# Patient Record
Sex: MALE | Race: WHITE | NOT HISPANIC OR LATINO | Employment: OTHER | URBAN - METROPOLITAN AREA
[De-identification: names, ages, dates, MRNs, and addresses within clinical notes are randomized per-mention and may not be internally consistent; named-entity substitution may affect disease eponyms.]

---

## 2017-01-16 ENCOUNTER — OFFICE VISIT (OUTPATIENT)
Dept: FAMILY MEDICINE | Facility: CLINIC | Age: 67
End: 2017-01-16
Payer: MEDICARE

## 2017-01-16 VITALS
WEIGHT: 249.56 LBS | HEIGHT: 71 IN | HEART RATE: 72 BPM | SYSTOLIC BLOOD PRESSURE: 121 MMHG | TEMPERATURE: 98 F | BODY MASS INDEX: 34.94 KG/M2 | OXYGEN SATURATION: 96 % | DIASTOLIC BLOOD PRESSURE: 79 MMHG

## 2017-01-16 DIAGNOSIS — Z11.59 NEED FOR HEPATITIS C SCREENING TEST: ICD-10-CM

## 2017-01-16 DIAGNOSIS — Z11.59 NEED FOR HEPATITIS C SCREENING TEST: Primary | ICD-10-CM

## 2017-01-16 DIAGNOSIS — I25.10 CORONARY ARTERY DISEASE INVOLVING NATIVE CORONARY ARTERY OF NATIVE HEART WITHOUT ANGINA PECTORIS: ICD-10-CM

## 2017-01-16 DIAGNOSIS — I50.42 CHRONIC COMBINED SYSTOLIC AND DIASTOLIC CHF (CONGESTIVE HEART FAILURE): ICD-10-CM

## 2017-01-16 DIAGNOSIS — E11.9 DIABETES MELLITUS TYPE 2 WITHOUT RETINOPATHY: Primary | ICD-10-CM

## 2017-01-16 DIAGNOSIS — Z79.4 TYPE 2 DIABETES MELLITUS WITH DIABETIC NEUROPATHY, WITH LONG-TERM CURRENT USE OF INSULIN: ICD-10-CM

## 2017-01-16 DIAGNOSIS — E78.5 DYSLIPIDEMIA: ICD-10-CM

## 2017-01-16 DIAGNOSIS — E66.9 OBESITY, CLASS I, BMI 30.0-34.9 (SEE ACTUAL BMI): ICD-10-CM

## 2017-01-16 DIAGNOSIS — E11.40 TYPE 2 DIABETES MELLITUS WITH DIABETIC NEUROPATHY, WITH LONG-TERM CURRENT USE OF INSULIN: ICD-10-CM

## 2017-01-16 PROCEDURE — 99999 PR PBB SHADOW E&M-EST. PATIENT-LVL III: CPT | Mod: PBBFAC,,, | Performed by: FAMILY MEDICINE

## 2017-01-16 PROCEDURE — 99214 OFFICE O/P EST MOD 30 MIN: CPT | Mod: S$PBB,,, | Performed by: FAMILY MEDICINE

## 2017-01-16 PROCEDURE — 99213 OFFICE O/P EST LOW 20 MIN: CPT | Mod: PBBFAC,PO | Performed by: FAMILY MEDICINE

## 2017-01-16 NOTE — MR AVS SNAPSHOT
[address]  [place] [address]  Phone: [phone]  Fax: [phone]                  Clifton Wilson   2017 8:00 AM   Office Visit    Description:  Male : 1950   Provider:  Britton Grissom MD   Department:  [facility]           Reason for Visit     Follow-up           Diagnoses this Visit        Comments    Diabetes mellitus type 2 without retinopathy    -  Primary     Dyslipidemia         Obesity, Class I, BMI 30.0-34.9 (see actual BMI)         Coronary artery disease involving native coronary artery of native heart without angina pectoris         Chronic combined systolic and diastolic CHF (congestive heart failure)         Type 2 diabetes mellitus with diabetic neuropathy, with long-term current use of insulin         Need for hepatitis C screening test                To Do List           Future Appointments        Provider Department Dept Phone    3/20/2017 9:10 AM APPOINTMENT LAB, [place] [facility] [phone]    3/22/2017 9:00 AM Britton Grissom MD [facility] [phone]      Goals (5 Years of Data)     None      Follow-Up and Disposition     Return in about 2 months (around 3/16/2017), or if symptoms worsen or fail to improve.      [facility]     [facility] -  Assistance  Registered nurses in the [facility] provide clinical advisement, health education, appointment booking, and other advisory services.  Call for this free service at [phone].             Medications           Message regarding Medications     Verify the changes and/or additions to your medication regime listed below are the same as discussed with your clinician today.  If any of these changes or additions are incorrect, please notify your healthcare provider.             Verify that the below list of medications is an accurate representation of the medications you are  "currently taking.  If none reported, the list may be blank. If incorrect, please contact your healthcare provider. Carry this list with you in case of emergency.           Current Medications     aspirin (ECOTRIN) 81 MG EC tablet Take 81 mg by mouth once daily.    atorvastatin (LIPITOR) 40 MG tablet Take 1 tablet (40 mg total) by mouth once daily.    blood sugar diagnostic (CONTOUR TEST STRIPS) Strp 200 each by Misc.(Non-Drug; Combo Route) route 4 (four) times daily.    carvedilol (COREG) 12.5 MG tablet Take 1 tablet (12.5 mg total) by mouth 2 (two) times daily.    clopidogrel (PLAVIX) 75 mg tablet TAKE ONE TABLET BY MOUTH ONCE DAILY    furosemide (LASIX) 20 MG tablet Take 1 tablet (20 mg total) by mouth 2 (two) times daily.    gabapentin (NEURONTIN) 300 MG capsule Take 2 capsules (600 mg total) by mouth 2 (two) times daily.    insulin aspart (NOVOLOG FLEXPEN) 100 unit/mL InPn pen Inject 20 Units into the skin 2 (two) times daily before meals.    insulin glargine (LANTUS SOLOSTAR) 100 unit/mL (3 mL) InPn pen Inject 20 Units into the skin every evening.    losartan (COZAAR) 25 MG tablet Take 1 tablet (25 mg total) by mouth once daily.    metformin (GLUCOPHAGE) 1000 MG tablet Take 1 tablet (1,000 mg total) by mouth 2 (two) times daily with meals.    nitroGLYCERIN (NITROSTAT) 0.4 MG SL tablet Place 1 tablet (0.4 mg total) under the tongue every 5 (five) minutes as needed for Chest pain.    pen needle, diabetic 32 gauge x 1/5" Ndle Use 4 times/day for insulin administration    zolpidem (AMBIEN) 5 MG Tab Take 1 tablet (5 mg total) by mouth nightly as needed.           Clinical Reference Information           Vital Signs - Last Recorded  Most recent update: 1/16/2017  8:19 AM by Tia Enriquez LPN    BP Pulse Temp Ht Wt SpO2    121/79 (BP Location: Left arm, Patient Position: Sitting, BP Method: Automatic) 72 98 °F (36.7 °C) (Oral) 5' 11" (1.803 m) 113.2 kg (249 lb 9 oz) 96%    BMI                34.81 kg/m2        "   Blood Pressure          Most Recent Value    BP  121/79      Allergies as of 1/16/2017     No Known Allergies      Immunizations Administered on Date of Encounter - 1/16/2017     None      Orders Placed During Today's Visit     Future Labs/Procedures Expected by Expires    Comprehensive metabolic panel  1/16/2017 3/17/2018    Hemoglobin A1c  1/16/2017 3/17/2018    Hepatitis C antibody  1/16/2017 3/17/2018    Lipid panel  1/16/2017 3/17/2018

## 2017-01-16 NOTE — PROGRESS NOTES
Subjective:       Patient ID: Clifton Wilson is a 66 y.o. male.    Chief Complaint: Follow-up    HPI Comments: 66 yr old pleasant white male with DM II, HTN, HLD, CAD, Combined chronic CHF, MR, obesity, neuropathy, presents today for diabetes.      DM II - improving - HGBA1C                   7.2 (H)             12/22/2016                      - on metformin and insulin and sugars improving - - due for eye exam - foot exam UTD - on ASA and plavix. Losartan      HTN - chronic - controlled - on losartan, lasix - compliant - no side effects      HLD - chronic -   LDLCALC                  62.6 (L)            12/22/2016                    - controlled -       CAD/combined CHF - EF 25 - on external defibrillator - medial management - on ASA/plavix/ARB - no symptoms - following cardiology      History as below - reviewed            Diabetes   He presents for his follow-up diabetic visit. He has type 2 diabetes mellitus. His disease course has been worsening. Pertinent negatives for hypoglycemia include no dizziness, nervousness/anxiousness, speech difficulty or sweats. Pertinent negatives for diabetes include no chest pain, no polydipsia, no polyuria and no weakness. Pertinent negatives for hypoglycemia complications include no blackouts, no hospitalization, no nocturnal hypoglycemia, no required assistance and no required glucagon injection. Symptoms are stable. There are no diabetic complications. Pertinent negatives for diabetic complications include no autonomic neuropathy, CVA, impotence, peripheral neuropathy, PVD or retinopathy. Risk factors for coronary artery disease include diabetes mellitus, dyslipidemia, hypertension and obesity. Current diabetic treatment includes oral agent (monotherapy). He is compliant with treatment all of the time. He is following a diabetic diet. Meal planning includes avoidance of concentrated sweets. He rarely participates in exercise. An ACE inhibitor/angiotensin II receptor blocker  is being taken. He does not see a podiatrist.Eye exam is not current.   Hypertension   This is a chronic problem. The current episode started more than 1 year ago. The problem has been gradually improving since onset. The problem is controlled. Pertinent negatives include no chest pain, malaise/fatigue, neck pain, palpitations, peripheral edema, PND or sweats. Risk factors for coronary artery disease include diabetes mellitus, dyslipidemia, male gender and obesity. Past treatments include angiotensin blockers and diuretics. The current treatment provides significant improvement. There are no compliance problems.  Hypertensive end-organ damage includes CAD/MI and heart failure. There is no history of CVA, left ventricular hypertrophy, PVD, renovascular disease, retinopathy or a thyroid problem. There is no history of chronic renal disease, hypercortisolism, hyperparathyroidism or pheochromocytoma.   Hyperlipidemia   This is a chronic problem. The current episode started more than 1 year ago. The problem is controlled. Recent lipid tests were reviewed and are normal. Exacerbating diseases include obesity. He has no history of chronic renal disease or diabetes. There are no known factors aggravating his hyperlipidemia. Pertinent negatives include no chest pain, focal sensory loss or myalgias. Current antihyperlipidemic treatment includes statins. The current treatment provides moderate improvement of lipids. There are no compliance problems.  Risk factors for coronary artery disease include diabetes mellitus, dyslipidemia, male sex, hypertension and obesity.     Review of Systems   Constitutional: Negative.  Negative for activity change, diaphoresis, malaise/fatigue and unexpected weight change.   HENT: Negative.  Negative for congestion, ear pain, mouth sores, rhinorrhea and voice change.    Eyes: Negative.  Negative for pain, discharge and visual disturbance.   Respiratory: Negative.  Negative for apnea, cough and  wheezing.    Cardiovascular: Negative.  Negative for chest pain, palpitations and PND.   Gastrointestinal: Negative.  Negative for abdominal distention, anal bleeding, diarrhea and vomiting.   Endocrine: Negative.  Negative for cold intolerance, polydipsia and polyuria.   Genitourinary: Negative.  Negative for decreased urine volume, difficulty urinating, discharge, frequency, impotence and scrotal swelling.   Musculoskeletal: Negative.  Negative for back pain, myalgias, neck pain and neck stiffness.   Skin: Negative.  Negative for color change and rash.   Allergic/Immunologic: Negative.  Negative for environmental allergies and immunocompromised state.   Neurological: Negative.  Negative for dizziness, speech difficulty, weakness and light-headedness.   Hematological: Negative.    Psychiatric/Behavioral: Negative.  Negative for agitation, dysphoric mood and suicidal ideas. The patient is not nervous/anxious.        PMH/PSH/FH/SH/MED/ALLERGY reviewed    Objective:       Vitals:    01/16/17 0816   BP: 121/79   Pulse: 72   Temp: 98 °F (36.7 °C)       Physical Exam   Constitutional: He is oriented to person, place, and time. He appears well-developed and well-nourished.   HENT:   Head: Normocephalic and atraumatic.   Right Ear: External ear normal.   Left Ear: External ear normal.   Nose: Nose normal.   Mouth/Throat: Oropharynx is clear and moist. No oropharyngeal exudate.   Eyes: Conjunctivae and EOM are normal. Pupils are equal, round, and reactive to light. Right eye exhibits no discharge. Left eye exhibits no discharge. No scleral icterus.   Neck: Normal range of motion. Neck supple. No JVD present. No tracheal deviation present. No thyromegaly present.   Cardiovascular: Normal rate, regular rhythm, normal heart sounds and intact distal pulses.  Exam reveals no gallop and no friction rub.    No murmur heard.  Pulmonary/Chest: Effort normal and breath sounds normal. No stridor. No respiratory distress. He has no  wheezes. He has no rales. He exhibits no tenderness.   Abdominal: Soft. Bowel sounds are normal. He exhibits no distension and no mass. There is no tenderness. There is no rebound and no guarding. No hernia.   Musculoskeletal: Normal range of motion. He exhibits no edema or tenderness.   Lymphadenopathy:     He has no cervical adenopathy.   Neurological: He is alert and oriented to person, place, and time. He has normal reflexes. He displays normal reflexes. No cranial nerve deficit. He exhibits normal muscle tone. Coordination normal.   Skin: Skin is warm and dry. No rash noted. No erythema. No pallor.   Psychiatric: He has a normal mood and affect. His behavior is normal. Judgment and thought content normal.       Protective Sensation (w/ 10 gram monofilament):  Right: Decreased  Left: Decreased    Visual Inspection:  Normal -  Bilateral    Pedal Pulses:   Right: Present  Left: Present    Posterior tibialis:   Right:Present  Left: Present      Assessment:       1. Diabetes mellitus type 2 without retinopathy    2. Dyslipidemia    3. Obesity, Class I, BMI 30.0-34.9 (see actual BMI)    4. Coronary artery disease involving native coronary artery of native heart without angina pectoris    5. Chronic combined systolic and diastolic CHF (congestive heart failure)    6. Type 2 diabetes mellitus with diabetic neuropathy, with long-term current use of insulin    7. Need for hepatitis C screening test        Plan:       Clifton was seen today for follow-up.    Diagnoses and all orders for this visit:    Diabetes mellitus type 2 without retinopathy  -     Hemoglobin A1c; Future  -     Comprehensive metabolic panel; Future  -     Lipid panel; Future    Dyslipidemia  -     Comprehensive metabolic panel; Future  -     Lipid panel; Future    Obesity, Class I, BMI 30.0-34.9 (see actual BMI)    Coronary artery disease involving native coronary artery of native heart without angina pectoris  -     Comprehensive metabolic panel;  Future  -     Lipid panel; Future    Chronic combined systolic and diastolic CHF (congestive heart failure)    Type 2 diabetes mellitus with diabetic neuropathy, with long-term current use of insulin  -     Lipid panel; Future    Need for hepatitis C screening test  -     Hepatitis C antibody; Future      DM II controlled/hyperglycemia  -reports improving since started insulin  -lantus and novolog to continue  -strict diet control    HTN  -controlled    HLD  -controlled    Combined CHF  -follows cardiology  -on external defibrillator    Neuropathy  -continue neurontin and increase dose to 600 mg BID    Obesity  -diet and exercise as tolerated    Spent adequate time in obtaining history and explaining differentials    40 minutes spent during this visit of which greater than 50% devoted to face-face counseling and coordination of care regarding diagnosis and management plan    RTC 2 months

## 2017-02-18 DIAGNOSIS — E11.40 TYPE 2 DIABETES MELLITUS WITH DIABETIC NEUROPATHY: ICD-10-CM

## 2017-02-20 ENCOUNTER — TELEPHONE (OUTPATIENT)
Dept: CARDIOLOGY | Facility: CLINIC | Age: 67
End: 2017-02-20

## 2017-02-20 RX ORDER — CLOPIDOGREL BISULFATE 75 MG/1
TABLET ORAL
Qty: 90 TABLET | Refills: 0 | Status: SHIPPED | OUTPATIENT
Start: 2017-02-20 | End: 2017-05-31 | Stop reason: SDUPTHER

## 2017-02-20 RX ORDER — LOSARTAN POTASSIUM 25 MG/1
TABLET ORAL
Qty: 90 TABLET | Refills: 0 | Status: SHIPPED | OUTPATIENT
Start: 2017-02-20 | End: 2017-04-10 | Stop reason: SDUPTHER

## 2017-02-20 RX ORDER — CARVEDILOL 6.25 MG/1
TABLET ORAL
Qty: 180 TABLET | Refills: 0 | Status: SHIPPED | OUTPATIENT
Start: 2017-02-20 | End: 2017-03-22 | Stop reason: SDUPTHER

## 2017-02-20 RX ORDER — METFORMIN HYDROCHLORIDE 1000 MG/1
TABLET ORAL
Qty: 180 TABLET | Refills: 0 | Status: SHIPPED | OUTPATIENT
Start: 2017-02-20 | End: 2017-06-20 | Stop reason: SDUPTHER

## 2017-02-26 ENCOUNTER — PATIENT MESSAGE (OUTPATIENT)
Dept: CARDIOLOGY | Facility: CLINIC | Age: 67
End: 2017-02-26

## 2017-03-02 ENCOUNTER — PATIENT MESSAGE (OUTPATIENT)
Dept: CARDIOLOGY | Facility: CLINIC | Age: 67
End: 2017-03-02

## 2017-03-08 ENCOUNTER — PATIENT MESSAGE (OUTPATIENT)
Dept: CARDIOLOGY | Facility: CLINIC | Age: 67
End: 2017-03-08

## 2017-03-08 DIAGNOSIS — I50.43 SYSTOLIC AND DIASTOLIC CHF, ACUTE ON CHRONIC: Primary | ICD-10-CM

## 2017-03-10 ENCOUNTER — PATIENT MESSAGE (OUTPATIENT)
Dept: CARDIOLOGY | Facility: CLINIC | Age: 67
End: 2017-03-10

## 2017-03-10 RX ORDER — TADALAFIL 10 MG/1
10 TABLET ORAL DAILY PRN
Qty: 30 TABLET | Refills: 3 | Status: SHIPPED | OUTPATIENT
Start: 2017-03-10 | End: 2017-04-10

## 2017-03-20 ENCOUNTER — LAB VISIT (OUTPATIENT)
Dept: LAB | Facility: HOSPITAL | Age: 67
End: 2017-03-20
Attending: FAMILY MEDICINE
Payer: MEDICARE

## 2017-03-20 DIAGNOSIS — E11.40 TYPE 2 DIABETES MELLITUS WITH DIABETIC NEUROPATHY, WITH LONG-TERM CURRENT USE OF INSULIN: ICD-10-CM

## 2017-03-20 DIAGNOSIS — Z79.4 TYPE 2 DIABETES MELLITUS WITH DIABETIC NEUROPATHY, WITH LONG-TERM CURRENT USE OF INSULIN: ICD-10-CM

## 2017-03-20 DIAGNOSIS — Z11.59 NEED FOR HEPATITIS C SCREENING TEST: ICD-10-CM

## 2017-03-20 DIAGNOSIS — I25.10 CORONARY ARTERY DISEASE INVOLVING NATIVE CORONARY ARTERY OF NATIVE HEART WITHOUT ANGINA PECTORIS: ICD-10-CM

## 2017-03-20 DIAGNOSIS — E11.9 DIABETES MELLITUS TYPE 2 WITHOUT RETINOPATHY: ICD-10-CM

## 2017-03-20 DIAGNOSIS — E78.5 DYSLIPIDEMIA: ICD-10-CM

## 2017-03-20 LAB
ALBUMIN SERPL BCP-MCNC: 3.8 G/DL
ALP SERPL-CCNC: 63 U/L
ALT SERPL W/O P-5'-P-CCNC: 23 U/L
ANION GAP SERPL CALC-SCNC: 11 MMOL/L
AST SERPL-CCNC: 15 U/L
BILIRUB SERPL-MCNC: 1.1 MG/DL
BUN SERPL-MCNC: 14 MG/DL
CALCIUM SERPL-MCNC: 9 MG/DL
CHLORIDE SERPL-SCNC: 108 MMOL/L
CHOLEST/HDLC SERPL: 4.1 {RATIO}
CO2 SERPL-SCNC: 22 MMOL/L
CREAT SERPL-MCNC: 0.9 MG/DL
EST. GFR  (AFRICAN AMERICAN): >60 ML/MIN/1.73 M^2
EST. GFR  (NON AFRICAN AMERICAN): >60 ML/MIN/1.73 M^2
GLUCOSE SERPL-MCNC: 149 MG/DL
HDL/CHOLESTEROL RATIO: 24.4 %
HDLC SERPL-MCNC: 135 MG/DL
HDLC SERPL-MCNC: 33 MG/DL
LDLC SERPL CALC-MCNC: 64 MG/DL
NONHDLC SERPL-MCNC: 102 MG/DL
POTASSIUM SERPL-SCNC: 4.2 MMOL/L
PROT SERPL-MCNC: 7.4 G/DL
SODIUM SERPL-SCNC: 141 MMOL/L
TRIGL SERPL-MCNC: 190 MG/DL

## 2017-03-20 PROCEDURE — 80061 LIPID PANEL: CPT

## 2017-03-20 PROCEDURE — 80053 COMPREHEN METABOLIC PANEL: CPT

## 2017-03-20 PROCEDURE — 36415 COLL VENOUS BLD VENIPUNCTURE: CPT

## 2017-03-20 PROCEDURE — 86803 HEPATITIS C AB TEST: CPT

## 2017-03-20 PROCEDURE — 83036 HEMOGLOBIN GLYCOSYLATED A1C: CPT

## 2017-03-21 LAB
ESTIMATED AVG GLUCOSE: 154 MG/DL
HBA1C MFR BLD HPLC: 7 %
HCV AB SERPL QL IA: NEGATIVE

## 2017-03-22 ENCOUNTER — HOSPITAL ENCOUNTER (OUTPATIENT)
Dept: CARDIOLOGY | Facility: HOSPITAL | Age: 67
Discharge: HOME | End: 2017-03-22
Attending: INTERNAL MEDICINE
Payer: MEDICARE

## 2017-03-22 ENCOUNTER — OFFICE VISIT (OUTPATIENT)
Dept: FAMILY MEDICINE | Facility: CLINIC | Age: 67
End: 2017-03-22
Payer: MEDICARE

## 2017-03-22 VITALS
SYSTOLIC BLOOD PRESSURE: 120 MMHG | OXYGEN SATURATION: 96 % | DIASTOLIC BLOOD PRESSURE: 79 MMHG | HEIGHT: 71 IN | WEIGHT: 257.06 LBS | HEART RATE: 73 BPM | BODY MASS INDEX: 35.99 KG/M2

## 2017-03-22 DIAGNOSIS — I25.10 CORONARY ARTERY DISEASE INVOLVING NATIVE CORONARY ARTERY OF NATIVE HEART WITHOUT ANGINA PECTORIS: ICD-10-CM

## 2017-03-22 DIAGNOSIS — Z79.4 TYPE 2 DIABETES MELLITUS WITH DIABETIC NEUROPATHY, WITH LONG-TERM CURRENT USE OF INSULIN: ICD-10-CM

## 2017-03-22 DIAGNOSIS — E78.5 DYSLIPIDEMIA: ICD-10-CM

## 2017-03-22 DIAGNOSIS — I50.42 CHRONIC COMBINED SYSTOLIC AND DIASTOLIC CHF (CONGESTIVE HEART FAILURE): ICD-10-CM

## 2017-03-22 DIAGNOSIS — I50.43 SYSTOLIC AND DIASTOLIC CHF, ACUTE ON CHRONIC: ICD-10-CM

## 2017-03-22 DIAGNOSIS — E66.9 OBESITY (BMI 35.0-39.9 WITHOUT COMORBIDITY): ICD-10-CM

## 2017-03-22 DIAGNOSIS — E11.40 TYPE 2 DIABETES MELLITUS WITH DIABETIC NEUROPATHY, WITH LONG-TERM CURRENT USE OF INSULIN: ICD-10-CM

## 2017-03-22 DIAGNOSIS — E11.9 DIABETES MELLITUS TYPE 2 WITHOUT RETINOPATHY: Primary | ICD-10-CM

## 2017-03-22 DIAGNOSIS — I10 ESSENTIAL HYPERTENSION: ICD-10-CM

## 2017-03-22 DIAGNOSIS — L02.11 ABSCESS, NECK: ICD-10-CM

## 2017-03-22 LAB
DIASTOLIC DYSFUNCTION: YES
ESTIMATED PA SYSTOLIC PRESSURE: 11.92
GLOBAL PERICARDIAL EFFUSION: ABNORMAL
RETIRED EF AND QEF - SEE NOTES: 25 (ref 55–65)

## 2017-03-22 PROCEDURE — 93306 TTE W/DOPPLER COMPLETE: CPT

## 2017-03-22 PROCEDURE — 99214 OFFICE O/P EST MOD 30 MIN: CPT | Mod: S$PBB,,, | Performed by: FAMILY MEDICINE

## 2017-03-22 PROCEDURE — 99214 OFFICE O/P EST MOD 30 MIN: CPT | Mod: PBBFAC,PO | Performed by: FAMILY MEDICINE

## 2017-03-22 PROCEDURE — 93306 TTE W/DOPPLER COMPLETE: CPT | Mod: 26,,, | Performed by: INTERNAL MEDICINE

## 2017-03-22 PROCEDURE — 99999 PR PBB SHADOW E&M-EST. PATIENT-LVL IV: CPT | Mod: PBBFAC,,, | Performed by: FAMILY MEDICINE

## 2017-03-22 RX ORDER — SULFAMETHOXAZOLE AND TRIMETHOPRIM 800; 160 MG/1; MG/1
1 TABLET ORAL 2 TIMES DAILY
Qty: 20 TABLET | Refills: 0 | Status: SHIPPED | OUTPATIENT
Start: 2017-03-22 | End: 2017-04-01

## 2017-03-22 NOTE — MR AVS SNAPSHOT
[address]  [place] [address]  Phone: [phone]  Fax: [phone]                  Clifton Wilson   3/22/2017 9:00 AM   Office Visit    Description:  Male : 1950   Provider:  Britton Grissom MD   Department:  [facility]           Reason for Visit     Follow-up     Eye Exam     Colonoscopy           Diagnoses this Visit        Comments    Diabetes mellitus type 2 without retinopathy    -  Primary     Obesity (BMI 35.0-39.9 without comorbidity)         Essential hypertension         Type 2 diabetes mellitus with diabetic neuropathy, with long-term current use of insulin         Chronic combined systolic and diastolic CHF (congestive heart failure)         Coronary artery disease involving native coronary artery of native heart without angina pectoris         Dyslipidemia         Abscess, neck                To Do List           Future Appointments        Provider Department Dept Phone    3/22/2017 11:00 AM CARDIOLOGY, [facility] [phone]      Goals (5 Years of Data)     None      Follow-Up and Disposition     Return in about 3 months (around 2017), or if symptoms worsen or fail to improve.       These Medications        Disp Refills Start End    sulfamethoxazole-trimethoprim 800-160mg (BACTRIM DS) 800-160 mg Tab 20 tablet 0 3/22/2017 2017    Take 1 tablet by mouth 2 (two) times daily. - Oral    Pharmacy: [facility] [address] [place] [address] Ph #: [phone]         [facility]     [facility] Care [facility] -  Assistance  Registered nurses in the [facility] provide clinical advisement, health education, appointment booking, and other advisory services.  Call for this free service at [phone].             Medications           Message regarding Medications     Verify the changes and/or additions to your medication regime listed  "below are the same as discussed with your clinician today.  If any of these changes or additions are incorrect, please notify your healthcare provider.        START taking these NEW medications        Refills    sulfamethoxazole-trimethoprim 800-160mg (BACTRIM DS) 800-160 mg Tab 0    Sig: Take 1 tablet by mouth 2 (two) times daily.    Class: Normal    Route: Oral           Verify that the below list of medications is an accurate representation of the medications you are currently taking.  If none reported, the list may be blank. If incorrect, please contact your healthcare provider. Carry this list with you in case of emergency.           Current Medications     aspirin (ECOTRIN) 81 MG EC tablet Take 81 mg by mouth once daily.    atorvastatin (LIPITOR) 40 MG tablet Take 1 tablet (40 mg total) by mouth once daily.    blood sugar diagnostic (CONTOUR TEST STRIPS) Strp 200 each by Misc.(Non-Drug; Combo Route) route 4 (four) times daily.    carvedilol (COREG) 12.5 MG tablet Take 1 tablet (12.5 mg total) by mouth 2 (two) times daily.    clopidogrel (PLAVIX) 75 mg tablet TAKE ONE TABLET BY MOUTH ONCE DAILY    furosemide (LASIX) 20 MG tablet Take 1 tablet (20 mg total) by mouth 2 (two) times daily.    gabapentin (NEURONTIN) 300 MG capsule Take 2 capsules (600 mg total) by mouth 2 (two) times daily.    insulin aspart (NOVOLOG FLEXPEN) 100 unit/mL InPn pen Inject 20 Units into the skin 2 (two) times daily before meals.    insulin glargine (LANTUS SOLOSTAR) 100 unit/mL (3 mL) InPn pen Inject 20 Units into the skin every evening.    losartan (COZAAR) 25 MG tablet TAKE ONE TABLET BY MOUTH ONCE DAILY    metformin (GLUCOPHAGE) 1000 MG tablet TAKE ONE TABLET BY MOUTH TWICE DAILY WITH MEALS    nitroGLYCERIN (NITROSTAT) 0.4 MG SL tablet Place 1 tablet (0.4 mg total) under the tongue every 5 (five) minutes as needed for Chest pain.    pen needle, diabetic 32 gauge x 1/5" Ndle Use 4 times/day for insulin administration    tadalafil " "(CIALIS) 10 MG tablet Take 1 tablet (10 mg total) by mouth daily as needed for Erectile Dysfunction.    zolpidem (AMBIEN) 5 MG Tab Take 1 tablet (5 mg total) by mouth nightly as needed.    sulfamethoxazole-trimethoprim 800-160mg (BACTRIM DS) 800-160 mg Tab Take 1 tablet by mouth 2 (two) times daily.           Clinical Reference Information           Your Vitals Were     BP Pulse Height Weight SpO2 BMI    120/79 73 5' 11" (1.803 m) 116.6 kg (257 lb 0.9 oz) 96% 35.85 kg/m2      Blood Pressure          Most Recent Value    BP  120/79      Allergies as of 3/22/2017     No Known Allergies      Immunizations Administered on Date of Encounter - 3/22/2017     None      Orders Placed During Today's Visit      Normal Orders This Visit    Ambulatory referral to Ophthalmology       Language Assistance Services     ATTENTION: Language assistance services are available, free of charge. Please call [phone].      ATENCIÓN: Si habla matilde, tiene a page disposición servicios gratuitos de asistencia lingüística. Llame al [phone].     ALESSANDRO Ý: N?u b?n nói Ti?ng Vi?t, có các d?ch v? h? tr? ngôn ng? mi?n phí clarkeh cho b?n. G?i s? [phone].         [facility] complies with applicable Federal civil rights laws and does not discriminate on the basis of race, color, national origin, age, disability, or sex.        "

## 2017-03-22 NOTE — PROGRESS NOTES
Subjective:       Patient ID: Clifton Wilson is a 66 y.o. male.    Chief Complaint: Follow-up; Eye Exam; and Colonoscopy    HPI Comments: 66 yr old pleasant white male with DM II, HTN, HLD, CAD, Combined chronic CHF, MR, obesity, neuropathy, presents today for diabetes. He also c/o abscess neck with discharge.      DM II - improving -  HGBA1C                   7.0 (H)             03/20/2017                              - on metformin and insulin and sugars improving - - due for eye exam - foot exam UTD - on ASA and plavix. Losartan      HTN - chronic - controlled - on losartan, lasix - compliant - no side effects      HLD - chronic -  LDLCALC                  64.0                03/20/2017                       - controlled -       CAD/combined CHF - EF 25 - on external defibrillator - medial management - on ASA/plavix/ARB - no symptoms - following cardiology      History as below - reviewed            Diabetes   He presents for his follow-up diabetic visit. He has type 2 diabetes mellitus. His disease course has been worsening. Pertinent negatives for hypoglycemia include no dizziness, nervousness/anxiousness, speech difficulty or sweats. Pertinent negatives for diabetes include no chest pain, no polydipsia, no polyuria and no weakness. Pertinent negatives for hypoglycemia complications include no blackouts, no hospitalization, no nocturnal hypoglycemia, no required assistance and no required glucagon injection. Symptoms are stable. There are no diabetic complications. Pertinent negatives for diabetic complications include no autonomic neuropathy, CVA, impotence, peripheral neuropathy, PVD or retinopathy. Risk factors for coronary artery disease include diabetes mellitus, dyslipidemia, hypertension and obesity. Current diabetic treatment includes oral agent (monotherapy). He is compliant with treatment all of the time. He is following a diabetic diet. Meal planning includes avoidance of concentrated sweets. He  rarely participates in exercise. An ACE inhibitor/angiotensin II receptor blocker is being taken. He does not see a podiatrist.Eye exam is not current.   Hypertension   This is a chronic problem. The current episode started more than 1 year ago. The problem has been gradually improving since onset. The problem is controlled. Pertinent negatives include no chest pain, malaise/fatigue, neck pain, palpitations, peripheral edema, PND or sweats. Risk factors for coronary artery disease include diabetes mellitus, dyslipidemia, male gender and obesity. Past treatments include angiotensin blockers and diuretics. The current treatment provides significant improvement. There are no compliance problems.  Hypertensive end-organ damage includes CAD/MI and heart failure. There is no history of CVA, left ventricular hypertrophy, PVD, renovascular disease, retinopathy or a thyroid problem. There is no history of chronic renal disease, hypercortisolism, hyperparathyroidism or pheochromocytoma.   Hyperlipidemia   This is a chronic problem. The current episode started more than 1 year ago. The problem is controlled. Recent lipid tests were reviewed and are normal. Exacerbating diseases include obesity. He has no history of chronic renal disease or diabetes. There are no known factors aggravating his hyperlipidemia. Pertinent negatives include no chest pain, focal sensory loss or myalgias. Current antihyperlipidemic treatment includes statins. The current treatment provides moderate improvement of lipids. There are no compliance problems.  Risk factors for coronary artery disease include diabetes mellitus, dyslipidemia, male sex, hypertension and obesity.   Abscess   Chronicity:  Recurrent  Size:  Less than 2 cm  Location:  Head/neck  Associated Symptoms: no fever, no chills, no sweats  Characteristics: painful, redness and swelling    Characteristics: not draining, no itching and no blistering    Pain Scale:  3/10  Relieved by:   Nothing  Worsened by:  Nothing    Review of Systems   Constitutional: Negative.  Negative for activity change, chills, diaphoresis, fever, malaise/fatigue and unexpected weight change.   HENT: Negative.  Negative for congestion, ear pain, mouth sores, rhinorrhea and voice change.    Eyes: Negative.  Negative for pain, discharge and visual disturbance.   Respiratory: Negative.  Negative for apnea, cough and wheezing.    Cardiovascular: Negative.  Negative for chest pain, palpitations and PND.   Gastrointestinal: Negative.  Negative for abdominal distention, anal bleeding, diarrhea and vomiting.   Endocrine: Negative.  Negative for cold intolerance, polydipsia and polyuria.   Genitourinary: Negative.  Negative for decreased urine volume, difficulty urinating, discharge, frequency, impotence and scrotal swelling.   Musculoskeletal: Negative.  Negative for back pain, myalgias, neck pain and neck stiffness.   Skin: Negative.  Negative for color change and rash.        Cyst on neck   Allergic/Immunologic: Negative.  Negative for environmental allergies and immunocompromised state.   Neurological: Negative.  Negative for dizziness, speech difficulty, weakness and light-headedness.   Hematological: Negative.    Psychiatric/Behavioral: Negative.  Negative for agitation, dysphoric mood and suicidal ideas. The patient is not nervous/anxious.        PMH/PSH/FH/SH/MED/ALLERGY reviewed    Objective:       Vitals:    03/22/17 0904   BP: 120/79   Pulse: 73       Physical Exam   Constitutional: He is oriented to person, place, and time. He appears well-developed and well-nourished.   HENT:   Head: Normocephalic and atraumatic.   Right Ear: External ear normal.   Left Ear: External ear normal.   Nose: Nose normal.   Mouth/Throat: Oropharynx is clear and moist. No oropharyngeal exudate.   Eyes: Conjunctivae and EOM are normal. Pupils are equal, round, and reactive to light. Right eye exhibits no discharge. Left eye exhibits no  discharge. No scleral icterus.   Neck: Normal range of motion. Neck supple. No JVD present. No tracheal deviation present. No thyromegaly present.   1 cm indurated, raised abscess left side of posterior neck with central opening and draining purulent material   Cardiovascular: Normal rate, regular rhythm, normal heart sounds and intact distal pulses.  Exam reveals no gallop and no friction rub.    No murmur heard.  Pulmonary/Chest: Effort normal and breath sounds normal. No stridor. No respiratory distress. He has no wheezes. He has no rales. He exhibits no tenderness.   Abdominal: Soft. Bowel sounds are normal. He exhibits no distension and no mass. There is no tenderness. There is no rebound and no guarding. No hernia.   Musculoskeletal: Normal range of motion. He exhibits no edema or tenderness.   Lymphadenopathy:     He has no cervical adenopathy.   Neurological: He is alert and oriented to person, place, and time. He has normal reflexes. He displays normal reflexes. No cranial nerve deficit. He exhibits normal muscle tone. Coordination normal.   Skin: Skin is warm and dry. No rash noted. No erythema. No pallor.   Psychiatric: He has a normal mood and affect. His behavior is normal. Judgment and thought content normal.       Assessment:       1. Diabetes mellitus type 2 without retinopathy    2. Obesity (BMI 35.0-39.9 without comorbidity)    3. Essential hypertension    4. Type 2 diabetes mellitus with diabetic neuropathy, with long-term current use of insulin    5. Chronic combined systolic and diastolic CHF (congestive heart failure)    6. Coronary artery disease involving native coronary artery of native heart without angina pectoris    7. Dyslipidemia    8. Abscess, neck        Plan:       Clifton was seen today for follow-up, eye exam and colonoscopy.    Diagnoses and all orders for this visit:    Diabetes mellitus type 2 without retinopathy  -     Ambulatory referral to Ophthalmology    Obesity (BMI  35.0-39.9 without comorbidity)    Essential hypertension    Type 2 diabetes mellitus with diabetic neuropathy, with long-term current use of insulin    Chronic combined systolic and diastolic CHF (congestive heart failure)    Coronary artery disease involving native coronary artery of native heart without angina pectoris    Dyslipidemia    Abscess, neck  -     sulfamethoxazole-trimethoprim 800-160mg (BACTRIM DS) 800-160 mg Tab; Take 1 tablet by mouth 2 (two) times daily.        DM II controlled/hyperglycemia  -reports improving since started insulin  -lantus and novolog to continue  -strict diet control    HTN  -controlled    HLD  -controlled    Combined CHF  -follows cardiology  -on external defibrillator    Neuropathy  -continue neurontin and increase dose to 600 mg BID    Obesity  -diet and exercise as tolerated    Abscess neck  -bactrim x 10 days  -warm compresses    Spent adequate time in obtaining history and explaining differentials    40 minutes spent during this visit of which greater than 50% devoted to face-face counseling and coordination of care regarding diagnosis and management plan      Return in about 3 months (around 6/22/2017), or if symptoms worsen or fail to improve.

## 2017-03-24 ENCOUNTER — PATIENT MESSAGE (OUTPATIENT)
Dept: CARDIOLOGY | Facility: CLINIC | Age: 67
End: 2017-03-24

## 2017-03-26 DIAGNOSIS — E11.40 TYPE 2 DIABETES MELLITUS WITH DIABETIC NEUROPATHY: ICD-10-CM

## 2017-03-26 DIAGNOSIS — R73.9 HYPERGLYCEMIA: ICD-10-CM

## 2017-03-26 DIAGNOSIS — G62.9 NEUROPATHY: ICD-10-CM

## 2017-03-26 DIAGNOSIS — E11.40 TYPE 2 DIABETES MELLITUS WITH DIABETIC NEUROPATHY, WITH LONG-TERM CURRENT USE OF INSULIN: ICD-10-CM

## 2017-03-26 DIAGNOSIS — Z79.4 TYPE 2 DIABETES MELLITUS WITH DIABETIC NEUROPATHY, WITH LONG-TERM CURRENT USE OF INSULIN: ICD-10-CM

## 2017-03-26 RX ORDER — GABAPENTIN 300 MG/1
CAPSULE ORAL
Qty: 360 CAPSULE | Refills: 0 | Status: SHIPPED | OUTPATIENT
Start: 2017-03-26 | End: 2017-04-10 | Stop reason: SDUPTHER

## 2017-03-27 RX ORDER — GABAPENTIN 300 MG/1
600 CAPSULE ORAL 2 TIMES DAILY
Qty: 360 CAPSULE | Refills: 3 | Status: SHIPPED | OUTPATIENT
Start: 2017-03-27 | End: 2017-08-28 | Stop reason: SDUPTHER

## 2017-03-27 RX ORDER — INSULIN ASPART 100 [IU]/ML
20 INJECTION, SOLUTION INTRAVENOUS; SUBCUTANEOUS
Qty: 3 ML | Refills: 10 | Status: SHIPPED | OUTPATIENT
Start: 2017-03-27 | End: 2017-08-28 | Stop reason: SDUPTHER

## 2017-03-27 NOTE — TELEPHONE ENCOUNTER
Patient would like to know if their is anything he should be doing different now that he is in stage 3 of his cardiac rehab?

## 2017-03-28 ENCOUNTER — TELEPHONE (OUTPATIENT)
Dept: CARDIOLOGY | Facility: CLINIC | Age: 67
End: 2017-03-28

## 2017-04-06 DIAGNOSIS — R00.2 PALPITATION: Primary | ICD-10-CM

## 2017-04-10 ENCOUNTER — TELEPHONE (OUTPATIENT)
Dept: DERMATOLOGY | Facility: CLINIC | Age: 67
End: 2017-04-10

## 2017-04-10 ENCOUNTER — OFFICE VISIT (OUTPATIENT)
Dept: CARDIOLOGY | Facility: CLINIC | Age: 67
End: 2017-04-10
Payer: MEDICARE

## 2017-04-10 ENCOUNTER — OFFICE VISIT (OUTPATIENT)
Dept: OPTOMETRY | Facility: CLINIC | Age: 67
End: 2017-04-10
Payer: MEDICARE

## 2017-04-10 VITALS
HEART RATE: 75 BPM | BODY MASS INDEX: 36.26 KG/M2 | WEIGHT: 259 LBS | SYSTOLIC BLOOD PRESSURE: 133 MMHG | HEIGHT: 71 IN | DIASTOLIC BLOOD PRESSURE: 80 MMHG

## 2017-04-10 DIAGNOSIS — E11.9 DIABETES MELLITUS TYPE 2 WITHOUT RETINOPATHY: Primary | ICD-10-CM

## 2017-04-10 DIAGNOSIS — Z96.1: ICD-10-CM

## 2017-04-10 DIAGNOSIS — I10 ESSENTIAL HYPERTENSION: ICD-10-CM

## 2017-04-10 DIAGNOSIS — I25.5 CARDIOMYOPATHY, ISCHEMIC: ICD-10-CM

## 2017-04-10 DIAGNOSIS — R00.2 PALPITATION: ICD-10-CM

## 2017-04-10 DIAGNOSIS — H26.493 BILATERAL POSTERIOR CAPSULAR OPACIFICATION: ICD-10-CM

## 2017-04-10 DIAGNOSIS — I50.42 CHRONIC COMBINED SYSTOLIC AND DIASTOLIC CHF (CONGESTIVE HEART FAILURE): Primary | ICD-10-CM

## 2017-04-10 PROCEDURE — 93010 ELECTROCARDIOGRAM REPORT: CPT | Mod: S$PBB,,, | Performed by: INTERNAL MEDICINE

## 2017-04-10 PROCEDURE — 99999 PR PBB SHADOW E&M-EST. PATIENT-LVL II: CPT | Mod: PBBFAC,,, | Performed by: OPTOMETRIST

## 2017-04-10 PROCEDURE — 92014 COMPRE OPH EXAM EST PT 1/>: CPT | Mod: S$PBB,,, | Performed by: OPTOMETRIST

## 2017-04-10 PROCEDURE — 99215 OFFICE O/P EST HI 40 MIN: CPT | Mod: S$PBB,,, | Performed by: INTERNAL MEDICINE

## 2017-04-10 PROCEDURE — 99999 PR PBB SHADOW E&M-EST. PATIENT-LVL III: CPT | Mod: PBBFAC,,, | Performed by: INTERNAL MEDICINE

## 2017-04-10 PROCEDURE — 99212 OFFICE O/P EST SF 10 MIN: CPT | Mod: PBBFAC,27,PO | Performed by: OPTOMETRIST

## 2017-04-10 RX ORDER — LOSARTAN POTASSIUM 50 MG/1
50 TABLET ORAL DAILY
Qty: 90 TABLET | Refills: 3 | Status: SHIPPED | OUTPATIENT
Start: 2017-04-10 | End: 2017-08-28 | Stop reason: SDUPTHER

## 2017-04-10 NOTE — PROGRESS NOTES
Subjective:    Patient ID:  Clifton Wilson is a 66 y.o. male who presents for evaluation of cardiomyopathy    Congestive Heart Failure   Associated symptoms include palpitations. Pertinent negatives include no abdominal pain, chest pain, muscle weakness, near-syncope or shortness of breath.   Palpitations    Pertinent negatives include no anxiety, chest pain, dizziness, malaise/fatigue, near-syncope, shortness of breath, syncope or weakness.      66 y.o. M  HTN HL DM  CAD/NSTEMI 12/2011, and recently as well: 12/2015.    In hospital post PCI, had NSVT. Was given a LifeVest.  Subsequent LVEF 39%, leading to EPS, which was negative (therefore no ICD then).  Since, has recently had months of persistently low LVEF.   Again referred by Dr Clement for ICD, due to LVEF 25% with ICM.    Feels fairly well overall. Some . No CP.    He's being treated for a R posterior neck abscess/cyst which has been present though variably-sized for >10 years. It was tangerine-sized recently, but since starting ABx (Rx by PCP), it's down to cherry-sized.    Echo 12/15: 35-40%. 4/16: 25-40%.  3/17 25%  2/16 nuclear ETT neg    My interpretation of today's ECG is NSR 75 bpm. Narrow QRSd.    Review of Systems   Constitution: Negative. Negative for weakness and malaise/fatigue.   HENT: Negative.  Negative for ear pain and tinnitus.    Eyes: Negative for blurred vision.   Cardiovascular: Positive for dyspnea on exertion and palpitations. Negative for chest pain, near-syncope and syncope.   Respiratory: Negative.  Negative for shortness of breath.    Endocrine: Negative.  Negative for polyuria.   Hematologic/Lymphatic: Does not bruise/bleed easily.   Skin: Positive for suspicious lesions (longstanding posterior neck lump (?abscess) -- improved since ABx). Negative for rash.   Musculoskeletal: Positive for myalgias (quads/knees). Negative for joint pain and muscle weakness.   Gastrointestinal: Negative.  Negative for abdominal pain and change in  bowel habit.   Genitourinary: Negative for frequency.   Neurological: Negative.  Negative for dizziness.   Psychiatric/Behavioral: Negative.  Negative for depression. The patient is not nervous/anxious.    Allergic/Immunologic: Negative for environmental allergies.        Objective:    Physical Exam   Constitutional: He is oriented to person, place, and time. He appears well-developed and well-nourished.   HENT:   Head: Normocephalic and atraumatic.   Eyes: Conjunctivae, EOM and lids are normal. No scleral icterus.   Neck: Normal range of motion. No JVD present. No tracheal deviation present. No thyromegaly present.   Cardiovascular: Normal rate, regular rhythm, normal heart sounds and intact distal pulses.   No extrasystoles are present. PMI is not displaced.  Exam reveals no gallop and no friction rub.    No murmur heard.  Pulses:       Radial pulses are 2+ on the right side, and 2+ on the left side.   Pulmonary/Chest: Effort normal and breath sounds normal. No accessory muscle usage. No tachypnea. No respiratory distress. He has no wheezes. He has no rales.   Abdominal: Soft. Bowel sounds are normal. He exhibits no distension. There is no hepatosplenomegaly. There is no tenderness.   Musculoskeletal: Normal range of motion. He exhibits no edema.   Neurological: He is alert and oriented to person, place, and time. He has normal reflexes. He exhibits normal muscle tone.   Skin: Skin is warm and dry. No rash noted.        Psychiatric: He has a normal mood and affect. His behavior is normal.   Nursing note and vitals reviewed.        Assessment:       1. Chronic combined systolic and diastolic CHF (congestive heart failure)    2. Palpitation    3. Essential hypertension    4. Cardiomyopathy, ischemic         Plan:       Given LVEF 25%, ICM, makes primary-prevention criteria per MADIT-2 and SCD-HeFT.    I spent about a half hour discussing the risks and benefits of ICD placement and testing. Our discussion of risks  included (but was not limited to) the possibility of infection, death, stroke, MI, pneumothorax, embolism, cardiac perforation, cardiac tamponade, renal injury, and bleeding.  I discussed with patient risks, indications, benefits, and alternatives of the planned procedure. All questions were answered. Patient understands and wishes to proceed.    Increase losartan for HTN, CHF.    Derm appt in near future re: longstanding likely cyst/abscess in posterior neck, which has decreased in size significantly since starting ABx given by PCP (making infectious etiology more likely). Once that's cleared...    SJM single-chamber ICD

## 2017-04-10 NOTE — TELEPHONE ENCOUNTER
----- Message from Emily Sky sent at 4/10/2017  8:50 AM CDT -----  Contact: Anila RAMOS-pt- Anila is calling to speak with the nurse pt needs to be seen today or this week with anyone. PT can see anyone. Pt is coming in for a bump on the back on the neck Dr. Walsh wants to have him checked out before the pt has surg.  Can you please Anila [phone].    ASHLEY

## 2017-04-10 NOTE — PROGRESS NOTES
ROS     Negative for: Constitutional, Gastrointestinal, Neurological, Skin,   Genitourinary, Musculoskeletal, HENT, Endocrine, Cardiovascular, Eyes,   Respiratory, Psychiatric, Allergic/Imm, Heme/Lymph    Last edited by Doug Damon, OD on 4/10/2017 11:37 AM. (History)        Assessment /Plan     For exam results, see Encounter Report.    Diabetes mellitus type 2 without retinopathy    Status post intraocular lens implant    Bilateral posterior capsular opacification      1. No diabetic retinopathy, no csme. Return in 1 year for dilated eye exam.  2. Monitor condition. Patient to report any changes. RTC 1 year recheck.  3. Refer to Dr. Dias for evaluation and possible removal.

## 2017-04-12 ENCOUNTER — PATIENT MESSAGE (OUTPATIENT)
Dept: CARDIOLOGY | Facility: CLINIC | Age: 67
End: 2017-04-12

## 2017-04-12 ENCOUNTER — TELEPHONE (OUTPATIENT)
Dept: ELECTROPHYSIOLOGY | Facility: CLINIC | Age: 67
End: 2017-04-12

## 2017-04-12 NOTE — TELEPHONE ENCOUNTER
----- Message from Ovi Orozco sent at 4/12/2017  1:56 PM CDT -----  Contact: patient  Please call pt at [phone]. Returning your call from today    Thank you

## 2017-04-17 ENCOUNTER — PATIENT MESSAGE (OUTPATIENT)
Dept: CARDIOLOGY | Facility: CLINIC | Age: 67
End: 2017-04-17

## 2017-04-17 ENCOUNTER — TELEPHONE (OUTPATIENT)
Dept: CARDIOLOGY | Facility: CLINIC | Age: 67
End: 2017-04-17

## 2017-04-18 ENCOUNTER — TELEPHONE (OUTPATIENT)
Dept: CARDIOLOGY | Facility: CLINIC | Age: 67
End: 2017-04-18

## 2017-04-18 ENCOUNTER — TELEPHONE (OUTPATIENT)
Dept: ELECTROPHYSIOLOGY | Facility: CLINIC | Age: 67
End: 2017-04-18

## 2017-04-18 NOTE — TELEPHONE ENCOUNTER
----- Message from Pedro Clement MD sent at 4/18/2017  1:08 PM CDT -----  Please let Mr Wilson know that because of his recent stents in November, he should not stop aspirin or plavix (clopidogrel) for any reason for at least a year or he will be at risk of those stents closing off and causing a heart attack. He has some procedure scheduled but cannot stop the aspirin or plavix. If the surgeon cannot perform the procedure while Mr Wilson is taking these medications, then the procedure should not be done at this time.   Thanks  MACK

## 2017-04-18 NOTE — TELEPHONE ENCOUNTER
I explained to the Pt that he needs to discuss holding Plavix prior to his Cyst removal with his Cardiologist. I explained that I will send them a message regarding this to the Cardiology Department. Pt is seeing a Dr. Gross ([phone]) to remove the Cyst, however, he prefers to see [facility]. I explained that I will send a message requesting an appointment with our Dermatology deparment, however, I can not guarantee an immediate appointment. I will also call Dermatology Dept. He verbalized understanding and agreement of plan. Denied further questions, needs, and concerns.

## 2017-04-18 NOTE — TELEPHONE ENCOUNTER
----- Message from Evelyn Martinez sent at 4/18/2017 10:08 AM CDT -----  Contact: Pt called  Pt called, states he is returning a call in regards to holding his RX Plavix.Ph [phone]. Thank you

## 2017-04-19 ENCOUNTER — OFFICE VISIT (OUTPATIENT)
Dept: PLASTIC SURGERY | Facility: CLINIC | Age: 67
End: 2017-04-19
Payer: MEDICARE

## 2017-04-19 ENCOUNTER — PATIENT MESSAGE (OUTPATIENT)
Dept: CARDIOLOGY | Facility: CLINIC | Age: 67
End: 2017-04-19

## 2017-04-19 ENCOUNTER — OFFICE VISIT (OUTPATIENT)
Dept: DERMATOLOGY | Facility: CLINIC | Age: 67
End: 2017-04-19
Payer: MEDICARE

## 2017-04-19 VITALS
BODY MASS INDEX: 5.49 KG/M2 | HEIGHT: 71 IN | SYSTOLIC BLOOD PRESSURE: 109 MMHG | DIASTOLIC BLOOD PRESSURE: 76 MMHG | TEMPERATURE: 98 F | HEART RATE: 65 BPM | WEIGHT: 39.25 LBS

## 2017-04-19 DIAGNOSIS — L72.9 CYST OF SKIN: Primary | ICD-10-CM

## 2017-04-19 DIAGNOSIS — L72.0 EIC (EPIDERMAL INCLUSION CYST): Primary | ICD-10-CM

## 2017-04-19 PROCEDURE — 99212 OFFICE O/P EST SF 10 MIN: CPT | Mod: 27,S$PBB,, | Performed by: PHYSICIAN ASSISTANT

## 2017-04-19 PROCEDURE — 99999 PR PBB SHADOW E&M-EST. PATIENT-LVL II: CPT | Mod: PBBFAC,,, | Performed by: DERMATOLOGY

## 2017-04-19 PROCEDURE — 99213 OFFICE O/P EST LOW 20 MIN: CPT | Mod: PBBFAC,27,PO | Performed by: PHYSICIAN ASSISTANT

## 2017-04-19 PROCEDURE — 99999 PR PBB SHADOW E&M-EST. PATIENT-LVL III: CPT | Mod: PBBFAC,,, | Performed by: PHYSICIAN ASSISTANT

## 2017-04-19 PROCEDURE — 99499 UNLISTED E&M SERVICE: CPT | Mod: S$PBB,,, | Performed by: DERMATOLOGY

## 2017-04-19 NOTE — MR AVS SNAPSHOT
[facility]  [address]  [facility] [address]  Phone: [phone]  Fax: [phone]                  Clifton Wilson   2017 9:00 AM   Office Visit    Description:  Male : 1950   Provider:  Libby Aldridge MD   Department:  [facility]           Reason for Visit     Cyst                To Do List           Future Appointments        Provider Department Dept Phone    2017 3:00 PM Doug Dias MD [place] [facility] Op[facility]mology [phone]      Goals (5 Years of Data)     None      [facility]     [facility] Nurse Care Line -  Assistance  Unless otherwise directed by your provider, please contact [facility], our nurse care line that is available for  assistance.     Registered nurses in the [facility] Center provide: appointment scheduling, clinical advisement, health education, and other advisory services.  Call: [phone] (toll free)               Medications           Message regarding Medications     Verify the changes and/or additions to your medication regime listed below are the same as discussed with your clinician today.  If any of these changes or additions are incorrect, please notify your healthcare provider.             Verify that the below list of medications is an accurate representation of the medications you are currently taking.  If none reported, the list may be blank. If incorrect, please contact your healthcare provider. Carry this list with you in case of emergency.           Current Medications     aspirin (ECOTRIN) 81 MG EC tablet Take 81 mg by mouth once daily.    atorvastatin (LIPITOR) 40 MG tablet Take 1 tablet (40 mg total) by mouth once daily.    blood sugar diagnostic (CONTOUR TEST STRIPS) Strp 200 each by Misc.(Non-Drug; Combo Route) route 4 (four) times daily.    carvedilol (COREG) 12.5 MG tablet Take 1 tablet (12.5 mg total) by mouth 2 (two) times daily.    clopidogrel (PLAVIX) 75 mg  "tablet TAKE ONE TABLET BY MOUTH ONCE DAILY    furosemide (LASIX) 20 MG tablet Take 1 tablet (20 mg total) by mouth 2 (two) times daily.    gabapentin (NEURONTIN) 300 MG capsule Take 2 capsules (600 mg total) by mouth 2 (two) times daily.    insulin aspart (NOVOLOG FLEXPEN) 100 unit/mL InPn pen Inject 20 Units into the skin 2 (two) times daily before meals.    insulin glargine (LANTUS SOLOSTAR) 100 unit/mL (3 mL) InPn pen Inject 20 Units into the skin every evening.    losartan (COZAAR) 50 MG tablet Take 1 tablet (50 mg total) by mouth once daily.    metformin (GLUCOPHAGE) 1000 MG tablet TAKE ONE TABLET BY MOUTH TWICE DAILY WITH MEALS    pen needle, diabetic 32 gauge x 1/5" Ndle Use 4 times/day for insulin administration    zolpidem (AMBIEN) 5 MG Tab Take 1 tablet (5 mg total) by mouth nightly as needed.    nitroGLYCERIN (NITROSTAT) 0.4 MG SL tablet Place 1 tablet (0.4 mg total) under the tongue every 5 (five) minutes as needed for Chest pain.           Clinical Reference Information           Allergies as of 4/19/2017     No Known Allergies      Immunizations Administered on Date of Encounter - 4/19/2017     None      Language Assistance Services     ATTENTION: Language assistance services are available, free of charge. Please call [phone].      ATENCIÓN: Si habla juanaroxana, tiene a page disposición servicios gratuitos de asistencia lingüística. Llame al [phone].     ALESSANDRO Ý: N?u b?n nói Ti?ng Vi?t, có các d?ch v? h? tr? ngôn ng? mi?n phí dành cho b?n. G?i s? [phone].         [facility] complies with applicable Federal civil rights laws and does not discriminate on the basis of race, color, national origin, age, disability, or sex.        "

## 2017-04-19 NOTE — LETTER
April 19, 2017      Libby Greene MD  [address]  [facility] [address]           [facility]  [address]  [facility] [address]  Phone: [phone]  Fax: [phone]          Patient: Clifton Wilson   MR Number: [id number]   YOB: 1950   Date of Visit: 4/19/2017       Dear Dr. Libby Greene:    Thank you for referring Clifton Wilson to me for evaluation. Attached you will find relevant portions of my assessment and plan of care.    If you have questions, please do not hesitate to call me. I look forward to following Clifton Wilson along with you.    Sincerely,    Libby Aldridge MD    Enclosure  CC:  No Recipients    If you would like to receive this communication electronically, please contact [email or web address] or [phone] to request more information on [employer] Link access.    For providers and/or their staff who would like to refer a patient to [facility], please contact us through our one-stop-shop provider referral line, [facility], at [phone].    If you feel you have received this communication in error or would no longer like to receive these types of communications, please e-mail [email or web address]

## 2017-04-19 NOTE — PROGRESS NOTES
Subjective:       Patient ID:  Clifton Wilson is a 66 y.o. male who presents for   Chief Complaint   Patient presents with    Cyst     post neck x 15-20years, recent drained x 2 months, bacitracin and bactrim oral in the past      Cyst  - Initial  Affected locations: neck  Duration: 15 years  Signs / symptoms: draining  Severity: mild to moderate  Timing: intermittent  Relieving factors/Treatments tried: antibiotics (bacitracin, bactrim (two months ago))  Improvement on treatment: moderate      Pt needs a pacemaker and per dr duffy needs EIC excised prior to pacemaker placement.    Review of Systems   Constitutional: Positive for weight gain. Negative for fever, chills and weight loss.   Skin: Negative for daily sunscreen use, activity-related sunscreen use and recent sunburn.   Hematologic/Lymphatic: Bruises/bleeds easily (on plavix and ASA).        Objective:    Physical Exam   Constitutional: He appears well-developed and well-nourished. No distress.   Neurological: He is alert and oriented to person, place, and time. He is not disoriented.   Psychiatric: He has a normal mood and affect.   Skin:   Areas Examined (abnormalities noted in diagram):   Neck Inspection Performed                  Diagram Legend     Erythematous scaling macule/papule c/w actinic keratosis       Vascular papule c/w angioma      Pigmented verrucoid papule/plaque c/w seborrheic keratosis      Yellow umbilicated papule c/w sebaceous hyperplasia      Irregularly shaped tan macule c/w lentigo     1-2 mm smooth white papules consistent with Milia      Movable subcutaneous cyst with punctum c/w epidermal inclusion cyst      Subcutaneous movable cyst c/w pilar cyst      Firm pink to brown papule c/w dermatofibroma      Pedunculated fleshy papule(s) c/w skin tag(s)      Evenly pigmented macule c/w junctional nevus     Mildly variegated pigmented, slightly irregular-bordered macule c/w mildly atypical nevus      Flesh colored to evenly  pigmented papule c/w intradermal nevus       Pink pearly papule/plaque c/w basal cell carcinoma      Erythematous hyperkeratotic cursted plaque c/w SCC      Surgical scar with no sign of skin cancer recurrence      Open and closed comedones      Inflammatory papules and pustules      Verrucoid papule consistent consistent with wart     Erythematous eczematous patches and plaques     Dystrophic onycholytic nail with subungual debris c/w onychomycosis     Umbilicated papule    Erythematous-base heme-crusted tan verrucoid plaque consistent with inflamed seborrheic keratosis     Erythematous Silvery Scaling Plaque c/w Psoriasis     See annotation      Assessment / Plan:        EIC (epidermal inclusion cyst) - right post neck  -     Ambulatory referral to Plastic Surgery for excision           Return if symptoms worsen or fail to improve.

## 2017-04-20 ENCOUNTER — PATIENT MESSAGE (OUTPATIENT)
Dept: DERMATOLOGY | Facility: CLINIC | Age: 67
End: 2017-04-20

## 2017-04-20 NOTE — PROGRESS NOTES
"Clifton Wilson presents to Plastic Surgery Clinic on referral from Dr Aldridge in Dermatology to discuss cyst removal of R posterior neck. He reports that he has had this cyst for 15-20 years but that approximately 2 months ago it became infected and spontaneously drained. He completed a course of Bactrim as prescribed by his PCP.  The more pressing issue with removal is that he needs the cyst urgently removed as he needs to have a ICD placed and cannot do so until the cyst is removed.     Vitals:    04/19/17 1046   BP: 109/76   Pulse: 65   Temp: 98.2 °F (36.8 °C)     Current Outpatient Prescriptions on File Prior to Visit   Medication Sig Dispense Refill    aspirin (ECOTRIN) 81 MG EC tablet Take 81 mg by mouth once daily.      atorvastatin (LIPITOR) 40 MG tablet Take 1 tablet (40 mg total) by mouth once daily. 90 tablet 3    blood sugar diagnostic (CONTOUR TEST STRIPS) Strp 200 each by Misc.(Non-Drug; Combo Route) route 4 (four) times daily. 360 each 3    carvedilol (COREG) 12.5 MG tablet Take 1 tablet (12.5 mg total) by mouth 2 (two) times daily. 180 tablet 2    clopidogrel (PLAVIX) 75 mg tablet TAKE ONE TABLET BY MOUTH ONCE DAILY 90 tablet 0    furosemide (LASIX) 20 MG tablet Take 1 tablet (20 mg total) by mouth 2 (two) times daily. 60 tablet 11    gabapentin (NEURONTIN) 300 MG capsule Take 2 capsules (600 mg total) by mouth 2 (two) times daily. 360 capsule 3    insulin aspart (NOVOLOG FLEXPEN) 100 unit/mL InPn pen Inject 20 Units into the skin 2 (two) times daily before meals. 3 mL 10    insulin glargine (LANTUS SOLOSTAR) 100 unit/mL (3 mL) InPn pen Inject 20 Units into the skin every evening. 15 mL 10    losartan (COZAAR) 50 MG tablet Take 1 tablet (50 mg total) by mouth once daily. 90 tablet 3    metformin (GLUCOPHAGE) 1000 MG tablet TAKE ONE TABLET BY MOUTH TWICE DAILY WITH MEALS 180 tablet 0    pen needle, diabetic 32 gauge x 1/5" Ndle Use 4 times/day for insulin administration 200 each 10    " "zolpidem (AMBIEN) 5 MG Tab Take 1 tablet (5 mg total) by mouth nightly as needed. 30 tablet 3    nitroGLYCERIN (NITROSTAT) 0.4 MG SL tablet Place 1 tablet (0.4 mg total) under the tongue every 5 (five) minutes as needed for Chest pain. 25 tablet 3     No current facility-administered medications on file prior to visit.      Patient Active Problem List   Diagnosis    HTN (hypertension)    Type 2 diabetes mellitus with neurologic complication    Mitral regurgitation    Acute combined systolic and diastolic congestive heart failure    Coronary artery disease involving native coronary artery without angina pectoris    Ventricular tachycardia, nonsustained post-MI    Dyslipidemia    Chronic combined systolic and diastolic CHF (congestive heart failure)    Coronary artery disease involving native coronary artery of native heart without angina pectoris    Type 2 diabetes mellitus with diabetic neuropathy    Diabetes mellitus type 2 without retinopathy    Status post intraocular lens implant    Bilateral posterior capsular opacification    Obesity (BMI 35.0-39.9 without comorbidity)    Cardiomyopathy, ischemic     Past Surgical History:   Procedure Laterality Date    APPENDECTOMY      CATARACT EXTRACTION Bilateral 2011    EYE SURGERY       WD/WD NAD AAOx3  Cyst of R posterior neck - no erythema/fluctuance 1.0cm x 0.5cm     A/P    Myself and Dr. Vaughn Noguera  Explained to the patient and his wife that we would not be able to schedule him for removal "urgently" as our schedule is completely booked into May. We have arranged for the patient to be seen and evaluated by Dr Parker in General Surgery next week. All questions were answered. The patient was advised to call the clinic with any questions or concerns prior to their next visit.     "

## 2017-04-24 ENCOUNTER — OFFICE VISIT (OUTPATIENT)
Dept: OPHTHALMOLOGY | Facility: CLINIC | Age: 67
End: 2017-04-24
Payer: MEDICARE

## 2017-04-24 ENCOUNTER — PATIENT MESSAGE (OUTPATIENT)
Dept: CARDIOLOGY | Facility: CLINIC | Age: 67
End: 2017-04-24

## 2017-04-24 ENCOUNTER — PROCEDURE VISIT (OUTPATIENT)
Dept: SURGERY | Facility: CLINIC | Age: 67
End: 2017-04-24
Payer: MEDICARE

## 2017-04-24 VITALS
BODY MASS INDEX: 35.98 KG/M2 | TEMPERATURE: 98 F | SYSTOLIC BLOOD PRESSURE: 106 MMHG | HEIGHT: 71 IN | DIASTOLIC BLOOD PRESSURE: 73 MMHG | WEIGHT: 257 LBS | HEART RATE: 75 BPM

## 2017-04-24 VITALS — HEART RATE: 76 BPM | DIASTOLIC BLOOD PRESSURE: 72 MMHG | SYSTOLIC BLOOD PRESSURE: 114 MMHG

## 2017-04-24 DIAGNOSIS — E11.9 DM TYPE 2 WITHOUT RETINOPATHY: ICD-10-CM

## 2017-04-24 DIAGNOSIS — Z96.1 PSEUDOPHAKIA: ICD-10-CM

## 2017-04-24 DIAGNOSIS — I10 ESSENTIAL HYPERTENSION: ICD-10-CM

## 2017-04-24 DIAGNOSIS — H26.493 PCO (POSTERIOR CAPSULAR OPACIFICATION), BILATERAL: Primary | ICD-10-CM

## 2017-04-24 PROCEDURE — 66821 AFTER CATARACT LASER SURGERY: CPT | Mod: PBBFAC,PO | Performed by: OPHTHALMOLOGY

## 2017-04-24 PROCEDURE — 99999 PR PBB SHADOW E&M-EST. PATIENT-LVL II: CPT | Mod: PBBFAC,,, | Performed by: OPHTHALMOLOGY

## 2017-04-24 PROCEDURE — 88304 TISSUE EXAM BY PATHOLOGIST: CPT | Performed by: PATHOLOGY

## 2017-04-24 PROCEDURE — 12042 INTMD RPR N-HF/GENIT2.6-7.5: CPT | Mod: S$PBB,,, | Performed by: SURGERY

## 2017-04-24 PROCEDURE — 66821 AFTER CATARACT LASER SURGERY: CPT | Mod: S$PBB,LT,, | Performed by: OPHTHALMOLOGY

## 2017-04-24 PROCEDURE — 92014 COMPRE OPH EXAM EST PT 1/>: CPT | Mod: 57,S$PBB,, | Performed by: OPHTHALMOLOGY

## 2017-04-24 PROCEDURE — 11423 EXC H-F-NK-SP B9+MARG 2.1-3: CPT | Mod: 51,S$PBB,, | Performed by: SURGERY

## 2017-04-24 PROCEDURE — 99212 OFFICE O/P EST SF 10 MIN: CPT | Mod: PBBFAC,PO,25 | Performed by: OPHTHALMOLOGY

## 2017-04-24 NOTE — PROGRESS NOTES
Subjective:       Patient ID: Clifton Wilson is a 66 y.o. male.    Chief Complaint: Laser Treatment (Yag Laser OS )    HPI  Review of Systems    Objective:      Physical Exam    Assessment:       1. PCO (posterior capsular opacification), bilateral    2. DM type 2 without retinopathy    3. Essential hypertension    4. Pseudophakia        Plan:       Visually significant  PCO OS- Pt. Wants Laser.  Early PCO OD- Not Visually Significant.  DM-No NPDR OU.  HTN-No retinopathy OU.        YAG CAP OS today. TE=   34.3 mj, Avg. 0.7mj, 49 pulses.     PF taper OS.  RTC 2 wks.

## 2017-04-25 ENCOUNTER — PATIENT MESSAGE (OUTPATIENT)
Dept: CARDIOLOGY | Facility: CLINIC | Age: 67
End: 2017-04-25

## 2017-04-25 NOTE — PROCEDURES
PREOPERATIVE DIAGNOSIS:    1. Cyst of neck  Tissue Specimen To Pathology, Surgery       POSTOPERATIVE DIAGNOSIS:   1. Cyst of neck  Tissue Specimen To Pathology, Surgery       PROCEDURE PERFORMED: excision sebaceous cusy of neck    ATTENDING SURGEON: Frank Parker MD      HOUSESTAFF SURGEON:none    ANESTHESIA:  Local    ESTIMATED BLOOD LOSS: 3 ml    FINDINGS: cyst    INDICATIONS: Clifton Wilson is a 66 y.o. male referred to my General Surgery Clinic with soft tissue mass in the posterior neck.  We recommended excisional biopsy.  This procedure has been fully reviewed with the patient and written informed consent has been obtained.      PROCEDURE IN DETAIL: The patient was identified and brought back to minor   procedure room in the General Surgery Clinic. Patient was positioned appropriately   and prepped and draped sterilely. Local anesthesia was administered and a 3cm skin incision was made with the scalpel. Subcutaneous tissue was divided sharply and the cyst was sharply excised away from the normal surrounding   subcutaneous tissue sharply, was passed off the field as specimen. Hemostasis   was obtained. The wound was closed in layers including vicryl deep and monocryl on the skin. A sterile dressing was applied. The patient tolerated the procedure well, was discharged from the minor procedure room and will follow up in two weeks.

## 2017-05-08 ENCOUNTER — OFFICE VISIT (OUTPATIENT)
Dept: SURGERY | Facility: CLINIC | Age: 67
End: 2017-05-08
Payer: MEDICARE

## 2017-05-08 ENCOUNTER — OFFICE VISIT (OUTPATIENT)
Dept: OPHTHALMOLOGY | Facility: CLINIC | Age: 67
End: 2017-05-08
Payer: MEDICARE

## 2017-05-08 VITALS
BODY MASS INDEX: 36.96 KG/M2 | HEART RATE: 79 BPM | TEMPERATURE: 98 F | SYSTOLIC BLOOD PRESSURE: 114 MMHG | HEIGHT: 71 IN | WEIGHT: 264 LBS | DIASTOLIC BLOOD PRESSURE: 75 MMHG

## 2017-05-08 DIAGNOSIS — L72.3 SEBACEOUS CYST: Primary | ICD-10-CM

## 2017-05-08 DIAGNOSIS — Z98.890 POST-OPERATIVE STATE: Primary | ICD-10-CM

## 2017-05-08 DIAGNOSIS — Z96.1 PSEUDOPHAKIA: ICD-10-CM

## 2017-05-08 DIAGNOSIS — H26.491 PCO (POSTERIOR CAPSULAR OPACIFICATION), RIGHT: ICD-10-CM

## 2017-05-08 PROCEDURE — 99999 PR PBB SHADOW E&M-EST. PATIENT-LVL III: CPT | Mod: PBBFAC,,, | Performed by: SURGERY

## 2017-05-08 PROCEDURE — 99024 POSTOP FOLLOW-UP VISIT: CPT | Mod: ,,, | Performed by: SURGERY

## 2017-05-08 PROCEDURE — 99213 OFFICE O/P EST LOW 20 MIN: CPT | Mod: PBBFAC,27 | Performed by: SURGERY

## 2017-05-08 PROCEDURE — 99024 POSTOP FOLLOW-UP VISIT: CPT | Mod: ,,, | Performed by: OPHTHALMOLOGY

## 2017-05-08 PROCEDURE — 99999 PR PBB SHADOW E&M-EST. PATIENT-LVL II: CPT | Mod: PBBFAC,,, | Performed by: OPHTHALMOLOGY

## 2017-05-08 NOTE — PROGRESS NOTES
Subjective:       Patient ID: Clifton Wilson is a 66 y.o. male.    Chief Complaint: Post-op Evaluation (2 weeks yag po os)    HPI  Review of Systems    Objective:      Physical Exam    Assessment:       1. Post-operative state    2. PCO (posterior capsular opacification), right    3. Pseudophakia        Plan:       S/p YAG CAP OS- Doing well.  Early PCO OD- Not Visually Significant.        RTC Dr Damon in 6 mos.

## 2017-05-08 NOTE — MR AVS SNAPSHOT
[place] [facility]   [facility]  [place] [address]  Phone: [phone]  Fax: [phone]                  Clifton Wilson   2017 8:00 AM   Office Visit    Description:  Male : 1950   Provider:  Doug Dias MD   Department:  [place] [facility]           Reason for Visit     Post-op Evaluation           Diagnoses this Visit        Comments    Post-operative state    -  Primary     PCO (posterior capsular opacification), right         Pseudophakia                To Do List           Future Appointments        Provider Department Dept Phone    2017 10:15 AM Frank Parker MD [facility] - General Surgery [phone]      Goals (5 Years of Data)     None      Follow-Up and Disposition     Return in about 6 months (around 2017) for Dr Damon, F/U S/P YAG CAP OS..      [facility] On Call     [facility] On Call Nurse Care Line -  Assistance  Unless otherwise directed by your provider, please contact [facility], our nurse care line that is available for  assistance.     Registered nurses in the [facility] provide: appointment scheduling, clinical advisement, health education, and other advisory services.  Call: [phone] (toll free)               Medications           Message regarding Medications     Verify the changes and/or additions to your medication regime listed below are the same as discussed with your clinician today.  If any of these changes or additions are incorrect, please notify your healthcare provider.             Verify that the below list of medications is an accurate representation of the medications you are currently taking.  If none reported, the list may be blank. If incorrect, please contact your healthcare provider. Carry this list with you in case of emergency.           Current Medications     aspirin (ECOTRIN) 81 MG EC tablet Take 81 mg by mouth once daily.    atorvastatin (LIPITOR) 40 MG tablet  "Take 1 tablet (40 mg total) by mouth once daily.    blood sugar diagnostic (CONTOUR TEST STRIPS) Strp 200 each by Misc.(Non-Drug; Combo Route) route 4 (four) times daily.    carvedilol (COREG) 12.5 MG tablet Take 1 tablet (12.5 mg total) by mouth 2 (two) times daily.    clopidogrel (PLAVIX) 75 mg tablet TAKE ONE TABLET BY MOUTH ONCE DAILY    furosemide (LASIX) 20 MG tablet Take 1 tablet (20 mg total) by mouth 2 (two) times daily.    gabapentin (NEURONTIN) 300 MG capsule Take 2 capsules (600 mg total) by mouth 2 (two) times daily.    insulin aspart (NOVOLOG FLEXPEN) 100 unit/mL InPn pen Inject 20 Units into the skin 2 (two) times daily before meals.    insulin glargine (LANTUS SOLOSTAR) 100 unit/mL (3 mL) InPn pen Inject 20 Units into the skin every evening.    losartan (COZAAR) 50 MG tablet Take 1 tablet (50 mg total) by mouth once daily.    metformin (GLUCOPHAGE) 1000 MG tablet TAKE ONE TABLET BY MOUTH TWICE DAILY WITH MEALS    nitroGLYCERIN (NITROSTAT) 0.4 MG SL tablet Place 1 tablet (0.4 mg total) under the tongue every 5 (five) minutes as needed for Chest pain.    pen needle, diabetic 32 gauge x 1/5" Ndle Use 4 times/day for insulin administration    zolpidem (AMBIEN) 5 MG Tab Take 1 tablet (5 mg total) by mouth nightly as needed.           Clinical Reference Information           Allergies as of 5/8/2017     No Known Allergies      Immunizations Administered on Date of Encounter - 5/8/2017     None      Language Assistance Services     ATTENTION: Language assistance services are available, free of charge. Please call [phone].      ATENCIÓN: Si habla matilde, tiene a page disposición servicios gratuitos de asistencia lingüística. Lljeannine al [phone].     CHÚ Ý: N?u b?n nói Ti?ng Vi?t, có các d?ch v? h? tr? ngôn ng? mi?n phí dành cho b?n. G?i s? [phone].         [facility] complies with applicable Federal civil rights laws and does not discriminate on the basis of race, color, " national origin, age, disability, or sex.

## 2017-05-09 NOTE — PROGRESS NOTES
S/p excision sebaceous cyst posterior neck      Incision healed,  No patient complaints,  Path reviewed.    F/u prn

## 2017-05-11 DIAGNOSIS — I25.5 CARDIOMYOPATHY, ISCHEMIC: ICD-10-CM

## 2017-05-11 DIAGNOSIS — I50.42 CHRONIC COMBINED SYSTOLIC AND DIASTOLIC CHF (CONGESTIVE HEART FAILURE): ICD-10-CM

## 2017-05-11 DIAGNOSIS — I25.10 CORONARY ARTERY DISEASE INVOLVING NATIVE CORONARY ARTERY OF NATIVE HEART WITHOUT ANGINA PECTORIS: ICD-10-CM

## 2017-05-11 NOTE — PROGRESS NOTES
Post-Procedure Patient Discharge Instructions  Defibrillator  Wound Care   If you are discharged with a standard dressing over the incision, you may remove the dressing after 24 hours.    If you are discharged with an AQUACEL dressing, you should keep it on until your follow-up appointment in 1-2 weeks.   If there are Steri-strips (strips of tape) over your incision, leave them on until your follow-up appointment. They may begin to fall off on their own, which is normal. If there is Dermabond (clear glue) over your incision, do not scrub it off. It acts as a barrier and will eventually disappear.   You will be discharged with 5 days of oral antibiotics. Please take the full prescription until it is gone.   Do not get the incision wet for 48 hours following the procedure. You may sponge bath during this period, working around the incision. After 48 hours, you may shower, but you should still try to keep this area as dry as possible, and avoid direct water contact to the incision (allow the water to hit back of your shoulder rather than directly on the incision). Gently pat the incision dry if it does get wet.   You may take regular showers after 2 weeks, unless otherwise indicated at your follow-up visit.   Do not submerge the incision in a tub, pool, or body of water for 6 weeks.   Avoid using deodorants, powders, creams, lotions, etc. on your incision for 6 weeks.   If you notice unusual swelling, redness, drainage, have more device site pain, chest pain, shortness of breath, or have a fever greater than 100 degrees, call our device clinic immediately: [phone] or [phone] during normal office hours. You may call [phone] after-hours or on weekends and ask for the electrophysiologist on call.  Activity    If you only had a battery/generator change performed, there are no postoperative activity restrictions.    If this is your first device or if you had new wires added to your existing  device, then you will be discharged in a sling which you should wear continuously for 48 hours. After that, the sling should remain off during the day but should be worn at night for another 2-4 weeks (depending on how active a sleeper you are).   Do not raise your device-side arm above your shoulder for 6 weeks. Do not lift more than 5-10 lbs with your device-side arm for 6 weeks.    If you were driving prior to the procedure, you may resume driving after your first follow-up appointment (1-2 weeks). If you have a history of passing out or a history of certain arrhythmias, there may be driving restrictions unrelated to the procedure. Please clarify with your physician if this is the case.   No heavy activity with the affected arm for 6 weeks (eg. tennis, golf, bowling, aerobics, mowing the lawn, etc.).   Avoid rough contact at the device site for 6 weeks.   You may participate in sexual activity unless otherwise instructed.   You may return to work within 3-5 days unless told otherwise, provided you adhere to the above activity restrictions.  Long-Term Instructions   Keep your pacemaker or defibrillator identification card with you at all times.   If you have a defibrillator and you get shocked by the device: If you receive one shock and you feel ok, you may call [phone] or [phone] during normal office hours. You may call [phone] after-hours. If you receive one shock and you do not feel well, call Emergency Medical Services. They will take you to the nearest emergency room.   If you have a defibrillator and you get more than one shock from the device or multiple shocks in a short period of time: Call Emergency Medical Services. They will take you to the nearest emergency room.   Appliances: You may operate any electrical device in your home, including microwaves.   Security Systems: Electromagnetic security systems can be located in the workplace, courthouses, or other  high-security areas. Exposure to this type of security system has been shown to cause interference in some cases. Interference may be related to the duration of exposure and/or the distance between the device and the security device. You should be aware of the location of security systems, move through them at a normal pace, and avoid leaning or standing too close.   Metal Detectors at Airports: Metal detectors at airports can potentially interfere with pacemakers or defibrillators, although this is unlikely. Metal detectors will likely be triggered by your device and therefore at places such as airports  it will be important for you to carry your identification card for the pacemaker/defibrillator. Airport personnel will likely prefer to do a manual search.   Cellular Phones: It is unlikely that using a cellular phone will interfere with your device. It should be used with the hand opposite to the side where your device was implanted. The phone should not be carried in the shirt pocket on the same side as the device.   Specific Work Conditions: Patients who work near high-voltage lines, transmitting towers, large motors, welding equipment, or powerful magnets should discuss their specific situation with their physician. In general, remain at least two feet from external electrical equipment, verify that the equipment is properly grounded, and wear insulated gloves when using electrical devices. Leave the immediate location if lightheadedness or other symptoms develop.   MRI: Some pacemakers and defibrillators are safe in MRI scanners, while others are not. Please consult with your physician to see if you have an MRI-compatible device.   Surgery: Should you require surgery in the future, some electrosurgical devices can interfere with your device function. You should discuss this with your surgeon before any operation.   Radiation Therapy: If you ever require radiation therapy in the future, care must be taken  to avoid irradiating the device.  Long-Term Follow-Up   Your device has the ability to transmit device information from home to the doctors office using a home monitoring system.   This remote system takes the place of a doctors visit. Your device will be checked from home every 3-6 months. Every 6-12 months, you will be asked to come into the office for an in-office check.   Your device should last in the range of 6-12 years. This depends on many factors including how often it paces the heart.   When the battery is low, a generator change will be performed. This is a same-day procedure with no post-op activity restrictions, unless one of the pacemaker or defibrillator leads needs to be replaced at that time, or a new lead is added to your existing system.

## 2017-05-11 NOTE — PROGRESS NOTES
IMPLANTABLE DEVICE EDUCATION CHECKLIST        6-06-17 @ 11 AM  REPORT TO CARDIOLOGY WAITING ROOM ON 3RD FLOOR OF HOSPITAL (DO NOT REPORT TO CLINIC)  Directions for Reporting to Cardiology Waiting Area in the Hospital  If you park in the Parking Garage:  Take elevators to the 2nd floor  Walk up ramp and turn right by Gold Elevators  Take elevator to the 3rd floor  Upon exiting the elevator, turn away from the clinic areas  Walk long trammell around to front of hospital to area with windows overlooking [facility]  Check in at Reception Desk  OR  If family is dropping you off:  Have them drop you off at the front of the Hospital  (Near the ER, where all the flags are hung).  Take the E elevators to the 3rd floor.  Check in at the Reception Desk in the waiting room.      WASH YOUR CHEST WITH HIBICLENS OR AN ANTIBACTERIAL SOAP (SUCH AS DIAL) ON THE NIGHT BEFORE AND THE MORNING OF YOUR PROCEDURE.    DO NOT EAT OR DRINK ANYTHING AFTER: 12 MN ON THE NIGHT BEFORE YOUR PROCEDURE    MEDICATIONS  TAKE HALF DOSE OF INSULIN(LANTUS) THE NIGHT PRIOR TO PROCEDURE.    HOLD LASIX (FUROSEMIDE), METFORMIN (GLUCOPHAGE), AND INSULIN  MORNING OF PROCEDURE     YOU MAY TAKE ALL  OTHER  MORNING MEDICATIONS WITH A SIP OF WATER INCLUDING ASPIRIN AND PLAVIX    YOU WILL BE SPENDING THE NIGHT AFTER YOUR PROCEDURE  YOU WILL NEED SOMEONE TO DRIVE YOU HOME THE DAY AFTER YOUR PROCEDURE    YOUR PAIN DURING YOUR PROCEDURE WILL BE MANAGED BY THE SEDATION NURSE    THE ABOVE INSTRUCTIONS WERE GIVEN TO THE PATIENT VERBALLY AND THEY VERBALIZED UNDERSTANDING. THEY DO NOT REQUIRE ANY SPECIAL NEEDS AND DO NOT HAVE ANY LEARNING BARRIERS.     Any need to reschedule or cancel procedures, or any questions regarding your procedures should be addressed directly with the Arrhythmia Department Nurses at the following phone number: [phone]

## 2017-05-12 DIAGNOSIS — E11.9 TYPE 2 DIABETES MELLITUS WITHOUT COMPLICATION: ICD-10-CM

## 2017-05-12 RX ORDER — GLIPIZIDE 10 MG/1
TABLET ORAL
Qty: 180 TABLET | Refills: 0 | Status: SHIPPED | OUTPATIENT
Start: 2017-05-12 | End: 2017-06-10 | Stop reason: SDUPTHER

## 2017-05-15 ENCOUNTER — PATIENT MESSAGE (OUTPATIENT)
Dept: ELECTROPHYSIOLOGY | Facility: CLINIC | Age: 67
End: 2017-05-15

## 2017-05-24 ENCOUNTER — PATIENT MESSAGE (OUTPATIENT)
Dept: MEDSURG UNIT | Facility: HOSPITAL | Age: 67
End: 2017-05-24

## 2017-05-25 ENCOUNTER — PATIENT MESSAGE (OUTPATIENT)
Dept: ELECTROPHYSIOLOGY | Facility: CLINIC | Age: 67
End: 2017-05-25

## 2017-05-31 RX ORDER — CLOPIDOGREL BISULFATE 75 MG/1
TABLET ORAL
Qty: 90 TABLET | Refills: 0 | Status: SHIPPED | OUTPATIENT
Start: 2017-05-31 | End: 2017-07-24 | Stop reason: SDUPTHER

## 2017-06-05 DIAGNOSIS — E78.5 HYPERLIPIDEMIA, UNSPECIFIED HYPERLIPIDEMIA TYPE: ICD-10-CM

## 2017-06-05 RX ORDER — ATORVASTATIN CALCIUM 40 MG/1
40 TABLET, FILM COATED ORAL DAILY
Qty: 90 TABLET | Refills: 3 | Status: SHIPPED | OUTPATIENT
Start: 2017-06-05 | End: 2018-02-02 | Stop reason: SDUPTHER

## 2017-06-06 ENCOUNTER — ANESTHESIA (OUTPATIENT)
Dept: MEDSURG UNIT | Facility: HOSPITAL | Age: 67
End: 2017-06-06
Payer: MEDICARE

## 2017-06-06 ENCOUNTER — HOSPITAL ENCOUNTER (OUTPATIENT)
Facility: HOSPITAL | Age: 67
Discharge: HOME | End: 2017-06-07
Attending: INTERNAL MEDICINE | Admitting: INTERNAL MEDICINE
Payer: MEDICARE

## 2017-06-06 ENCOUNTER — ANESTHESIA EVENT (OUTPATIENT)
Dept: MEDSURG UNIT | Facility: HOSPITAL | Age: 67
End: 2017-06-06
Payer: MEDICARE

## 2017-06-06 ENCOUNTER — SURGERY (OUTPATIENT)
Age: 67
End: 2017-06-06

## 2017-06-06 DIAGNOSIS — I49.9 ARRHYTHMIA: ICD-10-CM

## 2017-06-06 DIAGNOSIS — I25.5 ISCHEMIC CARDIOMYOPATHY: Primary | ICD-10-CM

## 2017-06-06 DIAGNOSIS — I25.5 CARDIOMYOPATHY, ISCHEMIC: ICD-10-CM

## 2017-06-06 DIAGNOSIS — I10 ESSENTIAL (PRIMARY) HYPERTENSION: ICD-10-CM

## 2017-06-06 LAB
ANION GAP SERPL CALC-SCNC: 11 MMOL/L
APTT BLDCRRT: <21 SEC
BASOPHILS # BLD AUTO: 0.04 K/UL
BASOPHILS NFR BLD: 0.6 %
BUN SERPL-MCNC: 18 MG/DL
CALCIUM SERPL-MCNC: 9.5 MG/DL
CHLORIDE SERPL-SCNC: 104 MMOL/L
CO2 SERPL-SCNC: 25 MMOL/L
CREAT SERPL-MCNC: 1 MG/DL
DIFFERENTIAL METHOD: ABNORMAL
EOSINOPHIL # BLD AUTO: 0.2 K/UL
EOSINOPHIL NFR BLD: 3.2 %
ERYTHROCYTE [DISTWIDTH] IN BLOOD BY AUTOMATED COUNT: 12.5 %
EST. GFR  (AFRICAN AMERICAN): >60 ML/MIN/1.73 M^2
EST. GFR  (NON AFRICAN AMERICAN): >60 ML/MIN/1.73 M^2
GLUCOSE SERPL-MCNC: 195 MG/DL
HCT VFR BLD AUTO: 41.9 %
HGB BLD-MCNC: 14.6 G/DL
INR PPP: 1
LYMPHOCYTES # BLD AUTO: 1.9 K/UL
LYMPHOCYTES NFR BLD: 27.5 %
MCH RBC QN AUTO: 31.3 PG
MCHC RBC AUTO-ENTMCNC: 34.8 %
MCV RBC AUTO: 90 FL
MONOCYTES # BLD AUTO: 0.7 K/UL
MONOCYTES NFR BLD: 10.2 %
NEUTROPHILS # BLD AUTO: 4 K/UL
NEUTROPHILS NFR BLD: 58.1 %
PLATELET # BLD AUTO: 161 K/UL
PMV BLD AUTO: 10.1 FL
POCT GLUCOSE: 122 MG/DL (ref 70–110)
POCT GLUCOSE: 141 MG/DL (ref 70–110)
POCT GLUCOSE: 143 MG/DL (ref 70–110)
POCT GLUCOSE: 192 MG/DL (ref 70–110)
POTASSIUM SERPL-SCNC: 4.3 MMOL/L
PROTHROMBIN TIME: 10.3 SEC
RBC # BLD AUTO: 4.67 M/UL
SODIUM SERPL-SCNC: 140 MMOL/L
WBC # BLD AUTO: 6.88 K/UL

## 2017-06-06 PROCEDURE — 80048 BASIC METABOLIC PNL TOTAL CA: CPT

## 2017-06-06 PROCEDURE — 85025 COMPLETE CBC W/AUTO DIFF WBC: CPT

## 2017-06-06 PROCEDURE — 93005 ELECTROCARDIOGRAM TRACING: CPT | Mod: 59

## 2017-06-06 PROCEDURE — 99220 PR INITIAL OBSERVATION CARE,LEVL III: CPT | Mod: ,,, | Performed by: INTERNAL MEDICINE

## 2017-06-06 PROCEDURE — 37000009 HC ANESTHESIA EA ADD 15 MINS: Performed by: INTERNAL MEDICINE

## 2017-06-06 PROCEDURE — 25000003 PHARM REV CODE 250: Performed by: NURSE PRACTITIONER

## 2017-06-06 PROCEDURE — 85730 THROMBOPLASTIN TIME PARTIAL: CPT

## 2017-06-06 PROCEDURE — 37000008 HC ANESTHESIA 1ST 15 MINUTES: Performed by: INTERNAL MEDICINE

## 2017-06-06 PROCEDURE — 82962 GLUCOSE BLOOD TEST: CPT

## 2017-06-06 PROCEDURE — 33249 INSJ/RPLCMT DEFIB W/LEAD(S): CPT | Mod: Q0,,, | Performed by: INTERNAL MEDICINE

## 2017-06-06 PROCEDURE — 25000003 PHARM REV CODE 250: Performed by: REGISTERED NURSE

## 2017-06-06 PROCEDURE — 33249 INSJ/RPLCMT DEFIB W/LEAD(S): CPT

## 2017-06-06 PROCEDURE — D9220A PRA ANESTHESIA: Mod: ANES,,, | Performed by: ANESTHESIOLOGY

## 2017-06-06 PROCEDURE — 93010 ELECTROCARDIOGRAM REPORT: CPT | Mod: ,,, | Performed by: INTERNAL MEDICINE

## 2017-06-06 PROCEDURE — 63600175 PHARM REV CODE 636 W HCPCS: Performed by: REGISTERED NURSE

## 2017-06-06 PROCEDURE — 27100006 EP LAB PROCEDURE

## 2017-06-06 PROCEDURE — 63600175 PHARM REV CODE 636 W HCPCS: Performed by: NURSE PRACTITIONER

## 2017-06-06 PROCEDURE — 85610 PROTHROMBIN TIME: CPT

## 2017-06-06 PROCEDURE — D9220A PRA ANESTHESIA: Mod: CRNA,,, | Performed by: REGISTERED NURSE

## 2017-06-06 RX ORDER — IBUPROFEN 200 MG
16 TABLET ORAL
Status: DISCONTINUED | OUTPATIENT
Start: 2017-06-06 | End: 2017-06-07 | Stop reason: HOSPADM

## 2017-06-06 RX ORDER — CEFAZOLIN SODIUM 2 G/50ML
2 SOLUTION INTRAVENOUS
Status: COMPLETED | OUTPATIENT
Start: 2017-06-06 | End: 2017-06-07

## 2017-06-06 RX ORDER — SODIUM CHLORIDE 0.9 % (FLUSH) 0.9 %
3 SYRINGE (ML) INJECTION
Status: DISCONTINUED | OUTPATIENT
Start: 2017-06-06 | End: 2017-06-06

## 2017-06-06 RX ORDER — LORAZEPAM 2 MG/ML
0.25 INJECTION INTRAMUSCULAR ONCE AS NEEDED
Status: DISCONTINUED | OUTPATIENT
Start: 2017-06-06 | End: 2017-06-06

## 2017-06-06 RX ORDER — FENTANYL CITRATE 50 UG/ML
INJECTION, SOLUTION INTRAMUSCULAR; INTRAVENOUS
Status: DISCONTINUED | OUTPATIENT
Start: 2017-06-06 | End: 2017-06-06

## 2017-06-06 RX ORDER — IBUPROFEN 200 MG
24 TABLET ORAL
Status: DISCONTINUED | OUTPATIENT
Start: 2017-06-06 | End: 2017-06-07 | Stop reason: HOSPADM

## 2017-06-06 RX ORDER — ATORVASTATIN CALCIUM 20 MG/1
40 TABLET, FILM COATED ORAL DAILY
Status: DISCONTINUED | OUTPATIENT
Start: 2017-06-07 | End: 2017-06-07 | Stop reason: HOSPADM

## 2017-06-06 RX ORDER — GLUCAGON 1 MG
1 KIT INJECTION
Status: DISCONTINUED | OUTPATIENT
Start: 2017-06-06 | End: 2017-06-07 | Stop reason: HOSPADM

## 2017-06-06 RX ORDER — FENTANYL CITRATE 50 UG/ML
25 INJECTION, SOLUTION INTRAMUSCULAR; INTRAVENOUS EVERY 5 MIN PRN
Status: DISCONTINUED | OUTPATIENT
Start: 2017-06-06 | End: 2017-06-06

## 2017-06-06 RX ORDER — CARVEDILOL 12.5 MG/1
12.5 TABLET ORAL 2 TIMES DAILY
Status: DISCONTINUED | OUTPATIENT
Start: 2017-06-06 | End: 2017-06-07 | Stop reason: HOSPADM

## 2017-06-06 RX ORDER — ONDANSETRON 2 MG/ML
4 INJECTION INTRAMUSCULAR; INTRAVENOUS DAILY PRN
Status: DISCONTINUED | OUTPATIENT
Start: 2017-06-06 | End: 2017-06-06

## 2017-06-06 RX ORDER — FUROSEMIDE 20 MG/1
20 TABLET ORAL 2 TIMES DAILY
Status: DISCONTINUED | OUTPATIENT
Start: 2017-06-06 | End: 2017-06-07 | Stop reason: HOSPADM

## 2017-06-06 RX ORDER — CEFAZOLIN SODIUM 2 G/50ML
2 SOLUTION INTRAVENOUS
Status: COMPLETED | OUTPATIENT
Start: 2017-06-06 | End: 2017-06-06

## 2017-06-06 RX ORDER — LOSARTAN POTASSIUM 50 MG/1
50 TABLET ORAL DAILY
Status: DISCONTINUED | OUTPATIENT
Start: 2017-06-07 | End: 2017-06-07 | Stop reason: HOSPADM

## 2017-06-06 RX ORDER — ASPIRIN 81 MG/1
81 TABLET ORAL DAILY
Status: DISCONTINUED | OUTPATIENT
Start: 2017-06-07 | End: 2017-06-07 | Stop reason: HOSPADM

## 2017-06-06 RX ORDER — GABAPENTIN 300 MG/1
600 CAPSULE ORAL 2 TIMES DAILY
Status: DISCONTINUED | OUTPATIENT
Start: 2017-06-06 | End: 2017-06-07 | Stop reason: HOSPADM

## 2017-06-06 RX ORDER — MIDAZOLAM HYDROCHLORIDE 1 MG/ML
INJECTION, SOLUTION INTRAMUSCULAR; INTRAVENOUS
Status: DISCONTINUED | OUTPATIENT
Start: 2017-06-06 | End: 2017-06-06

## 2017-06-06 RX ORDER — CLOPIDOGREL BISULFATE 75 MG/1
75 TABLET ORAL DAILY
Status: DISCONTINUED | OUTPATIENT
Start: 2017-06-07 | End: 2017-06-07 | Stop reason: HOSPADM

## 2017-06-06 RX ORDER — HYDROMORPHONE HYDROCHLORIDE 1 MG/ML
0.2 INJECTION, SOLUTION INTRAMUSCULAR; INTRAVENOUS; SUBCUTANEOUS EVERY 5 MIN PRN
Status: DISCONTINUED | OUTPATIENT
Start: 2017-06-06 | End: 2017-06-06

## 2017-06-06 RX ORDER — INSULIN ASPART 100 [IU]/ML
0-5 INJECTION, SOLUTION INTRAVENOUS; SUBCUTANEOUS
Status: DISCONTINUED | OUTPATIENT
Start: 2017-06-06 | End: 2017-06-07 | Stop reason: HOSPADM

## 2017-06-06 RX ORDER — SODIUM CHLORIDE 9 MG/ML
INJECTION, SOLUTION INTRAVENOUS CONTINUOUS PRN
Status: DISCONTINUED | OUTPATIENT
Start: 2017-06-06 | End: 2017-06-06

## 2017-06-06 RX ORDER — ZOLPIDEM TARTRATE 5 MG/1
5 TABLET ORAL NIGHTLY PRN
Status: DISCONTINUED | OUTPATIENT
Start: 2017-06-06 | End: 2017-06-07 | Stop reason: HOSPADM

## 2017-06-06 RX ADMIN — FUROSEMIDE 20 MG: 20 TABLET ORAL at 06:06

## 2017-06-06 RX ADMIN — INSULIN DETEMIR 10 UNITS: 100 INJECTION, SOLUTION SUBCUTANEOUS at 08:06

## 2017-06-06 RX ADMIN — CEFAZOLIN SODIUM 2 G: 2 SOLUTION INTRAVENOUS at 01:06

## 2017-06-06 RX ADMIN — SODIUM CHLORIDE: 0.9 INJECTION, SOLUTION INTRAVENOUS at 12:06

## 2017-06-06 RX ADMIN — CARVEDILOL 12.5 MG: 12.5 TABLET, FILM COATED ORAL at 08:06

## 2017-06-06 RX ADMIN — DEXTROSE 2 G: 50 INJECTION, SOLUTION INTRAVENOUS at 08:06

## 2017-06-06 RX ADMIN — MIDAZOLAM 1 MG: 1 INJECTION INTRAMUSCULAR; INTRAVENOUS at 12:06

## 2017-06-06 RX ADMIN — ZOLPIDEM TARTRATE 5 MG: 5 TABLET, FILM COATED ORAL at 11:06

## 2017-06-06 RX ADMIN — MIDAZOLAM 0.5 MG: 1 INJECTION INTRAMUSCULAR; INTRAVENOUS at 01:06

## 2017-06-06 RX ADMIN — GABAPENTIN 600 MG: 300 CAPSULE ORAL at 08:06

## 2017-06-06 RX ADMIN — FENTANYL CITRATE 25 MCG: 50 INJECTION, SOLUTION INTRAMUSCULAR; INTRAVENOUS at 01:06

## 2017-06-06 NOTE — ANESTHESIA RELEASE NOTE
"Anesthesia Release from PACU Note    Patient: Clifton Wilson    Procedure(s) Performed: Procedure(s) (LRB):  INSERTION-ICD-SINGLE (N/A)    Anesthesia type: MAC    Post pain: Adequate analgesia    Post assessment: no apparent anesthetic complications, tolerated procedure well and no evidence of recall    Last Vitals:   Visit Vitals  /68 (BP Location: Right arm, Patient Position: Lying, BP Method: Automatic)   Pulse 68   Temp 36.8 °C (98.3 °F) (Oral)   Resp 18   Ht 5' 11" (1.803 m)   Wt 113.4 kg (250 lb)   SpO2 (!) 93%   BMI 34.87 kg/m²       Post vital signs: stable    Level of consciousness: awake, alert  and oriented    Nausea/Vomiting: no nausea/no vomiting    Complications: none    Airway Patency: patent    Respiratory: unassisted    Cardiovascular: stable and blood pressure at baseline    Hydration: euvolemic  "

## 2017-06-06 NOTE — ANESTHESIA POSTPROCEDURE EVALUATION
"Anesthesia Post Evaluation    Patient: Clifton Wilson    Procedure(s) Performed: Procedure(s) (LRB):  INSERTION-ICD-SINGLE (N/A)    Final Anesthesia Type: general  Patient location during evaluation: PACU  Patient participation: Yes- Able to Participate  Level of consciousness: awake and alert  Post-procedure vital signs: reviewed and stable  Pain management: adequate  Airway patency: patent  PONV status at discharge: No PONV  Anesthetic complications: no      Cardiovascular status: hemodynamically stable  Respiratory status: unassisted, spontaneous ventilation and room air  Hydration status: euvolemic          Visit Vitals  /68 (BP Location: Right arm, Patient Position: Lying, BP Method: Automatic)   Pulse 68   Temp 36.8 °C (98.3 °F) (Oral)   Resp 18   Ht 5' 11" (1.803 m)   Wt 113.4 kg (250 lb)   SpO2 (!) 93%   BMI 34.87 kg/m²       Pain/Yumiko Score: Pain Assessment Performed: Yes (6/6/2017  4:00 PM)  Presence of Pain: denies (6/6/2017  4:00 PM)  Yumiko Score: 10 (6/6/2017  4:00 PM)      "

## 2017-06-06 NOTE — TRANSFER OF CARE
"Anesthesia Transfer of Care Note    Patient: Clifton Wilson    Procedure(s) Performed: Procedure(s) (LRB):  INSERTION-ICD-SINGLE (N/A)    Patient location: PACU    Anesthesia Type: MAC    Transport from OR: Transported from OR on room air with adequate spontaneous ventilation    Post pain: adequate analgesia    Post assessment: no apparent anesthetic complications and tolerated procedure well    Post vital signs: stable    Level of consciousness: awake, alert and oriented    Nausea/Vomiting: no nausea/vomiting    Complications: none    Transfer of care protocol was followed      Last vitals:   Visit Vitals  /70 (BP Location: Left arm, Patient Position: Lying, BP Method: Automatic)   Pulse 73   Temp 36.6 °C (97.8 °F) (Oral)   Resp 18   Ht 5' 11" (1.803 m)   Wt 113.4 kg (250 lb)   SpO2 95%   BMI 34.87 kg/m²     "

## 2017-06-06 NOTE — PROGRESS NOTES
Called observation to give report and sec stated they have a core call going on and are not able to take report at this time.

## 2017-06-06 NOTE — H&P
[facility]  Cardiology Electrophysiology  History and Physical     Patient Name: Clifton Wilson  MRN: [id number]  Admission Date: 6/6/2017  Attending Provider: Shukri Walsh MD  Principal Problem: Ischemic cardiomyopathy    Patient information was obtained from patient     Subjective:     Chief Complaint:   ICM     HPI: 66 year old male with PMH HTN, HL, DM, CAD/ NSTEMI  s/p PCIs > last PCI to prox LAD and mid LCX with IVONNE on 11/2016, with months of persistently low EF  (25-30 percent) with ICM. Pt had posterior neck abscess followed by dermatology and no further treatment at present. Pt presented for planned single chamber ICD implantation. Pt denies any acute symptoms at present, reports chronic . Pt denies any palpitations, syncope,  fevers chills     Past Medical History:   Diagnosis Date    Anticoagulant long-term use     Cardiomyopathy     CHF (congestive heart failure)     Coronary artery disease     Diabetes mellitus     Gout     Hypertension        Past Surgical History:   Procedure Laterality Date    APPENDECTOMY      CATARACT EXTRACTION Bilateral 2011    EYE SURGERY            Review of patient's allergies indicates:  No Known Allergies     No current facility-administered medications on file prior to encounter.      Current Outpatient Prescriptions on File Prior to Encounter   Medication Sig Dispense Refill    aspirin (ECOTRIN) 81 MG EC tablet Take 81 mg by mouth once daily.      blood sugar diagnostic (CONTOUR TEST STRIPS) Strp 200 each by Misc.(Non-Drug; Combo Route) route 4 (four) times daily. 360 each 3    carvedilol (COREG) 12.5 MG tablet Take 1 tablet (12.5 mg total) by mouth 2 (two) times daily. 180 tablet 2    furosemide (LASIX) 20 MG tablet Take 1 tablet (20 mg total) by mouth 2 (two) times daily. 60 tablet 11    gabapentin (NEURONTIN) 300 MG capsule Take 2 capsules (600 mg total) by mouth 2 (two) times daily. 360 capsule 3    insulin aspart (NOVOLOG  "FLEXPEN) 100 unit/mL InPn pen Inject 20 Units into the skin 2 (two) times daily before meals. 3 mL 10    insulin glargine (LANTUS SOLOSTAR) 100 unit/mL (3 mL) InPn pen Inject 20 Units into the skin every evening. 15 mL 10    losartan (COZAAR) 50 MG tablet Take 1 tablet (50 mg total) by mouth once daily. 90 tablet 3    metformin (GLUCOPHAGE) 1000 MG tablet TAKE ONE TABLET BY MOUTH TWICE DAILY WITH MEALS 180 tablet 0    pen needle, diabetic 32 gauge x 1/5" Ndle Use 4 times/day for insulin administration 200 each 10    zolpidem (AMBIEN) 5 MG Tab Take 1 tablet (5 mg total) by mouth nightly as needed. 30 tablet 3    nitroGLYCERIN (NITROSTAT) 0.4 MG SL tablet Place 1 tablet (0.4 mg total) under the tongue every 5 (five) minutes as needed for Chest pain. 25 tablet 3       Family History   Problem Relation Age of Onset    Heart disease Mother     Heart disease Father        Social History   Substance Use Topics    Smoking status: Former Smoker    Smokeless tobacco: Not on file    Alcohol use Yes      Comment: socially         Review of Systems   Constitution: Negative  for weakness and malaise/fatigue.   Eyes: Negative for blurred vision.   Cardiovascular: negative  for chest pain,  near-syncope, palpitations and syncope. Reports    Respiratory:  Positive  for shortness of breath on exertion   Endocrine:  Negative for polyuria.   Hematologic/Lymphatic: Negative for bruise/bleed easily.   Skin:  Negative for rash.   Musculoskeletal: Negative for joint pain and muscle weakness.   Gastrointestinal:  Negative for abdominal pain and change in bowel habit.   Genitourinary: Negative for frequency.   Neurological:  Negative for dizziness.   Psychiatric/Behavioral:  Negative for depression, anxiety       Objective:     Temp: 97.8 °F (36.6 °C) (06/06/17 1100)  Pulse: 73 (06/06/17 1100)  Resp: 18 (06/06/17 1100)  BP: 117/70 (06/06/17 1101)  SpO2: 95 % (06/06/17 1100)    Vital Signs (24h Range):  Temp:  [97.8 °F (36.6 " °C)]   Pulse:  [73]   Resp:  [18]   BP: (117-150)/(65-70)   SpO2:  [95 %]       PE:   General: Well developed, well nourished. No distress on Room air   Neck: Supple, symmetrical neck; trachea midline.  Lungs: Clear to auscultation bilaterally.  No wheezing. Normal respiratory effort.  Cardiovascular:  S1 S2 Normal rate, regular rhythm and normal heart sounds.    PMI is not displaced  Extremities: No LE edema. Pulses 2+ and symmetric.   Abdomen: Abdomen is soft, non-tender non-distended with normal bowel sounds.  Skin: Skin color, texture, turgor normal. R neck lesion healed with no acute drainage.   Musculoskeletal: normal range of motion   Neurologic: Normal strength and tone. No focal numbness or weakness.   Psychiatric: normal mood and affect.  behavior is normal. Alert and oriented X 3.      Significant Labs:   Lab Results   Component Value Date    WBC 6.88 2017    HGB 14.6 2017    HCT 41.9 2017    MCV 90 2017     2017     BMP  Lab Results   Component Value Date     2017    K 4.3 2017     2017    CO2 25 2017    BUN 18 2017    CREATININE 1.0 2017    CALCIUM 9.5 2017    ANIONGAP 11 2017    ESTGFRAFRICA >60.0 2017    EGFRNONAA >60.0 2017      Lab Results   Component Value Date    INR 1.0 2016    INR 0.9 2016    INR 1.0 2016       Significant Imaging:   ECHO   CONCLUSIONS     1 - Severely depressed left ventricular systolic function (EF 25-30%).     2 - Left ventricular diastolic dysfunction.     3 - Normal right ventricular systolic function .     4 - The estimated PA systolic pressure is 12 mmHg.     5 - Intermediate central venous pressure.       This document has been electronically    SIGNED BY: Genaro Mora MD On: 2017 15:35    EK2017 NSR at 72 BPM     Assessment and Plan:     66 year old male with PMH HTN, HL, DM, CAD/ NSTEMI  s/p PCIs, with months of persistently  low EF  (25-30 percent) with ICM.    SJM single-chamber ICD   Sedation per anesthesia.    Prior to procedure, we discussed the alternatives, benefits and risks of the procedure including pain, infection, bleeding, injury to lung causing pneumothorax requiring tube placement, injury to heart valves, puncture of the heart leading to pericardial effusion or tamponade requiring tube drainage, heart attack, stroke and death.The patient voices understanding and all questions have been answered. No further questions/concerns voiced at this time.        Signed:  MICHAEL Brown-BC  Cardiology Electrophysiology  NP   [facility]    Attending: MD Jillian Watt

## 2017-06-06 NOTE — PROGRESS NOTES
To room no complaints no distress noted called telemetry tech to make sure they could see patient and also ticket to ride in chart.

## 2017-06-06 NOTE — PROGRESS NOTES
Patient admitted to recovery see [facility] for complete assessment pacu bcg's maintained safety measures verified patient instructed on pain scale and patient verbalized understanding. Blood sugar 143 also called cojennifer for orders will monitor.

## 2017-06-06 NOTE — ANESTHESIA PREPROCEDURE EVALUATION
06/06/2017  Clifton Wilson is a 66 y.o., male.    Anesthesia Evaluation    I have reviewed the Patient Summary Reports.    I have reviewed the Nursing Notes.   I have reviewed the Medications.     Review of Systems  Cardiovascular:   Hypertension CAD   CHF    Endocrine:   Diabetes        Physical Exam  General:  Obesity    Airway/Jaw/Neck:  Airway Findings: Mouth Opening: Normal Tongue: Normal  General Airway Assessment: Average  Mallampati: III  Improves to II with phonation.  TM Distance: Normal, at least 6 cm  Jaw/Neck Findings:  Neck ROM: Normal ROM            Mental Status:  Mental Status Findings:  Alert and Oriented       Patient Active Problem List   Diagnosis    HTN (hypertension)    Type 2 diabetes mellitus with neurologic complication    Mitral regurgitation    Acute combined systolic and diastolic congestive heart failure    Coronary artery disease involving native coronary artery without angina pectoris    Ventricular tachycardia, nonsustained post-MI    Dyslipidemia    Chronic combined systolic and diastolic CHF (congestive heart failure)    Coronary artery disease involving native coronary artery of native heart without angina pectoris    Type 2 diabetes mellitus with diabetic neuropathy    Diabetes mellitus type 2 without retinopathy    Status post intraocular lens implant    Bilateral posterior capsular opacification    Obesity (BMI 35.0-39.9 without comorbidity)    Cardiomyopathy, ischemic    DM type 2 without retinopathy    Essential hypertension    Pseudophakia    Right posterior capsular opacification    Post-operative state    Ischemic cardiomyopathy         Anesthesia Plan  Type of Anesthesia, risks & benefits discussed:  Anesthesia Type:  general, MAC  Patient's Preference:   Intra-op Monitoring Plan: standard ASA monitors  Intra-op Monitoring Plan Comments:    Post Op Pain Control Plan:   Post Op Pain Control Plan Comments:   Induction:   IV  Beta Blocker:  Patient is on a Beta-Blocker and has received one dose within the past 24 hours (No further documentation required).       Informed Consent: Patient understands risks and agrees with Anesthesia plan.  Questions answered. Anesthesia consent signed with patient.  ASA Score: 3     Day of Surgery Review of History & Physical:    H&P update referred to the surgeon.         Ready For Surgery From Anesthesia Perspective.

## 2017-06-06 NOTE — NURSING
Pt arrived to unit via stretcher from EP.  Pt aaox3, vss, no s/s of distress noted.  Pt denies pain. Call light within reach.  Family at bedside.

## 2017-06-06 NOTE — PLAN OF CARE
Problem: Patient Care Overview  Goal: Individualization & Mutuality  See epic    Comments: See epic

## 2017-06-06 NOTE — NURSING TRANSFER
Nursing Transfer Note      6/6/2017     Transfer To: observation 5    Transfer via stretcher    Transfer with cardiac monitoring    Transported by escort    Medicines sent: none    Chart send with patient: Yes    Notified: spouse    Patient reassessed at: see note (date, time)

## 2017-06-07 VITALS
WEIGHT: 250 LBS | RESPIRATION RATE: 15 BRPM | DIASTOLIC BLOOD PRESSURE: 83 MMHG | OXYGEN SATURATION: 95 % | SYSTOLIC BLOOD PRESSURE: 142 MMHG | BODY MASS INDEX: 35 KG/M2 | TEMPERATURE: 97 F | HEART RATE: 82 BPM | HEIGHT: 71 IN

## 2017-06-07 LAB
ESTIMATED AVG GLUCOSE: 171 MG/DL
HBA1C MFR BLD HPLC: 7.6 %
POCT GLUCOSE: 158 MG/DL (ref 70–110)

## 2017-06-07 PROCEDURE — 36415 COLL VENOUS BLD VENIPUNCTURE: CPT

## 2017-06-07 PROCEDURE — 25000003 PHARM REV CODE 250: Performed by: INTERNAL MEDICINE

## 2017-06-07 PROCEDURE — 63600175 PHARM REV CODE 636 W HCPCS: Performed by: NURSE PRACTITIONER

## 2017-06-07 PROCEDURE — 83036 HEMOGLOBIN GLYCOSYLATED A1C: CPT

## 2017-06-07 PROCEDURE — 82962 GLUCOSE BLOOD TEST: CPT

## 2017-06-07 PROCEDURE — 25000003 PHARM REV CODE 250: Performed by: NURSE PRACTITIONER

## 2017-06-07 RX ORDER — CEPHALEXIN 500 MG/1
500 CAPSULE ORAL EVERY 8 HOURS
Qty: 15 CAPSULE | Refills: 0 | Status: SHIPPED | OUTPATIENT
Start: 2017-06-07 | End: 2017-06-12

## 2017-06-07 RX ORDER — ACETAMINOPHEN 325 MG/1
650 TABLET ORAL EVERY 6 HOURS PRN
Status: DISCONTINUED | OUTPATIENT
Start: 2017-06-07 | End: 2017-06-07 | Stop reason: HOSPADM

## 2017-06-07 RX ADMIN — ATORVASTATIN CALCIUM 40 MG: 20 TABLET, FILM COATED ORAL at 08:06

## 2017-06-07 RX ADMIN — ASPIRIN 81 MG: 81 TABLET, COATED ORAL at 08:06

## 2017-06-07 RX ADMIN — DEXTROSE 2 G: 50 INJECTION, SOLUTION INTRAVENOUS at 05:06

## 2017-06-07 RX ADMIN — CARVEDILOL 12.5 MG: 12.5 TABLET, FILM COATED ORAL at 08:06

## 2017-06-07 RX ADMIN — FUROSEMIDE 20 MG: 20 TABLET ORAL at 08:06

## 2017-06-07 RX ADMIN — CLOPIDOGREL 75 MG: 75 TABLET, FILM COATED ORAL at 08:06

## 2017-06-07 RX ADMIN — ACETAMINOPHEN 650 MG: 325 TABLET ORAL at 04:06

## 2017-06-07 RX ADMIN — LOSARTAN POTASSIUM 50 MG: 50 TABLET ORAL at 08:06

## 2017-06-07 RX ADMIN — GABAPENTIN 600 MG: 300 CAPSULE ORAL at 08:06

## 2017-06-07 NOTE — PLAN OF CARE
Problem: Patient Care Overview  Goal: Plan of Care Review  Outcome: Ongoing (interventions implemented as appropriate)  Pt aaox3, vss, no s/s of distress noted.  Accuchecks ac/hs.  Safety precautions maintained, pt remains free of falls.  Pt placed on telemetry.  Pt denies pain.  Call light within reach.  Family at bedside.

## 2017-06-07 NOTE — PROGRESS NOTES
Discharge instructions provided to and gone over with patient. Understanding verbalized. Sling removed and demonstration provided to patient. IV's removed x 2, tips intact. Sites WNL. Patient ambulated off unit with staff to waiting vehicle.

## 2017-06-07 NOTE — PLAN OF CARE
Problem: Patient Care Overview  Goal: Plan of Care Review  Outcome: Ongoing (interventions implemented as appropriate)  Rounding every 2 hours and call light within reach to reduce risk of falls. Accuchecks and insulin administration per MD order to control blood sugar levels. Administer pain medication per MD order to control pain levels.

## 2017-06-07 NOTE — DISCHARGE SUMMARY
[facility]  Cardiology  Discharge Summary      Patient Name: Clifton Wilson  MRN: [id number]  Admission Date: 6/6/2017  Hospital Length of Stay: 0 days  Discharge Date and Time:  06/07/2017 8:22 AM  Attending Physician: Shukri Walsh MD  Discharging Provider: Randolph Parra NP  Primary Care Physician: Britton Grissom MD     HPI: 66 year old male with PMH HTN, HL, DM, CAD/ NSTEMI  s/p PCIs > last PCI to prox LAD and mid LCX with IVONNE on 11/2016, with months of persistently low EF  (25-30 percent) with ICM. Pt had posterior neck abscess followed by dermatology and no further treatment at present. Pt presented for planned single chamber ICD implantation.       Procedure(s) (LRB):  INSERTION-ICD-SINGLE (N/A)     Hospital Course:  Pt underwent  ICD implantation ( see procedure note for details) , tolerated procedure, no acute complications noted. Left pectoral site with no bleeding or hematoma noted.   Pt to continue antibiotics keflex for 5 days. Pt to follow up with device clinic for wound check  in 1 week, and 3 months with MD Shukri Walsh   Discharge plans/instructions discussed with patient and spouse  who verbalized understanding and agreement of plans of care.     Consults:  anesthesia       Final Active Diagnoses:    Diagnosis Date Noted POA    PRINCIPAL PROBLEM:  Ischemic cardiomyopathy [I25.5] 06/06/2017 Yes      Problems Resolved During this Admission:    Diagnosis Date Noted Date Resolved POA       Discharged Condition: good    Follow Up:  Follow-up Information     PROV C ARRHYTHMIA In 1 week.    Specialty:  Cardiology  Contact information:  [address] [place] [address] [id number] [phone]           Shukri Walsh MD In 3 months.    Specialties:  Cardiology, Electrophysiology  Contact information:  [address] [facility] [id number] [phone]                 Patient Instructions:     Diet general   Order Specific Question Answer Comments   Additional restrictions:  Cardiac (Low Na/Chol)    Additional restrictions: Diabetic 1800      Other restrictions (specify):   Scheduling Instructions: Instructions to patient:  - We have implanted a /ICD on this admission, please take the following precautions in the days/weeks following your procedure  - Please refer to post procedure device written hand out    - You may shower in 48 hours, but do not let beam of shower hit site directly and no scrubbing in area. Use Hibiclens.   - For the first 6 weeks, while your implanted leads become permanently fixed, we ask that you avoid raising your left elbow above your shoulder and lifting greater than 5-10 lbs.   - We will schedule a visit to our wound clinic 1 week after your procedure for close follow up, in the interim period please keep your wound as clean & dry as possible, If you notice any discharge,worsening tenderness or discomfort from the area please call our clinic as soon as possible >   Telephone [phone] > or main hospital number  to be directed to Device Clinic [phone].   - You may continue the antibiotics as  prescribed  > keflex for 5 days   - Please resume your home medication.   - Follow with Dr Jillian Watt  in 3  months post procedure     Call MD for:  temperature >100.4     Call MD for:  persistent nausea and vomiting or diarrhea     Call MD for:  severe uncontrolled pain     Call MD for:  redness, tenderness, or signs of infection (pain, swelling, redness, odor or green/yellow discharge around incision site)     Call MD for:  difficulty breathing or increased cough     Call MD for:  severe persistent headache     Call MD for:  worsening rash     Call MD for:  persistent dizziness, light-headedness, or visual disturbances     Call MD for:  increased confusion or weakness     Call MD for:   Scheduling Instructions: For any concerning medical symptoms     No dressing needed       Medications:  Reconciled Home Medications:   Current Discharge Medication List       START taking these medications    Details   cephALEXin (KEFLEX) 500 MG capsule Take 1 capsule (500 mg total) by mouth every 8 (eight) hours.  Qty: 15 capsule, Refills: 0         CONTINUE these medications which have NOT CHANGED    Details   aspirin (ECOTRIN) 81 MG EC tablet Take 81 mg by mouth once daily.      atorvastatin (LIPITOR) 40 MG tablet Take 1 tablet (40 mg total) by mouth once daily.  Qty: 90 tablet, Refills: 3    Associated Diagnoses: Hyperlipidemia, unspecified hyperlipidemia type      blood sugar diagnostic (CONTOUR TEST STRIPS) Strp 200 each by Misc.(Non-Drug; Combo Route) route 4 (four) times daily.  Qty: 360 each, Refills: 3      carvedilol (COREG) 12.5 MG tablet Take 1 tablet (12.5 mg total) by mouth 2 (two) times daily.  Qty: 180 tablet, Refills: 2      clopidogrel (PLAVIX) 75 mg tablet TAKE ONE TABLET BY MOUTH ONCE DAILY  Qty: 90 tablet, Refills: 0      furosemide (LASIX) 20 MG tablet Take 1 tablet (20 mg total) by mouth 2 (two) times daily.  Qty: 60 tablet, Refills: 11      gabapentin (NEURONTIN) 300 MG capsule Take 2 capsules (600 mg total) by mouth 2 (two) times daily.  Qty: 360 capsule, Refills: 3    Associated Diagnoses: Neuropathy      glipiZIDE (GLUCOTROL) 10 MG tablet TAKE ONE TABLET BY MOUTH TWICE DAILY BEFORE MEAL(S)  Qty: 180 tablet, Refills: 0    Associated Diagnoses: Type 2 diabetes mellitus without complication      insulin aspart (NOVOLOG FLEXPEN) 100 unit/mL InPn pen Inject 20 Units into the skin 2 (two) times daily before meals.  Qty: 3 mL, Refills: 10    Associated Diagnoses: Type 2 diabetes mellitus with diabetic neuropathy, with long-term current use of insulin; Uncontrolled type 2 diabetes mellitus with diabetic polyneuropathy, without long-term current use of insulin; Hyperglycemia; IDDM (insulin dependent diabetes mellitus)      insulin glargine (LANTUS SOLOSTAR) 100 unit/mL (3 mL) InPn pen Inject 20 Units into the skin every evening.  Qty: 15 mL, Refills: 10      losartan  "(COZAAR) 50 MG tablet Take 1 tablet (50 mg total) by mouth once daily.  Qty: 90 tablet, Refills: 3      metformin (GLUCOPHAGE) 1000 MG tablet TAKE ONE TABLET BY MOUTH TWICE DAILY WITH MEALS  Qty: 180 tablet, Refills: 0    Associated Diagnoses: Type 2 diabetes mellitus with diabetic neuropathy      pen needle, diabetic 32 gauge x 1/5" Ndle Use 4 times/day for insulin administration  Qty: 200 each, Refills: 10    Associated Diagnoses: Type 2 diabetes mellitus with diabetic neuropathy, with long-term current use of insulin; Uncontrolled type 2 diabetes mellitus with diabetic polyneuropathy, without long-term current use of insulin; Hyperglycemia; IDDM (insulin dependent diabetes mellitus)      zolpidem (AMBIEN) 5 MG Tab Take 1 tablet (5 mg total) by mouth nightly as needed.  Qty: 30 tablet, Refills: 3    Associated Diagnoses: Primary insomnia      nitroGLYCERIN (NITROSTAT) 0.4 MG SL tablet Place 1 tablet (0.4 mg total) under the tongue every 5 (five) minutes as needed for Chest pain.  Qty: 25 tablet, Refills: 3             MICHAEL Brown-BC  Cardiology Electrophysiology  NP   [facility]    Attending: MD Jillian Watt         "

## 2017-06-07 NOTE — PLAN OF CARE
Problem: Patient Care Overview  Goal: Plan of Care Review  Outcome: Ongoing (interventions implemented as appropriate)  Patient AAOx4.Safety maintained. Bed locked in low with 2 side rails up. Call light within reach. Blood glucose monitoring ongoing. No events noted on tele. No complaints of pain.

## 2017-06-10 DIAGNOSIS — E11.9 TYPE 2 DIABETES MELLITUS WITHOUT COMPLICATION: ICD-10-CM

## 2017-06-10 RX ORDER — GLIPIZIDE 10 MG/1
10 TABLET ORAL 2 TIMES DAILY WITH MEALS
Qty: 180 TABLET | Refills: 4 | Status: SHIPPED | OUTPATIENT
Start: 2017-06-10 | End: 2017-08-28 | Stop reason: SDUPTHER

## 2017-06-13 ENCOUNTER — CLINICAL SUPPORT (OUTPATIENT)
Dept: ELECTROPHYSIOLOGY | Facility: CLINIC | Age: 67
End: 2017-06-13
Payer: MEDICARE

## 2017-06-13 DIAGNOSIS — I25.10 CORONARY ARTERY DISEASE INVOLVING NATIVE CORONARY ARTERY OF NATIVE HEART WITHOUT ANGINA PECTORIS: ICD-10-CM

## 2017-06-13 DIAGNOSIS — I50.42 CHRONIC COMBINED SYSTOLIC AND DIASTOLIC CHF (CONGESTIVE HEART FAILURE): ICD-10-CM

## 2017-06-13 DIAGNOSIS — I25.5 CARDIOMYOPATHY, ISCHEMIC: ICD-10-CM

## 2017-06-13 PROCEDURE — 93282 PRGRMG EVAL IMPLANTABLE DFB: CPT | Mod: PBBFAC | Performed by: INTERNAL MEDICINE

## 2017-06-15 DIAGNOSIS — E11.40 TYPE 2 DIABETES MELLITUS WITH DIABETIC NEUROPATHY: ICD-10-CM

## 2017-06-15 RX ORDER — METFORMIN HYDROCHLORIDE 1000 MG/1
TABLET ORAL
Qty: 180 TABLET | Refills: 0 | OUTPATIENT
Start: 2017-06-15

## 2017-06-19 ENCOUNTER — PATIENT MESSAGE (OUTPATIENT)
Dept: FAMILY MEDICINE | Facility: CLINIC | Age: 67
End: 2017-06-19

## 2017-06-19 DIAGNOSIS — E11.40 TYPE 2 DIABETES MELLITUS WITH DIABETIC NEUROPATHY: ICD-10-CM

## 2017-06-19 RX ORDER — METFORMIN HYDROCHLORIDE 1000 MG/1
TABLET ORAL
Qty: 180 TABLET | Refills: 0 | Status: CANCELLED | OUTPATIENT
Start: 2017-06-19

## 2017-06-20 DIAGNOSIS — E11.40 TYPE 2 DIABETES MELLITUS WITH DIABETIC NEUROPATHY, UNSPECIFIED LONG TERM INSULIN USE STATUS: ICD-10-CM

## 2017-06-20 RX ORDER — METFORMIN HYDROCHLORIDE 1000 MG/1
1000 TABLET ORAL 2 TIMES DAILY WITH MEALS
Qty: 180 TABLET | Refills: 3 | Status: SHIPPED | OUTPATIENT
Start: 2017-06-20 | End: 2017-08-28 | Stop reason: SDUPTHER

## 2017-06-23 ENCOUNTER — PATIENT MESSAGE (OUTPATIENT)
Dept: CARDIOLOGY | Facility: CLINIC | Age: 67
End: 2017-06-23

## 2017-06-28 DIAGNOSIS — F51.01 PRIMARY INSOMNIA: ICD-10-CM

## 2017-06-29 ENCOUNTER — PATIENT MESSAGE (OUTPATIENT)
Dept: FAMILY MEDICINE | Facility: CLINIC | Age: 67
End: 2017-06-29

## 2017-06-29 DIAGNOSIS — F51.01 PRIMARY INSOMNIA: ICD-10-CM

## 2017-06-29 RX ORDER — ZOLPIDEM TARTRATE 5 MG/1
TABLET ORAL
Qty: 30 TABLET | Refills: 0 | Status: SHIPPED | OUTPATIENT
Start: 2017-06-29 | End: 2017-06-29 | Stop reason: SDUPTHER

## 2017-06-29 NOTE — TELEPHONE ENCOUNTER
[facility] has approved Zolpidem Tartrate 5 mg staring 06/27/2017 until further notice.

## 2017-06-30 RX ORDER — ZOLPIDEM TARTRATE 5 MG/1
TABLET ORAL
Qty: 30 TABLET | Refills: 0 | Status: SHIPPED | OUTPATIENT
Start: 2017-06-30 | End: 2017-07-24 | Stop reason: SDUPTHER

## 2017-07-24 ENCOUNTER — PATIENT MESSAGE (OUTPATIENT)
Dept: FAMILY MEDICINE | Facility: CLINIC | Age: 67
End: 2017-07-24

## 2017-07-24 DIAGNOSIS — F51.01 PRIMARY INSOMNIA: ICD-10-CM

## 2017-07-24 RX ORDER — CLOPIDOGREL BISULFATE 75 MG/1
TABLET ORAL
Qty: 90 TABLET | Refills: 0 | Status: SHIPPED | OUTPATIENT
Start: 2017-07-24 | End: 2017-08-28 | Stop reason: SDUPTHER

## 2017-07-24 RX ORDER — ZOLPIDEM TARTRATE 5 MG/1
5 TABLET ORAL NIGHTLY PRN
Qty: 30 TABLET | Refills: 0 | Status: SHIPPED | OUTPATIENT
Start: 2017-07-24 | End: 2017-08-28 | Stop reason: SDUPTHER

## 2017-08-09 ENCOUNTER — PATIENT MESSAGE (OUTPATIENT)
Dept: CARDIOLOGY | Facility: CLINIC | Age: 67
End: 2017-08-09

## 2017-08-10 ENCOUNTER — TELEPHONE (OUTPATIENT)
Dept: ELECTROPHYSIOLOGY | Facility: CLINIC | Age: 67
End: 2017-08-10

## 2017-08-10 NOTE — TELEPHONE ENCOUNTER
Received my [facility] message from pt stating he may have received ICD shock in middle of night. Requested pt send remote transmission, as no alerts were received. Remote transmission received and reviewed, no therapies delivered. Notified patient. Pt states currently feeling fine. Advised pt if symptoms return he should go to ER. Pt verbalized understanding.

## 2017-08-27 DIAGNOSIS — E11.40 TYPE 2 DIABETES MELLITUS WITH DIABETIC NEUROPATHY: ICD-10-CM

## 2017-08-27 DIAGNOSIS — Z79.4 TYPE 2 DIABETES MELLITUS WITH DIABETIC NEUROPATHY, WITH LONG-TERM CURRENT USE OF INSULIN: ICD-10-CM

## 2017-08-27 DIAGNOSIS — F51.01 PRIMARY INSOMNIA: ICD-10-CM

## 2017-08-27 DIAGNOSIS — G62.9 NEUROPATHY: ICD-10-CM

## 2017-08-27 DIAGNOSIS — E11.40 TYPE 2 DIABETES MELLITUS WITH DIABETIC NEUROPATHY, WITH LONG-TERM CURRENT USE OF INSULIN: ICD-10-CM

## 2017-08-27 DIAGNOSIS — E11.9 TYPE 2 DIABETES MELLITUS WITHOUT COMPLICATION: ICD-10-CM

## 2017-08-27 DIAGNOSIS — E11.40 TYPE 2 DIABETES MELLITUS WITH DIABETIC NEUROPATHY, UNSPECIFIED LONG TERM INSULIN USE STATUS: ICD-10-CM

## 2017-08-27 DIAGNOSIS — R73.9 HYPERGLYCEMIA: ICD-10-CM

## 2017-08-28 DIAGNOSIS — F51.01 PRIMARY INSOMNIA: ICD-10-CM

## 2017-08-28 DIAGNOSIS — I10 ESSENTIAL HYPERTENSION: Primary | ICD-10-CM

## 2017-08-28 DIAGNOSIS — I50.41 ACUTE COMBINED SYSTOLIC AND DIASTOLIC CONGESTIVE HEART FAILURE: ICD-10-CM

## 2017-08-28 DIAGNOSIS — I34.0 MITRAL VALVE INSUFFICIENCY, UNSPECIFIED ETIOLOGY: ICD-10-CM

## 2017-08-28 DIAGNOSIS — I10 ESSENTIAL HYPERTENSION: ICD-10-CM

## 2017-08-28 RX ORDER — INSULIN ASPART 100 [IU]/ML
20 INJECTION, SOLUTION INTRAVENOUS; SUBCUTANEOUS
Qty: 3 ML | Refills: 10 | Status: SHIPPED | OUTPATIENT
Start: 2017-08-28 | End: 2017-12-26 | Stop reason: SDUPTHER

## 2017-08-28 RX ORDER — CARVEDILOL 12.5 MG/1
12.5 TABLET ORAL 2 TIMES DAILY
Qty: 180 TABLET | Refills: 2 | Status: SHIPPED | OUTPATIENT
Start: 2017-08-28 | End: 2017-08-28 | Stop reason: SDUPTHER

## 2017-08-28 RX ORDER — ZOLPIDEM TARTRATE 5 MG/1
5 TABLET ORAL NIGHTLY PRN
Qty: 90 TABLET | Refills: 1 | Status: SHIPPED | OUTPATIENT
Start: 2017-08-28 | End: 2017-08-28 | Stop reason: SDUPTHER

## 2017-08-28 RX ORDER — CARVEDILOL 12.5 MG/1
12.5 TABLET ORAL 2 TIMES DAILY
Qty: 180 TABLET | Refills: 2 | Status: SHIPPED | OUTPATIENT
Start: 2017-08-28 | End: 2018-01-10 | Stop reason: SDUPTHER

## 2017-08-28 RX ORDER — GLIPIZIDE 10 MG/1
10 TABLET ORAL 2 TIMES DAILY WITH MEALS
Qty: 180 TABLET | Refills: 4 | Status: SHIPPED | OUTPATIENT
Start: 2017-08-28 | End: 2017-12-26 | Stop reason: SDUPTHER

## 2017-08-28 RX ORDER — INSULIN GLARGINE 100 [IU]/ML
20 INJECTION, SOLUTION SUBCUTANEOUS NIGHTLY
Qty: 15 ML | Refills: 10 | Status: SHIPPED | OUTPATIENT
Start: 2017-08-28 | End: 2018-12-20 | Stop reason: SDUPTHER

## 2017-08-28 RX ORDER — FUROSEMIDE 20 MG/1
20 TABLET ORAL 2 TIMES DAILY
Qty: 60 TABLET | Refills: 11 | Status: SHIPPED | OUTPATIENT
Start: 2017-08-28 | End: 2018-01-10 | Stop reason: SDUPTHER

## 2017-08-28 RX ORDER — INSULIN GLARGINE 100 [IU]/ML
20 INJECTION, SOLUTION SUBCUTANEOUS NIGHTLY
Qty: 100 ML | Refills: 10 | Status: SHIPPED | OUTPATIENT
Start: 2017-08-28 | End: 2017-12-26 | Stop reason: SDUPTHER

## 2017-08-28 RX ORDER — ZOLPIDEM TARTRATE 5 MG/1
TABLET ORAL
Qty: 30 TABLET | Refills: 0 | Status: SHIPPED | OUTPATIENT
Start: 2017-08-28 | End: 2017-08-28 | Stop reason: SDUPTHER

## 2017-08-28 RX ORDER — METFORMIN HYDROCHLORIDE 1000 MG/1
1000 TABLET ORAL 2 TIMES DAILY WITH MEALS
Qty: 180 TABLET | Refills: 3 | Status: SHIPPED | OUTPATIENT
Start: 2017-08-28 | End: 2017-12-26 | Stop reason: SDUPTHER

## 2017-08-28 RX ORDER — LOSARTAN POTASSIUM 50 MG/1
50 TABLET ORAL DAILY
Qty: 90 TABLET | Refills: 3 | Status: SHIPPED | OUTPATIENT
Start: 2017-08-28 | End: 2018-02-02 | Stop reason: SDUPTHER

## 2017-08-28 RX ORDER — GABAPENTIN 300 MG/1
600 CAPSULE ORAL 2 TIMES DAILY
Qty: 360 CAPSULE | Refills: 1 | Status: SHIPPED | OUTPATIENT
Start: 2017-08-28 | End: 2017-12-26 | Stop reason: SDUPTHER

## 2017-08-28 RX ORDER — CLOPIDOGREL BISULFATE 75 MG/1
75 TABLET ORAL DAILY
Qty: 90 TABLET | Refills: 0 | Status: SHIPPED | OUTPATIENT
Start: 2017-08-28 | End: 2017-09-19 | Stop reason: SDUPTHER

## 2017-08-28 RX ORDER — INSULIN ASPART 100 [IU]/ML
INJECTION, SOLUTION INTRAVENOUS; SUBCUTANEOUS
Qty: 100 ML | Refills: 10 | Status: SHIPPED | OUTPATIENT
Start: 2017-08-28 | End: 2017-09-19 | Stop reason: SDUPTHER

## 2017-08-28 RX ORDER — ZOLPIDEM TARTRATE 5 MG/1
5 TABLET ORAL NIGHTLY PRN
Qty: 30 TABLET | Refills: 0 | Status: SHIPPED | OUTPATIENT
Start: 2017-08-28 | End: 2018-01-10 | Stop reason: SDUPTHER

## 2017-08-29 RX ORDER — CLOPIDOGREL BISULFATE 75 MG/1
TABLET ORAL
Qty: 90 TABLET | Refills: 0 | Status: SHIPPED | OUTPATIENT
Start: 2017-08-29 | End: 2018-01-10 | Stop reason: SDUPTHER

## 2017-09-14 ENCOUNTER — TELEPHONE (OUTPATIENT)
Dept: FAMILY MEDICINE | Facility: CLINIC | Age: 67
End: 2017-09-14

## 2017-09-14 NOTE — TELEPHONE ENCOUNTER
----- Message from Liliana García sent at 9/14/2017  9:46 AM CDT -----  Contact: Judie [facility]es [facility] [phone] Ref.[phone]   Calling to talk to nurse concerning patients health. Please advice

## 2017-09-19 ENCOUNTER — CLINICAL SUPPORT (OUTPATIENT)
Dept: ELECTROPHYSIOLOGY | Facility: CLINIC | Age: 67
End: 2017-09-19
Payer: MEDICARE

## 2017-09-19 ENCOUNTER — OFFICE VISIT (OUTPATIENT)
Dept: ELECTROPHYSIOLOGY | Facility: CLINIC | Age: 67
End: 2017-09-19
Payer: MEDICARE

## 2017-09-19 ENCOUNTER — HOSPITAL ENCOUNTER (OUTPATIENT)
Dept: CARDIOLOGY | Facility: CLINIC | Age: 67
Discharge: HOME | End: 2017-09-19
Payer: MEDICARE

## 2017-09-19 VITALS
DIASTOLIC BLOOD PRESSURE: 82 MMHG | WEIGHT: 262.13 LBS | BODY MASS INDEX: 36.7 KG/M2 | HEART RATE: 77 BPM | HEIGHT: 71 IN | SYSTOLIC BLOOD PRESSURE: 122 MMHG

## 2017-09-19 DIAGNOSIS — I25.5 CARDIOMYOPATHY, ISCHEMIC: ICD-10-CM

## 2017-09-19 DIAGNOSIS — I50.42 CHRONIC COMBINED SYSTOLIC AND DIASTOLIC CHF (CONGESTIVE HEART FAILURE): ICD-10-CM

## 2017-09-19 DIAGNOSIS — I25.10 CORONARY ARTERY DISEASE INVOLVING NATIVE CORONARY ARTERY OF NATIVE HEART WITHOUT ANGINA PECTORIS: ICD-10-CM

## 2017-09-19 DIAGNOSIS — R00.2 PALPITATION: ICD-10-CM

## 2017-09-19 DIAGNOSIS — Z95.810 ICD (IMPLANTABLE CARDIOVERTER-DEFIBRILLATOR), SINGLE, IN SITU: ICD-10-CM

## 2017-09-19 DIAGNOSIS — I25.5 ISCHEMIC CARDIOMYOPATHY: Primary | ICD-10-CM

## 2017-09-19 DIAGNOSIS — I47.20 VENTRICULAR TACHYCARDIA: ICD-10-CM

## 2017-09-19 PROCEDURE — 93282 PRGRMG EVAL IMPLANTABLE DFB: CPT | Mod: S$GLB,,, | Performed by: INTERNAL MEDICINE

## 2017-09-19 PROCEDURE — 3008F BODY MASS INDEX DOCD: CPT | Mod: S$GLB,,, | Performed by: NURSE PRACTITIONER

## 2017-09-19 PROCEDURE — 3074F SYST BP LT 130 MM HG: CPT | Mod: S$GLB,,, | Performed by: NURSE PRACTITIONER

## 2017-09-19 PROCEDURE — 99214 OFFICE O/P EST MOD 30 MIN: CPT | Mod: S$GLB,,, | Performed by: NURSE PRACTITIONER

## 2017-09-19 PROCEDURE — 93000 ELECTROCARDIOGRAM COMPLETE: CPT | Mod: S$GLB,,, | Performed by: INTERNAL MEDICINE

## 2017-09-19 PROCEDURE — 99999 PR PBB SHADOW E&M-EST. PATIENT-LVL III: CPT | Mod: PBBFAC,,, | Performed by: NURSE PRACTITIONER

## 2017-09-19 PROCEDURE — 3079F DIAST BP 80-89 MM HG: CPT | Mod: S$GLB,,, | Performed by: NURSE PRACTITIONER

## 2017-09-19 NOTE — PROGRESS NOTES
Subjective:    Patient ID:  Clifton Wilson is a 66 y.o. male who presents for follow-up of No chief complaint on file.    Clifton Wilson is a patient of Dr. Walsh.       HPI     66 y.o. M  HTN HL DM  CAD/NSTEMI 12/2011, and recently as well: 12/2015.    In hospital post PCI, had NSVT. Was given a LifeVest.  Subsequent LVEF 39%, leading to EPS, which was negative (therefore no ICD then).  Since, has recently had months of persistently low LVEF.   Again referred by Dr Clement for ICD, due to LVEF 25% with ICM.  At his last office visit (04/10/17), Mr. Wilson reported experiencing occasional .   Echo 12/15: 35-40%. 4/16: 25-40%.  3/17 25%  2/16 nuclear ETT neg  Mr. Wilson underwent successful SC ICD (06/06/17) without complication.     Since the procedure, Mr. Wilson reports feeling well from a cardiac standpoint. He continues to note occasional mild weakness with carrying groceries, however, he could not do this at all in the past; his exertional tolerance has since improved. He denies chest pain, SOB/, dizziness, palpitations, or syncope.      Recent cardiac studies:  Echo (03/22/17) revealed an EF of 25-30%, LVDD, intermediate CVP, and a PASP of 12 mmHg.   Device Interrogation (09/19/17) reveals an intrinsic SR with stable lead and device function. No arrhythmias or treated episodes noted. He paces 0% in the RV. Estimated battery longevity 7.6-8.8 years.       I reviewed today's ECG which demonstrated NSR at 77 bpm; , QRS 90, and QTc 439.    Review of Systems   Constitution: Positive for weakness (occasional; improved) and malaise/fatigue. Negative for diaphoresis.   HENT: Negative for nosebleeds.    Eyes: Negative for double vision.   Cardiovascular: Negative for chest pain, dyspnea on exertion, irregular heartbeat, near-syncope, palpitations and syncope.   Respiratory: Positive for cough. Negative for shortness of breath.    Skin: Negative.    Musculoskeletal: Positive for stiffness.    Gastrointestinal: Negative for abdominal pain, hematemesis and hematochezia.   Genitourinary: Negative for hematuria.   Neurological: Negative for dizziness and light-headedness.   Psychiatric/Behavioral: Negative for altered mental status.        Objective:    Physical Exam   Constitutional: He is oriented to person, place, and time. He appears well-developed and well-nourished.   HENT:   Head: Normocephalic and atraumatic.   Eyes: Pupils are equal, round, and reactive to light.   Cardiovascular: Normal rate, regular rhythm and normal heart sounds.    Pulmonary/Chest: Effort normal and breath sounds normal.   Neurological: He is alert and oriented to person, place, and time.   Vitals reviewed.        Assessment:       1. Ischemic cardiomyopathy    2. Chronic combined systolic and diastolic CHF (congestive heart failure)    3. Ventricular tachycardia, nonsustained post-MI    4. ICD (implantable cardioverter-defibrillator), single, in situ    5. Coronary artery disease involving native coronary artery of native heart without angina pectoris         Plan:       In summary, Mr. Wilson is a 66 y.o. male with ICM, HFrEF (EF 25-30%), a hx of VT s/p recent SC ICD, and CAD. Mr. Wilson is doing well from a device perspective with stable lead and device function without ventricular arrhythmia noted.    Continue current medication regimen and device settings.   Follow up in device clinic as scheduled.   Follow up in EP clinic in 1 year, sooner as needed.     Ofelia Jiménez, [place], APRN, FNP-C        (A copy of today's note was sent to Dr. Walsh.)

## 2017-12-20 ENCOUNTER — CLINICAL SUPPORT (OUTPATIENT)
Dept: ELECTROPHYSIOLOGY | Facility: CLINIC | Age: 67
End: 2017-12-20
Payer: MEDICARE

## 2017-12-20 DIAGNOSIS — I25.5 CARDIOMYOPATHY, ISCHEMIC: ICD-10-CM

## 2017-12-20 DIAGNOSIS — I50.42 CHRONIC COMBINED SYSTOLIC AND DIASTOLIC CHF (CONGESTIVE HEART FAILURE): ICD-10-CM

## 2017-12-20 DIAGNOSIS — I25.10 CORONARY ARTERY DISEASE INVOLVING NATIVE CORONARY ARTERY OF NATIVE HEART WITHOUT ANGINA PECTORIS: ICD-10-CM

## 2017-12-21 PROCEDURE — 93295 DEV INTERROG REMOTE 1/2/MLT: CPT | Mod: S$GLB,,, | Performed by: INTERNAL MEDICINE

## 2017-12-21 PROCEDURE — 93296 REM INTERROG EVL PM/IDS: CPT | Mod: S$GLB,,, | Performed by: INTERNAL MEDICINE

## 2017-12-26 DIAGNOSIS — E11.40 TYPE 2 DIABETES MELLITUS WITH DIABETIC NEUROPATHY, UNSPECIFIED LONG TERM INSULIN USE STATUS: ICD-10-CM

## 2017-12-26 DIAGNOSIS — R73.9 HYPERGLYCEMIA: ICD-10-CM

## 2017-12-26 DIAGNOSIS — E08.00 DIABETES MELLITUS DUE TO UNDERLYING CONDITION, UNCONTROLLED, WITH HYPEROSMOLARITY WITHOUT COMA, WITHOUT LONG-TERM CURRENT USE OF INSULIN: ICD-10-CM

## 2017-12-26 DIAGNOSIS — Z79.4 TYPE 2 DIABETES MELLITUS WITHOUT COMPLICATION, WITH LONG-TERM CURRENT USE OF INSULIN: ICD-10-CM

## 2017-12-26 DIAGNOSIS — Z79.4 TYPE 2 DIABETES MELLITUS WITH DIABETIC NEUROPATHY, WITH LONG-TERM CURRENT USE OF INSULIN: ICD-10-CM

## 2017-12-26 DIAGNOSIS — G62.9 NEUROPATHY: ICD-10-CM

## 2017-12-26 DIAGNOSIS — E11.9 TYPE 2 DIABETES MELLITUS WITHOUT COMPLICATION, WITH LONG-TERM CURRENT USE OF INSULIN: ICD-10-CM

## 2017-12-26 DIAGNOSIS — E11.40 TYPE 2 DIABETES MELLITUS WITH DIABETIC NEUROPATHY, WITH LONG-TERM CURRENT USE OF INSULIN: ICD-10-CM

## 2017-12-26 RX ORDER — LANCETS
1 EACH MISCELLANEOUS 3 TIMES DAILY
Qty: 300 EACH | Refills: 1 | Status: SHIPPED | OUTPATIENT
Start: 2017-12-26 | End: 2019-05-13 | Stop reason: SDUPTHER

## 2017-12-26 RX ORDER — ISOPROPYL ALCOHOL 70 ML/100ML
1 SWAB TOPICAL
Qty: 100 EACH | Refills: 1 | COMMUNITY
Start: 2017-12-26 | End: 2018-01-06 | Stop reason: SDUPTHER

## 2017-12-26 RX ORDER — GLIPIZIDE 10 MG/1
10 TABLET ORAL 2 TIMES DAILY WITH MEALS
Qty: 180 TABLET | Refills: 1 | Status: SHIPPED | OUTPATIENT
Start: 2017-12-26 | End: 2018-08-16 | Stop reason: SDUPTHER

## 2017-12-26 RX ORDER — ISOPROPYL ALCOHOL 70 ML/100ML
1 SWAB TOPICAL
COMMUNITY
End: 2017-12-26 | Stop reason: SDUPTHER

## 2017-12-26 RX ORDER — PEN NEEDLE, DIABETIC 30 GX3/16"
NEEDLE, DISPOSABLE MISCELLANEOUS
COMMUNITY
End: 2018-02-07 | Stop reason: SDUPTHER

## 2017-12-26 RX ORDER — DEXTROSE 4 G
1 TABLET,CHEWABLE ORAL 3 TIMES DAILY
Qty: 1 EACH | Refills: 0 | Status: SHIPPED | OUTPATIENT
Start: 2017-12-26 | End: 2018-10-24 | Stop reason: SDUPTHER

## 2017-12-26 RX ORDER — INSULIN GLARGINE 100 [IU]/ML
20 INJECTION, SOLUTION SUBCUTANEOUS NIGHTLY
Qty: 100 ML | Refills: 1 | Status: SHIPPED | OUTPATIENT
Start: 2017-12-26 | End: 2018-06-26 | Stop reason: SDUPTHER

## 2017-12-26 RX ORDER — METFORMIN HYDROCHLORIDE 1000 MG/1
1000 TABLET ORAL 2 TIMES DAILY WITH MEALS
Qty: 180 TABLET | Refills: 1 | Status: SHIPPED | OUTPATIENT
Start: 2017-12-26 | End: 2018-08-16 | Stop reason: SDUPTHER

## 2017-12-26 RX ORDER — GABAPENTIN 300 MG/1
600 CAPSULE ORAL 2 TIMES DAILY
Qty: 360 CAPSULE | Refills: 1 | Status: SHIPPED | OUTPATIENT
Start: 2017-12-26 | End: 2018-08-16 | Stop reason: SDUPTHER

## 2017-12-26 RX ORDER — INSULIN ASPART 100 [IU]/ML
20 INJECTION, SOLUTION INTRAVENOUS; SUBCUTANEOUS
Qty: 3 ML | Refills: 10 | Status: SHIPPED | OUTPATIENT
Start: 2017-12-26 | End: 2018-09-30 | Stop reason: SDUPTHER

## 2018-01-05 ENCOUNTER — PATIENT MESSAGE (OUTPATIENT)
Dept: FAMILY MEDICINE | Facility: CLINIC | Age: 68
End: 2018-01-05

## 2018-01-05 DIAGNOSIS — Z79.4 TYPE 2 DIABETES MELLITUS WITH DIABETIC NEUROPATHY, WITH LONG-TERM CURRENT USE OF INSULIN: ICD-10-CM

## 2018-01-05 DIAGNOSIS — E11.40 TYPE 2 DIABETES MELLITUS WITH DIABETIC NEUROPATHY, WITH LONG-TERM CURRENT USE OF INSULIN: ICD-10-CM

## 2018-01-05 DIAGNOSIS — R73.9 HYPERGLYCEMIA: ICD-10-CM

## 2018-01-06 RX ORDER — ISOPROPYL ALCOHOL 70 ML/100ML
1 SWAB TOPICAL
Qty: 100 EACH | Refills: 1 | COMMUNITY
Start: 2018-01-06 | End: 2018-11-07 | Stop reason: SDUPTHER

## 2018-01-08 DIAGNOSIS — E11.9 TYPE 2 DIABETES MELLITUS WITHOUT COMPLICATION: ICD-10-CM

## 2018-01-10 DIAGNOSIS — I10 ESSENTIAL HYPERTENSION: ICD-10-CM

## 2018-01-10 DIAGNOSIS — I50.41 ACUTE COMBINED SYSTOLIC AND DIASTOLIC CONGESTIVE HEART FAILURE: ICD-10-CM

## 2018-01-10 DIAGNOSIS — I34.0 MITRAL VALVE INSUFFICIENCY, UNSPECIFIED ETIOLOGY: ICD-10-CM

## 2018-01-10 DIAGNOSIS — F51.01 PRIMARY INSOMNIA: ICD-10-CM

## 2018-01-10 RX ORDER — CARVEDILOL 12.5 MG/1
12.5 TABLET ORAL 2 TIMES DAILY
Qty: 180 TABLET | Refills: 2 | Status: SHIPPED | OUTPATIENT
Start: 2018-01-10 | End: 2018-02-02 | Stop reason: SDUPTHER

## 2018-01-10 RX ORDER — CLOPIDOGREL BISULFATE 75 MG/1
75 TABLET ORAL DAILY
Qty: 90 TABLET | Refills: 0 | Status: SHIPPED | OUTPATIENT
Start: 2018-01-10 | End: 2018-02-02 | Stop reason: SDUPTHER

## 2018-01-10 RX ORDER — ZOLPIDEM TARTRATE 5 MG/1
5 TABLET ORAL NIGHTLY PRN
Qty: 90 TABLET | Refills: 0 | Status: SHIPPED | OUTPATIENT
Start: 2018-01-10 | End: 2018-04-13 | Stop reason: SDUPTHER

## 2018-01-10 RX ORDER — FUROSEMIDE 20 MG/1
20 TABLET ORAL 2 TIMES DAILY
Qty: 60 TABLET | Refills: 11 | Status: SHIPPED | OUTPATIENT
Start: 2018-01-10 | End: 2018-02-02 | Stop reason: SDUPTHER

## 2018-01-10 NOTE — TELEPHONE ENCOUNTER
----- Message from Reba Frazier sent at 1/10/2018 12:06 PM CST -----  Contact: Kaelyn from [facility]/[phone]  Kaelyn called to state previous requests have been sent to your office regarding getting a new prescription for the patient on the medication below and no response has been received back.    Please call and advise.    zolpidem (AMBIEN) 5 MG Tab

## 2018-01-16 ENCOUNTER — TELEPHONE (OUTPATIENT)
Dept: FAMILY MEDICINE | Facility: CLINIC | Age: 68
End: 2018-01-16

## 2018-01-16 DIAGNOSIS — I10 ESSENTIAL (PRIMARY) HYPERTENSION: Primary | ICD-10-CM

## 2018-01-16 DIAGNOSIS — I10 ESSENTIAL HYPERTENSION: ICD-10-CM

## 2018-01-16 DIAGNOSIS — E11.40 TYPE 2 DIABETES MELLITUS WITH DIABETIC NEUROPATHY, WITH LONG-TERM CURRENT USE OF INSULIN: ICD-10-CM

## 2018-01-16 DIAGNOSIS — E78.5 DYSLIPIDEMIA: ICD-10-CM

## 2018-01-16 DIAGNOSIS — Z12.5 SCREENING PSA (PROSTATE SPECIFIC ANTIGEN): ICD-10-CM

## 2018-01-16 DIAGNOSIS — Z79.4 TYPE 2 DIABETES MELLITUS WITH DIABETIC NEUROPATHY, WITH LONG-TERM CURRENT USE OF INSULIN: ICD-10-CM

## 2018-01-17 ENCOUNTER — PATIENT MESSAGE (OUTPATIENT)
Dept: FAMILY MEDICINE | Facility: CLINIC | Age: 68
End: 2018-01-17

## 2018-01-19 ENCOUNTER — LAB VISIT (OUTPATIENT)
Dept: LAB | Facility: HOSPITAL | Age: 68
End: 2018-01-19
Attending: FAMILY MEDICINE
Payer: MEDICARE

## 2018-01-19 DIAGNOSIS — Z79.4 TYPE 2 DIABETES MELLITUS WITH DIABETIC NEUROPATHY, WITH LONG-TERM CURRENT USE OF INSULIN: ICD-10-CM

## 2018-01-19 DIAGNOSIS — Z12.5 SCREENING PSA (PROSTATE SPECIFIC ANTIGEN): ICD-10-CM

## 2018-01-19 DIAGNOSIS — I10 ESSENTIAL (PRIMARY) HYPERTENSION: ICD-10-CM

## 2018-01-19 DIAGNOSIS — E78.5 DYSLIPIDEMIA: ICD-10-CM

## 2018-01-19 DIAGNOSIS — E11.40 TYPE 2 DIABETES MELLITUS WITH DIABETIC NEUROPATHY, WITH LONG-TERM CURRENT USE OF INSULIN: ICD-10-CM

## 2018-01-19 DIAGNOSIS — E11.9 TYPE 2 DIABETES MELLITUS WITHOUT COMPLICATION: ICD-10-CM

## 2018-01-19 LAB
ALBUMIN SERPL BCP-MCNC: 3.7 G/DL
ALP SERPL-CCNC: 71 U/L
ALT SERPL W/O P-5'-P-CCNC: 37 U/L
ANION GAP SERPL CALC-SCNC: 10 MMOL/L
AST SERPL-CCNC: 27 U/L
BASOPHILS # BLD AUTO: 0.03 K/UL
BASOPHILS NFR BLD: 0.5 %
BILIRUB SERPL-MCNC: 1.2 MG/DL
BILIRUB UR QL STRIP: NEGATIVE
BUN SERPL-MCNC: 15 MG/DL
CALCIUM SERPL-MCNC: 9.2 MG/DL
CHLORIDE SERPL-SCNC: 104 MMOL/L
CHOLEST SERPL-MCNC: 144 MG/DL
CHOLEST/HDLC SERPL: 5 {RATIO}
CLARITY UR: CLEAR
CO2 SERPL-SCNC: 28 MMOL/L
COLOR UR: YELLOW
COMPLEXED PSA SERPL-MCNC: 0.7 NG/ML
CREAT SERPL-MCNC: 0.9 MG/DL
CREAT UR-MCNC: 180 MG/DL
DIFFERENTIAL METHOD: NORMAL
EOSINOPHIL # BLD AUTO: 0.3 K/UL
EOSINOPHIL NFR BLD: 4.2 %
ERYTHROCYTE [DISTWIDTH] IN BLOOD BY AUTOMATED COUNT: 12.6 %
EST. GFR  (AFRICAN AMERICAN): >60 ML/MIN/1.73 M^2
EST. GFR  (NON AFRICAN AMERICAN): >60 ML/MIN/1.73 M^2
ESTIMATED AVG GLUCOSE: 169 MG/DL
ESTIMATED AVG GLUCOSE: 169 MG/DL
GLUCOSE SERPL-MCNC: 197 MG/DL
GLUCOSE UR QL STRIP: NEGATIVE
HBA1C MFR BLD HPLC: 7.5 %
HBA1C MFR BLD HPLC: 7.5 %
HCT VFR BLD AUTO: 42.2 %
HDLC SERPL-MCNC: 29 MG/DL
HDLC SERPL: 20.1 %
HGB BLD-MCNC: 14.2 G/DL
HGB UR QL STRIP: ABNORMAL
KETONES UR QL STRIP: NEGATIVE
LDLC SERPL CALC-MCNC: 67.2 MG/DL
LEUKOCYTE ESTERASE UR QL STRIP: NEGATIVE
LYMPHOCYTES # BLD AUTO: 1.5 K/UL
LYMPHOCYTES NFR BLD: 22.6 %
MCH RBC QN AUTO: 30.6 PG
MCHC RBC AUTO-ENTMCNC: 33.6 G/DL
MCV RBC AUTO: 91 FL
MICROALBUMIN UR DL<=1MG/L-MCNC: 26 UG/ML
MICROALBUMIN/CREATININE RATIO: 14.4 UG/MG
MONOCYTES # BLD AUTO: 0.8 K/UL
MONOCYTES NFR BLD: 11.6 %
NEUTROPHILS # BLD AUTO: 4.1 K/UL
NEUTROPHILS NFR BLD: 60.9 %
NITRITE UR QL STRIP: NEGATIVE
NONHDLC SERPL-MCNC: 115 MG/DL
PH UR STRIP: 6 [PH] (ref 5–8)
PLATELET # BLD AUTO: 186 K/UL
PMV BLD AUTO: 10 FL
POTASSIUM SERPL-SCNC: 3.7 MMOL/L
PROT SERPL-MCNC: 7.5 G/DL
PROT UR QL STRIP: NEGATIVE
RBC # BLD AUTO: 4.64 M/UL
SODIUM SERPL-SCNC: 142 MMOL/L
SP GR UR STRIP: 1.02 (ref 1–1.03)
TRIGL SERPL-MCNC: 239 MG/DL
URN SPEC COLLECT METH UR: ABNORMAL
UROBILINOGEN UR STRIP-ACNC: NEGATIVE EU/DL
WBC # BLD AUTO: 6.64 K/UL

## 2018-01-19 PROCEDURE — 36415 COLL VENOUS BLD VENIPUNCTURE: CPT

## 2018-01-19 PROCEDURE — 81003 URINALYSIS AUTO W/O SCOPE: CPT

## 2018-01-19 PROCEDURE — 80053 COMPREHEN METABOLIC PANEL: CPT

## 2018-01-19 PROCEDURE — 84153 ASSAY OF PSA TOTAL: CPT

## 2018-01-19 PROCEDURE — 80061 LIPID PANEL: CPT

## 2018-01-19 PROCEDURE — 83036 HEMOGLOBIN GLYCOSYLATED A1C: CPT

## 2018-01-19 PROCEDURE — 85025 COMPLETE CBC W/AUTO DIFF WBC: CPT

## 2018-01-19 PROCEDURE — 82043 UR ALBUMIN QUANTITATIVE: CPT

## 2018-01-21 ENCOUNTER — PATIENT MESSAGE (OUTPATIENT)
Dept: FAMILY MEDICINE | Facility: CLINIC | Age: 68
End: 2018-01-21

## 2018-01-23 ENCOUNTER — PATIENT MESSAGE (OUTPATIENT)
Dept: FAMILY MEDICINE | Facility: CLINIC | Age: 68
End: 2018-01-23

## 2018-01-26 ENCOUNTER — PATIENT MESSAGE (OUTPATIENT)
Dept: CARDIOLOGY | Facility: CLINIC | Age: 68
End: 2018-01-26

## 2018-01-31 RX ORDER — NITROGLYCERIN 0.4 MG/1
0.4 TABLET SUBLINGUAL EVERY 5 MIN PRN
Qty: 25 TABLET | Refills: 3 | Status: SHIPPED | OUTPATIENT
Start: 2018-01-31 | End: 2019-05-08 | Stop reason: SDUPTHER

## 2018-02-02 DIAGNOSIS — I10 ESSENTIAL HYPERTENSION: ICD-10-CM

## 2018-02-02 DIAGNOSIS — I50.41 ACUTE COMBINED SYSTOLIC AND DIASTOLIC CONGESTIVE HEART FAILURE: ICD-10-CM

## 2018-02-02 DIAGNOSIS — I34.0 MITRAL VALVE INSUFFICIENCY, UNSPECIFIED ETIOLOGY: ICD-10-CM

## 2018-02-02 DIAGNOSIS — E78.5 HYPERLIPIDEMIA, UNSPECIFIED HYPERLIPIDEMIA TYPE: ICD-10-CM

## 2018-02-06 RX ORDER — FUROSEMIDE 20 MG/1
20 TABLET ORAL 2 TIMES DAILY
Qty: 60 TABLET | Refills: 11 | Status: SHIPPED | OUTPATIENT
Start: 2018-02-06 | End: 2018-06-12 | Stop reason: SDUPTHER

## 2018-02-06 RX ORDER — CLOPIDOGREL BISULFATE 75 MG/1
75 TABLET ORAL DAILY
Qty: 90 TABLET | Refills: 0 | Status: SHIPPED | OUTPATIENT
Start: 2018-02-06 | End: 2018-04-18 | Stop reason: SDUPTHER

## 2018-02-06 RX ORDER — ATORVASTATIN CALCIUM 40 MG/1
40 TABLET, FILM COATED ORAL DAILY
Qty: 90 TABLET | Refills: 3 | Status: SHIPPED | OUTPATIENT
Start: 2018-02-06 | End: 2018-04-30 | Stop reason: SDUPTHER

## 2018-02-06 RX ORDER — LOSARTAN POTASSIUM 50 MG/1
50 TABLET ORAL DAILY
Qty: 90 TABLET | Refills: 3 | Status: SHIPPED | OUTPATIENT
Start: 2018-02-06 | End: 2018-04-30 | Stop reason: SDUPTHER

## 2018-02-06 RX ORDER — CARVEDILOL 12.5 MG/1
12.5 TABLET ORAL 2 TIMES DAILY
Qty: 180 TABLET | Refills: 2 | Status: SHIPPED | OUTPATIENT
Start: 2018-02-06 | End: 2018-11-02 | Stop reason: SDUPTHER

## 2018-02-07 RX ORDER — PEN NEEDLE, DIABETIC 30 GX3/16"
1 NEEDLE, DISPOSABLE MISCELLANEOUS 4 TIMES DAILY
Qty: 400 EACH | Refills: 5 | Status: SHIPPED | OUTPATIENT
Start: 2018-02-07 | End: 2019-03-12 | Stop reason: SDUPTHER

## 2018-03-27 ENCOUNTER — CLINICAL SUPPORT (OUTPATIENT)
Dept: ELECTROPHYSIOLOGY | Facility: CLINIC | Age: 68
End: 2018-03-27
Payer: MEDICARE

## 2018-03-27 DIAGNOSIS — I50.42 CHRONIC COMBINED SYSTOLIC AND DIASTOLIC CHF (CONGESTIVE HEART FAILURE): ICD-10-CM

## 2018-03-27 DIAGNOSIS — I25.5 CARDIOMYOPATHY, ISCHEMIC: ICD-10-CM

## 2018-03-27 DIAGNOSIS — I25.10 CORONARY ARTERY DISEASE INVOLVING NATIVE CORONARY ARTERY OF NATIVE HEART WITHOUT ANGINA PECTORIS: ICD-10-CM

## 2018-03-27 PROCEDURE — 93296 REM INTERROG EVL PM/IDS: CPT | Mod: S$GLB,,, | Performed by: INTERNAL MEDICINE

## 2018-03-27 PROCEDURE — 93295 DEV INTERROG REMOTE 1/2/MLT: CPT | Mod: S$GLB,,, | Performed by: INTERNAL MEDICINE

## 2018-04-04 DIAGNOSIS — I25.5 ISCHEMIC CARDIOMYOPATHY: ICD-10-CM

## 2018-04-04 DIAGNOSIS — Z95.810 AICD (AUTOMATIC CARDIOVERTER/DEFIBRILLATOR) PRESENT: Primary | ICD-10-CM

## 2018-04-13 DIAGNOSIS — F51.01 PRIMARY INSOMNIA: ICD-10-CM

## 2018-04-13 RX ORDER — ZOLPIDEM TARTRATE 5 MG/1
TABLET ORAL
Qty: 90 TABLET | Refills: 0 | Status: SHIPPED | OUTPATIENT
Start: 2018-04-13 | End: 2018-06-26 | Stop reason: SDUPTHER

## 2018-04-19 RX ORDER — CLOPIDOGREL BISULFATE 75 MG/1
75 TABLET ORAL DAILY
Qty: 90 TABLET | Refills: 2 | Status: SHIPPED | OUTPATIENT
Start: 2018-04-19 | End: 2018-12-27 | Stop reason: SDUPTHER

## 2018-04-23 ENCOUNTER — HOSPITAL ENCOUNTER (OUTPATIENT)
Facility: HOSPITAL | Age: 68
Discharge: HOME | End: 2018-04-25
Attending: EMERGENCY MEDICINE | Admitting: INTERNAL MEDICINE
Payer: MEDICARE

## 2018-04-23 ENCOUNTER — PATIENT MESSAGE (OUTPATIENT)
Dept: CARDIOLOGY | Facility: CLINIC | Age: 68
End: 2018-04-23

## 2018-04-23 DIAGNOSIS — I25.10 CAD (CORONARY ARTERY DISEASE): ICD-10-CM

## 2018-04-23 DIAGNOSIS — R07.9 CHEST PAIN: ICD-10-CM

## 2018-04-23 DIAGNOSIS — I50.41 ACUTE COMBINED SYSTOLIC AND DIASTOLIC CONGESTIVE HEART FAILURE: ICD-10-CM

## 2018-04-23 DIAGNOSIS — R73.9 HYPERGLYCEMIA: Primary | ICD-10-CM

## 2018-04-23 DIAGNOSIS — I25.10 CORONARY ARTERY DISEASE INVOLVING NATIVE CORONARY ARTERY OF NATIVE HEART WITHOUT ANGINA PECTORIS: ICD-10-CM

## 2018-04-23 LAB
ALBUMIN SERPL BCP-MCNC: 3.7 G/DL
ALP SERPL-CCNC: 95 U/L
ALT SERPL W/O P-5'-P-CCNC: 28 U/L
ANION GAP SERPL CALC-SCNC: 10 MMOL/L
AST SERPL-CCNC: 17 U/L
BASOPHILS # BLD AUTO: 0.03 K/UL
BASOPHILS NFR BLD: 0.5 %
BILIRUB SERPL-MCNC: 1 MG/DL
BNP SERPL-MCNC: 12 PG/ML
BUN SERPL-MCNC: 18 MG/DL
CALCIUM SERPL-MCNC: 9.1 MG/DL
CHLORIDE SERPL-SCNC: 106 MMOL/L
CO2 SERPL-SCNC: 25 MMOL/L
CREAT SERPL-MCNC: 1.1 MG/DL
DIFFERENTIAL METHOD: ABNORMAL
EOSINOPHIL # BLD AUTO: 0.2 K/UL
EOSINOPHIL NFR BLD: 3 %
ERYTHROCYTE [DISTWIDTH] IN BLOOD BY AUTOMATED COUNT: 12.3 %
EST. GFR  (AFRICAN AMERICAN): >60 ML/MIN/1.73 M^2
EST. GFR  (NON AFRICAN AMERICAN): >60 ML/MIN/1.73 M^2
GLUCOSE SERPL-MCNC: 390 MG/DL
HCT VFR BLD AUTO: 39.3 %
HGB BLD-MCNC: 13 G/DL
LYMPHOCYTES # BLD AUTO: 1.6 K/UL
LYMPHOCYTES NFR BLD: 29.2 %
MCH RBC QN AUTO: 30.2 PG
MCHC RBC AUTO-ENTMCNC: 33.1 G/DL
MCV RBC AUTO: 91 FL
MONOCYTES # BLD AUTO: 0.6 K/UL
MONOCYTES NFR BLD: 10.9 %
NEUTROPHILS # BLD AUTO: 3.1 K/UL
NEUTROPHILS NFR BLD: 56.2 %
PLATELET # BLD AUTO: 189 K/UL
PMV BLD AUTO: 9.7 FL
POTASSIUM SERPL-SCNC: 4 MMOL/L
PROT SERPL-MCNC: 7 G/DL
RBC # BLD AUTO: 4.31 M/UL
SODIUM SERPL-SCNC: 141 MMOL/L
TROPONIN I SERPL DL<=0.01 NG/ML-MCNC: 0.01 NG/ML
TROPONIN I SERPL DL<=0.01 NG/ML-MCNC: <0.006 NG/ML
WBC # BLD AUTO: 5.59 K/UL

## 2018-04-23 PROCEDURE — G0378 HOSPITAL OBSERVATION PER HR: HCPCS

## 2018-04-23 PROCEDURE — 63600175 PHARM REV CODE 636 W HCPCS: Performed by: PHYSICIAN ASSISTANT

## 2018-04-23 PROCEDURE — 80053 COMPREHEN METABOLIC PANEL: CPT

## 2018-04-23 PROCEDURE — 83880 ASSAY OF NATRIURETIC PEPTIDE: CPT

## 2018-04-23 PROCEDURE — 94761 N-INVAS EAR/PLS OXIMETRY MLT: CPT

## 2018-04-23 PROCEDURE — 63600175 PHARM REV CODE 636 W HCPCS: Performed by: EMERGENCY MEDICINE

## 2018-04-23 PROCEDURE — 96360 HYDRATION IV INFUSION INIT: CPT

## 2018-04-23 PROCEDURE — 93005 ELECTROCARDIOGRAM TRACING: CPT

## 2018-04-23 PROCEDURE — 85025 COMPLETE CBC W/AUTO DIFF WBC: CPT

## 2018-04-23 PROCEDURE — 84484 ASSAY OF TROPONIN QUANT: CPT | Mod: 91

## 2018-04-23 PROCEDURE — A4216 STERILE WATER/SALINE, 10 ML: HCPCS | Performed by: EMERGENCY MEDICINE

## 2018-04-23 PROCEDURE — 25000003 PHARM REV CODE 250: Performed by: EMERGENCY MEDICINE

## 2018-04-23 PROCEDURE — 99284 EMERGENCY DEPT VISIT MOD MDM: CPT | Mod: 25

## 2018-04-23 PROCEDURE — 96361 HYDRATE IV INFUSION ADD-ON: CPT

## 2018-04-23 PROCEDURE — 25000003 PHARM REV CODE 250: Performed by: PHYSICIAN ASSISTANT

## 2018-04-23 RX ORDER — SODIUM CHLORIDE 0.9 % (FLUSH) 0.9 %
3 SYRINGE (ML) INJECTION EVERY 8 HOURS
Status: DISCONTINUED | OUTPATIENT
Start: 2018-04-23 | End: 2018-04-25 | Stop reason: HOSPADM

## 2018-04-23 RX ORDER — NITROGLYCERIN 0.4 MG/1
0.4 TABLET SUBLINGUAL
Status: COMPLETED | OUTPATIENT
Start: 2018-04-23 | End: 2018-04-23

## 2018-04-23 RX ORDER — NITROGLYCERIN 0.4 MG/1
0.4 TABLET SUBLINGUAL
Status: ACTIVE | OUTPATIENT
Start: 2018-04-23 | End: 2018-04-24

## 2018-04-23 RX ORDER — ASPIRIN 325 MG
325 TABLET, DELAYED RELEASE (ENTERIC COATED) ORAL
Status: ACTIVE | OUTPATIENT
Start: 2018-04-23 | End: 2018-04-24

## 2018-04-23 RX ORDER — ASPIRIN 325 MG
325 TABLET ORAL
Status: COMPLETED | OUTPATIENT
Start: 2018-04-23 | End: 2018-04-23

## 2018-04-23 RX ADMIN — SODIUM CHLORIDE 500 ML: 9 INJECTION, SOLUTION INTRAVENOUS at 06:04

## 2018-04-23 RX ADMIN — SODIUM CHLORIDE 500 ML: 9 INJECTION, SOLUTION INTRAVENOUS at 07:04

## 2018-04-23 RX ADMIN — ASPIRIN 325 MG ORAL TABLET 325 MG: 325 PILL ORAL at 05:04

## 2018-04-23 RX ADMIN — SODIUM CHLORIDE, PRESERVATIVE FREE 3 ML: 5 INJECTION INTRAVENOUS at 11:04

## 2018-04-23 RX ADMIN — NITROGLYCERIN 0.4 MG: 0.4 TABLET SUBLINGUAL at 05:04

## 2018-04-23 NOTE — ED NOTES
Pt here c/o left chest aching and lower leg worsening edema. Skin is warm/dry/pink. Denies nausea or fevers or change in urine. And no cough.

## 2018-04-23 NOTE — ED NOTES
Pt c/o excessive wait time.  Apologized for wait, spoke with Charge Nurse who will place patient as quickly as possible.  Reports pain has increased to 8/10 at this time and vital signs re-assessed at [facility].

## 2018-04-23 NOTE — ED PROVIDER NOTES
"Encounter Date: 4/23/2018       History     Chief Complaint   Patient presents with    Chest Pain     Left chest "aching" that began 3 days ago and goes through to back.  Hx of pacemaker/defibrillator in place that did not fire.       Clifton Wilson, a 67 y.o. Male with PMH significant for CHF, DM, HTN, CAD, that presents to the ED for evaluation of "aching chest pain" that started 3 days ago.  Patient states that the pain is constant with radiation to back.  He has not identified any alleviating or exacerbating factors.  Patient has a pacemaker in place and did not feel it fire.  He has multiple stents placed, with his         The history is provided by the patient.     Review of patient's allergies indicates:  No Known Allergies  Past Medical History:   Diagnosis Date    Anticoagulant long-term use     Cardiomyopathy     CHF (congestive heart failure)     Coronary artery disease     Diabetes mellitus     Gout     Hypertension      Past Surgical History:   Procedure Laterality Date    APPENDECTOMY      CATARACT EXTRACTION Bilateral 2011    EYE SURGERY       Family History   Problem Relation Age of Onset    Heart disease Mother     Heart disease Father      Social History   Substance Use Topics    Smoking status: Former Smoker    Smokeless tobacco: Not on file    Alcohol use Yes      Comment: socially     Review of Systems   Constitutional: Negative for fever.   Respiratory: Negative for cough and shortness of breath.    Cardiovascular: Positive for chest pain and leg swelling. Negative for palpitations.   Gastrointestinal: Negative for nausea and vomiting.   Skin: Negative for color change.   Allergic/Immunologic: Negative for immunocompromised state.   Neurological: Negative for dizziness, weakness and numbness.   Hematological: Negative for adenopathy.   Psychiatric/Behavioral: Negative for agitation and confusion.   All other systems reviewed and are negative.      Physical Exam     Initial " Vitals [04/23/18 1512]   BP Pulse Resp Temp SpO2   (!) 162/74 81 16 98.3 °F (36.8 °C) 97 %      MAP       103.33         Physical Exam    Nursing note and vitals reviewed.  Constitutional: He appears well-developed and well-nourished.   HENT:   Head: Normocephalic and atraumatic.   Right Ear: External ear normal.   Left Ear: External ear normal.   Nose: Nose normal.   Mouth/Throat: Oropharynx is clear and moist.   Eyes: EOM are normal.   Neck: Normal range of motion. Neck supple.   Cardiovascular: Normal rate and regular rhythm. The patient has a device (subcutaneous AICD).   Pulmonary/Chest: Breath sounds normal. No respiratory distress. He has no wheezes. He has no rhonchi. He has no rales.   Abdominal: Soft. Bowel sounds are normal. He exhibits no distension. There is no tenderness. There is no rebound and no guarding.   Musculoskeletal: Normal range of motion. He exhibits no edema or tenderness.   Lymphadenopathy:     He has no cervical adenopathy.   Neurological: He is alert and oriented to person, place, and time. No cranial nerve deficit.   Skin: Skin is warm and dry. Capillary refill takes less than 2 seconds. No rash and no abscess noted. No erythema.   Psychiatric: He has a normal mood and affect. Thought content normal.         ED Course   Procedures  Labs Reviewed   CBC W/ AUTO DIFFERENTIAL - Abnormal; Notable for the following:        Result Value    RBC 4.31 (*)     Hemoglobin 13.0 (*)     Hematocrit 39.3 (*)     All other components within normal limits   COMPREHENSIVE METABOLIC PANEL - Abnormal; Notable for the following:     Glucose 390 (*)     All other components within normal limits   TROPONIN I   B-TYPE NATRIURETIC PEPTIDE   TROPONIN I   TROPONIN I   POCT GLUCOSE MONITORING CONTINUOUS     EKG Readings: (Independently Interpreted)   Rhythm: Normal Sinus Rhythm. Heart Rate: 82. Ectopy: No Ectopy. Conduction: Normal. ST Segments: Normal ST Segments. T Waves: Normal. Clinical Impression: Normal  Sinus Rhythm   1506     Imaging Results          X-Ray Chest AP Portable (Final result)  Result time 04/23/18 17:27:42    Final result by Marco A Cheung MD (04/23/18 17:27:42)                 Impression:      Cardiomegaly with pulmonary vascular congestion, suggestive of early CHF.      Electronically signed by: Marco A Cheung MD  Date:    04/23/2018  Time:    17:27             Narrative:    EXAMINATION:  XR CHEST AP PORTABLE    CLINICAL HISTORY:  Chest pain.    TECHNIQUE:  Single frontal view of the chest was performed.    COMPARISON:  06/06/2017    FINDINGS:  There is a stable appearance of a single lead left-sided pacemaker with the tip terminating over the right ventricle.    The trachea is unremarkable.  The cardiomediastinal silhouette is at upper limits of normal.  The hemidiaphragms are unremarkable.  There is no evidence of free air beneath the hemidiaphragms.  There are no pleural effusions.  There is no evidence of a pneumothorax.  There is no evidence of pneumomediastinum.  There is pulmonary vascular congestion.  No focal consolidation is present.                                     Medical Decision Making:   Initial Assessment:   Left sided CP that radiates to back with significant cardiac history   Differential Diagnosis:   ACS, angina, muscular strain, AAA  Clinical Tests:   Lab Tests: Ordered and Reviewed  The following lab test(s) were unremarkable: CBC, CMP, Troponin and BNP  Radiological Study: Ordered and Reviewed  ED Management:  Patient presents with c/o of anterior left sided chest pain.  Patient has significant cardiac history.  CBC, troponin, BNP, EKG show no acute abnormalities.  CMP shows glucose of 390.  1L bolus given. Patient received significant relief of his CP with administration of NTG.   CXR shows cardiomegaly with pulmonary vascular congestion, suggestive of early CHF.  Due to patient's significant co-morbidities, he will be admitted to Dr. Clement's service for further observation.                 Attending Attestation:     Physician Attestation Statement for NP/PA:       Other NP/PA Attestation Additions:      Medical Decision Making: The patient presents the emergency department with chest pain today.  The patient is a 67-year-old male with significant cardiac history, he has 7 stents and an AICD pacemaker in place.  The patient had relief of his chest pain with nitroglycerin.  Troponin is negative and EKG is within normal limits.  Case discussed with Dr. Baugh, on-call for Dr. Clement.  He will admit the patient to his service for serial cardiac enzymes.                    Clinical Impression:   The primary encounter diagnosis was Hyperglycemia. A diagnosis of Chest pain was also pertinent to this visit.                           Marjan Morales PA-C  04/23/18 1941       Marjan Morales PA-C  04/23/18 1942       Sherry Arzola MD  04/23/18 2002

## 2018-04-24 LAB
DIASTOLIC DYSFUNCTION: NO
DIASTOLIC DYSFUNCTION: YES
ESTIMATED AVG GLUCOSE: 160 MG/DL
ESTIMATED PA SYSTOLIC PRESSURE: 7.84
HBA1C MFR BLD HPLC: 7.2 %
MITRAL VALVE MOBILITY: NORMAL
POCT GLUCOSE: 157 MG/DL (ref 70–110)
POCT GLUCOSE: 195 MG/DL (ref 70–110)
POCT GLUCOSE: 218 MG/DL (ref 70–110)
POCT GLUCOSE: 237 MG/DL (ref 70–110)
RETIRED EF AND QEF - SEE NOTES: 35 (ref 55–65)
TRICUSPID VALVE REGURGITATION: ABNORMAL
TROPONIN I SERPL DL<=0.01 NG/ML-MCNC: 0.01 NG/ML
TROPONIN I SERPL DL<=0.01 NG/ML-MCNC: 0.01 NG/ML

## 2018-04-24 PROCEDURE — 63600175 PHARM REV CODE 636 W HCPCS: Performed by: NURSE PRACTITIONER

## 2018-04-24 PROCEDURE — 93306 TTE W/DOPPLER COMPLETE: CPT | Mod: 26,,, | Performed by: INTERNAL MEDICINE

## 2018-04-24 PROCEDURE — 94761 N-INVAS EAR/PLS OXIMETRY MLT: CPT

## 2018-04-24 PROCEDURE — 25000003 PHARM REV CODE 250: Performed by: NURSE PRACTITIONER

## 2018-04-24 PROCEDURE — 84484 ASSAY OF TROPONIN QUANT: CPT | Mod: 91

## 2018-04-24 PROCEDURE — 83036 HEMOGLOBIN GLYCOSYLATED A1C: CPT

## 2018-04-24 PROCEDURE — 93306 TTE W/DOPPLER COMPLETE: CPT

## 2018-04-24 PROCEDURE — 93005 ELECTROCARDIOGRAM TRACING: CPT | Mod: 59

## 2018-04-24 PROCEDURE — 93016 CV STRESS TEST SUPVJ ONLY: CPT | Mod: ,,, | Performed by: INTERNAL MEDICINE

## 2018-04-24 PROCEDURE — 93018 CV STRESS TEST I&R ONLY: CPT | Mod: ,,, | Performed by: INTERNAL MEDICINE

## 2018-04-24 PROCEDURE — G0378 HOSPITAL OBSERVATION PER HR: HCPCS

## 2018-04-24 PROCEDURE — 99223 1ST HOSP IP/OBS HIGH 75: CPT | Mod: ,,, | Performed by: NURSE PRACTITIONER

## 2018-04-24 PROCEDURE — A4216 STERILE WATER/SALINE, 10 ML: HCPCS | Performed by: EMERGENCY MEDICINE

## 2018-04-24 PROCEDURE — 36415 COLL VENOUS BLD VENIPUNCTURE: CPT

## 2018-04-24 PROCEDURE — 25000003 PHARM REV CODE 250: Performed by: EMERGENCY MEDICINE

## 2018-04-24 RX ORDER — GABAPENTIN 300 MG/1
600 CAPSULE ORAL 2 TIMES DAILY
Status: DISCONTINUED | OUTPATIENT
Start: 2018-04-24 | End: 2018-04-25 | Stop reason: HOSPADM

## 2018-04-24 RX ORDER — ZOLPIDEM TARTRATE 5 MG/1
5 TABLET ORAL NIGHTLY PRN
Status: DISCONTINUED | OUTPATIENT
Start: 2018-04-24 | End: 2018-04-25 | Stop reason: HOSPADM

## 2018-04-24 RX ORDER — FUROSEMIDE 20 MG/1
20 TABLET ORAL 2 TIMES DAILY
Status: DISCONTINUED | OUTPATIENT
Start: 2018-04-24 | End: 2018-04-24

## 2018-04-24 RX ORDER — IBUPROFEN 200 MG
24 TABLET ORAL
Status: DISCONTINUED | OUTPATIENT
Start: 2018-04-24 | End: 2018-04-25 | Stop reason: HOSPADM

## 2018-04-24 RX ORDER — FUROSEMIDE 10 MG/ML
40 INJECTION INTRAMUSCULAR; INTRAVENOUS 2 TIMES DAILY
Status: DISCONTINUED | OUTPATIENT
Start: 2018-04-24 | End: 2018-04-25 | Stop reason: HOSPADM

## 2018-04-24 RX ORDER — CARVEDILOL 12.5 MG/1
12.5 TABLET ORAL 2 TIMES DAILY
Status: DISCONTINUED | OUTPATIENT
Start: 2018-04-24 | End: 2018-04-25 | Stop reason: HOSPADM

## 2018-04-24 RX ORDER — IBUPROFEN 200 MG
16 TABLET ORAL
Status: DISCONTINUED | OUTPATIENT
Start: 2018-04-24 | End: 2018-04-25 | Stop reason: HOSPADM

## 2018-04-24 RX ORDER — ATORVASTATIN CALCIUM 40 MG/1
40 TABLET, FILM COATED ORAL DAILY
Status: DISCONTINUED | OUTPATIENT
Start: 2018-04-24 | End: 2018-04-25 | Stop reason: HOSPADM

## 2018-04-24 RX ORDER — INSULIN ASPART 100 [IU]/ML
1-10 INJECTION, SOLUTION INTRAVENOUS; SUBCUTANEOUS
Status: DISCONTINUED | OUTPATIENT
Start: 2018-04-24 | End: 2018-04-25 | Stop reason: HOSPADM

## 2018-04-24 RX ORDER — LOSARTAN POTASSIUM 50 MG/1
50 TABLET ORAL DAILY
Status: DISCONTINUED | OUTPATIENT
Start: 2018-04-24 | End: 2018-04-25 | Stop reason: HOSPADM

## 2018-04-24 RX ORDER — CLOPIDOGREL BISULFATE 75 MG/1
75 TABLET ORAL DAILY
Status: DISCONTINUED | OUTPATIENT
Start: 2018-04-24 | End: 2018-04-25 | Stop reason: HOSPADM

## 2018-04-24 RX ORDER — GLUCAGON 1 MG
1 KIT INJECTION
Status: DISCONTINUED | OUTPATIENT
Start: 2018-04-24 | End: 2018-04-25 | Stop reason: HOSPADM

## 2018-04-24 RX ORDER — ASPIRIN 81 MG/1
81 TABLET ORAL DAILY
Status: DISCONTINUED | OUTPATIENT
Start: 2018-04-24 | End: 2018-04-25 | Stop reason: HOSPADM

## 2018-04-24 RX ADMIN — CARVEDILOL 12.5 MG: 12.5 TABLET, FILM COATED ORAL at 09:04

## 2018-04-24 RX ADMIN — LOSARTAN POTASSIUM 50 MG: 50 TABLET, FILM COATED ORAL at 09:04

## 2018-04-24 RX ADMIN — INSULIN ASPART 2 UNITS: 100 INJECTION, SOLUTION INTRAVENOUS; SUBCUTANEOUS at 09:04

## 2018-04-24 RX ADMIN — SODIUM CHLORIDE, PRESERVATIVE FREE 3 ML: 5 INJECTION INTRAVENOUS at 09:04

## 2018-04-24 RX ADMIN — INSULIN ASPART 2 UNITS: 100 INJECTION, SOLUTION INTRAVENOUS; SUBCUTANEOUS at 05:04

## 2018-04-24 RX ADMIN — SODIUM CHLORIDE, PRESERVATIVE FREE 3 ML: 5 INJECTION INTRAVENOUS at 05:04

## 2018-04-24 RX ADMIN — GABAPENTIN 600 MG: 300 CAPSULE ORAL at 09:04

## 2018-04-24 RX ADMIN — ASPIRIN 81 MG: 81 TABLET, COATED ORAL at 09:04

## 2018-04-24 RX ADMIN — FUROSEMIDE 40 MG: 10 INJECTION, SOLUTION INTRAMUSCULAR; INTRAVENOUS at 05:04

## 2018-04-24 RX ADMIN — ATORVASTATIN CALCIUM 40 MG: 40 TABLET, FILM COATED ORAL at 09:04

## 2018-04-24 RX ADMIN — CLOPIDOGREL BISULFATE 75 MG: 75 TABLET ORAL at 09:04

## 2018-04-24 RX ADMIN — FUROSEMIDE 40 MG: 10 INJECTION, SOLUTION INTRAMUSCULAR; INTRAVENOUS at 09:04

## 2018-04-24 NOTE — ASSESSMENT & PLAN NOTE
LVEF 25% in 02/2018 s/p ICD  Repeat Echo pending  ADHF in setting of dietary indiscretion with patient eating boiled crawfish on Friday  CXR c/w ADHF, JVD, edema, rales, abd distension on exam  Takes Lasix 20 mg BID at home   Diuresing with Lasix 40 mg IV BID and will monitor response.   Continue BB and ARB

## 2018-04-24 NOTE — PLAN OF CARE
TN went to meet with patient, no family at bedside.  Patient is independent and lives with his spouse at home. He does not have home health or medical equipment at home, only a glucometer. His cardiologist is Dr. Clement. TN will continue to follow and assess discharge needs.    Future Appointments  Date Time Provider Department Center   7/3/2018 8:00 AM HOME MONITOR DEVICE CHECK, [facility]        04/24/18 0029   Discharge Assessment   Assessment Type Discharge Planning Assessment   Confirmed/corrected address and phone number on facesheet? Yes   Assessment information obtained from? Patient   Prior to hospitilization cognitive status: Alert/Oriented   Prior to hospitalization functional status: Independent   Current cognitive status: Alert/Oriented   Current Functional Status: Independent   Lives With spouse   Able to Return to Prior Arrangements yes   Is patient able to care for self after discharge? Yes   Patient's perception of discharge disposition home or selfcare   Readmission Within The Last 30 Days no previous admission in last 30 days   Equipment Currently Used at Home glucometer   Transportation Available family or friend will provide   Discharge Plan A Home with family   Discharge Plan B Home with family;Home Health   Patient/Family In Agreement With Plan yes     Екатерина Magdaleno RN  Transition Navigator  [phone]

## 2018-04-24 NOTE — HOSPITAL COURSE
04/24/2018 CXR c/w ADHF. Trop negative x 4. EKG SR without acute ischemic changes. NM stress and Echo pending. Patient undergoing diuresis with IV Lasix 40 mg BID.

## 2018-04-24 NOTE — ASSESSMENT & PLAN NOTE
11/2016 [facility]    proximal LAD  90% stenosis treated with Stent Resolute Rx 3.0 X 26.  Distal to the stenosis the LAD is stented to its distal portion with flow  that stops by the apex.    mid LCX  80% stenosis treated with Stent Resolute Rx 3.0 X 22   LM normal.  RCA LI. Distally provides collaterals to the distal LAD.    Continue GDMT with asa, Plavix, statin, BB, ARB    NM stress test to evaluate for possible ischemic nature of chest pain

## 2018-04-24 NOTE — SUBJECTIVE & OBJECTIVE
Past Medical History:   Diagnosis Date    Anticoagulant long-term use     Cardiomyopathy     CHF (congestive heart failure)     Coronary artery disease     Diabetes mellitus     Gout     Hypertension        Past Surgical History:   Procedure Laterality Date    APPENDECTOMY      CATARACT EXTRACTION Bilateral 2011    EYE SURGERY         Review of patient's allergies indicates:  No Known Allergies    No current facility-administered medications on file prior to encounter.      Current Outpatient Prescriptions on File Prior to Encounter   Medication Sig    alcohol swabs PadM Apply 1 each topically as needed.    aspirin (ECOTRIN) 81 MG EC tablet Take 81 mg by mouth once daily.    atorvastatin (LIPITOR) 40 MG tablet Take 1 tablet (40 mg total) by mouth once daily.    blood glucose control high,low (ACCU-CHEK CATHRYN CONTROL SOLN) Soln Use as directed to check blood sugar    blood sugar diagnostic (ACCU-CHEK CATHRYN PLUS TEST STRP) Strp 1 each by Misc.(Non-Drug; Combo Route) route 4 (four) times daily.    blood sugar diagnostic (CONTOUR TEST STRIPS) Strp 200 each by Misc.(Non-Drug; Combo Route) route 4 (four) times daily.    blood-glucose meter (ACCU-CHEK CATHRYN PLUS METER) Misc 1 Device by Misc.(Non-Drug; Combo Route) route 3 (three) times daily.    carvedilol (COREG) 12.5 MG tablet Take 1 tablet (12.5 mg total) by mouth 2 (two) times daily.    clopidogrel (PLAVIX) 75 mg tablet Take 1 tablet (75 mg total) by mouth once daily.    furosemide (LASIX) 20 MG tablet Take 1 tablet (20 mg total) by mouth 2 (two) times daily.    gabapentin (NEURONTIN) 300 MG capsule Take 2 capsules (600 mg total) by mouth 2 (two) times daily.    glipiZIDE (GLUCOTROL) 10 MG tablet Take 1 tablet (10 mg total) by mouth 2 (two) times daily with meals.    insulin aspart (NOVOLOG FLEXPEN) 100 unit/mL InPn pen Inject 20 Units into the skin 2 (two) times daily before meals.    insulin glargine (LANTUS SOLOSTAR) 100 unit/mL (3 mL) InPn  "pen Inject 20 Units into the skin every evening.    insulin glargine (LANTUS SOLOSTAR) 100 unit/mL (3 mL) InPn pen Inject 20 Units into the skin every evening.    lancets (ACCU-CHEK SOFTCLIX LANCETS) Misc 1 Device by Misc.(Non-Drug; Combo Route) route 3 (three) times daily.    losartan (COZAAR) 50 MG tablet Take 1 tablet (50 mg total) by mouth once daily.    metFORMIN (GLUCOPHAGE) 1000 MG tablet Take 1 tablet (1,000 mg total) by mouth 2 (two) times daily with meals.    nitroGLYCERIN (NITROSTAT) 0.4 MG SL tablet Place 1 tablet (0.4 mg total) under the tongue every 5 (five) minutes as needed for Chest pain.    pen needle, diabetic 31 gauge x 3/16" Ndle 1 each by Misc.(Non-Drug; Combo Route) route 4 (four) times daily.    pen needle, diabetic 32 gauge x 1/5" Ndle Use 4 times/day for insulin administration    zolpidem (AMBIEN) 5 MG Tab TAKE 1 TABLET EVERY NIGHT AS NEEDED     Family History     Problem Relation (Age of Onset)    Heart disease Mother, Father        Social History Main Topics    Smoking status: Former Smoker    Smokeless tobacco: Never Used    Alcohol use Yes      Comment: socially    Drug use: No    Sexual activity: Not on file     Review of Systems   Constitution: Positive for weakness (chronic) and weight gain. Negative for diaphoresis and malaise/fatigue.   HENT: Negative.    Eyes: Negative.    Cardiovascular: Positive for chest pain, dyspnea on exertion and leg swelling. Negative for irregular heartbeat, near-syncope, orthopnea, palpitations, paroxysmal nocturnal dyspnea and syncope.   Respiratory: Positive for shortness of breath. Negative for cough, sputum production and wheezing.    Endocrine: Negative.    Hematologic/Lymphatic: Negative.    Skin: Negative.    Musculoskeletal: Negative.    Gastrointestinal: Positive for bloating. Negative for nausea and vomiting.   Genitourinary: Negative.    Psychiatric/Behavioral: Negative.    Allergic/Immunologic: Negative.      Objective:     Vital " Signs (Most Recent):  Temp: 98 °F (36.7 °C) (04/24/18 0729)  Pulse: 74 (04/24/18 1115)  Resp: 18 (04/24/18 0922)  BP: (!) 161/77 (04/24/18 0729)  SpO2: 99 % (04/24/18 0922) Vital Signs (24h Range):  Temp:  [97.9 °F (36.6 °C)-98.3 °F (36.8 °C)] 98 °F (36.7 °C)  Pulse:  [56-81] 74  Resp:  [16-20] 18  SpO2:  [89 %-99 %] 99 %  BP: (109-174)/(54-97) 161/77     Weight: 111.1 kg (245 lb)  Body mass index is 34.17 kg/m².    SpO2: 99 %  O2 Device (Oxygen Therapy): room air      Intake/Output Summary (Last 24 hours) at 04/24/18 1216  Last data filed at 04/24/18 0930   Gross per 24 hour   Intake             1000 ml   Output              525 ml   Net              475 ml       Lines/Drains/Airways          No matching active lines, drains, or airways          Physical Exam   Constitutional: He is oriented to person, place, and time. He appears well-developed and well-nourished. No distress.   HENT:   Head: Normocephalic and atraumatic.   Eyes: Right eye exhibits no discharge. Left eye exhibits no discharge.   Neck: JVD present.   Cardiovascular: Normal rate and regular rhythm.  Exam reveals no gallop and no friction rub.    Murmur heard.  Pulmonary/Chest: Effort normal. He has rales.   Abdominal: Bowel sounds are normal. He exhibits distension.   Musculoskeletal: He exhibits edema.   Neurological: He is alert and oriented to person, place, and time.   Skin: Skin is warm and dry. He is not diaphoretic.   Psychiatric: He has a normal mood and affect. His behavior is normal. Judgment and thought content normal.       Significant Labs:   BMP:   Recent Labs  Lab 04/23/18  1736   *      K 4.0      CO2 25   BUN 18   CREATININE 1.1   CALCIUM 9.1   , CMP   Recent Labs  Lab 04/23/18  1736      K 4.0      CO2 25   *   BUN 18   CREATININE 1.1   CALCIUM 9.1   PROT 7.0   ALBUMIN 3.7   BILITOT 1.0   ALKPHOS 95   AST 17   ALT 28   ANIONGAP 10   ESTGFRAFRICA >60   EGFRNONAA >60   , CBC   Recent Labs  Lab  04/23/18  1736   WBC 5.59   HGB 13.0*   HCT 39.3*      , INR No results for input(s): INR, PROTIME in the last 48 hours., Troponin   Recent Labs  Lab 04/23/18  1736 04/23/18  1944 04/24/18  0254   TROPONINI <0.006 0.013 0.015  0.011    and All pertinent lab results from the last 24 hours have been reviewed.    Significant Imaging: X-Ray: CXR: X-Ray Chest 1 View (CXR): No results found for this visit on 04/23/18.

## 2018-04-24 NOTE — HPI
"Clifton Wilson, a 67 y.o. Male with HFrEF 25% s/p ICD, DM, HTN, CAD that presented to the ED for evaluation of "aching back pain" that radiates to his chest which began started 3 days ago.  Patient states that the pain is constant with radiation to chest. Pain has resolved at the present. He reports eating boiled crawfish on Friday which he has not done since his last stents in 2016.  He is also reporting increased edema to his BLE and hands. Denies NV, diaphoresis, syncope. His chest pain has resolved at the time of exam. Compliant with medications.   "

## 2018-04-24 NOTE — PLAN OF CARE
Problem: Patient Care Overview  Goal: Plan of Care Review  Outcome: Ongoing (interventions implemented as appropriate)  Plan of care reviewed w/pt.  Pt verbalized understanding.  IV lasix given per orders.  Stress test and 2D echo today.  No c/o CP throughout shift.  Blood glucose monitored and maintained per orders.  Fall/safety precautions maintained.  Bed in low position and locked.  Call light in reach.  Slip resistant socks on.  Bed alarm on.  Nurse instructed pt to notify staff for assistance.  Pt verbalized understanding.  Pt stable.  PIV clean, dry and intact.  No acute distress noted.  Pt on tele. NSR HR 60s to 70s.  No ectopy of true red alarms noted.

## 2018-04-24 NOTE — ASSESSMENT & PLAN NOTE
Etiology felt to be ADHF in setting of sodium indiscretion   NM stress for completeness  Troponin negative x 4, EKG without acute ischemic changes

## 2018-04-24 NOTE — H&P
"[facility]  Cardiology  History and Physical     Patient Name: Clifton Wilson  MRN: [id number]  Admission Date: 4/23/2018  Code Status: Full Code   Attending Provider: Pedro Clement MD   Primary Care Physician: Britton Grissom MD  Principal Problem:Acute combined systolic and diastolic congestive heart failure    Patient information was obtained from patient, past medical records and ER records.     Subjective:     Chief Complaint:  Chest Pain     HPI:  Clifton Wilson, a 67 y.o. Male with HFrEF 25% s/p ICD, DM, HTN, CAD that presented to the ED for evaluation of "aching back pain" that radiates to his chest which began started 3 days ago.  Patient states that the pain is constant with radiation to chest. Pain has resolved at the present. He reports eating boiled crawfish on Friday which he has not done since his last stents in 2016.   He is also reporting increased edema to his BLE and hands. Denies NV, diaphoresis, syncope. His chest pain has resolved at the time of exam.     Past Medical History:   Diagnosis Date    Anticoagulant long-term use     Cardiomyopathy     CHF (congestive heart failure)     Coronary artery disease     Diabetes mellitus     Gout     Hypertension        Past Surgical History:   Procedure Laterality Date    APPENDECTOMY      CATARACT EXTRACTION Bilateral 2011    EYE SURGERY         Review of patient's allergies indicates:  No Known Allergies    No current facility-administered medications on file prior to encounter.      Current Outpatient Prescriptions on File Prior to Encounter   Medication Sig    alcohol swabs PadM Apply 1 each topically as needed.    aspirin (ECOTRIN) 81 MG EC tablet Take 81 mg by mouth once daily.    atorvastatin (LIPITOR) 40 MG tablet Take 1 tablet (40 mg total) by mouth once daily.    blood glucose control high,low (ACCU-CHEK CATHRYN CONTROL SOLN) Soln Use as directed to check blood sugar    blood sugar diagnostic (ACCU-CHEK CATHRYN " "PLUS TEST STRP) Strp 1 each by Misc.(Non-Drug; Combo Route) route 4 (four) times daily.    blood sugar diagnostic (CONTOUR TEST STRIPS) Strp 200 each by Misc.(Non-Drug; Combo Route) route 4 (four) times daily.    blood-glucose meter (ACCU-CHEK CATHRYN PLUS METER) Misc 1 Device by Misc.(Non-Drug; Combo Route) route 3 (three) times daily.    carvedilol (COREG) 12.5 MG tablet Take 1 tablet (12.5 mg total) by mouth 2 (two) times daily.    clopidogrel (PLAVIX) 75 mg tablet Take 1 tablet (75 mg total) by mouth once daily.    furosemide (LASIX) 20 MG tablet Take 1 tablet (20 mg total) by mouth 2 (two) times daily.    gabapentin (NEURONTIN) 300 MG capsule Take 2 capsules (600 mg total) by mouth 2 (two) times daily.    glipiZIDE (GLUCOTROL) 10 MG tablet Take 1 tablet (10 mg total) by mouth 2 (two) times daily with meals.    insulin aspart (NOVOLOG FLEXPEN) 100 unit/mL InPn pen Inject 20 Units into the skin 2 (two) times daily before meals.    insulin glargine (LANTUS SOLOSTAR) 100 unit/mL (3 mL) InPn pen Inject 20 Units into the skin every evening.    insulin glargine (LANTUS SOLOSTAR) 100 unit/mL (3 mL) InPn pen Inject 20 Units into the skin every evening.    lancets (ACCU-CHEK SOFTCLIX LANCETS) Misc 1 Device by Misc.(Non-Drug; Combo Route) route 3 (three) times daily.    losartan (COZAAR) 50 MG tablet Take 1 tablet (50 mg total) by mouth once daily.    metFORMIN (GLUCOPHAGE) 1000 MG tablet Take 1 tablet (1,000 mg total) by mouth 2 (two) times daily with meals.    nitroGLYCERIN (NITROSTAT) 0.4 MG SL tablet Place 1 tablet (0.4 mg total) under the tongue every 5 (five) minutes as needed for Chest pain.    pen needle, diabetic 31 gauge x 3/16" Ndle 1 each by Misc.(Non-Drug; Combo Route) route 4 (four) times daily.    pen needle, diabetic 32 gauge x 1/5" Ndle Use 4 times/day for insulin administration    zolpidem (AMBIEN) 5 MG Tab TAKE 1 TABLET EVERY NIGHT AS NEEDED     Family History     Problem Relation (Age of " Onset)    Heart disease Mother, Father        Social History Main Topics    Smoking status: Former Smoker    Smokeless tobacco: Never Used    Alcohol use Yes      Comment: socially    Drug use: No    Sexual activity: Not on file     Review of Systems   Constitution: Positive for weakness (chronic) and weight gain. Negative for diaphoresis and malaise/fatigue.   HENT: Negative.    Eyes: Negative.    Cardiovascular: Positive for chest pain, dyspnea on exertion and leg swelling. Negative for irregular heartbeat, near-syncope, orthopnea, palpitations, paroxysmal nocturnal dyspnea and syncope.   Respiratory: Positive for shortness of breath. Negative for cough, sputum production and wheezing.    Endocrine: Negative.    Hematologic/Lymphatic: Negative.    Skin: Negative.    Musculoskeletal: Negative.    Gastrointestinal: Positive for bloating. Negative for nausea and vomiting.   Genitourinary: Negative.    Psychiatric/Behavioral: Negative.    Allergic/Immunologic: Negative.      Objective:     Vital Signs (Most Recent):  Temp: 98 °F (36.7 °C) (04/24/18 0729)  Pulse: 74 (04/24/18 1115)  Resp: 18 (04/24/18 0922)  BP: (!) 161/77 (04/24/18 0729)  SpO2: 99 % (04/24/18 0922) Vital Signs (24h Range):  Temp:  [97.9 °F (36.6 °C)-98.3 °F (36.8 °C)] 98 °F (36.7 °C)  Pulse:  [56-81] 74  Resp:  [16-20] 18  SpO2:  [89 %-99 %] 99 %  BP: (109-174)/(54-97) 161/77     Weight: 111.1 kg (245 lb)  Body mass index is 34.17 kg/m².    SpO2: 99 %  O2 Device (Oxygen Therapy): room air      Intake/Output Summary (Last 24 hours) at 04/24/18 1216  Last data filed at 04/24/18 0930   Gross per 24 hour   Intake             1000 ml   Output              525 ml   Net              475 ml       Lines/Drains/Airways          No matching active lines, drains, or airways          Physical Exam   Constitutional: He is oriented to person, place, and time. He appears well-developed and well-nourished. No distress.   HENT:   Head: Normocephalic and  atraumatic.   Eyes: Right eye exhibits no discharge. Left eye exhibits no discharge.   Neck: JVD present.   Cardiovascular: Normal rate and regular rhythm.  Exam reveals no gallop and no friction rub.    Murmur heard.  Pulmonary/Chest: Effort normal. He has rales.   Abdominal: Bowel sounds are normal. He exhibits distension.   Musculoskeletal: He exhibits edema.   Neurological: He is alert and oriented to person, place, and time.   Skin: Skin is warm and dry. He is not diaphoretic.   Psychiatric: He has a normal mood and affect. His behavior is normal. Judgment and thought content normal.       Significant Labs:   BMP:   Recent Labs  Lab 04/23/18  1736   *      K 4.0      CO2 25   BUN 18   CREATININE 1.1   CALCIUM 9.1   , CMP   Recent Labs  Lab 04/23/18  1736      K 4.0      CO2 25   *   BUN 18   CREATININE 1.1   CALCIUM 9.1   PROT 7.0   ALBUMIN 3.7   BILITOT 1.0   ALKPHOS 95   AST 17   ALT 28   ANIONGAP 10   ESTGFRAFRICA >60   EGFRNONAA >60   , CBC   Recent Labs  Lab 04/23/18  1736   WBC 5.59   HGB 13.0*   HCT 39.3*      , INR No results for input(s): INR, PROTIME in the last 48 hours., Troponin   Recent Labs  Lab 04/23/18  1736 04/23/18  1944 04/24/18  0254   TROPONINI <0.006 0.013 0.015  0.011    and All pertinent lab results from the last 24 hours have been reviewed.    Significant Imaging: X-Ray: CXR: X-Ray Chest 1 View (CXR): No results found for this visit on 04/23/18.    Assessment and Plan:     * Acute combined systolic and diastolic congestive heart failure    LVEF 25% in 02/2018 s/p ICD  Repeat Echo pending  ADHF in setting of dietary indiscretion with patient eating boiled crawfish on Friday  CXR c/w ADHF, JVD, edema, rales, abd distension on exam  Takes Lasix 20 mg BID at home   Diuresing with Lasix 40 mg IV BID and will monitor response.   Continue BB and ARB         Chest pain    Etiology felt to be ADHF in setting of sodium indiscretion   NM stress for  completeness  Troponin negative x 4, EKG without acute ischemic changes        Coronary artery disease involving native coronary artery without angina pectoris    11/2016 [facility]    proximal LAD  90% stenosis treated with Stent Resolute Rx 3.0 X 26.  Distal to the stenosis the LAD is stented to its distal portion with flow  that stops by the apex.    mid LCX  80% stenosis treated with Stent Resolute Rx 3.0 X 22   LM normal.  RCA LI. Distally provides collaterals to the distal LAD.    Continue GDMT with asa, Plavix, statin, BB, ARB    NM stress test to evaluate for possible ischemic nature of chest pain           Type 2 diabetes mellitus with neurologic complication    Accuchecks AC/HS with SSI   A1c 7.5 in 01/2018        HTN (hypertension)    SBP high on admission, improved with resumption of home medications  Continue Coreg and Losartan            VTE Risk Mitigation         Ordered     IP VTE LOW RISK PATIENT  Once      04/23/18 1949          Andrew Morales NP  Cardiology   [facility]

## 2018-04-24 NOTE — PLAN OF CARE
Problem: Patient Care Overview  Goal: Plan of Care Review  Outcome: Ongoing (interventions implemented as appropriate)  Plan of care reviewed with patient. Verbalized understanding. No distress noted. Will continue to monitor. On telemetry, no red alarms or ectopy.NSR, Hr 60s-70s, will continue to monitor.Bed alarm active, bed in lowest position, call light within reach. Patient instructed to use call light for assistance. Verbalized understanding. Bed rails up x2.

## 2018-04-25 VITALS
HEART RATE: 84 BPM | TEMPERATURE: 98 F | RESPIRATION RATE: 16 BRPM | BODY MASS INDEX: 34.3 KG/M2 | SYSTOLIC BLOOD PRESSURE: 132 MMHG | WEIGHT: 245 LBS | HEIGHT: 71 IN | DIASTOLIC BLOOD PRESSURE: 72 MMHG | OXYGEN SATURATION: 99 %

## 2018-04-25 DIAGNOSIS — I50.9 CONGESTIVE HEART FAILURE, UNSPECIFIED HF CHRONICITY, UNSPECIFIED HEART FAILURE TYPE: Primary | ICD-10-CM

## 2018-04-25 DIAGNOSIS — N52.9 ERECTILE DYSFUNCTION, UNSPECIFIED ERECTILE DYSFUNCTION TYPE: ICD-10-CM

## 2018-04-25 DIAGNOSIS — R60.9 EDEMA, UNSPECIFIED TYPE: ICD-10-CM

## 2018-04-25 LAB
ALBUMIN SERPL BCP-MCNC: 3.9 G/DL
ALP SERPL-CCNC: 80 U/L
ALT SERPL W/O P-5'-P-CCNC: 39 U/L
ANION GAP SERPL CALC-SCNC: 12 MMOL/L
AST SERPL-CCNC: 29 U/L
BILIRUB SERPL-MCNC: 1.3 MG/DL
BUN SERPL-MCNC: 19 MG/DL
CALCIUM SERPL-MCNC: 9.5 MG/DL
CHLORIDE SERPL-SCNC: 99 MMOL/L
CO2 SERPL-SCNC: 29 MMOL/L
CREAT SERPL-MCNC: 1 MG/DL
EST. GFR  (AFRICAN AMERICAN): >60 ML/MIN/1.73 M^2
EST. GFR  (NON AFRICAN AMERICAN): >60 ML/MIN/1.73 M^2
GLUCOSE SERPL-MCNC: 324 MG/DL
POCT GLUCOSE: 205 MG/DL (ref 70–110)
POCT GLUCOSE: 298 MG/DL (ref 70–110)
POTASSIUM SERPL-SCNC: 3.8 MMOL/L
PROT SERPL-MCNC: 7.7 G/DL
SODIUM SERPL-SCNC: 140 MMOL/L

## 2018-04-25 PROCEDURE — 25000003 PHARM REV CODE 250: Performed by: NURSE PRACTITIONER

## 2018-04-25 PROCEDURE — A4216 STERILE WATER/SALINE, 10 ML: HCPCS | Performed by: EMERGENCY MEDICINE

## 2018-04-25 PROCEDURE — 25000003 PHARM REV CODE 250: Performed by: EMERGENCY MEDICINE

## 2018-04-25 PROCEDURE — 36415 COLL VENOUS BLD VENIPUNCTURE: CPT

## 2018-04-25 PROCEDURE — 80053 COMPREHEN METABOLIC PANEL: CPT

## 2018-04-25 PROCEDURE — 94761 N-INVAS EAR/PLS OXIMETRY MLT: CPT

## 2018-04-25 PROCEDURE — 99217 PR OBSERVATION CARE DISCHARGE: CPT | Mod: ,,, | Performed by: NURSE PRACTITIONER

## 2018-04-25 PROCEDURE — G0378 HOSPITAL OBSERVATION PER HR: HCPCS

## 2018-04-25 PROCEDURE — 63600175 PHARM REV CODE 636 W HCPCS: Performed by: NURSE PRACTITIONER

## 2018-04-25 RX ORDER — TADALAFIL 10 MG/1
10 TABLET ORAL DAILY PRN
Qty: 6 TABLET | Refills: 0 | Status: SHIPPED | OUTPATIENT
Start: 2018-04-25 | End: 2020-02-19

## 2018-04-25 RX ADMIN — CLOPIDOGREL BISULFATE 75 MG: 75 TABLET ORAL at 08:04

## 2018-04-25 RX ADMIN — INSULIN ASPART 6 UNITS: 100 INJECTION, SOLUTION INTRAVENOUS; SUBCUTANEOUS at 12:04

## 2018-04-25 RX ADMIN — SODIUM CHLORIDE, PRESERVATIVE FREE 3 ML: 5 INJECTION INTRAVENOUS at 05:04

## 2018-04-25 RX ADMIN — FUROSEMIDE 40 MG: 10 INJECTION, SOLUTION INTRAMUSCULAR; INTRAVENOUS at 08:04

## 2018-04-25 RX ADMIN — CARVEDILOL 12.5 MG: 12.5 TABLET, FILM COATED ORAL at 08:04

## 2018-04-25 RX ADMIN — ASPIRIN 81 MG: 81 TABLET, COATED ORAL at 08:04

## 2018-04-25 RX ADMIN — INSULIN ASPART 4 UNITS: 100 INJECTION, SOLUTION INTRAVENOUS; SUBCUTANEOUS at 09:04

## 2018-04-25 RX ADMIN — ATORVASTATIN CALCIUM 40 MG: 40 TABLET, FILM COATED ORAL at 08:04

## 2018-04-25 RX ADMIN — GABAPENTIN 600 MG: 300 CAPSULE ORAL at 08:04

## 2018-04-25 RX ADMIN — LOSARTAN POTASSIUM 50 MG: 50 TABLET, FILM COATED ORAL at 08:04

## 2018-04-25 NOTE — PLAN OF CARE
Problem: Patient Care Overview  Goal: Plan of Care Review  Outcome: Ongoing (interventions implemented as appropriate)  Patient paced SR on continuous cardiac monitoring, no true red alarms noted. Blood glucose monitored. Denies pain. Fall precautions explained, patient refuses bed alarm. Patient advised to use call light for assistance, patient verbalizes understanding. Will continue to monitor.

## 2018-04-25 NOTE — PLAN OF CARE
Patient discharge instructions given and reviewed. Med rec reviewed with Patient. Patient verbalized understanding. Education provided on new medication and diagnosis and follow-up appointment. PIV d/dru tip intact. Pt tolerated well.  Tele removed.  Awaiting transportation home.

## 2018-04-25 NOTE — PLAN OF CARE
TN went to meet with patient, friend at bedside.   Cardiology follow-up scheduled. TN reviewed patient's clinicals. Will continue to follow and assess discharge needs.    Future Appointments  Date Time Provider Department Center   5/8/2018 10:20 AM Pedro Clement MD [facility]IO [facility]   7/3/2018 8:00 AM HOME MONITOR DEVICE CHECK, [facility] [address]     Follow-up With  Details  Why  Contact Info   Pedro Clement MD  On 5/8/2018  at 10:20 am--Cardiology Follow-Up  [address]  [address]  [facility] [address]  [phone]         04/25/18 6903   Discharge Reassessment   Assessment Type Discharge Planning Reassessment   Provided patient/caregiver education on the expected discharge date and the discharge plan Yes   Do you have any problems affording any of your prescribed medications? TBD   Discharge Plan A Home with family   Discharge Plan B Home with family;Home Health   Patient choice form signed by patient/caregiver N/A   Can the patient answer the patient profile reliably? Yes, cognitively intact   Describe the patient's ability to walk at the present time. No restrictions   How often would a person be available to care for the patient? Whenever needed     Екатерина Magdaleno RN  Transition Navigator  [phone]

## 2018-04-25 NOTE — PLAN OF CARE
Patient is discharged to home. Cardiology follow-up scheduled. Patient's medication called in to his pharmacy. No other discharge needs.    Future Appointments  Date Time Provider Department Center   5/8/2018 10:20 AM Pedro Clement MD [facility] CARDIO [facility]   7/3/2018 8:00 AM HOME MONITOR DEVICE CHECK, [facility] [address]        04/25/18 1508   Final Note   Assessment Type Final Discharge Note   Discharge Disposition Home   What phone number can be called within the next 1-3 days to see how you are doing after discharge? [phone]   Hospital Follow Up  Appt(s) scheduled? Yes   Discharge plans and expectations educations in teach back method with documentation complete? Yes   Right Care Referral Info   Post Acute Recommendation No Care     Екатерина Magdaleno RN  Transition Navigator  [phone]

## 2018-04-26 NOTE — DISCHARGE SUMMARY
"[facility]  Cardiology  Discharge Summary      Patient Name: Clifton Wilson  MRN: [id number]  Admission Date: 4/23/2018  Hospital Length of Stay: 0 days  Discharge Date and Time: 4/25/2018  5:48 PM  Attending Physician: No att. providers found  Discharging Provider: OMID Parnell, ANP  Primary Care Physician: Britton Grsisom MD    HPI: 67 y.o. Male with HFrEF 25% s/p ICD, DM, HTN, CAD that presented to the ED for evaluation of "aching back pain" that radiates to his chest which began started 3 days ago.  Patient states that the pain is constant with radiation to chest. Pain has resolved at the present. He reports eating boiled crawfish on Friday which he has not done since his last stents in 2016.   He is also reporting increased edema to his BLE and hands. Denies NV, diaphoresis, syncope. His chest pain has resolved at the time of exam.     * No surgery found *     Indwelling Lines/Drains at time of discharge: none       Hospital Course     4/23/2018 Presented to the ER with complaints of chest pain felf to be related to ADHF secondary to dietary indiscretion of salt intake. Admitted to [facility] for ADHF with IV diuresis initiated  4/24/2018 Continued on IV Lasix BID with good response. Echocardiogram with LVEF 35-40% up from 25% previously. Chest pain resolved with no complaints of SOB or . Nuclear stress test with preliminary results with no evidence of ischemia  4/25/2018 IV Lasix continued with 2.2 liters out overnight. Ambulating in the hallway with no complaints of chest pain or SOB. Complained of LLE swelling and mild pain with no major increase with ambulation. Notation of mild swelling and discoloration with improvement after 1-2 hours. Also complains of ED with no improvement with Viagra. Reports started about 2 years ago with no improvement with Viagra. Will try Cialis. Deemed ready for discharge and sent home on current medication regimen. Instructed to continue Lasix " 40mg daily and to strictly adhere to low salt diet. Instructed on importance of daily weights with instructions to take extra dose of Lasix for increased weight of 2-3lbs in 24hours (40mg po BID). Will arrange for BLE venous and arterial ultrasound as an outpatient. Will follow up with Dr. Clement in 1-2 weeks    Consults: none     Significant Diagnostic Studies: Cardiac Graphics: Echocardiogram:   2D echo with color flow doppler:   Results for orders placed or performed during the hospital encounter of 04/23/18   2D echo with color flow doppler   Result Value Ref Range    EF 35 (A) 55 - 65    Diastolic Dysfunction Yes (A)     Est. PA Systolic Pressure 7.84     Mitral Valve Mobility NORMAL     Tricuspid Valve Regurgitation TRIVIAL        Pending Diagnostic Studies:     Procedure Component Value Units Date/Time    NM Myocardial Perfusion Spect Multi Pharmacologic [[id number]] Resulted:  04/24/18 0928    Order Status:  Sent Lab Status:  In process Updated:  04/24/18 1220          Final Active Diagnoses:    Diagnosis Date Noted POA    PRINCIPAL PROBLEM:  Acute combined systolic and diastolic congestive heart failure [I50.41] 12/07/2015 Yes    Chest pain [R07.9] 04/23/2018 Yes    Coronary artery disease involving native coronary artery without angina pectoris [I25.10] 12/08/2015 Yes    HTN (hypertension) [I10] 12/04/2015 Yes    Type 2 diabetes mellitus with neurologic complication [E11.49] 12/04/2015 Yes      Problems Resolved During this Admission:    Diagnosis Date Noted Date Resolved POA       Discharged Condition: good    Follow Up:  Follow-up Information     Pedro Clement MD On 5/8/2018.    Specialties:  INTERVENTIONAL CARDIOLOGY, Cardiology  Why:  at 10:20 am--Cardiology Follow-Up  Contact information:  [address]  [address]  [place] [address] [phone]                 Patient Instructions:     Diet Cardiac     Activity as tolerated       Medications:  Reconciled Home Medications:       Medication List      START taking these medications    tadalafil 10 MG tablet  Commonly known as:  CIALIS  Take 1 tablet (10 mg total) by mouth daily as needed for Erectile Dysfunction.        CONTINUE taking these medications    alcohol swabs Padm  Apply 1 each topically as needed.     aspirin 81 MG EC tablet  Commonly known as:  ECOTRIN  Take 81 mg by mouth once daily.     atorvastatin 40 MG tablet  Commonly known as:  LIPITOR  Take 1 tablet (40 mg total) by mouth once daily.     blood glucose control high,low Soln  Commonly known as:  ACCU-CHEK CATHRYN CONTROL SOLN  Use as directed to check blood sugar     * blood sugar diagnostic Strp  Commonly known as:  CONTOUR TEST STRIPS  200 each by Misc.(Non-Drug; Combo Route) route 4 (four) times daily.     * blood sugar diagnostic Strp  Commonly known as:  ACCU-CHEK CATHRYN PLUS TEST STRP  1 each by Misc.(Non-Drug; Combo Route) route 4 (four) times daily.     blood-glucose meter Misc  Commonly known as:  ACCU-CHEK CATHRYN PLUS METER  1 Device by Misc.(Non-Drug; Combo Route) route 3 (three) times daily.     carvedilol 12.5 MG tablet  Commonly known as:  COREG  Take 1 tablet (12.5 mg total) by mouth 2 (two) times daily.     clopidogrel 75 mg tablet  Commonly known as:  PLAVIX  Take 1 tablet (75 mg total) by mouth once daily.     furosemide 20 MG tablet  Commonly known as:  LASIX  Take 1 tablet (20 mg total) by mouth 2 (two) times daily.     gabapentin 300 MG capsule  Commonly known as:  NEURONTIN  Take 2 capsules (600 mg total) by mouth 2 (two) times daily.     glipiZIDE 10 MG tablet  Commonly known as:  GLUCOTROL  Take 1 tablet (10 mg total) by mouth 2 (two) times daily with meals.     insulin aspart U-100 100 unit/mL Inpn pen  Commonly known as:  NovoLOG Flexpen U-100 Insulin  Inject 20 Units into the skin 2 (two) times daily before meals.     * insulin glargine 100 unit/mL (3 mL) Inpn pen  Commonly known as:  LANTUS SOLOSTAR U-100 INSULIN  Inject 20 Units into the skin  "every evening.     * insulin glargine 100 unit/mL (3 mL) Inpn pen  Commonly known as:  LANTUS SOLOSTAR U-100 INSULIN  Inject 20 Units into the skin every evening.     lancets Misc  Commonly known as:  ACCU-CHEK SOFTCLIX LANCETS  1 Device by Misc.(Non-Drug; Combo Route) route 3 (three) times daily.     losartan 50 MG tablet  Commonly known as:  COZAAR  Take 1 tablet (50 mg total) by mouth once daily.     metFORMIN 1000 MG tablet  Commonly known as:  GLUCOPHAGE  Take 1 tablet (1,000 mg total) by mouth 2 (two) times daily with meals.     nitroGLYCERIN 0.4 MG SL tablet  Commonly known as:  NITROSTAT  Place 1 tablet (0.4 mg total) under the tongue every 5 (five) minutes as needed for Chest pain.     * pen needle, diabetic 32 gauge x 1/5" Ndle  Use 4 times/day for insulin administration     * pen needle, diabetic 31 gauge x 3/16" Ndle  1 each by Misc.(Non-Drug; Combo Route) route 4 (four) times daily.     zolpidem 5 MG Tab  Commonly known as:  AMBIEN  TAKE 1 TABLET EVERY NIGHT AS NEEDED        * This list has 6 medication(s) that are the same as other medications prescribed for you. Read the directions carefully, and ask your doctor or other care provider to review them with you.                Time spent on the discharge of patient: 15 minutes    OMID Parnell, ANP  Cardiology  [facility]  "

## 2018-04-30 DIAGNOSIS — E78.5 HYPERLIPIDEMIA, UNSPECIFIED HYPERLIPIDEMIA TYPE: ICD-10-CM

## 2018-04-30 RX ORDER — LOSARTAN POTASSIUM 50 MG/1
50 TABLET ORAL DAILY
Qty: 90 TABLET | Refills: 3 | Status: SHIPPED | OUTPATIENT
Start: 2018-04-30 | End: 2018-12-27 | Stop reason: SDUPTHER

## 2018-04-30 RX ORDER — ATORVASTATIN CALCIUM 40 MG/1
40 TABLET, FILM COATED ORAL DAILY
Qty: 90 TABLET | Refills: 3 | Status: SHIPPED | OUTPATIENT
Start: 2018-04-30 | End: 2019-04-26 | Stop reason: SDUPTHER

## 2018-05-01 ENCOUNTER — HOSPITAL ENCOUNTER (OUTPATIENT)
Dept: RADIOLOGY | Facility: HOSPITAL | Age: 68
Discharge: HOME | End: 2018-05-01
Attending: NURSE PRACTITIONER
Payer: MEDICARE

## 2018-05-01 DIAGNOSIS — N52.9 ERECTILE DYSFUNCTION, UNSPECIFIED ERECTILE DYSFUNCTION TYPE: ICD-10-CM

## 2018-05-01 DIAGNOSIS — R60.9 EDEMA, UNSPECIFIED TYPE: ICD-10-CM

## 2018-05-01 PROCEDURE — 93970 EXTREMITY STUDY: CPT | Mod: TC

## 2018-05-01 PROCEDURE — 93925 LOWER EXTREMITY STUDY: CPT | Mod: TC

## 2018-05-01 PROCEDURE — 93925 LOWER EXTREMITY STUDY: CPT | Mod: 26,,, | Performed by: RADIOLOGY

## 2018-05-01 PROCEDURE — 93970 EXTREMITY STUDY: CPT | Mod: 26,,, | Performed by: RADIOLOGY

## 2018-05-02 ENCOUNTER — PATIENT MESSAGE (OUTPATIENT)
Dept: CARDIOLOGY | Facility: CLINIC | Age: 68
End: 2018-05-02

## 2018-05-03 ENCOUNTER — PATIENT MESSAGE (OUTPATIENT)
Dept: FAMILY MEDICINE | Facility: CLINIC | Age: 68
End: 2018-05-03

## 2018-05-08 ENCOUNTER — OFFICE VISIT (OUTPATIENT)
Dept: CARDIOLOGY | Facility: CLINIC | Age: 68
End: 2018-05-08
Payer: MEDICARE

## 2018-05-08 VITALS
HEIGHT: 71 IN | WEIGHT: 262.69 LBS | HEART RATE: 70 BPM | BODY MASS INDEX: 36.77 KG/M2 | SYSTOLIC BLOOD PRESSURE: 110 MMHG | DIASTOLIC BLOOD PRESSURE: 68 MMHG

## 2018-05-08 DIAGNOSIS — E78.5 DYSLIPIDEMIA: ICD-10-CM

## 2018-05-08 DIAGNOSIS — I50.42 CHRONIC COMBINED SYSTOLIC AND DIASTOLIC CHF (CONGESTIVE HEART FAILURE): Primary | ICD-10-CM

## 2018-05-08 DIAGNOSIS — Z95.810 ICD (IMPLANTABLE CARDIOVERTER-DEFIBRILLATOR), SINGLE, IN SITU: ICD-10-CM

## 2018-05-08 DIAGNOSIS — I25.5 ISCHEMIC CARDIOMYOPATHY: ICD-10-CM

## 2018-05-08 DIAGNOSIS — I47.20 VENTRICULAR TACHYCARDIA: ICD-10-CM

## 2018-05-08 DIAGNOSIS — I10 ESSENTIAL HYPERTENSION: ICD-10-CM

## 2018-05-08 DIAGNOSIS — E11.9 DIABETES MELLITUS TYPE 2 WITHOUT RETINOPATHY: ICD-10-CM

## 2018-05-08 DIAGNOSIS — R07.9 CHEST PAIN, UNSPECIFIED TYPE: ICD-10-CM

## 2018-05-08 DIAGNOSIS — I50.41 ACUTE COMBINED SYSTOLIC AND DIASTOLIC CONGESTIVE HEART FAILURE: ICD-10-CM

## 2018-05-08 DIAGNOSIS — I34.0 MITRAL VALVE INSUFFICIENCY, UNSPECIFIED ETIOLOGY: ICD-10-CM

## 2018-05-08 DIAGNOSIS — I25.10 CORONARY ARTERY DISEASE INVOLVING NATIVE CORONARY ARTERY OF NATIVE HEART WITHOUT ANGINA PECTORIS: ICD-10-CM

## 2018-05-08 DIAGNOSIS — I25.5 CARDIOMYOPATHY, ISCHEMIC: ICD-10-CM

## 2018-05-08 PROCEDURE — 99214 OFFICE O/P EST MOD 30 MIN: CPT | Mod: S$GLB,,, | Performed by: INTERNAL MEDICINE

## 2018-05-08 PROCEDURE — 3074F SYST BP LT 130 MM HG: CPT | Mod: CPTII,S$GLB,, | Performed by: INTERNAL MEDICINE

## 2018-05-08 PROCEDURE — 3078F DIAST BP <80 MM HG: CPT | Mod: CPTII,S$GLB,, | Performed by: INTERNAL MEDICINE

## 2018-05-08 PROCEDURE — 3045F PR MOST RECENT HEMOGLOBIN A1C LEVEL 7.0-9.0%: CPT | Mod: CPTII,S$GLB,, | Performed by: INTERNAL MEDICINE

## 2018-05-08 PROCEDURE — 99999 PR PBB SHADOW E&M-EST. PATIENT-LVL III: CPT | Mod: PBBFAC,,, | Performed by: INTERNAL MEDICINE

## 2018-05-08 NOTE — PROGRESS NOTES
Subjective:    Patient ID:  Clifton Wilson is a 67 y.o. male who presents for follow-up of Congestive Heart Failure      HPI    67 y/o male former pt of Dr. Coley. He has a hx of CAD s/p NSTEMI 12/11 with IVONNE to LAD, recurrent symptoms with ISR 1/2016 requiring additional IVONNE to LAD, recent NSTEMI with IVONNE to prox LAD (not ISR) and mid LCX (had ischemic changes in cardiac rehab and was supposed to see me but was admitted with NSTEMI before clinic visit), was evaluated for ICD by Dr Walsh (EF at that time marginal and EPS without inducible VT, therefore no ICD at that time), ICM with last 2DE with EF 20-25% after NSTEMI, HTN, HLD, DM, hx of VT (LIFEVEST in the past).   Presents for f/u.   Last clinic visit was doing well but had some LE from dietary indiscretion. Since then he was hospitalized for ADHF from dietary indiscretion (crawfish). 2DE with some improvement in EF to 35-40%. IV diuresis with improvement and discharged home. Has done well since and is doing well from a cardiac perspective and denies CP, SOB/, orthopnea, PND, syncope, palps. Currently with NYHA FC I-II symptoms. Mild LE edema.    Review of Systems   Constitution: Negative. Negative for weakness and malaise/fatigue.   HENT: Negative for congestion.    Eyes: Negative for blurred vision.   Cardiovascular: Positive for leg swelling. Negative for chest pain, claudication, cyanosis, dyspnea on exertion, irregular heartbeat, near-syncope, orthopnea, palpitations, paroxysmal nocturnal dyspnea and syncope.   Respiratory: Negative for shortness of breath.    Endocrine: Negative for polyuria.   Hematologic/Lymphatic: Negative for bleeding problem.   Skin: Negative for itching and rash.   Musculoskeletal: Negative for joint swelling, muscle cramps and muscle weakness.   Gastrointestinal: Negative for abdominal pain, hematemesis, hematochezia, melena, nausea and vomiting.   Genitourinary: Negative for dysuria and hematuria.   Neurological: Negative  for dizziness, focal weakness, headaches, light-headedness and loss of balance.   Psychiatric/Behavioral: Negative for depression. The patient is not nervous/anxious.         Objective:    Physical Exam   Constitutional: He is oriented to person, place, and time. He appears well-developed and well-nourished.   HENT:   Head: Normocephalic and atraumatic.   Neck: Neck supple. No JVD present.   Cardiovascular: Normal rate and regular rhythm.    Murmur heard.   Systolic murmur is present with a grade of 2/6   Pulses:       Carotid pulses are 2+ on the right side, and 2+ on the left side.       Radial pulses are 2+ on the right side, and 2+ on the left side.        Femoral pulses are 2+ on the right side, and 2+ on the left side.       Posterior tibial pulses are 1+ on the right side, and 1+ on the left side.   Pulmonary/Chest: Effort normal and breath sounds normal.   Abdominal: Soft. Bowel sounds are normal.   Musculoskeletal: He exhibits edema.   Neurological: He is alert and oriented to person, place, and time.   Skin: Skin is warm and dry.   Psychiatric: He has a normal mood and affect. His behavior is normal. Thought content normal.         Assessment:       1. Chronic combined systolic and diastolic CHF (congestive heart failure)    2. Cardiomyopathy, ischemic    3. Acute combined systolic and diastolic congestive heart failure    4. ICD (implantable cardioverter-defibrillator), single, in situ    5. Ischemic cardiomyopathy    6. Coronary artery disease involving native coronary artery of native heart without angina pectoris    7. Ventricular tachycardia, nonsustained post-MI    8. Mitral valve insufficiency, unspecified etiology    9. Essential hypertension    10. Dyslipidemia    11. Diabetes mellitus type 2 without retinopathy    12. Chest pain, unspecified type      66 y/o male with hx and presentation as above. Doing well with NYHA FC I-II symptoms. Recent hospitalization for ADHF from dietary indiscretion.         Plan:       -Continue current medical management  -f/u in 1 month

## 2018-05-21 ENCOUNTER — OFFICE VISIT (OUTPATIENT)
Dept: OPTOMETRY | Facility: CLINIC | Age: 68
End: 2018-05-21
Payer: MEDICARE

## 2018-05-21 DIAGNOSIS — H35.371 EPIRETINAL MEMBRANE (ERM) OF RIGHT EYE: ICD-10-CM

## 2018-05-21 DIAGNOSIS — H26.491 RIGHT POSTERIOR CAPSULAR OPACIFICATION: Primary | ICD-10-CM

## 2018-05-21 PROCEDURE — 99999 PR PBB SHADOW E&M-EST. PATIENT-LVL II: CPT | Mod: PBBFAC,,, | Performed by: OPTOMETRIST

## 2018-05-21 PROCEDURE — 92014 COMPRE OPH EXAM EST PT 1/>: CPT | Mod: S$GLB,,, | Performed by: OPTOMETRIST

## 2018-05-21 RX ORDER — PHENYLEPHRINE HCL 10 MG/1
TABLET, FILM COATED ORAL
COMMUNITY
Start: 2018-03-16 | End: 2020-02-19

## 2018-05-21 NOTE — PROGRESS NOTES
TOMAS SOTO 04/2017  Diabetic BS S/P YAG OS.  OD seems cloudy now, never went   back for YAG OD. Patient scheduled for YAG OD 06/11 with Dr. Dias.    Has glasses for reading +1.75.  Blur od at dist, x mos, no assoc pain or red, no relief over time,   constant  Hemoglobin A1C       Date                     Value               Ref Range             Status                04/24/2018               7.2 (H)             4.0 - 5.6 %           Final              01/19/2018               7.5 (H)             4.0 - 5.6 %           Final         01/19/2018               7.5 (H)             4.0 - 5.6 %           Final               Last edited by Doug Damon, OD on 5/21/2018  2:37 PM. (History)            Assessment /Plan     For exam results, see Encounter Report.    Right posterior capsular opacification    Epiretinal membrane (ERM) of right eye      1. Refer to Arun for YAG eval. Educated pt to slight ERM and possible blur persisting after YAG.

## 2018-06-11 ENCOUNTER — OFFICE VISIT (OUTPATIENT)
Dept: OPHTHALMOLOGY | Facility: CLINIC | Age: 68
End: 2018-06-11
Payer: MEDICARE

## 2018-06-11 VITALS — HEART RATE: 67 BPM | SYSTOLIC BLOOD PRESSURE: 134 MMHG | DIASTOLIC BLOOD PRESSURE: 89 MMHG

## 2018-06-11 DIAGNOSIS — E11.9 DM TYPE 2 WITHOUT RETINOPATHY: ICD-10-CM

## 2018-06-11 DIAGNOSIS — H43.819 VITREOUS DETACHMENT, UNSPECIFIED LATERALITY: ICD-10-CM

## 2018-06-11 DIAGNOSIS — Z96.1 PSEUDOPHAKIA: ICD-10-CM

## 2018-06-11 DIAGNOSIS — H26.491 PCO (POSTERIOR CAPSULAR OPACIFICATION), RIGHT: Primary | ICD-10-CM

## 2018-06-11 DIAGNOSIS — I10 ESSENTIAL HYPERTENSION: ICD-10-CM

## 2018-06-11 PROCEDURE — 66821 AFTER CATARACT LASER SURGERY: CPT | Mod: RT,S$GLB,, | Performed by: OPHTHALMOLOGY

## 2018-06-11 PROCEDURE — 92012 INTRM OPH EXAM EST PATIENT: CPT | Mod: 25,S$GLB,, | Performed by: OPHTHALMOLOGY

## 2018-06-11 PROCEDURE — 99999 PR PBB SHADOW E&M-EST. PATIENT-LVL III: CPT | Mod: PBBFAC,,, | Performed by: OPHTHALMOLOGY

## 2018-06-11 NOTE — PROGRESS NOTES
Subjective:       Patient ID: Clifton Wilson is a 67 y.o. male.    Chief Complaint: Posterior capsular opacification (YAG Laser OD )    HPI     Posterior capsular opacification    Additional comments: YAG Laser OD            Comments   67 y.o. Male is here for YAG Laser right eye. Denies eye pain and   f/f.Cloudy vision right eye. Vision in the left eye is clear. At night   time experience a halo affect. No itching, burning or tearing. Have   trouble with glare at nighttime.     Meds: No gtt        Last edited by DAYAMI Silva on 6/11/2018  3:36 PM. (History)             Assessment:       1. PCO (posterior capsular opacification), right    2. Vitreous detachment, unspecified laterality    3. DM type 2 without retinopathy    4. Essential hypertension    5. Pseudophakia        Plan:       Visually significant  PCO OD- Pt. Wants Laser.     PVD OD-Stable.  DM-No NPDR OD.  HTN-No retinopathy OU.        YAG CAP OD today. TE=   39.0 mj, Avg. 0.5mj, 78 pulses.  PF taper OD.  Control DM & HTN.  RTC 2 wks.

## 2018-06-12 ENCOUNTER — OFFICE VISIT (OUTPATIENT)
Dept: CARDIOLOGY | Facility: CLINIC | Age: 68
End: 2018-06-12
Payer: MEDICARE

## 2018-06-12 VITALS
HEART RATE: 74 BPM | WEIGHT: 265 LBS | SYSTOLIC BLOOD PRESSURE: 129 MMHG | DIASTOLIC BLOOD PRESSURE: 84 MMHG | HEIGHT: 71 IN | OXYGEN SATURATION: 95 % | BODY MASS INDEX: 37.1 KG/M2

## 2018-06-12 DIAGNOSIS — I50.41 ACUTE COMBINED SYSTOLIC AND DIASTOLIC CONGESTIVE HEART FAILURE: ICD-10-CM

## 2018-06-12 DIAGNOSIS — E11.9 DM TYPE 2 WITHOUT RETINOPATHY: ICD-10-CM

## 2018-06-12 DIAGNOSIS — I25.5 CARDIOMYOPATHY, ISCHEMIC: Primary | ICD-10-CM

## 2018-06-12 DIAGNOSIS — I25.5 ISCHEMIC CARDIOMYOPATHY: ICD-10-CM

## 2018-06-12 DIAGNOSIS — E78.5 DYSLIPIDEMIA: ICD-10-CM

## 2018-06-12 DIAGNOSIS — Z95.810 ICD (IMPLANTABLE CARDIOVERTER-DEFIBRILLATOR), SINGLE, IN SITU: ICD-10-CM

## 2018-06-12 DIAGNOSIS — I10 ESSENTIAL HYPERTENSION: ICD-10-CM

## 2018-06-12 DIAGNOSIS — E66.9 OBESITY (BMI 35.0-39.9 WITHOUT COMORBIDITY): ICD-10-CM

## 2018-06-12 DIAGNOSIS — I34.0 MITRAL VALVE INSUFFICIENCY, UNSPECIFIED ETIOLOGY: ICD-10-CM

## 2018-06-12 DIAGNOSIS — I25.10 CORONARY ARTERY DISEASE INVOLVING NATIVE CORONARY ARTERY OF NATIVE HEART WITHOUT ANGINA PECTORIS: ICD-10-CM

## 2018-06-12 DIAGNOSIS — I50.42 CHRONIC COMBINED SYSTOLIC AND DIASTOLIC CHF (CONGESTIVE HEART FAILURE): ICD-10-CM

## 2018-06-12 DIAGNOSIS — I47.20 VENTRICULAR TACHYCARDIA: ICD-10-CM

## 2018-06-12 PROCEDURE — 3074F SYST BP LT 130 MM HG: CPT | Mod: CPTII,S$GLB,, | Performed by: INTERNAL MEDICINE

## 2018-06-12 PROCEDURE — 99999 PR PBB SHADOW E&M-EST. PATIENT-LVL III: CPT | Mod: PBBFAC,,, | Performed by: INTERNAL MEDICINE

## 2018-06-12 PROCEDURE — 3045F PR MOST RECENT HEMOGLOBIN A1C LEVEL 7.0-9.0%: CPT | Mod: CPTII,S$GLB,, | Performed by: INTERNAL MEDICINE

## 2018-06-12 PROCEDURE — 99214 OFFICE O/P EST MOD 30 MIN: CPT | Mod: S$GLB,,, | Performed by: INTERNAL MEDICINE

## 2018-06-12 PROCEDURE — 3079F DIAST BP 80-89 MM HG: CPT | Mod: CPTII,S$GLB,, | Performed by: INTERNAL MEDICINE

## 2018-06-12 RX ORDER — FUROSEMIDE 20 MG/1
20 TABLET ORAL 2 TIMES DAILY
Qty: 180 TABLET | Refills: 3 | Status: SHIPPED | OUTPATIENT
Start: 2018-06-12 | End: 2019-05-08 | Stop reason: SDUPTHER

## 2018-06-12 NOTE — PROGRESS NOTES
Subjective:    Patient ID:  Clifton Wilson is a 67 y.o. male who presents for follow-up of Congestive Heart Failure      HPI  66 y/o male former pt of Dr. Coley. He has a hx of CAD s/p NSTEMI 12/11 with IVONNE to LAD, recurrent symptoms with ISR 1/2016 requiring additional IVONNE to LAD, 2016 NSTEMI with IVONNE to prox LAD (not ISR) and mid LCX (had ischemic changes in cardiac rehab and was supposed to see me but was admitted with NSTEMI before clinic visit), was evaluated for ICD by Dr Walsh (EF at that time marginal and EPS without inducible VT, therefore no ICD at that time), ICM with last 2DE with EF 35-40% improved after last NSTEMI, HTN, HLD, DM, hx of VT (LIFEVEST in the past).   Presents for f/u.   Last clinic visit was doing well. Denies CP, SOB/, orthopnea, PND, syncope, palps. Currently with NYHA FC I-II symptoms. Mild LE edema, chronic and unchanged. Had eaten some shrimp which caused leg edema, however, was taken care of with extra dose of lasix.     Review of Systems   Constitution: Negative for weakness and malaise/fatigue.   HENT: Negative for congestion.    Eyes: Negative for blurred vision.   Cardiovascular: Positive for leg swelling. Negative for chest pain, claudication, cyanosis, dyspnea on exertion, irregular heartbeat, near-syncope, orthopnea, palpitations, paroxysmal nocturnal dyspnea and syncope.   Respiratory: Negative for shortness of breath.    Endocrine: Negative for polyuria.   Hematologic/Lymphatic: Negative for bleeding problem.   Skin: Negative for itching and rash.   Musculoskeletal: Positive for joint pain and joint swelling. Negative for muscle cramps and muscle weakness.   Gastrointestinal: Negative for abdominal pain, hematemesis, hematochezia, melena, nausea and vomiting.   Genitourinary: Negative for dysuria and hematuria.   Neurological: Negative for dizziness, focal weakness, headaches, light-headedness and loss of balance.   Psychiatric/Behavioral: Negative for depression.  The patient is not nervous/anxious.         Objective:    Physical Exam   Constitutional: He is oriented to person, place, and time. He appears well-developed and well-nourished.   HENT:   Head: Normocephalic and atraumatic.   Neck: Neck supple. No JVD present.   Cardiovascular: Normal rate and regular rhythm.    Murmur heard.   Systolic murmur is present with a grade of 2/6   Pulses:       Carotid pulses are 2+ on the right side, and 2+ on the left side.       Radial pulses are 2+ on the right side, and 2+ on the left side.        Femoral pulses are 2+ on the right side, and 2+ on the left side.       Posterior tibial pulses are 1+ on the right side, and 1+ on the left side.   Pulmonary/Chest: Effort normal and breath sounds normal.   Abdominal: Soft. Bowel sounds are normal.   Musculoskeletal: He exhibits edema.   Neurological: He is alert and oriented to person, place, and time.   Skin: Skin is warm and dry.   Psychiatric: He has a normal mood and affect. His behavior is normal. Thought content normal.         Assessment:       1. Cardiomyopathy, ischemic    2. Essential hypertension    3. Mitral valve insufficiency, unspecified etiology    4. Acute combined systolic and diastolic congestive heart failure    5. Chronic combined systolic and diastolic CHF (congestive heart failure)    6. Coronary artery disease involving native coronary artery of native heart without angina pectoris    7. Dyslipidemia    8. ICD (implantable cardioverter-defibrillator), single, in situ    9. Ischemic cardiomyopathy    10. Ventricular tachycardia, nonsustained post-MI    11. DM type 2 without retinopathy    12. Obesity (BMI 35.0-39.9 without comorbidity)      66 y/o male with hx and presentation as above. Doing well from a cardiac perspective and compensated from a HF perspective.        Plan:       -Los sodium diet  -f/u in 6 months

## 2018-06-25 ENCOUNTER — OFFICE VISIT (OUTPATIENT)
Dept: OPHTHALMOLOGY | Facility: CLINIC | Age: 68
End: 2018-06-25
Payer: MEDICARE

## 2018-06-25 DIAGNOSIS — Z96.1 PSEUDOPHAKIA: ICD-10-CM

## 2018-06-25 DIAGNOSIS — Z98.890 POST-OPERATIVE STATE: Primary | ICD-10-CM

## 2018-06-25 PROCEDURE — 99024 POSTOP FOLLOW-UP VISIT: CPT | Mod: S$GLB,,, | Performed by: OPHTHALMOLOGY

## 2018-06-25 PROCEDURE — 99999 PR PBB SHADOW E&M-EST. PATIENT-LVL II: CPT | Mod: PBBFAC,,, | Performed by: OPHTHALMOLOGY

## 2018-06-25 NOTE — PROGRESS NOTES
Subjective:       Patient ID: Clifton Wilson is a 67 y.o. male.    Chief Complaint: Post-op Evaluation (Yag Laser OD)    HPI     Post-op Evaluation    Additional comments: Yag Laser OD           Comments   DSL- 6/11/18     Pt states VA OD has improved after Yag Laser. Pt denies pain and   discomfort.    Eyemeds  No gtts       Last edited by Arlette Loja on 6/25/2018  8:04 AM. (History)             Assessment:       1. Post-operative state    2. Pseudophakia        Plan:       S/P CE OD- Doing well.        RTC Dr Damon in 6 mos.

## 2018-06-26 ENCOUNTER — OFFICE VISIT (OUTPATIENT)
Dept: FAMILY MEDICINE | Facility: CLINIC | Age: 68
End: 2018-06-26
Attending: FAMILY MEDICINE
Payer: MEDICARE

## 2018-06-26 ENCOUNTER — HOSPITAL ENCOUNTER (OUTPATIENT)
Dept: RADIOLOGY | Facility: HOSPITAL | Age: 68
Discharge: HOME | End: 2018-06-26
Attending: FAMILY MEDICINE
Payer: MEDICARE

## 2018-06-26 VITALS
HEART RATE: 72 BPM | TEMPERATURE: 98 F | HEIGHT: 71 IN | BODY MASS INDEX: 35.84 KG/M2 | OXYGEN SATURATION: 97 % | WEIGHT: 256 LBS | DIASTOLIC BLOOD PRESSURE: 80 MMHG | SYSTOLIC BLOOD PRESSURE: 130 MMHG

## 2018-06-26 DIAGNOSIS — I47.20 VENTRICULAR TACHYCARDIA: ICD-10-CM

## 2018-06-26 DIAGNOSIS — I50.42 CHRONIC COMBINED SYSTOLIC AND DIASTOLIC CHF (CONGESTIVE HEART FAILURE): ICD-10-CM

## 2018-06-26 DIAGNOSIS — Z79.4 TYPE 2 DIABETES MELLITUS WITH DIABETIC NEUROPATHY, WITH LONG-TERM CURRENT USE OF INSULIN: Primary | ICD-10-CM

## 2018-06-26 DIAGNOSIS — E11.40 TYPE 2 DIABETES MELLITUS WITH DIABETIC NEUROPATHY, WITH LONG-TERM CURRENT USE OF INSULIN: Primary | ICD-10-CM

## 2018-06-26 DIAGNOSIS — M25.462 PAIN AND SWELLING OF LEFT KNEE: ICD-10-CM

## 2018-06-26 DIAGNOSIS — Z95.810 ICD (IMPLANTABLE CARDIOVERTER-DEFIBRILLATOR), SINGLE, IN SITU: ICD-10-CM

## 2018-06-26 DIAGNOSIS — Z12.11 COLON CANCER SCREENING: ICD-10-CM

## 2018-06-26 DIAGNOSIS — E11.9 DIABETES MELLITUS TYPE 2 WITHOUT RETINOPATHY: ICD-10-CM

## 2018-06-26 DIAGNOSIS — I25.10 CORONARY ARTERY DISEASE INVOLVING NATIVE CORONARY ARTERY OF NATIVE HEART WITHOUT ANGINA PECTORIS: ICD-10-CM

## 2018-06-26 DIAGNOSIS — I25.5 CARDIOMYOPATHY, ISCHEMIC: ICD-10-CM

## 2018-06-26 DIAGNOSIS — F51.01 PRIMARY INSOMNIA: ICD-10-CM

## 2018-06-26 DIAGNOSIS — M25.562 PAIN AND SWELLING OF LEFT KNEE: ICD-10-CM

## 2018-06-26 DIAGNOSIS — E66.9 OBESITY (BMI 35.0-39.9 WITHOUT COMORBIDITY): ICD-10-CM

## 2018-06-26 PROCEDURE — 73564 X-RAY EXAM KNEE 4 OR MORE: CPT | Mod: TC,FY,LT

## 2018-06-26 PROCEDURE — 3045F PR MOST RECENT HEMOGLOBIN A1C LEVEL 7.0-9.0%: CPT | Mod: CPTII,S$GLB,, | Performed by: FAMILY MEDICINE

## 2018-06-26 PROCEDURE — 3075F SYST BP GE 130 - 139MM HG: CPT | Mod: CPTII,S$GLB,, | Performed by: FAMILY MEDICINE

## 2018-06-26 PROCEDURE — 99999 PR PBB SHADOW E&M-EST. PATIENT-LVL III: CPT | Mod: PBBFAC,,, | Performed by: FAMILY MEDICINE

## 2018-06-26 PROCEDURE — 99214 OFFICE O/P EST MOD 30 MIN: CPT | Mod: S$GLB,,, | Performed by: FAMILY MEDICINE

## 2018-06-26 PROCEDURE — 3079F DIAST BP 80-89 MM HG: CPT | Mod: CPTII,S$GLB,, | Performed by: FAMILY MEDICINE

## 2018-06-26 PROCEDURE — 73564 X-RAY EXAM KNEE 4 OR MORE: CPT | Mod: 26,LT,, | Performed by: RADIOLOGY

## 2018-06-26 RX ORDER — DICLOFENAC SODIUM 10 MG/G
2 GEL TOPICAL DAILY
Qty: 100 G | Refills: 1 | Status: SHIPPED | OUTPATIENT
Start: 2018-06-26 | End: 2020-02-19

## 2018-06-26 RX ORDER — ZOLPIDEM TARTRATE 5 MG/1
TABLET ORAL
Qty: 90 TABLET | Refills: 1 | Status: SHIPPED | OUTPATIENT
Start: 2018-06-26 | End: 2018-08-10 | Stop reason: SDUPTHER

## 2018-06-26 NOTE — PROGRESS NOTES
Subjective:       Patient ID: Clifton Wilson is a 67 y.o. male.    Chief Complaint: Swelling (Knee Swelling) and Colonoscopy/Fit Kit    67 yr old pleasant white male with DM II, HTN, HLD, CAD, Combined chronic CHF, MR, obesity, neuropathy, presents today for diabetes. He also c/o left knee swelling.      DM II - improving -  A1C                   7.2 (H)             04/24/2018                             - on metformin and insulin and sugars improving - - due for eye exam - foot exam UTD - on ASA and plavix. Losartan      HTN - chronic - controlled - on losartan, lasix - compliant - no side effects      HLD - chronic -    LDLCALC                  67.2                01/19/2018                             - controlled -       CAD/combined CHF - EF 30 - on external defibrillator - medial management - on ASA/plavix/ARB - no symptoms - following cardiology      History as below - reviewed              Swelling   This is a chronic problem. The current episode started more than 1 month ago. The problem occurs intermittently. The problem has been gradually worsening. Associated symptoms include arthralgias and joint swelling. Pertinent negatives include no chest pain, congestion, coughing, diaphoresis, myalgias, neck pain, rash, vomiting or weakness.   Diabetes   He presents for his follow-up diabetic visit. He has type 2 diabetes mellitus. His disease course has been worsening. Pertinent negatives for hypoglycemia include no dizziness, nervousness/anxiousness or speech difficulty. Pertinent negatives for diabetes include no chest pain, no polydipsia, no polyuria and no weakness. Pertinent negatives for hypoglycemia complications include no blackouts, no hospitalization, no nocturnal hypoglycemia, no required assistance and no required glucagon injection. Symptoms are stable. There are no diabetic complications. Pertinent negatives for diabetic complications include no autonomic neuropathy, CVA, impotence, peripheral  neuropathy, PVD or retinopathy. Risk factors for coronary artery disease include diabetes mellitus, dyslipidemia, hypertension and obesity. Current diabetic treatment includes oral agent (monotherapy). He is compliant with treatment all of the time. He is following a diabetic diet. Meal planning includes avoidance of concentrated sweets. He rarely participates in exercise. An ACE inhibitor/angiotensin II receptor blocker is being taken. He does not see a podiatrist.Eye exam is not current.   Hypertension   This is a chronic problem. The current episode started more than 1 year ago. The problem has been gradually improving since onset. The problem is controlled. Pertinent negatives include no chest pain, malaise/fatigue, neck pain, palpitations, peripheral edema or PND. Risk factors for coronary artery disease include diabetes mellitus, dyslipidemia, male gender and obesity. Past treatments include angiotensin blockers and diuretics. The current treatment provides significant improvement. There are no compliance problems.  Hypertensive end-organ damage includes CAD/MI and heart failure. There is no history of CVA, left ventricular hypertrophy, PVD or retinopathy. There is no history of chronic renal disease, hypercortisolism, hyperparathyroidism, pheochromocytoma, renovascular disease or a thyroid problem.   Hyperlipidemia   This is a chronic problem. The current episode started more than 1 year ago. The problem is controlled. Recent lipid tests were reviewed and are normal. Exacerbating diseases include obesity. He has no history of chronic renal disease or diabetes. There are no known factors aggravating his hyperlipidemia. Pertinent negatives include no chest pain, focal sensory loss or myalgias. Current antihyperlipidemic treatment includes statins. The current treatment provides moderate improvement of lipids. There are no compliance problems.  Risk factors for coronary artery disease include diabetes mellitus,  dyslipidemia, male sex, hypertension and obesity.     Review of Systems   Constitutional: Negative.  Negative for activity change, diaphoresis, malaise/fatigue and unexpected weight change.   HENT: Negative.  Negative for congestion, ear pain, mouth sores, rhinorrhea and voice change.    Eyes: Negative.  Negative for pain, discharge and visual disturbance.   Respiratory: Negative.  Negative for apnea, cough and wheezing.    Cardiovascular: Negative.  Negative for chest pain, palpitations and PND.   Gastrointestinal: Negative.  Negative for abdominal distention, anal bleeding, diarrhea and vomiting.   Endocrine: Negative.  Negative for cold intolerance, polydipsia and polyuria.   Genitourinary: Negative.  Negative for decreased urine volume, difficulty urinating, discharge, frequency, impotence and scrotal swelling.   Musculoskeletal: Positive for arthralgias and joint swelling. Negative for back pain, myalgias, neck pain and neck stiffness.   Skin: Negative.  Negative for color change and rash.   Allergic/Immunologic: Negative.  Negative for environmental allergies and immunocompromised state.   Neurological: Negative.  Negative for dizziness, speech difficulty, weakness and light-headedness.   Hematological: Negative.    Psychiatric/Behavioral: Negative.  Negative for agitation, dysphoric mood and suicidal ideas. The patient is not nervous/anxious.        PMH/PSH/FH/SH/MED/ALLERGY reviewed    Objective:       Vitals:    06/26/18 1342   BP: 130/80   Pulse: 72   Temp: 98.4 °F (36.9 °C)       Physical Exam   Constitutional: He is oriented to person, place, and time. He appears well-developed and well-nourished.   HENT:   Head: Normocephalic and atraumatic.   Right Ear: External ear normal.   Left Ear: External ear normal.   Nose: Nose normal.   Mouth/Throat: Oropharynx is clear and moist. No oropharyngeal exudate.   Eyes: Conjunctivae and EOM are normal. Pupils are equal, round, and reactive to light. Right eye  exhibits no discharge. Left eye exhibits no discharge. No scleral icterus.   Neck: Normal range of motion. Neck supple. No JVD present. No tracheal deviation present. No thyromegaly present.   Cardiovascular: Normal rate, regular rhythm, normal heart sounds and intact distal pulses.  Exam reveals no gallop and no friction rub.    No murmur heard.  Pulmonary/Chest: Effort normal and breath sounds normal. No stridor. No respiratory distress. He has no wheezes. He has no rales. He exhibits no tenderness.   Abdominal: Soft. Bowel sounds are normal. He exhibits no distension and no mass. There is no tenderness. There is no rebound and no guarding. No hernia.   Musculoskeletal: Normal range of motion. He exhibits edema (left knee swelling). He exhibits no tenderness.   Lymphadenopathy:     He has no cervical adenopathy.   Neurological: He is alert and oriented to person, place, and time. He has normal reflexes. He displays normal reflexes. No cranial nerve deficit. He exhibits normal muscle tone. Coordination normal.   Skin: Skin is warm and dry. No rash noted. No erythema. No pallor.   Psychiatric: He has a normal mood and affect. His behavior is normal. Judgment and thought content normal.       Assessment:       1. Type 2 diabetes mellitus with diabetic neuropathy, with long-term current use of insulin    2. Obesity (BMI 35.0-39.9 without comorbidity)    3. Diabetes mellitus type 2 without retinopathy    4. Coronary artery disease involving native coronary artery of native heart without angina pectoris    5. Chronic combined systolic and diastolic CHF (congestive heart failure)    6. Cardiomyopathy, ischemic    7. ICD (implantable cardioverter-defibrillator), single, in situ    8. Ventricular tachycardia, nonsustained post-MI    9. Primary insomnia    10. Pain and swelling of left knee    11. Colon cancer screening        Plan:       Clifton was seen today for swelling and colonoscopy/fit kit.    Diagnoses and all  orders for this visit:    Type 2 diabetes mellitus with diabetic neuropathy, with long-term current use of insulin  -     Comprehensive metabolic panel; Future  -     Lipid panel; Future  -     Hemoglobin A1c; Future    Obesity (BMI 35.0-39.9 without comorbidity)    Diabetes mellitus type 2 without retinopathy  -     Comprehensive metabolic panel; Future  -     Lipid panel; Future  -     Hemoglobin A1c; Future    Coronary artery disease involving native coronary artery of native heart without angina pectoris  -     Comprehensive metabolic panel; Future  -     Lipid panel; Future    Chronic combined systolic and diastolic CHF (congestive heart failure)    Cardiomyopathy, ischemic    ICD (implantable cardioverter-defibrillator), single, in situ    Ventricular tachycardia, nonsustained post-MI    Primary insomnia  -     zolpidem (AMBIEN) 5 MG Tab; TAKE 1 TABLET EVERY NIGHT AS NEEDED    Pain and swelling of left knee  -     X-Ray Knee Complete 4 or More Views Left; Future    Colon cancer screening  -     Case request GI: COLONOSCOPY    Other orders  -     diclofenac sodium 1 % Gel; Apply 2 g topically once daily.        DM II controlled/hyperglycemia  -reports improving since started insulin  -lantus and novolog to continue  -strict diet control    HTN  -controlled    HLD  -controlled    Combined CHF  -follows cardiology  -on external defibrillator    Neuropathy  -continue neurontin and increase dose to 600 mg BID    Obesity  -diet and exercise as tolerated    Left knee swelling  -ice/heat  -voltaren gel or aspercreme local application as needed  -x ray knee    Spent adequate time in obtaining history and explaining differentials    40 minutes spent during this visit of which greater than 50% devoted to face-face counseling and coordination of care regarding diagnosis and management plan    Follow-up in about 3 months (around 9/26/2018), or if symptoms worsen or fail to improve.

## 2018-06-29 ENCOUNTER — PATIENT MESSAGE (OUTPATIENT)
Dept: FAMILY MEDICINE | Facility: CLINIC | Age: 68
End: 2018-06-29

## 2018-06-29 DIAGNOSIS — M17.12 OSTEOARTHRITIS OF LEFT KNEE, UNSPECIFIED OSTEOARTHRITIS TYPE: Primary | ICD-10-CM

## 2018-07-03 ENCOUNTER — CLINICAL SUPPORT (OUTPATIENT)
Dept: ELECTROPHYSIOLOGY | Facility: CLINIC | Age: 68
End: 2018-07-03
Payer: MEDICARE

## 2018-07-03 DIAGNOSIS — I25.5 ISCHEMIC CARDIOMYOPATHY: ICD-10-CM

## 2018-07-03 DIAGNOSIS — Z95.810 AICD (AUTOMATIC CARDIOVERTER/DEFIBRILLATOR) PRESENT: ICD-10-CM

## 2018-07-03 PROCEDURE — 93296 REM INTERROG EVL PM/IDS: CPT | Mod: S$GLB,,, | Performed by: INTERNAL MEDICINE

## 2018-07-03 PROCEDURE — 93295 DEV INTERROG REMOTE 1/2/MLT: CPT | Mod: S$GLB,,, | Performed by: INTERNAL MEDICINE

## 2018-07-07 DIAGNOSIS — Z95.810 AICD (AUTOMATIC CARDIOVERTER/DEFIBRILLATOR) PRESENT: Primary | ICD-10-CM

## 2018-07-07 DIAGNOSIS — I25.5 ISCHEMIC CARDIOMYOPATHY: ICD-10-CM

## 2018-07-17 ENCOUNTER — TELEPHONE (OUTPATIENT)
Dept: ENDOSCOPY | Facility: HOSPITAL | Age: 68
End: 2018-07-17

## 2018-07-17 ENCOUNTER — TELEPHONE (OUTPATIENT)
Dept: ORTHOPEDICS | Facility: CLINIC | Age: 68
End: 2018-07-17

## 2018-07-17 NOTE — TELEPHONE ENCOUNTER
Left message for patient to return call to  Office..    ----- Message from Arline Ashford sent at 7/17/2018  8:52 AM CDT -----  Contact: [phone]/self  Patient would like to be seen sooner than the next available appointment. Please advise.

## 2018-07-18 ENCOUNTER — APPOINTMENT (OUTPATIENT)
Dept: LAB | Facility: HOSPITAL | Age: 68
End: 2018-07-18
Attending: ORTHOPAEDIC SURGERY
Payer: MEDICARE

## 2018-07-18 ENCOUNTER — OFFICE VISIT (OUTPATIENT)
Dept: ORTHOPEDICS | Facility: CLINIC | Age: 68
End: 2018-07-18
Attending: FAMILY MEDICINE
Payer: MEDICARE

## 2018-07-18 VITALS — WEIGHT: 256 LBS | HEIGHT: 71 IN | BODY MASS INDEX: 35.84 KG/M2

## 2018-07-18 DIAGNOSIS — M65.9 SYNOVITIS OF KNEE: Primary | ICD-10-CM

## 2018-07-18 LAB
APPEARANCE FLD: NORMAL
BODY FLD TYPE: ABNORMAL
BODY FLD TYPE: NORMAL
COLOR FLD: YELLOW
CRYSTALS FLD MICRO: POSITIVE
LYMPHOCYTES NFR FLD MANUAL: 7 %
NEUTROPHILS NFR FLD MANUAL: 93 %
WBC # FLD: 5528 /CU MM

## 2018-07-18 PROCEDURE — 20610 DRAIN/INJ JOINT/BURSA W/O US: CPT | Mod: RT,S$GLB,, | Performed by: ORTHOPAEDIC SURGERY

## 2018-07-18 PROCEDURE — 89060 EXAM SYNOVIAL FLUID CRYSTALS: CPT

## 2018-07-18 PROCEDURE — 89051 BODY FLUID CELL COUNT: CPT

## 2018-07-18 PROCEDURE — 99203 OFFICE O/P NEW LOW 30 MIN: CPT | Mod: 25,S$GLB,, | Performed by: ORTHOPAEDIC SURGERY

## 2018-07-18 PROCEDURE — 99999 PR PBB SHADOW E&M-EST. PATIENT-LVL II: CPT | Mod: PBBFAC,,, | Performed by: ORTHOPAEDIC SURGERY

## 2018-07-18 RX ORDER — COLCHICINE 0.6 MG/1
0.6 TABLET ORAL DAILY
Qty: 30 TABLET | Refills: 1 | Status: SHIPPED | OUTPATIENT
Start: 2018-07-18 | End: 2018-09-22 | Stop reason: SDUPTHER

## 2018-07-18 RX ORDER — TRIAMCINOLONE ACETONIDE 40 MG/ML
40 INJECTION, SUSPENSION INTRA-ARTICULAR; INTRAMUSCULAR
Status: COMPLETED | OUTPATIENT
Start: 2018-07-18 | End: 2018-07-18

## 2018-07-18 RX ADMIN — TRIAMCINOLONE ACETONIDE 40 MG: 40 INJECTION, SUSPENSION INTRA-ARTICULAR; INTRAMUSCULAR at 02:07

## 2018-07-18 NOTE — PROGRESS NOTES
Subjective:      Patient ID: Clifton Wilson is a 67 y.o. male.    Chief Complaint: Pain of the Left Knee    HPI     They have experienced problems with their left knee over the past 3 weeks. The patient denies relevant history of injury/aggravation.  Patient reportedly has been treated for gout in the past.  Pain is located diffusely Associated symptoms include swelling.  Symptoms are aggravated by all activity. They have been treated with over the counter analgesics.   Symptoms have recently stayed the same. Ambulation reportedly has been impaired. Self care ADLs are painful.     Review of Systems   Constitution: Negative for fever and weight loss.   HENT: Negative for congestion.    Eyes: Negative for visual disturbance.   Cardiovascular: Negative for chest pain.   Respiratory: Negative for shortness of breath.    Hematologic/Lymphatic: Negative for bleeding problem. Does not bruise/bleed easily.   Skin: Negative for poor wound healing.   Musculoskeletal: Positive for joint pain and joint swelling.   Gastrointestinal: Negative for abdominal pain.   Genitourinary: Negative for dysuria.   Neurological: Negative for seizures.   Psychiatric/Behavioral: Negative for altered mental status.   Allergic/Immunologic: Negative for persistent infections.         Objective:            Ortho/SPM Exam        There were no vitals filed for this visit.  Left Knee      There were no vitals filed for this visit.    The patient is not in acute distress.   Body habitus is obese.   The patient walks with a mild limp.  Resisted SLR negative.   The skin over the knee is intact.  Knee effusion 3+  Tendernes is located:  NA  Range of motion- Flexion 100 deg, Extension 5 deg,   Ligament exam:   MCL intact   Lachman intact              Post sag intact    LCL intact  Patellar apprehension negative.  Popliteal cyst negative  Patellar crepitation absent.  Flexion/pinch negative.  Pulses DP present, PT present.  Motor normal 5/5 strength in  all tested muscle groups.   Sensory normal.  I reviewed the relevant radiographic images for the patient's condition:  Recent films showed no fracture or dislocation.  Joint space is preserved.  There appears to be chondrocalcinosis and vascular calcification          Assessment:       Encounter Diagnosis   Name Primary?    Synovitis of knee Yes          Most likely subacute gout or pseudogout  Plan:       Clifton was seen today for pain.    Diagnoses and all orders for this visit:    Synovitis of knee        I explained my diagnostic impression and the reasoning behind it in detail, using layman's terms    Aspiration and injection recommended and consent given    Colchicine

## 2018-07-18 NOTE — LETTER
July 18, 2018      Britton Grissom MD  [address]  [address]  [facility] [address]           [facility]  [address]  [facility] [address]  Phone: [phone]          Patient: Clifton Wilson   MR Number: [id number]   YOB: 1950   Date of Visit: 7/18/2018       Dear Dr. Britton Grissom:    Thank you for referring Clifton Wilson to me for evaluation. Attached you will find relevant portions of my assessment and plan of care.    If you have questions, please do not hesitate to call me. I look forward to following Clifton Wilson along with you.    Sincerely,    Aditya Brooks MD    Enclosure  CC:  No Recipients    If you would like to receive this communication electronically, please contact [email or web address] or [phone] to request more information on [employer] access.    For providers and/or their staff who would like to refer a patient to [facility], please contact us through our one-stop-shop provider referral line, [facility], at [phone].    If you feel you have received this communication in error or would no longer like to receive these types of communications, please e-mail [email or web address]

## 2018-07-19 ENCOUNTER — PATIENT MESSAGE (OUTPATIENT)
Dept: ORTHOPEDICS | Facility: CLINIC | Age: 68
End: 2018-07-19

## 2018-07-27 ENCOUNTER — OFFICE VISIT (OUTPATIENT)
Dept: GASTROENTEROLOGY | Facility: CLINIC | Age: 68
End: 2018-07-27
Payer: MEDICARE

## 2018-07-27 VITALS
HEIGHT: 71 IN | WEIGHT: 259.5 LBS | SYSTOLIC BLOOD PRESSURE: 113 MMHG | DIASTOLIC BLOOD PRESSURE: 67 MMHG | BODY MASS INDEX: 36.33 KG/M2

## 2018-07-27 DIAGNOSIS — Z79.01 CURRENT USE OF LONG TERM ANTICOAGULATION: ICD-10-CM

## 2018-07-27 DIAGNOSIS — R19.8 ALTERNATING CONSTIPATION AND DIARRHEA: Primary | ICD-10-CM

## 2018-07-27 DIAGNOSIS — Z12.12 SCREENING FOR COLORECTAL CANCER: ICD-10-CM

## 2018-07-27 DIAGNOSIS — Z12.11 SCREENING FOR COLORECTAL CANCER: ICD-10-CM

## 2018-07-27 PROCEDURE — 99999 PR PBB SHADOW E&M-EST. PATIENT-LVL II: CPT | Mod: PBBFAC,,, | Performed by: NURSE PRACTITIONER

## 2018-07-27 PROCEDURE — 3074F SYST BP LT 130 MM HG: CPT | Mod: CPTII,S$GLB,, | Performed by: NURSE PRACTITIONER

## 2018-07-27 PROCEDURE — 99213 OFFICE O/P EST LOW 20 MIN: CPT | Mod: S$GLB,,, | Performed by: NURSE PRACTITIONER

## 2018-07-27 PROCEDURE — 3078F DIAST BP <80 MM HG: CPT | Mod: CPTII,S$GLB,, | Performed by: NURSE PRACTITIONER

## 2018-07-27 RX ORDER — POLYETHYLENE GLYCOL 3350, SODIUM SULFATE ANHYDROUS, SODIUM BICARBONATE, SODIUM CHLORIDE, POTASSIUM CHLORIDE 236; 22.74; 6.74; 5.86; 2.97 G/4L; G/4L; G/4L; G/4L; G/4L
4 POWDER, FOR SOLUTION ORAL SEE ADMIN INSTRUCTIONS
Qty: 4000 ML | Refills: 0 | Status: SHIPPED | OUTPATIENT
Start: 2018-07-27 | End: 2020-02-19

## 2018-07-27 NOTE — PROGRESS NOTES
Subjective:       Patient ID: Clifton Wilson is a 67 y.o. male.    Chief Complaint: Colonoscopy    HPI  Presents to discuss screening colonoscopy.  He has never had colonoscopy.  No family history of colon cancer or colon polyps.   He is on plavix and when I last saw him was not yet one year out from heart stents.   He reports he has now been cleared to hold plavix for colonoscopy.   He denies change in bowel habit, blood with bowel movements or black stools.   Denies abdominal pain, nausea, vomiting or reflux.  Reports episodic constipation and at times diarrhea.    Review of Systems   Constitutional: Negative.  Negative for activity change, appetite change, fatigue, fever and unexpected weight change.   Respiratory: Negative for shortness of breath.    Cardiovascular: Negative for chest pain.   Gastrointestinal: Positive for constipation and diarrhea. Negative for abdominal pain, blood in stool and nausea.   Skin: Negative.    Neurological: Negative.    Psychiatric/Behavioral: Negative.        Objective:      Physical Exam   Constitutional: He is oriented to person, place, and time. He appears well-developed and well-nourished. No distress.   Pulmonary/Chest: Effort normal. No respiratory distress.   Abdominal: Soft. He exhibits no distension.   Neurological: He is alert and oriented to person, place, and time.   Skin: Skin is warm and dry. He is not diaphoretic.   Psychiatric: He has a normal mood and affect. His behavior is normal. Judgment and thought content normal.   Vitals reviewed.      Assessment:       1. Alternating constipation and diarrhea    2. Screening for colorectal cancer    3. Current use of long term anticoagulation        Plan:         Clifton was seen today for colonoscopy.    Diagnoses and all orders for this visit:    Alternating constipation and diarrhea    Screening for colorectal cancer    Current use of long term anticoagulation    Other orders  -     polyethylene glycol  (GOLYTELY,NULYTELY) 236-22.74-6.74 -5.86 gram suspension; Take 4,000 mLs (4 L total) by mouth As instructed.  -     Case request GI: COLONOSCOPY    -   High fiber diet  -   Fiber supplement  -   [place] fluid intake   -   Exercise

## 2018-08-03 ENCOUNTER — PATIENT MESSAGE (OUTPATIENT)
Dept: ENDOSCOPY | Facility: HOSPITAL | Age: 68
End: 2018-08-03

## 2018-08-06 ENCOUNTER — TELEPHONE (OUTPATIENT)
Dept: GASTROENTEROLOGY | Facility: CLINIC | Age: 68
End: 2018-08-06

## 2018-08-06 NOTE — TELEPHONE ENCOUNTER
----- Message from Clifton Wilson sent at 8/3/2018  6:02 PM CDT -----  Regarding: RE: Reminder for Upcoming Procedure  Contact: [phone]  What is the time limit on Gabapentin? Is that considered an aspirin or analgesic? Do I stop taking that 7 days before?  Thanks.     ----- Message -----  From: Valentine Burger MD  Sent: 8/3/18, 8:30 AM  To: Clifton Wilson  Subject: Reminder for Upcoming Procedure    [facility] [address] [place] [address]    08/03/2018    Dear Clifton Wilson,    This is a reminder for your upcoming procedure with Valentine Burger MD on 8/10/2018. We will contact you again before the day of your procedure with your scheduled arrival time.    If you have questions or scheduling concerns, you can contact your physicians office:  Valentine Burger MD  Phone Number: [phone]    Sincerely,  Patient Services

## 2018-08-10 ENCOUNTER — SURGERY (OUTPATIENT)
Age: 68
End: 2018-08-10

## 2018-08-10 ENCOUNTER — HOSPITAL ENCOUNTER (OUTPATIENT)
Facility: HOSPITAL | Age: 68
Discharge: HOME | End: 2018-08-10
Attending: INTERNAL MEDICINE | Admitting: INTERNAL MEDICINE
Payer: MEDICARE

## 2018-08-10 ENCOUNTER — ANESTHESIA EVENT (OUTPATIENT)
Dept: ENDOSCOPY | Facility: HOSPITAL | Age: 68
End: 2018-08-10
Payer: MEDICARE

## 2018-08-10 ENCOUNTER — ANESTHESIA (OUTPATIENT)
Dept: ENDOSCOPY | Facility: HOSPITAL | Age: 68
End: 2018-08-10
Payer: MEDICARE

## 2018-08-10 VITALS
OXYGEN SATURATION: 100 % | SYSTOLIC BLOOD PRESSURE: 118 MMHG | HEART RATE: 70 BPM | BODY MASS INDEX: 34.3 KG/M2 | HEIGHT: 71 IN | WEIGHT: 245 LBS | TEMPERATURE: 98 F | DIASTOLIC BLOOD PRESSURE: 78 MMHG | RESPIRATION RATE: 20 BRPM

## 2018-08-10 DIAGNOSIS — Z12.11 SCREENING FOR MALIGNANT NEOPLASM OF COLON: ICD-10-CM

## 2018-08-10 DIAGNOSIS — F51.01 PRIMARY INSOMNIA: ICD-10-CM

## 2018-08-10 LAB — POCT GLUCOSE: 147 MG/DL (ref 70–110)

## 2018-08-10 PROCEDURE — 37000008 HC ANESTHESIA 1ST 15 MINUTES: Performed by: INTERNAL MEDICINE

## 2018-08-10 PROCEDURE — 88305 TISSUE EXAM BY PATHOLOGIST: CPT | Mod: 26,,, | Performed by: PATHOLOGY

## 2018-08-10 PROCEDURE — 45385 COLONOSCOPY W/LESION REMOVAL: CPT | Mod: PT,,, | Performed by: INTERNAL MEDICINE

## 2018-08-10 PROCEDURE — 37000009 HC ANESTHESIA EA ADD 15 MINS: Performed by: INTERNAL MEDICINE

## 2018-08-10 PROCEDURE — 88305 TISSUE EXAM BY PATHOLOGIST: CPT | Mod: 59 | Performed by: PATHOLOGY

## 2018-08-10 PROCEDURE — 45385 COLONOSCOPY W/LESION REMOVAL: CPT | Performed by: INTERNAL MEDICINE

## 2018-08-10 PROCEDURE — 25000003 PHARM REV CODE 250: Performed by: INTERNAL MEDICINE

## 2018-08-10 PROCEDURE — 82962 GLUCOSE BLOOD TEST: CPT | Performed by: INTERNAL MEDICINE

## 2018-08-10 PROCEDURE — 63600175 PHARM REV CODE 636 W HCPCS: Performed by: NURSE ANESTHETIST, CERTIFIED REGISTERED

## 2018-08-10 PROCEDURE — 27201089 HC SNARE, DISP (ANY): Performed by: INTERNAL MEDICINE

## 2018-08-10 RX ORDER — PROPOFOL 10 MG/ML
INJECTION, EMULSION INTRAVENOUS CONTINUOUS PRN
Status: DISCONTINUED | OUTPATIENT
Start: 2018-08-10 | End: 2018-08-10

## 2018-08-10 RX ORDER — ZOLPIDEM TARTRATE 5 MG/1
TABLET ORAL
Qty: 30 TABLET | Refills: 0 | Status: SHIPPED | OUTPATIENT
Start: 2018-08-10 | End: 2018-08-11 | Stop reason: SDUPTHER

## 2018-08-10 RX ORDER — PROPOFOL 10 MG/ML
INJECTION, EMULSION INTRAVENOUS
Status: DISCONTINUED | OUTPATIENT
Start: 2018-08-10 | End: 2018-08-10

## 2018-08-10 RX ORDER — LIDOCAINE HCL/PF 100 MG/5ML
SYRINGE (ML) INTRAVENOUS
Status: DISCONTINUED | OUTPATIENT
Start: 2018-08-10 | End: 2018-08-10

## 2018-08-10 RX ORDER — SODIUM CHLORIDE 9 MG/ML
INJECTION, SOLUTION INTRAVENOUS CONTINUOUS
Status: DISCONTINUED | OUTPATIENT
Start: 2018-08-10 | End: 2018-08-10 | Stop reason: HOSPADM

## 2018-08-10 RX ORDER — SODIUM CHLORIDE 0.9 % (FLUSH) 0.9 %
3 SYRINGE (ML) INJECTION
Status: DISCONTINUED | OUTPATIENT
Start: 2018-08-10 | End: 2018-08-10 | Stop reason: HOSPADM

## 2018-08-10 RX ADMIN — PROPOFOL 100 MG: 10 INJECTION, EMULSION INTRAVENOUS at 11:08

## 2018-08-10 RX ADMIN — LIDOCAINE HYDROCHLORIDE 100 MG: 20 INJECTION, SOLUTION INTRAVENOUS at 11:08

## 2018-08-10 RX ADMIN — SODIUM CHLORIDE: 0.9 INJECTION, SOLUTION INTRAVENOUS at 11:08

## 2018-08-10 RX ADMIN — PROPOFOL 100 MCG/KG/MIN: 10 INJECTION, EMULSION INTRAVENOUS at 11:08

## 2018-08-10 NOTE — PLAN OF CARE
Admission process complete. Patient ready for procedure. Plan of care reviewed with patient. Preoperative fasting appropriate for procedure. Call light in reach and bed in lowest position.

## 2018-08-10 NOTE — ANESTHESIA PREPROCEDURE EVALUATION
08/10/2018  Clifton Wilson is a 67 y.o., male having lower endo for eval GI symptoms.   PMH - DM, HTN, ischemic cardiomyopathy / AICD / CAD / S/P  NSTEMI  12/11 with stents x 7.    Anesthesia Evaluation    I have reviewed the Patient Summary Reports.    I have reviewed the Nursing Notes.   I have reviewed the Medications.     Review of Systems  Anesthesia Hx:  No problems with previous Anesthesia   Denies Personal Hx of Anesthesia complications.   Social:  Non-Smoker    Cardiovascular:   Hypertension Past MI CAD   CHF    Endocrine:   Diabetes        Physical Exam  General:  Well nourished    Airway/Jaw/Neck:  Airway Findings: Mouth Opening: Normal Tongue: Normal  General Airway Assessment: Adult  Mallampati: III  Improves to II with phonation.  TM Distance: Normal, at least 6 cm  Jaw/Neck Findings:  Neck ROM: Normal ROM       Chest/Lungs:  Chest/Lungs Findings: Clear to auscultation, Normal Respiratory Rate     Heart/Vascular:  Heart Findings: Rhythm: Regular Rhythm  Sounds: Normal        Mental Status:  Mental Status Findings:  Alert and Oriented     EF  35 - 40%   4/2018    Anesthesia Plan  Type of Anesthesia, risks & benefits discussed:  Anesthesia Type:  MAC  Patient's Preference:   Intra-op Monitoring Plan:   Intra-op Monitoring Plan Comments:   Post Op Pain Control Plan:   Post Op Pain Control Plan Comments:   Induction:   IV  Beta Blocker:  Patient is on a Beta-Blocker and has received one dose within the past 24 hours (No further documentation required).       Informed Consent: Patient understands risks and agrees with Anesthesia plan.  Questions answered. Anesthesia consent signed with patient.  ASA Score: 3     Day of Surgery Review of History & Physical: I have interviewed and examined the patient. I have reviewed the patient's H&P dated:            Ready For Surgery From Anesthesia Perspective.

## 2018-08-10 NOTE — [employer] PATIENT INSTRUCTIONS
Discharge Summary/Instructions after an Endoscopic Procedure  Patient Name: Clifton Wilson  Patient MRN: [id number]  Patient YOB: 1950  Friday, August 10, 2018  Valentine Burger MD  RESTRICTIONS:  During your procedure today, you received medications for sedation.  These   medications may affect your judgment, balance and coordination.  Therefore,   for 24 hours, you have the following restrictions:   - DO NOT drive a car, operate machinery, make legal/financial decisions,   sign important papers or drink alcohol.    ACTIVITY:  Today: no heavy lifting, straining or running due to procedural   sedation/anesthesia.  The following day: return to full activity including work.  DIET:  Eat and drink normally unless instructed otherwise.     TREATMENT FOR COMMON SIDE EFFECTS:  - Mild abdominal pain, nausea, belching, bloating or excessive gas:  rest,   eat lightly and use a heating pad.  - Sore Throat: treat with throat lozenges and/or gargle with warm salt   water.  - Because air was used during the procedure, expelling large amounts of air   from your rectum or belching is normal.  - If a bowel prep was taken, you may not have a bowel movement for 1-3 days.    This is normal.  SYMPTOMS TO WATCH FOR AND REPORT TO YOUR PHYSICIAN:  1. Abdominal pain or bloating, other than gas cramps.  2. Chest pain.  3. Back pain.  4. Signs of infection such as: chills or fever occurring within 24 hours   after the procedure.  5. Rectal bleeding, which would show as bright red, maroon, or black stools.   (A tablespoon of blood from the rectum is not serious, especially if   hemorrhoids are present.)  6. Vomiting.  7. Weakness or dizziness.  GO DIRECTLY TO THE NEAREST EMERGENCY ROOM IF YOU HAVE ANY OF THE FOLLOWING:      Difficulty breathing              Chills and/or fever over 101 F   Persistent vomiting and/or vomiting blood   Severe abdominal pain   Severe chest pain   Black, tarry stools   Bleeding- more than one  tablespoon   Any other symptom or condition that you feel may need urgent attention  Your doctor recommends these additional instructions:  If any biopsies were taken, your doctors clinic will contact you in 1 to 2   weeks with any results.  - Repeat colonoscopy date to be determined after pending pathology results   are reviewed for surveillance.   - Patient has a contact number available for emergencies.  The signs and   symptoms of potential delayed complications were discussed with the   patient.  Return to normal activities tomorrow.  Written discharge   instructions were provided to the patient.   - Resume previous diet.   - Continue present medications.   - Await pathology results.   - Discharge patient to home (via wheelchair).  For questions, problems or results please call your physician - Valentien Burger MD at Work:  [phone].  EMERGENCY PHONE NUMBER: [phone],  LAB RESULTS: [phone]  IF A COMPLICATION OR EMERGENCY SITUATION ARISES AND YOU ARE UNABLE TO REACH   YOUR PHYSICIAN - GO DIRECTLY TO THE EMERGENCY ROOM.  Valentine Burger MD  8/10/2018 12:16:17 PM  This report has been verified and signed electronically.  [employer]

## 2018-08-10 NOTE — ANESTHESIA POSTPROCEDURE EVALUATION
"Anesthesia Post Evaluation    Patient: Clifton Wilson    Procedure(s) Performed: Procedure(s) (LRB):  COLONOSCOPY golytely (N/A)    Final Anesthesia Type: MAC  Patient location during evaluation: GI PACU  Patient participation: Yes- Able to Participate  Level of consciousness: awake and alert and oriented  Post-procedure vital signs: reviewed and stable  Pain management: adequate  Airway patency: patent  PONV status at discharge: No PONV  Anesthetic complications: no      Cardiovascular status: blood pressure returned to baseline and hemodynamically stable  Respiratory status: unassisted, spontaneous ventilation and room air  Hydration status: euvolemic  Follow-up not needed.        Visit Vitals  BP (!) 156/84   Pulse 68   Temp 36.2 °C (97.2 °F)   Resp 18   Ht 5' 11" (1.803 m)   Wt 111.1 kg (245 lb)   SpO2 98%   BMI 34.17 kg/m²       Pain/Yumiko Score: Pain Assessment Performed: Yes (8/10/2018 11:18 AM)  Presence of Pain: denies (8/10/2018 11:18 AM)      "

## 2018-08-10 NOTE — H&P (VIEW-ONLY)
Subjective:       Patient ID: Clifton Wilson is a 67 y.o. male.    Chief Complaint: Colonoscopy    HPI  Presents to discuss screening colonoscopy.  He has never had colonoscopy.  No family history of colon cancer or colon polyps.   He is on plavix and when I last saw him was not yet one year out from heart stents.   He reports he has now been cleared to hold plavix for colonoscopy.   He denies change in bowel habit, blood with bowel movements or black stools.   Denies abdominal pain, nausea, vomiting or reflux.  Reports episodic constipation and at times diarrhea.    Review of Systems   Constitutional: Negative.  Negative for activity change, appetite change, fatigue, fever and unexpected weight change.   Respiratory: Negative for shortness of breath.    Cardiovascular: Negative for chest pain.   Gastrointestinal: Positive for constipation and diarrhea. Negative for abdominal pain, blood in stool and nausea.   Skin: Negative.    Neurological: Negative.    Psychiatric/Behavioral: Negative.        Objective:      Physical Exam   Constitutional: He is oriented to person, place, and time. He appears well-developed and well-nourished. No distress.   Pulmonary/Chest: Effort normal. No respiratory distress.   Abdominal: Soft. He exhibits no distension.   Neurological: He is alert and oriented to person, place, and time.   Skin: Skin is warm and dry. He is not diaphoretic.   Psychiatric: He has a normal mood and affect. His behavior is normal. Judgment and thought content normal.   Vitals reviewed.      Assessment:       1. Alternating constipation and diarrhea    2. Screening for colorectal cancer    3. Current use of long term anticoagulation        Plan:         Clifton was seen today for colonoscopy.    Diagnoses and all orders for this visit:    Alternating constipation and diarrhea    Screening for colorectal cancer    Current use of long term anticoagulation    Other orders  -     polyethylene glycol  (GOLYTELY,NULYTELY) 236-22.74-6.74 -5.86 gram suspension; Take 4,000 mLs (4 L total) by mouth As instructed.  -     Case request GI: COLONOSCOPY    -   High fiber diet  -   Fiber supplement  -   [place] fluid intake   -   Exercise

## 2018-08-10 NOTE — TRANSFER OF CARE
"Anesthesia Transfer of Care Note    Patient: Clifton Wilson    Procedure(s) Performed: Procedure(s) (LRB):  COLONOSCOPY golytely (N/A)    Patient location: GI    Anesthesia Type: MAC    Transport from OR: Transported from OR on room air with adequate spontaneous ventilation    Post pain: adequate analgesia    Post assessment: no apparent anesthetic complications    Post vital signs: stable    Level of consciousness: awake    Nausea/Vomiting: no nausea/vomiting    Complications: none    Transfer of care protocol was followed      Last vitals:   Visit Vitals  BP (!) 156/84   Pulse 68   Temp 36.2 °C (97.2 °F)   Resp 18   Ht 5' 11" (1.803 m)   Wt 111.1 kg (245 lb)   SpO2 98%   BMI 34.17 kg/m²     "

## 2018-08-11 DIAGNOSIS — F51.01 PRIMARY INSOMNIA: ICD-10-CM

## 2018-08-13 RX ORDER — ZOLPIDEM TARTRATE 5 MG/1
TABLET ORAL
Qty: 90 TABLET | Refills: 0 | Status: SHIPPED | OUTPATIENT
Start: 2018-08-13 | End: 2018-08-16 | Stop reason: SDUPTHER

## 2018-08-16 DIAGNOSIS — G62.9 NEUROPATHY: ICD-10-CM

## 2018-08-16 DIAGNOSIS — Z79.4 TYPE 2 DIABETES MELLITUS WITHOUT COMPLICATION, WITH LONG-TERM CURRENT USE OF INSULIN: ICD-10-CM

## 2018-08-16 DIAGNOSIS — E11.40 TYPE 2 DIABETES MELLITUS WITH DIABETIC NEUROPATHY: ICD-10-CM

## 2018-08-16 DIAGNOSIS — F51.01 PRIMARY INSOMNIA: ICD-10-CM

## 2018-08-16 DIAGNOSIS — E11.9 TYPE 2 DIABETES MELLITUS WITHOUT COMPLICATION, WITH LONG-TERM CURRENT USE OF INSULIN: ICD-10-CM

## 2018-08-16 RX ORDER — ZOLPIDEM TARTRATE 5 MG/1
TABLET ORAL
Qty: 90 TABLET | Refills: 0 | Status: SHIPPED | OUTPATIENT
Start: 2018-08-16 | End: 2018-11-14 | Stop reason: SDUPTHER

## 2018-08-17 ENCOUNTER — OFFICE VISIT (OUTPATIENT)
Dept: ORTHOPEDICS | Facility: CLINIC | Age: 68
End: 2018-08-17
Payer: MEDICARE

## 2018-08-17 VITALS — BODY MASS INDEX: 34.3 KG/M2 | WEIGHT: 245 LBS | HEIGHT: 71 IN

## 2018-08-17 DIAGNOSIS — M65.9 SYNOVITIS OF KNEE: Primary | ICD-10-CM

## 2018-08-17 PROCEDURE — 99999 PR PBB SHADOW E&M-EST. PATIENT-LVL II: CPT | Mod: PBBFAC,,, | Performed by: ORTHOPAEDIC SURGERY

## 2018-08-17 PROCEDURE — 99212 OFFICE O/P EST SF 10 MIN: CPT | Mod: S$GLB,,, | Performed by: ORTHOPAEDIC SURGERY

## 2018-08-17 RX ORDER — METFORMIN HYDROCHLORIDE 1000 MG/1
TABLET ORAL
Qty: 180 TABLET | Refills: 1 | Status: SHIPPED | OUTPATIENT
Start: 2018-08-17 | End: 2018-11-14 | Stop reason: SDUPTHER

## 2018-08-17 RX ORDER — GLIPIZIDE 10 MG/1
TABLET ORAL
Qty: 180 TABLET | Refills: 1 | Status: SHIPPED | OUTPATIENT
Start: 2018-08-17 | End: 2018-12-20

## 2018-08-17 RX ORDER — GABAPENTIN 300 MG/1
CAPSULE ORAL
Qty: 360 CAPSULE | Refills: 1 | Status: SHIPPED | OUTPATIENT
Start: 2018-08-17 | End: 2018-10-24 | Stop reason: SDUPTHER

## 2018-08-17 NOTE — PROGRESS NOTES
Subjective:      Patient ID: Clifton Wilson is a 67 y.o. male.    Chief Complaint: Follow-up of the Left Knee    HPI  Follow-up for synovitis.  Patient was aspirated last visit.  He is taking colchicine now.  He feels much better.  Review of Systems   Constitution: Negative for fever and weight loss.   HENT: Negative for congestion.    Eyes: Negative for visual disturbance.   Cardiovascular: Negative for chest pain.   Respiratory: Negative for shortness of breath.    Hematologic/Lymphatic: Negative for bleeding problem. Does not bruise/bleed easily.   Skin: Negative for poor wound healing.   Gastrointestinal: Negative for abdominal pain.   Genitourinary: Negative for dysuria.   Neurological: Negative for seizures.   Psychiatric/Behavioral: Negative for altered mental status.   Allergic/Immunologic: Negative for persistent infections.         Objective:            Ortho/SPM Exam  Left knee    [unfilled]    The patient is not in acute distress.   Body habitus is obese.   The patient walks without a limp.  Resisted SLR negative.   The skin over the knee is intact.  Knee effusion trace  Tendernes is located:  Abscess  Range of motion- Flexion full deg, Extension 0 deg,   Ligament exam:   MCL intact   Lachman intact              Post sag intact    LCL intact  Patellar apprehension negative.  Popliteal cyst negative  Patellar crepitation absent.  Flexion/pinch negative.  Pulses DP present, PT present.  Motor normal 5/5 strength in all tested muscle groups.   Sensory normal.            Assessment:       Encounter Diagnosis   Name Primary?    Synovitis of knee Yes        the condition is controlled with present management  Plan:       Clifton was seen today for follow-up.    Diagnoses and all orders for this visit:    Synovitis of knee        I explained my diagnostic impression and the reasoning behind it in detail, using layman's terms.  Models and/or pictures were used to help in the explanation.    Considering the  clinical course and history of prior gout I recommend the patient's down colchicine permanently for suppression.  I advised the patient to discuss this with his primary care physician.

## 2018-08-22 ENCOUNTER — TELEPHONE (OUTPATIENT)
Dept: GASTROENTEROLOGY | Facility: CLINIC | Age: 68
End: 2018-08-22

## 2018-08-22 ENCOUNTER — TELEPHONE (OUTPATIENT)
Dept: ELECTROPHYSIOLOGY | Facility: CLINIC | Age: 68
End: 2018-08-22

## 2018-08-22 ENCOUNTER — PATIENT MESSAGE (OUTPATIENT)
Dept: GASTROENTEROLOGY | Facility: CLINIC | Age: 68
End: 2018-08-22

## 2018-08-22 NOTE — TELEPHONE ENCOUNTER
----- Message from Kate Smith MA sent at 7/12/2018  5:27 PM CDT -----  Received: 5 days ago   Message Contents   Evangelista Smith MA         Above patient is due for his/her annual clinic appointment along with an in clinic Device Check, (10/05/18)  Please attempt to coordinate all on the same day.  If unable to coordinate all on the same day, please schedule after the currently scheduled date.     A recall was entered for this patient.       Thanks,   Evangelista

## 2018-08-22 NOTE — TELEPHONE ENCOUNTER
----- Message from Valentine Burger MD sent at 8/22/2018  7:22 AM CDT -----  Benign polyps, 10 year recall colonoscopy. He can f/u in clinic if persistent GI symptoms

## 2018-08-22 NOTE — TELEPHONE ENCOUNTER
Returning a call. Left message on patient voicemail .  Appointments have been scheduled and mailed to patient.

## 2018-09-19 ENCOUNTER — LAB VISIT (OUTPATIENT)
Dept: LAB | Facility: HOSPITAL | Age: 68
End: 2018-09-19
Attending: FAMILY MEDICINE
Payer: MEDICARE

## 2018-09-19 DIAGNOSIS — E11.40 TYPE 2 DIABETES MELLITUS WITH DIABETIC NEUROPATHY, WITH LONG-TERM CURRENT USE OF INSULIN: ICD-10-CM

## 2018-09-19 DIAGNOSIS — Z79.4 TYPE 2 DIABETES MELLITUS WITH DIABETIC NEUROPATHY, WITH LONG-TERM CURRENT USE OF INSULIN: ICD-10-CM

## 2018-09-19 DIAGNOSIS — I25.10 CORONARY ARTERY DISEASE INVOLVING NATIVE CORONARY ARTERY OF NATIVE HEART WITHOUT ANGINA PECTORIS: ICD-10-CM

## 2018-09-19 DIAGNOSIS — E11.9 DIABETES MELLITUS TYPE 2 WITHOUT RETINOPATHY: ICD-10-CM

## 2018-09-19 LAB
ALBUMIN SERPL BCP-MCNC: 3.9 G/DL
ALP SERPL-CCNC: 65 U/L
ALT SERPL W/O P-5'-P-CCNC: 42 U/L
ANION GAP SERPL CALC-SCNC: 10 MMOL/L
AST SERPL-CCNC: 20 U/L
BILIRUB SERPL-MCNC: 1.4 MG/DL
BUN SERPL-MCNC: 21 MG/DL
CALCIUM SERPL-MCNC: 9.3 MG/DL
CHLORIDE SERPL-SCNC: 104 MMOL/L
CHOLEST SERPL-MCNC: 139 MG/DL
CHOLEST/HDLC SERPL: 4.8 {RATIO}
CO2 SERPL-SCNC: 27 MMOL/L
CREAT SERPL-MCNC: 0.9 MG/DL
EST. GFR  (AFRICAN AMERICAN): >60 ML/MIN/1.73 M^2
EST. GFR  (NON AFRICAN AMERICAN): >60 ML/MIN/1.73 M^2
ESTIMATED AVG GLUCOSE: 154 MG/DL
GLUCOSE SERPL-MCNC: 153 MG/DL
HBA1C MFR BLD HPLC: 7 %
HDLC SERPL-MCNC: 29 MG/DL
HDLC SERPL: 20.9 %
LDLC SERPL CALC-MCNC: 62.6 MG/DL
NONHDLC SERPL-MCNC: 110 MG/DL
POTASSIUM SERPL-SCNC: 4.1 MMOL/L
PROT SERPL-MCNC: 7.2 G/DL
SODIUM SERPL-SCNC: 141 MMOL/L
TRIGL SERPL-MCNC: 237 MG/DL

## 2018-09-19 PROCEDURE — 80053 COMPREHEN METABOLIC PANEL: CPT

## 2018-09-19 PROCEDURE — 36415 COLL VENOUS BLD VENIPUNCTURE: CPT

## 2018-09-19 PROCEDURE — 83036 HEMOGLOBIN GLYCOSYLATED A1C: CPT

## 2018-09-19 PROCEDURE — 80061 LIPID PANEL: CPT

## 2018-09-21 ENCOUNTER — PATIENT MESSAGE (OUTPATIENT)
Dept: ORTHOPEDICS | Facility: CLINIC | Age: 68
End: 2018-09-21

## 2018-09-21 ENCOUNTER — PATIENT MESSAGE (OUTPATIENT)
Dept: FAMILY MEDICINE | Facility: CLINIC | Age: 68
End: 2018-09-21

## 2018-09-21 DIAGNOSIS — M65.9 SYNOVITIS OF KNEE: ICD-10-CM

## 2018-09-21 RX ORDER — COLCHICINE 0.6 MG/1
0.6 TABLET ORAL DAILY
Qty: 30 TABLET | Refills: 1 | Status: CANCELLED | OUTPATIENT
Start: 2018-09-21 | End: 2019-09-21

## 2018-09-22 RX ORDER — COLCHICINE 0.6 MG/1
0.6 TABLET ORAL DAILY
Qty: 30 TABLET | Refills: 1 | Status: SHIPPED | OUTPATIENT
Start: 2018-09-22 | End: 2018-10-12 | Stop reason: SDUPTHER

## 2018-09-22 NOTE — TELEPHONE ENCOUNTER
----- Message from Guerda Frazier sent at 9/21/2018  5:09 PM CDT -----  Contact: [phone]/ self   Patient would like refill of the following medication sent to [facility] - [place], [place] - [address]. Please advise.     1. colchicine 0.6 mg tablet

## 2018-09-24 ENCOUNTER — OFFICE VISIT (OUTPATIENT)
Dept: FAMILY MEDICINE | Facility: CLINIC | Age: 68
End: 2018-09-24
Attending: FAMILY MEDICINE
Payer: MEDICARE

## 2018-09-24 VITALS
BODY MASS INDEX: 35.18 KG/M2 | HEART RATE: 74 BPM | SYSTOLIC BLOOD PRESSURE: 123 MMHG | DIASTOLIC BLOOD PRESSURE: 78 MMHG | HEIGHT: 71 IN | OXYGEN SATURATION: 98 % | WEIGHT: 251.31 LBS

## 2018-09-24 DIAGNOSIS — I25.5 CARDIOMYOPATHY, ISCHEMIC: ICD-10-CM

## 2018-09-24 DIAGNOSIS — I15.2 HYPERTENSION ASSOCIATED WITH DIABETES: ICD-10-CM

## 2018-09-24 DIAGNOSIS — Z23 IMMUNIZATION DUE: ICD-10-CM

## 2018-09-24 DIAGNOSIS — M1A.0690 IDIOPATHIC CHRONIC GOUT OF KNEE WITHOUT TOPHUS, UNSPECIFIED LATERALITY: ICD-10-CM

## 2018-09-24 DIAGNOSIS — Z79.4 TYPE 2 DIABETES MELLITUS WITH DIABETIC NEUROPATHY, WITH LONG-TERM CURRENT USE OF INSULIN: Primary | ICD-10-CM

## 2018-09-24 DIAGNOSIS — I50.42 CHRONIC COMBINED SYSTOLIC AND DIASTOLIC CHF (CONGESTIVE HEART FAILURE): ICD-10-CM

## 2018-09-24 DIAGNOSIS — E11.59 HYPERTENSION ASSOCIATED WITH DIABETES: ICD-10-CM

## 2018-09-24 DIAGNOSIS — E11.69 DYSLIPIDEMIA ASSOCIATED WITH TYPE 2 DIABETES MELLITUS: ICD-10-CM

## 2018-09-24 DIAGNOSIS — E11.9 DIABETES MELLITUS TYPE 2 WITHOUT RETINOPATHY: ICD-10-CM

## 2018-09-24 DIAGNOSIS — I25.118 CORONARY ARTERY DISEASE OF NATIVE ARTERY OF NATIVE HEART WITH STABLE ANGINA PECTORIS: ICD-10-CM

## 2018-09-24 DIAGNOSIS — E11.9 DM TYPE 2 WITHOUT RETINOPATHY: ICD-10-CM

## 2018-09-24 DIAGNOSIS — E66.01 MORBID OBESITY: ICD-10-CM

## 2018-09-24 DIAGNOSIS — E78.5 DYSLIPIDEMIA ASSOCIATED WITH TYPE 2 DIABETES MELLITUS: ICD-10-CM

## 2018-09-24 DIAGNOSIS — E11.40 TYPE 2 DIABETES MELLITUS WITH DIABETIC NEUROPATHY, WITH LONG-TERM CURRENT USE OF INSULIN: Primary | ICD-10-CM

## 2018-09-24 PROCEDURE — 99999 PR PBB SHADOW E&M-EST. PATIENT-LVL III: CPT | Mod: PBBFAC,,, | Performed by: FAMILY MEDICINE

## 2018-09-24 PROCEDURE — 3074F SYST BP LT 130 MM HG: CPT | Mod: CPTII,,, | Performed by: FAMILY MEDICINE

## 2018-09-24 PROCEDURE — 99214 OFFICE O/P EST MOD 30 MIN: CPT | Mod: 25,S$PBB,, | Performed by: FAMILY MEDICINE

## 2018-09-24 PROCEDURE — 3045F PR MOST RECENT HEMOGLOBIN A1C LEVEL 7.0-9.0%: CPT | Mod: CPTII,,, | Performed by: FAMILY MEDICINE

## 2018-09-24 PROCEDURE — 90662 IIV NO PRSV INCREASED AG IM: CPT | Mod: PBBFAC,PO

## 2018-09-24 PROCEDURE — 1101F PT FALLS ASSESS-DOCD LE1/YR: CPT | Mod: CPTII,,, | Performed by: FAMILY MEDICINE

## 2018-09-24 PROCEDURE — 3078F DIAST BP <80 MM HG: CPT | Mod: CPTII,,, | Performed by: FAMILY MEDICINE

## 2018-09-24 PROCEDURE — 99213 OFFICE O/P EST LOW 20 MIN: CPT | Mod: PBBFAC,PO,25 | Performed by: FAMILY MEDICINE

## 2018-09-24 NOTE — PROGRESS NOTES
Subjective:       Patient ID: Clifotn Wilson is a 67 y.o. male.    Chief Complaint: Follow-up (Gout)    67 yr old pleasant white male with DM II, HTN, HLD, CAD, Combined chronic CHF, MR, obesity, neuropathy, presents today for diabetes. No new complaints today.      DM II - improving - A1C                   7.0 (H)             09/19/2018                                    - on metformin and insulin and sugars improving - - due for eye exam - foot exam UTD - on ASA and plavix. Losartan      HTN - chronic - controlled - on losartan, lasix - compliant - no side effects      HLD - chronic -   LDLCALC                  62.6 (L)            09/19/2018                                   - controlled -       CAD/combined CHF - EF 35-40% - on external defibrillator - medical management - on ASA/plavix/ARB - no symptoms - following cardiology    Gout - improving - uses colchicine for flare up -     History as below - reviewed              Swelling   This is a chronic problem. The current episode started more than 1 month ago. The problem occurs intermittently. The problem has been gradually worsening. Associated symptoms include arthralgias and joint swelling. Pertinent negatives include no chest pain, congestion, coughing, diaphoresis, myalgias, neck pain, rash, vomiting or weakness.   Diabetes   He presents for his follow-up diabetic visit. He has type 2 diabetes mellitus. His disease course has been worsening. Pertinent negatives for hypoglycemia include no dizziness, nervousness/anxiousness or speech difficulty. Pertinent negatives for diabetes include no chest pain, no polydipsia, no polyuria and no weakness. Pertinent negatives for hypoglycemia complications include no blackouts, no hospitalization, no nocturnal hypoglycemia, no required assistance and no required glucagon injection. Symptoms are stable. There are no diabetic complications. Pertinent negatives for diabetic complications include no autonomic neuropathy,  CVA, impotence, peripheral neuropathy, PVD or retinopathy. Risk factors for coronary artery disease include diabetes mellitus, dyslipidemia, hypertension and obesity. Current diabetic treatment includes oral agent (monotherapy). He is compliant with treatment all of the time. He is following a diabetic diet. Meal planning includes avoidance of concentrated sweets. He rarely participates in exercise. An ACE inhibitor/angiotensin II receptor blocker is being taken. He does not see a podiatrist.Eye exam is not current.   Hypertension   This is a chronic problem. The current episode started more than 1 year ago. The problem has been gradually improving since onset. The problem is controlled. Pertinent negatives include no chest pain, malaise/fatigue, neck pain, palpitations, peripheral edema or PND. Risk factors for coronary artery disease include diabetes mellitus, dyslipidemia, male gender and obesity. Past treatments include angiotensin blockers and diuretics. The current treatment provides significant improvement. There are no compliance problems.  Hypertensive end-organ damage includes CAD/MI and heart failure. There is no history of CVA, left ventricular hypertrophy, PVD or retinopathy. There is no history of chronic renal disease, hypercortisolism, hyperparathyroidism, pheochromocytoma, renovascular disease or a thyroid problem.   Hyperlipidemia   This is a chronic problem. The current episode started more than 1 year ago. The problem is controlled. Recent lipid tests were reviewed and are normal. Exacerbating diseases include obesity. He has no history of chronic renal disease or diabetes. There are no known factors aggravating his hyperlipidemia. Pertinent negatives include no chest pain, focal sensory loss or myalgias. Current antihyperlipidemic treatment includes statins. The current treatment provides moderate improvement of lipids. There are no compliance problems.  Risk factors for coronary artery disease  include diabetes mellitus, dyslipidemia, male sex, hypertension and obesity.     Review of Systems   Constitutional: Negative.  Negative for activity change, diaphoresis, malaise/fatigue and unexpected weight change.   HENT: Negative.  Negative for congestion, ear pain, mouth sores, rhinorrhea and voice change.    Eyes: Negative.  Negative for pain, discharge and visual disturbance.   Respiratory: Negative.  Negative for apnea, cough and wheezing.    Cardiovascular: Negative.  Negative for chest pain, palpitations and PND.   Gastrointestinal: Negative.  Negative for abdominal distention, anal bleeding, diarrhea and vomiting.   Endocrine: Negative.  Negative for cold intolerance, polydipsia and polyuria.   Genitourinary: Negative.  Negative for decreased urine volume, difficulty urinating, discharge, frequency, impotence and scrotal swelling.   Musculoskeletal: Positive for arthralgias and joint swelling. Negative for back pain, myalgias, neck pain and neck stiffness.   Skin: Negative.  Negative for color change and rash.   Allergic/Immunologic: Negative.  Negative for environmental allergies and immunocompromised state.   Neurological: Negative.  Negative for dizziness, speech difficulty, weakness and light-headedness.   Hematological: Negative.    Psychiatric/Behavioral: Negative.  Negative for agitation, dysphoric mood and suicidal ideas. The patient is not nervous/anxious.        PMH/PSH/FH/SH/MED/ALLERGY reviewed    Objective:       Vitals:    09/24/18 1301   BP: 123/78   Pulse: 74       Physical Exam   Constitutional: He is oriented to person, place, and time. He appears well-developed and well-nourished.   HENT:   Head: Normocephalic and atraumatic.   Right Ear: External ear normal.   Left Ear: External ear normal.   Nose: Nose normal.   Mouth/Throat: Oropharynx is clear and moist. No oropharyngeal exudate.   Eyes: Conjunctivae and EOM are normal. Pupils are equal, round, and reactive to light. Right eye  exhibits no discharge. Left eye exhibits no discharge. No scleral icterus.   Neck: Normal range of motion. Neck supple. No JVD present. No tracheal deviation present. No thyromegaly present.   Cardiovascular: Normal rate, regular rhythm, normal heart sounds and intact distal pulses. Exam reveals no gallop and no friction rub.   No murmur heard.  Pulmonary/Chest: Effort normal and breath sounds normal. No stridor. No respiratory distress. He has no wheezes. He has no rales. He exhibits no tenderness.   Abdominal: Soft. Bowel sounds are normal. He exhibits no distension and no mass. There is no tenderness. There is no rebound and no guarding. No hernia.   Musculoskeletal: Normal range of motion. He exhibits no edema or tenderness.   Lymphadenopathy:     He has no cervical adenopathy.   Neurological: He is alert and oriented to person, place, and time. He has normal reflexes. He displays normal reflexes. No cranial nerve deficit. He exhibits normal muscle tone. Coordination normal.   Skin: Skin is warm and dry. No rash noted. No erythema. No pallor.   Psychiatric: He has a normal mood and affect. His behavior is normal. Judgment and thought content normal.       Assessment:       1. Type 2 diabetes mellitus with diabetic neuropathy, with long-term current use of insulin    2. Diabetes mellitus type 2 without retinopathy    3. DM type 2 without retinopathy    4. Coronary artery disease of native artery of native heart with stable angina pectoris    5. Chronic combined systolic and diastolic CHF (congestive heart failure)    6. Cardiomyopathy, ischemic    7. Morbid obesity    8. Dyslipidemia associated with type 2 diabetes mellitus    9. Hypertension associated with diabetes    10. Immunization due    11. 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    Diagnoses and all orders for this visit:    Type 2 diabetes mellitus with diabetic  neuropathy, with long-term current use of insulin  -     CBC auto differential; Future  -     Comprehensive metabolic panel; Future  -     Hemoglobin A1c; Future    Diabetes mellitus type 2 without retinopathy  -     CBC auto differential; Future  -     Comprehensive metabolic panel; Future  -     Hemoglobin A1c; Future    DM type 2 without retinopathy  -     CBC auto differential; Future  -     Comprehensive metabolic panel; Future  -     Hemoglobin A1c; Future    Coronary artery disease of native artery of native heart with stable angina pectoris  -     CBC auto differential; Future  -     Comprehensive metabolic panel; Future    Chronic combined systolic and diastolic CHF (congestive heart failure)    Cardiomyopathy, ischemic    Morbid obesity    Dyslipidemia associated with type 2 diabetes mellitus  -     CBC auto differential; Future  -     Comprehensive metabolic panel; Future    Hypertension associated with diabetes  -     CBC auto differential; Future  -     Comprehensive metabolic panel; Future    Immunization due  -     Influenza - High Dose (65+) (PF) (IM)    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, unspecified laterality  -     Uric acid; Future        DM II controlled/hyperglycemia  -reports improving since started insulin  -lantus and novolog to continue  -strict diet control    HTN  -controlled    HLD  -controlled    Combined CHF  -follows cardiology  -on external defibrillator    Neuropathy  -continue neurontin and increase dose to 600 mg BID    Obesity  -diet and exercise as tolerated    chronic gout  -uric acid levels  -conchicine as needed    Spent adequate time in obtaining history and explaining differentials    40 minutes spent during this visit of which greater than 50% devoted to face-face counseling and coordination of care regarding diagnosis and management plan    Follow-up in about 3 months (around 12/24/2018), or if symptoms worsen or fail to improve.

## 2018-09-30 DIAGNOSIS — E11.40 TYPE 2 DIABETES MELLITUS WITH DIABETIC NEUROPATHY, WITH LONG-TERM CURRENT USE OF INSULIN: ICD-10-CM

## 2018-09-30 DIAGNOSIS — R73.9 HYPERGLYCEMIA: ICD-10-CM

## 2018-09-30 DIAGNOSIS — Z79.4 TYPE 2 DIABETES MELLITUS WITH DIABETIC NEUROPATHY, WITH LONG-TERM CURRENT USE OF INSULIN: ICD-10-CM

## 2018-10-01 RX ORDER — INSULIN ASPART 100 [IU]/ML
INJECTION, SOLUTION INTRAVENOUS; SUBCUTANEOUS
Qty: 45 ML | Refills: 10 | Status: SHIPPED | OUTPATIENT
Start: 2018-10-01 | End: 2018-12-20

## 2018-10-04 NOTE — PROGRESS NOTES
Mr. Wilson is a patient of Dr. Walsh and was last seen in clinic 9/19/2017.      Subjective:   Patient ID:  Clifton Wilson is a 67 y.o. male who presents for follow-up of Pacemaker Check  .     HPI:    Mr. Wilson is a 67 y.o. male with HTN, HLD, DM, CAD (NSTEMI 2011 and 2015), ICM (EF 35-40%), and SC ICD (2017) here for annual follow up.     Background:    In hospital post PCI, had NSVT. Was given a LifeVest.  Subsequent LVEF 39%, leading to EPS, which was negative (therefore no ICD then).  Since, has recently had months of persistently low LVEF.   Again referred by Dr Clement for ICD, due to LVEF 25% with ICM.  At his last office visit (04/10/17), Mr. Wilson reported experiencing occasional .   Echo 12/15: 35-40%. 4/16: 25-40%.  3/17 25%  2/16 nuclear ETT neg  Mr. Wilson underwent successful SC ICD (06/06/17) without complication.     Update (10/05/2018):    Today he feels well with no cardiac complaints. Mr. Wilson denies chest pain with exertion or at rest, palpitations, SOB, , dizziness, or syncope. He had one CHF exacerbation in April 2018 after a very high salt meal.     Device Interrogation (10/5/2018) reveals an intrinsic sinus rhythm with stable lead and device function. No ventricular arrhythmias or treated episodes were noted.  He paces 0%in the RV. Estimated battery longevity 8 years.     I have personally reviewed the patient's EKG today, which shows sinus rhythm at 76bpm. NJ interval is 160. QTc is 425.    Recent Cardiac Tests:    2D Echo (4/24/2018):  CONCLUSIONS     1 - Moderately depressed left ventricular systolic function (EF 35-40%).     2 - Impaired LV relaxation, normal LAP (grade 1 diastolic dysfunction).     3 - Normal right ventricular systolic function .     4 - The estimated PA systolic pressure is 8 mmHg.     Current Outpatient Medications   Medication Sig    alcohol swabs PadM Apply 1 each topically as needed.    aspirin (ECOTRIN) 81 MG EC tablet Take 81 mg by mouth once  "daily.    atorvastatin (LIPITOR) 40 MG tablet Take 1 tablet (40 mg total) by mouth once daily.    blood glucose control high,low (ACCU-CHEK CATHRYN CONTROL SOLN) Soln Use as directed to check blood sugar    blood sugar diagnostic (ACCU-CHEK CATHRYN PLUS TEST STRP) Strp 1 each by Misc.(Non-Drug; Combo Route) route 4 (four) times daily.    blood sugar diagnostic (CONTOUR TEST STRIPS) Strp 200 each by Misc.(Non-Drug; Combo Route) route 4 (four) times daily.    blood-glucose meter (ACCU-CHEK CATHRYN PLUS METER) Misc 1 Device by Misc.(Non-Drug; Combo Route) route 3 (three) times daily.    carvedilol (COREG) 12.5 MG tablet Take 1 tablet (12.5 mg total) by mouth 2 (two) times daily.    clopidogrel (PLAVIX) 75 mg tablet Take 1 tablet (75 mg total) by mouth once daily.    colchicine (COLCRYS) 0.6 mg tablet Take 1 tablet (0.6 mg total) by mouth once daily.    diclofenac sodium 1 % Gel Apply 2 g topically once daily.    furosemide (LASIX) 20 MG tablet Take 1 tablet (20 mg total) by mouth 2 (two) times daily.    gabapentin (NEURONTIN) 300 MG capsule TAKE 2 CAPSULES TWICE DAILY    glipiZIDE (GLUCOTROL) 10 MG tablet TAKE 1 TABLET TWICE DAILY WITH MEALS    lancets (ACCU-CHEK SOFTCLIX LANCETS) Misc 1 Device by Misc.(Non-Drug; Combo Route) route 3 (three) times daily.    losartan (COZAAR) 50 MG tablet Take 1 tablet (50 mg total) by mouth once daily.    metFORMIN (GLUCOPHAGE) 1000 MG tablet TAKE 1 TABLET TWICE DAILY WITH MEALS    nitroGLYCERIN (NITROSTAT) 0.4 MG SL tablet Place 1 tablet (0.4 mg total) under the tongue every 5 (five) minutes as needed for Chest pain.    NOVOLOG FLEXPEN U-100 INSULIN 100 unit/mL InPn pen INJECT  20 UNITS SUBCUTANEOUSLY TWICE DAILY BEFORE MEALS    pen needle, diabetic 31 gauge x 3/16" Ndle 1 each by Misc.(Non-Drug; Combo Route) route 4 (four) times daily.    pen needle, diabetic 32 gauge x 1/5" Ndle Use 4 times/day for insulin administration    phenylephrine (SUDAFED PE) 10 MG Tab     " "polyethylene glycol (GOLYTELY,NULYTELY) 236-22.74-6.74 -5.86 gram suspension Take 4,000 mLs (4 L total) by mouth As instructed.    tadalafil (CIALIS) 10 MG tablet Take 1 tablet (10 mg total) by mouth daily as needed for Erectile Dysfunction.    zolpidem (AMBIEN) 5 MG Tab TAKE 1 TABLET EVERY NIGHT AS NEEDED    insulin glargine (LANTUS SOLOSTAR) 100 unit/mL (3 mL) InPn pen Inject 20 Units into the skin every evening.     No current facility-administered medications for this visit.      Review of Systems   Constitution: Negative for malaise/fatigue.   Cardiovascular: Negative for chest pain, dyspnea on exertion, irregular heartbeat, leg swelling and palpitations.   Respiratory: Negative for shortness of breath.    Hematologic/Lymphatic: Negative for bleeding problem.   Skin: Negative for rash.   Musculoskeletal: Positive for gout. Negative for myalgias.   Gastrointestinal: Negative for hematemesis, hematochezia and nausea.   Genitourinary: Negative for hematuria.   Neurological: Negative for light-headedness.   Psychiatric/Behavioral: Negative for altered mental status.   Allergic/Immunologic: Negative for persistent infections.     Objective:        /79   Pulse 76   Ht 5' 11" (1.803 m)   Wt 117.9 kg (260 lb)   BMI 36.26 kg/m²     Physical Exam   Constitutional: He is oriented to person, place, and time. He appears well-developed and well-nourished.   HENT:   Head: Normocephalic.   Nose: Nose normal.   Eyes: Pupils are equal, round, and reactive to light.   Cardiovascular: Normal rate, regular rhythm, S1 normal and S2 normal.   No murmur heard.  Pulses:       Radial pulses are 2+ on the right side, and 2+ on the left side.   Pulmonary/Chest: Breath sounds normal. No respiratory distress.   Device to LUCW   Abdominal: Normal appearance.   Musculoskeletal: Normal range of motion. He exhibits no edema.   Neurological: He is alert and oriented to person, place, and time.   Skin: Skin is warm and dry. No " erythema.   Psychiatric: He has a normal mood and affect. His speech is normal and behavior is normal.   Nursing note and vitals reviewed.    Lab Results   Component Value Date     09/19/2018    K 4.1 09/19/2018    MG 1.9 11/06/2016    BUN 21 09/19/2018    CREATININE 0.9 09/19/2018    ALT 42 09/19/2018    AST 20 09/19/2018    HGB 13.0 (L) 04/23/2018    HCT 39.3 (L) 04/23/2018    TSH 0.861 12/04/2015    LDLCALC 62.6 (L) 09/19/2018       Recent Labs   Lab  02/23/16   0245  06/06/16   1007  11/06/16   1241  06/06/17   1044   INR  1.0  0.9  1.0  1.0       Assessment:     1. ICD (implantable cardioverter-defibrillator), single, in situ    2. Ventricular tachycardia, nonsustained post-MI    3. Ischemic cardiomyopathy    4. Essential hypertension    5. Obesity (BMI 35.0-39.9 without comorbidity)      Plan:     In summary, Mr. Wilson is a 67 y.o. male with HTN, HLD, DM, CAD (NSTEMI 2011 and 2015), ICM (EF 35-40%), and SC ICD (2017) here for annual follow up.   Mr. Wilson is doing well from a device perspective with stable lead and device function. No ventricular arrhythmia noted. 0% V pacing. EF now 35-40%.    Continue current medication regimen and device settings.   Follow up in device clinic as scheduled.   Follow up in EP clinic in 1 year, sooner as needed.     *A copy of this note has been sent to Dr. Walsh*    Follow-up in about 1 year (around 10/5/2019).    ------------------------------------------------------------------    OMID Edwards, NP-C  [facility]

## 2018-10-05 ENCOUNTER — OFFICE VISIT (OUTPATIENT)
Dept: ELECTROPHYSIOLOGY | Facility: CLINIC | Age: 68
End: 2018-10-05
Payer: MEDICARE

## 2018-10-05 ENCOUNTER — CLINICAL SUPPORT (OUTPATIENT)
Dept: ELECTROPHYSIOLOGY | Facility: CLINIC | Age: 68
End: 2018-10-05
Attending: INTERNAL MEDICINE
Payer: MEDICARE

## 2018-10-05 ENCOUNTER — HOSPITAL ENCOUNTER (OUTPATIENT)
Dept: CARDIOLOGY | Facility: CLINIC | Age: 68
Discharge: HOME | End: 2018-10-05
Payer: MEDICARE

## 2018-10-05 VITALS
SYSTOLIC BLOOD PRESSURE: 120 MMHG | BODY MASS INDEX: 36.4 KG/M2 | HEIGHT: 71 IN | WEIGHT: 260 LBS | HEART RATE: 76 BPM | DIASTOLIC BLOOD PRESSURE: 79 MMHG

## 2018-10-05 DIAGNOSIS — I49.9 CARDIAC ARRHYTHMIA, UNSPECIFIED CARDIAC ARRHYTHMIA TYPE: ICD-10-CM

## 2018-10-05 DIAGNOSIS — I25.5 ISCHEMIC CARDIOMYOPATHY: ICD-10-CM

## 2018-10-05 DIAGNOSIS — Z95.810 AICD (AUTOMATIC CARDIOVERTER/DEFIBRILLATOR) PRESENT: ICD-10-CM

## 2018-10-05 DIAGNOSIS — Z95.810 ICD (IMPLANTABLE CARDIOVERTER-DEFIBRILLATOR), SINGLE, IN SITU: Primary | ICD-10-CM

## 2018-10-05 DIAGNOSIS — I47.20 VENTRICULAR TACHYCARDIA: ICD-10-CM

## 2018-10-05 DIAGNOSIS — I10 ESSENTIAL HYPERTENSION: ICD-10-CM

## 2018-10-05 DIAGNOSIS — E66.9 OBESITY (BMI 35.0-39.9 WITHOUT COMORBIDITY): ICD-10-CM

## 2018-10-05 PROCEDURE — 93282 PRGRMG EVAL IMPLANTABLE DFB: CPT | Mod: PBBFAC | Performed by: INTERNAL MEDICINE

## 2018-10-05 PROCEDURE — 93005 ELECTROCARDIOGRAM TRACING: CPT | Mod: PBBFAC,59 | Performed by: INTERNAL MEDICINE

## 2018-10-05 PROCEDURE — 1101F PT FALLS ASSESS-DOCD LE1/YR: CPT | Mod: CPTII,,, | Performed by: NURSE PRACTITIONER

## 2018-10-05 PROCEDURE — 3074F SYST BP LT 130 MM HG: CPT | Mod: CPTII,,, | Performed by: NURSE PRACTITIONER

## 2018-10-05 PROCEDURE — 99999 PR PBB SHADOW E&M-EST. PATIENT-LVL III: CPT | Mod: PBBFAC,,, | Performed by: NURSE PRACTITIONER

## 2018-10-05 PROCEDURE — 99214 OFFICE O/P EST MOD 30 MIN: CPT | Mod: S$PBB,,, | Performed by: NURSE PRACTITIONER

## 2018-10-05 PROCEDURE — 99213 OFFICE O/P EST LOW 20 MIN: CPT | Mod: PBBFAC,25 | Performed by: NURSE PRACTITIONER

## 2018-10-05 PROCEDURE — 3078F DIAST BP <80 MM HG: CPT | Mod: CPTII,,, | Performed by: NURSE PRACTITIONER

## 2018-10-05 PROCEDURE — 93010 ELECTROCARDIOGRAM REPORT: CPT | Mod: S$PBB,,, | Performed by: INTERNAL MEDICINE

## 2018-10-09 DIAGNOSIS — I49.9 CARDIAC ARRHYTHMIA, UNSPECIFIED CARDIAC ARRHYTHMIA TYPE: Primary | ICD-10-CM

## 2018-10-12 ENCOUNTER — TELEPHONE (OUTPATIENT)
Dept: ORTHOPEDICS | Facility: CLINIC | Age: 68
End: 2018-10-12

## 2018-10-12 ENCOUNTER — PATIENT MESSAGE (OUTPATIENT)
Dept: CARDIOLOGY | Facility: CLINIC | Age: 68
End: 2018-10-12

## 2018-10-12 DIAGNOSIS — M65.9 SYNOVITIS OF KNEE: ICD-10-CM

## 2018-10-12 RX ORDER — COLCHICINE 0.6 MG/1
0.6 TABLET ORAL DAILY
Qty: 90 TABLET | Refills: 0 | Status: SHIPPED | OUTPATIENT
Start: 2018-10-12 | End: 2019-01-21 | Stop reason: SDUPTHER

## 2018-10-12 NOTE — TELEPHONE ENCOUNTER
"Left message for pt in regards to patient should really contact his primary physician for further prescriptions  Per  . "  This will be a long-term condition and the patient should really contact his primary physician for further prescriptions ".    ----- Message from Aditya Brooks MD sent at 10/11/2018  2:22 PM CDT -----  Contact: sebastien/[employer] [phone]  This will be a long-term condition and the patient should really contact his primary physician for further prescriptions  ----- Message -----  From: Kori Evans MA  Sent: 10/10/2018   4:53 PM  To: Aditya Brooks MD    Please see message below .  ----- Message -----  From: Erwin Couch  Sent: 10/10/2018   4:39 PM  To: [facility] Staff    Pharmacist advised patient would like refill of colchicine (COLCRYS) 0.6 mg tablet sent to [facility]. Please call.          "

## 2018-10-12 NOTE — TELEPHONE ENCOUNTER
----- Message from Azucena Alvarado sent at 10/12/2018  1:11 PM CDT -----  Contact: [phone]/ self   Pt requesting a 90 day supply refill on rx colchicine (COLCRYS) 0.6 mg tablet sent to [facility] pharmacy  . Please advise

## 2018-10-12 NOTE — TELEPHONE ENCOUNTER
Spoke with patient in regards to med order he needed filled . I explained to the patient that  would not prescribe this medication . Verbalized understanding . I then Explained to the pt that A doctor already refilled it and all he had to go do was pick it up from his pharmacy . Verbalized understanding .  ----- Message from Leyla Giordano sent at 10/12/2018 11:40 AM CDT -----  Contact: self, [phone] (M)  Patient requests to speak with you regarding  Please advise.

## 2018-10-24 DIAGNOSIS — G62.9 NEUROPATHY: ICD-10-CM

## 2018-10-25 RX ORDER — GABAPENTIN 300 MG/1
CAPSULE ORAL
Qty: 360 CAPSULE | Refills: 1 | Status: SHIPPED | OUTPATIENT
Start: 2018-10-25 | End: 2019-07-24 | Stop reason: SDUPTHER

## 2018-11-02 DIAGNOSIS — I34.0 MITRAL VALVE INSUFFICIENCY, UNSPECIFIED ETIOLOGY: ICD-10-CM

## 2018-11-02 DIAGNOSIS — I10 ESSENTIAL HYPERTENSION: ICD-10-CM

## 2018-11-02 DIAGNOSIS — I50.41 ACUTE COMBINED SYSTOLIC AND DIASTOLIC CONGESTIVE HEART FAILURE: ICD-10-CM

## 2018-11-02 RX ORDER — CARVEDILOL 12.5 MG/1
12.5 TABLET ORAL 2 TIMES DAILY
Qty: 180 TABLET | Refills: 2 | Status: SHIPPED | OUTPATIENT
Start: 2018-11-02 | End: 2019-04-26 | Stop reason: SDUPTHER

## 2018-11-07 RX ORDER — ISOPROPYL ALCOHOL 0.75 G/1
SWAB TOPICAL
Qty: 400 EACH | Refills: 3 | Status: SHIPPED | OUTPATIENT
Start: 2018-11-07 | End: 2019-05-13 | Stop reason: SDUPTHER

## 2018-11-14 DIAGNOSIS — E11.40 TYPE 2 DIABETES MELLITUS WITH DIABETIC NEUROPATHY: ICD-10-CM

## 2018-11-14 DIAGNOSIS — F51.01 PRIMARY INSOMNIA: ICD-10-CM

## 2018-11-14 RX ORDER — ZOLPIDEM TARTRATE 5 MG/1
TABLET ORAL
Qty: 90 TABLET | Refills: 0 | Status: SHIPPED | OUTPATIENT
Start: 2018-11-14 | End: 2018-11-21 | Stop reason: SDUPTHER

## 2018-11-14 RX ORDER — METFORMIN HYDROCHLORIDE 1000 MG/1
1000 TABLET ORAL 2 TIMES DAILY WITH MEALS
Qty: 180 TABLET | Refills: 3 | Status: SHIPPED | OUTPATIENT
Start: 2018-11-14 | End: 2019-04-29 | Stop reason: SDUPTHER

## 2018-11-14 RX ORDER — ZOLPIDEM TARTRATE 5 MG/1
TABLET ORAL
Qty: 90 TABLET | Refills: 0 | OUTPATIENT
Start: 2018-11-14

## 2018-11-14 RX ORDER — METFORMIN HYDROCHLORIDE 1000 MG/1
TABLET ORAL
Qty: 180 TABLET | Refills: 1 | Status: SHIPPED | OUTPATIENT
Start: 2018-11-14 | End: 2018-11-14 | Stop reason: SDUPTHER

## 2018-11-14 NOTE — TELEPHONE ENCOUNTER
Clifton Wilson would like a refill of the following medications:         metFORMIN (GLUCOPHAGE) 1000 MG tablet [Britton Grissom MD]     Preferred pharmacy: [facility] - [facility] [id number]   Delivery method: Pickup

## 2018-11-21 DIAGNOSIS — F51.01 PRIMARY INSOMNIA: ICD-10-CM

## 2018-11-21 RX ORDER — ZOLPIDEM TARTRATE 5 MG/1
TABLET ORAL
Qty: 90 TABLET | Refills: 0 | Status: SHIPPED | OUTPATIENT
Start: 2018-11-21 | End: 2019-03-27 | Stop reason: SDUPTHER

## 2018-12-17 ENCOUNTER — LAB VISIT (OUTPATIENT)
Dept: LAB | Facility: HOSPITAL | Age: 68
End: 2018-12-17
Attending: FAMILY MEDICINE
Payer: MEDICARE

## 2018-12-17 DIAGNOSIS — Z79.4 TYPE 2 DIABETES MELLITUS WITH DIABETIC NEUROPATHY, WITH LONG-TERM CURRENT USE OF INSULIN: ICD-10-CM

## 2018-12-17 DIAGNOSIS — E11.9 DM TYPE 2 WITHOUT RETINOPATHY: ICD-10-CM

## 2018-12-17 DIAGNOSIS — E11.9 DIABETES MELLITUS TYPE 2 WITHOUT RETINOPATHY: ICD-10-CM

## 2018-12-17 DIAGNOSIS — I15.2 HYPERTENSION ASSOCIATED WITH DIABETES: ICD-10-CM

## 2018-12-17 DIAGNOSIS — E78.5 DYSLIPIDEMIA ASSOCIATED WITH TYPE 2 DIABETES MELLITUS: ICD-10-CM

## 2018-12-17 DIAGNOSIS — E11.59 HYPERTENSION ASSOCIATED WITH DIABETES: ICD-10-CM

## 2018-12-17 DIAGNOSIS — E11.69 DYSLIPIDEMIA ASSOCIATED WITH TYPE 2 DIABETES MELLITUS: ICD-10-CM

## 2018-12-17 DIAGNOSIS — E11.40 TYPE 2 DIABETES MELLITUS WITH DIABETIC NEUROPATHY, WITH LONG-TERM CURRENT USE OF INSULIN: ICD-10-CM

## 2018-12-17 DIAGNOSIS — I25.118 CORONARY ARTERY DISEASE OF NATIVE ARTERY OF NATIVE HEART WITH STABLE ANGINA PECTORIS: ICD-10-CM

## 2018-12-17 DIAGNOSIS — M1A.0690 IDIOPATHIC CHRONIC GOUT OF KNEE WITHOUT TOPHUS, UNSPECIFIED LATERALITY: ICD-10-CM

## 2018-12-17 LAB
ALBUMIN SERPL BCP-MCNC: 3.9 G/DL
ALP SERPL-CCNC: 75 U/L
ALT SERPL W/O P-5'-P-CCNC: 35 U/L
ANION GAP SERPL CALC-SCNC: 9 MMOL/L
AST SERPL-CCNC: 20 U/L
BASOPHILS # BLD AUTO: 0.03 K/UL
BASOPHILS NFR BLD: 0.5 %
BILIRUB SERPL-MCNC: 1.2 MG/DL
BUN SERPL-MCNC: 13 MG/DL
CALCIUM SERPL-MCNC: 9.4 MG/DL
CHLORIDE SERPL-SCNC: 105 MMOL/L
CO2 SERPL-SCNC: 26 MMOL/L
CREAT SERPL-MCNC: 0.9 MG/DL
DIFFERENTIAL METHOD: ABNORMAL
EOSINOPHIL # BLD AUTO: 0.2 K/UL
EOSINOPHIL NFR BLD: 3.5 %
ERYTHROCYTE [DISTWIDTH] IN BLOOD BY AUTOMATED COUNT: 12.1 %
EST. GFR  (AFRICAN AMERICAN): >60 ML/MIN/1.73 M^2
EST. GFR  (NON AFRICAN AMERICAN): >60 ML/MIN/1.73 M^2
ESTIMATED AVG GLUCOSE: 146 MG/DL
GLUCOSE SERPL-MCNC: 169 MG/DL
HBA1C MFR BLD HPLC: 6.7 %
HCT VFR BLD AUTO: 42 %
HGB BLD-MCNC: 14.2 G/DL
LYMPHOCYTES # BLD AUTO: 1.7 K/UL
LYMPHOCYTES NFR BLD: 29.9 %
MCH RBC QN AUTO: 31.3 PG
MCHC RBC AUTO-ENTMCNC: 33.8 G/DL
MCV RBC AUTO: 93 FL
MONOCYTES # BLD AUTO: 0.6 K/UL
MONOCYTES NFR BLD: 11 %
NEUTROPHILS # BLD AUTO: 3.1 K/UL
NEUTROPHILS NFR BLD: 54.9 %
PLATELET # BLD AUTO: 173 K/UL
PMV BLD AUTO: 10.8 FL
POTASSIUM SERPL-SCNC: 3.8 MMOL/L
PROT SERPL-MCNC: 7.2 G/DL
RBC # BLD AUTO: 4.53 M/UL
SODIUM SERPL-SCNC: 140 MMOL/L
URATE SERPL-MCNC: 9.6 MG/DL
WBC # BLD AUTO: 5.66 K/UL

## 2018-12-17 PROCEDURE — 85025 COMPLETE CBC W/AUTO DIFF WBC: CPT | Mod: HCNC

## 2018-12-17 PROCEDURE — 84550 ASSAY OF BLOOD/URIC ACID: CPT | Mod: HCNC

## 2018-12-17 PROCEDURE — 36415 COLL VENOUS BLD VENIPUNCTURE: CPT | Mod: HCNC

## 2018-12-17 PROCEDURE — 80053 COMPREHEN METABOLIC PANEL: CPT | Mod: HCNC

## 2018-12-17 PROCEDURE — 83036 HEMOGLOBIN GLYCOSYLATED A1C: CPT | Mod: HCNC

## 2018-12-20 ENCOUNTER — OFFICE VISIT (OUTPATIENT)
Dept: FAMILY MEDICINE | Facility: CLINIC | Age: 68
End: 2018-12-20
Attending: FAMILY MEDICINE
Payer: MEDICARE

## 2018-12-20 VITALS
OXYGEN SATURATION: 98 % | HEART RATE: 70 BPM | HEIGHT: 71 IN | DIASTOLIC BLOOD PRESSURE: 80 MMHG | WEIGHT: 254.44 LBS | BODY MASS INDEX: 35.62 KG/M2 | SYSTOLIC BLOOD PRESSURE: 130 MMHG

## 2018-12-20 DIAGNOSIS — E66.01 SEVERE OBESITY (BMI 35.0-35.9 WITH COMORBIDITY): ICD-10-CM

## 2018-12-20 DIAGNOSIS — E11.69 DYSLIPIDEMIA ASSOCIATED WITH TYPE 2 DIABETES MELLITUS: ICD-10-CM

## 2018-12-20 DIAGNOSIS — I15.2 HYPERTENSION ASSOCIATED WITH DIABETES: ICD-10-CM

## 2018-12-20 DIAGNOSIS — E11.40 TYPE 2 DIABETES MELLITUS WITH DIABETIC NEUROPATHY, WITH LONG-TERM CURRENT USE OF INSULIN: Primary | ICD-10-CM

## 2018-12-20 DIAGNOSIS — E11.9 DIABETES MELLITUS TYPE 2 WITHOUT RETINOPATHY: ICD-10-CM

## 2018-12-20 DIAGNOSIS — E11.59 HYPERTENSION ASSOCIATED WITH DIABETES: ICD-10-CM

## 2018-12-20 DIAGNOSIS — I25.118 CORONARY ARTERY DISEASE OF NATIVE ARTERY OF NATIVE HEART WITH STABLE ANGINA PECTORIS: ICD-10-CM

## 2018-12-20 DIAGNOSIS — E78.5 DYSLIPIDEMIA ASSOCIATED WITH TYPE 2 DIABETES MELLITUS: ICD-10-CM

## 2018-12-20 DIAGNOSIS — I25.5 ISCHEMIC CARDIOMYOPATHY: ICD-10-CM

## 2018-12-20 DIAGNOSIS — I50.42 CHRONIC COMBINED SYSTOLIC AND DIASTOLIC CHF (CONGESTIVE HEART FAILURE): ICD-10-CM

## 2018-12-20 DIAGNOSIS — Z95.810 ICD (IMPLANTABLE CARDIOVERTER-DEFIBRILLATOR), SINGLE, IN SITU: ICD-10-CM

## 2018-12-20 DIAGNOSIS — Z79.4 TYPE 2 DIABETES MELLITUS WITH DIABETIC NEUROPATHY, WITH LONG-TERM CURRENT USE OF INSULIN: Primary | ICD-10-CM

## 2018-12-20 DIAGNOSIS — I25.10 CORONARY ARTERY DISEASE INVOLVING NATIVE CORONARY ARTERY OF NATIVE HEART WITHOUT ANGINA PECTORIS: ICD-10-CM

## 2018-12-20 PROCEDURE — 99214 OFFICE O/P EST MOD 30 MIN: CPT | Mod: HCNC,S$GLB,, | Performed by: FAMILY MEDICINE

## 2018-12-20 PROCEDURE — 3079F DIAST BP 80-89 MM HG: CPT | Mod: CPTII,HCNC,S$GLB, | Performed by: FAMILY MEDICINE

## 2018-12-20 PROCEDURE — 1101F PT FALLS ASSESS-DOCD LE1/YR: CPT | Mod: CPTII,HCNC,S$GLB, | Performed by: FAMILY MEDICINE

## 2018-12-20 PROCEDURE — 3075F SYST BP GE 130 - 139MM HG: CPT | Mod: CPTII,HCNC,S$GLB, | Performed by: FAMILY MEDICINE

## 2018-12-20 PROCEDURE — 3044F HG A1C LEVEL LT 7.0%: CPT | Mod: CPTII,HCNC,S$GLB, | Performed by: FAMILY MEDICINE

## 2018-12-20 PROCEDURE — 99999 PR PBB SHADOW E&M-EST. PATIENT-LVL III: CPT | Mod: PBBFAC,HCNC,, | Performed by: FAMILY MEDICINE

## 2018-12-20 RX ORDER — INSULIN GLARGINE 100 [IU]/ML
15 INJECTION, SOLUTION SUBCUTANEOUS NIGHTLY
Qty: 13.5 ML | Refills: 3 | Status: SHIPPED | OUTPATIENT
Start: 2018-12-20 | End: 2018-12-27 | Stop reason: SDUPTHER

## 2018-12-20 RX ORDER — INSULIN ASPART 100 [IU]/ML
10 INJECTION, SOLUTION INTRAVENOUS; SUBCUTANEOUS
Qty: 45 ML | Refills: 10 | Status: SHIPPED | OUTPATIENT
Start: 2018-12-20 | End: 2019-01-23 | Stop reason: SDUPTHER

## 2018-12-20 NOTE — PROGRESS NOTES
Subjective:       Patient ID: Clifton Wilson is a 67 y.o. male.    Chief Complaint: Follow-up (3 month )    67 yr old pleasant white male with DM II, HTN, HLD, CAD, Combined chronic CHF, MR, obesity, neuropathy, presents today for diabetes. No new complaints today.      DM II - improving - A1C                   6.7 (H)             12/17/2018                                        - on metformin and insulin and sugars improving - UTD eye exam - foot exam UTD - on ASA and plavix. Losartan      HTN - chronic - controlled - on losartan, lasix - compliant - no side effects      HLD - chronic -   LDLCALC                  62.6 (L)            09/19/2018                                   - controlled -       CAD/combined CHF - EF 35-40% - on external defibrillator - medical management - on ASA/plavix/ARB - no symptoms - following cardiology    Gout - improving - uses colchicine for flare up -     History as below - reviewed              Diabetes   He presents for his follow-up diabetic visit. He has type 2 diabetes mellitus. No MedicAlert identification noted. The initial diagnosis of diabetes was made 27 years ago. His disease course has been worsening. Hypoglycemia symptoms include sleepiness. Pertinent negatives for hypoglycemia include no confusion, dizziness, headaches, hunger, mood changes, nervousness/anxiousness, pallor, seizures, speech difficulty, sweats or tremors. Associated symptoms include foot paresthesias and weakness. Pertinent negatives for diabetes include no blurred vision, no chest pain, no fatigue, no foot ulcerations, no polydipsia, no polyphagia, no polyuria, no visual change and no weight loss. Hypoglycemia complications include nocturnal hypoglycemia. Pertinent negatives for hypoglycemia complications include no blackouts, no hospitalization, no required assistance and no required glucagon injection. Symptoms are stable. Diabetic complications include heart disease, impotence, peripheral  neuropathy and PVD. Pertinent negatives for diabetic complications include no autonomic neuropathy, CVA, nephropathy or retinopathy. Risk factors for coronary artery disease include family history, hypertension, obesity, diabetes mellitus and male sex. Current diabetic treatment includes diet, insulin injections and oral agent (dual therapy). He is compliant with treatment all of the time. He is currently taking insulin pre-breakfast, pre-lunch, pre-dinner and at bedtime. Insulin injections are given by patient. Rotation sites for injection include the abdominal wall, arms and thighs. His weight is fluctuating minimally. He is following a diabetic, generally healthy and low salt diet. Meal planning includes avoidance of concentrated sweets and carbohydrate counting. He has not had a previous visit with a dietitian. He participates in exercise three times a week. He monitors blood glucose at home 3-4 x per day. He monitors urine at home <1 x per month. Blood glucose monitoring compliance is good. His home blood glucose trend is fluctuating minimally. An ACE inhibitor/angiotensin II receptor blocker is being taken. He does not see a podiatrist.Eye exam is current.   Swelling   This is a chronic problem. The current episode started more than 1 month ago. The problem occurs intermittently. The problem has been gradually worsening. Associated symptoms include arthralgias, joint swelling and weakness. Pertinent negatives include no chest pain, congestion, coughing, diaphoresis, fatigue, headaches, myalgias, neck pain, rash, visual change or vomiting.   Hypertension   This is a chronic problem. The current episode started more than 1 year ago. The problem has been gradually improving since onset. The problem is controlled. Pertinent negatives include no blurred vision, chest pain, headaches, malaise/fatigue, neck pain, palpitations, peripheral edema, PND or sweats. Risk factors for coronary artery disease include diabetes  mellitus, dyslipidemia, male gender and obesity. Past treatments include angiotensin blockers and diuretics. The current treatment provides significant improvement. There are no compliance problems.  Hypertensive end-organ damage includes CAD/MI, heart failure and PVD. There is no history of CVA, left ventricular hypertrophy or retinopathy. There is no history of chronic renal disease, hypercortisolism, hyperparathyroidism, pheochromocytoma, renovascular disease or a thyroid problem.   Hyperlipidemia   This is a chronic problem. The current episode started more than 1 year ago. The problem is controlled. Recent lipid tests were reviewed and are normal. Exacerbating diseases include obesity. He has no history of chronic renal disease or diabetes. There are no known factors aggravating his hyperlipidemia. Pertinent negatives include no chest pain, focal sensory loss or myalgias. Current antihyperlipidemic treatment includes statins. The current treatment provides moderate improvement of lipids. There are no compliance problems.  Risk factors for coronary artery disease include diabetes mellitus, dyslipidemia, male sex, hypertension and obesity.     Review of Systems   Constitutional: Negative for activity change, diaphoresis, fatigue, malaise/fatigue, unexpected weight change and weight loss.   HENT: Negative.  Negative for congestion, ear pain, mouth sores, rhinorrhea and voice change.    Eyes: Negative.  Negative for blurred vision, pain, discharge and visual disturbance.   Respiratory: Negative.  Negative for apnea, cough and wheezing.    Cardiovascular: Negative.  Negative for chest pain, palpitations and PND.   Gastrointestinal: Negative.  Negative for abdominal distention, anal bleeding, diarrhea and vomiting.   Endocrine: Negative.  Negative for cold intolerance, polydipsia, polyphagia and polyuria.   Genitourinary: Positive for impotence. Negative for decreased urine volume, difficulty urinating, discharge,  frequency and scrotal swelling.   Musculoskeletal: Positive for arthralgias and joint swelling. Negative for back pain, myalgias, neck pain and neck stiffness.   Skin: Negative.  Negative for color change, pallor and rash.   Allergic/Immunologic: Negative.  Negative for environmental allergies and immunocompromised state.   Neurological: Positive for weakness. Negative for dizziness, tremors, seizures, speech difficulty, light-headedness and headaches.   Hematological: Negative.    Psychiatric/Behavioral: Negative.  Negative for agitation, confusion, dysphoric mood and suicidal ideas. The patient is not nervous/anxious.        PMH/PSH/FH/SH/MED/ALLERGY reviewed    Objective:       Vitals:    12/20/18 0901   BP: 130/80   Pulse: 70       Physical Exam   Constitutional: He is oriented to person, place, and time. He appears well-developed and well-nourished.   HENT:   Head: Normocephalic and atraumatic.   Right Ear: External ear normal.   Left Ear: External ear normal.   Nose: Nose normal.   Mouth/Throat: Oropharynx is clear and moist. No oropharyngeal exudate.   Eyes: Conjunctivae and EOM are normal. Pupils are equal, round, and reactive to light. Right eye exhibits no discharge. Left eye exhibits no discharge. No scleral icterus.   Neck: Normal range of motion. Neck supple. No JVD present. No tracheal deviation present. No thyromegaly present.   Cardiovascular: Normal rate, regular rhythm, normal heart sounds and intact distal pulses. Exam reveals no gallop and no friction rub.   No murmur heard.  Pulmonary/Chest: Effort normal and breath sounds normal. No stridor. No respiratory distress. He has no wheezes. He has no rales. He exhibits no tenderness.   Abdominal: Soft. Bowel sounds are normal. He exhibits no distension and no mass. There is no tenderness. There is no rebound and no guarding. No hernia.   Musculoskeletal: Normal range of motion. He exhibits no edema or tenderness.   Lymphadenopathy:     He has no  cervical adenopathy.   Neurological: He is alert and oriented to person, place, and time. He has normal reflexes. He displays normal reflexes. No cranial nerve deficit. He exhibits normal muscle tone. Coordination normal.   Skin: Skin is warm and dry. No rash noted. No erythema. No pallor.   Psychiatric: He has a normal mood and affect. His behavior is normal. Judgment and thought content normal.       Assessment:       1. Type 2 diabetes mellitus with diabetic neuropathy, with long-term current use of insulin    2. Diabetes mellitus type 2 without retinopathy    3. Hypertension associated with diabetes    4. Dyslipidemia associated with type 2 diabetes mellitus    5. ICD (implantable cardioverter-defibrillator), single, in situ    6. Ischemic cardiomyopathy    7. Coronary artery disease of native artery of native heart with stable angina pectoris    8. Chronic combined systolic and diastolic CHF (congestive heart failure)    9. Coronary artery disease involving native coronary artery of native heart without angina pectoris    10. Severe obesity (BMI 35.0-35.9 with comorbidity)    11. IDDM (insulin dependent diabetes mellitus)        Plan:       Clifton was seen today for follow-up.    Diagnoses and all orders for this visit:    Type 2 diabetes mellitus with diabetic neuropathy, with long-term current use of insulin  -     insulin (LANTUS SOLOSTAR U-100 INSULIN) glargine 100 units/mL (3mL) SubQ pen; Inject 15 Units into the skin every evening.  -     insulin aspart U-100 (NOVOLOG FLEXPEN U-100 INSULIN) 100 unit/mL InPn pen; Inject 10 Units into the skin 3 (three) times daily with meals.  -     Comprehensive metabolic panel; Future  -     Hemoglobin A1c; Future  -     Lipid panel; Future  -     Urinalysis; Future  -     Microalbumin/creatinine urine ratio; Future    Diabetes mellitus type 2 without retinopathy  -     insulin (LANTUS SOLOSTAR U-100 INSULIN) glargine 100 units/mL (3mL) SubQ pen; Inject 15 Units into  the skin every evening.  -     insulin aspart U-100 (NOVOLOG FLEXPEN U-100 INSULIN) 100 unit/mL InPn pen; Inject 10 Units into the skin 3 (three) times daily with meals.  -     Comprehensive metabolic panel; Future  -     Hemoglobin A1c; Future  -     Lipid panel; Future  -     Urinalysis; Future  -     Microalbumin/creatinine urine ratio; Future    Hypertension associated with diabetes  -     Comprehensive metabolic panel; Future  -     Lipid panel; Future    Dyslipidemia associated with type 2 diabetes mellitus  -     Comprehensive metabolic panel; Future  -     Lipid panel; Future    ICD (implantable cardioverter-defibrillator), single, in situ    Ischemic cardiomyopathy    Coronary artery disease of native artery of native heart with stable angina pectoris  -     Comprehensive metabolic panel; Future  -     Lipid panel; Future    Chronic combined systolic and diastolic CHF (congestive heart failure)    Coronary artery disease involving native coronary artery of native heart without angina pectoris    Severe obesity (BMI 35.0-35.9 with comorbidity)    IDDM (insulin dependent diabetes mellitus)  -     insulin (LANTUS SOLOSTAR U-100 INSULIN) glargine 100 units/mL (3mL) SubQ pen; Inject 15 Units into the skin every evening.  -     insulin aspart U-100 (NOVOLOG FLEXPEN U-100 INSULIN) 100 unit/mL InPn pen; Inject 10 Units into the skin 3 (three) times daily with meals.  -     Comprehensive metabolic panel; Future  -     Hemoglobin A1c; Future  -     Lipid panel; Future  -     Urinalysis; Future  -     Microalbumin/creatinine urine ratio; Future      DM II controlled/hyperglycemia  -reports improving since started insulin  -lantus and novolog to continue  -strict diet control    HTN  -controlled    HLD  -controlled    Combined CHF  -follows cardiology  -on external defibrillator    Neuropathy  -continue neurontin and increase dose to 600 mg BID    Obesity  -diet and exercise as tolerated    chronic gout  -uric acid  dania  -conchicine as needed    Spent adequate time in obtaining history and explaining differentials    40 minutes spent during this visit of which greater than 50% devoted to face-face counseling and coordination of care regarding diagnosis and management plan    Follow-up in about 3 months (around 3/20/2019), or if symptoms worsen or fail to improve.

## 2018-12-27 DIAGNOSIS — Z79.4 TYPE 2 DIABETES MELLITUS WITH DIABETIC NEUROPATHY, WITH LONG-TERM CURRENT USE OF INSULIN: ICD-10-CM

## 2018-12-27 DIAGNOSIS — E11.40 TYPE 2 DIABETES MELLITUS WITH DIABETIC NEUROPATHY, WITH LONG-TERM CURRENT USE OF INSULIN: ICD-10-CM

## 2018-12-27 DIAGNOSIS — E11.9 DIABETES MELLITUS TYPE 2 WITHOUT RETINOPATHY: ICD-10-CM

## 2018-12-27 RX ORDER — INSULIN GLARGINE 100 [IU]/ML
INJECTION, SOLUTION SUBCUTANEOUS
Qty: 30 ML | Refills: 1 | Status: SHIPPED | OUTPATIENT
Start: 2018-12-27 | End: 2019-04-18 | Stop reason: SDUPTHER

## 2018-12-31 RX ORDER — CLOPIDOGREL BISULFATE 75 MG/1
75 TABLET ORAL DAILY
Qty: 90 TABLET | Refills: 2 | Status: SHIPPED | OUTPATIENT
Start: 2018-12-31 | End: 2019-05-08 | Stop reason: SDUPTHER

## 2018-12-31 RX ORDER — LOSARTAN POTASSIUM 50 MG/1
50 TABLET ORAL DAILY
Qty: 90 TABLET | Refills: 3 | Status: SHIPPED | OUTPATIENT
Start: 2018-12-31 | End: 2019-05-08 | Stop reason: SDUPTHER

## 2019-01-03 DIAGNOSIS — I25.5 ISCHEMIC CARDIOMYOPATHY: ICD-10-CM

## 2019-01-03 DIAGNOSIS — Z95.810 AICD (AUTOMATIC CARDIOVERTER/DEFIBRILLATOR) PRESENT: Primary | ICD-10-CM

## 2019-01-16 ENCOUNTER — TELEPHONE (OUTPATIENT)
Dept: CARDIOLOGY | Facility: HOSPITAL | Age: 69
End: 2019-01-16

## 2019-01-16 RX ORDER — BLOOD SUGAR DIAGNOSTIC
STRIP MISCELLANEOUS
Qty: 400 STRIP | Refills: 3 | Status: SHIPPED | OUTPATIENT
Start: 2019-01-16 | End: 2019-08-19 | Stop reason: SDUPTHER

## 2019-01-21 DIAGNOSIS — M65.9 SYNOVITIS OF KNEE: ICD-10-CM

## 2019-01-21 RX ORDER — COLCHICINE 0.6 MG/1
0.6 TABLET ORAL DAILY
Qty: 90 TABLET | Refills: 0 | Status: SHIPPED | OUTPATIENT
Start: 2019-01-21 | End: 2019-05-01 | Stop reason: SDUPTHER

## 2019-01-22 ENCOUNTER — CLINICAL SUPPORT (OUTPATIENT)
Dept: CARDIOLOGY | Facility: HOSPITAL | Age: 69
End: 2019-01-22
Attending: INTERNAL MEDICINE
Payer: MEDICARE

## 2019-01-22 PROCEDURE — 93296 REM INTERROG EVL PM/IDS: CPT | Mod: HCNC

## 2019-01-23 DIAGNOSIS — E11.9 DIABETES MELLITUS TYPE 2 WITHOUT RETINOPATHY: ICD-10-CM

## 2019-01-23 DIAGNOSIS — Z79.4 TYPE 2 DIABETES MELLITUS WITH DIABETIC NEUROPATHY, WITH LONG-TERM CURRENT USE OF INSULIN: ICD-10-CM

## 2019-01-23 DIAGNOSIS — E11.40 TYPE 2 DIABETES MELLITUS WITH DIABETIC NEUROPATHY, WITH LONG-TERM CURRENT USE OF INSULIN: ICD-10-CM

## 2019-01-23 RX ORDER — INSULIN ASPART 100 [IU]/ML
10 INJECTION, SOLUTION INTRAVENOUS; SUBCUTANEOUS
Qty: 45 ML | Refills: 10 | Status: SHIPPED | OUTPATIENT
Start: 2019-01-23 | End: 2019-03-22 | Stop reason: SDUPTHER

## 2019-03-12 RX ORDER — PEN NEEDLE, DIABETIC 31 GX5/16"
NEEDLE, DISPOSABLE MISCELLANEOUS
Qty: 360 EACH | Refills: 5 | Status: SHIPPED | OUTPATIENT
Start: 2019-03-12 | End: 2019-04-18 | Stop reason: SDUPTHER

## 2019-03-18 ENCOUNTER — LAB VISIT (OUTPATIENT)
Dept: LAB | Facility: HOSPITAL | Age: 69
End: 2019-03-18
Attending: FAMILY MEDICINE
Payer: MEDICARE

## 2019-03-18 DIAGNOSIS — Z79.4 TYPE 2 DIABETES MELLITUS WITH DIABETIC NEUROPATHY, WITH LONG-TERM CURRENT USE OF INSULIN: ICD-10-CM

## 2019-03-18 DIAGNOSIS — E11.59 HYPERTENSION ASSOCIATED WITH DIABETES: ICD-10-CM

## 2019-03-18 DIAGNOSIS — E11.69 DYSLIPIDEMIA ASSOCIATED WITH TYPE 2 DIABETES MELLITUS: ICD-10-CM

## 2019-03-18 DIAGNOSIS — E78.5 DYSLIPIDEMIA ASSOCIATED WITH TYPE 2 DIABETES MELLITUS: ICD-10-CM

## 2019-03-18 DIAGNOSIS — I25.118 CORONARY ARTERY DISEASE OF NATIVE ARTERY OF NATIVE HEART WITH STABLE ANGINA PECTORIS: ICD-10-CM

## 2019-03-18 DIAGNOSIS — E11.9 DIABETES MELLITUS TYPE 2 WITHOUT RETINOPATHY: ICD-10-CM

## 2019-03-18 DIAGNOSIS — E11.40 TYPE 2 DIABETES MELLITUS WITH DIABETIC NEUROPATHY, WITH LONG-TERM CURRENT USE OF INSULIN: ICD-10-CM

## 2019-03-18 DIAGNOSIS — I15.2 HYPERTENSION ASSOCIATED WITH DIABETES: ICD-10-CM

## 2019-03-18 LAB
ALBUMIN SERPL BCP-MCNC: 3.7 G/DL
ALP SERPL-CCNC: 78 U/L
ALT SERPL W/O P-5'-P-CCNC: 37 U/L
ANION GAP SERPL CALC-SCNC: 10 MMOL/L
AST SERPL-CCNC: 27 U/L
BILIRUB SERPL-MCNC: 1.7 MG/DL
BUN SERPL-MCNC: 15 MG/DL
CALCIUM SERPL-MCNC: 9.2 MG/DL
CHLORIDE SERPL-SCNC: 103 MMOL/L
CHOLEST SERPL-MCNC: 116 MG/DL
CHOLEST/HDLC SERPL: 3.7 {RATIO}
CO2 SERPL-SCNC: 27 MMOL/L
CREAT SERPL-MCNC: 0.9 MG/DL
EST. GFR  (AFRICAN AMERICAN): >60 ML/MIN/1.73 M^2
EST. GFR  (NON AFRICAN AMERICAN): >60 ML/MIN/1.73 M^2
ESTIMATED AVG GLUCOSE: 183 MG/DL
GLUCOSE SERPL-MCNC: 184 MG/DL
HBA1C MFR BLD HPLC: 8 %
HDLC SERPL-MCNC: 31 MG/DL
HDLC SERPL: 26.7 %
LDLC SERPL CALC-MCNC: 50.8 MG/DL
NONHDLC SERPL-MCNC: 85 MG/DL
POTASSIUM SERPL-SCNC: 3.5 MMOL/L
PROT SERPL-MCNC: 7 G/DL
SODIUM SERPL-SCNC: 140 MMOL/L
TRIGL SERPL-MCNC: 171 MG/DL

## 2019-03-18 PROCEDURE — 80053 COMPREHEN METABOLIC PANEL: CPT | Mod: HCNC

## 2019-03-18 PROCEDURE — 83036 HEMOGLOBIN GLYCOSYLATED A1C: CPT | Mod: HCNC

## 2019-03-18 PROCEDURE — 80061 LIPID PANEL: CPT | Mod: HCNC

## 2019-03-18 PROCEDURE — 36415 COLL VENOUS BLD VENIPUNCTURE: CPT | Mod: HCNC

## 2019-03-20 ENCOUNTER — OFFICE VISIT (OUTPATIENT)
Dept: FAMILY MEDICINE | Facility: CLINIC | Age: 69
End: 2019-03-20
Attending: FAMILY MEDICINE
Payer: MEDICARE

## 2019-03-20 VITALS
SYSTOLIC BLOOD PRESSURE: 130 MMHG | OXYGEN SATURATION: 96 % | WEIGHT: 248.69 LBS | DIASTOLIC BLOOD PRESSURE: 80 MMHG | HEIGHT: 71 IN | HEART RATE: 77 BPM | BODY MASS INDEX: 34.81 KG/M2

## 2019-03-20 DIAGNOSIS — Z79.4 TYPE 2 DIABETES MELLITUS WITH DIABETIC NEUROPATHY, WITH LONG-TERM CURRENT USE OF INSULIN: ICD-10-CM

## 2019-03-20 DIAGNOSIS — E11.59 HYPERTENSION ASSOCIATED WITH DIABETES: ICD-10-CM

## 2019-03-20 DIAGNOSIS — E11.69 DYSLIPIDEMIA ASSOCIATED WITH TYPE 2 DIABETES MELLITUS: ICD-10-CM

## 2019-03-20 DIAGNOSIS — E66.01 SEVERE OBESITY (BMI 35.0-39.9) WITH COMORBIDITY: ICD-10-CM

## 2019-03-20 DIAGNOSIS — E11.9 DM TYPE 2 WITHOUT RETINOPATHY: ICD-10-CM

## 2019-03-20 DIAGNOSIS — Z95.810 ICD (IMPLANTABLE CARDIOVERTER-DEFIBRILLATOR), SINGLE, IN SITU: ICD-10-CM

## 2019-03-20 DIAGNOSIS — I25.10 CORONARY ARTERY DISEASE INVOLVING NATIVE CORONARY ARTERY OF NATIVE HEART WITHOUT ANGINA PECTORIS: ICD-10-CM

## 2019-03-20 DIAGNOSIS — I50.42 CHRONIC COMBINED SYSTOLIC AND DIASTOLIC CHF (CONGESTIVE HEART FAILURE): ICD-10-CM

## 2019-03-20 DIAGNOSIS — E11.40 TYPE 2 DIABETES MELLITUS WITH DIABETIC NEUROPATHY, WITH LONG-TERM CURRENT USE OF INSULIN: ICD-10-CM

## 2019-03-20 DIAGNOSIS — E11.9 DIABETES MELLITUS TYPE 2 WITHOUT RETINOPATHY: ICD-10-CM

## 2019-03-20 DIAGNOSIS — E78.5 DYSLIPIDEMIA ASSOCIATED WITH TYPE 2 DIABETES MELLITUS: ICD-10-CM

## 2019-03-20 DIAGNOSIS — I25.118 CORONARY ARTERY DISEASE OF NATIVE ARTERY OF NATIVE HEART WITH STABLE ANGINA PECTORIS: ICD-10-CM

## 2019-03-20 DIAGNOSIS — E66.01 SEVERE OBESITY (BMI 35.0-35.9 WITH COMORBIDITY): ICD-10-CM

## 2019-03-20 DIAGNOSIS — I25.5 ISCHEMIC CARDIOMYOPATHY: ICD-10-CM

## 2019-03-20 DIAGNOSIS — I15.2 HYPERTENSION ASSOCIATED WITH DIABETES: ICD-10-CM

## 2019-03-20 DIAGNOSIS — I47.20 VENTRICULAR TACHYCARDIA: ICD-10-CM

## 2019-03-20 PROCEDURE — 3079F DIAST BP 80-89 MM HG: CPT | Mod: HCNC,CPTII,S$GLB, | Performed by: FAMILY MEDICINE

## 2019-03-20 PROCEDURE — 1101F PR PT FALLS ASSESS DOC 0-1 FALLS W/OUT INJ PAST YR: ICD-10-PCS | Mod: HCNC,CPTII,S$GLB, | Performed by: FAMILY MEDICINE

## 2019-03-20 PROCEDURE — 99999 PR PBB SHADOW E&M-EST. PATIENT-LVL III: ICD-10-PCS | Mod: PBBFAC,HCNC,, | Performed by: FAMILY MEDICINE

## 2019-03-20 PROCEDURE — 99214 PR OFFICE/OUTPT VISIT, EST, LEVL IV, 30-39 MIN: ICD-10-PCS | Mod: HCNC,S$GLB,, | Performed by: FAMILY MEDICINE

## 2019-03-20 PROCEDURE — 3075F PR MOST RECENT SYSTOLIC BLOOD PRESS GE 130-139MM HG: ICD-10-PCS | Mod: HCNC,CPTII,S$GLB, | Performed by: FAMILY MEDICINE

## 2019-03-20 PROCEDURE — 3079F PR MOST RECENT DIASTOLIC BLOOD PRESSURE 80-89 MM HG: ICD-10-PCS | Mod: HCNC,CPTII,S$GLB, | Performed by: FAMILY MEDICINE

## 2019-03-20 PROCEDURE — 3045F PR MOST RECENT HEMOGLOBIN A1C LEVEL 7.0-9.0%: ICD-10-PCS | Mod: HCNC,CPTII,S$GLB, | Performed by: FAMILY MEDICINE

## 2019-03-20 PROCEDURE — 99999 PR PBB SHADOW E&M-EST. PATIENT-LVL III: CPT | Mod: PBBFAC,HCNC,, | Performed by: FAMILY MEDICINE

## 2019-03-20 PROCEDURE — 3045F PR MOST RECENT HEMOGLOBIN A1C LEVEL 7.0-9.0%: CPT | Mod: HCNC,CPTII,S$GLB, | Performed by: FAMILY MEDICINE

## 2019-03-20 PROCEDURE — 3075F SYST BP GE 130 - 139MM HG: CPT | Mod: HCNC,CPTII,S$GLB, | Performed by: FAMILY MEDICINE

## 2019-03-20 PROCEDURE — 1101F PT FALLS ASSESS-DOCD LE1/YR: CPT | Mod: HCNC,CPTII,S$GLB, | Performed by: FAMILY MEDICINE

## 2019-03-20 PROCEDURE — 99214 OFFICE O/P EST MOD 30 MIN: CPT | Mod: HCNC,S$GLB,, | Performed by: FAMILY MEDICINE

## 2019-03-20 NOTE — PROGRESS NOTES
Subjective:       Patient ID: Clifton Wilson is a 68 y.o. male.    Chief Complaint: Follow-up    67 yr old pleasant white male with DM II, HTN, HLD, CAD, Combined chronic CHF, MR, obesity, neuropathy, presents today for diabetes. No new complaints today.      DM II - worsening - A1C                   8.0 (H)             03/18/2019             - on metformin and insulin and sugars improving - UTD eye exam - foot exam UTD - on ASA and plavix. Losartan. He ate too much jamie cake during mardi gras.      HTN - chronic - controlled - on losartan, lasix - compliant - no side effects      HLD - chronic -   LDLCALC                  62.6 (L)            09/19/2018                                   - controlled -       CAD/combined CHF - EF 35-40% - on external defibrillator - medical management - on ASA/plavix/ARB - no symptoms - following cardiology    Gout - improving - uses colchicine for flare up -     History as below - reviewed              Diabetes   He presents for his follow-up diabetic visit. He has type 2 diabetes mellitus. No MedicAlert identification noted. The initial diagnosis of diabetes was made 27 years ago. His disease course has been worsening. Hypoglycemia symptoms include sleepiness. Pertinent negatives for hypoglycemia include no confusion, dizziness, headaches, hunger, mood changes, nervousness/anxiousness, pallor, seizures, speech difficulty, sweats or tremors. Associated symptoms include foot paresthesias and weakness. Pertinent negatives for diabetes include no blurred vision, no chest pain, no fatigue, no foot ulcerations, no polydipsia, no polyphagia, no polyuria, no visual change and no weight loss. Hypoglycemia complications include nocturnal hypoglycemia. Pertinent negatives for hypoglycemia complications include no blackouts, no hospitalization, no required assistance and no required glucagon injection. Symptoms are stable. Diabetic complications include heart disease, impotence, peripheral  neuropathy and PVD. Pertinent negatives for diabetic complications include no autonomic neuropathy, CVA, nephropathy or retinopathy. Risk factors for coronary artery disease include family history, hypertension, obesity, diabetes mellitus and male sex. Current diabetic treatment includes diet, insulin injections and oral agent (dual therapy). He is compliant with treatment all of the time. He is currently taking insulin pre-breakfast, pre-lunch, pre-dinner and at bedtime. Insulin injections are given by patient. Rotation sites for injection include the abdominal wall, arms and thighs. His weight is fluctuating minimally. He is following a diabetic, generally healthy and low salt diet. Meal planning includes avoidance of concentrated sweets and carbohydrate counting. He has not had a previous visit with a dietitian. He participates in exercise three times a week. He monitors blood glucose at home 3-4 x per day. He monitors urine at home <1 x per month. Blood glucose monitoring compliance is good. His home blood glucose trend is fluctuating minimally. An ACE inhibitor/angiotensin II receptor blocker is being taken. He does not see a podiatrist.Eye exam is current.   Swelling   This is a chronic problem. The current episode started more than 1 month ago. The problem occurs intermittently. The problem has been gradually worsening. Associated symptoms include arthralgias, joint swelling and weakness. Pertinent negatives include no chest pain, congestion, coughing, diaphoresis, fatigue, headaches, myalgias, neck pain, rash, visual change or vomiting.   Hypertension   This is a chronic problem. The current episode started more than 1 year ago. The problem has been gradually improving since onset. The problem is controlled. Pertinent negatives include no blurred vision, chest pain, headaches, malaise/fatigue, neck pain, palpitations, peripheral edema, PND or sweats. Risk factors for coronary artery disease include diabetes  mellitus, dyslipidemia, male gender and obesity. Past treatments include angiotensin blockers and diuretics. The current treatment provides significant improvement. There are no compliance problems.  Hypertensive end-organ damage includes CAD/MI, heart failure and PVD. There is no history of CVA, left ventricular hypertrophy or retinopathy. There is no history of chronic renal disease, hypercortisolism, hyperparathyroidism, pheochromocytoma, renovascular disease or a thyroid problem.   Hyperlipidemia   This is a chronic problem. The current episode started more than 1 year ago. The problem is controlled. Recent lipid tests were reviewed and are normal. Exacerbating diseases include obesity. He has no history of chronic renal disease or diabetes. There are no known factors aggravating his hyperlipidemia. Pertinent negatives include no chest pain, focal sensory loss or myalgias. Current antihyperlipidemic treatment includes statins. The current treatment provides moderate improvement of lipids. There are no compliance problems.  Risk factors for coronary artery disease include diabetes mellitus, dyslipidemia, male sex, hypertension and obesity.     Review of Systems   Constitutional: Negative for activity change, diaphoresis, fatigue, malaise/fatigue, unexpected weight change and weight loss.   HENT: Negative.  Negative for congestion, ear pain, mouth sores, rhinorrhea and voice change.    Eyes: Negative.  Negative for blurred vision, pain, discharge and visual disturbance.   Respiratory: Negative.  Negative for apnea, cough and wheezing.    Cardiovascular: Negative.  Negative for chest pain, palpitations and PND.   Gastrointestinal: Negative.  Negative for abdominal distention, anal bleeding, diarrhea and vomiting.   Endocrine: Negative.  Negative for cold intolerance, polydipsia, polyphagia and polyuria.   Genitourinary: Positive for impotence. Negative for decreased urine volume, difficulty urinating, discharge,  frequency and scrotal swelling.   Musculoskeletal: Positive for arthralgias and joint swelling. Negative for back pain, myalgias, neck pain and neck stiffness.   Skin: Negative.  Negative for color change, pallor and rash.   Allergic/Immunologic: Negative.  Negative for environmental allergies and immunocompromised state.   Neurological: Positive for weakness. Negative for dizziness, tremors, seizures, speech difficulty, light-headedness and headaches.   Hematological: Negative.    Psychiatric/Behavioral: Negative.  Negative for agitation, confusion, dysphoric mood and suicidal ideas. The patient is not nervous/anxious.        PMH/PSH/FH/SH/MED/ALLERGY reviewed    Objective:       Vitals:    03/20/19 1332   BP: 130/80   Pulse: 77       Physical Exam   Constitutional: He is oriented to person, place, and time. He appears well-developed and well-nourished.   HENT:   Head: Normocephalic and atraumatic.   Right Ear: External ear normal.   Left Ear: External ear normal.   Nose: Nose normal.   Mouth/Throat: Oropharynx is clear and moist. No oropharyngeal exudate.   Eyes: Conjunctivae and EOM are normal. Pupils are equal, round, and reactive to light. Right eye exhibits no discharge. Left eye exhibits no discharge. No scleral icterus.   Neck: Normal range of motion. Neck supple. No JVD present. No tracheal deviation present. No thyromegaly present.   Cardiovascular: Normal rate, regular rhythm, normal heart sounds and intact distal pulses. Exam reveals no gallop and no friction rub.   No murmur heard.  Pulmonary/Chest: Effort normal and breath sounds normal. No stridor. No respiratory distress. He has no wheezes. He has no rales. He exhibits no tenderness.   Abdominal: Soft. Bowel sounds are normal. He exhibits no distension and no mass. There is no tenderness. There is no rebound and no guarding. No hernia.   Musculoskeletal: Normal range of motion. He exhibits no edema or tenderness.   Lymphadenopathy:     He has no  cervical adenopathy.   Neurological: He is alert and oriented to person, place, and time. He has normal reflexes. He displays normal reflexes. No cranial nerve deficit. He exhibits normal muscle tone. Coordination normal.   Skin: Skin is warm and dry. No rash noted. No erythema. No pallor.   Psychiatric: He has a normal mood and affect. His behavior is normal. Judgment and thought content normal.       Assessment:       1. IDDM (insulin dependent diabetes mellitus)    2. Hypertension associated with diabetes    3. Severe obesity (BMI 35.0-35.9 with comorbidity)    4. Chronic combined systolic and diastolic CHF (congestive heart failure)    5. Coronary artery disease of native artery of native heart with stable angina pectoris    6. Ventricular tachycardia    7. DM type 2 without retinopathy    8. Diabetes mellitus type 2 without retinopathy    9. Type 2 diabetes mellitus with diabetic neuropathy, with long-term current use of insulin    10. Dyslipidemia associated with type 2 diabetes mellitus    11. ICD (implantable cardioverter-defibrillator), single, in situ    12. Ischemic cardiomyopathy    13. Ventricular tachycardia, nonsustained post-MI    14. Coronary artery disease involving native coronary artery of native heart without angina pectoris    15. Severe obesity (BMI 35.0-39.9) with comorbidity        Plan:         Clifton was seen today for follow-up.    Diagnoses and all orders for this visit:    IDDM (insulin dependent diabetes mellitus)  -     Comprehensive metabolic panel; Future  -     Hemoglobin A1c; Future    Hypertension associated with diabetes  -     Comprehensive metabolic panel; Future    Severe obesity (BMI 35.0-35.9 with comorbidity)    Chronic combined systolic and diastolic CHF (congestive heart failure)    Coronary artery disease of native artery of native heart with stable angina pectoris    Ventricular tachycardia    DM type 2 without retinopathy  -     Comprehensive metabolic panel;  Future  -     Hemoglobin A1c; Future    Diabetes mellitus type 2 without retinopathy  -     Comprehensive metabolic panel; Future  -     Hemoglobin A1c; Future    Type 2 diabetes mellitus with diabetic neuropathy, with long-term current use of insulin  -     Comprehensive metabolic panel; Future  -     Hemoglobin A1c; Future    Dyslipidemia associated with type 2 diabetes mellitus  -     Comprehensive metabolic panel; Future    ICD (implantable cardioverter-defibrillator), single, in situ    Ischemic cardiomyopathy    Ventricular tachycardia, nonsustained post-MI    Coronary artery disease involving native coronary artery of native heart without angina pectoris  -     Comprehensive metabolic panel; Future    Severe obesity (BMI 35.0-39.9) with comorbidity      DM II controlled/hyperglycemia  -reports improving since started insulin  -lantus and novolog to continue  -strict diet control    HTN  -controlled    HLD  -controlled    Combined CHF  -follows cardiology  -on external defibrillator    Neuropathy  -continue neurontin and increase dose to 600 mg BID    Obesity  -diet and exercise as tolerated    chronic gout  -uric acid levels  -conchicine as needed    Spent adequate time in obtaining history and explaining differentials    40 minutes spent during this visit of which greater than 50% devoted to face-face counseling and coordination of care regarding diagnosis and management plan    RTC 3 MONTHS OR PRN

## 2019-03-22 DIAGNOSIS — E11.9 DIABETES MELLITUS TYPE 2 WITHOUT RETINOPATHY: ICD-10-CM

## 2019-03-22 DIAGNOSIS — E11.40 TYPE 2 DIABETES MELLITUS WITH DIABETIC NEUROPATHY, WITH LONG-TERM CURRENT USE OF INSULIN: ICD-10-CM

## 2019-03-22 DIAGNOSIS — Z79.4 TYPE 2 DIABETES MELLITUS WITH DIABETIC NEUROPATHY, WITH LONG-TERM CURRENT USE OF INSULIN: ICD-10-CM

## 2019-03-26 ENCOUNTER — PATIENT MESSAGE (OUTPATIENT)
Dept: FAMILY MEDICINE | Facility: CLINIC | Age: 69
End: 2019-03-26

## 2019-03-27 DIAGNOSIS — F51.01 PRIMARY INSOMNIA: ICD-10-CM

## 2019-03-27 RX ORDER — ZOLPIDEM TARTRATE 5 MG/1
TABLET ORAL
Qty: 90 TABLET | Refills: 0 | Status: SHIPPED | OUTPATIENT
Start: 2019-03-27 | End: 2019-04-10 | Stop reason: SDUPTHER

## 2019-03-27 RX ORDER — INSULIN ASPART 100 [IU]/ML
10 INJECTION, SOLUTION INTRAVENOUS; SUBCUTANEOUS
Qty: 45 ML | Refills: 10 | Status: SHIPPED | OUTPATIENT
Start: 2019-03-27 | End: 2019-07-29 | Stop reason: SDUPTHER

## 2019-04-10 ENCOUNTER — PATIENT MESSAGE (OUTPATIENT)
Dept: FAMILY MEDICINE | Facility: CLINIC | Age: 69
End: 2019-04-10

## 2019-04-10 DIAGNOSIS — F51.01 PRIMARY INSOMNIA: ICD-10-CM

## 2019-04-11 RX ORDER — ZOLPIDEM TARTRATE 5 MG/1
TABLET ORAL
Qty: 90 TABLET | Refills: 0 | Status: SHIPPED | OUTPATIENT
Start: 2019-04-11 | End: 2019-07-18 | Stop reason: SDUPTHER

## 2019-04-18 DIAGNOSIS — Z79.4 TYPE 2 DIABETES MELLITUS WITH DIABETIC NEUROPATHY, WITH LONG-TERM CURRENT USE OF INSULIN: ICD-10-CM

## 2019-04-18 DIAGNOSIS — E11.9 DIABETES MELLITUS TYPE 2 WITHOUT RETINOPATHY: ICD-10-CM

## 2019-04-18 DIAGNOSIS — E11.40 TYPE 2 DIABETES MELLITUS WITH DIABETIC NEUROPATHY, WITH LONG-TERM CURRENT USE OF INSULIN: ICD-10-CM

## 2019-04-18 RX ORDER — PEN NEEDLE, DIABETIC 30 GX3/16"
NEEDLE, DISPOSABLE MISCELLANEOUS
Qty: 360 EACH | Refills: 2 | Status: SHIPPED | OUTPATIENT
Start: 2019-04-18 | End: 2019-05-13 | Stop reason: SDUPTHER

## 2019-04-18 RX ORDER — INSULIN GLARGINE 100 [IU]/ML
INJECTION, SOLUTION SUBCUTANEOUS
Qty: 30 ML | Refills: 1 | Status: SHIPPED | OUTPATIENT
Start: 2019-04-18 | End: 2019-04-19 | Stop reason: SDUPTHER

## 2019-04-19 RX ORDER — INSULIN ASPART 100 [IU]/ML
10 INJECTION, SOLUTION INTRAVENOUS; SUBCUTANEOUS
Qty: 45 ML | Refills: 10 | Status: CANCELLED | OUTPATIENT
Start: 2019-04-19

## 2019-04-19 RX ORDER — INSULIN GLARGINE 100 [IU]/ML
INJECTION, SOLUTION SUBCUTANEOUS
Qty: 30 ML | Refills: 3 | Status: SHIPPED | OUTPATIENT
Start: 2019-04-19 | End: 2019-07-29 | Stop reason: SDUPTHER

## 2019-04-22 RX ORDER — TRAZODONE HYDROCHLORIDE 50 MG/1
50 TABLET ORAL NIGHTLY
Qty: 90 TABLET | Refills: 3 | Status: ON HOLD | OUTPATIENT
Start: 2019-04-22 | End: 2019-11-11

## 2019-04-26 ENCOUNTER — PATIENT MESSAGE (OUTPATIENT)
Dept: FAMILY MEDICINE | Facility: CLINIC | Age: 69
End: 2019-04-26

## 2019-04-26 DIAGNOSIS — I34.0 MITRAL VALVE INSUFFICIENCY, UNSPECIFIED ETIOLOGY: ICD-10-CM

## 2019-04-26 DIAGNOSIS — I50.41 ACUTE COMBINED SYSTOLIC AND DIASTOLIC CONGESTIVE HEART FAILURE: ICD-10-CM

## 2019-04-26 DIAGNOSIS — I10 ESSENTIAL HYPERTENSION: ICD-10-CM

## 2019-04-26 DIAGNOSIS — E78.5 HYPERLIPIDEMIA, UNSPECIFIED HYPERLIPIDEMIA TYPE: ICD-10-CM

## 2019-04-29 DIAGNOSIS — E11.40 TYPE 2 DIABETES MELLITUS WITH DIABETIC NEUROPATHY: ICD-10-CM

## 2019-04-29 RX ORDER — CARVEDILOL 12.5 MG/1
12.5 TABLET ORAL 2 TIMES DAILY
Qty: 180 TABLET | Refills: 3 | Status: SHIPPED | OUTPATIENT
Start: 2019-04-29 | End: 2019-05-08 | Stop reason: SDUPTHER

## 2019-04-29 RX ORDER — ATORVASTATIN CALCIUM 40 MG/1
40 TABLET, FILM COATED ORAL DAILY
Qty: 90 TABLET | Refills: 3 | Status: SHIPPED | OUTPATIENT
Start: 2019-04-29 | End: 2019-05-08 | Stop reason: SDUPTHER

## 2019-04-29 RX ORDER — METFORMIN HYDROCHLORIDE 1000 MG/1
1000 TABLET ORAL 2 TIMES DAILY WITH MEALS
Qty: 180 TABLET | Refills: 1 | Status: SHIPPED | OUTPATIENT
Start: 2019-04-29 | End: 2019-08-19 | Stop reason: SDUPTHER

## 2019-04-30 ENCOUNTER — CLINICAL SUPPORT (OUTPATIENT)
Dept: CARDIOLOGY | Facility: HOSPITAL | Age: 69
End: 2019-04-30
Attending: INTERNAL MEDICINE
Payer: MEDICARE

## 2019-04-30 DIAGNOSIS — Z95.810 AICD (AUTOMATIC CARDIOVERTER/DEFIBRILLATOR) PRESENT: ICD-10-CM

## 2019-04-30 DIAGNOSIS — I25.5 ISCHEMIC CARDIOMYOPATHY: ICD-10-CM

## 2019-04-30 PROCEDURE — 93296 REM INTERROG EVL PM/IDS: CPT | Mod: HCNC

## 2019-05-01 ENCOUNTER — PATIENT MESSAGE (OUTPATIENT)
Dept: FAMILY MEDICINE | Facility: CLINIC | Age: 69
End: 2019-05-01

## 2019-05-01 DIAGNOSIS — M65.9 SYNOVITIS OF KNEE: ICD-10-CM

## 2019-05-01 RX ORDER — COLCHICINE 0.6 MG/1
0.6 TABLET ORAL DAILY
Qty: 30 TABLET | Refills: 0 | Status: SHIPPED | OUTPATIENT
Start: 2019-05-01 | End: 2019-05-08 | Stop reason: SDUPTHER

## 2019-05-08 ENCOUNTER — PATIENT MESSAGE (OUTPATIENT)
Dept: FAMILY MEDICINE | Facility: CLINIC | Age: 69
End: 2019-05-08

## 2019-05-08 ENCOUNTER — PATIENT MESSAGE (OUTPATIENT)
Dept: CARDIOLOGY | Facility: CLINIC | Age: 69
End: 2019-05-08

## 2019-05-08 DIAGNOSIS — I34.0 MITRAL VALVE INSUFFICIENCY, UNSPECIFIED ETIOLOGY: ICD-10-CM

## 2019-05-08 DIAGNOSIS — I50.41 ACUTE COMBINED SYSTOLIC AND DIASTOLIC CONGESTIVE HEART FAILURE: ICD-10-CM

## 2019-05-08 DIAGNOSIS — E78.5 HYPERLIPIDEMIA, UNSPECIFIED HYPERLIPIDEMIA TYPE: ICD-10-CM

## 2019-05-08 DIAGNOSIS — M65.9 SYNOVITIS OF KNEE: ICD-10-CM

## 2019-05-08 DIAGNOSIS — I10 ESSENTIAL HYPERTENSION: ICD-10-CM

## 2019-05-08 RX ORDER — COLCHICINE 0.6 MG/1
0.6 TABLET ORAL DAILY
Qty: 30 TABLET | Refills: 0 | Status: SHIPPED | OUTPATIENT
Start: 2019-05-08 | End: 2019-07-29 | Stop reason: SDUPTHER

## 2019-05-13 DIAGNOSIS — R73.9 HYPERGLYCEMIA: ICD-10-CM

## 2019-05-13 DIAGNOSIS — Z79.4 TYPE 2 DIABETES MELLITUS WITH DIABETIC NEUROPATHY, WITH LONG-TERM CURRENT USE OF INSULIN: ICD-10-CM

## 2019-05-13 DIAGNOSIS — E11.40 TYPE 2 DIABETES MELLITUS WITH DIABETIC NEUROPATHY, WITH LONG-TERM CURRENT USE OF INSULIN: ICD-10-CM

## 2019-05-13 RX ORDER — FUROSEMIDE 20 MG/1
20 TABLET ORAL 2 TIMES DAILY
Qty: 180 TABLET | Refills: 3 | Status: SHIPPED | OUTPATIENT
Start: 2019-05-13 | End: 2019-08-27 | Stop reason: SDUPTHER

## 2019-05-13 RX ORDER — ATORVASTATIN CALCIUM 40 MG/1
40 TABLET, FILM COATED ORAL DAILY
Qty: 90 TABLET | Refills: 3 | Status: SHIPPED | OUTPATIENT
Start: 2019-05-13 | End: 2019-07-29 | Stop reason: SDUPTHER

## 2019-05-13 RX ORDER — ISOPROPYL ALCOHOL 70 ML/100ML
SWAB TOPICAL
Qty: 400 EACH | Refills: 3 | Status: SHIPPED | OUTPATIENT
Start: 2019-05-13 | End: 2019-07-29 | Stop reason: SDUPTHER

## 2019-05-13 RX ORDER — ISOPROPYL ALCOHOL 70 ML/100ML
SWAB TOPICAL
Qty: 400 EACH | Refills: 3 | Status: SHIPPED | OUTPATIENT
Start: 2019-05-13 | End: 2019-05-13 | Stop reason: SDUPTHER

## 2019-05-13 RX ORDER — LOSARTAN POTASSIUM 50 MG/1
50 TABLET ORAL DAILY
Qty: 90 TABLET | Refills: 3 | Status: SHIPPED | OUTPATIENT
Start: 2019-05-13 | End: 2020-02-19

## 2019-05-13 RX ORDER — DEXTROSE 4 G
TABLET,CHEWABLE ORAL
Qty: 1 EACH | Refills: 0 | Status: SHIPPED | OUTPATIENT
Start: 2019-05-13 | End: 2019-07-29 | Stop reason: SDUPTHER

## 2019-05-13 RX ORDER — CLOPIDOGREL BISULFATE 75 MG/1
75 TABLET ORAL DAILY
Qty: 90 TABLET | Refills: 3 | Status: SHIPPED | OUTPATIENT
Start: 2019-05-13 | End: 2020-05-20 | Stop reason: SDUPTHER

## 2019-05-13 RX ORDER — LANCETS
1 EACH MISCELLANEOUS 3 TIMES DAILY
Qty: 300 EACH | Refills: 1 | Status: SHIPPED | OUTPATIENT
Start: 2019-05-13 | End: 2019-08-19 | Stop reason: SDUPTHER

## 2019-05-13 RX ORDER — CARVEDILOL 12.5 MG/1
12.5 TABLET ORAL 2 TIMES DAILY
Qty: 180 TABLET | Refills: 3 | Status: SHIPPED | OUTPATIENT
Start: 2019-05-13 | End: 2019-07-29 | Stop reason: SDUPTHER

## 2019-05-13 RX ORDER — DEXTROSE 4 G
TABLET,CHEWABLE ORAL
Qty: 1 EACH | Refills: 0 | Status: SHIPPED | OUTPATIENT
Start: 2019-05-13 | End: 2019-05-13 | Stop reason: SDUPTHER

## 2019-05-13 RX ORDER — PEN NEEDLE, DIABETIC 30 GX3/16"
NEEDLE, DISPOSABLE MISCELLANEOUS
Qty: 360 EACH | Refills: 2 | Status: SHIPPED | OUTPATIENT
Start: 2019-05-13 | End: 2019-07-29 | Stop reason: SDUPTHER

## 2019-05-13 RX ORDER — NITROGLYCERIN 0.4 MG/1
0.4 TABLET SUBLINGUAL EVERY 5 MIN PRN
Qty: 25 TABLET | Refills: 3 | Status: SHIPPED | OUTPATIENT
Start: 2019-05-13 | End: 2020-07-20 | Stop reason: SDUPTHER

## 2019-05-13 NOTE — TELEPHONE ENCOUNTER
"Clifton Wilson would like a refill of the following medications:         lancets (ACCU-CHEK SOFTCLIX LANCETS) Misc [Aidee Ashford MD]     Preferred pharmacy: [facility] [address] [place] [address]   Delivery method: Pickup       Medication renewals requested in this message routed separately:         pen needle, diabetic 32 gauge x 1/5" Ndle [Britton Grissom MD]         ACCU-CHEK CATHRYN PLUS METER Misc [Britton Grissom MD]         BD ALCOHOL SWABS PadM [Britton Grissom MD]         pen needle, diabetic (BD ULTRA-FINE MINI PEN NEEDLE) 31 gauge x 3/16" Ndroshni [Britton Grissom MD]   "

## 2019-05-13 NOTE — TELEPHONE ENCOUNTER
"----- Message from Radha Aguirre sent at 5/13/2019  1:44 PM CDT -----  Contact: [facility] [phone]  Pharmacy would like to speak with you about switching pen needle, diabetic (NOVOTWIST) 32 gauge x 1/5" Ndle to the [facility] brand. Please advise.   "

## 2019-06-18 ENCOUNTER — LAB VISIT (OUTPATIENT)
Dept: LAB | Facility: HOSPITAL | Age: 69
End: 2019-06-18
Attending: FAMILY MEDICINE
Payer: MEDICARE

## 2019-06-18 DIAGNOSIS — E11.59 HYPERTENSION ASSOCIATED WITH DIABETES: ICD-10-CM

## 2019-06-18 DIAGNOSIS — E11.40 TYPE 2 DIABETES MELLITUS WITH DIABETIC NEUROPATHY, WITH LONG-TERM CURRENT USE OF INSULIN: ICD-10-CM

## 2019-06-18 DIAGNOSIS — E11.9 DIABETES MELLITUS TYPE 2 WITHOUT RETINOPATHY: ICD-10-CM

## 2019-06-18 DIAGNOSIS — I25.10 CORONARY ARTERY DISEASE INVOLVING NATIVE CORONARY ARTERY OF NATIVE HEART WITHOUT ANGINA PECTORIS: ICD-10-CM

## 2019-06-18 DIAGNOSIS — E78.5 DYSLIPIDEMIA ASSOCIATED WITH TYPE 2 DIABETES MELLITUS: ICD-10-CM

## 2019-06-18 DIAGNOSIS — Z79.4 TYPE 2 DIABETES MELLITUS WITH DIABETIC NEUROPATHY, WITH LONG-TERM CURRENT USE OF INSULIN: ICD-10-CM

## 2019-06-18 DIAGNOSIS — E11.9 DM TYPE 2 WITHOUT RETINOPATHY: ICD-10-CM

## 2019-06-18 DIAGNOSIS — I15.2 HYPERTENSION ASSOCIATED WITH DIABETES: ICD-10-CM

## 2019-06-18 DIAGNOSIS — E11.69 DYSLIPIDEMIA ASSOCIATED WITH TYPE 2 DIABETES MELLITUS: ICD-10-CM

## 2019-06-18 LAB
ALBUMIN SERPL BCP-MCNC: 4 G/DL (ref 3.5–5.2)
ALP SERPL-CCNC: 64 U/L (ref 55–135)
ALT SERPL W/O P-5'-P-CCNC: 42 U/L (ref 10–44)
ANION GAP SERPL CALC-SCNC: 10 MMOL/L (ref 8–16)
AST SERPL-CCNC: 25 U/L (ref 10–40)
BILIRUB SERPL-MCNC: 1.4 MG/DL (ref 0.1–1)
BUN SERPL-MCNC: 21 MG/DL (ref 8–23)
CALCIUM SERPL-MCNC: 9.1 MG/DL (ref 8.7–10.5)
CHLORIDE SERPL-SCNC: 104 MMOL/L (ref 95–110)
CO2 SERPL-SCNC: 28 MMOL/L (ref 23–29)
CREAT SERPL-MCNC: 1.1 MG/DL (ref 0.5–1.4)
EST. GFR  (AFRICAN AMERICAN): >60 ML/MIN/1.73 M^2
EST. GFR  (NON AFRICAN AMERICAN): >60 ML/MIN/1.73 M^2
ESTIMATED AVG GLUCOSE: 186 MG/DL (ref 68–131)
GLUCOSE SERPL-MCNC: 163 MG/DL (ref 70–110)
HBA1C MFR BLD HPLC: 8.1 % (ref 4–5.6)
POTASSIUM SERPL-SCNC: 3.9 MMOL/L (ref 3.5–5.1)
PROT SERPL-MCNC: 7.3 G/DL (ref 6–8.4)
SODIUM SERPL-SCNC: 142 MMOL/L (ref 136–145)

## 2019-06-18 PROCEDURE — 36415 COLL VENOUS BLD VENIPUNCTURE: CPT | Mod: HCNC

## 2019-06-18 PROCEDURE — 80053 COMPREHEN METABOLIC PANEL: CPT | Mod: HCNC

## 2019-06-18 PROCEDURE — 83036 HEMOGLOBIN GLYCOSYLATED A1C: CPT | Mod: HCNC

## 2019-06-28 ENCOUNTER — PATIENT MESSAGE (OUTPATIENT)
Dept: FAMILY MEDICINE | Facility: CLINIC | Age: 69
End: 2019-06-28

## 2019-06-28 NOTE — TELEPHONE ENCOUNTER
Spoke to pt and told him that that appt time may no longer be available. Pt verbalized understanding.

## 2019-07-18 ENCOUNTER — OFFICE VISIT (OUTPATIENT)
Dept: FAMILY MEDICINE | Facility: CLINIC | Age: 69
End: 2019-07-18
Attending: FAMILY MEDICINE
Payer: MEDICARE

## 2019-07-18 VITALS
OXYGEN SATURATION: 98 % | SYSTOLIC BLOOD PRESSURE: 130 MMHG | DIASTOLIC BLOOD PRESSURE: 80 MMHG | WEIGHT: 248 LBS | HEART RATE: 71 BPM | HEIGHT: 71 IN | BODY MASS INDEX: 34.72 KG/M2

## 2019-07-18 DIAGNOSIS — F51.01 PRIMARY INSOMNIA: ICD-10-CM

## 2019-07-18 DIAGNOSIS — Z79.4 TYPE 2 DIABETES MELLITUS WITH DIABETIC NEUROPATHY, WITH LONG-TERM CURRENT USE OF INSULIN: Primary | ICD-10-CM

## 2019-07-18 DIAGNOSIS — E11.40 TYPE 2 DIABETES MELLITUS WITH DIABETIC NEUROPATHY, WITH LONG-TERM CURRENT USE OF INSULIN: Primary | ICD-10-CM

## 2019-07-18 DIAGNOSIS — E11.59 HYPERTENSION ASSOCIATED WITH DIABETES: ICD-10-CM

## 2019-07-18 DIAGNOSIS — I15.2 HYPERTENSION ASSOCIATED WITH DIABETES: ICD-10-CM

## 2019-07-18 DIAGNOSIS — E78.5 DYSLIPIDEMIA ASSOCIATED WITH TYPE 2 DIABETES MELLITUS: ICD-10-CM

## 2019-07-18 DIAGNOSIS — I25.118 CORONARY ARTERY DISEASE OF NATIVE ARTERY OF NATIVE HEART WITH STABLE ANGINA PECTORIS: ICD-10-CM

## 2019-07-18 DIAGNOSIS — Z95.810 ICD (IMPLANTABLE CARDIOVERTER-DEFIBRILLATOR), SINGLE, IN SITU: ICD-10-CM

## 2019-07-18 DIAGNOSIS — E11.69 DYSLIPIDEMIA ASSOCIATED WITH TYPE 2 DIABETES MELLITUS: ICD-10-CM

## 2019-07-18 DIAGNOSIS — I50.42 CHRONIC COMBINED SYSTOLIC AND DIASTOLIC CHF (CONGESTIVE HEART FAILURE): ICD-10-CM

## 2019-07-18 DIAGNOSIS — Z12.5 ENCOUNTER FOR SCREENING FOR MALIGNANT NEOPLASM OF PROSTATE: ICD-10-CM

## 2019-07-18 PROBLEM — Z12.11 SCREENING FOR MALIGNANT NEOPLASM OF COLON: Status: RESOLVED | Noted: 2018-08-10 | Resolved: 2019-07-18

## 2019-07-18 PROBLEM — E66.01 SEVERE OBESITY (BMI 35.0-39.9) WITH COMORBIDITY: Status: ACTIVE | Noted: 2019-07-18

## 2019-07-18 PROBLEM — E66.01 SEVERE OBESITY (BMI 35.0-39.9) WITH COMORBIDITY: Status: RESOLVED | Noted: 2019-03-20 | Resolved: 2019-07-18

## 2019-07-18 PROBLEM — E66.01 SEVERE OBESITY (BMI 35.0-39.9) WITH COMORBIDITY: Status: RESOLVED | Noted: 2019-07-18 | Resolved: 2019-07-18

## 2019-07-18 PROBLEM — E66.01 SEVERE OBESITY (BMI 35.0-35.9 WITH COMORBIDITY): Status: RESOLVED | Noted: 2018-12-20 | Resolved: 2019-07-18

## 2019-07-18 PROCEDURE — 3079F DIAST BP 80-89 MM HG: CPT | Mod: CPTII,S$GLB,, | Performed by: FAMILY MEDICINE

## 2019-07-18 PROCEDURE — 3075F PR MOST RECENT SYSTOLIC BLOOD PRESS GE 130-139MM HG: ICD-10-PCS | Mod: CPTII,S$GLB,, | Performed by: FAMILY MEDICINE

## 2019-07-18 PROCEDURE — 99999 PR PBB SHADOW E&M-EST. PATIENT-LVL III: ICD-10-PCS | Mod: PBBFAC,HCNC,, | Performed by: FAMILY MEDICINE

## 2019-07-18 PROCEDURE — 99999 PR PBB SHADOW E&M-EST. PATIENT-LVL III: CPT | Mod: PBBFAC,HCNC,, | Performed by: FAMILY MEDICINE

## 2019-07-18 PROCEDURE — 3075F SYST BP GE 130 - 139MM HG: CPT | Mod: CPTII,S$GLB,, | Performed by: FAMILY MEDICINE

## 2019-07-18 PROCEDURE — 3045F PR MOST RECENT HEMOGLOBIN A1C LEVEL 7.0-9.0%: CPT | Mod: CPTII,S$GLB,, | Performed by: FAMILY MEDICINE

## 2019-07-18 PROCEDURE — 99214 PR OFFICE/OUTPT VISIT, EST, LEVL IV, 30-39 MIN: ICD-10-PCS | Mod: S$GLB,,, | Performed by: FAMILY MEDICINE

## 2019-07-18 PROCEDURE — 1101F PT FALLS ASSESS-DOCD LE1/YR: CPT | Mod: CPTII,S$GLB,, | Performed by: FAMILY MEDICINE

## 2019-07-18 PROCEDURE — 3079F PR MOST RECENT DIASTOLIC BLOOD PRESSURE 80-89 MM HG: ICD-10-PCS | Mod: CPTII,S$GLB,, | Performed by: FAMILY MEDICINE

## 2019-07-18 PROCEDURE — 1101F PR PT FALLS ASSESS DOC 0-1 FALLS W/OUT INJ PAST YR: ICD-10-PCS | Mod: CPTII,S$GLB,, | Performed by: FAMILY MEDICINE

## 2019-07-18 PROCEDURE — 99214 OFFICE O/P EST MOD 30 MIN: CPT | Mod: S$GLB,,, | Performed by: FAMILY MEDICINE

## 2019-07-18 PROCEDURE — 3045F PR MOST RECENT HEMOGLOBIN A1C LEVEL 7.0-9.0%: ICD-10-PCS | Mod: CPTII,S$GLB,, | Performed by: FAMILY MEDICINE

## 2019-07-18 RX ORDER — ZOLPIDEM TARTRATE 5 MG/1
TABLET ORAL
Qty: 90 TABLET | Refills: 0 | Status: SHIPPED | OUTPATIENT
Start: 2019-07-18 | End: 2020-01-11 | Stop reason: SDUPTHER

## 2019-07-18 NOTE — PROGRESS NOTES
Subjective:       Patient ID: Clifton Wilson is a 68 y.o. male.    Chief Complaint: No chief complaint on file.    67 yr old pleasant white male with DM II, HTN, HLD, CAD, Combined chronic CHF, MR, obesity, neuropathy, presents today for diabetes. No new complaints today.      DM II - worsening - A1C                   8.1 (H)             06/18/2019                   - on metformin and insulin and sugars improving - UTD eye exam - foot exam UTD - on ASA and plavix. Losartan. He ate too much jamie cake during mardi gras.      HTN - chronic - controlled - on losartan, lasix - compliant - no side effects      HLD - chronic -      LDLCALC                  50.8 (L)            03/18/2019                                        - controlled -       CAD/combined CHF - EF 35-40% - on external defibrillator - medical management - on ASA/plavix/ARB - no symptoms - following cardiology    Gout - improving - uses colchicine for flare up -     History as below - reviewed            Diabetes   He presents for his follow-up diabetic visit. He has type 2 diabetes mellitus. No MedicAlert identification noted. The initial diagnosis of diabetes was made 27 years ago. His disease course has been worsening. Hypoglycemia symptoms include sleepiness. Pertinent negatives for hypoglycemia include no confusion, dizziness, headaches, hunger, mood changes, nervousness/anxiousness, pallor, seizures, speech difficulty, sweats or tremors. Associated symptoms include foot paresthesias and weakness. Pertinent negatives for diabetes include no blurred vision, no chest pain, no fatigue, no foot ulcerations, no polydipsia, no polyphagia, no polyuria, no visual change and no weight loss. Hypoglycemia complications include nocturnal hypoglycemia. Pertinent negatives for hypoglycemia complications include no blackouts, no hospitalization, no required assistance and no required glucagon injection. Symptoms are stable. Diabetic complications include heart  disease, impotence, peripheral neuropathy and PVD. Pertinent negatives for diabetic complications include no autonomic neuropathy, CVA, nephropathy or retinopathy. Risk factors for coronary artery disease include family history, hypertension, obesity, diabetes mellitus and male sex. Current diabetic treatment includes diet, insulin injections and oral agent (dual therapy). He is compliant with treatment all of the time. He is currently taking insulin pre-breakfast, pre-lunch, pre-dinner and at bedtime. Insulin injections are given by patient. Rotation sites for injection include the abdominal wall, arms and thighs. His weight is fluctuating minimally. He is following a diabetic, generally healthy and low salt diet. Meal planning includes avoidance of concentrated sweets and carbohydrate counting. He has not had a previous visit with a dietitian. He participates in exercise three times a week. He monitors blood glucose at home 3-4 x per day. He monitors urine at home <1 x per month. Blood glucose monitoring compliance is good. His home blood glucose trend is fluctuating minimally. An ACE inhibitor/angiotensin II receptor blocker is being taken. He does not see a podiatrist.Eye exam is current.   Swelling   This is a chronic problem. The current episode started more than 1 month ago. The problem occurs intermittently. The problem has been gradually worsening. Associated symptoms include arthralgias, joint swelling and weakness. Pertinent negatives include no chest pain, congestion, coughing, diaphoresis, fatigue, headaches, myalgias, neck pain, rash, visual change or vomiting.   Hypertension   This is a chronic problem. The current episode started more than 1 year ago. The problem has been gradually improving since onset. The problem is controlled. Pertinent negatives include no blurred vision, chest pain, headaches, malaise/fatigue, neck pain, palpitations, peripheral edema, PND or sweats. Risk factors for coronary  artery disease include diabetes mellitus, dyslipidemia, male gender and obesity. Past treatments include angiotensin blockers and diuretics. The current treatment provides significant improvement. There are no compliance problems.  Hypertensive end-organ damage includes CAD/MI, heart failure and PVD. There is no history of CVA, left ventricular hypertrophy or retinopathy. There is no history of chronic renal disease, hypercortisolism, hyperparathyroidism, pheochromocytoma, renovascular disease or a thyroid problem.   Hyperlipidemia   This is a chronic problem. The current episode started more than 1 year ago. The problem is controlled. Recent lipid tests were reviewed and are normal. Exacerbating diseases include obesity. He has no history of chronic renal disease or diabetes. There are no known factors aggravating his hyperlipidemia. Pertinent negatives include no chest pain, focal sensory loss or myalgias. Current antihyperlipidemic treatment includes statins. The current treatment provides moderate improvement of lipids. There are no compliance problems.  Risk factors for coronary artery disease include diabetes mellitus, dyslipidemia, male sex, hypertension and obesity.     Review of Systems   Constitutional: Negative.  Negative for activity change, diaphoresis, fatigue, malaise/fatigue, unexpected weight change and weight loss.   HENT: Negative.  Negative for congestion, ear pain, mouth sores, rhinorrhea and voice change.    Eyes: Negative.  Negative for blurred vision, pain, discharge and visual disturbance.   Respiratory: Negative.  Negative for apnea, cough and wheezing.    Cardiovascular: Negative.  Negative for chest pain, palpitations and PND.   Gastrointestinal: Negative.  Negative for abdominal distention, anal bleeding, diarrhea and vomiting.   Endocrine: Negative.  Negative for cold intolerance, polydipsia, polyphagia and polyuria.   Genitourinary: Positive for impotence. Negative for decreased urine  volume, difficulty urinating, discharge, frequency and scrotal swelling.   Musculoskeletal: Positive for arthralgias and joint swelling. Negative for back pain, myalgias, neck pain and neck stiffness.   Skin: Negative.  Negative for color change, pallor and rash.   Allergic/Immunologic: Negative.  Negative for environmental allergies and immunocompromised state.   Neurological: Positive for weakness. Negative for dizziness, tremors, seizures, speech difficulty, light-headedness and headaches.   Hematological: Negative.    Psychiatric/Behavioral: Negative.  Negative for agitation, confusion, dysphoric mood and suicidal ideas. The patient is not nervous/anxious.        PMH/PSH/FH/SH/MED/ALLERGY reviewed    Objective:       Vitals:    07/18/19 0831   BP: 130/80   Pulse: 71       Physical Exam   Constitutional: He is oriented to person, place, and time. He appears well-developed and well-nourished.   HENT:   Head: Normocephalic and atraumatic.   Right Ear: External ear normal.   Left Ear: External ear normal.   Nose: Nose normal.   Mouth/Throat: Oropharynx is clear and moist. No oropharyngeal exudate.   Eyes: Pupils are equal, round, and reactive to light. Conjunctivae and EOM are normal. Right eye exhibits no discharge. Left eye exhibits no discharge. No scleral icterus.   Neck: Normal range of motion. Neck supple. No JVD present. No tracheal deviation present. No thyromegaly present.   Cardiovascular: Normal rate, regular rhythm, normal heart sounds and intact distal pulses. Exam reveals no gallop and no friction rub.   No murmur heard.  Pulmonary/Chest: Effort normal and breath sounds normal. No stridor. No respiratory distress. He has no wheezes. He has no rales. He exhibits no tenderness.   Abdominal: Soft. Bowel sounds are normal. He exhibits no distension and no mass. There is no tenderness. There is no rebound and no guarding. No hernia.   Musculoskeletal: Normal range of motion. He exhibits no edema or  tenderness.   Lymphadenopathy:     He has no cervical adenopathy.   Neurological: He is alert and oriented to person, place, and time. He has normal reflexes. He displays normal reflexes. No cranial nerve deficit. He exhibits normal muscle tone. Coordination normal.   Skin: Skin is warm and dry. No rash noted. No erythema. No pallor.   Psychiatric: He has a normal mood and affect. His behavior is normal. Judgment and thought content normal.       Assessment:       1. Type 2 diabetes mellitus with diabetic neuropathy, with long-term current use of insulin    2. IDDM (insulin dependent diabetes mellitus)    3. ICD (implantable cardioverter-defibrillator), single, in situ    4. Hypertension associated with diabetes    5. Dyslipidemia associated with type 2 diabetes mellitus    6. Coronary artery disease of native artery of native heart with stable angina pectoris    7. Chronic combined systolic and diastolic CHF (congestive heart failure)    8. Primary insomnia        Plan:           Diagnoses and all orders for this visit:    Type 2 diabetes mellitus with diabetic neuropathy, with long-term current use of insulin  -     Comprehensive metabolic panel; Future  -     Hemoglobin A1c; Future    IDDM (insulin dependent diabetes mellitus)  -     Comprehensive metabolic panel; Future  -     Hemoglobin A1c; Future    ICD (implantable cardioverter-defibrillator), single, in situ  -     Brain natriuretic peptide; Future  -     Transthoracic Echo (TTE) Complete 2D; Future    Hypertension associated with diabetes  -     Comprehensive metabolic panel; Future    Dyslipidemia associated with type 2 diabetes mellitus  -     Comprehensive metabolic panel; Future    Coronary artery disease of native artery of native heart with stable angina pectoris    Chronic combined systolic and diastolic CHF (congestive heart failure)  -     Brain natriuretic peptide; Future  -     Transthoracic Echo (TTE) Complete 2D; Future    Primary insomnia  -      zolpidem (AMBIEN) 5 MG Tab; TAKE 1 TABLET EVERY NIGHT AS NEEDED      DM II controlled/hyperglycemia  -reports improving since started insulin  -lantus and novolog to continue  -strict diet control    HTN  -controlled    HLD  -controlled    Combined CHF  -follows cardiology  -on external defibrillator    Neuropathy  -continue neurontin and increase dose to 600 mg BID    Obesity  -diet and exercise as tolerated    chronic gout  -uric acid levels  -conchicine as needed    Spent adequate time in obtaining history and explaining differentials    40 minutes spent during this visit of which greater than 50% devoted to face-face counseling and coordination of care regarding diagnosis and management plan      Follow up in about 4 weeks (around 8/15/2019), or if symptoms worsen or fail to improve.

## 2019-07-24 DIAGNOSIS — G62.9 NEUROPATHY: ICD-10-CM

## 2019-07-25 ENCOUNTER — HOSPITAL ENCOUNTER (OUTPATIENT)
Dept: CARDIOLOGY | Facility: HOSPITAL | Age: 69
Discharge: HOME | End: 2019-07-25
Attending: FAMILY MEDICINE
Payer: MEDICARE

## 2019-07-25 ENCOUNTER — PATIENT MESSAGE (OUTPATIENT)
Dept: FAMILY MEDICINE | Facility: CLINIC | Age: 69
End: 2019-07-25

## 2019-07-25 DIAGNOSIS — I50.42 CHRONIC COMBINED SYSTOLIC AND DIASTOLIC CHF (CONGESTIVE HEART FAILURE): ICD-10-CM

## 2019-07-25 DIAGNOSIS — Z95.810 ICD (IMPLANTABLE CARDIOVERTER-DEFIBRILLATOR), SINGLE, IN SITU: ICD-10-CM

## 2019-07-25 LAB
AORTIC ROOT ANNULUS: 3.44 CM
AORTIC VALVE CUSP SEPERATION: 2.08 CM
AV INDEX (PROSTH): 0.86
AV MEAN GRADIENT: 2 MMHG
AV PEAK GRADIENT: 5 MMHG
AV VALVE AREA: 3.22 CM2
AV VELOCITY RATIO: 0.98
CV ECHO LV RWT: 0.46 CM
DOP CALC AO PEAK VEL: 1.1 M/S
DOP CALC AO VTI: 21.29 CM
DOP CALC LVOT AREA: 3.7 CM2
DOP CALC LVOT DIAMETER: 2.18 CM
DOP CALC LVOT PEAK VEL: 1.08 M/S
DOP CALC LVOT STROKE VOLUME: 68.64 CM3
DOP CALCLVOT PEAK VEL VTI: 18.4 CM
E WAVE DECELERATION TIME: 382.92 MSEC
E/A RATIO: 0.74
ECHO LV POSTERIOR WALL: 1.25 CM (ref 0.6–1.1)
FRACTIONAL SHORTENING: 17 % (ref 28–44)
INTERVENTRICULAR SEPTUM: 1.59 CM (ref 0.6–1.1)
IVRT: 0.07 MSEC
LA MAJOR: 4.61 CM
LA MINOR: 4.86 CM
LA WIDTH: 2.63 CM
LEFT ATRIUM SIZE: 4.27 CM
LEFT ATRIUM VOLUME: 45.17 CM3
LEFT INTERNAL DIMENSION IN SYSTOLE: 4.55 CM (ref 2.1–4)
LEFT VENTRICLE DIASTOLIC VOLUME: 145 ML
LEFT VENTRICLE SYSTOLIC VOLUME: 94.99 ML
LEFT VENTRICULAR INTERNAL DIMENSION IN DIASTOLE: 5.46 CM (ref 3.5–6)
LEFT VENTRICULAR MASS: 340.9 G
MV PEAK A VEL: 0.58 M/S
MV PEAK E VEL: 0.43 M/S
PISA TR MAX VEL: 0.9 M/S
PV PEAK VELOCITY: 0.9 CM/S
RA MAJOR: 4.33 CM
RA PRESSURE: 8 MMHG
RIGHT VENTRICULAR END-DIASTOLIC DIMENSION: 3.44 CM
TR MAX PG: 3 MMHG
TV REST PULMONARY ARTERY PRESSURE: 11 MMHG

## 2019-07-25 PROCEDURE — 93306 TRANSTHORACIC ECHO (TTE) COMPLETE (CUPID ONLY): ICD-10-PCS | Mod: 26,,, | Performed by: STUDENT IN AN ORGANIZED HEALTH CARE EDUCATION/TRAINING PROGRAM

## 2019-07-25 PROCEDURE — 93306 TTE W/DOPPLER COMPLETE: CPT | Mod: 26,,, | Performed by: STUDENT IN AN ORGANIZED HEALTH CARE EDUCATION/TRAINING PROGRAM

## 2019-07-25 PROCEDURE — 93306 TTE W/DOPPLER COMPLETE: CPT

## 2019-07-25 RX ORDER — GABAPENTIN 300 MG/1
CAPSULE ORAL
Qty: 360 CAPSULE | Refills: 1 | Status: SHIPPED | OUTPATIENT
Start: 2019-07-25 | End: 2019-07-29 | Stop reason: SDUPTHER

## 2019-07-29 DIAGNOSIS — I10 ESSENTIAL HYPERTENSION: ICD-10-CM

## 2019-07-29 DIAGNOSIS — Z79.4 TYPE 2 DIABETES MELLITUS WITH DIABETIC NEUROPATHY, WITH LONG-TERM CURRENT USE OF INSULIN: ICD-10-CM

## 2019-07-29 DIAGNOSIS — M65.9 SYNOVITIS OF KNEE: ICD-10-CM

## 2019-07-29 DIAGNOSIS — E11.9 DIABETES MELLITUS TYPE 2 WITHOUT RETINOPATHY: ICD-10-CM

## 2019-07-29 DIAGNOSIS — I50.41 ACUTE COMBINED SYSTOLIC AND DIASTOLIC CONGESTIVE HEART FAILURE: ICD-10-CM

## 2019-07-29 DIAGNOSIS — G62.9 NEUROPATHY: ICD-10-CM

## 2019-07-29 DIAGNOSIS — I34.0 MITRAL VALVE INSUFFICIENCY, UNSPECIFIED ETIOLOGY: ICD-10-CM

## 2019-07-29 DIAGNOSIS — E78.5 HYPERLIPIDEMIA, UNSPECIFIED HYPERLIPIDEMIA TYPE: ICD-10-CM

## 2019-07-29 DIAGNOSIS — E11.40 TYPE 2 DIABETES MELLITUS WITH DIABETIC NEUROPATHY, WITH LONG-TERM CURRENT USE OF INSULIN: ICD-10-CM

## 2019-07-29 RX ORDER — INSULIN ASPART 100 [IU]/ML
10 INJECTION, SOLUTION INTRAVENOUS; SUBCUTANEOUS
Qty: 45 ML | Refills: 10 | Status: SHIPPED | OUTPATIENT
Start: 2019-07-29 | End: 2019-08-19 | Stop reason: SDUPTHER

## 2019-07-29 RX ORDER — ISOPROPYL ALCOHOL 70 ML/100ML
SWAB TOPICAL
Qty: 400 EACH | Refills: 3 | Status: SHIPPED | OUTPATIENT
Start: 2019-07-29 | End: 2021-08-03 | Stop reason: SDUPTHER

## 2019-07-29 RX ORDER — ATORVASTATIN CALCIUM 40 MG/1
40 TABLET, FILM COATED ORAL DAILY
Qty: 90 TABLET | Refills: 3 | Status: SHIPPED | OUTPATIENT
Start: 2019-07-29 | End: 2019-08-16 | Stop reason: SDUPTHER

## 2019-07-29 RX ORDER — DEXTROSE 4 G
TABLET,CHEWABLE ORAL
Qty: 1 EACH | Refills: 0 | Status: SHIPPED | OUTPATIENT
Start: 2019-07-29 | End: 2021-07-27 | Stop reason: SDUPTHER

## 2019-07-29 RX ORDER — CARVEDILOL 12.5 MG/1
12.5 TABLET ORAL 2 TIMES DAILY
Qty: 180 TABLET | Refills: 3 | Status: SHIPPED | OUTPATIENT
Start: 2019-07-29 | End: 2020-05-20 | Stop reason: SDUPTHER

## 2019-07-29 RX ORDER — COLCHICINE 0.6 MG/1
0.6 TABLET ORAL DAILY
Qty: 30 TABLET | Refills: 0 | Status: SHIPPED | OUTPATIENT
Start: 2019-07-29 | End: 2019-11-27 | Stop reason: SDUPTHER

## 2019-07-29 RX ORDER — INSULIN GLARGINE 100 [IU]/ML
INJECTION, SOLUTION SUBCUTANEOUS
Qty: 30 ML | Refills: 3 | Status: SHIPPED | OUTPATIENT
Start: 2019-07-29 | End: 2019-08-16 | Stop reason: SDUPTHER

## 2019-07-29 RX ORDER — PEN NEEDLE, DIABETIC 30 GX3/16"
NEEDLE, DISPOSABLE MISCELLANEOUS
Qty: 360 EACH | Refills: 2 | Status: SHIPPED | OUTPATIENT
Start: 2019-07-29 | End: 2019-12-26

## 2019-07-29 RX ORDER — ATORVASTATIN CALCIUM 40 MG/1
40 TABLET, FILM COATED ORAL DAILY
Qty: 90 TABLET | Refills: 3 | Status: SHIPPED | OUTPATIENT
Start: 2019-07-29 | End: 2020-05-20 | Stop reason: SDUPTHER

## 2019-07-29 RX ORDER — GABAPENTIN 300 MG/1
600 CAPSULE ORAL 2 TIMES DAILY
Qty: 360 CAPSULE | Refills: 1 | Status: SHIPPED | OUTPATIENT
Start: 2019-07-29 | End: 2019-09-21 | Stop reason: SDUPTHER

## 2019-07-29 RX ORDER — CARVEDILOL 12.5 MG/1
12.5 TABLET ORAL 2 TIMES DAILY
Qty: 180 TABLET | Refills: 3 | Status: SHIPPED | OUTPATIENT
Start: 2019-07-29 | End: 2019-08-16 | Stop reason: SDUPTHER

## 2019-07-30 ENCOUNTER — CLINICAL SUPPORT (OUTPATIENT)
Dept: CARDIOLOGY | Facility: HOSPITAL | Age: 69
End: 2019-07-30
Attending: INTERNAL MEDICINE
Payer: MEDICARE

## 2019-07-30 DIAGNOSIS — E11.9 DIABETES MELLITUS TYPE 2 WITHOUT RETINOPATHY: ICD-10-CM

## 2019-07-30 DIAGNOSIS — Z95.810 AICD (AUTOMATIC CARDIOVERTER/DEFIBRILLATOR) PRESENT: ICD-10-CM

## 2019-07-30 DIAGNOSIS — M65.9 SYNOVITIS OF KNEE: ICD-10-CM

## 2019-07-30 DIAGNOSIS — Z79.4 TYPE 2 DIABETES MELLITUS WITH DIABETIC NEUROPATHY, WITH LONG-TERM CURRENT USE OF INSULIN: ICD-10-CM

## 2019-07-30 DIAGNOSIS — E11.40 TYPE 2 DIABETES MELLITUS WITH DIABETIC NEUROPATHY, WITH LONG-TERM CURRENT USE OF INSULIN: ICD-10-CM

## 2019-07-30 DIAGNOSIS — I25.5 ISCHEMIC CARDIOMYOPATHY: ICD-10-CM

## 2019-07-30 PROCEDURE — 93296 REM INTERROG EVL PM/IDS: CPT

## 2019-07-30 RX ORDER — INSULIN ASPART 100 [IU]/ML
10 INJECTION, SOLUTION INTRAVENOUS; SUBCUTANEOUS
Qty: 45 ML | Refills: 3 | Status: SHIPPED | OUTPATIENT
Start: 2019-07-30 | End: 2019-08-16 | Stop reason: SDUPTHER

## 2019-07-30 RX ORDER — DEXTROSE 4 G
TABLET,CHEWABLE ORAL
Qty: 1 EACH | Refills: 0 | Status: SHIPPED | OUTPATIENT
Start: 2019-07-30 | End: 2020-05-20 | Stop reason: SDUPTHER

## 2019-07-30 RX ORDER — COLCHICINE 0.6 MG/1
0.6 TABLET ORAL DAILY
Qty: 90 TABLET | Refills: 0 | Status: SHIPPED | OUTPATIENT
Start: 2019-07-30 | End: 2019-08-16 | Stop reason: SDUPTHER

## 2019-07-30 RX ORDER — INSULIN GLARGINE 100 [IU]/ML
INJECTION, SOLUTION SUBCUTANEOUS
Qty: 30 ML | Refills: 3 | Status: SHIPPED | OUTPATIENT
Start: 2019-07-30 | End: 2019-08-19 | Stop reason: SDUPTHER

## 2019-07-30 RX ORDER — ISOPROPYL ALCOHOL 70 ML/100ML
SWAB TOPICAL
Qty: 400 EACH | Refills: 1 | Status: SHIPPED | OUTPATIENT
Start: 2019-07-30 | End: 2019-08-19 | Stop reason: SDUPTHER

## 2019-07-30 RX ORDER — PEN NEEDLE, DIABETIC 30 GX3/16"
NEEDLE, DISPOSABLE MISCELLANEOUS
Qty: 360 EACH | Refills: 1 | Status: SHIPPED | OUTPATIENT
Start: 2019-07-30 | End: 2019-08-16 | Stop reason: SDUPTHER

## 2019-08-07 ENCOUNTER — PATIENT MESSAGE (OUTPATIENT)
Dept: ELECTROPHYSIOLOGY | Facility: CLINIC | Age: 69
End: 2019-08-07

## 2019-08-07 ENCOUNTER — TELEPHONE (OUTPATIENT)
Dept: ELECTROPHYSIOLOGY | Facility: CLINIC | Age: 69
End: 2019-08-07

## 2019-08-07 DIAGNOSIS — I25.5 ISCHEMIC CARDIOMYOPATHY: ICD-10-CM

## 2019-08-07 DIAGNOSIS — I50.42 CHRONIC COMBINED SYSTOLIC AND DIASTOLIC CHF (CONGESTIVE HEART FAILURE): Primary | ICD-10-CM

## 2019-08-07 DIAGNOSIS — I47.20 VENTRICULAR TACHYCARDIA: ICD-10-CM

## 2019-08-16 ENCOUNTER — OFFICE VISIT (OUTPATIENT)
Dept: FAMILY MEDICINE | Facility: CLINIC | Age: 69
End: 2019-08-16
Attending: FAMILY MEDICINE
Payer: MEDICARE

## 2019-08-16 VITALS
DIASTOLIC BLOOD PRESSURE: 60 MMHG | HEIGHT: 71 IN | HEART RATE: 68 BPM | WEIGHT: 245.81 LBS | SYSTOLIC BLOOD PRESSURE: 100 MMHG | BODY MASS INDEX: 34.41 KG/M2 | OXYGEN SATURATION: 96 %

## 2019-08-16 DIAGNOSIS — I25.118 CORONARY ARTERY DISEASE OF NATIVE ARTERY OF NATIVE HEART WITH STABLE ANGINA PECTORIS: ICD-10-CM

## 2019-08-16 DIAGNOSIS — I50.42 CHRONIC COMBINED SYSTOLIC AND DIASTOLIC CHF (CONGESTIVE HEART FAILURE): ICD-10-CM

## 2019-08-16 DIAGNOSIS — E11.9 DIABETES MELLITUS TYPE 2 WITHOUT RETINOPATHY: Primary | ICD-10-CM

## 2019-08-16 DIAGNOSIS — E11.59 HYPERTENSION ASSOCIATED WITH DIABETES: ICD-10-CM

## 2019-08-16 DIAGNOSIS — E11.69 DYSLIPIDEMIA ASSOCIATED WITH TYPE 2 DIABETES MELLITUS: ICD-10-CM

## 2019-08-16 DIAGNOSIS — E66.9 OBESITY (BMI 35.0-39.9 WITHOUT COMORBIDITY): ICD-10-CM

## 2019-08-16 DIAGNOSIS — Z95.810 ICD (IMPLANTABLE CARDIOVERTER-DEFIBRILLATOR), SINGLE, IN SITU: ICD-10-CM

## 2019-08-16 DIAGNOSIS — E11.40 TYPE 2 DIABETES MELLITUS WITH DIABETIC NEUROPATHY, WITH LONG-TERM CURRENT USE OF INSULIN: ICD-10-CM

## 2019-08-16 DIAGNOSIS — Z79.4 TYPE 2 DIABETES MELLITUS WITH DIABETIC NEUROPATHY, WITH LONG-TERM CURRENT USE OF INSULIN: ICD-10-CM

## 2019-08-16 DIAGNOSIS — E78.5 DYSLIPIDEMIA ASSOCIATED WITH TYPE 2 DIABETES MELLITUS: ICD-10-CM

## 2019-08-16 DIAGNOSIS — I15.2 HYPERTENSION ASSOCIATED WITH DIABETES: ICD-10-CM

## 2019-08-16 PROBLEM — R07.9 CHEST PAIN: Status: RESOLVED | Noted: 2018-04-23 | Resolved: 2019-08-16

## 2019-08-16 PROCEDURE — 99214 PR OFFICE/OUTPT VISIT, EST, LEVL IV, 30-39 MIN: ICD-10-PCS | Mod: HCNC,S$GLB,, | Performed by: FAMILY MEDICINE

## 2019-08-16 PROCEDURE — 3045F PR MOST RECENT HEMOGLOBIN A1C LEVEL 7.0-9.0%: ICD-10-PCS | Mod: HCNC,CPTII,S$GLB, | Performed by: FAMILY MEDICINE

## 2019-08-16 PROCEDURE — 99999 PR PBB SHADOW E&M-EST. PATIENT-LVL III: CPT | Mod: PBBFAC,HCNC,, | Performed by: FAMILY MEDICINE

## 2019-08-16 PROCEDURE — 1101F PT FALLS ASSESS-DOCD LE1/YR: CPT | Mod: HCNC,CPTII,S$GLB, | Performed by: FAMILY MEDICINE

## 2019-08-16 PROCEDURE — 3074F PR MOST RECENT SYSTOLIC BLOOD PRESSURE < 130 MM HG: ICD-10-PCS | Mod: HCNC,CPTII,S$GLB, | Performed by: FAMILY MEDICINE

## 2019-08-16 PROCEDURE — 3078F DIAST BP <80 MM HG: CPT | Mod: HCNC,CPTII,S$GLB, | Performed by: FAMILY MEDICINE

## 2019-08-16 PROCEDURE — 99999 PR PBB SHADOW E&M-EST. PATIENT-LVL III: ICD-10-PCS | Mod: PBBFAC,HCNC,, | Performed by: FAMILY MEDICINE

## 2019-08-16 PROCEDURE — 3074F SYST BP LT 130 MM HG: CPT | Mod: HCNC,CPTII,S$GLB, | Performed by: FAMILY MEDICINE

## 2019-08-16 PROCEDURE — 1101F PR PT FALLS ASSESS DOC 0-1 FALLS W/OUT INJ PAST YR: ICD-10-PCS | Mod: HCNC,CPTII,S$GLB, | Performed by: FAMILY MEDICINE

## 2019-08-16 PROCEDURE — 3045F PR MOST RECENT HEMOGLOBIN A1C LEVEL 7.0-9.0%: CPT | Mod: HCNC,CPTII,S$GLB, | Performed by: FAMILY MEDICINE

## 2019-08-16 PROCEDURE — 3078F PR MOST RECENT DIASTOLIC BLOOD PRESSURE < 80 MM HG: ICD-10-PCS | Mod: HCNC,CPTII,S$GLB, | Performed by: FAMILY MEDICINE

## 2019-08-16 PROCEDURE — 99214 OFFICE O/P EST MOD 30 MIN: CPT | Mod: HCNC,S$GLB,, | Performed by: FAMILY MEDICINE

## 2019-08-16 NOTE — PROGRESS NOTES
Subjective:       Patient ID: Clifton Wilson is a 68 y.o. male.    Chief Complaint: Diabetes    67 yr old pleasant white male with DM II, HTN, HLD, CAD, Combined chronic CHF, MR, obesity, neuropathy, presents today for diabetes. No new complaints today.      DM II - worsening - A1C                   8.0 (H)             07/25/2019                  - on metformin and insulin and sugars improving - UTD eye exam - foot exam UTD - on ASA and plavix. Losartan. He ate too much jamie cake during mardi gras.      HTN - chronic - controlled - on losartan, lasix - compliant - no side effects      HLD - chronic -      LDLCALC                  50.8 (L)            03/18/2019                                        - controlled -       CAD/combined CHF - EF 35-40% - on external defibrillator - medical management - on ASA/plavix/ARB - no symptoms - following cardiology    Gout - improving - uses colchicine for flare up -     History as below - reviewed            Diabetes   He presents for his follow-up diabetic visit. He has type 2 diabetes mellitus. No MedicAlert identification noted. The initial diagnosis of diabetes was made 27 years ago. His disease course has been worsening. Hypoglycemia symptoms include sleepiness. Pertinent negatives for hypoglycemia include no confusion, dizziness, headaches, hunger, mood changes, nervousness/anxiousness, pallor, seizures, speech difficulty, sweats or tremors. Associated symptoms include foot paresthesias and polyuria. Pertinent negatives for diabetes include no blurred vision, no chest pain, no fatigue, no foot ulcerations, no polydipsia, no polyphagia, no visual change, no weakness and no weight loss. Hypoglycemia complications include blackouts and hospitalization. Pertinent negatives for hypoglycemia complications include no nocturnal hypoglycemia, no required assistance and no required glucagon injection. Symptoms are stable. Diabetic complications include autonomic neuropathy, heart  disease, impotence and peripheral neuropathy. Pertinent negatives for diabetic complications include no CVA, nephropathy, PVD or retinopathy. Risk factors for coronary artery disease include hypertension, obesity, diabetes mellitus and male sex. Current diabetic treatment includes diet, insulin injections and oral agent (monotherapy). He is compliant with treatment all of the time. He is currently taking insulin pre-breakfast, pre-lunch, pre-dinner and at bedtime. Insulin injections are given by patient. Rotation sites for injection include the abdominal wall, arms and thighs. His weight is fluctuating minimally. He is following a diabetic, generally healthy, low fat/cholesterol and low salt diet. Meal planning includes avoidance of concentrated sweets and carbohydrate counting. He has not had a previous visit with a dietitian. He participates in exercise three times a week. He monitors blood glucose at home 3-4 x per day. He monitors urine at home <1 x per month. Blood glucose monitoring compliance is excellent. His home blood glucose trend is decreasing steadily. An ACE inhibitor/angiotensin II receptor blocker is being taken. He does not see a podiatrist.Eye exam is current.   Swelling   This is a chronic problem. The current episode started more than 1 month ago. The problem occurs intermittently. The problem has been gradually worsening. Associated symptoms include arthralgias and joint swelling. Pertinent negatives include no chest pain, congestion, coughing, diaphoresis, fatigue, headaches, myalgias, neck pain, rash, visual change, vomiting or weakness.   Hypertension   This is a chronic problem. The current episode started more than 1 year ago. The problem has been gradually improving since onset. The problem is controlled. Pertinent negatives include no blurred vision, chest pain, headaches, malaise/fatigue, neck pain, palpitations, peripheral edema, PND or sweats. Risk factors for coronary artery disease  include diabetes mellitus, dyslipidemia, male gender and obesity. Past treatments include angiotensin blockers and diuretics. The current treatment provides significant improvement. There are no compliance problems.  Hypertensive end-organ damage includes CAD/MI and heart failure. There is no history of CVA, left ventricular hypertrophy, PVD or retinopathy. There is no history of chronic renal disease, hypercortisolism, hyperparathyroidism, pheochromocytoma, renovascular disease or a thyroid problem.   Hyperlipidemia   This is a chronic problem. The current episode started more than 1 year ago. The problem is controlled. Recent lipid tests were reviewed and are normal. Exacerbating diseases include obesity. He has no history of chronic renal disease or diabetes. There are no known factors aggravating his hyperlipidemia. Pertinent negatives include no chest pain, focal sensory loss or myalgias. Current antihyperlipidemic treatment includes statins. The current treatment provides moderate improvement of lipids. There are no compliance problems.  Risk factors for coronary artery disease include diabetes mellitus, dyslipidemia, male sex, hypertension and obesity.     Review of Systems   Constitutional: Negative.  Negative for activity change, diaphoresis, fatigue, malaise/fatigue, unexpected weight change and weight loss.   HENT: Negative.  Negative for congestion, ear pain, mouth sores, rhinorrhea and voice change.    Eyes: Negative.  Negative for blurred vision, pain, discharge and visual disturbance.   Respiratory: Negative.  Negative for apnea, cough and wheezing.    Cardiovascular: Negative.  Negative for chest pain, palpitations and PND.   Gastrointestinal: Negative.  Negative for abdominal distention, anal bleeding, diarrhea and vomiting.   Endocrine: Positive for polyuria. Negative for cold intolerance, polydipsia and polyphagia.   Genitourinary: Positive for impotence. Negative for decreased urine volume,  difficulty urinating, discharge, frequency and scrotal swelling.   Musculoskeletal: Positive for arthralgias and joint swelling. Negative for back pain, myalgias, neck pain and neck stiffness.   Skin: Negative.  Negative for color change, pallor and rash.   Allergic/Immunologic: Negative.  Negative for environmental allergies and immunocompromised state.   Neurological: Negative.  Negative for dizziness, tremors, seizures, speech difficulty, weakness, light-headedness and headaches.   Hematological: Negative.    Psychiatric/Behavioral: Negative.  Negative for agitation, confusion, dysphoric mood and suicidal ideas. The patient is not nervous/anxious.        PMH/PSH/FH/SH/MED/ALLERGY reviewed    Objective:       Vitals:    08/16/19 0952   BP: 100/60   Pulse: 68       Physical Exam   Constitutional: He is oriented to person, place, and time. He appears well-developed and well-nourished.   HENT:   Head: Normocephalic and atraumatic.   Right Ear: External ear normal.   Left Ear: External ear normal.   Nose: Nose normal.   Mouth/Throat: Oropharynx is clear and moist. No oropharyngeal exudate.   Eyes: Pupils are equal, round, and reactive to light. Conjunctivae and EOM are normal. Right eye exhibits no discharge. Left eye exhibits no discharge. No scleral icterus.   Neck: Normal range of motion. Neck supple. No JVD present. No tracheal deviation present. No thyromegaly present.   Cardiovascular: Normal rate, regular rhythm, normal heart sounds and intact distal pulses. Exam reveals no gallop and no friction rub.   No murmur heard.  Pulmonary/Chest: Effort normal and breath sounds normal. No stridor. No respiratory distress. He has no wheezes. He has no rales. He exhibits no tenderness.   Abdominal: Soft. Bowel sounds are normal. He exhibits no distension and no mass. There is no tenderness. There is no rebound and no guarding. No hernia.   Musculoskeletal: Normal range of motion. He exhibits no edema or tenderness.    Lymphadenopathy:     He has no cervical adenopathy.   Neurological: He is alert and oriented to person, place, and time. He has normal reflexes. He displays normal reflexes. No cranial nerve deficit. He exhibits normal muscle tone. Coordination normal.   Skin: Skin is warm and dry. No rash noted. No erythema. No pallor.   Psychiatric: He has a normal mood and affect. His behavior is normal. Judgment and thought content normal.       Assessment:       1. Diabetes mellitus type 2 without retinopathy    2. Coronary artery disease of native artery of native heart with stable angina pectoris    3. Chronic combined systolic and diastolic CHF (congestive heart failure)    4. Dyslipidemia associated with type 2 diabetes mellitus    5. Hypertension associated with diabetes    6. IDDM (insulin dependent diabetes mellitus)    7. ICD (implantable cardioverter-defibrillator), single, in situ    8. Obesity (BMI 35.0-39.9 without comorbidity)    9. Type 2 diabetes mellitus with diabetic neuropathy, with long-term current use of insulin        Plan:           Clifton was seen today for diabetes.    Diagnoses and all orders for this visit:    Diabetes mellitus type 2 without retinopathy  -     CBC auto differential; Future  -     Comprehensive metabolic panel; Future  -     Hemoglobin A1c; Future    Coronary artery disease of native artery of native heart with stable angina pectoris  -     CBC auto differential; Future  -     Comprehensive metabolic panel; Future    Chronic combined systolic and diastolic CHF (congestive heart failure)  -     CBC auto differential; Future  -     Comprehensive metabolic panel; Future    Dyslipidemia associated with type 2 diabetes mellitus  -     CBC auto differential; Future  -     Comprehensive metabolic panel; Future    Hypertension associated with diabetes  -     CBC auto differential; Future  -     Comprehensive metabolic panel; Future    IDDM (insulin dependent diabetes mellitus)  -     CBC  auto differential; Future  -     Comprehensive metabolic panel; Future  -     Hemoglobin A1c; Future    ICD (implantable cardioverter-defibrillator), single, in situ    Obesity (BMI 35.0-39.9 without comorbidity)    Type 2 diabetes mellitus with diabetic neuropathy, with long-term current use of insulin  -     CBC auto differential; Future  -     Comprehensive metabolic panel; Future  -     Hemoglobin A1c; Future              DM II controlled/hyperglycemia  -reports improving since started insulin  -lantus and novolog to continue  -strict diet control    HTN  -controlled    HLD  -controlled    Combined CHF  -follows cardiology  -on external defibrillator    Neuropathy  -continue neurontin and increase dose to 600 mg BID    Obesity  -diet and exercise as tolerated    chronic gout  -uric acid levels  -conchicine as needed    Spent adequate time in obtaining history and explaining differentials    40 minutes spent during this visit of which greater than 50% devoted to face-face counseling and coordination of care regarding diagnosis and management plan      Follow up in about 3 months (around 11/16/2019), or if symptoms worsen or fail to improve.

## 2019-08-18 ENCOUNTER — PATIENT MESSAGE (OUTPATIENT)
Dept: FAMILY MEDICINE | Facility: CLINIC | Age: 69
End: 2019-08-18

## 2019-08-19 DIAGNOSIS — E11.40 TYPE 2 DIABETES MELLITUS WITH DIABETIC NEUROPATHY: ICD-10-CM

## 2019-08-19 DIAGNOSIS — E11.9 DIABETES MELLITUS TYPE 2 WITHOUT RETINOPATHY: ICD-10-CM

## 2019-08-19 DIAGNOSIS — E11.40 TYPE 2 DIABETES MELLITUS WITH DIABETIC NEUROPATHY, WITH LONG-TERM CURRENT USE OF INSULIN: ICD-10-CM

## 2019-08-19 DIAGNOSIS — Z79.4 TYPE 2 DIABETES MELLITUS WITH DIABETIC NEUROPATHY, WITH LONG-TERM CURRENT USE OF INSULIN: ICD-10-CM

## 2019-08-19 RX ORDER — INSULIN ASPART 100 [IU]/ML
15 INJECTION, SOLUTION INTRAVENOUS; SUBCUTANEOUS
Qty: 45 ML | Refills: 10 | Status: SHIPPED | OUTPATIENT
Start: 2019-08-19 | End: 2019-08-29 | Stop reason: SDUPTHER

## 2019-08-19 RX ORDER — ISOPROPYL ALCOHOL 70 ML/100ML
SWAB TOPICAL
Qty: 400 EACH | Refills: 1 | Status: SHIPPED | OUTPATIENT
Start: 2019-08-19 | End: 2020-02-13 | Stop reason: SDUPTHER

## 2019-08-19 RX ORDER — LANCETS
1 EACH MISCELLANEOUS 3 TIMES DAILY
Qty: 300 EACH | Refills: 1 | Status: SHIPPED | OUTPATIENT
Start: 2019-08-19 | End: 2020-02-13 | Stop reason: SDUPTHER

## 2019-08-19 RX ORDER — INSULIN GLARGINE 100 [IU]/ML
INJECTION, SOLUTION SUBCUTANEOUS
Qty: 30 ML | Refills: 3 | Status: SHIPPED | OUTPATIENT
Start: 2019-08-19 | End: 2019-08-29 | Stop reason: SDUPTHER

## 2019-08-19 RX ORDER — METFORMIN HYDROCHLORIDE 1000 MG/1
1000 TABLET ORAL 2 TIMES DAILY WITH MEALS
Qty: 180 TABLET | Refills: 1 | Status: SHIPPED | OUTPATIENT
Start: 2019-08-19 | End: 2020-05-20 | Stop reason: SDUPTHER

## 2019-08-27 DIAGNOSIS — I50.41 ACUTE COMBINED SYSTOLIC AND DIASTOLIC CONGESTIVE HEART FAILURE: ICD-10-CM

## 2019-08-27 DIAGNOSIS — I10 ESSENTIAL HYPERTENSION: ICD-10-CM

## 2019-08-27 DIAGNOSIS — I34.0 MITRAL VALVE INSUFFICIENCY, UNSPECIFIED ETIOLOGY: ICD-10-CM

## 2019-08-28 RX ORDER — FUROSEMIDE 20 MG/1
TABLET ORAL
Qty: 180 TABLET | Refills: 3 | Status: SHIPPED | OUTPATIENT
Start: 2019-08-28 | End: 2020-05-20 | Stop reason: SDUPTHER

## 2019-08-29 DIAGNOSIS — E11.9 DIABETES MELLITUS TYPE 2 WITHOUT RETINOPATHY: ICD-10-CM

## 2019-08-29 DIAGNOSIS — E11.40 TYPE 2 DIABETES MELLITUS WITH DIABETIC NEUROPATHY, WITH LONG-TERM CURRENT USE OF INSULIN: ICD-10-CM

## 2019-08-29 DIAGNOSIS — Z79.4 TYPE 2 DIABETES MELLITUS WITH DIABETIC NEUROPATHY, WITH LONG-TERM CURRENT USE OF INSULIN: ICD-10-CM

## 2019-08-29 RX ORDER — INSULIN ASPART 100 [IU]/ML
15 INJECTION, SOLUTION INTRAVENOUS; SUBCUTANEOUS
Qty: 45 ML | Refills: 10 | Status: SHIPPED | OUTPATIENT
Start: 2019-08-29 | End: 2019-09-21 | Stop reason: SDUPTHER

## 2019-08-29 RX ORDER — INSULIN GLARGINE 100 [IU]/ML
INJECTION, SOLUTION SUBCUTANEOUS
Qty: 30 ML | Refills: 3 | Status: SHIPPED | OUTPATIENT
Start: 2019-08-29 | End: 2019-09-21 | Stop reason: SDUPTHER

## 2019-08-29 NOTE — TELEPHONE ENCOUNTER
Spoke to pt and states that he does not need diabetic supplies. Pt needs refills on his Novolog and Lantus to [facility]. Pls advise.

## 2019-08-29 NOTE — TELEPHONE ENCOUNTER
----- Message from Liliana Moran sent at 8/29/2019 10:50 AM CDT -----  Contact: Self [phone]  Patient is calling to get refills on his medication sent to [facility] - [place], [place] - [address] [phone] (Phone)  [phone] (Fax)  Please send clarification on the directions on his diabetic supplies.        1. blood sugar diagnostic (ACCU-CHEK CATHRYN PLUS TEST STRP) Strp 400 strip     2. lancets (ACCU-CHEK SOFTCLIX LANCETS) Misc 300 each

## 2019-09-16 ENCOUNTER — CLINICAL SUPPORT (OUTPATIENT)
Dept: CARDIOLOGY | Facility: HOSPITAL | Age: 69
End: 2019-09-16
Payer: MEDICARE

## 2019-09-16 PROCEDURE — 93295 DEV INTERROG REMOTE 1/2/MLT: CPT | Mod: ,,, | Performed by: INTERNAL MEDICINE

## 2019-09-16 PROCEDURE — 93296 REM INTERROG EVL PM/IDS: CPT | Mod: HCNC | Performed by: INTERNAL MEDICINE

## 2019-09-16 PROCEDURE — 93295 CARDIAC DEVICE CHECK - REMOTE: ICD-10-PCS | Mod: ,,, | Performed by: INTERNAL MEDICINE

## 2019-09-21 DIAGNOSIS — Z79.4 TYPE 2 DIABETES MELLITUS WITH DIABETIC NEUROPATHY, WITH LONG-TERM CURRENT USE OF INSULIN: ICD-10-CM

## 2019-09-21 DIAGNOSIS — G62.9 NEUROPATHY: ICD-10-CM

## 2019-09-21 DIAGNOSIS — E11.40 TYPE 2 DIABETES MELLITUS WITH DIABETIC NEUROPATHY, WITH LONG-TERM CURRENT USE OF INSULIN: ICD-10-CM

## 2019-09-21 DIAGNOSIS — E11.9 DIABETES MELLITUS TYPE 2 WITHOUT RETINOPATHY: ICD-10-CM

## 2019-09-22 RX ORDER — GABAPENTIN 300 MG/1
600 CAPSULE ORAL 2 TIMES DAILY
Qty: 360 CAPSULE | Refills: 1 | Status: SHIPPED | OUTPATIENT
Start: 2019-09-22 | End: 2019-12-02 | Stop reason: SDUPTHER

## 2019-09-22 RX ORDER — INSULIN ASPART 100 [IU]/ML
15 INJECTION, SOLUTION INTRAVENOUS; SUBCUTANEOUS
Qty: 45 ML | Refills: 10 | Status: SHIPPED | OUTPATIENT
Start: 2019-09-22 | End: 2020-01-13 | Stop reason: SDUPTHER

## 2019-09-22 RX ORDER — INSULIN GLARGINE 100 [IU]/ML
INJECTION, SOLUTION SUBCUTANEOUS
Qty: 30 ML | Refills: 3 | Status: SHIPPED | OUTPATIENT
Start: 2019-09-22 | End: 2020-01-13 | Stop reason: SDUPTHER

## 2019-10-09 ENCOUNTER — HOSPITAL ENCOUNTER (OUTPATIENT)
Dept: CARDIOLOGY | Facility: CLINIC | Age: 69
Discharge: HOME | End: 2019-10-09
Payer: MEDICARE

## 2019-10-09 ENCOUNTER — DOCUMENTATION ONLY (OUTPATIENT)
Dept: CARDIOLOGY | Facility: HOSPITAL | Age: 69
End: 2019-10-09

## 2019-10-09 ENCOUNTER — CLINICAL SUPPORT (OUTPATIENT)
Dept: CARDIOLOGY | Facility: HOSPITAL | Age: 69
End: 2019-10-09
Attending: INTERNAL MEDICINE
Payer: MEDICARE

## 2019-10-09 ENCOUNTER — HOSPITAL ENCOUNTER (OUTPATIENT)
Dept: RADIOLOGY | Facility: HOSPITAL | Age: 69
Discharge: HOME | End: 2019-10-09
Attending: NURSE PRACTITIONER
Payer: MEDICARE

## 2019-10-09 ENCOUNTER — OFFICE VISIT (OUTPATIENT)
Dept: ELECTROPHYSIOLOGY | Facility: CLINIC | Age: 69
End: 2019-10-09
Payer: MEDICARE

## 2019-10-09 VITALS
WEIGHT: 245.81 LBS | DIASTOLIC BLOOD PRESSURE: 78 MMHG | SYSTOLIC BLOOD PRESSURE: 112 MMHG | BODY MASS INDEX: 34.41 KG/M2 | HEART RATE: 71 BPM | HEIGHT: 71 IN

## 2019-10-09 DIAGNOSIS — I50.42 CHRONIC COMBINED SYSTOLIC AND DIASTOLIC CHF (CONGESTIVE HEART FAILURE): ICD-10-CM

## 2019-10-09 DIAGNOSIS — Z95.810 AICD (AUTOMATIC CARDIOVERTER/DEFIBRILLATOR) PRESENT: ICD-10-CM

## 2019-10-09 DIAGNOSIS — I25.5 ISCHEMIC CARDIOMYOPATHY: ICD-10-CM

## 2019-10-09 DIAGNOSIS — T82.198A MALFUNCTION OF IMPLANTABLE DEFIBRILLATOR VENTRICULAR (ICD) LEAD: ICD-10-CM

## 2019-10-09 DIAGNOSIS — E66.9 OBESITY (BMI 35.0-39.9 WITHOUT COMORBIDITY): ICD-10-CM

## 2019-10-09 DIAGNOSIS — Z95.810 ICD (IMPLANTABLE CARDIOVERTER-DEFIBRILLATOR), SINGLE, IN SITU: ICD-10-CM

## 2019-10-09 DIAGNOSIS — T82.198A MALFUNCTION OF IMPLANTABLE DEFIBRILLATOR VENTRICULAR (ICD) LEAD: Primary | ICD-10-CM

## 2019-10-09 DIAGNOSIS — I47.20 VENTRICULAR TACHYCARDIA: ICD-10-CM

## 2019-10-09 DIAGNOSIS — I10 ESSENTIAL HYPERTENSION: ICD-10-CM

## 2019-10-09 PROCEDURE — 93005 RHYTHM STRIP: ICD-10-PCS | Mod: S$GLB,,, | Performed by: INTERNAL MEDICINE

## 2019-10-09 PROCEDURE — 93282 PRGRMG EVAL IMPLANTABLE DFB: CPT

## 2019-10-09 PROCEDURE — 3078F PR MOST RECENT DIASTOLIC BLOOD PRESSURE < 80 MM HG: ICD-10-PCS | Mod: CPTII,S$GLB,, | Performed by: NURSE PRACTITIONER

## 2019-10-09 PROCEDURE — 3074F PR MOST RECENT SYSTOLIC BLOOD PRESSURE < 130 MM HG: ICD-10-PCS | Mod: CPTII,S$GLB,, | Performed by: NURSE PRACTITIONER

## 2019-10-09 PROCEDURE — 99215 OFFICE O/P EST HI 40 MIN: CPT | Mod: S$GLB,,, | Performed by: NURSE PRACTITIONER

## 2019-10-09 PROCEDURE — 99999 PR PBB SHADOW E&M-EST. PATIENT-LVL III: CPT | Mod: PBBFAC,,, | Performed by: NURSE PRACTITIONER

## 2019-10-09 PROCEDURE — 1101F PT FALLS ASSESS-DOCD LE1/YR: CPT | Mod: CPTII,S$GLB,, | Performed by: NURSE PRACTITIONER

## 2019-10-09 PROCEDURE — 3074F SYST BP LT 130 MM HG: CPT | Mod: CPTII,S$GLB,, | Performed by: NURSE PRACTITIONER

## 2019-10-09 PROCEDURE — 93010 ELECTROCARDIOGRAM REPORT: CPT | Mod: S$GLB,,, | Performed by: INTERNAL MEDICINE

## 2019-10-09 PROCEDURE — 93010 RHYTHM STRIP: ICD-10-PCS | Mod: S$GLB,,, | Performed by: INTERNAL MEDICINE

## 2019-10-09 PROCEDURE — 99999 PR PBB SHADOW E&M-EST. PATIENT-LVL III: ICD-10-PCS | Mod: PBBFAC,,, | Performed by: NURSE PRACTITIONER

## 2019-10-09 PROCEDURE — 99215 PR OFFICE/OUTPT VISIT, EST, LEVL V, 40-54 MIN: ICD-10-PCS | Mod: S$GLB,,, | Performed by: NURSE PRACTITIONER

## 2019-10-09 PROCEDURE — 71046 X-RAY EXAM CHEST 2 VIEWS: CPT | Mod: TC,FY

## 2019-10-09 PROCEDURE — 93005 ELECTROCARDIOGRAM TRACING: CPT | Mod: S$GLB,,, | Performed by: INTERNAL MEDICINE

## 2019-10-09 PROCEDURE — 71046 XR CHEST PA AND LATERAL: ICD-10-PCS | Mod: 26,,, | Performed by: RADIOLOGY

## 2019-10-09 PROCEDURE — 1101F PR PT FALLS ASSESS DOC 0-1 FALLS W/OUT INJ PAST YR: ICD-10-PCS | Mod: CPTII,S$GLB,, | Performed by: NURSE PRACTITIONER

## 2019-10-09 PROCEDURE — 71046 X-RAY EXAM CHEST 2 VIEWS: CPT | Mod: 26,,, | Performed by: RADIOLOGY

## 2019-10-09 PROCEDURE — 3078F DIAST BP <80 MM HG: CPT | Mod: CPTII,S$GLB,, | Performed by: NURSE PRACTITIONER

## 2019-10-09 NOTE — PROGRESS NOTES
Mr. Wilson is a patient of Dr. Walsh and was last seen in clinic 10/8/2018.      Subjective:   Patient ID:  Clifton Wilson is a 68 y.o. male who presents for follow-up of Cardiomyopathy  .     HPI:    Mr. Wilson is a 68 y.o. male with HTN, HLD, DM, CAD (NSTEMI 2011 and 2015), ICM (EF 35-40%), and ICD (2017) here for annual follow up.     Background:    In hospital post PCI, had NSVT. Was given a LifeVest.  Subsequent LVEF 39%, leading to EPS, which was negative (therefore no ICD then).  Since, has recently had months of persistently low LVEF.   Again referred by Dr Clement for ICD, due to LVEF 25% with ICM.  At his last office visit (04/10/17), Mr. Wilson reported experiencing occasional .   Echo 12/15: 35-40%. 4/16: 25-40%.  3/17 25%  2/16 nuclear ETT neg  Mr. Wilson underwent successful ICD (06/06/17) without complication.     Update (10/09/2019):    Today he says he feels well. No recent CHF exacerbations now that he controls his salt intake. Mr. Wilson denies chest pain with exertion or at rest, palpitations, SOB, , dizziness, or syncope.    Device Interrogation (10/9/2019) reveals sinus rhythm. No arrhythmias or treated episodes were noted. R wave measuring today at 10.1 mV. In clinic, during sensing testing, we had patient take a deep breath and R wave sensing varied from 1.8 mV- 12.0 mV. R wave has been trending down since April of this year, per graph. Threshold could not be captured. And impedance has been trending WNL. Today's impedance read at 430 Ohms.     I have personally reviewed the patient's EKG today, which shows sinus rhythm at 72bpm. NV interval is 160. QRS is 90. QTc is 422.    Recent Cardiac Tests:    2D Echo (7/25/2019):  · Moderately decreased left ventricular systolic function. The estimated ejection fraction is 35%  · Concentric left ventricular hypertrophy.  · Grade I (mild) left ventricular diastolic dysfunction consistent with impaired relaxation.  · Normal right  ventricular systolic function.  · Mild left atrial enlargement.  · The estimated PA systolic pressure is 11 mm Hg  · Intermediate central venous pressure (8 mm Hg).    Current Outpatient Medications   Medication Sig    alcohol swabs (BD ALCOHOL SWABS) PadM USE FOUR TIMES DAILY    alcohol swabs (BD ALCOHOL SWABS) PadM USE FOUR TIMES DAILY    aspirin (ECOTRIN) 81 MG EC tablet Take 81 mg by mouth once daily.    atorvastatin (LIPITOR) 40 MG tablet Take 1 tablet (40 mg total) by mouth once daily.    blood glucose control high,low (ACCU-CHEK CATHRYN CONTROL SOLN) Soln Use as directed to check blood sugar    blood sugar diagnostic (ACCU-CHEK CATHRYN PLUS TEST STRP) Strp CHECK  SUGAR FOUR TIMES DAILY    blood sugar diagnostic (ACCU-CHEK CATHRYN PLUS TEST STRP) Strp 1 each by Misc.(Non-Drug; Combo Route) route 4 (four) times daily.    blood-glucose meter (ACCU-CHEK CATHRYN PLUS METER) Misc USE AS DIRECTED    blood-glucose meter (ACCU-CHEK CATHRYN PLUS METER) Misc USE AS DIRECTED    carvedilol (COREG) 12.5 MG tablet Take 1 tablet (12.5 mg total) by mouth 2 (two) times daily.    clopidogrel (PLAVIX) 75 mg tablet Take 1 tablet (75 mg total) by mouth once daily.    colchicine (COLCRYS) 0.6 mg tablet Take 1 tablet (0.6 mg total) by mouth once daily.    furosemide (LASIX) 20 MG tablet TAKE 1 TABLET TWICE DAILY    gabapentin (NEURONTIN) 300 MG capsule Take 2 capsules (600 mg total) by mouth 2 (two) times daily.    insulin (LANTUS SOLOSTAR U-100 INSULIN) glargine 100 units/mL (3mL) SubQ pen INJECT 35 UNITS SUBCUTANEOUSLY EVERY EVENING    insulin aspart U-100 (NOVOLOG FLEXPEN U-100 INSULIN) 100 unit/mL (3 mL) InPn pen Inject 15 Units into the skin 3 (three) times daily with meals.    lancets (ACCU-CHEK SOFTCLIX LANCETS) Misc 1 Device by Misc.(Non-Drug; Combo Route) route 3 (three) times daily.    losartan (COZAAR) 50 MG tablet Take 1 tablet (50 mg total) by mouth once daily.    metFORMIN (GLUCOPHAGE) 1000 MG tablet Take 1  "tablet (1,000 mg total) by mouth 2 (two) times daily with meals.    nitroGLYCERIN (NITROSTAT) 0.4 MG SL tablet Place 1 tablet (0.4 mg total) under the tongue every 5 (five) minutes as needed for Chest pain.    pen needle, diabetic (BD ULTRA-FINE MINI PEN NEEDLE) 31 gauge x 3/16" Ndle USE FOUR TIMES DAILY    pen needle, diabetic (NOVOTWIST) 32 gauge x 1/5" Ndle Use 4 times/day for insulin administration    polyethylene glycol (GOLYTELY,NULYTELY) 236-22.74-6.74 -5.86 gram suspension Take 4,000 mLs (4 L total) by mouth As instructed.    traZODone (DESYREL) 50 MG tablet Take 1 tablet (50 mg total) by mouth every evening.    zolpidem (AMBIEN) 5 MG Tab TAKE 1 TABLET EVERY NIGHT AS NEEDED    diclofenac sodium 1 % Gel Apply 2 g topically once daily.    phenylephrine (SUDAFED PE) 10 MG Tab     tadalafil (CIALIS) 10 MG tablet Take 1 tablet (10 mg total) by mouth daily as needed for Erectile Dysfunction.     No current facility-administered medications for this visit.        Review of Systems   Constitution: Negative for malaise/fatigue.   Cardiovascular: Negative for chest pain, dyspnea on exertion, irregular heartbeat, leg swelling and palpitations.   Respiratory: Negative for shortness of breath.    Hematologic/Lymphatic: Negative for bleeding problem.   Skin: Negative for rash.   Musculoskeletal: Negative for myalgias.   Gastrointestinal: Negative for hematemesis, hematochezia and nausea.   Genitourinary: Negative for hematuria.   Neurological: Negative for light-headedness.   Psychiatric/Behavioral: Negative for altered mental status.   Allergic/Immunologic: Negative for persistent infections.         Objective:          /78   Pulse 71   Ht 5' 11" (1.803 m)   Wt 111.5 kg (245 lb 13 oz)   BMI 34.28 kg/m²     Physical Exam   Constitutional: He is oriented to person, place, and time. He appears well-developed and well-nourished.   HENT:   Head: Normocephalic.   Nose: Nose normal.   Eyes: Pupils are equal, " round, and reactive to light.   Cardiovascular: Normal rate, regular rhythm, S1 normal and S2 normal.   No murmur heard.  Pulses:       Radial pulses are 2+ on the right side, and 2+ on the left side.   Pulmonary/Chest: Breath sounds normal. No respiratory distress.   Device to LUCW.   Abdominal: Normal appearance.   Musculoskeletal: Normal range of motion. He exhibits no edema.   Neurological: He is alert and oriented to person, place, and time.   Skin: Skin is warm and dry. No erythema.   Psychiatric: He has a normal mood and affect. His speech is normal and behavior is normal.   Nursing note and vitals reviewed.    Lab Results   Component Value Date     07/25/2019    K 4.1 07/25/2019    MG 1.9 11/06/2016    BUN 12 07/25/2019    CREATININE 1.2 07/25/2019    ALT 59 (H) 07/25/2019    AST 40 07/25/2019    HGB 14.2 12/17/2018    HCT 42.0 12/17/2018    TSH 0.861 12/04/2015    LDLCALC 50.8 (L) 03/18/2019       Recent Labs   Lab 11/06/16  1241 06/06/17  1044   INR 1.0 1.0       Assessment:     1. Malfunction of implantable defibrillator ventricular (ICD) lead    2. ICD (implantable cardioverter-defibrillator), single, in situ    3. Essential hypertension    4. Ischemic cardiomyopathy    5. Obesity (BMI 35.0-39.9 without comorbidity)      Plan:     In summary, Mr. Wilson is a 68 y.o. male with HTN, HLD, DM, CAD (NSTEMI 2011 and 2015), ICM (EF 35-40%), and ICD (2017) here for annual follow up.   Patient has been feeling well. R wave measuring today at 10.1 mV. In clinic, during sensing testing, we had patient take a deep breath and R wave sensing varied from 1.8 mV- 12.0 mV. R wave has been trending down since April of this year, per graph. Threshold could not be captured. And impedance has been trending WNL. Today's impedance read at 430 Ohms. Case discussed with Dr. Walsh, who also counseled the patient. Possible lead migration or undiagnosed MI causing lead disruption. Plan will be to cap existing lead and add  new lead. No extraction due to possibility of RV lead perforation given varying R waves with inspiration. CXR today. Tachy therapies will be turned off and Life Vest will be applied. Dr. Walsh discussed the risks, benefits, and alternatives to ICD placement and testing. Discussion of risks included (but was not limited to) the possibility of infection, death, stroke, MI, pneumothorax, embolism, cardiac perforation, cardiac tamponade, renal injury, and bleeding. All questions were answered and Clifton PANDA Viktorianixon verbalized understanding. He wishes to proceed.     CXR PA and LAT  Tachy therapies off and apply Life Vest.  Schedule ICD lead implantation.  RTC as scheduled following procedure.    *A copy of this note has been sent to Dr. Walsh, who counseled the patient*    Follow up as scheduled following procedure.    ------------------------------------------------------------------    OMID Edwards, NP-C  Cardiac Electrophysiology

## 2019-10-09 NOTE — PROGRESS NOTES
Pt's ICD tachy therapies were restored prior to leaving the clinic, Life Vest approval will take approx 4 hrs.  Pt opted to restore therapies and RTC in am.  He was advised by Kellie Juarez RN that his device may not function as expected in the setting of a ventricular arrhythmia.  He and his wife (a retired ICD nurse) verbalized understanding.

## 2019-10-10 ENCOUNTER — CLINICAL SUPPORT (OUTPATIENT)
Dept: CARDIOLOGY | Facility: HOSPITAL | Age: 69
End: 2019-10-10
Attending: INTERNAL MEDICINE
Payer: MEDICARE

## 2019-10-10 DIAGNOSIS — I25.5 ISCHEMIC CARDIOMYOPATHY: ICD-10-CM

## 2019-10-10 DIAGNOSIS — Z95.810 AICD (AUTOMATIC CARDIOVERTER/DEFIBRILLATOR) PRESENT: ICD-10-CM

## 2019-10-10 PROCEDURE — 93282 PRGRMG EVAL IMPLANTABLE DFB: CPT

## 2019-10-11 ENCOUNTER — PATIENT MESSAGE (OUTPATIENT)
Dept: FAMILY MEDICINE | Facility: CLINIC | Age: 69
End: 2019-10-11

## 2019-10-30 ENCOUNTER — PATIENT MESSAGE (OUTPATIENT)
Dept: FAMILY MEDICINE | Facility: CLINIC | Age: 69
End: 2019-10-30

## 2019-11-01 ENCOUNTER — PATIENT MESSAGE (OUTPATIENT)
Dept: ELECTROPHYSIOLOGY | Facility: CLINIC | Age: 69
End: 2019-11-01

## 2019-11-01 DIAGNOSIS — I50.23 ACUTE ON CHRONIC SYSTOLIC (CONGESTIVE) HEART FAILURE: ICD-10-CM

## 2019-11-01 DIAGNOSIS — I50.22 CHRONIC SYSTOLIC (CONGESTIVE) HEART FAILURE: ICD-10-CM

## 2019-11-01 DIAGNOSIS — T82.110A AICD LEAD MALFUNCTION: Primary | ICD-10-CM

## 2019-11-01 NOTE — PROGRESS NOTES
Patient Instructions  Implant of Device      Pre-Procedure Testin/05/19 at 7:10 AM   Pre-procedure labs have been ordered for you at:  [facility]  · Be sure to arrive at your scheduled time.   · You do NOT need to fast for this blood work.       Important Medication Information:    · HOLD (do NOT take) METFORMIN, INSULIN on the day of your procedure.  Your last dose should be taken on 11/10/19.            - Night prior to procedure, if INSULIN taken with evening meal, take as directed. If INSULIN taken at bedtime take 1/2 prescribed dose.   · You may take all other morning medications with a sip of water on the day of your procedure.       Day of Procedure:    19 at 12 Noon - Lead revision   Please report to Cardiology Waiting Room on the 3rd floor of the Hospital    · Do not eat or drink anything after 12 midnight on the night before your procedure.  · Wash your chest with HIBICLENS OR AN ANTIBACTERIAL SOAP (such as Dial) on the night before and the morning of your procedure.  · Please do not wear makeup, especially mascara, when arriving for your procedure.  · You will be SPENDING THE NIGHT AFTER YOUR PROCEDURE  · You will need someone to drive you home from the hospital due to anesthesia.   · You are allowed one family member or friend to stay with you if you are scheduled to stay over night.  There is a pull out chair or sofa in each room for your guest to sleep.  · If you wear a CPAP or BIPAP machine for sleep apnea, please be sure to bring your machine with you if you are scheduled to spend the night.        Directions to Cardiology Waiting Room  If you park in the Parking Garage:  Take elevators to the 2nd floor  Walk up ramp and turn right by Gold Elevators  Take elevator to the 3rd floor  Upon exiting the elevator, turn away from the clinic areas  Walk long trammell around to front of hospital to area with windows overlooking [facility]  Check in at Reception Desk  OR  If family is  dropping you off:  Have them drop you off at the front of the Hospital  (Near the ER, where all the flags are hung).  Take the E elevators to the 3rd floor.  Check in at the Reception Desk in the waiting room.        Post-Procedure Patient Discharge Instructions  Devices    Restrictions   No pushing, pulling, or lifting greater than 5 pounds for six weeks.     No lifting left arm higher than shoulder level for six weeks.  You will wear a sling and swathe for the first two days to restrict arm movement.  If you tend to raise arms above shoulder level during sleep, it is asked you wear sling and swathe at night for six weeks.    No driving until your clinic wound check appointment (will be set up for 7-10 days post implant).   No submerging implant incision site in a tub, pool, or body of water for six weeks.       Wound Care   If you are discharged with a standard dressing over the incision, you may remove the dressing after 24 hours.    If you are discharged with an AQUACEL dressing, you should keep it on until your follow-up appointment in 1-2 weeks.   If there are Steri-strips (strips of tape) over your incision, leave them on until your follow-up appointment. They may begin to fall off on their own, which is normal. If there is Dermabond (clear glue) over your incision, do not scrub it off. It acts as a barrier and will eventually disappear.   You may be discharged with 5 days of oral antibiotics. Please take the full prescription until it is gone.   Do not get the incision wet for 48 hours following the procedure. You may sponge bath during this period, working around the incision. After 48 hours, you may shower, but you should still try to keep this area as dry as possible, and avoid direct water contact to the incision (allow the water to hit back of your shoulder rather than directly on the incision). Gently pat the incision dry if it does get wet.   You may take regular showers after 2 weeks,  unless otherwise indicated at your follow-up visit.   Avoid using deodorants, powders, creams, lotions, etc. on your incision for 6 weeks.   If you notice unusual swelling, redness, drainage, have more device site pain, chest pain, shortness of breath, or have a fever greater than 100 degrees, call our device clinic immediately: [phone] or [phone] during normal office hours. You may call [phone] after-hours or on weekends and ask for the electrophysiologist on call.      Activity   If you only had a battery/generator change performed, there are no postoperative activity restrictions.    If this is your first device or if you had new wires added to your existing device, then you will be discharged in a sling which you should wear continuously for 48 hours. After that, the sling should remain off during the day but should be worn at night for another 2-4 weeks (depending on how active a sleeper you are).   Do not raise your device-side arm above your shoulder for 6 weeks. Do not lift more than 5-10 lbs with your device-side arm for 6 weeks.    If you were driving prior to the procedure, you may resume driving after your first follow-up appointment (1-2 weeks). If you have a history of passing out or a history of certain arrhythmias, there may be driving restrictions unrelated to the procedure. Please clarify with your physician if this is the case.   No heavy activity with the affected arm for 6 weeks (eg. tennis, golf, bowling, aerobics, mowing the lawn, etc.).   Avoid rough contact at the device site for 6 weeks.   You may participate in sexual activity unless otherwise instructed.   You may return to work within 3-5 days unless told otherwise, provided you adhere to the above activity restrictions.    Long-Term Instructions   Keep your pacemaker or defibrillator identification card with you at all times.   If you have a defibrillator and you get shocked by the device: If you  receive one shock and you feel ok, you may call [phone] or [phone] during normal office hours. You may call [phone] after-hours. If you receive one shock and you do not feel well, call Emergency Medical Services. They will take you to the nearest emergency room.   If you have a defibrillator and you get more than one shock from the device or multiple shocks in a short period of time: Call Emergency Medical Services. They will take you to the nearest emergency room.   Appliances: You may operate any electrical device in your home, including microwaves.   Security Systems: Electromagnetic security systems can be located in the workplace, courthouses, or other high-security areas. Exposure to this type of security system has been shown to cause interference in some cases. Interference may be related to the duration of exposure and/or the distance between the device and the security device. You should be aware of the location of security systems, move through them at a normal pace, and avoid leaning or standing too close.   Metal Detectors at Airports: Metal detectors at airports can potentially interfere with pacemakers or defibrillators, although this is unlikely. Metal detectors will likely be triggered by your device and therefore at places such as airports  it will be important for you to carry your identification card for the pacemaker/defibrillator. Airport personnel will likely prefer to do a manual search.   Cellular Phones: It is unlikely that using a cellular phone will interfere with your device. It should be used with the hand opposite to the side where your device was implanted. The phone should not be carried in the shirt pocket on the same side as the device.   Specific Work Conditions: Patients who work near high-voltage lines, transmitting towers, large motors, welding equipment, or powerful magnets should discuss their specific situation with their physician. In general,  remain at least two feet from external electrical equipment, verify that the equipment is properly grounded, and wear insulated gloves when using electrical devices. Leave the immediate location if lightheadedness or other symptoms develop.   MRI: Some pacemakers and defibrillators are safe in MRI scanners, while others are not. Please consult with your physician to see if you have an MRI-compatible device.   Surgery: Should you require surgery in the future, some electrosurgical devices can interfere with your device function. You should discuss this with your surgeon before any operation.   Radiation Therapy: If you ever require radiation therapy in the future, care must be taken to avoid irradiating the device.    Long-Term Follow-Up   Your device has the ability to transmit device information from home to the doctors office using a home monitoring system.   This remote system takes the place of a doctors visit. Your device will be checked from home every 3-6 months. Every 6-12 months, you will be asked to come into the office for an in-office check.   Your device should last in the range of 6-12 years. This depends on many factors including how often it paces the heart.   When the battery is low, a generator change will be performed. This is a same-day procedure with no post-op activity restrictions, unless one of the pacemaker or defibrillator leads needs to be replaced at that time, or a new lead is added to your existing system.      Any need to reschedule or cancel procedures, or any questions regarding your procedures should be addressed directly with the Arrhythmia Department Nurses at the following phone number: [phone].

## 2019-11-04 ENCOUNTER — TELEPHONE (OUTPATIENT)
Dept: FAMILY MEDICINE | Facility: CLINIC | Age: 69
End: 2019-11-04

## 2019-11-04 ENCOUNTER — PATIENT OUTREACH (OUTPATIENT)
Dept: ADMINISTRATIVE | Facility: HOSPITAL | Age: 69
End: 2019-11-04

## 2019-11-04 NOTE — TELEPHONE ENCOUNTER
----- Message from Janeen Casiano sent at 11/4/2019  9:25 AM CST -----  Contact: Patient   Type:  Patient Returning Call    Who Called: Patient     Who Left Message for Patient:  Catherine    Does the patient know what this is regarding?: Eye exam     Would the patient rather a call back or a response via My [facility]? Call back     Best Call Back Number: [phone]      Additional Information: Pt states that his [facility] exam [facility] with [facility].

## 2019-11-05 ENCOUNTER — LAB VISIT (OUTPATIENT)
Dept: LAB | Facility: HOSPITAL | Age: 69
End: 2019-11-05
Attending: FAMILY MEDICINE
Payer: MEDICARE

## 2019-11-05 ENCOUNTER — PATIENT MESSAGE (OUTPATIENT)
Dept: FAMILY MEDICINE | Facility: CLINIC | Age: 69
End: 2019-11-05

## 2019-11-05 DIAGNOSIS — E11.40 TYPE 2 DIABETES MELLITUS WITH DIABETIC NEUROPATHY, WITH LONG-TERM CURRENT USE OF INSULIN: ICD-10-CM

## 2019-11-05 DIAGNOSIS — I50.42 CHRONIC COMBINED SYSTOLIC AND DIASTOLIC CHF (CONGESTIVE HEART FAILURE): ICD-10-CM

## 2019-11-05 DIAGNOSIS — I15.2 HYPERTENSION ASSOCIATED WITH DIABETES: ICD-10-CM

## 2019-11-05 DIAGNOSIS — I25.118 CORONARY ARTERY DISEASE OF NATIVE ARTERY OF NATIVE HEART WITH STABLE ANGINA PECTORIS: ICD-10-CM

## 2019-11-05 DIAGNOSIS — E11.9 DIABETES MELLITUS TYPE 2 WITHOUT RETINOPATHY: ICD-10-CM

## 2019-11-05 DIAGNOSIS — Z79.4 TYPE 2 DIABETES MELLITUS WITH DIABETIC NEUROPATHY, WITH LONG-TERM CURRENT USE OF INSULIN: ICD-10-CM

## 2019-11-05 DIAGNOSIS — I50.23 ACUTE ON CHRONIC SYSTOLIC (CONGESTIVE) HEART FAILURE: ICD-10-CM

## 2019-11-05 DIAGNOSIS — E78.5 DYSLIPIDEMIA ASSOCIATED WITH TYPE 2 DIABETES MELLITUS: ICD-10-CM

## 2019-11-05 DIAGNOSIS — E11.59 HYPERTENSION ASSOCIATED WITH DIABETES: ICD-10-CM

## 2019-11-05 DIAGNOSIS — E11.69 DYSLIPIDEMIA ASSOCIATED WITH TYPE 2 DIABETES MELLITUS: ICD-10-CM

## 2019-11-05 DIAGNOSIS — I50.22 CHRONIC SYSTOLIC (CONGESTIVE) HEART FAILURE: ICD-10-CM

## 2019-11-05 DIAGNOSIS — T82.110A AICD LEAD MALFUNCTION: ICD-10-CM

## 2019-11-05 LAB
ALBUMIN SERPL BCP-MCNC: 4 G/DL (ref 3.5–5.2)
ALP SERPL-CCNC: 79 U/L (ref 55–135)
ALT SERPL W/O P-5'-P-CCNC: 41 U/L (ref 10–44)
ANION GAP SERPL CALC-SCNC: 10 MMOL/L (ref 8–16)
APTT BLDCRRT: 25.7 SEC (ref 21–32)
AST SERPL-CCNC: 24 U/L (ref 10–40)
BASOPHILS # BLD AUTO: 0.04 K/UL (ref 0–0.2)
BASOPHILS NFR BLD: 0.7 % (ref 0–1.9)
BILIRUB SERPL-MCNC: 1.5 MG/DL (ref 0.1–1)
BUN SERPL-MCNC: 15 MG/DL (ref 8–23)
CALCIUM SERPL-MCNC: 9.5 MG/DL (ref 8.7–10.5)
CHLORIDE SERPL-SCNC: 105 MMOL/L (ref 95–110)
CO2 SERPL-SCNC: 29 MMOL/L (ref 23–29)
CREAT SERPL-MCNC: 1 MG/DL (ref 0.5–1.4)
DIFFERENTIAL METHOD: ABNORMAL
EOSINOPHIL # BLD AUTO: 0.3 K/UL (ref 0–0.5)
EOSINOPHIL NFR BLD: 5.1 % (ref 0–8)
ERYTHROCYTE [DISTWIDTH] IN BLOOD BY AUTOMATED COUNT: 12.2 % (ref 11.5–14.5)
EST. GFR  (AFRICAN AMERICAN): >60 ML/MIN/1.73 M^2
EST. GFR  (NON AFRICAN AMERICAN): >60 ML/MIN/1.73 M^2
ESTIMATED AVG GLUCOSE: 160 MG/DL (ref 68–131)
GLUCOSE SERPL-MCNC: 156 MG/DL (ref 70–110)
HBA1C MFR BLD HPLC: 7.2 % (ref 4–5.6)
HCT VFR BLD AUTO: 40.9 % (ref 40–54)
HGB BLD-MCNC: 13.6 G/DL (ref 14–18)
INR PPP: 1 (ref 0.8–1.2)
LYMPHOCYTES # BLD AUTO: 2 K/UL (ref 1–4.8)
LYMPHOCYTES NFR BLD: 33.6 % (ref 18–48)
MCH RBC QN AUTO: 30.9 PG (ref 27–31)
MCHC RBC AUTO-ENTMCNC: 33.3 G/DL (ref 32–36)
MCV RBC AUTO: 93 FL (ref 82–98)
MONOCYTES # BLD AUTO: 0.6 K/UL (ref 0.3–1)
MONOCYTES NFR BLD: 10.3 % (ref 4–15)
NEUTROPHILS # BLD AUTO: 2.9 K/UL (ref 1.8–7.7)
NEUTROPHILS NFR BLD: 50.3 % (ref 38–73)
PLATELET # BLD AUTO: 169 K/UL (ref 150–350)
PMV BLD AUTO: 11.1 FL (ref 9.2–12.9)
POTASSIUM SERPL-SCNC: 4.1 MMOL/L (ref 3.5–5.1)
PROT SERPL-MCNC: 7.3 G/DL (ref 6–8.4)
PROTHROMBIN TIME: 10.4 SEC (ref 9–12.5)
RBC # BLD AUTO: 4.4 M/UL (ref 4.6–6.2)
SODIUM SERPL-SCNC: 144 MMOL/L (ref 136–145)
WBC # BLD AUTO: 5.83 K/UL (ref 3.9–12.7)

## 2019-11-05 PROCEDURE — 85730 THROMBOPLASTIN TIME PARTIAL: CPT | Mod: HCNC

## 2019-11-05 PROCEDURE — 85025 COMPLETE CBC W/AUTO DIFF WBC: CPT | Mod: HCNC

## 2019-11-05 PROCEDURE — 80053 COMPREHEN METABOLIC PANEL: CPT | Mod: HCNC

## 2019-11-05 PROCEDURE — 83036 HEMOGLOBIN GLYCOSYLATED A1C: CPT | Mod: HCNC

## 2019-11-05 PROCEDURE — 36415 COLL VENOUS BLD VENIPUNCTURE: CPT | Mod: HCNC

## 2019-11-05 PROCEDURE — 85610 PROTHROMBIN TIME: CPT | Mod: HCNC

## 2019-11-06 ENCOUNTER — TELEPHONE (OUTPATIENT)
Dept: ELECTROPHYSIOLOGY | Facility: CLINIC | Age: 69
End: 2019-11-06

## 2019-11-06 NOTE — TELEPHONE ENCOUNTER
Returned call to pt. Advised that DR Walsh recommended that he wear a sling on his left arm when bowling or otherwise wait a month til he starts bowling. Pt voiced understanding and stated I have no problem wearing a sling.          ----- Message from Hillary Bowser sent at 11/6/2019 11:47 AM CST -----  Contact: Self  Pt has questions regarding physical restriction. 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    [phone]

## 2019-11-07 ENCOUNTER — TELEPHONE (OUTPATIENT)
Dept: ELECTROPHYSIOLOGY | Facility: CLINIC | Age: 69
End: 2019-11-07

## 2019-11-07 NOTE — TELEPHONE ENCOUNTER
Chart entered for purposes of quality and performance improvement. Left message for call back to discuss procedure intructions

## 2019-11-08 NOTE — TELEPHONE ENCOUNTER
Spoke to Clifton Wilson    CONFIRMED procedure arrival time of 12pm on 11/11  Reiterated instructions including:  -Directions to check in desk  -NPO after midnight night prior to procedure  -High importance of HOLDING Metformin and Insulin on the day of the procedure due to fasting. 1/2 dose of insulin the evening prior    -Pre-procedure LABS 11/5. Hgb low but appears to be pt's baseline  -Bathe night prior and morning prior to procedure with Hibiclens solution or an antibacterial soap     Pt verbalizes understanding of above and appreciates call.

## 2019-11-11 ENCOUNTER — PATIENT MESSAGE (OUTPATIENT)
Dept: ELECTROPHYSIOLOGY | Facility: CLINIC | Age: 69
End: 2019-11-11

## 2019-11-11 ENCOUNTER — HOSPITAL ENCOUNTER (OUTPATIENT)
Facility: HOSPITAL | Age: 69
Discharge: HOME | End: 2019-11-12
Attending: INTERNAL MEDICINE | Admitting: INTERNAL MEDICINE
Payer: MEDICARE

## 2019-11-11 ENCOUNTER — ANESTHESIA (OUTPATIENT)
Dept: MEDSURG UNIT | Facility: HOSPITAL | Age: 69
End: 2019-11-11
Payer: MEDICARE

## 2019-11-11 ENCOUNTER — ANESTHESIA EVENT (OUTPATIENT)
Dept: MEDSURG UNIT | Facility: HOSPITAL | Age: 69
End: 2019-11-11
Payer: MEDICARE

## 2019-11-11 DIAGNOSIS — I25.5 ISCHEMIC CARDIOMYOPATHY: ICD-10-CM

## 2019-11-11 DIAGNOSIS — I49.9 ARRHYTHMIA: ICD-10-CM

## 2019-11-11 DIAGNOSIS — Z01.812 PRE-PROCEDURE LAB EXAM: ICD-10-CM

## 2019-11-11 DIAGNOSIS — T82.190D IMPLANTED DEFIBRILLATOR ELECTRODE LEAD FRACTURE, SUBSEQUENT ENCOUNTER: ICD-10-CM

## 2019-11-11 LAB
POCT GLUCOSE: 135 MG/DL (ref 70–110)
POCT GLUCOSE: 147 MG/DL (ref 70–110)

## 2019-11-11 PROCEDURE — 93010 EKG 12-LEAD: ICD-10-PCS | Mod: HCNC,76,, | Performed by: INTERNAL MEDICINE

## 2019-11-11 PROCEDURE — 63600175 PHARM REV CODE 636 W HCPCS: Mod: HCNC | Performed by: NURSE ANESTHETIST, CERTIFIED REGISTERED

## 2019-11-11 PROCEDURE — 00534 ANES INSERTION/RPLCMT CVDFB: CPT | Mod: HCNC | Performed by: INTERNAL MEDICINE

## 2019-11-11 PROCEDURE — D9220A PRA ANESTHESIA: ICD-10-PCS | Mod: HCNC,ANES,, | Performed by: ANESTHESIOLOGY

## 2019-11-11 PROCEDURE — 93005 ELECTROCARDIOGRAM TRACING: CPT | Mod: HCNC

## 2019-11-11 PROCEDURE — 25000003 PHARM REV CODE 250: Mod: HCNC | Performed by: STUDENT IN AN ORGANIZED HEALTH CARE EDUCATION/TRAINING PROGRAM

## 2019-11-11 PROCEDURE — 82962 GLUCOSE BLOOD TEST: CPT | Mod: HCNC | Performed by: INTERNAL MEDICINE

## 2019-11-11 PROCEDURE — 37000009 HC ANESTHESIA EA ADD 15 MINS: Mod: HCNC | Performed by: INTERNAL MEDICINE

## 2019-11-11 PROCEDURE — C1894 INTRO/SHEATH, NON-LASER: HCPCS | Mod: HCNC | Performed by: INTERNAL MEDICINE

## 2019-11-11 PROCEDURE — 33216 INSERT 1 ELECTRODE PM-DEFIB: CPT | Mod: HCNC | Performed by: INTERNAL MEDICINE

## 2019-11-11 PROCEDURE — 93010 ELECTROCARDIOGRAM REPORT: CPT | Mod: HCNC,,, | Performed by: INTERNAL MEDICINE

## 2019-11-11 PROCEDURE — 94761 N-INVAS EAR/PLS OXIMETRY MLT: CPT | Mod: HCNC

## 2019-11-11 PROCEDURE — 33216 INSERT 1 ELECTRODE PM-DEFIB: CPT | Mod: HCNC,,, | Performed by: INTERNAL MEDICINE

## 2019-11-11 PROCEDURE — 37000008 HC ANESTHESIA 1ST 15 MINUTES: Mod: HCNC | Performed by: INTERNAL MEDICINE

## 2019-11-11 PROCEDURE — 63600175 PHARM REV CODE 636 W HCPCS: Mod: HCNC | Performed by: NURSE PRACTITIONER

## 2019-11-11 PROCEDURE — C1777 LEAD, AICD, ENDO SINGLE COIL: HCPCS | Mod: HCNC | Performed by: INTERNAL MEDICINE

## 2019-11-11 PROCEDURE — 33216 PR INSERT TRANSVEN ELECTRODE,SING CHAMBER: ICD-10-PCS | Mod: HCNC,,, | Performed by: INTERNAL MEDICINE

## 2019-11-11 PROCEDURE — 25000003 PHARM REV CODE 250: Mod: HCNC | Performed by: INTERNAL MEDICINE

## 2019-11-11 PROCEDURE — 27201423 OPTIME MED/SURG SUP & DEVICES STERILE SUPPLY: Mod: HCNC | Performed by: INTERNAL MEDICINE

## 2019-11-11 PROCEDURE — D9220A PRA ANESTHESIA: ICD-10-PCS | Mod: HCNC,CRNA,, | Performed by: NURSE ANESTHETIST, CERTIFIED REGISTERED

## 2019-11-11 PROCEDURE — 63600175 PHARM REV CODE 636 W HCPCS: Mod: HCNC | Performed by: INTERNAL MEDICINE

## 2019-11-11 PROCEDURE — 25000003 PHARM REV CODE 250: Mod: HCNC | Performed by: NURSE ANESTHETIST, CERTIFIED REGISTERED

## 2019-11-11 PROCEDURE — 25500020 PHARM REV CODE 255: Mod: HCNC | Performed by: NURSE ANESTHETIST, CERTIFIED REGISTERED

## 2019-11-11 PROCEDURE — D9220A PRA ANESTHESIA: Mod: HCNC,CRNA,, | Performed by: NURSE ANESTHETIST, CERTIFIED REGISTERED

## 2019-11-11 PROCEDURE — D9220A PRA ANESTHESIA: Mod: HCNC,ANES,, | Performed by: ANESTHESIOLOGY

## 2019-11-11 PROCEDURE — 93010 ELECTROCARDIOGRAM REPORT: CPT | Mod: HCNC,76,, | Performed by: INTERNAL MEDICINE

## 2019-11-11 DEVICE — DEFIBRILLATION LEAD
Type: IMPLANTABLE DEVICE | Site: HEART | Status: FUNCTIONAL
Brand: DURATA™

## 2019-11-11 RX ORDER — IBUPROFEN 200 MG
16 TABLET ORAL
Status: DISCONTINUED | OUTPATIENT
Start: 2019-11-11 | End: 2019-11-12 | Stop reason: HOSPADM

## 2019-11-11 RX ORDER — ASPIRIN 81 MG/1
81 TABLET ORAL DAILY
Status: DISCONTINUED | OUTPATIENT
Start: 2019-11-12 | End: 2019-11-12 | Stop reason: HOSPADM

## 2019-11-11 RX ORDER — DIPHENHYDRAMINE HYDROCHLORIDE 50 MG/ML
25 INJECTION INTRAMUSCULAR; INTRAVENOUS EVERY 6 HOURS PRN
Status: DISCONTINUED | OUTPATIENT
Start: 2019-11-11 | End: 2019-11-12 | Stop reason: HOSPADM

## 2019-11-11 RX ORDER — HYDROMORPHONE HYDROCHLORIDE 1 MG/ML
0.2 INJECTION, SOLUTION INTRAMUSCULAR; INTRAVENOUS; SUBCUTANEOUS EVERY 5 MIN PRN
Status: DISCONTINUED | OUTPATIENT
Start: 2019-11-11 | End: 2019-11-12 | Stop reason: HOSPADM

## 2019-11-11 RX ORDER — SODIUM CHLORIDE 9 MG/ML
INJECTION, SOLUTION INTRAVENOUS CONTINUOUS
Status: DISCONTINUED | OUTPATIENT
Start: 2019-11-11 | End: 2019-11-11

## 2019-11-11 RX ORDER — VANCOMYCIN HYDROCHLORIDE 1 G/20ML
INJECTION, POWDER, LYOPHILIZED, FOR SOLUTION INTRAVENOUS
Status: DISCONTINUED | OUTPATIENT
Start: 2019-11-11 | End: 2019-11-11

## 2019-11-11 RX ORDER — ATORVASTATIN CALCIUM 20 MG/1
40 TABLET, FILM COATED ORAL DAILY
Status: DISCONTINUED | OUTPATIENT
Start: 2019-11-12 | End: 2019-11-12 | Stop reason: HOSPADM

## 2019-11-11 RX ORDER — INSULIN ASPART 100 [IU]/ML
1-10 INJECTION, SOLUTION INTRAVENOUS; SUBCUTANEOUS
Status: DISCONTINUED | OUTPATIENT
Start: 2019-11-11 | End: 2019-11-12 | Stop reason: HOSPADM

## 2019-11-11 RX ORDER — IODIXANOL 320 MG/ML
INJECTION, SOLUTION INTRAVASCULAR
Status: DISCONTINUED | OUTPATIENT
Start: 2019-11-11 | End: 2019-11-11

## 2019-11-11 RX ORDER — CEFAZOLIN SODIUM 1 G/3ML
2 INJECTION, POWDER, FOR SOLUTION INTRAMUSCULAR; INTRAVENOUS
Status: COMPLETED | OUTPATIENT
Start: 2019-11-11 | End: 2019-11-11

## 2019-11-11 RX ORDER — ZOLPIDEM TARTRATE 5 MG/1
5 TABLET ORAL NIGHTLY PRN
Status: DISCONTINUED | OUTPATIENT
Start: 2019-11-11 | End: 2019-11-12 | Stop reason: HOSPADM

## 2019-11-11 RX ORDER — FENTANYL CITRATE 50 UG/ML
25 INJECTION, SOLUTION INTRAMUSCULAR; INTRAVENOUS EVERY 5 MIN PRN
Status: DISCONTINUED | OUTPATIENT
Start: 2019-11-11 | End: 2019-11-12 | Stop reason: HOSPADM

## 2019-11-11 RX ORDER — PROPOFOL 10 MG/ML
VIAL (ML) INTRAVENOUS CONTINUOUS PRN
Status: DISCONTINUED | OUTPATIENT
Start: 2019-11-11 | End: 2019-11-11

## 2019-11-11 RX ORDER — GABAPENTIN 300 MG/1
600 CAPSULE ORAL 2 TIMES DAILY
Status: DISCONTINUED | OUTPATIENT
Start: 2019-11-11 | End: 2019-11-12 | Stop reason: HOSPADM

## 2019-11-11 RX ORDER — GLUCAGON 1 MG
1 KIT INJECTION
Status: DISCONTINUED | OUTPATIENT
Start: 2019-11-11 | End: 2019-11-12 | Stop reason: HOSPADM

## 2019-11-11 RX ORDER — LIDOCAINE HYDROCHLORIDE 20 MG/ML
INJECTION, SOLUTION EPIDURAL; INFILTRATION; INTRACAUDAL; PERINEURAL
Status: DISCONTINUED | OUTPATIENT
Start: 2019-11-11 | End: 2019-11-11

## 2019-11-11 RX ORDER — CLOPIDOGREL BISULFATE 75 MG/1
75 TABLET ORAL DAILY
Status: DISCONTINUED | OUTPATIENT
Start: 2019-11-12 | End: 2019-11-12 | Stop reason: HOSPADM

## 2019-11-11 RX ORDER — SODIUM CHLORIDE 9 MG/ML
INJECTION, SOLUTION INTRAVENOUS CONTINUOUS PRN
Status: DISCONTINUED | OUTPATIENT
Start: 2019-11-11 | End: 2019-11-11

## 2019-11-11 RX ORDER — CARVEDILOL 12.5 MG/1
12.5 TABLET ORAL 2 TIMES DAILY
Status: DISCONTINUED | OUTPATIENT
Start: 2019-11-11 | End: 2019-11-12 | Stop reason: HOSPADM

## 2019-11-11 RX ORDER — ACETAMINOPHEN 325 MG/1
650 TABLET ORAL EVERY 4 HOURS PRN
Status: DISCONTINUED | OUTPATIENT
Start: 2019-11-11 | End: 2019-11-12 | Stop reason: HOSPADM

## 2019-11-11 RX ORDER — PHENYLEPHRINE HYDROCHLORIDE 10 MG/ML
INJECTION INTRAVENOUS
Status: DISCONTINUED | OUTPATIENT
Start: 2019-11-11 | End: 2019-11-11

## 2019-11-11 RX ORDER — MIDAZOLAM HYDROCHLORIDE 1 MG/ML
INJECTION, SOLUTION INTRAMUSCULAR; INTRAVENOUS
Status: DISCONTINUED | OUTPATIENT
Start: 2019-11-11 | End: 2019-11-11

## 2019-11-11 RX ORDER — IBUPROFEN 200 MG
24 TABLET ORAL
Status: DISCONTINUED | OUTPATIENT
Start: 2019-11-11 | End: 2019-11-12 | Stop reason: HOSPADM

## 2019-11-11 RX ORDER — LIDOCAINE HYDROCHLORIDE 10 MG/ML
INJECTION, SOLUTION INTRAVENOUS
Status: DISCONTINUED | OUTPATIENT
Start: 2019-11-11 | End: 2019-11-11

## 2019-11-11 RX ORDER — FENTANYL CITRATE 50 UG/ML
INJECTION, SOLUTION INTRAMUSCULAR; INTRAVENOUS
Status: DISCONTINUED | OUTPATIENT
Start: 2019-11-11 | End: 2019-11-11

## 2019-11-11 RX ORDER — BUPIVACAINE HYDROCHLORIDE 2.5 MG/ML
INJECTION, SOLUTION EPIDURAL; INFILTRATION; INTRACAUDAL
Status: DISCONTINUED | OUTPATIENT
Start: 2019-11-11 | End: 2019-11-11

## 2019-11-11 RX ADMIN — PROPOFOL: 10 INJECTION, EMULSION INTRAVENOUS at 03:11

## 2019-11-11 RX ADMIN — PROPOFOL: 10 INJECTION, EMULSION INTRAVENOUS at 04:11

## 2019-11-11 RX ADMIN — CEFAZOLIN 2 G: 330 INJECTION, POWDER, FOR SOLUTION INTRAMUSCULAR; INTRAVENOUS at 03:11

## 2019-11-11 RX ADMIN — ZOLPIDEM TARTRATE 5 MG: 5 TABLET ORAL at 11:11

## 2019-11-11 RX ADMIN — MIDAZOLAM HYDROCHLORIDE 2 MG: 1 INJECTION, SOLUTION INTRAMUSCULAR; INTRAVENOUS at 02:11

## 2019-11-11 RX ADMIN — CARVEDILOL 12.5 MG: 12.5 TABLET, FILM COATED ORAL at 08:11

## 2019-11-11 RX ADMIN — GABAPENTIN 600 MG: 300 CAPSULE ORAL at 08:11

## 2019-11-11 RX ADMIN — PHENYLEPHRINE HYDROCHLORIDE 100 MCG: 10 INJECTION INTRAVENOUS at 03:11

## 2019-11-11 RX ADMIN — SODIUM CHLORIDE: 0.9 INJECTION, SOLUTION INTRAVENOUS at 03:11

## 2019-11-11 RX ADMIN — PHENYLEPHRINE HYDROCHLORIDE 200 MCG: 10 INJECTION INTRAVENOUS at 04:11

## 2019-11-11 RX ADMIN — PROPOFOL 50 MCG/KG/MIN: 10 INJECTION, EMULSION INTRAVENOUS at 03:11

## 2019-11-11 RX ADMIN — FENTANYL CITRATE 100 MCG: 50 INJECTION, SOLUTION INTRAMUSCULAR; INTRAVENOUS at 03:11

## 2019-11-11 RX ADMIN — PHENYLEPHRINE HYDROCHLORIDE 100 MCG: 10 INJECTION INTRAVENOUS at 04:11

## 2019-11-11 RX ADMIN — LIDOCAINE HYDROCHLORIDE 50 MG: 10 INJECTION, SOLUTION INTRAVENOUS at 03:11

## 2019-11-11 RX ADMIN — IODIXANOL 10 ML: 320 INJECTION, SOLUTION INTRAVASCULAR at 03:11

## 2019-11-11 NOTE — ANESTHESIA PREPROCEDURE EVALUATION
11/11/2019  Clifton Wilson is a 68 y.o., male.  Patient Active Problem List   Diagnosis    Type 2 diabetes mellitus with neurologic complication    Mitral regurgitation    Chronic combined systolic and diastolic congestive heart failure    Coronary artery disease of native artery with stable angina pectoris    Ventricular tachycardia, nonsustained post-MI    Dyslipidemia    Chronic combined systolic and diastolic CHF (congestive heart failure)    Coronary artery disease of native artery of native heart with stable angina pectoris    Type 2 diabetes mellitus with diabetic neuropathy    Diabetes mellitus type 2 without retinopathy    Status post intraocular lens implant    Bilateral posterior capsular opacification    Obesity (BMI 35.0-39.9 without comorbidity)    DM type 2 without retinopathy    Essential hypertension    Pseudophakia    PCO (posterior capsular opacification), right    Post-operative state    Ischemic cardiomyopathy    ICD (implantable cardioverter-defibrillator), single, in situ    Edema    Erectile dysfunction    Vitreous detachment    Dyslipidemia associated with type 2 diabetes mellitus    Hypertension associated with diabetes    IDDM (insulin dependent diabetes mellitus)         Anesthesia Evaluation         Review of Systems      Physical Exam  General:  Well nourished    Airway/Jaw/Neck:  Airway Findings: Mouth Opening: Normal Tongue: Normal  General Airway Assessment: Adult  Mallampati: II  Improves to II with phonation.  TM Distance: Normal, at least 6 cm      Dental:  Dental Findings: In tact   Chest/Lungs:  Chest/Lungs Findings: Clear to auscultation     Heart/Vascular:  Heart Findings: Rate: Normal  Rhythm: Regular Rhythm  Sounds: Normal        Mental Status:  Mental Status Findings:  Cooperative, Alert and Oriented         Anesthesia Plan  Type of  Anesthesia, risks & benefits discussed:  Anesthesia Type:  MAC  Patient's Preference: Sedation  Intra-op Monitoring Plan: standard ASA monitors  Intra-op Monitoring Plan Comments:   Post Op Pain Control Plan: per primary service following discharge from PACU  Post Op Pain Control Plan Comments:   Induction:   IV  Beta Blocker:  Patient is not currently on a Beta-Blocker (No further documentation required).       Informed Consent: Patient understands risks and agrees with Anesthesia plan.  Questions answered.   ASA Score: 4     Day of Surgery Review of History & Physical:    H&P update referred to the surgeon.     Anesthesia Plan Notes: Sedation plan discussed.          Ready For Surgery From Anesthesia Perspective.

## 2019-11-11 NOTE — Clinical Note
Pt has existing ICD Device SJM Fortify Assura VR 1357-40Q SN [id number] implanted 6/6/2017 and RV lead SHILPA Miguel 7122Q/65cm SN [id number] implanted 6/6/2017

## 2019-11-11 NOTE — H&P
Electrophysiology Pre Procedure H&P    HPI:   Clifton Wilson is a 68 y.o. male with DM, CAD, HFrEF, and ICD in place who presents to [facility] for elective outpatient RV HV lead revision. He has no fever or chills. No chest pain or pressure. No syncope. No orthopnea. He does have some leg swelling.     Past Medical History:   Diagnosis Date    Anticoagulant long-term use     Cardiomyopathy     CHF (congestive heart failure)     Coronary artery disease     Diabetes mellitus     Gout     Heart attack     Hypertension     Pneumonia       Social History     Socioeconomic History    Marital status:      Spouse name: Not on file    Number of children: Not on file    Years of education: Not on file    Highest education level: Not on file   Occupational History    Not on file   Social Needs    Financial resource strain: Not hard at all    Food insecurity:     Worry: Never true     Inability: Never true    Transportation needs:     Medical: No     Non-medical: No   Tobacco Use    Smoking status: Former Smoker    Smokeless tobacco: Never Used   Substance and Sexual Activity    Alcohol use: Yes     Frequency: 2-3 times a week     Drinks per session: 3 or 4     Binge frequency: Monthly     Comment: socially    Drug use: No    Sexual activity: Not on file   Lifestyle    Physical activity:     Days per week: 3 days     Minutes per session: 60 min    Stress: Only a little   Relationships    Social connections:     Talks on phone: More than three times a week     Gets together: More than three times a week     Attends Spiritism service: Not on file     Active member of club or organization: Yes     Attends meetings of clubs or organizations: More than 4 times per year     Relationship status:    Other Topics Concern    Not on file   Social History Narrative    Not on file        Family History   Problem Relation Age of Onset    Heart disease Mother     Heart disease Father     Amblyopia Neg  Hx     Blindness Neg Hx     Cataracts Neg Hx     Glaucoma Neg Hx     Macular degeneration Neg Hx     Retinal detachment Neg Hx     Strabismus Neg Hx         Current Facility-Administered Medications   Medication Dose Route Frequency Provider Last Rate Last Dose    0.9%  NaCl infusion   Intravenous Continuous Sujata Xiong NP        ceFAZolin injection 2 g  2 g Intravenous On Call Procedure Sujata Xiong NP            Constitutional: (-)fevers, (-)chills, (-)night sweats  HEENT: (-) headaches (-) lightheadedness (-) blurry vision  Cardiovascular: (-)chest pain (-)paroxysmal nocturnal dyspnea (-)orthopnea  Respiratory: (-)shortness of breath, (-)dyspnea on exertion (-)hemoptysis  Gastrointestinal: (-)abdominal pain (-)nausea (-)vomiting (-)tarry stools  Musculoskeletal: (-)arthralgias (-)limited range of motion  Neurologic: (-)parasthesias (-)mood disorder (-)anxiety  Endo: (-)polyuria (-)polydipsia (-)heat/cold intolerance  Skin: (-)rash     There were no vitals filed for this visit.  Physical Exam:   Gen: no acute distress, pleasant patient answering questions appropriately  HEENT: extra-ocular muscles intact, normocephalic-atraumatic  Neck: no jugular venous distension, no lymphadenopathy, no thyromegaly, no carotid bruits  CVS: regular rate and rhythm, S1S2 normal, no murmurs/rubs/gallops  CHEST: clear to auscultation bilaterally, no wheezes/rales/ronchi  ABD: Soft, non-tender, nondistended, bowel sounds present  EXT: 1+ BLE edema  NEURO: awake, alert, and oriented   Skin: No rash     Review of Labs:   CMP  Sodium   Date Value Ref Range Status   11/05/2019 144 136 - 145 mmol/L Final     Potassium   Date Value Ref Range Status   11/05/2019 4.1 3.5 - 5.1 mmol/L Final     Chloride   Date Value Ref Range Status   11/05/2019 105 95 - 110 mmol/L Final     CO2   Date Value Ref Range Status   11/05/2019 29 23 - 29 mmol/L Final     Glucose   Date Value Ref Range Status   11/05/2019 156 (H) 70 - 110  mg/dL Final     BUN, Bld   Date Value Ref Range Status   11/05/2019 15 8 - 23 mg/dL Final     Creatinine   Date Value Ref Range Status   11/05/2019 1.0 0.5 - 1.4 mg/dL Final     Calcium   Date Value Ref Range Status   11/05/2019 9.5 8.7 - 10.5 mg/dL Final     Total Protein   Date Value Ref Range Status   11/05/2019 7.3 6.0 - 8.4 g/dL Final     Albumin   Date Value Ref Range Status   11/05/2019 4.0 3.5 - 5.2 g/dL Final     Total Bilirubin   Date Value Ref Range Status   11/05/2019 1.5 (H) 0.1 - 1.0 mg/dL Final     Comment:     For infants and newborns, interpretation of results should be based  on gestational age, weight and in agreement with clinical  observations.  Premature Infant recommended reference ranges:  Up to 24 hours.............<8.0 mg/dL  Up to 48 hours............<12.0 mg/dL  3-5 days..................<15.0 mg/dL  6-29 days.................<15.0 mg/dL       Alkaline Phosphatase   Date Value Ref Range Status   11/05/2019 79 55 - 135 U/L Final     AST   Date Value Ref Range Status   11/05/2019 24 10 - 40 U/L Final     ALT   Date Value Ref Range Status   11/05/2019 41 10 - 44 U/L Final     Anion Gap   Date Value Ref Range Status   11/05/2019 10 8 - 16 mmol/L Final     eGFR if    Date Value Ref Range Status   11/05/2019 >60 >60 mL/min/1.73 m^2 Final     eGFR if non    Date Value Ref Range Status   11/05/2019 >60 >60 mL/min/1.73 m^2 Final     Comment:     Calculation used to obtain the estimated glomerular filtration  rate (eGFR) is the CKD-EPI equation.        Lab Results   Component Value Date    WBC 5.83 11/05/2019    HGB 13.6 (L) 11/05/2019    HCT 40.9 11/05/2019    MCV 93 11/05/2019     11/05/2019     Lab Results   Component Value Date    INR 1.0 11/05/2019    INR 1.0 06/06/2017    INR 1.0 11/06/2016     Most recent ECG  sinus     Assessment/Plan:  Clifton Wilson is a 68 y.o. male with DM, CAD, HFrEF, and ICD in place who presents to [facility] for elective outpatient  RV HV lead revision. Will place new RV HV lead and cap existing lead.  We discussed the alternatives, benefits and risks of the procedure including pain, infection, bleeding, injury to lung causing pneumothorax requiring tube placement, injury to heart valves, puncture of the heart leading to pericardial effusion or tamponade requiring tube drainage, heart attack, stroke and death.  Consent signed and placed in chart.

## 2019-11-11 NOTE — TRANSFER OF CARE
"Anesthesia Transfer of Care Note    Patient: Clifton Wilson    Procedure(s) Performed: Procedure(s) (LRB):  REVISION, INSERTION, ELECTRODE LEAD, ICD (N/A)    Patient location: PACU    Anesthesia Type: general    Transport from OR: Transported from OR on 6-10 L/min O2 by face mask with adequate spontaneous ventilation    Post pain: adequate analgesia    Post assessment: no apparent anesthetic complications    Level of consciousness: awake, alert and oriented    Nausea/Vomiting: no nausea/vomiting    Complications: none    Transfer of care protocol was followed      Last vitals:   Visit Vitals  /79   Pulse 70   Temp 36.5 °C (97.7 °F) (Oral)   Resp 17   Ht 5' 11" (1.803 m)   Wt 106.6 kg (235 lb)   SpO2 95%   BMI 32.78 kg/m²     "

## 2019-11-11 NOTE — BRIEF OP NOTE
: Shukri Walsh MD    Post-operative Diagnosis: CMO, Problem with current ICD lead in situ    Procedure Performed: single chamber ICD implant    Description of Procedure: The patient was brought to the EP lab in the fasting state. Prepped and draped in sterile fashion. Safety timeout was performed. Sedation administered by anesthesia staff. Selective venogram of the left axillary and cephalic veins performed via left ac IV. Lidocaine used for local anesthetic. Fluoroscopic guided axillary access utilized. Guide/J Wire advanced and confirmed in IVC. Incision made. Blunt and electrocautery dissection performed to level of current generator. Peel away sheath advanced over J wire. ICD lead advanced into RV after confirming in the IVC first under fluoroscopy. R waves and injury pattern adequate. Lead fixed into place and sutured in place. Pocket washed using antibiotic solution. Previous RV lead retained and capped in pocket. New Lead connected to generator. Generator placed into pocket and washed with antibiotic solution. Deep layer closed with interrupted 3-0 suture. Intermediate layer closed with running 3-0 suture. Superficial layer closed with running 4-0 suture. Skin closed with Dermabond. Meplex dressing to be placed after dermabond has dried.     EBL: <10 mL    Specimens Removed: None  Complications: no immediate    1. Ancef 2 grams q8 hours x 2 doses (ordered)  2. Sling to left arm - wear for 48 hours, then only at night for 6 weeks.  3. NO HEPARIN PRODUCTS  4. Keflex 500 mg TID for 5 days at discharge (or after the 2 doses of IV antibiotics if still in the hospital)  5. No lifting left elbow above shoulder height  6. No lifting over 5 pounds  7. No driving for 1 week and for 4 weeks if patient uses left arm to make turns  8. Dressing will be removed in AM by EP  9. Chest Xray (ordered)  10. Follow up in device clinic in 1 week for device and wound checks.     Dr Walsh, the attending  electrophysiologist, was present for the entirety of this procedure.    Soren Frazier MD PGY7

## 2019-11-11 NOTE — Clinical Note
Existing ICD Device [facility] Fortify Assura VR 1357-40Q SN [id number] was removed from the pocket and disconnected from existing RV lead [facility] Myriam 7122Q/65cm SN [id number]

## 2019-11-12 VITALS
DIASTOLIC BLOOD PRESSURE: 87 MMHG | RESPIRATION RATE: 17 BRPM | HEART RATE: 74 BPM | OXYGEN SATURATION: 95 % | SYSTOLIC BLOOD PRESSURE: 178 MMHG | BODY MASS INDEX: 32.87 KG/M2 | WEIGHT: 234.81 LBS | HEIGHT: 71 IN | TEMPERATURE: 99 F

## 2019-11-12 LAB
POCT GLUCOSE: 174 MG/DL (ref 70–110)
POCT GLUCOSE: 181 MG/DL (ref 70–110)

## 2019-11-12 PROCEDURE — 63600175 PHARM REV CODE 636 W HCPCS: Mod: HCNC | Performed by: INTERNAL MEDICINE

## 2019-11-12 PROCEDURE — 25000003 PHARM REV CODE 250: Mod: HCNC | Performed by: STUDENT IN AN ORGANIZED HEALTH CARE EDUCATION/TRAINING PROGRAM

## 2019-11-12 PROCEDURE — 82962 GLUCOSE BLOOD TEST: CPT | Mod: HCNC

## 2019-11-12 RX ORDER — CEFAZOLIN SODIUM 1 G/3ML
2 INJECTION, POWDER, FOR SOLUTION INTRAMUSCULAR; INTRAVENOUS
Status: COMPLETED | OUTPATIENT
Start: 2019-11-12 | End: 2019-11-12

## 2019-11-12 RX ORDER — CEPHALEXIN 500 MG/1
500 CAPSULE ORAL EVERY 8 HOURS
Qty: 15 CAPSULE | Refills: 0 | Status: SHIPPED | OUTPATIENT
Start: 2019-11-12 | End: 2019-11-17

## 2019-11-12 RX ADMIN — CARVEDILOL 12.5 MG: 12.5 TABLET, FILM COATED ORAL at 08:11

## 2019-11-12 RX ADMIN — CLOPIDOGREL BISULFATE 75 MG: 75 TABLET ORAL at 08:11

## 2019-11-12 RX ADMIN — ASPIRIN 81 MG: 81 TABLET, COATED ORAL at 08:11

## 2019-11-12 RX ADMIN — CEFAZOLIN 2 G: 330 INJECTION, POWDER, FOR SOLUTION INTRAMUSCULAR; INTRAVENOUS at 01:11

## 2019-11-12 RX ADMIN — CEFAZOLIN 2 G: 330 INJECTION, POWDER, FOR SOLUTION INTRAMUSCULAR; INTRAVENOUS at 08:11

## 2019-11-12 RX ADMIN — GABAPENTIN 600 MG: 300 CAPSULE ORAL at 08:11

## 2019-11-12 RX ADMIN — ATORVASTATIN CALCIUM 40 MG: 20 TABLET, FILM COATED ORAL at 08:11

## 2019-11-12 NOTE — PROGRESS NOTES
Paged EP on call, re prescription for ancef 2 grams IV q8. Written on the post procedure note but not ordered on the MAR. Will await order.

## 2019-11-12 NOTE — ANESTHESIA POSTPROCEDURE EVALUATION
Anesthesia Post Evaluation    Patient: Clifton Wilson    Procedure(s) Performed: Procedure(s) (LRB):  REVISION, INSERTION, ELECTRODE LEAD, ICD (N/A)    Final Anesthesia Type: general  Patient location during evaluation: PACU  Patient participation: Yes- Able to Participate  Level of consciousness: awake and alert  Post-procedure vital signs: reviewed and stable  Pain management: adequate  Airway patency: patent  PONV status at discharge: No PONV  Anesthetic complications: no      Cardiovascular status: blood pressure returned to baseline  Respiratory status: spontaneous ventilation and room air  Hydration status: euvolemic  Follow-up not needed.          Vitals Value Taken Time   /78 11/11/2019  6:32 PM   Temp 36.6 °C (97.9 °F) 11/11/2019  6:00 PM   Pulse 61 11/11/2019  6:34 PM   Resp 21 11/11/2019  6:34 PM   SpO2 93 % 11/11/2019  6:34 PM   Vitals shown include unvalidated device data.      No case tracking events are documented in the log.      Pain/Yumiko Score: Yumiko Score: 10 (11/11/2019  6:08 PM)

## 2019-11-12 NOTE — DISCHARGE INSTRUCTIONS
Sling to left arm - wear for 24 hours, then only at night for 6 weeks.  Keflex 500 mg TID for 5 days at discharge  No lifting left elbow above shoulder height  No lifting over 5 pounds  No driving for 1 week and for 4 weeks if patient uses left arm to make turns  Patient may shower in 24 hours, do not let beam of shower hit site directly and no scrubbing in area  Follow up in device clinic in 1 week and with Dr Walsh in 4 months

## 2019-11-12 NOTE — NURSING TRANSFER
Nursing Transfer Note      11/11/2019     Transfer To: 3077    Transfer via stretcher    Transfer with cardiac monitoring    Transported by pct    Medicines sent: no    Chart send with patient: Yes    Notified: spouse    Patient reassessed at: 11/11/19@1855hrs

## 2019-11-12 NOTE — NURSING TRANSFER
Nursing Transfer Note      11/11/2019     Transfer from PACU post ICD lead revision via stretcher, patient AAO, admitted to room 3077. Not in distress, oriented to room, safety discussed, POC discussed. Patient is hungry provided with food and drink. VS taken, unremarkable, NSR on telemetry, denies pain.

## 2019-11-12 NOTE — DISCHARGE SUMMARY
[facility]  Cardiac Electrophysiology  Discharge Summary      Patient Name: Clifton Wilson  MRN: [id number]  Admission Date: 11/11/2019  Hospital Length of Stay: 0 days  Discharge Date and Time: 11/12/2019  9:50 AM  Attending Physician: Shukri Walsh MD   Discharging Provider: Soren Frazier MD  Primary Care Physician: Britton Grissom MD    HPI:   Clifton Wilson is a 68 y.o. male with DM, CAD, HFrEF, and ICD in place who presented to [facility] for elective outpatient RV HV lead revision.   Procedure(s) (LRB):  REVISION, INSERTION, ELECTRODE LEAD, ICD (N/A)     Indwelling Lines/Drains at time of discharge:  Lines/Drains/Airways     None                 Hospital Course:  Underwent placement of new RV HV lead. Old RV lead capped in pocket.     Significant Diagnostic Studies: Labs:   BMP: No results for input(s): GLU, NA, K, CL, CO2, BUN, CREATININE, CALCIUM, MG in the last 48 hours., CMP No results for input(s): NA, K, CL, CO2, GLU, BUN, CREATININE, CALCIUM, PROT, ALBUMIN, BILITOT, ALKPHOS, AST, ALT, ANIONGAP, ESTGFRAFRICA, EGFRNONAA in the last 48 hours., CBC No results for input(s): WBC, HGB, HCT, PLT in the last 48 hours. and INR   Lab Results   Component Value Date    INR 1.0 11/05/2019    INR 1.0 06/06/2017    INR 1.0 11/06/2016       Pending Diagnostic Studies:     None          Final Active Diagnoses:    Diagnosis Date Noted POA    Implanted defibrillator electrode lead fracture [T82.190A]  Yes    Ischemic cardiomyopathy [I25.5] 06/06/2017 Yes      Problems Resolved During this Admission:     No new Assessment & Plan notes have been filed under this hospital service since the last note was generated.  Service: Arrhythmia      Discharged Condition: good    Disposition: Home or Self Care    Follow Up:  Follow-up Information     Shukri Walsh MD In 4 months.    Specialties:  Electrophysiology, Cardiology  Contact information:  [facility] [address]  [phone]              Arben Dowd - Arrhythmia In 7 days.    Specialty:  Cardiology  Contact information:  [address]  [facility] [address] [phone]  Additional information:  [facility] - 3rd Floor               Patient Instructions:      Other restrictions (specify):   Order Comments: Sling to left arm - wear for 24 hours, then only at night for 6 weeks.  Keflex 500 mg TID for 5 days at discharge  No lifting left elbow above shoulder height  No lifting over 5 pounds  No driving for 1 week and for 4 weeks if patient uses left arm to make turns  Patient may shower in 24 hours, do not let beam of shower hit site directly and no scrubbing in area  Follow up in device clinic in 1 week and with Dr Walsh in 4 months     Medications:  Reconciled Home Medications:      Medication List      START taking these medications    cephALEXin 500 MG capsule  Commonly known as:  KEFLEX  Take 1 capsule (500 mg total) by mouth every 8 (eight) hours. for 5 days        CONTINUE taking these medications    * alcohol swabs Padm  Commonly known as:  BD ALCOHOL SWABS  USE FOUR TIMES DAILY     * alcohol swabs Padm  Commonly known as:  BD ALCOHOL SWABS  USE FOUR TIMES DAILY     aspirin 81 MG EC tablet  Commonly known as:  ECOTRIN  Take 81 mg by mouth once daily.     atorvastatin 40 MG tablet  Commonly known as:  LIPITOR  Take 1 tablet (40 mg total) by mouth once daily.     blood glucose control high,low Soln  Commonly known as:  ACCU-CHEK CATHRYN CONTROL SOLN  Use as directed to check blood sugar     * blood sugar diagnostic Strp  Commonly known as:  ACCU-CHEK CATHRYN PLUS TEST STRP  CHECK  SUGAR FOUR TIMES DAILY     * blood sugar diagnostic Strp  Commonly known as:  ACCU-CHEK CATHRYN PLUS TEST STRP  1 each by Misc.(Non-Drug; Combo Route) route 4 (four) times daily.     * blood-glucose meter Misc  Commonly known as:  ACCU-CHEK CATHRYN PLUS METER  USE AS DIRECTED     * blood-glucose meter Misc  Commonly known as:  ACCU-CHEK CATHRYN PLUS METER  USE AS  "DIRECTED     carvedilol 12.5 MG tablet  Commonly known as:  COREG  Take 1 tablet (12.5 mg total) by mouth 2 (two) times daily.     clopidogrel 75 mg tablet  Commonly known as:  PLAVIX  Take 1 tablet (75 mg total) by mouth once daily.     colchicine 0.6 mg tablet  Commonly known as:  COLCRYS  Take 1 tablet (0.6 mg total) by mouth once daily.     diclofenac sodium 1 % Gel  Commonly known as:  VOLTAREN  Apply 2 g topically once daily.     furosemide 20 MG tablet  Commonly known as:  LASIX  TAKE 1 TABLET TWICE DAILY     gabapentin 300 MG capsule  Commonly known as:  NEURONTIN  Take 2 capsules (600 mg total) by mouth 2 (two) times daily.     insulin aspart U-100 100 unit/mL (3 mL) Inpn pen  Commonly known as:  NovoLOG Flexpen U-100 Insulin  Inject 15 Units into the skin 3 (three) times daily with meals.     insulin glargine 100 units/mL (3mL) SubQ pen  Commonly known as:  LANTUS SOLOSTAR U-100 INSULIN  INJECT 35 UNITS SUBCUTANEOUSLY EVERY EVENING     lancets Misc  Commonly known as:  ACCU-CHEK SOFTCLIX LANCETS  1 Device by Misc.(Non-Drug; Combo Route) route 3 (three) times daily.     losartan 50 MG tablet  Commonly known as:  COZAAR  Take 1 tablet (50 mg total) by mouth once daily.     metFORMIN 1000 MG tablet  Commonly known as:  GLUCOPHAGE  Take 1 tablet (1,000 mg total) by mouth 2 (two) times daily with meals.     nitroGLYCERIN 0.4 MG SL tablet  Commonly known as:  NITROSTAT  Place 1 tablet (0.4 mg total) under the tongue every 5 (five) minutes as needed for Chest pain.     * pen needle, diabetic 32 gauge x 1/5" Ndle  Commonly known as:  NOVOTWIST  Use 4 times/day for insulin administration     * pen needle, diabetic 31 gauge x 3/16" Ndle  Commonly known as:  BD ULTRA-FINE MINI PEN NEEDLE  USE FOUR TIMES DAILY     phenylephrine 10 MG Tab  Commonly known as:  SUDAFED PE     polyethylene glycol 236-22.74-6.74 -5.86 gram suspension  Commonly known as:  GoLYTELY,NuLYTELY  Take 4,000 mLs (4 L total) by mouth As " instructed.     tadalafil 10 MG tablet  Commonly known as:  CIALIS  Take 1 tablet (10 mg total) by mouth daily as needed for Erectile Dysfunction.     zolpidem 5 MG Tab  Commonly known as:  AMBIEN  TAKE 1 TABLET EVERY NIGHT AS NEEDED         * This list has 8 medication(s) that are the same as other medications prescribed for you. Read the directions carefully, and ask your doctor or other care provider to review them with you.            STOP taking these medications    traZODone 50 MG tablet  Commonly known as:  DESYREL            Time spent on the discharge of patient: 30 minutes    Soren Frazier MD  Cardiac Electrophysiology  [facility]

## 2019-11-12 NOTE — NURSING
Pt discharged home to care of self/family. Discharge medications reviewed with patient and education provided.  Discharge medication delivered bedside via [facility]. Pt education on s/s of infection and site care. Pt to follow up for device check in one week. Pt and family acknowledge and verbalize understanding of all discharge instructions and follow up care. Pt instructed to return to nearest emergency room for active bleeding to site. Telebox #[id number] retrieved from pt and placed in return bin.

## 2019-11-14 ENCOUNTER — PATIENT MESSAGE (OUTPATIENT)
Dept: CARDIOLOGY | Facility: CLINIC | Age: 69
End: 2019-11-14

## 2019-11-18 ENCOUNTER — OFFICE VISIT (OUTPATIENT)
Dept: FAMILY MEDICINE | Facility: CLINIC | Age: 69
End: 2019-11-18
Attending: FAMILY MEDICINE
Payer: MEDICARE

## 2019-11-18 VITALS
BODY MASS INDEX: 35.65 KG/M2 | OXYGEN SATURATION: 97 % | WEIGHT: 254.63 LBS | HEART RATE: 72 BPM | DIASTOLIC BLOOD PRESSURE: 78 MMHG | HEIGHT: 71 IN | SYSTOLIC BLOOD PRESSURE: 130 MMHG

## 2019-11-18 DIAGNOSIS — Z95.810 ICD (IMPLANTABLE CARDIOVERTER-DEFIBRILLATOR), SINGLE, IN SITU: ICD-10-CM

## 2019-11-18 DIAGNOSIS — E11.9 DIABETES MELLITUS TYPE 2 WITHOUT RETINOPATHY: Primary | ICD-10-CM

## 2019-11-18 DIAGNOSIS — Z79.4 TYPE 2 DIABETES MELLITUS WITH DIABETIC NEUROPATHY, WITH LONG-TERM CURRENT USE OF INSULIN: ICD-10-CM

## 2019-11-18 DIAGNOSIS — I50.42 CHRONIC COMBINED SYSTOLIC AND DIASTOLIC CHF (CONGESTIVE HEART FAILURE): ICD-10-CM

## 2019-11-18 DIAGNOSIS — E11.40 TYPE 2 DIABETES MELLITUS WITH DIABETIC NEUROPATHY, WITH LONG-TERM CURRENT USE OF INSULIN: ICD-10-CM

## 2019-11-18 DIAGNOSIS — E66.01 SEVERE OBESITY (BMI 35.0-35.9 WITH COMORBIDITY): ICD-10-CM

## 2019-11-18 DIAGNOSIS — I25.118 CORONARY ARTERY DISEASE OF NATIVE ARTERY OF NATIVE HEART WITH STABLE ANGINA PECTORIS: ICD-10-CM

## 2019-11-18 DIAGNOSIS — I15.2 HYPERTENSION ASSOCIATED WITH DIABETES: ICD-10-CM

## 2019-11-18 DIAGNOSIS — E11.59 HYPERTENSION ASSOCIATED WITH DIABETES: ICD-10-CM

## 2019-11-18 DIAGNOSIS — I50.42 CHRONIC COMBINED SYSTOLIC AND DIASTOLIC CONGESTIVE HEART FAILURE: ICD-10-CM

## 2019-11-18 PROCEDURE — 1101F PT FALLS ASSESS-DOCD LE1/YR: CPT | Mod: HCNC,CPTII,S$GLB, | Performed by: FAMILY MEDICINE

## 2019-11-18 PROCEDURE — 1101F PR PT FALLS ASSESS DOC 0-1 FALLS W/OUT INJ PAST YR: ICD-10-PCS | Mod: HCNC,CPTII,S$GLB, | Performed by: FAMILY MEDICINE

## 2019-11-18 PROCEDURE — 99999 PR PBB SHADOW E&M-EST. PATIENT-LVL III: CPT | Mod: PBBFAC,HCNC,, | Performed by: FAMILY MEDICINE

## 2019-11-18 PROCEDURE — 99214 PR OFFICE/OUTPT VISIT, EST, LEVL IV, 30-39 MIN: ICD-10-PCS | Mod: HCNC,S$GLB,, | Performed by: FAMILY MEDICINE

## 2019-11-18 PROCEDURE — 99499 UNLISTED E&M SERVICE: CPT | Mod: HCNC,S$GLB,, | Performed by: FAMILY MEDICINE

## 2019-11-18 PROCEDURE — 99214 OFFICE O/P EST MOD 30 MIN: CPT | Mod: HCNC,S$GLB,, | Performed by: FAMILY MEDICINE

## 2019-11-18 PROCEDURE — 3078F DIAST BP <80 MM HG: CPT | Mod: HCNC,CPTII,S$GLB, | Performed by: FAMILY MEDICINE

## 2019-11-18 PROCEDURE — 3075F SYST BP GE 130 - 139MM HG: CPT | Mod: HCNC,CPTII,S$GLB, | Performed by: FAMILY MEDICINE

## 2019-11-18 PROCEDURE — 99999 PR PBB SHADOW E&M-EST. PATIENT-LVL III: ICD-10-PCS | Mod: PBBFAC,HCNC,, | Performed by: FAMILY MEDICINE

## 2019-11-18 PROCEDURE — 3078F PR MOST RECENT DIASTOLIC BLOOD PRESSURE < 80 MM HG: ICD-10-PCS | Mod: HCNC,CPTII,S$GLB, | Performed by: FAMILY MEDICINE

## 2019-11-18 PROCEDURE — 3075F PR MOST RECENT SYSTOLIC BLOOD PRESS GE 130-139MM HG: ICD-10-PCS | Mod: HCNC,CPTII,S$GLB, | Performed by: FAMILY MEDICINE

## 2019-11-18 PROCEDURE — 99499 RISK ADDL DX/OHS AUDIT: ICD-10-PCS | Mod: HCNC,S$GLB,, | Performed by: FAMILY MEDICINE

## 2019-11-18 NOTE — PROGRESS NOTES
Subjective:       Patient ID: Clifton Wilson is a 68 y.o. male.    Chief Complaint: Follow-up    67 yr old pleasant white male with DM II, HTN, HLD, CAD, Combined chronic CHF, MR, obesity, neuropathy, presents today for diabetes. No new complaints today.      DM II - controlled  - A1C                   7.2 (H)             11/05/2019                 - on metformin and insulin and sugars improving - UTD eye exam - foot exam UTD - on ASA and plavix. Losartan. He ate too much jamie cake during mardi gras.      HTN - chronic - controlled - on losartan, lasix - compliant - no side effects      HLD - chronic -      LDLCALC                  50.8 (L)            03/18/2019                                        - controlled -       CAD/combined CHF - EF 35-40% - on external defibrillator - medical management - on ASA/plavix/ARB - no symptoms - following cardiology    Gout - improving - uses colchicine for flare up -     History as below - reviewed            Follow-up   Associated symptoms include arthralgias and joint swelling. Pertinent negatives include no chest pain, congestion, coughing, diaphoresis, fatigue, headaches, myalgias, neck pain, rash, visual change, vomiting or weakness.   Diabetes   He presents for his follow-up diabetic visit. He has type 2 diabetes mellitus. No MedicAlert identification noted. The initial diagnosis of diabetes was made 27 years ago. His disease course has been worsening. Hypoglycemia symptoms include sleepiness. Pertinent negatives for hypoglycemia include no confusion, dizziness, headaches, hunger, mood changes, nervousness/anxiousness, pallor, seizures, speech difficulty, sweats or tremors. Associated symptoms include foot paresthesias and polyuria. Pertinent negatives for diabetes include no blurred vision, no chest pain, no fatigue, no foot ulcerations, no polydipsia, no polyphagia, no visual change, no weakness and no weight loss. Hypoglycemia complications include blackouts and  hospitalization. Pertinent negatives for hypoglycemia complications include no nocturnal hypoglycemia, no required assistance and no required glucagon injection. Symptoms are stable. Diabetic complications include autonomic neuropathy, heart disease, impotence and peripheral neuropathy. Pertinent negatives for diabetic complications include no CVA, nephropathy, PVD or retinopathy. Risk factors for coronary artery disease include hypertension, obesity, diabetes mellitus and male sex. Current diabetic treatment includes diet, insulin injections and oral agent (monotherapy). He is compliant with treatment all of the time. He is currently taking insulin pre-breakfast, pre-lunch, pre-dinner and at bedtime. Insulin injections are given by patient. Rotation sites for injection include the abdominal wall, arms and thighs. His weight is fluctuating minimally. He is following a diabetic, generally healthy, low fat/cholesterol and low salt diet. Meal planning includes avoidance of concentrated sweets and carbohydrate counting. He has not had a previous visit with a dietitian. He participates in exercise three times a week. He monitors blood glucose at home 3-4 x per day. He monitors urine at home <1 x per month. Blood glucose monitoring compliance is excellent. His home blood glucose trend is decreasing steadily. An ACE inhibitor/angiotensin II receptor blocker is being taken. He does not see a podiatrist.Eye exam is current.   Swelling   This is a chronic problem. The current episode started more than 1 month ago. The problem occurs intermittently. The problem has been gradually worsening. Associated symptoms include arthralgias and joint swelling. Pertinent negatives include no chest pain, congestion, coughing, diaphoresis, fatigue, headaches, myalgias, neck pain, rash, visual change, vomiting or weakness.   Hypertension   This is a chronic problem. The current episode started more than 1 year ago. The problem has been  gradually improving since onset. The problem is controlled. Pertinent negatives include no blurred vision, chest pain, headaches, malaise/fatigue, neck pain, palpitations, peripheral edema, PND or sweats. Risk factors for coronary artery disease include diabetes mellitus, dyslipidemia, male gender and obesity. Past treatments include angiotensin blockers and diuretics. The current treatment provides significant improvement. There are no compliance problems.  Hypertensive end-organ damage includes CAD/MI and heart failure. There is no history of CVA, left ventricular hypertrophy, PVD or retinopathy. There is no history of chronic renal disease, hypercortisolism, hyperparathyroidism, pheochromocytoma, renovascular disease or a thyroid problem.   Hyperlipidemia   This is a chronic problem. The current episode started more than 1 year ago. The problem is controlled. Recent lipid tests were reviewed and are normal. Exacerbating diseases include obesity. He has no history of chronic renal disease or diabetes. There are no known factors aggravating his hyperlipidemia. Pertinent negatives include no chest pain, focal sensory loss or myalgias. Current antihyperlipidemic treatment includes statins. The current treatment provides moderate improvement of lipids. There are no compliance problems.  Risk factors for coronary artery disease include diabetes mellitus, dyslipidemia, male sex, hypertension and obesity.     Review of Systems   Constitutional: Negative.  Negative for activity change, diaphoresis, fatigue, malaise/fatigue, unexpected weight change and weight loss.   HENT: Negative.  Negative for congestion, ear pain, mouth sores, rhinorrhea and voice change.    Eyes: Negative.  Negative for blurred vision, pain, discharge and visual disturbance.   Respiratory: Negative.  Negative for apnea, cough and wheezing.    Cardiovascular: Negative.  Negative for chest pain, palpitations and PND.   Gastrointestinal: Negative.   Negative for abdominal distention, anal bleeding, diarrhea and vomiting.   Endocrine: Positive for polyuria. Negative for cold intolerance, polydipsia and polyphagia.   Genitourinary: Positive for impotence. Negative for decreased urine volume, difficulty urinating, discharge, frequency and scrotal swelling.   Musculoskeletal: Positive for arthralgias and joint swelling. Negative for back pain, myalgias, neck pain and neck stiffness.   Skin: Negative.  Negative for color change, pallor and rash.   Allergic/Immunologic: Negative.  Negative for environmental allergies and immunocompromised state.   Neurological: Negative.  Negative for dizziness, tremors, seizures, speech difficulty, weakness, light-headedness and headaches.   Hematological: Negative.    Psychiatric/Behavioral: Negative.  Negative for agitation, confusion, dysphoric mood and suicidal ideas. The patient is not nervous/anxious.        PMH/PSH/FH/SH/MED/ALLERGY reviewed    Objective:       Vitals:    11/18/19 0953   BP: 130/78   Pulse: 72       Physical Exam   Constitutional: He is oriented to person, place, and time. He appears well-developed and well-nourished.   HENT:   Head: Normocephalic and atraumatic.   Right Ear: External ear normal.   Left Ear: External ear normal.   Nose: Nose normal.   Mouth/Throat: Oropharynx is clear and moist. No oropharyngeal exudate.   Eyes: Pupils are equal, round, and reactive to light. Conjunctivae and EOM are normal. Right eye exhibits no discharge. Left eye exhibits no discharge. No scleral icterus.   Neck: Normal range of motion. Neck supple. No JVD present. No tracheal deviation present. No thyromegaly present.   Cardiovascular: Normal rate, regular rhythm, normal heart sounds and intact distal pulses. Exam reveals no gallop and no friction rub.   No murmur heard.  Pulmonary/Chest: Effort normal and breath sounds normal. No stridor. No respiratory distress. He has no wheezes. He has no rales. He exhibits no  tenderness.   Abdominal: Soft. Bowel sounds are normal. He exhibits no distension and no mass. There is no tenderness. There is no rebound and no guarding. No hernia.   Musculoskeletal: Normal range of motion. He exhibits no edema or tenderness.   Lymphadenopathy:     He has no cervical adenopathy.   Neurological: He is alert and oriented to person, place, and time. He has normal reflexes. He displays normal reflexes. No cranial nerve deficit. He exhibits normal muscle tone. Coordination normal.   Skin: Skin is warm and dry. No rash noted. No erythema. No pallor.   Psychiatric: He has a normal mood and affect. His behavior is normal. Judgment and thought content normal.       Assessment:       1. Diabetes mellitus type 2 without retinopathy    2. Coronary artery disease of native artery of native heart with stable angina pectoris    3. Chronic combined systolic and diastolic congestive heart failure    4. Chronic combined systolic and diastolic CHF (congestive heart failure)    5. IDDM (insulin dependent diabetes mellitus)    6. Hypertension associated with diabetes    7. ICD (implantable cardioverter-defibrillator), single, in situ    8. Type 2 diabetes mellitus with diabetic neuropathy, with long-term current use of insulin    9. Severe obesity (BMI 35.0-35.9 with comorbidity)        Plan:           Clifton was seen today for follow-up.    Diagnoses and all orders for this visit:    Diabetes mellitus type 2 without retinopathy  -     CBC auto differential; Future  -     Comprehensive metabolic panel; Future  -     Hemoglobin A1c; Future  -     Urinalysis; Future  -     Microalbumin/creatinine urine ratio; Future  -     Lipid panel; Future    Coronary artery disease of native artery of native heart with stable angina pectoris  -     CBC auto differential; Future  -     Comprehensive metabolic panel; Future  -     Lipid panel; Future    Chronic combined systolic and diastolic congestive heart failure  -     CBC auto  differential; Future  -     Comprehensive metabolic panel; Future    Chronic combined systolic and diastolic CHF (congestive heart failure)  -     CBC auto differential; Future  -     Comprehensive metabolic panel; Future  -     Lipid panel; Future    IDDM (insulin dependent diabetes mellitus)  -     CBC auto differential; Future  -     Comprehensive metabolic panel; Future  -     Hemoglobin A1c; Future  -     Urinalysis; Future  -     Microalbumin/creatinine urine ratio; Future    Hypertension associated with diabetes  -     CBC auto differential; Future  -     Comprehensive metabolic panel; Future  -     Lipid panel; Future    ICD (implantable cardioverter-defibrillator), single, in situ    Type 2 diabetes mellitus with diabetic neuropathy, with long-term current use of insulin  -     CBC auto differential; Future  -     Comprehensive metabolic panel; Future  -     Hemoglobin A1c; Future  -     Urinalysis; Future  -     Microalbumin/creatinine urine ratio; Future    Severe obesity (BMI 35.0-35.9 with comorbidity)        DM II controlled/hyperglycemia  -reports improving since started insulin  -lantus and novolog to continue  -strict diet control    HTN  -controlled    HLD  -controlled    Combined CHF  -follows cardiology  -on external defibrillator    Neuropathy  -continue neurontin and increase dose to 600 mg BID    Obesity  -diet and exercise as tolerated    chronic gout  -uric acid levels  -conchicine as needed    Spent adequate time in obtaining history and explaining differentials    25 minutes spent during this visit of which greater than 50% devoted to face-face counseling and coordination of care regarding diagnosis and management plan    Follow up in about 3 months (around 2/18/2020), or if symptoms worsen or fail to improve.

## 2019-11-19 ENCOUNTER — CLINICAL SUPPORT (OUTPATIENT)
Dept: CARDIOLOGY | Facility: HOSPITAL | Age: 69
End: 2019-11-19
Attending: INTERNAL MEDICINE
Payer: MEDICARE

## 2019-11-19 DIAGNOSIS — Z95.810 AICD (AUTOMATIC CARDIOVERTER/DEFIBRILLATOR) PRESENT: ICD-10-CM

## 2019-11-19 DIAGNOSIS — I25.5 ISCHEMIC CARDIOMYOPATHY: ICD-10-CM

## 2019-11-19 PROCEDURE — 93282 PRGRMG EVAL IMPLANTABLE DFB: CPT | Mod: HCNC

## 2019-11-27 DIAGNOSIS — M65.9 SYNOVITIS OF KNEE: ICD-10-CM

## 2019-11-27 RX ORDER — COLCHICINE 0.6 MG/1
TABLET, FILM COATED ORAL
Qty: 90 TABLET | Refills: 0 | Status: SHIPPED | OUTPATIENT
Start: 2019-11-27 | End: 2019-12-04 | Stop reason: SDUPTHER

## 2019-12-02 DIAGNOSIS — G62.9 NEUROPATHY: ICD-10-CM

## 2019-12-02 RX ORDER — GABAPENTIN 300 MG/1
CAPSULE ORAL
Qty: 360 CAPSULE | Refills: 4 | Status: SHIPPED | OUTPATIENT
Start: 2019-12-02 | End: 2020-05-20 | Stop reason: SDUPTHER

## 2019-12-04 DIAGNOSIS — M65.9 SYNOVITIS OF KNEE: ICD-10-CM

## 2019-12-05 RX ORDER — COLCHICINE 0.6 MG/1
TABLET, FILM COATED ORAL
Qty: 90 TABLET | Refills: 0 | Status: SHIPPED | OUTPATIENT
Start: 2019-12-05 | End: 2019-12-09 | Stop reason: SDUPTHER

## 2019-12-09 DIAGNOSIS — M65.9 SYNOVITIS OF KNEE: ICD-10-CM

## 2019-12-09 RX ORDER — COLCHICINE 0.6 MG/1
0.6 TABLET ORAL DAILY
Qty: 90 TABLET | Refills: 0 | Status: SHIPPED | OUTPATIENT
Start: 2019-12-09 | End: 2020-07-20 | Stop reason: SDUPTHER

## 2019-12-15 ENCOUNTER — CLINICAL SUPPORT (OUTPATIENT)
Dept: CARDIOLOGY | Facility: HOSPITAL | Age: 69
End: 2019-12-15
Attending: INTERNAL MEDICINE
Payer: MEDICARE

## 2019-12-15 DIAGNOSIS — Z95.810 AICD (AUTOMATIC CARDIOVERTER/DEFIBRILLATOR) PRESENT: ICD-10-CM

## 2019-12-15 DIAGNOSIS — I25.5 ISCHEMIC CARDIOMYOPATHY: ICD-10-CM

## 2019-12-15 PROCEDURE — 93296 REM INTERROG EVL PM/IDS: CPT | Mod: HCNC | Performed by: INTERNAL MEDICINE

## 2019-12-15 PROCEDURE — 93295 CARDIAC DEVICE CHECK - REMOTE: ICD-10-PCS | Mod: ,,, | Performed by: INTERNAL MEDICINE

## 2019-12-15 PROCEDURE — 93295 DEV INTERROG REMOTE 1/2/MLT: CPT | Mod: ,,, | Performed by: INTERNAL MEDICINE

## 2019-12-26 RX ORDER — PEN NEEDLE, DIABETIC 31 GX3/16"
NEEDLE, DISPOSABLE MISCELLANEOUS
Qty: 400 EACH | Refills: 0 | Status: SHIPPED | OUTPATIENT
Start: 2019-12-26 | End: 2020-07-20 | Stop reason: SDUPTHER

## 2020-01-11 DIAGNOSIS — F51.01 PRIMARY INSOMNIA: ICD-10-CM

## 2020-01-13 DIAGNOSIS — E11.40 TYPE 2 DIABETES MELLITUS WITH DIABETIC NEUROPATHY, WITH LONG-TERM CURRENT USE OF INSULIN: ICD-10-CM

## 2020-01-13 DIAGNOSIS — E11.9 DIABETES MELLITUS TYPE 2 WITHOUT RETINOPATHY: ICD-10-CM

## 2020-01-13 DIAGNOSIS — Z79.4 TYPE 2 DIABETES MELLITUS WITH DIABETIC NEUROPATHY, WITH LONG-TERM CURRENT USE OF INSULIN: ICD-10-CM

## 2020-01-13 DIAGNOSIS — E11.40 TYPE 2 DIABETES MELLITUS WITH DIABETIC NEUROPATHY: ICD-10-CM

## 2020-01-13 RX ORDER — ZOLPIDEM TARTRATE 5 MG/1
TABLET ORAL
Qty: 90 TABLET | Refills: 0 | Status: SHIPPED | OUTPATIENT
Start: 2020-01-13 | End: 2020-04-12 | Stop reason: SDUPTHER

## 2020-01-13 RX ORDER — INSULIN ASPART 100 [IU]/ML
15 INJECTION, SOLUTION INTRAVENOUS; SUBCUTANEOUS
Qty: 45 ML | Refills: 10 | Status: CANCELLED | OUTPATIENT
Start: 2020-01-13

## 2020-01-13 RX ORDER — INSULIN GLARGINE 100 [IU]/ML
INJECTION, SOLUTION SUBCUTANEOUS
Qty: 30 ML | Refills: 3 | Status: CANCELLED | OUTPATIENT
Start: 2020-01-13

## 2020-01-14 RX ORDER — INSULIN ASPART 100 [IU]/ML
15 INJECTION, SOLUTION INTRAVENOUS; SUBCUTANEOUS
Qty: 45 ML | Refills: 10 | Status: SHIPPED | OUTPATIENT
Start: 2020-01-14 | End: 2021-02-01 | Stop reason: SDUPTHER

## 2020-01-14 RX ORDER — INSULIN GLARGINE 100 [IU]/ML
INJECTION, SOLUTION SUBCUTANEOUS
Qty: 30 ML | Refills: 3 | Status: SHIPPED | OUTPATIENT
Start: 2020-01-14 | End: 2020-08-08

## 2020-01-16 ENCOUNTER — TELEPHONE (OUTPATIENT)
Dept: CARDIOLOGY | Facility: HOSPITAL | Age: 70
End: 2020-01-16

## 2020-01-16 NOTE — TELEPHONE ENCOUNTER
Attempted to contact the patient on this afternoon in relation to sending a manual transmission via his remote ICD home monitor as to [email or web address] is showing that it is not communicating.  Message was left on the voice mail @ [phone] asking the pt to contact the Device Clinic.  Contact # for the Device Clinic was left on the voicemail.

## 2020-01-17 ENCOUNTER — PATIENT MESSAGE (OUTPATIENT)
Dept: FAMILY MEDICINE | Facility: CLINIC | Age: 70
End: 2020-01-17

## 2020-01-17 ENCOUNTER — PES CALL (OUTPATIENT)
Dept: ADMINISTRATIVE | Facility: CLINIC | Age: 70
End: 2020-01-17

## 2020-01-19 ENCOUNTER — PATIENT MESSAGE (OUTPATIENT)
Dept: FAMILY MEDICINE | Facility: CLINIC | Age: 70
End: 2020-01-19

## 2020-01-19 RX ORDER — PROMETHAZINE HYDROCHLORIDE AND DEXTROMETHORPHAN HYDROBROMIDE 6.25; 15 MG/5ML; MG/5ML
5 SYRUP ORAL 2 TIMES DAILY PRN
Qty: 180 ML | Refills: 1 | Status: SHIPPED | OUTPATIENT
Start: 2020-01-19 | End: 2020-01-29

## 2020-02-12 ENCOUNTER — PATIENT MESSAGE (OUTPATIENT)
Dept: FAMILY MEDICINE | Facility: CLINIC | Age: 70
End: 2020-02-12

## 2020-02-14 RX ORDER — ISOPROPYL ALCOHOL 70 ML/100ML
SWAB TOPICAL
Qty: 400 EACH | Refills: 1 | Status: SHIPPED | OUTPATIENT
Start: 2020-02-14 | End: 2021-02-14 | Stop reason: SDUPTHER

## 2020-02-14 RX ORDER — LANCETS
1 EACH MISCELLANEOUS 3 TIMES DAILY
Qty: 300 EACH | Refills: 1 | Status: SHIPPED | OUTPATIENT
Start: 2020-02-14 | End: 2021-08-03 | Stop reason: SDUPTHER

## 2020-02-18 ENCOUNTER — LAB VISIT (OUTPATIENT)
Dept: LAB | Facility: HOSPITAL | Age: 70
End: 2020-02-18
Attending: FAMILY MEDICINE
Payer: MEDICARE

## 2020-02-18 DIAGNOSIS — I50.42 CHRONIC COMBINED SYSTOLIC AND DIASTOLIC CONGESTIVE HEART FAILURE: ICD-10-CM

## 2020-02-18 DIAGNOSIS — E11.9 DIABETES MELLITUS TYPE 2 WITHOUT RETINOPATHY: ICD-10-CM

## 2020-02-18 DIAGNOSIS — I25.118 CORONARY ARTERY DISEASE OF NATIVE ARTERY OF NATIVE HEART WITH STABLE ANGINA PECTORIS: ICD-10-CM

## 2020-02-18 DIAGNOSIS — E11.40 TYPE 2 DIABETES MELLITUS WITH DIABETIC NEUROPATHY, WITH LONG-TERM CURRENT USE OF INSULIN: ICD-10-CM

## 2020-02-18 DIAGNOSIS — I50.42 CHRONIC COMBINED SYSTOLIC AND DIASTOLIC CHF (CONGESTIVE HEART FAILURE): ICD-10-CM

## 2020-02-18 DIAGNOSIS — I15.2 HYPERTENSION ASSOCIATED WITH DIABETES: ICD-10-CM

## 2020-02-18 DIAGNOSIS — Z79.4 TYPE 2 DIABETES MELLITUS WITH DIABETIC NEUROPATHY, WITH LONG-TERM CURRENT USE OF INSULIN: ICD-10-CM

## 2020-02-18 DIAGNOSIS — E11.59 HYPERTENSION ASSOCIATED WITH DIABETES: ICD-10-CM

## 2020-02-18 LAB
ALBUMIN SERPL BCP-MCNC: 3.8 G/DL (ref 3.5–5.2)
ALP SERPL-CCNC: 73 U/L (ref 55–135)
ALT SERPL W/O P-5'-P-CCNC: 30 U/L (ref 10–44)
ANION GAP SERPL CALC-SCNC: 10 MMOL/L (ref 8–16)
AST SERPL-CCNC: 19 U/L (ref 10–40)
BASOPHILS # BLD AUTO: 0.04 K/UL (ref 0–0.2)
BASOPHILS NFR BLD: 0.7 % (ref 0–1.9)
BILIRUB SERPL-MCNC: 0.8 MG/DL (ref 0.1–1)
BUN SERPL-MCNC: 12 MG/DL (ref 8–23)
CALCIUM SERPL-MCNC: 9.2 MG/DL (ref 8.7–10.5)
CHLORIDE SERPL-SCNC: 107 MMOL/L (ref 95–110)
CHOLEST SERPL-MCNC: 130 MG/DL (ref 120–199)
CHOLEST/HDLC SERPL: 4.1 {RATIO} (ref 2–5)
CO2 SERPL-SCNC: 26 MMOL/L (ref 23–29)
CREAT SERPL-MCNC: 0.9 MG/DL (ref 0.5–1.4)
DIFFERENTIAL METHOD: ABNORMAL
EOSINOPHIL # BLD AUTO: 0.4 K/UL (ref 0–0.5)
EOSINOPHIL NFR BLD: 6.1 % (ref 0–8)
ERYTHROCYTE [DISTWIDTH] IN BLOOD BY AUTOMATED COUNT: 11.9 % (ref 11.5–14.5)
EST. GFR  (AFRICAN AMERICAN): >60 ML/MIN/1.73 M^2
EST. GFR  (NON AFRICAN AMERICAN): >60 ML/MIN/1.73 M^2
ESTIMATED AVG GLUCOSE: 163 MG/DL (ref 68–131)
GLUCOSE SERPL-MCNC: 114 MG/DL (ref 70–110)
HBA1C MFR BLD HPLC: 7.3 % (ref 4–5.6)
HCT VFR BLD AUTO: 40.1 % (ref 40–54)
HDLC SERPL-MCNC: 32 MG/DL (ref 40–75)
HDLC SERPL: 24.6 % (ref 20–50)
HGB BLD-MCNC: 13.5 G/DL (ref 14–18)
IMM GRANULOCYTES # BLD AUTO: 0.02 K/UL (ref 0–0.04)
IMM GRANULOCYTES NFR BLD AUTO: 0.3 % (ref 0–0.5)
LDLC SERPL CALC-MCNC: 56 MG/DL (ref 63–159)
LYMPHOCYTES # BLD AUTO: 2 K/UL (ref 1–4.8)
LYMPHOCYTES NFR BLD: 35.1 % (ref 18–48)
MCH RBC QN AUTO: 30.9 PG (ref 27–31)
MCHC RBC AUTO-ENTMCNC: 33.7 G/DL (ref 32–36)
MCV RBC AUTO: 92 FL (ref 82–98)
MONOCYTES # BLD AUTO: 0.7 K/UL (ref 0.3–1)
MONOCYTES NFR BLD: 11.3 % (ref 4–15)
NEUTROPHILS # BLD AUTO: 2.7 K/UL (ref 1.8–7.7)
NEUTROPHILS NFR BLD: 46.5 % (ref 38–73)
NONHDLC SERPL-MCNC: 98 MG/DL
NRBC BLD-RTO: 0 /100 WBC
PLATELET # BLD AUTO: 147 K/UL (ref 150–350)
PMV BLD AUTO: 10.9 FL (ref 9.2–12.9)
POTASSIUM SERPL-SCNC: 3.8 MMOL/L (ref 3.5–5.1)
PROT SERPL-MCNC: 7 G/DL (ref 6–8.4)
RBC # BLD AUTO: 4.37 M/UL (ref 4.6–6.2)
SODIUM SERPL-SCNC: 143 MMOL/L (ref 136–145)
TRIGL SERPL-MCNC: 210 MG/DL (ref 30–150)
WBC # BLD AUTO: 5.76 K/UL (ref 3.9–12.7)

## 2020-02-18 PROCEDURE — 80061 LIPID PANEL: CPT | Mod: HCNC

## 2020-02-18 PROCEDURE — 80053 COMPREHEN METABOLIC PANEL: CPT | Mod: HCNC

## 2020-02-18 PROCEDURE — 83036 HEMOGLOBIN GLYCOSYLATED A1C: CPT | Mod: HCNC

## 2020-02-18 PROCEDURE — 85025 COMPLETE CBC W/AUTO DIFF WBC: CPT | Mod: HCNC

## 2020-02-18 PROCEDURE — 36415 COLL VENOUS BLD VENIPUNCTURE: CPT | Mod: HCNC

## 2020-02-19 ENCOUNTER — OFFICE VISIT (OUTPATIENT)
Dept: FAMILY MEDICINE | Facility: CLINIC | Age: 70
End: 2020-02-19
Attending: FAMILY MEDICINE
Payer: MEDICARE

## 2020-02-19 VITALS
BODY MASS INDEX: 35.1 KG/M2 | WEIGHT: 250.69 LBS | OXYGEN SATURATION: 98 % | SYSTOLIC BLOOD PRESSURE: 130 MMHG | HEART RATE: 70 BPM | HEIGHT: 71 IN | DIASTOLIC BLOOD PRESSURE: 80 MMHG

## 2020-02-19 DIAGNOSIS — I47.20 VENTRICULAR TACHYCARDIA: ICD-10-CM

## 2020-02-19 DIAGNOSIS — I25.118 CORONARY ARTERY DISEASE OF NATIVE ARTERY OF NATIVE HEART WITH STABLE ANGINA PECTORIS: ICD-10-CM

## 2020-02-19 DIAGNOSIS — E11.9 DIABETES MELLITUS TYPE 2 WITHOUT RETINOPATHY: ICD-10-CM

## 2020-02-19 DIAGNOSIS — Z79.4 TYPE 2 DIABETES MELLITUS WITH DIABETIC NEUROPATHY, WITH LONG-TERM CURRENT USE OF INSULIN: Primary | ICD-10-CM

## 2020-02-19 DIAGNOSIS — E66.01 SEVERE OBESITY (BMI 35.0-35.9 WITH COMORBIDITY): ICD-10-CM

## 2020-02-19 DIAGNOSIS — Z95.810 ICD (IMPLANTABLE CARDIOVERTER-DEFIBRILLATOR), SINGLE, IN SITU: ICD-10-CM

## 2020-02-19 DIAGNOSIS — E11.40 TYPE 2 DIABETES MELLITUS WITH DIABETIC NEUROPATHY, WITH LONG-TERM CURRENT USE OF INSULIN: Primary | ICD-10-CM

## 2020-02-19 DIAGNOSIS — I25.5 ISCHEMIC CARDIOMYOPATHY: ICD-10-CM

## 2020-02-19 DIAGNOSIS — D69.6 THROMBOCYTOPENIA: ICD-10-CM

## 2020-02-19 DIAGNOSIS — I15.2 HYPERTENSION ASSOCIATED WITH DIABETES: ICD-10-CM

## 2020-02-19 DIAGNOSIS — I50.42 CHRONIC COMBINED SYSTOLIC AND DIASTOLIC CONGESTIVE HEART FAILURE: ICD-10-CM

## 2020-02-19 DIAGNOSIS — E11.59 HYPERTENSION ASSOCIATED WITH DIABETES: ICD-10-CM

## 2020-02-19 PROCEDURE — 1101F PR PT FALLS ASSESS DOC 0-1 FALLS W/OUT INJ PAST YR: ICD-10-PCS | Mod: HCNC,CPTII,S$GLB, | Performed by: FAMILY MEDICINE

## 2020-02-19 PROCEDURE — 99499 RISK ADDL DX/OHS AUDIT: ICD-10-PCS | Mod: HCNC,S$GLB,, | Performed by: FAMILY MEDICINE

## 2020-02-19 PROCEDURE — 3051F PR MOST RECENT HEMOGLOBIN A1C LEVEL 7.0 - < 8.0%: ICD-10-PCS | Mod: HCNC,CPTII,S$GLB, | Performed by: FAMILY MEDICINE

## 2020-02-19 PROCEDURE — 99214 OFFICE O/P EST MOD 30 MIN: CPT | Mod: HCNC,S$GLB,, | Performed by: FAMILY MEDICINE

## 2020-02-19 PROCEDURE — 3051F HG A1C>EQUAL 7.0%<8.0%: CPT | Mod: HCNC,CPTII,S$GLB, | Performed by: FAMILY MEDICINE

## 2020-02-19 PROCEDURE — 99999 PR PBB SHADOW E&M-EST. PATIENT-LVL III: CPT | Mod: PBBFAC,HCNC,, | Performed by: FAMILY MEDICINE

## 2020-02-19 PROCEDURE — 1126F AMNT PAIN NOTED NONE PRSNT: CPT | Mod: HCNC,S$GLB,, | Performed by: FAMILY MEDICINE

## 2020-02-19 PROCEDURE — 99999 PR PBB SHADOW E&M-EST. PATIENT-LVL III: ICD-10-PCS | Mod: PBBFAC,HCNC,, | Performed by: FAMILY MEDICINE

## 2020-02-19 PROCEDURE — 3079F DIAST BP 80-89 MM HG: CPT | Mod: HCNC,CPTII,S$GLB, | Performed by: FAMILY MEDICINE

## 2020-02-19 PROCEDURE — 3075F SYST BP GE 130 - 139MM HG: CPT | Mod: HCNC,CPTII,S$GLB, | Performed by: FAMILY MEDICINE

## 2020-02-19 PROCEDURE — 99499 UNLISTED E&M SERVICE: CPT | Mod: HCNC,S$GLB,, | Performed by: FAMILY MEDICINE

## 2020-02-19 PROCEDURE — 1159F MED LIST DOCD IN RCRD: CPT | Mod: HCNC,S$GLB,, | Performed by: FAMILY MEDICINE

## 2020-02-19 PROCEDURE — 3075F PR MOST RECENT SYSTOLIC BLOOD PRESS GE 130-139MM HG: ICD-10-PCS | Mod: HCNC,CPTII,S$GLB, | Performed by: FAMILY MEDICINE

## 2020-02-19 PROCEDURE — 99214 PR OFFICE/OUTPT VISIT, EST, LEVL IV, 30-39 MIN: ICD-10-PCS | Mod: HCNC,S$GLB,, | Performed by: FAMILY MEDICINE

## 2020-02-19 PROCEDURE — 1159F PR MEDICATION LIST DOCUMENTED IN MEDICAL RECORD: ICD-10-PCS | Mod: HCNC,S$GLB,, | Performed by: FAMILY MEDICINE

## 2020-02-19 PROCEDURE — 1101F PT FALLS ASSESS-DOCD LE1/YR: CPT | Mod: HCNC,CPTII,S$GLB, | Performed by: FAMILY MEDICINE

## 2020-02-19 PROCEDURE — 3079F PR MOST RECENT DIASTOLIC BLOOD PRESSURE 80-89 MM HG: ICD-10-PCS | Mod: HCNC,CPTII,S$GLB, | Performed by: FAMILY MEDICINE

## 2020-02-19 PROCEDURE — 1126F PR PAIN SEVERITY QUANTIFIED, NO PAIN PRESENT: ICD-10-PCS | Mod: HCNC,S$GLB,, | Performed by: FAMILY MEDICINE

## 2020-02-19 RX ORDER — LOSARTAN POTASSIUM 100 MG/1
100 TABLET ORAL DAILY
Qty: 90 TABLET | Refills: 3 | Status: SHIPPED | OUTPATIENT
Start: 2020-02-19 | End: 2020-05-20

## 2020-02-19 RX ORDER — LOSARTAN POTASSIUM 100 MG/1
100 TABLET ORAL DAILY
Qty: 90 TABLET | Refills: 3 | Status: SHIPPED | OUTPATIENT
Start: 2020-02-19 | End: 2020-02-19 | Stop reason: SDUPTHER

## 2020-02-19 NOTE — PROGRESS NOTES
Subjective:       Patient ID: Clifton Wilson is a 69 y.o. male.    Chief Complaint: Follow-up (3 month )    69 yr old pleasant white male with DM II, HTN, HLD, CAD, Combined chronic CHF, MR, obesity, neuropathy, presents today for diabetes. No new complaints today.      DM II - controlled  - A1C                   7.3 (H)             02/18/2020                     - on metformin and insulin and sugars improving - UTD eye exam - foot exam UTD - on ASA and plavix. Losartan. He ate too much jamie cake during mardi gras.      HTN - chronic - controlled - on losartan, lasix - compliant - no side effects      HLD - chronic -        LDLCALC                  56.0 (L)            02/18/2020                                           - controlled -       CAD/combined CHF - EF 35-40% - on external defibrillator - medical management - on ASA/plavix/ARB - no symptoms - following cardiology    Gout - improving - uses colchicine for flare up -     History as below - reviewed            Follow-up   Associated symptoms include arthralgias and joint swelling. Pertinent negatives include no chest pain, congestion, coughing, diaphoresis, fatigue, headaches, myalgias, neck pain, rash, visual change, vomiting or weakness.   Diabetes   He presents for his follow-up diabetic visit. He has type 2 diabetes mellitus. No MedicAlert identification noted. The initial diagnosis of diabetes was made 27 years ago. His disease course has been worsening. Hypoglycemia symptoms include sleepiness. Pertinent negatives for hypoglycemia include no confusion, dizziness, headaches, hunger, mood changes, nervousness/anxiousness, pallor, seizures, speech difficulty, sweats or tremors. Associated symptoms include foot paresthesias and polyuria. Pertinent negatives for diabetes include no blurred vision, no chest pain, no fatigue, no foot ulcerations, no polydipsia, no polyphagia, no visual change, no weakness and no weight loss. Hypoglycemia complications  include blackouts and hospitalization. Pertinent negatives for hypoglycemia complications include no nocturnal hypoglycemia, no required assistance and no required glucagon injection. Symptoms are stable. Diabetic complications include autonomic neuropathy, heart disease, impotence and peripheral neuropathy. Pertinent negatives for diabetic complications include no CVA, nephropathy, PVD or retinopathy. Risk factors for coronary artery disease include hypertension, obesity, diabetes mellitus and male sex. Current diabetic treatment includes diet, insulin injections and oral agent (monotherapy). He is compliant with treatment all of the time. He is currently taking insulin pre-breakfast, pre-lunch, pre-dinner and at bedtime. Insulin injections are given by patient. Rotation sites for injection include the abdominal wall, arms and thighs. His weight is fluctuating minimally. He is following a diabetic, generally healthy, low fat/cholesterol and low salt diet. Meal planning includes avoidance of concentrated sweets and carbohydrate counting. He has not had a previous visit with a dietitian. He participates in exercise three times a week. He monitors blood glucose at home 3-4 x per day. He monitors urine at home <1 x per month. Blood glucose monitoring compliance is excellent. His home blood glucose trend is decreasing steadily. An ACE inhibitor/angiotensin II receptor blocker is being taken. He does not see a podiatrist.Eye exam is current.   Swelling   This is a chronic problem. The current episode started more than 1 month ago. The problem occurs intermittently. The problem has been gradually worsening. Associated symptoms include arthralgias and joint swelling. Pertinent negatives include no chest pain, congestion, coughing, diaphoresis, fatigue, headaches, myalgias, neck pain, rash, visual change, vomiting or weakness.   Hypertension   This is a chronic problem. The current episode started more than 1 year ago. The  problem has been gradually improving since onset. The problem is controlled. Pertinent negatives include no blurred vision, chest pain, headaches, malaise/fatigue, neck pain, palpitations, peripheral edema, PND or sweats. Risk factors for coronary artery disease include diabetes mellitus, dyslipidemia, male gender and obesity. Past treatments include angiotensin blockers and diuretics. The current treatment provides significant improvement. There are no compliance problems.  Hypertensive end-organ damage includes CAD/MI and heart failure. There is no history of CVA, left ventricular hypertrophy, PVD or retinopathy. There is no history of chronic renal disease, hypercortisolism, hyperparathyroidism, pheochromocytoma, renovascular disease or a thyroid problem.   Hyperlipidemia   This is a chronic problem. The current episode started more than 1 year ago. The problem is controlled. Recent lipid tests were reviewed and are normal. Exacerbating diseases include obesity. He has no history of chronic renal disease or diabetes. There are no known factors aggravating his hyperlipidemia. Pertinent negatives include no chest pain, focal sensory loss or myalgias. Current antihyperlipidemic treatment includes statins. The current treatment provides moderate improvement of lipids. There are no compliance problems.  Risk factors for coronary artery disease include diabetes mellitus, dyslipidemia, male sex, hypertension and obesity.     Review of Systems   Constitutional: Negative.  Negative for activity change, diaphoresis, fatigue, malaise/fatigue, unexpected weight change and weight loss.   HENT: Negative.  Negative for congestion, ear pain, mouth sores, rhinorrhea and voice change.    Eyes: Negative.  Negative for blurred vision, pain, discharge and visual disturbance.   Respiratory: Negative.  Negative for apnea, cough and wheezing.    Cardiovascular: Negative.  Negative for chest pain, palpitations and PND.    Gastrointestinal: Negative.  Negative for abdominal distention, anal bleeding, diarrhea and vomiting.   Endocrine: Positive for polyuria. Negative for cold intolerance, polydipsia and polyphagia.   Genitourinary: Positive for impotence. Negative for decreased urine volume, difficulty urinating, discharge, frequency and scrotal swelling.   Musculoskeletal: Positive for arthralgias and joint swelling. Negative for back pain, myalgias, neck pain and neck stiffness.   Skin: Negative.  Negative for color change, pallor and rash.   Allergic/Immunologic: Negative.  Negative for environmental allergies and immunocompromised state.   Neurological: Negative.  Negative for dizziness, tremors, seizures, speech difficulty, weakness, light-headedness and headaches.   Hematological: Negative.    Psychiatric/Behavioral: Negative.  Negative for agitation, confusion, dysphoric mood and suicidal ideas. The patient is not nervous/anxious.        PMH/PSH/FH/SH/MED/ALLERGY reviewed    Objective:       Vitals:    02/19/20 1325   BP: 130/80   Pulse: 70       Physical Exam   Constitutional: He is oriented to person, place, and time. He appears well-developed and well-nourished.   HENT:   Head: Normocephalic and atraumatic.   Right Ear: External ear normal.   Left Ear: External ear normal.   Nose: Nose normal.   Mouth/Throat: Oropharynx is clear and moist. No oropharyngeal exudate.   Eyes: Pupils are equal, round, and reactive to light. Conjunctivae and EOM are normal. Right eye exhibits no discharge. Left eye exhibits no discharge. No scleral icterus.   Neck: Normal range of motion. Neck supple. No JVD present. No tracheal deviation present. No thyromegaly present.   Cardiovascular: Normal rate, regular rhythm, normal heart sounds and intact distal pulses. Exam reveals no gallop and no friction rub.   No murmur heard.  Pulmonary/Chest: Effort normal and breath sounds normal. No stridor. No respiratory distress. He has no wheezes. He has  no rales. He exhibits no tenderness.   Abdominal: Soft. Bowel sounds are normal. He exhibits no distension and no mass. There is no tenderness. There is no rebound and no guarding. No hernia.   Musculoskeletal: Normal range of motion. He exhibits no edema or tenderness.   Lymphadenopathy:     He has no cervical adenopathy.   Neurological: He is alert and oriented to person, place, and time. He has normal reflexes. He displays normal reflexes. No cranial nerve deficit. He exhibits normal muscle tone. Coordination normal.   Skin: Skin is warm and dry. No rash noted. No erythema. No pallor.   Psychiatric: He has a normal mood and affect. His behavior is normal. Judgment and thought content normal.       Assessment:       1. Type 2 diabetes mellitus with diabetic neuropathy, with long-term current use of insulin    2. Hypertension associated with diabetes    3. Severe obesity (BMI 35.0-35.9 with comorbidity)    4. Chronic combined systolic and diastolic congestive heart failure    5. Coronary artery disease of native artery of native heart with stable angina pectoris    6. Ventricular tachycardia    7. Ventricular tachycardia, nonsustained post-MI    8. Ischemic cardiomyopathy    9. IDDM (insulin dependent diabetes mellitus)    10. ICD (implantable cardioverter-defibrillator), single, in situ    11. Diabetes mellitus type 2 without retinopathy    12. Thrombocytopenia        Plan:           Clifton was seen today for follow-up.    Diagnoses and all orders for this visit:    Type 2 diabetes mellitus with diabetic neuropathy, with long-term current use of insulin  -     CBC auto differential; Future  -     Comprehensive metabolic panel; Future    Hypertension associated with diabetes  -     Discontinue: losartan (COZAAR) 100 MG tablet; Take 1 tablet (100 mg total) by mouth once daily.  -     losartan (COZAAR) 100 MG tablet; Take 1 tablet (100 mg total) by mouth once daily.  -     CBC auto differential; Future  -      Comprehensive metabolic panel; Future    Severe obesity (BMI 35.0-35.9 with comorbidity)    Chronic combined systolic and diastolic congestive heart failure    Coronary artery disease of native artery of native heart with stable angina pectoris    Ventricular tachycardia    Ventricular tachycardia, nonsustained post-MI    Ischemic cardiomyopathy    IDDM (insulin dependent diabetes mellitus)    ICD (implantable cardioverter-defibrillator), single, in situ    Diabetes mellitus type 2 without retinopathy    Thrombocytopenia        DM II controlled/hyperglycemia  -reports improving since started insulin  -lantus and novolog to continue  -strict diet control    HTN  -controlled    HLD  -controlled    Combined CHF  -follows cardiology  -on external defibrillator    Neuropathy  -continue neurontin and increase dose to 600 mg BID    Obesity  -diet and exercise as tolerated    chronic gout  -uric acid levels  -conchicine as needed    Spent adequate time in obtaining history and explaining differentials    25 minutes spent during this visit of which greater than 50% devoted to face-face counseling and coordination of care regarding diagnosis and management plan    Follow up in about 3 months (around 5/19/2020), or if symptoms worsen or fail to improve.

## 2020-02-20 ENCOUNTER — PATIENT MESSAGE (OUTPATIENT)
Dept: FAMILY MEDICINE | Facility: CLINIC | Age: 70
End: 2020-02-20

## 2020-03-12 ENCOUNTER — PES CALL (OUTPATIENT)
Dept: ADMINISTRATIVE | Facility: CLINIC | Age: 70
End: 2020-03-12

## 2020-03-13 DIAGNOSIS — E11.59 HYPERTENSION ASSOCIATED WITH DIABETES: ICD-10-CM

## 2020-03-13 DIAGNOSIS — I15.2 HYPERTENSION ASSOCIATED WITH DIABETES: ICD-10-CM

## 2020-03-14 ENCOUNTER — CLINICAL SUPPORT (OUTPATIENT)
Dept: CARDIOLOGY | Facility: HOSPITAL | Age: 70
End: 2020-03-14
Attending: INTERNAL MEDICINE
Payer: MEDICARE

## 2020-03-14 DIAGNOSIS — I25.5 ISCHEMIC CARDIOMYOPATHY: ICD-10-CM

## 2020-03-14 DIAGNOSIS — Z95.810 AICD (AUTOMATIC CARDIOVERTER/DEFIBRILLATOR) PRESENT: ICD-10-CM

## 2020-03-14 PROCEDURE — 93295 CARDIAC DEVICE CHECK - REMOTE: ICD-10-PCS | Mod: ,,, | Performed by: INTERNAL MEDICINE

## 2020-03-14 PROCEDURE — 93296 REM INTERROG EVL PM/IDS: CPT | Mod: HCNC | Performed by: INTERNAL MEDICINE

## 2020-03-14 PROCEDURE — 93295 DEV INTERROG REMOTE 1/2/MLT: CPT | Mod: ,,, | Performed by: INTERNAL MEDICINE

## 2020-03-20 ENCOUNTER — PATIENT MESSAGE (OUTPATIENT)
Dept: ADMINISTRATIVE | Facility: OTHER | Age: 70
End: 2020-03-20

## 2020-04-07 ENCOUNTER — PATIENT MESSAGE (OUTPATIENT)
Dept: CARDIOLOGY | Facility: CLINIC | Age: 70
End: 2020-04-07

## 2020-04-12 DIAGNOSIS — F51.01 PRIMARY INSOMNIA: ICD-10-CM

## 2020-04-13 RX ORDER — ZOLPIDEM TARTRATE 5 MG/1
TABLET ORAL
Qty: 90 TABLET | Refills: 0 | Status: SHIPPED | OUTPATIENT
Start: 2020-04-13 | End: 2020-04-18 | Stop reason: SDUPTHER

## 2020-04-17 ENCOUNTER — DOCUMENTATION ONLY (OUTPATIENT)
Dept: CARDIOLOGY | Facility: HOSPITAL | Age: 70
End: 2020-04-17

## 2020-04-18 ENCOUNTER — PATIENT MESSAGE (OUTPATIENT)
Dept: FAMILY MEDICINE | Facility: CLINIC | Age: 70
End: 2020-04-18

## 2020-04-18 DIAGNOSIS — F51.01 PRIMARY INSOMNIA: ICD-10-CM

## 2020-04-18 RX ORDER — ZOLPIDEM TARTRATE 5 MG/1
TABLET ORAL
Qty: 90 TABLET | Refills: 0 | Status: SHIPPED | OUTPATIENT
Start: 2020-04-18 | End: 2020-07-12 | Stop reason: SDUPTHER

## 2020-04-23 ENCOUNTER — PATIENT MESSAGE (OUTPATIENT)
Dept: ADMINISTRATIVE | Facility: OTHER | Age: 70
End: 2020-04-23

## 2020-04-24 DIAGNOSIS — E11.9 TYPE 2 DIABETES MELLITUS: ICD-10-CM

## 2020-05-14 ENCOUNTER — PATIENT MESSAGE (OUTPATIENT)
Dept: FAMILY MEDICINE | Facility: CLINIC | Age: 70
End: 2020-05-14

## 2020-05-15 ENCOUNTER — LAB VISIT (OUTPATIENT)
Dept: LAB | Facility: HOSPITAL | Age: 70
End: 2020-05-15
Attending: FAMILY MEDICINE
Payer: MEDICARE

## 2020-05-15 DIAGNOSIS — I15.2 HYPERTENSION ASSOCIATED WITH DIABETES: ICD-10-CM

## 2020-05-15 DIAGNOSIS — Z79.4 TYPE 2 DIABETES MELLITUS WITH DIABETIC NEUROPATHY, WITH LONG-TERM CURRENT USE OF INSULIN: ICD-10-CM

## 2020-05-15 DIAGNOSIS — E11.40 TYPE 2 DIABETES MELLITUS WITH DIABETIC NEUROPATHY, WITH LONG-TERM CURRENT USE OF INSULIN: ICD-10-CM

## 2020-05-15 DIAGNOSIS — E11.59 HYPERTENSION ASSOCIATED WITH DIABETES: ICD-10-CM

## 2020-05-15 LAB
ALBUMIN SERPL BCP-MCNC: 4 G/DL (ref 3.5–5.2)
ALP SERPL-CCNC: 80 U/L (ref 55–135)
ALT SERPL W/O P-5'-P-CCNC: 32 U/L (ref 10–44)
ANION GAP SERPL CALC-SCNC: 10 MMOL/L (ref 8–16)
AST SERPL-CCNC: 21 U/L (ref 10–40)
BASOPHILS # BLD AUTO: 0.06 K/UL (ref 0–0.2)
BASOPHILS NFR BLD: 0.9 % (ref 0–1.9)
BILIRUB SERPL-MCNC: 1.1 MG/DL (ref 0.1–1)
BUN SERPL-MCNC: 21 MG/DL (ref 8–23)
CALCIUM SERPL-MCNC: 9 MG/DL (ref 8.7–10.5)
CHLORIDE SERPL-SCNC: 103 MMOL/L (ref 95–110)
CO2 SERPL-SCNC: 29 MMOL/L (ref 23–29)
CREAT SERPL-MCNC: 1.1 MG/DL (ref 0.5–1.4)
DIFFERENTIAL METHOD: ABNORMAL
EOSINOPHIL # BLD AUTO: 0.3 K/UL (ref 0–0.5)
EOSINOPHIL NFR BLD: 4 % (ref 0–8)
ERYTHROCYTE [DISTWIDTH] IN BLOOD BY AUTOMATED COUNT: 11.9 % (ref 11.5–14.5)
EST. GFR  (AFRICAN AMERICAN): >60 ML/MIN/1.73 M^2
EST. GFR  (NON AFRICAN AMERICAN): >60 ML/MIN/1.73 M^2
GLUCOSE SERPL-MCNC: 167 MG/DL (ref 70–110)
HCT VFR BLD AUTO: 41.5 % (ref 40–54)
HGB BLD-MCNC: 13.9 G/DL (ref 14–18)
IMM GRANULOCYTES # BLD AUTO: 0.02 K/UL (ref 0–0.04)
IMM GRANULOCYTES NFR BLD AUTO: 0.3 % (ref 0–0.5)
LYMPHOCYTES # BLD AUTO: 2 K/UL (ref 1–4.8)
LYMPHOCYTES NFR BLD: 30.5 % (ref 18–48)
MCH RBC QN AUTO: 31 PG (ref 27–31)
MCHC RBC AUTO-ENTMCNC: 33.5 G/DL (ref 32–36)
MCV RBC AUTO: 93 FL (ref 82–98)
MONOCYTES # BLD AUTO: 0.7 K/UL (ref 0.3–1)
MONOCYTES NFR BLD: 11.4 % (ref 4–15)
NEUTROPHILS # BLD AUTO: 3.4 K/UL (ref 1.8–7.7)
NEUTROPHILS NFR BLD: 52.9 % (ref 38–73)
NRBC BLD-RTO: 0 /100 WBC
PLATELET # BLD AUTO: 155 K/UL (ref 150–350)
PMV BLD AUTO: 10.8 FL (ref 9.2–12.9)
POTASSIUM SERPL-SCNC: 4 MMOL/L (ref 3.5–5.1)
PROT SERPL-MCNC: 7.4 G/DL (ref 6–8.4)
RBC # BLD AUTO: 4.48 M/UL (ref 4.6–6.2)
SODIUM SERPL-SCNC: 142 MMOL/L (ref 136–145)
WBC # BLD AUTO: 6.49 K/UL (ref 3.9–12.7)

## 2020-05-15 PROCEDURE — 80053 COMPREHEN METABOLIC PANEL: CPT | Mod: HCNC

## 2020-05-15 PROCEDURE — 85025 COMPLETE CBC W/AUTO DIFF WBC: CPT | Mod: HCNC

## 2020-05-15 PROCEDURE — 36415 COLL VENOUS BLD VENIPUNCTURE: CPT | Mod: HCNC

## 2020-05-16 ENCOUNTER — PATIENT MESSAGE (OUTPATIENT)
Dept: FAMILY MEDICINE | Facility: CLINIC | Age: 70
End: 2020-05-16

## 2020-05-20 ENCOUNTER — OFFICE VISIT (OUTPATIENT)
Dept: FAMILY MEDICINE | Facility: CLINIC | Age: 70
End: 2020-05-20
Attending: FAMILY MEDICINE
Payer: MEDICARE

## 2020-05-20 VITALS
OXYGEN SATURATION: 98 % | BODY MASS INDEX: 35.02 KG/M2 | HEART RATE: 78 BPM | SYSTOLIC BLOOD PRESSURE: 130 MMHG | DIASTOLIC BLOOD PRESSURE: 80 MMHG | WEIGHT: 251.13 LBS | TEMPERATURE: 98 F

## 2020-05-20 DIAGNOSIS — I15.2 HYPERTENSION ASSOCIATED WITH DIABETES: Primary | ICD-10-CM

## 2020-05-20 DIAGNOSIS — I50.41 ACUTE COMBINED SYSTOLIC AND DIASTOLIC CONGESTIVE HEART FAILURE: ICD-10-CM

## 2020-05-20 DIAGNOSIS — G62.9 NEUROPATHY: ICD-10-CM

## 2020-05-20 DIAGNOSIS — E78.5 DYSLIPIDEMIA ASSOCIATED WITH TYPE 2 DIABETES MELLITUS: ICD-10-CM

## 2020-05-20 DIAGNOSIS — I10 ESSENTIAL HYPERTENSION: ICD-10-CM

## 2020-05-20 DIAGNOSIS — E11.69 DYSLIPIDEMIA ASSOCIATED WITH TYPE 2 DIABETES MELLITUS: ICD-10-CM

## 2020-05-20 DIAGNOSIS — I34.0 MITRAL VALVE INSUFFICIENCY, UNSPECIFIED ETIOLOGY: ICD-10-CM

## 2020-05-20 DIAGNOSIS — E78.5 HYPERLIPIDEMIA, UNSPECIFIED HYPERLIPIDEMIA TYPE: ICD-10-CM

## 2020-05-20 DIAGNOSIS — E11.40 TYPE 2 DIABETES MELLITUS WITH DIABETIC NEUROPATHY, UNSPECIFIED WHETHER LONG TERM INSULIN USE: ICD-10-CM

## 2020-05-20 DIAGNOSIS — I25.118 CORONARY ARTERY DISEASE OF NATIVE ARTERY OF NATIVE HEART WITH STABLE ANGINA PECTORIS: ICD-10-CM

## 2020-05-20 DIAGNOSIS — E11.59 HYPERTENSION ASSOCIATED WITH DIABETES: Primary | ICD-10-CM

## 2020-05-20 DIAGNOSIS — E11.40 TYPE 2 DIABETES MELLITUS WITH DIABETIC NEUROPATHY, WITH LONG-TERM CURRENT USE OF INSULIN: ICD-10-CM

## 2020-05-20 DIAGNOSIS — Z79.4 TYPE 2 DIABETES MELLITUS WITH DIABETIC NEUROPATHY, WITH LONG-TERM CURRENT USE OF INSULIN: ICD-10-CM

## 2020-05-20 PROCEDURE — 99999 PR PBB SHADOW E&M-EST. PATIENT-LVL III: ICD-10-PCS | Mod: PBBFAC,HCNC,, | Performed by: FAMILY MEDICINE

## 2020-05-20 PROCEDURE — 1159F MED LIST DOCD IN RCRD: CPT | Mod: HCNC,S$GLB,, | Performed by: FAMILY MEDICINE

## 2020-05-20 PROCEDURE — 3051F PR MOST RECENT HEMOGLOBIN A1C LEVEL 7.0 - < 8.0%: ICD-10-PCS | Mod: HCNC,CPTII,S$GLB, | Performed by: FAMILY MEDICINE

## 2020-05-20 PROCEDURE — 1126F PR PAIN SEVERITY QUANTIFIED, NO PAIN PRESENT: ICD-10-PCS | Mod: HCNC,S$GLB,, | Performed by: FAMILY MEDICINE

## 2020-05-20 PROCEDURE — 99499 RISK ADDL DX/OHS AUDIT: ICD-10-PCS | Mod: HCNC,S$GLB,, | Performed by: FAMILY MEDICINE

## 2020-05-20 PROCEDURE — 3079F DIAST BP 80-89 MM HG: CPT | Mod: HCNC,CPTII,S$GLB, | Performed by: FAMILY MEDICINE

## 2020-05-20 PROCEDURE — 3075F PR MOST RECENT SYSTOLIC BLOOD PRESS GE 130-139MM HG: ICD-10-PCS | Mod: HCNC,CPTII,S$GLB, | Performed by: FAMILY MEDICINE

## 2020-05-20 PROCEDURE — 3075F SYST BP GE 130 - 139MM HG: CPT | Mod: HCNC,CPTII,S$GLB, | Performed by: FAMILY MEDICINE

## 2020-05-20 PROCEDURE — 3079F PR MOST RECENT DIASTOLIC BLOOD PRESSURE 80-89 MM HG: ICD-10-PCS | Mod: HCNC,CPTII,S$GLB, | Performed by: FAMILY MEDICINE

## 2020-05-20 PROCEDURE — 1159F PR MEDICATION LIST DOCUMENTED IN MEDICAL RECORD: ICD-10-PCS | Mod: HCNC,S$GLB,, | Performed by: FAMILY MEDICINE

## 2020-05-20 PROCEDURE — 3051F HG A1C>EQUAL 7.0%<8.0%: CPT | Mod: HCNC,CPTII,S$GLB, | Performed by: FAMILY MEDICINE

## 2020-05-20 PROCEDURE — 1101F PT FALLS ASSESS-DOCD LE1/YR: CPT | Mod: HCNC,CPTII,S$GLB, | Performed by: FAMILY MEDICINE

## 2020-05-20 PROCEDURE — 99999 PR PBB SHADOW E&M-EST. PATIENT-LVL III: CPT | Mod: PBBFAC,HCNC,, | Performed by: FAMILY MEDICINE

## 2020-05-20 PROCEDURE — 99499 UNLISTED E&M SERVICE: CPT | Mod: HCNC,S$GLB,, | Performed by: FAMILY MEDICINE

## 2020-05-20 PROCEDURE — 99214 OFFICE O/P EST MOD 30 MIN: CPT | Mod: HCNC,S$GLB,, | Performed by: FAMILY MEDICINE

## 2020-05-20 PROCEDURE — 99214 PR OFFICE/OUTPT VISIT, EST, LEVL IV, 30-39 MIN: ICD-10-PCS | Mod: HCNC,S$GLB,, | Performed by: FAMILY MEDICINE

## 2020-05-20 PROCEDURE — 1126F AMNT PAIN NOTED NONE PRSNT: CPT | Mod: HCNC,S$GLB,, | Performed by: FAMILY MEDICINE

## 2020-05-20 PROCEDURE — 1101F PR PT FALLS ASSESS DOC 0-1 FALLS W/OUT INJ PAST YR: ICD-10-PCS | Mod: HCNC,CPTII,S$GLB, | Performed by: FAMILY MEDICINE

## 2020-05-20 RX ORDER — CARVEDILOL 12.5 MG/1
12.5 TABLET ORAL 2 TIMES DAILY
Qty: 180 TABLET | Refills: 3 | Status: SHIPPED | OUTPATIENT
Start: 2020-05-20 | End: 2021-06-23 | Stop reason: SDUPTHER

## 2020-05-20 RX ORDER — CLOPIDOGREL BISULFATE 75 MG/1
75 TABLET ORAL DAILY
Qty: 90 TABLET | Refills: 3 | Status: SHIPPED | OUTPATIENT
Start: 2020-05-20 | End: 2021-06-23 | Stop reason: SDUPTHER

## 2020-05-20 RX ORDER — GABAPENTIN 300 MG/1
600 CAPSULE ORAL 2 TIMES DAILY
Qty: 360 CAPSULE | Refills: 4 | Status: SHIPPED | OUTPATIENT
Start: 2020-05-20 | End: 2021-06-23 | Stop reason: SDUPTHER

## 2020-05-20 RX ORDER — LOSARTAN POTASSIUM 50 MG/1
50 TABLET ORAL DAILY
Qty: 90 TABLET | Refills: 3 | Status: SHIPPED | OUTPATIENT
Start: 2020-05-20 | End: 2021-06-23 | Stop reason: SDUPTHER

## 2020-05-20 RX ORDER — FUROSEMIDE 20 MG/1
20 TABLET ORAL 2 TIMES DAILY
Qty: 180 TABLET | Refills: 3 | Status: SHIPPED | OUTPATIENT
Start: 2020-05-20 | End: 2021-06-23 | Stop reason: SDUPTHER

## 2020-05-20 RX ORDER — ATORVASTATIN CALCIUM 40 MG/1
40 TABLET, FILM COATED ORAL DAILY
Qty: 90 TABLET | Refills: 3 | Status: SHIPPED | OUTPATIENT
Start: 2020-05-20 | End: 2021-06-23 | Stop reason: SDUPTHER

## 2020-05-20 RX ORDER — METFORMIN HYDROCHLORIDE 1000 MG/1
1000 TABLET ORAL 2 TIMES DAILY WITH MEALS
Qty: 180 TABLET | Refills: 2 | Status: SHIPPED | OUTPATIENT
Start: 2020-05-20 | End: 2021-02-14 | Stop reason: SDUPTHER

## 2020-05-20 NOTE — PROGRESS NOTES
Subjective:       Patient ID: Clifton Wilson is a 69 y.o. male.    Chief Complaint: Follow-up (3 mths follow-up)    69 yr old pleasant white male with DM II, HTN, HLD, CAD, Combined chronic CHF, MR, obesity, neuropathy, presents today for diabetes. No new complaints today.      DM II - controlled  - A1C                   7.3 (H)             02/18/2020                     - on metformin and insulin and sugars improving - UTD eye exam - foot exam UTD - on ASA and plavix. Losartan. He ate too much jamie cake during mardi gras.      HTN - chronic - controlled - on losartan, lasix - compliant - no side effects      HLD - chronic -        LDLCALC                  56.0 (L)            02/18/2020                                           - controlled -       CAD/combined CHF - EF 35-40% - on external defibrillator - medical management - on ASA/plavix/ARB - no symptoms - following cardiology    Gout - improving - uses colchicine for flare up -     History as below - reviewed            Follow-up   Associated symptoms include arthralgias and joint swelling. Pertinent negatives include no chest pain, congestion, coughing, diaphoresis, fatigue, headaches, myalgias, neck pain, rash, visual change, vomiting or weakness.   Diabetes   He presents for his follow-up diabetic visit. He has type 2 diabetes mellitus. No MedicAlert identification noted. The initial diagnosis of diabetes was made 27 years ago. His disease course has been worsening. Hypoglycemia symptoms include sleepiness. Pertinent negatives for hypoglycemia include no confusion, dizziness, headaches, hunger, mood changes, nervousness/anxiousness, pallor, seizures, speech difficulty, sweats or tremors. Associated symptoms include foot paresthesias and polyuria. Pertinent negatives for diabetes include no blurred vision, no chest pain, no fatigue, no foot ulcerations, no polydipsia, no polyphagia, no visual change, no weakness and no weight loss. Hypoglycemia  complications include blackouts and hospitalization. Pertinent negatives for hypoglycemia complications include no nocturnal hypoglycemia, no required assistance and no required glucagon injection. Symptoms are stable. Diabetic complications include autonomic neuropathy, heart disease, impotence and peripheral neuropathy. Pertinent negatives for diabetic complications include no CVA, nephropathy, PVD or retinopathy. Risk factors for coronary artery disease include hypertension, obesity, diabetes mellitus and male sex. Current diabetic treatment includes diet, insulin injections and oral agent (monotherapy). He is compliant with treatment all of the time. He is currently taking insulin pre-breakfast, pre-lunch, pre-dinner and at bedtime. Insulin injections are given by patient. Rotation sites for injection include the abdominal wall, arms and thighs. His weight is fluctuating minimally. He is following a diabetic, generally healthy, low fat/cholesterol and low salt diet. Meal planning includes avoidance of concentrated sweets and carbohydrate counting. He has not had a previous visit with a dietitian. He participates in exercise three times a week. He monitors blood glucose at home 3-4 x per day. He monitors urine at home <1 x per month. Blood glucose monitoring compliance is excellent. His home blood glucose trend is decreasing steadily. An ACE inhibitor/angiotensin II receptor blocker is being taken. He does not see a podiatrist.Eye exam is current.   Swelling   This is a chronic problem. The current episode started more than 1 month ago. The problem occurs intermittently. The problem has been gradually worsening. Associated symptoms include arthralgias and joint swelling. Pertinent negatives include no chest pain, congestion, coughing, diaphoresis, fatigue, headaches, myalgias, neck pain, rash, visual change, vomiting or weakness.   Hypertension   This is a chronic problem. The current episode started more than 1  year ago. The problem has been gradually improving since onset. The problem is controlled. Pertinent negatives include no blurred vision, chest pain, headaches, malaise/fatigue, neck pain, palpitations, peripheral edema, PND or sweats. Risk factors for coronary artery disease include diabetes mellitus, dyslipidemia, male gender and obesity. Past treatments include angiotensin blockers and diuretics. The current treatment provides significant improvement. There are no compliance problems.  Hypertensive end-organ damage includes CAD/MI and heart failure. There is no history of CVA, left ventricular hypertrophy, PVD or retinopathy. There is no history of chronic renal disease, hypercortisolism, hyperparathyroidism, pheochromocytoma, renovascular disease or a thyroid problem.   Hyperlipidemia   This is a chronic problem. The current episode started more than 1 year ago. The problem is controlled. Recent lipid tests were reviewed and are normal. Exacerbating diseases include obesity. He has no history of chronic renal disease or diabetes. There are no known factors aggravating his hyperlipidemia. Pertinent negatives include no chest pain, focal sensory loss or myalgias. Current antihyperlipidemic treatment includes statins. The current treatment provides moderate improvement of lipids. There are no compliance problems.  Risk factors for coronary artery disease include diabetes mellitus, dyslipidemia, male sex, hypertension and obesity.     Review of Systems   Constitutional: Negative.  Negative for activity change, diaphoresis, fatigue, malaise/fatigue, unexpected weight change and weight loss.   HENT: Negative.  Negative for congestion, ear pain, mouth sores, rhinorrhea and voice change.    Eyes: Negative.  Negative for blurred vision, pain, discharge and visual disturbance.   Respiratory: Negative.  Negative for apnea, cough and wheezing.    Cardiovascular: Negative.  Negative for chest pain, palpitations and PND.    Gastrointestinal: Negative.  Negative for abdominal distention, anal bleeding, diarrhea and vomiting.   Endocrine: Positive for polyuria. Negative for cold intolerance, polydipsia and polyphagia.   Genitourinary: Positive for impotence. Negative for decreased urine volume, difficulty urinating, discharge, frequency and scrotal swelling.   Musculoskeletal: Positive for arthralgias and joint swelling. Negative for back pain, myalgias, neck pain and neck stiffness.   Skin: Negative.  Negative for color change, pallor and rash.   Allergic/Immunologic: Negative.  Negative for environmental allergies and immunocompromised state.   Neurological: Negative.  Negative for dizziness, tremors, seizures, speech difficulty, weakness, light-headedness and headaches.   Hematological: Negative.    Psychiatric/Behavioral: Negative.  Negative for agitation, confusion, dysphoric mood and suicidal ideas. The patient is not nervous/anxious.        PMH/PSH/FH/SH/MED/ALLERGY reviewed    Objective:       Vitals:    05/20/20 1330   BP: 130/80   Pulse: 78   Temp: 98.4 °F (36.9 °C)       Physical Exam   Constitutional: He is oriented to person, place, and time. He appears well-developed and well-nourished.   HENT:   Head: Normocephalic and atraumatic.   Right Ear: External ear normal.   Left Ear: External ear normal.   Nose: Nose normal.   Mouth/Throat: Oropharynx is clear and moist. No oropharyngeal exudate.   Eyes: Pupils are equal, round, and reactive to light. Conjunctivae and EOM are normal. Right eye exhibits no discharge. Left eye exhibits no discharge. No scleral icterus.   Neck: Normal range of motion. Neck supple. No JVD present. No tracheal deviation present. No thyromegaly present.   Cardiovascular: Normal rate, regular rhythm, normal heart sounds and intact distal pulses. Exam reveals no gallop and no friction rub.   No murmur heard.  Pulmonary/Chest: Effort normal and breath sounds normal. No stridor. No respiratory distress.  He has no wheezes. He has no rales. He exhibits no tenderness.   Abdominal: Soft. Bowel sounds are normal. He exhibits no distension and no mass. There is no tenderness. There is no rebound and no guarding. No hernia.   Musculoskeletal: Normal range of motion. He exhibits no edema or tenderness.   Lymphadenopathy:     He has no cervical adenopathy.   Neurological: He is alert and oriented to person, place, and time. He has normal reflexes. He displays normal reflexes. No cranial nerve deficit. He exhibits normal muscle tone. Coordination normal.   Skin: Skin is warm and dry. No rash noted. No erythema. No pallor.   Psychiatric: He has a normal mood and affect. His behavior is normal. Judgment and thought content normal.       Assessment:       1. Hypertension associated with diabetes    2. Type 2 diabetes mellitus with diabetic neuropathy, with long-term current use of insulin    3. IDDM (insulin dependent diabetes mellitus)    4. Dyslipidemia associated with type 2 diabetes mellitus    5. Coronary artery disease of native artery of native heart with stable angina pectoris    6. Type 2 diabetes mellitus with diabetic neuropathy    7. Neuropathy    8. Essential hypertension    9. Mitral valve insufficiency, unspecified etiology    10. Acute combined systolic and diastolic congestive heart failure    11. Hyperlipidemia, unspecified hyperlipidemia type        Plan:           Clifton was seen today for follow-up.    Diagnoses and all orders for this visit:    Hypertension associated with diabetes  -     losartan (COZAAR) 50 MG tablet; Take 1 tablet (50 mg total) by mouth once daily.  -     Comprehensive metabolic panel; Future  -     Hemoglobin A1C; Future    Type 2 diabetes mellitus with diabetic neuropathy, with long-term current use of insulin  -     Comprehensive metabolic panel; Future  -     Hemoglobin A1C; Future    IDDM (insulin dependent diabetes mellitus)  -     Comprehensive metabolic panel; Future  -      Hemoglobin A1C; Future    Dyslipidemia associated with type 2 diabetes mellitus  -     Comprehensive metabolic panel; Future    Coronary artery disease of native artery of native heart with stable angina pectoris    Type 2 diabetes mellitus with diabetic neuropathy  -     metFORMIN (GLUCOPHAGE) 1000 MG tablet; Take 1 tablet (1,000 mg total) by mouth 2 (two) times daily with meals.    Neuropathy  -     gabapentin (NEURONTIN) 300 MG capsule; Take 2 capsules (600 mg total) by mouth 2 (two) times daily.    Essential hypertension  -     furosemide (LASIX) 20 MG tablet; Take 1 tablet (20 mg total) by mouth 2 (two) times daily.  -     carvediloL (COREG) 12.5 MG tablet; Take 1 tablet (12.5 mg total) by mouth 2 (two) times daily.    Mitral valve insufficiency, unspecified etiology  -     furosemide (LASIX) 20 MG tablet; Take 1 tablet (20 mg total) by mouth 2 (two) times daily.  -     carvediloL (COREG) 12.5 MG tablet; Take 1 tablet (12.5 mg total) by mouth 2 (two) times daily.    Acute combined systolic and diastolic congestive heart failure  -     furosemide (LASIX) 20 MG tablet; Take 1 tablet (20 mg total) by mouth 2 (two) times daily.  -     carvediloL (COREG) 12.5 MG tablet; Take 1 tablet (12.5 mg total) by mouth 2 (two) times daily.    Hyperlipidemia, unspecified hyperlipidemia type  -     atorvastatin (LIPITOR) 40 MG tablet; Take 1 tablet (40 mg total) by mouth once daily.    Other orders  -     clopidogreL (PLAVIX) 75 mg tablet; Take 1 tablet (75 mg total) by mouth once daily.      DM II controlled/hyperglycemia  -reports improving since started insulin  -lantus and novolog to continue  -strict diet control    HTN  -controlled    HLD  -controlled    Combined CHF  -follows cardiology  -on external defibrillator    Neuropathy  -continue neurontin and increase dose to 600 mg BID    Obesity  -diet and exercise as tolerated    chronic gout  -uric acid levels  -conchicine as needed    Spent adequate time in obtaining  history and explaining differentials    25 minutes spent during this visit of which greater than 50% devoted to face-face counseling and coordination of care regarding diagnosis and management plan    Follow up in about 3 months (around 8/20/2020), or if symptoms worsen or fail to improve.

## 2020-05-21 ENCOUNTER — PES CALL (OUTPATIENT)
Dept: ADMINISTRATIVE | Facility: CLINIC | Age: 70
End: 2020-05-21

## 2020-06-12 ENCOUNTER — CLINICAL SUPPORT (OUTPATIENT)
Dept: CARDIOLOGY | Facility: HOSPITAL | Age: 70
End: 2020-06-12
Attending: INTERNAL MEDICINE
Payer: MEDICARE

## 2020-06-12 DIAGNOSIS — I25.5 ISCHEMIC CARDIOMYOPATHY: ICD-10-CM

## 2020-06-12 DIAGNOSIS — Z95.810 AICD (AUTOMATIC CARDIOVERTER/DEFIBRILLATOR) PRESENT: ICD-10-CM

## 2020-06-12 PROCEDURE — 93296 REM INTERROG EVL PM/IDS: CPT | Mod: HCNC | Performed by: INTERNAL MEDICINE

## 2020-06-12 PROCEDURE — 93295 CARDIAC DEVICE CHECK - REMOTE: ICD-10-PCS | Mod: HCNC,,, | Performed by: INTERNAL MEDICINE

## 2020-06-12 PROCEDURE — 93295 DEV INTERROG REMOTE 1/2/MLT: CPT | Mod: HCNC,,, | Performed by: INTERNAL MEDICINE

## 2020-07-12 DIAGNOSIS — F51.01 PRIMARY INSOMNIA: ICD-10-CM

## 2020-07-13 RX ORDER — ZOLPIDEM TARTRATE 5 MG/1
TABLET ORAL
Qty: 90 TABLET | Refills: 0 | Status: SHIPPED | OUTPATIENT
Start: 2020-07-13 | End: 2020-10-05 | Stop reason: SDUPTHER

## 2020-07-20 DIAGNOSIS — M65.9 SYNOVITIS OF KNEE: ICD-10-CM

## 2020-07-20 RX ORDER — COLCHICINE 0.6 MG/1
0.6 TABLET ORAL DAILY
Qty: 90 TABLET | Refills: 0 | Status: SHIPPED | OUTPATIENT
Start: 2020-07-20 | End: 2020-08-24 | Stop reason: SDUPTHER

## 2020-08-05 ENCOUNTER — TELEPHONE (OUTPATIENT)
Dept: CARDIOLOGY | Facility: HOSPITAL | Age: 70
End: 2020-08-05

## 2020-08-05 DIAGNOSIS — Z95.810 ICD (IMPLANTABLE CARDIOVERTER-DEFIBRILLATOR), SINGLE, IN SITU: Primary | ICD-10-CM

## 2020-08-05 NOTE — TELEPHONE ENCOUNTER
Called pt to discuss follow up care. Patient overdue for routine clinic visit with [facility]. No answer. Left voicemail requesting call back to arrange follow up.

## 2020-08-17 ENCOUNTER — LAB VISIT (OUTPATIENT)
Dept: LAB | Facility: HOSPITAL | Age: 70
End: 2020-08-17
Attending: FAMILY MEDICINE
Payer: MEDICARE

## 2020-08-17 ENCOUNTER — OFFICE VISIT (OUTPATIENT)
Dept: FAMILY MEDICINE | Facility: CLINIC | Age: 70
End: 2020-08-17
Attending: FAMILY MEDICINE
Payer: MEDICARE

## 2020-08-17 ENCOUNTER — TELEPHONE (OUTPATIENT)
Dept: FAMILY MEDICINE | Facility: CLINIC | Age: 70
End: 2020-08-17

## 2020-08-17 VITALS
DIASTOLIC BLOOD PRESSURE: 78 MMHG | WEIGHT: 243.38 LBS | TEMPERATURE: 98 F | HEIGHT: 71 IN | BODY MASS INDEX: 34.07 KG/M2 | OXYGEN SATURATION: 97 % | SYSTOLIC BLOOD PRESSURE: 126 MMHG | HEART RATE: 73 BPM

## 2020-08-17 DIAGNOSIS — E11.59 HYPERTENSION ASSOCIATED WITH DIABETES: Primary | ICD-10-CM

## 2020-08-17 DIAGNOSIS — E11.40 TYPE 2 DIABETES MELLITUS WITH DIABETIC NEUROPATHY, WITH LONG-TERM CURRENT USE OF INSULIN: ICD-10-CM

## 2020-08-17 DIAGNOSIS — I15.2 HYPERTENSION ASSOCIATED WITH DIABETES: Primary | ICD-10-CM

## 2020-08-17 DIAGNOSIS — Z12.5 ENCOUNTER FOR SCREENING FOR MALIGNANT NEOPLASM OF PROSTATE: ICD-10-CM

## 2020-08-17 DIAGNOSIS — E11.40 TYPE 2 DIABETES MELLITUS WITH DIABETIC NEUROPATHY, UNSPECIFIED WHETHER LONG TERM INSULIN USE: ICD-10-CM

## 2020-08-17 DIAGNOSIS — Z95.810 ICD (IMPLANTABLE CARDIOVERTER-DEFIBRILLATOR), SINGLE, IN SITU: ICD-10-CM

## 2020-08-17 DIAGNOSIS — E78.5 DYSLIPIDEMIA ASSOCIATED WITH TYPE 2 DIABETES MELLITUS: ICD-10-CM

## 2020-08-17 DIAGNOSIS — E11.9 DIABETES MELLITUS TYPE 2 WITHOUT RETINOPATHY: ICD-10-CM

## 2020-08-17 DIAGNOSIS — Z79.4 TYPE 2 DIABETES MELLITUS WITH DIABETIC NEUROPATHY, WITH LONG-TERM CURRENT USE OF INSULIN: ICD-10-CM

## 2020-08-17 DIAGNOSIS — I15.2 HYPERTENSION ASSOCIATED WITH DIABETES: ICD-10-CM

## 2020-08-17 DIAGNOSIS — E11.9 DM TYPE 2 WITHOUT RETINOPATHY: ICD-10-CM

## 2020-08-17 DIAGNOSIS — I25.118 CORONARY ARTERY DISEASE OF NATIVE ARTERY OF NATIVE HEART WITH STABLE ANGINA PECTORIS: ICD-10-CM

## 2020-08-17 DIAGNOSIS — E66.01 SEVERE OBESITY (BMI 35.0-35.9 WITH COMORBIDITY): ICD-10-CM

## 2020-08-17 DIAGNOSIS — I50.42 CHRONIC COMBINED SYSTOLIC AND DIASTOLIC CONGESTIVE HEART FAILURE: ICD-10-CM

## 2020-08-17 DIAGNOSIS — M10.062 ACUTE IDIOPATHIC GOUT OF LEFT KNEE: ICD-10-CM

## 2020-08-17 DIAGNOSIS — E11.69 DYSLIPIDEMIA ASSOCIATED WITH TYPE 2 DIABETES MELLITUS: ICD-10-CM

## 2020-08-17 DIAGNOSIS — E11.59 HYPERTENSION ASSOCIATED WITH DIABETES: ICD-10-CM

## 2020-08-17 DIAGNOSIS — I50.42 CHRONIC COMBINED SYSTOLIC AND DIASTOLIC CHF (CONGESTIVE HEART FAILURE): ICD-10-CM

## 2020-08-17 LAB
ALBUMIN SERPL BCP-MCNC: 4.2 G/DL (ref 3.5–5.2)
ALP SERPL-CCNC: 77 U/L (ref 55–135)
ALT SERPL W/O P-5'-P-CCNC: 27 U/L (ref 10–44)
ANION GAP SERPL CALC-SCNC: 12 MMOL/L (ref 8–16)
AST SERPL-CCNC: 20 U/L (ref 10–40)
BILIRUB SERPL-MCNC: 1.3 MG/DL (ref 0.1–1)
BUN SERPL-MCNC: 23 MG/DL (ref 8–23)
CALCIUM SERPL-MCNC: 9.1 MG/DL (ref 8.7–10.5)
CHLORIDE SERPL-SCNC: 105 MMOL/L (ref 95–110)
CO2 SERPL-SCNC: 25 MMOL/L (ref 23–29)
CREAT SERPL-MCNC: 1.1 MG/DL (ref 0.5–1.4)
EST. GFR  (AFRICAN AMERICAN): >60 ML/MIN/1.73 M^2
EST. GFR  (NON AFRICAN AMERICAN): >60 ML/MIN/1.73 M^2
ESTIMATED AVG GLUCOSE: 163 MG/DL (ref 68–131)
GLUCOSE SERPL-MCNC: 160 MG/DL (ref 70–110)
HBA1C MFR BLD HPLC: 7.3 % (ref 4–5.6)
POTASSIUM SERPL-SCNC: 4 MMOL/L (ref 3.5–5.1)
PROT SERPL-MCNC: 7.5 G/DL (ref 6–8.4)
SODIUM SERPL-SCNC: 142 MMOL/L (ref 136–145)

## 2020-08-17 PROCEDURE — 99214 PR OFFICE/OUTPT VISIT, EST, LEVL IV, 30-39 MIN: ICD-10-PCS | Mod: HCNC,S$GLB,, | Performed by: FAMILY MEDICINE

## 2020-08-17 PROCEDURE — 3074F SYST BP LT 130 MM HG: CPT | Mod: HCNC,CPTII,S$GLB, | Performed by: FAMILY MEDICINE

## 2020-08-17 PROCEDURE — 99999 PR PBB SHADOW E&M-EST. PATIENT-LVL V: ICD-10-PCS | Mod: PBBFAC,HCNC,, | Performed by: FAMILY MEDICINE

## 2020-08-17 PROCEDURE — 1126F PR PAIN SEVERITY QUANTIFIED, NO PAIN PRESENT: ICD-10-PCS | Mod: HCNC,S$GLB,, | Performed by: FAMILY MEDICINE

## 2020-08-17 PROCEDURE — 1126F AMNT PAIN NOTED NONE PRSNT: CPT | Mod: HCNC,S$GLB,, | Performed by: FAMILY MEDICINE

## 2020-08-17 PROCEDURE — 3078F DIAST BP <80 MM HG: CPT | Mod: HCNC,CPTII,S$GLB, | Performed by: FAMILY MEDICINE

## 2020-08-17 PROCEDURE — 1101F PT FALLS ASSESS-DOCD LE1/YR: CPT | Mod: HCNC,CPTII,S$GLB, | Performed by: FAMILY MEDICINE

## 2020-08-17 PROCEDURE — 3074F PR MOST RECENT SYSTOLIC BLOOD PRESSURE < 130 MM HG: ICD-10-PCS | Mod: HCNC,CPTII,S$GLB, | Performed by: FAMILY MEDICINE

## 2020-08-17 PROCEDURE — 3008F PR BODY MASS INDEX (BMI) DOCUMENTED: ICD-10-PCS | Mod: HCNC,CPTII,S$GLB, | Performed by: FAMILY MEDICINE

## 2020-08-17 PROCEDURE — 3008F BODY MASS INDEX DOCD: CPT | Mod: HCNC,CPTII,S$GLB, | Performed by: FAMILY MEDICINE

## 2020-08-17 PROCEDURE — 99499 UNLISTED E&M SERVICE: CPT | Mod: S$GLB,,, | Performed by: FAMILY MEDICINE

## 2020-08-17 PROCEDURE — 3078F PR MOST RECENT DIASTOLIC BLOOD PRESSURE < 80 MM HG: ICD-10-PCS | Mod: HCNC,CPTII,S$GLB, | Performed by: FAMILY MEDICINE

## 2020-08-17 PROCEDURE — 3051F PR MOST RECENT HEMOGLOBIN A1C LEVEL 7.0 - < 8.0%: ICD-10-PCS | Mod: HCNC,CPTII,S$GLB, | Performed by: FAMILY MEDICINE

## 2020-08-17 PROCEDURE — 1159F MED LIST DOCD IN RCRD: CPT | Mod: HCNC,S$GLB,, | Performed by: FAMILY MEDICINE

## 2020-08-17 PROCEDURE — 1101F PR PT FALLS ASSESS DOC 0-1 FALLS W/OUT INJ PAST YR: ICD-10-PCS | Mod: HCNC,CPTII,S$GLB, | Performed by: FAMILY MEDICINE

## 2020-08-17 PROCEDURE — 99214 OFFICE O/P EST MOD 30 MIN: CPT | Mod: HCNC,S$GLB,, | Performed by: FAMILY MEDICINE

## 2020-08-17 PROCEDURE — 36415 COLL VENOUS BLD VENIPUNCTURE: CPT | Mod: HCNC

## 2020-08-17 PROCEDURE — 3051F HG A1C>EQUAL 7.0%<8.0%: CPT | Mod: HCNC,CPTII,S$GLB, | Performed by: FAMILY MEDICINE

## 2020-08-17 PROCEDURE — 1159F PR MEDICATION LIST DOCUMENTED IN MEDICAL RECORD: ICD-10-PCS | Mod: HCNC,S$GLB,, | Performed by: FAMILY MEDICINE

## 2020-08-17 PROCEDURE — 83036 HEMOGLOBIN GLYCOSYLATED A1C: CPT | Mod: HCNC

## 2020-08-17 PROCEDURE — 99499 RISK ADDL DX/OHS AUDIT: ICD-10-PCS | Mod: S$GLB,,, | Performed by: FAMILY MEDICINE

## 2020-08-17 PROCEDURE — 80053 COMPREHEN METABOLIC PANEL: CPT | Mod: HCNC

## 2020-08-17 PROCEDURE — 99999 PR PBB SHADOW E&M-EST. PATIENT-LVL V: CPT | Mod: PBBFAC,HCNC,, | Performed by: FAMILY MEDICINE

## 2020-08-17 NOTE — TELEPHONE ENCOUNTER
Spoke to Katie in the lab and stated that a gold color top was not used. Unable to add the PSA to pt's labs today.

## 2020-08-17 NOTE — PROGRESS NOTES
Subjective:       Patient ID: Clifton Wilson is a 69 y.o. male.    Chief Complaint: Follow-up and Knee Pain (Left Knee)    69 yr old pleasant white male with DM II, HTN, HLD, CAD, Combined chronic CHF, MR, obesity, neuropathy, presents today for diabetes. C/o left knee swelling. He has gout and recently diet non compliant. Painful and swelling. 6-7/10 and aching type.       DM II - controlled  - A1C                   7.3 (H)             08/17/2020                  - on metformin and insulin and sugars improving - UTD eye exam - foot exam UTD - on ASA and plavix. Losartan. He ate too much jamie cake during mardi gras.      HTN - chronic - controlled - on losartan, lasix - compliant - no side effects      HLD - chronic -        LDLCALC                  56.0 (L)            02/18/2020                                           - controlled -       CAD/combined CHF - EF 35-40% - on external defibrillator - medical management - on ASA/plavix/ARB - no symptoms - following cardiology    Gout - improving - uses colchicine for flare up -     History as below - reviewed            Follow-up  Associated symptoms include arthralgias, joint swelling and myalgias. Pertinent negatives include no chest pain, congestion, coughing, diaphoresis, fatigue, headaches, neck pain, rash, visual change, vomiting or weakness.   Knee Pain     Diabetes  He presents for his follow-up diabetic visit. He has type 2 diabetes mellitus. No MedicAlert identification noted. The initial diagnosis of diabetes was made 27 years ago. His disease course has been worsening. Hypoglycemia symptoms include sleepiness. Pertinent negatives for hypoglycemia include no confusion, dizziness, headaches, hunger, mood changes, nervousness/anxiousness, pallor, seizures, speech difficulty, sweats or tremors. Associated symptoms include foot paresthesias and polyuria. Pertinent negatives for diabetes include no blurred vision, no chest pain, no fatigue, no foot  ulcerations, no polydipsia, no polyphagia, no visual change, no weakness and no weight loss. Hypoglycemia complications include blackouts and hospitalization. Pertinent negatives for hypoglycemia complications include no nocturnal hypoglycemia, no required assistance and no required glucagon injection. Symptoms are stable. Diabetic complications include autonomic neuropathy, heart disease, impotence and peripheral neuropathy. Pertinent negatives for diabetic complications include no CVA, nephropathy, PVD or retinopathy. Risk factors for coronary artery disease include hypertension, obesity, diabetes mellitus and male sex. Current diabetic treatment includes diet, insulin injections and oral agent (monotherapy). He is compliant with treatment all of the time. He is currently taking insulin pre-breakfast, pre-lunch, pre-dinner and at bedtime. Insulin injections are given by patient. Rotation sites for injection include the abdominal wall, arms and thighs. His weight is fluctuating minimally. He is following a diabetic, generally healthy, low fat/cholesterol and low salt diet. Meal planning includes avoidance of concentrated sweets and carbohydrate counting. He has not had a previous visit with a dietitian. He participates in exercise three times a week. He monitors blood glucose at home 3-4 x per day. He monitors urine at home <1 x per month. Blood glucose monitoring compliance is excellent. His home blood glucose trend is decreasing steadily. An ACE inhibitor/angiotensin II receptor blocker is being taken. He does not see a podiatrist.Eye exam is current.   Swelling  This is a chronic problem. The current episode started more than 1 month ago. The problem occurs intermittently. The problem has been gradually worsening. Associated symptoms include arthralgias, joint swelling and myalgias. Pertinent negatives include no chest pain, congestion, coughing, diaphoresis, fatigue, headaches, neck pain, rash, visual change,  vomiting or weakness.   Hypertension  This is a chronic problem. The current episode started more than 1 year ago. The problem has been gradually improving since onset. The problem is controlled. Pertinent negatives include no blurred vision, chest pain, headaches, malaise/fatigue, neck pain, palpitations, peripheral edema, PND or sweats. Risk factors for coronary artery disease include diabetes mellitus, dyslipidemia, male gender and obesity. Past treatments include angiotensin blockers and diuretics. The current treatment provides significant improvement. There are no compliance problems.  Hypertensive end-organ damage includes CAD/MI and heart failure. There is no history of CVA, left ventricular hypertrophy, PVD or retinopathy. There is no history of chronic renal disease, hypercortisolism, hyperparathyroidism, pheochromocytoma, renovascular disease or a thyroid problem.   Hyperlipidemia  This is a chronic problem. The current episode started more than 1 year ago. The problem is controlled. Recent lipid tests were reviewed and are normal. Exacerbating diseases include obesity. He has no history of chronic renal disease or diabetes. There are no known factors aggravating his hyperlipidemia. Associated symptoms include myalgias. Pertinent negatives include no chest pain or focal sensory loss. Current antihyperlipidemic treatment includes statins. The current treatment provides moderate improvement of lipids. There are no compliance problems.  Risk factors for coronary artery disease include diabetes mellitus, dyslipidemia, male sex, hypertension and obesity.     Review of Systems   Constitutional: Negative.  Negative for activity change, diaphoresis, fatigue, malaise/fatigue, unexpected weight change and weight loss.   HENT: Negative.  Negative for nasal congestion, ear pain, mouth sores, rhinorrhea and voice change.    Eyes: Negative.  Negative for blurred vision, pain, discharge and visual disturbance.    Respiratory: Negative.  Negative for apnea, cough and wheezing.    Cardiovascular: Negative.  Negative for chest pain, palpitations and PND.   Gastrointestinal: Negative.  Negative for abdominal distention, anal bleeding, diarrhea and vomiting.   Endocrine: Positive for polyuria. Negative for cold intolerance, polydipsia and polyphagia.   Genitourinary: Positive for impotence. Negative for decreased urine volume, difficulty urinating, discharge, frequency and scrotal swelling.   Musculoskeletal: Positive for arthralgias, joint swelling and myalgias. Negative for back pain, neck pain and neck stiffness.   Integumentary:  Negative for color change, pallor and rash. Negative.   Allergic/Immunologic: Negative.  Negative for environmental allergies.   Neurological: Negative.  Negative for dizziness, tremors, seizures, speech difficulty, weakness, light-headedness and headaches.   Hematological: Negative.    Psychiatric/Behavioral: Negative.  Negative for agitation, confusion, dysphoric mood and suicidal ideas. The patient is not nervous/anxious.      PMH/PSH/FH/SH/MED/ALLERGY reviewed        Objective:       Vitals:    08/17/20 1129   BP: 126/78   Pulse: 73   Temp: 98.3 °F (36.8 °C)       Physical Exam  Constitutional:       Appearance: He is well-developed.   HENT:      Head: Normocephalic and atraumatic.      Right Ear: External ear normal.      Left Ear: External ear normal.      Nose: Nose normal.      Mouth/Throat:      Pharynx: No oropharyngeal exudate.   Eyes:      General: No scleral icterus.        Right eye: No discharge.         Left eye: No discharge.      Conjunctiva/sclera: Conjunctivae normal.      Pupils: Pupils are equal, round, and reactive to light.   Neck:      Musculoskeletal: Normal range of motion and neck supple.      Thyroid: No thyromegaly.      Vascular: No JVD.      Trachea: No tracheal deviation.   Cardiovascular:      Rate and Rhythm: Normal rate and regular rhythm.      Heart sounds:  Normal heart sounds. No murmur. No friction rub. No gallop.    Pulmonary:      Effort: Pulmonary effort is normal. No respiratory distress.      Breath sounds: Normal breath sounds. No stridor. No wheezing or rales.   Chest:      Chest wall: No tenderness.   Abdominal:      General: Bowel sounds are normal. There is no distension.      Palpations: Abdomen is soft. There is no mass.      Tenderness: There is no abdominal tenderness. There is no guarding or rebound.      Hernia: No hernia is present.   Musculoskeletal: Normal range of motion.         General: Swelling and tenderness present.      Comments: Left knee swelling, warm and TTP.   Lymphadenopathy:      Cervical: No cervical adenopathy.   Skin:     General: Skin is warm and dry.      Coloration: Skin is not pale.      Findings: No erythema or rash.   Neurological:      Mental Status: He is alert and oriented to person, place, and time.      Cranial Nerves: No cranial nerve deficit.      Motor: No abnormal muscle tone.      Coordination: Coordination normal.      Deep Tendon Reflexes: Reflexes are normal and symmetric. Reflexes normal.   Psychiatric:         Behavior: Behavior normal.         Thought Content: Thought content normal.         Judgment: Judgment normal.         Assessment:       1. Hypertension associated with diabetes    2. Chronic combined systolic and diastolic congestive heart failure    3. Chronic combined systolic and diastolic CHF (congestive heart failure)    4. Coronary artery disease of native artery of native heart with stable angina pectoris    5. ICD (implantable cardioverter-defibrillator), single, in situ    6. Dyslipidemia associated with type 2 diabetes mellitus    7. Type 2 diabetes mellitus with diabetic neuropathy, with long-term current use of insulin    8. Type 2 diabetes mellitus with diabetic neuropathy, unspecified whether long term insulin use    9. Diabetes mellitus type 2 without retinopathy    10. IDDM (insulin  dependent diabetes mellitus)    11. DM type 2 without retinopathy    12. Severe obesity (BMI 35.0-35.9 with comorbidity)    13. Encounter for screening for malignant neoplasm of prostate    14. Acute idiopathic gout of left knee        Plan:           Clifton was seen today for follow-up and knee pain.    Diagnoses and all orders for this visit:    Hypertension associated with diabetes    Chronic combined systolic and diastolic congestive heart failure    Chronic combined systolic and diastolic CHF (congestive heart failure)    Coronary artery disease of native artery of native heart with stable angina pectoris    ICD (implantable cardioverter-defibrillator), single, in situ    Dyslipidemia associated with type 2 diabetes mellitus    Type 2 diabetes mellitus with diabetic neuropathy, with long-term current use of insulin    Type 2 diabetes mellitus with diabetic neuropathy, unspecified whether long term insulin use    Diabetes mellitus type 2 without retinopathy    IDDM (insulin dependent diabetes mellitus)    DM type 2 without retinopathy    Severe obesity (BMI 35.0-35.9 with comorbidity)    Encounter for screening for malignant neoplasm of prostate  -     PSA, Screening; Future    Acute idiopathic gout of left knee      Left knee gout  -rest, ice, colchicine prn    DM II controlled/hyperglycemia  - improving since started insulin  -lantus and novolog to continue  -strict diet control    HTN  -controlled    HLD  -controlled    Combined CHF  -follows cardiology  -on external defibrillator    Neuropathy  -continue neurontin and increase dose to 600 mg BID    Obesity  -diet and exercise as tolerated    chronic gout  -uric acid levels  -conchicine as needed    Spent adequate time in obtaining history and explaining differentials    25 minutes spent during this visit of which greater than 50% devoted to face-face counseling and coordination of care regarding diagnosis and management plan    Follow up in about 6 months  (around 2/17/2021), or if symptoms worsen or fail to improve.

## 2020-08-24 DIAGNOSIS — M65.9 SYNOVITIS OF KNEE: ICD-10-CM

## 2020-08-24 RX ORDER — COLCHICINE 0.6 MG/1
0.6 TABLET ORAL DAILY
Qty: 90 TABLET | Refills: 0 | Status: SHIPPED | OUTPATIENT
Start: 2020-08-24 | End: 2021-01-25 | Stop reason: SDUPTHER

## 2020-08-31 NOTE — TELEPHONE ENCOUNTER
----- Message from Stephanie Cherry sent at 4/17/2017 12:09 PM CDT -----  Contact: [phone]/ self  Patient is scheduled to have cyst removed and would like to know if it is okay for him to stop taking Lasix. Please advise.      
yes
yes
Signing off - call with questions…
yes
Signing off - call with questions…
yes

## 2020-09-06 ENCOUNTER — PATIENT OUTREACH (OUTPATIENT)
Dept: ADMINISTRATIVE | Facility: OTHER | Age: 70
End: 2020-09-06

## 2020-09-08 DIAGNOSIS — Z95.810 ICD (IMPLANTABLE CARDIOVERTER-DEFIBRILLATOR), SINGLE, IN SITU: Primary | ICD-10-CM

## 2020-09-09 ENCOUNTER — TELEPHONE (OUTPATIENT)
Dept: ELECTROPHYSIOLOGY | Facility: CLINIC | Age: 70
End: 2020-09-09

## 2020-09-09 ENCOUNTER — OFFICE VISIT (OUTPATIENT)
Dept: ELECTROPHYSIOLOGY | Facility: CLINIC | Age: 70
End: 2020-09-09
Payer: MEDICARE

## 2020-09-09 VITALS
WEIGHT: 246.94 LBS | SYSTOLIC BLOOD PRESSURE: 115 MMHG | HEIGHT: 71 IN | BODY MASS INDEX: 34.57 KG/M2 | DIASTOLIC BLOOD PRESSURE: 75 MMHG

## 2020-09-09 DIAGNOSIS — Z95.810 ICD (IMPLANTABLE CARDIOVERTER-DEFIBRILLATOR), SINGLE, IN SITU: ICD-10-CM

## 2020-09-09 DIAGNOSIS — I10 ESSENTIAL HYPERTENSION: ICD-10-CM

## 2020-09-09 DIAGNOSIS — I15.2 HYPERTENSION ASSOCIATED WITH DIABETES: ICD-10-CM

## 2020-09-09 DIAGNOSIS — I25.5 ISCHEMIC CARDIOMYOPATHY: ICD-10-CM

## 2020-09-09 DIAGNOSIS — E11.59 HYPERTENSION ASSOCIATED WITH DIABETES: ICD-10-CM

## 2020-09-09 DIAGNOSIS — I25.118 CORONARY ARTERY DISEASE OF NATIVE ARTERY OF NATIVE HEART WITH STABLE ANGINA PECTORIS: Primary | ICD-10-CM

## 2020-09-09 PROCEDURE — 1159F PR MEDICATION LIST DOCUMENTED IN MEDICAL RECORD: ICD-10-PCS | Mod: HCNC,S$GLB,, | Performed by: INTERNAL MEDICINE

## 2020-09-09 PROCEDURE — 1101F PR PT FALLS ASSESS DOC 0-1 FALLS W/OUT INJ PAST YR: ICD-10-PCS | Mod: HCNC,CPTII,S$GLB, | Performed by: INTERNAL MEDICINE

## 2020-09-09 PROCEDURE — 93000 RHYTHM STRIP: ICD-10-PCS | Mod: HCNC,S$GLB,, | Performed by: INTERNAL MEDICINE

## 2020-09-09 PROCEDURE — 3051F HG A1C>EQUAL 7.0%<8.0%: CPT | Mod: HCNC,CPTII,S$GLB, | Performed by: INTERNAL MEDICINE

## 2020-09-09 PROCEDURE — 3008F BODY MASS INDEX DOCD: CPT | Mod: HCNC,CPTII,S$GLB, | Performed by: INTERNAL MEDICINE

## 2020-09-09 PROCEDURE — 3078F DIAST BP <80 MM HG: CPT | Mod: HCNC,CPTII,S$GLB, | Performed by: INTERNAL MEDICINE

## 2020-09-09 PROCEDURE — 1159F MED LIST DOCD IN RCRD: CPT | Mod: HCNC,S$GLB,, | Performed by: INTERNAL MEDICINE

## 2020-09-09 PROCEDURE — 99999 PR PBB SHADOW E&M-EST. PATIENT-LVL IV: ICD-10-PCS | Mod: PBBFAC,HCNC,, | Performed by: INTERNAL MEDICINE

## 2020-09-09 PROCEDURE — 3078F PR MOST RECENT DIASTOLIC BLOOD PRESSURE < 80 MM HG: ICD-10-PCS | Mod: HCNC,CPTII,S$GLB, | Performed by: INTERNAL MEDICINE

## 2020-09-09 PROCEDURE — 1101F PT FALLS ASSESS-DOCD LE1/YR: CPT | Mod: HCNC,CPTII,S$GLB, | Performed by: INTERNAL MEDICINE

## 2020-09-09 PROCEDURE — 93000 ELECTROCARDIOGRAM COMPLETE: CPT | Mod: HCNC,S$GLB,, | Performed by: INTERNAL MEDICINE

## 2020-09-09 PROCEDURE — 3008F PR BODY MASS INDEX (BMI) DOCUMENTED: ICD-10-PCS | Mod: HCNC,CPTII,S$GLB, | Performed by: INTERNAL MEDICINE

## 2020-09-09 PROCEDURE — 3051F PR MOST RECENT HEMOGLOBIN A1C LEVEL 7.0 - < 8.0%: ICD-10-PCS | Mod: HCNC,CPTII,S$GLB, | Performed by: INTERNAL MEDICINE

## 2020-09-09 PROCEDURE — 1126F PR PAIN SEVERITY QUANTIFIED, NO PAIN PRESENT: ICD-10-PCS | Mod: HCNC,S$GLB,, | Performed by: INTERNAL MEDICINE

## 2020-09-09 PROCEDURE — 3074F PR MOST RECENT SYSTOLIC BLOOD PRESSURE < 130 MM HG: ICD-10-PCS | Mod: HCNC,CPTII,S$GLB, | Performed by: INTERNAL MEDICINE

## 2020-09-09 PROCEDURE — 99215 PR OFFICE/OUTPT VISIT, EST, LEVL V, 40-54 MIN: ICD-10-PCS | Mod: HCNC,25,S$GLB, | Performed by: INTERNAL MEDICINE

## 2020-09-09 PROCEDURE — 1126F AMNT PAIN NOTED NONE PRSNT: CPT | Mod: HCNC,S$GLB,, | Performed by: INTERNAL MEDICINE

## 2020-09-09 PROCEDURE — 99999 PR PBB SHADOW E&M-EST. PATIENT-LVL IV: CPT | Mod: PBBFAC,HCNC,, | Performed by: INTERNAL MEDICINE

## 2020-09-09 PROCEDURE — 3074F SYST BP LT 130 MM HG: CPT | Mod: HCNC,CPTII,S$GLB, | Performed by: INTERNAL MEDICINE

## 2020-09-09 PROCEDURE — 99215 OFFICE O/P EST HI 40 MIN: CPT | Mod: HCNC,25,S$GLB, | Performed by: INTERNAL MEDICINE

## 2020-09-09 NOTE — PROGRESS NOTES
Mr. Wilson is a patient of Dr. Walsh and was last seen in clinic 10/8/2018.      Subjective:   Patient ID:  Clifton Wilson is a 69 y.o. male who presents for follow-up of No chief complaint on file.    HPI:  Mr. Wilson is a 68 y.o. male with HTN, HLD, DM, CAD (NSTEMI 2011 and 2015), ICM (EF 35-40%), and ICD (2017) here for annual follow up.     In hospital post PCI, had NSVT. Was given a LifeVest.  Subsequent LVEF 39%, leading to EPS, which was negative (therefore no ICD then).  Since, has recently had months of persistently low LVEF.   Again referred by Dr Clement for ICD, due to LVEF 25% with ICM.  At his last office visit (04/10/17), Mr. Wilson reported experiencing occasional .   Echo 12/15: 35-40%. 4/16: 25-40%.  3/17 25%  2/16 nuclear ETT neg  Mr. Wilson underwent successful ICD (06/06/17) without complication.     Required a new RV lead 11/2019. Capped old lead. No problems since.    I have personally reviewed the patient's EKG today, which shows sinus rhythm      Current Outpatient Medications   Medication Sig    alcohol swabs (BD ALCOHOL SWABS) PadM USE FOUR TIMES DAILY    alcohol swabs (BD ALCOHOL SWABS) PadM USE FOUR TIMES DAILY    aspirin (ECOTRIN) 81 MG EC tablet Take 81 mg by mouth once daily.    atorvastatin (LIPITOR) 40 MG tablet Take 1 tablet (40 mg total) by mouth once daily.    blood glucose control high,low (ACCU-CHEK CATHRYN CONTROL SOLN) Soln Use as directed to check blood sugar    blood sugar diagnostic (ACCU-CHEK CATHRYN PLUS TEST STRP) Strp CHECK  SUGAR FOUR TIMES DAILY    blood-glucose meter (ACCU-CHEK CATHRYN PLUS METER) Misc USE AS DIRECTED    carvediloL (COREG) 12.5 MG tablet Take 1 tablet (12.5 mg total) by mouth 2 (two) times daily.    clopidogreL (PLAVIX) 75 mg tablet Take 1 tablet (75 mg total) by mouth once daily.    colchicine (COLCRYS) 0.6 mg tablet Take 1 tablet (0.6 mg total) by mouth once daily.    furosemide (LASIX) 20 MG tablet Take 1 tablet (20 mg total) by  "mouth 2 (two) times daily.    gabapentin (NEURONTIN) 300 MG capsule Take 2 capsules (600 mg total) by mouth 2 (two) times daily.    insulin (LANTUS SOLOSTAR U-100 INSULIN) glargine 100 units/mL (3mL) SubQ pen INJECT 35 UNITS SUBCUTANEOUSLY EVERY EVENING    insulin aspart U-100 (NOVOLOG FLEXPEN U-100 INSULIN) 100 unit/mL (3 mL) InPn pen Inject 15 Units into the skin 3 (three) times daily with meals.    lancets (ACCU-CHEK SOFTCLIX LANCETS) Misc TEST 3 (three) times daily.    losartan (COZAAR) 50 MG tablet Take 1 tablet (50 mg total) by mouth once daily.    metFORMIN (GLUCOPHAGE) 1000 MG tablet Take 1 tablet (1,000 mg total) by mouth 2 (two) times daily with meals.    nitroGLYCERIN (NITROSTAT) 0.4 MG SL tablet Place 1 tablet (0.4 mg total) under the tongue every 5 (five) minutes as needed for Chest pain.    pen needle, diabetic (DROPLET PEN NEEDLE) 31 gauge x 3/16" Ndle USE FOUR TIMES DAILY    pen needle, diabetic (NOVOTWIST) 32 gauge x 1/5" Ndle Use 4 times/day for insulin administration    zolpidem (AMBIEN) 5 MG Tab TAKE 1 TABLET BY MOUTH EVERY NIGHT AS NEEDED     No current facility-administered medications for this visit.        Review of Systems   Constitution: Negative. Negative for malaise/fatigue.   HENT: Negative.  Negative for ear pain and tinnitus.    Eyes: Negative for blurred vision.   Cardiovascular: Negative.  Negative for chest pain, dyspnea on exertion, irregular heartbeat, leg swelling, near-syncope, palpitations and syncope.   Respiratory: Negative.  Negative for shortness of breath.    Endocrine: Negative.  Negative for polyuria.   Hematologic/Lymphatic: Negative for bleeding problem. Does not bruise/bleed easily.   Skin: Negative.  Negative for rash.   Musculoskeletal: Negative.  Negative for joint pain, muscle weakness and myalgias.   Gastrointestinal: Negative.  Negative for abdominal pain, change in bowel habit, hematemesis, hematochezia and nausea.   Genitourinary: Negative for " "frequency and hematuria.   Neurological: Negative.  Negative for dizziness, light-headedness and weakness.   Psychiatric/Behavioral: Negative.  Negative for altered mental status and depression. The patient is not nervous/anxious.    Allergic/Immunologic: Negative for environmental allergies and persistent infections.         Objective:          /75   Ht 5' 11" (1.803 m)   Wt 112 kg (246 lb 14.6 oz)   BMI 34.44 kg/m²     Physical Exam   Constitutional: He is oriented to person, place, and time. He appears well-developed and well-nourished.   HENT:   Head: Normocephalic and atraumatic.   Nose: Nose normal.   Eyes: Pupils are equal, round, and reactive to light. Conjunctivae, EOM and lids are normal. No scleral icterus.   Neck: Normal range of motion. No tracheal deviation present. No thyromegaly present.   Cardiovascular: Normal rate, regular rhythm, S1 normal and S2 normal.  No extrasystoles are present. PMI is not displaced. Exam reveals friction rub.   Pulses:       Radial pulses are 2+ on the right side and 2+ on the left side.   Pulmonary/Chest: Effort normal and breath sounds normal. No accessory muscle usage. No tachypnea. No respiratory distress. He has no wheezes.   Device to LUCW.   Abdominal: Normal appearance. He exhibits no distension. There is no hepatosplenomegaly.   Musculoskeletal: Normal range of motion.         General: No edema.   Neurological: He is alert and oriented to person, place, and time. He has normal reflexes. He exhibits normal muscle tone.   Skin: Skin is warm and dry. No rash noted. No erythema.   Psychiatric: He has a normal mood and affect. His speech is normal and behavior is normal.   Nursing note and vitals reviewed.    Lab Results   Component Value Date     08/17/2020    K 4.0 08/17/2020    MG 1.9 11/06/2016    BUN 23 08/17/2020    CREATININE 1.1 08/17/2020    ALT 27 08/17/2020    AST 20 08/17/2020    HGB 13.9 (L) 05/15/2020    HCT 41.5 05/15/2020    TSH 0.861 " 12/04/2015    LDLCALC 56.0 (L) 02/18/2020       Recent Labs   Lab 11/05/19  0900   INR 1.0       Assessment:     1. Coronary artery disease of native artery of native heart with stable angina pectoris    2. ICD (implantable cardioverter-defibrillator), single, in situ    3. Essential hypertension    4. Ischemic cardiomyopathy    5. Hypertension associated with diabetes      Plan:     In summary, Mr. Wilson is a 69 y.o. male with HTN, HLD, DM, CAD (NSTEMI 2011 and 2015), ICM (EF 35-40%), and ICD (2017) here for  follow up.     ICD doing well.  Return in 1 year with echo, or earlier prn.

## 2020-09-09 NOTE — TELEPHONE ENCOUNTER
Patient did a mobile check in and I reached out to patient to see where he was located I have checked the lobby and called patient cell phone. I left a voice mail for patient to let us know when he is present in the lobby

## 2020-09-10 ENCOUNTER — CLINICAL SUPPORT (OUTPATIENT)
Dept: CARDIOLOGY | Facility: HOSPITAL | Age: 70
End: 2020-09-10
Attending: INTERNAL MEDICINE
Payer: MEDICARE

## 2020-09-10 DIAGNOSIS — Z95.810 AICD (AUTOMATIC CARDIOVERTER/DEFIBRILLATOR) PRESENT: ICD-10-CM

## 2020-09-10 DIAGNOSIS — I25.5 ISCHEMIC CARDIOMYOPATHY: ICD-10-CM

## 2020-09-10 PROCEDURE — 93296 REM INTERROG EVL PM/IDS: CPT | Mod: HCNC | Performed by: INTERNAL MEDICINE

## 2020-09-10 PROCEDURE — 93295 DEV INTERROG REMOTE 1/2/MLT: CPT | Mod: ,,, | Performed by: INTERNAL MEDICINE

## 2020-09-10 PROCEDURE — 93295 CARDIAC DEVICE CHECK - REMOTE: ICD-10-PCS | Mod: ,,, | Performed by: INTERNAL MEDICINE

## 2020-09-29 ENCOUNTER — PATIENT MESSAGE (OUTPATIENT)
Dept: OTHER | Facility: OTHER | Age: 70
End: 2020-09-29

## 2020-10-05 DIAGNOSIS — F51.01 PRIMARY INSOMNIA: ICD-10-CM

## 2020-10-06 RX ORDER — ZOLPIDEM TARTRATE 5 MG/1
TABLET ORAL
Qty: 90 TABLET | Refills: 0 | Status: SHIPPED | OUTPATIENT
Start: 2020-10-06 | End: 2021-01-04 | Stop reason: SDUPTHER

## 2020-12-09 ENCOUNTER — CLINICAL SUPPORT (OUTPATIENT)
Dept: CARDIOLOGY | Facility: HOSPITAL | Age: 70
End: 2020-12-09
Payer: MEDICARE

## 2020-12-09 DIAGNOSIS — Z95.810 PRESENCE OF AUTOMATIC (IMPLANTABLE) CARDIAC DEFIBRILLATOR: ICD-10-CM

## 2020-12-09 PROCEDURE — 93295 CARDIAC DEVICE CHECK - REMOTE: ICD-10-PCS | Mod: ,,, | Performed by: INTERNAL MEDICINE

## 2020-12-09 PROCEDURE — 93296 REM INTERROG EVL PM/IDS: CPT | Mod: HCNC | Performed by: INTERNAL MEDICINE

## 2020-12-09 PROCEDURE — 93295 DEV INTERROG REMOTE 1/2/MLT: CPT | Mod: ,,, | Performed by: INTERNAL MEDICINE

## 2021-01-04 DIAGNOSIS — F51.01 PRIMARY INSOMNIA: ICD-10-CM

## 2021-01-05 RX ORDER — ZOLPIDEM TARTRATE 5 MG/1
TABLET ORAL
Qty: 90 TABLET | Refills: 0 | Status: SHIPPED | OUTPATIENT
Start: 2021-01-05 | End: 2021-03-21 | Stop reason: SDUPTHER

## 2021-01-13 ENCOUNTER — OFFICE VISIT (OUTPATIENT)
Dept: FAMILY MEDICINE | Facility: CLINIC | Age: 71
End: 2021-01-13
Attending: FAMILY MEDICINE
Payer: MEDICARE

## 2021-01-13 VITALS
HEART RATE: 76 BPM | WEIGHT: 246 LBS | SYSTOLIC BLOOD PRESSURE: 110 MMHG | HEIGHT: 71 IN | BODY MASS INDEX: 34.44 KG/M2 | DIASTOLIC BLOOD PRESSURE: 70 MMHG

## 2021-01-13 DIAGNOSIS — J01.90 ACUTE BACTERIAL SINUSITIS: Primary | ICD-10-CM

## 2021-01-13 DIAGNOSIS — B96.89 ACUTE BACTERIAL SINUSITIS: Primary | ICD-10-CM

## 2021-01-13 DIAGNOSIS — R05.9 COUGH: ICD-10-CM

## 2021-01-13 PROCEDURE — 99999 PR PBB SHADOW E&M-EST. PATIENT-LVL III: ICD-10-PCS | Mod: PBBFAC,,, | Performed by: FAMILY MEDICINE

## 2021-01-13 PROCEDURE — 3078F PR MOST RECENT DIASTOLIC BLOOD PRESSURE < 80 MM HG: ICD-10-PCS | Mod: CPTII,S$GLB,, | Performed by: FAMILY MEDICINE

## 2021-01-13 PROCEDURE — 1159F MED LIST DOCD IN RCRD: CPT | Mod: S$GLB,,, | Performed by: FAMILY MEDICINE

## 2021-01-13 PROCEDURE — 99999 PR PBB SHADOW E&M-EST. PATIENT-LVL III: CPT | Mod: PBBFAC,,, | Performed by: FAMILY MEDICINE

## 2021-01-13 PROCEDURE — 3074F PR MOST RECENT SYSTOLIC BLOOD PRESSURE < 130 MM HG: ICD-10-PCS | Mod: CPTII,S$GLB,, | Performed by: FAMILY MEDICINE

## 2021-01-13 PROCEDURE — 99214 OFFICE O/P EST MOD 30 MIN: CPT | Mod: S$GLB,,, | Performed by: FAMILY MEDICINE

## 2021-01-13 PROCEDURE — 3078F DIAST BP <80 MM HG: CPT | Mod: CPTII,S$GLB,, | Performed by: FAMILY MEDICINE

## 2021-01-13 PROCEDURE — 99214 PR OFFICE/OUTPT VISIT, EST, LEVL IV, 30-39 MIN: ICD-10-PCS | Mod: S$GLB,,, | Performed by: FAMILY MEDICINE

## 2021-01-13 PROCEDURE — 1159F PR MEDICATION LIST DOCUMENTED IN MEDICAL RECORD: ICD-10-PCS | Mod: S$GLB,,, | Performed by: FAMILY MEDICINE

## 2021-01-13 PROCEDURE — 3008F PR BODY MASS INDEX (BMI) DOCUMENTED: ICD-10-PCS | Mod: CPTII,S$GLB,, | Performed by: FAMILY MEDICINE

## 2021-01-13 PROCEDURE — 3074F SYST BP LT 130 MM HG: CPT | Mod: CPTII,S$GLB,, | Performed by: FAMILY MEDICINE

## 2021-01-13 PROCEDURE — 3008F BODY MASS INDEX DOCD: CPT | Mod: CPTII,S$GLB,, | Performed by: FAMILY MEDICINE

## 2021-01-13 RX ORDER — PROMETHAZINE HYDROCHLORIDE AND DEXTROMETHORPHAN HYDROBROMIDE 6.25; 15 MG/5ML; MG/5ML
5 SYRUP ORAL 2 TIMES DAILY PRN
Qty: 180 ML | Refills: 1 | Status: SHIPPED | OUTPATIENT
Start: 2021-01-13 | End: 2021-01-31

## 2021-01-13 RX ORDER — AMOXICILLIN 875 MG/1
875 TABLET, FILM COATED ORAL EVERY 12 HOURS
Qty: 20 TABLET | Refills: 0 | Status: SHIPPED | OUTPATIENT
Start: 2021-01-13 | End: 2021-06-23 | Stop reason: SDUPTHER

## 2021-01-20 ENCOUNTER — TELEPHONE (OUTPATIENT)
Dept: FAMILY MEDICINE | Facility: CLINIC | Age: 71
End: 2021-01-20

## 2021-01-20 DIAGNOSIS — Z20.822 SUSPECTED COVID-19 VIRUS INFECTION: Primary | ICD-10-CM

## 2021-01-21 ENCOUNTER — LAB VISIT (OUTPATIENT)
Dept: INTERNAL MEDICINE | Facility: CLINIC | Age: 71
End: 2021-01-21
Payer: MEDICARE

## 2021-01-21 ENCOUNTER — PATIENT MESSAGE (OUTPATIENT)
Dept: FAMILY MEDICINE | Facility: CLINIC | Age: 71
End: 2021-01-21

## 2021-01-21 ENCOUNTER — TELEPHONE (OUTPATIENT)
Dept: FAMILY MEDICINE | Facility: CLINIC | Age: 71
End: 2021-01-21

## 2021-01-21 DIAGNOSIS — Z20.822 SUSPECTED COVID-19 VIRUS INFECTION: ICD-10-CM

## 2021-01-21 PROCEDURE — U0003 INFECTIOUS AGENT DETECTION BY NUCLEIC ACID (DNA OR RNA); SEVERE ACUTE RESPIRATORY SYNDROME CORONAVIRUS 2 (SARS-COV-2) (CORONAVIRUS DISEASE [COVID-19]), AMPLIFIED PROBE TECHNIQUE, MAKING USE OF HIGH THROUGHPUT TECHNOLOGIES AS DESCRIBED BY CMS-2020-01-R: HCPCS

## 2021-01-22 ENCOUNTER — PATIENT MESSAGE (OUTPATIENT)
Dept: FAMILY MEDICINE | Facility: CLINIC | Age: 71
End: 2021-01-22

## 2021-01-22 LAB — SARS-COV-2 RNA RESP QL NAA+PROBE: DETECTED

## 2021-01-23 DIAGNOSIS — U07.1 COVID-19 VIRUS DETECTED: ICD-10-CM

## 2021-01-25 DIAGNOSIS — M65.9 SYNOVITIS OF KNEE: ICD-10-CM

## 2021-01-26 ENCOUNTER — PATIENT MESSAGE (OUTPATIENT)
Dept: FAMILY MEDICINE | Facility: CLINIC | Age: 71
End: 2021-01-26

## 2021-01-26 RX ORDER — COLCHICINE 0.6 MG/1
0.6 TABLET ORAL DAILY
Qty: 30 TABLET | Refills: 0 | Status: SHIPPED | OUTPATIENT
Start: 2021-01-26 | End: 2021-02-22 | Stop reason: SDUPTHER

## 2021-01-29 ENCOUNTER — TELEPHONE (OUTPATIENT)
Dept: FAMILY MEDICINE | Facility: CLINIC | Age: 71
End: 2021-01-29

## 2021-01-30 ENCOUNTER — NURSE TRIAGE (OUTPATIENT)
Dept: ADMINISTRATIVE | Facility: CLINIC | Age: 71
End: 2021-01-30

## 2021-02-01 DIAGNOSIS — E11.9 DIABETES MELLITUS TYPE 2 WITHOUT RETINOPATHY: ICD-10-CM

## 2021-02-01 DIAGNOSIS — E11.40 TYPE 2 DIABETES MELLITUS WITH DIABETIC NEUROPATHY, WITH LONG-TERM CURRENT USE OF INSULIN: ICD-10-CM

## 2021-02-01 DIAGNOSIS — Z79.4 TYPE 2 DIABETES MELLITUS WITH DIABETIC NEUROPATHY, WITH LONG-TERM CURRENT USE OF INSULIN: ICD-10-CM

## 2021-02-01 RX ORDER — INSULIN ASPART 100 [IU]/ML
15 INJECTION, SOLUTION INTRAVENOUS; SUBCUTANEOUS
Qty: 45 ML | Refills: 10 | Status: SHIPPED | OUTPATIENT
Start: 2021-02-01 | End: 2022-02-14 | Stop reason: SDUPTHER

## 2021-02-02 ENCOUNTER — PATIENT MESSAGE (OUTPATIENT)
Dept: FAMILY MEDICINE | Facility: CLINIC | Age: 71
End: 2021-02-02

## 2021-02-02 ENCOUNTER — TELEPHONE (OUTPATIENT)
Dept: FAMILY MEDICINE | Facility: CLINIC | Age: 71
End: 2021-02-02

## 2021-02-03 ENCOUNTER — OFFICE VISIT (OUTPATIENT)
Dept: FAMILY MEDICINE | Facility: CLINIC | Age: 71
End: 2021-02-03
Attending: FAMILY MEDICINE
Payer: MEDICARE

## 2021-02-03 VITALS
WEIGHT: 241.63 LBS | HEIGHT: 71 IN | HEART RATE: 76 BPM | DIASTOLIC BLOOD PRESSURE: 68 MMHG | SYSTOLIC BLOOD PRESSURE: 118 MMHG | OXYGEN SATURATION: 98 % | TEMPERATURE: 98 F | BODY MASS INDEX: 33.83 KG/M2

## 2021-02-03 DIAGNOSIS — I47.20 VENTRICULAR TACHYCARDIA: ICD-10-CM

## 2021-02-03 DIAGNOSIS — E11.69 DYSLIPIDEMIA ASSOCIATED WITH TYPE 2 DIABETES MELLITUS: ICD-10-CM

## 2021-02-03 DIAGNOSIS — E78.5 DYSLIPIDEMIA ASSOCIATED WITH TYPE 2 DIABETES MELLITUS: ICD-10-CM

## 2021-02-03 DIAGNOSIS — I25.118 CORONARY ARTERY DISEASE OF NATIVE ARTERY OF NATIVE HEART WITH STABLE ANGINA PECTORIS: ICD-10-CM

## 2021-02-03 DIAGNOSIS — I50.42 CHRONIC COMBINED SYSTOLIC AND DIASTOLIC CHF (CONGESTIVE HEART FAILURE): ICD-10-CM

## 2021-02-03 DIAGNOSIS — E11.40 TYPE 2 DIABETES MELLITUS WITH DIABETIC NEUROPATHY, UNSPECIFIED WHETHER LONG TERM INSULIN USE: Primary | ICD-10-CM

## 2021-02-03 DIAGNOSIS — D69.6 THROMBOCYTOPENIA: ICD-10-CM

## 2021-02-03 DIAGNOSIS — M1A.9XX0 CHRONIC GOUT WITHOUT TOPHUS, UNSPECIFIED CAUSE, UNSPECIFIED SITE: ICD-10-CM

## 2021-02-03 DIAGNOSIS — E66.01 SEVERE OBESITY (BMI 35.0-35.9 WITH COMORBIDITY): ICD-10-CM

## 2021-02-03 DIAGNOSIS — Z12.5 ENCOUNTER FOR SCREENING FOR MALIGNANT NEOPLASM OF PROSTATE: ICD-10-CM

## 2021-02-03 DIAGNOSIS — I15.2 HYPERTENSION ASSOCIATED WITH DIABETES: ICD-10-CM

## 2021-02-03 DIAGNOSIS — E11.59 HYPERTENSION ASSOCIATED WITH DIABETES: ICD-10-CM

## 2021-02-03 DIAGNOSIS — E11.9 DM TYPE 2 WITHOUT RETINOPATHY: ICD-10-CM

## 2021-02-03 PROCEDURE — 3008F BODY MASS INDEX DOCD: CPT | Mod: CPTII,S$GLB,, | Performed by: FAMILY MEDICINE

## 2021-02-03 PROCEDURE — 99999 PR PBB SHADOW E&M-EST. PATIENT-LVL III: ICD-10-PCS | Mod: PBBFAC,,, | Performed by: FAMILY MEDICINE

## 2021-02-03 PROCEDURE — 1101F PR PT FALLS ASSESS DOC 0-1 FALLS W/OUT INJ PAST YR: ICD-10-PCS | Mod: CPTII,S$GLB,, | Performed by: FAMILY MEDICINE

## 2021-02-03 PROCEDURE — 99499 RISK ADDL DX/OHS AUDIT: ICD-10-PCS | Mod: S$GLB,,, | Performed by: FAMILY MEDICINE

## 2021-02-03 PROCEDURE — 3288F FALL RISK ASSESSMENT DOCD: CPT | Mod: CPTII,S$GLB,, | Performed by: FAMILY MEDICINE

## 2021-02-03 PROCEDURE — 1159F PR MEDICATION LIST DOCUMENTED IN MEDICAL RECORD: ICD-10-PCS | Mod: S$GLB,,, | Performed by: FAMILY MEDICINE

## 2021-02-03 PROCEDURE — 1101F PT FALLS ASSESS-DOCD LE1/YR: CPT | Mod: CPTII,S$GLB,, | Performed by: FAMILY MEDICINE

## 2021-02-03 PROCEDURE — 3074F SYST BP LT 130 MM HG: CPT | Mod: CPTII,S$GLB,, | Performed by: FAMILY MEDICINE

## 2021-02-03 PROCEDURE — 3078F DIAST BP <80 MM HG: CPT | Mod: CPTII,S$GLB,, | Performed by: FAMILY MEDICINE

## 2021-02-03 PROCEDURE — 3008F PR BODY MASS INDEX (BMI) DOCUMENTED: ICD-10-PCS | Mod: CPTII,S$GLB,, | Performed by: FAMILY MEDICINE

## 2021-02-03 PROCEDURE — 3288F PR FALLS RISK ASSESSMENT DOCUMENTED: ICD-10-PCS | Mod: CPTII,S$GLB,, | Performed by: FAMILY MEDICINE

## 2021-02-03 PROCEDURE — 3074F PR MOST RECENT SYSTOLIC BLOOD PRESSURE < 130 MM HG: ICD-10-PCS | Mod: CPTII,S$GLB,, | Performed by: FAMILY MEDICINE

## 2021-02-03 PROCEDURE — 99214 OFFICE O/P EST MOD 30 MIN: CPT | Mod: S$GLB,,, | Performed by: FAMILY MEDICINE

## 2021-02-03 PROCEDURE — 3051F PR MOST RECENT HEMOGLOBIN A1C LEVEL 7.0 - < 8.0%: ICD-10-PCS | Mod: CPTII,S$GLB,, | Performed by: FAMILY MEDICINE

## 2021-02-03 PROCEDURE — 3078F PR MOST RECENT DIASTOLIC BLOOD PRESSURE < 80 MM HG: ICD-10-PCS | Mod: CPTII,S$GLB,, | Performed by: FAMILY MEDICINE

## 2021-02-03 PROCEDURE — 99999 PR PBB SHADOW E&M-EST. PATIENT-LVL III: CPT | Mod: PBBFAC,,, | Performed by: FAMILY MEDICINE

## 2021-02-03 PROCEDURE — 1159F MED LIST DOCD IN RCRD: CPT | Mod: S$GLB,,, | Performed by: FAMILY MEDICINE

## 2021-02-03 PROCEDURE — 3051F HG A1C>EQUAL 7.0%<8.0%: CPT | Mod: CPTII,S$GLB,, | Performed by: FAMILY MEDICINE

## 2021-02-03 PROCEDURE — 99214 PR OFFICE/OUTPT VISIT, EST, LEVL IV, 30-39 MIN: ICD-10-PCS | Mod: S$GLB,,, | Performed by: FAMILY MEDICINE

## 2021-02-03 PROCEDURE — 99499 UNLISTED E&M SERVICE: CPT | Mod: S$GLB,,, | Performed by: FAMILY MEDICINE

## 2021-02-03 RX ORDER — ALLOPURINOL 100 MG/1
100 TABLET ORAL DAILY
Qty: 90 TABLET | Refills: 3 | Status: SHIPPED | OUTPATIENT
Start: 2021-02-03 | End: 2021-08-03

## 2021-02-03 RX ORDER — ALLOPURINOL 100 MG/1
100 TABLET ORAL DAILY
Qty: 90 TABLET | Refills: 3 | Status: SHIPPED | OUTPATIENT
Start: 2021-02-03 | End: 2021-02-03 | Stop reason: SDUPTHER

## 2021-02-04 ENCOUNTER — LAB VISIT (OUTPATIENT)
Dept: LAB | Facility: HOSPITAL | Age: 71
End: 2021-02-04
Attending: FAMILY MEDICINE
Payer: MEDICARE

## 2021-02-04 DIAGNOSIS — I50.42 CHRONIC COMBINED SYSTOLIC AND DIASTOLIC CHF (CONGESTIVE HEART FAILURE): ICD-10-CM

## 2021-02-04 DIAGNOSIS — Z12.5 ENCOUNTER FOR SCREENING FOR MALIGNANT NEOPLASM OF PROSTATE: ICD-10-CM

## 2021-02-04 DIAGNOSIS — E11.59 HYPERTENSION ASSOCIATED WITH DIABETES: ICD-10-CM

## 2021-02-04 DIAGNOSIS — E11.9 DM TYPE 2 WITHOUT RETINOPATHY: ICD-10-CM

## 2021-02-04 DIAGNOSIS — I15.2 HYPERTENSION ASSOCIATED WITH DIABETES: ICD-10-CM

## 2021-02-04 DIAGNOSIS — M1A.9XX0 CHRONIC GOUT WITHOUT TOPHUS, UNSPECIFIED CAUSE, UNSPECIFIED SITE: ICD-10-CM

## 2021-02-04 DIAGNOSIS — E78.5 DYSLIPIDEMIA ASSOCIATED WITH TYPE 2 DIABETES MELLITUS: ICD-10-CM

## 2021-02-04 DIAGNOSIS — E11.40 TYPE 2 DIABETES MELLITUS WITH DIABETIC NEUROPATHY, UNSPECIFIED WHETHER LONG TERM INSULIN USE: ICD-10-CM

## 2021-02-04 DIAGNOSIS — I25.118 CORONARY ARTERY DISEASE OF NATIVE ARTERY OF NATIVE HEART WITH STABLE ANGINA PECTORIS: ICD-10-CM

## 2021-02-04 DIAGNOSIS — E11.69 DYSLIPIDEMIA ASSOCIATED WITH TYPE 2 DIABETES MELLITUS: ICD-10-CM

## 2021-02-04 LAB
ALBUMIN SERPL BCP-MCNC: 3.7 G/DL (ref 3.5–5.2)
ALP SERPL-CCNC: 95 U/L (ref 55–135)
ALT SERPL W/O P-5'-P-CCNC: 24 U/L (ref 10–44)
ANION GAP SERPL CALC-SCNC: 10 MMOL/L (ref 8–16)
AST SERPL-CCNC: 16 U/L (ref 10–40)
BASOPHILS # BLD AUTO: 0.07 K/UL (ref 0–0.2)
BASOPHILS NFR BLD: 1 % (ref 0–1.9)
BILIRUB SERPL-MCNC: 0.9 MG/DL (ref 0.1–1)
BUN SERPL-MCNC: 19 MG/DL (ref 8–23)
CALCIUM SERPL-MCNC: 9 MG/DL (ref 8.7–10.5)
CHLORIDE SERPL-SCNC: 104 MMOL/L (ref 95–110)
CHOLEST SERPL-MCNC: 99 MG/DL (ref 120–199)
CHOLEST/HDLC SERPL: 4 {RATIO} (ref 2–5)
CO2 SERPL-SCNC: 28 MMOL/L (ref 23–29)
COMPLEXED PSA SERPL-MCNC: 0.37 NG/ML (ref 0–4)
CREAT SERPL-MCNC: 1.1 MG/DL (ref 0.5–1.4)
DIFFERENTIAL METHOD: ABNORMAL
EOSINOPHIL # BLD AUTO: 0.3 K/UL (ref 0–0.5)
EOSINOPHIL NFR BLD: 3.5 % (ref 0–8)
ERYTHROCYTE [DISTWIDTH] IN BLOOD BY AUTOMATED COUNT: 12.4 % (ref 11.5–14.5)
EST. GFR  (AFRICAN AMERICAN): >60 ML/MIN/1.73 M^2
EST. GFR  (NON AFRICAN AMERICAN): >60 ML/MIN/1.73 M^2
ESTIMATED AVG GLUCOSE: 166 MG/DL (ref 68–131)
GLUCOSE SERPL-MCNC: 168 MG/DL (ref 70–110)
HBA1C MFR BLD: 7.4 % (ref 4–5.6)
HCT VFR BLD AUTO: 41.6 % (ref 40–54)
HDLC SERPL-MCNC: 25 MG/DL (ref 40–75)
HDLC SERPL: 25.3 % (ref 20–50)
HGB BLD-MCNC: 13.8 G/DL (ref 14–18)
IMM GRANULOCYTES # BLD AUTO: 0.02 K/UL (ref 0–0.04)
IMM GRANULOCYTES NFR BLD AUTO: 0.3 % (ref 0–0.5)
LDLC SERPL CALC-MCNC: 14.6 MG/DL (ref 63–159)
LYMPHOCYTES # BLD AUTO: 2 K/UL (ref 1–4.8)
LYMPHOCYTES NFR BLD: 28.8 % (ref 18–48)
MCH RBC QN AUTO: 31.4 PG (ref 27–31)
MCHC RBC AUTO-ENTMCNC: 33.2 G/DL (ref 32–36)
MCV RBC AUTO: 95 FL (ref 82–98)
MONOCYTES # BLD AUTO: 1 K/UL (ref 0.3–1)
MONOCYTES NFR BLD: 13.4 % (ref 4–15)
NEUTROPHILS # BLD AUTO: 3.8 K/UL (ref 1.8–7.7)
NEUTROPHILS NFR BLD: 53 % (ref 38–73)
NONHDLC SERPL-MCNC: 74 MG/DL
NRBC BLD-RTO: 0 /100 WBC
PLATELET # BLD AUTO: 202 K/UL (ref 150–350)
PMV BLD AUTO: 10.8 FL (ref 9.2–12.9)
POTASSIUM SERPL-SCNC: 4.1 MMOL/L (ref 3.5–5.1)
PROT SERPL-MCNC: 7.2 G/DL (ref 6–8.4)
RBC # BLD AUTO: 4.4 M/UL (ref 4.6–6.2)
SODIUM SERPL-SCNC: 142 MMOL/L (ref 136–145)
TRIGL SERPL-MCNC: 297 MG/DL (ref 30–150)
URATE SERPL-MCNC: 9.2 MG/DL (ref 3.4–7)
WBC # BLD AUTO: 7.09 K/UL (ref 3.9–12.7)

## 2021-02-04 PROCEDURE — 83036 HEMOGLOBIN GLYCOSYLATED A1C: CPT

## 2021-02-04 PROCEDURE — 36415 COLL VENOUS BLD VENIPUNCTURE: CPT

## 2021-02-04 PROCEDURE — 80053 COMPREHEN METABOLIC PANEL: CPT

## 2021-02-04 PROCEDURE — 84153 ASSAY OF PSA TOTAL: CPT

## 2021-02-04 PROCEDURE — 80061 LIPID PANEL: CPT

## 2021-02-04 PROCEDURE — 85025 COMPLETE CBC W/AUTO DIFF WBC: CPT

## 2021-02-04 PROCEDURE — 84550 ASSAY OF BLOOD/URIC ACID: CPT

## 2021-02-14 ENCOUNTER — PATIENT MESSAGE (OUTPATIENT)
Dept: FAMILY MEDICINE | Facility: CLINIC | Age: 71
End: 2021-02-14

## 2021-02-14 DIAGNOSIS — E11.40 TYPE 2 DIABETES MELLITUS WITH DIABETIC NEUROPATHY, UNSPECIFIED WHETHER LONG TERM INSULIN USE: ICD-10-CM

## 2021-02-14 RX ORDER — METFORMIN HYDROCHLORIDE 1000 MG/1
1000 TABLET ORAL 2 TIMES DAILY WITH MEALS
Qty: 180 TABLET | Refills: 2 | Status: CANCELLED | OUTPATIENT
Start: 2021-02-14

## 2021-02-15 DIAGNOSIS — E11.40 TYPE 2 DIABETES MELLITUS WITH DIABETIC NEUROPATHY, UNSPECIFIED WHETHER LONG TERM INSULIN USE: ICD-10-CM

## 2021-02-15 RX ORDER — METFORMIN HYDROCHLORIDE 1000 MG/1
1000 TABLET ORAL 2 TIMES DAILY WITH MEALS
Qty: 180 TABLET | Refills: 2 | Status: CANCELLED | OUTPATIENT
Start: 2021-02-14

## 2021-02-15 RX ORDER — METFORMIN HYDROCHLORIDE 1000 MG/1
1000 TABLET ORAL 2 TIMES DAILY WITH MEALS
Qty: 180 TABLET | Refills: 2 | Status: SHIPPED | OUTPATIENT
Start: 2021-02-15 | End: 2021-09-12 | Stop reason: SDUPTHER

## 2021-02-15 RX ORDER — ISOPROPYL ALCOHOL 70 ML/100ML
SWAB TOPICAL
Qty: 400 EACH | Refills: 1 | Status: SHIPPED | OUTPATIENT
Start: 2021-02-15 | End: 2021-08-03

## 2021-02-18 ENCOUNTER — PATIENT MESSAGE (OUTPATIENT)
Dept: FAMILY MEDICINE | Facility: CLINIC | Age: 71
End: 2021-02-18

## 2021-02-22 ENCOUNTER — PATIENT MESSAGE (OUTPATIENT)
Dept: FAMILY MEDICINE | Facility: CLINIC | Age: 71
End: 2021-02-22

## 2021-02-22 DIAGNOSIS — M65.9 SYNOVITIS OF KNEE: ICD-10-CM

## 2021-02-22 RX ORDER — COLCHICINE 0.6 MG/1
0.6 TABLET ORAL DAILY
Qty: 30 TABLET | Refills: 0 | Status: SHIPPED | OUTPATIENT
Start: 2021-02-22 | End: 2021-04-12 | Stop reason: SDUPTHER

## 2021-02-26 ENCOUNTER — IMMUNIZATION (OUTPATIENT)
Dept: PHARMACY | Facility: CLINIC | Age: 71
End: 2021-02-26
Payer: MEDICARE

## 2021-02-26 DIAGNOSIS — Z23 NEED FOR VACCINATION: Primary | ICD-10-CM

## 2021-03-09 ENCOUNTER — CLINICAL SUPPORT (OUTPATIENT)
Dept: CARDIOLOGY | Facility: HOSPITAL | Age: 71
End: 2021-03-09
Payer: MEDICARE

## 2021-03-09 DIAGNOSIS — Z95.810 PRESENCE OF AUTOMATIC (IMPLANTABLE) CARDIAC DEFIBRILLATOR: ICD-10-CM

## 2021-03-09 PROCEDURE — 93295 CARDIAC DEVICE CHECK - REMOTE: ICD-10-PCS | Mod: ,,, | Performed by: INTERNAL MEDICINE

## 2021-03-09 PROCEDURE — 93295 DEV INTERROG REMOTE 1/2/MLT: CPT | Mod: ,,, | Performed by: INTERNAL MEDICINE

## 2021-03-09 PROCEDURE — 93296 REM INTERROG EVL PM/IDS: CPT | Performed by: INTERNAL MEDICINE

## 2021-03-17 DIAGNOSIS — E11.40 TYPE 2 DIABETES MELLITUS WITH DIABETIC NEUROPATHY, UNSPECIFIED WHETHER LONG TERM INSULIN USE: ICD-10-CM

## 2021-03-18 ENCOUNTER — TELEPHONE (OUTPATIENT)
Dept: ADMINISTRATIVE | Facility: HOSPITAL | Age: 71
End: 2021-03-18

## 2021-03-21 DIAGNOSIS — F51.01 PRIMARY INSOMNIA: ICD-10-CM

## 2021-03-22 RX ORDER — BLOOD SUGAR DIAGNOSTIC
STRIP MISCELLANEOUS
Qty: 400 STRIP | Refills: 1 | Status: SHIPPED | OUTPATIENT
Start: 2021-03-22 | End: 2021-07-27 | Stop reason: SDUPTHER

## 2021-03-22 RX ORDER — ZOLPIDEM TARTRATE 5 MG/1
TABLET ORAL
Qty: 90 TABLET | Refills: 0 | Status: SHIPPED | OUTPATIENT
Start: 2021-03-22 | End: 2021-07-24 | Stop reason: SDUPTHER

## 2021-03-26 ENCOUNTER — IMMUNIZATION (OUTPATIENT)
Dept: PHARMACY | Facility: CLINIC | Age: 71
End: 2021-03-26
Payer: MEDICARE

## 2021-03-26 DIAGNOSIS — Z23 NEED FOR VACCINATION: Primary | ICD-10-CM

## 2021-04-12 DIAGNOSIS — M65.9 SYNOVITIS OF KNEE: ICD-10-CM

## 2021-04-13 ENCOUNTER — OFFICE VISIT (OUTPATIENT)
Dept: FAMILY MEDICINE | Facility: CLINIC | Age: 71
End: 2021-04-13
Attending: FAMILY MEDICINE
Payer: MEDICARE

## 2021-04-13 VITALS
SYSTOLIC BLOOD PRESSURE: 114 MMHG | HEIGHT: 71 IN | OXYGEN SATURATION: 96 % | HEART RATE: 77 BPM | DIASTOLIC BLOOD PRESSURE: 74 MMHG | BODY MASS INDEX: 33.74 KG/M2 | WEIGHT: 241 LBS

## 2021-04-13 DIAGNOSIS — L02.11 ABSCESS OF SKIN OF NECK: Primary | ICD-10-CM

## 2021-04-13 PROCEDURE — 3008F PR BODY MASS INDEX (BMI) DOCUMENTED: ICD-10-PCS | Mod: CPTII,S$GLB,, | Performed by: FAMILY MEDICINE

## 2021-04-13 PROCEDURE — 99999 PR PBB SHADOW E&M-EST. PATIENT-LVL IV: CPT | Mod: PBBFAC,,, | Performed by: FAMILY MEDICINE

## 2021-04-13 PROCEDURE — 3288F FALL RISK ASSESSMENT DOCD: CPT | Mod: CPTII,S$GLB,, | Performed by: FAMILY MEDICINE

## 2021-04-13 PROCEDURE — 99214 OFFICE O/P EST MOD 30 MIN: CPT | Mod: S$GLB,,, | Performed by: FAMILY MEDICINE

## 2021-04-13 PROCEDURE — 99214 PR OFFICE/OUTPT VISIT, EST, LEVL IV, 30-39 MIN: ICD-10-PCS | Mod: S$GLB,,, | Performed by: FAMILY MEDICINE

## 2021-04-13 PROCEDURE — 1101F PT FALLS ASSESS-DOCD LE1/YR: CPT | Mod: CPTII,S$GLB,, | Performed by: FAMILY MEDICINE

## 2021-04-13 PROCEDURE — 1101F PR PT FALLS ASSESS DOC 0-1 FALLS W/OUT INJ PAST YR: ICD-10-PCS | Mod: CPTII,S$GLB,, | Performed by: FAMILY MEDICINE

## 2021-04-13 PROCEDURE — 1159F PR MEDICATION LIST DOCUMENTED IN MEDICAL RECORD: ICD-10-PCS | Mod: S$GLB,,, | Performed by: FAMILY MEDICINE

## 2021-04-13 PROCEDURE — 3288F PR FALLS RISK ASSESSMENT DOCUMENTED: ICD-10-PCS | Mod: CPTII,S$GLB,, | Performed by: FAMILY MEDICINE

## 2021-04-13 PROCEDURE — 1159F MED LIST DOCD IN RCRD: CPT | Mod: S$GLB,,, | Performed by: FAMILY MEDICINE

## 2021-04-13 PROCEDURE — 3008F BODY MASS INDEX DOCD: CPT | Mod: CPTII,S$GLB,, | Performed by: FAMILY MEDICINE

## 2021-04-13 PROCEDURE — 99999 PR PBB SHADOW E&M-EST. PATIENT-LVL IV: ICD-10-PCS | Mod: PBBFAC,,, | Performed by: FAMILY MEDICINE

## 2021-04-13 RX ORDER — COLCHICINE 0.6 MG/1
0.6 CAPSULE ORAL DAILY
Qty: 30 CAPSULE | Refills: 0 | Status: SHIPPED | OUTPATIENT
Start: 2021-04-13 | End: 2021-12-25 | Stop reason: SDUPTHER

## 2021-04-13 RX ORDER — SULFAMETHOXAZOLE AND TRIMETHOPRIM 800; 160 MG/1; MG/1
1 TABLET ORAL 2 TIMES DAILY
Qty: 20 TABLET | Refills: 0 | Status: SHIPPED | OUTPATIENT
Start: 2021-04-13 | End: 2021-04-23

## 2021-06-07 ENCOUNTER — CLINICAL SUPPORT (OUTPATIENT)
Dept: CARDIOLOGY | Facility: HOSPITAL | Age: 71
End: 2021-06-07
Payer: MEDICARE

## 2021-06-07 DIAGNOSIS — Z95.810 PRESENCE OF AUTOMATIC (IMPLANTABLE) CARDIAC DEFIBRILLATOR: ICD-10-CM

## 2021-06-07 PROCEDURE — 93296 REM INTERROG EVL PM/IDS: CPT | Performed by: INTERNAL MEDICINE

## 2021-06-07 PROCEDURE — 93295 DEV INTERROG REMOTE 1/2/MLT: CPT | Mod: ,,, | Performed by: INTERNAL MEDICINE

## 2021-06-07 PROCEDURE — 93295 CARDIAC DEVICE CHECK - REMOTE: ICD-10-PCS | Mod: ,,, | Performed by: INTERNAL MEDICINE

## 2021-06-23 ENCOUNTER — PATIENT MESSAGE (OUTPATIENT)
Dept: FAMILY MEDICINE | Facility: CLINIC | Age: 71
End: 2021-06-23

## 2021-06-23 DIAGNOSIS — R05.9 COUGH: ICD-10-CM

## 2021-06-23 DIAGNOSIS — I34.0 MITRAL VALVE INSUFFICIENCY, UNSPECIFIED ETIOLOGY: ICD-10-CM

## 2021-06-23 DIAGNOSIS — E78.5 HYPERLIPIDEMIA, UNSPECIFIED HYPERLIPIDEMIA TYPE: ICD-10-CM

## 2021-06-23 DIAGNOSIS — I50.41 ACUTE COMBINED SYSTOLIC AND DIASTOLIC CONGESTIVE HEART FAILURE: ICD-10-CM

## 2021-06-23 DIAGNOSIS — B96.89 ACUTE BACTERIAL SINUSITIS: ICD-10-CM

## 2021-06-23 DIAGNOSIS — I10 ESSENTIAL HYPERTENSION: ICD-10-CM

## 2021-06-23 DIAGNOSIS — E11.59 HYPERTENSION ASSOCIATED WITH DIABETES: ICD-10-CM

## 2021-06-23 DIAGNOSIS — G62.9 NEUROPATHY: ICD-10-CM

## 2021-06-23 DIAGNOSIS — J01.90 ACUTE BACTERIAL SINUSITIS: ICD-10-CM

## 2021-06-23 DIAGNOSIS — I15.2 HYPERTENSION ASSOCIATED WITH DIABETES: ICD-10-CM

## 2021-06-23 RX ORDER — AMOXICILLIN 875 MG/1
875 TABLET, FILM COATED ORAL EVERY 12 HOURS
Qty: 20 TABLET | Refills: 0 | Status: SHIPPED | OUTPATIENT
Start: 2021-06-23 | End: 2021-09-22

## 2021-06-23 RX ORDER — ATORVASTATIN CALCIUM 40 MG/1
40 TABLET, FILM COATED ORAL DAILY
Qty: 90 TABLET | Refills: 3 | Status: CANCELLED | OUTPATIENT
Start: 2021-06-23

## 2021-06-23 RX ORDER — GABAPENTIN 300 MG/1
600 CAPSULE ORAL 2 TIMES DAILY
Qty: 360 CAPSULE | Refills: 1 | Status: SHIPPED | OUTPATIENT
Start: 2021-06-23 | End: 2021-09-12 | Stop reason: SDUPTHER

## 2021-06-23 RX ORDER — FUROSEMIDE 20 MG/1
20 TABLET ORAL 2 TIMES DAILY
Qty: 180 TABLET | Refills: 3 | Status: CANCELLED | OUTPATIENT
Start: 2021-06-23

## 2021-06-23 RX ORDER — CARVEDILOL 12.5 MG/1
12.5 TABLET ORAL 2 TIMES DAILY
Qty: 180 TABLET | Refills: 3 | Status: CANCELLED | OUTPATIENT
Start: 2021-06-23 | End: 2022-06-23

## 2021-06-23 RX ORDER — LOSARTAN POTASSIUM 50 MG/1
50 TABLET ORAL DAILY
Qty: 90 TABLET | Refills: 1 | Status: SHIPPED | OUTPATIENT
Start: 2021-06-23 | End: 2021-12-25 | Stop reason: SDUPTHER

## 2021-06-23 RX ORDER — CLOPIDOGREL BISULFATE 75 MG/1
75 TABLET ORAL DAILY
Qty: 90 TABLET | Refills: 1 | Status: SHIPPED | OUTPATIENT
Start: 2021-06-23 | End: 2021-12-21 | Stop reason: SDUPTHER

## 2021-06-23 RX ORDER — CLOPIDOGREL BISULFATE 75 MG/1
75 TABLET ORAL DAILY
Qty: 90 TABLET | Refills: 3 | Status: CANCELLED | OUTPATIENT
Start: 2021-06-23

## 2021-06-23 RX ORDER — LOSARTAN POTASSIUM 50 MG/1
50 TABLET ORAL DAILY
Qty: 90 TABLET | Refills: 3 | Status: CANCELLED | OUTPATIENT
Start: 2021-06-23

## 2021-06-23 RX ORDER — FUROSEMIDE 20 MG/1
20 TABLET ORAL 2 TIMES DAILY
Qty: 180 TABLET | Refills: 1 | Status: SHIPPED | OUTPATIENT
Start: 2021-06-23 | End: 2021-12-21 | Stop reason: SDUPTHER

## 2021-06-23 RX ORDER — GABAPENTIN 300 MG/1
600 CAPSULE ORAL 2 TIMES DAILY
Qty: 360 CAPSULE | Refills: 4 | Status: CANCELLED | OUTPATIENT
Start: 2021-06-23

## 2021-06-23 RX ORDER — AMOXICILLIN 875 MG/1
875 TABLET, FILM COATED ORAL EVERY 12 HOURS
Qty: 20 TABLET | Refills: 0 | Status: CANCELLED | OUTPATIENT
Start: 2021-06-23

## 2021-06-23 RX ORDER — ATORVASTATIN CALCIUM 40 MG/1
40 TABLET, FILM COATED ORAL DAILY
Qty: 90 TABLET | Refills: 1 | Status: SHIPPED | OUTPATIENT
Start: 2021-06-23 | End: 2021-10-23 | Stop reason: SDUPTHER

## 2021-06-23 RX ORDER — CARVEDILOL 12.5 MG/1
12.5 TABLET ORAL 2 TIMES DAILY
Qty: 180 TABLET | Refills: 1 | Status: SHIPPED | OUTPATIENT
Start: 2021-06-23 | End: 2021-12-25 | Stop reason: SDUPTHER

## 2021-06-24 DIAGNOSIS — E11.40 TYPE 2 DIABETES MELLITUS WITH DIABETIC NEUROPATHY, WITH LONG-TERM CURRENT USE OF INSULIN: ICD-10-CM

## 2021-06-24 DIAGNOSIS — E11.9 DIABETES MELLITUS TYPE 2 WITHOUT RETINOPATHY: ICD-10-CM

## 2021-06-24 DIAGNOSIS — Z79.4 TYPE 2 DIABETES MELLITUS WITH DIABETIC NEUROPATHY, WITH LONG-TERM CURRENT USE OF INSULIN: ICD-10-CM

## 2021-06-24 RX ORDER — INSULIN GLARGINE 100 [IU]/ML
INJECTION, SOLUTION SUBCUTANEOUS
Qty: 30 ML | Refills: 3 | Status: SHIPPED | OUTPATIENT
Start: 2021-06-24 | End: 2021-12-25 | Stop reason: SDUPTHER

## 2021-07-24 DIAGNOSIS — F51.01 PRIMARY INSOMNIA: ICD-10-CM

## 2021-07-26 RX ORDER — ZOLPIDEM TARTRATE 5 MG/1
TABLET ORAL
Qty: 90 TABLET | Refills: 0 | Status: SHIPPED | OUTPATIENT
Start: 2021-07-26 | End: 2021-10-23 | Stop reason: SDUPTHER

## 2021-07-27 ENCOUNTER — PATIENT MESSAGE (OUTPATIENT)
Dept: FAMILY MEDICINE | Facility: CLINIC | Age: 71
End: 2021-07-27

## 2021-08-03 ENCOUNTER — OFFICE VISIT (OUTPATIENT)
Dept: FAMILY MEDICINE | Facility: CLINIC | Age: 71
End: 2021-08-03
Attending: FAMILY MEDICINE
Payer: MEDICARE

## 2021-08-03 ENCOUNTER — PATIENT MESSAGE (OUTPATIENT)
Dept: FAMILY MEDICINE | Facility: CLINIC | Age: 71
End: 2021-08-03

## 2021-08-03 VITALS
DIASTOLIC BLOOD PRESSURE: 80 MMHG | SYSTOLIC BLOOD PRESSURE: 130 MMHG | BODY MASS INDEX: 34.69 KG/M2 | HEART RATE: 76 BPM | TEMPERATURE: 98 F | WEIGHT: 247.81 LBS | HEIGHT: 71 IN

## 2021-08-03 DIAGNOSIS — E66.01 SEVERE OBESITY (BMI 35.0-35.9 WITH COMORBIDITY): ICD-10-CM

## 2021-08-03 DIAGNOSIS — E78.5 DYSLIPIDEMIA ASSOCIATED WITH TYPE 2 DIABETES MELLITUS: ICD-10-CM

## 2021-08-03 DIAGNOSIS — I50.42 CHRONIC COMBINED SYSTOLIC AND DIASTOLIC CHF (CONGESTIVE HEART FAILURE): ICD-10-CM

## 2021-08-03 DIAGNOSIS — E11.69 DYSLIPIDEMIA ASSOCIATED WITH TYPE 2 DIABETES MELLITUS: ICD-10-CM

## 2021-08-03 DIAGNOSIS — E11.59 HYPERTENSION ASSOCIATED WITH DIABETES: ICD-10-CM

## 2021-08-03 DIAGNOSIS — E11.40 TYPE 2 DIABETES MELLITUS WITH DIABETIC NEUROPATHY, UNSPECIFIED WHETHER LONG TERM INSULIN USE: ICD-10-CM

## 2021-08-03 DIAGNOSIS — M79.605 BILATERAL LEG PAIN: ICD-10-CM

## 2021-08-03 DIAGNOSIS — Z79.4 TYPE 2 DIABETES MELLITUS WITH DIABETIC NEUROPATHY, WITH LONG-TERM CURRENT USE OF INSULIN: Primary | ICD-10-CM

## 2021-08-03 DIAGNOSIS — I15.2 HYPERTENSION ASSOCIATED WITH DIABETES: ICD-10-CM

## 2021-08-03 DIAGNOSIS — M79.604 BILATERAL LEG PAIN: ICD-10-CM

## 2021-08-03 DIAGNOSIS — I50.42 CHRONIC COMBINED SYSTOLIC AND DIASTOLIC CONGESTIVE HEART FAILURE: ICD-10-CM

## 2021-08-03 DIAGNOSIS — E11.40 TYPE 2 DIABETES MELLITUS WITH DIABETIC NEUROPATHY, WITH LONG-TERM CURRENT USE OF INSULIN: Primary | ICD-10-CM

## 2021-08-03 PROCEDURE — 3079F DIAST BP 80-89 MM HG: CPT | Mod: CPTII,S$GLB,, | Performed by: FAMILY MEDICINE

## 2021-08-03 PROCEDURE — 99999 PR PBB SHADOW E&M-EST. PATIENT-LVL III: ICD-10-PCS | Mod: PBBFAC,,, | Performed by: FAMILY MEDICINE

## 2021-08-03 PROCEDURE — 1160F PR REVIEW ALL MEDS BY PRESCRIBER/CLIN PHARMACIST DOCUMENTED: ICD-10-PCS | Mod: CPTII,S$GLB,, | Performed by: FAMILY MEDICINE

## 2021-08-03 PROCEDURE — 3008F PR BODY MASS INDEX (BMI) DOCUMENTED: ICD-10-PCS | Mod: CPTII,S$GLB,, | Performed by: FAMILY MEDICINE

## 2021-08-03 PROCEDURE — 99214 OFFICE O/P EST MOD 30 MIN: CPT | Mod: S$GLB,,, | Performed by: FAMILY MEDICINE

## 2021-08-03 PROCEDURE — 99499 RISK ADDL DX/OHS AUDIT: ICD-10-PCS | Mod: S$GLB,,, | Performed by: FAMILY MEDICINE

## 2021-08-03 PROCEDURE — 3075F SYST BP GE 130 - 139MM HG: CPT | Mod: CPTII,S$GLB,, | Performed by: FAMILY MEDICINE

## 2021-08-03 PROCEDURE — 3051F HG A1C>EQUAL 7.0%<8.0%: CPT | Mod: CPTII,S$GLB,, | Performed by: FAMILY MEDICINE

## 2021-08-03 PROCEDURE — 1159F PR MEDICATION LIST DOCUMENTED IN MEDICAL RECORD: ICD-10-PCS | Mod: CPTII,S$GLB,, | Performed by: FAMILY MEDICINE

## 2021-08-03 PROCEDURE — 3008F BODY MASS INDEX DOCD: CPT | Mod: CPTII,S$GLB,, | Performed by: FAMILY MEDICINE

## 2021-08-03 PROCEDURE — 99999 PR PBB SHADOW E&M-EST. PATIENT-LVL III: CPT | Mod: PBBFAC,,, | Performed by: FAMILY MEDICINE

## 2021-08-03 PROCEDURE — 3051F PR MOST RECENT HEMOGLOBIN A1C LEVEL 7.0 - < 8.0%: ICD-10-PCS | Mod: CPTII,S$GLB,, | Performed by: FAMILY MEDICINE

## 2021-08-03 PROCEDURE — 3075F PR MOST RECENT SYSTOLIC BLOOD PRESS GE 130-139MM HG: ICD-10-PCS | Mod: CPTII,S$GLB,, | Performed by: FAMILY MEDICINE

## 2021-08-03 PROCEDURE — 99214 PR OFFICE/OUTPT VISIT, EST, LEVL IV, 30-39 MIN: ICD-10-PCS | Mod: S$GLB,,, | Performed by: FAMILY MEDICINE

## 2021-08-03 PROCEDURE — 1160F RVW MEDS BY RX/DR IN RCRD: CPT | Mod: CPTII,S$GLB,, | Performed by: FAMILY MEDICINE

## 2021-08-03 PROCEDURE — 3079F PR MOST RECENT DIASTOLIC BLOOD PRESSURE 80-89 MM HG: ICD-10-PCS | Mod: CPTII,S$GLB,, | Performed by: FAMILY MEDICINE

## 2021-08-03 PROCEDURE — 1159F MED LIST DOCD IN RCRD: CPT | Mod: CPTII,S$GLB,, | Performed by: FAMILY MEDICINE

## 2021-08-03 PROCEDURE — 99499 UNLISTED E&M SERVICE: CPT | Mod: S$GLB,,, | Performed by: FAMILY MEDICINE

## 2021-08-03 RX ORDER — BLOOD GLUCOSE CONTROL HIGH,LOW
EACH MISCELLANEOUS
Qty: 1 EACH | Refills: 1 | Status: SHIPPED | OUTPATIENT
Start: 2021-08-03

## 2021-08-03 RX ORDER — LANCETS
1 EACH MISCELLANEOUS 3 TIMES DAILY
Qty: 300 EACH | Refills: 1 | Status: SHIPPED | OUTPATIENT
Start: 2021-08-03 | End: 2022-10-11 | Stop reason: SDUPTHER

## 2021-08-03 RX ORDER — ISOPROPYL ALCOHOL 70 ML/100ML
SWAB TOPICAL
Qty: 400 EACH | Refills: 3 | Status: SHIPPED | OUTPATIENT
Start: 2021-08-03 | End: 2021-11-17 | Stop reason: SDUPTHER

## 2021-08-03 RX ORDER — DEXTROSE 4 G
TABLET,CHEWABLE ORAL
Qty: 1 EACH | Refills: 0 | Status: SHIPPED | OUTPATIENT
Start: 2021-08-03

## 2021-08-10 ENCOUNTER — PATIENT MESSAGE (OUTPATIENT)
Dept: FAMILY MEDICINE | Facility: CLINIC | Age: 71
End: 2021-08-10

## 2021-08-10 ENCOUNTER — PATIENT MESSAGE (OUTPATIENT)
Dept: ELECTROPHYSIOLOGY | Facility: CLINIC | Age: 71
End: 2021-08-10

## 2021-08-10 ENCOUNTER — LAB VISIT (OUTPATIENT)
Dept: LAB | Facility: HOSPITAL | Age: 71
End: 2021-08-10
Attending: FAMILY MEDICINE
Payer: MEDICARE

## 2021-08-10 ENCOUNTER — PATIENT OUTREACH (OUTPATIENT)
Dept: ADMINISTRATIVE | Facility: OTHER | Age: 71
End: 2021-08-10

## 2021-08-10 DIAGNOSIS — E11.40 TYPE 2 DIABETES MELLITUS WITH DIABETIC NEUROPATHY, WITH LONG-TERM CURRENT USE OF INSULIN: ICD-10-CM

## 2021-08-10 DIAGNOSIS — E11.40 TYPE 2 DIABETES MELLITUS WITH DIABETIC NEUROPATHY, WITH LONG-TERM CURRENT USE OF INSULIN: Primary | ICD-10-CM

## 2021-08-10 DIAGNOSIS — L02.11 ABSCESS OF SKIN OF NECK: Primary | ICD-10-CM

## 2021-08-10 DIAGNOSIS — I49.8 OTHER SPECIFIED CARDIAC ARRHYTHMIAS: Primary | ICD-10-CM

## 2021-08-10 DIAGNOSIS — Z79.4 TYPE 2 DIABETES MELLITUS WITH DIABETIC NEUROPATHY, WITH LONG-TERM CURRENT USE OF INSULIN: ICD-10-CM

## 2021-08-10 DIAGNOSIS — Z79.4 TYPE 2 DIABETES MELLITUS WITH DIABETIC NEUROPATHY, WITH LONG-TERM CURRENT USE OF INSULIN: Primary | ICD-10-CM

## 2021-08-10 LAB
ALBUMIN SERPL BCP-MCNC: 3.9 G/DL (ref 3.5–5.2)
ALP SERPL-CCNC: 82 U/L (ref 55–135)
ALT SERPL W/O P-5'-P-CCNC: 23 U/L (ref 10–44)
ANION GAP SERPL CALC-SCNC: 14 MMOL/L (ref 8–16)
AST SERPL-CCNC: 17 U/L (ref 10–40)
BILIRUB SERPL-MCNC: 0.9 MG/DL (ref 0.1–1)
BUN SERPL-MCNC: 15 MG/DL (ref 8–23)
CALCIUM SERPL-MCNC: 9.6 MG/DL (ref 8.7–10.5)
CHLORIDE SERPL-SCNC: 106 MMOL/L (ref 95–110)
CO2 SERPL-SCNC: 23 MMOL/L (ref 23–29)
CREAT SERPL-MCNC: 1.2 MG/DL (ref 0.5–1.4)
EST. GFR  (AFRICAN AMERICAN): >60 ML/MIN/1.73 M^2
EST. GFR  (NON AFRICAN AMERICAN): >60 ML/MIN/1.73 M^2
ESTIMATED AVG GLUCOSE: 137 MG/DL (ref 68–131)
GLUCOSE SERPL-MCNC: 158 MG/DL (ref 70–110)
HBA1C MFR BLD: 6.4 % (ref 4–5.6)
POTASSIUM SERPL-SCNC: 4.2 MMOL/L (ref 3.5–5.1)
PROT SERPL-MCNC: 7.5 G/DL (ref 6–8.4)
SODIUM SERPL-SCNC: 143 MMOL/L (ref 136–145)

## 2021-08-10 PROCEDURE — 83036 HEMOGLOBIN GLYCOSYLATED A1C: CPT | Performed by: FAMILY MEDICINE

## 2021-08-10 PROCEDURE — 80053 COMPREHEN METABOLIC PANEL: CPT | Performed by: FAMILY MEDICINE

## 2021-08-10 PROCEDURE — 36415 COLL VENOUS BLD VENIPUNCTURE: CPT | Performed by: FAMILY MEDICINE

## 2021-08-10 RX ORDER — DOXYCYCLINE 100 MG/1
100 CAPSULE ORAL 2 TIMES DAILY
Qty: 20 CAPSULE | Refills: 0 | Status: ON HOLD | OUTPATIENT
Start: 2021-08-10 | End: 2023-03-26

## 2021-08-11 ENCOUNTER — OFFICE VISIT (OUTPATIENT)
Dept: SURGERY | Facility: CLINIC | Age: 71
End: 2021-08-11
Payer: MEDICARE

## 2021-08-11 VITALS
TEMPERATURE: 98 F | WEIGHT: 247 LBS | HEART RATE: 85 BPM | BODY MASS INDEX: 34.58 KG/M2 | DIASTOLIC BLOOD PRESSURE: 76 MMHG | HEIGHT: 71 IN | SYSTOLIC BLOOD PRESSURE: 126 MMHG

## 2021-08-11 DIAGNOSIS — L72.0 EPIDERMAL INCLUSION CYST: Primary | ICD-10-CM

## 2021-08-11 DIAGNOSIS — E78.5 DYSLIPIDEMIA ASSOCIATED WITH TYPE 2 DIABETES MELLITUS: ICD-10-CM

## 2021-08-11 DIAGNOSIS — I25.5 ISCHEMIC CARDIOMYOPATHY: ICD-10-CM

## 2021-08-11 DIAGNOSIS — Z79.4 INSULIN DEPENDENT TYPE 2 DIABETES MELLITUS: ICD-10-CM

## 2021-08-11 DIAGNOSIS — E11.69 DYSLIPIDEMIA ASSOCIATED WITH TYPE 2 DIABETES MELLITUS: ICD-10-CM

## 2021-08-11 DIAGNOSIS — E11.9 INSULIN DEPENDENT TYPE 2 DIABETES MELLITUS: ICD-10-CM

## 2021-08-11 DIAGNOSIS — I10 ESSENTIAL HYPERTENSION: ICD-10-CM

## 2021-08-11 DIAGNOSIS — I50.42 CHRONIC COMBINED SYSTOLIC AND DIASTOLIC CONGESTIVE HEART FAILURE: ICD-10-CM

## 2021-08-11 DIAGNOSIS — I25.118 CORONARY ARTERY DISEASE OF NATIVE ARTERY OF NATIVE HEART WITH STABLE ANGINA PECTORIS: ICD-10-CM

## 2021-08-11 PROCEDURE — 1126F PR PAIN SEVERITY QUANTIFIED, NO PAIN PRESENT: ICD-10-PCS | Mod: CPTII,S$GLB,, | Performed by: SURGERY

## 2021-08-11 PROCEDURE — 1159F PR MEDICATION LIST DOCUMENTED IN MEDICAL RECORD: ICD-10-PCS | Mod: CPTII,S$GLB,, | Performed by: SURGERY

## 2021-08-11 PROCEDURE — 3044F HG A1C LEVEL LT 7.0%: CPT | Mod: CPTII,S$GLB,, | Performed by: SURGERY

## 2021-08-11 PROCEDURE — 3008F PR BODY MASS INDEX (BMI) DOCUMENTED: ICD-10-PCS | Mod: CPTII,S$GLB,, | Performed by: SURGERY

## 2021-08-11 PROCEDURE — 99999 PR PBB SHADOW E&M-EST. PATIENT-LVL III: ICD-10-PCS | Mod: PBBFAC,,, | Performed by: SURGERY

## 2021-08-11 PROCEDURE — 1126F AMNT PAIN NOTED NONE PRSNT: CPT | Mod: CPTII,S$GLB,, | Performed by: SURGERY

## 2021-08-11 PROCEDURE — 1160F RVW MEDS BY RX/DR IN RCRD: CPT | Mod: CPTII,S$GLB,, | Performed by: SURGERY

## 2021-08-11 PROCEDURE — 3044F PR MOST RECENT HEMOGLOBIN A1C LEVEL <7.0%: ICD-10-PCS | Mod: CPTII,S$GLB,, | Performed by: SURGERY

## 2021-08-11 PROCEDURE — 3078F PR MOST RECENT DIASTOLIC BLOOD PRESSURE < 80 MM HG: ICD-10-PCS | Mod: CPTII,S$GLB,, | Performed by: SURGERY

## 2021-08-11 PROCEDURE — 1159F MED LIST DOCD IN RCRD: CPT | Mod: CPTII,S$GLB,, | Performed by: SURGERY

## 2021-08-11 PROCEDURE — 3074F PR MOST RECENT SYSTOLIC BLOOD PRESSURE < 130 MM HG: ICD-10-PCS | Mod: CPTII,S$GLB,, | Performed by: SURGERY

## 2021-08-11 PROCEDURE — 3078F DIAST BP <80 MM HG: CPT | Mod: CPTII,S$GLB,, | Performed by: SURGERY

## 2021-08-11 PROCEDURE — 3008F BODY MASS INDEX DOCD: CPT | Mod: CPTII,S$GLB,, | Performed by: SURGERY

## 2021-08-11 PROCEDURE — 99203 OFFICE O/P NEW LOW 30 MIN: CPT | Mod: S$GLB,,, | Performed by: SURGERY

## 2021-08-11 PROCEDURE — 3074F SYST BP LT 130 MM HG: CPT | Mod: CPTII,S$GLB,, | Performed by: SURGERY

## 2021-08-11 PROCEDURE — 99203 PR OFFICE/OUTPT VISIT, NEW, LEVL III, 30-44 MIN: ICD-10-PCS | Mod: S$GLB,,, | Performed by: SURGERY

## 2021-08-11 PROCEDURE — 99999 PR PBB SHADOW E&M-EST. PATIENT-LVL III: CPT | Mod: PBBFAC,,, | Performed by: SURGERY

## 2021-08-11 PROCEDURE — 1160F PR REVIEW ALL MEDS BY PRESCRIBER/CLIN PHARMACIST DOCUMENTED: ICD-10-PCS | Mod: CPTII,S$GLB,, | Performed by: SURGERY

## 2021-08-12 ENCOUNTER — PROCEDURE VISIT (OUTPATIENT)
Dept: SURGERY | Facility: CLINIC | Age: 71
End: 2021-08-12
Payer: MEDICARE

## 2021-08-12 VITALS
HEIGHT: 71 IN | WEIGHT: 247 LBS | DIASTOLIC BLOOD PRESSURE: 71 MMHG | BODY MASS INDEX: 34.58 KG/M2 | SYSTOLIC BLOOD PRESSURE: 130 MMHG | HEART RATE: 72 BPM

## 2021-08-12 DIAGNOSIS — E11.9 INSULIN DEPENDENT TYPE 2 DIABETES MELLITUS: ICD-10-CM

## 2021-08-12 DIAGNOSIS — R22.1 MASS OF RIGHT SIDE OF NECK: ICD-10-CM

## 2021-08-12 DIAGNOSIS — L72.0 EPIDERMAL INCLUSION CYST: Primary | ICD-10-CM

## 2021-08-12 DIAGNOSIS — Z79.4 INSULIN DEPENDENT TYPE 2 DIABETES MELLITUS: ICD-10-CM

## 2021-08-12 PROCEDURE — 88305 TISSUE EXAM BY PATHOLOGIST: ICD-10-PCS | Mod: 26,,, | Performed by: PATHOLOGY

## 2021-08-12 PROCEDURE — 88305 TISSUE EXAM BY PATHOLOGIST: CPT | Performed by: PATHOLOGY

## 2021-08-12 PROCEDURE — 11424 PR EXC SKIN BENIG 3.1-4 CM REMAINDR BODY: ICD-10-PCS | Mod: 51,S$GLB,, | Performed by: SURGERY

## 2021-08-12 PROCEDURE — 88305 TISSUE EXAM BY PATHOLOGIST: CPT | Mod: 26,,, | Performed by: PATHOLOGY

## 2021-08-12 PROCEDURE — 11424 EXC H-F-NK-SP B9+MARG 3.1-4: CPT | Mod: 51,S$GLB,, | Performed by: SURGERY

## 2021-08-12 PROCEDURE — 12042 INTMD RPR N-HF/GENIT2.6-7.5: CPT | Mod: S$GLB,,, | Performed by: SURGERY

## 2021-08-12 PROCEDURE — 12042 PR LAYR CLOS WND REST BODY 2.6-7.5 CM: ICD-10-PCS | Mod: S$GLB,,, | Performed by: SURGERY

## 2021-08-17 LAB
FINAL PATHOLOGIC DIAGNOSIS: NORMAL
GROSS: NORMAL
Lab: NORMAL

## 2021-08-18 ENCOUNTER — OFFICE VISIT (OUTPATIENT)
Dept: SURGERY | Facility: CLINIC | Age: 71
End: 2021-08-18
Payer: MEDICARE

## 2021-08-18 ENCOUNTER — TELEPHONE (OUTPATIENT)
Dept: SURGERY | Facility: CLINIC | Age: 71
End: 2021-08-18

## 2021-08-18 VITALS — HEART RATE: 70 BPM | SYSTOLIC BLOOD PRESSURE: 162 MMHG | DIASTOLIC BLOOD PRESSURE: 81 MMHG | RESPIRATION RATE: 18 BRPM

## 2021-08-18 DIAGNOSIS — I10 ESSENTIAL HYPERTENSION: ICD-10-CM

## 2021-08-18 DIAGNOSIS — I50.42 CHRONIC COMBINED SYSTOLIC AND DIASTOLIC CONGESTIVE HEART FAILURE: ICD-10-CM

## 2021-08-18 DIAGNOSIS — E78.5 DYSLIPIDEMIA ASSOCIATED WITH TYPE 2 DIABETES MELLITUS: ICD-10-CM

## 2021-08-18 DIAGNOSIS — Z79.4 INSULIN DEPENDENT TYPE 2 DIABETES MELLITUS: ICD-10-CM

## 2021-08-18 DIAGNOSIS — I25.118 CORONARY ARTERY DISEASE OF NATIVE ARTERY OF NATIVE HEART WITH STABLE ANGINA PECTORIS: ICD-10-CM

## 2021-08-18 DIAGNOSIS — E11.69 DYSLIPIDEMIA ASSOCIATED WITH TYPE 2 DIABETES MELLITUS: ICD-10-CM

## 2021-08-18 DIAGNOSIS — E11.9 INSULIN DEPENDENT TYPE 2 DIABETES MELLITUS: ICD-10-CM

## 2021-08-18 DIAGNOSIS — I25.5 ISCHEMIC CARDIOMYOPATHY: ICD-10-CM

## 2021-08-18 DIAGNOSIS — L72.0 EPIDERMAL INCLUSION CYST: Primary | ICD-10-CM

## 2021-08-18 PROCEDURE — 99999 PR PBB SHADOW E&M-EST. PATIENT-LVL IV: ICD-10-PCS | Mod: PBBFAC,,, | Performed by: SURGERY

## 2021-08-18 PROCEDURE — 3288F PR FALLS RISK ASSESSMENT DOCUMENTED: ICD-10-PCS | Mod: CPTII,S$GLB,, | Performed by: SURGERY

## 2021-08-18 PROCEDURE — 1160F RVW MEDS BY RX/DR IN RCRD: CPT | Mod: CPTII,S$GLB,, | Performed by: SURGERY

## 2021-08-18 PROCEDURE — 99999 PR PBB SHADOW E&M-EST. PATIENT-LVL IV: CPT | Mod: PBBFAC,,, | Performed by: SURGERY

## 2021-08-18 PROCEDURE — 1159F MED LIST DOCD IN RCRD: CPT | Mod: CPTII,S$GLB,, | Performed by: SURGERY

## 2021-08-18 PROCEDURE — 3288F FALL RISK ASSESSMENT DOCD: CPT | Mod: CPTII,S$GLB,, | Performed by: SURGERY

## 2021-08-18 PROCEDURE — 3079F DIAST BP 80-89 MM HG: CPT | Mod: CPTII,S$GLB,, | Performed by: SURGERY

## 2021-08-18 PROCEDURE — 1101F PR PT FALLS ASSESS DOC 0-1 FALLS W/OUT INJ PAST YR: ICD-10-PCS | Mod: CPTII,S$GLB,, | Performed by: SURGERY

## 2021-08-18 PROCEDURE — 1160F PR REVIEW ALL MEDS BY PRESCRIBER/CLIN PHARMACIST DOCUMENTED: ICD-10-PCS | Mod: CPTII,S$GLB,, | Performed by: SURGERY

## 2021-08-18 PROCEDURE — 99024 PR POST-OP FOLLOW-UP VISIT: ICD-10-PCS | Mod: S$GLB,,, | Performed by: SURGERY

## 2021-08-18 PROCEDURE — 3077F SYST BP >= 140 MM HG: CPT | Mod: CPTII,S$GLB,, | Performed by: SURGERY

## 2021-08-18 PROCEDURE — 3079F PR MOST RECENT DIASTOLIC BLOOD PRESSURE 80-89 MM HG: ICD-10-PCS | Mod: CPTII,S$GLB,, | Performed by: SURGERY

## 2021-08-18 PROCEDURE — 1101F PT FALLS ASSESS-DOCD LE1/YR: CPT | Mod: CPTII,S$GLB,, | Performed by: SURGERY

## 2021-08-18 PROCEDURE — 3044F HG A1C LEVEL LT 7.0%: CPT | Mod: CPTII,S$GLB,, | Performed by: SURGERY

## 2021-08-18 PROCEDURE — 1159F PR MEDICATION LIST DOCUMENTED IN MEDICAL RECORD: ICD-10-PCS | Mod: CPTII,S$GLB,, | Performed by: SURGERY

## 2021-08-18 PROCEDURE — 3077F PR MOST RECENT SYSTOLIC BLOOD PRESSURE >= 140 MM HG: ICD-10-PCS | Mod: CPTII,S$GLB,, | Performed by: SURGERY

## 2021-08-18 PROCEDURE — 99024 POSTOP FOLLOW-UP VISIT: CPT | Mod: S$GLB,,, | Performed by: SURGERY

## 2021-08-18 PROCEDURE — 3044F PR MOST RECENT HEMOGLOBIN A1C LEVEL <7.0%: ICD-10-PCS | Mod: CPTII,S$GLB,, | Performed by: SURGERY

## 2021-08-19 ENCOUNTER — OFFICE VISIT (OUTPATIENT)
Dept: OPTOMETRY | Facility: CLINIC | Age: 71
End: 2021-08-19
Payer: COMMERCIAL

## 2021-08-19 DIAGNOSIS — Z01.00 EYE EXAM, ROUTINE: Primary | ICD-10-CM

## 2021-08-19 DIAGNOSIS — H52.4 REGULAR ASTIGMATISM OF BOTH EYES WITH PRESBYOPIA: ICD-10-CM

## 2021-08-19 DIAGNOSIS — H52.223 REGULAR ASTIGMATISM OF BOTH EYES WITH PRESBYOPIA: ICD-10-CM

## 2021-08-19 PROCEDURE — 92004 COMPRE OPH EXAM NEW PT 1/>: CPT | Mod: S$GLB,,, | Performed by: OPTOMETRIST

## 2021-08-19 PROCEDURE — 92004 PR EYE EXAM, NEW PATIENT,COMPREHESV: ICD-10-PCS | Mod: S$GLB,,, | Performed by: OPTOMETRIST

## 2021-08-19 PROCEDURE — 92015 DETERMINE REFRACTIVE STATE: CPT | Mod: S$GLB,,, | Performed by: OPTOMETRIST

## 2021-08-19 PROCEDURE — 1159F MED LIST DOCD IN RCRD: CPT | Mod: CPTII,S$GLB,, | Performed by: OPTOMETRIST

## 2021-08-19 PROCEDURE — 2023F PR DILATED RETINAL EXAM W/O EVID OF RETINOPATHY: ICD-10-PCS | Mod: CPTII,S$GLB,, | Performed by: OPTOMETRIST

## 2021-08-19 PROCEDURE — 99999 PR PBB SHADOW E&M-EST. PATIENT-LVL III: ICD-10-PCS | Mod: PBBFAC,,, | Performed by: OPTOMETRIST

## 2021-08-19 PROCEDURE — 1126F PR PAIN SEVERITY QUANTIFIED, NO PAIN PRESENT: ICD-10-PCS | Mod: CPTII,S$GLB,, | Performed by: OPTOMETRIST

## 2021-08-19 PROCEDURE — 92015 PR REFRACTION: ICD-10-PCS | Mod: S$GLB,,, | Performed by: OPTOMETRIST

## 2021-08-19 PROCEDURE — 2023F DILAT RTA XM W/O RTNOPTHY: CPT | Mod: CPTII,S$GLB,, | Performed by: OPTOMETRIST

## 2021-08-19 PROCEDURE — 1159F PR MEDICATION LIST DOCUMENTED IN MEDICAL RECORD: ICD-10-PCS | Mod: CPTII,S$GLB,, | Performed by: OPTOMETRIST

## 2021-08-19 PROCEDURE — 1126F AMNT PAIN NOTED NONE PRSNT: CPT | Mod: CPTII,S$GLB,, | Performed by: OPTOMETRIST

## 2021-08-19 PROCEDURE — 99999 PR PBB SHADOW E&M-EST. PATIENT-LVL III: CPT | Mod: PBBFAC,,, | Performed by: OPTOMETRIST

## 2021-09-05 ENCOUNTER — CLINICAL SUPPORT (OUTPATIENT)
Dept: CARDIOLOGY | Facility: HOSPITAL | Age: 71
End: 2021-09-05
Payer: MEDICARE

## 2021-09-05 DIAGNOSIS — Z95.810 PRESENCE OF AUTOMATIC (IMPLANTABLE) CARDIAC DEFIBRILLATOR: ICD-10-CM

## 2021-09-05 DIAGNOSIS — I42.5 OTHER RESTRICTIVE CARDIOMYOPATHY: ICD-10-CM

## 2021-09-05 PROCEDURE — 93295 CARDIAC DEVICE CHECK - REMOTE: ICD-10-PCS | Mod: ,,, | Performed by: INTERNAL MEDICINE

## 2021-09-05 PROCEDURE — 93295 DEV INTERROG REMOTE 1/2/MLT: CPT | Mod: ,,, | Performed by: INTERNAL MEDICINE

## 2021-09-05 PROCEDURE — 93296 REM INTERROG EVL PM/IDS: CPT | Performed by: INTERNAL MEDICINE

## 2021-09-12 DIAGNOSIS — G62.9 NEUROPATHY: ICD-10-CM

## 2021-09-12 DIAGNOSIS — E11.40 TYPE 2 DIABETES MELLITUS WITH DIABETIC NEUROPATHY, UNSPECIFIED WHETHER LONG TERM INSULIN USE: ICD-10-CM

## 2021-09-13 ENCOUNTER — CLINICAL SUPPORT (OUTPATIENT)
Dept: REHABILITATION | Facility: HOSPITAL | Age: 71
End: 2021-09-13
Payer: MEDICARE

## 2021-09-13 DIAGNOSIS — G89.29 CHRONIC PAIN OF LEFT KNEE: Primary | ICD-10-CM

## 2021-09-13 DIAGNOSIS — M25.562 CHRONIC PAIN OF LEFT KNEE: Primary | ICD-10-CM

## 2021-09-13 DIAGNOSIS — M79.604 BILATERAL LEG PAIN: ICD-10-CM

## 2021-09-13 DIAGNOSIS — R26.2 DIFFICULTY WALKING: ICD-10-CM

## 2021-09-13 DIAGNOSIS — M79.605 BILATERAL LEG PAIN: ICD-10-CM

## 2021-09-13 PROCEDURE — 97162 PT EVAL MOD COMPLEX 30 MIN: CPT | Mod: PN

## 2021-09-13 RX ORDER — METFORMIN HYDROCHLORIDE 1000 MG/1
1000 TABLET ORAL 2 TIMES DAILY WITH MEALS
Qty: 180 TABLET | Refills: 2 | Status: SHIPPED | OUTPATIENT
Start: 2021-09-13 | End: 2022-09-02 | Stop reason: SDUPTHER

## 2021-09-13 RX ORDER — GABAPENTIN 300 MG/1
600 CAPSULE ORAL 2 TIMES DAILY
Qty: 360 CAPSULE | Refills: 1 | Status: SHIPPED | OUTPATIENT
Start: 2021-09-13 | End: 2022-07-17 | Stop reason: SDUPTHER

## 2021-09-15 ENCOUNTER — HOSPITAL ENCOUNTER (OUTPATIENT)
Dept: CARDIOLOGY | Facility: HOSPITAL | Age: 71
Discharge: HOME | End: 2021-09-15
Attending: INTERNAL MEDICINE
Payer: MEDICARE

## 2021-09-15 VITALS
HEIGHT: 71 IN | BODY MASS INDEX: 34.58 KG/M2 | HEART RATE: 67 BPM | WEIGHT: 247 LBS | SYSTOLIC BLOOD PRESSURE: 112 MMHG | DIASTOLIC BLOOD PRESSURE: 68 MMHG

## 2021-09-15 DIAGNOSIS — I50.22 CHRONIC SYSTOLIC (CONGESTIVE) HEART FAILURE: ICD-10-CM

## 2021-09-15 DIAGNOSIS — I25.118 CORONARY ARTERY DISEASE OF NATIVE ARTERY OF NATIVE HEART WITH STABLE ANGINA PECTORIS: ICD-10-CM

## 2021-09-15 DIAGNOSIS — I34.0 MITRAL VALVE INSUFFICIENCY, UNSPECIFIED ETIOLOGY: ICD-10-CM

## 2021-09-15 DIAGNOSIS — I25.5 ISCHEMIC CARDIOMYOPATHY: ICD-10-CM

## 2021-09-15 DIAGNOSIS — I47.20 VENTRICULAR TACHYCARDIA: ICD-10-CM

## 2021-09-15 DIAGNOSIS — Z95.810 PRESENCE OF AUTOMATIC (IMPLANTABLE) CARDIAC DEFIBRILLATOR: ICD-10-CM

## 2021-09-15 DIAGNOSIS — I10 ESSENTIAL HYPERTENSION: ICD-10-CM

## 2021-09-15 LAB
ASCENDING AORTA: 3.9 CM
AV INDEX (PROSTH): 0.67
AV MEAN GRADIENT: 4 MMHG
AV PEAK GRADIENT: 6 MMHG
AV VALVE AREA: 3.05 CM2
AV VELOCITY RATIO: 0.7
BSA FOR ECHO PROCEDURE: 2.37 M2
CV ECHO LV RWT: 0.33 CM
DOP CALC AO PEAK VEL: 1.2 M/S
DOP CALC AO VTI: 23.07 CM
DOP CALC LVOT AREA: 4.6 CM2
DOP CALC LVOT DIAMETER: 2.41 CM
DOP CALC LVOT PEAK VEL: 0.84 M/S
DOP CALC LVOT STROKE VOLUME: 70.26 CM3
DOP CALCLVOT PEAK VEL VTI: 15.41 CM
E WAVE DECELERATION TIME: 297.32 MSEC
E/A RATIO: 0.7
ECHO LV POSTERIOR WALL: 0.91 CM (ref 0.6–1.1)
EJECTION FRACTION: 45 %
FRACTIONAL SHORTENING: 29 % (ref 28–44)
INTERVENTRICULAR SEPTUM: 0.9 CM (ref 0.6–1.1)
LA MAJOR: 4.76 CM
LA MINOR: 4.83 CM
LA WIDTH: 3.61 CM
LEFT ATRIUM SIZE: 4.11 CM
LEFT ATRIUM VOLUME INDEX MOD: 19.1 ML/M2
LEFT ATRIUM VOLUME INDEX: 26.2 ML/M2
LEFT ATRIUM VOLUME MOD: 44.02 CM3
LEFT ATRIUM VOLUME: 60.47 CM3
LEFT INTERNAL DIMENSION IN SYSTOLE: 3.94 CM (ref 2.1–4)
LEFT VENTRICLE DIASTOLIC VOLUME INDEX: 65.93 ML/M2
LEFT VENTRICLE DIASTOLIC VOLUME: 152.29 ML
LEFT VENTRICLE MASS INDEX: 83 G/M2
LEFT VENTRICLE SYSTOLIC VOLUME INDEX: 29.3 ML/M2
LEFT VENTRICLE SYSTOLIC VOLUME: 67.59 ML
LEFT VENTRICULAR INTERNAL DIMENSION IN DIASTOLE: 5.58 CM (ref 3.5–6)
LEFT VENTRICULAR MASS: 191.83 G
MV A" WAVE DURATION": 12.56 MSEC
MV PEAK A VEL: 0.61 M/S
MV PEAK E VEL: 0.43 M/S
MV STENOSIS PRESSURE HALF TIME: 86.22 MS
MV VALVE AREA P 1/2 METHOD: 2.55 CM2
PULM VEIN S/D RATIO: 1.21
PV PEAK D VEL: 0.33 M/S
PV PEAK S VEL: 0.4 M/S
QEF: 44 %
RA MAJOR: 4.28 CM
RA PRESSURE: 3 MMHG
RA WIDTH: 2.91 CM
RIGHT VENTRICULAR END-DIASTOLIC DIMENSION: 3.69 CM
SINUS: 3.69 CM
STJ: 3.59 CM
TRICUSPID ANNULAR PLANE SYSTOLIC EXCURSION: 1.94 CM

## 2021-09-15 PROCEDURE — 93306 TTE W/DOPPLER COMPLETE: CPT | Mod: 26,,, | Performed by: INTERNAL MEDICINE

## 2021-09-15 PROCEDURE — 93306 TTE W/DOPPLER COMPLETE: CPT

## 2021-09-15 PROCEDURE — 93306 ECHO (CUPID ONLY): ICD-10-PCS | Mod: 26,,, | Performed by: INTERNAL MEDICINE

## 2021-09-21 ENCOUNTER — CLINICAL SUPPORT (OUTPATIENT)
Dept: REHABILITATION | Facility: HOSPITAL | Age: 71
End: 2021-09-21
Payer: MEDICARE

## 2021-09-21 DIAGNOSIS — R26.2 DIFFICULTY WALKING: ICD-10-CM

## 2021-09-21 DIAGNOSIS — G89.29 CHRONIC PAIN OF LEFT KNEE: ICD-10-CM

## 2021-09-21 DIAGNOSIS — M25.562 CHRONIC PAIN OF LEFT KNEE: ICD-10-CM

## 2021-09-21 PROCEDURE — 97140 MANUAL THERAPY 1/> REGIONS: CPT | Mod: PN

## 2021-09-21 PROCEDURE — 97110 THERAPEUTIC EXERCISES: CPT | Mod: PN

## 2021-09-22 ENCOUNTER — CLINICAL SUPPORT (OUTPATIENT)
Dept: CARDIOLOGY | Facility: HOSPITAL | Age: 71
End: 2021-09-22
Attending: INTERNAL MEDICINE
Payer: MEDICARE

## 2021-09-22 ENCOUNTER — HOSPITAL ENCOUNTER (OUTPATIENT)
Dept: CARDIOLOGY | Facility: CLINIC | Age: 71
Discharge: HOME | End: 2021-09-22
Payer: MEDICARE

## 2021-09-22 ENCOUNTER — OFFICE VISIT (OUTPATIENT)
Dept: ELECTROPHYSIOLOGY | Facility: CLINIC | Age: 71
End: 2021-09-22
Payer: MEDICARE

## 2021-09-22 VITALS
WEIGHT: 246.94 LBS | SYSTOLIC BLOOD PRESSURE: 124 MMHG | HEART RATE: 64 BPM | BODY MASS INDEX: 34.57 KG/M2 | DIASTOLIC BLOOD PRESSURE: 86 MMHG | HEIGHT: 71 IN

## 2021-09-22 DIAGNOSIS — I25.5 ISCHEMIC CARDIOMYOPATHY: ICD-10-CM

## 2021-09-22 DIAGNOSIS — I49.8 OTHER SPECIFIED CARDIAC ARRHYTHMIAS: ICD-10-CM

## 2021-09-22 DIAGNOSIS — E11.40 TYPE 2 DIABETES MELLITUS WITH DIABETIC NEUROPATHY, WITHOUT LONG-TERM CURRENT USE OF INSULIN: ICD-10-CM

## 2021-09-22 DIAGNOSIS — Z95.810 ICD (IMPLANTABLE CARDIOVERTER-DEFIBRILLATOR), SINGLE, IN SITU: Primary | ICD-10-CM

## 2021-09-22 DIAGNOSIS — I25.118 CORONARY ARTERY DISEASE OF NATIVE ARTERY OF NATIVE HEART WITH STABLE ANGINA PECTORIS: ICD-10-CM

## 2021-09-22 PROCEDURE — 3074F PR MOST RECENT SYSTOLIC BLOOD PRESSURE < 130 MM HG: ICD-10-PCS | Mod: CPTII,S$GLB,, | Performed by: INTERNAL MEDICINE

## 2021-09-22 PROCEDURE — 3008F BODY MASS INDEX DOCD: CPT | Mod: CPTII,S$GLB,, | Performed by: INTERNAL MEDICINE

## 2021-09-22 PROCEDURE — 1160F RVW MEDS BY RX/DR IN RCRD: CPT | Mod: CPTII,S$GLB,, | Performed by: INTERNAL MEDICINE

## 2021-09-22 PROCEDURE — 3061F PR NEG MICROALBUMINURIA RESULT DOCUMENTED/REVIEW: ICD-10-PCS | Mod: CPTII,S$GLB,, | Performed by: INTERNAL MEDICINE

## 2021-09-22 PROCEDURE — 3066F PR DOCUMENTATION OF TREATMENT FOR NEPHROPATHY: ICD-10-PCS | Mod: CPTII,S$GLB,, | Performed by: INTERNAL MEDICINE

## 2021-09-22 PROCEDURE — 3079F DIAST BP 80-89 MM HG: CPT | Mod: CPTII,S$GLB,, | Performed by: INTERNAL MEDICINE

## 2021-09-22 PROCEDURE — 4010F ACE/ARB THERAPY RXD/TAKEN: CPT | Mod: CPTII,S$GLB,, | Performed by: INTERNAL MEDICINE

## 2021-09-22 PROCEDURE — 3044F HG A1C LEVEL LT 7.0%: CPT | Mod: CPTII,S$GLB,, | Performed by: INTERNAL MEDICINE

## 2021-09-22 PROCEDURE — 3044F PR MOST RECENT HEMOGLOBIN A1C LEVEL <7.0%: ICD-10-PCS | Mod: CPTII,S$GLB,, | Performed by: INTERNAL MEDICINE

## 2021-09-22 PROCEDURE — 1101F PT FALLS ASSESS-DOCD LE1/YR: CPT | Mod: CPTII,S$GLB,, | Performed by: INTERNAL MEDICINE

## 2021-09-22 PROCEDURE — 93282 PRGRMG EVAL IMPLANTABLE DFB: CPT

## 2021-09-22 PROCEDURE — 99214 PR OFFICE/OUTPT VISIT, EST, LEVL IV, 30-39 MIN: ICD-10-PCS | Mod: S$GLB,,, | Performed by: INTERNAL MEDICINE

## 2021-09-22 PROCEDURE — 93282 PRGRMG EVAL IMPLANTABLE DFB: CPT | Mod: 26,,, | Performed by: INTERNAL MEDICINE

## 2021-09-22 PROCEDURE — 1126F PR PAIN SEVERITY QUANTIFIED, NO PAIN PRESENT: ICD-10-PCS | Mod: CPTII,S$GLB,, | Performed by: INTERNAL MEDICINE

## 2021-09-22 PROCEDURE — 99999 PR PBB SHADOW E&M-EST. PATIENT-LVL IV: ICD-10-PCS | Mod: PBBFAC,,, | Performed by: INTERNAL MEDICINE

## 2021-09-22 PROCEDURE — 3074F SYST BP LT 130 MM HG: CPT | Mod: CPTII,S$GLB,, | Performed by: INTERNAL MEDICINE

## 2021-09-22 PROCEDURE — 93010 ELECTROCARDIOGRAM REPORT: CPT | Mod: S$GLB,,, | Performed by: INTERNAL MEDICINE

## 2021-09-22 PROCEDURE — 3008F PR BODY MASS INDEX (BMI) DOCUMENTED: ICD-10-PCS | Mod: CPTII,S$GLB,, | Performed by: INTERNAL MEDICINE

## 2021-09-22 PROCEDURE — 99999 PR PBB SHADOW E&M-EST. PATIENT-LVL IV: CPT | Mod: PBBFAC,,, | Performed by: INTERNAL MEDICINE

## 2021-09-22 PROCEDURE — 3288F FALL RISK ASSESSMENT DOCD: CPT | Mod: CPTII,S$GLB,, | Performed by: INTERNAL MEDICINE

## 2021-09-22 PROCEDURE — 3066F NEPHROPATHY DOC TX: CPT | Mod: CPTII,S$GLB,, | Performed by: INTERNAL MEDICINE

## 2021-09-22 PROCEDURE — 3079F PR MOST RECENT DIASTOLIC BLOOD PRESSURE 80-89 MM HG: ICD-10-PCS | Mod: CPTII,S$GLB,, | Performed by: INTERNAL MEDICINE

## 2021-09-22 PROCEDURE — 3061F NEG MICROALBUMINURIA REV: CPT | Mod: CPTII,S$GLB,, | Performed by: INTERNAL MEDICINE

## 2021-09-22 PROCEDURE — 93005 RHYTHM STRIP: ICD-10-PCS | Mod: S$GLB,,, | Performed by: INTERNAL MEDICINE

## 2021-09-22 PROCEDURE — 93010 RHYTHM STRIP: ICD-10-PCS | Mod: S$GLB,,, | Performed by: INTERNAL MEDICINE

## 2021-09-22 PROCEDURE — 1160F PR REVIEW ALL MEDS BY PRESCRIBER/CLIN PHARMACIST DOCUMENTED: ICD-10-PCS | Mod: CPTII,S$GLB,, | Performed by: INTERNAL MEDICINE

## 2021-09-22 PROCEDURE — 93282 CARDIAC DEVICE CHECK - IN CLINIC & HOSPITAL: ICD-10-PCS | Mod: 26,,, | Performed by: INTERNAL MEDICINE

## 2021-09-22 PROCEDURE — 99214 OFFICE O/P EST MOD 30 MIN: CPT | Mod: S$GLB,,, | Performed by: INTERNAL MEDICINE

## 2021-09-22 PROCEDURE — 1159F PR MEDICATION LIST DOCUMENTED IN MEDICAL RECORD: ICD-10-PCS | Mod: CPTII,S$GLB,, | Performed by: INTERNAL MEDICINE

## 2021-09-22 PROCEDURE — 1126F AMNT PAIN NOTED NONE PRSNT: CPT | Mod: CPTII,S$GLB,, | Performed by: INTERNAL MEDICINE

## 2021-09-22 PROCEDURE — 93005 ELECTROCARDIOGRAM TRACING: CPT | Mod: S$GLB,,, | Performed by: INTERNAL MEDICINE

## 2021-09-22 PROCEDURE — 1101F PR PT FALLS ASSESS DOC 0-1 FALLS W/OUT INJ PAST YR: ICD-10-PCS | Mod: CPTII,S$GLB,, | Performed by: INTERNAL MEDICINE

## 2021-09-22 PROCEDURE — 1159F MED LIST DOCD IN RCRD: CPT | Mod: CPTII,S$GLB,, | Performed by: INTERNAL MEDICINE

## 2021-09-22 PROCEDURE — 3288F PR FALLS RISK ASSESSMENT DOCUMENTED: ICD-10-PCS | Mod: CPTII,S$GLB,, | Performed by: INTERNAL MEDICINE

## 2021-09-22 PROCEDURE — 4010F PR ACE/ARB THEARPY RXD/TAKEN: ICD-10-PCS | Mod: CPTII,S$GLB,, | Performed by: INTERNAL MEDICINE

## 2021-09-22 RX ORDER — POTASSIUM CHLORIDE 750 MG/1
TABLET, EXTENDED RELEASE ORAL
Status: ON HOLD | COMMUNITY
Start: 2020-12-14 | End: 2023-03-29 | Stop reason: SDUPTHER

## 2021-09-22 RX ORDER — LANOLIN ALCOHOL/MO/W.PET/CERES
CREAM (GRAM) TOPICAL
COMMUNITY
Start: 2021-02-02

## 2021-09-22 RX ORDER — ALLOPURINOL 300 MG/1
TABLET ORAL
COMMUNITY
Start: 2021-07-12 | End: 2022-08-25

## 2021-09-28 ENCOUNTER — DOCUMENTATION ONLY (OUTPATIENT)
Dept: REHABILITATION | Facility: HOSPITAL | Age: 71
End: 2021-09-28

## 2021-10-22 ENCOUNTER — CLINICAL SUPPORT (OUTPATIENT)
Dept: REHABILITATION | Facility: HOSPITAL | Age: 71
End: 2021-10-22
Payer: MEDICARE

## 2021-10-22 DIAGNOSIS — G89.29 CHRONIC PAIN OF LEFT KNEE: ICD-10-CM

## 2021-10-22 DIAGNOSIS — R26.2 DIFFICULTY WALKING: ICD-10-CM

## 2021-10-22 DIAGNOSIS — M25.562 CHRONIC PAIN OF LEFT KNEE: ICD-10-CM

## 2021-10-22 PROCEDURE — 97140 MANUAL THERAPY 1/> REGIONS: CPT | Mod: PN

## 2021-10-22 PROCEDURE — 97110 THERAPEUTIC EXERCISES: CPT | Mod: PN

## 2021-10-23 DIAGNOSIS — E78.5 HYPERLIPIDEMIA, UNSPECIFIED HYPERLIPIDEMIA TYPE: ICD-10-CM

## 2021-10-23 DIAGNOSIS — F51.01 PRIMARY INSOMNIA: ICD-10-CM

## 2021-10-25 RX ORDER — ATORVASTATIN CALCIUM 40 MG/1
40 TABLET, FILM COATED ORAL DAILY
Qty: 90 TABLET | Refills: 1 | Status: SHIPPED | OUTPATIENT
Start: 2021-10-25 | End: 2022-07-17 | Stop reason: SDUPTHER

## 2021-10-25 RX ORDER — ZOLPIDEM TARTRATE 5 MG/1
TABLET ORAL
Qty: 90 TABLET | Refills: 0 | Status: SHIPPED | OUTPATIENT
Start: 2021-10-25 | End: 2022-01-22 | Stop reason: SDUPTHER

## 2021-10-26 ENCOUNTER — DOCUMENTATION ONLY (OUTPATIENT)
Dept: REHABILITATION | Facility: HOSPITAL | Age: 71
End: 2021-10-26
Payer: MEDICARE

## 2021-11-08 ENCOUNTER — PATIENT MESSAGE (OUTPATIENT)
Dept: FAMILY MEDICINE | Facility: CLINIC | Age: 71
End: 2021-11-08
Payer: MEDICARE

## 2021-11-19 ENCOUNTER — DOCUMENTATION ONLY (OUTPATIENT)
Dept: REHABILITATION | Facility: HOSPITAL | Age: 71
End: 2021-11-19
Payer: MEDICARE

## 2021-12-04 ENCOUNTER — CLINICAL SUPPORT (OUTPATIENT)
Dept: CARDIOLOGY | Facility: HOSPITAL | Age: 71
End: 2021-12-04
Payer: MEDICARE

## 2021-12-04 DIAGNOSIS — I25.5 ISCHEMIC CARDIOMYOPATHY: ICD-10-CM

## 2021-12-04 DIAGNOSIS — I47.20 VENTRICULAR TACHYCARDIA: ICD-10-CM

## 2021-12-04 DIAGNOSIS — Z95.810 PRESENCE OF AUTOMATIC (IMPLANTABLE) CARDIAC DEFIBRILLATOR: ICD-10-CM

## 2021-12-04 PROCEDURE — 93295 DEV INTERROG REMOTE 1/2/MLT: CPT | Mod: ,,, | Performed by: INTERNAL MEDICINE

## 2021-12-04 PROCEDURE — 93296 REM INTERROG EVL PM/IDS: CPT | Performed by: INTERNAL MEDICINE

## 2021-12-04 PROCEDURE — 93295 CARDIAC DEVICE CHECK - REMOTE: ICD-10-PCS | Mod: ,,, | Performed by: INTERNAL MEDICINE

## 2021-12-21 DIAGNOSIS — I34.0 MITRAL VALVE INSUFFICIENCY, UNSPECIFIED ETIOLOGY: ICD-10-CM

## 2021-12-21 DIAGNOSIS — I10 ESSENTIAL HYPERTENSION: ICD-10-CM

## 2021-12-21 DIAGNOSIS — I50.41 ACUTE COMBINED SYSTOLIC AND DIASTOLIC CONGESTIVE HEART FAILURE: ICD-10-CM

## 2021-12-21 RX ORDER — CLOPIDOGREL BISULFATE 75 MG/1
75 TABLET ORAL DAILY
Qty: 90 TABLET | Refills: 1 | Status: SHIPPED | OUTPATIENT
Start: 2021-12-21 | End: 2022-07-17 | Stop reason: SDUPTHER

## 2021-12-21 RX ORDER — FUROSEMIDE 20 MG/1
20 TABLET ORAL 2 TIMES DAILY
Qty: 180 TABLET | Refills: 1 | Status: SHIPPED | OUTPATIENT
Start: 2021-12-21 | End: 2022-07-17 | Stop reason: SDUPTHER

## 2021-12-25 DIAGNOSIS — Z79.4 TYPE 2 DIABETES MELLITUS WITH DIABETIC NEUROPATHY, WITH LONG-TERM CURRENT USE OF INSULIN: ICD-10-CM

## 2021-12-25 DIAGNOSIS — I10 ESSENTIAL HYPERTENSION: ICD-10-CM

## 2021-12-25 DIAGNOSIS — I50.41 ACUTE COMBINED SYSTOLIC AND DIASTOLIC CONGESTIVE HEART FAILURE: ICD-10-CM

## 2021-12-25 DIAGNOSIS — M65.9 SYNOVITIS OF KNEE: ICD-10-CM

## 2021-12-25 DIAGNOSIS — E11.40 TYPE 2 DIABETES MELLITUS WITH DIABETIC NEUROPATHY, WITH LONG-TERM CURRENT USE OF INSULIN: ICD-10-CM

## 2021-12-25 DIAGNOSIS — E11.59 HYPERTENSION ASSOCIATED WITH DIABETES: ICD-10-CM

## 2021-12-25 DIAGNOSIS — E11.9 DIABETES MELLITUS TYPE 2 WITHOUT RETINOPATHY: ICD-10-CM

## 2021-12-25 DIAGNOSIS — I34.0 MITRAL VALVE INSUFFICIENCY, UNSPECIFIED ETIOLOGY: ICD-10-CM

## 2021-12-25 DIAGNOSIS — I15.2 HYPERTENSION ASSOCIATED WITH DIABETES: ICD-10-CM

## 2021-12-26 RX ORDER — INSULIN GLARGINE 100 [IU]/ML
INJECTION, SOLUTION SUBCUTANEOUS
Qty: 30 ML | Refills: 3 | Status: SHIPPED | OUTPATIENT
Start: 2021-12-26 | End: 2022-09-02 | Stop reason: SDUPTHER

## 2021-12-26 RX ORDER — NITROGLYCERIN 0.4 MG/1
0.4 TABLET SUBLINGUAL EVERY 5 MIN PRN
Qty: 25 TABLET | Refills: 3 | Status: SHIPPED | OUTPATIENT
Start: 2021-12-26 | End: 2023-08-23 | Stop reason: SDUPTHER

## 2021-12-26 RX ORDER — COLCHICINE 0.6 MG/1
0.6 CAPSULE ORAL DAILY
Qty: 30 CAPSULE | Refills: 0 | Status: SHIPPED | OUTPATIENT
Start: 2021-12-26 | End: 2022-12-18 | Stop reason: SDUPTHER

## 2021-12-26 RX ORDER — CARVEDILOL 12.5 MG/1
12.5 TABLET ORAL 2 TIMES DAILY
Qty: 180 TABLET | Refills: 1 | Status: SHIPPED | OUTPATIENT
Start: 2021-12-26 | End: 2022-07-17 | Stop reason: SDUPTHER

## 2021-12-26 RX ORDER — LOSARTAN POTASSIUM 50 MG/1
50 TABLET ORAL DAILY
Qty: 90 TABLET | Refills: 1 | Status: SHIPPED | OUTPATIENT
Start: 2021-12-26 | End: 2022-07-17 | Stop reason: SDUPTHER

## 2022-01-05 ENCOUNTER — PATIENT MESSAGE (OUTPATIENT)
Dept: ADMINISTRATIVE | Facility: OTHER | Age: 72
End: 2022-01-05
Payer: MEDICARE

## 2022-01-05 ENCOUNTER — LAB VISIT (OUTPATIENT)
Dept: PRIMARY CARE CLINIC | Facility: CLINIC | Age: 72
End: 2022-01-05
Payer: MEDICARE

## 2022-01-05 DIAGNOSIS — Z20.822 CONTACT WITH AND (SUSPECTED) EXPOSURE TO COVID-19: ICD-10-CM

## 2022-01-05 LAB
CTP QC/QA: YES
SARS-COV-2 AG RESP QL IA.RAPID: NEGATIVE

## 2022-01-05 PROCEDURE — 87811 SARS-COV-2 COVID19 W/OPTIC: CPT

## 2022-01-25 ENCOUNTER — LAB VISIT (OUTPATIENT)
Dept: LAB | Facility: HOSPITAL | Age: 72
End: 2022-01-25
Attending: FAMILY MEDICINE
Payer: MEDICARE

## 2022-01-25 DIAGNOSIS — E11.59 HYPERTENSION ASSOCIATED WITH DIABETES: ICD-10-CM

## 2022-01-25 DIAGNOSIS — Z79.4 TYPE 2 DIABETES MELLITUS WITH DIABETIC NEUROPATHY, WITH LONG-TERM CURRENT USE OF INSULIN: ICD-10-CM

## 2022-01-25 DIAGNOSIS — E78.5 DYSLIPIDEMIA ASSOCIATED WITH TYPE 2 DIABETES MELLITUS: ICD-10-CM

## 2022-01-25 DIAGNOSIS — E11.40 TYPE 2 DIABETES MELLITUS WITH DIABETIC NEUROPATHY, WITH LONG-TERM CURRENT USE OF INSULIN: ICD-10-CM

## 2022-01-25 DIAGNOSIS — E11.69 DYSLIPIDEMIA ASSOCIATED WITH TYPE 2 DIABETES MELLITUS: ICD-10-CM

## 2022-01-25 DIAGNOSIS — I15.2 HYPERTENSION ASSOCIATED WITH DIABETES: ICD-10-CM

## 2022-01-25 DIAGNOSIS — E11.40 TYPE 2 DIABETES MELLITUS WITH DIABETIC NEUROPATHY, UNSPECIFIED WHETHER LONG TERM INSULIN USE: ICD-10-CM

## 2022-01-25 LAB
ALBUMIN SERPL BCP-MCNC: 4 G/DL (ref 3.5–5.2)
ALP SERPL-CCNC: 69 U/L (ref 55–135)
ALT SERPL W/O P-5'-P-CCNC: 17 U/L (ref 10–44)
ANION GAP SERPL CALC-SCNC: 10 MMOL/L (ref 8–16)
AST SERPL-CCNC: 15 U/L (ref 10–40)
BILIRUB SERPL-MCNC: 1 MG/DL (ref 0.1–1)
BUN SERPL-MCNC: 19 MG/DL (ref 8–23)
CALCIUM SERPL-MCNC: 9.1 MG/DL (ref 8.7–10.5)
CHLORIDE SERPL-SCNC: 108 MMOL/L (ref 95–110)
CO2 SERPL-SCNC: 27 MMOL/L (ref 23–29)
CREAT SERPL-MCNC: 1 MG/DL (ref 0.5–1.4)
EST. GFR  (AFRICAN AMERICAN): >60 ML/MIN/1.73 M^2
EST. GFR  (NON AFRICAN AMERICAN): >60 ML/MIN/1.73 M^2
ESTIMATED AVG GLUCOSE: 148 MG/DL (ref 68–131)
GLUCOSE SERPL-MCNC: 114 MG/DL (ref 70–110)
HBA1C MFR BLD: 6.8 % (ref 4–5.6)
POTASSIUM SERPL-SCNC: 4.1 MMOL/L (ref 3.5–5.1)
PROT SERPL-MCNC: 7.1 G/DL (ref 6–8.4)
SODIUM SERPL-SCNC: 145 MMOL/L (ref 136–145)

## 2022-01-25 PROCEDURE — 83036 HEMOGLOBIN GLYCOSYLATED A1C: CPT | Performed by: FAMILY MEDICINE

## 2022-01-25 PROCEDURE — 36415 COLL VENOUS BLD VENIPUNCTURE: CPT | Performed by: FAMILY MEDICINE

## 2022-01-25 PROCEDURE — 80053 COMPREHEN METABOLIC PANEL: CPT | Performed by: FAMILY MEDICINE

## 2022-02-01 ENCOUNTER — OFFICE VISIT (OUTPATIENT)
Dept: FAMILY MEDICINE | Facility: CLINIC | Age: 72
End: 2022-02-01
Attending: FAMILY MEDICINE
Payer: MEDICARE

## 2022-02-01 VITALS
BODY MASS INDEX: 34.41 KG/M2 | WEIGHT: 245.81 LBS | HEIGHT: 71 IN | SYSTOLIC BLOOD PRESSURE: 130 MMHG | TEMPERATURE: 98 F | OXYGEN SATURATION: 95 % | DIASTOLIC BLOOD PRESSURE: 80 MMHG | HEART RATE: 78 BPM

## 2022-02-01 DIAGNOSIS — Z79.4 INSULIN DEPENDENT TYPE 2 DIABETES MELLITUS: ICD-10-CM

## 2022-02-01 DIAGNOSIS — Z23 IMMUNIZATION DUE: ICD-10-CM

## 2022-02-01 DIAGNOSIS — I25.118 CORONARY ARTERY DISEASE OF NATIVE ARTERY OF NATIVE HEART WITH STABLE ANGINA PECTORIS: ICD-10-CM

## 2022-02-01 DIAGNOSIS — E11.69 DYSLIPIDEMIA ASSOCIATED WITH TYPE 2 DIABETES MELLITUS: ICD-10-CM

## 2022-02-01 DIAGNOSIS — E11.59 HYPERTENSION ASSOCIATED WITH DIABETES: Primary | ICD-10-CM

## 2022-02-01 DIAGNOSIS — I47.20 VENTRICULAR TACHYCARDIA: ICD-10-CM

## 2022-02-01 DIAGNOSIS — I50.42 CHRONIC COMBINED SYSTOLIC AND DIASTOLIC CHF (CONGESTIVE HEART FAILURE): ICD-10-CM

## 2022-02-01 DIAGNOSIS — E78.5 DYSLIPIDEMIA ASSOCIATED WITH TYPE 2 DIABETES MELLITUS: ICD-10-CM

## 2022-02-01 DIAGNOSIS — D69.6 THROMBOCYTOPENIA: ICD-10-CM

## 2022-02-01 DIAGNOSIS — I15.2 HYPERTENSION ASSOCIATED WITH DIABETES: Primary | ICD-10-CM

## 2022-02-01 DIAGNOSIS — E66.01 SEVERE OBESITY (BMI 35.0-35.9 WITH COMORBIDITY): ICD-10-CM

## 2022-02-01 DIAGNOSIS — E11.9 INSULIN DEPENDENT TYPE 2 DIABETES MELLITUS: ICD-10-CM

## 2022-02-01 PROCEDURE — 99499 UNLISTED E&M SERVICE: CPT | Mod: S$GLB,,, | Performed by: FAMILY MEDICINE

## 2022-02-01 PROCEDURE — 90662 IIV NO PRSV INCREASED AG IM: CPT | Mod: PBBFAC,PO

## 2022-02-01 PROCEDURE — 99999 PR PBB SHADOW E&M-EST. PATIENT-LVL III: ICD-10-PCS | Mod: PBBFAC,,, | Performed by: FAMILY MEDICINE

## 2022-02-01 PROCEDURE — 99214 PR OFFICE/OUTPT VISIT, EST, LEVL IV, 30-39 MIN: ICD-10-PCS | Mod: 25,S$PBB,, | Performed by: FAMILY MEDICINE

## 2022-02-01 PROCEDURE — 99999 PR PBB SHADOW E&M-EST. PATIENT-LVL III: CPT | Mod: PBBFAC,,, | Performed by: FAMILY MEDICINE

## 2022-02-01 PROCEDURE — G0009 ADMIN PNEUMOCOCCAL VACCINE: HCPCS | Mod: PBBFAC

## 2022-02-01 PROCEDURE — G0008 ADMIN INFLUENZA VIRUS VAC: HCPCS | Mod: PBBFAC

## 2022-02-01 PROCEDURE — 99499 RISK ADDL DX/OHS AUDIT: ICD-10-PCS | Mod: S$GLB,,, | Performed by: FAMILY MEDICINE

## 2022-02-01 PROCEDURE — 99214 OFFICE O/P EST MOD 30 MIN: CPT | Mod: 25,S$PBB,, | Performed by: FAMILY MEDICINE

## 2022-02-01 NOTE — PROGRESS NOTES
Subjective:       Patient ID: Clifton Wilson is a 71 y.o. male.    Chief Complaint: No chief complaint on file.    70 yr old pleasant white male with DM II, HTN, HLD, CAD, Combined chronic CHF, MR, obesity, neuropathy, presents today for diabetes.       DM II - controlled  -    HGBA1C                   6.8 (H)             01/25/2022                               - on metformin and insulin and sugars improving - UTD eye exam - foot exam UTD - on ASA and plavix. Losartan. He ate too much jamie cake during mardi gras.      HTN - chronic - controlled - on losartan, lasix - compliant - no side effects      HLD - chronic -      LDLCALC                  14.6 (L)            02/04/2021                                        - controlled -       CAD/combined CHF - EF 35-40% - on external defibrillator - medical management - on ASA/plavix/ARB - no symptoms - following cardiology    Gout - improving - uses colchicine for flare up -     History as below - reviewed            Diabetes  He presents for his follow-up diabetic visit. He has type 2 diabetes mellitus. No MedicAlert identification noted. The initial diagnosis of diabetes was made 27 years ago. His disease course has been worsening. Hypoglycemia symptoms include sleepiness. Pertinent negatives for hypoglycemia include no confusion, dizziness, headaches, hunger, mood changes, nervousness/anxiousness, pallor, seizures, speech difficulty, sweats or tremors. Associated symptoms include foot paresthesias. Pertinent negatives for diabetes include no blurred vision, no chest pain, no fatigue, no foot ulcerations, no polydipsia, no polyphagia, no polyuria, no visual change, no weakness and no weight loss. Hypoglycemia complications include blackouts and hospitalization. Pertinent negatives for hypoglycemia complications include no nocturnal hypoglycemia, no required assistance and no required glucagon injection. Symptoms are stable. Diabetic complications include autonomic  neuropathy, heart disease, impotence and peripheral neuropathy. Pertinent negatives for diabetic complications include no CVA, nephropathy, PVD or retinopathy. Risk factors for coronary artery disease include hypertension, obesity, diabetes mellitus and male sex. Current diabetic treatment includes diet, insulin injections and oral agent (monotherapy). He is compliant with treatment all of the time. He is currently taking insulin pre-breakfast, pre-lunch, pre-dinner and at bedtime. Insulin injections are given by patient. Rotation sites for injection include the abdominal wall, arms and thighs. His weight is fluctuating minimally. He is following a diabetic, generally healthy, low fat/cholesterol and low salt diet. Meal planning includes avoidance of concentrated sweets and carbohydrate counting. He has not had a previous visit with a dietitian. He participates in exercise three times a week. He monitors blood glucose at home 3-4 x per day. He monitors urine at home <1 x per month. Blood glucose monitoring compliance is excellent. His home blood glucose trend is decreasing steadily. An ACE inhibitor/angiotensin II receptor blocker is being taken. He does not see a podiatrist.Eye exam is current.   Follow-up  Associated symptoms include arthralgias, joint swelling and myalgias. Pertinent negatives include no chest pain, congestion, coughing, diaphoresis, fatigue, headaches, neck pain, rash, visual change, vomiting or weakness.   Knee Pain     Swelling  This is a chronic problem. The current episode started more than 1 month ago. The problem occurs intermittently. The problem has been gradually worsening. Associated symptoms include arthralgias, joint swelling and myalgias. Pertinent negatives include no chest pain, congestion, coughing, diaphoresis, fatigue, headaches, neck pain, rash, visual change, vomiting or weakness.   Hypertension  This is a chronic problem. The current episode started more than 1 year ago. The  problem has been gradually improving since onset. The problem is controlled. Pertinent negatives include no blurred vision, chest pain, headaches, malaise/fatigue, neck pain, palpitations, peripheral edema, PND or sweats. Risk factors for coronary artery disease include diabetes mellitus, dyslipidemia, male gender and obesity. Past treatments include angiotensin blockers and diuretics. The current treatment provides significant improvement. There are no compliance problems.  Hypertensive end-organ damage includes CAD/MI and heart failure. There is no history of CVA, left ventricular hypertrophy, PVD or retinopathy. There is no history of chronic renal disease, hypercortisolism, hyperparathyroidism, pheochromocytoma, renovascular disease or a thyroid problem.   Hyperlipidemia  This is a chronic problem. The current episode started more than 1 year ago. The problem is controlled. Recent lipid tests were reviewed and are normal. Exacerbating diseases include obesity. He has no history of chronic renal disease or diabetes. There are no known factors aggravating his hyperlipidemia. Associated symptoms include myalgias. Pertinent negatives include no chest pain or focal sensory loss. Current antihyperlipidemic treatment includes statins. The current treatment provides moderate improvement of lipids. There are no compliance problems.  Risk factors for coronary artery disease include diabetes mellitus, dyslipidemia, male sex, hypertension and obesity.     Review of Systems   Constitutional: Negative.  Negative for activity change, diaphoresis, fatigue, malaise/fatigue, unexpected weight change and weight loss.   HENT: Negative.  Negative for nasal congestion, ear pain, mouth sores, rhinorrhea and voice change.    Eyes: Negative.  Negative for blurred vision, pain, discharge and visual disturbance.   Respiratory: Negative.  Negative for apnea, cough and wheezing.    Cardiovascular: Negative.  Negative for chest pain,  palpitations and PND.   Gastrointestinal: Negative.  Negative for abdominal distention, anal bleeding, diarrhea and vomiting.   Endocrine: Negative.  Negative for cold intolerance, polydipsia, polyphagia and polyuria.   Genitourinary: Positive for impotence. Negative for decreased urine volume, difficulty urinating, discharge, frequency and scrotal swelling.   Musculoskeletal: Positive for arthralgias, joint swelling and myalgias. Negative for back pain, neck pain and neck stiffness.   Integumentary:  Negative for color change, pallor and rash. Negative.   Allergic/Immunologic: Negative.  Negative for environmental allergies.   Neurological: Negative.  Negative for dizziness, tremors, seizures, speech difficulty, weakness, light-headedness and headaches.   Hematological: Negative.    Psychiatric/Behavioral: Negative.  Negative for agitation, confusion, dysphoric mood and suicidal ideas. The patient is not nervous/anxious.          PMH/PSH/FH/SH/MED/ALLERGY reviewed    Objective:       Vitals:    02/01/22 0914   BP: 130/80   Pulse: 78   Temp: 98.4 °F (36.9 °C)       Physical Exam  Constitutional:       Appearance: He is well-developed.   HENT:      Head: Normocephalic and atraumatic.      Right Ear: External ear normal.      Left Ear: External ear normal.      Nose: Nose normal.      Mouth/Throat:      Pharynx: No oropharyngeal exudate.   Eyes:      General: No scleral icterus.        Right eye: No discharge.         Left eye: No discharge.      Conjunctiva/sclera: Conjunctivae normal.      Pupils: Pupils are equal, round, and reactive to light.   Neck:      Thyroid: No thyromegaly.      Vascular: No JVD.      Trachea: No tracheal deviation.   Cardiovascular:      Rate and Rhythm: Normal rate and regular rhythm.      Heart sounds: Normal heart sounds. No murmur heard.  No friction rub. No gallop.    Pulmonary:      Effort: Pulmonary effort is normal. No respiratory distress.      Breath sounds: Normal breath sounds.  No stridor. No wheezing or rales.   Chest:      Chest wall: No tenderness.   Abdominal:      General: Bowel sounds are normal. There is no distension.      Palpations: Abdomen is soft. There is no mass.      Tenderness: There is no abdominal tenderness. There is no guarding or rebound.      Hernia: No hernia is present.   Musculoskeletal:         General: No swelling or tenderness. Normal range of motion.      Cervical back: Normal range of motion and neck supple.      Comments: Left knee swelling, warm and TTP.   Lymphadenopathy:      Cervical: No cervical adenopathy.   Skin:     General: Skin is warm and dry.      Coloration: Skin is not pale.      Findings: No erythema or rash.   Neurological:      Mental Status: He is alert and oriented to person, place, and time.      Cranial Nerves: No cranial nerve deficit.      Motor: No abnormal muscle tone.      Coordination: Coordination normal.      Deep Tendon Reflexes: Reflexes are normal and symmetric. Reflexes normal.   Psychiatric:         Behavior: Behavior normal.         Thought Content: Thought content normal.         Judgment: Judgment normal.         Assessment:       Problem List Items Addressed This Visit     Ventricular tachycardia, nonsustained post-MI    Thrombocytopenia    Severe obesity (BMI 35.0-35.9 with comorbidity)    Insulin dependent type 2 diabetes mellitus    Hypertension associated with diabetes - Primary    Dyslipidemia associated with type 2 diabetes mellitus    Coronary artery disease of native artery of native heart with stable angina pectoris    Chronic combined systolic and diastolic CHF (congestive heart failure)      Other Visit Diagnoses     Immunization due        Relevant Orders    Influenza - Quadrivalent - High Dose (65+) (PF) (IM) (Completed)    (In Office Administered) Pneumococcal Conjugate Vaccine (13 Valent) (IM) (Completed)          Plan:           Diagnoses and all orders for this visit:    Hypertension associated with  diabetes    Dyslipidemia associated with type 2 diabetes mellitus    Insulin dependent type 2 diabetes mellitus    Coronary artery disease of native artery of native heart with stable angina pectoris    Chronic combined systolic and diastolic CHF (congestive heart failure)    Immunization due  -     Influenza - Quadrivalent - High Dose (65+) (PF) (IM)  -     (In Office Administered) Pneumococcal Conjugate Vaccine (13 Valent) (IM)    Severe obesity (BMI 35.0-35.9 with comorbidity)    Thrombocytopenia    Ventricular tachycardia        DM II controlled/hyperglycemia  - improving since started insulin  -lantus and novolog to continue  -strict diet control    Leg pain B/L  -refer PT    HTN  -controlled    HLD  -controlled    Combined CHF  -follows cardiology  -on external defibrillator    Neuropathy  -continue neurontin and increase dose to 600 mg BID    Obesity  -diet and exercise as tolerated    chronic gout  -uric acid levels  -conchicine as needed    Spent adequate time in obtaining history and explaining differentials      25 minutes spent during this visit of which greater than 50% devoted to face-face counseling and coordination of care regarding diagnosis and management plan    Follow up in about 5 months (around 7/1/2022), or if symptoms worsen or fail to improve.

## 2022-02-14 DIAGNOSIS — E11.9 DIABETES MELLITUS TYPE 2 WITHOUT RETINOPATHY: ICD-10-CM

## 2022-02-14 DIAGNOSIS — Z79.4 TYPE 2 DIABETES MELLITUS WITH DIABETIC NEUROPATHY, WITH LONG-TERM CURRENT USE OF INSULIN: ICD-10-CM

## 2022-02-14 DIAGNOSIS — E11.40 TYPE 2 DIABETES MELLITUS WITH DIABETIC NEUROPATHY, WITH LONG-TERM CURRENT USE OF INSULIN: ICD-10-CM

## 2022-02-14 RX ORDER — INSULIN ASPART 100 [IU]/ML
15 INJECTION, SOLUTION INTRAVENOUS; SUBCUTANEOUS
Qty: 45 ML | Refills: 10 | Status: SHIPPED | OUTPATIENT
Start: 2022-02-14 | End: 2023-03-05 | Stop reason: SDUPTHER

## 2022-02-14 NOTE — TELEPHONE ENCOUNTER
Care Due:                  Date            Visit Type   Department     Provider  --------------------------------------------------------------------------------                                EP -                              [facility]    Britton Dumontkimmie  Last Visit: 02-      CARE ([facility])   MEDICINE       Jaciel                               -                              [facility]  Next Visit: 05-      CARE ([facility])   MEDICINE       Jaciel                                                            Last  Test          Frequency    Reason                     Performed    Due Date  --------------------------------------------------------------------------------    Lipid Panel.  12 months..  atorvastatin.............  02- 01-    Powered by [employer]. Reference number: [id number].   2/14/2022 12:18:51 PM CST

## 2022-02-22 ENCOUNTER — PATIENT MESSAGE (OUTPATIENT)
Dept: RESEARCH | Facility: HOSPITAL | Age: 72
End: 2022-02-22
Payer: MEDICARE

## 2022-03-04 ENCOUNTER — CLINICAL SUPPORT (OUTPATIENT)
Dept: CARDIOLOGY | Facility: HOSPITAL | Age: 72
End: 2022-03-04
Payer: MEDICARE

## 2022-03-04 DIAGNOSIS — I47.20 VENTRICULAR TACHYCARDIA: ICD-10-CM

## 2022-03-04 DIAGNOSIS — I25.5 ISCHEMIC CARDIOMYOPATHY: ICD-10-CM

## 2022-03-04 DIAGNOSIS — Z95.810 PRESENCE OF AUTOMATIC (IMPLANTABLE) CARDIAC DEFIBRILLATOR: ICD-10-CM

## 2022-03-04 PROCEDURE — 93296 REM INTERROG EVL PM/IDS: CPT | Performed by: INTERNAL MEDICINE

## 2022-03-09 DIAGNOSIS — E11.9 TYPE 2 DIABETES MELLITUS WITHOUT COMPLICATION: ICD-10-CM

## 2022-03-14 ENCOUNTER — PATIENT MESSAGE (OUTPATIENT)
Dept: ADMINISTRATIVE | Facility: HOSPITAL | Age: 72
End: 2022-03-14
Payer: MEDICARE

## 2022-04-01 ENCOUNTER — OFFICE VISIT (OUTPATIENT)
Dept: URGENT CARE | Facility: CLINIC | Age: 72
End: 2022-04-01
Payer: MEDICARE

## 2022-04-01 VITALS
SYSTOLIC BLOOD PRESSURE: 148 MMHG | DIASTOLIC BLOOD PRESSURE: 80 MMHG | OXYGEN SATURATION: 97 % | BODY MASS INDEX: 33.18 KG/M2 | HEIGHT: 71 IN | TEMPERATURE: 98 F | WEIGHT: 237 LBS | HEART RATE: 63 BPM | RESPIRATION RATE: 16 BRPM

## 2022-04-01 DIAGNOSIS — S39.012A LOW BACK STRAIN, INITIAL ENCOUNTER: Primary | ICD-10-CM

## 2022-04-01 PROCEDURE — 1159F PR MEDICATION LIST DOCUMENTED IN MEDICAL RECORD: ICD-10-PCS | Mod: CPTII,S$GLB,, | Performed by: PHYSICIAN ASSISTANT

## 2022-04-01 PROCEDURE — 3044F HG A1C LEVEL LT 7.0%: CPT | Mod: CPTII,S$GLB,, | Performed by: PHYSICIAN ASSISTANT

## 2022-04-01 PROCEDURE — 1159F MED LIST DOCD IN RCRD: CPT | Mod: CPTII,S$GLB,, | Performed by: PHYSICIAN ASSISTANT

## 2022-04-01 PROCEDURE — 3072F PR LOW RISK FOR RETINOPATHY: ICD-10-PCS | Mod: CPTII,S$GLB,, | Performed by: PHYSICIAN ASSISTANT

## 2022-04-01 PROCEDURE — 99213 OFFICE O/P EST LOW 20 MIN: CPT | Mod: S$GLB,,, | Performed by: PHYSICIAN ASSISTANT

## 2022-04-01 PROCEDURE — 3008F PR BODY MASS INDEX (BMI) DOCUMENTED: ICD-10-PCS | Mod: CPTII,S$GLB,, | Performed by: PHYSICIAN ASSISTANT

## 2022-04-01 PROCEDURE — 99213 PR OFFICE/OUTPT VISIT, EST, LEVL III, 20-29 MIN: ICD-10-PCS | Mod: S$GLB,,, | Performed by: PHYSICIAN ASSISTANT

## 2022-04-01 PROCEDURE — 3077F PR MOST RECENT SYSTOLIC BLOOD PRESSURE >= 140 MM HG: ICD-10-PCS | Mod: CPTII,S$GLB,, | Performed by: PHYSICIAN ASSISTANT

## 2022-04-01 PROCEDURE — 3008F BODY MASS INDEX DOCD: CPT | Mod: CPTII,S$GLB,, | Performed by: PHYSICIAN ASSISTANT

## 2022-04-01 PROCEDURE — 3044F PR MOST RECENT HEMOGLOBIN A1C LEVEL <7.0%: ICD-10-PCS | Mod: CPTII,S$GLB,, | Performed by: PHYSICIAN ASSISTANT

## 2022-04-01 PROCEDURE — 1160F PR REVIEW ALL MEDS BY PRESCRIBER/CLIN PHARMACIST DOCUMENTED: ICD-10-PCS | Mod: CPTII,S$GLB,, | Performed by: PHYSICIAN ASSISTANT

## 2022-04-01 PROCEDURE — 1160F RVW MEDS BY RX/DR IN RCRD: CPT | Mod: CPTII,S$GLB,, | Performed by: PHYSICIAN ASSISTANT

## 2022-04-01 PROCEDURE — 3072F LOW RISK FOR RETINOPATHY: CPT | Mod: CPTII,S$GLB,, | Performed by: PHYSICIAN ASSISTANT

## 2022-04-01 PROCEDURE — 3077F SYST BP >= 140 MM HG: CPT | Mod: CPTII,S$GLB,, | Performed by: PHYSICIAN ASSISTANT

## 2022-04-01 PROCEDURE — 3079F PR MOST RECENT DIASTOLIC BLOOD PRESSURE 80-89 MM HG: ICD-10-PCS | Mod: CPTII,S$GLB,, | Performed by: PHYSICIAN ASSISTANT

## 2022-04-01 PROCEDURE — 3079F DIAST BP 80-89 MM HG: CPT | Mod: CPTII,S$GLB,, | Performed by: PHYSICIAN ASSISTANT

## 2022-04-01 RX ORDER — TRAMADOL HYDROCHLORIDE 50 MG/1
50 TABLET ORAL EVERY 8 HOURS PRN
Qty: 12 TABLET | Refills: 0 | Status: SHIPPED | OUTPATIENT
Start: 2022-04-01 | End: 2022-04-05

## 2022-04-01 NOTE — PROGRESS NOTES
"Subjective:       Patient ID: Clifton Wilson is a 71 y.o. male.    Vitals:  height is 5' 11" (1.803 m) and weight is 107.5 kg (237 lb). His temperature is 98 °F (36.7 °C). His blood pressure is 148/80 (abnormal) and his pulse is 63. His respiration is 16 and oxygen saturation is 97%.     Chief Complaint: Back Pain    Mr. Wilson presents aeration of right lower back pain for the past week.  He reports a week ago, his wife fell and he helped pull her up and his back pain started after that time.  The pain is worse with movement & bending, improved with rest.  He denies any urinary frequency, urgency, hematuria, or dysuria.  He denies any radiating leg pain, paresthesias, weakness, saddle anesthesia or B/B dysfunction.  He has taken ibuprofen with some relief.      Back Pain  This is a new problem. The current episode started in the past 7 days. The problem occurs intermittently. The problem has been gradually worsening since onset. The pain is present in the lumbar spine. The quality of the pain is described as stabbing. The pain does not radiate. The pain is at a severity of 8/10. The pain is moderate. The symptoms are aggravated by bending, twisting, standing and position. Pertinent negatives include no abdominal pain, chest pain, dysuria, fever, headaches, leg pain or pelvic pain. He has tried NSAIDs for the symptoms. The treatment provided mild relief.       Constitution: Negative for appetite change, chills, sweating, fatigue and fever.   HENT: Negative for ear pain, ear discharge, postnasal drip, sinus pain, sinus pressure and sore throat.    Neck: Negative for neck pain and neck stiffness.   Cardiovascular: Negative for chest trauma, chest pain, leg swelling, palpitations, sob on exertion and passing out.   Eyes: Negative for blurred vision.   Respiratory: Negative for cough and shortness of breath.    Gastrointestinal: Negative for abdominal pain, nausea, vomiting and diarrhea.   Genitourinary: Negative " for dysuria, frequency, urgency and pelvic pain.   Musculoskeletal: Positive for pain, back pain and muscle ache. Negative for pain with walking and muscle cramps.   Skin: Negative for rash.   Neurological: Negative for dizziness, history of vertigo, light-headedness, passing out, facial drooping, speech difficulty, coordination disturbances, loss of balance and headaches.   Hematologic/Lymphatic: Negative for easy bruising/bleeding. Does not bruise/bleed easily.   Psychiatric/Behavioral: Negative for confusion.       Objective:      Physical Exam   Constitutional: He is oriented to person, place, and time. He appears well-developed. He is cooperative. No distress.   HENT:   Head: Normocephalic and atraumatic.   Nose: Nose normal.   Mouth/Throat: Oropharynx is clear and moist and mucous membranes are normal.   Eyes: Conjunctivae and lids are normal.   Neck: Trachea normal and phonation normal. Neck supple.   Cardiovascular: Normal rate, regular rhythm, normal heart sounds and normal pulses.   Pulmonary/Chest: Effort normal and breath sounds normal.   Abdominal: Normal appearance and bowel sounds are normal. He exhibits no abdominal bruit, no pulsatile midline mass and no mass. Soft.   Musculoskeletal:         General: No deformity.      Thoracic back: Normal.      Lumbar back: He exhibits tenderness. He exhibits normal range of motion and no bony tenderness.        Back:       Comments: Right lumbar paraspinal muscular TTP.  Full ROM.  No midline TTP.     Neurological: He is alert and oriented to person, place, and time. He has normal strength and normal reflexes. No sensory deficit.      Comments: BLE 5/5 HF, KF, KE, DF, PF, EHL.  Sensation intact.      Skin: Skin is warm, dry, intact and not diaphoretic.   Psychiatric: His speech is normal and behavior is normal. Judgment and thought content normal.   Nursing note and vitals reviewed.        Assessment:       1. Low back strain, initial encounter          Plan:          Low back strain, initial encounter    Other orders  -     traMADoL (ULTRAM) 50 mg tablet; Take 1 tablet (50 mg total) by mouth every 8 (eight) hours as needed for Pain.  Dispense: 12 tablet; Refill: 0    Patient is on dual anti-platelet therapy, so NSAIDs CI.  Will give low dose tramadol PRN for a short course.  Diagnoses and plan discussed with the patient, as well as the expected course and duration of his symptoms.  All questions and concerns were addressed prior to discharge.  He was advised to follow up with his PCP within 1 week if symptoms do not improve.  Emergency department precautions were given.  Patient verbalized understanding and was happy with the plan of care.   Note dictated with voice recognition software, please excuse any grammatical errors.    Patient Instructions   PLEASE READ YOUR DISCHARGE INSTRUCTIONS ENTIRELY AS IT CONTAINS IMPORTANT INFORMATION.  - Rest.    - Drink plenty of fluids.    - Tylenol or Ibuprofen as directed as needed for fever/pain.    - If you were prescribed antibiotics, please take them to completion.  - If you are female and on birth control pills - please use additional methods of contraception to prevent pregnancy while on antibiotics and for one cycle after.   - If you were prescribed a narcotic medication, a cough syrup, or a muscle relaxer, do not drive or operate heavy equipment or machinery while taking these medications, as they can cause drowsiness.   - If a referral to a specialty was made today, you should receive a phone call in the next few days to schedule an appt.  Please call [phone] to schedule an appt if have not gotten a phone call in the next few days.  - If you smoke, please stop smoking.  -You must understand that you've received an Urgent Care treatment only and that you may be released before all your medical problems are known or treated. You, the patient, will arrange for follow up care as instructed. Please arrange follow up with  your primary medical clinic as soon as possible.   - Follow up with your PCP or specialty clinic as directed in the next 1-2 weeks if not improved or as needed.  You can call [phone] to schedule an appointment with the appropriate provider.    - Please return to Urgent Care or to the Emergency Department if your symptoms worsen.    Patient aware and verbalized understanding.

## 2022-04-01 NOTE — PATIENT INSTRUCTIONS
PLEASE READ YOUR DISCHARGE INSTRUCTIONS ENTIRELY AS IT CONTAINS IMPORTANT INFORMATION.  - Rest.    - Drink plenty of fluids.    - Tylenol or Ibuprofen as directed as needed for fever/pain.    - If you were prescribed antibiotics, please take them to completion.  - If you are female and on birth control pills - please use additional methods of contraception to prevent pregnancy while on antibiotics and for one cycle after.   - If you were prescribed a narcotic medication, a cough syrup, or a muscle relaxer, do not drive or operate heavy equipment or machinery while taking these medications, as they can cause drowsiness.   - If a referral to a specialty was made today, you should receive a phone call in the next few days to schedule an appt.  Please call [phone] to schedule an appt if have not gotten a phone call in the next few days.  - If you smoke, please stop smoking.  -You must understand that you've received an Urgent Care treatment only and that you may be released before all your medical problems are known or treated. You, the patient, will arrange for follow up care as instructed. Please arrange follow up with your primary medical clinic as soon as possible.   - Follow up with your PCP or specialty clinic as directed in the next 1-2 weeks if not improved or as needed.  You can call [phone] to schedule an appointment with the appropriate provider.    - Please return to Urgent Care or to the Emergency Department if your symptoms worsen.    Patient aware and verbalized understanding.

## 2022-04-07 DIAGNOSIS — E11.9 TYPE 2 DIABETES MELLITUS WITHOUT COMPLICATION: ICD-10-CM

## 2022-04-12 ENCOUNTER — PATIENT MESSAGE (OUTPATIENT)
Dept: ADMINISTRATIVE | Facility: HOSPITAL | Age: 72
End: 2022-04-12
Payer: MEDICARE

## 2022-04-13 DIAGNOSIS — F51.01 PRIMARY INSOMNIA: ICD-10-CM

## 2022-04-14 RX ORDER — ZOLPIDEM TARTRATE 5 MG/1
TABLET ORAL
Qty: 90 TABLET | Refills: 0 | Status: SHIPPED | OUTPATIENT
Start: 2022-04-14 | End: 2022-07-17 | Stop reason: SDUPTHER

## 2022-04-14 NOTE — TELEPHONE ENCOUNTER
Care Due:                  Date            Visit Type   Department     Provider  --------------------------------------------------------------------------------                                EP -                              [facility]    Britton Claudio  Last Visit: 02-      [facility]   [facility]       [facility]  Next Visit: 05-      CARE ([facility])   MEDICINE       Jaciel                                                            Last  Test          Frequency    Reason                     Performed    Due Date  --------------------------------------------------------------------------------    CBC.........  12 months..  clopidogreL..............  02- 01-    Uric Acid...  12 months..  colchicine...............  02- 01-    Powered by [email or web address] by [employer] Reference number: [id number].   4/13/2022 9:14:44 PM CDT

## 2022-04-26 ENCOUNTER — PATIENT MESSAGE (OUTPATIENT)
Dept: FAMILY MEDICINE | Facility: CLINIC | Age: 72
End: 2022-04-26
Payer: MEDICARE

## 2022-05-03 ENCOUNTER — OFFICE VISIT (OUTPATIENT)
Dept: FAMILY MEDICINE | Facility: CLINIC | Age: 72
End: 2022-05-03
Attending: FAMILY MEDICINE
Payer: MEDICARE

## 2022-05-03 ENCOUNTER — LAB VISIT (OUTPATIENT)
Dept: LAB | Facility: HOSPITAL | Age: 72
End: 2022-05-03
Attending: FAMILY MEDICINE
Payer: MEDICARE

## 2022-05-03 VITALS
DIASTOLIC BLOOD PRESSURE: 76 MMHG | WEIGHT: 242.75 LBS | HEIGHT: 71 IN | OXYGEN SATURATION: 97 % | BODY MASS INDEX: 33.98 KG/M2 | HEART RATE: 58 BPM | SYSTOLIC BLOOD PRESSURE: 130 MMHG

## 2022-05-03 DIAGNOSIS — E11.69 DYSLIPIDEMIA ASSOCIATED WITH TYPE 2 DIABETES MELLITUS: ICD-10-CM

## 2022-05-03 DIAGNOSIS — I15.2 HYPERTENSION ASSOCIATED WITH DIABETES: ICD-10-CM

## 2022-05-03 DIAGNOSIS — Z12.5 ENCOUNTER FOR SCREENING FOR MALIGNANT NEOPLASM OF PROSTATE: ICD-10-CM

## 2022-05-03 DIAGNOSIS — E11.9 INSULIN DEPENDENT TYPE 2 DIABETES MELLITUS: ICD-10-CM

## 2022-05-03 DIAGNOSIS — E11.40 TYPE 2 DIABETES MELLITUS WITH DIABETIC NEUROPATHY, WITH LONG-TERM CURRENT USE OF INSULIN: ICD-10-CM

## 2022-05-03 DIAGNOSIS — Z79.4 TYPE 2 DIABETES MELLITUS WITH DIABETIC NEUROPATHY, WITH LONG-TERM CURRENT USE OF INSULIN: ICD-10-CM

## 2022-05-03 DIAGNOSIS — I50.42 CHRONIC COMBINED SYSTOLIC AND DIASTOLIC CHF (CONGESTIVE HEART FAILURE): ICD-10-CM

## 2022-05-03 DIAGNOSIS — Z00.00 ROUTINE GENERAL MEDICAL EXAMINATION AT HEALTH CARE FACILITY: ICD-10-CM

## 2022-05-03 DIAGNOSIS — Z79.4 INSULIN DEPENDENT TYPE 2 DIABETES MELLITUS: ICD-10-CM

## 2022-05-03 DIAGNOSIS — I25.118 CORONARY ARTERY DISEASE OF NATIVE ARTERY OF NATIVE HEART WITH STABLE ANGINA PECTORIS: ICD-10-CM

## 2022-05-03 DIAGNOSIS — I50.42 CHRONIC COMBINED SYSTOLIC AND DIASTOLIC CONGESTIVE HEART FAILURE: ICD-10-CM

## 2022-05-03 DIAGNOSIS — E11.59 HYPERTENSION ASSOCIATED WITH DIABETES: ICD-10-CM

## 2022-05-03 DIAGNOSIS — Z00.00 ROUTINE GENERAL MEDICAL EXAMINATION AT HEALTH CARE FACILITY: Primary | ICD-10-CM

## 2022-05-03 DIAGNOSIS — E78.5 DYSLIPIDEMIA ASSOCIATED WITH TYPE 2 DIABETES MELLITUS: ICD-10-CM

## 2022-05-03 LAB
ALBUMIN/CREAT UR: 31.3 UG/MG (ref 0–30)
BILIRUB UR QL STRIP: NEGATIVE
CLARITY UR: CLEAR
COLOR UR: COLORLESS
CREAT UR-MCNC: 32 MG/DL (ref 23–375)
GLUCOSE UR QL STRIP: NEGATIVE
HGB UR QL STRIP: NEGATIVE
KETONES UR QL STRIP: NEGATIVE
LEUKOCYTE ESTERASE UR QL STRIP: NEGATIVE
MICROALBUMIN UR DL<=1MG/L-MCNC: 10 UG/ML
NITRITE UR QL STRIP: NEGATIVE
PH UR STRIP: 5 [PH] (ref 5–8)
PROT UR QL STRIP: NEGATIVE
SP GR UR STRIP: 1.01 (ref 1–1.03)
URN SPEC COLLECT METH UR: ABNORMAL
UROBILINOGEN UR STRIP-ACNC: NEGATIVE EU/DL

## 2022-05-03 PROCEDURE — 1160F RVW MEDS BY RX/DR IN RCRD: CPT | Mod: CPTII,S$GLB,, | Performed by: FAMILY MEDICINE

## 2022-05-03 PROCEDURE — 99999 PR PBB SHADOW E&M-EST. PATIENT-LVL V: ICD-10-PCS | Mod: PBBFAC,,, | Performed by: FAMILY MEDICINE

## 2022-05-03 PROCEDURE — 3072F PR LOW RISK FOR RETINOPATHY: ICD-10-PCS | Mod: CPTII,S$GLB,, | Performed by: FAMILY MEDICINE

## 2022-05-03 PROCEDURE — 81003 URINALYSIS AUTO W/O SCOPE: CPT | Performed by: FAMILY MEDICINE

## 2022-05-03 PROCEDURE — 3288F FALL RISK ASSESSMENT DOCD: CPT | Mod: CPTII,S$GLB,, | Performed by: FAMILY MEDICINE

## 2022-05-03 PROCEDURE — 1159F PR MEDICATION LIST DOCUMENTED IN MEDICAL RECORD: ICD-10-PCS | Mod: CPTII,S$GLB,, | Performed by: FAMILY MEDICINE

## 2022-05-03 PROCEDURE — 1159F MED LIST DOCD IN RCRD: CPT | Mod: CPTII,S$GLB,, | Performed by: FAMILY MEDICINE

## 2022-05-03 PROCEDURE — 4010F ACE/ARB THERAPY RXD/TAKEN: CPT | Mod: CPTII,S$GLB,, | Performed by: FAMILY MEDICINE

## 2022-05-03 PROCEDURE — 3008F PR BODY MASS INDEX (BMI) DOCUMENTED: ICD-10-PCS | Mod: CPTII,S$GLB,, | Performed by: FAMILY MEDICINE

## 2022-05-03 PROCEDURE — 3044F PR MOST RECENT HEMOGLOBIN A1C LEVEL <7.0%: ICD-10-PCS | Mod: CPTII,S$GLB,, | Performed by: FAMILY MEDICINE

## 2022-05-03 PROCEDURE — 1126F PR PAIN SEVERITY QUANTIFIED, NO PAIN PRESENT: ICD-10-PCS | Mod: CPTII,S$GLB,, | Performed by: FAMILY MEDICINE

## 2022-05-03 PROCEDURE — 1101F PT FALLS ASSESS-DOCD LE1/YR: CPT | Mod: CPTII,S$GLB,, | Performed by: FAMILY MEDICINE

## 2022-05-03 PROCEDURE — 3078F DIAST BP <80 MM HG: CPT | Mod: CPTII,S$GLB,, | Performed by: FAMILY MEDICINE

## 2022-05-03 PROCEDURE — 4010F PR ACE/ARB THEARPY RXD/TAKEN: ICD-10-PCS | Mod: CPTII,S$GLB,, | Performed by: FAMILY MEDICINE

## 2022-05-03 PROCEDURE — 1126F AMNT PAIN NOTED NONE PRSNT: CPT | Mod: CPTII,S$GLB,, | Performed by: FAMILY MEDICINE

## 2022-05-03 PROCEDURE — 99999 PR PBB SHADOW E&M-EST. PATIENT-LVL V: CPT | Mod: PBBFAC,,, | Performed by: FAMILY MEDICINE

## 2022-05-03 PROCEDURE — 1160F PR REVIEW ALL MEDS BY PRESCRIBER/CLIN PHARMACIST DOCUMENTED: ICD-10-PCS | Mod: CPTII,S$GLB,, | Performed by: FAMILY MEDICINE

## 2022-05-03 PROCEDURE — 3044F HG A1C LEVEL LT 7.0%: CPT | Mod: CPTII,S$GLB,, | Performed by: FAMILY MEDICINE

## 2022-05-03 PROCEDURE — 3075F SYST BP GE 130 - 139MM HG: CPT | Mod: CPTII,S$GLB,, | Performed by: FAMILY MEDICINE

## 2022-05-03 PROCEDURE — 99397 PR PREVENTIVE VISIT,EST,65 & OVER: ICD-10-PCS | Mod: S$GLB,,, | Performed by: FAMILY MEDICINE

## 2022-05-03 PROCEDURE — 82570 ASSAY OF URINE CREATININE: CPT | Performed by: FAMILY MEDICINE

## 2022-05-03 PROCEDURE — 1101F PR PT FALLS ASSESS DOC 0-1 FALLS W/OUT INJ PAST YR: ICD-10-PCS | Mod: CPTII,S$GLB,, | Performed by: FAMILY MEDICINE

## 2022-05-03 PROCEDURE — 3072F LOW RISK FOR RETINOPATHY: CPT | Mod: CPTII,S$GLB,, | Performed by: FAMILY MEDICINE

## 2022-05-03 PROCEDURE — 3288F PR FALLS RISK ASSESSMENT DOCUMENTED: ICD-10-PCS | Mod: CPTII,S$GLB,, | Performed by: FAMILY MEDICINE

## 2022-05-03 PROCEDURE — 3008F BODY MASS INDEX DOCD: CPT | Mod: CPTII,S$GLB,, | Performed by: FAMILY MEDICINE

## 2022-05-03 PROCEDURE — 82043 UR ALBUMIN QUANTITATIVE: CPT | Performed by: FAMILY MEDICINE

## 2022-05-03 PROCEDURE — 3075F PR MOST RECENT SYSTOLIC BLOOD PRESS GE 130-139MM HG: ICD-10-PCS | Mod: CPTII,S$GLB,, | Performed by: FAMILY MEDICINE

## 2022-05-03 PROCEDURE — 99397 PER PM REEVAL EST PAT 65+ YR: CPT | Mod: S$GLB,,, | Performed by: FAMILY MEDICINE

## 2022-05-03 PROCEDURE — 3078F PR MOST RECENT DIASTOLIC BLOOD PRESSURE < 80 MM HG: ICD-10-PCS | Mod: CPTII,S$GLB,, | Performed by: FAMILY MEDICINE

## 2022-05-03 NOTE — PROGRESS NOTES
Subjective:       Patient ID: Clifton Wilson is a 71 y.o. male.    Chief Complaint: Annual Exam    71 yr old pleasant white male with DM II, HTN, HLD, CAD, Combined chronic CHF, MR, obesity, neuropathy, presents today for diabetes.       DM II - controlled  -    HGBA1C                   6.5 (H)             05/03/2022                              - on metformin and insulin and sugars improving - UTD eye exam - foot exam UTD - on ASA and plavix. Losartan. He ate too much jamie cake during mardi gras.      HTN - chronic - controlled - on losartan, lasix - compliant - no side effects      HLD - chronic -      LDLCALC                  71.2                05/03/2022                                     - controlled -       CAD/combined CHF - EF 35-40% - on external defibrillator - medical management - on ASA/plavix/ARB - no symptoms - following cardiology    Gout - improving - uses colchicine for flare up -     History as below - reviewed            Diabetes  He presents for his follow-up diabetic visit. He has type 2 diabetes mellitus. No MedicAlert identification noted. The initial diagnosis of diabetes was made 27 years ago. His disease course has been worsening. Hypoglycemia symptoms include sleepiness. Pertinent negatives for hypoglycemia include no confusion, dizziness, headaches, hunger, mood changes, nervousness/anxiousness, pallor, seizures, speech difficulty, sweats or tremors. Associated symptoms include foot paresthesias. Pertinent negatives for diabetes include no blurred vision, no chest pain, no fatigue, no foot ulcerations, no polydipsia, no polyphagia, no polyuria, no visual change, no weakness and no weight loss. Hypoglycemia complications include blackouts and hospitalization. Pertinent negatives for hypoglycemia complications include no nocturnal hypoglycemia, no required assistance and no required glucagon injection. Symptoms are stable. Diabetic complications include autonomic neuropathy, heart  disease, impotence and peripheral neuropathy. Pertinent negatives for diabetic complications include no CVA, nephropathy, PVD or retinopathy. Risk factors for coronary artery disease include hypertension, obesity, diabetes mellitus and male sex. Current diabetic treatment includes diet, insulin injections and oral agent (monotherapy). He is compliant with treatment all of the time. He is currently taking insulin pre-breakfast, pre-lunch, pre-dinner and at bedtime. Insulin injections are given by patient. Rotation sites for injection include the abdominal wall, arms and thighs. His weight is fluctuating minimally. He is following a diabetic, generally healthy, low fat/cholesterol and low salt diet. Meal planning includes avoidance of concentrated sweets and carbohydrate counting. He has not had a previous visit with a dietitian. He participates in exercise three times a week. He monitors blood glucose at home 3-4 x per day. He monitors urine at home <1 x per month. Blood glucose monitoring compliance is excellent. His home blood glucose trend is decreasing steadily. An ACE inhibitor/angiotensin II receptor blocker is being taken. He does not see a podiatrist.Eye exam is current.   Follow-up  Associated symptoms include arthralgias, joint swelling and myalgias. Pertinent negatives include no chest pain, congestion, coughing, diaphoresis, fatigue, headaches, neck pain, rash, visual change, vomiting or weakness.   Knee Pain     Swelling  This is a chronic problem. The current episode started more than 1 month ago. The problem occurs intermittently. The problem has been gradually worsening. Associated symptoms include arthralgias, joint swelling and myalgias. Pertinent negatives include no chest pain, congestion, coughing, diaphoresis, fatigue, headaches, neck pain, rash, visual change, vomiting or weakness.   Hypertension  This is a chronic problem. The current episode started more than 1 year ago. The problem has been  gradually improving since onset. The problem is controlled. Pertinent negatives include no blurred vision, chest pain, headaches, malaise/fatigue, neck pain, palpitations, peripheral edema, PND or sweats. Risk factors for coronary artery disease include diabetes mellitus, dyslipidemia, male gender and obesity. Past treatments include angiotensin blockers and diuretics. The current treatment provides significant improvement. There are no compliance problems.  Hypertensive end-organ damage includes CAD/MI and heart failure. There is no history of CVA, left ventricular hypertrophy, PVD or retinopathy. There is no history of chronic renal disease, hypercortisolism, hyperparathyroidism, pheochromocytoma, renovascular disease or a thyroid problem.   Hyperlipidemia  This is a chronic problem. The current episode started more than 1 year ago. The problem is controlled. Recent lipid tests were reviewed and are normal. Exacerbating diseases include obesity. He has no history of chronic renal disease or diabetes. There are no known factors aggravating his hyperlipidemia. Associated symptoms include myalgias. Pertinent negatives include no chest pain or focal sensory loss. Current antihyperlipidemic treatment includes statins. The current treatment provides moderate improvement of lipids. There are no compliance problems.  Risk factors for coronary artery disease include diabetes mellitus, dyslipidemia, male sex, hypertension and obesity.     Review of Systems   Constitutional: Negative.  Negative for activity change, diaphoresis, fatigue, malaise/fatigue, unexpected weight change and weight loss.   HENT: Negative.  Negative for nasal congestion, ear pain, mouth sores, rhinorrhea and voice change.    Eyes: Negative.  Negative for blurred vision, pain, discharge and visual disturbance.   Respiratory: Negative.  Negative for apnea, cough and wheezing.    Cardiovascular: Negative.  Negative for chest pain, palpitations and PND.    Gastrointestinal: Negative.  Negative for abdominal distention, anal bleeding, diarrhea and vomiting.   Endocrine: Negative.  Negative for cold intolerance, polydipsia, polyphagia and polyuria.   Genitourinary: Positive for impotence. Negative for decreased urine volume, difficulty urinating, discharge, frequency and scrotal swelling.   Musculoskeletal: Positive for arthralgias, joint swelling and myalgias. Negative for back pain, neck pain and neck stiffness.   Integumentary:  Negative for color change, pallor and rash. Negative.   Allergic/Immunologic: Negative.  Negative for environmental allergies.   Neurological: Negative.  Negative for dizziness, tremors, seizures, speech difficulty, weakness, light-headedness and headaches.   Hematological: Negative.    Psychiatric/Behavioral: Negative.  Negative for agitation, confusion, dysphoric mood and suicidal ideas. The patient is not nervous/anxious.          PMH/PSH/FH/SH/MED/ALLERGY reviewed    Objective:       Vitals:    05/03/22 1019   BP: 130/76   Pulse: (!) 58       Physical Exam  Constitutional:       Appearance: He is well-developed.   HENT:      Head: Normocephalic and atraumatic.      Right Ear: External ear normal.      Left Ear: External ear normal.      Nose: Nose normal.      Mouth/Throat:      Pharynx: No oropharyngeal exudate.   Eyes:      General: No scleral icterus.        Right eye: No discharge.         Left eye: No discharge.      Conjunctiva/sclera: Conjunctivae normal.      Pupils: Pupils are equal, round, and reactive to light.   Neck:      Thyroid: No thyromegaly.      Vascular: No JVD.      Trachea: No tracheal deviation.   Cardiovascular:      Rate and Rhythm: Normal rate and regular rhythm.      Heart sounds: Normal heart sounds. No murmur heard.    No friction rub. No gallop.   Pulmonary:      Effort: Pulmonary effort is normal. No respiratory distress.      Breath sounds: Normal breath sounds. No stridor. No wheezing or rales.   Chest:       Chest wall: No tenderness.   Abdominal:      General: Bowel sounds are normal. There is no distension.      Palpations: Abdomen is soft. There is no mass.      Tenderness: There is no abdominal tenderness. There is no guarding or rebound.      Hernia: No hernia is present.   Musculoskeletal:         General: No swelling or tenderness. Normal range of motion.      Cervical back: Normal range of motion and neck supple.      Comments: Left knee swelling, warm and TTP.   Lymphadenopathy:      Cervical: No cervical adenopathy.   Skin:     General: Skin is warm and dry.      Coloration: Skin is not pale.      Findings: No erythema or rash.   Neurological:      Mental Status: He is alert and oriented to person, place, and time.      Cranial Nerves: No cranial nerve deficit.      Motor: No abnormal muscle tone.      Coordination: Coordination normal.      Deep Tendon Reflexes: Reflexes are normal and symmetric. Reflexes normal.   Psychiatric:         Behavior: Behavior normal.         Thought Content: Thought content normal.         Judgment: Judgment normal.         Assessment:       Problem List Items Addressed This Visit     Type 2 diabetes mellitus with neurologic complication    Relevant Orders    CBC Auto Differential (Completed)    Comprehensive Metabolic Panel (Completed)    Lipid Panel (Completed)    Hemoglobin A1C (Completed)    Urinalysis (Completed)    Microalbumin/Creatinine Ratio, Urine    Insulin dependent type 2 diabetes mellitus    Relevant Orders    CBC Auto Differential (Completed)    Comprehensive Metabolic Panel (Completed)    Lipid Panel (Completed)    Hemoglobin A1C (Completed)    Hypertension associated with diabetes    Relevant Orders    CBC Auto Differential (Completed)    Comprehensive Metabolic Panel (Completed)    Lipid Panel (Completed)    Dyslipidemia associated with type 2 diabetes mellitus    Relevant Orders    CBC Auto Differential (Completed)    Comprehensive Metabolic Panel  (Completed)    Lipid Panel (Completed)    Coronary artery disease of native artery of native heart with stable angina pectoris    Chronic combined systolic and diastolic congestive heart failure    Chronic combined systolic and diastolic CHF (congestive heart failure)      Other Visit Diagnoses     Routine general medical examination at health care facility    -  Primary    Relevant Orders    CBC Auto Differential (Completed)    Comprehensive Metabolic Panel (Completed)    Lipid Panel (Completed)    Hemoglobin A1C (Completed)    Urinalysis (Completed)    Microalbumin/Creatinine Ratio, Urine    Encounter for screening for malignant neoplasm of prostate        Relevant Orders    PSA, Screening (Completed)          Plan:           Clifton was seen today for annual exam.    Diagnoses and all orders for this visit:    Routine general medical examination at health care facility  -     CBC Auto Differential; Future  -     Comprehensive Metabolic Panel; Future  -     Lipid Panel; Future  -     Hemoglobin A1C; Future  -     Urinalysis; Future  -     Microalbumin/Creatinine Ratio, Urine; Future    Chronic combined systolic and diastolic congestive heart failure    Type 2 diabetes mellitus with diabetic neuropathy, with long-term current use of insulin  -     CBC Auto Differential; Future  -     Comprehensive Metabolic Panel; Future  -     Lipid Panel; Future  -     Hemoglobin A1C; Future  -     Urinalysis; Future  -     Microalbumin/Creatinine Ratio, Urine; Future    Chronic combined systolic and diastolic CHF (congestive heart failure)    Coronary artery disease of native artery of native heart with stable angina pectoris    Insulin dependent type 2 diabetes mellitus  -     CBC Auto Differential; Future  -     Comprehensive Metabolic Panel; Future  -     Lipid Panel; Future  -     Hemoglobin A1C; Future    Dyslipidemia associated with type 2 diabetes mellitus  -     CBC Auto Differential; Future  -     Comprehensive  Metabolic Panel; Future  -     Lipid Panel; Future    Hypertension associated with diabetes  -     CBC Auto Differential; Future  -     Comprehensive Metabolic Panel; Future  -     Lipid Panel; Future    Encounter for screening for malignant neoplasm of prostate  -     PSA, Screening; Future      Wellness check  -normal exam  -labs    DM II controlled/hyperglycemia  - improving since started insulin  -lantus and novolog to continue  -strict diet control        HTN  -controlled    HLD  -controlled    Combined CHF  -follows cardiology  -on external defibrillator    Neuropathy  -continue neurontin and increase dose to 600 mg BID    Obesity  -diet and exercise as tolerated    chronic gout  -uric acid levels  -conchicine as needed    Spent adequate time in obtaining history and explaining differentials    Follow up in about 6 months (around 11/3/2022), or if symptoms worsen or fail to improve.

## 2022-06-02 ENCOUNTER — CLINICAL SUPPORT (OUTPATIENT)
Dept: CARDIOLOGY | Facility: HOSPITAL | Age: 72
End: 2022-06-02
Payer: MEDICARE

## 2022-06-02 DIAGNOSIS — Z95.810 PRESENCE OF AUTOMATIC (IMPLANTABLE) CARDIAC DEFIBRILLATOR: ICD-10-CM

## 2022-06-02 DIAGNOSIS — I25.5 ISCHEMIC CARDIOMYOPATHY: ICD-10-CM

## 2022-06-02 PROCEDURE — 93296 REM INTERROG EVL PM/IDS: CPT | Performed by: INTERNAL MEDICINE

## 2022-06-02 PROCEDURE — 93295 DEV INTERROG REMOTE 1/2/MLT: CPT | Mod: ,,, | Performed by: INTERNAL MEDICINE

## 2022-06-02 PROCEDURE — 93295 CARDIAC DEVICE CHECK - REMOTE: ICD-10-PCS | Mod: ,,, | Performed by: INTERNAL MEDICINE

## 2022-07-17 DIAGNOSIS — E78.5 HYPERLIPIDEMIA, UNSPECIFIED HYPERLIPIDEMIA TYPE: ICD-10-CM

## 2022-07-17 DIAGNOSIS — G62.9 NEUROPATHY: ICD-10-CM

## 2022-07-17 DIAGNOSIS — I10 ESSENTIAL HYPERTENSION: ICD-10-CM

## 2022-07-17 DIAGNOSIS — I50.41 ACUTE COMBINED SYSTOLIC AND DIASTOLIC CONGESTIVE HEART FAILURE: ICD-10-CM

## 2022-07-17 DIAGNOSIS — I34.0 MITRAL VALVE INSUFFICIENCY, UNSPECIFIED ETIOLOGY: ICD-10-CM

## 2022-07-17 DIAGNOSIS — F51.01 PRIMARY INSOMNIA: ICD-10-CM

## 2022-07-17 DIAGNOSIS — E11.59 HYPERTENSION ASSOCIATED WITH DIABETES: ICD-10-CM

## 2022-07-17 DIAGNOSIS — I15.2 HYPERTENSION ASSOCIATED WITH DIABETES: ICD-10-CM

## 2022-07-18 RX ORDER — ZOLPIDEM TARTRATE 5 MG/1
TABLET ORAL
Qty: 90 TABLET | Refills: 0 | Status: SHIPPED | OUTPATIENT
Start: 2022-07-18 | End: 2022-09-02 | Stop reason: SDUPTHER

## 2022-07-18 NOTE — TELEPHONE ENCOUNTER
Care Due:                  Date            Visit Type   Department     Provider  --------------------------------------------------------------------------------                                EP -                              [facility]    Britton González  Last Visit: 05-      CARE ([facility])   MEDICINE       Jaciel                              [facility]  Next Visit: 11-      CARE ([facility])   MEDICINE       Jaciel                                                            Last  Test          Frequency    Reason                     Performed    Due Date  --------------------------------------------------------------------------------    Uric Acid...  12 months..  colchicine...............  02- 01-    [facility] Embedded Care Gaps. Reference number: [id number]. 7/17/2022   9:01:11 PM CDT

## 2022-07-18 NOTE — TELEPHONE ENCOUNTER
No new care gaps identified.  [facility] Embedded Care Gaps. Reference number: [id number]. 7/17/2022   9:01:45 PM CDT

## 2022-07-19 RX ORDER — LOSARTAN POTASSIUM 50 MG/1
50 TABLET ORAL DAILY
Qty: 90 TABLET | Refills: 3 | Status: SHIPPED | OUTPATIENT
Start: 2022-07-19 | End: 2022-08-25 | Stop reason: SDUPTHER

## 2022-07-19 RX ORDER — CLOPIDOGREL BISULFATE 75 MG/1
75 TABLET ORAL DAILY
Qty: 90 TABLET | Refills: 3 | Status: SHIPPED | OUTPATIENT
Start: 2022-07-19 | End: 2023-06-29 | Stop reason: SDUPTHER

## 2022-07-19 RX ORDER — ATORVASTATIN CALCIUM 40 MG/1
40 TABLET, FILM COATED ORAL DAILY
Qty: 90 TABLET | Refills: 3 | Status: SHIPPED | OUTPATIENT
Start: 2022-07-19 | End: 2023-06-29 | Stop reason: SDUPTHER

## 2022-07-19 RX ORDER — GABAPENTIN 300 MG/1
600 CAPSULE ORAL 2 TIMES DAILY
Qty: 360 CAPSULE | Refills: 1 | Status: SHIPPED | OUTPATIENT
Start: 2022-07-19 | End: 2022-12-18 | Stop reason: SDUPTHER

## 2022-07-19 RX ORDER — CARVEDILOL 12.5 MG/1
12.5 TABLET ORAL 2 TIMES DAILY
Qty: 180 TABLET | Refills: 3 | Status: SHIPPED | OUTPATIENT
Start: 2022-07-19 | End: 2023-06-29 | Stop reason: SDUPTHER

## 2022-07-19 RX ORDER — FUROSEMIDE 20 MG/1
20 TABLET ORAL 2 TIMES DAILY
Qty: 180 TABLET | Refills: 3 | Status: SHIPPED | OUTPATIENT
Start: 2022-07-19 | End: 2023-06-29 | Stop reason: SDUPTHER

## 2022-07-19 NOTE — TELEPHONE ENCOUNTER
Refill Routing Note   Medication(s) are not appropriate for processing by [facility] for the following reason(s):      - Outside of protocol  - Drug-Disease Interaction (clopidogrel and thrombocytopenia  //  carvedilol and acute combined systolic and diastolic congestive heart failure)    [facility] action(s):  Defer  Route  Approve Medication-related problems identified: Drug-disease interaction     Medication Therapy Plan: Drug-Disease: clopidogreL and Thrombocytopenia; Drug-Disease: carvediloL and Acute combined systolic and diastolic congestive heart failure;  Medication reconciliation completed: No     Appointments  past 12m or future 3m with PCP    Date Provider   Last Visit   5/3/2022 Britton Grissom MD   Next Visit   11/3/2022 Britton Grissom MD   ED visits in past 90 days: 0        Note composed:9:51 AM 07/19/2022

## 2022-08-24 ENCOUNTER — TELEPHONE (OUTPATIENT)
Dept: ELECTROPHYSIOLOGY | Facility: CLINIC | Age: 72
End: 2022-08-24
Payer: MEDICARE

## 2022-08-25 ENCOUNTER — OFFICE VISIT (OUTPATIENT)
Dept: CARDIOLOGY | Facility: CLINIC | Age: 72
End: 2022-08-25
Payer: MEDICARE

## 2022-08-25 VITALS — SYSTOLIC BLOOD PRESSURE: 134 MMHG | HEART RATE: 59 BPM | DIASTOLIC BLOOD PRESSURE: 77 MMHG

## 2022-08-25 DIAGNOSIS — I25.118 CORONARY ARTERY DISEASE OF NATIVE ARTERY OF NATIVE HEART WITH STABLE ANGINA PECTORIS: ICD-10-CM

## 2022-08-25 DIAGNOSIS — Z79.4 INSULIN DEPENDENT TYPE 2 DIABETES MELLITUS: ICD-10-CM

## 2022-08-25 DIAGNOSIS — I15.2 HYPERTENSION ASSOCIATED WITH DIABETES: ICD-10-CM

## 2022-08-25 DIAGNOSIS — Z95.810 ICD (IMPLANTABLE CARDIOVERTER-DEFIBRILLATOR), SINGLE, IN SITU: ICD-10-CM

## 2022-08-25 DIAGNOSIS — E11.40 TYPE 2 DIABETES MELLITUS WITH DIABETIC NEUROPATHY, WITHOUT LONG-TERM CURRENT USE OF INSULIN: ICD-10-CM

## 2022-08-25 DIAGNOSIS — E11.59 HYPERTENSION ASSOCIATED WITH DIABETES: ICD-10-CM

## 2022-08-25 DIAGNOSIS — Z79.4 TYPE 2 DIABETES MELLITUS WITH DIABETIC NEUROPATHY, WITH LONG-TERM CURRENT USE OF INSULIN: ICD-10-CM

## 2022-08-25 DIAGNOSIS — I47.20 VENTRICULAR TACHYCARDIA: ICD-10-CM

## 2022-08-25 DIAGNOSIS — E11.69 DYSLIPIDEMIA ASSOCIATED WITH TYPE 2 DIABETES MELLITUS: ICD-10-CM

## 2022-08-25 DIAGNOSIS — I50.42 CHRONIC COMBINED SYSTOLIC AND DIASTOLIC CHF (CONGESTIVE HEART FAILURE): Primary | ICD-10-CM

## 2022-08-25 DIAGNOSIS — I50.42 CHRONIC COMBINED SYSTOLIC AND DIASTOLIC CONGESTIVE HEART FAILURE: ICD-10-CM

## 2022-08-25 DIAGNOSIS — I25.5 ISCHEMIC CARDIOMYOPATHY: ICD-10-CM

## 2022-08-25 DIAGNOSIS — E11.40 TYPE 2 DIABETES MELLITUS WITH DIABETIC NEUROPATHY, WITH LONG-TERM CURRENT USE OF INSULIN: ICD-10-CM

## 2022-08-25 DIAGNOSIS — E11.9 INSULIN DEPENDENT TYPE 2 DIABETES MELLITUS: ICD-10-CM

## 2022-08-25 DIAGNOSIS — E66.01 SEVERE OBESITY (BMI 35.0-35.9 WITH COMORBIDITY): ICD-10-CM

## 2022-08-25 DIAGNOSIS — E11.9 DIABETES MELLITUS TYPE 2 WITHOUT RETINOPATHY: ICD-10-CM

## 2022-08-25 DIAGNOSIS — E11.9 DM TYPE 2 WITHOUT RETINOPATHY: ICD-10-CM

## 2022-08-25 DIAGNOSIS — E78.5 DYSLIPIDEMIA ASSOCIATED WITH TYPE 2 DIABETES MELLITUS: ICD-10-CM

## 2022-08-25 DIAGNOSIS — I10 ESSENTIAL HYPERTENSION: ICD-10-CM

## 2022-08-25 DIAGNOSIS — E78.5 DYSLIPIDEMIA: ICD-10-CM

## 2022-08-25 PROCEDURE — 93005 EKG 12-LEAD: ICD-10-PCS | Mod: S$GLB,,, | Performed by: INTERNAL MEDICINE

## 2022-08-25 PROCEDURE — 3072F LOW RISK FOR RETINOPATHY: CPT | Mod: CPTII,S$GLB,, | Performed by: INTERNAL MEDICINE

## 2022-08-25 PROCEDURE — 3288F PR FALLS RISK ASSESSMENT DOCUMENTED: ICD-10-PCS | Mod: CPTII,S$GLB,, | Performed by: INTERNAL MEDICINE

## 2022-08-25 PROCEDURE — 93010 ELECTROCARDIOGRAM REPORT: CPT | Mod: S$GLB,,, | Performed by: INTERNAL MEDICINE

## 2022-08-25 PROCEDURE — 1126F PR PAIN SEVERITY QUANTIFIED, NO PAIN PRESENT: ICD-10-PCS | Mod: CPTII,S$GLB,, | Performed by: INTERNAL MEDICINE

## 2022-08-25 PROCEDURE — 93005 ELECTROCARDIOGRAM TRACING: CPT | Mod: S$GLB,,, | Performed by: INTERNAL MEDICINE

## 2022-08-25 PROCEDURE — 3044F HG A1C LEVEL LT 7.0%: CPT | Mod: CPTII,S$GLB,, | Performed by: INTERNAL MEDICINE

## 2022-08-25 PROCEDURE — 1159F PR MEDICATION LIST DOCUMENTED IN MEDICAL RECORD: ICD-10-PCS | Mod: CPTII,S$GLB,, | Performed by: INTERNAL MEDICINE

## 2022-08-25 PROCEDURE — 3288F FALL RISK ASSESSMENT DOCD: CPT | Mod: CPTII,S$GLB,, | Performed by: INTERNAL MEDICINE

## 2022-08-25 PROCEDURE — 1160F RVW MEDS BY RX/DR IN RCRD: CPT | Mod: CPTII,S$GLB,, | Performed by: INTERNAL MEDICINE

## 2022-08-25 PROCEDURE — 4010F PR ACE/ARB THEARPY RXD/TAKEN: ICD-10-PCS | Mod: CPTII,S$GLB,, | Performed by: INTERNAL MEDICINE

## 2022-08-25 PROCEDURE — 3066F NEPHROPATHY DOC TX: CPT | Mod: CPTII,S$GLB,, | Performed by: INTERNAL MEDICINE

## 2022-08-25 PROCEDURE — 99214 OFFICE O/P EST MOD 30 MIN: CPT | Mod: S$GLB,,, | Performed by: INTERNAL MEDICINE

## 2022-08-25 PROCEDURE — 3078F DIAST BP <80 MM HG: CPT | Mod: CPTII,S$GLB,, | Performed by: INTERNAL MEDICINE

## 2022-08-25 PROCEDURE — 4010F ACE/ARB THERAPY RXD/TAKEN: CPT | Mod: CPTII,S$GLB,, | Performed by: INTERNAL MEDICINE

## 2022-08-25 PROCEDURE — 3060F PR POS MICROALBUMINURIA RESULT DOCUMENTED/REVIEW: ICD-10-PCS | Mod: CPTII,S$GLB,, | Performed by: INTERNAL MEDICINE

## 2022-08-25 PROCEDURE — 99999 PR PBB SHADOW E&M-EST. PATIENT-LVL III: ICD-10-PCS | Mod: PBBFAC,,, | Performed by: INTERNAL MEDICINE

## 2022-08-25 PROCEDURE — 1101F PR PT FALLS ASSESS DOC 0-1 FALLS W/OUT INJ PAST YR: ICD-10-PCS | Mod: CPTII,S$GLB,, | Performed by: INTERNAL MEDICINE

## 2022-08-25 PROCEDURE — 3075F PR MOST RECENT SYSTOLIC BLOOD PRESS GE 130-139MM HG: ICD-10-PCS | Mod: CPTII,S$GLB,, | Performed by: INTERNAL MEDICINE

## 2022-08-25 PROCEDURE — 3060F POS MICROALBUMINURIA REV: CPT | Mod: CPTII,S$GLB,, | Performed by: INTERNAL MEDICINE

## 2022-08-25 PROCEDURE — 1126F AMNT PAIN NOTED NONE PRSNT: CPT | Mod: CPTII,S$GLB,, | Performed by: INTERNAL MEDICINE

## 2022-08-25 PROCEDURE — 1160F PR REVIEW ALL MEDS BY PRESCRIBER/CLIN PHARMACIST DOCUMENTED: ICD-10-PCS | Mod: CPTII,S$GLB,, | Performed by: INTERNAL MEDICINE

## 2022-08-25 PROCEDURE — 99999 PR PBB SHADOW E&M-EST. PATIENT-LVL III: CPT | Mod: PBBFAC,,, | Performed by: INTERNAL MEDICINE

## 2022-08-25 PROCEDURE — 3078F PR MOST RECENT DIASTOLIC BLOOD PRESSURE < 80 MM HG: ICD-10-PCS | Mod: CPTII,S$GLB,, | Performed by: INTERNAL MEDICINE

## 2022-08-25 PROCEDURE — 3075F SYST BP GE 130 - 139MM HG: CPT | Mod: CPTII,S$GLB,, | Performed by: INTERNAL MEDICINE

## 2022-08-25 PROCEDURE — 99214 PR OFFICE/OUTPT VISIT, EST, LEVL IV, 30-39 MIN: ICD-10-PCS | Mod: S$GLB,,, | Performed by: INTERNAL MEDICINE

## 2022-08-25 PROCEDURE — 3066F PR DOCUMENTATION OF TREATMENT FOR NEPHROPATHY: ICD-10-PCS | Mod: CPTII,S$GLB,, | Performed by: INTERNAL MEDICINE

## 2022-08-25 PROCEDURE — 1101F PT FALLS ASSESS-DOCD LE1/YR: CPT | Mod: CPTII,S$GLB,, | Performed by: INTERNAL MEDICINE

## 2022-08-25 PROCEDURE — 3044F PR MOST RECENT HEMOGLOBIN A1C LEVEL <7.0%: ICD-10-PCS | Mod: CPTII,S$GLB,, | Performed by: INTERNAL MEDICINE

## 2022-08-25 PROCEDURE — 93010 EKG 12-LEAD: ICD-10-PCS | Mod: S$GLB,,, | Performed by: INTERNAL MEDICINE

## 2022-08-25 PROCEDURE — 1159F MED LIST DOCD IN RCRD: CPT | Mod: CPTII,S$GLB,, | Performed by: INTERNAL MEDICINE

## 2022-08-25 PROCEDURE — 3072F PR LOW RISK FOR RETINOPATHY: ICD-10-PCS | Mod: CPTII,S$GLB,, | Performed by: INTERNAL MEDICINE

## 2022-08-25 RX ORDER — LOSARTAN POTASSIUM 25 MG/1
25 TABLET ORAL DAILY
Qty: 90 TABLET | Refills: 3 | Status: SHIPPED | OUTPATIENT
Start: 2022-08-25 | End: 2023-06-30 | Stop reason: SDUPTHER

## 2022-08-25 NOTE — PROGRESS NOTES
Subjective:   Patient ID:  Clfiton Wilson is a 71 y.o. male who presents for follow-up of ICM  HPI:  Mr. Wilson is a 71 y.o.  male with HTN, HLD, DM, CAD (NSTEMI 2011 and 2015), ICM (EF 25->45%), and ICD (2017) here for annual follow up.     In hospital post PCI, had NSVT. Was given a LifeVest.  Subsequent LVEF 39%, leading to EPS, which was negative (therefore no ICD then).  Since, has recently had months of persistently low LVEF.   Again referred by Dr Clement for ICD, due to LVEF 25% with ICM.  At his last office visit (04/10/17), Mr. Wilson reported experiencing occasional .   Echo 12/15: 35-40%. 4/16: 25-40%.  3/17 25%  2/16 nuclear ETT neg  Mr. Wilson underwent successful ICD (06/06/17) without complication.   Required a new RV lead 11/2019. Capped old lead. No problems since.    LVEF has improved to 45% (2021)!    He self-decreased losartan recently due to LH and hypotension. Feeling better since.  ICD: good function.     I have personally reviewed the patient's EKG today, which shows sinus rhythm and RBBB.      Current Outpatient Medications   Medication Sig    alcohol swabs (BD ALCOHOL SWABS) PadM USE FOUR TIMES DAILY    aspirin (ECOTRIN) 81 MG EC tablet Take 81 mg by mouth once daily.    atorvastatin (LIPITOR) 40 MG tablet Take 1 tablet (40 mg total) by mouth once daily.    blood glucose control high,low (ACCU-CHEK CATHRYN CONTROL SOLN) Soln Use as directed to check blood sugar    blood sugar diagnostic Strp Use to test four times daily    blood-glucose meter (ACCU-CHEK CATHRYN PLUS METER) Misc USE AS DIRECTED    carvediloL (COREG) 12.5 MG tablet Take 1 tablet (12.5 mg total) by mouth 2 (two) times daily.    clopidogreL (PLAVIX) 75 mg tablet Take 1 tablet (75 mg total) by mouth once daily.    colchicine (MITIGARE) 0.6 mg Cap Take 1 capsule (0.6 mg total) by mouth once daily.    cyanocobalamin (VITAMIN B-12) 1000 MCG tablet TAKE ONE TABLET BY MOUTH ONCE DAILY FOR VITAMIN DEFICIENCIES     "doxycycline (MONODOX) 100 MG capsule Take 1 capsule (100 mg total) by mouth 2 (two) times daily.    furosemide (LASIX) 20 MG tablet Take 1 tablet (20 mg total) by mouth 2 (two) times daily.    gabapentin (NEURONTIN) 300 MG capsule Take 2 capsules (600 mg total) by mouth 2 (two) times daily.    insulin (LANTUS SOLOSTAR U-100 INSULIN) glargine 100 units/mL SubQ pen INJECT 35 UNITS SUBCUTANEOUSLY EVERY EVENING    insulin aspart U-100 (NOVOLOG FLEXPEN U-100 INSULIN) 100 unit/mL (3 mL) InPn pen Inject 15 Units into the skin 3 (three) times daily with meals.    lancets (ACCU-CHEK SOFTCLIX LANCETS) Misc TEST 3 (three) times daily.    metFORMIN (GLUCOPHAGE) 1000 MG tablet Take 1 tablet (1,000 mg total) by mouth 2 (two) times daily with meals.    nitroGLYCERIN (NITROSTAT) 0.4 MG SL tablet Place 1 tablet (0.4 mg total) under the tongue every 5 (five) minutes as needed for Chest pain.    pen needle, diabetic (BD ULTRA-FINE SINA PEN NEEDLE) 32 gauge x 5/32" Ndle Use four times daily for insulin administration (Patient taking differently: Use four times daily for insulin administration)    potassium chloride (KLOR-CON) 10 MEQ TbSR TAKE ONE TABLET BY MOUTH ONCE DAILY TO INCREASE POTASSIUM    zolpidem (AMBIEN) 5 MG Tab TAKE 1 TABLET BY MOUTH EVERY NIGHT AS NEEDED    losartan (COZAAR) 25 MG tablet Take 1 tablet (25 mg total) by mouth once daily.     No current facility-administered medications for this visit.       Review of Systems   Constitutional: Negative. Negative for malaise/fatigue.   HENT: Negative.  Negative for ear pain and tinnitus.    Eyes: Negative for blurred vision.   Cardiovascular: Negative.  Negative for chest pain, dyspnea on exertion, irregular heartbeat, leg swelling, near-syncope, palpitations and syncope.   Respiratory: Negative.  Negative for shortness of breath.    Endocrine: Negative.  Negative for polyuria.   Hematologic/Lymphatic: Negative for bleeding problem. Does not bruise/bleed easily. "   Skin: Negative.  Negative for rash.   Musculoskeletal: Negative.  Negative for joint pain, muscle weakness and myalgias.   Gastrointestinal: Negative.  Negative for abdominal pain, change in bowel habit, hematemesis, hematochezia and nausea.   Genitourinary: Negative for frequency and hematuria.   Neurological: Negative.  Negative for dizziness, light-headedness and weakness.   Psychiatric/Behavioral: Negative.  Negative for altered mental status and depression. The patient is not nervous/anxious.    Allergic/Immunologic: Negative for environmental allergies and persistent infections.         Objective:          /77   Pulse (!) 59     Physical Exam  Vitals and nursing note reviewed.   Constitutional:       Appearance: Normal appearance. He is well-developed.   HENT:      Head: Normocephalic and atraumatic.      Nose: Nose normal.   Eyes:      General: Lids are normal. No scleral icterus.     Conjunctiva/sclera: Conjunctivae normal.      Pupils: Pupils are equal, round, and reactive to light.   Neck:      Thyroid: No thyromegaly.      Trachea: No tracheal deviation.   Cardiovascular:      Rate and Rhythm: Normal rate and regular rhythm.  No extrasystoles are present.     Chest Wall: PMI is not displaced.      Pulses:           Radial pulses are 2+ on the right side and 2+ on the left side.      Heart sounds: S1 normal and S2 normal.   Pulmonary:      Effort: Pulmonary effort is normal. No tachypnea, accessory muscle usage or respiratory distress.      Breath sounds: Normal breath sounds. No wheezing.   Abdominal:      General: There is no distension.   Musculoskeletal:         General: Normal range of motion.      Cervical back: Normal range of motion.   Skin:     General: Skin is warm and dry.      Findings: No erythema or rash.   Neurological:      Mental Status: He is alert and oriented to person, place, and time.      Motor: No abnormal muscle tone.      Deep Tendon Reflexes: Reflexes are normal and  symmetric.   Psychiatric:         Speech: Speech normal.         Behavior: Behavior normal.           Assessment:     1. Chronic combined systolic and diastolic CHF (congestive heart failure)    2. Chronic combined systolic and diastolic congestive heart failure    3. Coronary artery disease of native artery of native heart with stable angina pectoris    4. Dyslipidemia    5. Dyslipidemia associated with type 2 diabetes mellitus    6. Essential hypertension    7. Hypertension associated with diabetes    8. ICD (implantable cardioverter-defibrillator), single, in situ    9. Ischemic cardiomyopathy    10. Ventricular tachycardia, nonsustained post-MI    11. Diabetes mellitus type 2 without retinopathy    12. DM type 2 without retinopathy    13. Insulin dependent type 2 diabetes mellitus    14. Severe obesity (BMI 35.0-35.9 with comorbidity)    15. Type 2 diabetes mellitus with diabetic neuropathy, without long-term current use of insulin    16. Type 2 diabetes mellitus with diabetic neuropathy, with long-term current use of insulin      Plan:     In summary, Mr. Wilson is a 71 y.o. male with HTN, HLD, DM, CAD (NSTEMI 2011 and 2015), ICM (EF 35-40%), and ICD (2017) here for  follow up.     Agree with lower dose losartan.  ICD doing well.  Return in 1 year with echo, or earlier prn.

## 2022-08-26 DIAGNOSIS — I50.42 CHRONIC COMBINED SYSTOLIC AND DIASTOLIC CONGESTIVE HEART FAILURE: Primary | ICD-10-CM

## 2022-08-31 ENCOUNTER — CLINICAL SUPPORT (OUTPATIENT)
Dept: CARDIOLOGY | Facility: HOSPITAL | Age: 72
End: 2022-08-31
Payer: MEDICARE

## 2022-08-31 DIAGNOSIS — I25.5 ISCHEMIC CARDIOMYOPATHY: ICD-10-CM

## 2022-08-31 DIAGNOSIS — Z95.810 PRESENCE OF AUTOMATIC (IMPLANTABLE) CARDIAC DEFIBRILLATOR: ICD-10-CM

## 2022-08-31 PROCEDURE — 93296 REM INTERROG EVL PM/IDS: CPT | Performed by: INTERNAL MEDICINE

## 2022-09-02 DIAGNOSIS — E11.40 TYPE 2 DIABETES MELLITUS WITH DIABETIC NEUROPATHY, UNSPECIFIED WHETHER LONG TERM INSULIN USE: ICD-10-CM

## 2022-09-02 DIAGNOSIS — E11.9 DIABETES MELLITUS TYPE 2 WITHOUT RETINOPATHY: ICD-10-CM

## 2022-09-02 DIAGNOSIS — E11.40 TYPE 2 DIABETES MELLITUS WITH DIABETIC NEUROPATHY, WITH LONG-TERM CURRENT USE OF INSULIN: ICD-10-CM

## 2022-09-02 DIAGNOSIS — F51.01 PRIMARY INSOMNIA: ICD-10-CM

## 2022-09-02 DIAGNOSIS — Z79.4 TYPE 2 DIABETES MELLITUS WITH DIABETIC NEUROPATHY, WITH LONG-TERM CURRENT USE OF INSULIN: ICD-10-CM

## 2022-09-02 RX ORDER — INSULIN GLARGINE 100 [IU]/ML
35 INJECTION, SOLUTION SUBCUTANEOUS NIGHTLY
Qty: 30 ML | Refills: 0 | Status: SHIPPED | OUTPATIENT
Start: 2022-09-02 | End: 2022-10-11 | Stop reason: SDUPTHER

## 2022-09-02 RX ORDER — METFORMIN HYDROCHLORIDE 1000 MG/1
1000 TABLET ORAL 2 TIMES DAILY WITH MEALS
Qty: 180 TABLET | Refills: 0 | Status: SHIPPED | OUTPATIENT
Start: 2022-09-02 | End: 2022-12-18 | Stop reason: SDUPTHER

## 2022-09-02 NOTE — TELEPHONE ENCOUNTER
Refill Decision Note   Clifton Wilson  is requesting a refill authorization.  Brief Assessment and Rationale for Refill:  Approve     Medication Therapy Plan:       Medication Reconciliation Completed: No   Comments:     No Care Gaps recommended.     Note composed:6:39 PM 09/02/2022

## 2022-09-02 NOTE — TELEPHONE ENCOUNTER
Care Due:                  Date            Visit Type   Department     Provider  --------------------------------------------------------------------------------                                EP [facility]    Britton Dumontkimmie  Last Visit: 05-      CARE ([facility])   MEDICINE       Jaciel                              [facility]  Next Visit: 11-      CARE ([facility])   MEDICINE       Jaciel                                                            Last  Test          Frequency    Reason                     Performed    Due Date  --------------------------------------------------------------------------------    HBA1C.......  6 months...  insulin, metFORMIN.......  05-   10-    [facility] Embedded Care Gaps. Reference number: [id number]. 9/02/2022   4:39:35 PM CDT

## 2022-09-02 NOTE — TELEPHONE ENCOUNTER
No new care gaps identified.  [facility] Embedded Care Gaps. Reference number: [id number]. 9/02/2022   4:40:11 PM CDT

## 2022-09-06 RX ORDER — ZOLPIDEM TARTRATE 5 MG/1
TABLET ORAL
Qty: 90 TABLET | Refills: 0 | Status: SHIPPED | OUTPATIENT
Start: 2022-09-06 | End: 2022-12-18 | Stop reason: SDUPTHER

## 2022-09-26 ENCOUNTER — PATIENT MESSAGE (OUTPATIENT)
Dept: FAMILY MEDICINE | Facility: CLINIC | Age: 72
End: 2022-09-26
Payer: MEDICARE

## 2022-09-26 ENCOUNTER — NURSE TRIAGE (OUTPATIENT)
Dept: ADMINISTRATIVE | Facility: CLINIC | Age: 72
End: 2022-09-26
Payer: MEDICARE

## 2022-09-26 NOTE — TELEPHONE ENCOUNTER
OOC RN   Patient calling and on Saturday, I think I hurt my knee when taking off my socks.  Partially fell.   And caught myself.  Patient states he was walking and his knee gave out fell out on left side.today.   Went down on bottom,  Denies any injury. Thinks there is something going on with my knee.   Would like knee xray.  Swollen left knee,  able to walk and put weight on it.  Pain 3/10   care advise is patient wishes to be seen today,  No appt today with Dr. Grissom,  Scheduled another appt.  For any new or worsening symptoms to call back OOC RN  Patient Vu/  Reason for Disposition   Patient wants to be seen    Additional Information   Negative: Sounds like a life-threatening emergency to the triager   Followed a knee injury   Negative: Major bleeding (actively dripping or spurting) that can't be stopped   Negative: Bullet, stabbed by knife or other serious penetrating wound   Negative: Looks like a dislocated joint (crooked or deformed)   Negative: Serious injury with multiple fractures (broken bones)   Negative: Sounds like a life-threatening emergency to the triager   Negative: Can't stand (bear weight) or walk   Negative: Skin is split open or gaping (length > 1/2 inch or 12 mm)   Negative: Bleeding won't stop after 10 minutes of direct pressure (using correct technique)   Negative: Dirt in the wound and not removed after 15 minutes of scrubbing   Negative: Sounds like a serious injury to the triager   Negative: SEVERE pain (e.g., excruciating)   Negative: No prior tetanus shots (or is not fully vaccinated) and any wound (e.g., cut or scrape)   Negative: HIV positive or severe immunodeficiency (severely weak immune system) and DIRTY cut or scrape    Protocols used: Knee Pain-A-OH, Knee Injury-A-OH    
oral

## 2022-09-28 ENCOUNTER — HOSPITAL ENCOUNTER (OUTPATIENT)
Dept: RADIOLOGY | Facility: HOSPITAL | Age: 72
Discharge: HOME | End: 2022-09-28
Attending: FAMILY MEDICINE
Payer: MEDICARE

## 2022-09-28 ENCOUNTER — OFFICE VISIT (OUTPATIENT)
Dept: FAMILY MEDICINE | Facility: CLINIC | Age: 72
End: 2022-09-28
Attending: FAMILY MEDICINE
Payer: MEDICARE

## 2022-09-28 VITALS
SYSTOLIC BLOOD PRESSURE: 130 MMHG | HEART RATE: 66 BPM | HEIGHT: 71 IN | BODY MASS INDEX: 34.17 KG/M2 | DIASTOLIC BLOOD PRESSURE: 80 MMHG | WEIGHT: 244.06 LBS | OXYGEN SATURATION: 98 %

## 2022-09-28 DIAGNOSIS — M25.562 CHRONIC PAIN OF LEFT KNEE: ICD-10-CM

## 2022-09-28 DIAGNOSIS — M25.462 EFFUSION OF BURSA OF LEFT KNEE: ICD-10-CM

## 2022-09-28 DIAGNOSIS — G89.29 CHRONIC PAIN OF LEFT KNEE: Primary | ICD-10-CM

## 2022-09-28 DIAGNOSIS — G89.29 CHRONIC PAIN OF LEFT KNEE: ICD-10-CM

## 2022-09-28 DIAGNOSIS — M25.562 CHRONIC PAIN OF LEFT KNEE: Primary | ICD-10-CM

## 2022-09-28 PROCEDURE — 3008F BODY MASS INDEX DOCD: CPT | Mod: CPTII,S$GLB,, | Performed by: FAMILY MEDICINE

## 2022-09-28 PROCEDURE — 3066F PR DOCUMENTATION OF TREATMENT FOR NEPHROPATHY: ICD-10-PCS | Mod: CPTII,S$GLB,, | Performed by: FAMILY MEDICINE

## 2022-09-28 PROCEDURE — 4010F PR ACE/ARB THEARPY RXD/TAKEN: ICD-10-PCS | Mod: CPTII,S$GLB,, | Performed by: FAMILY MEDICINE

## 2022-09-28 PROCEDURE — 1160F RVW MEDS BY RX/DR IN RCRD: CPT | Mod: CPTII,S$GLB,, | Performed by: FAMILY MEDICINE

## 2022-09-28 PROCEDURE — 99214 PR OFFICE/OUTPT VISIT, EST, LEVL IV, 30-39 MIN: ICD-10-PCS | Mod: S$GLB,,, | Performed by: FAMILY MEDICINE

## 2022-09-28 PROCEDURE — 1126F PR PAIN SEVERITY QUANTIFIED, NO PAIN PRESENT: ICD-10-PCS | Mod: CPTII,S$GLB,, | Performed by: FAMILY MEDICINE

## 2022-09-28 PROCEDURE — 3288F PR FALLS RISK ASSESSMENT DOCUMENTED: ICD-10-PCS | Mod: CPTII,S$GLB,, | Performed by: FAMILY MEDICINE

## 2022-09-28 PROCEDURE — 99999 PR PBB SHADOW E&M-EST. PATIENT-LVL V: ICD-10-PCS | Mod: PBBFAC,,, | Performed by: FAMILY MEDICINE

## 2022-09-28 PROCEDURE — 1126F AMNT PAIN NOTED NONE PRSNT: CPT | Mod: CPTII,S$GLB,, | Performed by: FAMILY MEDICINE

## 2022-09-28 PROCEDURE — 3075F PR MOST RECENT SYSTOLIC BLOOD PRESS GE 130-139MM HG: ICD-10-PCS | Mod: CPTII,S$GLB,, | Performed by: FAMILY MEDICINE

## 2022-09-28 PROCEDURE — 73564 X-RAY EXAM KNEE 4 OR MORE: CPT | Mod: 26,LT,, | Performed by: STUDENT IN AN ORGANIZED HEALTH CARE EDUCATION/TRAINING PROGRAM

## 2022-09-28 PROCEDURE — 1159F PR MEDICATION LIST DOCUMENTED IN MEDICAL RECORD: ICD-10-PCS | Mod: CPTII,S$GLB,, | Performed by: FAMILY MEDICINE

## 2022-09-28 PROCEDURE — 3072F LOW RISK FOR RETINOPATHY: CPT | Mod: CPTII,S$GLB,, | Performed by: FAMILY MEDICINE

## 2022-09-28 PROCEDURE — 4010F ACE/ARB THERAPY RXD/TAKEN: CPT | Mod: CPTII,S$GLB,, | Performed by: FAMILY MEDICINE

## 2022-09-28 PROCEDURE — 73564 X-RAY EXAM KNEE 4 OR MORE: CPT | Mod: TC,FY,LT

## 2022-09-28 PROCEDURE — 3066F NEPHROPATHY DOC TX: CPT | Mod: CPTII,S$GLB,, | Performed by: FAMILY MEDICINE

## 2022-09-28 PROCEDURE — 1101F PT FALLS ASSESS-DOCD LE1/YR: CPT | Mod: CPTII,S$GLB,, | Performed by: FAMILY MEDICINE

## 2022-09-28 PROCEDURE — 1159F MED LIST DOCD IN RCRD: CPT | Mod: CPTII,S$GLB,, | Performed by: FAMILY MEDICINE

## 2022-09-28 PROCEDURE — 3060F POS MICROALBUMINURIA REV: CPT | Mod: CPTII,S$GLB,, | Performed by: FAMILY MEDICINE

## 2022-09-28 PROCEDURE — 1160F PR REVIEW ALL MEDS BY PRESCRIBER/CLIN PHARMACIST DOCUMENTED: ICD-10-PCS | Mod: CPTII,S$GLB,, | Performed by: FAMILY MEDICINE

## 2022-09-28 PROCEDURE — 3044F PR MOST RECENT HEMOGLOBIN A1C LEVEL <7.0%: ICD-10-PCS | Mod: CPTII,S$GLB,, | Performed by: FAMILY MEDICINE

## 2022-09-28 PROCEDURE — 3060F PR POS MICROALBUMINURIA RESULT DOCUMENTED/REVIEW: ICD-10-PCS | Mod: CPTII,S$GLB,, | Performed by: FAMILY MEDICINE

## 2022-09-28 PROCEDURE — 3008F PR BODY MASS INDEX (BMI) DOCUMENTED: ICD-10-PCS | Mod: CPTII,S$GLB,, | Performed by: FAMILY MEDICINE

## 2022-09-28 PROCEDURE — 3079F PR MOST RECENT DIASTOLIC BLOOD PRESSURE 80-89 MM HG: ICD-10-PCS | Mod: CPTII,S$GLB,, | Performed by: FAMILY MEDICINE

## 2022-09-28 PROCEDURE — 99214 OFFICE O/P EST MOD 30 MIN: CPT | Mod: S$GLB,,, | Performed by: FAMILY MEDICINE

## 2022-09-28 PROCEDURE — 3288F FALL RISK ASSESSMENT DOCD: CPT | Mod: CPTII,S$GLB,, | Performed by: FAMILY MEDICINE

## 2022-09-28 PROCEDURE — 1101F PR PT FALLS ASSESS DOC 0-1 FALLS W/OUT INJ PAST YR: ICD-10-PCS | Mod: CPTII,S$GLB,, | Performed by: FAMILY MEDICINE

## 2022-09-28 PROCEDURE — 99999 PR PBB SHADOW E&M-EST. PATIENT-LVL V: CPT | Mod: PBBFAC,,, | Performed by: FAMILY MEDICINE

## 2022-09-28 PROCEDURE — 73564 XR KNEE COMP 4 OR MORE VIEWS LEFT: ICD-10-PCS | Mod: 26,LT,, | Performed by: STUDENT IN AN ORGANIZED HEALTH CARE EDUCATION/TRAINING PROGRAM

## 2022-09-28 PROCEDURE — 3075F SYST BP GE 130 - 139MM HG: CPT | Mod: CPTII,S$GLB,, | Performed by: FAMILY MEDICINE

## 2022-09-28 PROCEDURE — 3044F HG A1C LEVEL LT 7.0%: CPT | Mod: CPTII,S$GLB,, | Performed by: FAMILY MEDICINE

## 2022-09-28 PROCEDURE — 3079F DIAST BP 80-89 MM HG: CPT | Mod: CPTII,S$GLB,, | Performed by: FAMILY MEDICINE

## 2022-09-28 PROCEDURE — 3072F PR LOW RISK FOR RETINOPATHY: ICD-10-PCS | Mod: CPTII,S$GLB,, | Performed by: FAMILY MEDICINE

## 2022-09-30 ENCOUNTER — TELEPHONE (OUTPATIENT)
Dept: FAMILY MEDICINE | Facility: CLINIC | Age: 72
End: 2022-09-30

## 2022-09-30 ENCOUNTER — PATIENT MESSAGE (OUTPATIENT)
Dept: FAMILY MEDICINE | Facility: CLINIC | Age: 72
End: 2022-09-30
Payer: MEDICARE

## 2022-09-30 DIAGNOSIS — M25.562 CHRONIC PAIN OF LEFT KNEE: ICD-10-CM

## 2022-09-30 DIAGNOSIS — G89.29 CHRONIC PAIN OF LEFT KNEE: ICD-10-CM

## 2022-09-30 DIAGNOSIS — M25.462 EFFUSION OF BURSA OF LEFT KNEE: Primary | ICD-10-CM

## 2022-09-30 NOTE — PROGRESS NOTES
Subjective:       Patient ID: Clifton Wilson is a 71 y.o. male.    Chief Complaint: Knee Pain (Left Knee)    71 yr old pleasant white male with DM II, HTN, HLD, CAD, Combined chronic CHF, MR, obesity, neuropathy, presents today for evaluation of left knee pain/swelling. Onset many weeks ago but got worse in the last one week. He was fine until he kneeled in [employer] and started feeling pop and pain and swelling. Difficult range of motion. Details as follows -    History as below - reviewed     Knee Pain   The injury mechanism is unknown. The pain is present in the left knee. The pain is at a severity of 7/10. The pain is moderate. The pain has been Constant since onset. Pertinent negatives include no inability to bear weight, loss of sensation, muscle weakness or tingling. The symptoms are aggravated by palpation and movement. He has tried NSAIDs for the symptoms. The treatment provided no relief.   Review of Systems   Constitutional: Negative.  Negative for activity change, diaphoresis and unexpected weight change.   HENT: Negative.  Negative for nasal congestion, ear pain, mouth sores, rhinorrhea and voice change.    Eyes: Negative.  Negative for pain, discharge and visual disturbance.   Respiratory: Negative.  Negative for apnea, cough and wheezing.    Cardiovascular: Negative.  Negative for chest pain and palpitations.   Gastrointestinal: Negative.  Negative for abdominal distention, anal bleeding, diarrhea and vomiting.   Endocrine: Negative.  Negative for cold intolerance and polyuria.   Genitourinary: Negative.  Negative for decreased urine volume, difficulty urinating, discharge, frequency and scrotal swelling.   Musculoskeletal:  Positive for joint swelling, leg pain and myalgias. Negative for back pain and neck stiffness.   Integumentary:  Negative for color change and rash. Negative.   Allergic/Immunologic: Negative.  Negative for environmental allergies.   Neurological: Negative.  Negative for  dizziness, tingling, speech difficulty, weakness and light-headedness.   Hematological: Negative.    Psychiatric/Behavioral: Negative.  Negative for agitation, dysphoric mood and suicidal ideas. The patient is not nervous/anxious.        PMH/PSH/FH/SH/MED/ALLERGY reviewed    Objective:      Vitals:    09/28/22 1528   BP: 130/80   Pulse: 66       Physical Exam  Constitutional:       Appearance: He is well-developed.   HENT:      Head: Normocephalic and atraumatic.      Right Ear: External ear normal.      Left Ear: External ear normal.      Nose: Nose normal.      Mouth/Throat:      Pharynx: No oropharyngeal exudate.   Eyes:      General: No scleral icterus.        Right eye: No discharge.         Left eye: No discharge.      Conjunctiva/sclera: Conjunctivae normal.      Pupils: Pupils are equal, round, and reactive to light.   Neck:      Thyroid: No thyromegaly.      Vascular: No JVD.      Trachea: No tracheal deviation.   Cardiovascular:      Rate and Rhythm: Normal rate and regular rhythm.      Heart sounds: Normal heart sounds. No murmur heard.    No friction rub. No gallop.   Pulmonary:      Effort: Pulmonary effort is normal. No respiratory distress.      Breath sounds: Normal breath sounds. No stridor. No wheezing or rales.   Chest:      Chest wall: No tenderness.   Abdominal:      General: Bowel sounds are normal. There is no distension.      Palpations: Abdomen is soft. There is no mass.      Tenderness: There is no abdominal tenderness. There is no guarding or rebound.      Hernia: No hernia is present.   Musculoskeletal:         General: Swelling and tenderness present. Normal range of motion.      Cervical back: Normal range of motion and neck supple.      Comments: Left knee effusion and edema+ and tenderness and restricted ROM. Warmth+   Lymphadenopathy:      Cervical: No cervical adenopathy.   Skin:     General: Skin is warm and dry.      Coloration: Skin is not pale.      Findings: No erythema or  rash.   Neurological:      Mental Status: He is alert and oriented to person, place, and time.      Cranial Nerves: No cranial nerve deficit.      Motor: No abnormal muscle tone.      Coordination: Coordination normal.      Deep Tendon Reflexes: Reflexes are normal and symmetric. Reflexes normal.   Psychiatric:         Behavior: Behavior normal.         Thought Content: Thought content normal.         Judgment: Judgment normal.         Lab Visit on 09/28/2022   Component Date Value Ref Range Status    Uric Acid 09/28/2022 8.3 (H)  3.4 - 7.0 mg/dL Final       Assessment:       Problem List Items Addressed This Visit       Chronic pain of left knee - Primary    Relevant Orders    MRI Knee Without Contrast Left    URIC ACID (Completed)    X-Ray Knee Complete 4 or More Views Left (Completed)     Other Visit Diagnoses       Effusion of bursa of left knee        Relevant Orders    MRI Knee Without Contrast Left    URIC ACID (Completed)    X-Ray Knee Complete 4 or More Views Left (Completed)              Plan:       Clifton was seen today for knee pain.    Diagnoses and all orders for this visit:    Chronic pain of left knee  -     MRI Knee Without Contrast Left; Future  -     URIC ACID; Future  -     X-Ray Knee Complete 4 or More Views Left; Future    Effusion of bursa of left knee  -     MRI Knee Without Contrast Left; Future  -     URIC ACID; Future  -     X-Ray Knee Complete 4 or More Views Left; Future    Left knee pain/effusion/gout  -ice, nsaids prn  -x ray and MRI  -likely needs allopurinol daily after the flare up  -er/uc precautions given    Spent adequate time in obtaining history and explaining differentials    25 minutes spent during this visit of which greater than 50% devoted to face-face counseling and coordination of care regarding diagnosis and management plan    Follow up in about 5 weeks (around 11/3/2022), or if symptoms worsen or fail to improve.

## 2022-09-30 NOTE — TELEPHONE ENCOUNTER
----- Message from Tressa Verma MA sent at 9/30/2022  4:32 PM CDT -----    ----- Message -----  From: Kari Anthony  Sent: 9/30/2022   3:40 PM CDT  To: Jaciel Jarquin Staff    Patient was scheduled for an [facility] of the [facility]. MRI was canceled because patient has a Pacemaker, I spoke to Ana  one of the Radiologic Technologist at [facility] EXT [id number] and stated that Mr. Wilson is one of the patient's she's working on to call and reschedule.

## 2022-10-03 ENCOUNTER — TELEPHONE (OUTPATIENT)
Dept: FAMILY MEDICINE | Facility: CLINIC | Age: 72
End: 2022-10-03
Payer: MEDICARE

## 2022-10-04 ENCOUNTER — TELEPHONE (OUTPATIENT)
Dept: FAMILY MEDICINE | Facility: CLINIC | Age: 72
End: 2022-10-04
Payer: MEDICARE

## 2022-10-06 ENCOUNTER — HOSPITAL ENCOUNTER (OUTPATIENT)
Dept: RADIOLOGY | Facility: HOSPITAL | Age: 72
Discharge: HOME | End: 2022-10-06
Attending: FAMILY MEDICINE
Payer: MEDICARE

## 2022-10-06 DIAGNOSIS — G89.29 CHRONIC PAIN OF LEFT KNEE: ICD-10-CM

## 2022-10-06 DIAGNOSIS — M25.562 CHRONIC PAIN OF LEFT KNEE: ICD-10-CM

## 2022-10-06 DIAGNOSIS — M25.462 EFFUSION OF BURSA OF LEFT KNEE: ICD-10-CM

## 2022-10-06 PROCEDURE — 73700 CT LOWER EXTREMITY W/O DYE: CPT | Mod: 26,LT,, | Performed by: RADIOLOGY

## 2022-10-06 PROCEDURE — 73700 CT LOWER EXTREMITY W/O DYE: CPT | Mod: TC,LT

## 2022-10-06 PROCEDURE — 73700 CT KNEE WITHOUT CONTRAST LEFT: ICD-10-PCS | Mod: 26,LT,, | Performed by: RADIOLOGY

## 2022-10-09 ENCOUNTER — PATIENT MESSAGE (OUTPATIENT)
Dept: FAMILY MEDICINE | Facility: CLINIC | Age: 72
End: 2022-10-09
Payer: MEDICARE

## 2022-10-09 DIAGNOSIS — M25.562 CHRONIC PAIN OF LEFT KNEE: ICD-10-CM

## 2022-10-09 DIAGNOSIS — G89.29 CHRONIC PAIN OF LEFT KNEE: ICD-10-CM

## 2022-10-09 DIAGNOSIS — M25.462 EFFUSION OF BURSA OF LEFT KNEE: Primary | ICD-10-CM

## 2022-10-11 DIAGNOSIS — Z79.4 TYPE 2 DIABETES MELLITUS WITH DIABETIC NEUROPATHY, WITH LONG-TERM CURRENT USE OF INSULIN: ICD-10-CM

## 2022-10-11 DIAGNOSIS — E11.9 DIABETES MELLITUS WITHOUT COMPLICATION: ICD-10-CM

## 2022-10-11 DIAGNOSIS — R73.9 HYPERGLYCEMIA: ICD-10-CM

## 2022-10-11 DIAGNOSIS — E11.40 TYPE 2 DIABETES MELLITUS WITH DIABETIC NEUROPATHY, WITH LONG-TERM CURRENT USE OF INSULIN: ICD-10-CM

## 2022-10-11 DIAGNOSIS — E11.9 DIABETES MELLITUS TYPE 2 WITHOUT RETINOPATHY: ICD-10-CM

## 2022-10-11 NOTE — TELEPHONE ENCOUNTER
I left a message on his voicemail that we are not seeing patients in the hospital unless it's absolutely necessary. I left instructions in the message on how to send a transmission in with his [email or web address] monitor.   Invanz given today over 30 minutes via Midline. Tolerated infusion well. No signs of of adverse reaction. Mid line dressing done today. Patient recently had inpatient visit because of excess drainage and skin irritation from ostomy site. States she going to go to SNF, decided to go home to daughter home so her daughter can help her. Daughter Mika Arriaza called while patient was getting infusion to inform can't get into to ID until 11/1.     Discharged per wheelchair with     EOT 10/18  ID 11/1

## 2022-10-12 RX ORDER — INSULIN GLARGINE 100 [IU]/ML
35 INJECTION, SOLUTION SUBCUTANEOUS NIGHTLY
Qty: 30 ML | Refills: 0 | Status: SHIPPED | OUTPATIENT
Start: 2022-10-12 | End: 2022-12-18 | Stop reason: SDUPTHER

## 2022-10-12 RX ORDER — PEN NEEDLE, DIABETIC 30 GX3/16"
NEEDLE, DISPOSABLE MISCELLANEOUS
Qty: 400 EACH | Refills: 0 | Status: SHIPPED | OUTPATIENT
Start: 2022-10-12 | End: 2023-06-29 | Stop reason: SDUPTHER

## 2022-10-12 NOTE — TELEPHONE ENCOUNTER
Refill Decision Note   Clifton Wilson  is requesting a refill authorization.  Brief Assessment and Rationale for Refill:  Approve     Medication Therapy Plan:       Medication Reconciliation Completed: No   Comments:     No Care Gaps recommended.     Note composed:7:07 AM 10/12/2022

## 2022-10-12 NOTE — TELEPHONE ENCOUNTER
No new care gaps identified.  [facility] Embedded Care Gaps. Reference number: [id number]. 10/11/2022   11:19:39 PM CDT

## 2022-10-14 ENCOUNTER — TELEPHONE (OUTPATIENT)
Dept: FAMILY MEDICINE | Facility: CLINIC | Age: 72
End: 2022-10-14
Payer: MEDICARE

## 2022-10-17 ENCOUNTER — TELEPHONE (OUTPATIENT)
Dept: FAMILY MEDICINE | Facility: CLINIC | Age: 72
End: 2022-10-17
Payer: MEDICARE

## 2022-10-27 ENCOUNTER — OFFICE VISIT (OUTPATIENT)
Dept: ORTHOPEDICS | Facility: CLINIC | Age: 72
End: 2022-10-27
Attending: FAMILY MEDICINE
Payer: MEDICARE

## 2022-10-27 VITALS — WEIGHT: 244.06 LBS | HEIGHT: 71 IN | BODY MASS INDEX: 34.17 KG/M2

## 2022-10-27 DIAGNOSIS — G89.29 CHRONIC PAIN OF LEFT KNEE: ICD-10-CM

## 2022-10-27 DIAGNOSIS — M25.462 EFFUSION OF BURSA OF LEFT KNEE: ICD-10-CM

## 2022-10-27 DIAGNOSIS — M25.562 CHRONIC PAIN OF LEFT KNEE: ICD-10-CM

## 2022-10-27 DIAGNOSIS — M11.262 PSEUDOGOUT OF LEFT KNEE: Primary | ICD-10-CM

## 2022-10-27 PROCEDURE — 20610 DRAIN/INJ JOINT/BURSA W/O US: CPT | Mod: LT,S$GLB,, | Performed by: ORTHOPAEDIC SURGERY

## 2022-10-27 PROCEDURE — 3060F PR POS MICROALBUMINURIA RESULT DOCUMENTED/REVIEW: ICD-10-PCS | Mod: CPTII,S$GLB,, | Performed by: ORTHOPAEDIC SURGERY

## 2022-10-27 PROCEDURE — 4010F ACE/ARB THERAPY RXD/TAKEN: CPT | Mod: CPTII,S$GLB,, | Performed by: ORTHOPAEDIC SURGERY

## 2022-10-27 PROCEDURE — 3288F FALL RISK ASSESSMENT DOCD: CPT | Mod: CPTII,S$GLB,, | Performed by: ORTHOPAEDIC SURGERY

## 2022-10-27 PROCEDURE — 3072F LOW RISK FOR RETINOPATHY: CPT | Mod: CPTII,S$GLB,, | Performed by: ORTHOPAEDIC SURGERY

## 2022-10-27 PROCEDURE — 99999 PR PBB SHADOW E&M-EST. PATIENT-LVL IV: ICD-10-PCS | Mod: PBBFAC,,, | Performed by: ORTHOPAEDIC SURGERY

## 2022-10-27 PROCEDURE — 4010F PR ACE/ARB THEARPY RXD/TAKEN: ICD-10-PCS | Mod: CPTII,S$GLB,, | Performed by: ORTHOPAEDIC SURGERY

## 2022-10-27 PROCEDURE — 1159F PR MEDICATION LIST DOCUMENTED IN MEDICAL RECORD: ICD-10-PCS | Mod: CPTII,S$GLB,, | Performed by: ORTHOPAEDIC SURGERY

## 2022-10-27 PROCEDURE — 20610 LARGE JOINT ASPIRATION/INJECTION: L KNEE: ICD-10-PCS | Mod: LT,S$GLB,, | Performed by: ORTHOPAEDIC SURGERY

## 2022-10-27 PROCEDURE — 3044F HG A1C LEVEL LT 7.0%: CPT | Mod: CPTII,S$GLB,, | Performed by: ORTHOPAEDIC SURGERY

## 2022-10-27 PROCEDURE — 99999 PR PBB SHADOW E&M-EST. PATIENT-LVL IV: CPT | Mod: PBBFAC,,, | Performed by: ORTHOPAEDIC SURGERY

## 2022-10-27 PROCEDURE — 3044F PR MOST RECENT HEMOGLOBIN A1C LEVEL <7.0%: ICD-10-PCS | Mod: CPTII,S$GLB,, | Performed by: ORTHOPAEDIC SURGERY

## 2022-10-27 PROCEDURE — 1101F PT FALLS ASSESS-DOCD LE1/YR: CPT | Mod: CPTII,S$GLB,, | Performed by: ORTHOPAEDIC SURGERY

## 2022-10-27 PROCEDURE — 99202 OFFICE O/P NEW SF 15 MIN: CPT | Mod: 25,S$GLB,, | Performed by: ORTHOPAEDIC SURGERY

## 2022-10-27 PROCEDURE — 99202 PR OFFICE/OUTPT VISIT, NEW, LEVL II, 15-29 MIN: ICD-10-PCS | Mod: 25,S$GLB,, | Performed by: ORTHOPAEDIC SURGERY

## 2022-10-27 PROCEDURE — 1160F PR REVIEW ALL MEDS BY PRESCRIBER/CLIN PHARMACIST DOCUMENTED: ICD-10-PCS | Mod: CPTII,S$GLB,, | Performed by: ORTHOPAEDIC SURGERY

## 2022-10-27 PROCEDURE — 3066F PR DOCUMENTATION OF TREATMENT FOR NEPHROPATHY: ICD-10-PCS | Mod: CPTII,S$GLB,, | Performed by: ORTHOPAEDIC SURGERY

## 2022-10-27 PROCEDURE — 3060F POS MICROALBUMINURIA REV: CPT | Mod: CPTII,S$GLB,, | Performed by: ORTHOPAEDIC SURGERY

## 2022-10-27 PROCEDURE — 3288F PR FALLS RISK ASSESSMENT DOCUMENTED: ICD-10-PCS | Mod: CPTII,S$GLB,, | Performed by: ORTHOPAEDIC SURGERY

## 2022-10-27 PROCEDURE — 3072F PR LOW RISK FOR RETINOPATHY: ICD-10-PCS | Mod: CPTII,S$GLB,, | Performed by: ORTHOPAEDIC SURGERY

## 2022-10-27 PROCEDURE — 1160F RVW MEDS BY RX/DR IN RCRD: CPT | Mod: CPTII,S$GLB,, | Performed by: ORTHOPAEDIC SURGERY

## 2022-10-27 PROCEDURE — 1159F MED LIST DOCD IN RCRD: CPT | Mod: CPTII,S$GLB,, | Performed by: ORTHOPAEDIC SURGERY

## 2022-10-27 PROCEDURE — 1101F PR PT FALLS ASSESS DOC 0-1 FALLS W/OUT INJ PAST YR: ICD-10-PCS | Mod: CPTII,S$GLB,, | Performed by: ORTHOPAEDIC SURGERY

## 2022-10-27 PROCEDURE — 3066F NEPHROPATHY DOC TX: CPT | Mod: CPTII,S$GLB,, | Performed by: ORTHOPAEDIC SURGERY

## 2022-10-27 RX ORDER — TRIAMCINOLONE ACETONIDE 40 MG/ML
40 INJECTION, SUSPENSION INTRA-ARTICULAR; INTRAMUSCULAR
Status: DISCONTINUED | OUTPATIENT
Start: 2022-10-27 | End: 2022-10-27 | Stop reason: HOSPADM

## 2022-10-27 RX ADMIN — TRIAMCINOLONE ACETONIDE 40 MG: 40 INJECTION, SUSPENSION INTRA-ARTICULAR; INTRAMUSCULAR at 10:10

## 2022-10-27 NOTE — PROGRESS NOTES
Subjective:      Patient ID: Clifton Wilson is a 71 y.o. male.    Chief Complaint: Consult and Knee Pain (left )    HPI     They have experienced problems with their left knee over the past 1 month. The patient reports relevant history of injury/aggravation.  He states that he fell after [employer].  Pain is located diffusely Associated symptoms include swelling.  Symptoms are aggravated by no particular activity. They have been treated with colchicine for chronic pseudogout.   Symptoms have recently improved. Ambulation reportedly has not been impaired. Self care ADLs are not painful.     Review of Systems   Constitutional: Negative for fever and weight loss.   HENT:  Negative for congestion.    Eyes:  Negative for visual disturbance.   Cardiovascular:  Negative for chest pain.   Respiratory:  Negative for shortness of breath.    Hematologic/Lymphatic: Negative for bleeding problem. Does not bruise/bleed easily.   Skin:  Negative for poor wound healing.   Musculoskeletal:  Positive for joint pain and joint swelling.   Gastrointestinal:  Negative for abdominal pain.   Genitourinary:  Negative for dysuria.   Neurological:  Negative for seizures.   Psychiatric/Behavioral:  Negative for altered mental status.    Allergic/Immunologic: Negative for persistent infections.       Objective:      Ortho/SPM Exam      Left knee    [unfilled]    The patient is not in acute distress.   Sclerae normal  Body habitus is normal.  Respiratory distress:  none   The patient walks without a limp.  Hip irritability  negative.   The skin over the knee is intact.  Knee effusion 3+  Palpation- no focal tenderness  Range of motion- 5-120  Ligament laxity exam:  Stable valgus and varus stress   Patellar apprehension negative.  Popliteal cyst negative  Patellar crepitation absent.  Meniscal irritability not applicable  Pulses DP present, PT present.  Motor normal 5/5 strength in all tested muscle groups.   Sensory normal.    I reviewed the  relevant imaging for the patient's condition:  Recent radiographs and CT scan remarkable for chondrocalcinosis, effusion and moderate degenerative changes      Assessment:       Encounter Diagnoses   Name Primary?    Effusion of bursa of left knee     Chronic pain of left knee     Pseudogout of left knee Yes    The condition is partially treated with colchicine      Plan:       Clifton was seen today for consult and knee pain.    Diagnoses and all orders for this visit:    Pseudogout of left knee    Effusion of bursa of left knee  -     Ambulatory referral/consult to Orthopedics    Chronic pain of left knee  -     Ambulatory referral/consult to Orthopedics    I explained my diagnostic impression and the reasoning behind it in detail, using layman's terms.  Models and/or pictures were used to help in the explanation.    Continue colchicine    Aspiration recommended and consent given

## 2022-10-27 NOTE — PROCEDURES
Large Joint Aspiration/Injection: L knee    Date/Time: 10/27/2022 10:45 AM  Performed by: Aditya Brooks MD  Authorized by: Aditya Brooks MD     Location:  Knee  Site:  L knee  Medications:  40 mg triamcinolone acetonide 40 mg/mL    After explaining the procedure, the patient gave verbal consent for left knee aspiration. The site was identified and the skin was aseptically prepped.  Thirty cc's of slightly turbid fluid was obtained.      After injection the joint was injected with 40 mg of triamcinolone and 1 cc of 1% plain Xylocaine.  A sterile dressing was applied.  The patient was instructed to call if there were any problems at the aspiration sight.

## 2022-11-03 ENCOUNTER — OFFICE VISIT (OUTPATIENT)
Dept: FAMILY MEDICINE | Facility: CLINIC | Age: 72
End: 2022-11-03
Attending: FAMILY MEDICINE
Payer: MEDICARE

## 2022-11-03 ENCOUNTER — LAB VISIT (OUTPATIENT)
Dept: LAB | Facility: HOSPITAL | Age: 72
End: 2022-11-03
Attending: FAMILY MEDICINE
Payer: MEDICARE

## 2022-11-03 VITALS
WEIGHT: 240.75 LBS | HEIGHT: 71 IN | BODY MASS INDEX: 33.7 KG/M2 | HEART RATE: 64 BPM | DIASTOLIC BLOOD PRESSURE: 78 MMHG | SYSTOLIC BLOOD PRESSURE: 117 MMHG | OXYGEN SATURATION: 96 %

## 2022-11-03 DIAGNOSIS — E11.59 HYPERTENSION ASSOCIATED WITH DIABETES: ICD-10-CM

## 2022-11-03 DIAGNOSIS — I15.2 HYPERTENSION ASSOCIATED WITH DIABETES: ICD-10-CM

## 2022-11-03 DIAGNOSIS — E11.40 TYPE 2 DIABETES MELLITUS WITH DIABETIC NEUROPATHY, WITH LONG-TERM CURRENT USE OF INSULIN: Primary | ICD-10-CM

## 2022-11-03 DIAGNOSIS — Z79.4 TYPE 2 DIABETES MELLITUS WITH DIABETIC NEUROPATHY, WITH LONG-TERM CURRENT USE OF INSULIN: ICD-10-CM

## 2022-11-03 DIAGNOSIS — E78.5 DYSLIPIDEMIA ASSOCIATED WITH TYPE 2 DIABETES MELLITUS: ICD-10-CM

## 2022-11-03 DIAGNOSIS — E11.40 TYPE 2 DIABETES MELLITUS WITH DIABETIC NEUROPATHY, WITH LONG-TERM CURRENT USE OF INSULIN: ICD-10-CM

## 2022-11-03 DIAGNOSIS — I50.42 CHRONIC COMBINED SYSTOLIC AND DIASTOLIC CONGESTIVE HEART FAILURE: ICD-10-CM

## 2022-11-03 DIAGNOSIS — Z23 IMMUNIZATION DUE: ICD-10-CM

## 2022-11-03 DIAGNOSIS — E66.01 SEVERE OBESITY (BMI 35.0-35.9 WITH COMORBIDITY): ICD-10-CM

## 2022-11-03 DIAGNOSIS — Z79.4 TYPE 2 DIABETES MELLITUS WITH DIABETIC NEUROPATHY, WITH LONG-TERM CURRENT USE OF INSULIN: Primary | ICD-10-CM

## 2022-11-03 DIAGNOSIS — I25.118 CORONARY ARTERY DISEASE OF NATIVE ARTERY OF NATIVE HEART WITH STABLE ANGINA PECTORIS: ICD-10-CM

## 2022-11-03 DIAGNOSIS — E11.69 DYSLIPIDEMIA ASSOCIATED WITH TYPE 2 DIABETES MELLITUS: ICD-10-CM

## 2022-11-03 LAB
ALBUMIN SERPL BCP-MCNC: 3.9 G/DL (ref 3.5–5.2)
ALP SERPL-CCNC: 92 U/L (ref 55–135)
ALT SERPL W/O P-5'-P-CCNC: 30 U/L (ref 10–44)
ANION GAP SERPL CALC-SCNC: 13 MMOL/L (ref 8–16)
AST SERPL-CCNC: 19 U/L (ref 10–40)
BILIRUB SERPL-MCNC: 1.1 MG/DL (ref 0.1–1)
BUN SERPL-MCNC: 21 MG/DL (ref 8–23)
CALCIUM SERPL-MCNC: 9.4 MG/DL (ref 8.7–10.5)
CHLORIDE SERPL-SCNC: 104 MMOL/L (ref 95–110)
CO2 SERPL-SCNC: 23 MMOL/L (ref 23–29)
CREAT SERPL-MCNC: 1.1 MG/DL (ref 0.5–1.4)
EST. GFR  (NO RACE VARIABLE): >60 ML/MIN/1.73 M^2
ESTIMATED AVG GLUCOSE: 157 MG/DL (ref 68–131)
GLUCOSE SERPL-MCNC: 143 MG/DL (ref 70–110)
HBA1C MFR BLD: 7.1 % (ref 4–5.6)
POTASSIUM SERPL-SCNC: 5 MMOL/L (ref 3.5–5.1)
PROT SERPL-MCNC: 7.2 G/DL (ref 6–8.4)
SODIUM SERPL-SCNC: 140 MMOL/L (ref 136–145)

## 2022-11-03 PROCEDURE — 3288F FALL RISK ASSESSMENT DOCD: CPT | Mod: CPTII,S$GLB,, | Performed by: FAMILY MEDICINE

## 2022-11-03 PROCEDURE — 1160F PR REVIEW ALL MEDS BY PRESCRIBER/CLIN PHARMACIST DOCUMENTED: ICD-10-PCS | Mod: CPTII,S$GLB,, | Performed by: FAMILY MEDICINE

## 2022-11-03 PROCEDURE — 36415 COLL VENOUS BLD VENIPUNCTURE: CPT | Performed by: FAMILY MEDICINE

## 2022-11-03 PROCEDURE — 99999 PR PBB SHADOW E&M-EST. PATIENT-LVL V: ICD-10-PCS | Mod: PBBFAC,,, | Performed by: FAMILY MEDICINE

## 2022-11-03 PROCEDURE — 3051F HG A1C>EQUAL 7.0%<8.0%: CPT | Mod: CPTII,S$GLB,, | Performed by: FAMILY MEDICINE

## 2022-11-03 PROCEDURE — 99499 RISK ADDL DX/OHS AUDIT: ICD-10-PCS | Mod: S$GLB,,, | Performed by: FAMILY MEDICINE

## 2022-11-03 PROCEDURE — 3078F DIAST BP <80 MM HG: CPT | Mod: CPTII,S$GLB,, | Performed by: FAMILY MEDICINE

## 2022-11-03 PROCEDURE — 3051F PR MOST RECENT HEMOGLOBIN A1C LEVEL 7.0 - < 8.0%: ICD-10-PCS | Mod: CPTII,S$GLB,, | Performed by: FAMILY MEDICINE

## 2022-11-03 PROCEDURE — 3060F POS MICROALBUMINURIA REV: CPT | Mod: CPTII,S$GLB,, | Performed by: FAMILY MEDICINE

## 2022-11-03 PROCEDURE — 3078F PR MOST RECENT DIASTOLIC BLOOD PRESSURE < 80 MM HG: ICD-10-PCS | Mod: CPTII,S$GLB,, | Performed by: FAMILY MEDICINE

## 2022-11-03 PROCEDURE — 90694 VACC AIIV4 NO PRSRV 0.5ML IM: CPT | Mod: S$GLB,,, | Performed by: FAMILY MEDICINE

## 2022-11-03 PROCEDURE — 90694 FLU VACCINE - QUADRIVALENT - ADJUVANTED: ICD-10-PCS | Mod: S$GLB,,, | Performed by: FAMILY MEDICINE

## 2022-11-03 PROCEDURE — 3072F PR LOW RISK FOR RETINOPATHY: ICD-10-PCS | Mod: CPTII,S$GLB,, | Performed by: FAMILY MEDICINE

## 2022-11-03 PROCEDURE — 3060F PR POS MICROALBUMINURIA RESULT DOCUMENTED/REVIEW: ICD-10-PCS | Mod: CPTII,S$GLB,, | Performed by: FAMILY MEDICINE

## 2022-11-03 PROCEDURE — 1160F RVW MEDS BY RX/DR IN RCRD: CPT | Mod: CPTII,S$GLB,, | Performed by: FAMILY MEDICINE

## 2022-11-03 PROCEDURE — 3074F SYST BP LT 130 MM HG: CPT | Mod: CPTII,S$GLB,, | Performed by: FAMILY MEDICINE

## 2022-11-03 PROCEDURE — 99999 PR PBB SHADOW E&M-EST. PATIENT-LVL V: CPT | Mod: PBBFAC,,, | Performed by: FAMILY MEDICINE

## 2022-11-03 PROCEDURE — 3008F PR BODY MASS INDEX (BMI) DOCUMENTED: ICD-10-PCS | Mod: CPTII,S$GLB,, | Performed by: FAMILY MEDICINE

## 2022-11-03 PROCEDURE — 3288F PR FALLS RISK ASSESSMENT DOCUMENTED: ICD-10-PCS | Mod: CPTII,S$GLB,, | Performed by: FAMILY MEDICINE

## 2022-11-03 PROCEDURE — 3072F LOW RISK FOR RETINOPATHY: CPT | Mod: CPTII,S$GLB,, | Performed by: FAMILY MEDICINE

## 2022-11-03 PROCEDURE — G0008 ADMIN INFLUENZA VIRUS VAC: HCPCS | Mod: S$GLB,,, | Performed by: FAMILY MEDICINE

## 2022-11-03 PROCEDURE — 4010F PR ACE/ARB THEARPY RXD/TAKEN: ICD-10-PCS | Mod: CPTII,S$GLB,, | Performed by: FAMILY MEDICINE

## 2022-11-03 PROCEDURE — 80053 COMPREHEN METABOLIC PANEL: CPT | Performed by: FAMILY MEDICINE

## 2022-11-03 PROCEDURE — 3074F PR MOST RECENT SYSTOLIC BLOOD PRESSURE < 130 MM HG: ICD-10-PCS | Mod: CPTII,S$GLB,, | Performed by: FAMILY MEDICINE

## 2022-11-03 PROCEDURE — 99214 OFFICE O/P EST MOD 30 MIN: CPT | Mod: 25,S$GLB,, | Performed by: FAMILY MEDICINE

## 2022-11-03 PROCEDURE — 3066F NEPHROPATHY DOC TX: CPT | Mod: CPTII,S$GLB,, | Performed by: FAMILY MEDICINE

## 2022-11-03 PROCEDURE — 4010F ACE/ARB THERAPY RXD/TAKEN: CPT | Mod: CPTII,S$GLB,, | Performed by: FAMILY MEDICINE

## 2022-11-03 PROCEDURE — 1101F PT FALLS ASSESS-DOCD LE1/YR: CPT | Mod: CPTII,S$GLB,, | Performed by: FAMILY MEDICINE

## 2022-11-03 PROCEDURE — 1101F PR PT FALLS ASSESS DOC 0-1 FALLS W/OUT INJ PAST YR: ICD-10-PCS | Mod: CPTII,S$GLB,, | Performed by: FAMILY MEDICINE

## 2022-11-03 PROCEDURE — 3008F BODY MASS INDEX DOCD: CPT | Mod: CPTII,S$GLB,, | Performed by: FAMILY MEDICINE

## 2022-11-03 PROCEDURE — 3066F PR DOCUMENTATION OF TREATMENT FOR NEPHROPATHY: ICD-10-PCS | Mod: CPTII,S$GLB,, | Performed by: FAMILY MEDICINE

## 2022-11-03 PROCEDURE — 1126F PR PAIN SEVERITY QUANTIFIED, NO PAIN PRESENT: ICD-10-PCS | Mod: CPTII,S$GLB,, | Performed by: FAMILY MEDICINE

## 2022-11-03 PROCEDURE — G0008 FLU VACCINE - QUADRIVALENT - ADJUVANTED: ICD-10-PCS | Mod: S$GLB,,, | Performed by: FAMILY MEDICINE

## 2022-11-03 PROCEDURE — 1126F AMNT PAIN NOTED NONE PRSNT: CPT | Mod: CPTII,S$GLB,, | Performed by: FAMILY MEDICINE

## 2022-11-03 PROCEDURE — 1159F MED LIST DOCD IN RCRD: CPT | Mod: CPTII,S$GLB,, | Performed by: FAMILY MEDICINE

## 2022-11-03 PROCEDURE — 99214 PR OFFICE/OUTPT VISIT, EST, LEVL IV, 30-39 MIN: ICD-10-PCS | Mod: 25,S$GLB,, | Performed by: FAMILY MEDICINE

## 2022-11-03 PROCEDURE — 99499 UNLISTED E&M SERVICE: CPT | Mod: S$GLB,,, | Performed by: FAMILY MEDICINE

## 2022-11-03 PROCEDURE — 83036 HEMOGLOBIN GLYCOSYLATED A1C: CPT | Performed by: FAMILY MEDICINE

## 2022-11-03 PROCEDURE — 1159F PR MEDICATION LIST DOCUMENTED IN MEDICAL RECORD: ICD-10-PCS | Mod: CPTII,S$GLB,, | Performed by: FAMILY MEDICINE

## 2022-11-05 NOTE — PROGRESS NOTES
Subjective:       Patient ID: Clifton Wilson is a 71 y.o. male.    Chief Complaint: Follow-up    71 yr old pleasant white male with DM II, HTN, HLD, CAD, Combined chronic CHF, MR, obesity, neuropathy, presents today for diabetes.       DM II - controlled  - HGBA1C                   7.1 (H)             11/03/2022                                    - on metformin and insulin and sugars improving - UTD eye exam - foot exam UTD - on ASA and plavix. Losartan. He ate too much jamie cake during mardi gras.      HTN - chronic - controlled - on losartan, lasix - compliant - no side effects      HLD - chronic -      LDLCALC                  71.2                05/03/2022                                     - controlled -       CAD/combined CHF - EF 35-40% - on external defibrillator - medical management - on ASA/plavix/ARB - no symptoms - following cardiology    Gout - improving - uses colchicine for flare up -     History as below - reviewed            Follow-up  Associated symptoms include arthralgias, joint swelling and myalgias. Pertinent negatives include no chest pain, congestion, coughing, diaphoresis, fatigue, headaches, neck pain, rash, visual change, vomiting or weakness.   Diabetes  He presents for his follow-up diabetic visit. He has type 2 diabetes mellitus. No MedicAlert identification noted. The initial diagnosis of diabetes was made 27 years ago. His disease course has been worsening. Hypoglycemia symptoms include sleepiness. Pertinent negatives for hypoglycemia include no confusion, dizziness, headaches, hunger, mood changes, nervousness/anxiousness, pallor, seizures, speech difficulty, sweats or tremors. Associated symptoms include foot paresthesias. Pertinent negatives for diabetes include no blurred vision, no chest pain, no fatigue, no foot ulcerations, no polydipsia, no polyphagia, no polyuria, no visual change, no weakness and no weight loss. Hypoglycemia complications include blackouts and  hospitalization. Pertinent negatives for hypoglycemia complications include no nocturnal hypoglycemia, no required assistance and no required glucagon injection. Symptoms are stable. Diabetic complications include autonomic neuropathy, heart disease, impotence and peripheral neuropathy. Pertinent negatives for diabetic complications include no CVA, nephropathy, PVD or retinopathy. Risk factors for coronary artery disease include hypertension, obesity, diabetes mellitus and male sex. Current diabetic treatment includes diet, insulin injections and oral agent (monotherapy). He is compliant with treatment all of the time. He is currently taking insulin pre-breakfast, pre-lunch, pre-dinner and at bedtime. Insulin injections are given by patient. Rotation sites for injection include the abdominal wall, arms and thighs. His weight is fluctuating minimally. He is following a diabetic, generally healthy, low fat/cholesterol and low salt diet. Meal planning includes avoidance of concentrated sweets and carbohydrate counting. He has not had a previous visit with a dietitian. He participates in exercise three times a week. He monitors blood glucose at home 3-4 x per day. He monitors urine at home <1 x per month. Blood glucose monitoring compliance is excellent. His home blood glucose trend is decreasing steadily. An ACE inhibitor/angiotensin II receptor blocker is being taken. He does not see a podiatrist.Eye exam is current.   Knee Pain     Swelling  This is a chronic problem. The current episode started more than 1 month ago. The problem occurs intermittently. The problem has been gradually worsening. Associated symptoms include arthralgias, joint swelling and myalgias. Pertinent negatives include no chest pain, congestion, coughing, diaphoresis, fatigue, headaches, neck pain, rash, visual change, vomiting or weakness.   Hypertension  This is a chronic problem. The current episode started more than 1 year ago. The problem  has been gradually improving since onset. The problem is controlled. Pertinent negatives include no blurred vision, chest pain, headaches, malaise/fatigue, neck pain, palpitations, peripheral edema, PND or sweats. Risk factors for coronary artery disease include diabetes mellitus, dyslipidemia, male gender and obesity. Past treatments include angiotensin blockers and diuretics. The current treatment provides significant improvement. There are no compliance problems.  Hypertensive end-organ damage includes CAD/MI and heart failure. There is no history of CVA, left ventricular hypertrophy, PVD or retinopathy. There is no history of chronic renal disease, hypercortisolism, hyperparathyroidism, pheochromocytoma, renovascular disease or a thyroid problem.   Hyperlipidemia  This is a chronic problem. The current episode started more than 1 year ago. The problem is controlled. Recent lipid tests were reviewed and are normal. Exacerbating diseases include obesity. He has no history of chronic renal disease or diabetes. There are no known factors aggravating his hyperlipidemia. Associated symptoms include myalgias. Pertinent negatives include no chest pain or focal sensory loss. Current antihyperlipidemic treatment includes statins. The current treatment provides moderate improvement of lipids. There are no compliance problems.  Risk factors for coronary artery disease include diabetes mellitus, dyslipidemia, male sex, hypertension and obesity.   Review of Systems   Constitutional: Negative.  Negative for activity change, diaphoresis, fatigue, malaise/fatigue, unexpected weight change and weight loss.   HENT: Negative.  Negative for nasal congestion, ear pain, mouth sores, rhinorrhea and voice change.    Eyes: Negative.  Negative for blurred vision, pain, discharge and visual disturbance.   Respiratory: Negative.  Negative for apnea, cough and wheezing.    Cardiovascular: Negative.  Negative for chest pain, palpitations and  PND.   Gastrointestinal: Negative.  Negative for abdominal distention, anal bleeding, diarrhea and vomiting.   Endocrine: Negative.  Negative for cold intolerance, polydipsia, polyphagia and polyuria.   Genitourinary:  Positive for impotence. Negative for decreased urine volume, difficulty urinating, discharge, frequency and scrotal swelling.   Musculoskeletal:  Positive for arthralgias, joint swelling and myalgias. Negative for back pain, neck pain and neck stiffness.   Integumentary:  Negative for color change, pallor and rash. Negative.   Allergic/Immunologic: Negative.  Negative for environmental allergies.   Neurological: Negative.  Negative for dizziness, tremors, seizures, speech difficulty, weakness, light-headedness and headaches.   Hematological: Negative.    Psychiatric/Behavioral: Negative.  Negative for agitation, confusion, dysphoric mood and suicidal ideas. The patient is not nervous/anxious.        PMH/PSH/FH/SH/MED/ALLERGY reviewed    Objective:       Vitals:    11/03/22 0919   BP: 117/78   Pulse: 64       Physical Exam  Constitutional:       Appearance: He is well-developed.   HENT:      Head: Normocephalic and atraumatic.      Right Ear: External ear normal.      Left Ear: External ear normal.      Nose: Nose normal.      Mouth/Throat:      Pharynx: No oropharyngeal exudate.   Eyes:      General: No scleral icterus.        Right eye: No discharge.         Left eye: No discharge.      Conjunctiva/sclera: Conjunctivae normal.      Pupils: Pupils are equal, round, and reactive to light.   Neck:      Thyroid: No thyromegaly.      Vascular: No JVD.      Trachea: No tracheal deviation.   Cardiovascular:      Rate and Rhythm: Normal rate and regular rhythm.      Heart sounds: Normal heart sounds. No murmur heard.    No friction rub. No gallop.   Pulmonary:      Effort: Pulmonary effort is normal. No respiratory distress.      Breath sounds: Normal breath sounds. No stridor. No wheezing or rales.   Chest:       Chest wall: No tenderness.   Abdominal:      General: Bowel sounds are normal. There is no distension.      Palpations: Abdomen is soft. There is no mass.      Tenderness: There is no abdominal tenderness. There is no guarding or rebound.      Hernia: No hernia is present.   Musculoskeletal:         General: No swelling or tenderness. Normal range of motion.      Cervical back: Normal range of motion and neck supple.      Comments: Left knee swelling, warm and TTP.   Lymphadenopathy:      Cervical: No cervical adenopathy.   Skin:     General: Skin is warm and dry.      Coloration: Skin is not pale.      Findings: No erythema or rash.   Neurological:      Mental Status: He is alert and oriented to person, place, and time.      Cranial Nerves: No cranial nerve deficit.      Motor: No abnormal muscle tone.      Coordination: Coordination normal.      Deep Tendon Reflexes: Reflexes are normal and symmetric. Reflexes normal.   Psychiatric:         Behavior: Behavior normal.         Thought Content: Thought content normal.         Judgment: Judgment normal.       Assessment:       Problem List Items Addressed This Visit       Type 2 diabetes mellitus with diabetic neuropathy - Primary    Severe obesity (BMI 35.0-35.9 with comorbidity)    Hypertension associated with diabetes    Dyslipidemia associated with type 2 diabetes mellitus    Coronary artery disease of native artery of native heart with stable angina pectoris    Chronic combined systolic and diastolic congestive heart failure     Other Visit Diagnoses       Immunization due                Plan:           Clifton was seen today for follow-up.    Diagnoses and all orders for this visit:    Type 2 diabetes mellitus with diabetic neuropathy, with long-term current use of insulin  -     Comprehensive Metabolic Panel; Future  -     Hemoglobin A1C; Future    Severe obesity (BMI 35.0-35.9 with comorbidity)    Hypertension associated with diabetes    Dyslipidemia  associated with type 2 diabetes mellitus    Chronic combined systolic and diastolic congestive heart failure    Coronary artery disease of native artery of native heart with stable angina pectoris    Immunization due  -     Influenza (FLUAD) - Quadrivalent (Adjuvanted) *Preferred* (65+) (PF)    Wellness check  -normal exam  -labs    DM II controlled/hyperglycemia  - improving since started insulin  -lantus and novolog to continue  -strict diet control        HTN  -controlled    HLD  -controlled    Combined CHF  -follows cardiology  -on external defibrillator    Neuropathy  -continue neurontin and increase dose to 600 mg BID    Obesity  -diet and exercise as tolerated    chronic gout  -uric acid levels  -conchicine as needed    Spent adequate time in obtaining history and explaining differentials    25 minutes spent during this visit of which greater than 50% devoted to face-face counseling and coordination of care regarding diagnosis and management plan    Follow up in about 6 months (around 5/3/2023), or if symptoms worsen or fail to improve.

## 2022-11-29 ENCOUNTER — CLINICAL SUPPORT (OUTPATIENT)
Dept: CARDIOLOGY | Facility: HOSPITAL | Age: 72
End: 2022-11-29
Payer: MEDICARE

## 2022-11-29 DIAGNOSIS — I25.5 ISCHEMIC CARDIOMYOPATHY: ICD-10-CM

## 2022-11-29 DIAGNOSIS — Z95.810 PRESENCE OF AUTOMATIC (IMPLANTABLE) CARDIAC DEFIBRILLATOR: ICD-10-CM

## 2022-11-29 PROCEDURE — 93296 REM INTERROG EVL PM/IDS: CPT | Performed by: INTERNAL MEDICINE

## 2022-11-29 PROCEDURE — 93295 CARDIAC DEVICE CHECK - REMOTE: ICD-10-PCS | Mod: ,,, | Performed by: INTERNAL MEDICINE

## 2022-11-29 PROCEDURE — 93295 DEV INTERROG REMOTE 1/2/MLT: CPT | Mod: ,,, | Performed by: INTERNAL MEDICINE

## 2022-12-18 DIAGNOSIS — M65.9 SYNOVITIS OF KNEE: ICD-10-CM

## 2022-12-18 DIAGNOSIS — E11.40 TYPE 2 DIABETES MELLITUS WITH DIABETIC NEUROPATHY, WITH LONG-TERM CURRENT USE OF INSULIN: ICD-10-CM

## 2022-12-18 DIAGNOSIS — Z79.4 TYPE 2 DIABETES MELLITUS WITH DIABETIC NEUROPATHY, WITH LONG-TERM CURRENT USE OF INSULIN: ICD-10-CM

## 2022-12-18 DIAGNOSIS — E11.9 DIABETES MELLITUS TYPE 2 WITHOUT RETINOPATHY: ICD-10-CM

## 2022-12-18 DIAGNOSIS — G62.9 NEUROPATHY: ICD-10-CM

## 2022-12-18 DIAGNOSIS — E11.40 TYPE 2 DIABETES MELLITUS WITH DIABETIC NEUROPATHY, UNSPECIFIED WHETHER LONG TERM INSULIN USE: ICD-10-CM

## 2022-12-18 DIAGNOSIS — F51.01 PRIMARY INSOMNIA: ICD-10-CM

## 2022-12-19 RX ORDER — ZOLPIDEM TARTRATE 5 MG/1
TABLET ORAL
Qty: 90 TABLET | Refills: 0 | Status: SHIPPED | OUTPATIENT
Start: 2022-12-19 | End: 2023-04-08 | Stop reason: SDUPTHER

## 2022-12-19 RX ORDER — METFORMIN HYDROCHLORIDE 1000 MG/1
1000 TABLET ORAL 2 TIMES DAILY WITH MEALS
Qty: 180 TABLET | Refills: 1 | Status: SHIPPED | OUTPATIENT
Start: 2022-12-19 | End: 2023-04-05 | Stop reason: SDUPTHER

## 2022-12-19 RX ORDER — GABAPENTIN 300 MG/1
600 CAPSULE ORAL 2 TIMES DAILY
Qty: 360 CAPSULE | Refills: 1 | Status: SHIPPED | OUTPATIENT
Start: 2022-12-19 | End: 2023-08-23 | Stop reason: SDUPTHER

## 2022-12-19 RX ORDER — INSULIN GLARGINE 100 [IU]/ML
35 INJECTION, SOLUTION SUBCUTANEOUS NIGHTLY
Qty: 30 ML | Refills: 1 | Status: SHIPPED | OUTPATIENT
Start: 2022-12-19 | End: 2023-05-09 | Stop reason: SDUPTHER

## 2022-12-19 RX ORDER — COLCHICINE 0.6 MG/1
0.6 CAPSULE ORAL DAILY
Qty: 90 CAPSULE | Refills: 3 | Status: SHIPPED | OUTPATIENT
Start: 2022-12-19 | End: 2023-12-30 | Stop reason: SDUPTHER

## 2022-12-19 NOTE — TELEPHONE ENCOUNTER
No new care gaps identified.  [facility] Embedded Care Gaps. Reference number: [id number]. 12/18/2022   6:22:55 PM CST

## 2022-12-19 NOTE — TELEPHONE ENCOUNTER
No new care gaps identified.  [facility] Embedded Care Gaps. Reference number: [id number]. 12/18/2022   6:23:28 PM CST

## 2022-12-19 NOTE — TELEPHONE ENCOUNTER
Refill Routing Note   Medication(s) are not appropriate for processing by [facility] for the following reason(s):      - Outside of protocol (non-delegated)    [facility] action(s):  Route  Approve          Medication reconciliation completed: No     Appointments  past 12m or future 3m with PCP    Date Provider   Last Visit   11/3/2022 Britton Grissom MD   Next Visit   5/3/2023 Britton Grissom MD   ED visits in past 90 days: 0        Note composed:5:11 PM 12/19/2022

## 2023-01-18 ENCOUNTER — PATIENT MESSAGE (OUTPATIENT)
Dept: FAMILY MEDICINE | Facility: CLINIC | Age: 73
End: 2023-01-18
Payer: MEDICARE

## 2023-01-31 ENCOUNTER — PATIENT MESSAGE (OUTPATIENT)
Dept: FAMILY MEDICINE | Facility: CLINIC | Age: 73
End: 2023-01-31
Payer: MEDICARE

## 2023-02-07 DIAGNOSIS — Z00.00 ENCOUNTER FOR MEDICARE ANNUAL WELLNESS EXAM: ICD-10-CM

## 2023-02-09 DIAGNOSIS — Z00.00 ENCOUNTER FOR MEDICARE ANNUAL WELLNESS EXAM: ICD-10-CM

## 2023-02-27 ENCOUNTER — CLINICAL SUPPORT (OUTPATIENT)
Dept: CARDIOLOGY | Facility: HOSPITAL | Age: 73
End: 2023-02-27
Payer: MEDICARE

## 2023-02-27 DIAGNOSIS — Z95.810 PRESENCE OF AUTOMATIC (IMPLANTABLE) CARDIAC DEFIBRILLATOR: ICD-10-CM

## 2023-02-27 DIAGNOSIS — I25.5 ISCHEMIC CARDIOMYOPATHY: ICD-10-CM

## 2023-02-27 DIAGNOSIS — I50.9 HEART FAILURE, UNSPECIFIED: ICD-10-CM

## 2023-02-27 DIAGNOSIS — I47.29 OTHER VENTRICULAR TACHYCARDIA: ICD-10-CM

## 2023-02-27 PROCEDURE — 93296 REM INTERROG EVL PM/IDS: CPT | Performed by: INTERNAL MEDICINE

## 2023-03-05 DIAGNOSIS — E11.40 TYPE 2 DIABETES MELLITUS WITH DIABETIC NEUROPATHY, WITH LONG-TERM CURRENT USE OF INSULIN: ICD-10-CM

## 2023-03-05 DIAGNOSIS — Z79.4 TYPE 2 DIABETES MELLITUS WITH DIABETIC NEUROPATHY, WITH LONG-TERM CURRENT USE OF INSULIN: ICD-10-CM

## 2023-03-05 DIAGNOSIS — E11.9 DIABETES MELLITUS TYPE 2 WITHOUT RETINOPATHY: ICD-10-CM

## 2023-03-05 NOTE — TELEPHONE ENCOUNTER
Care Due:                  Date            Visit Type   Department     Provider  --------------------------------------------------------------------------------                                EP -                              [facility]    Britton Claudio  Last Visit: 11-      CARE ([facility])   MEDICINE       Jaciel                               -                              [facility]  Next Visit: 05-      CARE ([facility])   MEDICINE       Jaciel                                                            Last  Test          Frequency    Reason                     Performed    Due Date  --------------------------------------------------------------------------------    CBC.........  12 months..  clopidogreL..............  05- 04-    HBA1C.......  6 months...  insulin, metFORMIN.......  11- 05-    Lipid Panel.  12 months..  atorvastatin.............  05- 04-    [facility] Embedded Care Gaps. Reference number: [id number]. 3/05/2023   12:41:30 PM CST

## 2023-03-06 RX ORDER — INSULIN ASPART 100 [IU]/ML
15 INJECTION, SOLUTION INTRAVENOUS; SUBCUTANEOUS
Qty: 45 ML | Refills: 10 | Status: SHIPPED | OUTPATIENT
Start: 2023-03-06

## 2023-03-06 NOTE — TELEPHONE ENCOUNTER
Refill Decision Note   Clitfon Wilson  is requesting a refill authorization.  Brief Assessment and Rationale for Refill:  Approve     Medication Therapy Plan:       Medication Reconciliation Completed: No   Comments:     Provider Staff:     Action is required for this patient.   Please see care gap opportunities below in Care Due Message.     Thanks!  [facility]     Appointments      Date Provider   Last Visit   11/3/2022 Britton Grissom MD   Next Visit   5/3/2023 Britton Grissom MD     Note composed:8:36 AM 03/06/2023           Note composed:8:36 AM 03/06/2023

## 2023-03-16 ENCOUNTER — PATIENT MESSAGE (OUTPATIENT)
Dept: FAMILY MEDICINE | Facility: CLINIC | Age: 73
End: 2023-03-16
Payer: MEDICARE

## 2023-03-25 ENCOUNTER — HOSPITAL ENCOUNTER (INPATIENT)
Facility: HOSPITAL | Age: 73
LOS: 3 days | Discharge: HOME | DRG: 690 | End: 2023-03-29
Attending: EMERGENCY MEDICINE | Admitting: STUDENT IN AN ORGANIZED HEALTH CARE EDUCATION/TRAINING PROGRAM
Payer: MEDICARE

## 2023-03-25 DIAGNOSIS — N12 PYELONEPHRITIS: ICD-10-CM

## 2023-03-25 DIAGNOSIS — R07.9 CHEST PAIN: ICD-10-CM

## 2023-03-25 DIAGNOSIS — R78.81 GRAM-NEGATIVE BACTEREMIA: Primary | ICD-10-CM

## 2023-03-25 LAB
BASOPHILS # BLD AUTO: 0.06 K/UL (ref 0–0.2)
BASOPHILS NFR BLD: 0.4 % (ref 0–1.9)
DIFFERENTIAL METHOD: ABNORMAL
EOSINOPHIL # BLD AUTO: 0.1 K/UL (ref 0–0.5)
EOSINOPHIL NFR BLD: 0.8 % (ref 0–8)
ERYTHROCYTE [DISTWIDTH] IN BLOOD BY AUTOMATED COUNT: 12 % (ref 11.5–14.5)
HCT VFR BLD AUTO: 34.6 % (ref 40–54)
HGB BLD-MCNC: 11.7 G/DL (ref 14–18)
IMM GRANULOCYTES # BLD AUTO: 0.07 K/UL (ref 0–0.04)
IMM GRANULOCYTES NFR BLD AUTO: 0.5 % (ref 0–0.5)
LYMPHOCYTES # BLD AUTO: 1.2 K/UL (ref 1–4.8)
LYMPHOCYTES NFR BLD: 8.2 % (ref 18–48)
MCH RBC QN AUTO: 31.9 PG (ref 27–31)
MCHC RBC AUTO-ENTMCNC: 33.8 G/DL (ref 32–36)
MCV RBC AUTO: 94 FL (ref 82–98)
MONOCYTES # BLD AUTO: 1.3 K/UL (ref 0.3–1)
MONOCYTES NFR BLD: 8.8 % (ref 4–15)
NEUTROPHILS # BLD AUTO: 11.5 K/UL (ref 1.8–7.7)
NEUTROPHILS NFR BLD: 81.3 % (ref 38–73)
NRBC BLD-RTO: 0 /100 WBC
PLATELET # BLD AUTO: 212 K/UL (ref 150–450)
PMV BLD AUTO: 10 FL (ref 9.2–12.9)
RBC # BLD AUTO: 3.67 M/UL (ref 4.6–6.2)
WBC # BLD AUTO: 14.14 K/UL (ref 3.9–12.7)

## 2023-03-25 PROCEDURE — 87040 BLOOD CULTURE FOR BACTERIA: CPT | Mod: 59 | Performed by: EMERGENCY MEDICINE

## 2023-03-25 PROCEDURE — 80053 COMPREHEN METABOLIC PANEL: CPT | Performed by: EMERGENCY MEDICINE

## 2023-03-25 PROCEDURE — 96365 THER/PROPH/DIAG IV INF INIT: CPT

## 2023-03-25 PROCEDURE — G0378 HOSPITAL OBSERVATION PER HR: HCPCS

## 2023-03-25 PROCEDURE — 86140 C-REACTIVE PROTEIN: CPT | Performed by: EMERGENCY MEDICINE

## 2023-03-25 PROCEDURE — 84145 PROCALCITONIN (PCT): CPT | Performed by: EMERGENCY MEDICINE

## 2023-03-25 PROCEDURE — 63600175 PHARM REV CODE 636 W HCPCS: Performed by: EMERGENCY MEDICINE

## 2023-03-25 PROCEDURE — 85025 COMPLETE CBC W/AUTO DIFF WBC: CPT | Performed by: EMERGENCY MEDICINE

## 2023-03-25 PROCEDURE — 99285 EMERGENCY DEPT VISIT HI MDM: CPT | Mod: 25

## 2023-03-25 PROCEDURE — 25000003 PHARM REV CODE 250: Performed by: EMERGENCY MEDICINE

## 2023-03-25 RX ORDER — INSULIN ASPART 100 [IU]/ML
0-5 INJECTION, SOLUTION INTRAVENOUS; SUBCUTANEOUS
Status: DISCONTINUED | OUTPATIENT
Start: 2023-03-26 | End: 2023-03-28

## 2023-03-25 RX ORDER — GLUCAGON 1 MG
1 KIT INJECTION
Status: DISCONTINUED | OUTPATIENT
Start: 2023-03-26 | End: 2023-03-29 | Stop reason: HOSPADM

## 2023-03-25 RX ORDER — NALOXONE HCL 0.4 MG/ML
0.02 VIAL (ML) INJECTION
Status: DISCONTINUED | OUTPATIENT
Start: 2023-03-26 | End: 2023-03-29 | Stop reason: HOSPADM

## 2023-03-25 RX ORDER — ACETAMINOPHEN 325 MG/1
650 TABLET ORAL EVERY 8 HOURS PRN
Status: DISCONTINUED | OUTPATIENT
Start: 2023-03-26 | End: 2023-03-29 | Stop reason: HOSPADM

## 2023-03-25 RX ORDER — PROCHLORPERAZINE EDISYLATE 5 MG/ML
5 INJECTION INTRAMUSCULAR; INTRAVENOUS EVERY 6 HOURS PRN
Status: DISCONTINUED | OUTPATIENT
Start: 2023-03-26 | End: 2023-03-29 | Stop reason: HOSPADM

## 2023-03-25 RX ORDER — DEXTROSE 40 %
15 GEL (GRAM) ORAL
Status: DISCONTINUED | OUTPATIENT
Start: 2023-03-26 | End: 2023-03-29 | Stop reason: HOSPADM

## 2023-03-25 RX ORDER — ONDANSETRON 2 MG/ML
4 INJECTION INTRAMUSCULAR; INTRAVENOUS EVERY 8 HOURS PRN
Status: DISCONTINUED | OUTPATIENT
Start: 2023-03-26 | End: 2023-03-29 | Stop reason: HOSPADM

## 2023-03-25 RX ORDER — POLYETHYLENE GLYCOL 3350 17 G/17G
17 POWDER, FOR SOLUTION ORAL DAILY PRN
Status: DISCONTINUED | OUTPATIENT
Start: 2023-03-26 | End: 2023-03-29 | Stop reason: HOSPADM

## 2023-03-25 RX ORDER — TALC
6 POWDER (GRAM) TOPICAL NIGHTLY PRN
Status: DISCONTINUED | OUTPATIENT
Start: 2023-03-26 | End: 2023-03-29 | Stop reason: HOSPADM

## 2023-03-25 RX ORDER — SODIUM CHLORIDE 0.9 % (FLUSH) 0.9 %
10 SYRINGE (ML) INJECTION EVERY 8 HOURS
Status: DISCONTINUED | OUTPATIENT
Start: 2023-03-26 | End: 2023-03-29 | Stop reason: HOSPADM

## 2023-03-25 RX ORDER — DEXTROSE 40 %
30 GEL (GRAM) ORAL
Status: DISCONTINUED | OUTPATIENT
Start: 2023-03-26 | End: 2023-03-29 | Stop reason: HOSPADM

## 2023-03-25 RX ORDER — ENOXAPARIN SODIUM 100 MG/ML
40 INJECTION SUBCUTANEOUS EVERY 24 HOURS
Status: DISCONTINUED | OUTPATIENT
Start: 2023-03-26 | End: 2023-03-29 | Stop reason: HOSPADM

## 2023-03-25 RX ADMIN — CEFTRIAXONE SODIUM 2 G: 2 INJECTION, POWDER, FOR SOLUTION INTRAMUSCULAR; INTRAVENOUS at 11:03

## 2023-03-25 RX ADMIN — SODIUM CHLORIDE 1000 ML: 0.9 INJECTION, SOLUTION INTRAVENOUS at 11:03

## 2023-03-26 PROBLEM — R78.81 GRAM-NEGATIVE BACTEREMIA: Status: ACTIVE | Noted: 2023-03-26

## 2023-03-26 PROBLEM — N12 PYELONEPHRITIS: Status: ACTIVE | Noted: 2023-03-26

## 2023-03-26 LAB
ALBUMIN SERPL BCP-MCNC: 2.7 G/DL (ref 3.5–5.2)
ALBUMIN SERPL BCP-MCNC: 2.9 G/DL (ref 3.5–5.2)
ALP SERPL-CCNC: 71 U/L (ref 55–135)
ALP SERPL-CCNC: 76 U/L (ref 55–135)
ALT SERPL W/O P-5'-P-CCNC: 11 U/L (ref 10–44)
ALT SERPL W/O P-5'-P-CCNC: 11 U/L (ref 10–44)
ANION GAP SERPL CALC-SCNC: 10 MMOL/L (ref 8–16)
ANION GAP SERPL CALC-SCNC: 8 MMOL/L (ref 8–16)
AST SERPL-CCNC: 11 U/L (ref 10–40)
AST SERPL-CCNC: 12 U/L (ref 10–40)
BACTERIA #/AREA URNS HPF: ABNORMAL /HPF
BASOPHILS # BLD AUTO: 0.04 K/UL (ref 0–0.2)
BASOPHILS NFR BLD: 0.4 % (ref 0–1.9)
BILIRUB SERPL-MCNC: 1 MG/DL (ref 0.1–1)
BILIRUB SERPL-MCNC: 1.4 MG/DL (ref 0.1–1)
BILIRUB UR QL STRIP: NEGATIVE
BUN SERPL-MCNC: 10 MG/DL (ref 8–23)
BUN SERPL-MCNC: 12 MG/DL (ref 8–23)
CALCIUM SERPL-MCNC: 7.7 MG/DL (ref 8.7–10.5)
CALCIUM SERPL-MCNC: 8 MG/DL (ref 8.7–10.5)
CHLORIDE SERPL-SCNC: 105 MMOL/L (ref 95–110)
CHLORIDE SERPL-SCNC: 105 MMOL/L (ref 95–110)
CLARITY UR: CLEAR
CO2 SERPL-SCNC: 24 MMOL/L (ref 23–29)
CO2 SERPL-SCNC: 24 MMOL/L (ref 23–29)
COLOR UR: YELLOW
CREAT SERPL-MCNC: 0.9 MG/DL (ref 0.5–1.4)
CREAT SERPL-MCNC: 1 MG/DL (ref 0.5–1.4)
CRP SERPL-MCNC: 110.5 MG/L (ref 0–8.2)
DIFFERENTIAL METHOD: ABNORMAL
EOSINOPHIL # BLD AUTO: 0 K/UL (ref 0–0.5)
EOSINOPHIL NFR BLD: 0.4 % (ref 0–8)
ERYTHROCYTE [DISTWIDTH] IN BLOOD BY AUTOMATED COUNT: 12 % (ref 11.5–14.5)
EST. GFR  (NO RACE VARIABLE): >60 ML/MIN/1.73 M^2
EST. GFR  (NO RACE VARIABLE): >60 ML/MIN/1.73 M^2
GLUCOSE SERPL-MCNC: 165 MG/DL (ref 70–110)
GLUCOSE SERPL-MCNC: 169 MG/DL (ref 70–110)
GLUCOSE UR QL STRIP: NEGATIVE
HCT VFR BLD AUTO: 30.8 % (ref 40–54)
HGB BLD-MCNC: 10.6 G/DL (ref 14–18)
HGB UR QL STRIP: ABNORMAL
IMM GRANULOCYTES # BLD AUTO: 0.04 K/UL (ref 0–0.04)
IMM GRANULOCYTES NFR BLD AUTO: 0.4 % (ref 0–0.5)
KETONES UR QL STRIP: NEGATIVE
LEUKOCYTE ESTERASE UR QL STRIP: ABNORMAL
LYMPHOCYTES # BLD AUTO: 0.6 K/UL (ref 1–4.8)
LYMPHOCYTES NFR BLD: 5.3 % (ref 18–48)
MCH RBC QN AUTO: 31.9 PG (ref 27–31)
MCHC RBC AUTO-ENTMCNC: 34.4 G/DL (ref 32–36)
MCV RBC AUTO: 93 FL (ref 82–98)
MICROSCOPIC COMMENT: ABNORMAL
MONOCYTES # BLD AUTO: 1 K/UL (ref 0.3–1)
MONOCYTES NFR BLD: 9 % (ref 4–15)
NEUTROPHILS # BLD AUTO: 9.1 K/UL (ref 1.8–7.7)
NEUTROPHILS NFR BLD: 84.5 % (ref 38–73)
NITRITE UR QL STRIP: NEGATIVE
NRBC BLD-RTO: 0 /100 WBC
PH UR STRIP: 6 [PH] (ref 5–8)
PLATELET # BLD AUTO: 199 K/UL (ref 150–450)
PMV BLD AUTO: 10.1 FL (ref 9.2–12.9)
POCT GLUCOSE: 147 MG/DL (ref 70–110)
POCT GLUCOSE: 177 MG/DL (ref 70–110)
POCT GLUCOSE: 195 MG/DL (ref 70–110)
POCT GLUCOSE: 214 MG/DL (ref 70–110)
POTASSIUM SERPL-SCNC: 3.1 MMOL/L (ref 3.5–5.1)
POTASSIUM SERPL-SCNC: 3.2 MMOL/L (ref 3.5–5.1)
PROCALCITONIN SERPL IA-MCNC: 0.63 NG/ML
PROT SERPL-MCNC: 6.1 G/DL (ref 6–8.4)
PROT SERPL-MCNC: 6.7 G/DL (ref 6–8.4)
PROT UR QL STRIP: ABNORMAL
RBC # BLD AUTO: 3.32 M/UL (ref 4.6–6.2)
RBC #/AREA URNS HPF: 1 /HPF (ref 0–4)
SODIUM SERPL-SCNC: 137 MMOL/L (ref 136–145)
SODIUM SERPL-SCNC: 139 MMOL/L (ref 136–145)
SP GR UR STRIP: 1.01 (ref 1–1.03)
URN SPEC COLLECT METH UR: ABNORMAL
UROBILINOGEN UR STRIP-ACNC: NEGATIVE EU/DL
WBC # BLD AUTO: 10.72 K/UL (ref 3.9–12.7)
WBC #/AREA URNS HPF: 61 /HPF (ref 0–5)

## 2023-03-26 PROCEDURE — 81000 URINALYSIS NONAUTO W/SCOPE: CPT | Performed by: EMERGENCY MEDICINE

## 2023-03-26 PROCEDURE — 36415 COLL VENOUS BLD VENIPUNCTURE: CPT | Performed by: NURSE PRACTITIONER

## 2023-03-26 PROCEDURE — 63600175 PHARM REV CODE 636 W HCPCS: Performed by: NURSE PRACTITIONER

## 2023-03-26 PROCEDURE — 94761 N-INVAS EAR/PLS OXIMETRY MLT: CPT

## 2023-03-26 PROCEDURE — 25000003 PHARM REV CODE 250: Performed by: NURSE PRACTITIONER

## 2023-03-26 PROCEDURE — A4216 STERILE WATER/SALINE, 10 ML: HCPCS | Performed by: NURSE PRACTITIONER

## 2023-03-26 PROCEDURE — 87086 URINE CULTURE/COLONY COUNT: CPT | Performed by: EMERGENCY MEDICINE

## 2023-03-26 PROCEDURE — 21400001 HC TELEMETRY ROOM

## 2023-03-26 PROCEDURE — 27000207 HC ISOLATION

## 2023-03-26 PROCEDURE — 85025 COMPLETE CBC W/AUTO DIFF WBC: CPT | Performed by: NURSE PRACTITIONER

## 2023-03-26 PROCEDURE — 99900035 HC TECH TIME PER 15 MIN (STAT)

## 2023-03-26 PROCEDURE — 80053 COMPREHEN METABOLIC PANEL: CPT | Performed by: NURSE PRACTITIONER

## 2023-03-26 RX ORDER — FUROSEMIDE 20 MG/1
20 TABLET ORAL 2 TIMES DAILY
Status: DISCONTINUED | OUTPATIENT
Start: 2023-03-26 | End: 2023-03-29 | Stop reason: HOSPADM

## 2023-03-26 RX ORDER — ATORVASTATIN CALCIUM 40 MG/1
40 TABLET, FILM COATED ORAL DAILY
Status: DISCONTINUED | OUTPATIENT
Start: 2023-03-26 | End: 2023-03-29 | Stop reason: HOSPADM

## 2023-03-26 RX ORDER — CARVEDILOL 12.5 MG/1
12.5 TABLET ORAL 2 TIMES DAILY
Status: DISCONTINUED | OUTPATIENT
Start: 2023-03-26 | End: 2023-03-29 | Stop reason: HOSPADM

## 2023-03-26 RX ORDER — LOSARTAN POTASSIUM 25 MG/1
25 TABLET ORAL DAILY
Status: DISCONTINUED | OUTPATIENT
Start: 2023-03-26 | End: 2023-03-29 | Stop reason: HOSPADM

## 2023-03-26 RX ORDER — ASPIRIN 81 MG/1
81 TABLET ORAL DAILY
Status: DISCONTINUED | OUTPATIENT
Start: 2023-03-26 | End: 2023-03-29 | Stop reason: HOSPADM

## 2023-03-26 RX ORDER — CLOPIDOGREL BISULFATE 75 MG/1
75 TABLET ORAL DAILY
Status: DISCONTINUED | OUTPATIENT
Start: 2023-03-26 | End: 2023-03-29 | Stop reason: HOSPADM

## 2023-03-26 RX ADMIN — ACETAMINOPHEN 650 MG: 325 TABLET ORAL at 04:03

## 2023-03-26 RX ADMIN — LACTOBACILLUS TAB 4 TABLET: TAB at 05:03

## 2023-03-26 RX ADMIN — ACETAMINOPHEN 650 MG: 325 TABLET ORAL at 05:03

## 2023-03-26 RX ADMIN — ATORVASTATIN CALCIUM 40 MG: 40 TABLET, FILM COATED ORAL at 09:03

## 2023-03-26 RX ADMIN — INSULIN ASPART 2 UNITS: 100 INJECTION, SOLUTION INTRAVENOUS; SUBCUTANEOUS at 04:03

## 2023-03-26 RX ADMIN — POTASSIUM BICARBONATE 40 MEQ: 391 TABLET, EFFERVESCENT ORAL at 05:03

## 2023-03-26 RX ADMIN — Medication 10 ML: at 02:03

## 2023-03-26 RX ADMIN — CEFTRIAXONE SODIUM 2 G: 2 INJECTION, POWDER, FOR SOLUTION INTRAMUSCULAR; INTRAVENOUS at 10:03

## 2023-03-26 RX ADMIN — FUROSEMIDE 20 MG: 20 TABLET ORAL at 09:03

## 2023-03-26 RX ADMIN — POTASSIUM BICARBONATE 40 MEQ: 391 TABLET, EFFERVESCENT ORAL at 09:03

## 2023-03-26 RX ADMIN — ASPIRIN 81 MG: 81 TABLET, COATED ORAL at 09:03

## 2023-03-26 RX ADMIN — CARVEDILOL 12.5 MG: 12.5 TABLET, FILM COATED ORAL at 09:03

## 2023-03-26 RX ADMIN — ENOXAPARIN SODIUM 40 MG: 40 INJECTION SUBCUTANEOUS at 05:03

## 2023-03-26 RX ADMIN — INSULIN DETEMIR 15 UNITS: 100 INJECTION, SOLUTION SUBCUTANEOUS at 10:03

## 2023-03-26 RX ADMIN — LOSARTAN POTASSIUM 25 MG: 25 TABLET, FILM COATED ORAL at 09:03

## 2023-03-26 RX ADMIN — LACTOBACILLUS TAB 4 TABLET: TAB at 12:03

## 2023-03-26 RX ADMIN — CLOPIDOGREL BISULFATE 75 MG: 75 TABLET ORAL at 09:03

## 2023-03-26 RX ADMIN — Medication 10 ML: at 10:03

## 2023-03-26 NOTE — HPI
Clifton Wilson is a 72-year-old male with a past history of anticoagulant long-term use, cardiomyopathy, CHF, CAD, DM, gout, heart attack, HTN, pneumonia who presented to the ED for the evaluation of positive blood cultures. He reports being seen here yesterday with complaints of significant fatigue, myalgias, rigors and chills as well as loose bowel movements and difficulty with urinary continence.  He was diagnosed with pyelonephritis and discharged home with cipro. He reports he has taken the prescribed medications and still feel fatigued. His blood culture resulted positive for gram-negative rods, and he was called to come to the ED for further evaluation. The ED work up revealed a WBC 14, afebrile. UA: WBC 61, trace protein, occult blood 1+, leukocytes 3+. Procalcitonin 0.63, K 3.1, .5. CXR: No acute abnormality. Blood and urine cultures pending. He was started on Rocephin. Admitted to [facility] medicine for further management.

## 2023-03-26 NOTE — CARE UPDATE
Patient with multiple very loose brown BMs today.    No longer having flank pain.   Able to urinate without difficulty   Tolerating PO   Blood cx with klebsiella.   Patient's leukocytosis is improving, no longer febrile since 7 am. Ok to continue rocephin IV pending sensitives given clinical improvement.   Increase rocephin to 2 g to prevent relapse of infection     Stool studies   Add probiotic   Replete K   Continue to monitor closely.       KAVITA Hollis-BC   Department of Hospital Medicine

## 2023-03-26 NOTE — SUBJECTIVE & OBJECTIVE
Past Medical History:   Diagnosis Date    Anticoagulant long-term use     Cardiomyopathy     CHF (congestive heart failure)     Coronary artery disease     Diabetes mellitus     Gout     Heart attack     Hypertension     Pneumonia        Past Surgical History:   Procedure Laterality Date    APPENDECTOMY      CARDIAC CATHETERIZATION      7 stents    CARDIAC DEFIBRILLATOR PLACEMENT      CATARACT EXTRACTION Bilateral 2011    COLONOSCOPY N/A 8/10/2018    Procedure: COLONOSCOPY golytely;  Surgeon: Valentine Burger MD;  Location: [facility] ENDO;  Service: Endoscopy;  Laterality: N/A;    EYE SURGERY      REVISION OF IMPLANTABLE CARDIOVERTER-DEFIBRILLATOR (ICD) ELECTRODE LEAD PLACEMENT N/A 11/11/2019    Procedure: REVISION, INSERTION, ELECTRODE LEAD, ICD;  Surgeon: Shukri Walsh MD;  Location: [facility] EP LAB;  Service: Cardiology;  Laterality: N/A;  Lead malfxn, RV lead ICD, SJM, anes, DM, 3 PREP       Review of patient's allergies indicates:  No Known Allergies    No current facility-administered medications on file prior to encounter.     Current Outpatient Medications on File Prior to Encounter   Medication Sig    acetaminophen (TYLENOL) 500 MG tablet Take 1 tablet (500 mg total) by mouth every 6 (six) hours as needed for Pain.    alcohol swabs (BD ALCOHOL SWABS) PadM USE FOUR TIMES DAILY (Patient not taking: Reported on 10/27/2022)    aspirin (ECOTRIN) 81 MG EC tablet Take 81 mg by mouth once daily.    atorvastatin (LIPITOR) 40 MG tablet Take 1 tablet (40 mg total) by mouth once daily.    blood glucose control high,low (ACCU-CHEK CATHRYN CONTROL SOLN) Soln Use as directed to check blood sugar    blood sugar diagnostic Strp Use to test four times daily    blood-glucose meter (ACCU-CHEK CATHRYN PLUS METER) Misc USE AS DIRECTED    carvediloL (COREG) 12.5 MG tablet Take 1 tablet (12.5 mg total) by mouth 2 (two) times daily.    ciprofloxacin HCl (CIPRO) 500 MG tablet Take 1 tablet (500 mg total) by mouth 2 (two) times daily. for 10  "days    clopidogreL (PLAVIX) 75 mg tablet Take 1 tablet (75 mg total) by mouth once daily.    colchicine (MITIGARE) 0.6 mg Cap Take 1 capsule (0.6 mg total) by mouth once daily.    cyanocobalamin (VITAMIN B-12) 1000 MCG tablet TAKE ONE TABLET BY MOUTH ONCE DAILY FOR VITAMIN DEFICIENCIES    doxycycline (MONODOX) 100 MG capsule Take 1 capsule (100 mg total) by mouth 2 (two) times daily. (Patient not taking: Reported on 11/3/2022)    furosemide (LASIX) 20 MG tablet Take 1 tablet (20 mg total) by mouth 2 (two) times daily.    gabapentin (NEURONTIN) 300 MG capsule Take 2 capsules (600 mg total) by mouth 2 (two) times daily.    ibuprofen (ADVIL,MOTRIN) 600 MG tablet Take 1 tablet (600 mg total) by mouth every 6 (six) hours as needed for Pain.    insulin (LANTUS SOLOSTAR U-100 INSULIN) glargine 100 units/mL SubQ pen Inject 35 Units into the skin every evening.    insulin aspart U-100 (NOVOLOG FLEXPEN U-100 INSULIN) 100 unit/mL (3 mL) InPn pen Inject 15 Units into the skin 3 (three) times daily with meals.    lancets (ACCU-CHEK SOFTCLIX LANCETS) Misc TEST 3 (three) times daily.    losartan (COZAAR) 25 MG tablet Take 1 tablet (25 mg total) by mouth once daily.    metFORMIN (GLUCOPHAGE) 1000 MG tablet Take 1 tablet (1,000 mg total) by mouth 2 (two) times daily with meals.    nitroGLYCERIN (NITROSTAT) 0.4 MG SL tablet Place 1 tablet (0.4 mg total) under the tongue every 5 (five) minutes as needed for Chest pain.    pen needle, diabetic (BD ULTRA-FINE SINA PEN NEEDLE) 32 gauge x 5/32" Ndle Use four times daily for insulin administration    potassium chloride (KLOR-CON) 10 MEQ TbSR TAKE ONE TABLET BY MOUTH ONCE DAILY TO INCREASE POTASSIUM    zolpidem (AMBIEN) 5 MG Tab TAKE 1 TABLET BY MOUTH EVERY NIGHT AS NEEDED     Family History       Problem Relation (Age of Onset)    Heart disease Mother, Father          Tobacco Use    Smoking status: Former    Smokeless tobacco: Never   Substance and Sexual Activity    Alcohol use: Yes     " Comment: socially    Drug use: No    Sexual activity: Not on file     Review of Systems   Constitutional:  Positive for fatigue and fever.   HENT: Negative.     Eyes: Negative.    Respiratory: Negative.     Cardiovascular: Negative.    Gastrointestinal: Negative.    Genitourinary: Negative.    Musculoskeletal:  Positive for back pain.   Skin: Negative.    Neurological:  Positive for weakness.   Psychiatric/Behavioral: Negative.     Objective:     Vital Signs (Most Recent):  Temp: 99.6 °F (37.6 °C) (03/26/23 0051)  Pulse: 69 (03/26/23 0051)  Resp: 18 (03/26/23 0051)  BP: (!) 143/65 (03/26/23 0051)  SpO2: 97 % (03/26/23 0051)   Vital Signs (24h Range):  Temp:  [97.7 °F (36.5 °C)-99.6 °F (37.6 °C)] 99.6 °F (37.6 °C)  Pulse:  [65-71] 69  Resp:  [18] 18  SpO2:  [97 %-99 %] 97 %  BP: (127-143)/(65-72) 143/65     Weight: 108 kg (238 lb 1.6 oz)  Body mass index is 33.21 kg/m².    Physical Exam  Vitals and nursing note reviewed.   Constitutional:       Appearance: He is obese. He is not ill-appearing or toxic-appearing.   HENT:      Head: Normocephalic and atraumatic.   Cardiovascular:      Rate and Rhythm: Normal rate and regular rhythm.   Pulmonary:      Effort: Pulmonary effort is normal. No respiratory distress.      Breath sounds: Normal breath sounds.   Abdominal:      General: There is no distension.      Tenderness: There is right CVA tenderness.   Musculoskeletal:         General: Normal range of motion.      Cervical back: Normal range of motion.   Skin:     General: Skin is warm and dry.      Capillary Refill: Capillary refill takes 2 to 3 seconds.   Neurological:      General: No focal deficit present.      Mental Status: He is alert and oriented to person, place, and time. Mental status is at baseline.   Psychiatric:         Mood and Affect: Mood normal.         Behavior: Behavior normal.         Thought Content: Thought content normal.         Judgment: Judgment normal.           Significant Labs: All pertinent  labs within the past 24 hours have been reviewed.  BMP:   Recent Labs   Lab 03/24/23 1947 03/25/23 2326    165*    139   K 3.5 3.1*    105   CO2 22* 24   BUN 16 12   CREATININE 1.1 1.0   CALCIUM 8.8 8.0*   MG 1.3*  --      CBC:   Recent Labs   Lab 03/24/23 1947 03/25/23 2326   WBC 13.68* 14.14*   HGB 12.4* 11.7*   HCT 36.2* 34.6*    212     Urine Studies:   Recent Labs   Lab 03/24/23 2214 03/26/23  0027   COLORU Yellow Yellow   APPEARANCEUA Hazy* Clear   PHUR 6.0 6.0   SPECGRAV 1.010 1.010   PROTEINUA 1+* Trace*   GLUCUA Negative Negative   KETONESU Negative Negative   BILIRUBINUA Negative Negative   OCCULTUA 1+* 1+*   NITRITE Positive* Negative   UROBILINOGEN Negative Negative   LEUKOCYTESUR 3+* 3+*   RBCUA 6* 1   WBCUA >100* 61*   BACTERIA Occasional Rare   HYALINECASTS 0  --        Significant Imaging: I have reviewed all pertinent imaging results/findings within the past 24 hours.

## 2023-03-26 NOTE — H&P
[facility] Medicine  History & Physical    Patient Name: Clifton Wilson  MRN: [id number]  Patient Class: OP- Observation  Admission Date: 3/25/2023  Attending Physician: Laura Jarrett*   Primary Care Provider: Britton Grissom MD         Patient information was obtained from patient, past medical records and ER records.     Subjective:     Principal Problem:Gram-negative bacteremia    Chief Complaint:   Chief Complaint   Patient presents with    Abnormal labs     Patient was seen in ER yesterday and dx with pyelonephritis. MD called tonight to report positive blood cultures. Patient denies pain. States last time having fever (102.8) was earlier during day. Last dose of Cipro taken at 9:30 pm.         HPI: Clifton Wilson is a 72-year-old male with a past history of anticoagulant long-term use, cardiomyopathy, CHF, CAD, DM, gout, heart attack, HTN, pneumonia who presented to the ED for the evaluation of positive blood cultures. He reports being seen here yesterday with complaints of significant fatigue, myalgias, rigors and chills as well as loose bowel movements and difficulty with urinary continence.  He was diagnosed with pyelonephritis and discharged home with cipro. He reports he has taken the prescribed medications and still feel fatigued. His blood culture resulted positive for gram-negative rods, and he was called to come to the ED for further evaluation. The ED work up revealed a WBC 14, afebrile. UA: WBC 61, trace protein, occult blood 1+, leukocytes 3+. Procalcitonin 0.63, K 3.1, .5. CXR: No acute abnormality. Blood and urine cultures pending. He was started on Rocephin. Admitted to [facility] medicine for further management.      Past Medical History:   Diagnosis Date    Anticoagulant long-term use     Cardiomyopathy     CHF (congestive heart failure)     Coronary artery disease     Diabetes mellitus     Gout     Heart attack     Hypertension     Pneumonia         Past Surgical History:   Procedure Laterality Date    APPENDECTOMY      CARDIAC CATHETERIZATION      7 stents    CARDIAC DEFIBRILLATOR PLACEMENT      CATARACT EXTRACTION Bilateral 2011    COLONOSCOPY N/A 8/10/2018    Procedure: COLONOSCOPY golytely;  Surgeon: Valentine Burger MD;  Location: [facility] ENDO;  Service: Endoscopy;  Laterality: N/A;    EYE SURGERY      REVISION OF IMPLANTABLE CARDIOVERTER-DEFIBRILLATOR (ICD) ELECTRODE LEAD PLACEMENT N/A 11/11/2019    Procedure: REVISION, INSERTION, ELECTRODE LEAD, ICD;  Surgeon: Shukri Walsh MD;  Location: [facility] EP LAB;  Service: Cardiology;  Laterality: N/A;  Lead malfxn, RV lead ICD, SJM, anes, DM, 3 PREP       Review of patient's allergies indicates:  No Known Allergies    No current facility-administered medications on file prior to encounter.     Current Outpatient Medications on File Prior to Encounter   Medication Sig    acetaminophen (TYLENOL) 500 MG tablet Take 1 tablet (500 mg total) by mouth every 6 (six) hours as needed for Pain.    alcohol swabs (BD ALCOHOL SWABS) PadM USE FOUR TIMES DAILY (Patient not taking: Reported on 10/27/2022)    aspirin (ECOTRIN) 81 MG EC tablet Take 81 mg by mouth once daily.    atorvastatin (LIPITOR) 40 MG tablet Take 1 tablet (40 mg total) by mouth once daily.    blood glucose control high,low (ACCU-CHEK CATHRYN CONTROL SOLN) Soln Use as directed to check blood sugar    blood sugar diagnostic Strp Use to test four times daily    blood-glucose meter (ACCU-CHEK CATHRYN PLUS METER) Misc USE AS DIRECTED    carvediloL (COREG) 12.5 MG tablet Take 1 tablet (12.5 mg total) by mouth 2 (two) times daily.    ciprofloxacin HCl (CIPRO) 500 MG tablet Take 1 tablet (500 mg total) by mouth 2 (two) times daily. for 10 days    clopidogreL (PLAVIX) 75 mg tablet Take 1 tablet (75 mg total) by mouth once daily.    colchicine (MITIGARE) 0.6 mg Cap Take 1 capsule (0.6 mg total) by mouth once daily.    cyanocobalamin (VITAMIN B-12)  "1000 MCG tablet TAKE ONE TABLET BY MOUTH ONCE DAILY FOR VITAMIN DEFICIENCIES    doxycycline (MONODOX) 100 MG capsule Take 1 capsule (100 mg total) by mouth 2 (two) times daily. (Patient not taking: Reported on 11/3/2022)    furosemide (LASIX) 20 MG tablet Take 1 tablet (20 mg total) by mouth 2 (two) times daily.    gabapentin (NEURONTIN) 300 MG capsule Take 2 capsules (600 mg total) by mouth 2 (two) times daily.    ibuprofen (ADVIL,MOTRIN) 600 MG tablet Take 1 tablet (600 mg total) by mouth every 6 (six) hours as needed for Pain.    insulin (LANTUS SOLOSTAR U-100 INSULIN) glargine 100 units/mL SubQ pen Inject 35 Units into the skin every evening.    insulin aspart U-100 (NOVOLOG FLEXPEN U-100 INSULIN) 100 unit/mL (3 mL) InPn pen Inject 15 Units into the skin 3 (three) times daily with meals.    lancets (ACCU-CHEK SOFTCLIX LANCETS) Misc TEST 3 (three) times daily.    losartan (COZAAR) 25 MG tablet Take 1 tablet (25 mg total) by mouth once daily.    metFORMIN (GLUCOPHAGE) 1000 MG tablet Take 1 tablet (1,000 mg total) by mouth 2 (two) times daily with meals.    nitroGLYCERIN (NITROSTAT) 0.4 MG SL tablet Place 1 tablet (0.4 mg total) under the tongue every 5 (five) minutes as needed for Chest pain.    pen needle, diabetic (BD ULTRA-FINE SINA PEN NEEDLE) 32 gauge x 5/32" Ndle Use four times daily for insulin administration    potassium chloride (KLOR-CON) 10 MEQ TbSR TAKE ONE TABLET BY MOUTH ONCE DAILY TO INCREASE POTASSIUM    zolpidem (AMBIEN) 5 MG Tab TAKE 1 TABLET BY MOUTH EVERY NIGHT AS NEEDED     Family History       Problem Relation (Age of Onset)    Heart disease Mother, Father          Tobacco Use    Smoking status: Former    Smokeless tobacco: Never   Substance and Sexual Activity    Alcohol use: Yes     Comment: socially    Drug use: No    Sexual activity: Not on file     Review of Systems   Constitutional:  Positive for fatigue and fever.   HENT: Negative.     Eyes: Negative.    Respiratory: " Negative.     Cardiovascular: Negative.    Gastrointestinal: Negative.    Genitourinary: Negative.    Musculoskeletal:  Positive for back pain.   Skin: Negative.    Neurological:  Positive for weakness.   Psychiatric/Behavioral: Negative.     Objective:     Vital Signs (Most Recent):  Temp: 99.6 °F (37.6 °C) (03/26/23 0051)  Pulse: 69 (03/26/23 0051)  Resp: 18 (03/26/23 0051)  BP: (!) 143/65 (03/26/23 0051)  SpO2: 97 % (03/26/23 0051)   Vital Signs (24h Range):  Temp:  [97.7 °F (36.5 °C)-99.6 °F (37.6 °C)] 99.6 °F (37.6 °C)  Pulse:  [65-71] 69  Resp:  [18] 18  SpO2:  [97 %-99 %] 97 %  BP: (127-143)/(65-72) 143/65     Weight: 108 kg (238 lb 1.6 oz)  Body mass index is 33.21 kg/m².    Physical Exam  Vitals and nursing note reviewed.   Constitutional:       Appearance: He is obese. He is not ill-appearing or toxic-appearing.   HENT:      Head: Normocephalic and atraumatic.   Cardiovascular:      Rate and Rhythm: Normal rate and regular rhythm.   Pulmonary:      Effort: Pulmonary effort is normal. No respiratory distress.      Breath sounds: Normal breath sounds.   Abdominal:      General: There is no distension.      Tenderness: There is right CVA tenderness.   Musculoskeletal:         General: Normal range of motion.      Cervical back: Normal range of motion.   Skin:     General: Skin is warm and dry.      Capillary Refill: Capillary refill takes 2 to 3 seconds.   Neurological:      General: No focal deficit present.      Mental Status: He is alert and oriented to person, place, and time. Mental status is at baseline.   Psychiatric:         Mood and Affect: Mood normal.         Behavior: Behavior normal.         Thought Content: Thought content normal.         Judgment: Judgment normal.           Significant Labs: All pertinent labs within the past 24 hours have been reviewed.  BMP:   Recent Labs   Lab 03/24/23 1947 03/25/23 2326    165*    139   K 3.5 3.1*    105   CO2 22* 24   BUN 16 12    CREATININE 1.1 1.0   CALCIUM 8.8 8.0*   MG 1.3*  --      CBC:   Recent Labs   Lab 03/24/23  1947 03/25/23  2326   WBC 13.68* 14.14*   HGB 12.4* 11.7*   HCT 36.2* 34.6*    212     Urine Studies:   Recent Labs   Lab 03/24/23  2214 03/26/23  0027   COLORU Yellow Yellow   APPEARANCEUA Hazy* Clear   PHUR 6.0 6.0   SPECGRAV 1.010 1.010   PROTEINUA 1+* Trace*   GLUCUA Negative Negative   KETONESU Negative Negative   BILIRUBINUA Negative Negative   OCCULTUA 1+* 1+*   NITRITE Positive* Negative   UROBILINOGEN Negative Negative   LEUKOCYTESUR 3+* 3+*   RBCUA 6* 1   WBCUA >100* 61*   BACTERIA Occasional Rare   HYALINECASTS 0  --        Significant Imaging: I have reviewed all pertinent imaging results/findings within the past 24 hours.    Assessment/Plan:     * Gram-negative bacteremia  Pyelonephritis  -positive cultures of gram negative rods from cultures taken yesterday for diagnosis of pyelonephritis  -repeat UA this admission; UA: WBC 61, trace protein, occult blood 1+, leukocytes 3+  -Procalcitonin 0.63, .5  -blood and urine cultures pending  -started on rocephin    Essential hypertension  Chronic, Latest blood pressure and vitals reviewed-   Temp:  [97.7 °F (36.5 °C)-99.6 °F (37.6 °C)]   Pulse:  [65-71]   Resp:  [18]   BP: (127-143)/(65-72)   SpO2:  [97 %-99 %] .   Home meds for hypertension were reviewed and noted below.   Hypertension Medications             carvediloL (COREG) 12.5 MG tablet Take 1 tablet (12.5 mg total) by mouth 2 (two) times daily.    furosemide (LASIX) 20 MG tablet Take 1 tablet (20 mg total) by mouth 2 (two) times daily.    losartan (COZAAR) 25 MG tablet Take 1 tablet (25 mg total) by mouth once daily.    nitroGLYCERIN (NITROSTAT) 0.4 MG SL tablet Place 1 tablet (0.4 mg total) under the tongue every 5 (five) minutes as needed for Chest pain.   -Resume home BP medications  -Monitor and trend vital signs q4hr  -Will utilize p.r.n. blood pressure medication only if patient's blood  pressure greater than  180/110 and he develops symptoms such as worsening chest pain or shortness of breath.    Coronary artery disease of native artery with stable angina pectoris  -Patient with known CAD s/p stent placement, which is controlled   -Will continue ASA, Plavix and Statin and monitor for S/Sx of angina/ACS.   -Continue to monitor on telemetry.     Chronic combined systolic and diastolic congestive heart failure  ICD (implantable cardioverter-defibrillator), single, in situ  -No c/o CP or SOB  -Monitor on telemetry  -Monitor and trend BMP, Mg, and renal function; keep K >4, Mg >2  -Sodium restriction (<2g/d), fluid restriction (<2L)   -Monitor for signs of fluid overload: RR>30, O2 sat<92%, weight gain of >3 lbs in 24 hours, or urinary output <160ml/8hr  -Continue BB    Type 2 diabetes mellitus with neurologic complication  Patient's FSGs are controlled on current medication regimen.  Last A1c reviewed-   Lab Results   Component Value Date    HGBA1C 7.1 (H) 11/03/2022     Most recent fingerstick glucose reviewed- No results for input(s): POCTGLUCOSE in the last 24 hours.  Current correctional scale  Low  Maintain anti-hyperglycemic dose as follows-   Antihyperglycemics (From admission, onward)    Start     Stop Route Frequency Ordered    03/26/23 0055  insulin aspart U-100 pen 0-5 Units         -- SubQ Before meals & nightly PRN 03/25/23 2356      Hold Oral hypoglycemics while patient is in the hospital.      VTE Risk Mitigation (From admission, onward)         Ordered     enoxaparin injection 40 mg  Daily         03/25/23 2356     IP VTE HIGH RISK PATIENT  Once         03/25/23 2356     Place sequential compression device  Until discontinued         03/25/23 2356                On 03/26/2023, patient should be placed in hospital observation services under my care in collaboration with Laura Jarrett MD.    Janeen Hein DNP, AGACNP-BC, PMHNP-BC  Hospitalist   Department of [facility]  Medicine   [facility]   Office #: [phone]

## 2023-03-26 NOTE — PLAN OF CARE
Problem: Adult Inpatient Plan of Care  Goal: Plan of Care Review  Outcome: Ongoing, Progressing     VIRTUAL NURSE:  Called patient's room via telephone; verified spelling of patient's name and birthdate.  Introduced as VN for night shift that will be working with floor nurse and nursing assistant.  Educated patient on VN's role in patient care and VIP model.  Plan of care reviewed with patient.  Education per flowsheet.   Informed patient that staff will round on them every 2 hours but to use call light for any other needs they may have; informed of fall risk and fall precautions.  Patient verbalized understanding.  Call light within reach per patient.  Opportunity given for questions and questions answered.  Admission assessment questions answered.  Patient denies complaints or any needs at this time. Instructed to call for assistance.  Will cont to monitor and intervene as needed.     Labs, notes, orders, and careplan reviewed.       03/26/23 0635   Patient Request   Patient Requested unable to hear patient via viyoconnect; called patient on room telephone. no complaints or needs currently   Admission   Initial VN Admission Questions Complete   Communication Issues? Technical Issue   Shift   Virtual Nurse - Rounding Complete   Pain Management Interventions pain management plan reviewed with patient/caregiver   Virtual Nurse - Patient Verbalized Approval Of Camera Use;VN Rounding   Type of Frequent Check   Type Patient Rounds   Safety/Activity   Patient Rounds bed in low position;toileting offered;bed wheels locked;call light in patient/parent reach;clutter free environment maintained;placement of personal items at bedside   Safety Promotion/Fall Prevention assistive device/personal item within reach;bed alarm set;commode/urinal/bedpan at bedside;diversional activities provided;Fall Risk reviewed with patient/family;high risk medications identified;medications reviewed;nonskid shoes/socks when out of bed;room  near unit station;side rails raised x 3;instructed to call staff for mobility;supervised activity   Safety Precautions emergency equipment at bedside   Positioning   Body Position position changed independently;neutral body alignment;neutral head position   Head of Bed (HOB) Positioning HOB at 60-90 degrees   Pain/Comfort/Sleep   Preferred Pain Scale number (Numeric Rating Pain Scale)   Comfort/Acceptable Pain Level 0   Pain Rating (0-10): Rest 0   Sleep/Rest/Relaxation no problem identified;awake

## 2023-03-26 NOTE — ED PROVIDER NOTES
Encounter Date: 3/25/2023       History     Chief Complaint   Patient presents with    Abnormal labs     Patient was seen in ER yesterday and dx with pyelonephritis. MD called tonight to report positive blood cultures. Patient denies pain. States last time having fever (102.8) was earlier during day. Last dose of Cipro taken at 9:30 pm.      HPI    Pleasant 72-year-old male presents the ER for evaluation of positive blood cultures.  Seen evaluated in the ER yesterday diagnosed with pyelonephritis, subsequently discharged with antibiotics.  Patient reports he has been taking antibiotics but is still febrile.  He is  blood culture positive 2/2 for Gram-negative rods.  He was called and advised to come to the ER for further evaluation.    Review of patient's allergies indicates:  No Known Allergies  Past Medical History:   Diagnosis Date    Anticoagulant long-term use     Cardiomyopathy     CHF (congestive heart failure)     Coronary artery disease     Diabetes mellitus     Gout     Heart attack     Hypertension     Pneumonia      Past Surgical History:   Procedure Laterality Date    APPENDECTOMY      CARDIAC CATHETERIZATION      7 stents    CARDIAC DEFIBRILLATOR PLACEMENT      CATARACT EXTRACTION Bilateral 2011    COLONOSCOPY N/A 8/10/2018    Procedure: COLONOSCOPY golytely;  Surgeon: Valentine Burger MD;  Location: [facility] ENDO;  Service: Endoscopy;  Laterality: N/A;    EYE SURGERY      REVISION OF IMPLANTABLE CARDIOVERTER-DEFIBRILLATOR (ICD) ELECTRODE LEAD PLACEMENT N/A 11/11/2019    Procedure: REVISION, INSERTION, ELECTRODE LEAD, ICD;  Surgeon: Shukri Walsh MD;  Location: [facility] EP LAB;  Service: Cardiology;  Laterality: N/A;  Lead malfxn, RV lead ICD, SJM, anes, DM, 3 PREP     Family History   Problem Relation Age of Onset    Heart disease Mother     Heart disease Father     Amblyopia Neg Hx     Blindness Neg Hx     Cataracts Neg Hx     Glaucoma Neg Hx     Macular degeneration Neg Hx     Retinal detachment Neg Hx      Strabismus Neg Hx      Social History     Tobacco Use    Smoking status: Former    Smokeless tobacco: Never   Substance Use Topics    Alcohol use: Yes     Comment: socially    Drug use: No     Review of Systems   Constitutional:  Positive for fatigue and fever.   Respiratory:  Negative for shortness of breath.    Cardiovascular:  Negative for chest pain.   All other systems reviewed and are negative.    Physical Exam     Initial Vitals [03/25/23 2308]   BP Pulse Resp Temp SpO2   127/72 65 18 97.7 °F (36.5 °C) 98 %      MAP       --         Physical Exam    Nursing note and vitals reviewed.  Constitutional: He appears well-developed and well-nourished.   HENT:   Head: Normocephalic and atraumatic.   Eyes: Pupils are equal, round, and reactive to light.   Neck:   Normal range of motion.  Cardiovascular:  Normal rate, regular rhythm and normal heart sounds.           Pulmonary/Chest: Breath sounds normal. No respiratory distress.   Abdominal: Abdomen is soft. He exhibits no distension. There is no abdominal tenderness.   Musculoskeletal:         General: Normal range of motion.      Cervical back: Normal range of motion.     Neurological: He is alert and oriented to person, place, and time. He has normal strength. GCS score is 15. GCS eye subscore is 4. GCS verbal subscore is 5. GCS motor subscore is 6.   Skin: Skin is warm and dry. Capillary refill takes less than 2 seconds.   Psychiatric: He has a normal mood and affect. Thought content normal.       ED Course   Procedures  Labs Reviewed   CBC W/ AUTO DIFFERENTIAL - Abnormal; Notable for the following components:       Result Value    WBC 14.14 (*)     RBC 3.67 (*)     Hemoglobin 11.7 (*)     Hematocrit 34.6 (*)     MCH 31.9 (*)     Gran # (ANC) 11.5 (*)     Immature Grans (Abs) 0.07 (*)     Mono # 1.3 (*)     Gran % 81.3 (*)     Lymph % 8.2 (*)     All other components within normal limits   COMPREHENSIVE METABOLIC PANEL - Abnormal; Notable for the following  components:    Potassium 3.1 (*)     Glucose 165 (*)     Calcium 8.0 (*)     Albumin 2.9 (*)     Total Bilirubin 1.4 (*)     All other components within normal limits   C-REACTIVE PROTEIN - Abnormal; Notable for the following components:    .5 (*)     All other components within normal limits   PROCALCITONIN - Abnormal; Notable for the following components:    Procalcitonin 0.63 (*)     All other components within normal limits   CULTURE, BLOOD   CULTURE, BLOOD          Imaging Results    None          Medications   sodium chloride 0.9% flush 10 mL (has no administration in time range)   melatonin tablet 6 mg (has no administration in time range)   ondansetron injection 4 mg (has no administration in time range)   prochlorperazine injection Soln 5 mg (has no administration in time range)   polyethylene glycol packet 17 g (has no administration in time range)   acetaminophen tablet 650 mg (has no administration in time range)   naloxone 0.4 mg/mL injection 0.02 mg (has no administration in time range)   glucagon (human recombinant) injection 1 mg (has no administration in time range)   dextrose 10% bolus 125 mL 125 mL (has no administration in time range)   dextrose 10% bolus 250 mL 250 mL (has no administration in time range)   dextrose 40 % gel 15,000 mg (has no administration in time range)   dextrose 40 % gel 30,000 mg (has no administration in time range)   enoxaparin injection 40 mg (has no administration in time range)   insulin aspart U-100 pen 0-5 Units (has no administration in time range)   cefTRIAXone (ROCEPHIN) 2 g in dextrose 5 % in water (D5W) 5 % 50 mL IVPB (MB+) (0 g Intravenous Stopped 3/26/23 0002)   sodium chloride 0.9% bolus 1,000 mL 1,000 mL (0 mLs Intravenous Stopped 3/26/23 0031)     Medical Decision Making:   Initial Assessment:   This is a pleasant 72-year-old male who presents the ER for evaluation of positive blood cultures.  Patient grew Gram-negative bacteria out of both his  cultures.  He was seen in the ER yesterday diagnosed with pyelonephritis.  Patient endorses still febrile despite taking antibiotics.  He arrived in the ER hemodynamically stable no acute distress.  He has taken 2 doses of Cipro tonight.  Will plan admission will redraw urine and blood cultures will start Rocephin for broad coverage and plan admission.  Patient understood agreed with plan.  Patient will be admitted to [facility].                        Clinical Impression:   Final diagnoses:  [R78.81] Gram-negative bacteremia (Primary)  [N12] Pyelonephritis        ED Disposition Condition    Observation Stable                Cristy Flynn MD  03/26/23 0107

## 2023-03-26 NOTE — ASSESSMENT & PLAN NOTE
Pyelonephritis  -positive cultures of gram negative rods from cultures taken yesterday for diagnosis of pyelonephritis  -repeat UA this admission; UA: WBC 61, trace protein, occult blood 1+, leukocytes 3+  -Procalcitonin 0.63, .5  -blood and urine cultures pending  -started on rocephin

## 2023-03-26 NOTE — ASSESSMENT & PLAN NOTE
-Patient with known CAD s/p stent placement, which is controlled   -Will continue ASA, Plavix and Statin and monitor for S/Sx of angina/ACS.   -Continue to monitor on telemetry.

## 2023-03-26 NOTE — ASSESSMENT & PLAN NOTE
Patient's FSGs are controlled on current medication regimen.  Last A1c reviewed-   Lab Results   Component Value Date    HGBA1C 7.1 (H) 11/03/2022     Most recent fingerstick glucose reviewed- No results for input(s): POCTGLUCOSE in the last 24 hours.  Current correctional scale  Low  Maintain anti-hyperglycemic dose as follows-   Antihyperglycemics (From admission, onward)    Start     Stop Route Frequency Ordered    03/26/23 0055  insulin aspart U-100 pen 0-5 Units         -- SubQ Before meals & nightly PRN 03/25/23 3586      Hold Oral hypoglycemics while patient is in the hospital.

## 2023-03-26 NOTE — ASSESSMENT & PLAN NOTE
Chronic, Latest blood pressure and vitals reviewed-   Temp:  [97.7 °F (36.5 °C)-99.6 °F (37.6 °C)]   Pulse:  [65-71]   Resp:  [18]   BP: (127-143)/(65-72)   SpO2:  [97 %-99 %] .   Home meds for hypertension were reviewed and noted below.   Hypertension Medications             carvediloL (COREG) 12.5 MG tablet Take 1 tablet (12.5 mg total) by mouth 2 (two) times daily.    furosemide (LASIX) 20 MG tablet Take 1 tablet (20 mg total) by mouth 2 (two) times daily.    losartan (COZAAR) 25 MG tablet Take 1 tablet (25 mg total) by mouth once daily.    nitroGLYCERIN (NITROSTAT) 0.4 MG SL tablet Place 1 tablet (0.4 mg total) under the tongue every 5 (five) minutes as needed for Chest pain.   -Resume home BP medications  -Monitor and trend vital signs q4hr  -Will utilize p.r.n. blood pressure medication only if patient's blood pressure greater than  180/110 and he develops symptoms such as worsening chest pain or shortness of breath.

## 2023-03-26 NOTE — ASSESSMENT & PLAN NOTE
ICD (implantable cardioverter-defibrillator), single, in situ  -No c/o CP or SOB  -Monitor on telemetry  -Monitor and trend BMP, Mg, and renal function; keep K >4, Mg >2  -Sodium restriction (<2g/d), fluid restriction (<2L)   -Monitor for signs of fluid overload: RR>30, O2 sat<92%, weight gain of >3 lbs in 24 hours, or urinary output <160ml/8hr  -Continue BB

## 2023-03-27 ENCOUNTER — TELEPHONE (OUTPATIENT)
Dept: FAMILY MEDICINE | Facility: CLINIC | Age: 73
End: 2023-03-27
Payer: MEDICARE

## 2023-03-27 LAB
ALBUMIN SERPL BCP-MCNC: 2.8 G/DL (ref 3.5–5.2)
ALP SERPL-CCNC: 72 U/L (ref 55–135)
ALT SERPL W/O P-5'-P-CCNC: 16 U/L (ref 10–44)
ANION GAP SERPL CALC-SCNC: 10 MMOL/L (ref 8–16)
AST SERPL-CCNC: 15 U/L (ref 10–40)
BACTERIA UR CULT: NO GROWTH
BASOPHILS # BLD AUTO: 0.04 K/UL (ref 0–0.2)
BASOPHILS NFR BLD: 0.5 % (ref 0–1.9)
BILIRUB SERPL-MCNC: 0.9 MG/DL (ref 0.1–1)
BUN SERPL-MCNC: 7 MG/DL (ref 8–23)
CALCIUM SERPL-MCNC: 8.4 MG/DL (ref 8.7–10.5)
CHLORIDE SERPL-SCNC: 102 MMOL/L (ref 95–110)
CO2 SERPL-SCNC: 27 MMOL/L (ref 23–29)
CREAT SERPL-MCNC: 0.9 MG/DL (ref 0.5–1.4)
DIFFERENTIAL METHOD: ABNORMAL
EOSINOPHIL # BLD AUTO: 0.1 K/UL (ref 0–0.5)
EOSINOPHIL NFR BLD: 1.2 % (ref 0–8)
ERYTHROCYTE [DISTWIDTH] IN BLOOD BY AUTOMATED COUNT: 11.9 % (ref 11.5–14.5)
EST. GFR  (NO RACE VARIABLE): >60 ML/MIN/1.73 M^2
GLUCOSE SERPL-MCNC: 155 MG/DL (ref 70–110)
HCT VFR BLD AUTO: 32.7 % (ref 40–54)
HGB BLD-MCNC: 11.1 G/DL (ref 14–18)
IMM GRANULOCYTES # BLD AUTO: 0.02 K/UL (ref 0–0.04)
IMM GRANULOCYTES NFR BLD AUTO: 0.2 % (ref 0–0.5)
LYMPHOCYTES # BLD AUTO: 1.2 K/UL (ref 1–4.8)
LYMPHOCYTES NFR BLD: 14.2 % (ref 18–48)
MCH RBC QN AUTO: 31.2 PG (ref 27–31)
MCHC RBC AUTO-ENTMCNC: 33.9 G/DL (ref 32–36)
MCV RBC AUTO: 92 FL (ref 82–98)
MONOCYTES # BLD AUTO: 1.3 K/UL (ref 0.3–1)
MONOCYTES NFR BLD: 14.9 % (ref 4–15)
NEUTROPHILS # BLD AUTO: 5.8 K/UL (ref 1.8–7.7)
NEUTROPHILS NFR BLD: 69 % (ref 38–73)
NRBC BLD-RTO: 0 /100 WBC
PLATELET # BLD AUTO: 209 K/UL (ref 150–450)
PMV BLD AUTO: 10 FL (ref 9.2–12.9)
POCT GLUCOSE: 161 MG/DL (ref 70–110)
POCT GLUCOSE: 284 MG/DL (ref 70–110)
POTASSIUM SERPL-SCNC: 2.8 MMOL/L (ref 3.5–5.1)
PROT SERPL-MCNC: 6.5 G/DL (ref 6–8.4)
RBC # BLD AUTO: 3.56 M/UL (ref 4.6–6.2)
SODIUM SERPL-SCNC: 139 MMOL/L (ref 136–145)
WBC # BLD AUTO: 8.39 K/UL (ref 3.9–12.7)

## 2023-03-27 PROCEDURE — 25500020 PHARM REV CODE 255: Performed by: STUDENT IN AN ORGANIZED HEALTH CARE EDUCATION/TRAINING PROGRAM

## 2023-03-27 PROCEDURE — 25000003 PHARM REV CODE 250: Performed by: NURSE PRACTITIONER

## 2023-03-27 PROCEDURE — 63600175 PHARM REV CODE 636 W HCPCS: Performed by: NURSE PRACTITIONER

## 2023-03-27 PROCEDURE — A4216 STERILE WATER/SALINE, 10 ML: HCPCS | Performed by: NURSE PRACTITIONER

## 2023-03-27 PROCEDURE — 36415 COLL VENOUS BLD VENIPUNCTURE: CPT | Performed by: NURSE PRACTITIONER

## 2023-03-27 PROCEDURE — 21400001 HC TELEMETRY ROOM

## 2023-03-27 PROCEDURE — 94761 N-INVAS EAR/PLS OXIMETRY MLT: CPT

## 2023-03-27 PROCEDURE — 80053 COMPREHEN METABOLIC PANEL: CPT | Performed by: NURSE PRACTITIONER

## 2023-03-27 PROCEDURE — 85025 COMPLETE CBC W/AUTO DIFF WBC: CPT | Performed by: NURSE PRACTITIONER

## 2023-03-27 PROCEDURE — 94799 UNLISTED PULMONARY SVC/PX: CPT

## 2023-03-27 RX ORDER — GABAPENTIN 300 MG/1
600 CAPSULE ORAL 2 TIMES DAILY
Status: DISCONTINUED | OUTPATIENT
Start: 2023-03-27 | End: 2023-03-29 | Stop reason: HOSPADM

## 2023-03-27 RX ORDER — POTASSIUM CHLORIDE 20 MEQ/1
40 TABLET, EXTENDED RELEASE ORAL 2 TIMES DAILY
Status: COMPLETED | OUTPATIENT
Start: 2023-03-27 | End: 2023-03-27

## 2023-03-27 RX ADMIN — Medication 10 ML: at 09:03

## 2023-03-27 RX ADMIN — GABAPENTIN 600 MG: 300 CAPSULE ORAL at 04:03

## 2023-03-27 RX ADMIN — INSULIN ASPART 1 UNITS: 100 INJECTION, SOLUTION INTRAVENOUS; SUBCUTANEOUS at 09:03

## 2023-03-27 RX ADMIN — INSULIN DETEMIR 15 UNITS: 100 INJECTION, SOLUTION SUBCUTANEOUS at 09:03

## 2023-03-27 RX ADMIN — ATORVASTATIN CALCIUM 40 MG: 40 TABLET, FILM COATED ORAL at 09:03

## 2023-03-27 RX ADMIN — LOSARTAN POTASSIUM 25 MG: 25 TABLET, FILM COATED ORAL at 09:03

## 2023-03-27 RX ADMIN — LACTOBACILLUS TAB 4 TABLET: TAB at 09:03

## 2023-03-27 RX ADMIN — IOHEXOL 30 ML: 350 INJECTION, SOLUTION INTRAVENOUS at 09:03

## 2023-03-27 RX ADMIN — ENOXAPARIN SODIUM 40 MG: 40 INJECTION SUBCUTANEOUS at 04:03

## 2023-03-27 RX ADMIN — LACTOBACILLUS TAB 4 TABLET: TAB at 04:03

## 2023-03-27 RX ADMIN — CLOPIDOGREL BISULFATE 75 MG: 75 TABLET ORAL at 09:03

## 2023-03-27 RX ADMIN — FUROSEMIDE 20 MG: 20 TABLET ORAL at 09:03

## 2023-03-27 RX ADMIN — CARVEDILOL 12.5 MG: 12.5 TABLET, FILM COATED ORAL at 09:03

## 2023-03-27 RX ADMIN — ASPIRIN 81 MG: 81 TABLET, COATED ORAL at 09:03

## 2023-03-27 RX ADMIN — Medication 10 ML: at 02:03

## 2023-03-27 RX ADMIN — PIPERACILLIN AND TAZOBACTAM 4.5 G: 4; .5 INJECTION, POWDER, LYOPHILIZED, FOR SOLUTION INTRAVENOUS; PARENTERAL at 09:03

## 2023-03-27 RX ADMIN — CEFTRIAXONE SODIUM 2 G: 2 INJECTION, POWDER, FOR SOLUTION INTRAMUSCULAR; INTRAVENOUS at 04:03

## 2023-03-27 RX ADMIN — ACETAMINOPHEN 650 MG: 325 TABLET ORAL at 09:03

## 2023-03-27 RX ADMIN — GABAPENTIN 600 MG: 300 CAPSULE ORAL at 09:03

## 2023-03-27 RX ADMIN — LACTOBACILLUS TAB 4 TABLET: TAB at 11:03

## 2023-03-27 RX ADMIN — POTASSIUM CHLORIDE 40 MEQ: 1500 TABLET, EXTENDED RELEASE ORAL at 09:03

## 2023-03-27 RX ADMIN — IOHEXOL 100 ML: 350 INJECTION, SOLUTION INTRAVENOUS at 11:03

## 2023-03-27 NOTE — ASSESSMENT & PLAN NOTE
Patient's FSGs are controlled on current medication regimen.  Last A1c reviewed-   Lab Results   Component Value Date    HGBA1C 7.1 (H) 11/03/2022     Most recent fingerstick glucose reviewed-   Recent Labs   Lab 03/26/23  1535 03/26/23  2017 03/27/23  0514   POCTGLUCOSE 214* 195* 161*     Current correctional scale  Low  Maintain anti-hyperglycemic dose as follows-   Antihyperglycemics (From admission, onward)    Start     Stop Route Frequency Ordered    03/26/23 2100  insulin detemir U-100 (Levemir) pen 15 Units         -- SubQ Nightly 03/26/23 0329    03/26/23 0055  insulin aspart U-100 pen 0-5 Units         -- SubQ Before meals & nightly PRN 03/25/23 2356      Hold Oral hypoglycemics while patient is in the hospital.

## 2023-03-27 NOTE — HOSPITAL COURSE
Admitted for Klebsiella pneumoniae bacteremia secondary to pyelonephritis.  Has been treated with IV Rocephin  Repeat blood and urine cultures no growth, pending   Having persistent fevers otherwise clinically improving  Electrolytes repleted  Renal ultrasound pending to rule out abscess with persistent fevers.  Consult ID if fevers persist.    3/29- Patient remains afebrile and continues to improve. He will be discharged to follow up with PCP and referral to urologist. Cipro 500 mg BID x 5 days. Scripts sent to pharmacy.

## 2023-03-27 NOTE — TELEPHONE ENCOUNTER
----- Message from Екатерина Magdaleno RN sent at 3/27/2023 12:11 PM CDT -----  Regarding: Hospital Follow-Up  Goodmorning,       Patient will likely be here for a few days. Can you schedule him a PCP hospital follow-up please? He prefers Monday/Tuesday/Wednesday appointments if possible. Thank you!    Екатерина Magdaleno RN    [phone]

## 2023-03-27 NOTE — TELEPHONE ENCOUNTER
Rxoanne Willams. When will pt be discharged so that we can follow the 7 day guidelines of getting him scheduled for a hospital follow-up

## 2023-03-27 NOTE — ASSESSMENT & PLAN NOTE
Chronic, Latest blood pressure and vitals reviewed-   Temp:  [98 °F (36.7 °C)-102.3 °F (39.1 °C)]   Pulse:  [61-79]   Resp:  [17-20]   BP: (148-187)/(72-87)   SpO2:  [93 %-99 %] .   Home meds for hypertension were reviewed and noted below.   Hypertension Medications             carvediloL (COREG) 12.5 MG tablet Take 1 tablet (12.5 mg total) by mouth 2 (two) times daily.    furosemide (LASIX) 20 MG tablet Take 1 tablet (20 mg total) by mouth 2 (two) times daily.    losartan (COZAAR) 25 MG tablet Take 1 tablet (25 mg total) by mouth once daily.    nitroGLYCERIN (NITROSTAT) 0.4 MG SL tablet Place 1 tablet (0.4 mg total) under the tongue every 5 (five) minutes as needed for Chest pain.   -Resume home BP medications  -Monitor and trend vital signs q4hr  -Will utilize p.r.n. blood pressure medication only if patient's blood pressure greater than  180/110 and he develops symptoms such as worsening chest pain or shortness of breath.

## 2023-03-27 NOTE — ASSESSMENT & PLAN NOTE
Body mass index is 33.21 kg/m². Morbid obesity complicates all aspects of disease management from diagnostic modalities to treatment. Weight loss encouraged and health benefits explained to patient.

## 2023-03-27 NOTE — SUBJECTIVE & OBJECTIVE
Interval hx: this am he states he is feeling better. He denies CP, SOB, flank pain, abdominal pain, dysuria.   He is tolerating more PO today. He had ongoing fever over night.   Objective:     Vital Signs (Most Recent):  Temp: 98 °F (36.7 °C) (03/27/23 1116)  Pulse: 61 (03/27/23 1235)  Resp: 17 (03/27/23 1116)  BP: (!) 175/87 (03/27/23 1116)  SpO2: 99 % (03/27/23 1116)   Vital Signs (24h Range):  Temp:  [98 °F (36.7 °C)-102.3 °F (39.1 °C)] 98 °F (36.7 °C)  Pulse:  [61-79] 61  Resp:  [17-20] 17  SpO2:  [93 %-99 %] 99 %  BP: (148-187)/(72-87) 175/87     Weight: 108 kg (238 lb 1.6 oz)  Body mass index is 33.21 kg/m².    Physical Exam  Vitals and nursing note reviewed.   Constitutional:       Appearance: He is obese. He is not ill-appearing or toxic-appearing.   HENT:      Head: Normocephalic and atraumatic.      Mouth/Throat:      Mouth: Mucous membranes are moist.      Pharynx: Oropharynx is clear.   Eyes:      Extraocular Movements: Extraocular movements intact.      Conjunctiva/sclera: Conjunctivae normal.   Cardiovascular:      Rate and Rhythm: Normal rate and regular rhythm.      Pulses: Normal pulses.      Heart sounds: Normal heart sounds.   Pulmonary:      Effort: Pulmonary effort is normal. No respiratory distress.      Breath sounds: Normal breath sounds.   Abdominal:      General: Bowel sounds are normal. There is no distension.      Palpations: Abdomen is soft.      Tenderness: There is no right CVA tenderness or left CVA tenderness.   Musculoskeletal:         General: Normal range of motion.      Cervical back: Normal range of motion and neck supple.   Skin:     General: Skin is warm and dry.      Capillary Refill: Capillary refill takes 2 to 3 seconds.   Neurological:      General: No focal deficit present.      Mental Status: He is alert and oriented to person, place, and time. Mental status is at baseline.   Psychiatric:         Mood and Affect: Mood normal.         Behavior: Behavior normal.          Thought Content: Thought content normal.         Judgment: Judgment normal.           Significant Labs: All pertinent labs within the past 24 hours have been reviewed.      Significant Imaging: I have reviewed all pertinent imaging results/findings within the past 24 hours.

## 2023-03-27 NOTE — ASSESSMENT & PLAN NOTE
Pyelonephritis  -positive blood cultures of gram negative rods from cultures taken day prior to admission for diagnosis of pyelonephritis  -repeat UA this admission; UA: WBC 61, trace protein, occult blood 1+, leukocytes 3+  -Procalcitonin 0.63, .5  -repeat blood and urine cultures pending  -started on rocephin    Microbiology Results (last 7 days)     Procedure Component Value Units Date/Time    Blood culture #2 **CANNOT BE ORDERED STAT** [[id number]] Collected: 03/25/23 2335    Order Status: Completed Specimen: Blood from Peripheral, Hand, Right Updated: 03/27/23 1612     Blood Culture, Routine No Growth to date      No Growth to date    Blood culture #1 **CANNOT BE ORDERED STAT** [[id number]] Collected: 03/25/23 2335    Order Status: Completed Specimen: Blood from Peripheral, Antecubital, Left Updated: 03/27/23 1612     Blood Culture, Routine No Growth to date      No Growth to date    Stool culture [[id number]]     Order Status: Canceled Specimen: Stool     Clostridium difficile EIA [[id number]]     Order Status: Canceled Specimen: Stool     Urine culture [[id number]] Collected: 03/26/23 0027    Order Status: No result Specimen: Urine Updated: 03/26/23 0103        Blood cx with Klebsiella pneumoniae MDSO   Will continue IV rocephin for now   Will US kidneys to r/o renal abscess   Overall clinically improving aside from fevers   May need to consult ID if fevers do not resolve.     Mostly hypoechoic area at the inferomedial left lower renal pole, not definitively related to simple cyst by this exam.  In this patient with concern for abscess, further correlation with contrast enhanced CT abdomen-pelvis can be obtained for further assessment (unless contraindicated for any reason).     Bilateral renal cysts without hydronephrosis.     Elevated renal resistive indices, a nonspecific indicator of underlying medical renal disease.      3/28- CT abd/pel w/ contrast: 1. Nonspecific perinephric and periureteral fat  stranding as above.  Pyelitis/pyelonephritis is not excluded, correlate with urinalysis.  No evidence of a renal abscess.  2. Nonobstructing punctate left renal calculus.  No hydronephrosis.  -Continues to clinically improve, afebrile  -ID consulted for abx management upon discharge

## 2023-03-27 NOTE — PLAN OF CARE
Pts safety maintained, bed alarm on, call bell in reach, tellesiter at the bedside.. Meds given per MAR. Low grade fever during night. Pt stating he is feeling better. Gabapentin ordered by NP. No BM to collect sample. No N/V, SOB, distress during night. Vitals stable.

## 2023-03-27 NOTE — ASSESSMENT & PLAN NOTE
Pyelonephritis  -positive blood cultures of gram negative rods from cultures taken day prior to admission for diagnosis of pyelonephritis  -repeat UA this admission; UA: WBC 61, trace protein, occult blood 1+, leukocytes 3+  -Procalcitonin 0.63, .5  -repeat blood and urine cultures pending  -started on rocephin    Microbiology Results (last 7 days)     Procedure Component Value Units Date/Time    Blood culture #2 **CANNOT BE ORDERED STAT** [[id number]] Collected: 03/25/23 2335    Order Status: Completed Specimen: Blood from Peripheral, Hand, Right Updated: 03/26/23 2115     Blood Culture, Routine No Growth to date    Blood culture #1 **CANNOT BE ORDERED STAT** [[id number]] Collected: 03/25/23 2335    Order Status: Completed Specimen: Blood from Peripheral, Antecubital, Left Updated: 03/26/23 2115     Blood Culture, Routine No Growth to date    Stool culture [[id number]]     Order Status: No result Specimen: Stool     Clostridium difficile EIA [[id number]]     Order Status: Canceled Specimen: Stool     Urine culture [[id number]] Collected: 03/26/23 0027    Order Status: No result Specimen: Urine Updated: 03/26/23 0103        Blood cx with Klebsiella pneumoniae MDSO   Will continue IV rocephin for now   Will US kidneys to r/o renal abscess   Overall clinically improving aside from fevers   May need to consult ID if fevers do not resolve.

## 2023-03-27 NOTE — PROGRESS NOTES
[facility] Medicine  Progress Note    Patient Name: Clifton Wilson  MRN: [id number]  Patient Class: IP- Inpatient   Admission Date: 3/25/2023  Length of Stay: 1 days  Attending Physician: Mala Son MD  Primary Care Provider: Britton Grissom MD        Subjective:     Principal Problem:Gram-negative bacteremia        HPI:  Clifton Wilson is a 72-year-old male with a past history of anticoagulant long-term use, cardiomyopathy, CHF, CAD, DM, gout, heart attack, HTN, pneumonia who presented to the ED for the evaluation of positive blood cultures. He reports being seen here yesterday with complaints of significant fatigue, myalgias, rigors and chills as well as loose bowel movements and difficulty with urinary continence.  He was diagnosed with pyelonephritis and discharged home with cipro. He reports he has taken the prescribed medications and still feel fatigued. His blood culture resulted positive for gram-negative rods, and he was called to come to the ED for further evaluation. The ED work up revealed a WBC 14, afebrile. UA: WBC 61, trace protein, occult blood 1+, leukocytes 3+. Procalcitonin 0.63, K 3.1, .5. CXR: No acute abnormality. Blood and urine cultures pending. He was started on Rocephin. Admitted to [facility] medicine for further management.      Overview/Hospital Course:  No notes on file    Interval hx: this am he states he is feeling better. He denies CP, SOB, flank pain, abdominal pain, dysuria.  No longer having loose stools after probiotic added.  He is tolerating more PO today. He had ongoing fever over night.   Objective:     Vital Signs (Most Recent):  Temp: 98 °F (36.7 °C) (03/27/23 1116)  Pulse: 61 (03/27/23 1235)  Resp: 17 (03/27/23 1116)  BP: (!) 175/87 (03/27/23 1116)  SpO2: 99 % (03/27/23 1116)   Vital Signs (24h Range):  Temp:  [98 °F (36.7 °C)-102.3 °F (39.1 °C)] 98 °F (36.7 °C)  Pulse:  [61-79] 61  Resp:  [17-20] 17  SpO2:  [93 %-99 %] 99 %  BP:  (148-187)/(72-87) 175/87     Weight: 108 kg (238 lb 1.6 oz)  Body mass index is 33.21 kg/m².    Physical Exam  Vitals and nursing note reviewed.   Constitutional:       Appearance: He is obese. He is not ill-appearing or toxic-appearing.   HENT:      Head: Normocephalic and atraumatic.      Mouth/Throat:      Mouth: Mucous membranes are moist.      Pharynx: Oropharynx is clear.   Eyes:      Extraocular Movements: Extraocular movements intact.      Conjunctiva/sclera: Conjunctivae normal.   Cardiovascular:      Rate and Rhythm: Normal rate and regular rhythm.      Pulses: Normal pulses.      Heart sounds: Normal heart sounds.   Pulmonary:      Effort: Pulmonary effort is normal. No respiratory distress.      Breath sounds: Normal breath sounds.   Abdominal:      General: Bowel sounds are normal. There is no distension.      Palpations: Abdomen is soft.      Tenderness: There is no right CVA tenderness or left CVA tenderness.   Musculoskeletal:         General: Normal range of motion.      Cervical back: Normal range of motion and neck supple.   Skin:     General: Skin is warm and dry.      Capillary Refill: Capillary refill takes 2 to 3 seconds.   Neurological:      General: No focal deficit present.      Mental Status: He is alert and oriented to person, place, and time. Mental status is at baseline.   Psychiatric:         Mood and Affect: Mood normal.         Behavior: Behavior normal.         Thought Content: Thought content normal.         Judgment: Judgment normal.           Significant Labs: All pertinent labs within the past 24 hours have been reviewed.      Significant Imaging: I have reviewed all pertinent imaging results/findings within the past 24 hours.      Assessment/Plan:      * Gram-negative bacteremia  Pyelonephritis  -positive blood cultures of gram negative rods from cultures taken day prior to admission for diagnosis of pyelonephritis  -repeat UA this admission; UA: WBC 61, trace protein, occult  blood 1+, leukocytes 3+  -Procalcitonin 0.63, .5  -repeat blood and urine cultures pending  -started on rocephin    Microbiology Results (last 7 days)       Procedure Component Value Units Date/Time    Blood culture #2 **CANNOT BE ORDERED STAT** [[id number]] Collected: 03/25/23 2335    Order Status: Completed Specimen: Blood from Peripheral, Hand, Right Updated: 03/26/23 2115     Blood Culture, Routine No Growth to date    Blood culture #1 **CANNOT BE ORDERED STAT** [[id number]] Collected: 03/25/23 2335    Order Status: Completed Specimen: Blood from Peripheral, Antecubital, Left Updated: 03/26/23 2115     Blood Culture, Routine No Growth to date    Stool culture [[id number]]     Order Status: No result Specimen: Stool     Clostridium difficile EIA [[id number]]     Order Status: Canceled Specimen: Stool     Urine culture [[id number]] Collected: 03/26/23 0027    Order Status: No result Specimen: Urine Updated: 03/26/23 0103          Blood cx with Klebsiella pneumoniae MDSO   Will continue IV rocephin for now   Will US kidneys to r/o renal abscess   Overall clinically improving aside from fevers   May need to consult ID if fevers do not resolve.     Essential hypertension  Chronic, Latest blood pressure and vitals reviewed-   Temp:  [98 °F (36.7 °C)-102.3 °F (39.1 °C)]   Pulse:  [61-79]   Resp:  [17-20]   BP: (148-187)/(72-87)   SpO2:  [93 %-99 %] .   Home meds for hypertension were reviewed and noted below.   Hypertension Medications               carvediloL (COREG) 12.5 MG tablet Take 1 tablet (12.5 mg total) by mouth 2 (two) times daily.    furosemide (LASIX) 20 MG tablet Take 1 tablet (20 mg total) by mouth 2 (two) times daily.    losartan (COZAAR) 25 MG tablet Take 1 tablet (25 mg total) by mouth once daily.    nitroGLYCERIN (NITROSTAT) 0.4 MG SL tablet Place 1 tablet (0.4 mg total) under the tongue every 5 (five) minutes as needed for Chest pain.   -Resume home BP medications  -Monitor and trend vital signs  q4hr  -Will utilize p.r.n. blood pressure medication only if patient's blood pressure greater than  180/110 and he develops symptoms such as worsening chest pain or shortness of breath.    Severe obesity (BMI 35.0-35.9 with comorbidity)  Body mass index is 33.21 kg/m². Morbid obesity complicates all aspects of disease management from diagnostic modalities to treatment. Weight loss encouraged and health benefits explained to patient.         Dyslipidemia    Resume statin     Hypokalemia    Replete     Coronary artery disease of native artery with stable angina pectoris  -Patient with known CAD s/p stent placement, which is controlled   -Will continue ASA, Plavix and Statin and monitor for S/Sx of angina/ACS.   -Continue to monitor on telemetry.     Chronic combined systolic and diastolic congestive heart failure  ICD (implantable cardioverter-defibrillator), single, in situ  -No c/o CP or SOB  -Monitor on telemetry  -Monitor and trend BMP, Mg, and renal function; keep K >4, Mg >2  -Sodium restriction (<2g/d), fluid restriction (<2L)   -Monitor for signs of fluid overload: RR>30, O2 sat<92%, weight gain of >3 lbs in 24 hours, or urinary output <160ml/8hr  -Continue BB    Type 2 diabetes mellitus with neurologic complication  Patient's FSGs are controlled on current medication regimen.  Last A1c reviewed-   Lab Results   Component Value Date    HGBA1C 7.1 (H) 11/03/2022     Most recent fingerstick glucose reviewed-   Recent Labs   Lab 03/26/23  1535 03/26/23 2017 03/27/23  0514   POCTGLUCOSE 214* 195* 161*     Current correctional scale  Low  Maintain anti-hyperglycemic dose as follows-   Antihyperglycemics (From admission, onward)      Start     Stop Route Frequency Ordered    03/26/23 2100  insulin detemir U-100 (Levemir) pen 15 Units         -- SubQ Nightly 03/26/23 0329    03/26/23 0055  insulin aspart U-100 pen 0-5 Units         -- SubQ Before meals & nightly PRN 03/25/23 2356        Hold Oral hypoglycemics while  patient is in the hospital.      VTE Risk Mitigation (From admission, onward)           Ordered     enoxaparin injection 40 mg  Daily         03/25/23 2356     IP VTE HIGH RISK PATIENT  Once         03/25/23 2356     Place sequential compression device  Until discontinued         03/25/23 2356                    Discharge Planning   MARTHA:      Code Status: Full Code   Is the patient medically ready for discharge?:     Reason for patient still in hospital (select all that apply): Patient trending condition  Discharge Plan A: Home                  Alanis Brothers NP  Department of [facility]   [facility]

## 2023-03-27 NOTE — PLAN OF CARE
went to meet with patient. Patient's friend Marie at bedside. Patient reports he is independent and lives at home with Marie. He does not use any DME or have Home Health. Patient still drives, but Marie will transport home at discharge. PCP follow-up requested from MD office. I sent Home Infusion referral to [facility] via [facility] to follow. Patient encouraged to call with any questions or concerns.  will continue to follow patient through transitions of care and assist with any discharge needs.     Patient Contacts    Name Relation Home Work Mobile   Marie Gore Friend   [phone]     Future Appointments   Date Time Provider Department Center   5/3/2023  9:40 AM Britton Grissom MD [facility]   5/28/2023 10:00 AM HOME MONITOR DEVICE CHECK, KEVIN Dowd         03/27/23 1204   Discharge Assessment   Assessment Type Discharge Planning Assessment   Confirmed/corrected address, phone number and insurance Yes   Confirmed Demographics Correct on Facesheet   Source of Information patient   People in Home friend(s)   Facility Arrived From: Home   Do you expect to return to your current living situation? Yes   Do you have help at home or someone to help you manage your care at home? Yes   Who are your caregiver(s) and their phone number(s)? Marie (Friend) Phone#[phone]   Prior to hospitilization cognitive status: Alert/Oriented   Current cognitive status: Alert/Oriented   Equipment Currently Used at Home none   Do you take prescription medications? Yes   Do you have prescription coverage? Yes   Do you have any problems affording any of your prescribed medications? No   Is the patient taking medications as prescribed? yes   Who is going to help you get home at discharge? Marie (Friend) Phone#[phone]   How do you get to doctors appointments? car, drives self   Are you on dialysis? No   Do you take coumadin? No   Discharge Plan A Home    Discharge Plan B Home with family;Home Health   DME Needed Upon Discharge  none   Discharge Plan discussed with: Patient   Discharge Barriers Identified None   Physical Activity   On average, how many days per week do you engage in moderate to strenuous exercise (like a brisk walk)? Patient refu   On average, how many minutes do you engage in exercise at this level? Patient refu   Financial Resource Strain   How hard is it for you to pay for the very basics like food, housing, medical care, and heating? Not hard   Housing Stability   In the last 12 months, was there a time when you were not able to pay the mortgage or rent on time? N   In the last 12 months, was there a time when you did not have a steady place to sleep or slept in a shelter (including now)? N   Transportation Needs   In the past 12 months, has lack of transportation kept you from medical appointments or from getting medications? no   In the past 12 months, has lack of transportation kept you from meetings, work, or from getting things needed for daily living? No   Food Insecurity   Within the past 12 months, you worried that your food would run out before you got the money to buy more. Never true   Within the past 12 months, the food you bought just didn't last and you didn't have money to get more. Never true   Stress   Do you feel stress - tense, restless, nervous, or anxious, or unable to sleep at night because your mind is troubled all the time - these days? Patient refu   Social Connections   In a typical week, how many times do you talk on the phone with family, friends, or neighbors? More than 3   How often do you get together with friends or relatives? More than 3   How often do you attend Religion or Nondenominational services? Patient refu   Do you belong to any clubs or organizations such as Religion groups, unions, fraternal or athletic groups, or school groups? Patient refu   How often do you attend meetings of the clubs or organizations you  belong to? Patient refu   Are you , , , , never , or living with a partner? Patient refu   Alcohol Use   Q1: How often do you have a drink containing alcohol? Patient refu   Q2: How many drinks containing alcohol do you have on a typical day when you are drinking? Patient refu   Q3: How often do you have six or more drinks on one occasion? Patient refu     Екатерина Magdaleno RN    [phone]

## 2023-03-28 LAB
ALBUMIN SERPL BCP-MCNC: 2.7 G/DL (ref 3.5–5.2)
ALP SERPL-CCNC: 75 U/L (ref 55–135)
ALT SERPL W/O P-5'-P-CCNC: 22 U/L (ref 10–44)
ANION GAP SERPL CALC-SCNC: 11 MMOL/L (ref 8–16)
AST SERPL-CCNC: 19 U/L (ref 10–40)
BASOPHILS # BLD AUTO: 0.04 K/UL (ref 0–0.2)
BASOPHILS NFR BLD: 0.7 % (ref 0–1.9)
BILIRUB SERPL-MCNC: 0.6 MG/DL (ref 0.1–1)
BUN SERPL-MCNC: 8 MG/DL (ref 8–23)
CALCIUM SERPL-MCNC: 8.2 MG/DL (ref 8.7–10.5)
CHLORIDE SERPL-SCNC: 103 MMOL/L (ref 95–110)
CO2 SERPL-SCNC: 29 MMOL/L (ref 23–29)
CREAT SERPL-MCNC: 0.9 MG/DL (ref 0.5–1.4)
DIFFERENTIAL METHOD: ABNORMAL
EOSINOPHIL # BLD AUTO: 0.2 K/UL (ref 0–0.5)
EOSINOPHIL NFR BLD: 2.4 % (ref 0–8)
ERYTHROCYTE [DISTWIDTH] IN BLOOD BY AUTOMATED COUNT: 11.8 % (ref 11.5–14.5)
EST. GFR  (NO RACE VARIABLE): >60 ML/MIN/1.73 M^2
GLUCOSE SERPL-MCNC: 207 MG/DL (ref 70–110)
HCT VFR BLD AUTO: 32.3 % (ref 40–54)
HGB BLD-MCNC: 10.9 G/DL (ref 14–18)
IMM GRANULOCYTES # BLD AUTO: 0.03 K/UL (ref 0–0.04)
IMM GRANULOCYTES NFR BLD AUTO: 0.5 % (ref 0–0.5)
LYMPHOCYTES # BLD AUTO: 1.4 K/UL (ref 1–4.8)
LYMPHOCYTES NFR BLD: 22.3 % (ref 18–48)
MCH RBC QN AUTO: 31.5 PG (ref 27–31)
MCHC RBC AUTO-ENTMCNC: 33.7 G/DL (ref 32–36)
MCV RBC AUTO: 93 FL (ref 82–98)
MONOCYTES # BLD AUTO: 0.8 K/UL (ref 0.3–1)
MONOCYTES NFR BLD: 13.5 % (ref 4–15)
NEUTROPHILS # BLD AUTO: 3.7 K/UL (ref 1.8–7.7)
NEUTROPHILS NFR BLD: 60.6 % (ref 38–73)
NRBC BLD-RTO: 0 /100 WBC
PLATELET # BLD AUTO: 226 K/UL (ref 150–450)
PMV BLD AUTO: 10.1 FL (ref 9.2–12.9)
POCT GLUCOSE: 202 MG/DL (ref 70–110)
POCT GLUCOSE: 222 MG/DL (ref 70–110)
POCT GLUCOSE: 226 MG/DL (ref 70–110)
POCT GLUCOSE: 245 MG/DL (ref 70–110)
POTASSIUM SERPL-SCNC: 3.2 MMOL/L (ref 3.5–5.1)
PROT SERPL-MCNC: 6.5 G/DL (ref 6–8.4)
RBC # BLD AUTO: 3.46 M/UL (ref 4.6–6.2)
SODIUM SERPL-SCNC: 143 MMOL/L (ref 136–145)
WBC # BLD AUTO: 6.13 K/UL (ref 3.9–12.7)

## 2023-03-28 PROCEDURE — 25000003 PHARM REV CODE 250: Performed by: NURSE PRACTITIONER

## 2023-03-28 PROCEDURE — 99900035 HC TECH TIME PER 15 MIN (STAT)

## 2023-03-28 PROCEDURE — 25500020 PHARM REV CODE 255: Performed by: STUDENT IN AN ORGANIZED HEALTH CARE EDUCATION/TRAINING PROGRAM

## 2023-03-28 PROCEDURE — 94799 UNLISTED PULMONARY SVC/PX: CPT

## 2023-03-28 PROCEDURE — 36415 COLL VENOUS BLD VENIPUNCTURE: CPT | Performed by: NURSE PRACTITIONER

## 2023-03-28 PROCEDURE — 80053 COMPREHEN METABOLIC PANEL: CPT | Performed by: NURSE PRACTITIONER

## 2023-03-28 PROCEDURE — 21400001 HC TELEMETRY ROOM

## 2023-03-28 PROCEDURE — 63600175 PHARM REV CODE 636 W HCPCS: Performed by: NURSE PRACTITIONER

## 2023-03-28 PROCEDURE — 85025 COMPLETE CBC W/AUTO DIFF WBC: CPT | Performed by: NURSE PRACTITIONER

## 2023-03-28 PROCEDURE — 25000003 PHARM REV CODE 250

## 2023-03-28 PROCEDURE — A4216 STERILE WATER/SALINE, 10 ML: HCPCS | Performed by: NURSE PRACTITIONER

## 2023-03-28 PROCEDURE — 94761 N-INVAS EAR/PLS OXIMETRY MLT: CPT

## 2023-03-28 RX ORDER — POTASSIUM CHLORIDE 20 MEQ/1
40 TABLET, EXTENDED RELEASE ORAL 2 TIMES DAILY
Status: COMPLETED | OUTPATIENT
Start: 2023-03-28 | End: 2023-03-28

## 2023-03-28 RX ORDER — INSULIN ASPART 100 [IU]/ML
0-5 INJECTION, SOLUTION INTRAVENOUS; SUBCUTANEOUS
Status: DISCONTINUED | OUTPATIENT
Start: 2023-03-29 | End: 2023-03-29 | Stop reason: HOSPADM

## 2023-03-28 RX ADMIN — Medication 10 ML: at 05:03

## 2023-03-28 RX ADMIN — FUROSEMIDE 20 MG: 20 TABLET ORAL at 08:03

## 2023-03-28 RX ADMIN — GABAPENTIN 600 MG: 300 CAPSULE ORAL at 08:03

## 2023-03-28 RX ADMIN — LACTOBACILLUS TAB 4 TABLET: TAB at 05:03

## 2023-03-28 RX ADMIN — POTASSIUM CHLORIDE 40 MEQ: 1500 TABLET, EXTENDED RELEASE ORAL at 08:03

## 2023-03-28 RX ADMIN — ACETAMINOPHEN 650 MG: 325 TABLET ORAL at 08:03

## 2023-03-28 RX ADMIN — INSULIN ASPART 3 UNITS: 100 INJECTION, SOLUTION INTRAVENOUS; SUBCUTANEOUS at 05:03

## 2023-03-28 RX ADMIN — CLOPIDOGREL BISULFATE 75 MG: 75 TABLET ORAL at 08:03

## 2023-03-28 RX ADMIN — CEFTRIAXONE SODIUM 2 G: 2 INJECTION, POWDER, FOR SOLUTION INTRAMUSCULAR; INTRAVENOUS at 03:03

## 2023-03-28 RX ADMIN — ASPIRIN 81 MG: 81 TABLET, COATED ORAL at 08:03

## 2023-03-28 RX ADMIN — LACTOBACILLUS TAB 4 TABLET: TAB at 08:03

## 2023-03-28 RX ADMIN — INSULIN ASPART 2 UNITS: 100 INJECTION, SOLUTION INTRAVENOUS; SUBCUTANEOUS at 04:03

## 2023-03-28 RX ADMIN — LOSARTAN POTASSIUM 25 MG: 25 TABLET, FILM COATED ORAL at 08:03

## 2023-03-28 RX ADMIN — Medication 6 MG: at 08:03

## 2023-03-28 RX ADMIN — LACTOBACILLUS TAB 4 TABLET: TAB at 12:03

## 2023-03-28 RX ADMIN — INSULIN ASPART 2 UNITS: 100 INJECTION, SOLUTION INTRAVENOUS; SUBCUTANEOUS at 01:03

## 2023-03-28 RX ADMIN — POTASSIUM CHLORIDE 40 MEQ: 1500 TABLET, EXTENDED RELEASE ORAL at 12:03

## 2023-03-28 RX ADMIN — Medication 10 ML: at 09:03

## 2023-03-28 RX ADMIN — ENOXAPARIN SODIUM 40 MG: 40 INJECTION SUBCUTANEOUS at 05:03

## 2023-03-28 RX ADMIN — CARVEDILOL 12.5 MG: 12.5 TABLET, FILM COATED ORAL at 08:03

## 2023-03-28 RX ADMIN — ATORVASTATIN CALCIUM 40 MG: 40 TABLET, FILM COATED ORAL at 08:03

## 2023-03-28 RX ADMIN — INSULIN DETEMIR 15 UNITS: 100 INJECTION, SOLUTION SUBCUTANEOUS at 08:03

## 2023-03-28 NOTE — NURSING
Patient AAOx4. No c/o pain or n/v. Medications administered per orders. Ambulated with stand by assist. Tolerating diet. Had a bm tonight. Tele, vs, labs and bg monitored. Aspiration and fall precautions continued. Instructed to call for assistance. Safety maintained. Will continue to monitor.

## 2023-03-28 NOTE — PROGRESS NOTES
[facility] Medicine  Progress Note    Patient Name: Clifton Wilson  MRN: [id number]  Patient Class: IP- Inpatient   Admission Date: 3/25/2023  Length of Stay: 2 days  Attending Physician: Huan Hernandez, *  Primary Care Provider: Britton Grissom MD        Subjective:     Principal Problem:Gram-negative bacteremia        HPI:  Clifton Wilson is a 72-year-old male with a past history of anticoagulant long-term use, cardiomyopathy, CHF, CAD, DM, gout, heart attack, HTN, pneumonia who presented to the ED for the evaluation of positive blood cultures. He reports being seen here yesterday with complaints of significant fatigue, myalgias, rigors and chills as well as loose bowel movements and difficulty with urinary continence.  He was diagnosed with pyelonephritis and discharged home with cipro. He reports he has taken the prescribed medications and still feel fatigued. His blood culture resulted positive for gram-negative rods, and he was called to come to the ED for further evaluation. The ED work up revealed a WBC 14, afebrile. UA: WBC 61, trace protein, occult blood 1+, leukocytes 3+. Procalcitonin 0.63, K 3.1, .5. CXR: No acute abnormality. Blood and urine cultures pending. He was started on Rocephin. Admitted to [facility] medicine for further management.      Overview/Hospital Course:  Admitted for Klebsiella pneumoniae bacteremia secondary to pyelonephritis.  Has been treated with IV Rocephin  Repeat blood and urine cultures no growth, pending   Having persistent fevers otherwise clinically improving  Electrolytes repleted  Renal ultrasound pending to rule out abscess with persistent fevers.  Consult ID if fevers persist.      Interval History: Patient reports feeling better today, stating he is glad he came to be treated. He is no longer febrile and leukocytosis is down trending. Continues on ceftriaxone, repeat urine and blood cx NGTD. ID consulted for assistance with abx  management. K 3.2 this am, replenished.     Review of Systems   Constitutional:  Negative for chills and fatigue.   Genitourinary:  Negative for dysuria.   Musculoskeletal:  Negative for back pain.   Objective:     Vital Signs (Most Recent):  Temp: 98.1 °F (36.7 °C) (03/28/23 1654)  Pulse: 65 (03/28/23 1654)  Resp: 18 (03/28/23 1654)  BP: (!) 149/83 (03/28/23 1654)  SpO2: 96 % (03/28/23 1654)   Vital Signs (24h Range):  Temp:  [97.8 °F (36.6 °C)-98.6 °F (37 °C)] 98.1 °F (36.7 °C)  Pulse:  [55-67] 65  Resp:  [17-20] 18  SpO2:  [94 %-98 %] 96 %  BP: (132-176)/(62-83) 149/83     Weight: 105.5 kg (232 lb 9.4 oz)  Body mass index is 32.44 kg/m².    Intake/Output Summary (Last 24 hours) at 3/28/2023 1823  Last data filed at 3/28/2023 1300  Gross per 24 hour   Intake 619.86 ml   Output 0 ml   Net 619.86 ml      Physical Exam  Vitals reviewed.   Constitutional:       Appearance: Normal appearance. He is normal weight.   Cardiovascular:      Rate and Rhythm: Normal rate and regular rhythm.   Pulmonary:      Effort: Pulmonary effort is normal.   Musculoskeletal:         General: Normal range of motion.   Skin:     General: Skin is warm and dry.   Neurological:      General: No focal deficit present.      Mental Status: He is alert and oriented to person, place, and time. Mental status is at baseline.       Significant Labs: All pertinent labs within the past 24 hours have been reviewed.    Significant Imaging: I have reviewed all pertinent imaging results/findings within the past 24 hours.  I have reviewed and interpreted all pertinent imaging results/findings within the past 24 hours.      Assessment/Plan:      * Gram-negative bacteremia  Pyelonephritis  -positive blood cultures of gram negative rods from cultures taken day prior to admission for diagnosis of pyelonephritis  -repeat UA this admission; UA: WBC 61, trace protein, occult blood 1+, leukocytes 3+  -Procalcitonin 0.63, .5  -repeat blood and urine cultures  pending  -started on rocephin    Microbiology Results (last 7 days)     Procedure Component Value Units Date/Time    Blood culture #2 **CANNOT BE ORDERED STAT** [[id number]] Collected: 03/25/23 2335    Order Status: Completed Specimen: Blood from Peripheral, Hand, Right Updated: 03/27/23 1612     Blood Culture, Routine No Growth to date      No Growth to date    Blood culture #1 **CANNOT BE ORDERED STAT** [[id number]] Collected: 03/25/23 2335    Order Status: Completed Specimen: Blood from Peripheral, Antecubital, Left Updated: 03/27/23 1612     Blood Culture, Routine No Growth to date      No Growth to date    Stool culture [[id number]]     Order Status: Canceled Specimen: Stool     Clostridium difficile EIA [[id number]]     Order Status: Canceled Specimen: Stool     Urine culture [[id number]] Collected: 03/26/23 0027    Order Status: No result Specimen: Urine Updated: 03/26/23 0103        Blood cx with Klebsiella pneumoniae MDSO   Will continue IV rocephin for now   Will US kidneys to r/o renal abscess   Overall clinically improving aside from fevers   May need to consult ID if fevers do not resolve.     Mostly hypoechoic area at the inferomedial left lower renal pole, not definitively related to simple cyst by this exam.  In this patient with concern for abscess, further correlation with contrast enhanced CT abdomen-pelvis can be obtained for further assessment (unless contraindicated for any reason).     Bilateral renal cysts without hydronephrosis.     Elevated renal resistive indices, a nonspecific indicator of underlying medical renal disease.      3/28- CT abd/pel w/ contrast: 1. Nonspecific perinephric and periureteral fat stranding as above.  Pyelitis/pyelonephritis is not excluded, correlate with urinalysis.  No evidence of a renal abscess.  2. Nonobstructing punctate left renal calculus.  No hydronephrosis.  -Continues to clinically improve, afebrile  -ID consulted for abx management upon  discharge       Essential hypertension  Chronic, Latest blood pressure and vitals reviewed-   Temp:  [98 °F (36.7 °C)-102.3 °F (39.1 °C)]   Pulse:  [61-79]   Resp:  [17-20]   BP: (148-187)/(72-87)   SpO2:  [93 %-99 %] .   Home meds for hypertension were reviewed and noted below.   Hypertension Medications             carvediloL (COREG) 12.5 MG tablet Take 1 tablet (12.5 mg total) by mouth 2 (two) times daily.    furosemide (LASIX) 20 MG tablet Take 1 tablet (20 mg total) by mouth 2 (two) times daily.    losartan (COZAAR) 25 MG tablet Take 1 tablet (25 mg total) by mouth once daily.    nitroGLYCERIN (NITROSTAT) 0.4 MG SL tablet Place 1 tablet (0.4 mg total) under the tongue every 5 (five) minutes as needed for Chest pain.   -Resume home BP medications  -Monitor and trend vital signs q4hr  -Will utilize p.r.n. blood pressure medication only if patient's blood pressure greater than  180/110 and he develops symptoms such as worsening chest pain or shortness of breath.    Severe obesity (BMI 35.0-35.9 with comorbidity)  Body mass index is 33.21 kg/m². Morbid obesity complicates all aspects of disease management from diagnostic modalities to treatment. Weight loss encouraged and health benefits explained to patient.         Dyslipidemia    Resume statin     Hypokalemia    Replenish PRN      Coronary artery disease of native artery with stable angina pectoris  -Patient with known CAD s/p stent placement, which is controlled   -Will continue ASA, Plavix and Statin and monitor for S/Sx of angina/ACS.   -Continue to monitor on telemetry.     Chronic combined systolic and diastolic congestive heart failure  ICD (implantable cardioverter-defibrillator), single, in situ  -No c/o CP or SOB  -Monitor on telemetry  -Monitor and trend BMP, Mg, and renal function; keep K >4, Mg >2  -Sodium restriction (<2g/d), fluid restriction (<2L)   -Monitor for signs of fluid overload: RR>30, O2 sat<92%, weight gain of >3 lbs in 24 hours, or  urinary output <160ml/8hr  -Continue BB    Type 2 diabetes mellitus with neurologic complication  Patient's FSGs are controlled on current medication regimen.  Last A1c reviewed-   Lab Results   Component Value Date    HGBA1C 7.1 (H) 11/03/2022     Most recent fingerstick glucose reviewed-   Recent Labs   Lab 03/26/23  1535 03/26/23 2017 03/27/23  0514   POCTGLUCOSE 214* 195* 161*     Current correctional scale  Low  Maintain anti-hyperglycemic dose as follows-   Antihyperglycemics (From admission, onward)    Start     Stop Route Frequency Ordered    03/26/23 2100  insulin detemir U-100 (Levemir) pen 15 Units         -- SubQ Nightly 03/26/23 0329    03/26/23 0055  insulin aspart U-100 pen 0-5 Units         -- SubQ Before meals & nightly PRN 03/25/23 2356      Hold Oral hypoglycemics while patient is in the hospital.      VTE Risk Mitigation (From admission, onward)         Ordered     enoxaparin injection 40 mg  Daily         03/25/23 2356     IP VTE HIGH RISK PATIENT  Once         03/25/23 2356     Place sequential compression device  Until discontinued         03/25/23 2356                Discharge Planning   MARTHA:      Code Status: Full Code   Is the patient medically ready for discharge?:     Reason for patient still in hospital (select all that apply): Patient trending condition, Treatment and Consult recommendations  Discharge Plan A: Home, Home with family   Discharge Delays: (!) Other (PENDING MEDICAL STABILITY)              Mariama Rios NP  Department of Hospital Medicine   [facility]

## 2023-03-28 NOTE — SUBJECTIVE & OBJECTIVE
Interval History: Patient reports feeling better today, stating he is glad he came to be treated. He is no longer febrile and leukocytosis is down trending. Continues on ceftriaxone, repeat urine and blood cx NGTD. ID consulted for assistance with abx management. K 3.2 this am, replenished.     Review of Systems   Constitutional:  Negative for chills and fatigue.   Genitourinary:  Negative for dysuria.   Musculoskeletal:  Negative for back pain.   Objective:     Vital Signs (Most Recent):  Temp: 98.1 °F (36.7 °C) (03/28/23 1654)  Pulse: 65 (03/28/23 1654)  Resp: 18 (03/28/23 1654)  BP: (!) 149/83 (03/28/23 1654)  SpO2: 96 % (03/28/23 1654)   Vital Signs (24h Range):  Temp:  [97.8 °F (36.6 °C)-98.6 °F (37 °C)] 98.1 °F (36.7 °C)  Pulse:  [55-67] 65  Resp:  [17-20] 18  SpO2:  [94 %-98 %] 96 %  BP: (132-176)/(62-83) 149/83     Weight: 105.5 kg (232 lb 9.4 oz)  Body mass index is 32.44 kg/m².    Intake/Output Summary (Last 24 hours) at 3/28/2023 1823  Last data filed at 3/28/2023 1300  Gross per 24 hour   Intake 619.86 ml   Output 0 ml   Net 619.86 ml      Physical Exam  Vitals reviewed.   Constitutional:       Appearance: Normal appearance. He is normal weight.   Cardiovascular:      Rate and Rhythm: Normal rate and regular rhythm.   Pulmonary:      Effort: Pulmonary effort is normal.   Musculoskeletal:         General: Normal range of motion.   Skin:     General: Skin is warm and dry.   Neurological:      General: No focal deficit present.      Mental Status: He is alert and oriented to person, place, and time. Mental status is at baseline.       Significant Labs: All pertinent labs within the past 24 hours have been reviewed.    Significant Imaging: I have reviewed all pertinent imaging results/findings within the past 24 hours.  I have reviewed and interpreted all pertinent imaging results/findings within the past 24 hours.

## 2023-03-28 NOTE — PROGRESS NOTES
Future Appointments   Date Time Provider Department Center   5/3/2023  9:40 AM Britton Grissom MD [facility] [place] [facility]   5/28/2023 10:00 AM HOME MONITOR DEVICE CHECK, KEVIN Dowd

## 2023-03-28 NOTE — NURSING
2100 Patient drank Omnipaque as ordered.     2200 Patient to CT by wheelchair.     23 Patient arrived from CT by wheelchair. Safety maintained.

## 2023-03-28 NOTE — PLAN OF CARE
Chart reviewed - MD report reviewed   possible d/c today on po abx.   Future Appointments   Date Time Provider Department Center   5/3/2023  9:40 AM Britton Grissom MD [facility]   5/28/2023 10:00 AM HOME MONITOR DEVICE CHECK, KEVIN Dwod     CM will request sooner f/u apt with pcp   Dx:  Pyelonephritis - bacteremia       CM will contact pt with sooner pcp f/u apt      03/28/23 0944   Post-Acute Status   Post-Acute Authorization Other   Other Status No Post-Acute Service Needs   Hospital Resources/Appts/Education Provided Appointments scheduled and added to AVS   Discharge Delays (!) Other  (PENDING MEDICAL STABILITY)   Discharge Plan   Discharge Plan A Home;Home with family

## 2023-03-29 ENCOUNTER — PATIENT MESSAGE (OUTPATIENT)
Dept: FAMILY MEDICINE | Facility: CLINIC | Age: 73
End: 2023-03-29
Payer: MEDICARE

## 2023-03-29 ENCOUNTER — PATIENT OUTREACH (OUTPATIENT)
Dept: ADMINISTRATIVE | Facility: OTHER | Age: 73
End: 2023-03-29
Payer: MEDICARE

## 2023-03-29 VITALS
HEART RATE: 65 BPM | BODY MASS INDEX: 33.46 KG/M2 | SYSTOLIC BLOOD PRESSURE: 169 MMHG | RESPIRATION RATE: 18 BRPM | DIASTOLIC BLOOD PRESSURE: 76 MMHG | HEIGHT: 71 IN | TEMPERATURE: 98 F | OXYGEN SATURATION: 99 % | WEIGHT: 239 LBS

## 2023-03-29 VITALS — DIASTOLIC BLOOD PRESSURE: 78 MMHG | SYSTOLIC BLOOD PRESSURE: 128 MMHG

## 2023-03-29 LAB
ALBUMIN SERPL BCP-MCNC: 2.8 G/DL (ref 3.5–5.2)
ALP SERPL-CCNC: 79 U/L (ref 55–135)
ALT SERPL W/O P-5'-P-CCNC: 30 U/L (ref 10–44)
ANION GAP SERPL CALC-SCNC: 11 MMOL/L (ref 8–16)
AST SERPL-CCNC: 22 U/L (ref 10–40)
BASOPHILS # BLD AUTO: 0.04 K/UL (ref 0–0.2)
BASOPHILS NFR BLD: 0.7 % (ref 0–1.9)
BILIRUB SERPL-MCNC: 0.6 MG/DL (ref 0.1–1)
BUN SERPL-MCNC: 9 MG/DL (ref 8–23)
CALCIUM SERPL-MCNC: 8.7 MG/DL (ref 8.7–10.5)
CHLORIDE SERPL-SCNC: 103 MMOL/L (ref 95–110)
CO2 SERPL-SCNC: 28 MMOL/L (ref 23–29)
CREAT SERPL-MCNC: 0.9 MG/DL (ref 0.5–1.4)
DIFFERENTIAL METHOD: ABNORMAL
EOSINOPHIL # BLD AUTO: 0.2 K/UL (ref 0–0.5)
EOSINOPHIL NFR BLD: 2.6 % (ref 0–8)
ERYTHROCYTE [DISTWIDTH] IN BLOOD BY AUTOMATED COUNT: 11.9 % (ref 11.5–14.5)
EST. GFR  (NO RACE VARIABLE): >60 ML/MIN/1.73 M^2
GLUCOSE SERPL-MCNC: 185 MG/DL (ref 70–110)
HCT VFR BLD AUTO: 33.2 % (ref 40–54)
HGB BLD-MCNC: 11.2 G/DL (ref 14–18)
IMM GRANULOCYTES # BLD AUTO: 0.03 K/UL (ref 0–0.04)
IMM GRANULOCYTES NFR BLD AUTO: 0.5 % (ref 0–0.5)
LYMPHOCYTES # BLD AUTO: 1.4 K/UL (ref 1–4.8)
LYMPHOCYTES NFR BLD: 24.2 % (ref 18–48)
MCH RBC QN AUTO: 31.2 PG (ref 27–31)
MCHC RBC AUTO-ENTMCNC: 33.7 G/DL (ref 32–36)
MCV RBC AUTO: 93 FL (ref 82–98)
MONOCYTES # BLD AUTO: 0.7 K/UL (ref 0.3–1)
MONOCYTES NFR BLD: 11.5 % (ref 4–15)
NEUTROPHILS # BLD AUTO: 3.5 K/UL (ref 1.8–7.7)
NEUTROPHILS NFR BLD: 60.5 % (ref 38–73)
NRBC BLD-RTO: 0 /100 WBC
PLATELET # BLD AUTO: 235 K/UL (ref 150–450)
PMV BLD AUTO: 9.8 FL (ref 9.2–12.9)
POCT GLUCOSE: 202 MG/DL (ref 70–110)
POTASSIUM SERPL-SCNC: 3.5 MMOL/L (ref 3.5–5.1)
PROT SERPL-MCNC: 6.6 G/DL (ref 6–8.4)
RBC # BLD AUTO: 3.59 M/UL (ref 4.6–6.2)
SODIUM SERPL-SCNC: 142 MMOL/L (ref 136–145)
WBC # BLD AUTO: 5.83 K/UL (ref 3.9–12.7)

## 2023-03-29 PROCEDURE — 94799 UNLISTED PULMONARY SVC/PX: CPT

## 2023-03-29 PROCEDURE — A4216 STERILE WATER/SALINE, 10 ML: HCPCS | Performed by: NURSE PRACTITIONER

## 2023-03-29 PROCEDURE — 25000003 PHARM REV CODE 250: Performed by: NURSE PRACTITIONER

## 2023-03-29 PROCEDURE — 94761 N-INVAS EAR/PLS OXIMETRY MLT: CPT

## 2023-03-29 PROCEDURE — 80053 COMPREHEN METABOLIC PANEL: CPT

## 2023-03-29 PROCEDURE — 25000003 PHARM REV CODE 250

## 2023-03-29 PROCEDURE — 36415 COLL VENOUS BLD VENIPUNCTURE: CPT

## 2023-03-29 PROCEDURE — 85025 COMPLETE CBC W/AUTO DIFF WBC: CPT

## 2023-03-29 RX ORDER — POTASSIUM CHLORIDE 20 MEQ/1
20 TABLET, EXTENDED RELEASE ORAL DAILY
Status: DISCONTINUED | OUTPATIENT
Start: 2023-03-29 | End: 2023-03-29 | Stop reason: HOSPADM

## 2023-03-29 RX ORDER — CIPROFLOXACIN 500 MG/1
500 TABLET ORAL 2 TIMES DAILY
Qty: 10 TABLET | Refills: 0 | Status: SHIPPED | OUTPATIENT
Start: 2023-03-29 | End: 2023-04-05

## 2023-03-29 RX ORDER — POTASSIUM CHLORIDE 750 MG/1
TABLET, EXTENDED RELEASE ORAL
Qty: 30 TABLET | Refills: 0 | Status: SHIPPED | OUTPATIENT
Start: 2023-03-29

## 2023-03-29 RX ADMIN — LACTOBACILLUS TAB 4 TABLET: TAB at 08:03

## 2023-03-29 RX ADMIN — GABAPENTIN 600 MG: 300 CAPSULE ORAL at 08:03

## 2023-03-29 RX ADMIN — ATORVASTATIN CALCIUM 40 MG: 40 TABLET, FILM COATED ORAL at 08:03

## 2023-03-29 RX ADMIN — CARVEDILOL 12.5 MG: 12.5 TABLET, FILM COATED ORAL at 08:03

## 2023-03-29 RX ADMIN — LOSARTAN POTASSIUM 25 MG: 25 TABLET, FILM COATED ORAL at 08:03

## 2023-03-29 RX ADMIN — FUROSEMIDE 20 MG: 20 TABLET ORAL at 08:03

## 2023-03-29 RX ADMIN — POTASSIUM CHLORIDE 20 MEQ: 1500 TABLET, EXTENDED RELEASE ORAL at 09:03

## 2023-03-29 RX ADMIN — INSULIN ASPART 2 UNITS: 100 INJECTION, SOLUTION INTRAVENOUS; SUBCUTANEOUS at 08:03

## 2023-03-29 RX ADMIN — CLOPIDOGREL BISULFATE 75 MG: 75 TABLET ORAL at 08:03

## 2023-03-29 RX ADMIN — Medication 10 ML: at 05:03

## 2023-03-29 RX ADMIN — ASPIRIN 81 MG: 81 TABLET, COATED ORAL at 08:03

## 2023-03-29 RX ADMIN — LACTOBACILLUS TAB 4 TABLET: TAB at 12:03

## 2023-03-29 NOTE — NURSING
Patient AAOx4. C/o mild HA. PRN tylenol administered per orders. Medications administered per orders. Tolerating diet. Fluids restriction 1500ml/d. Tele, vs, labs and bg monitored. Aspiration and fall precautions continued. Instructed to call for assistance. Safety maintained. Will continue to monitor.

## 2023-03-29 NOTE — ASSESSMENT & PLAN NOTE
73 Yo M with PMH of HTN, CAD s/p stents, ICD, pneumonia presented to ED on 3/24 not feeling well, loose Bms with fever of 102, with WBC 13, pyuria, Cxray no finding and diagnosed with UTI. Given CFTXx1 dose in ED and sent home on 10 days of cipro 500mg PO BID. Called back and admitted 3/25 since the Bcx 3/24 were positive (later klebsiella-pansensitive). On admission started on CFTX. Fever 103, WBC 14. Urine Cx 3/24 and 3/26 negative. Renal US with no stone, abscess possible pyelo. Ct scan showed no stone, no abscess but confirmed pyelitis/pyelonephritis with fat stranding    REcommendations  Pt has been on CFTX since 3/24. Defervesced within 72 hrs. WBC normalized and no fevers after 3/26.  New studies shown do not need total 14 day therapy- Can do 5-7 days with fluoroquinolones and 7-10 days with other abx. Has been on 5 days with Abx- would recommend 5 more days of abx and then stop. Can go home on ciprofloxacin 500mg PO BID for 5 more days and then stop- Pt should follow up with PCP and should be referred to a urologist also outpt  ID will sign off

## 2023-03-29 NOTE — PLAN OF CARE
CM met with pt prior to d/c   friend Marie Gore  [phone] will transport pt to home   Meds delivered to bedside --   Discharging nurse to review all d/c meds/instructions     Future Appointments   Date Time Provider Department Center   4/5/2023 10:40 AM Britton Grissom MD [facility] [place] [facility]   5/3/2023  9:40 AM Britton Grissom MD [facility] [place] [facility]   5/28/2023 10:00 AM HOME MONITOR DEVICE CHECK, KEVIN [facility]        03/29/23 1358   Final Note   Assessment Type Final Discharge Note   Anticipated Discharge Disposition Home   What phone number can be called within the next 1-3 days to see how you are doing after discharge? [phone]   Hospital Resources/Appts/Education Provided Appointments scheduled and added to AVS   Post-Acute Status   Other Status No Post-Acute Service Needs   Discharge Delays None known at this time

## 2023-03-29 NOTE — HPI
73 Yo M with PMH of HTN, DM, CAD s/p stents, ICD, pneumonia presented to ED on 3/24 not feeling well, loose Bms with fever of 102, with WBC 13, pyuria, Cxray no finding and diagnosed with UTI. Given CFTXx1 dose in ED and sent home on 10 days of cipro 500mg PO BID. Called back and admitted 3/25 since the Bcx 3/24 were positive (later klebsiella-pansensitive). On admission started on CFTX. Fever 103, WBC 14. Urine Cx 3/24 and 3/26 negative. Renal US with no stone, abscess possible pyelo. Ct scan showed no stone, no abscess but confirmed pyelitis/pyelonephritis with fat stranding    Pt has been on CFTX since 3/24. Defervesced within 72 hrs. WBC normalized and no fevers after 3/26.

## 2023-03-29 NOTE — PLAN OF CARE
VN reviewed discharge instructions with pt. Using teach back method.  AVS printed and handed to pt by bedside nurse.  Reviewed follow-up appointments, medications, diet, and importance of medication compliance.  Reviewed home care instructions, treatment plan, self-management, and when to seek medical attention.  Allowed time for questions.  All questions answered.  Patient verbalized complete understanding of discharge instructions and voices no concerns.     Discharge instructions complete. Pt waiting on ride home.  Bedside nurse notified.

## 2023-03-29 NOTE — PROGRESS NOTES
IP Liaison - Initial Visit Note    Patient: Clifton Wilson  MRN:  [id number]  Date of Service:  3/29/2023  Completed by:  DAVE Adam    Reason for Visit   Patient presents with    IP Liaison Initial Visit       RSW met with patient at bedside in order to complete SDOH questionnaire and liaison assessment. Pt has identified no social barriers to care. Pt still works and has a strong support system at home. Pt declined the need for resources at this time.    The following were addressed during this visit:  - Review SDOH Questions   - Complete patient assessment   - Complete initial visit with patient        Patient Summary     IP Liaison Patient Assessment    General  Level of Caregiver support: Member independent and does not need caregiver assistance  Have you had to make a decision between paying for any of the following in the last 2 months?: None  Transportation means: Friend, Self  Employment status: Full time  Assessments  Was the PHQ Depression Screening completed this visit?: No  Was the ARISTEO-7 Screening completed this visit?: No       DAVE Adam

## 2023-03-29 NOTE — CONSULTS
[facility] - Med Surg  Infectious Disease  Consult Note    Patient Name: Clifton Wilson  MRN: [id number]  Admission Date: 3/25/2023  Hospital Length of Stay: 3 days  Attending Physician: Huan Hernandez, *  Primary Care Provider: Britton Grissom MD     Isolation Status: No active isolations    Patient information was obtained from patient, past medical records and primary team.      Inpatient consult to Infectious Diseases  Consult performed by: Lucia Velasquez MD  Consult ordered by: Mariama Rios NP  Reason for consult: g negative bacteremia and pyelonephritis        Assessment/Plan:     ID  * Gram-negative bacteremia  Source pyelonephritis  Blood cx 3/25 NGTD    Pyelonephritis  71 Yo M with PMH of HTN, CAD s/p stents, ICD, pneumonia presented to ED on 3/24 not feeling well, loose Bms with fever of 102, with WBC 13, pyuria, Cxray no finding and diagnosed with UTI. Given CFTXx1 dose in ED and sent home on 10 days of cipro 500mg PO BID. Called back and admitted 3/25 since the Bcx 3/24 were positive (later klebsiella-pansensitive). On admission started on CFTX. Fever 103, WBC 14. Urine Cx 3/24 and 3/26 negative. Renal US with no stone, abscess possible pyelo. Ct scan showed no stone, no abscess but confirmed pyelitis/pyelonephritis with fat stranding    REcommendations  Pt has been on CFTX since 3/24. Defervesced within 72 hrs. WBC normalized and no fevers after 3/26.  New studies shown do not need total 14 day therapy- Can do 5-7 days with fluoroquinolones and 7-10 days with other abx. Has been on 5 days with Abx- would recommend 5 more days of abx and then stop. Can go home on ciprofloxacin 500mg PO BID for 5 more days and then stop- Pt should follow up with PCP and should be referred to a urologist also outpt  ID will sign off      Thank you for your consult. I will sign off. Please contact us if you have any additional questions.    Lucia Velasquez MD  Infectious Diseases  [facility] - Med Surg    Subjective:      Principal Problem: Gram-negative bacteremia    HPI: 73 Yo M with PMH of HTN, DM, CAD s/p stents, ICD, pneumonia presented to ED on 3/24 not feeling well, loose Bms with fever of 102, with WBC 13, pyuria, Cxray no finding and diagnosed with UTI. Given CFTXx1 dose in ED and sent home on 10 days of cipro 500mg PO BID. Called back and admitted 3/25 since the Bcx 3/24 were positive (later klebsiella-pansensitive). On admission started on CFTX. Fever 103, WBC 14. Urine Cx 3/24 and 3/26 negative. Renal US with no stone, abscess possible pyelo. Ct scan showed no stone, no abscess but confirmed pyelitis/pyelonephritis with fat stranding    Pt has been on CFTX since 3/24. Defervesced within 72 hrs. WBC normalized and no fevers after 3/26.      Past Medical History:   Diagnosis Date    Anticoagulant long-term use     Cardiomyopathy     CHF (congestive heart failure)     Coronary artery disease     Diabetes mellitus     Gout     Heart attack     Hypertension     Pneumonia        Past Surgical History:   Procedure Laterality Date    APPENDECTOMY      CARDIAC CATHETERIZATION      7 stents    CARDIAC DEFIBRILLATOR PLACEMENT      CATARACT EXTRACTION Bilateral 2011    COLONOSCOPY N/A 8/10/2018    Procedure: COLONOSCOPY golytely;  Surgeon: Valentine Burger MD;  Location: [facility] ENDO;  Service: Endoscopy;  Laterality: N/A;    EYE SURGERY      REVISION OF IMPLANTABLE CARDIOVERTER-DEFIBRILLATOR (ICD) ELECTRODE LEAD PLACEMENT N/A 11/11/2019    Procedure: REVISION, INSERTION, ELECTRODE LEAD, ICD;  Surgeon: Shukri Walsh MD;  Location: [facility] EP LAB;  Service: Cardiology;  Laterality: N/A;  Lead malfxn, RV lead ICD, SJM, anes, DM, 3 PREP       Review of patient's allergies indicates:  No Known Allergies    Medications:  Medications Prior to Admission   Medication Sig    acetaminophen (TYLENOL) 500 MG tablet Take 1 tablet (500 mg total) by mouth every 6 (six) hours as needed for Pain.    aspirin (ECOTRIN) 81 MG EC  tablet Take 81 mg by mouth once daily.    atorvastatin (LIPITOR) 40 MG tablet Take 1 tablet (40 mg total) by mouth once daily.    carvediloL (COREG) 12.5 MG tablet Take 1 tablet (12.5 mg total) by mouth 2 (two) times daily.    ciprofloxacin HCl (CIPRO) 500 MG tablet Take 1 tablet (500 mg total) by mouth 2 (two) times daily. for 10 days    clopidogreL (PLAVIX) 75 mg tablet Take 1 tablet (75 mg total) by mouth once daily.    colchicine (MITIGARE) 0.6 mg Cap Take 1 capsule (0.6 mg total) by mouth once daily.    cyanocobalamin (VITAMIN B-12) 1000 MCG tablet TAKE ONE TABLET BY MOUTH ONCE DAILY FOR VITAMIN DEFICIENCIES    furosemide (LASIX) 20 MG tablet Take 1 tablet (20 mg total) by mouth 2 (two) times daily.    gabapentin (NEURONTIN) 300 MG capsule Take 2 capsules (600 mg total) by mouth 2 (two) times daily.    ibuprofen (ADVIL,MOTRIN) 600 MG tablet Take 1 tablet (600 mg total) by mouth every 6 (six) hours as needed for Pain.    insulin (LANTUS SOLOSTAR U-100 INSULIN) glargine 100 units/mL SubQ pen Inject 35 Units into the skin every evening.    insulin aspart U-100 (NOVOLOG FLEXPEN U-100 INSULIN) 100 unit/mL (3 mL) InPn pen Inject 15 Units into the skin 3 (three) times daily with meals.    losartan (COZAAR) 25 MG tablet Take 1 tablet (25 mg total) by mouth once daily.    metFORMIN (GLUCOPHAGE) 1000 MG tablet Take 1 tablet (1,000 mg total) by mouth 2 (two) times daily with meals.    zolpidem (AMBIEN) 5 MG Tab TAKE 1 TABLET BY MOUTH EVERY NIGHT AS NEEDED    alcohol swabs (BD ALCOHOL SWABS) PadM USE FOUR TIMES DAILY (Patient not taking: Reported on 10/27/2022)    blood glucose control high,low (ACCU-CHEK CATHRYN CONTROL SOLN) Soln Use as directed to check blood sugar    blood sugar diagnostic Strp Use to test four times daily    blood-glucose meter (ACCU-CHEK CATHRYN PLUS METER) Misc USE AS DIRECTED    lancets (ACCU-CHEK SOFTCLIX LANCETS) Misc TEST 3 (three) times daily.    nitroGLYCERIN (NITROSTAT) 0.4 MG  "SL tablet Place 1 tablet (0.4 mg total) under the tongue every 5 (five) minutes as needed for Chest pain.    pen needle, diabetic (BD ULTRA-FINE SINA PEN NEEDLE) 32 gauge x 5/32" Ndle Use four times daily for insulin administration    potassium chloride (KLOR-CON) 10 MEQ TbSR TAKE ONE TABLET BY MOUTH ONCE DAILY TO INCREASE POTASSIUM     Antibiotics (From admission, onward)      Start     Stop Route Frequency Ordered    03/27/23 1500  cefTRIAXone (ROCEPHIN) 2 g in dextrose 5 % in water (D5W) 5 % 50 mL IVPB (MB+)         -- IV Every 24 hours (non-standard times) 03/27/23 1348          Antifungals (From admission, onward)      None          Antivirals (From admission, onward)      None             Immunization History   Administered Date(s) Administered    COVID-19, MRNA, LN-S, PF (MODERNA FULL 0.5 ML DOSE) 02/26/2021, 03/26/2021, 06/14/2022    Influenza 10/11/2007, 12/15/2008, 10/07/2009, 10/28/2010, 10/23/2013, 08/07/2019    Influenza (FLUAD) - Quadrivalent - Adjuvanted - PF *Preferred* (65+) 11/03/2022    Influenza - High Dose - PF (65 years and older) 09/24/2018    Influenza - Quadrivalent - High Dose - PF (65 years and older) 02/01/2022    Influenza - Quadrivalent - PF *Preferred* (6 months and older) 12/09/2015, 10/20/2016    Pneumococcal Conjugate - 13 Valent 12/09/2015, 02/01/2022    Tdap 08/25/2016       Family History       Problem Relation (Age of Onset)    Heart disease Mother, Father          Social History     Socioeconomic History    Marital status: Single   Tobacco Use    Smoking status: Former    Smokeless tobacco: Never   Substance and Sexual Activity    Alcohol use: Yes     Comment: socially    Drug use: No    Sexual activity: Yes     Social Determinants of Health     Financial Resource Strain: Low Risk     Difficulty of Paying Living Expenses: Not hard at all   Food Insecurity: No Food Insecurity    Worried About Running Out of Food in the Last Year: Never true    Ran Out of Food " in the Last Year: Never true   Transportation Needs: No Transportation Needs    Lack of Transportation (Medical): No    Lack of Transportation (Non-Medical): No   Physical Activity: Unknown    Days of Exercise per Week: Patient refused    Minutes of Exercise per Session: Patient refused   Stress: Unknown    Feeling of Stress : Patient refused   Social Connections: Unknown    Frequency of Communication with Friends and Family: More than three times a week    Frequency of Social Gatherings with Friends and Family: More than three times a week    Attends Yarsani Services: Patient refused    Active Member of Clubs or Organizations: Patient refused    Attends Club or Organization Meetings: Patient refused    Marital Status: Patient refused   Housing Stability: Low Risk     Unable to Pay for Housing in the Last Year: No    Number of Places Lived in the Last Year: 1    Unstable Housing in the Last Year: No     Review of Systems   Constitutional:  Positive for activity change. Negative for appetite change and chills.   HENT:  Negative for congestion.    Eyes:  Negative for discharge and itching.   Respiratory:  Negative for apnea and chest tightness.    Cardiovascular:  Negative for chest pain and leg swelling.   Gastrointestinal:  Negative for abdominal distention, abdominal pain, constipation, diarrhea and nausea.   Genitourinary:  Positive for dysuria (resolved) and flank pain (resolved). Negative for difficulty urinating.   Musculoskeletal:  Negative for joint swelling.   Skin:  Negative for rash and wound.   Neurological:  Negative for dizziness and facial asymmetry.   Psychiatric/Behavioral:  Negative for agitation and behavioral problems.    All other systems reviewed and are negative.  Objective:     Vital Signs (Most Recent):  Temp: 98.3 °F (36.8 °C) (03/29/23 0805)  Pulse: 64 (03/29/23 0805)  Resp: 17 (03/29/23 0805)  BP: (!) 168/80 (03/29/23 0805)  SpO2: 98 % (03/29/23 0913)   Vital Signs (24h  Range):  Temp:  [97.4 °F (36.3 °C)-98.5 °F (36.9 °C)] 98.3 °F (36.8 °C)  Pulse:  [55-67] 64  Resp:  [17-18] 17  SpO2:  [94 %-100 %] 98 %  BP: (128-176)/(60-83) 128/78     Weight: 108.4 kg (238 lb 15.7 oz)  Body mass index is 33.33 kg/m².    Estimated Creatinine Clearance: 94.2 mL/min (based on SCr of 0.9 mg/dL).    Physical Exam  Vitals and nursing note reviewed.   Constitutional:       Appearance: He is obese. He is not ill-appearing or diaphoretic.   HENT:      Head: Normocephalic and atraumatic.      Nose: Nose normal. No rhinorrhea.      Mouth/Throat:      Mouth: Mucous membranes are moist.      Pharynx: No oropharyngeal exudate or posterior oropharyngeal erythema.      Comments: Some broken teeth, some careis  Eyes:      General:         Right eye: No discharge.         Left eye: No discharge.      Extraocular Movements: Extraocular movements intact.   Cardiovascular:      Rate and Rhythm: Normal rate and regular rhythm.      Heart sounds: Normal heart sounds.      Comments: Left ICD chest non tender  Pulmonary:      Effort: Pulmonary effort is normal. No respiratory distress.      Breath sounds: Normal breath sounds. No wheezing.   Abdominal:      General: Bowel sounds are normal. There is no distension.      Palpations: Abdomen is soft.      Tenderness: There is no abdominal tenderness.   Musculoskeletal:         General: No swelling or tenderness. Normal range of motion.      Cervical back: Normal range of motion and neck supple. No rigidity.      Right lower leg: No edema.      Left lower leg: No edema.      Comments: B/l feet near toes skin abrasion   Skin:     General: Skin is warm.      Findings: No erythema or rash.   Neurological:      General: No focal deficit present.      Mental Status: He is alert and oriented to person, place, and time.   Psychiatric:         Mood and Affect: Mood normal.         Behavior: Behavior normal.       Significant Labs: CBC:   Recent Labs   Lab 03/28/23  7681  03/29/23  0502   WBC 6.13 5.83   HGB 10.9* 11.2*   HCT 32.3* 33.2*    235     CMP:   Recent Labs   Lab 03/28/23  0445 03/29/23  0502    142   K 3.2* 3.5    103   CO2 29 28   * 185*   BUN 8 9   CREATININE 0.9 0.9   CALCIUM 8.2* 8.7   PROT 6.5 6.6   ALBUMIN 2.7* 2.8*   BILITOT 0.6 0.6   ALKPHOS 75 79   AST 19 22   ALT 22 30   ANIONGAP 11 11     Microbiology Results (last 7 days)       Procedure Component Value Units Date/Time    Blood culture #2 **CANNOT BE ORDERED STAT** [[id number]] Collected: 03/25/23 2335    Order Status: Completed Specimen: Blood from Peripheral, Hand, Right Updated: 03/28/23 1612     Blood Culture, Routine No Growth to date      No Growth to date      No Growth to date    Blood culture #1 **CANNOT BE ORDERED STAT** [[id number]] Collected: 03/25/23 2335    Order Status: Completed Specimen: Blood from Peripheral, Antecubital, Left Updated: 03/28/23 1612     Blood Culture, Routine No Growth to date      No Growth to date      No Growth to date    Urine culture [[id number]] Collected: 03/26/23 0027    Order Status: Completed Specimen: Urine Updated: 03/27/23 2238     Urine Culture, Routine No growth    Narrative:      Specimen Source->Urine    Stool culture [[id number]]     Order Status: Canceled Specimen: Stool     Clostridium difficile EIA [[id number]]     Order Status: Canceled Specimen: Stool           All pertinent labs within the past 24 hours have been reviewed.    Significant Imaging: I have reviewed all pertinent imaging results/findings within the past 24 hours.

## 2023-03-29 NOTE — SUBJECTIVE & OBJECTIVE
Past Medical History:   Diagnosis Date    Anticoagulant long-term use     Cardiomyopathy     CHF (congestive heart failure)     Coronary artery disease     Diabetes mellitus     Gout     Heart attack     Hypertension     Pneumonia        Past Surgical History:   Procedure Laterality Date    APPENDECTOMY      CARDIAC CATHETERIZATION      7 stents    CARDIAC DEFIBRILLATOR PLACEMENT      CATARACT EXTRACTION Bilateral 2011    COLONOSCOPY N/A 8/10/2018    Procedure: COLONOSCOPY golytely;  Surgeon: Valentine Burger MD;  Location: [facility] ENDO;  Service: Endoscopy;  Laterality: N/A;    EYE SURGERY      REVISION OF IMPLANTABLE CARDIOVERTER-DEFIBRILLATOR (ICD) ELECTRODE LEAD PLACEMENT N/A 11/11/2019    Procedure: REVISION, INSERTION, ELECTRODE LEAD, ICD;  Surgeon: Shukri Walsh MD;  Location: [facility] EP LAB;  Service: Cardiology;  Laterality: N/A;  Lead malfxn, RV lead ICD, SJM, anes, DM, 3 PREP       Review of patient's allergies indicates:  No Known Allergies    Medications:  Medications Prior to Admission   Medication Sig    acetaminophen (TYLENOL) 500 MG tablet Take 1 tablet (500 mg total) by mouth every 6 (six) hours as needed for Pain.    aspirin (ECOTRIN) 81 MG EC tablet Take 81 mg by mouth once daily.    atorvastatin (LIPITOR) 40 MG tablet Take 1 tablet (40 mg total) by mouth once daily.    carvediloL (COREG) 12.5 MG tablet Take 1 tablet (12.5 mg total) by mouth 2 (two) times daily.    ciprofloxacin HCl (CIPRO) 500 MG tablet Take 1 tablet (500 mg total) by mouth 2 (two) times daily. for 10 days    clopidogreL (PLAVIX) 75 mg tablet Take 1 tablet (75 mg total) by mouth once daily.    colchicine (MITIGARE) 0.6 mg Cap Take 1 capsule (0.6 mg total) by mouth once daily.    cyanocobalamin (VITAMIN B-12) 1000 MCG tablet TAKE ONE TABLET BY MOUTH ONCE DAILY FOR VITAMIN DEFICIENCIES    furosemide (LASIX) 20 MG tablet Take 1 tablet (20 mg total) by mouth 2 (two) times daily.    gabapentin (NEURONTIN) 300 MG capsule Take 2 capsules  "(600 mg total) by mouth 2 (two) times daily.    ibuprofen (ADVIL,MOTRIN) 600 MG tablet Take 1 tablet (600 mg total) by mouth every 6 (six) hours as needed for Pain.    insulin (LANTUS SOLOSTAR U-100 INSULIN) glargine 100 units/mL SubQ pen Inject 35 Units into the skin every evening.    insulin aspart U-100 (NOVOLOG FLEXPEN U-100 INSULIN) 100 unit/mL (3 mL) InPn pen Inject 15 Units into the skin 3 (three) times daily with meals.    losartan (COZAAR) 25 MG tablet Take 1 tablet (25 mg total) by mouth once daily.    metFORMIN (GLUCOPHAGE) 1000 MG tablet Take 1 tablet (1,000 mg total) by mouth 2 (two) times daily with meals.    zolpidem (AMBIEN) 5 MG Tab TAKE 1 TABLET BY MOUTH EVERY NIGHT AS NEEDED    alcohol swabs (BD ALCOHOL SWABS) PadM USE FOUR TIMES DAILY (Patient not taking: Reported on 10/27/2022)    blood glucose control high,low (ACCU-CHEK CATHRYN CONTROL SOLN) Soln Use as directed to check blood sugar    blood sugar diagnostic Strp Use to test four times daily    blood-glucose meter (ACCU-CHEK CATHRYN PLUS METER) Misc USE AS DIRECTED    lancets (ACCU-CHEK SOFTCLIX LANCETS) Misc TEST 3 (three) times daily.    nitroGLYCERIN (NITROSTAT) 0.4 MG SL tablet Place 1 tablet (0.4 mg total) under the tongue every 5 (five) minutes as needed for Chest pain.    pen needle, diabetic (BD ULTRA-FINE SINA PEN NEEDLE) 32 gauge x 5/32" Ndle Use four times daily for insulin administration    potassium chloride (KLOR-CON) 10 MEQ TbSR TAKE ONE TABLET BY MOUTH ONCE DAILY TO INCREASE POTASSIUM     Antibiotics (From admission, onward)      Start     Stop Route Frequency Ordered    03/27/23 1500  cefTRIAXone (ROCEPHIN) 2 g in dextrose 5 % in water (D5W) 5 % 50 mL IVPB (MB+)         -- IV Every 24 hours (non-standard times) 03/27/23 1348          Antifungals (From admission, onward)      None          Antivirals (From admission, onward)      None             Immunization History   Administered Date(s) Administered    COVID-19, MRNA, LN-S, PF " (MODERNA FULL 0.5 ML DOSE) 02/26/2021, 03/26/2021, 06/14/2022    Influenza 10/11/2007, 12/15/2008, 10/07/2009, 10/28/2010, 10/23/2013, 08/07/2019    Influenza (FLUAD) - Quadrivalent - Adjuvanted - PF *Preferred* (65+) 11/03/2022    Influenza - High Dose - PF (65 years and older) 09/24/2018    Influenza - Quadrivalent - High Dose - PF (65 years and older) 02/01/2022    Influenza - Quadrivalent - PF *Preferred* (6 months and older) 12/09/2015, 10/20/2016    Pneumococcal Conjugate - 13 Valent 12/09/2015, 02/01/2022    Tdap 08/25/2016       Family History       Problem Relation (Age of Onset)    Heart disease Mother, Father          Social History     Socioeconomic History    Marital status: Single   Tobacco Use    Smoking status: Former    Smokeless tobacco: Never   Substance and Sexual Activity    Alcohol use: Yes     Comment: socially    Drug use: No    Sexual activity: Yes     Social Determinants of Health     Financial Resource Strain: Low Risk     Difficulty of Paying Living Expenses: Not hard at all   Food Insecurity: No Food Insecurity    Worried About Running Out of Food in the Last Year: Never true    Ran Out of Food in the Last Year: Never true   Transportation Needs: No Transportation Needs    Lack of Transportation (Medical): No    Lack of Transportation (Non-Medical): No   Physical Activity: Unknown    Days of Exercise per Week: Patient refused    Minutes of Exercise per Session: Patient refused   Stress: Unknown    Feeling of Stress : Patient refused   Social Connections: Unknown    Frequency of Communication with Friends and Family: More than three times a week    Frequency of Social Gatherings with Friends and Family: More than three times a week    Attends Je[employer] witness Services: Patient refused    Active Member of Clubs or Organizations: Patient refused    Attends Club or Organization Meetings: Patient refused    Marital Status: Patient refused   Housing Stability: Low Risk     Unable to Pay for Housing  in the Last Year: No    Number of Places Lived in the Last Year: 1    Unstable Housing in the Last Year: No     Review of Systems   Constitutional:  Positive for activity change. Negative for appetite change and chills.   HENT:  Negative for congestion.    Eyes:  Negative for discharge and itching.   Respiratory:  Negative for apnea and chest tightness.    Cardiovascular:  Negative for chest pain and leg swelling.   Gastrointestinal:  Negative for abdominal distention, abdominal pain, constipation, diarrhea and nausea.   Genitourinary:  Positive for dysuria (resolved) and flank pain (resolved). Negative for difficulty urinating.   Musculoskeletal:  Negative for joint swelling.   Skin:  Negative for rash and wound.   Neurological:  Negative for dizziness and facial asymmetry.   Psychiatric/Behavioral:  Negative for agitation and behavioral problems.    All other systems reviewed and are negative.  Objective:     Vital Signs (Most Recent):  Temp: 98.3 °F (36.8 °C) (03/29/23 0805)  Pulse: 64 (03/29/23 0805)  Resp: 17 (03/29/23 0805)  BP: (!) 168/80 (03/29/23 0805)  SpO2: 98 % (03/29/23 0913)   Vital Signs (24h Range):  Temp:  [97.4 °F (36.3 °C)-98.5 °F (36.9 °C)] 98.3 °F (36.8 °C)  Pulse:  [55-67] 64  Resp:  [17-18] 17  SpO2:  [94 %-100 %] 98 %  BP: (128-176)/(60-83) 128/78     Weight: 108.4 kg (238 lb 15.7 oz)  Body mass index is 33.33 kg/m².    Estimated Creatinine Clearance: 94.2 mL/min (based on SCr of 0.9 mg/dL).    Physical Exam  Vitals and nursing note reviewed.   Constitutional:       Appearance: He is obese. He is not ill-appearing or diaphoretic.   HENT:      Head: Normocephalic and atraumatic.      Nose: Nose normal. No rhinorrhea.      Mouth/Throat:      Mouth: Mucous membranes are moist.      Pharynx: No oropharyngeal exudate or posterior oropharyngeal erythema.      Comments: Some broken teeth, some careis  Eyes:      General:         Right eye: No discharge.         Left eye: No discharge.       Extraocular Movements: Extraocular movements intact.   Cardiovascular:      Rate and Rhythm: Normal rate and regular rhythm.      Heart sounds: Normal heart sounds.      Comments: Left ICD chest non tender  Pulmonary:      Effort: Pulmonary effort is normal. No respiratory distress.      Breath sounds: Normal breath sounds. No wheezing.   Abdominal:      General: Bowel sounds are normal. There is no distension.      Palpations: Abdomen is soft.      Tenderness: There is no abdominal tenderness.   Musculoskeletal:         General: No swelling or tenderness. Normal range of motion.      Cervical back: Normal range of motion and neck supple. No rigidity.      Right lower leg: No edema.      Left lower leg: No edema.      Comments: B/l feet near toes skin abrasion   Skin:     General: Skin is warm.      Findings: No erythema or rash.   Neurological:      General: No focal deficit present.      Mental Status: He is alert and oriented to person, place, and time.   Psychiatric:         Mood and Affect: Mood normal.         Behavior: Behavior normal.       Significant Labs: CBC:   Recent Labs   Lab 03/28/23  0445 03/29/23  0502   WBC 6.13 5.83   HGB 10.9* 11.2*   HCT 32.3* 33.2*    235     CMP:   Recent Labs   Lab 03/28/23  0445 03/29/23  0502    142   K 3.2* 3.5    103   CO2 29 28   * 185*   BUN 8 9   CREATININE 0.9 0.9   CALCIUM 8.2* 8.7   PROT 6.5 6.6   ALBUMIN 2.7* 2.8*   BILITOT 0.6 0.6   ALKPHOS 75 79   AST 19 22   ALT 22 30   ANIONGAP 11 11     Microbiology Results (last 7 days)       Procedure Component Value Units Date/Time    Blood culture #2 **CANNOT BE ORDERED STAT** [[id number]] Collected: 03/25/23 2335    Order Status: Completed Specimen: Blood from Peripheral, Hand, Right Updated: 03/28/23 1612     Blood Culture, Routine No Growth to date      No Growth to date      No Growth to date    Blood culture #1 **CANNOT BE ORDERED STAT** [[id number]] Collected: 03/25/23 2335    Order Status:  Completed Specimen: Blood from Peripheral, Antecubital, Left Updated: 03/28/23 1612     Blood Culture, Routine No Growth to date      No Growth to date      No Growth to date    Urine culture [[id number]] Collected: 03/26/23 0027    Order Status: Completed Specimen: Urine Updated: 03/27/23 2238     Urine Culture, Routine No growth    Narrative:      Specimen Source->Urine    Stool culture [[id number]]     Order Status: Canceled Specimen: Stool     Clostridium difficile EIA [[id number]]     Order Status: Canceled Specimen: Stool           All pertinent labs within the past 24 hours have been reviewed.    Significant Imaging: I have reviewed all pertinent imaging results/findings within the past 24 hours.

## 2023-03-30 ENCOUNTER — PATIENT OUTREACH (OUTPATIENT)
Dept: ADMINISTRATIVE | Facility: CLINIC | Age: 73
End: 2023-03-30
Payer: MEDICARE

## 2023-03-30 VITALS — DIASTOLIC BLOOD PRESSURE: 78 MMHG | SYSTOLIC BLOOD PRESSURE: 128 MMHG

## 2023-03-30 NOTE — PROGRESS NOTES
C3 nurse attempted to contact Clifton Wilson  for a TCC post hospital discharge follow up call. No answer. Left voicemail with callback information. The patient has a scheduled HOSFU appointment with Britton Grissom MD  on 0405/23 @ 0033.

## 2023-03-30 NOTE — PROGRESS NOTES
2nd attempt to make TCC Call. Pt left message on return call line to call him back but when called, he did not answer. Left voicemail. Please call [phone] leave your first and last name and date of birth for Narciso. I will return your call.

## 2023-03-30 NOTE — PROGRESS NOTES
C3 nurse spoke with Clifton Wilson  for a TCC post hospital discharge follow up call. The patient has a scheduled HOSFU appointment with Britton Grissom MD  on 04/05/23 @ 1040.

## 2023-03-31 ENCOUNTER — PATIENT MESSAGE (OUTPATIENT)
Dept: FAMILY MEDICINE | Facility: CLINIC | Age: 73
End: 2023-03-31
Payer: MEDICARE

## 2023-03-31 ENCOUNTER — PATIENT OUTREACH (OUTPATIENT)
Dept: ADMINISTRATIVE | Facility: OTHER | Age: 73
End: 2023-03-31
Payer: MEDICARE

## 2023-03-31 LAB
BACTERIA BLD CULT: NORMAL
BACTERIA BLD CULT: NORMAL

## 2023-03-31 NOTE — PROGRESS NOTES
IP Liaison - Final Visit Note    Patient: Clifton Wilson  MRN:  [id number]  Date of Service:  3/31/2023  Completed by:  DAVE Adam    Reason for Visit   Patient presents with    IP Liaison Chart Review     Patient discharged from hospital before AURELIAW was able to complete follow-up visit.        Patient Summary     Discharge Date: 03/29/2023  Discharge telephone number/address: [phone] / [address] [id number]  Follow up provider: Britton Grissom MD  Follow up appointments: 4/5/2023 @ 10:40am  Home Health agency & telephone number: n/a  DME ordered &  name: n/a  Assigned OPCM RN/SW: n/a  Report sent to follow up team (PCP/OPCM) via in basket message: n/a  Community Resources arranged including agency name & contact info: n/a      DAVE Adam

## 2023-04-04 NOTE — DISCHARGE SUMMARY
[facility] Hospital Medicine  Discharge Summary      Patient Name: Clifton Wilson  MRN: [id number]  EMIR: [id number]  Patient Class: IP- Inpatient  Admission Date: 3/25/2023  Hospital Length of Stay: 3 days  Discharge Date and Time: 3/29/2023  3:19 PM  Attending Physician: No att. providers found   Discharging Provider: Mariama Rios NP  Primary Care Provider: Britton Grissom MD    Primary Care Team: Networked reference to record PCT     HPI:   Clifton Wilson is a 72-year-old male with a past history of anticoagulant long-term use, cardiomyopathy, CHF, CAD, DM, gout, heart attack, HTN, pneumonia who presented to the ED for the evaluation of positive blood cultures. He reports being seen here yesterday with complaints of significant fatigue, myalgias, rigors and chills as well as loose bowel movements and difficulty with urinary continence.  He was diagnosed with pyelonephritis and discharged home with cipro. He reports he has taken the prescribed medications and still feel fatigued. His blood culture resulted positive for gram-negative rods, and he was called to come to the ED for further evaluation. The ED work up revealed a WBC 14, afebrile. UA: WBC 61, trace protein, occult blood 1+, leukocytes 3+. Procalcitonin 0.63, K 3.1, .5. CXR: No acute abnormality. Blood and urine cultures pending. He was started on Rocephin. Admitted to [facility] medicine for further management.      * No surgery found *      Hospital Course:   Admitted for Klebsiella pneumoniae bacteremia secondary to pyelonephritis.  Has been treated with IV Rocephin  Repeat blood and urine cultures no growth, pending   Having persistent fevers otherwise clinically improving  Electrolytes repleted  Renal ultrasound pending to rule out abscess with persistent fevers.  Consult ID if fevers persist.    3/29- Patient remains afebrile and continues to improve. He will be discharged to follow up with PCP and referral to urologist.  Cipro 500 mg BID x 5 days. Scripts sent to pharmacy.         Goals of Care Treatment Preferences:  Code Status: Full Code      Consults:   Consults (From admission, onward)        Status Ordering Provider     Inpatient consult to Infectious Diseases  Once        Provider:  Lucia Velasquez MD    Completed DOREEN MARTIN          No new Assessment & Plan notes have been filed under this hospital service since the last note was generated.  Service: Hospital Medicine    Final Active Diagnoses:    Diagnosis Date Noted POA    PRINCIPAL PROBLEM:  Gram-negative bacteremia [R78.81] 03/26/2023 Yes    Pyelonephritis [N12] 03/26/2023 Yes    Essential hypertension [I10] 04/24/2017 Yes    Severe obesity (BMI 35.0-35.9 with comorbidity) [E66.01, Z68.35] 03/22/2017 Not Applicable    Dyslipidemia [E78.5] 02/11/2016 Yes    Hypokalemia [E87.6] 12/09/2015 Yes    Coronary artery disease of native artery with stable angina pectoris [I25.118] 12/08/2015 Yes    Chronic combined systolic and diastolic congestive heart failure [I50.42] 12/07/2015 Yes    Type 2 diabetes mellitus with neurologic complication [E11.49] 12/04/2015 Yes      Problems Resolved During this Admission:       Discharged Condition: stable    Disposition: Home or Self Care    Follow Up:   Follow-up Information     Britton Grissom MD Follow up on 4/5/2023.    Specialty: Internal Medicine  Why: 10:40 am  Contact information:  [address]  [address]  [facility] [address]  [phone]                       Patient Instructions:      Ambulatory referral/consult to Urology   Standing Status: Future   Referral Priority: Routine Referral Type: Consultation   Referral Reason: Specialty Services Required   Requested Specialty: Urology   Number of Visits Requested: 1     Diet Cardiac     Notify your health care provider if you experience any of the following:  temperature >100.4     Notify your health care provider if you experience any of the following:  redness,  "tenderness, or signs of infection (pain, swelling, redness, odor or green/yellow discharge around incision site)     Activity as tolerated       Significant Diagnostic Studies: N/A    Pending Diagnostic Studies:     None         Medications:  Reconciled Home Medications:      Medication List      CONTINUE taking these medications    ACCU-CHEK CATHRYN CONTROL SOLN Soln  Generic drug: blood glucose control high,low  Use as directed to check blood sugar     acetaminophen 500 MG tablet  Commonly known as: TYLENOL  Take 1 tablet (500 mg total) by mouth every 6 (six) hours as needed for Pain.     aspirin 81 MG EC tablet  Commonly known as: ECOTRIN  Take 81 mg by mouth once daily.     atorvastatin 40 MG tablet  Commonly known as: LIPITOR  Take 1 tablet (40 mg total) by mouth once daily.     BD ALCOHOL SWABS Padm  Generic drug: alcohol swabs  USE FOUR TIMES DAILY     BD ULTRA-FINE SINA PEN NEEDLE 32 gauge x 5/32" Ndle  Generic drug: pen needle, diabetic  Use four times daily for insulin administration     blood sugar diagnostic Strp  Use to test four times daily     blood-glucose meter Misc  Commonly known as: ACCU-CHEK CATHRYN PLUS METER  USE AS DIRECTED     carvediloL 12.5 MG tablet  Commonly known as: COREG  Take 1 tablet (12.5 mg total) by mouth 2 (two) times daily.     clopidogreL 75 mg tablet  Commonly known as: PLAVIX  Take 1 tablet (75 mg total) by mouth once daily.     cyanocobalamin 1000 MCG tablet  Commonly known as: VITAMIN B-12  TAKE ONE TABLET BY MOUTH ONCE DAILY FOR VITAMIN DEFICIENCIES     furosemide 20 MG tablet  Commonly known as: LASIX  Take 1 tablet (20 mg total) by mouth 2 (two) times daily.     gabapentin 300 MG capsule  Commonly known as: NEURONTIN  Take 2 capsules (600 mg total) by mouth 2 (two) times daily.     ibuprofen 600 MG tablet  Commonly known as: ADVIL,MOTRIN  Take 1 tablet (600 mg total) by mouth every 6 (six) hours as needed for Pain.     lancets Misc  Commonly known as: ACCU-CHEK SOFTCLIX " LANCETS  TEST 3 (three) times daily.     LANTUS SOLOSTAR U-100 INSULIN glargine 100 units/mL SubQ pen  Generic drug: insulin  Inject 35 Units into the skin every evening.     losartan 25 MG tablet  Commonly known as: COZAAR  Take 1 tablet (25 mg total) by mouth once daily.     metFORMIN 1000 MG tablet  Commonly known as: GLUCOPHAGE  Take 1 tablet (1,000 mg total) by mouth 2 (two) times daily with meals.     MITIGARE 0.6 mg Cap  Generic drug: colchicine  Take 1 capsule (0.6 mg total) by mouth once daily.     nitroGLYCERIN 0.4 MG SL tablet  Commonly known as: NITROSTAT  Place 1 tablet (0.4 mg total) under the tongue every 5 (five) minutes as needed for Chest pain.     NovoLOG FlexPen U-100 Insulin 100 unit/mL (3 mL) Inpn pen  Generic drug: insulin aspart U-100  Inject 15 Units into the skin 3 (three) times daily with meals.     potassium chloride 10 MEQ Tbsr  Commonly known as: KLOR-CON  TAKE ONE TABLET BY MOUTH ONCE DAILY TO INCREASE POTASSIUM     zolpidem 5 MG Tab  Commonly known as: AMBIEN  TAKE 1 TABLET BY MOUTH EVERY NIGHT AS NEEDED        STOP taking these medications    ciprofloxacin HCl 500 MG tablet  Commonly known as: CIPRO        ASK your doctor about these medications    ciprofloxacin HCl 500 MG tablet  Commonly known as: CIPRO  Take 1 tablet (500 mg total) by mouth 2 (two) times daily. for 5 days  Ask about: Should I take this medication?            Indwelling Lines/Drains at time of discharge:   Lines/Drains/Airways     None                 Time spent on the discharge of patient:40  minutes         Mariama Rios NP  Department of Hospital Medicine  [facility]

## 2023-04-05 ENCOUNTER — OFFICE VISIT (OUTPATIENT)
Dept: FAMILY MEDICINE | Facility: CLINIC | Age: 73
End: 2023-04-05
Attending: FAMILY MEDICINE
Payer: MEDICARE

## 2023-04-05 VITALS
BODY MASS INDEX: 32.5 KG/M2 | TEMPERATURE: 98 F | SYSTOLIC BLOOD PRESSURE: 120 MMHG | OXYGEN SATURATION: 98 % | DIASTOLIC BLOOD PRESSURE: 70 MMHG | HEIGHT: 71 IN | HEART RATE: 72 BPM | WEIGHT: 232.13 LBS

## 2023-04-05 DIAGNOSIS — B96.89 UTI DUE TO KLEBSIELLA SPECIES: ICD-10-CM

## 2023-04-05 DIAGNOSIS — I25.118 CORONARY ARTERY DISEASE OF NATIVE ARTERY OF NATIVE HEART WITH STABLE ANGINA PECTORIS: ICD-10-CM

## 2023-04-05 DIAGNOSIS — I15.2 HYPERTENSION ASSOCIATED WITH DIABETES: ICD-10-CM

## 2023-04-05 DIAGNOSIS — N39.0 UTI DUE TO KLEBSIELLA SPECIES: ICD-10-CM

## 2023-04-05 DIAGNOSIS — E11.59 HYPERTENSION ASSOCIATED WITH DIABETES: ICD-10-CM

## 2023-04-05 DIAGNOSIS — E11.40 TYPE 2 DIABETES MELLITUS WITH DIABETIC NEUROPATHY, UNSPECIFIED WHETHER LONG TERM INSULIN USE: ICD-10-CM

## 2023-04-05 DIAGNOSIS — E11.69 DYSLIPIDEMIA ASSOCIATED WITH TYPE 2 DIABETES MELLITUS: ICD-10-CM

## 2023-04-05 DIAGNOSIS — Z09 HOSPITAL DISCHARGE FOLLOW-UP: Primary | ICD-10-CM

## 2023-04-05 DIAGNOSIS — E78.5 DYSLIPIDEMIA ASSOCIATED WITH TYPE 2 DIABETES MELLITUS: ICD-10-CM

## 2023-04-05 PROCEDURE — 99496 TRANSITIONAL CARE MANAGE SERVICE 7 DAY DISCHARGE: ICD-10-PCS | Mod: S$GLB,,, | Performed by: FAMILY MEDICINE

## 2023-04-05 PROCEDURE — 3078F DIAST BP <80 MM HG: CPT | Mod: CPTII,S$GLB,, | Performed by: FAMILY MEDICINE

## 2023-04-05 PROCEDURE — 3074F SYST BP LT 130 MM HG: CPT | Mod: CPTII,S$GLB,, | Performed by: FAMILY MEDICINE

## 2023-04-05 PROCEDURE — 3074F PR MOST RECENT SYSTOLIC BLOOD PRESSURE < 130 MM HG: ICD-10-PCS | Mod: CPTII,S$GLB,, | Performed by: FAMILY MEDICINE

## 2023-04-05 PROCEDURE — 3008F BODY MASS INDEX DOCD: CPT | Mod: CPTII,S$GLB,, | Performed by: FAMILY MEDICINE

## 2023-04-05 PROCEDURE — 1159F MED LIST DOCD IN RCRD: CPT | Mod: CPTII,S$GLB,, | Performed by: FAMILY MEDICINE

## 2023-04-05 PROCEDURE — 99999 PR PBB SHADOW E&M-EST. PATIENT-LVL III: ICD-10-PCS | Mod: PBBFAC,,, | Performed by: FAMILY MEDICINE

## 2023-04-05 PROCEDURE — 4010F ACE/ARB THERAPY RXD/TAKEN: CPT | Mod: CPTII,S$GLB,, | Performed by: FAMILY MEDICINE

## 2023-04-05 PROCEDURE — 1160F RVW MEDS BY RX/DR IN RCRD: CPT | Mod: CPTII,S$GLB,, | Performed by: FAMILY MEDICINE

## 2023-04-05 PROCEDURE — 3008F PR BODY MASS INDEX (BMI) DOCUMENTED: ICD-10-PCS | Mod: CPTII,S$GLB,, | Performed by: FAMILY MEDICINE

## 2023-04-05 PROCEDURE — 1159F PR MEDICATION LIST DOCUMENTED IN MEDICAL RECORD: ICD-10-PCS | Mod: CPTII,S$GLB,, | Performed by: FAMILY MEDICINE

## 2023-04-05 PROCEDURE — 1160F PR REVIEW ALL MEDS BY PRESCRIBER/CLIN PHARMACIST DOCUMENTED: ICD-10-PCS | Mod: CPTII,S$GLB,, | Performed by: FAMILY MEDICINE

## 2023-04-05 PROCEDURE — 99496 TRANSJ CARE MGMT HIGH F2F 7D: CPT | Mod: S$GLB,,, | Performed by: FAMILY MEDICINE

## 2023-04-05 PROCEDURE — 3078F PR MOST RECENT DIASTOLIC BLOOD PRESSURE < 80 MM HG: ICD-10-PCS | Mod: CPTII,S$GLB,, | Performed by: FAMILY MEDICINE

## 2023-04-05 PROCEDURE — 4010F PR ACE/ARB THEARPY RXD/TAKEN: ICD-10-PCS | Mod: CPTII,S$GLB,, | Performed by: FAMILY MEDICINE

## 2023-04-05 PROCEDURE — 99999 PR PBB SHADOW E&M-EST. PATIENT-LVL III: CPT | Mod: PBBFAC,,, | Performed by: FAMILY MEDICINE

## 2023-04-05 RX ORDER — METFORMIN HYDROCHLORIDE 1000 MG/1
1000 TABLET ORAL 2 TIMES DAILY WITH MEALS
Qty: 180 TABLET | Refills: 4 | Status: SHIPPED | OUTPATIENT
Start: 2023-04-05

## 2023-04-05 NOTE — PROGRESS NOTES
Transitional Care Note  Subjective:       Patient ID: Clifton Wilson is a 72 y.o. male.  Chief Complaint: Follow-up    Family and/or Caretaker present at visit?  Yes.  Diagnostic tests reviewed/disposition: I have reviewed all completed as well as pending diagnostic tests at the time of discharge.  Disease/illness education: yes  Home health/community services discussion/referrals: Patient does not have home health established from hospital visit.  They do not need home health.  If needed, we will set up home health for the patient.   Establishment or re-establishment of referral orders for community resources: No other necessary community resources.   Discussion with other health care providers: No discussion with other health care providers necessary.   72 yr old pleasant white male with DM II, HTN, HLD, CAD, Combined chronic CHF, MR, obesity, neuropathy, presents today for hospital discharge follow up. He was admitted for klebsiella UTI and bacteremia and stayed for few days and also had IV infusion with antibiotics. He then switched to oral cipro and discharged. He has follow up with urology soon. H feels fine overall.      DM II - controlled  - HGBA1C                   7.1 (H)             11/03/2022                                    - on metformin and insulin and sugars improving - UTD eye exam - foot exam UTD - on ASA and plavix. Losartan. He ate too much jamie cake during mardi gras.      HTN - chronic - controlled - on losartan, lasix - compliant - no side effects      HLD - chronic -      LDLCALC                  71.2                05/03/2022                                     - controlled -       CAD/combined CHF - EF 35-40% - on external defibrillator - medical management - on ASA/plavix/ARB - no symptoms - following cardiology    Gout - improving - uses colchicine for flare up -     History as below - reviewed            Follow-up  Associated symptoms include arthralgias, joint swelling and  myalgias. Pertinent negatives include no chest pain, congestion, coughing, diaphoresis, fatigue, headaches, neck pain, rash, visual change, vomiting or weakness.   Diabetes  He presents for his follow-up diabetic visit. He has type 2 diabetes mellitus. No MedicAlert identification noted. The initial diagnosis of diabetes was made 27 years ago. His disease course has been worsening. Hypoglycemia symptoms include sleepiness. Pertinent negatives for hypoglycemia include no confusion, dizziness, headaches, hunger, mood changes, nervousness/anxiousness, pallor, seizures, speech difficulty, sweats or tremors. Associated symptoms include foot paresthesias. Pertinent negatives for diabetes include no blurred vision, no chest pain, no fatigue, no foot ulcerations, no polydipsia, no polyphagia, no polyuria, no visual change, no weakness and no weight loss. Hypoglycemia complications include blackouts and hospitalization. Pertinent negatives for hypoglycemia complications include no nocturnal hypoglycemia, no required assistance and no required glucagon injection. Symptoms are stable. Diabetic complications include autonomic neuropathy, heart disease, impotence and peripheral neuropathy. Pertinent negatives for diabetic complications include no CVA, nephropathy, PVD or retinopathy. Risk factors for coronary artery disease include hypertension, obesity, diabetes mellitus and male sex. Current diabetic treatment includes diet, insulin injections and oral agent (monotherapy). He is compliant with treatment all of the time. He is currently taking insulin pre-breakfast, pre-lunch, pre-dinner and at bedtime. Insulin injections are given by patient. Rotation sites for injection include the abdominal wall, arms and thighs. His weight is fluctuating minimally. He is following a diabetic, generally healthy, low fat/cholesterol and low salt diet. Meal planning includes avoidance of concentrated sweets and carbohydrate counting. He has  not had a previous visit with a dietitian. He participates in exercise three times a week. He monitors blood glucose at home 3-4 x per day. He monitors urine at home <1 x per month. Blood glucose monitoring compliance is excellent. His home blood glucose trend is decreasing steadily. An ACE inhibitor/angiotensin II receptor blocker is being taken. He does not see a podiatrist.Eye exam is current.   Knee Pain     Swelling  This is a chronic problem. The current episode started more than 1 month ago. The problem occurs intermittently. The problem has been gradually worsening. Associated symptoms include arthralgias, joint swelling and myalgias. Pertinent negatives include no chest pain, congestion, coughing, diaphoresis, fatigue, headaches, neck pain, rash, visual change, vomiting or weakness.   Hypertension  This is a chronic problem. The current episode started more than 1 year ago. The problem has been gradually improving since onset. The problem is controlled. Pertinent negatives include no blurred vision, chest pain, headaches, malaise/fatigue, neck pain, palpitations, peripheral edema, PND or sweats. Risk factors for coronary artery disease include diabetes mellitus, dyslipidemia, male gender and obesity. Past treatments include angiotensin blockers and diuretics. The current treatment provides significant improvement. There are no compliance problems.  Hypertensive end-organ damage includes CAD/MI and heart failure. There is no history of CVA, left ventricular hypertrophy, PVD or retinopathy. There is no history of chronic renal disease, hypercortisolism, hyperparathyroidism, pheochromocytoma, renovascular disease or a thyroid problem.   Hyperlipidemia  This is a chronic problem. The current episode started more than 1 year ago. The problem is controlled. Recent lipid tests were reviewed and are normal. Exacerbating diseases include obesity. He has no history of chronic renal disease or diabetes. There are no  known factors aggravating his hyperlipidemia. Associated symptoms include myalgias. Pertinent negatives include no chest pain or focal sensory loss. Current antihyperlipidemic treatment includes statins. The current treatment provides moderate improvement of lipids. There are no compliance problems.  Risk factors for coronary artery disease include diabetes mellitus, dyslipidemia, male sex, hypertension and obesity.   Review of Systems   Constitutional: Negative.  Negative for activity change, diaphoresis, fatigue, malaise/fatigue, unexpected weight change and weight loss.   HENT: Negative.  Negative for congestion, ear pain, mouth sores, rhinorrhea and voice change.    Eyes: Negative.  Negative for blurred vision, pain, discharge and visual disturbance.   Respiratory: Negative.  Negative for apnea, cough and wheezing.    Cardiovascular: Negative.  Negative for chest pain, palpitations and PND.   Gastrointestinal: Negative.  Negative for abdominal distention, anal bleeding, diarrhea and vomiting.   Endocrine: Negative.  Negative for cold intolerance, polydipsia, polyphagia and polyuria.   Genitourinary:  Positive for impotence. Negative for decreased urine volume, difficulty urinating, frequency, penile discharge and scrotal swelling.   Musculoskeletal:  Positive for arthralgias, joint swelling and myalgias. Negative for back pain, neck pain and neck stiffness.   Skin: Negative.  Negative for color change, pallor and rash.   Allergic/Immunologic: Negative.  Negative for environmental allergies and immunocompromised state.   Neurological: Negative.  Negative for dizziness, tremors, seizures, speech difficulty, weakness, light-headedness and headaches.   Hematological: Negative.    Psychiatric/Behavioral: Negative.  Negative for agitation, confusion, dysphoric mood and suicidal ideas. The patient is not nervous/anxious.        Past Medical History:   Diagnosis Date    Anticoagulant long-term use     Cardiomyopathy      CHF (congestive heart failure)     Coronary artery disease     Diabetes mellitus     Gout     Heart attack     Hypertension     Pneumonia        Past Surgical History:   Procedure Laterality Date    APPENDECTOMY      CARDIAC CATHETERIZATION      7 stents    CARDIAC DEFIBRILLATOR PLACEMENT      CATARACT EXTRACTION Bilateral 2011    COLONOSCOPY N/A 8/10/2018    Procedure: COLONOSCOPY golytely;  Surgeon: Valentine Burger MD;  Location: [facility] ENDO;  Service: Endoscopy;  Laterality: N/A;    EYE SURGERY      REVISION OF IMPLANTABLE CARDIOVERTER-DEFIBRILLATOR (ICD) ELECTRODE LEAD PLACEMENT N/A 11/11/2019    Procedure: REVISION, INSERTION, ELECTRODE LEAD, ICD;  Surgeon: Shukri Walsh MD;  Location: [facility] EP LAB;  Service: Cardiology;  Laterality: N/A;  Lead malfxn, RV lead ICD, SJM, anes, DM, 3 PREP       Family History   Problem Relation Age of Onset    Heart disease Mother     Heart disease Father     Amblyopia Neg Hx     Blindness Neg Hx     Cataracts Neg Hx     Glaucoma Neg Hx     Macular degeneration Neg Hx     Retinal detachment Neg Hx     Strabismus Neg Hx        Social History     Socioeconomic History    Marital status: Single   Tobacco Use    Smoking status: Former    Smokeless tobacco: Never   Substance and Sexual Activity    Alcohol use: Yes     Comment: socially    Drug use: No    Sexual activity: Yes     Social Determinants of Health     Financial Resource Strain: Low Risk     Difficulty of Paying Living Expenses: Not hard at all   Food Insecurity: No Food Insecurity    Worried About Running Out of Food in the Last Year: Never true    Ran Out of Food in the Last Year: Never true   Transportation Needs: No Transportation Needs    Lack of Transportation (Medical): No    Lack of Transportation (Non-Medical): No   Physical Activity: Inactive    Days of Exercise per Week: 0 days    Minutes of Exercise per Session: 0 min   Stress: No Stress Concern Present    Feeling of Stress : Not at all   Social Connections:  Unknown    Frequency of Communication with Friends and Family: More than three times a week    Frequency of Social Gatherings with Friends and Family: More than three times a week    Attends Denominational Services: Never    Active Member of Clubs or Organizations: No    Attends Club or Organization Meetings: Never    Marital Status: Patient refused   Housing Stability: Low Risk     Unable to Pay for Housing in the Last Year: No    Number of Places Lived in the Last Year: 1    Unstable Housing in the Last Year: No       Current Outpatient Medications   Medication Sig Dispense Refill    acetaminophen (TYLENOL) 500 MG tablet Take 1 tablet (500 mg total) by mouth every 6 (six) hours as needed for Pain. 50 tablet 0    alcohol swabs (BD ALCOHOL SWABS) PadM USE FOUR TIMES DAILY 400 each 3    aspirin (ECOTRIN) 81 MG EC tablet Take 81 mg by mouth once daily.      atorvastatin (LIPITOR) 40 MG tablet Take 1 tablet (40 mg total) by mouth once daily. 90 tablet 3    blood glucose control high,low (ACCU-CHEK CATHRYN CONTROL SOLN) Soln Use as directed to check blood sugar 1 each 1    blood sugar diagnostic Strp Use to test four times daily 400 each 11    blood-glucose meter (ACCU-CHEK CATHRYN PLUS METER) Misc USE AS DIRECTED 1 each 0    carvediloL (COREG) 12.5 MG tablet Take 1 tablet (12.5 mg total) by mouth 2 (two) times daily. 180 tablet 3    ciprofloxacin HCl (CIPRO) 500 MG tablet Take 1 tablet (500 mg total) by mouth 2 (two) times daily. for 5 days 10 tablet 0    clopidogreL (PLAVIX) 75 mg tablet Take 1 tablet (75 mg total) by mouth once daily. 90 tablet 3    colchicine (MITIGARE) 0.6 mg Cap Take 1 capsule (0.6 mg total) by mouth once daily. 90 capsule 3    cyanocobalamin (VITAMIN B-12) 1000 MCG tablet TAKE ONE TABLET BY MOUTH ONCE DAILY FOR VITAMIN DEFICIENCIES      furosemide (LASIX) 20 MG tablet Take 1 tablet (20 mg total) by mouth 2 (two) times daily. 180 tablet 3    gabapentin (NEURONTIN) 300 MG capsule Take 2 capsules (600 mg  "total) by mouth 2 (two) times daily. 360 capsule 1    ibuprofen (ADVIL,MOTRIN) 600 MG tablet Take 1 tablet (600 mg total) by mouth every 6 (six) hours as needed for Pain. 20 tablet 0    insulin (LANTUS SOLOSTAR U-100 INSULIN) glargine 100 units/mL SubQ pen Inject 35 Units into the skin every evening. 30 mL 1    insulin aspart U-100 (NOVOLOG FLEXPEN U-100 INSULIN) 100 unit/mL (3 mL) InPn pen Inject 15 Units into the skin 3 (three) times daily with meals. 45 mL 10    lancets (ACCU-CHEK SOFTCLIX LANCETS) Misc TEST 3 (three) times daily. 300 each 1    losartan (COZAAR) 25 MG tablet Take 1 tablet (25 mg total) by mouth once daily. 90 tablet 3    nitroGLYCERIN (NITROSTAT) 0.4 MG SL tablet Place 1 tablet (0.4 mg total) under the tongue every 5 (five) minutes as needed for Chest pain. 25 tablet 3    pen needle, diabetic (BD ULTRA-FINE SINA PEN NEEDLE) 32 gauge x 5/32" Ndle Use four times daily for insulin administration 400 each 0    potassium chloride (KLOR-CON) 10 MEQ TbSR TAKE ONE TABLET BY MOUTH ONCE DAILY TO INCREASE POTASSIUM 30 tablet 0    zolpidem (AMBIEN) 5 MG Tab TAKE 1 TABLET BY MOUTH EVERY NIGHT AS NEEDED 90 tablet 0    metFORMIN (GLUCOPHAGE) 1000 MG tablet Take 1 tablet (1,000 mg total) by mouth 2 (two) times daily with meals. 180 tablet 4     No current facility-administered medications for this visit.       Review of patient's allergies indicates:  No Known Allergies    Objective:      Vitals:    04/05/23 1048   BP: 120/70   Pulse: 72   Temp: 98 °F (36.7 °C)       Physical Exam  Constitutional:       Appearance: He is well-developed.   HENT:      Head: Normocephalic and atraumatic.      Right Ear: External ear normal.      Left Ear: External ear normal.      Nose: Nose normal.      Mouth/Throat:      Pharynx: No oropharyngeal exudate.   Eyes:      General: No scleral icterus.        Right eye: No discharge.         Left eye: No discharge.      Conjunctiva/sclera: Conjunctivae normal.      Pupils: Pupils are " equal, round, and reactive to light.   Neck:      Thyroid: No thyromegaly.      Vascular: No JVD.      Trachea: No tracheal deviation.   Cardiovascular:      Rate and Rhythm: Normal rate and regular rhythm.      Heart sounds: Normal heart sounds. No murmur heard.    No friction rub. No gallop.   Pulmonary:      Effort: Pulmonary effort is normal. No respiratory distress.      Breath sounds: Normal breath sounds. No stridor. No wheezing or rales.   Chest:      Chest wall: No tenderness.   Abdominal:      General: Bowel sounds are normal. There is no distension.      Palpations: Abdomen is soft. There is no mass.      Tenderness: There is no abdominal tenderness. There is no guarding or rebound.      Hernia: No hernia is present.   Musculoskeletal:         General: No tenderness. Normal range of motion.      Cervical back: Normal range of motion and neck supple.   Lymphadenopathy:      Cervical: No cervical adenopathy.   Skin:     General: Skin is warm and dry.      Coloration: Skin is not pale.      Findings: No erythema or rash.   Neurological:      Mental Status: He is alert and oriented to person, place, and time.      Cranial Nerves: No cranial nerve deficit.      Motor: No abnormal muscle tone.      Coordination: Coordination normal.      Deep Tendon Reflexes: Reflexes are normal and symmetric. Reflexes normal.   Psychiatric:         Behavior: Behavior normal.         Thought Content: Thought content normal.         Judgment: Judgment normal.       Assessment:       1. Hospital discharge follow-up    2. Type 2 diabetes mellitus with diabetic neuropathy, unspecified whether long term insulin use    3. Dyslipidemia associated with type 2 diabetes mellitus    4. Coronary artery disease of native artery of native heart with stable angina pectoris    5. Hypertension associated with diabetes    6. UTI due to Klebsiella species        Plan:           Clifton was seen today for follow-up.    Diagnoses and all orders for  this visit:    Hospital discharge follow-up    Type 2 diabetes mellitus with diabetic neuropathy, unspecified whether long term insulin use  -     metFORMIN (GLUCOPHAGE) 1000 MG tablet; Take 1 tablet (1,000 mg total) by mouth 2 (two) times daily with meals.    Dyslipidemia associated with type 2 diabetes mellitus    Coronary artery disease of native artery of native heart with stable angina pectoris    Hypertension associated with diabetes    UTI due to Klebsiella species      Hospital UT follow up  -doing well  -follow urology for scheduled visit    DM II controlled/hyperglycemia  - improving since started insulin  -lantus and novolog to continue  -strict diet control        HTN  -controlled    HLD  -controlled    Combined CHF  -follows cardiology  -on external defibrillator    Neuropathy  -continue neurontin and increase dose to 600 mg BID    Obesity  -diet and exercise as tolerated    chronic gout  -uric acid levels  -conchicine as needed    Spent adequate time in obtaining history and explaining differentials    25 minutes spent during this visit of which greater than 50% devoted to face-face counseling and coordination of care regarding diagnosis and management and plan    Follow up in about 29 days (around 5/4/2023), or if symptoms worsen or fail to improve.

## 2023-04-08 DIAGNOSIS — F51.01 PRIMARY INSOMNIA: ICD-10-CM

## 2023-04-08 RX ORDER — ZOLPIDEM TARTRATE 5 MG/1
TABLET ORAL
Qty: 90 TABLET | Refills: 0 | Status: CANCELLED | OUTPATIENT
Start: 2023-04-08

## 2023-04-08 NOTE — TELEPHONE ENCOUNTER
No new care gaps identified.  [facility] Embedded Care Gaps. Reference number: [id number]. 4/08/2023   11:30:59 AM CDT

## 2023-04-10 ENCOUNTER — OFFICE VISIT (OUTPATIENT)
Dept: UROLOGY | Facility: CLINIC | Age: 73
End: 2023-04-10
Payer: MEDICARE

## 2023-04-10 VITALS
HEIGHT: 71 IN | WEIGHT: 230 LBS | DIASTOLIC BLOOD PRESSURE: 80 MMHG | BODY MASS INDEX: 32.2 KG/M2 | SYSTOLIC BLOOD PRESSURE: 120 MMHG

## 2023-04-10 DIAGNOSIS — R78.81 GRAM-NEGATIVE BACTEREMIA: ICD-10-CM

## 2023-04-10 DIAGNOSIS — N12 PYELONEPHRITIS: ICD-10-CM

## 2023-04-10 DIAGNOSIS — F51.01 PRIMARY INSOMNIA: ICD-10-CM

## 2023-04-10 DIAGNOSIS — N52.9 ERECTILE DYSFUNCTION, UNSPECIFIED ERECTILE DYSFUNCTION TYPE: Primary | ICD-10-CM

## 2023-04-10 PROCEDURE — 1126F PR PAIN SEVERITY QUANTIFIED, NO PAIN PRESENT: ICD-10-PCS | Mod: CPTII,S$GLB,, | Performed by: UROLOGY

## 2023-04-10 PROCEDURE — 4010F PR ACE/ARB THEARPY RXD/TAKEN: ICD-10-PCS | Mod: CPTII,S$GLB,, | Performed by: UROLOGY

## 2023-04-10 PROCEDURE — 3074F SYST BP LT 130 MM HG: CPT | Mod: CPTII,S$GLB,, | Performed by: UROLOGY

## 2023-04-10 PROCEDURE — 3079F PR MOST RECENT DIASTOLIC BLOOD PRESSURE 80-89 MM HG: ICD-10-PCS | Mod: CPTII,S$GLB,, | Performed by: UROLOGY

## 2023-04-10 PROCEDURE — 1111F DSCHRG MED/CURRENT MED MERGE: CPT | Mod: CPTII,S$GLB,, | Performed by: UROLOGY

## 2023-04-10 PROCEDURE — 1101F PR PT FALLS ASSESS DOC 0-1 FALLS W/OUT INJ PAST YR: ICD-10-PCS | Mod: CPTII,S$GLB,, | Performed by: UROLOGY

## 2023-04-10 PROCEDURE — 3074F PR MOST RECENT SYSTOLIC BLOOD PRESSURE < 130 MM HG: ICD-10-PCS | Mod: CPTII,S$GLB,, | Performed by: UROLOGY

## 2023-04-10 PROCEDURE — 99999 PR PBB SHADOW E&M-EST. PATIENT-LVL IV: ICD-10-PCS | Mod: PBBFAC,,, | Performed by: UROLOGY

## 2023-04-10 PROCEDURE — 1101F PT FALLS ASSESS-DOCD LE1/YR: CPT | Mod: CPTII,S$GLB,, | Performed by: UROLOGY

## 2023-04-10 PROCEDURE — 3008F BODY MASS INDEX DOCD: CPT | Mod: CPTII,S$GLB,, | Performed by: UROLOGY

## 2023-04-10 PROCEDURE — 1160F RVW MEDS BY RX/DR IN RCRD: CPT | Mod: CPTII,S$GLB,, | Performed by: UROLOGY

## 2023-04-10 PROCEDURE — 1159F MED LIST DOCD IN RCRD: CPT | Mod: CPTII,S$GLB,, | Performed by: UROLOGY

## 2023-04-10 PROCEDURE — 3008F PR BODY MASS INDEX (BMI) DOCUMENTED: ICD-10-PCS | Mod: CPTII,S$GLB,, | Performed by: UROLOGY

## 2023-04-10 PROCEDURE — 1160F PR REVIEW ALL MEDS BY PRESCRIBER/CLIN PHARMACIST DOCUMENTED: ICD-10-PCS | Mod: CPTII,S$GLB,, | Performed by: UROLOGY

## 2023-04-10 PROCEDURE — 3079F DIAST BP 80-89 MM HG: CPT | Mod: CPTII,S$GLB,, | Performed by: UROLOGY

## 2023-04-10 PROCEDURE — 1126F AMNT PAIN NOTED NONE PRSNT: CPT | Mod: CPTII,S$GLB,, | Performed by: UROLOGY

## 2023-04-10 PROCEDURE — 4010F ACE/ARB THERAPY RXD/TAKEN: CPT | Mod: CPTII,S$GLB,, | Performed by: UROLOGY

## 2023-04-10 PROCEDURE — 1111F PR DISCHARGE MEDS RECONCILED W/ CURRENT OUTPATIENT MED LIST: ICD-10-PCS | Mod: CPTII,S$GLB,, | Performed by: UROLOGY

## 2023-04-10 PROCEDURE — 3288F PR FALLS RISK ASSESSMENT DOCUMENTED: ICD-10-PCS | Mod: CPTII,S$GLB,, | Performed by: UROLOGY

## 2023-04-10 PROCEDURE — 99204 PR OFFICE/OUTPT VISIT, NEW, LEVL IV, 45-59 MIN: ICD-10-PCS | Mod: S$GLB,,, | Performed by: UROLOGY

## 2023-04-10 PROCEDURE — 1159F PR MEDICATION LIST DOCUMENTED IN MEDICAL RECORD: ICD-10-PCS | Mod: CPTII,S$GLB,, | Performed by: UROLOGY

## 2023-04-10 PROCEDURE — 3288F FALL RISK ASSESSMENT DOCD: CPT | Mod: CPTII,S$GLB,, | Performed by: UROLOGY

## 2023-04-10 PROCEDURE — 99999 PR PBB SHADOW E&M-EST. PATIENT-LVL IV: CPT | Mod: PBBFAC,,, | Performed by: UROLOGY

## 2023-04-10 PROCEDURE — 99204 OFFICE O/P NEW MOD 45 MIN: CPT | Mod: S$GLB,,, | Performed by: UROLOGY

## 2023-04-10 RX ORDER — TADALAFIL 20 MG/1
20 TABLET ORAL DAILY
Qty: 30 TABLET | Refills: 11 | Status: SHIPPED | OUTPATIENT
Start: 2023-04-10 | End: 2024-04-09

## 2023-04-10 RX ORDER — ZOLPIDEM TARTRATE 5 MG/1
TABLET ORAL
Qty: 90 TABLET | Refills: 0 | Status: CANCELLED | OUTPATIENT
Start: 2023-04-08

## 2023-04-10 NOTE — PROGRESS NOTES
Urology - [facility]  Clinic Note    SUBJECTIVE:     Chief Complaint: Pyelonephritis     History of Present Illness:  Clifton Wilson is a 72 y.o. male who presents as a hospital follow up after admission to [facility] 03/25-03/29 for pyelonephritis and gram negative bacteremia. Blood cultures at that time grew Klebsiella however urine cultures were negative. He was discharged on ciprofloxacin which he is still taking. Denies frequency, urgency, fever, chills, nausea, vomiting or flank pain since discharge.      He has ED. He has tried Viagra in the past. Would like to try Cialis.          PATIENT HISTORY:  Past Medical History:   Diagnosis Date    Anticoagulant long-term use     Cardiomyopathy     CHF (congestive heart failure)     Coronary artery disease     Diabetes mellitus     Gout     Heart attack     Hypertension     Pneumonia      Past Surgical History:   Procedure Laterality Date    APPENDECTOMY      CARDIAC CATHETERIZATION      7 stents    CARDIAC DEFIBRILLATOR PLACEMENT      CATARACT EXTRACTION Bilateral 2011    COLONOSCOPY N/A 8/10/2018    Procedure: COLONOSCOPY golytely;  Surgeon: Valentine Burger MD;  Location: [facility] ENDO;  Service: Endoscopy;  Laterality: N/A;    EYE SURGERY      REVISION OF IMPLANTABLE CARDIOVERTER-DEFIBRILLATOR (ICD) ELECTRODE LEAD PLACEMENT N/A 11/11/2019    Procedure: REVISION, INSERTION, ELECTRODE LEAD, ICD;  Surgeon: Shukri Walsh MD;  Location: [facility] EP LAB;  Service: Cardiology;  Laterality: N/A;  Lead malfxn, RV lead ICD, SJM, anes, DM, 3 PREP     Family History   Problem Relation Age of Onset    Heart disease Mother     Heart disease Father     Amblyopia Neg Hx     Blindness Neg Hx     Cataracts Neg Hx     Glaucoma Neg Hx     Macular degeneration Neg Hx     Retinal detachment Neg Hx     Strabismus Neg Hx      Social History     Socioeconomic History    Marital status: Single   Tobacco Use    Smoking status: Former    Smokeless tobacco: Never   Substance and  Sexual Activity    Alcohol use: Yes     Comment: socially    Drug use: No    Sexual activity: Yes     Social Determinants of Health     Financial Resource Strain: Low Risk     Difficulty of Paying Living Expenses: Not hard at all   Food Insecurity: No Food Insecurity    Worried About Running Out of Food in the Last Year: Never true    Ran Out of Food in the Last Year: Never true   Transportation Needs: No Transportation Needs    Lack of Transportation (Medical): No    Lack of Transportation (Non-Medical): No   Physical Activity: Inactive    Days of Exercise per Week: 0 days    Minutes of Exercise per Session: 0 min   Stress: No Stress Concern Present    Feeling of Stress : Not at all   Social Connections: Unknown    Frequency of Communication with Friends and Family: More than three times a week    Frequency of Social Gatherings with Friends and Family: More than three times a week    Attends [employer] Services: Never    Active Member of Clubs or Organizations: No    Attends Club or Organization Meetings: Never    Marital Status: Patient refused   Housing Stability: Low Risk     Unable to Pay for Housing in the Last Year: No    Number of Places Lived in the Last Year: 1    Unstable Housing in the Last Year: No       Medications:  Outpatient Encounter Medications as of 4/10/2023   Medication Sig Dispense Refill    acetaminophen (TYLENOL) 500 MG tablet Take 1 tablet (500 mg total) by mouth every 6 (six) hours as needed for Pain. 50 tablet 0    alcohol swabs (BD ALCOHOL SWABS) PadM USE FOUR TIMES DAILY 400 each 3    aspirin (ECOTRIN) 81 MG EC tablet Take 81 mg by mouth once daily.      atorvastatin (LIPITOR) 40 MG tablet Take 1 tablet (40 mg total) by mouth once daily. 90 tablet 3    blood glucose control high,low (ACCU-CHEK CATHRYN CONTROL SOLN) Soln Use as directed to check blood sugar 1 each 1    blood sugar diagnostic Strp Use to test four times daily 400 each 11    blood-glucose meter (ACCU-CHEK CATHRYN PLUS METER)  "Misc USE AS DIRECTED 1 each 0    carvediloL (COREG) 12.5 MG tablet Take 1 tablet (12.5 mg total) by mouth 2 (two) times daily. 180 tablet 3    clopidogreL (PLAVIX) 75 mg tablet Take 1 tablet (75 mg total) by mouth once daily. 90 tablet 3    colchicine (MITIGARE) 0.6 mg Cap Take 1 capsule (0.6 mg total) by mouth once daily. 90 capsule 3    cyanocobalamin (VITAMIN B-12) 1000 MCG tablet TAKE ONE TABLET BY MOUTH ONCE DAILY FOR VITAMIN DEFICIENCIES      furosemide (LASIX) 20 MG tablet Take 1 tablet (20 mg total) by mouth 2 (two) times daily. 180 tablet 3    gabapentin (NEURONTIN) 300 MG capsule Take 2 capsules (600 mg total) by mouth 2 (two) times daily. 360 capsule 1    ibuprofen (ADVIL,MOTRIN) 600 MG tablet Take 1 tablet (600 mg total) by mouth every 6 (six) hours as needed for Pain. 20 tablet 0    insulin (LANTUS SOLOSTAR U-100 INSULIN) glargine 100 units/mL SubQ pen Inject 35 Units into the skin every evening. 30 mL 1    insulin aspart U-100 (NOVOLOG FLEXPEN U-100 INSULIN) 100 unit/mL (3 mL) InPn pen Inject 15 Units into the skin 3 (three) times daily with meals. 45 mL 10    lancets (ACCU-CHEK SOFTCLIX LANCETS) Misc TEST 3 (three) times daily. 300 each 1    losartan (COZAAR) 25 MG tablet Take 1 tablet (25 mg total) by mouth once daily. 90 tablet 3    metFORMIN (GLUCOPHAGE) 1000 MG tablet Take 1 tablet (1,000 mg total) by mouth 2 (two) times daily with meals. 180 tablet 4    pen needle, diabetic (BD ULTRA-FINE SINA PEN NEEDLE) 32 gauge x 5/32" Ndle Use four times daily for insulin administration 400 each 0    potassium chloride (KLOR-CON) 10 MEQ TbSR TAKE ONE TABLET BY MOUTH ONCE DAILY TO INCREASE POTASSIUM 30 tablet 0    zolpidem (AMBIEN) 5 MG Tab TAKE 1 TABLET BY MOUTH EVERY NIGHT AS NEEDED 90 tablet 0    nitroGLYCERIN (NITROSTAT) 0.4 MG SL tablet Place 1 tablet (0.4 mg total) under the tongue every 5 (five) minutes as needed for Chest pain. 25 tablet 3    tadalafiL (CIALIS) 20 MG Tab Take 1 tablet (20 mg total) by " "mouth once daily. 30 tablet 11     No facility-administered encounter medications on file as of 4/10/2023.     Allergies:  Patient has no known allergies.    Review of Systems:  Review of Systems   Constitutional:  Negative for chills and fever.   Respiratory:  Negative for shortness of breath.    Cardiovascular:  Negative for chest pain.   Gastrointestinal:  Negative for constipation, diarrhea, nausea and vomiting.   Genitourinary:  Negative for flank pain, frequency, hematuria and urgency.     OBJECTIVE:     Estimated body mass index is 32.08 kg/m² as calculated from the following:    Height as of this encounter: 5' 11" (1.803 m).    Weight as of this encounter: 104.3 kg (230 lb).    Vital Signs (Most Recent)  BP: 120/80 (04/10/23 1412)    Physical Exam  Constitutional:       General: He is not in acute distress.     Appearance: Normal appearance. He is not ill-appearing.   HENT:      Head: Normocephalic and atraumatic.      Mouth/Throat:      Mouth: Mucous membranes are moist.   Eyes:      Extraocular Movements: Extraocular movements intact.   Cardiovascular:      Rate and Rhythm: Normal rate.   Pulmonary:      Effort: Pulmonary effort is normal.   Abdominal:      Palpations: Abdomen is soft.   Genitourinary:     Comments: Prostate approximately 40 g, smooth, no nodules, tenderness, induration  Musculoskeletal:      Cervical back: Normal range of motion.   Skin:     General: Skin is warm and dry.   Neurological:      Mental Status: He is alert and oriented to person, place, and time.   Psychiatric:         Mood and Affect: Mood normal.         Behavior: Behavior normal.     Urine dipstick - negative for all components.      Labs:  Lab Results   Component Value Date    BUN 9 03/29/2023    CREATININE 0.9 03/29/2023    WBC 5.83 03/29/2023    HGB 11.2 (L) 03/29/2023    HCT 33.2 (L) 03/29/2023     03/29/2023    AST 22 03/29/2023    ALT 30 03/29/2023    ALKPHOS 79 03/29/2023    ALBUMIN 2.8 (L) 03/29/2023    " HGBA1C 7.1 (H) 11/03/2022        Lab Results   Component Value Date    PSA 0.30 05/03/2022    PSA 0.37 02/04/2021    PSA 0.27 07/25/2019    PSA 0.70 01/19/2018    PSA 0.3 03/23/2004         Imaging:  US 03/27/23:   Mostly hypoechoic area at the inferomedial left lower renal pole, not definitively related to simple cyst by this exam.  In this patient with concern for abscess, further correlation with contrast enhanced CT abdomen-pelvis can be obtained for further assessment (unless contraindicated for any reason).     Bilateral renal cysts without hydronephrosis.     Elevated renal resistive indices, a nonspecific indicator of underlying medical renal disease.      CT abdomen/pelvis with contrast 03/27/23:   1. Nonspecific perinephric and periureteral fat stranding as above.  Pyelitis/pyelonephritis is not excluded, correlate with urinalysis.  No evidence of a renal abscess.  2. Nonobstructing punctate left renal calculus.  No hydronephrosis.    ASSESSMENT     1. Erectile dysfunction, unspecified erectile dysfunction type    2. Gram-negative bacteremia    3. Pyelonephritis        PLAN:   - Continue ciprofloxacin  - Plan for cystoscopy  - Discussed risks and benefits of Cialis with patient. Instructed patient not to take Cialis and nitroglycerin within 36 hours of each other. Rx for Cialis provided.       Caleb Ma MD     Letter to Britton Grissom MD, Mariama Rios, MIGUEL

## 2023-04-10 NOTE — TELEPHONE ENCOUNTER
No new care gaps identified.  [facility] Embedded Care Gaps. Reference number: [id number]. 4/10/2023   3:33:50 PM CDT

## 2023-04-12 DIAGNOSIS — F51.01 PRIMARY INSOMNIA: ICD-10-CM

## 2023-04-12 NOTE — TELEPHONE ENCOUNTER
No new care gaps identified.  [facility] Embedded Care Gaps. Reference number: [id number]. 4/12/2023   9:35:39 AM [place]

## 2023-04-13 RX ORDER — ZOLPIDEM TARTRATE 5 MG/1
TABLET ORAL
Qty: 90 TABLET | Refills: 0 | Status: SHIPPED | OUTPATIENT
Start: 2023-04-13 | End: 2023-07-06 | Stop reason: SDUPTHER

## 2023-04-23 ENCOUNTER — HOSPITAL ENCOUNTER (EMERGENCY)
Facility: HOSPITAL | Age: 73
Discharge: HOME | End: 2023-04-23
Attending: EMERGENCY MEDICINE
Payer: MEDICARE

## 2023-04-23 VITALS
SYSTOLIC BLOOD PRESSURE: 141 MMHG | HEIGHT: 71 IN | HEART RATE: 51 BPM | OXYGEN SATURATION: 99 % | RESPIRATION RATE: 15 BRPM | BODY MASS INDEX: 32.2 KG/M2 | DIASTOLIC BLOOD PRESSURE: 72 MMHG | WEIGHT: 230 LBS | TEMPERATURE: 98 F

## 2023-04-23 DIAGNOSIS — T50.905A ADVERSE EFFECT OF DRUG, INITIAL ENCOUNTER: Primary | ICD-10-CM

## 2023-04-23 DIAGNOSIS — R55 SYNCOPE: ICD-10-CM

## 2023-04-23 DIAGNOSIS — R42 DIZZINESS ON STANDING: ICD-10-CM

## 2023-04-23 LAB
ALBUMIN SERPL BCP-MCNC: 3.5 G/DL (ref 3.5–5.2)
ALP SERPL-CCNC: 63 U/L (ref 55–135)
ALT SERPL W/O P-5'-P-CCNC: 15 U/L (ref 10–44)
ANION GAP SERPL CALC-SCNC: 9 MMOL/L (ref 8–16)
AST SERPL-CCNC: 13 U/L (ref 10–40)
BASOPHILS # BLD AUTO: 0.07 K/UL (ref 0–0.2)
BASOPHILS NFR BLD: 1.2 % (ref 0–1.9)
BILIRUB SERPL-MCNC: 1.1 MG/DL (ref 0.1–1)
BILIRUB UR QL STRIP: NEGATIVE
BNP SERPL-MCNC: 96 PG/ML (ref 0–99)
BUN SERPL-MCNC: 14 MG/DL (ref 8–23)
CALCIUM SERPL-MCNC: 8.7 MG/DL (ref 8.7–10.5)
CHLORIDE SERPL-SCNC: 111 MMOL/L (ref 95–110)
CLARITY UR: CLEAR
CO2 SERPL-SCNC: 24 MMOL/L (ref 23–29)
COLOR UR: YELLOW
CREAT SERPL-MCNC: 0.9 MG/DL (ref 0.5–1.4)
DIFFERENTIAL METHOD: ABNORMAL
EOSINOPHIL # BLD AUTO: 0.3 K/UL (ref 0–0.5)
EOSINOPHIL NFR BLD: 5.5 % (ref 0–8)
ERYTHROCYTE [DISTWIDTH] IN BLOOD BY AUTOMATED COUNT: 12.6 % (ref 11.5–14.5)
EST. GFR  (NO RACE VARIABLE): >60 ML/MIN/1.73 M^2
GLUCOSE SERPL-MCNC: 142 MG/DL (ref 70–110)
GLUCOSE UR QL STRIP: NEGATIVE
HCT VFR BLD AUTO: 36 % (ref 40–54)
HGB BLD-MCNC: 11.9 G/DL (ref 14–18)
HGB UR QL STRIP: NEGATIVE
IMM GRANULOCYTES # BLD AUTO: 0.02 K/UL (ref 0–0.04)
IMM GRANULOCYTES NFR BLD AUTO: 0.3 % (ref 0–0.5)
KETONES UR QL STRIP: NEGATIVE
LEUKOCYTE ESTERASE UR QL STRIP: NEGATIVE
LYMPHOCYTES # BLD AUTO: 1.2 K/UL (ref 1–4.8)
LYMPHOCYTES NFR BLD: 20.3 % (ref 18–48)
MCH RBC QN AUTO: 31.6 PG (ref 27–31)
MCHC RBC AUTO-ENTMCNC: 33.1 G/DL (ref 32–36)
MCV RBC AUTO: 96 FL (ref 82–98)
MONOCYTES # BLD AUTO: 0.5 K/UL (ref 0.3–1)
MONOCYTES NFR BLD: 8.2 % (ref 4–15)
NEUTROPHILS # BLD AUTO: 3.8 K/UL (ref 1.8–7.7)
NEUTROPHILS NFR BLD: 64.5 % (ref 38–73)
NITRITE UR QL STRIP: NEGATIVE
NRBC BLD-RTO: 0 /100 WBC
PH UR STRIP: 6 [PH] (ref 5–8)
PLATELET # BLD AUTO: 121 K/UL (ref 150–450)
PMV BLD AUTO: 11.1 FL (ref 9.2–12.9)
POTASSIUM SERPL-SCNC: 3.8 MMOL/L (ref 3.5–5.1)
PROT SERPL-MCNC: 6.3 G/DL (ref 6–8.4)
PROT UR QL STRIP: NEGATIVE
RBC # BLD AUTO: 3.76 M/UL (ref 4.6–6.2)
SODIUM SERPL-SCNC: 144 MMOL/L (ref 136–145)
SP GR UR STRIP: 1.01 (ref 1–1.03)
TROPONIN I SERPL DL<=0.01 NG/ML-MCNC: 0.01 NG/ML (ref 0–0.03)
TROPONIN I SERPL DL<=0.01 NG/ML-MCNC: 0.01 NG/ML (ref 0–0.03)
URN SPEC COLLECT METH UR: NORMAL
UROBILINOGEN UR STRIP-ACNC: NEGATIVE EU/DL
WBC # BLD AUTO: 5.85 K/UL (ref 3.9–12.7)

## 2023-04-23 PROCEDURE — 93005 ELECTROCARDIOGRAM TRACING: CPT

## 2023-04-23 PROCEDURE — 80053 COMPREHEN METABOLIC PANEL: CPT | Performed by: EMERGENCY MEDICINE

## 2023-04-23 PROCEDURE — 25000003 PHARM REV CODE 250: Performed by: EMERGENCY MEDICINE

## 2023-04-23 PROCEDURE — 99285 EMERGENCY DEPT VISIT HI MDM: CPT | Mod: 25

## 2023-04-23 PROCEDURE — 81003 URINALYSIS AUTO W/O SCOPE: CPT | Performed by: EMERGENCY MEDICINE

## 2023-04-23 PROCEDURE — 93010 EKG 12-LEAD: ICD-10-PCS | Mod: ,,, | Performed by: INTERNAL MEDICINE

## 2023-04-23 PROCEDURE — 84484 ASSAY OF TROPONIN QUANT: CPT | Mod: 91 | Performed by: EMERGENCY MEDICINE

## 2023-04-23 PROCEDURE — 93010 ELECTROCARDIOGRAM REPORT: CPT | Mod: ,,, | Performed by: INTERNAL MEDICINE

## 2023-04-23 PROCEDURE — 83880 ASSAY OF NATRIURETIC PEPTIDE: CPT | Performed by: EMERGENCY MEDICINE

## 2023-04-23 PROCEDURE — 85025 COMPLETE CBC W/AUTO DIFF WBC: CPT | Performed by: EMERGENCY MEDICINE

## 2023-04-23 RX ADMIN — SODIUM CHLORIDE 500 ML: 9 INJECTION, SOLUTION INTRAVENOUS at 10:04

## 2023-04-23 NOTE — ED PROVIDER NOTES
Encounter Date: 4/23/2023       History     Chief Complaint   Patient presents with    Weakness    Dizziness     Patient was at [facility] when he started feeling dizzy, weak and diaphoretic. Patient has a cardiac defibrillator. Patient denies chest pain. Ambulatory with steady gait.    Near syncope     Clifton Wilson is a 72 y.o. male who  has a past medical history of Anticoagulant long-term use, Cardiomyopathy, CHF (congestive heart failure), Coronary artery disease, Diabetes mellitus, Gout, Heart attack, Hypertension, and Pneumonia.      The patient presents today with his spouse by POV after patient had a near syncopal episode in [facility].  Patient states he was standing up for a couple of minutes listened to the Gospel when he became diaphoretic lightheaded and felt like he was going to pass out.  Patient denies losing consciousness.  Denies any chest pain shortness of breath numbness weakness dizziness headache or any symptoms at this time.  Patient's blood pressure is in the 90 systolic on arrival.  Reports not eating much or drinking much.  He has a history of hypertension took his blood pressure medications and recently was prescribed Cialis for ED. He reports taking his cialis yesterday and then again today this morning with hs BP medication.     The history is provided by the patient and the spouse.   Review of patient's allergies indicates:  No Known Allergies  Past Medical History:   Diagnosis Date    Anticoagulant long-term use     Cardiomyopathy     CHF (congestive heart failure)     Coronary artery disease     Diabetes mellitus     Gout     Heart attack     Hypertension     Pneumonia      Past Surgical History:   Procedure Laterality Date    APPENDECTOMY      CARDIAC CATHETERIZATION      7 stents    CARDIAC DEFIBRILLATOR PLACEMENT      CATARACT EXTRACTION Bilateral 2011    COLONOSCOPY N/A 8/10/2018    Procedure: COLONOSCOPY golytely;  Surgeon: Valentine Burger MD;  Location: [facility];  Service:  Endoscopy;  Laterality: N/A;    EYE SURGERY      REVISION OF IMPLANTABLE CARDIOVERTER-DEFIBRILLATOR (ICD) ELECTRODE LEAD PLACEMENT N/A 11/11/2019    Procedure: REVISION, INSERTION, ELECTRODE LEAD, ICD;  Surgeon: Shukri Walsh MD;  Location: [facility] EP LAB;  Service: Cardiology;  Laterality: N/A;  Lead malfxn, RV lead ICD, SJM, anes, DM, 3 PREP     Family History   Problem Relation Age of Onset    Heart disease Mother     Heart disease Father     Amblyopia Neg Hx     Blindness Neg Hx     Cataracts Neg Hx     Glaucoma Neg Hx     Macular degeneration Neg Hx     Retinal detachment Neg Hx     Strabismus Neg Hx      Social History     Tobacco Use    Smoking status: Former    Smokeless tobacco: Never   Substance Use Topics    Alcohol use: Yes     Comment: socially    Drug use: No     Review of Systems   Constitutional:  Negative for fever.   HENT:  Negative for sore throat.    Respiratory:  Negative for shortness of breath.    Cardiovascular:  Negative for chest pain.   Gastrointestinal:  Negative for abdominal pain and nausea.   Genitourinary:  Negative for dysuria.   Musculoskeletal:  Negative for back pain.   Skin:  Negative for rash.   Neurological:  Positive for dizziness and light-headedness. Negative for weakness and numbness.   Hematological:  Does not bruise/bleed easily.     Physical Exam     Initial Vitals [04/23/23 0914]   BP Pulse Resp Temp SpO2   (!) 95/57 66 20 98.2 °F (36.8 °C) 96 %      MAP       --         Physical Exam    Nursing note and vitals reviewed.  Constitutional: He appears well-developed and well-nourished. He is not diaphoretic. No distress.   HENT:   Head: Normocephalic and atraumatic.   Mouth/Throat: Oropharynx is clear and moist.   Eyes: EOM are normal. Pupils are equal, round, and reactive to light.   Neck: No tracheal deviation present.   Cardiovascular:  Normal rate, regular rhythm, normal heart sounds and intact distal pulses.           Pulmonary/Chest: Breath sounds normal. No stridor.  No respiratory distress.   Abdominal: Abdomen is soft. He exhibits no distension and no mass. There is no abdominal tenderness.   Musculoskeletal:         General: No tenderness. Normal range of motion.     Neurological: He is alert and oriented to person, place, and time. No cranial nerve deficit or sensory deficit.   Skin: Skin is warm and dry. Capillary refill takes less than 2 seconds. No rash noted.   Psychiatric: He has a normal mood and affect. His behavior is normal. Thought content normal.       ED Course   Procedures  Labs Reviewed   CBC W/ AUTO DIFFERENTIAL - Abnormal; Notable for the following components:       Result Value    RBC 3.76 (*)     Hemoglobin 11.9 (*)     Hematocrit 36.0 (*)     MCH 31.6 (*)     Platelets 121 (*)     All other components within normal limits   COMPREHENSIVE METABOLIC PANEL - Abnormal; Notable for the following components:    Chloride 111 (*)     Glucose 142 (*)     Total Bilirubin 1.1 (*)     All other components within normal limits   TROPONIN I   B-TYPE NATRIURETIC PEPTIDE   URINALYSIS, REFLEX TO URINE CULTURE    Narrative:     Specimen Source->Urine   TROPONIN I        ECG Results              EKG 12-lead (Syncope) Age >50 (In process)  Result time 04/24/23 13:03:03      In process by Interface, Lab In [facility] (04/24/23 13:03:03)                   Narrative:    Test Reason : R55,    Vent. Rate : 064 BPM     Atrial Rate : 064 BPM     P-R Int : 162 ms          QRS Dur : 154 ms      QT Int : 442 ms       P-R-T Axes : 040 074 056 degrees     QTc Int : 455 ms    Normal sinus rhythm  Right bundle branch block  Septal infarct ,age undetermined  Abnormal ECG  When compared with ECG of 24-MAR-2023 19:29,  Questionable change in The axis  Septal infarct is now Present    Referred By: [email or web address]   SELF           Confirmed By:                                   Imaging Results              X-Ray Chest 1 View (Final result)  Result time 04/23/23 10:41:19      Final result by Hilaria SINGH  MD Jeanne (04/23/23 10:41:19)                   Impression:      No acute abnormality.      Electronically signed by: Hilaria Angel MD  Date:    04/23/2023  Time:    10:41               Narrative:    EXAMINATION:  XR CHEST 1 VIEW    CLINICAL HISTORY:  Dizziness and giddiness    TECHNIQUE:  Single frontal view of the chest was performed.    COMPARISON:  03/24/2023    FINDINGS:  The lungs are clear with normal appearance of pulmonary vasculature. No pleural effusion. No evident pneumothorax.    The cardiac silhouette is normal in size. The hilar and mediastinal contours are unremarkable.Left-sided pacemaker device remains in place.    Bones are intact.                                       Medications   sodium chloride 0.9% bolus 500 mL 500 mL (0 mLs Intravenous Stopped 4/23/23 1201)     Medical Decision Making:   Differential Diagnosis:   Differential Diagnosis includes, but is not limited to:  Arrhythmia, aortic dissection, MI/unstable angina, PE, cardiogenic shock, CHF, CVA/TIA, intracranial lesion/mass, seizure, perforated viscous, ruptured AAA, orthostatic hypotension, vasovagal episode, anemia, dehydration, medication reaction, intentional overdose    ED Management:  After taking into careful account the historical factors and physical exam findings of the patient's presentation today, in conjunction with the empirical and objective data obtained on ED workup, no acute emergent medical condition has been identified. The patient appears to be low risk for an emergent medical condition and I feel it is safe and appropriate at this time for the patient to be discharged to follow-up as detailed in their discharge instructions for reevaluation and possible continued outpatient workup and management. I have discussed the specifics of the workup with the patient and the patient has verbalized understanding of the details of the workup, the diagnosis, the treatment plan, and the need for outpatient follow-up.   Although the patient has no emergent etiology today this does not preclude the development of an emergent condition so in addition, I have advised the patient that they can return to the ED and/or activate EMS at any time with worsening of their symptoms, change of their symptoms, or with any other medical complaint.  The patient remained comfortable and stable during their visit in the ED.  Discharge and follow-up instructions discussed with the patient who expressed understanding and willingness to comply with my recommendations.             ED Course as of 04/24/23 1351   Sun Apr 23, 2023   0937 EKG:  Rate 64.  Normal sinus rhythm.  Right bundle-branch block.  No STEMI. [RN]   1045 CBC auto differential(!)  No significant abnormality.    [RN]   1045 Comprehensive metabolic panel(!)  No significant abnormality.    [RN]   1046 Troponin I #1  No significant abnormality.    [RN]   1046 BNP  No significant abnormality.    [RN]   1207 Urinalysis, Reflex to Urine Culture Urine, Clean Catch  No significant abnormality.    [RN]   1357 Troponin I [RN]   1357 Repeat troponin is negative.  Patient was observed in the ED without acute event.  Blood pressure has normalized.  Suspect medication induced effect from Cialis discussed further evaluation with patient however he feels well enough to go home.  Discussed need for follow-up with primary care and cardiology.  Discussed return precautions for worsening/recurrent symptoms or any other concerns [RN]      ED Course User Index  [RN] Chris Cameron Jr., MD                 Clinical Impression:   Final diagnoses:  [R55] Syncope  [R42] Dizziness on standing  [T50.905A] Adverse effect of drug, initial encounter (Primary)        ED Disposition Condition    Discharge Stable          ED Prescriptions    None       Follow-up Information       Follow up With Specialties Details Why Contact Info    Britton Grissom MD Internal Medicine In 3 days  [address]  [address]  [place]  [place] [address]  [phone]              Portions of this note were dictated using voice recognition software and may contain dictation related errors in spelling/grammar/syntax not found on text review       Chris Cameron Jr., MD  04/24/23 [id number]

## 2023-04-23 NOTE — DISCHARGE INSTRUCTIONS
Please check your blood pressure closely.  Please refrain from taking Cialis until you can discuss with your primary care physician.  Please follow-up with him in 2-4 days and please return to the emergency department if you have return of your symptoms or any new symptoms such as chest pain trouble breathing numbness weakness or you have any other concerns.  Thank you.    Thank you for choosing [facility]! We appreciate you trusting us with your medical care.     It is important to remember that some problems are difficult to diagnose and may not be found during your first visit. Be sure to follow up with your primary care doctor and review any labs/imaging that was performed during your visit with them. If you do not have a primary care doctor, you may contact the one listed on your discharge paperwork, or you may also call the [facility] Appointment Desk at [phone] to schedule an appointment.     All medications may potentially have side effects and it is impossible to predict which medications may give you side effects. If you feel that you are having a negative effect of any medication you should immediately stop taking them and seek medical attention. Do not drive or make any important decisions for 24 hours if you have received any pain medications, sedatives or mood altering drugs during your ER visit.    We will be happy to take care of you for all of your future medical needs. You may return to the ER at any time for any new/concerning symptoms, worsening condition, or failure to improve. We hope you feel better soon.     Chris Cameron MD MPH  Emergency Medicine

## 2023-05-03 ENCOUNTER — HOSPITAL ENCOUNTER (OUTPATIENT)
Dept: RADIOLOGY | Facility: HOSPITAL | Age: 73
Discharge: HOME | End: 2023-05-03
Attending: FAMILY MEDICINE
Payer: MEDICARE

## 2023-05-03 ENCOUNTER — OFFICE VISIT (OUTPATIENT)
Dept: FAMILY MEDICINE | Facility: CLINIC | Age: 73
End: 2023-05-03
Attending: FAMILY MEDICINE
Payer: MEDICARE

## 2023-05-03 VITALS
WEIGHT: 237.63 LBS | HEIGHT: 71 IN | BODY MASS INDEX: 33.27 KG/M2 | OXYGEN SATURATION: 97 % | DIASTOLIC BLOOD PRESSURE: 79 MMHG | HEART RATE: 66 BPM | SYSTOLIC BLOOD PRESSURE: 129 MMHG | TEMPERATURE: 98 F

## 2023-05-03 DIAGNOSIS — Z79.4 INSULIN DEPENDENT TYPE 2 DIABETES MELLITUS: ICD-10-CM

## 2023-05-03 DIAGNOSIS — Z12.5 ENCOUNTER FOR SCREENING FOR MALIGNANT NEOPLASM OF PROSTATE: ICD-10-CM

## 2023-05-03 DIAGNOSIS — E11.69 DYSLIPIDEMIA ASSOCIATED WITH TYPE 2 DIABETES MELLITUS: ICD-10-CM

## 2023-05-03 DIAGNOSIS — E78.5 DYSLIPIDEMIA ASSOCIATED WITH TYPE 2 DIABETES MELLITUS: ICD-10-CM

## 2023-05-03 DIAGNOSIS — E11.40 TYPE 2 DIABETES MELLITUS WITH DIABETIC NEUROPATHY, UNSPECIFIED WHETHER LONG TERM INSULIN USE: Primary | ICD-10-CM

## 2023-05-03 DIAGNOSIS — D69.6 THROMBOCYTOPENIA: ICD-10-CM

## 2023-05-03 DIAGNOSIS — E11.9 INSULIN DEPENDENT TYPE 2 DIABETES MELLITUS: ICD-10-CM

## 2023-05-03 DIAGNOSIS — M79.672 LEFT FOOT PAIN: ICD-10-CM

## 2023-05-03 DIAGNOSIS — I50.42 CHRONIC COMBINED SYSTOLIC AND DIASTOLIC CHF (CONGESTIVE HEART FAILURE): ICD-10-CM

## 2023-05-03 DIAGNOSIS — I50.42 CHRONIC COMBINED SYSTOLIC AND DIASTOLIC CONGESTIVE HEART FAILURE: ICD-10-CM

## 2023-05-03 DIAGNOSIS — I15.2 HYPERTENSION ASSOCIATED WITH DIABETES: ICD-10-CM

## 2023-05-03 DIAGNOSIS — M25.552 LEFT HIP PAIN: ICD-10-CM

## 2023-05-03 DIAGNOSIS — E11.59 HYPERTENSION ASSOCIATED WITH DIABETES: ICD-10-CM

## 2023-05-03 DIAGNOSIS — Z79.4 TYPE 2 DIABETES MELLITUS WITH DIABETIC NEUROPATHY, WITH LONG-TERM CURRENT USE OF INSULIN: ICD-10-CM

## 2023-05-03 DIAGNOSIS — E11.40 TYPE 2 DIABETES MELLITUS WITH DIABETIC NEUROPATHY, WITH LONG-TERM CURRENT USE OF INSULIN: ICD-10-CM

## 2023-05-03 DIAGNOSIS — I47.20 VENTRICULAR TACHYCARDIA: ICD-10-CM

## 2023-05-03 PROCEDURE — 73502 X-RAY EXAM HIP UNI 2-3 VIEWS: CPT | Mod: TC,FY,LT

## 2023-05-03 PROCEDURE — 3074F PR MOST RECENT SYSTOLIC BLOOD PRESSURE < 130 MM HG: ICD-10-PCS | Mod: CPTII,S$GLB,, | Performed by: FAMILY MEDICINE

## 2023-05-03 PROCEDURE — 3078F DIAST BP <80 MM HG: CPT | Mod: CPTII,S$GLB,, | Performed by: FAMILY MEDICINE

## 2023-05-03 PROCEDURE — 3288F PR FALLS RISK ASSESSMENT DOCUMENTED: ICD-10-PCS | Mod: CPTII,S$GLB,, | Performed by: FAMILY MEDICINE

## 2023-05-03 PROCEDURE — 73502 XR HIP WITH PELVIS WHEN PERFORMED, 2 OR 3 VIEWS LEFT: ICD-10-PCS | Mod: 26,LT,, | Performed by: RADIOLOGY

## 2023-05-03 PROCEDURE — 1126F PR PAIN SEVERITY QUANTIFIED, NO PAIN PRESENT: ICD-10-PCS | Mod: CPTII,S$GLB,, | Performed by: FAMILY MEDICINE

## 2023-05-03 PROCEDURE — 3008F BODY MASS INDEX DOCD: CPT | Mod: CPTII,S$GLB,, | Performed by: FAMILY MEDICINE

## 2023-05-03 PROCEDURE — 99215 PR OFFICE/OUTPT VISIT, EST, LEVL V, 40-54 MIN: ICD-10-PCS | Mod: S$GLB,,, | Performed by: FAMILY MEDICINE

## 2023-05-03 PROCEDURE — 3074F SYST BP LT 130 MM HG: CPT | Mod: CPTII,S$GLB,, | Performed by: FAMILY MEDICINE

## 2023-05-03 PROCEDURE — 99999 PR PBB SHADOW E&M-EST. PATIENT-LVL V: CPT | Mod: PBBFAC,,, | Performed by: FAMILY MEDICINE

## 2023-05-03 PROCEDURE — 4010F ACE/ARB THERAPY RXD/TAKEN: CPT | Mod: CPTII,S$GLB,, | Performed by: FAMILY MEDICINE

## 2023-05-03 PROCEDURE — 3078F PR MOST RECENT DIASTOLIC BLOOD PRESSURE < 80 MM HG: ICD-10-PCS | Mod: CPTII,S$GLB,, | Performed by: FAMILY MEDICINE

## 2023-05-03 PROCEDURE — 1126F AMNT PAIN NOTED NONE PRSNT: CPT | Mod: CPTII,S$GLB,, | Performed by: FAMILY MEDICINE

## 2023-05-03 PROCEDURE — 3008F PR BODY MASS INDEX (BMI) DOCUMENTED: ICD-10-PCS | Mod: CPTII,S$GLB,, | Performed by: FAMILY MEDICINE

## 2023-05-03 PROCEDURE — 99215 OFFICE O/P EST HI 40 MIN: CPT | Mod: S$GLB,,, | Performed by: FAMILY MEDICINE

## 2023-05-03 PROCEDURE — 3288F FALL RISK ASSESSMENT DOCD: CPT | Mod: CPTII,S$GLB,, | Performed by: FAMILY MEDICINE

## 2023-05-03 PROCEDURE — 73502 X-RAY EXAM HIP UNI 2-3 VIEWS: CPT | Mod: 26,LT,, | Performed by: RADIOLOGY

## 2023-05-03 PROCEDURE — 1160F PR REVIEW ALL MEDS BY PRESCRIBER/CLIN PHARMACIST DOCUMENTED: ICD-10-PCS | Mod: CPTII,S$GLB,, | Performed by: FAMILY MEDICINE

## 2023-05-03 PROCEDURE — 1101F PT FALLS ASSESS-DOCD LE1/YR: CPT | Mod: CPTII,S$GLB,, | Performed by: FAMILY MEDICINE

## 2023-05-03 PROCEDURE — 1159F MED LIST DOCD IN RCRD: CPT | Mod: CPTII,S$GLB,, | Performed by: FAMILY MEDICINE

## 2023-05-03 PROCEDURE — 1101F PR PT FALLS ASSESS DOC 0-1 FALLS W/OUT INJ PAST YR: ICD-10-PCS | Mod: CPTII,S$GLB,, | Performed by: FAMILY MEDICINE

## 2023-05-03 PROCEDURE — 3044F PR MOST RECENT HEMOGLOBIN A1C LEVEL <7.0%: ICD-10-PCS | Mod: CPTII,S$GLB,, | Performed by: FAMILY MEDICINE

## 2023-05-03 PROCEDURE — 1160F RVW MEDS BY RX/DR IN RCRD: CPT | Mod: CPTII,S$GLB,, | Performed by: FAMILY MEDICINE

## 2023-05-03 PROCEDURE — 3044F HG A1C LEVEL LT 7.0%: CPT | Mod: CPTII,S$GLB,, | Performed by: FAMILY MEDICINE

## 2023-05-03 PROCEDURE — 99999 PR PBB SHADOW E&M-EST. PATIENT-LVL V: ICD-10-PCS | Mod: PBBFAC,,, | Performed by: FAMILY MEDICINE

## 2023-05-03 PROCEDURE — 4010F PR ACE/ARB THEARPY RXD/TAKEN: ICD-10-PCS | Mod: CPTII,S$GLB,, | Performed by: FAMILY MEDICINE

## 2023-05-03 PROCEDURE — 1159F PR MEDICATION LIST DOCUMENTED IN MEDICAL RECORD: ICD-10-PCS | Mod: CPTII,S$GLB,, | Performed by: FAMILY MEDICINE

## 2023-05-03 NOTE — PROGRESS NOTES
Subjective:       Patient ID: Clifton Wilson is a 72 y.o. male.    Chief Complaint: Follow-up    72 yr old pleasant white male with DM II, HTN, HLD, CAD, Combined chronic CHF, MR, obesity, neuropathy, presents today for diabetes.       DM II - controlled  - HGBA1C                   6.5 (H)             05/03/2023                                       - on metformin and insulin and sugars improving - UTD eye exam - foot exam UTD - on ASA and plavix. Losartan. He ate too much jamie cake during mardi gras.      HTN - chronic - controlled - on losartan, lasix - compliant - no side effects      HLD - chronic -      LDLCALC                  71.2                05/03/2022                                     - controlled -       CAD/combined CHF - EF 35-40% - on external defibrillator - medical management - on ASA/plavix/ARB - no symptoms - following cardiology    Gout - improving - uses colchicine for flare up -     History as below - reviewed            Follow-up  Associated symptoms include arthralgias, joint swelling and myalgias. Pertinent negatives include no chest pain, congestion, coughing, diaphoresis, fatigue, headaches, neck pain, rash, visual change, vomiting or weakness.   Diabetes  He presents for his follow-up diabetic visit. He has type 2 diabetes mellitus. No MedicAlert identification noted. The initial diagnosis of diabetes was made 27 years ago. His disease course has been worsening. Hypoglycemia symptoms include sleepiness. Pertinent negatives for hypoglycemia include no confusion, dizziness, headaches, hunger, mood changes, nervousness/anxiousness, pallor, seizures, speech difficulty, sweats or tremors. Associated symptoms include foot paresthesias. Pertinent negatives for diabetes include no blurred vision, no chest pain, no fatigue, no foot ulcerations, no polydipsia, no polyphagia, no polyuria, no visual change, no weakness and no weight loss. Hypoglycemia complications include blackouts and  hospitalization. Pertinent negatives for hypoglycemia complications include no nocturnal hypoglycemia, no required assistance and no required glucagon injection. Symptoms are stable. Diabetic complications include autonomic neuropathy, heart disease, impotence and peripheral neuropathy. Pertinent negatives for diabetic complications include no CVA, nephropathy, PVD or retinopathy. Risk factors for coronary artery disease include hypertension, obesity, diabetes mellitus and male sex. Current diabetic treatment includes diet, insulin injections and oral agent (monotherapy). He is compliant with treatment all of the time. He is currently taking insulin pre-breakfast, pre-lunch, pre-dinner and at bedtime. Insulin injections are given by patient. Rotation sites for injection include the abdominal wall, arms and thighs. His weight is fluctuating minimally. He is following a diabetic, generally healthy, low fat/cholesterol and low salt diet. Meal planning includes avoidance of concentrated sweets and carbohydrate counting. He has not had a previous visit with a dietitian. He participates in exercise three times a week. He monitors blood glucose at home 3-4 x per day. He monitors urine at home <1 x per month. Blood glucose monitoring compliance is excellent. His home blood glucose trend is decreasing steadily. An ACE inhibitor/angiotensin II receptor blocker is being taken. He does not see a podiatrist.Eye exam is current.   Knee Pain     Swelling  This is a chronic problem. The current episode started more than 1 month ago. The problem occurs intermittently. The problem has been gradually worsening. Associated symptoms include arthralgias, joint swelling and myalgias. Pertinent negatives include no chest pain, congestion, coughing, diaphoresis, fatigue, headaches, neck pain, rash, visual change, vomiting or weakness.   Hypertension  This is a chronic problem. The current episode started more than 1 year ago. The problem  has been gradually improving since onset. The problem is controlled. Pertinent negatives include no blurred vision, chest pain, headaches, malaise/fatigue, neck pain, palpitations, peripheral edema, PND or sweats. Risk factors for coronary artery disease include diabetes mellitus, dyslipidemia, male gender and obesity. Past treatments include angiotensin blockers and diuretics. The current treatment provides significant improvement. There are no compliance problems.  Hypertensive end-organ damage includes CAD/MI and heart failure. There is no history of CVA, left ventricular hypertrophy, PVD or retinopathy. There is no history of chronic renal disease, hypercortisolism, hyperparathyroidism, pheochromocytoma, renovascular disease or a thyroid problem.   Hyperlipidemia  This is a chronic problem. The current episode started more than 1 year ago. The problem is controlled. Recent lipid tests were reviewed and are normal. Exacerbating diseases include obesity. He has no history of chronic renal disease or diabetes. There are no known factors aggravating his hyperlipidemia. Associated symptoms include myalgias. Pertinent negatives include no chest pain or focal sensory loss. Current antihyperlipidemic treatment includes statins. The current treatment provides moderate improvement of lipids. There are no compliance problems.  Risk factors for coronary artery disease include diabetes mellitus, dyslipidemia, male sex, hypertension and obesity.   Review of Systems   Constitutional: Negative.  Negative for activity change, diaphoresis, fatigue, malaise/fatigue, unexpected weight change and weight loss.   HENT: Negative.  Negative for nasal congestion, ear pain, mouth sores, rhinorrhea and voice change.    Eyes: Negative.  Negative for blurred vision, pain, discharge and visual disturbance.   Respiratory: Negative.  Negative for apnea, cough and wheezing.    Cardiovascular: Negative.  Negative for chest pain, palpitations and  PND.   Gastrointestinal: Negative.  Negative for abdominal distention, anal bleeding, diarrhea and vomiting.   Endocrine: Negative.  Negative for cold intolerance, polydipsia, polyphagia and polyuria.   Genitourinary:  Positive for impotence. Negative for decreased urine volume, difficulty urinating, discharge, frequency and scrotal swelling.   Musculoskeletal:  Positive for arthralgias, joint swelling and myalgias. Negative for back pain, neck pain and neck stiffness.   Integumentary:  Negative for color change, pallor and rash. Negative.   Allergic/Immunologic: Negative.  Negative for environmental allergies.   Neurological: Negative.  Negative for dizziness, tremors, seizures, speech difficulty, weakness, light-headedness and headaches.   Hematological: Negative.    Psychiatric/Behavioral: Negative.  Negative for agitation, confusion, dysphoric mood and suicidal ideas. The patient is not nervous/anxious.        PMH/PSH/FH/SH/MED/ALLERGY reviewed    Past Medical History:   Diagnosis Date    Anticoagulant long-term use     Cardiomyopathy     CHF (congestive heart failure)     Coronary artery disease     Diabetes mellitus     Gout     Heart attack     Hypertension     Pneumonia        Past Surgical History:   Procedure Laterality Date    APPENDECTOMY      CARDIAC CATHETERIZATION      7 stents    CARDIAC DEFIBRILLATOR PLACEMENT      CATARACT EXTRACTION Bilateral 2011    COLONOSCOPY N/A 8/10/2018    Procedure: COLONOSCOPY golytely;  Surgeon: Valentine Burger MD;  Location: [facility] ENDO;  Service: Endoscopy;  Laterality: N/A;    EYE SURGERY      REVISION OF IMPLANTABLE CARDIOVERTER-DEFIBRILLATOR (ICD) ELECTRODE LEAD PLACEMENT N/A 11/11/2019    Procedure: REVISION, INSERTION, ELECTRODE LEAD, ICD;  Surgeon: Shukri Walsh MD;  Location: [facility] EP LAB;  Service: Cardiology;  Laterality: N/A;  Lead malfxn, RV lead ICD, SJM, anes, DM, 3 PREP       Family History   Problem Relation Age of Onset    Heart disease Mother     Heart  disease Father     Amblyopia Neg Hx     Blindness Neg Hx     Cataracts Neg Hx     Glaucoma Neg Hx     Macular degeneration Neg Hx     Retinal detachment Neg Hx     Strabismus Neg Hx        Social History     Socioeconomic History    Marital status: Single   Tobacco Use    Smoking status: Former    Smokeless tobacco: Never   Substance and Sexual Activity    Alcohol use: Yes     Comment: socially    Drug use: No    Sexual activity: Yes     Social Determinants of Health     Financial Resource Strain: Low Risk     Difficulty of Paying Living Expenses: Not hard at all   Food Insecurity: No Food Insecurity    Worried About Running Out of Food in the Last Year: Never true    Ran Out of Food in the Last Year: Never true   Transportation Needs: No Transportation Needs    Lack of Transportation (Medical): No    Lack of Transportation (Non-Medical): No   Physical Activity: Inactive    Days of Exercise per Week: 0 days    Minutes of Exercise per Session: 0 min   Stress: No Stress Concern Present    Feeling of Stress : Not at all   Social Connections: Unknown    Frequency of Communication with Friends and Family: More than three times a week    Frequency of Social Gatherings with Friends and Family: More than three times a week    Attends Jain Services: Never    Active Member of Clubs or Organizations: No    Attends Club or Organization Meetings: Never    Marital Status: Patient refused   Housing Stability: Low Risk     Unable to Pay for Housing in the Last Year: No    Number of Places Lived in the Last Year: 1    Unstable Housing in the Last Year: No       Current Outpatient Medications   Medication Sig Dispense Refill    acetaminophen (TYLENOL) 500 MG tablet Take 1 tablet (500 mg total) by mouth every 6 (six) hours as needed for Pain. 50 tablet 0    alcohol swabs (BD ALCOHOL SWABS) PadM USE FOUR TIMES DAILY 400 each 3    aspirin (ECOTRIN) 81 MG EC tablet Take 81 mg by mouth once daily.      atorvastatin (LIPITOR) 40 MG  "tablet Take 1 tablet (40 mg total) by mouth once daily. 90 tablet 3    blood glucose control high,low (ACCU-CHEK CATHRYN CONTROL SOLN) Soln Use as directed to check blood sugar 1 each 1    blood sugar diagnostic Strp Use to test four times daily 400 each 11    blood-glucose meter (ACCU-CHEK CATHRYN PLUS METER) Misc USE AS DIRECTED 1 each 0    carvediloL (COREG) 12.5 MG tablet Take 1 tablet (12.5 mg total) by mouth 2 (two) times daily. 180 tablet 3    clopidogreL (PLAVIX) 75 mg tablet Take 1 tablet (75 mg total) by mouth once daily. 90 tablet 3    colchicine (MITIGARE) 0.6 mg Cap Take 1 capsule (0.6 mg total) by mouth once daily. 90 capsule 3    cyanocobalamin (VITAMIN B-12) 1000 MCG tablet TAKE ONE TABLET BY MOUTH ONCE DAILY FOR VITAMIN DEFICIENCIES      furosemide (LASIX) 20 MG tablet Take 1 tablet (20 mg total) by mouth 2 (two) times daily. 180 tablet 3    gabapentin (NEURONTIN) 300 MG capsule Take 2 capsules (600 mg total) by mouth 2 (two) times daily. 360 capsule 1    ibuprofen (ADVIL,MOTRIN) 600 MG tablet Take 1 tablet (600 mg total) by mouth every 6 (six) hours as needed for Pain. 20 tablet 0    insulin (LANTUS SOLOSTAR U-100 INSULIN) glargine 100 units/mL SubQ pen Inject 35 Units into the skin every evening. 30 mL 1    insulin aspart U-100 (NOVOLOG FLEXPEN U-100 INSULIN) 100 unit/mL (3 mL) InPn pen Inject 15 Units into the skin 3 (three) times daily with meals. 45 mL 10    lancets (ACCU-CHEK SOFTCLIX LANCETS) Misc TEST 3 (three) times daily. 300 each 1    losartan (COZAAR) 25 MG tablet Take 1 tablet (25 mg total) by mouth once daily. 90 tablet 3    metFORMIN (GLUCOPHAGE) 1000 MG tablet Take 1 tablet (1,000 mg total) by mouth 2 (two) times daily with meals. 180 tablet 4    pen needle, diabetic (BD ULTRA-FINE SINA PEN NEEDLE) 32 gauge x 5/32" Ndle Use four times daily for insulin administration 400 each 0    potassium chloride (KLOR-CON) 10 MEQ TbSR TAKE ONE TABLET BY MOUTH ONCE DAILY TO INCREASE POTASSIUM 30 " tablet 0    zolpidem (AMBIEN) 5 MG Tab TAKE 1 TABLET BY MOUTH EVERY NIGHT AS NEEDED 90 tablet 0    nitroGLYCERIN (NITROSTAT) 0.4 MG SL tablet Place 1 tablet (0.4 mg total) under the tongue every 5 (five) minutes as needed for Chest pain. 25 tablet 3    tadalafiL (CIALIS) 20 MG Tab Take 1 tablet (20 mg total) by mouth once daily. (Patient not taking: Reported on 5/3/2023) 30 tablet 11     No current facility-administered medications for this visit.       Review of patient's allergies indicates:  No Known Allergies      Objective:       Vitals:    05/03/23 0927   BP: 129/79   Pulse: 66   Temp: 97.9 °F (36.6 °C)       Physical Exam  Constitutional:       Appearance: He is well-developed.   HENT:      Head: Normocephalic and atraumatic.      Right Ear: External ear normal.      Left Ear: External ear normal.      Nose: Nose normal.      Mouth/Throat:      Pharynx: No oropharyngeal exudate.   Eyes:      General: No scleral icterus.        Right eye: No discharge.         Left eye: No discharge.      Conjunctiva/sclera: Conjunctivae normal.      Pupils: Pupils are equal, round, and reactive to light.   Neck:      Thyroid: No thyromegaly.      Vascular: No JVD.      Trachea: No tracheal deviation.   Cardiovascular:      Rate and Rhythm: Normal rate and regular rhythm.      Heart sounds: Normal heart sounds. No murmur heard.    No friction rub. No gallop.   Pulmonary:      Effort: Pulmonary effort is normal. No respiratory distress.      Breath sounds: Normal breath sounds. No stridor. No wheezing or rales.   Chest:      Chest wall: No tenderness.   Abdominal:      General: Bowel sounds are normal. There is no distension.      Palpations: Abdomen is soft. There is no mass.      Tenderness: There is no abdominal tenderness. There is no guarding or rebound.      Hernia: No hernia is present.   Musculoskeletal:         General: No swelling or tenderness. Normal range of motion.      Cervical back: Normal range of motion and  neck supple.      Comments: Left knee swelling, warm and TTP.   Lymphadenopathy:      Cervical: No cervical adenopathy.   Skin:     General: Skin is warm and dry.      Coloration: Skin is not pale.      Findings: No erythema or rash.   Neurological:      Mental Status: He is alert and oriented to person, place, and time.      Cranial Nerves: No cranial nerve deficit.      Motor: No abnormal muscle tone.      Coordination: Coordination normal.      Deep Tendon Reflexes: Reflexes are normal and symmetric. Reflexes normal.   Psychiatric:         Behavior: Behavior normal.         Thought Content: Thought content normal.         Judgment: Judgment normal.       Assessment:       Problem List Items Addressed This Visit       Ventricular tachycardia, nonsustained post-MI    Type 2 diabetes mellitus with diabetic neuropathy - Primary    Relevant Orders    CBC Auto Differential (Completed)    Comprehensive Metabolic Panel (Completed)    Lipid Panel (Completed)    Hemoglobin A1C (Completed)    PSA, Screening (Completed)    Urinalysis (Completed)    Microalbumin/Creatinine Ratio, Urine    CBC Auto Differential (Completed)    Comprehensive Metabolic Panel (Completed)    Lipid Panel (Completed)    Hemoglobin A1C (Completed)    PSA, Screening (Completed)    Urinalysis (Completed)    Microalbumin/Creatinine Ratio, Urine    Diabetes Digital Medicine (DDMP) Enrollment Order (Completed)    Diabetes Digital Medicine (DDMP): Assign Onboarding Questionnaires (Completed)    Thrombocytopenia    Insulin dependent type 2 diabetes mellitus    Relevant Orders    CBC Auto Differential (Completed)    Comprehensive Metabolic Panel (Completed)    Lipid Panel (Completed)    Hemoglobin A1C (Completed)    PSA, Screening (Completed)    Urinalysis (Completed)    Microalbumin/Creatinine Ratio, Urine    Hypertension associated with diabetes    Relevant Orders    CBC Auto Differential (Completed)    Comprehensive Metabolic Panel (Completed)    Lipid  Panel (Completed)    Hemoglobin A1C (Completed)    PSA, Screening (Completed)    Urinalysis (Completed)    Microalbumin/Creatinine Ratio, Urine    Hypertension Digital Medicine (HDMP) Enrollment Order (Completed)    Hypertension Digital Medicine (HDMP): Assign Onboarding Questionnaires (Completed)    Dyslipidemia associated with type 2 diabetes mellitus    Relevant Orders    CBC Auto Differential (Completed)    Comprehensive Metabolic Panel (Completed)    Lipid Panel (Completed)    Hemoglobin A1C (Completed)    PSA, Screening (Completed)    Urinalysis (Completed)    Microalbumin/Creatinine Ratio, Urine    Lipids Digital Medicine (LDMP) Enrollment Order (Completed)    Lipids Digital Medicine (LDMP): Assign Onboarding Questionnaires (Completed)    Chronic combined systolic and diastolic congestive heart failure    Relevant Orders    CBC Auto Differential (Completed)    Comprehensive Metabolic Panel (Completed)    Lipid Panel (Completed)    Hemoglobin A1C (Completed)    PSA, Screening (Completed)    Urinalysis (Completed)    Microalbumin/Creatinine Ratio, Urine    Chronic combined systolic and diastolic CHF (congestive heart failure)    Relevant Orders    CBC Auto Differential (Completed)    Comprehensive Metabolic Panel (Completed)    Lipid Panel (Completed)    Hemoglobin A1C (Completed)    PSA, Screening (Completed)    Urinalysis (Completed)    Microalbumin/Creatinine Ratio, Urine     Other Visit Diagnoses       Encounter for screening for malignant neoplasm of prostate        Relevant Orders    PSA, Screening (Completed)    Left foot pain        Relevant Orders    Ambulatory referral/consult to Podiatry    Left hip pain        Relevant Orders    X-Ray Hip 2 or 3 views Left (with Pelvis when performed) (Completed)    Ambulatory referral/consult to Physical/Occupational Therapy            Plan:           Clifton was seen today for follow-up.    Diagnoses and all orders for this visit:    Type 2 diabetes mellitus with  diabetic neuropathy, unspecified whether long term insulin use  -     CBC Auto Differential; Future  -     Comprehensive Metabolic Panel; Future  -     Lipid Panel; Future  -     Hemoglobin A1C; Future  -     PSA, Screening; Future  -     Urinalysis; Future  -     Microalbumin/Creatinine Ratio, Urine; Future  -     Diabetes Digital Medicine (DDMP) Enrollment Order  -     Diabetes Digital Medicine (DDMP): Assign Onboarding Questionnaires    Hypertension associated with diabetes  -     CBC Auto Differential; Future  -     Comprehensive Metabolic Panel; Future  -     Lipid Panel; Future  -     Hemoglobin A1C; Future  -     PSA, Screening; Future  -     Urinalysis; Future  -     Microalbumin/Creatinine Ratio, Urine; Future  -     Hypertension Digital Medicine (HDMP) Enrollment Order  -     Hypertension Digital Medicine (HDMP): Assign Onboarding Questionnaires    Type 2 diabetes mellitus with diabetic neuropathy, with long-term current use of insulin  -     CBC Auto Differential; Future  -     Comprehensive Metabolic Panel; Future  -     Lipid Panel; Future  -     Hemoglobin A1C; Future  -     PSA, Screening; Future  -     Urinalysis; Future  -     Microalbumin/Creatinine Ratio, Urine; Future    Dyslipidemia associated with type 2 diabetes mellitus  -     CBC Auto Differential; Future  -     Comprehensive Metabolic Panel; Future  -     Lipid Panel; Future  -     Hemoglobin A1C; Future  -     PSA, Screening; Future  -     Urinalysis; Future  -     Microalbumin/Creatinine Ratio, Urine; Future  -     Lipids Digital Medicine (LDMP) Enrollment Order  -     Lipids Digital Medicine (LDMP): Assign Onboarding Questionnaires    Chronic combined systolic and diastolic congestive heart failure  -     CBC Auto Differential; Future  -     Comprehensive Metabolic Panel; Future  -     Lipid Panel; Future  -     Hemoglobin A1C; Future  -     PSA, Screening; Future  -     Urinalysis; Future  -     Microalbumin/Creatinine Ratio, Urine;  Future    Chronic combined systolic and diastolic CHF (congestive heart failure)  -     CBC Auto Differential; Future  -     Comprehensive Metabolic Panel; Future  -     Lipid Panel; Future  -     Hemoglobin A1C; Future  -     PSA, Screening; Future  -     Urinalysis; Future  -     Microalbumin/Creatinine Ratio, Urine; Future    Insulin dependent type 2 diabetes mellitus  -     CBC Auto Differential; Future  -     Comprehensive Metabolic Panel; Future  -     Lipid Panel; Future  -     Hemoglobin A1C; Future  -     PSA, Screening; Future  -     Urinalysis; Future  -     Microalbumin/Creatinine Ratio, Urine; Future    Encounter for screening for malignant neoplasm of prostate  -     PSA, Screening; Future    Left foot pain  -     Ambulatory referral/consult to Podiatry; Future    Left hip pain  -     X-Ray Hip 2 or 3 views Left (with Pelvis when performed); Future  -     Ambulatory referral/consult to Physical/Occupational Therapy; Future    Thrombocytopenia    Ventricular tachycardia      Wellness check  -normal exam  -labs    DM II controlled/hyperglycemia  - improving since started insulin  -lantus and novolog to continue  -strict diet control        HTN  -controlled    HLD  -controlled    Combined CHF  -follows cardiology  -on external defibrillator    Neuropathy  -continue neurontin and increase dose to 600 mg BID    Obesity  -diet and exercise as tolerated    chronic gout  -uric acid levels  -conchicine as needed    Spent adequate time in obtaining history and explaining differentials    40 minutes spent during this visit of which greater than 50% devoted to face-face counseling and coordination of care regarding diagnosis and management plan    Follow up in about 6 months (around 11/3/2023), or if symptoms worsen or fail to improve.

## 2023-05-09 DIAGNOSIS — E11.40 TYPE 2 DIABETES MELLITUS WITH DIABETIC NEUROPATHY, WITH LONG-TERM CURRENT USE OF INSULIN: ICD-10-CM

## 2023-05-09 DIAGNOSIS — E11.9 DIABETES MELLITUS TYPE 2 WITHOUT RETINOPATHY: ICD-10-CM

## 2023-05-09 DIAGNOSIS — Z79.4 TYPE 2 DIABETES MELLITUS WITH DIABETIC NEUROPATHY, WITH LONG-TERM CURRENT USE OF INSULIN: ICD-10-CM

## 2023-05-09 RX ORDER — INSULIN GLARGINE 100 [IU]/ML
35 INJECTION, SOLUTION SUBCUTANEOUS NIGHTLY
Qty: 30 ML | Refills: 1 | Status: SHIPPED | OUTPATIENT
Start: 2023-05-09 | End: 2023-08-23 | Stop reason: SDUPTHER

## 2023-05-09 RX ORDER — INSULIN GLARGINE 100 [IU]/ML
35 INJECTION, SOLUTION SUBCUTANEOUS NIGHTLY
Qty: 30 ML | Refills: 1 | Status: CANCELLED | OUTPATIENT
Start: 2023-05-09

## 2023-05-09 NOTE — TELEPHONE ENCOUNTER
No care due was identified.  [facility] Embedded Care Due Messages. Reference number: [id number].   5/09/2023 12:09:01 PM CDT

## 2023-05-09 NOTE — TELEPHONE ENCOUNTER
No care due was identified.  [facility] Embedded Care Due Messages. Reference number: [id number].   5/09/2023 2:21:30 PM CDT

## 2023-05-10 NOTE — TELEPHONE ENCOUNTER
Refill Decision Note   Clifton Wilson  is requesting a refill authorization.  Brief Assessment and Rationale for Refill:  Approve     Medication Therapy Plan:         Comments:     Note composed:10:15 PM 05/09/2023

## 2023-05-18 ENCOUNTER — TELEPHONE (OUTPATIENT)
Dept: FAMILY MEDICINE | Facility: CLINIC | Age: 73
End: 2023-05-18
Payer: MEDICARE

## 2023-05-22 ENCOUNTER — PATIENT MESSAGE (OUTPATIENT)
Dept: FAMILY MEDICINE | Facility: CLINIC | Age: 73
End: 2023-05-22
Payer: MEDICARE

## 2023-05-23 ENCOUNTER — PROCEDURE VISIT (OUTPATIENT)
Dept: UROLOGY | Facility: CLINIC | Age: 73
End: 2023-05-23
Payer: MEDICARE

## 2023-05-23 VITALS
HEART RATE: 65 BPM | SYSTOLIC BLOOD PRESSURE: 136 MMHG | DIASTOLIC BLOOD PRESSURE: 64 MMHG | RESPIRATION RATE: 20 BRPM | TEMPERATURE: 97 F | WEIGHT: 234.69 LBS | BODY MASS INDEX: 32.73 KG/M2

## 2023-05-23 DIAGNOSIS — N12 PYELONEPHRITIS: ICD-10-CM

## 2023-05-23 PROCEDURE — 52000 CYSTOSCOPY: ICD-10-PCS | Mod: S$GLB,,, | Performed by: UROLOGY

## 2023-05-23 PROCEDURE — 52000 CYSTOURETHROSCOPY: CPT | Mod: S$GLB,,, | Performed by: UROLOGY

## 2023-05-23 RX ORDER — LIDOCAINE HYDROCHLORIDE 20 MG/ML
JELLY TOPICAL
Status: COMPLETED | OUTPATIENT
Start: 2023-05-23 | End: 2023-05-23

## 2023-05-23 RX ADMIN — LIDOCAINE HYDROCHLORIDE: 20 JELLY TOPICAL at 12:05

## 2023-05-23 NOTE — PATIENT INSTRUCTIONS
What to Expect After a Cystoscopy  For the next 24-48 hours, you may feel a mild burning when you urinate. This burning is normal and expected. Drink plenty of water to dilute the urine to help relieve the burning sensation. You may also see a small amount of blood in your urine after the procedure.    Unless you are already taking antibiotics, you may be given an antibiotic after the test to prevent infection.    Signs and Symptoms to Report  Call the [facility] at [phone] if you develop any of the following:  Fever of 101 degrees or higher  Chills or persistent bleeding  Inability to urinate

## 2023-05-23 NOTE — PROCEDURES
Cystoscopy    Date/Time: 5/23/2023 12:45 PM  Performed by: Juan Antonio Cornejo Jr., MD  Authorized by: Caleb Ma MD     Consent Done?:  Yes (Written)  Timeout: prior to procedure the correct patient, procedure, and site was verified    Prep: patient was prepped and draped in usual sterile fashion    Local anesthesia used?: Yes    Anesthesia:  Lidocaine jelly  Indications: BPH    Position:  Supine  Anesthesia:  Lidocaine jelly  Patient sedated?: No    Preparation: Patient was prepped and draped in usual sterile fashion    Scope type:  Flexible cystoscope  External exam normal: Yes    Digital exam performed: Yes    Urethra normal: Yes    Prostate normal: mild llo.    Bladder neck normal: Yes    Bladder normal: Yes     patient tolerated the procedure well with no immediate complications  Comments:      Yearly check  Voiding well now

## 2023-05-28 ENCOUNTER — CLINICAL SUPPORT (OUTPATIENT)
Dept: CARDIOLOGY | Facility: HOSPITAL | Age: 73
End: 2023-05-28
Payer: MEDICARE

## 2023-05-28 DIAGNOSIS — Z95.810 PRESENCE OF AUTOMATIC (IMPLANTABLE) CARDIAC DEFIBRILLATOR: ICD-10-CM

## 2023-05-28 PROCEDURE — 93296 REM INTERROG EVL PM/IDS: CPT | Performed by: INTERNAL MEDICINE

## 2023-05-28 PROCEDURE — 93295 DEV INTERROG REMOTE 1/2/MLT: CPT | Mod: ,,, | Performed by: INTERNAL MEDICINE

## 2023-05-28 PROCEDURE — 93295 CARDIAC DEVICE CHECK - REMOTE: ICD-10-PCS | Mod: ,,, | Performed by: INTERNAL MEDICINE

## 2023-06-19 ENCOUNTER — OFFICE VISIT (OUTPATIENT)
Dept: PODIATRY | Facility: CLINIC | Age: 73
End: 2023-06-19
Attending: FAMILY MEDICINE
Payer: MEDICARE

## 2023-06-19 VITALS
BODY MASS INDEX: 32.76 KG/M2 | HEART RATE: 58 BPM | WEIGHT: 234 LBS | HEIGHT: 71 IN | SYSTOLIC BLOOD PRESSURE: 147 MMHG | DIASTOLIC BLOOD PRESSURE: 81 MMHG

## 2023-06-19 DIAGNOSIS — E11.51 TYPE 2 DIABETES MELLITUS WITH PERIPHERAL VASCULAR DISEASE: Primary | ICD-10-CM

## 2023-06-19 DIAGNOSIS — E11.49 TYPE 2 DIABETES MELLITUS WITH NEUROLOGICAL MANIFESTATIONS: ICD-10-CM

## 2023-06-19 DIAGNOSIS — S90.212A: ICD-10-CM

## 2023-06-19 PROCEDURE — 3079F DIAST BP 80-89 MM HG: CPT | Mod: CPTII,S$GLB,, | Performed by: PODIATRIST

## 2023-06-19 PROCEDURE — 1101F PT FALLS ASSESS-DOCD LE1/YR: CPT | Mod: CPTII,S$GLB,, | Performed by: PODIATRIST

## 2023-06-19 PROCEDURE — 99203 PR OFFICE/OUTPT VISIT, NEW, LEVL III, 30-44 MIN: ICD-10-PCS | Mod: S$GLB,,, | Performed by: PODIATRIST

## 2023-06-19 PROCEDURE — 1101F PR PT FALLS ASSESS DOC 0-1 FALLS W/OUT INJ PAST YR: ICD-10-PCS | Mod: CPTII,S$GLB,, | Performed by: PODIATRIST

## 2023-06-19 PROCEDURE — 99203 OFFICE O/P NEW LOW 30 MIN: CPT | Mod: S$GLB,,, | Performed by: PODIATRIST

## 2023-06-19 PROCEDURE — 1159F MED LIST DOCD IN RCRD: CPT | Mod: CPTII,S$GLB,, | Performed by: PODIATRIST

## 2023-06-19 PROCEDURE — 3288F PR FALLS RISK ASSESSMENT DOCUMENTED: ICD-10-PCS | Mod: CPTII,S$GLB,, | Performed by: PODIATRIST

## 2023-06-19 PROCEDURE — 3044F PR MOST RECENT HEMOGLOBIN A1C LEVEL <7.0%: ICD-10-PCS | Mod: CPTII,S$GLB,, | Performed by: PODIATRIST

## 2023-06-19 PROCEDURE — 3079F PR MOST RECENT DIASTOLIC BLOOD PRESSURE 80-89 MM HG: ICD-10-PCS | Mod: CPTII,S$GLB,, | Performed by: PODIATRIST

## 2023-06-19 PROCEDURE — 4010F PR ACE/ARB THEARPY RXD/TAKEN: ICD-10-PCS | Mod: CPTII,S$GLB,, | Performed by: PODIATRIST

## 2023-06-19 PROCEDURE — 3008F PR BODY MASS INDEX (BMI) DOCUMENTED: ICD-10-PCS | Mod: CPTII,S$GLB,, | Performed by: PODIATRIST

## 2023-06-19 PROCEDURE — 3077F SYST BP >= 140 MM HG: CPT | Mod: CPTII,S$GLB,, | Performed by: PODIATRIST

## 2023-06-19 PROCEDURE — 1126F PR PAIN SEVERITY QUANTIFIED, NO PAIN PRESENT: ICD-10-PCS | Mod: CPTII,S$GLB,, | Performed by: PODIATRIST

## 2023-06-19 PROCEDURE — 3077F PR MOST RECENT SYSTOLIC BLOOD PRESSURE >= 140 MM HG: ICD-10-PCS | Mod: CPTII,S$GLB,, | Performed by: PODIATRIST

## 2023-06-19 PROCEDURE — 3044F HG A1C LEVEL LT 7.0%: CPT | Mod: CPTII,S$GLB,, | Performed by: PODIATRIST

## 2023-06-19 PROCEDURE — 1126F AMNT PAIN NOTED NONE PRSNT: CPT | Mod: CPTII,S$GLB,, | Performed by: PODIATRIST

## 2023-06-19 PROCEDURE — 4010F ACE/ARB THERAPY RXD/TAKEN: CPT | Mod: CPTII,S$GLB,, | Performed by: PODIATRIST

## 2023-06-19 PROCEDURE — 3288F FALL RISK ASSESSMENT DOCD: CPT | Mod: CPTII,S$GLB,, | Performed by: PODIATRIST

## 2023-06-19 PROCEDURE — 99999 PR PBB SHADOW E&M-EST. PATIENT-LVL V: ICD-10-PCS | Mod: PBBFAC,,, | Performed by: PODIATRIST

## 2023-06-19 PROCEDURE — 1159F PR MEDICATION LIST DOCUMENTED IN MEDICAL RECORD: ICD-10-PCS | Mod: CPTII,S$GLB,, | Performed by: PODIATRIST

## 2023-06-19 PROCEDURE — 1160F PR REVIEW ALL MEDS BY PRESCRIBER/CLIN PHARMACIST DOCUMENTED: ICD-10-PCS | Mod: CPTII,S$GLB,, | Performed by: PODIATRIST

## 2023-06-19 PROCEDURE — 3008F BODY MASS INDEX DOCD: CPT | Mod: CPTII,S$GLB,, | Performed by: PODIATRIST

## 2023-06-19 PROCEDURE — 1160F RVW MEDS BY RX/DR IN RCRD: CPT | Mod: CPTII,S$GLB,, | Performed by: PODIATRIST

## 2023-06-19 PROCEDURE — 99999 PR PBB SHADOW E&M-EST. PATIENT-LVL V: CPT | Mod: PBBFAC,,, | Performed by: PODIATRIST

## 2023-06-19 NOTE — PROGRESS NOTES
Subjective:     Patient ID: Clifton Wilson is a 72 y.o. male.    Chief Complaint: Nail Problem (Discoloration to bilateral great toes)    Clifton is a 72 y.o. male who presents to the clinic upon referral from Dr. Grissom  for evaluation and treatment of diabetic feet. Clifton has a past medical history of Anticoagulant long-term use, Cardiomyopathy, CHF (congestive heart failure), Coronary artery disease, Diabetes mellitus, Gout, Heart attack, Hypertension, and Pneumonia. Patient relates no major problem with feet. Only complaints today consist of discoloration to left great toenail since falling few weeks ago and persistent discoloration to the right great toe for several years has remain unchanged.  No pain reported.  Does report intermittent back change and reports numbness to his hands and feet when lying down at night.  Ambulating with tennis shoes.  Accompanied by his wife.    PCP: Britton Grissom MD    Date Last Seen by PCP:     Current shoe gear: Tennis shoes    Hemoglobin A1C   Date Value Ref Range Status   05/03/2023 6.5 (H) 4.0 - 5.6 % Final     Comment:     ADA Screening Guidelines:  5.7-6.4%  Consistent with prediabetes  >or=6.5%  Consistent with diabetes    High levels of fetal hemoglobin interfere with the HbA1C  assay. Heterozygous hemoglobin variants (HbS, HgC, etc)do  not significantly interfere with this assay.   However, presence of multiple variants may affect accuracy.     11/03/2022 7.1 (H) 4.0 - 5.6 % Final     Comment:     ADA Screening Guidelines:  5.7-6.4%  Consistent with prediabetes  >or=6.5%  Consistent with diabetes    High levels of fetal hemoglobin interfere with the HbA1C  assay. Heterozygous hemoglobin variants (HbS, HgC, etc)do  not significantly interfere with this assay.   However, presence of multiple variants may affect accuracy.     05/03/2022 6.5 (H) 4.0 - 5.6 % Final     Comment:     ADA Screening Guidelines:  5.7-6.4%  Consistent with prediabetes  >or=6.5%  Consistent with  "diabetes    High levels of fetal hemoglobin interfere with the HbA1C  assay. Heterozygous hemoglobin variants (HbS, HgC, etc)do  not significantly interfere with this assay.   However, presence of multiple variants may affect accuracy.       Vitals:    06/19/23 1449   BP: (!) 147/81   Pulse: (!) 58   Weight: 106.1 kg (234 lb)   Height: 5' 11" (1.803 m)   PainSc: 0-No pain      Past Medical History:   Diagnosis Date    Anticoagulant long-term use     Cardiomyopathy     CHF (congestive heart failure)     Coronary artery disease     Diabetes mellitus     Gout     Heart attack     Hypertension     Pneumonia        Past Surgical History:   Procedure Laterality Date    APPENDECTOMY      CARDIAC CATHETERIZATION      7 stents    CARDIAC DEFIBRILLATOR PLACEMENT      CATARACT EXTRACTION Bilateral 2011    COLONOSCOPY N/A 8/10/2018    Procedure: COLONOSCOPY golytely;  Surgeon: Valentine Burger MD;  Location: [facility] ENDO;  Service: Endoscopy;  Laterality: N/A;    EYE SURGERY      REVISION OF IMPLANTABLE CARDIOVERTER-DEFIBRILLATOR (ICD) ELECTRODE LEAD PLACEMENT N/A 11/11/2019    Procedure: REVISION, INSERTION, ELECTRODE LEAD, ICD;  Surgeon: Shukri Walsh MD;  Location: [facility] EP LAB;  Service: Cardiology;  Laterality: N/A;  Lead malfxn, RV lead ICD, SJM, anes, DM, 3 PREP       Family History   Problem Relation Age of Onset    Heart disease Mother     Heart disease Father     Amblyopia Neg Hx     Blindness Neg Hx     Cataracts Neg Hx     Glaucoma Neg Hx     Macular degeneration Neg Hx     Retinal detachment Neg Hx     Strabismus Neg Hx        Social History     Socioeconomic History    Marital status: Single   Tobacco Use    Smoking status: Former    Smokeless tobacco: Never   Substance and Sexual Activity    Alcohol use: Yes     Comment: socially    Drug use: No    Sexual activity: Yes     Social Determinants of Health     Financial Resource Strain: Low Risk     Difficulty of Paying Living Expenses: Not hard at all   Food " Insecurity: No Food Insecurity    Worried About Running Out of Food in the Last Year: Never true    Ran Out of Food in the Last Year: Never true   Transportation Needs: No Transportation Needs    Lack of Transportation (Medical): No    Lack of Transportation (Non-Medical): No   Physical Activity: Inactive    Days of Exercise per Week: 0 days    Minutes of Exercise per Session: 0 min   Stress: No Stress Concern Present    Feeling of Stress : Not at all   Social Connections: Unknown    Frequency of Communication with Friends and Family: More than three times a week    Frequency of Social Gatherings with Friends and Family: More than three times a week    Attends Cheondoism Services: Never    Active Member of Clubs or Organizations: No    Attends Club or Organization Meetings: Never    Marital Status: Patient refused   Housing Stability: Low Risk     Unable to Pay for Housing in the Last Year: No    Number of Places Lived in the Last Year: 1    Unstable Housing in the Last Year: No       Current Outpatient Medications   Medication Sig Dispense Refill    acetaminophen (TYLENOL) 500 MG tablet Take 1 tablet (500 mg total) by mouth every 6 (six) hours as needed for Pain. 50 tablet 0    alcohol swabs (BD ALCOHOL SWABS) PadM USE FOUR TIMES DAILY 400 each 3    aspirin (ECOTRIN) 81 MG EC tablet Take 81 mg by mouth once daily.      atorvastatin (LIPITOR) 40 MG tablet Take 1 tablet (40 mg total) by mouth once daily. 90 tablet 3    blood glucose control high,low (ACCU-CHEK CATHRYN CONTROL SOLN) Soln Use as directed to check blood sugar 1 each 1    blood sugar diagnostic Strp Use to test four times daily 400 each 11    blood-glucose meter (ACCU-CHEK CATHRYN PLUS METER) Misc USE AS DIRECTED 1 each 0    carvediloL (COREG) 12.5 MG tablet Take 1 tablet (12.5 mg total) by mouth 2 (two) times daily. 180 tablet 3    clopidogreL (PLAVIX) 75 mg tablet Take 1 tablet (75 mg total) by mouth once daily. 90 tablet 3    colchicine (MITIGARE) 0.6 mg  "Cap Take 1 capsule (0.6 mg total) by mouth once daily. 90 capsule 3    cyanocobalamin (VITAMIN B-12) 1000 MCG tablet TAKE ONE TABLET BY MOUTH ONCE DAILY FOR VITAMIN DEFICIENCIES      furosemide (LASIX) 20 MG tablet Take 1 tablet (20 mg total) by mouth 2 (two) times daily. 180 tablet 3    gabapentin (NEURONTIN) 300 MG capsule Take 2 capsules (600 mg total) by mouth 2 (two) times daily. 360 capsule 1    ibuprofen (ADVIL,MOTRIN) 600 MG tablet Take 1 tablet (600 mg total) by mouth every 6 (six) hours as needed for Pain. 20 tablet 0    insulin (LANTUS SOLOSTAR U-100 INSULIN) glargine 100 units/mL SubQ pen Inject 35 Units into the skin every evening. 30 mL 1    insulin aspart U-100 (NOVOLOG FLEXPEN U-100 INSULIN) 100 unit/mL (3 mL) InPn pen Inject 15 Units into the skin 3 (three) times daily with meals. 45 mL 10    lancets (ACCU-CHEK SOFTCLIX LANCETS) Misc TEST 3 (three) times daily. 300 each 1    losartan (COZAAR) 25 MG tablet Take 1 tablet (25 mg total) by mouth once daily. 90 tablet 3    metFORMIN (GLUCOPHAGE) 1000 MG tablet Take 1 tablet (1,000 mg total) by mouth 2 (two) times daily with meals. 180 tablet 4    pen needle, diabetic (BD ULTRA-FINE SINA PEN NEEDLE) 32 gauge x 5/32" Ndle Use four times daily for insulin administration 400 each 0    potassium chloride (KLOR-CON) 10 MEQ TbSR TAKE ONE TABLET BY MOUTH ONCE DAILY TO INCREASE POTASSIUM 30 tablet 0    tadalafiL (CIALIS) 20 MG Tab Take 1 tablet (20 mg total) by mouth once daily. 30 tablet 11    zolpidem (AMBIEN) 5 MG Tab TAKE 1 TABLET BY MOUTH EVERY NIGHT AS NEEDED 90 tablet 0    nitroGLYCERIN (NITROSTAT) 0.4 MG SL tablet Place 1 tablet (0.4 mg total) under the tongue every 5 (five) minutes as needed for Chest pain. 25 tablet 3     No current facility-administered medications for this visit.       Review of patient's allergies indicates:  No Known Allergies      Review of Systems   Constitutional: Negative for chills and fever.   HENT:  Negative for congestion " and hearing loss.    Cardiovascular:  Positive for leg swelling. Negative for chest pain and claudication.   Skin:  Positive for color change and nail changes.   Musculoskeletal:  Positive for back pain.   Gastrointestinal:  Negative for nausea and vomiting.   Neurological:  Positive for numbness and paresthesias.   Psychiatric/Behavioral:  Negative for altered mental status.       Objective:     Physical Exam  Constitutional:       General: He is not in acute distress.     Appearance: Normal appearance. He is obese. He is not ill-appearing.   Cardiovascular:      Pulses:           Dorsalis pedis pulses are detected w/ Doppler on the right side and detected w/ Doppler on the left side.        Posterior tibial pulses are detected w/ Doppler on the right side and detected w/ Doppler on the left side.      Comments: Triphasic left PT and weakly monophasic left DP with weakly monophasic right PT and strong biphasic right DP with Doppler.  Mild to moderate pitting edema to lower extremity bilateral.  Reduced hair growth follow extremity.  No rubor noted on dependency bilateral foot.  Skin temp is warm to lower extremity bilateral.  Musculoskeletal:      Comments: Manual muscle strength testing 5/5 bilateral lower extremity.    Mild semi rigid pes planus foot structure bilateral foot.  No pain with range of motion or manual muscle strength testing bilateral foot and ankle.   Feet:      Right foot:      Protective Sensation: 10 sites tested.  10 sites sensed.      Skin integrity: No ulcer, blister, skin breakdown, erythema, warmth, callus, dry skin or fissure.      Left foot:      Protective Sensation: 10 sites tested.  8 sites sensed.      Skin integrity: No ulcer, blister, skin breakdown, erythema, warmth, callus, dry skin or fissure.      Toenail Condition: Left toenails are abnormally thick.   Skin:     General: Skin is warm.      Capillary Refill: Capillary refill takes less than 2 seconds.      Findings: Ecchymosis  present. No erythema.      Nails: There is no clubbing.      Comments: Left hallux nail with central underlying resolving ecchymosis.  No significant loosening of the nail except that the distal lateral margin with white discoloration underlying debris.  Right hallux nail with central lightly hyperpigmented streak without any significant loosening or other underlying changes.  Remaining toenails 2-5 bilateral foot are normal in appearance.   Neurological:      Mental Status: He is alert and oriented to person, place, and time.      Sensory: Sensory deficit present.      Motor: Motor function is intact.               Assessment:      Encounter Diagnoses   Name Primary?    Type 2 diabetes mellitus with neurological manifestations     Type 2 diabetes mellitus with peripheral vascular disease Yes    Traumatic subungual ecchymosis of left great toe      Plan:     Clifton was seen today for nail problem.    Diagnoses and all orders for this visit:    Type 2 diabetes mellitus with peripheral vascular disease  -     US Lower Extremity Arteries Bilateral; Future    Type 2 diabetes mellitus with neurological manifestations  -     Ambulatory referral/consult to Podiatry    Traumatic subungual ecchymosis of left great toe      I counseled the patient on his conditions, their implications and medical management.    Shoe inspection. Diabetic Foot Education. Patient reminded of the importance of good nutrition and blood sugar control to help prevent podiatric complications of diabetes. Patient instructed on proper foot hygeine. We discussed wearing proper shoe gear, daily foot inspections, never walking without protective shoe gear, never putting sharp instruments to feet.    We discussed monitoring the left great toe and ensuring that grows out properly since there is underlying resolving traumatic ecchymosis to left great toe.  The nail is stable without signs infection.  No pain.  We discussed wearing shoes adequate room the  toe box to prevent any further nail trauma.    We also discussed monitoring the right hallux nail which has mild hyperpigmentation of the nail along the central longitudinal aspect.  We discussed differential diagnosis of melanoma which is extremely rare and monitoring.    Comprehensive annual diabetic foot exam completed.  Patient found to be intermediate risk for diabetic foot complication.  Baseline arterial ultrasound lower extremity bilateral ordered.  RTC within 1 year p.r.n. as discussed.    A portion of this note was generated by voice recognition software and may contain spelling and grammar errors.

## 2023-06-22 ENCOUNTER — HOSPITAL ENCOUNTER (OUTPATIENT)
Dept: RADIOLOGY | Facility: HOSPITAL | Age: 73
Discharge: HOME | End: 2023-06-22
Attending: PODIATRIST
Payer: MEDICARE

## 2023-06-22 DIAGNOSIS — E11.51 TYPE 2 DIABETES MELLITUS WITH PERIPHERAL VASCULAR DISEASE: ICD-10-CM

## 2023-06-22 PROCEDURE — 93925 LOWER EXTREMITY STUDY: CPT | Mod: TC

## 2023-06-22 PROCEDURE — 93925 LOWER EXTREMITY STUDY: CPT | Mod: 26,,, | Performed by: RADIOLOGY

## 2023-06-22 PROCEDURE — 93925 US LOWER EXTREMITY ARTERIES BILATERAL: ICD-10-PCS | Mod: 26,,, | Performed by: RADIOLOGY

## 2023-06-23 ENCOUNTER — PATIENT MESSAGE (OUTPATIENT)
Dept: PODIATRY | Facility: CLINIC | Age: 73
End: 2023-06-23
Payer: MEDICARE

## 2023-06-23 DIAGNOSIS — E11.51 TYPE 2 DIABETES MELLITUS WITH PERIPHERAL ANGIOPATHY: Primary | ICD-10-CM

## 2023-06-29 DIAGNOSIS — E11.9 DIABETES MELLITUS WITHOUT COMPLICATION: ICD-10-CM

## 2023-06-29 DIAGNOSIS — I15.2 HYPERTENSION ASSOCIATED WITH DIABETES: ICD-10-CM

## 2023-06-29 DIAGNOSIS — F51.01 PRIMARY INSOMNIA: ICD-10-CM

## 2023-06-29 DIAGNOSIS — E11.59 HYPERTENSION ASSOCIATED WITH DIABETES: ICD-10-CM

## 2023-06-29 DIAGNOSIS — E11.40 TYPE 2 DIABETES MELLITUS WITH DIABETIC NEUROPATHY, WITH LONG-TERM CURRENT USE OF INSULIN: ICD-10-CM

## 2023-06-29 DIAGNOSIS — I34.0 MITRAL VALVE INSUFFICIENCY, UNSPECIFIED ETIOLOGY: ICD-10-CM

## 2023-06-29 DIAGNOSIS — R73.9 HYPERGLYCEMIA: ICD-10-CM

## 2023-06-29 DIAGNOSIS — E78.5 HYPERLIPIDEMIA, UNSPECIFIED HYPERLIPIDEMIA TYPE: ICD-10-CM

## 2023-06-29 DIAGNOSIS — I10 ESSENTIAL HYPERTENSION: ICD-10-CM

## 2023-06-29 DIAGNOSIS — Z79.4 TYPE 2 DIABETES MELLITUS WITH DIABETIC NEUROPATHY, WITH LONG-TERM CURRENT USE OF INSULIN: ICD-10-CM

## 2023-06-29 DIAGNOSIS — I50.41 ACUTE COMBINED SYSTOLIC AND DIASTOLIC CONGESTIVE HEART FAILURE: ICD-10-CM

## 2023-06-29 RX ORDER — CARVEDILOL 12.5 MG/1
12.5 TABLET ORAL 2 TIMES DAILY
Qty: 180 TABLET | Refills: 3 | Status: SHIPPED | OUTPATIENT
Start: 2023-06-29 | End: 2024-06-28

## 2023-06-29 RX ORDER — ATORVASTATIN CALCIUM 40 MG/1
40 TABLET, FILM COATED ORAL DAILY
Qty: 90 TABLET | Refills: 3 | Status: SHIPPED | OUTPATIENT
Start: 2023-06-29

## 2023-06-29 RX ORDER — CLOPIDOGREL BISULFATE 75 MG/1
75 TABLET ORAL DAILY
Qty: 90 TABLET | Refills: 3 | Status: SHIPPED | OUTPATIENT
Start: 2023-06-29

## 2023-06-29 RX ORDER — PEN NEEDLE, DIABETIC 30 GX3/16"
NEEDLE, DISPOSABLE MISCELLANEOUS
Qty: 400 EACH | Refills: 3 | Status: SHIPPED | OUTPATIENT
Start: 2023-06-29

## 2023-06-29 RX ORDER — FUROSEMIDE 20 MG/1
20 TABLET ORAL 2 TIMES DAILY
Qty: 180 TABLET | Refills: 3 | Status: SHIPPED | OUTPATIENT
Start: 2023-06-29

## 2023-06-29 RX ORDER — ZOLPIDEM TARTRATE 5 MG/1
TABLET ORAL
Qty: 90 TABLET | Refills: 0 | Status: CANCELLED | OUTPATIENT
Start: 2023-06-29

## 2023-06-29 RX ORDER — ZOLPIDEM TARTRATE 5 MG/1
TABLET ORAL
Qty: 90 TABLET | Refills: 0 | OUTPATIENT
Start: 2023-06-29

## 2023-06-29 NOTE — TELEPHONE ENCOUNTER
Care Due:                  Date            Visit Type   Department     Provider  --------------------------------------------------------------------------------                                EP -                              [facility]    Britton Dumontkimmie  Last Visit: 05-      CARE ([facility])   MEDICINE       Jaciel                              [facility]  Next Visit: 11-      CARE ([facility])   MEDICINE       Jaciel                                                            Last  Test          Frequency    Reason                     Performed    Due Date  --------------------------------------------------------------------------------    Uric Acid...  12 months..  colchicine...............  09- 09-    Health [facility] Embedded Care Due Messages. Reference number: [id number].   6/29/2023 10:43:43 AM CDT

## 2023-06-29 NOTE — TELEPHONE ENCOUNTER
No care due was identified.  Health [facility] Embedded Care Due Messages. Reference number: [id number].   6/29/2023 10:44:18 AM CDT

## 2023-06-29 NOTE — TELEPHONE ENCOUNTER
No care due was identified.  [facility] Embedded Care Due Messages. Reference number: [id number].   6/29/2023 1:23:56 PM CDT

## 2023-06-29 NOTE — TELEPHONE ENCOUNTER
Refill Routing Note   Refill Routing Note   Medication(s) are not appropriate for processing by [facility] for the following reason(s):      Required vitals abnormal    [facility] action(s):  Approve  Defer None identified     Medication Therapy Plan: BP 6/19/23 (!) 147/81  Medication reconciliation completed: No     Appointments  past 12m or future 3m with PCP    Date Provider   Last Visit   5/3/2023 Britton Grissom MD   Next Visit   11/3/2023 Britton Grissom MD   ED visits in past 90 days: 1        Note composed:10:58 AM 06/29/2023

## 2023-06-30 ENCOUNTER — PATIENT MESSAGE (OUTPATIENT)
Dept: FAMILY MEDICINE | Facility: CLINIC | Age: 73
End: 2023-06-30
Payer: MEDICARE

## 2023-06-30 RX ORDER — LOSARTAN POTASSIUM 25 MG/1
25 TABLET ORAL DAILY
Qty: 90 TABLET | Refills: 3 | Status: SHIPPED | OUTPATIENT
Start: 2023-06-30

## 2023-06-30 RX ORDER — LOSARTAN POTASSIUM 25 MG/1
25 TABLET ORAL DAILY
Qty: 90 TABLET | Refills: 3 | OUTPATIENT
Start: 2023-06-30

## 2023-07-03 PROBLEM — N12 PYELONEPHRITIS: Status: RESOLVED | Noted: 2023-03-26 | Resolved: 2023-07-03

## 2023-07-05 DIAGNOSIS — F51.01 PRIMARY INSOMNIA: ICD-10-CM

## 2023-07-06 ENCOUNTER — PATIENT MESSAGE (OUTPATIENT)
Dept: FAMILY MEDICINE | Facility: CLINIC | Age: 73
End: 2023-07-06
Payer: MEDICARE

## 2023-07-06 DIAGNOSIS — F51.01 PRIMARY INSOMNIA: ICD-10-CM

## 2023-07-06 RX ORDER — ZOLPIDEM TARTRATE 5 MG/1
TABLET ORAL
Qty: 90 TABLET | Refills: 0 | Status: SHIPPED | OUTPATIENT
Start: 2023-07-06 | End: 2023-08-24 | Stop reason: SDUPTHER

## 2023-07-06 RX ORDER — ZOLPIDEM TARTRATE 5 MG/1
TABLET ORAL
Qty: 90 TABLET | Refills: 0 | OUTPATIENT
Start: 2023-07-06

## 2023-07-06 NOTE — TELEPHONE ENCOUNTER
No care due was identified.  Health [facility] Embedded Care Due Messages. Reference number: [id number].   7/06/2023 12:09:39 PM CDT

## 2023-07-06 NOTE — TELEPHONE ENCOUNTER
No care due was identified.  [facility] Embedded Care Due Messages. Reference number: [id number].   7/05/2023 9:23:49 PM CDT

## 2023-07-06 NOTE — TELEPHONE ENCOUNTER
No care due was identified.  Health [facility] Embedded Care Due Messages. Reference number: [id number].   7/06/2023 2:03:33 PM CDT

## 2023-07-06 NOTE — TELEPHONE ENCOUNTER
----- Message from Grace Jones sent at 7/6/2023  1:11 PM CDT -----  Regarding: refill  Contact: [phone]  Type:  RX Refill Request    Who Called:  pt  Refill or New Rx: refill   RX Name and Strength: zolpidem (AMBIEN) 5 MG Tab  How is the patient currently taking it? (ex. 1XDay): TAKE 1 TABLET BY MOUTH EVERY NIGHT AS NEEDED  Is this a 30 day or 90 day RX: 90  Preferred Pharmacy with phone number: [facility] [[id number]]  Local or Mail Order: local  Ordering Provider:   Would the patient rather a call back or a response via [facility]?  call  Best Call Back Number: [phone]  Additional Information:

## 2023-07-11 ENCOUNTER — OFFICE VISIT (OUTPATIENT)
Dept: CARDIOLOGY | Facility: CLINIC | Age: 73
End: 2023-07-11
Payer: MEDICARE

## 2023-07-11 VITALS
OXYGEN SATURATION: 96 % | WEIGHT: 236.75 LBS | DIASTOLIC BLOOD PRESSURE: 77 MMHG | HEART RATE: 63 BPM | HEIGHT: 71 IN | BODY MASS INDEX: 33.15 KG/M2 | SYSTOLIC BLOOD PRESSURE: 117 MMHG

## 2023-07-11 DIAGNOSIS — I73.9 PAD (PERIPHERAL ARTERY DISEASE): Primary | ICD-10-CM

## 2023-07-11 DIAGNOSIS — I50.42 CHRONIC COMBINED SYSTOLIC AND DIASTOLIC CONGESTIVE HEART FAILURE: ICD-10-CM

## 2023-07-11 DIAGNOSIS — I25.118 CORONARY ARTERY DISEASE OF NATIVE ARTERY OF NATIVE HEART WITH STABLE ANGINA PECTORIS: ICD-10-CM

## 2023-07-11 DIAGNOSIS — E11.59 HYPERTENSION ASSOCIATED WITH DIABETES: ICD-10-CM

## 2023-07-11 DIAGNOSIS — I47.20 VENTRICULAR TACHYCARDIA: ICD-10-CM

## 2023-07-11 DIAGNOSIS — I25.5 ISCHEMIC CARDIOMYOPATHY: ICD-10-CM

## 2023-07-11 DIAGNOSIS — E11.69 DYSLIPIDEMIA ASSOCIATED WITH TYPE 2 DIABETES MELLITUS: ICD-10-CM

## 2023-07-11 DIAGNOSIS — Z95.810 ICD (IMPLANTABLE CARDIOVERTER-DEFIBRILLATOR), SINGLE, IN SITU: ICD-10-CM

## 2023-07-11 DIAGNOSIS — I15.2 HYPERTENSION ASSOCIATED WITH DIABETES: ICD-10-CM

## 2023-07-11 DIAGNOSIS — E11.51 TYPE 2 DIABETES MELLITUS WITH PERIPHERAL ANGIOPATHY: ICD-10-CM

## 2023-07-11 DIAGNOSIS — E11.40 TYPE 2 DIABETES MELLITUS WITH DIABETIC NEUROPATHY, WITHOUT LONG-TERM CURRENT USE OF INSULIN: ICD-10-CM

## 2023-07-11 DIAGNOSIS — E78.5 DYSLIPIDEMIA ASSOCIATED WITH TYPE 2 DIABETES MELLITUS: ICD-10-CM

## 2023-07-11 DIAGNOSIS — E66.01 SEVERE OBESITY (BMI 35.0-35.9 WITH COMORBIDITY): ICD-10-CM

## 2023-07-11 PROCEDURE — 3078F PR MOST RECENT DIASTOLIC BLOOD PRESSURE < 80 MM HG: ICD-10-PCS | Mod: CPTII,S$GLB,, | Performed by: INTERNAL MEDICINE

## 2023-07-11 PROCEDURE — 1126F AMNT PAIN NOTED NONE PRSNT: CPT | Mod: CPTII,S$GLB,, | Performed by: INTERNAL MEDICINE

## 2023-07-11 PROCEDURE — 1160F RVW MEDS BY RX/DR IN RCRD: CPT | Mod: CPTII,S$GLB,, | Performed by: INTERNAL MEDICINE

## 2023-07-11 PROCEDURE — 3008F BODY MASS INDEX DOCD: CPT | Mod: CPTII,S$GLB,, | Performed by: INTERNAL MEDICINE

## 2023-07-11 PROCEDURE — 1159F PR MEDICATION LIST DOCUMENTED IN MEDICAL RECORD: ICD-10-PCS | Mod: CPTII,S$GLB,, | Performed by: INTERNAL MEDICINE

## 2023-07-11 PROCEDURE — 4010F ACE/ARB THERAPY RXD/TAKEN: CPT | Mod: CPTII,S$GLB,, | Performed by: INTERNAL MEDICINE

## 2023-07-11 PROCEDURE — 3044F PR MOST RECENT HEMOGLOBIN A1C LEVEL <7.0%: ICD-10-PCS | Mod: CPTII,S$GLB,, | Performed by: INTERNAL MEDICINE

## 2023-07-11 PROCEDURE — 3288F PR FALLS RISK ASSESSMENT DOCUMENTED: ICD-10-PCS | Mod: CPTII,S$GLB,, | Performed by: INTERNAL MEDICINE

## 2023-07-11 PROCEDURE — 99999 PR PBB SHADOW E&M-EST. PATIENT-LVL V: ICD-10-PCS | Mod: PBBFAC,,, | Performed by: INTERNAL MEDICINE

## 2023-07-11 PROCEDURE — 3288F FALL RISK ASSESSMENT DOCD: CPT | Mod: CPTII,S$GLB,, | Performed by: INTERNAL MEDICINE

## 2023-07-11 PROCEDURE — 1101F PR PT FALLS ASSESS DOC 0-1 FALLS W/OUT INJ PAST YR: ICD-10-PCS | Mod: CPTII,S$GLB,, | Performed by: INTERNAL MEDICINE

## 2023-07-11 PROCEDURE — 3074F SYST BP LT 130 MM HG: CPT | Mod: CPTII,S$GLB,, | Performed by: INTERNAL MEDICINE

## 2023-07-11 PROCEDURE — 99214 PR OFFICE/OUTPT VISIT, EST, LEVL IV, 30-39 MIN: ICD-10-PCS | Mod: S$GLB,,, | Performed by: INTERNAL MEDICINE

## 2023-07-11 PROCEDURE — 1159F MED LIST DOCD IN RCRD: CPT | Mod: CPTII,S$GLB,, | Performed by: INTERNAL MEDICINE

## 2023-07-11 PROCEDURE — 1126F PR PAIN SEVERITY QUANTIFIED, NO PAIN PRESENT: ICD-10-PCS | Mod: CPTII,S$GLB,, | Performed by: INTERNAL MEDICINE

## 2023-07-11 PROCEDURE — 3044F HG A1C LEVEL LT 7.0%: CPT | Mod: CPTII,S$GLB,, | Performed by: INTERNAL MEDICINE

## 2023-07-11 PROCEDURE — 3074F PR MOST RECENT SYSTOLIC BLOOD PRESSURE < 130 MM HG: ICD-10-PCS | Mod: CPTII,S$GLB,, | Performed by: INTERNAL MEDICINE

## 2023-07-11 PROCEDURE — 99999 PR PBB SHADOW E&M-EST. PATIENT-LVL V: CPT | Mod: PBBFAC,,, | Performed by: INTERNAL MEDICINE

## 2023-07-11 PROCEDURE — 1160F PR REVIEW ALL MEDS BY PRESCRIBER/CLIN PHARMACIST DOCUMENTED: ICD-10-PCS | Mod: CPTII,S$GLB,, | Performed by: INTERNAL MEDICINE

## 2023-07-11 PROCEDURE — 3008F PR BODY MASS INDEX (BMI) DOCUMENTED: ICD-10-PCS | Mod: CPTII,S$GLB,, | Performed by: INTERNAL MEDICINE

## 2023-07-11 PROCEDURE — 3078F DIAST BP <80 MM HG: CPT | Mod: CPTII,S$GLB,, | Performed by: INTERNAL MEDICINE

## 2023-07-11 PROCEDURE — 4010F PR ACE/ARB THEARPY RXD/TAKEN: ICD-10-PCS | Mod: CPTII,S$GLB,, | Performed by: INTERNAL MEDICINE

## 2023-07-11 PROCEDURE — 1101F PT FALLS ASSESS-DOCD LE1/YR: CPT | Mod: CPTII,S$GLB,, | Performed by: INTERNAL MEDICINE

## 2023-07-11 PROCEDURE — 99214 OFFICE O/P EST MOD 30 MIN: CPT | Mod: S$GLB,,, | Performed by: INTERNAL MEDICINE

## 2023-07-11 NOTE — PROGRESS NOTES
Subjective:    Patient ID:  Clifton Wilson is a 72 y.o. male who presents for follow-up of Peripheral Arterial Disease      HPI    71 y/o male former pt of Dr. Coley. He has a hx of CAD s/p NSTEMI 12/11 with IVONNE to LAD, recurrent symptoms with ISR 1/2016 requiring additional IVONNE to LAD, 2016 NSTEMI with IVONNE to prox LAD (not ISR) and mid LCX (had ischemic changes in cardiac rehab and was supposed to see me but was admitted with NSTEMI before clinic visit), was evaluated for ICD by Dr Walsh (EF at that time marginal and EPS without inducible VT, therefore no ICD at that time), ICM with last 2DE with EF 35-40% improved after last NSTEMI, HTN, HLD, DM, hx of VT (LIFEVEST in the past).   Presents for f/u.   Last clinic visit was doing well. Denies CP, SOB/, orthopnea, PND, syncope, palps. Currently with NYHA FC I-II symptoms. Mild LE edema, chronic and unchanged. Had eaten some shrimp which caused leg edema, however, was taken care of with extra dose of lasix.     7/11/2023:  Last seen in my clinic 2018. Had ICD placed and follows with Dr Walsh. Last 2DE from 2021 with EF 45%. Recently seen by Podiatry and had decreased pulses and arterial doppler with:     Bilaterally, the common femoral, deep femoral, superficial femoral, and popliteal arteries show triphasic waveforms.  The bilateral anterior tibial arteries demonstrate biphasic waveforms.  Bilateral posterior tibial arteries appear to be occluded.     Impression:     Normal velocities and waveforms throughout the proximal bilateral lower extremity arterial system.     There is apparent occlusion of the bilateral posterior tibial arteries.  In addition, there are dampened waveforms of the anterior tibial arteries suggestive of small vessel atherosclerosis    No non healing ulcers or signs of limb ischemia. No vascular claudication. Already on DAPT/statin. Walks regularly. Compliant with meds. Has some bilateral LE edema, chronic, asymptomatic.    Review of  Systems   Constitutional: Negative for malaise/fatigue.   HENT:  Negative for congestion.    Eyes:  Negative for blurred vision.   Cardiovascular:  Positive for leg swelling. Negative for chest pain, claudication, cyanosis, dyspnea on exertion, irregular heartbeat, near-syncope, orthopnea, palpitations, paroxysmal nocturnal dyspnea and syncope.   Respiratory:  Negative for shortness of breath.    Endocrine: Negative for polyuria.   Hematologic/Lymphatic: Negative for bleeding problem.   Skin:  Negative for itching and rash.   Musculoskeletal:  Positive for joint pain and joint swelling. Negative for muscle cramps and muscle weakness.   Gastrointestinal:  Negative for abdominal pain, hematemesis, hematochezia, melena, nausea and vomiting.   Genitourinary:  Negative for dysuria and hematuria.   Neurological:  Negative for dizziness, focal weakness, headaches, light-headedness, loss of balance and weakness.   Psychiatric/Behavioral:  Negative for depression. The patient is not nervous/anxious.       Objective:    Physical Exam  Constitutional:       Appearance: He is well-developed.   HENT:      Head: Normocephalic and atraumatic.   Neck:      Vascular: No JVD.   Cardiovascular:      Rate and Rhythm: Normal rate and regular rhythm.      Pulses:           Carotid pulses are 2+ on the right side and 2+ on the left side.       Radial pulses are 2+ on the right side and 2+ on the left side.        Femoral pulses are 2+ on the right side and 2+ on the left side.       Posterior tibial pulses are 1+ on the right side and 1+ on the left side.      Heart sounds: Murmur heard.   Systolic murmur is present with a grade of 2/6.   Pulmonary:      Effort: Pulmonary effort is normal.      Breath sounds: Normal breath sounds.   Abdominal:      General: Bowel sounds are normal.      Palpations: Abdomen is soft.   Musculoskeletal:      Cervical back: Neck supple.   Skin:     General: Skin is warm and dry.   Neurological:      Mental  Status: He is alert and oriented to person, place, and time.   Psychiatric:         Behavior: Behavior normal.         Thought Content: Thought content normal.         Assessment:       1. PAD (peripheral artery disease)    2. Type 2 diabetes mellitus with peripheral angiopathy    3. Ischemic cardiomyopathy    4. ICD (implantable cardioverter-defibrillator), single, in situ    5. Hypertension associated with diabetes    6. Dyslipidemia associated with type 2 diabetes mellitus    7. Coronary artery disease of native artery of native heart with stable angina pectoris    8. Chronic combined systolic and diastolic congestive heart failure    9. Ventricular tachycardia, nonsustained post-MI    10. Type 2 diabetes mellitus with diabetic neuropathy, without long-term current use of insulin    11. Severe obesity (BMI 35.0-35.9 with comorbidity)      71 y/o male with hx and presentation as above. Doing well from a cardiac perspective and compensated from a HF perspective. Currently on GDMT and EF improved to 45%. Regarding PAD, no claudication and no wounds and already on medical management. Walking program indicated, no invasive management. Needs to stay active and lose weight. Discussed the etiology, evaluation, and management of PAD, CHF, ICM, CAD, HTN, PCI, HLD, VT, DM, obesity. Discussed the importance of med compliance, heart healthy diet, and regular exercise.      Plan:       -Walking program  -Cont DAPT/statin  -f/u in 1 year

## 2023-07-13 DIAGNOSIS — E11.9 TYPE 2 DIABETES MELLITUS WITHOUT COMPLICATION: ICD-10-CM

## 2023-07-20 ENCOUNTER — TELEPHONE (OUTPATIENT)
Dept: FAMILY MEDICINE | Facility: CLINIC | Age: 73
End: 2023-07-20
Payer: MEDICARE

## 2023-07-20 ENCOUNTER — PATIENT MESSAGE (OUTPATIENT)
Dept: FAMILY MEDICINE | Facility: CLINIC | Age: 73
End: 2023-07-20
Payer: MEDICARE

## 2023-07-28 ENCOUNTER — PATIENT OUTREACH (OUTPATIENT)
Dept: ADMINISTRATIVE | Facility: HOSPITAL | Age: 73
End: 2023-07-28
Payer: MEDICARE

## 2023-07-28 DIAGNOSIS — E11.9 DIABETES MELLITUS TYPE 2 WITHOUT RETINOPATHY: Primary | ICD-10-CM

## 2023-07-28 NOTE — PROGRESS NOTES
Care Everywhere updates requested and reviewed.  Immunizations reconciled. Media reports reviewed.  Duplicate HM overrides and  orders removed.  Overdue HM topic chart audit and/or requested.  Overdue lab testing linked to upcoming lab appointments if applies.      Health Maintenance Due   Topic Date Due    Shingles Vaccine (1 of 2) Never done    Pneumococcal Vaccines (Age 65+) (2 - PPSV23 if available, else PCV20) 2022    COVID-19 Vaccine (4 - Moderna series) 2022    Eye Exam  2022    Diabetes Urine Screening  2023

## 2023-08-09 ENCOUNTER — HOSPITAL ENCOUNTER (OUTPATIENT)
Dept: CARDIOLOGY | Facility: HOSPITAL | Age: 73
Discharge: HOME | End: 2023-08-09
Attending: INTERNAL MEDICINE
Payer: MEDICARE

## 2023-08-09 VITALS — BODY MASS INDEX: 33.04 KG/M2 | WEIGHT: 236 LBS | HEIGHT: 71 IN

## 2023-08-09 DIAGNOSIS — I50.42 CHRONIC COMBINED SYSTOLIC AND DIASTOLIC CONGESTIVE HEART FAILURE: ICD-10-CM

## 2023-08-09 LAB
ASCENDING AORTA: 3.31 CM
AV INDEX (PROSTH): 0.75
AV MEAN GRADIENT: 3 MMHG
AV PEAK GRADIENT: 6 MMHG
AV VALVE AREA BY VELOCITY RATIO: 2.74 CM²
AV VALVE AREA: 2.59 CM²
AV VELOCITY RATIO: 0.79
BSA FOR ECHO PROCEDURE: 2.32 M2
CV ECHO LV RWT: 0.39 CM
DOP CALC AO PEAK VEL: 1.24 M/S
DOP CALC AO VTI: 26.2 CM
DOP CALC LVOT AREA: 3.5 CM2
DOP CALC LVOT DIAMETER: 2.1 CM
DOP CALC LVOT PEAK VEL: 0.98 M/S
DOP CALC LVOT STROKE VOLUME: 67.85 CM3
DOP CALC MV VTI: 20 CM
DOP CALCLVOT PEAK VEL VTI: 19.6 CM
E WAVE DECELERATION TIME: 378.49 MSEC
E/A RATIO: 0.74
E/E' RATIO: 12 M/S
ECHO LV POSTERIOR WALL: 1.02 CM (ref 0.6–1.1)
FRACTIONAL SHORTENING: 24 % (ref 28–44)
INTERVENTRICULAR SEPTUM: 1.4 CM (ref 0.6–1.1)
IVC DIAMETER: 0.76 CM
LA MAJOR: 4.9 CM
LA MINOR: 5.18 CM
LA WIDTH: 3.9 CM
LEFT ATRIUM SIZE: 3.92 CM
LEFT ATRIUM VOLUME INDEX MOD: 22.3 ML/M2
LEFT ATRIUM VOLUME INDEX: 29 ML/M2
LEFT ATRIUM VOLUME MOD: 50.42 CM3
LEFT ATRIUM VOLUME: 65.44 CM3
LEFT INTERNAL DIMENSION IN SYSTOLE: 3.96 CM (ref 2.1–4)
LEFT VENTRICLE DIASTOLIC VOLUME INDEX: 58.32 ML/M2
LEFT VENTRICLE DIASTOLIC VOLUME: 131.81 ML
LEFT VENTRICLE MASS INDEX: 113 G/M2
LEFT VENTRICLE SYSTOLIC VOLUME INDEX: 30.2 ML/M2
LEFT VENTRICLE SYSTOLIC VOLUME: 68.36 ML
LEFT VENTRICULAR INTERNAL DIMENSION IN DIASTOLE: 5.24 CM (ref 3.5–6)
LEFT VENTRICULAR MASS: 254.84 G
LV LATERAL E/E' RATIO: 14 M/S
LV SEPTAL E/E' RATIO: 10.5 M/S
LVOT MG: 1.81 MMHG
LVOT MV: 0.63 CM/S
MV MEAN GRADIENT: 1 MMHG
MV PEAK A VEL: 0.57 M/S
MV PEAK E VEL: 0.42 M/S
MV PEAK GRADIENT: 2 MMHG
MV STENOSIS PRESSURE HALF TIME: 109.76 MS
MV VALVE AREA BY CONTINUITY EQUATION: 3.39 CM2
MV VALVE AREA P 1/2 METHOD: 2 CM2
OHS LV EJECTION FRACTION SIMPSONS BIPLANE MOD: 4 %
PISA MRMAX VEL: 4.37 M/S
PISA TR MAX VEL: 2.21 M/S
PULM VEIN S/D RATIO: 1.37
PV MV: 0.54 M/S
PV PEAK D VEL: 0.35 M/S
PV PEAK GRADIENT: 2 MMHG
PV PEAK S VEL: 0.48 M/S
PV PEAK VELOCITY: 0.75 M/S
RA MAJOR: 4.3 CM
RA PRESSURE ESTIMATED: 3 MMHG
RA WIDTH: 4 CM
RIGHT VENTRICULAR END-DIASTOLIC DIMENSION: 3.25 CM
RV TB RVSP: 5 MMHG
RV TISSUE DOPPLER FREE WALL SYSTOLIC VELOCITY 1 (APICAL 4 CHAMBER VIEW): 5.18 CM/S
STJ: 3.08 CM
TDI LATERAL: 0.03 M/S
TDI SEPTAL: 0.04 M/S
TDI: 0.04 M/S
TR MAX PG: 20 MMHG
TRICUSPID ANNULAR PLANE SYSTOLIC EXCURSION: 1.51 CM
TV REST PULMONARY ARTERY PRESSURE: 23 MMHG
Z-SCORE OF LEFT VENTRICULAR DIMENSION IN END DIASTOLE: -4.59
Z-SCORE OF LEFT VENTRICULAR DIMENSION IN END SYSTOLE: -1.87

## 2023-08-09 PROCEDURE — 93306 TTE W/DOPPLER COMPLETE: CPT

## 2023-08-09 PROCEDURE — 93306 ECHO (CUPID ONLY): ICD-10-PCS | Mod: 26,,, | Performed by: INTERNAL MEDICINE

## 2023-08-09 PROCEDURE — 93306 TTE W/DOPPLER COMPLETE: CPT | Mod: 26,,, | Performed by: INTERNAL MEDICINE

## 2023-08-10 ENCOUNTER — PATIENT MESSAGE (OUTPATIENT)
Dept: CARDIOLOGY | Facility: CLINIC | Age: 73
End: 2023-08-10
Payer: MEDICARE

## 2023-08-23 DIAGNOSIS — F51.01 PRIMARY INSOMNIA: ICD-10-CM

## 2023-08-23 DIAGNOSIS — E11.40 TYPE 2 DIABETES MELLITUS WITH DIABETIC NEUROPATHY, WITH LONG-TERM CURRENT USE OF INSULIN: ICD-10-CM

## 2023-08-23 DIAGNOSIS — G62.9 NEUROPATHY: ICD-10-CM

## 2023-08-23 DIAGNOSIS — Z95.810 ICD (IMPLANTABLE CARDIOVERTER-DEFIBRILLATOR), SINGLE, IN SITU: Primary | ICD-10-CM

## 2023-08-23 DIAGNOSIS — E11.9 DIABETES MELLITUS TYPE 2 WITHOUT RETINOPATHY: ICD-10-CM

## 2023-08-23 DIAGNOSIS — Z79.4 TYPE 2 DIABETES MELLITUS WITH DIABETIC NEUROPATHY, WITH LONG-TERM CURRENT USE OF INSULIN: ICD-10-CM

## 2023-08-23 RX ORDER — ZOLPIDEM TARTRATE 5 MG/1
TABLET ORAL
Qty: 90 TABLET | Refills: 0 | OUTPATIENT
Start: 2023-08-23

## 2023-08-23 RX ORDER — GABAPENTIN 300 MG/1
600 CAPSULE ORAL 2 TIMES DAILY
Qty: 360 CAPSULE | Refills: 1 | Status: SHIPPED | OUTPATIENT
Start: 2023-08-23

## 2023-08-23 RX ORDER — INSULIN GLARGINE 100 [IU]/ML
35 INJECTION, SOLUTION SUBCUTANEOUS NIGHTLY
Qty: 30 ML | Refills: 0 | Status: SHIPPED | OUTPATIENT
Start: 2023-08-23 | End: 2023-09-23 | Stop reason: SDUPTHER

## 2023-08-23 NOTE — TELEPHONE ENCOUNTER
Care Due:                  Date            Visit Type   Department     Provider  --------------------------------------------------------------------------------                                EP [facility]    Britton Dumontkimmie  Last Visit: 05-      CARE ([facility])   MEDICINE       Jaciel                              [facility]  Next Visit: 11-      CARE ([facility])   MEDICINE       Jaciel                                                            Last  Test          Frequency    Reason                     Performed    Due Date  --------------------------------------------------------------------------------    HBA1C.......  6 months...  insulin, metFORMIN.......  05-   10-    [facility] Embedded Care Due Messages. Reference number: [id number].   8/23/2023 3:24:23 PM CDT

## 2023-08-23 NOTE — TELEPHONE ENCOUNTER
Refill Routing Note   Medication(s) are not appropriate for processing by [facility] for the following reason(s):      Medication outside of protocol: non-delegated    [facility] action(s):  Route  Approve Care Due:  None identified          Appointments  past 12m or future 3m with PCP    Date Provider   Last Visit   5/3/2023 Britton Grissom MD   Next Visit   11/3/2023 Britton Grissom MD   ED visits in past 90 days: 0        Note composed:5:33 PM 08/23/2023

## 2023-08-23 NOTE — TELEPHONE ENCOUNTER
No care due was identified.  [facility] Embedded Care Due Messages. Reference number: [id number].   8/23/2023 3:25:25 PM CDT

## 2023-08-24 DIAGNOSIS — F51.01 PRIMARY INSOMNIA: ICD-10-CM

## 2023-08-24 RX ORDER — ZOLPIDEM TARTRATE 5 MG/1
TABLET ORAL
Qty: 90 TABLET | Refills: 0 | Status: SHIPPED | OUTPATIENT
Start: 2023-08-24 | End: 2023-11-27 | Stop reason: SDUPTHER

## 2023-08-24 NOTE — TELEPHONE ENCOUNTER
No care due was identified.  [facility] Embedded Care Due Messages. Reference number: [id number].   8/24/2023 12:01:04 PM CDT

## 2023-08-26 ENCOUNTER — CLINICAL SUPPORT (OUTPATIENT)
Dept: CARDIOLOGY | Facility: HOSPITAL | Age: 73
End: 2023-08-26
Payer: MEDICARE

## 2023-08-26 DIAGNOSIS — I25.5 ISCHEMIC CARDIOMYOPATHY: ICD-10-CM

## 2023-08-26 DIAGNOSIS — Z95.810 PRESENCE OF AUTOMATIC (IMPLANTABLE) CARDIAC DEFIBRILLATOR: ICD-10-CM

## 2023-08-26 PROCEDURE — 93296 REM INTERROG EVL PM/IDS: CPT | Performed by: INTERNAL MEDICINE

## 2023-08-28 RX ORDER — NITROGLYCERIN 0.4 MG/1
0.4 TABLET SUBLINGUAL EVERY 5 MIN PRN
Qty: 25 TABLET | Refills: 3 | Status: SHIPPED | OUTPATIENT
Start: 2023-08-28 | End: 2024-08-27

## 2023-09-23 DIAGNOSIS — E11.9 DIABETES MELLITUS TYPE 2 WITHOUT RETINOPATHY: ICD-10-CM

## 2023-09-23 DIAGNOSIS — E11.40 TYPE 2 DIABETES MELLITUS WITH DIABETIC NEUROPATHY, WITH LONG-TERM CURRENT USE OF INSULIN: ICD-10-CM

## 2023-09-23 DIAGNOSIS — Z79.4 TYPE 2 DIABETES MELLITUS WITH DIABETIC NEUROPATHY, WITH LONG-TERM CURRENT USE OF INSULIN: ICD-10-CM

## 2023-09-24 NOTE — TELEPHONE ENCOUNTER
Care Due:                  Date            Visit Type   Department     Provider  --------------------------------------------------------------------------------                                EP -                              [facility]    Britton Dumontkimmie  Last Visit: 05-      CARE ([facility])   MEDICINE       Jaciel                               -                              [facility]  Next Visit: 11-      CARE ([facility])   MEDICINE       Jaciel                                                            Last  Test          Frequency    Reason                     Performed    Due Date  --------------------------------------------------------------------------------    Uric Acid...  12 months..  colchicine...............  09- 09-    [facility] Embedded Care Due Messages. Reference number: [id number].   9/23/2023 10:16:49 PM CDT

## 2023-09-25 RX ORDER — INSULIN GLARGINE 100 [IU]/ML
35 INJECTION, SOLUTION SUBCUTANEOUS NIGHTLY
Qty: 30 ML | Refills: 0 | Status: SHIPPED | OUTPATIENT
Start: 2023-09-25 | End: 2024-02-27 | Stop reason: SDUPTHER

## 2023-09-25 NOTE — TELEPHONE ENCOUNTER
Provider Staff:  Action required for this patient     Please see care gap opportunities below in Care Due Message.    Thanks!  [facility]     Appointments      Date Provider   Last Visit   5/3/2023 Britton Grissom MD   Next Visit   11/3/2023 Britton Grissom MD     Refill Decision Note   Clifton Wilson  is requesting a refill authorization.  Brief Assessment and Rationale for Refill:  Approve     Medication Therapy Plan:         Comments:     Note composed:11:11 AM 09/25/2023             Appointments     Last Visit   5/3/2023 Britton Grissom MD   Next Visit   11/3/2023 Britton Grissom MD

## 2023-10-12 ENCOUNTER — PATIENT MESSAGE (OUTPATIENT)
Dept: RESPIRATORY THERAPY | Facility: HOSPITAL | Age: 73
End: 2023-10-12
Payer: MEDICARE

## 2023-11-15 DIAGNOSIS — E11.9 TYPE 2 DIABETES MELLITUS WITHOUT COMPLICATION: ICD-10-CM

## 2023-11-17 ENCOUNTER — HOSPITAL ENCOUNTER (OUTPATIENT)
Dept: CARDIOLOGY | Facility: CLINIC | Age: 73
Discharge: HOME | End: 2023-11-17
Payer: MEDICARE

## 2023-11-17 ENCOUNTER — OFFICE VISIT (OUTPATIENT)
Dept: ELECTROPHYSIOLOGY | Facility: CLINIC | Age: 73
End: 2023-11-17
Payer: MEDICARE

## 2023-11-17 ENCOUNTER — CLINICAL SUPPORT (OUTPATIENT)
Dept: CARDIOLOGY | Facility: HOSPITAL | Age: 73
End: 2023-11-17
Attending: INTERNAL MEDICINE
Payer: MEDICARE

## 2023-11-17 VITALS
HEART RATE: 54 BPM | BODY MASS INDEX: 33.79 KG/M2 | DIASTOLIC BLOOD PRESSURE: 77 MMHG | SYSTOLIC BLOOD PRESSURE: 129 MMHG | HEIGHT: 71 IN | WEIGHT: 241.38 LBS

## 2023-11-17 DIAGNOSIS — I15.2 HYPERTENSION ASSOCIATED WITH DIABETES: ICD-10-CM

## 2023-11-17 DIAGNOSIS — E11.59 HYPERTENSION ASSOCIATED WITH DIABETES: ICD-10-CM

## 2023-11-17 DIAGNOSIS — I25.5 ISCHEMIC CARDIOMYOPATHY: ICD-10-CM

## 2023-11-17 DIAGNOSIS — I10 ESSENTIAL HYPERTENSION: ICD-10-CM

## 2023-11-17 DIAGNOSIS — E11.40 TYPE 2 DIABETES MELLITUS WITH DIABETIC NEUROPATHY, WITHOUT LONG-TERM CURRENT USE OF INSULIN: ICD-10-CM

## 2023-11-17 DIAGNOSIS — I50.42 CHRONIC COMBINED SYSTOLIC AND DIASTOLIC CHF (CONGESTIVE HEART FAILURE): Primary | ICD-10-CM

## 2023-11-17 DIAGNOSIS — E11.9 INSULIN DEPENDENT TYPE 2 DIABETES MELLITUS: ICD-10-CM

## 2023-11-17 DIAGNOSIS — E11.9 DIABETES MELLITUS TYPE 2 WITHOUT RETINOPATHY: ICD-10-CM

## 2023-11-17 DIAGNOSIS — Z95.810 ICD (IMPLANTABLE CARDIOVERTER-DEFIBRILLATOR), SINGLE, IN SITU: ICD-10-CM

## 2023-11-17 DIAGNOSIS — I47.20 VENTRICULAR TACHYCARDIA: ICD-10-CM

## 2023-11-17 DIAGNOSIS — E11.9 DM TYPE 2 WITHOUT RETINOPATHY: ICD-10-CM

## 2023-11-17 DIAGNOSIS — I73.9 PAD (PERIPHERAL ARTERY DISEASE): ICD-10-CM

## 2023-11-17 DIAGNOSIS — I50.42 CHRONIC COMBINED SYSTOLIC AND DIASTOLIC CONGESTIVE HEART FAILURE: ICD-10-CM

## 2023-11-17 DIAGNOSIS — Z79.4 INSULIN DEPENDENT TYPE 2 DIABETES MELLITUS: ICD-10-CM

## 2023-11-17 DIAGNOSIS — I25.118 CORONARY ARTERY DISEASE OF NATIVE ARTERY OF NATIVE HEART WITH STABLE ANGINA PECTORIS: ICD-10-CM

## 2023-11-17 DIAGNOSIS — E66.01 SEVERE OBESITY (BMI 35.0-35.9 WITH COMORBIDITY): ICD-10-CM

## 2023-11-17 PROCEDURE — 99214 PR OFFICE/OUTPT VISIT, EST, LEVL IV, 30-39 MIN: ICD-10-PCS | Mod: S$GLB,,, | Performed by: INTERNAL MEDICINE

## 2023-11-17 PROCEDURE — 1126F AMNT PAIN NOTED NONE PRSNT: CPT | Mod: CPTII,S$GLB,, | Performed by: INTERNAL MEDICINE

## 2023-11-17 PROCEDURE — 3008F BODY MASS INDEX DOCD: CPT | Mod: CPTII,S$GLB,, | Performed by: INTERNAL MEDICINE

## 2023-11-17 PROCEDURE — 3044F HG A1C LEVEL LT 7.0%: CPT | Mod: CPTII,S$GLB,, | Performed by: INTERNAL MEDICINE

## 2023-11-17 PROCEDURE — 93282 PRGRMG EVAL IMPLANTABLE DFB: CPT | Mod: 26,,, | Performed by: INTERNAL MEDICINE

## 2023-11-17 PROCEDURE — 4010F PR ACE/ARB THEARPY RXD/TAKEN: ICD-10-PCS | Mod: CPTII,S$GLB,, | Performed by: INTERNAL MEDICINE

## 2023-11-17 PROCEDURE — 3078F DIAST BP <80 MM HG: CPT | Mod: CPTII,S$GLB,, | Performed by: INTERNAL MEDICINE

## 2023-11-17 PROCEDURE — 3074F SYST BP LT 130 MM HG: CPT | Mod: CPTII,S$GLB,, | Performed by: INTERNAL MEDICINE

## 2023-11-17 PROCEDURE — 99999 PR PBB SHADOW E&M-EST. PATIENT-LVL IV: CPT | Mod: PBBFAC,,, | Performed by: INTERNAL MEDICINE

## 2023-11-17 PROCEDURE — 4010F ACE/ARB THERAPY RXD/TAKEN: CPT | Mod: CPTII,S$GLB,, | Performed by: INTERNAL MEDICINE

## 2023-11-17 PROCEDURE — 93010 RHYTHM STRIP: ICD-10-PCS | Mod: S$GLB,,, | Performed by: INTERNAL MEDICINE

## 2023-11-17 PROCEDURE — 93005 RHYTHM STRIP: ICD-10-PCS | Mod: S$GLB,,, | Performed by: INTERNAL MEDICINE

## 2023-11-17 PROCEDURE — 1159F MED LIST DOCD IN RCRD: CPT | Mod: CPTII,S$GLB,, | Performed by: INTERNAL MEDICINE

## 2023-11-17 PROCEDURE — 99999 PR PBB SHADOW E&M-EST. PATIENT-LVL IV: ICD-10-PCS | Mod: PBBFAC,,, | Performed by: INTERNAL MEDICINE

## 2023-11-17 PROCEDURE — 1160F PR REVIEW ALL MEDS BY PRESCRIBER/CLIN PHARMACIST DOCUMENTED: ICD-10-PCS | Mod: CPTII,S$GLB,, | Performed by: INTERNAL MEDICINE

## 2023-11-17 PROCEDURE — 3074F PR MOST RECENT SYSTOLIC BLOOD PRESSURE < 130 MM HG: ICD-10-PCS | Mod: CPTII,S$GLB,, | Performed by: INTERNAL MEDICINE

## 2023-11-17 PROCEDURE — 93010 ELECTROCARDIOGRAM REPORT: CPT | Mod: S$GLB,,, | Performed by: INTERNAL MEDICINE

## 2023-11-17 PROCEDURE — 93282 CARDIAC DEVICE CHECK - IN CLINIC & HOSPITAL: ICD-10-PCS | Mod: 26,,, | Performed by: INTERNAL MEDICINE

## 2023-11-17 PROCEDURE — 3078F PR MOST RECENT DIASTOLIC BLOOD PRESSURE < 80 MM HG: ICD-10-PCS | Mod: CPTII,S$GLB,, | Performed by: INTERNAL MEDICINE

## 2023-11-17 PROCEDURE — 1101F PT FALLS ASSESS-DOCD LE1/YR: CPT | Mod: CPTII,S$GLB,, | Performed by: INTERNAL MEDICINE

## 2023-11-17 PROCEDURE — 3008F PR BODY MASS INDEX (BMI) DOCUMENTED: ICD-10-PCS | Mod: CPTII,S$GLB,, | Performed by: INTERNAL MEDICINE

## 2023-11-17 PROCEDURE — 3044F PR MOST RECENT HEMOGLOBIN A1C LEVEL <7.0%: ICD-10-PCS | Mod: CPTII,S$GLB,, | Performed by: INTERNAL MEDICINE

## 2023-11-17 PROCEDURE — 99214 OFFICE O/P EST MOD 30 MIN: CPT | Mod: S$GLB,,, | Performed by: INTERNAL MEDICINE

## 2023-11-17 PROCEDURE — 3288F FALL RISK ASSESSMENT DOCD: CPT | Mod: CPTII,S$GLB,, | Performed by: INTERNAL MEDICINE

## 2023-11-17 PROCEDURE — 1101F PR PT FALLS ASSESS DOC 0-1 FALLS W/OUT INJ PAST YR: ICD-10-PCS | Mod: CPTII,S$GLB,, | Performed by: INTERNAL MEDICINE

## 2023-11-17 PROCEDURE — 3288F PR FALLS RISK ASSESSMENT DOCUMENTED: ICD-10-PCS | Mod: CPTII,S$GLB,, | Performed by: INTERNAL MEDICINE

## 2023-11-17 PROCEDURE — 1126F PR PAIN SEVERITY QUANTIFIED, NO PAIN PRESENT: ICD-10-PCS | Mod: CPTII,S$GLB,, | Performed by: INTERNAL MEDICINE

## 2023-11-17 PROCEDURE — 1159F PR MEDICATION LIST DOCUMENTED IN MEDICAL RECORD: ICD-10-PCS | Mod: CPTII,S$GLB,, | Performed by: INTERNAL MEDICINE

## 2023-11-17 PROCEDURE — 93282 PRGRMG EVAL IMPLANTABLE DFB: CPT

## 2023-11-17 PROCEDURE — 1160F RVW MEDS BY RX/DR IN RCRD: CPT | Mod: CPTII,S$GLB,, | Performed by: INTERNAL MEDICINE

## 2023-11-17 PROCEDURE — 93005 ELECTROCARDIOGRAM TRACING: CPT | Mod: S$GLB,,, | Performed by: INTERNAL MEDICINE

## 2023-11-17 NOTE — PROGRESS NOTES
Subjective:   Patient ID:  Clifton Wilson is a 72 y.o. male who presents for follow-up of ICM  HPI:  Mr. Wilson is a 72 y.o.  male with HTN, HLD, DM, CAD (NSTEMI 2011 and 2015), ICM (EF 25->45%), and ICD (2017) here for annual follow up.     In hospital post PCI, had NSVT. Was given a LifeVest.  Subsequent LVEF 39%, leading to EPS, which was negative (therefore no ICD then).  Since, has recently had months of persistently low LVEF.   Again referred by Dr Clement for ICD, due to LVEF 25% with ICM.  At his last office visit (04/10/17), Mr. Wilson reported experiencing occasional .   Echo 12/15: 35-40%. 4/16: 25-40%.  3/17 25%  2/16 nuclear ETT neg  Mr. Wilson underwent successful ICD (06/06/17) without complication.   Required a new RV lead 11/2019. Capped old lead. No problems since.    LVEF has improved to 45% (2021, -23)!    ICD: good function. 0% RVP.    I have personally reviewed the patient's EKG today, which shows sinus rhythm and RBBB.      Current Outpatient Medications   Medication Sig    acetaminophen (TYLENOL) 500 MG tablet Take 1 tablet (500 mg total) by mouth every 6 (six) hours as needed for Pain.    alcohol swabs (BD ALCOHOL SWABS) PadM USE FOUR TIMES DAILY    aspirin (ECOTRIN) 81 MG EC tablet Take 81 mg by mouth once daily.    atorvastatin (LIPITOR) 40 MG tablet Take 1 tablet (40 mg total) by mouth once daily.    blood glucose control high,low (ACCU-CHEK CATHRYN CONTROL SOLN) Soln Use as directed to check blood sugar    blood sugar diagnostic Strp Use to test four times daily    blood-glucose meter (ACCU-CHEK CATHRYN PLUS METER) Misc USE AS DIRECTED    blood-glucose meter Misc use as directed    carvediloL (COREG) 12.5 MG tablet Take 1 tablet (12.5 mg total) by mouth 2 (two) times daily.    clopidogreL (PLAVIX) 75 mg tablet Take 1 tablet (75 mg total) by mouth once daily.    colchicine (MITIGARE) 0.6 mg Cap Take 1 capsule (0.6 mg total) by mouth once daily.    cyanocobalamin (VITAMIN B-12) 1000  "MCG tablet TAKE ONE TABLET BY MOUTH ONCE DAILY FOR VITAMIN DEFICIENCIES    furosemide (LASIX) 20 MG tablet Take 1 tablet (20 mg total) by mouth 2 (two) times daily.    gabapentin (NEURONTIN) 300 MG capsule Take 2 capsules (600 mg total) by mouth 2 (two) times daily.    ibuprofen (ADVIL,MOTRIN) 600 MG tablet Take 1 tablet (600 mg total) by mouth every 6 (six) hours as needed for Pain.    insulin (LANTUS SOLOSTAR U-100 INSULIN) glargine 100 units/mL SubQ pen Inject 35 Units into the skin every evening.    insulin aspart U-100 (NOVOLOG FLEXPEN U-100 INSULIN) 100 unit/mL (3 mL) InPn pen Inject 15 Units into the skin 3 (three) times daily with meals.    lancets 30 gauge Misc by Misc.(Non-Drug; Combo Route) route 4 (four) times daily.    losartan (COZAAR) 25 MG tablet Take 1 tablet (25 mg total) by mouth once daily.    metFORMIN (GLUCOPHAGE) 1000 MG tablet Take 1 tablet (1,000 mg total) by mouth 2 (two) times daily with meals.    nitroGLYCERIN (NITROSTAT) 0.4 MG SL tablet Place 1 tablet (0.4 mg total) under the tongue every 5 (five) minutes as needed for Chest pain.    pen needle, diabetic (BD ULTRA-FINE SINA PEN NEEDLE) 32 gauge x 5/32" Ndle Use four times daily for insulin administration    potassium chloride (KLOR-CON) 10 MEQ TbSR TAKE ONE TABLET BY MOUTH ONCE DAILY TO INCREASE POTASSIUM    tadalafiL (CIALIS) 20 MG Tab Take 1 tablet (20 mg total) by mouth once daily.    zolpidem (AMBIEN) 5 MG Tab TAKE 1 TABLET BY MOUTH EVERY NIGHT AS NEEDED     No current facility-administered medications for this visit.       Review of Systems   Constitutional: Negative. Negative for malaise/fatigue.   HENT: Negative.  Negative for ear pain and tinnitus.    Eyes:  Negative for blurred vision.   Cardiovascular: Negative.  Negative for chest pain, dyspnea on exertion, irregular heartbeat, leg swelling, near-syncope, palpitations and syncope.   Respiratory: Negative.  Negative for shortness of breath.    Endocrine: Negative.  Negative " "for polyuria.   Hematologic/Lymphatic: Negative for bleeding problem. Does not bruise/bleed easily.   Skin: Negative.  Negative for rash.   Musculoskeletal: Negative.  Negative for joint pain, muscle weakness and myalgias.   Gastrointestinal: Negative.  Negative for abdominal pain, change in bowel habit, hematemesis, hematochezia and nausea.   Genitourinary:  Negative for frequency and hematuria.   Neurological: Negative.  Negative for dizziness, light-headedness and weakness.   Psychiatric/Behavioral: Negative.  Negative for altered mental status and depression. The patient is not nervous/anxious.    Allergic/Immunologic: Negative for environmental allergies and persistent infections.         Objective:          /77   Pulse (!) 54   Ht 5' 11" (1.803 m)   Wt 109.5 kg (241 lb 6.5 oz)   BMI 33.67 kg/m²     Physical Exam  Vitals and nursing note reviewed.   Constitutional:       Appearance: Normal appearance. He is well-developed.   HENT:      Head: Normocephalic and atraumatic.      Nose: Nose normal.   Eyes:      General: Lids are normal. No scleral icterus.     Conjunctiva/sclera: Conjunctivae normal.      Pupils: Pupils are equal, round, and reactive to light.   Neck:      Thyroid: No thyromegaly.      Vascular: No JVD.      Trachea: No tracheal deviation.   Cardiovascular:      Rate and Rhythm: Normal rate and regular rhythm. No extrasystoles are present.     Chest Wall: PMI is not displaced.      Pulses:           Radial pulses are 2+ on the right side and 2+ on the left side.      Heart sounds: S1 normal and S2 normal.   Pulmonary:      Effort: Pulmonary effort is normal. No tachypnea, accessory muscle usage or respiratory distress.      Breath sounds: No wheezing.   Abdominal:      General: There is no distension.   Musculoskeletal:         General: Normal range of motion.      Cervical back: Normal range of motion.   Skin:     General: Skin is warm and dry.      Findings: No erythema or rash. "   Neurological:      Mental Status: He is alert and oriented to person, place, and time.      Motor: No abnormal muscle tone.      Deep Tendon Reflexes: Reflexes are normal and symmetric.   Psychiatric:         Speech: Speech normal.         Behavior: Behavior normal.           Assessment:     1. Chronic combined systolic and diastolic CHF (congestive heart failure)    2. Chronic combined systolic and diastolic congestive heart failure    3. Coronary artery disease of native artery of native heart with stable angina pectoris    4. Essential hypertension    5. Hypertension associated with diabetes    6. ICD (implantable cardioverter-defibrillator), single, in situ    7. Ischemic cardiomyopathy    8. PAD (peripheral artery disease)    9. Ventricular tachycardia, nonsustained post-MI    10. Diabetes mellitus type 2 without retinopathy    11. DM type 2 without retinopathy    12. Insulin dependent type 2 diabetes mellitus    13. Severe obesity (BMI 35.0-35.9 with comorbidity)    14. Type 2 diabetes mellitus with diabetic neuropathy, without long-term current use of insulin      Plan:     In summary, Mr. Wilson is a 72 y.o. male with HTN, HLD, DM, CAD (NSTEMI 2011 and 2015), ICM (EF 35-40%), and ICD (2017) here for  follow up.     ICD doing well.  Return in 1 year, or earlier prn.

## 2023-11-25 ENCOUNTER — CLINICAL SUPPORT (OUTPATIENT)
Dept: CARDIOLOGY | Facility: HOSPITAL | Age: 73
End: 2023-11-25
Attending: INTERNAL MEDICINE
Payer: MEDICARE

## 2023-11-25 DIAGNOSIS — I25.5 ISCHEMIC CARDIOMYOPATHY: ICD-10-CM

## 2023-11-25 PROCEDURE — 93295 DEV INTERROG REMOTE 1/2/MLT: CPT | Mod: ,,, | Performed by: INTERNAL MEDICINE

## 2023-11-25 PROCEDURE — 93296 REM INTERROG EVL PM/IDS: CPT | Performed by: INTERNAL MEDICINE

## 2023-11-25 PROCEDURE — 93295 CARDIAC DEVICE CHECK - REMOTE: ICD-10-PCS | Mod: ,,, | Performed by: INTERNAL MEDICINE

## 2023-11-27 ENCOUNTER — TELEPHONE (OUTPATIENT)
Dept: FAMILY MEDICINE | Facility: CLINIC | Age: 73
End: 2023-11-27
Payer: MEDICARE

## 2023-11-27 DIAGNOSIS — F51.01 PRIMARY INSOMNIA: ICD-10-CM

## 2023-11-27 LAB
OHS CV DC REMOTE DEVICE TYPE: NORMAL
OHS CV RV PACING PERCENT: 0 %

## 2023-11-27 RX ORDER — ZOLPIDEM TARTRATE 5 MG/1
TABLET ORAL
Qty: 90 TABLET | Refills: 0 | Status: SHIPPED | OUTPATIENT
Start: 2023-11-27 | End: 2024-01-08 | Stop reason: SDUPTHER

## 2023-11-27 NOTE — TELEPHONE ENCOUNTER
Care Due:                  Date            Visit Type   Department     Provider  --------------------------------------------------------------------------------                                EP -                              PRIMARY      [facility]    Britton Claudio  Last Visit: 05-      CARE ([facility])   LIBBY GUNN                              FOLLOWUP/OF  [facility]    Britton Dumontdelisagaurav  Next Visit: 01-      [facility]ICE VISIT   MEDICINE       Jaciel                                                            Last  Test          Frequency    Reason                     Performed    Due Date  --------------------------------------------------------------------------------    HBA1C.......  6 months...  insulin, metFORMIN.......  05-   10-    Uric Acid...  12 months..  colchicine...............  09- 09-    [employer] Embedded Care Due Messages. Reference number: [id number].   11/27/2023 11:21:37 AM CST

## 2023-11-27 NOTE — TELEPHONE ENCOUNTER
Called pt and asked him if he tested + for covid. Pt  stated he has not but his wife has. Pt asked if he needed to self quarantine. I told pt he did not he can go out in public we just recommend he wears a mask and to just be mindful. If he starts to feel bad then to take an at home test and to keep his distance from others

## 2023-11-27 NOTE — TELEPHONE ENCOUNTER
----- Message from Ling Goldberg sent at 11/27/2023  3:05 PM CST -----  Contact: Clifton  [phone]  Would like to receive medical advice.     Would they like a call back or a response via [facility]: Either    Additional information:  Clifton is calling to speak to the provider or staff to get some advice on how long does he need to be in the house, Cilfton states he does have any symptoms like his wife and grandson had // have. Please call or message Clifton back for more advice.

## 2023-12-30 DIAGNOSIS — M65.9 SYNOVITIS OF KNEE: ICD-10-CM

## 2023-12-30 NOTE — TELEPHONE ENCOUNTER
No care due was identified.  [facility] Embedded Care Due Messages. Reference number: [id number].   12/30/2023 11:45:47 AM CST

## 2024-01-01 RX ORDER — COLCHICINE 0.6 MG/1
0.6 CAPSULE ORAL DAILY
Qty: 90 CAPSULE | Refills: 3 | Status: SHIPPED | OUTPATIENT
Start: 2024-01-01

## 2024-01-01 NOTE — TELEPHONE ENCOUNTER
Refill Routing Note   Medication(s) are not appropriate for processing by [facility] for the following reason(s):        Required labs outdated Uric Acid    [facility] action(s):  Defer               Appointments  past 12m or future 3m with PCP    Date Provider   Last Visit   5/3/2023 Britton Grissom MD   Next Visit   1/8/2024 Britton Grissom MD   ED visits in past 90 days: 0        Note composed:10:04 AM 01/01/2024

## 2024-01-06 ENCOUNTER — PATIENT MESSAGE (OUTPATIENT)
Dept: FAMILY MEDICINE | Facility: CLINIC | Age: 74
End: 2024-01-06
Payer: MEDICARE

## 2024-01-08 ENCOUNTER — LAB VISIT (OUTPATIENT)
Dept: LAB | Facility: HOSPITAL | Age: 74
End: 2024-01-08
Attending: FAMILY MEDICINE
Payer: MEDICARE

## 2024-01-08 ENCOUNTER — OFFICE VISIT (OUTPATIENT)
Dept: FAMILY MEDICINE | Facility: CLINIC | Age: 74
End: 2024-01-08
Attending: FAMILY MEDICINE
Payer: MEDICARE

## 2024-01-08 VITALS
DIASTOLIC BLOOD PRESSURE: 79 MMHG | HEIGHT: 71 IN | HEART RATE: 76 BPM | SYSTOLIC BLOOD PRESSURE: 129 MMHG | OXYGEN SATURATION: 96 % | BODY MASS INDEX: 33.79 KG/M2 | WEIGHT: 241.38 LBS

## 2024-01-08 DIAGNOSIS — Z79.4 INSULIN DEPENDENT TYPE 2 DIABETES MELLITUS: ICD-10-CM

## 2024-01-08 DIAGNOSIS — I50.42 CHRONIC COMBINED SYSTOLIC AND DIASTOLIC CONGESTIVE HEART FAILURE: ICD-10-CM

## 2024-01-08 DIAGNOSIS — E11.59 HYPERTENSION ASSOCIATED WITH DIABETES: ICD-10-CM

## 2024-01-08 DIAGNOSIS — E11.9 INSULIN DEPENDENT TYPE 2 DIABETES MELLITUS: ICD-10-CM

## 2024-01-08 DIAGNOSIS — F51.01 PRIMARY INSOMNIA: ICD-10-CM

## 2024-01-08 DIAGNOSIS — I15.2 HYPERTENSION ASSOCIATED WITH DIABETES: ICD-10-CM

## 2024-01-08 DIAGNOSIS — E11.40 TYPE 2 DIABETES MELLITUS WITH DIABETIC NEUROPATHY, UNSPECIFIED WHETHER LONG TERM INSULIN USE: ICD-10-CM

## 2024-01-08 DIAGNOSIS — I25.118 CORONARY ARTERY DISEASE OF NATIVE ARTERY OF NATIVE HEART WITH STABLE ANGINA PECTORIS: ICD-10-CM

## 2024-01-08 DIAGNOSIS — I50.42 CHRONIC COMBINED SYSTOLIC AND DIASTOLIC CHF (CONGESTIVE HEART FAILURE): ICD-10-CM

## 2024-01-08 DIAGNOSIS — E11.9 TYPE 2 DIABETES MELLITUS WITHOUT COMPLICATION: ICD-10-CM

## 2024-01-08 DIAGNOSIS — D69.6 THROMBOCYTOPENIA: ICD-10-CM

## 2024-01-08 DIAGNOSIS — E11.40 TYPE 2 DIABETES MELLITUS WITH DIABETIC NEUROPATHY, UNSPECIFIED WHETHER LONG TERM INSULIN USE: Primary | ICD-10-CM

## 2024-01-08 DIAGNOSIS — I73.9 PAD (PERIPHERAL ARTERY DISEASE): ICD-10-CM

## 2024-01-08 DIAGNOSIS — E11.69 DYSLIPIDEMIA ASSOCIATED WITH TYPE 2 DIABETES MELLITUS: ICD-10-CM

## 2024-01-08 DIAGNOSIS — E78.5 DYSLIPIDEMIA ASSOCIATED WITH TYPE 2 DIABETES MELLITUS: ICD-10-CM

## 2024-01-08 DIAGNOSIS — I47.20 VENTRICULAR TACHYCARDIA: ICD-10-CM

## 2024-01-08 PROBLEM — E66.01 SEVERE OBESITY (BMI 35.0-35.9 WITH COMORBIDITY): Status: RESOLVED | Noted: 2017-03-22 | Resolved: 2024-01-08

## 2024-01-08 LAB
ALBUMIN SERPL BCP-MCNC: 3.6 G/DL (ref 3.5–5.2)
ALP SERPL-CCNC: 71 U/L (ref 55–135)
ALT SERPL W/O P-5'-P-CCNC: 15 U/L (ref 10–44)
ANION GAP SERPL CALC-SCNC: 10 MMOL/L (ref 8–16)
AST SERPL-CCNC: 13 U/L (ref 10–40)
BACTERIA #/AREA URNS HPF: NORMAL /HPF
BASOPHILS # BLD AUTO: 0.08 K/UL (ref 0–0.2)
BASOPHILS NFR BLD: 1.5 % (ref 0–1.9)
BILIRUB SERPL-MCNC: 1.3 MG/DL (ref 0.1–1)
BILIRUB UR QL STRIP: NEGATIVE
BUN SERPL-MCNC: 13 MG/DL (ref 8–23)
CALCIUM SERPL-MCNC: 8.9 MG/DL (ref 8.7–10.5)
CHLORIDE SERPL-SCNC: 108 MMOL/L (ref 95–110)
CLARITY UR: CLEAR
CO2 SERPL-SCNC: 23 MMOL/L (ref 23–29)
COLOR UR: COLORLESS
CREAT SERPL-MCNC: 1 MG/DL (ref 0.5–1.4)
DIFFERENTIAL METHOD BLD: ABNORMAL
EOSINOPHIL # BLD AUTO: 0.3 K/UL (ref 0–0.5)
EOSINOPHIL NFR BLD: 5.8 % (ref 0–8)
ERYTHROCYTE [DISTWIDTH] IN BLOOD BY AUTOMATED COUNT: 12.2 % (ref 11.5–14.5)
EST. GFR  (NO RACE VARIABLE): >60 ML/MIN/1.73 M^2
ESTIMATED AVG GLUCOSE: 171 MG/DL (ref 68–131)
GLUCOSE SERPL-MCNC: 207 MG/DL (ref 70–110)
GLUCOSE UR QL STRIP: NEGATIVE
HBA1C MFR BLD: 7.6 % (ref 4–5.6)
HCT VFR BLD AUTO: 38.6 % (ref 40–54)
HGB BLD-MCNC: 13.4 G/DL (ref 14–18)
HGB UR QL STRIP: NEGATIVE
HYALINE CASTS #/AREA URNS LPF: 0 /LPF
IMM GRANULOCYTES # BLD AUTO: 0.02 K/UL (ref 0–0.04)
IMM GRANULOCYTES NFR BLD AUTO: 0.4 % (ref 0–0.5)
KETONES UR QL STRIP: NEGATIVE
LEUKOCYTE ESTERASE UR QL STRIP: NEGATIVE
LYMPHOCYTES # BLD AUTO: 1.4 K/UL (ref 1–4.8)
LYMPHOCYTES NFR BLD: 26.5 % (ref 18–48)
MCH RBC QN AUTO: 31.8 PG (ref 27–31)
MCHC RBC AUTO-ENTMCNC: 34.7 G/DL (ref 32–36)
MCV RBC AUTO: 92 FL (ref 82–98)
MICROSCOPIC COMMENT: NORMAL
MONOCYTES # BLD AUTO: 0.5 K/UL (ref 0.3–1)
MONOCYTES NFR BLD: 8.3 % (ref 4–15)
NEUTROPHILS # BLD AUTO: 3.1 K/UL (ref 1.8–7.7)
NEUTROPHILS NFR BLD: 57.5 % (ref 38–73)
NITRITE UR QL STRIP: NEGATIVE
NRBC BLD-RTO: 0 /100 WBC
PH UR STRIP: 6 [PH] (ref 5–8)
PLATELET # BLD AUTO: 159 K/UL (ref 150–450)
PMV BLD AUTO: 10.8 FL (ref 9.2–12.9)
POTASSIUM SERPL-SCNC: 4 MMOL/L (ref 3.5–5.1)
PROT SERPL-MCNC: 6.7 G/DL (ref 6–8.4)
PROT UR QL STRIP: ABNORMAL
RBC # BLD AUTO: 4.22 M/UL (ref 4.6–6.2)
RBC #/AREA URNS HPF: 1 /HPF (ref 0–4)
SODIUM SERPL-SCNC: 141 MMOL/L (ref 136–145)
SP GR UR STRIP: 1.01 (ref 1–1.03)
SQUAMOUS #/AREA URNS HPF: 0 /HPF
URN SPEC COLLECT METH UR: ABNORMAL
UROBILINOGEN UR STRIP-ACNC: NEGATIVE EU/DL
WBC # BLD AUTO: 5.39 K/UL (ref 3.9–12.7)
WBC #/AREA URNS HPF: 0 /HPF (ref 0–5)

## 2024-01-08 PROCEDURE — 1159F MED LIST DOCD IN RCRD: CPT | Mod: CPTII,S$GLB,, | Performed by: FAMILY MEDICINE

## 2024-01-08 PROCEDURE — 81000 URINALYSIS NONAUTO W/SCOPE: CPT | Performed by: FAMILY MEDICINE

## 2024-01-08 PROCEDURE — 83036 HEMOGLOBIN GLYCOSYLATED A1C: CPT | Performed by: FAMILY MEDICINE

## 2024-01-08 PROCEDURE — 1101F PT FALLS ASSESS-DOCD LE1/YR: CPT | Mod: CPTII,S$GLB,, | Performed by: FAMILY MEDICINE

## 2024-01-08 PROCEDURE — 99999 PR PBB SHADOW E&M-EST. PATIENT-LVL V: CPT | Mod: PBBFAC,,, | Performed by: FAMILY MEDICINE

## 2024-01-08 PROCEDURE — 3074F SYST BP LT 130 MM HG: CPT | Mod: CPTII,S$GLB,, | Performed by: FAMILY MEDICINE

## 2024-01-08 PROCEDURE — 85025 COMPLETE CBC W/AUTO DIFF WBC: CPT | Performed by: FAMILY MEDICINE

## 2024-01-08 PROCEDURE — 3008F BODY MASS INDEX DOCD: CPT | Mod: CPTII,S$GLB,, | Performed by: FAMILY MEDICINE

## 2024-01-08 PROCEDURE — 1160F RVW MEDS BY RX/DR IN RCRD: CPT | Mod: CPTII,S$GLB,, | Performed by: FAMILY MEDICINE

## 2024-01-08 PROCEDURE — 3078F DIAST BP <80 MM HG: CPT | Mod: CPTII,S$GLB,, | Performed by: FAMILY MEDICINE

## 2024-01-08 PROCEDURE — 36415 COLL VENOUS BLD VENIPUNCTURE: CPT | Performed by: FAMILY MEDICINE

## 2024-01-08 PROCEDURE — 80053 COMPREHEN METABOLIC PANEL: CPT | Performed by: FAMILY MEDICINE

## 2024-01-08 PROCEDURE — 3288F FALL RISK ASSESSMENT DOCD: CPT | Mod: CPTII,S$GLB,, | Performed by: FAMILY MEDICINE

## 2024-01-08 PROCEDURE — 1126F AMNT PAIN NOTED NONE PRSNT: CPT | Mod: CPTII,S$GLB,, | Performed by: FAMILY MEDICINE

## 2024-01-08 PROCEDURE — 99215 OFFICE O/P EST HI 40 MIN: CPT | Mod: S$GLB,,, | Performed by: FAMILY MEDICINE

## 2024-01-08 RX ORDER — ZOLPIDEM TARTRATE 5 MG/1
TABLET ORAL
Qty: 90 TABLET | Refills: 3 | Status: SHIPPED | OUTPATIENT
Start: 2024-01-08

## 2024-01-08 NOTE — PROGRESS NOTES
Subjective:       Patient ID: Clifton Wilson is a 73 y.o. male.    Chief Complaint: Follow-up    72 yr old pleasant white male with DM II, HTN, HLD, CAD, Combined chronic CHF, MR, obesity, neuropathy, presents today for diabetes.       DM II - controlled  - HGBA1C                   6.5 (H)             05/03/2023                                              - on metformin and insulin and sugars improving - UTD eye exam - foot exam UTD - on ASA and plavix. Losartan. He ate too much jamie cake during mardi gras.      HTN - chronic - controlled - on losartan, lasix - compliant - no side effects      HLD - chronic -      LDLCALC                  66.6                05/03/2023                                         - controlled -       CAD/combined CHF - EF 35-40% - on external defibrillator - medical management - on ASA/plavix/ARB - no symptoms - following cardiology    Gout - improving - uses colchicine for flare up -     History as below - reviewed            Follow-up  Associated symptoms include arthralgias, joint swelling and myalgias. Pertinent negatives include no chest pain, congestion, coughing, diaphoresis, fatigue, headaches, neck pain, rash, visual change, vomiting or weakness.   Diabetes  He presents for his follow-up diabetic visit. He has type 2 diabetes mellitus. No MedicAlert identification noted. The initial diagnosis of diabetes was made 27 years ago. His disease course has been worsening. Hypoglycemia symptoms include sleepiness. Pertinent negatives for hypoglycemia include no confusion, dizziness, headaches, hunger, mood changes, nervousness/anxiousness, pallor, seizures, speech difficulty, sweats or tremors. Associated symptoms include foot paresthesias. Pertinent negatives for diabetes include no blurred vision, no chest pain, no fatigue, no foot ulcerations, no polydipsia, no polyphagia, no polyuria, no visual change, no weakness and no weight loss. Hypoglycemia complications include blackouts  and hospitalization. Pertinent negatives for hypoglycemia complications include no nocturnal hypoglycemia, no required assistance and no required glucagon injection. Symptoms are stable. Diabetic complications include autonomic neuropathy, heart disease, impotence and peripheral neuropathy. Pertinent negatives for diabetic complications include no CVA, nephropathy, PVD or retinopathy. Risk factors for coronary artery disease include hypertension, obesity, diabetes mellitus and male sex. Current diabetic treatment includes diet, insulin injections and oral agent (monotherapy). He is compliant with treatment all of the time. He is currently taking insulin pre-breakfast, pre-lunch, pre-dinner and at bedtime. Insulin injections are given by patient. Rotation sites for injection include the abdominal wall, arms and thighs. His weight is fluctuating minimally. He is following a diabetic, generally healthy, low fat/cholesterol and low salt diet. Meal planning includes avoidance of concentrated sweets and carbohydrate counting. He has not had a previous visit with a dietitian. He participates in exercise three times a week. He monitors blood glucose at home 3-4 x per day. He monitors urine at home <1 x per month. Blood glucose monitoring compliance is excellent. His home blood glucose trend is decreasing steadily. An ACE inhibitor/angiotensin II receptor blocker is being taken. He does not see a podiatrist.Eye exam is current.   Knee Pain     Swelling  This is a chronic problem. The current episode started more than 1 month ago. The problem occurs intermittently. The problem has been gradually worsening. Associated symptoms include arthralgias, joint swelling and myalgias. Pertinent negatives include no chest pain, congestion, coughing, diaphoresis, fatigue, headaches, neck pain, rash, visual change, vomiting or weakness.   Hypertension  This is a chronic problem. The current episode started more than 1 year ago. The  problem has been gradually improving since onset. The problem is controlled. Pertinent negatives include no blurred vision, chest pain, headaches, malaise/fatigue, neck pain, palpitations, peripheral edema, PND or sweats. Risk factors for coronary artery disease include diabetes mellitus, dyslipidemia, male gender and obesity. Past treatments include angiotensin blockers and diuretics. The current treatment provides significant improvement. There are no compliance problems.  Hypertensive end-organ damage includes CAD/MI and heart failure. There is no history of CVA, left ventricular hypertrophy, PVD or retinopathy. There is no history of chronic renal disease, hypercortisolism, hyperparathyroidism, pheochromocytoma, renovascular disease or a thyroid problem.   Hyperlipidemia  This is a chronic problem. The current episode started more than 1 year ago. The problem is controlled. Recent lipid tests were reviewed and are normal. Exacerbating diseases include obesity. He has no history of chronic renal disease or diabetes. There are no known factors aggravating his hyperlipidemia. Associated symptoms include myalgias. Pertinent negatives include no chest pain or focal sensory loss. Current antihyperlipidemic treatment includes statins. The current treatment provides moderate improvement of lipids. There are no compliance problems.  Risk factors for coronary artery disease include diabetes mellitus, dyslipidemia, male sex, hypertension and obesity.     Review of Systems   Constitutional: Negative.  Negative for activity change, diaphoresis, fatigue, malaise/fatigue, unexpected weight change and weight loss.   HENT: Negative.  Negative for nasal congestion, ear pain, mouth sores, rhinorrhea and voice change.    Eyes: Negative.  Negative for blurred vision, pain, discharge and visual disturbance.   Respiratory: Negative.  Negative for apnea, cough and wheezing.    Cardiovascular: Negative.  Negative for chest pain,  palpitations and PND.   Gastrointestinal: Negative.  Negative for abdominal distention, anal bleeding, diarrhea and vomiting.   Endocrine: Negative.  Negative for cold intolerance, polydipsia, polyphagia and polyuria.   Genitourinary:  Positive for impotence. Negative for decreased urine volume, difficulty urinating, discharge, frequency and scrotal swelling.   Musculoskeletal:  Positive for arthralgias, joint swelling and myalgias. Negative for back pain, neck pain and neck stiffness.   Integumentary:  Negative for color change, pallor and rash. Negative.   Allergic/Immunologic: Negative.  Negative for environmental allergies.   Neurological: Negative.  Negative for dizziness, tremors, seizures, speech difficulty, weakness, light-headedness and headaches.   Hematological: Negative.    Psychiatric/Behavioral: Negative.  Negative for agitation, confusion, dysphoric mood and suicidal ideas. The patient is not nervous/anxious.          PMH/PSH/FH/SH/MED/ALLERGY reviewed    Past Medical History:   Diagnosis Date    Anticoagulant long-term use     Cardiomyopathy     CHF (congestive heart failure)     Coronary artery disease     Diabetes mellitus     Gout     Heart attack     Hypertension     Pneumonia        Past Surgical History:   Procedure Laterality Date    APPENDECTOMY      CARDIAC CATHETERIZATION      7 stents    CARDIAC DEFIBRILLATOR PLACEMENT      CATARACT EXTRACTION Bilateral 2011    COLONOSCOPY N/A 8/10/2018    Procedure: COLONOSCOPY golytely;  Surgeon: Valentine Burger MD;  Location: [facility] ENDO;  Service: Endoscopy;  Laterality: N/A;    EYE SURGERY      REVISION OF IMPLANTABLE CARDIOVERTER-DEFIBRILLATOR (ICD) ELECTRODE LEAD PLACEMENT N/A 11/11/2019    Procedure: REVISION, INSERTION, ELECTRODE LEAD, ICD;  Surgeon: Shukri Walsh MD;  Location: [facility] EP LAB;  Service: Cardiology;  Laterality: N/A;  Lead malfxn, RV lead ICD, SJM, anes, DM, 3 PREP       Family History   Problem Relation Age of Onset    Heart  disease Mother     Heart disease Father     Amblyopia Neg Hx     Blindness Neg Hx     Cataracts Neg Hx     Glaucoma Neg Hx     Macular degeneration Neg Hx     Retinal detachment Neg Hx     Strabismus Neg Hx        Social History     Socioeconomic History    Marital status:    Tobacco Use    Smoking status: Former    Smokeless tobacco: Never   Substance and Sexual Activity    Alcohol use: Yes     Comment: socially    Drug use: No    Sexual activity: Yes     Social Determinants of Health     Financial Resource Strain: Low Risk  (7/6/2023)    Overall Financial Resource Strain (CARDIA)     Difficulty of Paying Living Expenses: Not hard at all   Food Insecurity: No Food Insecurity (7/6/2023)    Hunger Vital Sign     Worried About Running Out of Food in the Last Year: Never true     Ran Out of Food in the Last Year: Never true   Transportation Needs: No Transportation Needs (7/6/2023)    PRAPARE - Transportation     Lack of Transportation (Medical): No     Lack of Transportation (Non-Medical): No   Physical Activity: Inactive (3/29/2023)    Exercise Vital Sign     Days of Exercise per Week: 0 days     Minutes of Exercise per Session: 0 min   Stress: No Stress Concern Present (3/29/2023)    [place] [employer] - Occupational Stress Questionnaire     Feeling of Stress : Not at all   Social Connections: Moderately Integrated (7/6/2023)    Social Connection and Isolation Panel [NHANES]     Frequency of Communication with Friends and Family: More than three times a week     Frequency of Social Gatherings with Friends and Family: More than three times a week     Attends Druze Services: Never     Active Member of Clubs or Organizations: Yes     Attends Club or Organization Meetings: More than 4 times per year     Marital Status:    Housing Stability: Low Risk  (7/6/2023)    Housing Stability Vital Sign     Unable to Pay for Housing in the Last Year: No     Number of Places Lived in the Last  Year: 1     Unstable Housing in the Last Year: No       Current Outpatient Medications   Medication Sig Dispense Refill    acetaminophen (TYLENOL) 500 MG tablet Take 1 tablet (500 mg total) by mouth every 6 (six) hours as needed for Pain. 50 tablet 0    alcohol swabs (BD ALCOHOL SWABS) PadM USE FOUR TIMES DAILY 400 each 3    aspirin (ECOTRIN) 81 MG EC tablet Take 81 mg by mouth once daily.      atorvastatin (LIPITOR) 40 MG tablet Take 1 tablet (40 mg total) by mouth once daily. 90 tablet 3    blood glucose control high,low (ACCU-CHEK CATHRYN CONTROL SOLN) Soln Use as directed to check blood sugar 1 each 1    blood sugar diagnostic Strp Use to test four times daily 400 each 11    blood-glucose meter (ACCU-CHEK CATHRYN PLUS METER) Misc USE AS DIRECTED 1 each 0    blood-glucose meter Misc use as directed 1 each 0    carvediloL (COREG) 12.5 MG tablet Take 1 tablet (12.5 mg total) by mouth 2 (two) times daily. 180 tablet 3    clopidogreL (PLAVIX) 75 mg tablet Take 1 tablet (75 mg total) by mouth once daily. 90 tablet 3    colchicine (MITIGARE) 0.6 mg Cap Take 1 capsule (0.6 mg total) by mouth once daily. 90 capsule 3    cyanocobalamin (VITAMIN B-12) 1000 MCG tablet TAKE ONE TABLET BY MOUTH ONCE DAILY FOR VITAMIN DEFICIENCIES      furosemide (LASIX) 20 MG tablet Take 1 tablet (20 mg total) by mouth 2 (two) times daily. 180 tablet 3    gabapentin (NEURONTIN) 300 MG capsule Take 2 capsules (600 mg total) by mouth 2 (two) times daily. 360 capsule 1    ibuprofen (ADVIL,MOTRIN) 600 MG tablet Take 1 tablet (600 mg total) by mouth every 6 (six) hours as needed for Pain. 20 tablet 0    insulin (LANTUS SOLOSTAR U-100 INSULIN) glargine 100 units/mL SubQ pen Inject 35 Units into the skin every evening. 30 mL 0    insulin aspart U-100 (NOVOLOG FLEXPEN U-100 INSULIN) 100 unit/mL (3 mL) InPn pen Inject 15 Units into the skin 3 (three) times daily with meals. 45 mL 10    lancets 30 gauge Misc by Misc.(Non-Drug; Combo Route) route 4 (four)  "times daily. 400 each 1    losartan (COZAAR) 25 MG tablet Take 1 tablet (25 mg total) by mouth once daily. 90 tablet 3    metFORMIN (GLUCOPHAGE) 1000 MG tablet Take 1 tablet (1,000 mg total) by mouth 2 (two) times daily with meals. 180 tablet 4    nitroGLYCERIN (NITROSTAT) 0.4 MG SL tablet Place 1 tablet (0.4 mg total) under the tongue every 5 (five) minutes as needed for Chest pain. 25 tablet 3    pen needle, diabetic (BD ULTRA-FINE SINA PEN NEEDLE) 32 gauge x 5/32" Ndle Use four times daily for insulin administration 400 each 3    potassium chloride (KLOR-CON) 10 MEQ TbSR TAKE ONE TABLET BY MOUTH ONCE DAILY TO INCREASE POTASSIUM 30 tablet 0    tadalafiL (CIALIS) 20 MG Tab Take 1 tablet (20 mg total) by mouth once daily. 30 tablet 11    zolpidem (AMBIEN) 5 MG Tab TAKE 1 TABLET BY MOUTH EVERY NIGHT AS NEEDED 90 tablet 3     No current facility-administered medications for this visit.       Review of patient's allergies indicates:  No Known Allergies      Objective:       Vitals:    01/08/24 0907   BP: 129/79   Pulse: 76       Physical Exam  Constitutional:       Appearance: He is well-developed.   HENT:      Head: Normocephalic and atraumatic.      Right Ear: External ear normal.      Left Ear: External ear normal.      Nose: Nose normal.      Mouth/Throat:      Pharynx: No oropharyngeal exudate.   Eyes:      General: No scleral icterus.        Right eye: No discharge.         Left eye: No discharge.      Conjunctiva/sclera: Conjunctivae normal.      Pupils: Pupils are equal, round, and reactive to light.   Neck:      Thyroid: No thyromegaly.      Vascular: No JVD.      Trachea: No tracheal deviation.   Cardiovascular:      Rate and Rhythm: Normal rate and regular rhythm.      Heart sounds: Normal heart sounds. No murmur heard.     No friction rub. No gallop.   Pulmonary:      Effort: Pulmonary effort is normal. No respiratory distress.      Breath sounds: Normal breath sounds. No stridor. No wheezing or rales. "   Chest:      Chest wall: No tenderness.   Abdominal:      General: Bowel sounds are normal. There is no distension.      Palpations: Abdomen is soft. There is no mass.      Tenderness: There is no abdominal tenderness. There is no guarding or rebound.      Hernia: No hernia is present.   Musculoskeletal:         General: No swelling or tenderness. Normal range of motion.      Cervical back: Normal range of motion and neck supple.      Comments: Left knee swelling, warm and TTP.   Lymphadenopathy:      Cervical: No cervical adenopathy.   Skin:     General: Skin is warm and dry.      Coloration: Skin is not pale.      Findings: No erythema or rash.   Neurological:      Mental Status: He is alert and oriented to person, place, and time.      Cranial Nerves: No cranial nerve deficit.      Motor: No abnormal muscle tone.      Coordination: Coordination normal.      Deep Tendon Reflexes: Reflexes are normal and symmetric. Reflexes normal.   Psychiatric:         Behavior: Behavior normal.         Thought Content: Thought content normal.         Judgment: Judgment normal.         Assessment:       Problem List Items Addressed This Visit       Ventricular tachycardia, nonsustained post-MI    Type 2 diabetes mellitus with diabetic neuropathy - Primary    Relevant Orders    CBC Auto Differential    Comprehensive Metabolic Panel    Urinalysis    Thrombocytopenia    PAD (peripheral artery disease)    Insulin dependent type 2 diabetes mellitus    Relevant Orders    CBC Auto Differential    Comprehensive Metabolic Panel    Urinalysis    Hypertension associated with diabetes    Relevant Orders    CBC Auto Differential    Comprehensive Metabolic Panel    Urinalysis    Dyslipidemia associated with type 2 diabetes mellitus    Relevant Orders    CBC Auto Differential    Comprehensive Metabolic Panel    Urinalysis    Coronary artery disease of native artery of native heart with stable angina pectoris    Chronic combined systolic and  diastolic congestive heart failure    Relevant Orders    CBC Auto Differential    Comprehensive Metabolic Panel    Urinalysis    Chronic combined systolic and diastolic CHF (congestive heart failure)    Relevant Orders    CBC Auto Differential    Comprehensive Metabolic Panel    Urinalysis     Other Visit Diagnoses       Primary insomnia        Relevant Medications    zolpidem (AMBIEN) 5 MG Tab            Plan:           Clifton was seen today for follow-up.    Diagnoses and all orders for this visit:    Type 2 diabetes mellitus with diabetic neuropathy, unspecified whether long term insulin use  -     CBC Auto Differential; Future  -     Comprehensive Metabolic Panel; Future  -     Urinalysis; Future    Hypertension associated with diabetes  -     CBC Auto Differential; Future  -     Comprehensive Metabolic Panel; Future  -     Urinalysis; Future    Dyslipidemia associated with type 2 diabetes mellitus  -     CBC Auto Differential; Future  -     Comprehensive Metabolic Panel; Future  -     Urinalysis; Future    Chronic combined systolic and diastolic congestive heart failure  -     CBC Auto Differential; Future  -     Comprehensive Metabolic Panel; Future  -     Urinalysis; Future    Chronic combined systolic and diastolic CHF (congestive heart failure)  -     CBC Auto Differential; Future  -     Comprehensive Metabolic Panel; Future  -     Urinalysis; Future    Insulin dependent type 2 diabetes mellitus  -     CBC Auto Differential; Future  -     Comprehensive Metabolic Panel; Future  -     Urinalysis; Future    Primary insomnia  -     zolpidem (AMBIEN) 5 MG Tab; TAKE 1 TABLET BY MOUTH EVERY NIGHT AS NEEDED    Ventricular tachycardia    Thrombocytopenia    Coronary artery disease of native artery of native heart with stable angina pectoris    PAD (peripheral artery disease)      Wellness check  -normal exam  -labs    DM II controlled/hyperglycemia  - improving since started insulin  -lantus and novolog to  continue  -strict diet control        HTN  -controlled    HLD  -controlled    Combined CHF  -follows cardiology  -on external defibrillator    Neuropathy  -continue neurontin and increase dose to 600 mg BID    Obesity  -diet and exercise as tolerated    chronic gout  -uric acid levels  -conchicine as needed    Spent adequate time in obtaining history and explaining differentials    40 minutes spent during this visit of which greater than 50% devoted to face-face counseling and coordination of care regarding diagnosis and management plan    Follow up in about 6 months (around 7/8/2024), or if symptoms worsen or fail to improve.

## 2024-01-30 ENCOUNTER — PATIENT MESSAGE (OUTPATIENT)
Dept: FAMILY MEDICINE | Facility: CLINIC | Age: 74
End: 2024-01-30
Payer: MEDICARE

## 2024-02-01 ENCOUNTER — TELEPHONE (OUTPATIENT)
Dept: FAMILY MEDICINE | Facility: CLINIC | Age: 74
End: 2024-02-01
Payer: MEDICARE

## 2024-02-01 ENCOUNTER — HOSPITAL ENCOUNTER (OUTPATIENT)
Dept: RADIOLOGY | Facility: HOSPITAL | Age: 74
Discharge: HOME | End: 2024-02-01
Attending: NURSE PRACTITIONER
Payer: MEDICARE

## 2024-02-01 ENCOUNTER — OFFICE VISIT (OUTPATIENT)
Dept: FAMILY MEDICINE | Facility: CLINIC | Age: 74
End: 2024-02-01
Payer: MEDICARE

## 2024-02-01 VITALS
DIASTOLIC BLOOD PRESSURE: 80 MMHG | SYSTOLIC BLOOD PRESSURE: 128 MMHG | OXYGEN SATURATION: 97 % | WEIGHT: 235.69 LBS | HEIGHT: 71 IN | HEART RATE: 66 BPM | BODY MASS INDEX: 33 KG/M2

## 2024-02-01 DIAGNOSIS — R20.0 NUMBNESS AND TINGLING: ICD-10-CM

## 2024-02-01 DIAGNOSIS — I15.2 HYPERTENSION ASSOCIATED WITH DIABETES: ICD-10-CM

## 2024-02-01 DIAGNOSIS — I25.118 CORONARY ARTERY DISEASE OF NATIVE ARTERY OF NATIVE HEART WITH STABLE ANGINA PECTORIS: ICD-10-CM

## 2024-02-01 DIAGNOSIS — R20.2 NUMBNESS AND TINGLING: ICD-10-CM

## 2024-02-01 DIAGNOSIS — E11.59 HYPERTENSION ASSOCIATED WITH DIABETES: ICD-10-CM

## 2024-02-01 DIAGNOSIS — I50.42 CHRONIC COMBINED SYSTOLIC AND DIASTOLIC CHF (CONGESTIVE HEART FAILURE): ICD-10-CM

## 2024-02-01 DIAGNOSIS — Z95.810 ICD (IMPLANTABLE CARDIOVERTER-DEFIBRILLATOR), SINGLE, IN SITU: ICD-10-CM

## 2024-02-01 DIAGNOSIS — I50.42 CHRONIC COMBINED SYSTOLIC AND DIASTOLIC CHF (CONGESTIVE HEART FAILURE): Primary | ICD-10-CM

## 2024-02-01 PROBLEM — M70.21 OLECRANON BURSITIS, RIGHT ELBOW: Status: ACTIVE | Noted: 2024-02-01

## 2024-02-01 PROBLEM — H90.3 SENSORINEURAL HEARING LOSS, BILATERAL: Status: ACTIVE | Noted: 2024-02-01

## 2024-02-01 PROCEDURE — 3051F HG A1C>EQUAL 7.0%<8.0%: CPT | Mod: CPTII,S$GLB,, | Performed by: NURSE PRACTITIONER

## 2024-02-01 PROCEDURE — 3074F SYST BP LT 130 MM HG: CPT | Mod: CPTII,S$GLB,, | Performed by: NURSE PRACTITIONER

## 2024-02-01 PROCEDURE — 3079F DIAST BP 80-89 MM HG: CPT | Mod: CPTII,S$GLB,, | Performed by: NURSE PRACTITIONER

## 2024-02-01 PROCEDURE — 3008F BODY MASS INDEX DOCD: CPT | Mod: CPTII,S$GLB,, | Performed by: NURSE PRACTITIONER

## 2024-02-01 PROCEDURE — 71046 X-RAY EXAM CHEST 2 VIEWS: CPT | Mod: TC,FY

## 2024-02-01 PROCEDURE — 1101F PT FALLS ASSESS-DOCD LE1/YR: CPT | Mod: CPTII,S$GLB,, | Performed by: NURSE PRACTITIONER

## 2024-02-01 PROCEDURE — 99999 PR PBB SHADOW E&M-EST. PATIENT-LVL V: CPT | Mod: PBBFAC,,, | Performed by: NURSE PRACTITIONER

## 2024-02-01 PROCEDURE — 1159F MED LIST DOCD IN RCRD: CPT | Mod: CPTII,S$GLB,, | Performed by: NURSE PRACTITIONER

## 2024-02-01 PROCEDURE — 99214 OFFICE O/P EST MOD 30 MIN: CPT | Mod: S$GLB,,, | Performed by: NURSE PRACTITIONER

## 2024-02-01 PROCEDURE — 72040 X-RAY EXAM NECK SPINE 2-3 VW: CPT | Mod: 26,,, | Performed by: RADIOLOGY

## 2024-02-01 PROCEDURE — 72040 X-RAY EXAM NECK SPINE 2-3 VW: CPT | Mod: TC,FY

## 2024-02-01 PROCEDURE — 3288F FALL RISK ASSESSMENT DOCD: CPT | Mod: CPTII,S$GLB,, | Performed by: NURSE PRACTITIONER

## 2024-02-01 PROCEDURE — 71046 X-RAY EXAM CHEST 2 VIEWS: CPT | Mod: 26,,, | Performed by: RADIOLOGY

## 2024-02-01 NOTE — TELEPHONE ENCOUNTER
Spoke with patient to discuss imaging results. Arthritic changes noted to cervical spine, could be cause of numbness/tingling; could also be related to increase in blood sugar level recently; advised patient to follow proper diet and take medications as ordered; also instructed to notify PCP if pain develops as he may benefit from PT or additional imaging if needed. Warning signs discussed. Message if needed. No questions at this time.    Emily Castellon, NP  
Yes

## 2024-02-01 NOTE — PROGRESS NOTES
Subjective     Patient ID: Clifton Wilson is a 73 y.o. male.    Chief Complaint: numbness in left shoulder    HPI    This is a 73 y.o. yo male patient of Dr. Grissom  who is new to me. He presents today with c/o numbness in left shoulder  - PMH includes    Patient Active Problem List   Diagnosis    Type 2 diabetes mellitus with neurologic complication    Mitral regurgitation    Chronic combined systolic and diastolic congestive heart failure    Coronary artery disease of native artery with stable angina pectoris    Ventricular tachycardia, nonsustained post-MI    Hypokalemia    Dyslipidemia    Chronic combined systolic and diastolic CHF (congestive heart failure)    Coronary artery disease of native artery of native heart with stable angina pectoris    Type 2 diabetes mellitus with diabetic neuropathy    Diabetes mellitus type 2 without retinopathy    Status post intraocular lens implant    Bilateral posterior capsular opacification    DM type 2 without retinopathy    Essential hypertension    Pseudophakia    PCO (posterior capsular opacification), right    Post-operative state    Ischemic cardiomyopathy    ICD (implantable cardioverter-defibrillator), single, in situ    Edema    Erectile dysfunction    Vitreous detachment    Dyslipidemia associated with type 2 diabetes mellitus    Hypertension associated with diabetes    Insulin dependent type 2 diabetes mellitus    Thrombocytopenia    Chronic pain of left knee    Difficulty walking    Gram-negative bacteremia    PAD (peripheral artery disease)    Olecranon bursitis, right elbow    Sensorineural hearing loss, bilateral     VSS today. Reports he has been experiencing intermittent numbness/tingling to left anterior/posterior shoulder x past 3-4 weeks that has not improved since onset. Denies fall/injury/trauma. Denies neck/shoulder/chest pain, SOB, dyspnea, dizziness, HA, visual changes. Has LCW defibrillator in place and is followed by Dr. Walsh. Last device check  "in November. Has hx of DM with last A1C increased to 7.6%. Does have hx of diabetic neuropathy but has not felt this before. Episodes of numbness/tingling occur randomly throughout the day, regardless of position or activity. Does not feel numbness/tingling to arms/hands. Reports he feels like he hears a "buzzing" in that area. No other issues or complaints.    Review of Systems   Neurological:  Positive for numbness (and tingling to left shoulder area).   Otherwise negative       Objective     Physical Exam  Vitals reviewed.   Constitutional:       General: He is awake. He is not in acute distress.     Appearance: Normal appearance. He is well-developed and well-groomed. He is obese.   HENT:      Head: Normocephalic.      Right Ear: External ear normal.      Left Ear: External ear normal.   Eyes:      General:         Right eye: No discharge.         Left eye: No discharge.   Neck:      Vascular: No carotid bruit.   Cardiovascular:      Rate and Rhythm: Normal rate and regular rhythm.      Pulses:           Radial pulses are 2+ on the left side.      Heart sounds: Normal heart sounds, S1 normal and S2 normal. No murmur heard.     Comments: LCW device in place  Pulmonary:      Effort: Pulmonary effort is normal. No respiratory distress.      Breath sounds: Normal breath sounds. No wheezing or rhonchi.   Musculoskeletal:         General: No tenderness.      Right shoulder: No swelling. Normal pulse.      Left shoulder: No swelling or tenderness. Normal range of motion. Normal pulse.      Cervical back: Full passive range of motion without pain. No edema, signs of trauma, rigidity or tenderness. No pain with movement, spinous process tenderness or muscular tenderness. Normal range of motion.   Lymphadenopathy:      Cervical: No cervical adenopathy.   Skin:     General: Skin is warm and dry.      Capillary Refill: Capillary refill takes less than 2 seconds.      Coloration: Skin is not pale.   Neurological:      Mental " Status: He is alert and oriented to person, place, and time.      Coordination: Coordination normal.   Psychiatric:         Attention and Perception: Attention normal.         Mood and Affect: Mood and affect normal.         Speech: Speech normal.         Behavior: Behavior normal. Behavior is cooperative.         Thought Content: Thought content normal.            Assessment and Plan     1. Chronic combined systolic and diastolic CHF (congestive heart failure)  -     Ambulatory referral/consult to Cardiac Electrophysiology; Future; Expected date: 02/08/2024  -     X-Ray Chest PA And Lateral; Future; Expected date: 02/01/2024    2. ICD (implantable cardioverter-defibrillator), single, in situ  -     Ambulatory referral/consult to Cardiac Electrophysiology; Future; Expected date: 02/08/2024    3. Coronary artery disease of native artery of native heart with stable angina pectoris  Overview:  -[facility] (12/7/15) LV dilated with EF 25-30% and global hypokinesios; LVEDP 32-34 mm Hg; LM patent mLAD 95% stenosis dLAD subtotal D1 80%; dLCX 80% OM1 LIs; RCA LIs; oPDA 80%; oPLB 80%    mLAD 2.5x16 Promus IVONNE 2.5x38 Promus IVONNE; dLAD  2.25 x30 Resolute IVONNE        Orders:  -     X-Ray Chest PA And Lateral; Future; Expected date: 02/01/2024    4. Numbness and tingling - left shoulder  -     X-Ray Cervical Spine 2 or 3 Views; Future; Expected date: 02/01/2024    5. Hypertension associated with diabetes        - Unclear etiology for patient's complaint: possible nerve impingement vs diabetic neuropathy vs cardiac cause  - Imaging ordered today  - Encouraged to follow proper diet and take medications as ordered  - Avoid heavy lifting or strenuous activity  - Warning signs discussed  - Follow up with Dr. Walsh and PCP         Follow up if symptoms worsen or fail to improve.        I spent a total of 30 minutes on the day of the visit. This includes face to face time and non-face to face time preparing to see the patient (eg, review of  tests), obtaining and/or reviewing separately obtained history, documenting clinical information in the electronic or other health record, independently interpreting results and communicating results to the patient/family/caregiver, or care coordinator.        (Portions of this note were dictated using voice recognition software and may contain dictation related errors in spelling/grammar/syntax not found on text review)

## 2024-02-06 ENCOUNTER — PATIENT MESSAGE (OUTPATIENT)
Dept: ELECTROPHYSIOLOGY | Facility: CLINIC | Age: 74
End: 2024-02-06
Payer: MEDICARE

## 2024-02-06 ENCOUNTER — OFFICE VISIT (OUTPATIENT)
Dept: FAMILY MEDICINE | Facility: CLINIC | Age: 74
End: 2024-02-06
Attending: FAMILY MEDICINE
Payer: MEDICARE

## 2024-02-06 VITALS
SYSTOLIC BLOOD PRESSURE: 124 MMHG | WEIGHT: 233 LBS | HEIGHT: 71 IN | HEART RATE: 68 BPM | OXYGEN SATURATION: 98 % | DIASTOLIC BLOOD PRESSURE: 66 MMHG | BODY MASS INDEX: 32.62 KG/M2

## 2024-02-06 DIAGNOSIS — I25.118 CORONARY ARTERY DISEASE OF NATIVE ARTERY OF NATIVE HEART WITH STABLE ANGINA PECTORIS: ICD-10-CM

## 2024-02-06 DIAGNOSIS — R20.2 NUMBNESS AND TINGLING: ICD-10-CM

## 2024-02-06 DIAGNOSIS — I15.2 HYPERTENSION ASSOCIATED WITH DIABETES: ICD-10-CM

## 2024-02-06 DIAGNOSIS — E11.69 DYSLIPIDEMIA ASSOCIATED WITH TYPE 2 DIABETES MELLITUS: ICD-10-CM

## 2024-02-06 DIAGNOSIS — I50.42 CHRONIC COMBINED SYSTOLIC AND DIASTOLIC CONGESTIVE HEART FAILURE: ICD-10-CM

## 2024-02-06 DIAGNOSIS — G62.9 NEUROPATHY: Primary | ICD-10-CM

## 2024-02-06 DIAGNOSIS — E11.40 TYPE 2 DIABETES MELLITUS WITH DIABETIC NEUROPATHY, UNSPECIFIED WHETHER LONG TERM INSULIN USE: ICD-10-CM

## 2024-02-06 DIAGNOSIS — E11.59 HYPERTENSION ASSOCIATED WITH DIABETES: ICD-10-CM

## 2024-02-06 DIAGNOSIS — R20.0 NUMBNESS AND TINGLING: ICD-10-CM

## 2024-02-06 DIAGNOSIS — E78.5 DYSLIPIDEMIA ASSOCIATED WITH TYPE 2 DIABETES MELLITUS: ICD-10-CM

## 2024-02-06 DIAGNOSIS — I50.42 CHRONIC COMBINED SYSTOLIC AND DIASTOLIC CHF (CONGESTIVE HEART FAILURE): ICD-10-CM

## 2024-02-06 PROCEDURE — 1101F PT FALLS ASSESS-DOCD LE1/YR: CPT | Mod: CPTII,S$GLB,, | Performed by: FAMILY MEDICINE

## 2024-02-06 PROCEDURE — 99999 PR PBB SHADOW E&M-EST. PATIENT-LVL V: CPT | Mod: PBBFAC,,, | Performed by: FAMILY MEDICINE

## 2024-02-06 PROCEDURE — 3078F DIAST BP <80 MM HG: CPT | Mod: CPTII,S$GLB,, | Performed by: FAMILY MEDICINE

## 2024-02-06 PROCEDURE — 1160F RVW MEDS BY RX/DR IN RCRD: CPT | Mod: CPTII,S$GLB,, | Performed by: FAMILY MEDICINE

## 2024-02-06 PROCEDURE — 99215 OFFICE O/P EST HI 40 MIN: CPT | Mod: S$GLB,,, | Performed by: FAMILY MEDICINE

## 2024-02-06 PROCEDURE — 3074F SYST BP LT 130 MM HG: CPT | Mod: CPTII,S$GLB,, | Performed by: FAMILY MEDICINE

## 2024-02-06 PROCEDURE — 3288F FALL RISK ASSESSMENT DOCD: CPT | Mod: CPTII,S$GLB,, | Performed by: FAMILY MEDICINE

## 2024-02-06 PROCEDURE — 1126F AMNT PAIN NOTED NONE PRSNT: CPT | Mod: CPTII,S$GLB,, | Performed by: FAMILY MEDICINE

## 2024-02-06 PROCEDURE — 3051F HG A1C>EQUAL 7.0%<8.0%: CPT | Mod: CPTII,S$GLB,, | Performed by: FAMILY MEDICINE

## 2024-02-06 PROCEDURE — 3008F BODY MASS INDEX DOCD: CPT | Mod: CPTII,S$GLB,, | Performed by: FAMILY MEDICINE

## 2024-02-06 PROCEDURE — 1159F MED LIST DOCD IN RCRD: CPT | Mod: CPTII,S$GLB,, | Performed by: FAMILY MEDICINE

## 2024-02-06 NOTE — PROGRESS NOTES
Subjective:       Patient ID: Clifton Wilson is a 73 y.o. male.    Chief Complaint: Shoulder Pain (Left shoulder)    73 yr old pleasant white male with DM II, HTN, HLD, CAD, Combined chronic CHF, MR, obesity, neuropathy, presents today for diabetes and left shoulder numbness. He is already on gabapentin 600 BID. No pain or discomfort. No chest pain/SOB/palpitations.      DM II - controlled  -HBA1C                   7.6 (H)             01/08/2024                                                      - on metformin and insulin and sugars improving - UTD eye exam - foot exam UTD - on ASA and plavix. Losartan. He ate too much jamie cake during mardi gras.      HTN - chronic - controlled - on losartan, lasix - compliant - no side effects      HLD - chronic -      LDLCALC                  66.6                05/03/2023                                            - controlled -       CAD/combined CHF - EF 35-40% - on external defibrillator - medical management - on ASA/plavix/ARB - no symptoms - following cardiology    Gout - improving - uses colchicine for flare up -     History as below - reviewed            Shoulder Pain   Pertinent negatives include no headaches. There is no history of diabetes.   Follow-up  Associated symptoms include arthralgias, joint swelling and myalgias. Pertinent negatives include no chest pain, congestion, coughing, diaphoresis, fatigue, headaches, neck pain, rash, visual change, vomiting or weakness.   Diabetes  He presents for his follow-up diabetic visit. He has type 2 diabetes mellitus. No MedicAlert identification noted. The initial diagnosis of diabetes was made 27 years ago. His disease course has been worsening. Hypoglycemia symptoms include sleepiness. Pertinent negatives for hypoglycemia include no confusion, dizziness, headaches, hunger, mood changes, nervousness/anxiousness, pallor, seizures, speech difficulty, sweats or tremors. Associated symptoms include foot paresthesias.  Pertinent negatives for diabetes include no blurred vision, no chest pain, no fatigue, no foot ulcerations, no polydipsia, no polyphagia, no polyuria, no visual change, no weakness and no weight loss. Hypoglycemia complications include blackouts and hospitalization. Pertinent negatives for hypoglycemia complications include no nocturnal hypoglycemia, no required assistance and no required glucagon injection. Symptoms are stable. Diabetic complications include autonomic neuropathy, heart disease, impotence and peripheral neuropathy. Pertinent negatives for diabetic complications include no CVA, nephropathy, PVD or retinopathy. Risk factors for coronary artery disease include hypertension, obesity, diabetes mellitus and male sex. Current diabetic treatment includes diet, insulin injections and oral agent (monotherapy). He is compliant with treatment all of the time. He is currently taking insulin pre-breakfast, pre-lunch, pre-dinner and at bedtime. Insulin injections are given by patient. Rotation sites for injection include the abdominal wall, arms and thighs. His weight is fluctuating minimally. He is following a diabetic, generally healthy, low fat/cholesterol and low salt diet. Meal planning includes avoidance of concentrated sweets and carbohydrate counting. He has not had a previous visit with a dietitian. He participates in exercise three times a week. He monitors blood glucose at home 3-4 x per day. He monitors urine at home <1 x per month. Blood glucose monitoring compliance is excellent. His home blood glucose trend is decreasing steadily. An ACE inhibitor/angiotensin II receptor blocker is being taken. He does not see a podiatrist.Eye exam is current.   Knee Pain     Swelling  This is a chronic problem. The current episode started more than 1 month ago. The problem occurs intermittently. The problem has been gradually worsening. Associated symptoms include arthralgias, joint swelling and myalgias. Pertinent  negatives include no chest pain, congestion, coughing, diaphoresis, fatigue, headaches, neck pain, rash, visual change, vomiting or weakness.   Hypertension  This is a chronic problem. The current episode started more than 1 year ago. The problem has been gradually improving since onset. The problem is controlled. Pertinent negatives include no blurred vision, chest pain, headaches, malaise/fatigue, neck pain, palpitations, peripheral edema, PND or sweats. Risk factors for coronary artery disease include diabetes mellitus, dyslipidemia, male gender and obesity. Past treatments include angiotensin blockers and diuretics. The current treatment provides significant improvement. There are no compliance problems.  Hypertensive end-organ damage includes CAD/MI and heart failure. There is no history of CVA, left ventricular hypertrophy, PVD or retinopathy. There is no history of chronic renal disease, hypercortisolism, hyperparathyroidism, pheochromocytoma, renovascular disease or a thyroid problem.   Hyperlipidemia  This is a chronic problem. The current episode started more than 1 year ago. The problem is controlled. Recent lipid tests were reviewed and are normal. Exacerbating diseases include obesity. He has no history of chronic renal disease or diabetes. There are no known factors aggravating his hyperlipidemia. Associated symptoms include myalgias. Pertinent negatives include no chest pain or focal sensory loss. Current antihyperlipidemic treatment includes statins. The current treatment provides moderate improvement of lipids. There are no compliance problems.  Risk factors for coronary artery disease include diabetes mellitus, dyslipidemia, male sex, hypertension and obesity.     Review of Systems   Constitutional: Negative.  Negative for activity change, diaphoresis, fatigue, malaise/fatigue, unexpected weight change and weight loss.   HENT: Negative.  Negative for nasal congestion, ear pain, mouth sores,  rhinorrhea and voice change.    Eyes: Negative.  Negative for blurred vision, pain, discharge and visual disturbance.   Respiratory: Negative.  Negative for apnea, cough and wheezing.    Cardiovascular: Negative.  Negative for chest pain, palpitations and PND.   Gastrointestinal: Negative.  Negative for abdominal distention, anal bleeding, diarrhea and vomiting.   Endocrine: Negative.  Negative for cold intolerance, polydipsia, polyphagia and polyuria.   Genitourinary:  Positive for impotence. Negative for decreased urine volume, difficulty urinating, discharge, frequency and scrotal swelling.   Musculoskeletal:  Positive for arthralgias, joint swelling and myalgias. Negative for back pain, neck pain and neck stiffness.   Integumentary:  Negative for color change, pallor and rash. Negative.   Allergic/Immunologic: Negative.  Negative for environmental allergies.   Neurological: Negative.  Negative for dizziness, tremors, seizures, speech difficulty, weakness, light-headedness and headaches.   Hematological: Negative.    Psychiatric/Behavioral: Negative.  Negative for agitation, confusion, dysphoric mood and suicidal ideas. The patient is not nervous/anxious.          PMH/PSH/FH/SH/MED/ALLERGY reviewed    Past Medical History:   Diagnosis Date    Anticoagulant long-term use     Cardiomyopathy     CHF (congestive heart failure)     Coronary artery disease     Diabetes mellitus     Gout     Heart attack     Hypertension     Pneumonia        Past Surgical History:   Procedure Laterality Date    APPENDECTOMY      CARDIAC CATHETERIZATION      7 stents    CARDIAC DEFIBRILLATOR PLACEMENT      CATARACT EXTRACTION Bilateral 2011    COLONOSCOPY N/A 8/10/2018    Procedure: COLONOSCOPY golytely;  Surgeon: Valentine Burger MD;  Location: [facility];  Service: Endoscopy;  Laterality: N/A;    EYE SURGERY      REVISION OF IMPLANTABLE CARDIOVERTER-DEFIBRILLATOR (ICD) ELECTRODE LEAD PLACEMENT N/A 11/11/2019    Procedure: REVISION,  INSERTION, ELECTRODE LEAD, ICD;  Surgeon: Shukri Walsh MD;  Location: [facility] EP LAB;  Service: Cardiology;  Laterality: N/A;  Lead malfxn, RV lead ICD, SJM, anes, DM, 3 PREP       Family History   Problem Relation Age of Onset    Heart disease Mother     Heart disease Father     Amblyopia Neg Hx     Blindness Neg Hx     Cataracts Neg Hx     Glaucoma Neg Hx     Macular degeneration Neg Hx     Retinal detachment Neg Hx     Strabismus Neg Hx        Social History     Socioeconomic History    Marital status:    Tobacco Use    Smoking status: Former    Smokeless tobacco: Never   Substance and Sexual Activity    Alcohol use: Yes     Comment: socially    Drug use: No    Sexual activity: Yes     Social Determinants of Health     Financial Resource Strain: Low Risk  (7/6/2023)    Overall Financial Resource Strain (CARDIA)     Difficulty of Paying Living Expenses: Not hard at all   Food Insecurity: No Food Insecurity (7/6/2023)    Hunger Vital Sign     Worried About Running Out of Food in the Last Year: Never true     Ran Out of Food in the Last Year: Never true   Transportation Needs: No Transportation Needs (7/6/2023)    PRAPARE - Transportation     Lack of Transportation (Medical): No     Lack of Transportation (Non-Medical): No   Physical Activity: Inactive (3/29/2023)    Exercise Vital Sign     Days of Exercise per Week: 0 days     Minutes of Exercise per Session: 0 min   Stress: No Stress Concern Present (3/29/2023)    [employer] [place] [employer] - Occupational Stress Questionnaire     Feeling of Stress : Not at all   Social Connections: Moderately Integrated (7/6/2023)    Social Connection and Isolation Panel [NHANES]     Frequency of Communication with Friends and Family: More than three times a week     Frequency of Social Gatherings with Friends and Family: More than three times a week     Attends Baptism Services: Never     Active Member of Clubs or Organizations: Yes     Attends Club or  Organization Meetings: More than 4 times per year     Marital Status:    Housing Stability: Low Risk  (7/6/2023)    Housing Stability Vital Sign     Unable to Pay for Housing in the Last Year: No     Number of Places Lived in the Last Year: 1     Unstable Housing in the Last Year: No       Current Outpatient Medications   Medication Sig Dispense Refill    acetaminophen (TYLENOL) 500 MG tablet Take 1 tablet (500 mg total) by mouth every 6 (six) hours as needed for Pain. 50 tablet 0    alcohol swabs (BD ALCOHOL SWABS) PadM USE FOUR TIMES DAILY 400 each 3    aspirin (ECOTRIN) 81 MG EC tablet Take 81 mg by mouth once daily.      atorvastatin (LIPITOR) 40 MG tablet Take 1 tablet (40 mg total) by mouth once daily. 90 tablet 3    blood glucose control high,low (ACCU-CHEK CATHRYN CONTROL SOLN) Soln Use as directed to check blood sugar 1 each 1    blood sugar diagnostic Strp Use to test four times daily 400 each 11    blood-glucose meter (ACCU-CHEK CATHRYN PLUS METER) Misc USE AS DIRECTED 1 each 0    blood-glucose meter Misc use as directed 1 each 0    carvediloL (COREG) 12.5 MG tablet Take 1 tablet (12.5 mg total) by mouth 2 (two) times daily. 180 tablet 3    clopidogreL (PLAVIX) 75 mg tablet Take 1 tablet (75 mg total) by mouth once daily. 90 tablet 3    colchicine (MITIGARE) 0.6 mg Cap Take 1 capsule (0.6 mg total) by mouth once daily. 90 capsule 3    cyanocobalamin (VITAMIN B-12) 1000 MCG tablet TAKE ONE TABLET BY MOUTH ONCE DAILY FOR VITAMIN DEFICIENCIES      furosemide (LASIX) 20 MG tablet Take 1 tablet (20 mg total) by mouth 2 (two) times daily. 180 tablet 3    gabapentin (NEURONTIN) 300 MG capsule Take 2 capsules (600 mg total) by mouth 2 (two) times daily. 360 capsule 1    ibuprofen (ADVIL,MOTRIN) 600 MG tablet Take 1 tablet (600 mg total) by mouth every 6 (six) hours as needed for Pain. 20 tablet 0    insulin (LANTUS SOLOSTAR U-100 INSULIN) glargine 100 units/mL SubQ pen Inject 35 Units into the skin every  "evening. 30 mL 0    insulin aspart U-100 (NOVOLOG FLEXPEN U-100 INSULIN) 100 unit/mL (3 mL) InPn pen Inject 15 Units into the skin 3 (three) times daily with meals. 45 mL 10    lancets 30 gauge Misc by Misc.(Non-Drug; Combo Route) route 4 (four) times daily. 400 each 1    losartan (COZAAR) 25 MG tablet Take 1 tablet (25 mg total) by mouth once daily. 90 tablet 3    metFORMIN (GLUCOPHAGE) 1000 MG tablet Take 1 tablet (1,000 mg total) by mouth 2 (two) times daily with meals. 180 tablet 4    nitroGLYCERIN (NITROSTAT) 0.4 MG SL tablet Place 1 tablet (0.4 mg total) under the tongue every 5 (five) minutes as needed for Chest pain. 25 tablet 3    pen needle, diabetic (BD ULTRA-FINE SINA PEN NEEDLE) 32 gauge x 5/32" Ndle Use four times daily for insulin administration 400 each 3    potassium chloride (KLOR-CON) 10 MEQ TbSR TAKE ONE TABLET BY MOUTH ONCE DAILY TO INCREASE POTASSIUM 30 tablet 0    tadalafiL (CIALIS) 20 MG Tab Take 1 tablet (20 mg total) by mouth once daily. 30 tablet 11    zolpidem (AMBIEN) 5 MG Tab TAKE 1 TABLET BY MOUTH EVERY NIGHT AS NEEDED 90 tablet 3     No current facility-administered medications for this visit.       Review of patient's allergies indicates:  No Known Allergies      Objective:       Vitals:    02/06/24 0836   BP: 124/66   Pulse: 68       Physical Exam  Constitutional:       Appearance: He is well-developed.   HENT:      Head: Normocephalic and atraumatic.      Right Ear: External ear normal.      Left Ear: External ear normal.      Nose: Nose normal.      Mouth/Throat:      Pharynx: No oropharyngeal exudate.   Eyes:      General: No scleral icterus.        Right eye: No discharge.         Left eye: No discharge.      Conjunctiva/sclera: Conjunctivae normal.      Pupils: Pupils are equal, round, and reactive to light.   Neck:      Thyroid: No thyromegaly.      Vascular: No JVD.      Trachea: No tracheal deviation.   Cardiovascular:      Rate and Rhythm: Normal rate and regular rhythm.    "   Heart sounds: Normal heart sounds. No murmur heard.     No friction rub. No gallop.   Pulmonary:      Effort: Pulmonary effort is normal. No respiratory distress.      Breath sounds: Normal breath sounds. No stridor. No wheezing or rales.   Chest:      Chest wall: No tenderness.   Abdominal:      General: Bowel sounds are normal. There is no distension.      Palpations: Abdomen is soft. There is no mass.      Tenderness: There is no abdominal tenderness. There is no guarding or rebound.      Hernia: No hernia is present.   Musculoskeletal:         General: No swelling or tenderness. Normal range of motion.      Cervical back: Normal range of motion and neck supple.      Comments: Left knee swelling, warm and TTP.   Lymphadenopathy:      Cervical: No cervical adenopathy.   Skin:     General: Skin is warm and dry.      Coloration: Skin is not pale.      Findings: No erythema or rash.   Neurological:      Mental Status: He is alert and oriented to person, place, and time.      Cranial Nerves: No cranial nerve deficit.      Motor: No abnormal muscle tone.      Coordination: Coordination normal.      Deep Tendon Reflexes: Reflexes are normal and symmetric. Reflexes normal.   Psychiatric:         Behavior: Behavior normal.         Thought Content: Thought content normal.         Judgment: Judgment normal.         Assessment:       Problem List Items Addressed This Visit       Type 2 diabetes mellitus with diabetic neuropathy    Hypertension associated with diabetes    Dyslipidemia associated with type 2 diabetes mellitus    Coronary artery disease of native artery of native heart with stable angina pectoris    Chronic combined systolic and diastolic congestive heart failure    Chronic combined systolic and diastolic CHF (congestive heart failure)     Other Visit Diagnoses       Neuropathy    -  Primary    Numbness and tingling - left shoulder                Plan:           Clifton was seen today for shoulder  pain.    Diagnoses and all orders for this visit:    Neuropathy    Numbness and tingling - left shoulder    Hypertension associated with diabetes    Type 2 diabetes mellitus with diabetic neuropathy, unspecified whether long term insulin use    Coronary artery disease of native artery of native heart with stable angina pectoris    Dyslipidemia associated with type 2 diabetes mellitus    Chronic combined systolic and diastolic congestive heart failure    Chronic combined systolic and diastolic CHF (congestive heart failure)    Neuropathy/cervical spine impingement  -increase neurontin and increase dose to 600 mgTID  -non improvement may need CT c spine    DM II controlled/hyperglycemia  - improving since started insulin  -lantus and novolog to continue  -strict diet control        HTN  -controlled    HLD  -controlled    Combined CHF  -follows cardiology  -on external defibrillator    Neuropathy  -increase neurontin and increase dose to 600 mgTID    Obesity  -diet and exercise as tolerated    chronic gout  -uric acid levels  -conchicine as needed    Spent adequate time in obtaining history and explaining differentials    40 minutes spent during this visit of which greater than 50% devoted to face-face counseling and coordination of care regarding diagnosis and management plan    Follow up in about 5 months (around 7/8/2024), or if symptoms worsen or fail to improve.

## 2024-02-24 ENCOUNTER — CLINICAL SUPPORT (OUTPATIENT)
Dept: CARDIOLOGY | Facility: HOSPITAL | Age: 74
End: 2024-02-24
Attending: INTERNAL MEDICINE
Payer: MEDICARE

## 2024-02-24 ENCOUNTER — CLINICAL SUPPORT (OUTPATIENT)
Dept: CARDIOLOGY | Facility: HOSPITAL | Age: 74
End: 2024-02-24
Payer: MEDICARE

## 2024-02-24 DIAGNOSIS — Z95.810 PRESENCE OF AUTOMATIC (IMPLANTABLE) CARDIAC DEFIBRILLATOR: ICD-10-CM

## 2024-02-24 DIAGNOSIS — I25.5 ISCHEMIC CARDIOMYOPATHY: ICD-10-CM

## 2024-02-24 PROCEDURE — 93295 DEV INTERROG REMOTE 1/2/MLT: CPT | Mod: ,,, | Performed by: INTERNAL MEDICINE

## 2024-02-24 PROCEDURE — 93296 REM INTERROG EVL PM/IDS: CPT | Performed by: INTERNAL MEDICINE

## 2024-02-27 DIAGNOSIS — E11.40 TYPE 2 DIABETES MELLITUS WITH DIABETIC NEUROPATHY, WITH LONG-TERM CURRENT USE OF INSULIN: ICD-10-CM

## 2024-02-27 DIAGNOSIS — E11.9 DIABETES MELLITUS TYPE 2 WITHOUT RETINOPATHY: ICD-10-CM

## 2024-02-27 DIAGNOSIS — Z79.4 TYPE 2 DIABETES MELLITUS WITH DIABETIC NEUROPATHY, WITH LONG-TERM CURRENT USE OF INSULIN: ICD-10-CM

## 2024-02-27 RX ORDER — INSULIN GLARGINE 100 [IU]/ML
35 INJECTION, SOLUTION SUBCUTANEOUS NIGHTLY
Qty: 30 ML | Refills: 1 | Status: SHIPPED | OUTPATIENT
Start: 2024-02-27

## 2024-02-27 NOTE — TELEPHONE ENCOUNTER
Provider Staff:  Action required for this patient    Requires labs      Please see care gap opportunities below in Care Due Message.    Thanks!  [facility]     Appointments      Date Provider   Last Visit   2/6/2024 Britton Grissom MD   Next Visit   7/8/2024 Britton Grissom MD     Refill Decision Note   Clifton Wilson  is requesting a refill authorization.  Brief Assessment and Rationale for Refill:  Approve     Medication Therapy Plan:         Comments:     Note composed:8:19 AM 02/27/2024

## 2024-02-27 NOTE — TELEPHONE ENCOUNTER
Care Due:                  Date            Visit Type   Department     Provider  --------------------------------------------------------------------------------                                EP -                              [facility]    Britton Claudio  Last Visit: 02-      CARE ([facility])   MEDICINE       Jaciel                              [facility]  Next Visit: 07-      CARE ([facility])   MEDICINE       Jaciel                                                            Last  Test          Frequency    Reason                     Performed    Due Date  --------------------------------------------------------------------------------    Lipid Panel.  12 months..  atorvastatin.............  05- 04-    Uric Acid...  12 months..  colchicine...............  09- 09-    [facility] Embedded Care Due Messages. Reference number: [id number].   2/27/2024 7:38:48 AM CST

## 2024-02-28 LAB
OHS CV DC REMOTE DEVICE TYPE: NORMAL
OHS CV RV PACING PERCENT: 0 %

## 2024-04-14 DIAGNOSIS — E11.40 TYPE 2 DIABETES MELLITUS WITH DIABETIC NEUROPATHY, WITH LONG-TERM CURRENT USE OF INSULIN: ICD-10-CM

## 2024-04-14 DIAGNOSIS — E11.9 DIABETES MELLITUS TYPE 2 WITHOUT RETINOPATHY: ICD-10-CM

## 2024-04-14 DIAGNOSIS — G62.9 NEUROPATHY: ICD-10-CM

## 2024-04-14 DIAGNOSIS — Z79.4 TYPE 2 DIABETES MELLITUS WITH DIABETIC NEUROPATHY, WITH LONG-TERM CURRENT USE OF INSULIN: ICD-10-CM

## 2024-04-14 RX ORDER — GABAPENTIN 300 MG/1
600 CAPSULE ORAL 2 TIMES DAILY
Qty: 360 CAPSULE | Refills: 1 | Status: SHIPPED | OUTPATIENT
Start: 2024-04-14

## 2024-04-14 RX ORDER — INSULIN ASPART 100 [IU]/ML
15 INJECTION, SOLUTION INTRAVENOUS; SUBCUTANEOUS
Qty: 45 ML | Refills: 0 | Status: SHIPPED | OUTPATIENT
Start: 2024-04-14

## 2024-04-14 NOTE — TELEPHONE ENCOUNTER
No care due was identified.  [facility] Embedded Care Due Messages. Reference number: [id number].   4/14/2024 7:17:21 AM CDT

## 2024-04-14 NOTE — TELEPHONE ENCOUNTER
Care Due:                  Date            Visit Type   Department     Provider  --------------------------------------------------------------------------------                                EP [facility]    Britton Dumontkimmie  Last Visit: 02-      CARE ([facility])   MEDICINE       Jaciel                              [facility]  Next Visit: 07-      CARE ([facility])   MEDICINE       Jaciel                                                            Last  Test          Frequency    Reason                     Performed    Due Date  --------------------------------------------------------------------------------    HBA1C.......  6 months...  insulin, metFORMIN.......  01- 07-    [facility] Embedded Care Due Messages. Reference number: [id number].   4/14/2024 7:16:51 AM CDT

## 2024-04-15 RX ORDER — DEXTROSE 4 G
TABLET,CHEWABLE ORAL
Qty: 1 EACH | Refills: 0 | Status: SHIPPED | OUTPATIENT
Start: 2024-04-15

## 2024-04-15 NOTE — TELEPHONE ENCOUNTER
Refill Routing Note   Refill Routing Note   Medication(s) are not appropriate for processing by [facility] for the following reason(s):        Outside of protocol    [facility] action(s):  Approve  Route      Medication Therapy Plan: ROUTE: GABAPENTIN NOT DELEGATED      Appointments  past 12m or future 3m with PCP    Date Provider   Last Visit   2/6/2024 Britton Grissom MD   Next Visit   7/8/2024 Britton Grissom MD   ED visits in past 90 days: 0        Note composed:10:57 PM 04/14/2024

## 2024-04-25 ENCOUNTER — OFFICE VISIT (OUTPATIENT)
Dept: CARDIOLOGY | Facility: CLINIC | Age: 74
End: 2024-04-25
Payer: MEDICARE

## 2024-04-25 VITALS
SYSTOLIC BLOOD PRESSURE: 100 MMHG | DIASTOLIC BLOOD PRESSURE: 70 MMHG | WEIGHT: 237.44 LBS | BODY MASS INDEX: 33.24 KG/M2 | HEIGHT: 71 IN | HEART RATE: 64 BPM

## 2024-04-25 DIAGNOSIS — E78.5 DYSLIPIDEMIA: ICD-10-CM

## 2024-04-25 DIAGNOSIS — Z95.810 ICD (IMPLANTABLE CARDIOVERTER-DEFIBRILLATOR), SINGLE, IN SITU: ICD-10-CM

## 2024-04-25 DIAGNOSIS — E11.69 DYSLIPIDEMIA ASSOCIATED WITH TYPE 2 DIABETES MELLITUS: ICD-10-CM

## 2024-04-25 DIAGNOSIS — E78.5 DYSLIPIDEMIA ASSOCIATED WITH TYPE 2 DIABETES MELLITUS: ICD-10-CM

## 2024-04-25 DIAGNOSIS — I50.42 CHRONIC COMBINED SYSTOLIC AND DIASTOLIC CHF (CONGESTIVE HEART FAILURE): ICD-10-CM

## 2024-04-25 DIAGNOSIS — I50.42 CHRONIC COMBINED SYSTOLIC AND DIASTOLIC CONGESTIVE HEART FAILURE: Primary | ICD-10-CM

## 2024-04-25 PROCEDURE — 93005 ELECTROCARDIOGRAM TRACING: CPT | Mod: S$GLB,,, | Performed by: INTERNAL MEDICINE

## 2024-04-25 PROCEDURE — 3051F HG A1C>EQUAL 7.0%<8.0%: CPT | Mod: CPTII,S$GLB,, | Performed by: INTERNAL MEDICINE

## 2024-04-25 PROCEDURE — 4010F ACE/ARB THERAPY RXD/TAKEN: CPT | Mod: CPTII,S$GLB,, | Performed by: INTERNAL MEDICINE

## 2024-04-25 PROCEDURE — 3008F BODY MASS INDEX DOCD: CPT | Mod: CPTII,S$GLB,, | Performed by: INTERNAL MEDICINE

## 2024-04-25 PROCEDURE — 1126F AMNT PAIN NOTED NONE PRSNT: CPT | Mod: CPTII,S$GLB,, | Performed by: INTERNAL MEDICINE

## 2024-04-25 PROCEDURE — 3288F FALL RISK ASSESSMENT DOCD: CPT | Mod: CPTII,S$GLB,, | Performed by: INTERNAL MEDICINE

## 2024-04-25 PROCEDURE — 3074F SYST BP LT 130 MM HG: CPT | Mod: CPTII,S$GLB,, | Performed by: INTERNAL MEDICINE

## 2024-04-25 PROCEDURE — 3078F DIAST BP <80 MM HG: CPT | Mod: CPTII,S$GLB,, | Performed by: INTERNAL MEDICINE

## 2024-04-25 PROCEDURE — 1160F RVW MEDS BY RX/DR IN RCRD: CPT | Mod: CPTII,S$GLB,, | Performed by: INTERNAL MEDICINE

## 2024-04-25 PROCEDURE — 99999 PR PBB SHADOW E&M-EST. PATIENT-LVL IV: CPT | Mod: PBBFAC,,, | Performed by: INTERNAL MEDICINE

## 2024-04-25 PROCEDURE — 1159F MED LIST DOCD IN RCRD: CPT | Mod: CPTII,S$GLB,, | Performed by: INTERNAL MEDICINE

## 2024-04-25 PROCEDURE — 93010 ELECTROCARDIOGRAM REPORT: CPT | Mod: S$GLB,,, | Performed by: INTERNAL MEDICINE

## 2024-04-25 PROCEDURE — 1101F PT FALLS ASSESS-DOCD LE1/YR: CPT | Mod: CPTII,S$GLB,, | Performed by: INTERNAL MEDICINE

## 2024-04-25 PROCEDURE — 99214 OFFICE O/P EST MOD 30 MIN: CPT | Mod: S$GLB,,, | Performed by: INTERNAL MEDICINE

## 2024-04-25 NOTE — PROGRESS NOTES
Subjective:   Patient ID:  Clifton Wilson is a 73 y.o. male who presents for follow-up of ICM  HPI:  Mr. Wilson is a 73 y.o.  male with HTN, HLD, DM, CAD (NSTEMI 2011 and 2015), ICM (EF 25->45%), and ICD (2017) here for annual follow up.     In hospital post PCI, had NSVT. Was given a LifeVest.  Subsequent LVEF 39%, leading to EPS, which was negative (therefore no ICD then).  Since, has recently had months of persistently low LVEF.   Again referred by Dr Clement for ICD, due to LVEF 25% with ICM.  At his last office visit (04/10/17), Mr. Wilson reported experiencing occasional .   Echo 12/15: 35-40%. 4/16: 25-40%.  3/17 25%  2/16 nuclear ETT neg  Mr. Wilson underwent successful ICD (06/06/17) without complication.   Required a new RV lead 11/2019. Capped old lead. No problems since.    LVEF has improved to 45% (2021, -23)!  ICD: good function. 0% RVP.    I have personally reviewed the patient's EKG today, which shows sinus rhythm and RBBB.      Current Outpatient Medications   Medication Sig Dispense Refill    alcohol swabs (BD ALCOHOL SWABS) PadM USE FOUR TIMES DAILY 400 each 3    aspirin (ECOTRIN) 81 MG EC tablet Take 81 mg by mouth once daily.      atorvastatin (LIPITOR) 40 MG tablet Take 1 tablet (40 mg total) by mouth once daily. 90 tablet 3    blood glucose control high,low (ACCU-CHEK CATHRYN CONTROL SOLN) Soln Use as directed to check blood sugar 1 each 1    blood sugar diagnostic Strp Use to test four times daily 400 each 11    blood sugar diagnostic Strp test blood sugar as directed four times daily 100 each 3    blood-glucose meter (ACCU-CHEK CATHRYN PLUS METER) Misc USE AS DIRECTED 1 each 0    blood-glucose meter (TRUE METRIX GLUCOSE METER) Misc use as directed 1 each 0    carvediloL (COREG) 12.5 MG tablet Take 1 tablet (12.5 mg total) by mouth 2 (two) times daily. 180 tablet 3    clopidogreL (PLAVIX) 75 mg tablet Take 1 tablet (75 mg total) by mouth once daily. 90 tablet 3    colchicine (MITIGARE)  "0.6 mg Cap Take 1 capsule (0.6 mg total) by mouth once daily. 90 capsule 3    cyanocobalamin (VITAMIN B-12) 1000 MCG tablet TAKE ONE TABLET BY MOUTH ONCE DAILY FOR VITAMIN DEFICIENCIES      furosemide (LASIX) 20 MG tablet Take 1 tablet (20 mg total) by mouth 2 (two) times daily. 180 tablet 3    gabapentin (NEURONTIN) 300 MG capsule Take 2 capsules (600 mg total) by mouth 2 (two) times daily. 360 capsule 1    ibuprofen (ADVIL,MOTRIN) 600 MG tablet Take 1 tablet (600 mg total) by mouth every 6 (six) hours as needed for Pain. 20 tablet 0    insulin (LANTUS SOLOSTAR U-100 INSULIN) glargine 100 units/mL SubQ pen Inject 35 Units into the skin every evening. 30 mL 1    insulin aspart U-100 (NOVOLOG FLEXPEN U-100 INSULIN) 100 unit/mL (3 mL) InPn pen Inject 15 Units into the skin 3 (three) times daily with meals. 45 mL 0    lancets 30 gauge Misc by Misc.(Non-Drug; Combo Route) route 4 (four) times daily. 400 each 1    lancets 30 gauge Misc usea s directed to check blood glucose four times daily 100 each 3    losartan (COZAAR) 25 MG tablet Take 1 tablet (25 mg total) by mouth once daily. 90 tablet 3    metFORMIN (GLUCOPHAGE) 1000 MG tablet Take 1 tablet (1,000 mg total) by mouth 2 (two) times daily with meals. 180 tablet 4    nitroGLYCERIN (NITROSTAT) 0.4 MG SL tablet Place 1 tablet (0.4 mg total) under the tongue every 5 (five) minutes as needed for Chest pain. 25 tablet 3    pen needle, diabetic (BD ULTRA-FINE SINA PEN NEEDLE) 32 gauge x 5/32" Ndle Use four times daily for insulin administration 400 each 3    zolpidem (AMBIEN) 5 MG Tab TAKE 1 TABLET BY MOUTH EVERY NIGHT AS NEEDED 90 tablet 3     No current facility-administered medications for this visit.       Review of Systems   Constitutional: Negative. Negative for malaise/fatigue.   HENT: Negative.  Negative for ear pain and tinnitus.    Eyes:  Negative for blurred vision.   Cardiovascular: Negative.  Negative for chest pain, dyspnea on exertion, irregular heartbeat, " "leg swelling, near-syncope, palpitations and syncope.   Respiratory: Negative.  Negative for shortness of breath.    Endocrine: Negative.  Negative for polyuria.   Hematologic/Lymphatic: Negative for bleeding problem. Does not bruise/bleed easily.   Skin: Negative.  Negative for rash.   Musculoskeletal: Negative.  Negative for joint pain, muscle weakness and myalgias.   Gastrointestinal: Negative.  Negative for abdominal pain, change in bowel habit, hematemesis, hematochezia and nausea.   Genitourinary:  Negative for frequency and hematuria.   Neurological: Negative.  Negative for dizziness, light-headedness and weakness.   Psychiatric/Behavioral: Negative.  Negative for altered mental status and depression. The patient is not nervous/anxious.    Allergic/Immunologic: Negative for environmental allergies and persistent infections.         Objective:          /70   Pulse 64   Ht 5' 11" (1.803 m)   Wt 107.7 kg (237 lb 7 oz)   BMI 33.12 kg/m²     Physical Exam  Vitals and nursing note reviewed.   Constitutional:       Appearance: Normal appearance. He is well-developed.   HENT:      Head: Normocephalic and atraumatic.      Nose: Nose normal.   Eyes:      General: Lids are normal. No scleral icterus.     Conjunctiva/sclera: Conjunctivae normal.      Pupils: Pupils are equal, round, and reactive to light.   Neck:      Thyroid: No thyromegaly.      Vascular: No JVD.      Trachea: No tracheal deviation.   Cardiovascular:      Rate and Rhythm: Normal rate and regular rhythm. No extrasystoles are present.     Chest Wall: PMI is not displaced.      Pulses:           Radial pulses are 2+ on the right side and 2+ on the left side.      Heart sounds: S1 normal and S2 normal.   Pulmonary:      Effort: Pulmonary effort is normal. No tachypnea, accessory muscle usage or respiratory distress.      Breath sounds: No wheezing.   Abdominal:      General: There is no distension.   Musculoskeletal:         General: Normal range " of motion.      Cervical back: Normal range of motion.   Skin:     General: Skin is warm and dry.      Findings: No erythema or rash.   Neurological:      Mental Status: He is alert and oriented to person, place, and time.      Motor: No abnormal muscle tone.      Deep Tendon Reflexes: Reflexes are normal and symmetric.   Psychiatric:         Speech: Speech normal.         Behavior: Behavior normal.           Assessment:     1. Chronic combined systolic and diastolic congestive heart failure    2. ICD (implantable cardioverter-defibrillator), single, in situ    3. Chronic combined systolic and diastolic CHF (congestive heart failure)    4. Dyslipidemia associated with type 2 diabetes mellitus    5. Dyslipidemia      Plan:     In summary, Mr. Wilson is a 73 y.o. male with HTN, HLD, DM, CAD (NSTEMI 2011 and 2015), ICM (EF 35-40%), and ICD (2017) here for  follow up.     ICD doing well.  Return in 1 year, or earlier prn.

## 2024-04-26 LAB
OHS QRS DURATION: 146 MS
OHS QTC CALCULATION: 464 MS

## 2024-05-15 DIAGNOSIS — E11.40 TYPE 2 DIABETES MELLITUS WITH DIABETIC NEUROPATHY, UNSPECIFIED WHETHER LONG TERM INSULIN USE: ICD-10-CM

## 2024-05-15 DIAGNOSIS — E11.9 TYPE 2 DIABETES MELLITUS WITHOUT COMPLICATION: ICD-10-CM

## 2024-05-15 RX ORDER — METFORMIN HYDROCHLORIDE 1000 MG/1
1000 TABLET ORAL 2 TIMES DAILY WITH MEALS
Qty: 180 TABLET | Refills: 4 | Status: CANCELLED | OUTPATIENT
Start: 2024-05-15

## 2024-05-15 RX ORDER — METFORMIN HYDROCHLORIDE 1000 MG/1
1000 TABLET ORAL 2 TIMES DAILY WITH MEALS
Qty: 180 TABLET | Refills: 4 | Status: SHIPPED | OUTPATIENT
Start: 2024-05-15

## 2024-05-15 NOTE — TELEPHONE ENCOUNTER
No care due was identified.  [facility] Embedded Care Due Messages. Reference number: [id number].   5/15/2024 3:09:23 PM CDT

## 2024-05-15 NOTE — TELEPHONE ENCOUNTER
Care Due:                  Date            Visit Type   Department     Provider  --------------------------------------------------------------------------------                                EP -                              [facility]    Britton Claudio  Last Visit: 02-      CARE ([facility])   MEDICINE       Jaciel                               [facility]  Next Visit: 07-      CARE ([facility])   MEDICINE       Jaciel                                                            Last  Test          Frequency    Reason                     Performed    Due Date  --------------------------------------------------------------------------------    Lipid Panel.  12 months..  atorvastatin.............  05- 04-    Uric Acid...  12 months..  colchicine...............  09- 09-    [facility] Embedded Care Due Messages. Reference number: [id number].   5/15/2024 12:36:57 PM CDT

## 2024-05-22 ENCOUNTER — TELEPHONE (OUTPATIENT)
Dept: ELECTROPHYSIOLOGY | Facility: CLINIC | Age: 74
End: 2024-05-22
Payer: MEDICARE

## 2024-05-22 NOTE — TELEPHONE ENCOUNTER
Spoke with patient re: disconnected [facility]- instructed him to send manual transmission and he was agreeable.

## 2024-05-25 ENCOUNTER — CLINICAL SUPPORT (OUTPATIENT)
Dept: CARDIOLOGY | Facility: HOSPITAL | Age: 74
End: 2024-05-25
Payer: MEDICARE

## 2024-05-25 ENCOUNTER — CLINICAL SUPPORT (OUTPATIENT)
Dept: CARDIOLOGY | Facility: HOSPITAL | Age: 74
End: 2024-05-25
Attending: INTERNAL MEDICINE
Payer: MEDICARE

## 2024-05-25 DIAGNOSIS — Z95.810 PRESENCE OF AUTOMATIC (IMPLANTABLE) CARDIAC DEFIBRILLATOR: ICD-10-CM

## 2024-05-25 DIAGNOSIS — I25.5 ISCHEMIC CARDIOMYOPATHY: ICD-10-CM

## 2024-05-25 PROCEDURE — 93296 REM INTERROG EVL PM/IDS: CPT | Performed by: INTERNAL MEDICINE

## 2024-05-25 PROCEDURE — 93295 DEV INTERROG REMOTE 1/2/MLT: CPT | Mod: ,,, | Performed by: INTERNAL MEDICINE

## 2024-05-31 LAB
OHS CV DC REMOTE DEVICE TYPE: NORMAL
OHS CV RV PACING PERCENT: 0 %

## 2024-06-12 DIAGNOSIS — I50.41 ACUTE COMBINED SYSTOLIC AND DIASTOLIC CONGESTIVE HEART FAILURE: ICD-10-CM

## 2024-06-12 DIAGNOSIS — I10 ESSENTIAL HYPERTENSION: ICD-10-CM

## 2024-06-12 DIAGNOSIS — E78.5 HYPERLIPIDEMIA, UNSPECIFIED HYPERLIPIDEMIA TYPE: ICD-10-CM

## 2024-06-12 DIAGNOSIS — I34.0 MITRAL VALVE INSUFFICIENCY, UNSPECIFIED ETIOLOGY: ICD-10-CM

## 2024-06-12 RX ORDER — FUROSEMIDE 20 MG/1
20 TABLET ORAL 2 TIMES DAILY
Qty: 180 TABLET | Refills: 2 | Status: SHIPPED | OUTPATIENT
Start: 2024-06-12

## 2024-06-12 RX ORDER — CARVEDILOL 12.5 MG/1
12.5 TABLET ORAL 2 TIMES DAILY
Qty: 180 TABLET | Refills: 2 | Status: SHIPPED | OUTPATIENT
Start: 2024-06-12

## 2024-06-12 RX ORDER — ATORVASTATIN CALCIUM 40 MG/1
40 TABLET, FILM COATED ORAL DAILY
Qty: 90 TABLET | Refills: 0 | Status: SHIPPED | OUTPATIENT
Start: 2024-06-12

## 2024-06-12 NOTE — TELEPHONE ENCOUNTER
Refill Routing Note   Medication(s) are not appropriate for processing by [facility] for the following reason(s):        Required labs outdated  Drug-disease interaction: carvediloL and Acute combined systolic and diastolic congestive heart failure: furosemide and Hypokalemia    [facility] action(s):  Defer             Pharmacist review requested: Yes     Appointments  past 12m or future 3m with PCP    Date Provider   Last Visit   2/6/2024 Britton Grissom MD   Next Visit   7/8/2024 Britton Grissom MD   ED visits in past 90 days: 0        Note composed:10:28 AM 06/12/2024

## 2024-06-12 NOTE — TELEPHONE ENCOUNTER
No care due was identified.  [facility] Embedded Care Due Messages. Reference number: [id number].   6/12/2024 10:00:28 AM CDT

## 2024-06-13 NOTE — TELEPHONE ENCOUNTER
Refill Routing Note   Medication(s) are not appropriate for processing by [facility] for the following reason(s):        Required labs outdated    [facility] action(s):  Defer  Approve      Medication Therapy Plan: Drug-Disease interaction for furosemide and carvedilol reviewed, ok to fill. labs outdated for atorvastatin    Pharmacist review requested: Yes     Appointments  past 12m or future 3m with PCP    Date Provider   Last Visit   2/6/2024 Britton Grissom MD   Next Visit   7/8/2024 Britton Grissom MD   ED visits in past 90 days: 0        Note composed:10:06 PM 06/12/2024

## 2024-07-08 ENCOUNTER — OFFICE VISIT (OUTPATIENT)
Dept: FAMILY MEDICINE | Facility: CLINIC | Age: 74
End: 2024-07-08
Attending: FAMILY MEDICINE
Payer: MEDICARE

## 2024-07-08 ENCOUNTER — HOSPITAL ENCOUNTER (OUTPATIENT)
Dept: RADIOLOGY | Facility: HOSPITAL | Age: 74
Discharge: HOME | End: 2024-07-08
Attending: FAMILY MEDICINE
Payer: MEDICARE

## 2024-07-08 VITALS
HEART RATE: 70 BPM | SYSTOLIC BLOOD PRESSURE: 102 MMHG | HEIGHT: 71 IN | BODY MASS INDEX: 32.1 KG/M2 | DIASTOLIC BLOOD PRESSURE: 54 MMHG | WEIGHT: 229.25 LBS | OXYGEN SATURATION: 98 %

## 2024-07-08 DIAGNOSIS — I50.42 CHRONIC COMBINED SYSTOLIC AND DIASTOLIC CONGESTIVE HEART FAILURE: ICD-10-CM

## 2024-07-08 DIAGNOSIS — Z95.810 ICD (IMPLANTABLE CARDIOVERTER-DEFIBRILLATOR), SINGLE, IN SITU: ICD-10-CM

## 2024-07-08 DIAGNOSIS — R05.9 COUGH, UNSPECIFIED TYPE: Primary | ICD-10-CM

## 2024-07-08 DIAGNOSIS — U07.1 COVID-19: ICD-10-CM

## 2024-07-08 DIAGNOSIS — I50.42 CHRONIC COMBINED SYSTOLIC AND DIASTOLIC CHF (CONGESTIVE HEART FAILURE): ICD-10-CM

## 2024-07-08 DIAGNOSIS — R05.9 COUGH, UNSPECIFIED TYPE: ICD-10-CM

## 2024-07-08 DIAGNOSIS — Z79.4 INSULIN DEPENDENT TYPE 2 DIABETES MELLITUS: ICD-10-CM

## 2024-07-08 DIAGNOSIS — E78.5 DYSLIPIDEMIA ASSOCIATED WITH TYPE 2 DIABETES MELLITUS: ICD-10-CM

## 2024-07-08 DIAGNOSIS — Z12.5 ENCOUNTER FOR SCREENING FOR MALIGNANT NEOPLASM OF PROSTATE: ICD-10-CM

## 2024-07-08 DIAGNOSIS — G62.9 NEUROPATHY: ICD-10-CM

## 2024-07-08 DIAGNOSIS — E11.40 TYPE 2 DIABETES MELLITUS WITH DIABETIC NEUROPATHY, UNSPECIFIED WHETHER LONG TERM INSULIN USE: ICD-10-CM

## 2024-07-08 DIAGNOSIS — E11.9 INSULIN DEPENDENT TYPE 2 DIABETES MELLITUS: ICD-10-CM

## 2024-07-08 DIAGNOSIS — I25.118 CORONARY ARTERY DISEASE OF NATIVE ARTERY OF NATIVE HEART WITH STABLE ANGINA PECTORIS: ICD-10-CM

## 2024-07-08 DIAGNOSIS — I15.2 HYPERTENSION ASSOCIATED WITH DIABETES: ICD-10-CM

## 2024-07-08 DIAGNOSIS — E11.59 HYPERTENSION ASSOCIATED WITH DIABETES: ICD-10-CM

## 2024-07-08 DIAGNOSIS — E11.69 DYSLIPIDEMIA ASSOCIATED WITH TYPE 2 DIABETES MELLITUS: ICD-10-CM

## 2024-07-08 LAB
CTP QC/QA: YES
CTP QC/QA: YES
S PYO RRNA THROAT QL PROBE: NEGATIVE
SARS-COV-2 AG RESP QL IA.RAPID: POSITIVE

## 2024-07-08 PROCEDURE — 1126F AMNT PAIN NOTED NONE PRSNT: CPT | Mod: HCNC,CPTII,S$GLB, | Performed by: FAMILY MEDICINE

## 2024-07-08 PROCEDURE — 87880 STREP A ASSAY W/OPTIC: CPT | Mod: QW,HCNC,S$GLB, | Performed by: FAMILY MEDICINE

## 2024-07-08 PROCEDURE — 3008F BODY MASS INDEX DOCD: CPT | Mod: HCNC,CPTII,S$GLB, | Performed by: FAMILY MEDICINE

## 2024-07-08 PROCEDURE — 1160F RVW MEDS BY RX/DR IN RCRD: CPT | Mod: HCNC,CPTII,S$GLB, | Performed by: FAMILY MEDICINE

## 2024-07-08 PROCEDURE — 99215 OFFICE O/P EST HI 40 MIN: CPT | Mod: HCNC,S$GLB,, | Performed by: FAMILY MEDICINE

## 2024-07-08 PROCEDURE — 71046 X-RAY EXAM CHEST 2 VIEWS: CPT | Mod: 26,HCNC,, | Performed by: RADIOLOGY

## 2024-07-08 PROCEDURE — 1159F MED LIST DOCD IN RCRD: CPT | Mod: HCNC,CPTII,S$GLB, | Performed by: FAMILY MEDICINE

## 2024-07-08 PROCEDURE — 3078F DIAST BP <80 MM HG: CPT | Mod: HCNC,CPTII,S$GLB, | Performed by: FAMILY MEDICINE

## 2024-07-08 PROCEDURE — 4010F ACE/ARB THERAPY RXD/TAKEN: CPT | Mod: HCNC,CPTII,S$GLB, | Performed by: FAMILY MEDICINE

## 2024-07-08 PROCEDURE — 3288F FALL RISK ASSESSMENT DOCD: CPT | Mod: HCNC,CPTII,S$GLB, | Performed by: FAMILY MEDICINE

## 2024-07-08 PROCEDURE — 99999 PR PBB SHADOW E&M-EST. PATIENT-LVL IV: CPT | Mod: PBBFAC,HCNC,, | Performed by: FAMILY MEDICINE

## 2024-07-08 PROCEDURE — 87635 SARS-COV-2 COVID-19 AMP PRB: CPT | Mod: QW,HCNC,S$GLB, | Performed by: FAMILY MEDICINE

## 2024-07-08 PROCEDURE — 3074F SYST BP LT 130 MM HG: CPT | Mod: HCNC,CPTII,S$GLB, | Performed by: FAMILY MEDICINE

## 2024-07-08 PROCEDURE — 71046 X-RAY EXAM CHEST 2 VIEWS: CPT | Mod: TC,HCNC,FY

## 2024-07-08 PROCEDURE — 3051F HG A1C>EQUAL 7.0%<8.0%: CPT | Mod: HCNC,CPTII,S$GLB, | Performed by: FAMILY MEDICINE

## 2024-07-08 PROCEDURE — 1101F PT FALLS ASSESS-DOCD LE1/YR: CPT | Mod: HCNC,CPTII,S$GLB, | Performed by: FAMILY MEDICINE

## 2024-07-08 RX ORDER — PROMETHAZINE HYDROCHLORIDE AND DEXTROMETHORPHAN HYDROBROMIDE 6.25; 15 MG/5ML; MG/5ML
5 SYRUP ORAL 2 TIMES DAILY PRN
Qty: 180 ML | Refills: 1 | Status: SHIPPED | OUTPATIENT
Start: 2024-07-08

## 2024-07-08 NOTE — PROGRESS NOTES
Subjective:       Patient ID: Clifton Wilson is a 73 y.o. male.    Chief Complaint: Follow-up    73 yr old pleasant white male with DM II, HTN, HLD, CAD, Combined chronic CHF, MR, obesity, neuropathy, presents today for follow up and c/o cough x 3-4 days. No fever or chills. + travel      DM II - controlled  -HBA1C                   7.6 (H)             01/08/2024                                                      - on metformin and insulin and sugars improving - UTD eye exam - foot exam UTD - on ASA and plavix. Losartan. He ate too much jamie cake during mardi gras.      HTN - chronic - controlled - on losartan, lasix - compliant - no side effects      HLD - chronic -      LDLCALC                  66.6                05/03/2023                                            - controlled -       CAD/combined CHF - EF 35-40% - on external defibrillator - medical management - on ASA/plavix/ARB - no symptoms - following cardiology    Gout - improving - uses colchicine for flare up -     History as below - reviewed            Follow-up  Associated symptoms include arthralgias, joint swelling and myalgias. Pertinent negatives include no chest pain, congestion, coughing, diaphoresis, fatigue, headaches, neck pain, rash, visual change, vomiting or weakness.   Shoulder Pain   Pertinent negatives include no headaches. There is no history of diabetes.   Diabetes  He presents for his follow-up diabetic visit. He has type 2 diabetes mellitus. No MedicAlert identification noted. The initial diagnosis of diabetes was made 27 years ago. His disease course has been worsening. Hypoglycemia symptoms include sleepiness. Pertinent negatives for hypoglycemia include no confusion, dizziness, headaches, hunger, mood changes, nervousness/anxiousness, pallor, seizures, speech difficulty, sweats or tremors. Associated symptoms include foot paresthesias. Pertinent negatives for diabetes include no blurred vision, no chest pain, no fatigue, no  foot ulcerations, no polydipsia, no polyphagia, no polyuria, no visual change, no weakness and no weight loss. Hypoglycemia complications include blackouts and hospitalization. Pertinent negatives for hypoglycemia complications include no nocturnal hypoglycemia, no required assistance and no required glucagon injection. Symptoms are stable. Diabetic complications include autonomic neuropathy, heart disease, impotence and peripheral neuropathy. Pertinent negatives for diabetic complications include no CVA, nephropathy, PVD or retinopathy. Risk factors for coronary artery disease include hypertension, obesity, diabetes mellitus and male sex. Current diabetic treatment includes diet, insulin injections and oral agent (monotherapy). He is compliant with treatment all of the time. He is currently taking insulin pre-breakfast, pre-lunch, pre-dinner and at bedtime. Insulin injections are given by patient. Rotation sites for injection include the abdominal wall, arms and thighs. His weight is fluctuating minimally. He is following a diabetic, generally healthy, low fat/cholesterol and low salt diet. Meal planning includes avoidance of concentrated sweets and carbohydrate counting. He has not had a previous visit with a dietitian. He participates in exercise three times a week. He monitors blood glucose at home 3-4 x per day. He monitors urine at home <1 x per month. Blood glucose monitoring compliance is excellent. His home blood glucose trend is decreasing steadily. An ACE inhibitor/angiotensin II receptor blocker is being taken. He does not see a podiatrist.Eye exam is current.   Knee Pain     Swelling  This is a chronic problem. The current episode started more than 1 month ago. The problem occurs intermittently. The problem has been gradually worsening. Associated symptoms include arthralgias, joint swelling and myalgias. Pertinent negatives include no chest pain, congestion, coughing, diaphoresis, fatigue, headaches,  neck pain, rash, visual change, vomiting or weakness.   Hypertension  This is a chronic problem. The current episode started more than 1 year ago. The problem has been gradually improving since onset. The problem is controlled. Pertinent negatives include no blurred vision, chest pain, headaches, malaise/fatigue, neck pain, palpitations, peripheral edema, PND or sweats. Risk factors for coronary artery disease include diabetes mellitus, dyslipidemia, male gender and obesity. Past treatments include angiotensin blockers and diuretics. The current treatment provides significant improvement. There are no compliance problems.  Hypertensive end-organ damage includes CAD/MI and heart failure. There is no history of CVA, left ventricular hypertrophy, PVD or retinopathy. There is no history of chronic renal disease, hypercortisolism, hyperparathyroidism, pheochromocytoma, renovascular disease or a thyroid problem.   Hyperlipidemia  This is a chronic problem. The current episode started more than 1 year ago. The problem is controlled. Recent lipid tests were reviewed and are normal. Exacerbating diseases include obesity. He has no history of chronic renal disease or diabetes. There are no known factors aggravating his hyperlipidemia. Associated symptoms include myalgias. Pertinent negatives include no chest pain or focal sensory loss. Current antihyperlipidemic treatment includes statins. The current treatment provides moderate improvement of lipids. There are no compliance problems.  Risk factors for coronary artery disease include diabetes mellitus, dyslipidemia, male sex, hypertension and obesity.     Review of Systems   Constitutional: Negative.  Negative for activity change, diaphoresis, fatigue, malaise/fatigue, unexpected weight change and weight loss.   HENT: Negative.  Negative for nasal congestion, ear pain, mouth sores, rhinorrhea and voice change.    Eyes: Negative.  Negative for blurred vision, pain, discharge  and visual disturbance.   Respiratory: Negative.  Negative for apnea, cough and wheezing.    Cardiovascular: Negative.  Negative for chest pain, palpitations and PND.   Gastrointestinal: Negative.  Negative for abdominal distention, anal bleeding, diarrhea and vomiting.   Endocrine: Negative.  Negative for cold intolerance, polydipsia, polyphagia and polyuria.   Genitourinary:  Positive for impotence. Negative for decreased urine volume, difficulty urinating, discharge, frequency and scrotal swelling.   Musculoskeletal:  Positive for arthralgias, joint swelling and myalgias. Negative for back pain, neck pain and neck stiffness.   Integumentary:  Negative for color change, pallor and rash. Negative.   Allergic/Immunologic: Negative.  Negative for environmental allergies.   Neurological: Negative.  Negative for dizziness, tremors, seizures, speech difficulty, weakness, light-headedness and headaches.   Hematological: Negative.    Psychiatric/Behavioral: Negative.  Negative for agitation, confusion, dysphoric mood and suicidal ideas. The patient is not nervous/anxious.          PMH/PSH/FH/SH/MED/ALLERGY reviewed    Past Medical History:   Diagnosis Date    Anticoagulant long-term use     Cardiomyopathy     CHF (congestive heart failure)     Coronary artery disease     Diabetes mellitus     Gout     Heart attack     Hypertension     Pneumonia        Past Surgical History:   Procedure Laterality Date    APPENDECTOMY      CARDIAC CATHETERIZATION      7 stents    CARDIAC DEFIBRILLATOR PLACEMENT      CATARACT EXTRACTION Bilateral 2011    COLONOSCOPY N/A 8/10/2018    Procedure: COLONOSCOPY golytely;  Surgeon: Valentine Burger MD;  Location: [facility] ENDO;  Service: Endoscopy;  Laterality: N/A;    EYE SURGERY      REVISION OF IMPLANTABLE CARDIOVERTER-DEFIBRILLATOR (ICD) ELECTRODE LEAD PLACEMENT N/A 11/11/2019    Procedure: REVISION, INSERTION, ELECTRODE LEAD, ICD;  Surgeon: Shukri Walsh MD;  Location: [facility] EP LAB;  Service:  Cardiology;  Laterality: N/A;  Lead malfxn, RV lead ICD, SJM, anes, DM, 3 PREP       Family History   Problem Relation Name Age of Onset    Heart disease Mother      Heart disease Father      Amblyopia Neg Hx      Blindness Neg Hx      Cataracts Neg Hx      Glaucoma Neg Hx      Macular degeneration Neg Hx      Retinal detachment Neg Hx      Strabismus Neg Hx         Social History     Socioeconomic History    Marital status:    Tobacco Use    Smoking status: Former    Smokeless tobacco: Never   Substance and Sexual Activity    Alcohol use: Yes     Comment: socially    Drug use: No    Sexual activity: Yes     Social Determinants of Health     Financial Resource Strain: Low Risk  (7/6/2023)    Overall Financial Resource Strain (CARDIA)     Difficulty of Paying Living Expenses: Not hard at all   Food Insecurity: No Food Insecurity (7/6/2023)    Hunger Vital Sign     Worried About Running Out of Food in the Last Year: Never true     Ran Out of Food in the Last Year: Never true   Transportation Needs: No Transportation Needs (7/6/2023)    PRAPARE - Transportation     Lack of Transportation (Medical): No     Lack of Transportation (Non-Medical): No   Physical Activity: Inactive (3/29/2023)    Exercise Vital Sign     Days of Exercise per Week: 0 days     Minutes of Exercise per Session: 0 min   Stress: No Stress Concern Present (3/29/2023)    [employer] - Occupational Stress Questionnaire     Feeling of Stress : Not at all   Housing Stability: Low Risk  (7/6/2023)    Housing Stability Vital Sign     Unable to Pay for Housing in the Last Year: No     Number of Places Lived in the Last Year: 1     Unstable Housing in the Last Year: No       Current Outpatient Medications   Medication Sig Dispense Refill    alcohol swabs (BD ALCOHOL SWABS) PadM USE FOUR TIMES DAILY 400 each 3    aspirin (ECOTRIN) 81 MG EC tablet Take 81 mg by mouth once daily.      atorvastatin (LIPITOR) 40 MG tablet Take  "1 tablet (40 mg total) by mouth once daily. 90 tablet 0    carvediloL (COREG) 12.5 MG tablet Take 1 tablet (12.5 mg total) by mouth 2 (two) times daily. 180 tablet 2    clopidogreL (PLAVIX) 75 mg tablet Take 1 tablet (75 mg total) by mouth once daily. 90 tablet 3    colchicine (MITIGARE) 0.6 mg Cap Take 1 capsule (0.6 mg total) by mouth once daily. 90 capsule 3    cyanocobalamin (VITAMIN B-12) 1000 MCG tablet TAKE ONE TABLET BY MOUTH ONCE DAILY FOR VITAMIN DEFICIENCIES      furosemide (LASIX) 20 MG tablet Take 1 tablet (20 mg total) by mouth 2 (two) times daily. 180 tablet 2    gabapentin (NEURONTIN) 300 MG capsule Take 2 capsules (600 mg total) by mouth 2 (two) times daily. 360 capsule 1    ibuprofen (ADVIL,MOTRIN) 600 MG tablet Take 1 tablet (600 mg total) by mouth every 6 (six) hours as needed for Pain. 20 tablet 0    insulin aspart U-100 (NOVOLOG FLEXPEN U-100 INSULIN) 100 unit/mL (3 mL) InPn pen Inject 15 Units into the skin 3 (three) times daily with meals. 45 mL 0    insulin glargine U-100, Lantus, (LANTUS SOLOSTAR U-100 INSULIN) 100 unit/mL (3 mL) InPn pen Inject 35 Units into the skin every evening. 30 mL 1    losartan (COZAAR) 25 MG tablet Take 1 tablet (25 mg total) by mouth once daily. 90 tablet 3    metFORMIN (GLUCOPHAGE) 1000 MG tablet Take 1 tablet (1,000 mg total) by mouth 2 (two) times daily with meals. 180 tablet 4    nitroGLYCERIN (NITROSTAT) 0.4 MG SL tablet Place 1 tablet (0.4 mg total) under the tongue every 5 (five) minutes as needed for Chest pain. 25 tablet 3    pen needle, diabetic (BD ULTRA-FINE SINA PEN NEEDLE) 32 gauge x 5/32" Ndle Use four times daily for insulin administration 400 each 3    zolpidem (AMBIEN) 5 MG Tab TAKE 1 TABLET BY MOUTH EVERY NIGHT AS NEEDED 90 tablet 3    blood glucose control high,low (ACCU-CHEK CATHRYN CONTROL SOLN) Soln Use as directed to check blood sugar 1 each 1    blood sugar diagnostic Strp Use to test four times daily 400 each 11    blood sugar diagnostic " Strp test blood sugar as directed four times daily 100 each 3    blood-glucose meter (ACCU-CHEK CATHRYN PLUS METER) Misc USE AS DIRECTED 1 each 0    blood-glucose meter (TRUE METRIX GLUCOSE METER) Misc use as directed 1 each 0    lancets 30 gauge Misc by Misc.(Non-Drug; Combo Route) route 4 (four) times daily. 400 each 1    lancets 30 gauge Misc usea s directed to check blood glucose four times daily 100 each 3    promethazine-dextromethorphan (PROMETHAZINE-DM) 6.25-15 mg/5 mL Syrp Take 5 mLs by mouth 2 (two) times daily as needed (cough). 180 mL 1     No current facility-administered medications for this visit.       Review of patient's allergies indicates:  No Known Allergies      Objective:       Vitals:    07/08/24 0924   BP: (!) 102/54   Pulse: 70       Physical Exam  Constitutional:       Appearance: He is well-developed.   HENT:      Head: Normocephalic and atraumatic.      Right Ear: External ear normal.      Left Ear: External ear normal.      Nose: Nose normal.      Mouth/Throat:      Pharynx: No oropharyngeal exudate.   Eyes:      General: No scleral icterus.        Right eye: No discharge.         Left eye: No discharge.      Conjunctiva/sclera: Conjunctivae normal.      Pupils: Pupils are equal, round, and reactive to light.   Neck:      Thyroid: No thyromegaly.      Vascular: No JVD.      Trachea: No tracheal deviation.   Cardiovascular:      Rate and Rhythm: Normal rate and regular rhythm.      Heart sounds: Normal heart sounds. No murmur heard.     No friction rub. No gallop.   Pulmonary:      Effort: Pulmonary effort is normal. No respiratory distress.      Breath sounds: Normal breath sounds. No stridor. No wheezing or rales.   Chest:      Chest wall: No tenderness.   Abdominal:      General: Bowel sounds are normal. There is no distension.      Palpations: Abdomen is soft. There is no mass.      Tenderness: There is no abdominal tenderness. There is no guarding or rebound.      Hernia: No hernia is  present.   Musculoskeletal:         General: No swelling or tenderness. Normal range of motion.      Cervical back: Normal range of motion and neck supple.      Comments: Left knee swelling, warm and TTP.   Lymphadenopathy:      Cervical: No cervical adenopathy.   Skin:     General: Skin is warm and dry.      Coloration: Skin is not pale.      Findings: No erythema or rash.   Neurological:      Mental Status: He is alert and oriented to person, place, and time.      Cranial Nerves: No cranial nerve deficit.      Motor: No abnormal muscle tone.      Coordination: Coordination normal.      Deep Tendon Reflexes: Reflexes are normal and symmetric. Reflexes normal.   Psychiatric:         Behavior: Behavior normal.         Thought Content: Thought content normal.         Judgment: Judgment normal.         Assessment:       Problem List Items Addressed This Visit       Type 2 diabetes mellitus with diabetic neuropathy    Relevant Orders    CBC Auto Differential    Comprehensive Metabolic Panel    Lipid Panel    Hemoglobin A1C    PSA, Screening    Urinalysis    Microalbumin/Creatinine Ratio, Urine    Diabetes Digital Medicine (DDMP) Enrollment Order (Completed)    Insulin dependent type 2 diabetes mellitus    Relevant Orders    CBC Auto Differential    Comprehensive Metabolic Panel    Lipid Panel    Hemoglobin A1C    PSA, Screening    Urinalysis    Microalbumin/Creatinine Ratio, Urine    ICD (implantable cardioverter-defibrillator), single, in situ    Relevant Orders    CBC Auto Differential    Comprehensive Metabolic Panel    Lipid Panel    Hemoglobin A1C    PSA, Screening    Urinalysis    Microalbumin/Creatinine Ratio, Urine    Hypertension associated with diabetes    Relevant Orders    CBC Auto Differential    Comprehensive Metabolic Panel    Lipid Panel    Hemoglobin A1C    PSA, Screening    Urinalysis    Microalbumin/Creatinine Ratio, Urine    Hypertension Digital Medicine (HDMP) Enrollment Order (Completed)     Dyslipidemia associated with type 2 diabetes mellitus    Relevant Orders    CBC Auto Differential    Comprehensive Metabolic Panel    Lipid Panel    Hemoglobin A1C    PSA, Screening    Urinalysis    Microalbumin/Creatinine Ratio, Urine    Coronary artery disease of native artery of native heart with stable angina pectoris    Relevant Orders    CBC Auto Differential    Comprehensive Metabolic Panel    Lipid Panel    Hemoglobin A1C    PSA, Screening    Urinalysis    Microalbumin/Creatinine Ratio, Urine    Chronic combined systolic and diastolic congestive heart failure    Relevant Orders    CBC Auto Differential    Comprehensive Metabolic Panel    Lipid Panel    Hemoglobin A1C    PSA, Screening    Urinalysis    Microalbumin/Creatinine Ratio, Urine    Chronic combined systolic and diastolic CHF (congestive heart failure)    Relevant Orders    CBC Auto Differential    Comprehensive Metabolic Panel    Lipid Panel    Hemoglobin A1C    PSA, Screening    Urinalysis    Microalbumin/Creatinine Ratio, Urine     Other Visit Diagnoses       Cough, unspecified type    -  Primary    Relevant Medications    promethazine-dextromethorphan (PROMETHAZINE-DM) 6.25-15 mg/5 mL Syrp    Other Relevant Orders    CBC Auto Differential    Comprehensive Metabolic Panel    Lipid Panel    Hemoglobin A1C    PSA, Screening    Urinalysis    Microalbumin/Creatinine Ratio, Urine    X-Ray Chest PA And Lateral    SARS Coronavirus 2 Antigen, POCT    POCT Rapid Strep A    Neuropathy        Relevant Orders    CBC Auto Differential    Comprehensive Metabolic Panel    Lipid Panel    Hemoglobin A1C    PSA, Screening    Urinalysis    Microalbumin/Creatinine Ratio, Urine    Encounter for screening for malignant neoplasm of prostate        Relevant Orders    PSA, Screening    COVID-19                Plan:           Clifton was seen today for follow-up.    Diagnoses and all orders for this visit:    Cough, unspecified type  -     CBC Auto Differential; Future  -      Comprehensive Metabolic Panel; Future  -     Lipid Panel; Future  -     Hemoglobin A1C; Future  -     PSA, Screening; Future  -     Urinalysis; Future  -     Microalbumin/Creatinine Ratio, Urine; Future  -     X-Ray Chest PA And Lateral; Future  -     promethazine-dextromethorphan (PROMETHAZINE-DM) 6.25-15 mg/5 mL Syrp; Take 5 mLs by mouth 2 (two) times daily as needed (cough).  -     SARS Coronavirus 2 Antigen, POCT  -     POCT Rapid Strep A    Hypertension associated with diabetes  -     CBC Auto Differential; Future  -     Comprehensive Metabolic Panel; Future  -     Lipid Panel; Future  -     Hemoglobin A1C; Future  -     PSA, Screening; Future  -     Urinalysis; Future  -     Microalbumin/Creatinine Ratio, Urine; Future  -     Hypertension Digital Medicine (HDMP) Enrollment Order    Type 2 diabetes mellitus with diabetic neuropathy, unspecified whether long term insulin use  -     CBC Auto Differential; Future  -     Comprehensive Metabolic Panel; Future  -     Lipid Panel; Future  -     Hemoglobin A1C; Future  -     PSA, Screening; Future  -     Urinalysis; Future  -     Microalbumin/Creatinine Ratio, Urine; Future  -     Diabetes Digital Medicine (DDMP) Enrollment Order    Chronic combined systolic and diastolic congestive heart failure  -     CBC Auto Differential; Future  -     Comprehensive Metabolic Panel; Future  -     Lipid Panel; Future  -     Hemoglobin A1C; Future  -     PSA, Screening; Future  -     Urinalysis; Future  -     Microalbumin/Creatinine Ratio, Urine; Future    Coronary artery disease of native artery of native heart with stable angina pectoris  -     CBC Auto Differential; Future  -     Comprehensive Metabolic Panel; Future  -     Lipid Panel; Future  -     Hemoglobin A1C; Future  -     PSA, Screening; Future  -     Urinalysis; Future  -     Microalbumin/Creatinine Ratio, Urine; Future    Neuropathy  -     CBC Auto Differential; Future  -     Comprehensive Metabolic Panel; Future  -      Lipid Panel; Future  -     Hemoglobin A1C; Future  -     PSA, Screening; Future  -     Urinalysis; Future  -     Microalbumin/Creatinine Ratio, Urine; Future    ICD (implantable cardioverter-defibrillator), single, in situ  -     CBC Auto Differential; Future  -     Comprehensive Metabolic Panel; Future  -     Lipid Panel; Future  -     Hemoglobin A1C; Future  -     PSA, Screening; Future  -     Urinalysis; Future  -     Microalbumin/Creatinine Ratio, Urine; Future    Insulin dependent type 2 diabetes mellitus  -     CBC Auto Differential; Future  -     Comprehensive Metabolic Panel; Future  -     Lipid Panel; Future  -     Hemoglobin A1C; Future  -     PSA, Screening; Future  -     Urinalysis; Future  -     Microalbumin/Creatinine Ratio, Urine; Future    Chronic combined systolic and diastolic CHF (congestive heart failure)  -     CBC Auto Differential; Future  -     Comprehensive Metabolic Panel; Future  -     Lipid Panel; Future  -     Hemoglobin A1C; Future  -     PSA, Screening; Future  -     Urinalysis; Future  -     Microalbumin/Creatinine Ratio, Urine; Future    Dyslipidemia associated with type 2 diabetes mellitus  -     CBC Auto Differential; Future  -     Comprehensive Metabolic Panel; Future  -     Lipid Panel; Future  -     Hemoglobin A1C; Future  -     PSA, Screening; Future  -     Urinalysis; Future  -     Microalbumin/Creatinine Ratio, Urine; Future    Encounter for screening for malignant neoplasm of prostate  -     PSA, Screening; Future    COVID-19    Cough  -covid 19 positive  -paxlovid contraindicated as he is on plavix  -isolation x 5 days and cough med prn  -CXR    DM II controlled/hyperglycemia  - improving since started insulin  -lantus and novolog to continue  -strict diet control        HTN  -controlled    HLD  -controlled    Combined CHF  -follows cardiology  -on external defibrillator    Neuropathy  -increase neurontin and increase dose to 600 mgTID    Obesity  -diet and exercise as  tolerated    chronic gout  -uric acid levels  -conchicine as needed    Spent adequate time in obtaining history and explaining differentials    40 minutes spent during this visit of which greater than 50% devoted to face-face counseling and coordination of care regarding diagnosis and management plan      Rtc 6 m r prn

## 2024-07-09 ENCOUNTER — PATIENT MESSAGE (OUTPATIENT)
Dept: FAMILY MEDICINE | Facility: CLINIC | Age: 74
End: 2024-07-09
Payer: MEDICARE

## 2024-07-16 LAB
LEFT EYE DM RETINOPATHY: NEGATIVE
RIGHT EYE DM RETINOPATHY: POSITIVE

## 2024-07-17 ENCOUNTER — PATIENT MESSAGE (OUTPATIENT)
Dept: FAMILY MEDICINE | Facility: CLINIC | Age: 74
End: 2024-07-17
Payer: MEDICARE

## 2024-07-17 DIAGNOSIS — E11.40 TYPE 2 DIABETES MELLITUS WITH DIABETIC NEUROPATHY, WITH LONG-TERM CURRENT USE OF INSULIN: ICD-10-CM

## 2024-07-17 DIAGNOSIS — Z79.4 INSULIN DEPENDENT TYPE 2 DIABETES MELLITUS: ICD-10-CM

## 2024-07-17 DIAGNOSIS — Z79.4 TYPE 2 DIABETES MELLITUS WITH DIABETIC NEUROPATHY, WITH LONG-TERM CURRENT USE OF INSULIN: ICD-10-CM

## 2024-07-17 DIAGNOSIS — E11.40 TYPE 2 DIABETES MELLITUS WITH DIABETIC NEUROPATHY, UNSPECIFIED WHETHER LONG TERM INSULIN USE: Primary | ICD-10-CM

## 2024-07-17 DIAGNOSIS — E11.9 INSULIN DEPENDENT TYPE 2 DIABETES MELLITUS: ICD-10-CM

## 2024-07-17 DIAGNOSIS — E11.9 DIABETES MELLITUS TYPE 2 WITHOUT RETINOPATHY: ICD-10-CM

## 2024-07-17 RX ORDER — INSULIN ASPART 100 [IU]/ML
18 INJECTION, SOLUTION INTRAVENOUS; SUBCUTANEOUS
Qty: 45 ML | Refills: 5 | Status: SHIPPED | OUTPATIENT
Start: 2024-07-17

## 2024-07-17 RX ORDER — INSULIN GLARGINE 100 [IU]/ML
40 INJECTION, SOLUTION SUBCUTANEOUS NIGHTLY
Qty: 30 ML | Refills: 1 | Status: SHIPPED | OUTPATIENT
Start: 2024-07-17

## 2024-07-17 NOTE — TELEPHONE ENCOUNTER
Plz call n inform    DM uncontrolled - increasing lantus to 40 units daily and novolog to 18 units three times  day - he still has insulin so he can adjust and then [facility] also [facility]    Come for labs in 4-5 weeks CMP and A1C ordered

## 2024-08-07 DIAGNOSIS — E11.9 DIABETES MELLITUS WITHOUT COMPLICATION: ICD-10-CM

## 2024-08-07 DIAGNOSIS — R73.9 HYPERGLYCEMIA: ICD-10-CM

## 2024-08-07 DIAGNOSIS — E11.9 INSULIN DEPENDENT TYPE 2 DIABETES MELLITUS: ICD-10-CM

## 2024-08-07 DIAGNOSIS — E11.59 HYPERTENSION ASSOCIATED WITH DIABETES: ICD-10-CM

## 2024-08-07 DIAGNOSIS — E11.40 TYPE 2 DIABETES MELLITUS WITH DIABETIC NEUROPATHY, WITH LONG-TERM CURRENT USE OF INSULIN: ICD-10-CM

## 2024-08-07 DIAGNOSIS — Z79.4 TYPE 2 DIABETES MELLITUS WITH DIABETIC NEUROPATHY, WITH LONG-TERM CURRENT USE OF INSULIN: ICD-10-CM

## 2024-08-07 DIAGNOSIS — I15.2 HYPERTENSION ASSOCIATED WITH DIABETES: ICD-10-CM

## 2024-08-07 DIAGNOSIS — Z79.4 INSULIN DEPENDENT TYPE 2 DIABETES MELLITUS: ICD-10-CM

## 2024-08-07 DIAGNOSIS — E11.9 DIABETES MELLITUS TYPE 2 WITHOUT RETINOPATHY: ICD-10-CM

## 2024-08-07 DIAGNOSIS — F51.01 PRIMARY INSOMNIA: ICD-10-CM

## 2024-08-07 RX ORDER — PEN NEEDLE, DIABETIC 30 GX3/16"
NEEDLE, DISPOSABLE MISCELLANEOUS
Qty: 400 EACH | Refills: 3 | Status: CANCELLED | OUTPATIENT
Start: 2024-08-07

## 2024-08-07 RX ORDER — LOSARTAN POTASSIUM 25 MG/1
25 TABLET ORAL DAILY
Qty: 90 TABLET | Refills: 3 | Status: SHIPPED | OUTPATIENT
Start: 2024-08-07

## 2024-08-07 RX ORDER — INSULIN ASPART 100 [IU]/ML
18 INJECTION, SOLUTION INTRAVENOUS; SUBCUTANEOUS
Qty: 45 ML | Refills: 5 | Status: SHIPPED | OUTPATIENT
Start: 2024-08-07

## 2024-08-07 RX ORDER — ZOLPIDEM TARTRATE 5 MG/1
TABLET ORAL
Qty: 90 TABLET | Refills: 3 | Status: SHIPPED | OUTPATIENT
Start: 2024-08-07

## 2024-08-07 RX ORDER — CLOPIDOGREL BISULFATE 75 MG/1
75 TABLET ORAL DAILY
Qty: 90 TABLET | Refills: 3 | Status: SHIPPED | OUTPATIENT
Start: 2024-08-07

## 2024-08-07 RX ORDER — PEN NEEDLE, DIABETIC 30 GX3/16"
NEEDLE, DISPOSABLE MISCELLANEOUS
Qty: 400 EACH | Refills: 3 | Status: SHIPPED | OUTPATIENT
Start: 2024-08-07

## 2024-08-07 RX ORDER — LOSARTAN POTASSIUM 25 MG/1
25 TABLET ORAL DAILY
Qty: 90 TABLET | Refills: 3 | Status: CANCELLED | OUTPATIENT
Start: 2024-08-07

## 2024-08-07 RX ORDER — INSULIN GLARGINE 100 [IU]/ML
40 INJECTION, SOLUTION SUBCUTANEOUS NIGHTLY
Qty: 30 ML | Refills: 1 | Status: SHIPPED | OUTPATIENT
Start: 2024-08-07

## 2024-08-24 ENCOUNTER — CLINICAL SUPPORT (OUTPATIENT)
Dept: CARDIOLOGY | Facility: HOSPITAL | Age: 74
End: 2024-08-24
Payer: MEDICARE

## 2024-08-24 ENCOUNTER — CLINICAL SUPPORT (OUTPATIENT)
Dept: CARDIOLOGY | Facility: HOSPITAL | Age: 74
End: 2024-08-24
Attending: INTERNAL MEDICINE
Payer: MEDICARE

## 2024-08-24 DIAGNOSIS — Z95.810 PRESENCE OF AUTOMATIC (IMPLANTABLE) CARDIAC DEFIBRILLATOR: ICD-10-CM

## 2024-08-24 DIAGNOSIS — I25.5 ISCHEMIC CARDIOMYOPATHY: ICD-10-CM

## 2024-08-24 PROCEDURE — 93296 REM INTERROG EVL PM/IDS: CPT | Mod: HCNC | Performed by: INTERNAL MEDICINE

## 2024-08-24 PROCEDURE — 93295 DEV INTERROG REMOTE 1/2/MLT: CPT | Mod: HCNC,,, | Performed by: INTERNAL MEDICINE

## 2024-08-27 LAB
OHS CV DC REMOTE DEVICE TYPE: NORMAL
OHS CV RV PACING PERCENT: 0 %

## 2024-09-03 DIAGNOSIS — E78.5 HYPERLIPIDEMIA, UNSPECIFIED HYPERLIPIDEMIA TYPE: ICD-10-CM

## 2024-09-03 DIAGNOSIS — G62.9 NEUROPATHY: ICD-10-CM

## 2024-09-03 NOTE — TELEPHONE ENCOUNTER
No care due was identified.  [facility] Embedded Care Due Messages. Reference number: [id number].   9/03/2024 1:07:18 PM CDT

## 2024-09-04 RX ORDER — ATORVASTATIN CALCIUM 40 MG/1
40 TABLET, FILM COATED ORAL DAILY
Qty: 90 TABLET | Refills: 3 | Status: SHIPPED | OUTPATIENT
Start: 2024-09-04

## 2024-09-04 RX ORDER — GABAPENTIN 300 MG/1
600 CAPSULE ORAL 2 TIMES DAILY
Qty: 360 CAPSULE | Refills: 1 | Status: SHIPPED | OUTPATIENT
Start: 2024-09-04

## 2024-09-04 NOTE — TELEPHONE ENCOUNTER
Refill Routing Note   Medication(s) are not appropriate for processing by [facility] for the following reason(s):        Outside of protocol    [facility] action(s):  Approve  Route             Appointments  past 12m or future 3m with PCP    Date Provider   Last Visit   7/8/2024 Britton Grissom MD   Next Visit   1/7/2025 Britton Grissom MD   ED visits in past 90 days: 0        Note composed:10:49 AM 09/04/2024

## 2024-09-13 ENCOUNTER — PATIENT MESSAGE (OUTPATIENT)
Dept: FAMILY MEDICINE | Facility: CLINIC | Age: 74
End: 2024-09-13
Payer: MEDICARE

## 2024-09-13 DIAGNOSIS — I50.41 ACUTE COMBINED SYSTOLIC AND DIASTOLIC CONGESTIVE HEART FAILURE: ICD-10-CM

## 2024-09-13 DIAGNOSIS — I34.0 MITRAL VALVE INSUFFICIENCY, UNSPECIFIED ETIOLOGY: ICD-10-CM

## 2024-09-13 DIAGNOSIS — I10 ESSENTIAL HYPERTENSION: ICD-10-CM

## 2024-09-13 RX ORDER — FUROSEMIDE 40 MG/1
40 TABLET ORAL 2 TIMES DAILY
Qty: 60 TABLET | Refills: 2 | Status: SHIPPED | OUTPATIENT
Start: 2024-09-13

## 2024-09-18 ENCOUNTER — OFFICE VISIT (OUTPATIENT)
Dept: FAMILY MEDICINE | Facility: CLINIC | Age: 74
End: 2024-09-18
Attending: FAMILY MEDICINE
Payer: MEDICARE

## 2024-09-18 ENCOUNTER — LAB VISIT (OUTPATIENT)
Dept: LAB | Facility: HOSPITAL | Age: 74
End: 2024-09-18
Attending: FAMILY MEDICINE
Payer: MEDICARE

## 2024-09-18 VITALS
DIASTOLIC BLOOD PRESSURE: 62 MMHG | BODY MASS INDEX: 32.75 KG/M2 | HEIGHT: 71 IN | WEIGHT: 233.94 LBS | OXYGEN SATURATION: 98 % | HEART RATE: 67 BPM | SYSTOLIC BLOOD PRESSURE: 106 MMHG

## 2024-09-18 DIAGNOSIS — E11.40 TYPE 2 DIABETES MELLITUS WITH DIABETIC NEUROPATHY, WITH LONG-TERM CURRENT USE OF INSULIN: Primary | ICD-10-CM

## 2024-09-18 DIAGNOSIS — I34.0 MITRAL VALVE INSUFFICIENCY, UNSPECIFIED ETIOLOGY: ICD-10-CM

## 2024-09-18 DIAGNOSIS — Z79.4 TYPE 2 DIABETES MELLITUS WITH DIABETIC NEUROPATHY, WITH LONG-TERM CURRENT USE OF INSULIN: Primary | ICD-10-CM

## 2024-09-18 DIAGNOSIS — Z95.810 ICD (IMPLANTABLE CARDIOVERTER-DEFIBRILLATOR), SINGLE, IN SITU: ICD-10-CM

## 2024-09-18 DIAGNOSIS — E11.9 DIABETES MELLITUS TYPE 2 WITHOUT RETINOPATHY: ICD-10-CM

## 2024-09-18 DIAGNOSIS — I50.42 CHRONIC COMBINED SYSTOLIC AND DIASTOLIC CONGESTIVE HEART FAILURE: ICD-10-CM

## 2024-09-18 DIAGNOSIS — E11.40 TYPE 2 DIABETES MELLITUS WITH DIABETIC NEUROPATHY, WITH LONG-TERM CURRENT USE OF INSULIN: ICD-10-CM

## 2024-09-18 DIAGNOSIS — I10 ESSENTIAL HYPERTENSION: ICD-10-CM

## 2024-09-18 DIAGNOSIS — Z79.4 TYPE 2 DIABETES MELLITUS WITH DIABETIC NEUROPATHY, WITH LONG-TERM CURRENT USE OF INSULIN: ICD-10-CM

## 2024-09-18 DIAGNOSIS — E11.59 HYPERTENSION ASSOCIATED WITH DIABETES: ICD-10-CM

## 2024-09-18 DIAGNOSIS — E11.9 INSULIN DEPENDENT TYPE 2 DIABETES MELLITUS: ICD-10-CM

## 2024-09-18 DIAGNOSIS — I50.41 ACUTE COMBINED SYSTOLIC AND DIASTOLIC CONGESTIVE HEART FAILURE: ICD-10-CM

## 2024-09-18 DIAGNOSIS — I15.2 HYPERTENSION ASSOCIATED WITH DIABETES: ICD-10-CM

## 2024-09-18 DIAGNOSIS — Z79.4 INSULIN DEPENDENT TYPE 2 DIABETES MELLITUS: ICD-10-CM

## 2024-09-18 LAB
ALBUMIN SERPL BCP-MCNC: 3.8 G/DL (ref 3.5–5.2)
ALP SERPL-CCNC: 84 U/L (ref 55–135)
ALT SERPL W/O P-5'-P-CCNC: 15 U/L (ref 10–44)
ANION GAP SERPL CALC-SCNC: 10 MMOL/L (ref 8–16)
AST SERPL-CCNC: 13 U/L (ref 10–40)
BILIRUB SERPL-MCNC: 1 MG/DL (ref 0.1–1)
BUN SERPL-MCNC: 30 MG/DL (ref 8–23)
CALCIUM SERPL-MCNC: 9.3 MG/DL (ref 8.7–10.5)
CHLORIDE SERPL-SCNC: 106 MMOL/L (ref 95–110)
CO2 SERPL-SCNC: 23 MMOL/L (ref 23–29)
CREAT SERPL-MCNC: 1.4 MG/DL (ref 0.5–1.4)
EST. GFR  (NO RACE VARIABLE): 53 ML/MIN/1.73 M^2
ESTIMATED AVG GLUCOSE: 183 MG/DL (ref 68–131)
GLUCOSE SERPL-MCNC: 175 MG/DL (ref 70–110)
HBA1C MFR BLD: 8 % (ref 4–5.6)
POTASSIUM SERPL-SCNC: 4.2 MMOL/L (ref 3.5–5.1)
PROT SERPL-MCNC: 7 G/DL (ref 6–8.4)
SODIUM SERPL-SCNC: 139 MMOL/L (ref 136–145)

## 2024-09-18 PROCEDURE — 1101F PT FALLS ASSESS-DOCD LE1/YR: CPT | Mod: HCNC,CPTII,S$GLB, | Performed by: FAMILY MEDICINE

## 2024-09-18 PROCEDURE — 99215 OFFICE O/P EST HI 40 MIN: CPT | Mod: HCNC,S$GLB,, | Performed by: FAMILY MEDICINE

## 2024-09-18 PROCEDURE — 36415 COLL VENOUS BLD VENIPUNCTURE: CPT | Mod: HCNC | Performed by: FAMILY MEDICINE

## 2024-09-18 PROCEDURE — 3066F NEPHROPATHY DOC TX: CPT | Mod: HCNC,CPTII,S$GLB, | Performed by: FAMILY MEDICINE

## 2024-09-18 PROCEDURE — 99999 PR PBB SHADOW E&M-EST. PATIENT-LVL IV: CPT | Mod: PBBFAC,HCNC,, | Performed by: FAMILY MEDICINE

## 2024-09-18 PROCEDURE — 1160F RVW MEDS BY RX/DR IN RCRD: CPT | Mod: HCNC,CPTII,S$GLB, | Performed by: FAMILY MEDICINE

## 2024-09-18 PROCEDURE — 80053 COMPREHEN METABOLIC PANEL: CPT | Mod: HCNC | Performed by: FAMILY MEDICINE

## 2024-09-18 PROCEDURE — 3288F FALL RISK ASSESSMENT DOCD: CPT | Mod: HCNC,CPTII,S$GLB, | Performed by: FAMILY MEDICINE

## 2024-09-18 PROCEDURE — 3074F SYST BP LT 130 MM HG: CPT | Mod: HCNC,CPTII,S$GLB, | Performed by: FAMILY MEDICINE

## 2024-09-18 PROCEDURE — 83036 HEMOGLOBIN GLYCOSYLATED A1C: CPT | Mod: HCNC | Performed by: FAMILY MEDICINE

## 2024-09-18 PROCEDURE — 3008F BODY MASS INDEX DOCD: CPT | Mod: HCNC,CPTII,S$GLB, | Performed by: FAMILY MEDICINE

## 2024-09-18 PROCEDURE — 3078F DIAST BP <80 MM HG: CPT | Mod: HCNC,CPTII,S$GLB, | Performed by: FAMILY MEDICINE

## 2024-09-18 PROCEDURE — 1159F MED LIST DOCD IN RCRD: CPT | Mod: HCNC,CPTII,S$GLB, | Performed by: FAMILY MEDICINE

## 2024-09-18 PROCEDURE — 3061F NEG MICROALBUMINURIA REV: CPT | Mod: HCNC,CPTII,S$GLB, | Performed by: FAMILY MEDICINE

## 2024-09-18 PROCEDURE — 3052F HG A1C>EQUAL 8.0%<EQUAL 9.0%: CPT | Mod: HCNC,CPTII,S$GLB, | Performed by: FAMILY MEDICINE

## 2024-09-18 PROCEDURE — 4010F ACE/ARB THERAPY RXD/TAKEN: CPT | Mod: HCNC,CPTII,S$GLB, | Performed by: FAMILY MEDICINE

## 2024-09-18 RX ORDER — FUROSEMIDE 20 MG/1
20 TABLET ORAL 2 TIMES DAILY
Qty: 180 TABLET | Refills: 4 | Status: SHIPPED | OUTPATIENT
Start: 2024-09-18

## 2024-09-18 NOTE — PROGRESS NOTES
Subjective:       Patient ID: Clifton Wilson is a 73 y.o. male.    Chief Complaint: Edema (Hands and arms x 5 days, lasix helped some )    73 yr old pleasant white male with DM II, HTN, HLD, CAD, Combined chronic CHF, MR, obesity, neuropathy, presents today for diabetes and left shoulder numbness. He is already on gabapentin 600 BID. No pain or discomfort. No chest pain/SOB/palpitations.      DM II - controlled  -  HGBA1C                   8.5 (H)             07/08/2024                                                - on metformin and insulin and sugars improving - UTD eye exam - foot exam UTD - on ASA and plavix. Losartan. He ate too much jamie cake during mardi gras.      HTN - chronic - controlled - on losartan, lasix - compliant - no side effects      HLD - chronic -      LDLCALC                  66.6                05/03/2023                                            - controlled -       CAD/combined CHF - EF 35-40% - on external defibrillator - medical management - on ASA/plavix/ARB - no symptoms - following cardiology    Gout - improving - uses colchicine for flare up -     History as below - reviewed            Edema  Associated symptoms include arthralgias, joint swelling and myalgias. Pertinent negatives include no chest pain, congestion, coughing, diaphoresis, fatigue, headaches, neck pain, rash, visual change, vomiting or weakness.   Shoulder Pain   Pertinent negatives include no headaches. There is no history of diabetes.   Follow-up  Associated symptoms include arthralgias, joint swelling and myalgias. Pertinent negatives include no chest pain, congestion, coughing, diaphoresis, fatigue, headaches, neck pain, rash, visual change, vomiting or weakness.   Diabetes  He presents for his follow-up diabetic visit. He has type 2 diabetes mellitus. No MedicAlert identification noted. The initial diagnosis of diabetes was made 27 years ago. His disease course has been worsening. Hypoglycemia symptoms include  sleepiness. Pertinent negatives for hypoglycemia include no confusion, dizziness, headaches, hunger, mood changes, nervousness/anxiousness, pallor, seizures, speech difficulty, sweats or tremors. Associated symptoms include foot paresthesias. Pertinent negatives for diabetes include no blurred vision, no chest pain, no fatigue, no foot ulcerations, no polydipsia, no polyphagia, no polyuria, no visual change, no weakness and no weight loss. Hypoglycemia complications include blackouts and hospitalization. Pertinent negatives for hypoglycemia complications include no nocturnal hypoglycemia, no required assistance and no required glucagon injection. Symptoms are stable. Diabetic complications include autonomic neuropathy, heart disease, impotence and peripheral neuropathy. Pertinent negatives for diabetic complications include no CVA, nephropathy, PVD or retinopathy. Risk factors for coronary artery disease include hypertension, obesity, diabetes mellitus and male sex. Current diabetic treatment includes diet, insulin injections and oral agent (monotherapy). He is compliant with treatment all of the time. He is currently taking insulin pre-breakfast, pre-lunch, pre-dinner and at bedtime. Insulin injections are given by patient. Rotation sites for injection include the abdominal wall, arms and thighs. His weight is fluctuating minimally. He is following a diabetic, generally healthy, low fat/cholesterol and low salt diet. Meal planning includes avoidance of concentrated sweets and carbohydrate counting. He has not had a previous visit with a dietitian. He participates in exercise three times a week. He monitors blood glucose at home 3-4 x per day. He monitors urine at home <1 x per month. Blood glucose monitoring compliance is excellent. His home blood glucose trend is decreasing steadily. An ACE inhibitor/angiotensin II receptor blocker is being taken. He does not see a podiatrist.Eye exam is current.   Knee Pain      Swelling  This is a chronic problem. The current episode started more than 1 month ago. The problem occurs intermittently. The problem has been gradually worsening. Associated symptoms include arthralgias, joint swelling and myalgias. Pertinent negatives include no chest pain, congestion, coughing, diaphoresis, fatigue, headaches, neck pain, rash, visual change, vomiting or weakness.   Hypertension  This is a chronic problem. The current episode started more than 1 year ago. The problem has been gradually improving since onset. The problem is controlled. Pertinent negatives include no blurred vision, chest pain, headaches, malaise/fatigue, neck pain, palpitations, peripheral edema, PND or sweats. Risk factors for coronary artery disease include diabetes mellitus, dyslipidemia, male gender and obesity. Past treatments include angiotensin blockers and diuretics. The current treatment provides significant improvement. There are no compliance problems.  Hypertensive end-organ damage includes CAD/MI and heart failure. There is no history of CVA, left ventricular hypertrophy, PVD or retinopathy. There is no history of chronic renal disease, hypercortisolism, hyperparathyroidism, pheochromocytoma, renovascular disease or a thyroid problem.   Hyperlipidemia  This is a chronic problem. The current episode started more than 1 year ago. The problem is controlled. Recent lipid tests were reviewed and are normal. Exacerbating diseases include obesity. He has no history of chronic renal disease or diabetes. There are no known factors aggravating his hyperlipidemia. Associated symptoms include myalgias. Pertinent negatives include no chest pain or focal sensory loss. Current antihyperlipidemic treatment includes statins. The current treatment provides moderate improvement of lipids. There are no compliance problems.  Risk factors for coronary artery disease include diabetes mellitus, dyslipidemia, male sex, hypertension and  obesity.     Review of Systems   Constitutional: Negative.  Negative for activity change, diaphoresis, fatigue, malaise/fatigue, unexpected weight change and weight loss.   HENT: Negative.  Negative for nasal congestion, ear pain, mouth sores, rhinorrhea and voice change.    Eyes: Negative.  Negative for blurred vision, pain, discharge and visual disturbance.   Respiratory: Negative.  Negative for apnea, cough and wheezing.    Cardiovascular: Negative.  Negative for chest pain, palpitations and PND.   Gastrointestinal: Negative.  Negative for abdominal distention, anal bleeding, diarrhea and vomiting.   Endocrine: Negative.  Negative for cold intolerance, polydipsia, polyphagia and polyuria.   Genitourinary:  Positive for impotence. Negative for decreased urine volume, difficulty urinating, discharge, frequency and scrotal swelling.   Musculoskeletal:  Positive for arthralgias, joint swelling and myalgias. Negative for back pain, neck pain and neck stiffness.   Integumentary:  Negative for color change, pallor and rash. Negative.   Allergic/Immunologic: Negative.  Negative for environmental allergies.   Neurological: Negative.  Negative for dizziness, tremors, seizures, speech difficulty, weakness, light-headedness and headaches.   Hematological: Negative.    Psychiatric/Behavioral: Negative.  Negative for agitation, confusion, dysphoric mood and suicidal ideas. The patient is not nervous/anxious.          PMH/PSH/FH/SH/MED/ALLERGY reviewed    Past Medical History:   Diagnosis Date    Anticoagulant long-term use     Cardiomyopathy     CHF (congestive heart failure)     Coronary artery disease     Diabetes mellitus     Gout     Heart attack     Hypertension     Pneumonia        Past Surgical History:   Procedure Laterality Date    APPENDECTOMY      CARDIAC CATHETERIZATION      7 stents    CARDIAC DEFIBRILLATOR PLACEMENT      CATARACT EXTRACTION Bilateral 2011    COLONOSCOPY N/A 8/10/2018    Procedure: COLONOSCOPY  nicol;  Surgeon: Valentine Burger MD;  Location: [facility] ENDO;  Service: Endoscopy;  Laterality: N/A;    EYE SURGERY      REVISION OF IMPLANTABLE CARDIOVERTER-DEFIBRILLATOR (ICD) ELECTRODE LEAD PLACEMENT N/A 11/11/2019    Procedure: REVISION, INSERTION, ELECTRODE LEAD, ICD;  Surgeon: Shukri Walsh MD;  Location: [facility] EP LAB;  Service: Cardiology;  Laterality: N/A;  Lead malfxn, RV lead ICD, SJM, anes, DM, 3 PREP       Family History   Problem Relation Name Age of Onset    Heart disease Mother      Heart disease Father      Amblyopia Neg Hx      Blindness Neg Hx      Cataracts Neg Hx      Glaucoma Neg Hx      Macular degeneration Neg Hx      Retinal detachment Neg Hx      Strabismus Neg Hx         Social History     Socioeconomic History    Marital status:    Tobacco Use    Smoking status: Former    Smokeless tobacco: Never   Substance and Sexual Activity    Alcohol use: Yes     Comment: socially    Drug use: No    Sexual activity: Yes     Social Determinants of Health     Financial Resource Strain: Low Risk  (7/6/2023)    Overall Financial Resource Strain (CARDIA)     Difficulty of Paying Living Expenses: Not hard at all   Food Insecurity: No Food Insecurity (7/6/2023)    Hunger Vital Sign     Worried About Running Out of Food in the Last Year: Never true     Ran Out of Food in the Last Year: Never true   Transportation Needs: No Transportation Needs (7/6/2023)    PRAPARE - Transportation     Lack of Transportation (Medical): No     Lack of Transportation (Non-Medical): No   Physical Activity: Inactive (3/29/2023)    Exercise Vital Sign     Days of Exercise per Week: 0 days     Minutes of Exercise per Session: 0 min   Stress: No Stress Concern Present (3/29/2023)    [place] [employer] [place] [employer] - Occupational Stress Questionnaire     Feeling of Stress : Not at all   Housing Stability: Low Risk  (7/6/2023)    Housing Stability Vital Sign     Unable to Pay for Housing in the Last Year: No      "Number of Places Lived in the Last Year: 1     Unstable Housing in the Last Year: No       Current Outpatient Medications   Medication Sig Dispense Refill    alcohol swabs (BD ALCOHOL SWABS) PadM USE FOUR TIMES DAILY 400 each 3    aspirin (ECOTRIN) 81 MG EC tablet Take 81 mg by mouth once daily.      atorvastatin (LIPITOR) 40 MG tablet Take 1 tablet (40 mg total) by mouth once daily. 90 tablet 3    carvediloL (COREG) 12.5 MG tablet Take 1 tablet (12.5 mg total) by mouth 2 (two) times daily. 180 tablet 2    clopidogreL (PLAVIX) 75 mg tablet Take 1 tablet (75 mg total) by mouth once daily. 90 tablet 3    colchicine (MITIGARE) 0.6 mg Cap Take 1 capsule (0.6 mg total) by mouth once daily. 90 capsule 3    cyanocobalamin (VITAMIN B-12) 1000 MCG tablet TAKE ONE TABLET BY MOUTH ONCE DAILY FOR VITAMIN DEFICIENCIES      gabapentin (NEURONTIN) 300 MG capsule Take 2 capsules (600 mg total) by mouth 2 (two) times daily. 360 capsule 1    ibuprofen (ADVIL,MOTRIN) 600 MG tablet Take 1 tablet (600 mg total) by mouth every 6 (six) hours as needed for Pain. 20 tablet 0    insulin aspart U-100 (NOVOLOG FLEXPEN U-100 INSULIN) 100 unit/mL (3 mL) InPn pen Inject 18 Units into the skin 3 (three) times daily with meals. 45 mL 5    insulin glargine U-100, Lantus, (LANTUS SOLOSTAR U-100 INSULIN) 100 unit/mL (3 mL) InPn pen Inject 40 Units into the skin every evening. 30 mL 1    losartan (COZAAR) 25 MG tablet Take 1 tablet (25 mg total) by mouth once daily. 90 tablet 3    metFORMIN (GLUCOPHAGE) 1000 MG tablet Take 1 tablet (1,000 mg total) by mouth 2 (two) times daily with meals. 180 tablet 4    nitroGLYCERIN (NITROSTAT) 0.4 MG SL tablet Place 1 tablet (0.4 mg total) under the tongue every 5 (five) minutes as needed for Chest pain. 25 tablet 3    pen needle, diabetic (BD ULTRA-FINE SINA PEN NEEDLE) 32 gauge x 5/32" Ndle Use four times daily for insulin administration 400 each 3    promethazine-dextromethorphan (PROMETHAZINE-DM) 6.25-15 mg/5 mL " Syrp Take 5 mLs by mouth 2 (two) times daily as needed (cough). 180 mL 1    zolpidem (AMBIEN) 5 MG Tab TAKE 1 TABLET BY MOUTH EVERY NIGHT AS NEEDED 90 tablet 3    blood glucose control high,low (ACCU-CHEK CATHRYN CONTROL SOLN) Soln Use as directed to check blood sugar 1 each 1    blood sugar diagnostic Strp Use to test four times daily 400 each 11    blood sugar diagnostic Strp test blood sugar as directed four times daily 100 each 3    blood-glucose meter (ACCU-CHEK CATHRYN PLUS METER) Misc USE AS DIRECTED 1 each 0    blood-glucose meter (TRUE METRIX GLUCOSE METER) Misc use as directed 1 each 0    furosemide (LASIX) 20 MG tablet Take 1 tablet (20 mg total) by mouth 2 (two) times daily. 180 tablet 4    lancets 30 gauge Misc by Misc.(Non-Drug; Combo Route) route 4 (four) times daily. 400 each 1    lancets 30 gauge Misc usea s directed to check blood glucose four times daily 100 each 3     No current facility-administered medications for this visit.       Review of patient's allergies indicates:  No Known Allergies      Objective:       Vitals:    09/18/24 0825   BP: 106/62   Pulse: 67       Physical Exam  Constitutional:       Appearance: He is well-developed.   HENT:      Head: Normocephalic and atraumatic.      Right Ear: External ear normal.      Left Ear: External ear normal.      Nose: Nose normal.      Mouth/Throat:      Pharynx: No oropharyngeal exudate.   Eyes:      General: No scleral icterus.        Right eye: No discharge.         Left eye: No discharge.      Conjunctiva/sclera: Conjunctivae normal.      Pupils: Pupils are equal, round, and reactive to light.   Neck:      Thyroid: No thyromegaly.      Vascular: No JVD.      Trachea: No tracheal deviation.   Cardiovascular:      Rate and Rhythm: Normal rate and regular rhythm.      Heart sounds: Normal heart sounds. No murmur heard.     No friction rub. No gallop.   Pulmonary:      Effort: Pulmonary effort is normal. No respiratory distress.      Breath sounds:  Normal breath sounds. No stridor. No wheezing or rales.   Chest:      Chest wall: No tenderness.   Abdominal:      General: Bowel sounds are normal. There is no distension.      Palpations: Abdomen is soft. There is no mass.      Tenderness: There is no abdominal tenderness. There is no guarding or rebound.      Hernia: No hernia is present.   Musculoskeletal:         General: No swelling or tenderness. Normal range of motion.      Cervical back: Normal range of motion and neck supple.      Comments: Left knee swelling, warm and TTP.   Lymphadenopathy:      Cervical: No cervical adenopathy.   Skin:     General: Skin is warm and dry.      Coloration: Skin is not pale.      Findings: No erythema or rash.   Neurological:      Mental Status: He is alert and oriented to person, place, and time.      Cranial Nerves: No cranial nerve deficit.      Motor: No abnormal muscle tone.      Coordination: Coordination normal.      Deep Tendon Reflexes: Reflexes are normal and symmetric. Reflexes normal.   Psychiatric:         Behavior: Behavior normal.         Thought Content: Thought content normal.         Judgment: Judgment normal.         Assessment:       Problem List Items Addressed This Visit       Type 2 diabetes mellitus with diabetic neuropathy - Primary    Relevant Orders    Diabetes Digital Medicine (DDMP) Enrollment Order (Completed)    Mitral regurgitation    Relevant Medications    furosemide (LASIX) 20 MG tablet    Insulin dependent type 2 diabetes mellitus    ICD (implantable cardioverter-defibrillator), single, in situ    Hypertension associated with diabetes    Relevant Orders    Hypertension Digital Medicine (HDMP) Enrollment Order (Completed)    Essential hypertension    Relevant Medications    furosemide (LASIX) 20 MG tablet    Diabetes mellitus type 2 without retinopathy    Chronic combined systolic and diastolic congestive heart failure    Relevant Medications    furosemide (LASIX) 20 MG tablet     Other  Visit Diagnoses       Acute combined systolic and diastolic congestive heart failure        Relevant Medications    furosemide (LASIX) 20 MG tablet            Plan:           Clifton was seen today for edema.    Diagnoses and all orders for this visit:    Type 2 diabetes mellitus with diabetic neuropathy, with long-term current use of insulin  -     Diabetes Digital Medicine (DDMP) Enrollment Order    Diabetes mellitus type 2 without retinopathy    Insulin dependent type 2 diabetes mellitus    Hypertension associated with diabetes  -     Hypertension Digital Medicine (HDMP) Enrollment Order    Chronic combined systolic and diastolic congestive heart failure    ICD (implantable cardioverter-defibrillator), single, in situ    Essential hypertension  -     furosemide (LASIX) 20 MG tablet; Take 1 tablet (20 mg total) by mouth 2 (two) times daily.    Mitral valve insufficiency, unspecified etiology  -     furosemide (LASIX) 20 MG tablet; Take 1 tablet (20 mg total) by mouth 2 (two) times daily.    Acute combined systolic and diastolic congestive heart failure  -     furosemide (LASIX) 20 MG tablet; Take 1 tablet (20 mg total) by mouth 2 (two) times daily.    Neuropathy/cervical spine impingement  -increase neurontin and increase dose to 600 mgTID  -non improvement may need CT c spine    DM II controlled/hyperglycemia  - improving since started insulin  -lantus and novolog to continue  -strict diet control        HTN  -controlled    HLD  -controlled    Combined CHF  -follows cardiology  -on external defibrillator    Neuropathy  -increase neurontin and increase dose to 600 mgTID    Obesity  -diet and exercise as tolerated    chronic gout  -uric acid levels  -conchicine as needed    Spent adequate time in obtaining history and explaining differentials    40 minutes spent during this visit of which greater than 50% devoted to face-face counseling and coordination of care regarding diagnosis and management plan    Follow up  in about 3 months (around 12/18/2024), or if symptoms worsen or fail to improve.

## 2024-10-31 ENCOUNTER — PATIENT OUTREACH (OUTPATIENT)
Dept: ADMINISTRATIVE | Facility: HOSPITAL | Age: 74
End: 2024-10-31
Payer: MEDICARE

## 2024-11-08 ENCOUNTER — HOSPITAL ENCOUNTER (INPATIENT)
Facility: HOSPITAL | Age: 74
LOS: 2 days | Discharge: HOME | DRG: 287 | End: 2024-11-11
Attending: EMERGENCY MEDICINE | Admitting: FAMILY MEDICINE
Payer: MEDICARE

## 2024-11-08 ENCOUNTER — PATIENT OUTREACH (OUTPATIENT)
Dept: ADMINISTRATIVE | Facility: HOSPITAL | Age: 74
End: 2024-11-08
Payer: MEDICAID

## 2024-11-08 DIAGNOSIS — I20.0 UNSTABLE ANGINA: Primary | ICD-10-CM

## 2024-11-08 DIAGNOSIS — R07.9 CHEST PAIN: ICD-10-CM

## 2024-11-08 DIAGNOSIS — R07.89 CHEST DISCOMFORT: ICD-10-CM

## 2024-11-08 LAB
ALBUMIN SERPL BCP-MCNC: 3.8 G/DL (ref 3.5–5.2)
ALP SERPL-CCNC: 78 U/L (ref 40–150)
ALT SERPL W/O P-5'-P-CCNC: 21 U/L (ref 10–44)
ANION GAP SERPL CALC-SCNC: 8 MMOL/L (ref 8–16)
AST SERPL-CCNC: 14 U/L (ref 10–40)
BASOPHILS # BLD AUTO: 0.06 K/UL (ref 0–0.2)
BASOPHILS NFR BLD: 0.9 % (ref 0–1.9)
BILIRUB SERPL-MCNC: 1.1 MG/DL (ref 0.1–1)
BNP SERPL-MCNC: 138 PG/ML (ref 0–99)
BUN SERPL-MCNC: 14 MG/DL (ref 8–23)
CALCIUM SERPL-MCNC: 9.1 MG/DL (ref 8.7–10.5)
CHLORIDE SERPL-SCNC: 105 MMOL/L (ref 95–110)
CO2 SERPL-SCNC: 29 MMOL/L (ref 23–29)
CREAT SERPL-MCNC: 1.1 MG/DL (ref 0.5–1.4)
DIFFERENTIAL METHOD BLD: ABNORMAL
EOSINOPHIL # BLD AUTO: 0.3 K/UL (ref 0–0.5)
EOSINOPHIL NFR BLD: 4.5 % (ref 0–8)
ERYTHROCYTE [DISTWIDTH] IN BLOOD BY AUTOMATED COUNT: 12.2 % (ref 11.5–14.5)
EST. GFR  (NO RACE VARIABLE): >60 ML/MIN/1.73 M^2
GLUCOSE SERPL-MCNC: 118 MG/DL (ref 70–110)
HCT VFR BLD AUTO: 39.1 % (ref 40–54)
HGB BLD-MCNC: 13.4 G/DL (ref 14–18)
IMM GRANULOCYTES # BLD AUTO: 0.01 K/UL (ref 0–0.04)
IMM GRANULOCYTES NFR BLD AUTO: 0.1 % (ref 0–0.5)
LYMPHOCYTES # BLD AUTO: 2.1 K/UL (ref 1–4.8)
LYMPHOCYTES NFR BLD: 30.8 % (ref 18–48)
MCH RBC QN AUTO: 33 PG (ref 27–31)
MCHC RBC AUTO-ENTMCNC: 34.3 G/DL (ref 32–36)
MCV RBC AUTO: 96 FL (ref 82–98)
MONOCYTES # BLD AUTO: 0.9 K/UL (ref 0.3–1)
MONOCYTES NFR BLD: 13.1 % (ref 4–15)
NEUTROPHILS # BLD AUTO: 3.4 K/UL (ref 1.8–7.7)
NEUTROPHILS NFR BLD: 50.6 % (ref 38–73)
NRBC BLD-RTO: 0 /100 WBC
PLATELET # BLD AUTO: 175 K/UL (ref 150–450)
PMV BLD AUTO: 10.6 FL (ref 9.2–12.9)
POCT GLUCOSE: 130 MG/DL (ref 70–110)
POTASSIUM SERPL-SCNC: 3.8 MMOL/L (ref 3.5–5.1)
PROT SERPL-MCNC: 7.3 G/DL (ref 6–8.4)
RBC # BLD AUTO: 4.06 M/UL (ref 4.6–6.2)
SODIUM SERPL-SCNC: 142 MMOL/L (ref 136–145)
TROPONIN I SERPL DL<=0.01 NG/ML-MCNC: 0.01 NG/ML (ref 0–0.03)
TROPONIN I SERPL DL<=0.01 NG/ML-MCNC: 0.01 NG/ML (ref 0–0.03)
WBC # BLD AUTO: 6.73 K/UL (ref 3.9–12.7)

## 2024-11-08 PROCEDURE — 25000003 PHARM REV CODE 250

## 2024-11-08 PROCEDURE — G0378 HOSPITAL OBSERVATION PER HR: HCPCS

## 2024-11-08 PROCEDURE — 93010 ELECTROCARDIOGRAM REPORT: CPT | Mod: HCNC,,, | Performed by: INTERNAL MEDICINE

## 2024-11-08 PROCEDURE — 80053 COMPREHEN METABOLIC PANEL: CPT | Mod: HCNC | Performed by: PHYSICIAN ASSISTANT

## 2024-11-08 PROCEDURE — 99285 EMERGENCY DEPT VISIT HI MDM: CPT | Mod: 25,HCNC

## 2024-11-08 PROCEDURE — 83880 ASSAY OF NATRIURETIC PEPTIDE: CPT | Mod: HCNC | Performed by: PHYSICIAN ASSISTANT

## 2024-11-08 PROCEDURE — 93005 ELECTROCARDIOGRAM TRACING: CPT | Mod: HCNC

## 2024-11-08 PROCEDURE — 84484 ASSAY OF TROPONIN QUANT: CPT | Mod: 91,HCNC | Performed by: PHYSICIAN ASSISTANT

## 2024-11-08 PROCEDURE — 85025 COMPLETE CBC W/AUTO DIFF WBC: CPT | Mod: HCNC | Performed by: PHYSICIAN ASSISTANT

## 2024-11-08 RX ORDER — ENOXAPARIN SODIUM 100 MG/ML
40 INJECTION SUBCUTANEOUS EVERY 24 HOURS
Status: DISCONTINUED | OUTPATIENT
Start: 2024-11-08 | End: 2024-11-11 | Stop reason: HOSPADM

## 2024-11-08 RX ORDER — TALC
9 POWDER (GRAM) TOPICAL NIGHTLY PRN
Status: DISCONTINUED | OUTPATIENT
Start: 2024-11-08 | End: 2024-11-11 | Stop reason: HOSPADM

## 2024-11-08 RX ORDER — CLOPIDOGREL BISULFATE 75 MG/1
75 TABLET ORAL DAILY
Status: DISCONTINUED | OUTPATIENT
Start: 2024-11-09 | End: 2024-11-11 | Stop reason: HOSPADM

## 2024-11-08 RX ORDER — ONDANSETRON HYDROCHLORIDE 2 MG/ML
4 INJECTION, SOLUTION INTRAVENOUS EVERY 8 HOURS PRN
Status: DISCONTINUED | OUTPATIENT
Start: 2024-11-08 | End: 2024-11-11 | Stop reason: HOSPADM

## 2024-11-08 RX ORDER — AMOXICILLIN 250 MG
1 CAPSULE ORAL 2 TIMES DAILY PRN
Status: DISCONTINUED | OUTPATIENT
Start: 2024-11-08 | End: 2024-11-11 | Stop reason: HOSPADM

## 2024-11-08 RX ORDER — LIDOCAINE 50 MG/G
1 PATCH TOPICAL DAILY PRN
Status: DISCONTINUED | OUTPATIENT
Start: 2024-11-08 | End: 2024-11-11 | Stop reason: HOSPADM

## 2024-11-08 RX ORDER — GLUCAGON 1 MG
1 KIT INJECTION
Status: DISCONTINUED | OUTPATIENT
Start: 2024-11-08 | End: 2024-11-11 | Stop reason: HOSPADM

## 2024-11-08 RX ORDER — INSULIN GLARGINE 100 [IU]/ML
35 INJECTION, SOLUTION SUBCUTANEOUS NIGHTLY
Status: DISCONTINUED | OUTPATIENT
Start: 2024-11-08 | End: 2024-11-11 | Stop reason: HOSPADM

## 2024-11-08 RX ORDER — INSULIN ASPART 100 [IU]/ML
15 INJECTION, SOLUTION INTRAVENOUS; SUBCUTANEOUS
Status: DISCONTINUED | OUTPATIENT
Start: 2024-11-09 | End: 2024-11-11 | Stop reason: HOSPADM

## 2024-11-08 RX ORDER — LOSARTAN POTASSIUM 25 MG/1
25 TABLET ORAL DAILY
Status: DISCONTINUED | OUTPATIENT
Start: 2024-11-09 | End: 2024-11-11 | Stop reason: HOSPADM

## 2024-11-08 RX ORDER — ASPIRIN 81 MG/1
81 TABLET ORAL DAILY
Status: DISCONTINUED | OUTPATIENT
Start: 2024-11-09 | End: 2024-11-11 | Stop reason: HOSPADM

## 2024-11-08 RX ORDER — GABAPENTIN 300 MG/1
600 CAPSULE ORAL 2 TIMES DAILY
Status: DISCONTINUED | OUTPATIENT
Start: 2024-11-08 | End: 2024-11-11 | Stop reason: HOSPADM

## 2024-11-08 RX ORDER — INSULIN ASPART 100 [IU]/ML
1-10 INJECTION, SOLUTION INTRAVENOUS; SUBCUTANEOUS
Status: DISCONTINUED | OUTPATIENT
Start: 2024-11-08 | End: 2024-11-11 | Stop reason: HOSPADM

## 2024-11-08 RX ORDER — ASPIRIN 325 MG
325 TABLET ORAL DAILY
Status: DISCONTINUED | OUTPATIENT
Start: 2024-11-09 | End: 2024-11-08

## 2024-11-08 RX ORDER — IBUPROFEN 200 MG
24 TABLET ORAL
Status: DISCONTINUED | OUTPATIENT
Start: 2024-11-08 | End: 2024-11-11 | Stop reason: HOSPADM

## 2024-11-08 RX ORDER — CARVEDILOL 12.5 MG/1
12.5 TABLET ORAL 2 TIMES DAILY
Status: DISCONTINUED | OUTPATIENT
Start: 2024-11-08 | End: 2024-11-11 | Stop reason: HOSPADM

## 2024-11-08 RX ORDER — IBUPROFEN 200 MG
16 TABLET ORAL
Status: DISCONTINUED | OUTPATIENT
Start: 2024-11-08 | End: 2024-11-11 | Stop reason: HOSPADM

## 2024-11-08 RX ORDER — ASPIRIN 325 MG
325 TABLET ORAL ONCE
Status: DISCONTINUED | OUTPATIENT
Start: 2024-11-08 | End: 2024-11-11 | Stop reason: HOSPADM

## 2024-11-08 RX ORDER — ACETAMINOPHEN 325 MG/1
650 TABLET ORAL EVERY 8 HOURS PRN
Status: DISCONTINUED | OUTPATIENT
Start: 2024-11-08 | End: 2024-11-11 | Stop reason: HOSPADM

## 2024-11-08 RX ORDER — SODIUM CHLORIDE 0.9 % (FLUSH) 0.9 %
5 SYRINGE (ML) INJECTION
Status: DISCONTINUED | OUTPATIENT
Start: 2024-11-08 | End: 2024-11-11 | Stop reason: HOSPADM

## 2024-11-08 RX ORDER — ATORVASTATIN CALCIUM 40 MG/1
40 TABLET, FILM COATED ORAL DAILY
Status: DISCONTINUED | OUTPATIENT
Start: 2024-11-09 | End: 2024-11-11 | Stop reason: HOSPADM

## 2024-11-08 RX ADMIN — GABAPENTIN 600 MG: 300 CAPSULE ORAL at 08:11

## 2024-11-08 RX ADMIN — CARVEDILOL 12.5 MG: 12.5 TABLET, FILM COATED ORAL at 08:11

## 2024-11-08 NOTE — Clinical Note
The catheter was inserted into the ostium   right coronary artery. An angiography was performed of the right coronary arteries. Multiple views were taken. The angiography was performed via hand injection with 12 mL of contrast.

## 2024-11-08 NOTE — FIRST PROVIDER EVALUATION
Emergency Department TeleTriage Encounter Note      CHIEF COMPLAINT    Chief Complaint   Patient presents with    Chest Pain     Patient reports to the ED complaining of chest discomfort that started earlier today. Patient endorses cough, congestion. Patient has extensive cardiac hx (7 stents, pacemaker). Ambulatory, NAD noted.       VITAL SIGNS   Initial Vitals [11/08/24 1554]   BP Pulse Resp Temp SpO2   (!) 176/88 (!) 59 18 97.4 °F (36.3 °C) 99 %      MAP       --            ALLERGIES    Review of patient's allergies indicates:  No Known Allergies    PROVIDER TRIAGE NOTE  Patient presents with complaint of  Headache and feeling in his chest similar to when he had his prior stent placement.      Phy:   Constitutional: well nourished, well developed, appearing stated age, NAD        Initial orders will be placed and care will be transferred to an alternate provider when patient is roomed for a full evaluation. Any additional orders and the final disposition will be determined by that provider.        ORDERS  Labs Reviewed - No data to display    ED Orders (720h ago, onward)      Start Ordered     Status Ordering Provider    11/08/24 1556 11/08/24 1555  EKG 12-lead  Once         Completed by DAYANA ARAMBULA on 11/8/2024 at  3:56 PM DAISHA PATTON              Virtual Visit Note: The provider triage portion of this emergency department evaluation and documentation was performed via [employer], a HIPAA-compliant telemedicine application, in concert with a tele-presenter in the room. A face to face patient evaluation with one of my colleagues will occur once the patient is placed in an emergency department room.      DISCLAIMER: This note was prepared with [employer] voice recognition transcription software. Garbled syntax, mangled pronouns, and other bizarre constructions may be attributed to that software system.

## 2024-11-09 LAB
ALBUMIN SERPL BCP-MCNC: 3.3 G/DL (ref 3.5–5.2)
ALP SERPL-CCNC: 58 U/L (ref 40–150)
ALT SERPL W/O P-5'-P-CCNC: 16 U/L (ref 10–44)
ANION GAP SERPL CALC-SCNC: 10 MMOL/L (ref 8–16)
AST SERPL-CCNC: 13 U/L (ref 10–40)
BASOPHILS # BLD AUTO: 0.04 K/UL (ref 0–0.2)
BASOPHILS NFR BLD: 0.7 % (ref 0–1.9)
BILIRUB SERPL-MCNC: 1.2 MG/DL (ref 0.1–1)
BUN SERPL-MCNC: 15 MG/DL (ref 8–23)
CALCIUM SERPL-MCNC: 8.7 MG/DL (ref 8.7–10.5)
CHLORIDE SERPL-SCNC: 109 MMOL/L (ref 95–110)
CO2 SERPL-SCNC: 24 MMOL/L (ref 23–29)
CREAT SERPL-MCNC: 0.9 MG/DL (ref 0.5–1.4)
DIFFERENTIAL METHOD BLD: ABNORMAL
EOSINOPHIL # BLD AUTO: 0.2 K/UL (ref 0–0.5)
EOSINOPHIL NFR BLD: 3.9 % (ref 0–8)
ERYTHROCYTE [DISTWIDTH] IN BLOOD BY AUTOMATED COUNT: 12.2 % (ref 11.5–14.5)
EST. GFR  (NO RACE VARIABLE): >60 ML/MIN/1.73 M^2
GLUCOSE SERPL-MCNC: 103 MG/DL (ref 70–110)
HCT VFR BLD AUTO: 35.1 % (ref 40–54)
HGB BLD-MCNC: 11.9 G/DL (ref 14–18)
IMM GRANULOCYTES # BLD AUTO: 0.02 K/UL (ref 0–0.04)
IMM GRANULOCYTES NFR BLD AUTO: 0.4 % (ref 0–0.5)
LYMPHOCYTES # BLD AUTO: 1.9 K/UL (ref 1–4.8)
LYMPHOCYTES NFR BLD: 35.5 % (ref 18–48)
MAGNESIUM SERPL-MCNC: 1.5 MG/DL (ref 1.6–2.6)
MCH RBC QN AUTO: 32.3 PG (ref 27–31)
MCHC RBC AUTO-ENTMCNC: 33.9 G/DL (ref 32–36)
MCV RBC AUTO: 95 FL (ref 82–98)
MONOCYTES # BLD AUTO: 0.7 K/UL (ref 0.3–1)
MONOCYTES NFR BLD: 13.2 % (ref 4–15)
NEUTROPHILS # BLD AUTO: 2.5 K/UL (ref 1.8–7.7)
NEUTROPHILS NFR BLD: 46.3 % (ref 38–73)
NRBC BLD-RTO: 0 /100 WBC
PHOSPHATE SERPL-MCNC: 3.8 MG/DL (ref 2.7–4.5)
PLATELET # BLD AUTO: 143 K/UL (ref 150–450)
PMV BLD AUTO: 11.1 FL (ref 9.2–12.9)
POCT GLUCOSE: 107 MG/DL (ref 70–110)
POCT GLUCOSE: 172 MG/DL (ref 70–110)
POCT GLUCOSE: 236 MG/DL (ref 70–110)
POCT GLUCOSE: 68 MG/DL (ref 70–110)
POTASSIUM SERPL-SCNC: 3.4 MMOL/L (ref 3.5–5.1)
PROT SERPL-MCNC: 6.2 G/DL (ref 6–8.4)
RBC # BLD AUTO: 3.68 M/UL (ref 4.6–6.2)
SODIUM SERPL-SCNC: 143 MMOL/L (ref 136–145)
WBC # BLD AUTO: 5.38 K/UL (ref 3.9–12.7)

## 2024-11-09 PROCEDURE — 84100 ASSAY OF PHOSPHORUS: CPT | Mod: HCNC

## 2024-11-09 PROCEDURE — 96365 THER/PROPH/DIAG IV INF INIT: CPT

## 2024-11-09 PROCEDURE — 63600175 PHARM REV CODE 636 W HCPCS: Mod: HCNC

## 2024-11-09 PROCEDURE — 85025 COMPLETE CBC W/AUTO DIFF WBC: CPT | Mod: HCNC

## 2024-11-09 PROCEDURE — 83735 ASSAY OF MAGNESIUM: CPT | Mod: HCNC

## 2024-11-09 PROCEDURE — 99223 1ST HOSP IP/OBS HIGH 75: CPT | Mod: HCNC,25,, | Performed by: INTERNAL MEDICINE

## 2024-11-09 PROCEDURE — 11000001 HC ACUTE MED/SURG PRIVATE ROOM: Mod: HCNC

## 2024-11-09 PROCEDURE — 80053 COMPREHEN METABOLIC PANEL: CPT | Mod: HCNC

## 2024-11-09 PROCEDURE — 25000003 PHARM REV CODE 250: Mod: HCNC

## 2024-11-09 PROCEDURE — 96372 THER/PROPH/DIAG INJ SC/IM: CPT

## 2024-11-09 PROCEDURE — 36415 COLL VENOUS BLD VENIPUNCTURE: CPT | Mod: HCNC

## 2024-11-09 RX ORDER — MAGNESIUM SULFATE HEPTAHYDRATE 40 MG/ML
2 INJECTION, SOLUTION INTRAVENOUS
Status: COMPLETED | OUTPATIENT
Start: 2024-11-09 | End: 2024-11-09

## 2024-11-09 RX ORDER — FUROSEMIDE 20 MG/1
20 TABLET ORAL DAILY
Status: DISCONTINUED | OUTPATIENT
Start: 2024-11-09 | End: 2024-11-10

## 2024-11-09 RX ORDER — POTASSIUM CHLORIDE 20 MEQ/1
60 TABLET, EXTENDED RELEASE ORAL ONCE
Status: COMPLETED | OUTPATIENT
Start: 2024-11-09 | End: 2024-11-09

## 2024-11-09 RX ADMIN — MAGNESIUM SULFATE HEPTAHYDRATE 2 G: 40 INJECTION, SOLUTION INTRAVENOUS at 10:11

## 2024-11-09 RX ADMIN — ATORVASTATIN CALCIUM 40 MG: 40 TABLET, FILM COATED ORAL at 08:11

## 2024-11-09 RX ADMIN — INSULIN ASPART 15 UNITS: 100 INJECTION, SOLUTION INTRAVENOUS; SUBCUTANEOUS at 04:11

## 2024-11-09 RX ADMIN — CLOPIDOGREL BISULFATE 75 MG: 75 TABLET ORAL at 08:11

## 2024-11-09 RX ADMIN — INSULIN ASPART 15 UNITS: 100 INJECTION, SOLUTION INTRAVENOUS; SUBCUTANEOUS at 11:11

## 2024-11-09 RX ADMIN — INSULIN ASPART 4 UNITS: 100 INJECTION, SOLUTION INTRAVENOUS; SUBCUTANEOUS at 11:11

## 2024-11-09 RX ADMIN — FUROSEMIDE 20 MG: 20 TABLET ORAL at 08:11

## 2024-11-09 RX ADMIN — LOSARTAN POTASSIUM 25 MG: 25 TABLET, FILM COATED ORAL at 08:11

## 2024-11-09 RX ADMIN — CARVEDILOL 12.5 MG: 12.5 TABLET, FILM COATED ORAL at 08:11

## 2024-11-09 RX ADMIN — MAGNESIUM SULFATE HEPTAHYDRATE 2 G: 40 INJECTION, SOLUTION INTRAVENOUS at 08:11

## 2024-11-09 RX ADMIN — ASPIRIN 81 MG: 81 TABLET, COATED ORAL at 08:11

## 2024-11-09 RX ADMIN — POTASSIUM CHLORIDE 60 MEQ: 1500 TABLET, EXTENDED RELEASE ORAL at 08:11

## 2024-11-09 RX ADMIN — GABAPENTIN 600 MG: 300 CAPSULE ORAL at 08:11

## 2024-11-09 RX ADMIN — INSULIN ASPART 2 UNITS: 100 INJECTION, SOLUTION INTRAVENOUS; SUBCUTANEOUS at 04:11

## 2024-11-09 NOTE — ASSESSMENT & PLAN NOTE
Patient has Combined Systolic and Diastolic heart failure that is Acute on chronic. On presentation their CHF was well compensated. Most recent BNP and echo results are listed below.  Recent Labs     11/08/24  1650   *       Latest ECHO  Results for orders placed during the hospital encounter of 08/09/23    Echo    Interpretation Summary    Left Ventricle: The left ventricle is normal in size. Normal wall thickness. Mild global hypokinesis present. There is mildly reduced systolic function with a visually estimated ejection fraction of 40 - 45%.    Right Ventricle: Normal right ventricular cavity size. Wall thickness is normal. Right ventricle wall motion  is normal. Systolic function is normal.    Pulmonary Artery: The estimated pulmonary artery systolic pressure is 23 mmHg.    IVC/SVC: Normal venous pressure at 3 mmHg.    Current Heart Failure Medications  carvediloL tablet 12.5 mg, 2 times daily, Oral  losartan tablet 25 mg, Daily, Oral  furosemide tablet 20 mg, Daily, Oral    Plan  - Monitor strict I&Os and daily weights.    - Place on telemetry  - Low sodium diet once no longer NPO  - Place on fluid restriction of 1 L.   - Cardiology has been consulted and pending  - The patient's volume status is stable but not at their baseline as indicated by elevated BNP

## 2024-11-09 NOTE — CARE UPDATE
Inpatient Upgrade Note    Clifton Wilson has warranted treatment spanning two or more midnights of hospital level care for the management of chest pain. He continues to require further testing/imaging. His condition is also complicated by the following comorbidities: Coronary Artery Disease, Hypertension, Diabetes, and Heart failure.

## 2024-11-09 NOTE — ASSESSMENT & PLAN NOTE
"Patient's FSGs are uncontrolled due to hyperglycemia on current medication regimen.  Last A1c reviewed-   Lab Results   Component Value Date    HGBA1C 8.0 (H) 09/18/2024     Most recent fingerstick glucose reviewed- No results for input(s): "POCTGLUCOSE" in the last 24 hours.  Current correctional scale  High  Maintain anti-hyperglycemic dose as follows-   Antihyperglycemics (From admission, onward)      Start     Stop Route Frequency Ordered    11/09/24 0745  insulin aspart U-100 pen 15 Units         -- SubQ 3 times daily with meals 11/08/24 1958 11/08/24 2100  insulin glargine U-100 (Lantus) pen 35 Units         -- SubQ Nightly 11/08/24 1958 11/08/24 2041  insulin aspart U-100 pen 1-10 Units         -- SubQ Before meals & nightly PRN 11/08/24 1944          Hold Oral hypoglycemics while patient is in the hospital.  "

## 2024-11-09 NOTE — PLAN OF CARE
VIRTUAL NURSE:  Cued into patient's room.  Permission received per patient to turn camera to view patient.  Introduced as VN for night shift that will be working with floor nurse and nursing assistant.  Educated patient on VN's role in patient care and  [facility] model.  Plan of care reviewed with patient.  Education per flowsheet.   Informed patient that staff will round on them every 2 hours but to use call light for any other needs they may have; informed of fall risk and fall precautions.  Patient verbalized understanding.  Call light within reach; bed siderails up x3.  Opportunity given for questions and questions answered.  Admission assessment questions answered.  Patient denies complaints or any needs at this time. Instructed to call for assistance.  Will cont to monitor and intervene as needed.    Labs, notes, orders, and careplan initiated.       Problem: Adult Inpatient Plan of Care  Goal: Plan of Care Review  Outcome: Progressing  Goal: Patient-Specific Goal (Individualized)  Outcome: Progressing         11/08/24 2212   Nurse Notification   Bedside Nurse Notified? Yes   Name of Bedside Nurse PADMINI Lombardi   Nurse Notfication Method Secure Chat   Nurse Notified Of Other  (Admission profile is completed)   Admission   Initial VN Admission Questions Complete   Communication Issues? None   Shift   Virtual Nurse - Rounding Complete   Pain Management Interventions pain management plan reviewed with patient/caregiver   Virtual Nurse - Patient Verbalized Approval Of Camera Use;VN Rounding   Type of Frequent Check   Type Patient Rounds   Safety/Activity   Patient Rounds bed in low position;placement of personal items at bedside;call light in patient/parent reach;clutter free environment maintained;visualized patient   Safety Promotion/Fall Prevention Fall Risk reviewed with patient/family;patient expresses understanding of fall risk and prevention;nonskid shoes/socks when out of bed;medications reviewed;side rails raised  x 2   Positioning   Head of Bed (HOB) Positioning HOB elevated   Pain/Comfort/Sleep   Preferred Pain Scale number (Numeric Rating Pain Scale)   Comfort/Acceptable Pain Level 0   Pain Rating (0-10): Rest 0   Pain Rating (0-10): Activity 0   Sleep/Rest/Relaxation awake

## 2024-11-09 NOTE — ASSESSMENT & PLAN NOTE
Patient has Combined Systolic and Diastolic heart failure that is Acute on chronic. On presentation their CHF was well compensated. Most recent BNP and echo results are listed below.  Recent Labs     11/08/24  1650   *     Latest ECHO  Results for orders placed during the hospital encounter of 08/09/23    Echo    Interpretation Summary    Left Ventricle: The left ventricle is normal in size. Normal wall thickness. Mild global hypokinesis present. There is mildly reduced systolic function with a visually estimated ejection fraction of 40 - 45%.    Right Ventricle: Normal right ventricular cavity size. Wall thickness is normal. Right ventricle wall motion  is normal. Systolic function is normal.    Pulmonary Artery: The estimated pulmonary artery systolic pressure is 23 mmHg.    IVC/SVC: Normal venous pressure at 3 mmHg.    Current Heart Failure Medications  carvediloL tablet 12.5 mg, 2 times daily, Oral  losartan tablet 25 mg, Daily, Oral    Plan  - Monitor strict I&Os and daily weights.    - Place on telemetry  - Low sodium diet once no longer NPO  - Place on fluid restriction of 1 L.   - Cardiology has been consulted  - The patient's volume status is stable but not at their baseline as indicated by elevated BNP

## 2024-11-09 NOTE — HPI
"73-year-old man, PMHx of HTN, CAD (NSTEMI 2011 and 2015), ICM (EF 25->45%), and ICD (2017), CHF, DMT2, presented to [facility] ER 11/8/24 for chest discomfort.  Patient states that a sensation in his chest started when he sat down on the couch in the morning to drink coffee. He described the sensation as "discomfort" that was similar to the sensation in 2015, at which time he required stent placement. (Upon chart review, 2015 was also the year of patient's NSTEMI.) The sensation has been constant, and progressive, but patient denies that it is pain/painful. Patient denies exacerbating/relieving factors, denies reflux or history of heartburn, SOB, or syncope. He reports that he took his antihypertensives and all other morning medications before the start of the discomfort. He attempted to take his blood pressure when the discomfort started, but the machine read as "EE." He does not consistently take pressures at home but reports that his pressures at doctor's office visits typically run in the 110s systolic. He did not take his PRN nitroglycerine. Of note, patient does endorse feeling fatigued for the last 2 week. He reports that his wife, who was also seen in the ER today, is sick with cough and sinus congestion.  In the ER, initial vitals as follows: Temp 97.4F, /88, HR 59, O2 99%. Labs with normal Troponin 0.008,  (normal baseline). EKG showing sinus bradycardia & right bundle branch consistent with previous EKG. Patient was admitted to [facility] for observation of chest discomfort and evaluation by Cardiology in the morning.  "

## 2024-11-09 NOTE — PLAN OF CARE
Problem: Adult Inpatient Plan of Care  Goal: Plan of Care Review  Outcome: Progressing  Goal: Patient-Specific Goal (Individualized)  Outcome: Progressing  Goal: Absence of Hospital-Acquired Illness or Injury  Outcome: Progressing  Goal: Optimal Comfort and Wellbeing  Outcome: Progressing  Goal: Readiness for Transition of Care  Outcome: Progressing     Problem: Diabetes Comorbidity  Goal: Blood Glucose Level Within Targeted Range  Outcome: Progressing     Problem: Fall Injury Risk  Goal: Absence of Fall and Fall-Related Injury  Outcome: Progressing     Problem: Pain Acute  Goal: Optimal Pain Control and Function  Outcome: Progressing     Problem: Chest Pain  Goal: Resolution of Chest Pain Symptoms  Outcome: Progressing

## 2024-11-09 NOTE — CONSULTS
[facility] - Telemetry  Cardiology  Consult Note    Patient Name: Clifton Wilson  MRN: [id number]  Admission Date: 11/8/2024  Hospital Length of Stay: 0 days  Code Status: Full Code   Attending Provider: Francisco Randhawa III, MD   Consulting Provider: Jamey Albright MD  Primary Care Physician: Britton Grissom MD  Principal Problem:Chest discomfort    Patient information was obtained from ER records.     Consults  Subjective:     Chief Complaint:  Chest pain     HPI:     73-year-old male with past medical history of CAD, last angiogram done in 2016 with PCI of the LAD and left circumflex, history of ischemic cardiomyopathy with EF improving to 45% in the past from 25%, ICD placement in 2017, EF of around 40-45% in 2022, hypertension on Coreg and losartan, takes dual antiplatelet therapy aspirin and Plavix, initially had hypertension on arrival, EKG shows no significant ST segment changes and right bundle-branch block at baseline.  Currently chest pain-free.  Cardiology consulted for unstable angina.      Past Medical History:   Diagnosis Date    Anticoagulant long-term use     Cardiomyopathy     CHF (congestive heart failure)     Coronary artery disease     Diabetes mellitus     Gout     Heart attack     Hypertension     Pneumonia        Past Surgical History:   Procedure Laterality Date    APPENDECTOMY      CARDIAC CATHETERIZATION      7 stents    CARDIAC DEFIBRILLATOR PLACEMENT      CATARACT EXTRACTION Bilateral 2011    COLONOSCOPY N/A 8/10/2018    Procedure: COLONOSCOPY golytely;  Surgeon: Valentine Burger MD;  Location: [facility] ENDO;  Service: Endoscopy;  Laterality: N/A;    EYE SURGERY      REVISION OF IMPLANTABLE CARDIOVERTER-DEFIBRILLATOR (ICD) ELECTRODE LEAD PLACEMENT N/A 11/11/2019    Procedure: REVISION, INSERTION, ELECTRODE LEAD, ICD;  Surgeon: Shukri Walsh MD;  Location: [facility] EP LAB;  Service: Cardiology;  Laterality: N/A;  Lead malfxn, RV lead ICD, SJM, anes, DM, 3 PREP       Review of patient's allergies  indicates:  No Known Allergies    No current facility-administered medications on file prior to encounter.     Current Outpatient Medications on File Prior to Encounter   Medication Sig    alcohol swabs (BD ALCOHOL SWABS) PadM USE FOUR TIMES DAILY    aspirin (ECOTRIN) 81 MG EC tablet Take 81 mg by mouth once daily.    atorvastatin (LIPITOR) 40 MG tablet Take 1 tablet (40 mg total) by mouth once daily.    blood glucose control high,low (ACCU-CHEK CATHRYN CONTROL SOLN) Soln Use as directed to check blood sugar    blood sugar diagnostic Strp Use to test four times daily    blood sugar diagnostic Strp test blood sugar as directed four times daily    blood-glucose meter (ACCU-CHEK CATHRYN PLUS METER) Misc USE AS DIRECTED    blood-glucose meter (TRUE METRIX GLUCOSE METER) Misc use as directed    carvediloL (COREG) 12.5 MG tablet Take 1 tablet (12.5 mg total) by mouth 2 (two) times daily.    clopidogreL (PLAVIX) 75 mg tablet Take 1 tablet (75 mg total) by mouth once daily.    colchicine (MITIGARE) 0.6 mg Cap Take 1 capsule (0.6 mg total) by mouth once daily.    cyanocobalamin (VITAMIN B-12) 1000 MCG tablet TAKE ONE TABLET BY MOUTH ONCE DAILY FOR VITAMIN DEFICIENCIES    furosemide (LASIX) 20 MG tablet Take 1 tablet (20 mg total) by mouth 2 (two) times daily.    gabapentin (NEURONTIN) 300 MG capsule Take 2 capsules (600 mg total) by mouth 2 (two) times daily.    ibuprofen (ADVIL,MOTRIN) 600 MG tablet Take 1 tablet (600 mg total) by mouth every 6 (six) hours as needed for Pain.    insulin aspart U-100 (NOVOLOG FLEXPEN U-100 INSULIN) 100 unit/mL (3 mL) InPn pen Inject 18 Units into the skin 3 (three) times daily with meals.    insulin glargine U-100, Lantus, (LANTUS SOLOSTAR U-100 INSULIN) 100 unit/mL (3 mL) InPn pen Inject 40 Units into the skin every evening.    lancets 30 gauge Misc by Misc.(Non-Drug; Combo Route) route 4 (four) times daily.    lancets 30 gauge Misc usea s directed to check blood glucose four times daily     "losartan (COZAAR) 25 MG tablet Take 1 tablet (25 mg total) by mouth once daily.    metFORMIN (GLUCOPHAGE) 1000 MG tablet Take 1 tablet (1,000 mg total) by mouth 2 (two) times daily with meals.    nitroGLYCERIN (NITROSTAT) 0.4 MG SL tablet Place 1 tablet (0.4 mg total) under the tongue every 5 (five) minutes as needed for Chest pain.    pen needle, diabetic (BD ULTRA-FINE SINA PEN NEEDLE) 32 gauge x 5/32" Ndle Use four times daily for insulin administration    promethazine-dextromethorphan (PROMETHAZINE-DM) 6.25-15 mg/5 mL Syrp Take 5 mLs by mouth 2 (two) times daily as needed (cough).    zolpidem (AMBIEN) 5 MG Tab TAKE 1 TABLET BY MOUTH EVERY NIGHT AS NEEDED     Family History       Problem Relation (Age of Onset)    Heart disease Mother, Father          Tobacco Use    Smoking status: Former    Smokeless tobacco: Never   Substance and Sexual Activity    Alcohol use: Yes     Comment: socially    Drug use: No    Sexual activity: Yes     Review of Systems   Constitutional: Negative for chills and fever.   HENT:  Negative for hearing loss and nosebleeds.    Eyes:  Negative for blurred vision.   Cardiovascular:  Positive for chest pain. Negative for leg swelling and palpitations.   Respiratory:  Negative for hemoptysis and shortness of breath.    Hematologic/Lymphatic: Negative for bleeding problem.   Skin:  Negative for itching.   Musculoskeletal:  Negative for falls.   Gastrointestinal:  Negative for abdominal pain and hematochezia.   Genitourinary:  Negative for hematuria.   Neurological:  Negative for dizziness and loss of balance.   Psychiatric/Behavioral:  Negative for altered mental status and depression.      Objective:     Vital Signs (Most Recent):  Temp: 97.6 °F (36.4 °C) (11/09/24 1111)  Pulse: 61 (11/09/24 1111)  Resp: 18 (11/09/24 1111)  BP: (!) 147/70 (11/09/24 1111)  SpO2: 97 % (11/09/24 1111) Vital Signs (24h Range):  Temp:  [97.4 °F (36.3 °C)-97.8 °F (36.6 °C)] 97.6 °F (36.4 °C)  Pulse:  [51-61] " 61  Resp:  [16-34] 18  SpO2:  [96 %-100 %] 97 %  BP: (132-191)/(61-98) 147/70     Weight: 107.5 kg (237 lb)  Body mass index is 33.05 kg/m².    SpO2: 97 %       No intake or output data in the 24 hours ending 11/09/24 1143    Lines/Drains/Airways       Peripheral Intravenous Line  Duration                  Peripheral IV - Single Lumen 11/08/24 1650 18 G Left Antecubital <1 day                    Physical Exam  Constitutional:       Appearance: He is well-developed.   HENT:      Head: Normocephalic and atraumatic.   Eyes:      Conjunctiva/sclera: Conjunctivae normal.   Neck:      Vascular: No carotid bruit or JVD.   Cardiovascular:      Rate and Rhythm: Normal rate and regular rhythm.      Pulses:           Carotid pulses are 2+ on the right side and 2+ on the left side.       Radial pulses are 2+ on the right side and 2+ on the left side.      Heart sounds: Murmur heard.      No friction rub. No gallop.   Pulmonary:      Effort: Pulmonary effort is normal. No respiratory distress.      Breath sounds: Normal breath sounds. No stridor. No wheezing.   Musculoskeletal:      Cervical back: Neck supple.   Skin:     General: Skin is warm and dry.   Neurological:      Mental Status: He is alert and oriented to person, place, and time.   Psychiatric:         Behavior: Behavior normal.         Significant Labs: All pertinent lab results from the last 24 hours have been reviewed. and   Recent Lab Results  (Last 5 results in the past 24 hours)        11/09/24  0556   11/09/24  0406   11/08/24  2033   11/08/24  1932   11/08/24  1650        Albumin   3.3       3.8       ALP   58       78       ALT   16       21       Anion Gap   10       8       AST   13       14       Baso #   0.04       0.06       Basophil %   0.7       0.9       BILIRUBIN TOTAL   1.2  Comment: For infants and newborns, interpretation of results should be based  on gestational age, weight and in agreement with clinical  observations.    Premature Infant  recommended reference ranges:  Up to 24 hours.............<8.0 mg/dL  Up to 48 hours............<12.0 mg/dL  3-5 days..................<15.0 mg/dL  6-29 days.................<15.0 mg/dL         1.1  Comment: For infants and newborns, interpretation of results should be based  on gestational age, weight and in agreement with clinical  observations.    Premature Infant recommended reference ranges:  Up to 24 hours.............<8.0 mg/dL  Up to 48 hours............<12.0 mg/dL  3-5 days..................<15.0 mg/dL  6-29 days.................<15.0 mg/dL         BNP         138  Comment: Values of less than 100 pg/ml are consistent with non-CHF populations.       BUN   15       14       Calcium   8.7       9.1       Chloride   109       105       CO2   24       29       Creatinine   0.9       1.1       Differential Method   Automated       Automated       eGFR   >60       >60       Eos #   0.2       0.3       Eos %   3.9       4.5       Glucose   103       118       Gran # (ANC)   2.5       3.4       Gran %   46.3       50.6       Hematocrit   35.1       39.1       Hemoglobin   11.9       13.4       Immature Grans (Abs)   0.02  Comment: Mild elevation in immature granulocytes is non specific and   can be seen in a variety of conditions including stress response,   acute inflammation, trauma and pregnancy. Correlation with other   laboratory and clinical findings is essential.         0.01  Comment: Mild elevation in immature granulocytes is non specific and   can be seen in a variety of conditions including stress response,   acute inflammation, trauma and pregnancy. Correlation with other   laboratory and clinical findings is essential.         Immature Granulocytes   0.4       0.1       Lymph #   1.9       2.1       Lymph %   35.5       30.8       Magnesium    1.5             MCH   32.3       33.0       MCHC   33.9       34.3       MCV   95       96       Mono #   0.7       0.9       Mono %   13.2       13.1       MPV    11.1       10.6       nRBC   0       0       Phosphorus Level   3.8             Platelet Count   143       175       POCT Glucose 107     130           Potassium   3.4       3.8       PROTEIN TOTAL   6.2       7.3       RBC   3.68       4.06       RDW   12.2       12.2       Sodium   143       142       Troponin I       0.010  Comment: The reference interval for Troponin I represents the 99th percentile   cutoff   for our facility and is consistent with 3rd generation assay   performance.     0.008  Comment: The reference interval for Troponin I represents the 99th percentile   cutoff   for our facility and is consistent with 3rd generation assay   performance.         WBC   5.38       6.73                              Significant Imaging: Echocardiogram: 2D echo with color flow doppler:   Results for orders placed or performed during the hospital encounter of 04/23/18   2D echo with color flow doppler   Result Value Ref Range    EF + QEF 35 (A) 55 - 65    Diastolic Dysfunction Yes (A)     Est. PA Systolic Pressure 7.84     Mitral Valve Mobility NORMAL     Tricuspid Valve Regurgitation TRIVIAL     Narrative    Date of Procedure: 04/24/2018        TEST DESCRIPTION   Technical Quality: This is a technically adequate study.     Aorta: The aortic root is normal in size, measuring 3.2 cm at sinotubular junction.     Left Atrium: The left atrium is normal in size, measuring 3.8 cm across in the parasternal view.     Left Ventricle: The left ventricle is normal in size, with an end-diastolic diameter of 4.9 cm, and an end-systolic diameter of 3.9 cm. LV wall thickness is normal, with the septum measuring 1.8 cm and the posterior wall measuring 1.4 cm across. Relative   wall thickness was increased at 0.57, and the LV mass index was increased at 181.2 g/m2 consistent with concentric left ventricular hypertrophy. There are no regional wall motion abnormalities. Left ventricular systolic function appears moderately    depressed. Visually estimated ejection fraction is 35-40%. The LV Doppler derived stroke volume equals 54.0 ccs.     Diastolic indices: E wave velocity 0.5 m/s, E/A ratio 0.7,  msec., E/e' ratio(lat) 8. Mean left atrial pressures are normal. There is diastolic dysfunction secondary to relaxation abnormality.     Right Atrium: The right atrium is normal in size.     Right Ventricle: The right ventricle is normal in size measuring 3.2 cm at the base in the apical right ventricle-focused view. Global right ventricular systolic function appears normal. The estimated PA systolic pressure is 8 mmHg.     Aortic Valve:  The aortic valve is mildly sclerotic with normal leaflet mobility. The aortic valve is tri-leaflet in structure.     Mitral Valve:  The mitral valve is normal in structure with normal leaflet mobility.     Tricuspid Valve:  The tricuspid valve is normal in structure with normal leaflet mobility. There is trivial tricuspid regurgitation.     Pulmonary Valve:  The pulmonic valve is normal in structure.     IVC: IVC is normal in size and collapses > 50% with a sniff, suggesting normal right atrial pressure of 3 mmHg.     Atrial Septum: The atrial septum is intact.     Intracavitary: There is no evidence of pericardial effusion, intracavity mass, thrombi, or vegetation.         CONCLUSIONS     1 - Moderately depressed left ventricular systolic function (EF 35-40%).     2 - Impaired LV relaxation, normal LAP (grade 1 diastolic dysfunction).     3 - Normal right ventricular systolic function .     4 - The estimated PA systolic pressure is 8 mmHg.             This document has been electronically    SIGNED BY: Rafita Singletary MD On: 04/24/2018 14:51    and Transthoracic echo (TTE) complete ([facility] Only):   Results for orders placed or performed during the hospital encounter of 08/09/23   Echo   Result Value Ref Range    BSA 2.32 m2    LA WIDTH 3.9 cm    RA Width 4.0 cm    LA Vol (MOD) 50.42 cm3    TDI LATERAL  0.03 m/s    Left Ventricular Outflow Tract Mean Gradient 1.81 mmHg    Left Ventricular Outflow Tract Mean Velocity 0.63 cm/s    MV peak gradient 2 mmHg    MV mean gradient 1 mmHg    MV stenosis pressure 1/2 time 109.76 ms    Mr max rome 4.37 m/s    MV VTI 20.0 cm    TR Max [place] 2.21 m/s    Ao peak rome 1.24 m/s    PW 1.02 0.6 - 1.1 cm    LVOT diameter 2.10 cm    LVOT peak rome 0.98 m/s    LVOT peak VTI 19.60 cm    E wave deceleration time 378.49 msec    MV Peak A Rome 0.57 m/s    MV Peak E [place] 0.42 m/s    PV Peak D [place] 0.35 m/s    PV Peak S [place] 0.48 m/s    RA Major Axis 4.30 cm    TAPSE 1.51 cm    IVS 1.40 (A) 0.6 - 1.1 cm    Ao VTI 26.20 cm    AV mean gradient 3 mmHg    LVIDd 5.24 3.5 - 6.0 cm    LVIDs 3.96 2.1 - 4.0 cm    Left Atrium Major Axis 4.90 cm    Left Atrium Minor Axis 5.18 cm    PV PEAK VELOCITY 0.75 m/s    Pulmonary Valve Mean Velocity 0.54 m/s    STJ 3.08 cm    Ascending aorta 3.31 cm    LV Systolic Volume 68.36 mL    LV Diastolic Volume 131.81 mL    TDI SEPTAL 0.04 m/s    RVDD 3.25 cm    LA size 3.92 cm    RV S' 5.18 cm/s    IVC diameter 0.76 cm    Flores's Biplane MOD Ejection Fraction 4 %    LV LATERAL E/E' RATIO 14.00 m/s    LV SEPTAL E/E' RATIO 10.50 m/s    FS 24 %    LA Vol 65.44 cm3    LV mass 254.84 g    ZLVIDD -4.59     ZLVIDS -1.87     Left Ventricle Relative Wall Thickness 0.39 cm    AV valve area 2.59 cm²    AV Velocity Ratio 0.79     AV index (prosthetic) 0.75     MV valve area p 1/2 method 2.00 cm2    MV valve area by continuity eq 3.39 cm2    PV peak gradient 2 mmHg    E/A ratio 0.74     Mean e' 0.04 m/s    Pulm vein S/D ratio 1.37     LVOT area 3.5 cm2    LVOT stroke volume 67.85 cm3    AV peak gradient 6 mmHg    E/E' ratio 12.00 m/s    LV Systolic Volume Index 30.2 mL/m2    LV Diastolic Volume Index 58.32 mL/m2    SANDRITA 29.0 mL/m2    LV Mass Index 113 g/m2    Triscuspid Valve Regurgitation Peak Gradient 20 mmHg    SANDRITA (MOD) 22.3 mL/m2    BEV by Velocity Ratio 2.74 cm²    TV resting  pulmonary artery pressure 23 mmHg    RV TB RVSP 5 mmHg    Est. RA pres 3 mmHg    Narrative      Left Ventricle: The left ventricle is normal in size. Normal wall   thickness. Mild global hypokinesis present. There is mildly reduced   systolic function with a visually estimated ejection fraction of 40 - 45%.    Right Ventricle: Normal right ventricular cavity size. Wall thickness   is normal. Right ventricle wall motion  is normal. Systolic function is   normal.    Pulmonary Artery: The estimated pulmonary artery systolic pressure is   23 mmHg.    IVC/SVC: Normal venous pressure at 3 mmHg.       Assessment and Plan:     Unstable angina  CAD status post PCI in 2016  Hypertension  On dual antiplatelet therapy  Hyperlipidemia  Diabetes        - patient has unstable angina that improved with nitroglycerin.  Agree with escalation of losartan as needed.  Continue Coreg.  - continue high-intensity statin , Coreg, losartan  - continue dual antiplatelet therapy  - recommend left heart catheterization and coronary angiogram while inpatient considering significant CAD history and classical symptoms of angina now happening at rest  - NPO after midnight on Sunday for left heart catheterization Monday morning.  - agree with holding off of heparin for now.  Get stat EKG and troponins if more episodes of chest pain      Active Diagnoses:    Diagnosis Date Noted POA    PRINCIPAL PROBLEM:  Chest discomfort [R07.89] 11/08/2024 Unknown    Essential hypertension [I10] 04/24/2017 Yes    Coronary artery disease of native artery of native heart with stable angina pectoris [I25.118] 02/22/2016 Yes    Chronic combined systolic and diastolic CHF (congestive heart failure) [I50.42] 02/11/2016 Yes    Type 2 diabetes mellitus with neurologic complication [E11.49] 12/04/2015 Yes      Problems Resolved During this Admission:       VTE Risk Mitigation (From admission, onward)           Ordered     enoxaparin injection 40 mg  Daily         11/08/24  1944     IP VTE HIGH RISK PATIENT  Once         11/08/24 1944     Place sequential compression device  Until discontinued         11/08/24 1944                    Thank you for your consult. I will follow-up with patient. Please contact us if you have any additional questions.    Jamey Albright MD  Cardiology   [facility] - Telemetry

## 2024-11-09 NOTE — ASSESSMENT & PLAN NOTE
Patients blood pressure range in the last 24 hours was: BP  Min: 176/88  Max: 191/87.The patient's inpatient anti-hypertensive regimen is listed below:  Current Antihypertensives  carvediloL tablet 12.5 mg, 2 times daily, Oral  losartan tablet 25 mg, Daily, Oral    Plan  - BP is uncontrolled, will adjust as follows: Will give nighttime Carvedilol & increase Losartan if needed

## 2024-11-09 NOTE — SUBJECTIVE & OBJECTIVE
Interval History: VSS, NAD    Review of Systems   Constitutional: Negative.    HENT: Negative.     Eyes:  Negative for visual disturbance.   Respiratory:  Negative for cough, chest tightness, shortness of breath, wheezing and stridor.    Cardiovascular:  Negative for chest pain, palpitations and leg swelling.   Gastrointestinal:  Negative for abdominal distention and abdominal pain.   Endocrine: Negative for polydipsia and polyuria.   Genitourinary:  Negative for difficulty urinating.   Musculoskeletal:  Negative for arthralgias.   Skin:  Negative for color change.       Objective:     Vital Signs (Most Recent):  Temp: 97.8 °F (36.6 °C) (11/09/24 0725)  Pulse: (!) 51 (11/09/24 0725)  Resp: 18 (11/09/24 0725)  BP: (!) 151/70 (11/09/24 0725)  SpO2: 96 % (11/09/24 0725) Vital Signs (24h Range):  Temp:  [97.4 °F (36.3 °C)-97.8 °F (36.6 °C)] 97.8 °F (36.6 °C)  Pulse:  [51-61] 51  Resp:  [16-34] 18  SpO2:  [96 %-100 %] 96 %  BP: (132-191)/(61-98) 151/70     Weight: 107.5 kg (237 lb)  Body mass index is 33.05 kg/m².  No intake or output data in the 24 hours ending 11/09/24 0912      Physical Exam  Constitutional:       Appearance: Normal appearance.   HENT:      Head: Normocephalic and atraumatic.      Nose: Nose normal.      Mouth/Throat:      Mouth: Mucous membranes are moist.   Eyes:      Extraocular Movements: Extraocular movements intact.      Pupils: Pupils are equal, round, and reactive to light.   Cardiovascular:      Rate and Rhythm: Normal rate and regular rhythm.      Heart sounds: No murmur heard.     No friction rub. No gallop.   Pulmonary:      Effort: Pulmonary effort is normal. No respiratory distress.      Breath sounds: Normal breath sounds. No stridor. No wheezing, rhonchi or rales.   Chest:      Chest wall: No tenderness.   Abdominal:      General: Bowel sounds are normal. There is no distension.      Palpations: Abdomen is soft.      Tenderness: There is no abdominal tenderness. There is no guarding.    Musculoskeletal:      Cervical back: Normal range of motion and neck supple.      Right lower leg: No edema.      Left lower leg: No edema.   Skin:     General: Skin is warm and dry.      Capillary Refill: Capillary refill takes less than 2 seconds.      Findings: No rash.   Neurological:      General: No focal deficit present.      Mental Status: He is alert.   Psychiatric:         Behavior: Behavior normal.             Significant Labs: All pertinent labs within the past 24 hours have been reviewed.    Significant Imaging: I have reviewed all pertinent imaging results/findings within the past 24 hours.

## 2024-11-09 NOTE — H&P
"  [facility]  History & Physical    Patient Name: Clifton Wilson  MRN: [id number]  Patient Class: OP- Observation  Admission Date: 11/8/2024  Attending Physician: Francisco Randhawa III, MD   Primary Care Provider: Britton Grissom MD         Patient information was obtained from patient and ER records.     Subjective:     Principal Problem:Chest discomfort    Chief Complaint:   Chief Complaint   Patient presents with    Chest Pain     Patient reports to the ED complaining of chest discomfort that started earlier today. Patient endorses cough, congestion. Patient has extensive cardiac hx (7 stents, pacemaker). Ambulatory, NAD noted.        HPI: 73-year-old man, PMHx of HTN, CAD (NSTEMI 2011 and 2015), ICM (EF 25->45%), and ICD (2017), CHF, DMT2, presented to [facility] ER 11/8/24 for chest discomfort.  Patient states that a sensation in his chest started when he sat down on the couch in the morning to drink coffee. He described the sensation as "discomfort" that was similar to the sensation in 2015, at which time he required stent placement. (Upon chart review, 2015 was also the year of patient's NSTEMI.) The sensation has been constant, and progressive, but patient denies that it is pain/painful. Patient denies exacerbating/relieving factors, denies reflux or history of heartburn, SOB, or syncope. He reports that he took his antihypertensives and all other morning medications before the start of the discomfort. He attempted to take his blood pressure when the discomfort started, but the machine read as "EE." He does not consistently take pressures at home but reports that his pressures at doctor's office visits typically run in the 110s systolic. He did not take his PRN nitroglycerine. Of note, patient does endorse feeling fatigued for the last 2 week. He reports that his wife, who was also seen in the ER today, is sick with cough and sinus congestion.  In the ER, initial vitals as follows: " Temp 97.4F, /88, HR 59, O2 99%. Labs with normal Troponin 0.008,  (normal baseline). EKG showing sinus bradycardia & right bundle branch consistent with previous EKG. Patient was admitted to [facility] for observation of chest discomfort and evaluation by Cardiology in the morning.    Past Medical History:   Diagnosis Date    Anticoagulant long-term use     Cardiomyopathy     CHF (congestive heart failure)     Coronary artery disease     Diabetes mellitus     Gout     Heart attack     Hypertension     Pneumonia        Past Surgical History:   Procedure Laterality Date    APPENDECTOMY      CARDIAC CATHETERIZATION      7 stents    CARDIAC DEFIBRILLATOR PLACEMENT      CATARACT EXTRACTION Bilateral 2011    COLONOSCOPY N/A 8/10/2018    Procedure: COLONOSCOPY golytely;  Surgeon: Valentine Burger MD;  Location: [facility] ENDO;  Service: Endoscopy;  Laterality: N/A;    EYE SURGERY      REVISION OF IMPLANTABLE CARDIOVERTER-DEFIBRILLATOR (ICD) ELECTRODE LEAD PLACEMENT N/A 11/11/2019    Procedure: REVISION, INSERTION, ELECTRODE LEAD, ICD;  Surgeon: Shukri Walsh MD;  Location: [facility] EP LAB;  Service: Cardiology;  Laterality: N/A;  Lead malfxn, RV lead ICD, SJM, anes, DM, 3 PREP       Review of patient's allergies indicates:  No Known Allergies    No current facility-administered medications on file prior to encounter.     Current Outpatient Medications on File Prior to Encounter   Medication Sig    alcohol swabs (BD ALCOHOL SWABS) PadM USE FOUR TIMES DAILY    aspirin (ECOTRIN) 81 MG EC tablet Take 81 mg by mouth once daily.    atorvastatin (LIPITOR) 40 MG tablet Take 1 tablet (40 mg total) by mouth once daily.    blood glucose control high,low (ACCU-CHEK CATHRYN CONTROL SOLN) Soln Use as directed to check blood sugar    blood sugar diagnostic Strp Use to test four times daily    blood sugar diagnostic Strp test blood sugar as directed four times daily    blood-glucose meter (ACCU-CHEK CATHRYN PLUS METER) [facility]  "USE AS DIRECTED    blood-glucose meter (TRUE METRIX GLUCOSE METER) Misc use as directed    carvediloL (COREG) 12.5 MG tablet Take 1 tablet (12.5 mg total) by mouth 2 (two) times daily.    clopidogreL (PLAVIX) 75 mg tablet Take 1 tablet (75 mg total) by mouth once daily.    colchicine (MITIGARE) 0.6 mg Cap Take 1 capsule (0.6 mg total) by mouth once daily.    cyanocobalamin (VITAMIN B-12) 1000 MCG tablet TAKE ONE TABLET BY MOUTH ONCE DAILY FOR VITAMIN DEFICIENCIES    furosemide (LASIX) 20 MG tablet Take 1 tablet (20 mg total) by mouth 2 (two) times daily.    gabapentin (NEURONTIN) 300 MG capsule Take 2 capsules (600 mg total) by mouth 2 (two) times daily.    ibuprofen (ADVIL,MOTRIN) 600 MG tablet Take 1 tablet (600 mg total) by mouth every 6 (six) hours as needed for Pain.    insulin aspart U-100 (NOVOLOG FLEXPEN U-100 INSULIN) 100 unit/mL (3 mL) InPn pen Inject 18 Units into the skin 3 (three) times daily with meals.    insulin glargine U-100, Lantus, (LANTUS SOLOSTAR U-100 INSULIN) 100 unit/mL (3 mL) InPn pen Inject 40 Units into the skin every evening.    lancets 30 gauge Misc by Misc.(Non-Drug; Combo Route) route 4 (four) times daily.    lancets 30 gauge Misc usea s directed to check blood glucose four times daily    losartan (COZAAR) 25 MG tablet Take 1 tablet (25 mg total) by mouth once daily.    metFORMIN (GLUCOPHAGE) 1000 MG tablet Take 1 tablet (1,000 mg total) by mouth 2 (two) times daily with meals.    nitroGLYCERIN (NITROSTAT) 0.4 MG SL tablet Place 1 tablet (0.4 mg total) under the tongue every 5 (five) minutes as needed for Chest pain.    pen needle, diabetic (BD ULTRA-FINE SINA PEN NEEDLE) 32 gauge x 5/32" Ndle Use four times daily for insulin administration    promethazine-dextromethorphan (PROMETHAZINE-DM) 6.25-15 mg/5 mL Syrp Take 5 mLs by mouth 2 (two) times daily as needed (cough).    zolpidem (AMBIEN) 5 MG Tab TAKE 1 TABLET BY MOUTH EVERY NIGHT AS NEEDED     Family History       Problem Relation " (Age of Onset)    Heart disease Mother, Father          Tobacco Use    Smoking status: Former    Smokeless tobacco: Never   Substance and Sexual Activity    Alcohol use: Yes     Comment: socially    Drug use: No    Sexual activity: Yes     Review of Systems   Constitutional:  Negative for chills and fever.   Eyes:  Negative for visual disturbance.   Respiratory:  Negative for chest tightness, shortness of breath and wheezing.    Cardiovascular:  Negative for palpitations and leg swelling.   Gastrointestinal:  Negative for abdominal pain, nausea and vomiting.   Genitourinary:  Negative for dysuria.   Neurological:  Negative for headaches.     Objective:     Vital Signs (Most Recent):  Temp: 97.4 °F (36.3 °C) (11/08/24 1554)  Pulse: (!) 59 (11/08/24 2001)  Resp: (!) 34 (11/08/24 2001)  BP: (!) 178/98 (11/08/24 2001)  SpO2: 99 % (11/08/24 2001) Vital Signs (24h Range):  Temp:  [97.4 °F (36.3 °C)] 97.4 °F (36.3 °C)  Pulse:  [51-59] 59  Resp:  [16-34] 34  SpO2:  [99 %-100 %] 99 %  BP: (176-191)/(87-98) 178/98     Weight: 107.5 kg (237 lb)  Body mass index is 33.05 kg/m².     Physical Exam  HENT:      Head: Normocephalic and atraumatic.   Eyes:      General:         Right eye: No discharge.         Left eye: No discharge.      Conjunctiva/sclera: Conjunctivae normal.   Cardiovascular:      Rate and Rhythm: Bradycardia present.   Pulmonary:      Effort: Pulmonary effort is normal. No respiratory distress.      Breath sounds: Normal breath sounds.   Abdominal:      Palpations: Abdomen is soft.      Tenderness: There is no abdominal tenderness. There is no guarding or rebound.   Musculoskeletal:      Right lower leg: No edema.      Left lower leg: No edema.   Skin:     General: Skin is warm.      Coloration: Skin is not jaundiced.   Neurological:      Mental Status: He is alert and oriented to person, place, and time.   Psychiatric:         Mood and Affect: Mood normal.         Behavior: Behavior normal.                 Significant Labs: All pertinent labs within the past 24 hours have been reviewed.  CBC:   Recent Labs   Lab 11/08/24  1650   WBC 6.73   HGB 13.4*   HCT 39.1*        CMP:   Recent Labs   Lab 11/08/24  1650      K 3.8      CO2 29   *   BUN 14   CREATININE 1.1   CALCIUM 9.1   PROT 7.3   ALBUMIN 3.8   BILITOT 1.1*   ALKPHOS 78   AST 14   ALT 21   ANIONGAP 8       Significant Imaging: I have reviewed all pertinent imaging results/findings within the past 24 hours.  Assessment/Plan:     * Chest discomfort  73-year-old man with pertinent medical history of CAD (NSTEMI 2011 and 2015), ICM (EF 25->45%), and ICD (2017) presents with chest discomfort similar to past which required stent placement. EKG without evidence of STEMI, Troponin 0.008. Heart Score 5    Plan  Aspirin 325 -> 81mg  Follow up Troponin #2  Cardiology consulted; appreciate recs  NPO at midnight until cardiology evaluation    Essential hypertension  Patients blood pressure range in the last 24 hours was: BP  Min: 176/88  Max: 191/87.The patient's inpatient anti-hypertensive regimen is listed below:  Current Antihypertensives  carvediloL tablet 12.5 mg, 2 times daily, Oral  losartan tablet 25 mg, Daily, Oral    Plan  - BP is uncontrolled, will adjust as follows: Will give nighttime Carvedilol & increase Losartan if needed    Coronary artery disease of native artery of native heart with stable angina pectoris  Patient with known CAD s/p stent placement & on external defibrillator, which is uncontrolled. ICD evaluated 4/2024. Will continue ASA, Plavix, and Statin and monitor for S/Sx of angina/ACS. Continue to monitor on telemetry.     Chronic combined systolic and diastolic CHF (congestive heart failure)  Patient has Combined Systolic and Diastolic heart failure that is Acute on chronic. On presentation their CHF was well compensated. Most recent BNP and echo results are listed below.  Recent Labs     11/08/24  1650   *  "    Latest ECHO  Results for orders placed during the hospital encounter of 08/09/23    Echo    Interpretation Summary    Left Ventricle: The left ventricle is normal in size. Normal wall thickness. Mild global hypokinesis present. There is mildly reduced systolic function with a visually estimated ejection fraction of 40 - 45%.    Right Ventricle: Normal right ventricular cavity size. Wall thickness is normal. Right ventricle wall motion  is normal. Systolic function is normal.    Pulmonary Artery: The estimated pulmonary artery systolic pressure is 23 mmHg.    IVC/SVC: Normal venous pressure at 3 mmHg.    Current Heart Failure Medications  carvediloL tablet 12.5 mg, 2 times daily, Oral  losartan tablet 25 mg, Daily, Oral    Plan  - Monitor strict I&Os and daily weights.    - Place on telemetry  - Low sodium diet once no longer NPO  - Place on fluid restriction of 1 L.   - Cardiology has been consulted  - The patient's volume status is stable but not at their baseline as indicated by elevated BNP    Type 2 diabetes mellitus with neurologic complication  Patient's FSGs are uncontrolled due to hyperglycemia on current medication regimen.  Last A1c reviewed-   Lab Results   Component Value Date    HGBA1C 8.0 (H) 09/18/2024     Most recent fingerstick glucose reviewed- No results for input(s): "POCTGLUCOSE" in the last 24 hours.  Current correctional scale  High  Maintain anti-hyperglycemic dose as follows-   Antihyperglycemics (From admission, onward)      Start     Stop Route Frequency Ordered    11/09/24 0745  insulin aspart U-100 pen 15 Units         -- SubQ 3 times daily with meals 11/08/24 1958 11/08/24 2100  insulin glargine U-100 (Lantus) pen 35 Units         -- SubQ Nightly 11/08/24 1958 11/08/24 2041  insulin aspart U-100 pen 1-10 Units         -- SubQ Before meals & nightly PRN 11/08/24 1944          Hold Oral hypoglycemics while patient is in the hospital.      VTE Risk Mitigation (From admission, " onward)           Ordered     enoxaparin injection 40 mg  Daily         11/08/24 1944     IP VTE HIGH RISK PATIENT  Once         11/08/24 1944     Place sequential compression device  Until discontinued         11/08/24 1944                           On 11/08/2024, patient should be placed in hospital observation services under my care in collaboration with Dr. Randhawa.         Elizabeth Burton MD  Department of Hospital Medicine  [facility]

## 2024-11-09 NOTE — ED PROVIDER NOTES
Encounter Date: 11/8/2024       History     Chief Complaint   Patient presents with    Chest Pain     Patient reports to the ED complaining of chest discomfort that started earlier today. Patient endorses cough, congestion. Patient has extensive cardiac hx (7 stents, pacemaker). Ambulatory, NAD noted.     The patient is a 73-year-old male who came to the emergency department with chest pain started this morning.  He states he has had chest pain all day today.  The patient states the chest pain feels just like his pain in 2015 when he needed a stent.  He has had 7 stents in the past, he is diabetic hypertensive and has a history of gout.  He states he has felt tired for the past 2 weeks.  No shortness of breath, no syncope.      Review of patient's allergies indicates:  No Known Allergies  Past Medical History:   Diagnosis Date    Anticoagulant long-term use     Cardiomyopathy     CHF (congestive heart failure)     Coronary artery disease     Diabetes mellitus     Gout     Heart attack     Hypertension     Pneumonia      Past Surgical History:   Procedure Laterality Date    APPENDECTOMY      CARDIAC CATHETERIZATION      7 stents    CARDIAC DEFIBRILLATOR PLACEMENT      CATARACT EXTRACTION Bilateral 2011    COLONOSCOPY N/A 8/10/2018    Procedure: COLONOSCOPY golytely;  Surgeon: Valentine Burger MD;  Location: [facility] ENDO;  Service: Endoscopy;  Laterality: N/A;    EYE SURGERY      REVISION OF IMPLANTABLE CARDIOVERTER-DEFIBRILLATOR (ICD) ELECTRODE LEAD PLACEMENT N/A 11/11/2019    Procedure: REVISION, INSERTION, ELECTRODE LEAD, ICD;  Surgeon: Shukri Walsh MD;  Location: [facility] EP LAB;  Service: Cardiology;  Laterality: N/A;  Lead malfxn, RV lead ICD, SJM, anes, DM, 3 PREP     Family History   Problem Relation Name Age of Onset    Heart disease Mother      Heart disease Father      Amblyopia Neg Hx      Blindness Neg Hx      Cataracts Neg Hx      Glaucoma Neg Hx      Macular degeneration Neg Hx      Retinal detachment Neg  Hx      Strabismus Neg Hx       Social History     Tobacco Use    Smoking status: Former    Smokeless tobacco: Never   Substance Use Topics    Alcohol use: Yes     Comment: socially    Drug use: No     Review of Systems   All other systems reviewed and are negative.      Physical Exam     Initial Vitals [11/08/24 1554]   BP Pulse Resp Temp SpO2   (!) 176/88 (!) 59 18 97.4 °F (36.3 °C) 99 %      MAP       --         Physical Exam    Nursing note and vitals reviewed.  Constitutional: He appears well-developed and well-nourished.   HENT:   Head: Normocephalic and atraumatic.   Eyes: EOM are normal. Pupils are equal, round, and reactive to light.   Neck: Neck supple.   Normal range of motion.  Cardiovascular:  Normal rate, regular rhythm, normal heart sounds and intact distal pulses.           Pulmonary/Chest: Breath sounds normal.   Abdominal: Abdomen is soft. Bowel sounds are normal. He exhibits no distension. There is no abdominal tenderness. There is no rebound and no guarding.   Musculoskeletal:         General: No edema. Normal range of motion.      Cervical back: Normal range of motion and neck supple.     Neurological: He is alert and oriented to person, place, and time.   Skin: Skin is warm and dry.   Psychiatric: He has a normal mood and affect. His behavior is normal. Judgment and thought content normal.         ED Course   Procedures  Labs Reviewed   CBC W/ AUTO DIFFERENTIAL - Abnormal       Result Value    WBC 6.73      RBC 4.06 (*)     Hemoglobin 13.4 (*)     Hematocrit 39.1 (*)     MCV 96      MCH 33.0 (*)     MCHC 34.3      RDW 12.2      Platelets 175      MPV 10.6      Immature Granulocytes 0.1      Gran # (ANC) 3.4      Immature Grans (Abs) 0.01      Lymph # 2.1      Mono # 0.9      Eos # 0.3      Baso # 0.06      nRBC 0      Gran % 50.6      Lymph % 30.8      Mono % 13.1      Eosinophil % 4.5      Basophil % 0.9      Differential Method Automated     COMPREHENSIVE METABOLIC PANEL - Abnormal    Sodium  142      Potassium 3.8      Chloride 105      CO2 29      Glucose 118 (*)     BUN 14      Creatinine 1.1      Calcium 9.1      Total Protein 7.3      Albumin 3.8      Total Bilirubin 1.1 (*)     Alkaline Phosphatase 78      AST 14      ALT 21      eGFR >60      Anion Gap 8     B-TYPE NATRIURETIC PEPTIDE - Abnormal     (*)    TROPONIN I    Troponin I 0.008     TROPONIN I     EKG Readings: (Independently Interpreted)   Initial: 1556. Rhythm: Sinus Bradycardia. Heart Rate: 56. Ectopy: No Ectopy. Conduction: RBBB.       Imaging Results              X-Ray Chest AP Portable (Final result)  Result time 11/08/24 17:42:23      Final result by Casimiro Hernandez MD (11/08/24 17:42:23)                   Impression:      Stable chest with findings as above but no acute cardiopulmonary process radiographically.      Electronically signed by: Casimiro Hernandez  Date:    11/08/2024  Time:    17:42               Narrative:    EXAMINATION:  XR CHEST AP PORTABLE    CLINICAL HISTORY:  Chest Pain;    TECHNIQUE:  Single frontal view of the chest was performed.    COMPARISON:  None    FINDINGS:  Twin lead defibrillator.  Lungs clear.  Cardiac vascular stent on the left.  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  No infiltrate or effusion.  No mediastinal contour abnormality.                                       Medications - No data to display  Medical Decision Making  Differential Diagnosis includes, but is not limited to:  ACS/MI, PE, aortic dissection, pneumothorax, cardiac tamponade, pericarditis/myocarditis, pneumonia, infection/abscess, lung mass, trauma/fracture, costochondritis/pleurisy, MSK pain/contusion, GERD, biliary disease, pancreatitis, anemia     MDM:  The patient is a 73-year-old male who came to the emergency department with chest pain off and on all day today.  He took his nitroglycerin in the emergency department and had some improvement of his chest pain.    1921:  The case was discussed with [facility].   They will admit the patient for serial enzymes and stress test and Cardiology consult.    Amount and/or Complexity of Data Reviewed  Labs:  Decision-making details documented in ED Course.  Radiology:  Decision-making details documented in ED Course.  ECG/medicine tests: ordered and independent interpretation performed. Decision-making details documented in ED Course.    Risk  Decision regarding hospitalization.               ED Course as of 11/08/24 1922 Fri Nov 08, 2024   1725 CBC auto differential(!) [ST]   1725 Troponin I #1 [ST]   1725 BNP(!) [ST]   1725 Comprehensive metabolic panel(!) [ST]   1725 X-Ray Chest AP Portable  NAD [ST]      ED Course User Index  [ST] Sherry Arzola MD                           Clinical Impression:  Final diagnoses:  [R07.9] Chest pain  [I20.0] Unstable angina (Primary)          ED Disposition Condition    Observation Stable                Sherry Arzola MD  11/08/24 1922

## 2024-11-09 NOTE — ED NOTES
Report given to rajeev WASHINGTON on 4th floor. Pt assigned to room 462A. Pt on tele box and preparing for transport to floor

## 2024-11-09 NOTE — PROGRESS NOTES
"[facility]  Progress Note    Patient Name: Clifton Wilson  MRN: [id number]  Patient Class: OP- Observation   Admission Date: 11/8/2024  Length of Stay: 0 days  Attending Physician: Francisco Randhawa III, MD  Primary Care Provider: Britton Grissom MD        Subjective:     Principal Problem:Chest discomfort        HPI:  73-year-old man, PMHx of HTN, CAD (NSTEMI 2011 and 2015), ICM (EF 25->45%), and ICD (2017), CHF, DMT2, presented to [facility] 11/8/24 for chest discomfort.  Patient states that a sensation in his chest started when he sat down on the couch in the morning to drink coffee. He described the sensation as "discomfort" that was similar to the sensation in 2015, at which time he required stent placement. (Upon chart review, 2015 was also the year of patient's NSTEMI.) The sensation has been constant, and progressive, but patient denies that it is pain/painful. Patient denies exacerbating/relieving factors, denies reflux or history of heartburn, SOB, or syncope. He reports that he took his antihypertensives and all other morning medications before the start of the discomfort. He attempted to take his blood pressure when the discomfort started, but the machine read as "EE." He does not consistently take pressures at home but reports that his pressures at doctor's office visits typically run in the 110s systolic. He did not take his PRN nitroglycerine. Of note, patient does endorse feeling fatigued for the last 2 week. He reports that his wife, who was also seen in the ER today, is sick with cough and sinus congestion.  In the ER, initial vitals as follows: Temp 97.4F, /88, HR 59, O2 99%. Labs with normal Troponin 0.008,  (normal baseline). EKG showing sinus bradycardia & right bundle branch consistent with previous EKG. Patient was admitted to [facility] for observation of chest discomfort and evaluation by Cardiology in the morning.    Overview/Hospital " Course:  No notes on file    Interval History: VSS, NAD    Review of Systems   Constitutional: Negative.    HENT: Negative.     Eyes:  Negative for visual disturbance.   Respiratory:  Negative for cough, chest tightness, shortness of breath, wheezing and stridor.    Cardiovascular:  Negative for chest pain, palpitations and leg swelling.   Gastrointestinal:  Negative for abdominal distention and abdominal pain.   Endocrine: Negative for polydipsia and polyuria.   Genitourinary:  Negative for difficulty urinating.   Musculoskeletal:  Negative for arthralgias.   Skin:  Negative for color change.       Objective:     Vital Signs (Most Recent):  Temp: 97.8 °F (36.6 °C) (11/09/24 0725)  Pulse: (!) 51 (11/09/24 0725)  Resp: 18 (11/09/24 0725)  BP: (!) 151/70 (11/09/24 0725)  SpO2: 96 % (11/09/24 0725) Vital Signs (24h Range):  Temp:  [97.4 °F (36.3 °C)-97.8 °F (36.6 °C)] 97.8 °F (36.6 °C)  Pulse:  [51-61] 51  Resp:  [16-34] 18  SpO2:  [96 %-100 %] 96 %  BP: (132-191)/(61-98) 151/70     Weight: 107.5 kg (237 lb)  Body mass index is 33.05 kg/m².  No intake or output data in the 24 hours ending 11/09/24 0912      Physical Exam  Constitutional:       Appearance: Normal appearance.   HENT:      Head: Normocephalic and atraumatic.      Nose: Nose normal.      Mouth/Throat:      Mouth: Mucous membranes are moist.   Eyes:      Extraocular Movements: Extraocular movements intact.      Pupils: Pupils are equal, round, and reactive to light.   Cardiovascular:      Rate and Rhythm: Normal rate and regular rhythm.      Heart sounds: No murmur heard.     No friction rub. No gallop.   Pulmonary:      Effort: Pulmonary effort is normal. No respiratory distress.      Breath sounds: Normal breath sounds. No stridor. No wheezing, rhonchi or rales.   Chest:      Chest wall: No tenderness.   Abdominal:      General: Bowel sounds are normal. There is no distension.      Palpations: Abdomen is soft.      Tenderness: There is no abdominal  tenderness. There is no guarding.   Musculoskeletal:      Cervical back: Normal range of motion and neck supple.      Right lower leg: No edema.      Left lower leg: No edema.   Skin:     General: Skin is warm and dry.      Capillary Refill: Capillary refill takes less than 2 seconds.      Findings: No rash.   Neurological:      General: No focal deficit present.      Mental Status: He is alert.   Psychiatric:         Behavior: Behavior normal.             Significant Labs: All pertinent labs within the past 24 hours have been reviewed.    Significant Imaging: I have reviewed all pertinent imaging results/findings within the past 24 hours.      Assessment/Plan:      * Chest discomfort  73-year-old man with pertinent medical history of CAD (NSTEMI 2011 and 2015), ICM (EF 25->45%), and ICD (2017) presents with chest discomfort similar to past which required stent placement. EKG without evidence of STEMI, Troponin 0.008. Heart Score 5    Plan  Aspirin 325 -> 81mg  Follow up Troponin #2  Cardiology consulted; appreciate recs  NPO  currently and  pending cardiology evaluation    Essential hypertension  Patients blood pressure range in the last 24 hours was: BP  Min: 176/88  Max: 191/87.The patient's inpatient anti-hypertensive regimen is listed below:  Current Antihypertensives  carvediloL tablet 12.5 mg, 2 times daily, Oral  losartan tablet 25 mg, Daily, Oral    Plan  - BP is uncontrolled, will adjust as follows: Will give nighttime Carvedilol & increase Losartan if needed    Coronary artery disease of native artery of native heart with stable angina pectoris  Patient with known CAD s/p stent placement & on external defibrillator, which is uncontrolled. ICD evaluated 4/2024. Will continue ASA, Plavix, and Statin and monitor for S/Sx of angina/ACS. Continue to monitor on telemetry.     Chronic combined systolic and diastolic CHF (congestive heart failure)  Patient has Combined Systolic and Diastolic heart failure that is  "Acute on chronic. On presentation their CHF was well compensated. Most recent BNP and echo results are listed below.  Recent Labs     11/08/24  1650   *       Latest ECHO  Results for orders placed during the hospital encounter of 08/09/23    Echo    Interpretation Summary    Left Ventricle: The left ventricle is normal in size. Normal wall thickness. Mild global hypokinesis present. There is mildly reduced systolic function with a visually estimated ejection fraction of 40 - 45%.    Right Ventricle: Normal right ventricular cavity size. Wall thickness is normal. Right ventricle wall motion  is normal. Systolic function is normal.    Pulmonary Artery: The estimated pulmonary artery systolic pressure is 23 mmHg.    IVC/SVC: Normal venous pressure at 3 mmHg.    Current Heart Failure Medications  carvediloL tablet 12.5 mg, 2 times daily, Oral  losartan tablet 25 mg, Daily, Oral  furosemide tablet 20 mg, Daily, Oral    Plan  - Monitor strict I&Os and daily weights.    - Place on telemetry  - Low sodium diet once no longer NPO  - Place on fluid restriction of 1 L.   - Cardiology has been consulted and pending  - The patient's volume status is stable but not at their baseline as indicated by elevated BNP    Type 2 diabetes mellitus with neurologic complication  Patient's FSGs are uncontrolled due to hyperglycemia on current medication regimen.  Last A1c reviewed-   Lab Results   Component Value Date    HGBA1C 8.0 (H) 09/18/2024     Most recent fingerstick glucose reviewed- No results for input(s): "POCTGLUCOSE" in the last 24 hours.  Current correctional scale  High  Maintain anti-hyperglycemic dose as follows-   Antihyperglycemics (From admission, onward)      Start     Stop Route Frequency Ordered    11/09/24 0745  insulin aspart U-100 pen 15 Units         -- SubQ 3 times daily with meals 11/08/24 1958 11/08/24 2100  insulin glargine U-100 (Lantus) pen 35 Units         -- SubQ Nightly 11/08/24 1958 11/08/24 " 2041  insulin aspart U-100 pen 1-10 Units         -- SubQ Before meals & nightly PRN 11/08/24 1944          Hold Oral hypoglycemics while patient is in the hospital.      VTE Risk Mitigation (From admission, onward)           Ordered     enoxaparin injection 40 mg  Daily         11/08/24 1944     IP VTE HIGH RISK PATIENT  Once         11/08/24 1944     Place sequential compression device  Until discontinued         11/08/24 1944                    Discharge Planning   MARTHA:      Code Status: Full Code   Is the patient medically ready for discharge?:     Reason for patient still in hospital: Trending pt condition           Andreina Montes MD  Department of Hospital Medicine   [place] [facility]

## 2024-11-09 NOTE — ASSESSMENT & PLAN NOTE
73-year-old man with pertinent medical history of CAD (NSTEMI 2011 and 2015), ICM (EF 25->45%), and ICD (2017) presents with chest discomfort similar to past which required stent placement. EKG without evidence of STEMI, Troponin 0.008. Heart Score 5    Plan  Aspirin 325 -> 81mg  Follow up Troponin #2  Cardiology consulted; appreciate recs  NPO at midnight until cardiology evaluation

## 2024-11-09 NOTE — SUBJECTIVE & OBJECTIVE
Past Medical History:   Diagnosis Date    Anticoagulant long-term use     Cardiomyopathy     CHF (congestive heart failure)     Coronary artery disease     Diabetes mellitus     Gout     Heart attack     Hypertension     Pneumonia        Past Surgical History:   Procedure Laterality Date    APPENDECTOMY      CARDIAC CATHETERIZATION      7 stents    CARDIAC DEFIBRILLATOR PLACEMENT      CATARACT EXTRACTION Bilateral 2011    COLONOSCOPY N/A 8/10/2018    Procedure: COLONOSCOPY golytely;  Surgeon: Valentine Burger MD;  Location: [facility] ENDO;  Service: Endoscopy;  Laterality: N/A;    EYE SURGERY      REVISION OF IMPLANTABLE CARDIOVERTER-DEFIBRILLATOR (ICD) ELECTRODE LEAD PLACEMENT N/A 11/11/2019    Procedure: REVISION, INSERTION, ELECTRODE LEAD, ICD;  Surgeon: Shukri Walsh MD;  Location: [facility] EP LAB;  Service: Cardiology;  Laterality: N/A;  Lead malfxn, RV lead ICD, SJM, anes, DM, 3 PREP       Review of patient's allergies indicates:  No Known Allergies    No current facility-administered medications on file prior to encounter.     Current Outpatient Medications on File Prior to Encounter   Medication Sig    alcohol swabs (BD ALCOHOL SWABS) PadM USE FOUR TIMES DAILY    aspirin (ECOTRIN) 81 MG EC tablet Take 81 mg by mouth once daily.    atorvastatin (LIPITOR) 40 MG tablet Take 1 tablet (40 mg total) by mouth once daily.    blood glucose control high,low (ACCU-CHEK CATHRYN CONTROL SOLN) Soln Use as directed to check blood sugar    blood sugar diagnostic Strp Use to test four times daily    blood sugar diagnostic Strp test blood sugar as directed four times daily    blood-glucose meter (ACCU-CHEK CATHRYN PLUS METER) Misc USE AS DIRECTED    blood-glucose meter (TRUE METRIX GLUCOSE METER) Misc use as directed    carvediloL (COREG) 12.5 MG tablet Take 1 tablet (12.5 mg total) by mouth 2 (two) times daily.    clopidogreL (PLAVIX) 75 mg tablet Take 1 tablet (75 mg total) by mouth once daily.    colchicine (MITIGARE) 0.6 mg Cap  "Take 1 capsule (0.6 mg total) by mouth once daily.    cyanocobalamin (VITAMIN B-12) 1000 MCG tablet TAKE ONE TABLET BY MOUTH ONCE DAILY FOR VITAMIN DEFICIENCIES    furosemide (LASIX) 20 MG tablet Take 1 tablet (20 mg total) by mouth 2 (two) times daily.    gabapentin (NEURONTIN) 300 MG capsule Take 2 capsules (600 mg total) by mouth 2 (two) times daily.    ibuprofen (ADVIL,MOTRIN) 600 MG tablet Take 1 tablet (600 mg total) by mouth every 6 (six) hours as needed for Pain.    insulin aspart U-100 (NOVOLOG FLEXPEN U-100 INSULIN) 100 unit/mL (3 mL) InPn pen Inject 18 Units into the skin 3 (three) times daily with meals.    insulin glargine U-100, Lantus, (LANTUS SOLOSTAR U-100 INSULIN) 100 unit/mL (3 mL) InPn pen Inject 40 Units into the skin every evening.    lancets 30 gauge Misc by Misc.(Non-Drug; Combo Route) route 4 (four) times daily.    lancets 30 gauge Misc usea s directed to check blood glucose four times daily    losartan (COZAAR) 25 MG tablet Take 1 tablet (25 mg total) by mouth once daily.    metFORMIN (GLUCOPHAGE) 1000 MG tablet Take 1 tablet (1,000 mg total) by mouth 2 (two) times daily with meals.    nitroGLYCERIN (NITROSTAT) 0.4 MG SL tablet Place 1 tablet (0.4 mg total) under the tongue every 5 (five) minutes as needed for Chest pain.    pen needle, diabetic (BD ULTRA-FINE SINA PEN NEEDLE) 32 gauge x 5/32" Ndle Use four times daily for insulin administration    promethazine-dextromethorphan (PROMETHAZINE-DM) 6.25-15 mg/5 mL Syrp Take 5 mLs by mouth 2 (two) times daily as needed (cough).    zolpidem (AMBIEN) 5 MG Tab TAKE 1 TABLET BY MOUTH EVERY NIGHT AS NEEDED     Family History       Problem Relation (Age of Onset)    Heart disease Mother, Father          Tobacco Use    Smoking status: Former    Smokeless tobacco: Never   Substance and Sexual Activity    Alcohol use: Yes     Comment: socially    Drug use: No    Sexual activity: Yes     Review of Systems   Constitutional:  Negative for chills and fever. "   Eyes:  Negative for visual disturbance.   Respiratory:  Negative for chest tightness, shortness of breath and wheezing.    Cardiovascular:  Negative for palpitations and leg swelling.   Gastrointestinal:  Negative for abdominal pain, nausea and vomiting.   Genitourinary:  Negative for dysuria.   Neurological:  Negative for headaches.     Objective:     Vital Signs (Most Recent):  Temp: 97.4 °F (36.3 °C) (11/08/24 1554)  Pulse: (!) 59 (11/08/24 2001)  Resp: (!) 34 (11/08/24 2001)  BP: (!) 178/98 (11/08/24 2001)  SpO2: 99 % (11/08/24 2001) Vital Signs (24h Range):  Temp:  [97.4 °F (36.3 °C)] 97.4 °F (36.3 °C)  Pulse:  [51-59] 59  Resp:  [16-34] 34  SpO2:  [99 %-100 %] 99 %  BP: (176-191)/(87-98) 178/98     Weight: 107.5 kg (237 lb)  Body mass index is 33.05 kg/m².     Physical Exam  HENT:      Head: Normocephalic and atraumatic.   Eyes:      General:         Right eye: No discharge.         Left eye: No discharge.      Conjunctiva/sclera: Conjunctivae normal.   Cardiovascular:      Rate and Rhythm: Bradycardia present.   Pulmonary:      Effort: Pulmonary effort is normal. No respiratory distress.      Breath sounds: Normal breath sounds.   Abdominal:      Palpations: Abdomen is soft.      Tenderness: There is no abdominal tenderness. There is no guarding or rebound.   Musculoskeletal:      Right lower leg: No edema.      Left lower leg: No edema.   Skin:     General: Skin is warm.      Coloration: Skin is not jaundiced.   Neurological:      Mental Status: He is alert and oriented to person, place, and time.   Psychiatric:         Mood and Affect: Mood normal.         Behavior: Behavior normal.                Significant Labs: All pertinent labs within the past 24 hours have been reviewed.  CBC:   Recent Labs   Lab 11/08/24  1650   WBC 6.73   HGB 13.4*   HCT 39.1*        CMP:   Recent Labs   Lab 11/08/24  1650      K 3.8      CO2 29   *   BUN 14   CREATININE 1.1   CALCIUM 9.1   PROT 7.3    ALBUMIN 3.8   BILITOT 1.1*   ALKPHOS 78   AST 14   ALT 21   ANIONGAP 8       Significant Imaging: I have reviewed all pertinent imaging results/findings within the past 24 hours.

## 2024-11-09 NOTE — ASSESSMENT & PLAN NOTE
Patient with known CAD s/p stent placement & on external defibrillator, which is uncontrolled. ICD evaluated 4/2024. Will continue ASA, Plavix, and Statin and monitor for S/Sx of angina/ACS. Continue to monitor on telemetry.

## 2024-11-10 LAB
ALBUMIN SERPL BCP-MCNC: 3.4 G/DL (ref 3.5–5.2)
ALP SERPL-CCNC: 63 U/L (ref 40–150)
ALT SERPL W/O P-5'-P-CCNC: 18 U/L (ref 10–44)
ANION GAP SERPL CALC-SCNC: 8 MMOL/L (ref 8–16)
APICAL FOUR CHAMBER EJECTION FRACTION: 22 %
APICAL TWO CHAMBER EJECTION FRACTION: 32 %
AST SERPL-CCNC: 14 U/L (ref 10–40)
AV INDEX (PROSTH): 0.87
AV MEAN GRADIENT: 2 MMHG
AV PEAK GRADIENT: 4 MMHG
AV VALVE AREA BY VELOCITY RATIO: 2.7 CM²
AV VALVE AREA: 3.3 CM²
AV VELOCITY RATIO: 0.7
BASOPHILS # BLD AUTO: 0.04 K/UL (ref 0–0.2)
BASOPHILS NFR BLD: 0.6 % (ref 0–1.9)
BILIRUB SERPL-MCNC: 1.3 MG/DL (ref 0.1–1)
BSA FOR ECHO PROCEDURE: 2.32 M2
BUN SERPL-MCNC: 16 MG/DL (ref 8–23)
CALCIUM SERPL-MCNC: 8.9 MG/DL (ref 8.7–10.5)
CHLORIDE SERPL-SCNC: 109 MMOL/L (ref 95–110)
CO2 SERPL-SCNC: 26 MMOL/L (ref 23–29)
CREAT SERPL-MCNC: 1.2 MG/DL (ref 0.5–1.4)
CV ECHO LV RWT: 0.39 CM
DIFFERENTIAL METHOD BLD: ABNORMAL
DOP CALC AO PEAK VEL: 1 M/S
DOP CALC AO VTI: 17.9 CM
DOP CALC LVOT AREA: 3.8 CM2
DOP CALC LVOT DIAMETER: 2.2 CM
DOP CALC LVOT PEAK VEL: 0.7 M/S
DOP CALC LVOT STROKE VOLUME: 58.9 CM3
DOP CALCLVOT PEAK VEL VTI: 15.5 CM
E WAVE DECELERATION TIME: 361.62 MSEC
E/A RATIO: 0.68
E/E' RATIO: 5.14 M/S
ECHO LV POSTERIOR WALL: 1.2 CM (ref 0.6–1.1)
EOSINOPHIL # BLD AUTO: 0.2 K/UL (ref 0–0.5)
EOSINOPHIL NFR BLD: 3.8 % (ref 0–8)
ERYTHROCYTE [DISTWIDTH] IN BLOOD BY AUTOMATED COUNT: 12.3 % (ref 11.5–14.5)
EST. GFR  (NO RACE VARIABLE): >60 ML/MIN/1.73 M^2
FRACTIONAL SHORTENING: 18 % (ref 28–44)
GLUCOSE SERPL-MCNC: 80 MG/DL (ref 70–110)
HCT VFR BLD AUTO: 37.8 % (ref 40–54)
HGB BLD-MCNC: 12.6 G/DL (ref 14–18)
IMM GRANULOCYTES # BLD AUTO: 0.02 K/UL (ref 0–0.04)
IMM GRANULOCYTES NFR BLD AUTO: 0.3 % (ref 0–0.5)
INTERVENTRICULAR SEPTUM: 1.2 CM (ref 0.6–1.1)
LEFT ATRIUM AREA SYSTOLIC (APICAL 4 CHAMBER): 16.54 CM2
LEFT INTERNAL DIMENSION IN SYSTOLE: 5 CM (ref 2.1–4)
LEFT VENTRICLE DIASTOLIC VOLUME INDEX: 82.31 ML/M2
LEFT VENTRICLE DIASTOLIC VOLUME: 186.85 ML
LEFT VENTRICLE END DIASTOLIC VOLUME APICAL 2 CHAMBER: 70.48 ML
LEFT VENTRICLE END DIASTOLIC VOLUME APICAL 4 CHAMBER: 76.78 ML
LEFT VENTRICLE END SYSTOLIC VOLUME APICAL 4 CHAMBER: 38.66 ML
LEFT VENTRICLE MASS INDEX: 142.2 G/M2
LEFT VENTRICLE SYSTOLIC VOLUME INDEX: 51.3 ML/M2
LEFT VENTRICLE SYSTOLIC VOLUME: 116.56 ML
LEFT VENTRICULAR INTERNAL DIMENSION IN DIASTOLE: 6.1 CM (ref 3.5–6)
LEFT VENTRICULAR MASS: 322.7 G
LV LATERAL E/E' RATIO: 5.14 M/S
LV SEPTAL E/E' RATIO: 5.14 M/S
LVED V (TEICH): 186.85 ML
LVES V (TEICH): 116.56 ML
LVOT MG: 1.13 MMHG
LVOT MV: 0.52 CM/S
LYMPHOCYTES # BLD AUTO: 1.7 K/UL (ref 1–4.8)
LYMPHOCYTES NFR BLD: 27 % (ref 18–48)
MAGNESIUM SERPL-MCNC: 2 MG/DL (ref 1.6–2.6)
MCH RBC QN AUTO: 32.1 PG (ref 27–31)
MCHC RBC AUTO-ENTMCNC: 33.3 G/DL (ref 32–36)
MCV RBC AUTO: 96 FL (ref 82–98)
MONOCYTES # BLD AUTO: 0.9 K/UL (ref 0.3–1)
MONOCYTES NFR BLD: 15 % (ref 4–15)
MV PEAK A VEL: 0.53 M/S
MV PEAK E VEL: 0.36 M/S
MV STENOSIS PRESSURE HALF TIME: 104.87 MS
MV VALVE AREA P 1/2 METHOD: 2.1 CM2
NEUTROPHILS # BLD AUTO: 3.3 K/UL (ref 1.8–7.7)
NEUTROPHILS NFR BLD: 53.3 % (ref 38–73)
NRBC BLD-RTO: 0 /100 WBC
OHS CV RV/LV RATIO: 0.64 CM
PHOSPHATE SERPL-MCNC: 3.6 MG/DL (ref 2.7–4.5)
PLATELET # BLD AUTO: 153 K/UL (ref 150–450)
PMV BLD AUTO: 10.6 FL (ref 9.2–12.9)
POCT GLUCOSE: 107 MG/DL (ref 70–110)
POCT GLUCOSE: 118 MG/DL (ref 70–110)
POCT GLUCOSE: 121 MG/DL (ref 70–110)
POCT GLUCOSE: 230 MG/DL (ref 70–110)
POCT GLUCOSE: 96 MG/DL (ref 70–110)
POTASSIUM SERPL-SCNC: 4.2 MMOL/L (ref 3.5–5.1)
PROT SERPL-MCNC: 6.4 G/DL (ref 6–8.4)
PV PEAK GRADIENT: 5 MMHG
PV PEAK VELOCITY: 1.1 M/S
RA PRESSURE ESTIMATED: 3 MMHG
RA VOL SYS: 71.17 ML
RBC # BLD AUTO: 3.92 M/UL (ref 4.6–6.2)
RIGHT ATRIAL AREA: 21.3 CM2
RIGHT ATRIUM VOLUME AREA LENGTH APICAL 4 CHAMBER: 67.56 ML
RIGHT VENTRICLE DIASTOLIC BASEL DIMENSION: 3.9 CM
SINUS: 3.14 CM
SODIUM SERPL-SCNC: 143 MMOL/L (ref 136–145)
STJ: 3.1 CM
TDI LATERAL: 0.07 M/S
TDI SEPTAL: 0.07 M/S
TDI: 0.07 M/S
TRICUSPID ANNULAR PLANE SYSTOLIC EXCURSION: 2.34 CM
WBC # BLD AUTO: 6.25 K/UL (ref 3.9–12.7)
Z-SCORE OF LEFT VENTRICULAR DIMENSION IN END DIASTOLE: -3.24
Z-SCORE OF LEFT VENTRICULAR DIMENSION IN END SYSTOLE: -0.16

## 2024-11-10 PROCEDURE — 84100 ASSAY OF PHOSPHORUS: CPT | Mod: HCNC

## 2024-11-10 PROCEDURE — 96366 THER/PROPH/DIAG IV INF ADDON: CPT

## 2024-11-10 PROCEDURE — 36415 COLL VENOUS BLD VENIPUNCTURE: CPT | Mod: HCNC

## 2024-11-10 PROCEDURE — 25000003 PHARM REV CODE 250: Mod: HCNC

## 2024-11-10 PROCEDURE — 11000001 HC ACUTE MED/SURG PRIVATE ROOM: Mod: HCNC

## 2024-11-10 PROCEDURE — 99233 SBSQ HOSP IP/OBS HIGH 50: CPT | Mod: HCNC,,, | Performed by: INTERNAL MEDICINE

## 2024-11-10 PROCEDURE — 83735 ASSAY OF MAGNESIUM: CPT | Mod: HCNC

## 2024-11-10 PROCEDURE — 85025 COMPLETE CBC W/AUTO DIFF WBC: CPT | Mod: HCNC

## 2024-11-10 PROCEDURE — 80053 COMPREHEN METABOLIC PANEL: CPT | Mod: HCNC

## 2024-11-10 RX ADMIN — CARVEDILOL 12.5 MG: 12.5 TABLET, FILM COATED ORAL at 09:11

## 2024-11-10 RX ADMIN — CLOPIDOGREL BISULFATE 75 MG: 75 TABLET ORAL at 09:11

## 2024-11-10 RX ADMIN — GABAPENTIN 600 MG: 300 CAPSULE ORAL at 09:11

## 2024-11-10 RX ADMIN — LOSARTAN POTASSIUM 25 MG: 25 TABLET, FILM COATED ORAL at 09:11

## 2024-11-10 RX ADMIN — ASPIRIN 81 MG: 81 TABLET, COATED ORAL at 09:11

## 2024-11-10 RX ADMIN — INSULIN ASPART 15 UNITS: 100 INJECTION, SOLUTION INTRAVENOUS; SUBCUTANEOUS at 11:11

## 2024-11-10 RX ADMIN — ATORVASTATIN CALCIUM 40 MG: 40 TABLET, FILM COATED ORAL at 09:11

## 2024-11-10 RX ADMIN — GABAPENTIN 600 MG: 300 CAPSULE ORAL at 08:11

## 2024-11-10 RX ADMIN — CARVEDILOL 12.5 MG: 12.5 TABLET, FILM COATED ORAL at 08:11

## 2024-11-10 RX ADMIN — INSULIN ASPART 4 UNITS: 100 INJECTION, SOLUTION INTRAVENOUS; SUBCUTANEOUS at 11:11

## 2024-11-10 NOTE — ASSESSMENT & PLAN NOTE
Patient's FSGs are uncontrolled due to hyperglycemia on current medication regimen.  Last A1c reviewed-   Lab Results   Component Value Date    HGBA1C 8.0 (H) 09/18/2024     Most recent fingerstick glucose reviewed-   Recent Labs   Lab 11/09/24  2053 11/10/24  0450 11/10/24  0621 11/10/24  1143   POCTGLUCOSE 68* 107 118* 230*     Current correctional scale  High  Maintain anti-hyperglycemic dose as follows-   Antihyperglycemics (From admission, onward)    Start     Stop Route Frequency Ordered    11/09/24 0745  insulin aspart U-100 pen 15 Units         -- SubQ 3 times daily with meals 11/08/24 1958 11/08/24 2100  insulin glargine U-100 (Lantus) pen 35 Units         -- SubQ Nightly 11/08/24 1958 11/08/24 2041  insulin aspart U-100 pen 1-10 Units         -- SubQ Before meals & nightly PRN 11/08/24 1944        Hold Oral hypoglycemics while patient is in the hospital.

## 2024-11-10 NOTE — ASSESSMENT & PLAN NOTE
Patient has Combined Systolic and Diastolic heart failure that is Acute on chronic. On presentation their CHF was well compensated. Most recent BNP and echo results are listed below.  Recent Labs     11/08/24  1650   *       Latest ECHO  Results for orders placed during the hospital encounter of 08/09/23    Echo    Interpretation Summary    Left Ventricle: The left ventricle is normal in size. Normal wall thickness. Mild global hypokinesis present. There is mildly reduced systolic function with a visually estimated ejection fraction of 40 - 45%.    Right Ventricle: Normal right ventricular cavity size. Wall thickness is normal. Right ventricle wall motion  is normal. Systolic function is normal.    Pulmonary Artery: The estimated pulmonary artery systolic pressure is 23 mmHg.    IVC/SVC: Normal venous pressure at 3 mmHg.    Current Heart Failure Medications  carvediloL tablet 12.5 mg, 2 times daily, Oral  losartan tablet 25 mg, Daily, Oral    Plan  - Monitor strict I&Os and daily weights.    - Place on telemetry  - Low sodium diet once no longer NPO  - Place on fluid restriction of 1 L.   - Cardiology has been consulted and C planned for 11/11  - The patient's volume status is stable but not at their baseline as indicated by elevated BNP

## 2024-11-10 NOTE — ASSESSMENT & PLAN NOTE
73-year-old man with pertinent medical history of CAD (NSTEMI 2011 and 2015), ICM (EF 25->45%), and ICD (2017) presents with chest discomfort similar to past which required stent placement. EKG without evidence of STEMI, Troponin 0.008. Heart Score 5    Plan  NPO midnight for [facility] 11/11

## 2024-11-10 NOTE — ASSESSMENT & PLAN NOTE
Patients blood pressure range in the last 24 hours was: BP  Min: 127/62  Max: 191/87.The patient's inpatient anti-hypertensive regimen is listed below:  Current Antihypertensives  carvediloL tablet 12.5 mg, 2 times daily, Oral  losartan tablet 25 mg, Daily, Oral    Plan  - BP is uncontrolled, will adjust as follows: continuing home carvedilol and losartan

## 2024-11-10 NOTE — SUBJECTIVE & OBJECTIVE
Interval History: no acute events overnight, denies SOB, fevers, chills. Intermittent mild chest pain in the morning but mostly resolved. Awaiting [facility] tomorrow, NPO at midnight.     Review of Systems   Constitutional: Negative.    HENT: Negative.     Eyes:  Negative for visual disturbance.   Respiratory:  Negative for cough, chest tightness, shortness of breath, wheezing and stridor.    Cardiovascular:  Negative for chest pain, palpitations and leg swelling.   Gastrointestinal:  Negative for abdominal distention and abdominal pain.   Endocrine: Negative for polydipsia and polyuria.   Genitourinary:  Negative for difficulty urinating.   Musculoskeletal:  Negative for arthralgias.   Skin:  Negative for color change.     Objective:     Vital Signs (Most Recent):  Temp: 97.6 °F (36.4 °C) (11/10/24 1143)  Pulse: 67 (11/10/24 1143)  Resp: 18 (11/10/24 1143)  BP: (!) 155/74 (11/10/24 1143)  SpO2: 97 % (11/10/24 1143) Vital Signs (24h Range):  Temp:  [97.6 °F (36.4 °C)-98.5 °F (36.9 °C)] 97.6 °F (36.4 °C)  Pulse:  [55-67] 67  Resp:  [18] 18  SpO2:  [92 %-97 %] 97 %  BP: (127-180)/(60-82) 155/74     Weight: 107.5 kg (237 lb)  Body mass index is 33.05 kg/m².  No intake or output data in the 24 hours ending 11/10/24 1409      Physical Exam  Constitutional:       Appearance: Normal appearance.   HENT:      Head: Normocephalic and atraumatic.      Nose: Nose normal.      Mouth/Throat:      Mouth: Mucous membranes are moist.   Eyes:      Extraocular Movements: Extraocular movements intact.      Pupils: Pupils are equal, round, and reactive to light.   Cardiovascular:      Rate and Rhythm: Normal rate and regular rhythm.      Heart sounds: No murmur heard.     No friction rub. No gallop.   Pulmonary:      Effort: Pulmonary effort is normal. No respiratory distress.      Breath sounds: Normal breath sounds. No stridor. No wheezing, rhonchi or rales.   Chest:      Chest wall: No tenderness.   Abdominal:      General: Bowel sounds  are normal. There is no distension.      Palpations: Abdomen is soft.      Tenderness: There is no abdominal tenderness. There is no guarding.   Musculoskeletal:      Cervical back: Normal range of motion and neck supple.      Right lower leg: No edema.      Left lower leg: No edema.   Skin:     General: Skin is warm and dry.      Capillary Refill: Capillary refill takes less than 2 seconds.      Findings: No rash.   Neurological:      General: No focal deficit present.      Mental Status: He is alert.   Psychiatric:         Behavior: Behavior normal.             Significant Labs: All pertinent labs within the past 24 hours have been reviewed.  CBC:   Recent Labs   Lab 11/08/24 1650 11/09/24  0406 11/10/24  0254   WBC 6.73 5.38 6.25   HGB 13.4* 11.9* 12.6*   HCT 39.1* 35.1* 37.8*    143* 153     CMP:   Recent Labs   Lab 11/08/24 1650 11/09/24  0406 11/10/24  0254    143 143   K 3.8 3.4* 4.2    109 109   CO2 29 24 26   * 103 80   BUN 14 15 16   CREATININE 1.1 0.9 1.2   CALCIUM 9.1 8.7 8.9   PROT 7.3 6.2 6.4   ALBUMIN 3.8 3.3* 3.4*   BILITOT 1.1* 1.2* 1.3*   ALKPHOS 78 58 63   AST 14 13 14   ALT 21 16 18   ANIONGAP 8 10 8       Significant Imaging: I have reviewed all pertinent imaging results/findings within the past 24 hours.

## 2024-11-10 NOTE — PROGRESS NOTES
[facility] - Telemetry  Cardiology  Progress Note    Patient Name: Clifton Wilson  MRN: [id number]  Admission Date: 11/8/2024  Hospital Length of Stay: 1 days  Code Status: Full Code   Attending Physician: Francisco Randhawa III, MD   Primary Care Physician: Britton Grissom MD  Expected Discharge Date:   Principal Problem:Chest discomfort    Subjective:      No acute issues overnight.  Some shortness of breath this morning that has since resolved.  No chest pain or pressure.  NPO after midnight for left heart catheterization tomorrow    Review of Systems   Constitutional: Negative for chills and fever.   HENT:  Negative for hearing loss and nosebleeds.    Eyes:  Negative for blurred vision.   Cardiovascular:  Negative for chest pain, leg swelling and palpitations.   Respiratory:  Positive for shortness of breath. Negative for hemoptysis.    Hematologic/Lymphatic: Negative for bleeding problem.   Skin:  Negative for itching.   Musculoskeletal:  Negative for falls.   Gastrointestinal:  Negative for abdominal pain and hematochezia.   Genitourinary:  Negative for hematuria.   Neurological:  Negative for dizziness and loss of balance.   Psychiatric/Behavioral:  Negative for altered mental status and depression.      Objective:     Vital Signs (Most Recent):  Temp: 97.6 °F (36.4 °C) (11/10/24 1143)  Pulse: 67 (11/10/24 1143)  Resp: 18 (11/10/24 1143)  BP: (!) 155/74 (11/10/24 1143)  SpO2: 97 % (11/10/24 1143) Vital Signs (24h Range):  Temp:  [97.6 °F (36.4 °C)-98.5 °F (36.9 °C)] 97.6 °F (36.4 °C)  Pulse:  [55-70] 67  Resp:  [18] 18  SpO2:  [92 %-97 %] 97 %  BP: (127-180)/(60-82) 155/74     Weight: 107.5 kg (237 lb)  Body mass index is 33.05 kg/m².    SpO2: 97 %       No intake or output data in the 24 hours ending 11/10/24 1232    Lines/Drains/Airways       Peripheral Intravenous Line  Duration                  Peripheral IV - Single Lumen 11/08/24 1650 18 G Left Antecubital 1 day                    Physical Exam  Constitutional:        Appearance: He is well-developed.   HENT:      Head: Normocephalic and atraumatic.   Eyes:      Conjunctiva/sclera: Conjunctivae normal.   Neck:      Vascular: No carotid bruit or JVD.   Cardiovascular:      Rate and Rhythm: Normal rate and regular rhythm.      Pulses:           Carotid pulses are 2+ on the right side and 2+ on the left side.       Radial pulses are 2+ on the right side and 2+ on the left side.      Heart sounds: Normal heart sounds. No murmur heard.     No friction rub. No gallop.   Pulmonary:      Effort: Pulmonary effort is normal. No respiratory distress.      Breath sounds: Normal breath sounds. No stridor. No wheezing.   Musculoskeletal:      Cervical back: Neck supple.   Skin:     General: Skin is warm and dry.   Neurological:      Mental Status: He is alert and oriented to person, place, and time.   Psychiatric:         Behavior: Behavior normal.         Significant Labs: All pertinent lab results from the last 24 hours have been reviewed. and   Recent Lab Results  (Last 5 results in the past 24 hours)        11/10/24  1143   11/10/24  0621   11/10/24  0450   11/10/24  0254   11/09/24  2053        A2C EF               A4C EF               Albumin       3.4         ALP       63         ALT       18         Anion Gap       8         Ao peak balwinder               Ao VTI               AST       14         AV valve area               BEV by Velocity Ratio               AV mean gradient               AV index (prosthetic)               AV peak gradient               AV Velocity Ratio               Baso #       0.04         Basophil %       0.6         BILIRUBIN TOTAL       1.3  Comment: For infants and newborns, interpretation of results should be based  on gestational age, weight and in agreement with clinical  observations.    Premature Infant recommended reference ranges:  Up to 24 hours.............<8.0 mg/dL  Up to 48 hours............<12.0 mg/dL  3-5 days..................<15.0 mg/dL  6-29  days.................<15.0 mg/dL           BSA               BUN       16         Calcium       8.9         Chloride       109         CO2       26         Creatinine       1.2         Left Ventricle Relative Wall Thickness               Differential Method       Automated         E/A ratio               E/E' ratio               eGFR       >60         Eos #       0.2         Eos %       3.8         E wave deceleration time               FS               Glucose       80         Gran # (ANC)       3.3         Gran %       53.3         Hematocrit       37.8         Hemoglobin       12.6         Immature Grans (Abs)       0.02  Comment: Mild elevation in immature granulocytes is non specific and   can be seen in a variety of conditions including stress response,   acute inflammation, trauma and pregnancy. Correlation with other   laboratory and clinical findings is essential.           Immature Granulocytes       0.3         IVSd               LA area A4C               LVOT area               LV LATERAL E/E' RATIO               LV SEPTAL E/E' RATIO               LV EDV BP               LV Diastolic Volume Index               Left Ventricular End Diastolic Volume by Teichholz Method               LV EDV A2C               LV EDV A4C               Left Ventricular End Systolic Volume by Teichholz Method               LV ESV A4C               LVIDd               LVIDs               LV mass               LV Mass Index               Left Ventricular Outflow Tract Mean Gradient               Left Ventricular Outflow Tract Mean Velocity               LVOT diameter               LVOT peak balwinder               LVOT stroke volume               LVOT peak VTI               LV ESV BP               LV Systolic Volume Index               Lymph #       1.7         Lymph %       27.0         Magnesium        2.0         MCH       32.1         MCHC       33.3         MCV       96         Mean e'               Mono #       0.9         Mono %        15.0         MPV       10.6         MV valve area p 1/2 method               MV Peak A Rome               MV Peak E Rome               MV stenosis pressure 1/2 time               nRBC       0         Phosphorus Level       3.6         Platelet Count       153         POCT Glucose 230   118   107     68       Potassium       4.2         PROTEIN TOTAL       6.4         PV peak gradient               PV PEAK VELOCITY               PW               RA Vol               RA Area               Est. RA pres               RA area length vol               RBC       3.92         RDW       12.3         RV/LV Ratio               RV- estes basal diam               Sinus               Sodium       143         STJ               TAPSE               TDI SEPTAL               TDI LATERAL               WBC       6.25         ZLVIDD               ZLVIDS                                      Significant Imaging: Echocardiogram: Transthoracic echo (TTE) complete ([facility] Only):   Results for orders placed or performed during the hospital encounter of 11/08/24   Echo   Result Value Ref Range    BSA 2.32 m2    A2C EF 32 %    A4C EF 22 %    LVOT stroke volume 58.9 cm3    LVIDd 6.1 (A) 3.5 - 6.0 cm    LV Systolic Volume 116.56 mL    LV Systolic Volume Index 51.3 mL/m2    LVIDs 5.0 (A) 2.1 - 4.0 cm    LV Diastolic Volume 186.85 mL    LV ESV A4C 38.66 mL    LV Diastolic Volume Index 82.31 mL/m2    LV EDV A2C [id number] mL    LV EDV A4C 76.78 mL    Left Ventricular End Systolic Volume by Teichholz Method 116.56 mL    Left Ventricular End Diastolic Volume by Teichholz Method 186.85 mL    IVS 1.2 (A) 0.6 - 1.1 cm    LVOT diameter 2.2 cm    LVOT area 3.8 cm2    FS 18.0 (A) 28 - 44 %    Left Ventricle Relative Wall Thickness 0.39 cm    PW 1.2 (A) 0.6 - 1.1 cm    LV mass 322.7 g    LV Mass Index 142.2 g/m2    MV Peak E Rome 0.36 m/s    TDI LATERAL 0.07 m/s    TDI SEPTAL 0.07 m/s    E/E' ratio 5.14 m/s    MV Peak A Rome 0.53 m/s    E/A ratio 0.68      E wave deceleration time 361.62 msec    LV SEPTAL E/E' RATIO 5.14 m/s    LV LATERAL E/E' RATIO 5.14 m/s    LVOT peak balwinder 0.7 m/s    Left Ventricular Outflow Tract Mean Velocity 0.52 cm/s    Left Ventricular Outflow Tract Mean Gradient 1.13 mmHg    RV- estes basal diam 3.9 cm    TAPSE 2.34 cm    RV/LV Ratio 0.64 cm    RA area length vol 67.56 mL    RA Area 21.3 cm2    RA Vol 71.17 mL    AV mean gradient 2.0 mmHg    AV peak gradient 4.0 mmHg    Ao peak balwinder 1.0 m/s    Ao VTI 17.9 cm    LVOT peak VTI 15.5 cm    AV valve area 3.3 cm²    AV Velocity Ratio 0.70     AV index (prosthetic) 0.87     BEV by Velocity Ratio 2.7 cm²    MV stenosis pressure 1/2 time 104.87 ms    MV valve area p 1/2 method 2.10 cm2    PV PEAK VELOCITY 1.10 m/s    PV peak gradient 5 mmHg    Sinus 3.14 cm    STJ 3.10 cm    Mean e' 0.07 m/s    ZLVIDS -0.16     ZLVIDD -3.24     LA area A4C 16.54 cm2    Est. RA pres 3 mmHg    Narrative      Left Ventricle: The left ventricle is severely dilated. There is mild   eccentric hypertrophy. Poor echocardiogram windows.  Definity contrast not   used.  Unable to comment on wall motion abnormalities  however I do see    inferolateral wall hypokinesis There is mildly reduced systolic function   with a visually estimated ejection fraction of 40 - 45%. Grade I diastolic   dysfunction. Normal left ventricular filling pressure.    Right Ventricle: Normal right ventricular cavity size. Systolic   function is normal. Pacemaker lead present in the ventricle.    Left Atrium: Left atrium was not well visualized.    Aortic Valve: The aortic valve is a trileaflet valve. There is no   stenosis. Aortic valve peak velocity is 1.0 m/s. Mean gradient is 2.0   mmHg. There is no significant regurgitation.    Mitral Valve: The mitral valve is structurally normal.    Tricuspid Valve: Not well visualized due to poor acoustic window.    Aorta: Aortic root is normal in size measuring 3.14 cm.    IVC/SVC: Normal venous pressure at 3  mmHg.    Pericardium: There is no pericardial effusion.       Assessment and Plan:     Unstable angina  CAD status post PCI in 2016  Hypertension  On dual antiplatelet therapy  Hyperlipidemia  Diabetes           -  continue Coreg and losartan for blood pressure management.  Euvolemic on examination.  Lasix stopped today.  -   Continue high-intensity statin  - continue dual antiplatelet therapy  - recommend left heart catheterization and coronary angiogram while inpatient considering significant CAD history and classical symptoms of angina now happening at rest  - NPO after midnight on Sunday for left heart catheterization  tomorrow.    Active Diagnoses:    Diagnosis Date Noted POA    PRINCIPAL PROBLEM:  Chest discomfort [R07.89] 11/08/2024 Yes    Essential hypertension [I10] 04/24/2017 Yes    Coronary artery disease of native artery of native heart with stable angina pectoris [I25.118] 02/22/2016 Yes    Chronic combined systolic and diastolic CHF (congestive heart failure) [I50.42] 02/11/2016 Yes    Type 2 diabetes mellitus with neurologic complication [E11.49] 12/04/2015 Yes      Problems Resolved During this Admission:       VTE Risk Mitigation (From admission, onward)           Ordered     enoxaparin injection 40 mg  Daily         11/08/24 1944     IP VTE HIGH RISK PATIENT  Once         11/08/24 1944     Place sequential compression device  Until discontinued         11/08/24 1944                    Jamey Albright MD  Cardiology  [facility] - Telemetry

## 2024-11-10 NOTE — PROGRESS NOTES
"Family Medicine  Progress Note    Patient Name: Clifton Wilson  MRN: [id number]  Patient Class: IP- Inpatient   Admission Date: 11/8/2024  Length of Stay: 1 days  Attending Physician: Dr. Ortiz  Primary Care Provider: Britton Grissom MD        Subjective:     Principal Problem:Chest discomfort      HPI:  73-year-old man, PMHx of HTN, CAD (NSTEMI 2011 and 2015), ICM (EF 25->45%), and ICD (2017), CHF, DMT2, presented to [facility] ER 11/8/24 for chest discomfort.  Patient states that a sensation in his chest started when he sat down on the couch in the morning to drink coffee. He described the sensation as "discomfort" that was similar to the sensation in 2015, at which time he required stent placement. (Upon chart review, 2015 was also the year of patient's NSTEMI.) The sensation has been constant, and progressive, but patient denies that it is pain/painful. Patient denies exacerbating/relieving factors, denies reflux or history of heartburn, SOB, or syncope. He reports that he took his antihypertensives and all other morning medications before the start of the discomfort. He attempted to take his blood pressure when the discomfort started, but the machine read as "EE." He does not consistently take pressures at home but reports that his pressures at doctor's office visits typically run in the 110s systolic. He did not take his PRN nitroglycerine. Of note, patient does endorse feeling fatigued for the last 2 week. He reports that his wife, who was also seen in the ER today, is sick with cough and sinus congestion.  In the ER, initial vitals as follows: Temp 97.4F, /88, HR 59, O2 99%. Labs with normal Troponin 0.008,  (normal baseline). EKG showing sinus bradycardia & right bundle branch consistent with previous EKG. Patient was admitted to [facility] for observation of chest discomfort and evaluation by Cardiology in the morning.      Interval History: no acute events overnight, denies " SOB, fevers, chills. Intermittent mild chest pain in the morning but mostly resolved. Awaiting [facility] tomorrow, NPO at midnight.     Review of Systems   Constitutional: Negative.    HENT: Negative.     Eyes:  Negative for visual disturbance.   Respiratory:  Negative for cough, chest tightness, shortness of breath, wheezing and stridor.    Cardiovascular:  Negative for chest pain, palpitations and leg swelling.   Gastrointestinal:  Negative for abdominal distention and abdominal pain.   Endocrine: Negative for polydipsia and polyuria.   Genitourinary:  Negative for difficulty urinating.   Musculoskeletal:  Negative for arthralgias.   Skin:  Negative for color change.     Objective:     Vital Signs (Most Recent):  Temp: 97.6 °F (36.4 °C) (11/10/24 1143)  Pulse: 67 (11/10/24 1143)  Resp: 18 (11/10/24 1143)  BP: (!) 155/74 (11/10/24 1143)  SpO2: 97 % (11/10/24 1143) Vital Signs (24h Range):  Temp:  [97.6 °F (36.4 °C)-98.5 °F (36.9 °C)] 97.6 °F (36.4 °C)  Pulse:  [55-67] 67  Resp:  [18] 18  SpO2:  [92 %-97 %] 97 %  BP: (127-180)/(60-82) 155/74     Weight: 107.5 kg (237 lb)  Body mass index is 33.05 kg/m².  No intake or output data in the 24 hours ending 11/10/24 1409      Physical Exam  Constitutional:       Appearance: Normal appearance.   HENT:      Head: Normocephalic and atraumatic.      Nose: Nose normal.      Mouth/Throat:      Mouth: Mucous membranes are moist.   Eyes:      Extraocular Movements: Extraocular movements intact.      Pupils: Pupils are equal, round, and reactive to light.   Cardiovascular:      Rate and Rhythm: Normal rate and regular rhythm.      Heart sounds: No murmur heard.     No friction rub. No gallop.   Pulmonary:      Effort: Pulmonary effort is normal. No respiratory distress.      Breath sounds: Normal breath sounds. No stridor. No wheezing, rhonchi or rales.   Chest:      Chest wall: No tenderness.   Abdominal:      General: Bowel sounds are normal. There is no distension.      Palpations:  Abdomen is soft.      Tenderness: There is no abdominal tenderness. There is no guarding.   Musculoskeletal:      Cervical back: Normal range of motion and neck supple.      Right lower leg: No edema.      Left lower leg: No edema.   Skin:     General: Skin is warm and dry.      Capillary Refill: Capillary refill takes less than 2 seconds.      Findings: No rash.   Neurological:      General: No focal deficit present.      Mental Status: He is alert.   Psychiatric:         Behavior: Behavior normal.             Significant Labs: All pertinent labs within the past 24 hours have been reviewed.  CBC:   Recent Labs   Lab 11/08/24 1650 11/09/24 0406 11/10/24  0254   WBC 6.73 5.38 6.25   HGB 13.4* 11.9* 12.6*   HCT 39.1* 35.1* 37.8*    143* 153     CMP:   Recent Labs   Lab 11/08/24 1650 11/09/24 0406 11/10/24  0254    143 143   K 3.8 3.4* 4.2    109 109   CO2 29 24 26   * 103 80   BUN 14 15 16   CREATININE 1.1 0.9 1.2   CALCIUM 9.1 8.7 8.9   PROT 7.3 6.2 6.4   ALBUMIN 3.8 3.3* 3.4*   BILITOT 1.1* 1.2* 1.3*   ALKPHOS 78 58 63   AST 14 13 14   ALT 21 16 18   ANIONGAP 8 10 8       Significant Imaging: I have reviewed all pertinent imaging results/findings within the past 24 hours.    Assessment/Plan:      * Chest discomfort  73-year-old man with pertinent medical history of CAD (NSTEMI 2011 and 2015), ICM (EF 25->45%), and ICD (2017) presents with chest discomfort similar to past which required stent placement. EKG without evidence of STEMI, Troponin 0.008. Heart Score 5    Plan  NPO midnight for [facility] 11/11    Essential hypertension  Patients blood pressure range in the last 24 hours was: BP  Min: 127/62  Max: 191/87.The patient's inpatient anti-hypertensive regimen is listed below:  Current Antihypertensives  carvediloL tablet 12.5 mg, 2 times daily, Oral  losartan tablet 25 mg, Daily, Oral    Plan  - BP is uncontrolled, will adjust as follows: continuing home carvedilol and losartan    Coronary  artery disease of native artery of native heart with stable angina pectoris  Patient with known CAD s/p stent placement & on external defibrillator, which is uncontrolled. ICD evaluated 4/2024. Will continue ASA, Plavix, and Statin and monitor for S/Sx of angina/ACS. Continue to monitor on telemetry.     Chronic combined systolic and diastolic CHF (congestive heart failure)  Patient has Combined Systolic and Diastolic heart failure that is Acute on chronic. On presentation their CHF was well compensated. Most recent BNP and echo results are listed below.  Recent Labs     11/08/24  1650   *       Latest ECHO  Results for orders placed during the hospital encounter of 08/09/23    Echo    Interpretation Summary    Left Ventricle: The left ventricle is normal in size. Normal wall thickness. Mild global hypokinesis present. There is mildly reduced systolic function with a visually estimated ejection fraction of 40 - 45%.    Right Ventricle: Normal right ventricular cavity size. Wall thickness is normal. Right ventricle wall motion  is normal. Systolic function is normal.    Pulmonary Artery: The estimated pulmonary artery systolic pressure is 23 mmHg.    IVC/SVC: Normal venous pressure at 3 mmHg.    Current Heart Failure Medications  carvediloL tablet 12.5 mg, 2 times daily, Oral  losartan tablet 25 mg, Daily, Oral    Plan  - Monitor strict I&Os and daily weights.    - Place on telemetry  - Low sodium diet once no longer NPO  - Place on fluid restriction of 1 L.   - Cardiology has been consulted and C planned for 11/11  - The patient's volume status is stable but not at their baseline as indicated by elevated BNP    Type 2 diabetes mellitus with neurologic complication  Patient's FSGs are uncontrolled due to hyperglycemia on current medication regimen.  Last A1c reviewed-   Lab Results   Component Value Date    HGBA1C 8.0 (H) 09/18/2024     Most recent fingerstick glucose reviewed-   Recent Labs   Lab  11/09/24  2053 11/10/24  0450 11/10/24  0621 11/10/24  1143   POCTGLUCOSE 68* 107 118* 230*     Current correctional scale  High  Maintain anti-hyperglycemic dose as follows-   Antihyperglycemics (From admission, onward)      Start     Stop Route Frequency Ordered    11/09/24 0745  insulin aspart U-100 pen 15 Units         -- SubQ 3 times daily with meals 11/08/24 1958 11/08/24 2100  insulin glargine U-100 (Lantus) pen 35 Units         -- SubQ Nightly 11/08/24 1958 11/08/24 2041  insulin aspart U-100 pen 1-10 Units         -- SubQ Before meals & nightly PRN 11/08/24 1944          Hold Oral hypoglycemics while patient is in the hospital.      VTE Risk Mitigation (From admission, onward)           Ordered     enoxaparin injection 40 mg  Daily         11/08/24 1944     IP VTE HIGH RISK PATIENT  Once         11/08/24 1944     Place sequential compression device  Until discontinued         11/08/24 1944                    Discharge Planning   MARTHA:      Code Status: Full Code   Is the patient medically ready for discharge?:                  Shukri Martinez MD  \[facility] [place] [facility]\"" Family Medicine, PGY-2  11/10/2024

## 2024-11-11 VITALS
SYSTOLIC BLOOD PRESSURE: 153 MMHG | OXYGEN SATURATION: 97 % | TEMPERATURE: 99 F | DIASTOLIC BLOOD PRESSURE: 73 MMHG | RESPIRATION RATE: 16 BRPM | WEIGHT: 237 LBS | HEART RATE: 71 BPM | BODY MASS INDEX: 33.18 KG/M2 | HEIGHT: 71 IN

## 2024-11-11 DIAGNOSIS — I25.118 CORONARY ARTERY DISEASE OF NATIVE ARTERY OF NATIVE HEART WITH STABLE ANGINA PECTORIS: Primary | ICD-10-CM

## 2024-11-11 LAB
ALBUMIN SERPL BCP-MCNC: 3.5 G/DL (ref 3.5–5.2)
ALP SERPL-CCNC: 65 U/L (ref 40–150)
ALT SERPL W/O P-5'-P-CCNC: 18 U/L (ref 10–44)
ANION GAP SERPL CALC-SCNC: 10 MMOL/L (ref 8–16)
AST SERPL-CCNC: 13 U/L (ref 10–40)
BASOPHILS # BLD AUTO: 0.05 K/UL (ref 0–0.2)
BASOPHILS NFR BLD: 0.6 % (ref 0–1.9)
BILIRUB SERPL-MCNC: 1.4 MG/DL (ref 0.1–1)
BUN SERPL-MCNC: 16 MG/DL (ref 8–23)
CALCIUM SERPL-MCNC: 9.3 MG/DL (ref 8.7–10.5)
CHLORIDE SERPL-SCNC: 107 MMOL/L (ref 95–110)
CO2 SERPL-SCNC: 22 MMOL/L (ref 23–29)
CREAT SERPL-MCNC: 1.1 MG/DL (ref 0.5–1.4)
DIFFERENTIAL METHOD BLD: ABNORMAL
EOSINOPHIL # BLD AUTO: 0.2 K/UL (ref 0–0.5)
EOSINOPHIL NFR BLD: 2.8 % (ref 0–8)
ERYTHROCYTE [DISTWIDTH] IN BLOOD BY AUTOMATED COUNT: 12.2 % (ref 11.5–14.5)
EST. GFR  (NO RACE VARIABLE): >60 ML/MIN/1.73 M^2
GLUCOSE SERPL-MCNC: 149 MG/DL (ref 70–110)
HCT VFR BLD AUTO: 37.2 % (ref 40–54)
HGB BLD-MCNC: 12.7 G/DL (ref 14–18)
IMM GRANULOCYTES # BLD AUTO: 0.02 K/UL (ref 0–0.04)
IMM GRANULOCYTES NFR BLD AUTO: 0.3 % (ref 0–0.5)
LYMPHOCYTES # BLD AUTO: 1.2 K/UL (ref 1–4.8)
LYMPHOCYTES NFR BLD: 14.6 % (ref 18–48)
MAGNESIUM SERPL-MCNC: 1.7 MG/DL (ref 1.6–2.6)
MCH RBC QN AUTO: 32.6 PG (ref 27–31)
MCHC RBC AUTO-ENTMCNC: 34.1 G/DL (ref 32–36)
MCV RBC AUTO: 95 FL (ref 82–98)
MONOCYTES # BLD AUTO: 0.9 K/UL (ref 0.3–1)
MONOCYTES NFR BLD: 11.6 % (ref 4–15)
NEUTROPHILS # BLD AUTO: 5.6 K/UL (ref 1.8–7.7)
NEUTROPHILS NFR BLD: 70.1 % (ref 38–73)
NRBC BLD-RTO: 0 /100 WBC
PHOSPHATE SERPL-MCNC: 3.1 MG/DL (ref 2.7–4.5)
PLATELET # BLD AUTO: 148 K/UL (ref 150–450)
PMV BLD AUTO: 10.5 FL (ref 9.2–12.9)
POCT GLUCOSE: 162 MG/DL (ref 70–110)
POCT GLUCOSE: 288 MG/DL (ref 70–110)
POTASSIUM SERPL-SCNC: 4 MMOL/L (ref 3.5–5.1)
PROT SERPL-MCNC: 6.5 G/DL (ref 6–8.4)
RBC # BLD AUTO: 3.9 M/UL (ref 4.6–6.2)
SODIUM SERPL-SCNC: 139 MMOL/L (ref 136–145)
WBC # BLD AUTO: 7.93 K/UL (ref 3.9–12.7)

## 2024-11-11 PROCEDURE — 25000003 PHARM REV CODE 250: Mod: HCNC | Performed by: STUDENT IN AN ORGANIZED HEALTH CARE EDUCATION/TRAINING PROGRAM

## 2024-11-11 PROCEDURE — 63600175 PHARM REV CODE 636 W HCPCS: Mod: HCNC

## 2024-11-11 PROCEDURE — 93458 L HRT ARTERY/VENTRICLE ANGIO: CPT | Mod: 26,HCNC,, | Performed by: STUDENT IN AN ORGANIZED HEALTH CARE EDUCATION/TRAINING PROGRAM

## 2024-11-11 PROCEDURE — 4A033BC MEASUREMENT OF ARTERIAL PRESSURE, CORONARY, PERCUTANEOUS APPROACH: ICD-10-PCS | Performed by: STUDENT IN AN ORGANIZED HEALTH CARE EDUCATION/TRAINING PROGRAM

## 2024-11-11 PROCEDURE — 84100 ASSAY OF PHOSPHORUS: CPT | Mod: HCNC

## 2024-11-11 PROCEDURE — 80053 COMPREHEN METABOLIC PANEL: CPT | Mod: HCNC

## 2024-11-11 PROCEDURE — 93458 L HRT ARTERY/VENTRICLE ANGIO: CPT | Mod: HCNC | Performed by: STUDENT IN AN ORGANIZED HEALTH CARE EDUCATION/TRAINING PROGRAM

## 2024-11-11 PROCEDURE — C1887 CATHETER, GUIDING: HCPCS | Mod: HCNC | Performed by: STUDENT IN AN ORGANIZED HEALTH CARE EDUCATION/TRAINING PROGRAM

## 2024-11-11 PROCEDURE — C1894 INTRO/SHEATH, NON-LASER: HCPCS | Mod: HCNC | Performed by: STUDENT IN AN ORGANIZED HEALTH CARE EDUCATION/TRAINING PROGRAM

## 2024-11-11 PROCEDURE — 25500020 PHARM REV CODE 255: Mod: HCNC | Performed by: STUDENT IN AN ORGANIZED HEALTH CARE EDUCATION/TRAINING PROGRAM

## 2024-11-11 PROCEDURE — 99153 MOD SED SAME PHYS/QHP EA: CPT | Mod: HCNC | Performed by: STUDENT IN AN ORGANIZED HEALTH CARE EDUCATION/TRAINING PROGRAM

## 2024-11-11 PROCEDURE — 36415 COLL VENOUS BLD VENIPUNCTURE: CPT | Mod: HCNC

## 2024-11-11 PROCEDURE — C1769 GUIDE WIRE: HCPCS | Mod: HCNC | Performed by: STUDENT IN AN ORGANIZED HEALTH CARE EDUCATION/TRAINING PROGRAM

## 2024-11-11 PROCEDURE — 25000003 PHARM REV CODE 250: Mod: HCNC

## 2024-11-11 PROCEDURE — B211YZZ FLUOROSCOPY OF MULTIPLE CORONARY ARTERIES USING OTHER CONTRAST: ICD-10-PCS | Performed by: STUDENT IN AN ORGANIZED HEALTH CARE EDUCATION/TRAINING PROGRAM

## 2024-11-11 PROCEDURE — 93799 UNLISTED CV SVC/PROCEDURE: CPT | Mod: HCNC | Performed by: STUDENT IN AN ORGANIZED HEALTH CARE EDUCATION/TRAINING PROGRAM

## 2024-11-11 PROCEDURE — 63600175 PHARM REV CODE 636 W HCPCS: Mod: HCNC | Performed by: STUDENT IN AN ORGANIZED HEALTH CARE EDUCATION/TRAINING PROGRAM

## 2024-11-11 PROCEDURE — 4A023N7 MEASUREMENT OF CARDIAC SAMPLING AND PRESSURE, LEFT HEART, PERCUTANEOUS APPROACH: ICD-10-PCS | Performed by: STUDENT IN AN ORGANIZED HEALTH CARE EDUCATION/TRAINING PROGRAM

## 2024-11-11 PROCEDURE — 83735 ASSAY OF MAGNESIUM: CPT | Mod: HCNC

## 2024-11-11 PROCEDURE — 99152 MOD SED SAME PHYS/QHP 5/>YRS: CPT | Mod: HCNC | Performed by: STUDENT IN AN ORGANIZED HEALTH CARE EDUCATION/TRAINING PROGRAM

## 2024-11-11 PROCEDURE — 85025 COMPLETE CBC W/AUTO DIFF WBC: CPT | Mod: HCNC

## 2024-11-11 PROCEDURE — 99152 MOD SED SAME PHYS/QHP 5/>YRS: CPT | Mod: HCNC,,, | Performed by: STUDENT IN AN ORGANIZED HEALTH CARE EDUCATION/TRAINING PROGRAM

## 2024-11-11 PROCEDURE — 93571 IV DOP VEL&/PRESS C FLO 1ST: CPT | Mod: 26,52,LD,HCNC | Performed by: STUDENT IN AN ORGANIZED HEALTH CARE EDUCATION/TRAINING PROGRAM

## 2024-11-11 RX ORDER — VERAPAMIL HYDROCHLORIDE 2.5 MG/ML
INJECTION, SOLUTION INTRAVENOUS
Status: DISCONTINUED | OUTPATIENT
Start: 2024-11-11 | End: 2024-11-11 | Stop reason: HOSPADM

## 2024-11-11 RX ORDER — MIDAZOLAM HYDROCHLORIDE 1 MG/ML
INJECTION INTRAMUSCULAR; INTRAVENOUS
Status: DISCONTINUED | OUTPATIENT
Start: 2024-11-11 | End: 2024-11-11 | Stop reason: HOSPADM

## 2024-11-11 RX ORDER — MAGNESIUM SULFATE HEPTAHYDRATE 40 MG/ML
2 INJECTION, SOLUTION INTRAVENOUS ONCE
Status: COMPLETED | OUTPATIENT
Start: 2024-11-11 | End: 2024-11-11

## 2024-11-11 RX ORDER — IODIXANOL 320 MG/ML
INJECTION, SOLUTION INTRAVASCULAR
Status: DISCONTINUED | OUTPATIENT
Start: 2024-11-11 | End: 2024-11-11 | Stop reason: HOSPADM

## 2024-11-11 RX ORDER — ISOSORBIDE MONONITRATE 30 MG/1
30 TABLET, EXTENDED RELEASE ORAL DAILY
Qty: 30 TABLET | Refills: 0 | Status: SHIPPED | OUTPATIENT
Start: 2024-11-12 | End: 2024-12-12

## 2024-11-11 RX ORDER — LIDOCAINE HYDROCHLORIDE 10 MG/ML
INJECTION, SOLUTION INFILTRATION; PERINEURAL
Status: DISCONTINUED | OUTPATIENT
Start: 2024-11-11 | End: 2024-11-11 | Stop reason: HOSPADM

## 2024-11-11 RX ORDER — LIDOCAINE HYDROCHLORIDE 20 MG/ML
INJECTION, SOLUTION EPIDURAL; INFILTRATION; INTRACAUDAL; PERINEURAL
Status: DISCONTINUED | OUTPATIENT
Start: 2024-11-11 | End: 2024-11-11 | Stop reason: HOSPADM

## 2024-11-11 RX ORDER — ISOSORBIDE MONONITRATE 30 MG/1
30 TABLET, EXTENDED RELEASE ORAL DAILY
Status: DISCONTINUED | OUTPATIENT
Start: 2024-11-12 | End: 2024-11-11 | Stop reason: HOSPADM

## 2024-11-11 RX ORDER — HEPARIN SODIUM 200 [USP'U]/100ML
INJECTION, SOLUTION INTRAVENOUS
Status: DISCONTINUED | OUTPATIENT
Start: 2024-11-11 | End: 2024-11-11 | Stop reason: HOSPADM

## 2024-11-11 RX ORDER — HEPARIN SODIUM 1000 [USP'U]/ML
INJECTION, SOLUTION INTRAVENOUS; SUBCUTANEOUS
Status: DISCONTINUED | OUTPATIENT
Start: 2024-11-11 | End: 2024-11-11 | Stop reason: HOSPADM

## 2024-11-11 RX ORDER — FENTANYL CITRATE 50 UG/ML
INJECTION, SOLUTION INTRAMUSCULAR; INTRAVENOUS
Status: DISCONTINUED | OUTPATIENT
Start: 2024-11-11 | End: 2024-11-11 | Stop reason: HOSPADM

## 2024-11-11 RX ADMIN — MAGNESIUM SULFATE HEPTAHYDRATE 2 G: 40 INJECTION, SOLUTION INTRAVENOUS at 09:11

## 2024-11-11 RX ADMIN — GABAPENTIN 600 MG: 300 CAPSULE ORAL at 09:11

## 2024-11-11 RX ADMIN — ATORVASTATIN CALCIUM 40 MG: 40 TABLET, FILM COATED ORAL at 09:11

## 2024-11-11 RX ADMIN — LOSARTAN POTASSIUM 25 MG: 25 TABLET, FILM COATED ORAL at 09:11

## 2024-11-11 RX ADMIN — ASPIRIN 81 MG: 81 TABLET, COATED ORAL at 09:11

## 2024-11-11 RX ADMIN — CLOPIDOGREL BISULFATE 75 MG: 75 TABLET ORAL at 09:11

## 2024-11-11 RX ADMIN — CARVEDILOL 12.5 MG: 12.5 TABLET, FILM COATED ORAL at 09:11

## 2024-11-11 RX ADMIN — INSULIN ASPART 6 UNITS: 100 INJECTION, SOLUTION INTRAVENOUS; SUBCUTANEOUS at 02:11

## 2024-11-11 NOTE — ASSESSMENT & PLAN NOTE
Patient has Combined Systolic and Diastolic heart failure that is Acute on chronic. On presentation their CHF was well compensated. Most recent BNP and echo results are listed below.  Recent Labs     11/08/24  1650   *       Latest ECHO  Results for orders placed during the hospital encounter of 08/09/23    Echo    Interpretation Summary    Left Ventricle: The left ventricle is normal in size. Normal wall thickness. Mild global hypokinesis present. There is mildly reduced systolic function with a visually estimated ejection fraction of 40 - 45%.    Right Ventricle: Normal right ventricular cavity size. Wall thickness is normal. Right ventricle wall motion  is normal. Systolic function is normal.    Pulmonary Artery: The estimated pulmonary artery systolic pressure is 23 mmHg.    IVC/SVC: Normal venous pressure at 3 mmHg.    Current Heart Failure Medications  carvediloL tablet 12.5 mg, 2 times daily, Oral  losartan tablet 25 mg, Daily, Oral    Plan  - Monitor strict I&Os and daily weights.    - Place on telemetry  - Low sodium diet once no longer NPO  - Place on fluid restriction of 1 L.   - Cardiology has been consulted and C planned for 11/11 this am  - The patient's volume status is stable but not at their baseline as indicated by elevated BNP

## 2024-11-11 NOTE — PLAN OF CARE
2 mL of air removed from radial vasc band.  No hematoma or bleeding noted.  +2 brendan radial pulses palpated. Skin normal in color, warm to touch, < 3 sec cap refill. Will continue to monitor pt.

## 2024-11-11 NOTE — PLAN OF CARE
Pt for d/c to home today - wife is available to drive him home   CM has asked  to assist with f/u apts with pcp and Cardiology   - pt will be contacted with apts.       No dme, no hh ordered    Discharging nurse to review all d/c meds/instructions        11/11/24 3463   Final Note   Assessment Type Final Discharge Note   Anticipated Discharge Disposition Home   What phone number can be called within the next 1-3 days to see how you are doing after discharge? [phone]   Hospital Resources/Appts/Education Provided Appointments scheduled and added to AVS   Post-Acute Status   Discharge Delays None known at this time

## 2024-11-11 NOTE — SUBJECTIVE & OBJECTIVE
Interval History: VSS, NAD    Review of Systems   Constitutional: Negative.    HENT: Negative.     Eyes:  Negative for visual disturbance.   Respiratory:  Negative for cough, chest tightness, shortness of breath, wheezing and stridor.    Cardiovascular:  Negative for chest pain, palpitations and leg swelling.   Gastrointestinal:  Negative for abdominal distention and abdominal pain.   Endocrine: Negative for polydipsia and polyuria.   Genitourinary:  Negative for difficulty urinating.   Musculoskeletal:  Negative for arthralgias.   Skin:  Negative for color change.       Objective:     Vital Signs (Most Recent):  Temp: 98.7 °F (37.1 °C) (11/11/24 1450)  Pulse: 71 (11/11/24 1450)  Resp: 16 (11/11/24 1450)  BP: (!) 153/73 (11/11/24 1450)  SpO2: 97 % (11/11/24 1450) Vital Signs (24h Range):  Temp:  [97.4 °F (36.3 °C)-98.7 °F (37.1 °C)] 98.7 °F (37.1 °C)  Pulse:  [57-77] 71  Resp:  [16-34] 16  SpO2:  [94 %-100 %] 97 %  BP: (127-167)/(60-84) 153/73     Weight: 107.5 kg (237 lb)  Body mass index is 33.05 kg/m².  No intake or output data in the 24 hours ending 11/11/24 1618      Physical Exam  Constitutional:       Appearance: Normal appearance.   HENT:      Head: Normocephalic and atraumatic.      Nose: Nose normal.      Mouth/Throat:      Mouth: Mucous membranes are moist.   Eyes:      Extraocular Movements: Extraocular movements intact.      Pupils: Pupils are equal, round, and reactive to light.   Cardiovascular:      Rate and Rhythm: Normal rate and regular rhythm.      Heart sounds: No murmur heard.     No friction rub. No gallop.   Pulmonary:      Effort: Pulmonary effort is normal. No respiratory distress.      Breath sounds: Normal breath sounds. No stridor. No wheezing, rhonchi or rales.   Chest:      Chest wall: No tenderness.   Abdominal:      General: Bowel sounds are normal. There is no distension.      Palpations: Abdomen is soft.      Tenderness: There is no abdominal tenderness. There is no guarding.    Musculoskeletal:      Cervical back: Normal range of motion and neck supple.      Right lower leg: No edema.      Left lower leg: No edema.   Skin:     General: Skin is warm and dry.      Capillary Refill: Capillary refill takes less than 2 seconds.      Findings: No rash.   Neurological:      General: No focal deficit present.      Mental Status: He is alert.   Psychiatric:         Behavior: Behavior normal.             Significant Labs: All pertinent labs within the past 24 hours have been reviewed.    Significant Imaging: I have reviewed all pertinent imaging results/findings within the past 24 hours.

## 2024-11-11 NOTE — PLAN OF CARE
Remaining air removed from right radial vasc band. Gauze and tegaderm dressing applied c.d.i. No drainage or shadowing noted. No bleeding or hematoma noted around site. +2 brendan radial pulses palpated. Skin normal in color, warm to touch, < 3 sec cap refill.   Pt tolerated well.  Will continue to monitor pt.

## 2024-11-11 NOTE — PLAN OF CARE
Patient transferred to recovery cath lab slot 7 via stretcher with side rails up x2 .  Pt AAO X 4 and able to follow commands. Pt is stable when connecting to cardiac monitors.  VSS. Right radial vasc band in place with 12ml of air in band c.d.i. no bleeding or hematoma noted. +2 brendan radial pulses palpated. +2 brendan pedal pulses.Skin normal in color and warm to touch, <3 sec cap refill.  Fall risk precautions given and patient acknowledges.  AIDET completed to pt.  Will continue to monitor patient.

## 2024-11-11 NOTE — CONSULTS
[facility] - Telemetry  Cardiology  Consult Note    Patient Name: Clifton Wilson  MRN: [id number]  Admission Date: 11/8/2024  Hospital Length of Stay: 2 days  Code Status: Full Code   Attending Provider: Francisco Randhawa III, MD   Consulting Provider: OMID Raphael ANP  Primary Care Physician: Britton Grissom MD  Principal Problem:Chest discomfort         Inpatient consult to [facility]  Consult performed by: Marie Bear APRN, ANP  Consult ordered by: Elizabeth Burton MD        See full consult noted by Dr. Jamey Alas dated 11/9/2024

## 2024-11-11 NOTE — PLAN OF CARE
Problem: Adult Inpatient Plan of Care  Goal: Plan of Care Review  11/10/2024 1931 by Janeen Roper, RN  Outcome: Progressing

## 2024-11-11 NOTE — PROGRESS NOTES
"[facility] Medicine  Progress Note    Patient Name: Clifton Wilson  MRN: [id number]  Patient Class: IP- Inpatient   Admission Date: 11/8/2024  Length of Stay: 2 days  Attending Physician: Christophe Ortiz MD  Primary Care Provider: Britton Grissom MD        Subjective:     Principal Problem:Chest discomfort        HPI:  73-year-old man, PMHx of HTN, CAD (NSTEMI 2011 and 2015), ICM (EF 25->45%), and ICD (2017), CHF, DMT2, presented to [facility] ER 11/8/24 for chest discomfort.  Patient states that a sensation in his chest started when he sat down on the couch in the morning to drink coffee. He described the sensation as "discomfort" that was similar to the sensation in 2015, at which time he required stent placement. (Upon chart review, 2015 was also the year of patient's NSTEMI.) The sensation has been constant, and progressive, but patient denies that it is pain/painful. Patient denies exacerbating/relieving factors, denies reflux or history of heartburn, SOB, or syncope. He reports that he took his antihypertensives and all other morning medications before the start of the discomfort. He attempted to take his blood pressure when the discomfort started, but the machine read as "EE." He does not consistently take pressures at home but reports that his pressures at doctor's office visits typically run in the 110s systolic. He did not take his PRN nitroglycerine. Of note, patient does endorse feeling fatigued for the last 2 week. He reports that his wife, who was also seen in the ER today, is sick with cough and sinus congestion.  In the ER, initial vitals as follows: Temp 97.4F, /88, HR 59, O2 99%. Labs with normal Troponin 0.008,  (normal baseline). EKG showing sinus bradycardia & right bundle branch consistent with previous EKG. Patient was admitted to [facility] for observation of chest discomfort and evaluation by Cardiology in the morning.    Overview/Hospital " Course:  No notes on file    Interval History: VSS, NAD    Review of Systems   Constitutional: Negative.    HENT: Negative.     Eyes:  Negative for visual disturbance.   Respiratory:  Negative for cough, chest tightness, shortness of breath, wheezing and stridor.    Cardiovascular:  Negative for chest pain, palpitations and leg swelling.   Gastrointestinal:  Negative for abdominal distention and abdominal pain.   Endocrine: Negative for polydipsia and polyuria.   Genitourinary:  Negative for difficulty urinating.   Musculoskeletal:  Negative for arthralgias.   Skin:  Negative for color change.       Objective:     Vital Signs (Most Recent):  Temp: 98.7 °F (37.1 °C) (11/11/24 1450)  Pulse: 71 (11/11/24 1450)  Resp: 16 (11/11/24 1450)  BP: (!) 153/73 (11/11/24 1450)  SpO2: 97 % (11/11/24 1450) Vital Signs (24h Range):  Temp:  [97.4 °F (36.3 °C)-98.7 °F (37.1 °C)] 98.7 °F (37.1 °C)  Pulse:  [57-77] 71  Resp:  [16-34] 16  SpO2:  [94 %-100 %] 97 %  BP: (127-167)/(60-84) 153/73     Weight: 107.5 kg (237 lb)  Body mass index is 33.05 kg/m².  No intake or output data in the 24 hours ending 11/11/24 1618      Physical Exam  Constitutional:       Appearance: Normal appearance.   HENT:      Head: Normocephalic and atraumatic.      Nose: Nose normal.      Mouth/Throat:      Mouth: Mucous membranes are moist.   Eyes:      Extraocular Movements: Extraocular movements intact.      Pupils: Pupils are equal, round, and reactive to light.   Cardiovascular:      Rate and Rhythm: Normal rate and regular rhythm.      Heart sounds: No murmur heard.     No friction rub. No gallop.   Pulmonary:      Effort: Pulmonary effort is normal. No respiratory distress.      Breath sounds: Normal breath sounds. No stridor. No wheezing, rhonchi or rales.   Chest:      Chest wall: No tenderness.   Abdominal:      General: Bowel sounds are normal. There is no distension.      Palpations: Abdomen is soft.      Tenderness: There is no abdominal  tenderness. There is no guarding.   Musculoskeletal:      Cervical back: Normal range of motion and neck supple.      Right lower leg: No edema.      Left lower leg: No edema.   Skin:     General: Skin is warm and dry.      Capillary Refill: Capillary refill takes less than 2 seconds.      Findings: No rash.   Neurological:      General: No focal deficit present.      Mental Status: He is alert.   Psychiatric:         Behavior: Behavior normal.             Significant Labs: All pertinent labs within the past 24 hours have been reviewed.    Significant Imaging: I have reviewed all pertinent imaging results/findings within the past 24 hours.      Assessment/Plan:      * Chest discomfort  73-year-old man with pertinent medical history of CAD (NSTEMI 2011 and 2015), ICM (EF 25->45%), and ICD (2017) presents with chest discomfort similar to past which required stent placement. EKG without evidence of STEMI, Troponin 0.008. Heart Score 5    Plan  NPO midnight for [facility] 11/11    Essential hypertension  Patients blood pressure range in the last 24 hours was: BP  Min: 127/62  Max: 191/87.The patient's inpatient anti-hypertensive regimen is listed below:  Current Antihypertensives  carvediloL tablet 12.5 mg, 2 times daily, Oral  losartan tablet 25 mg, Daily, Oral    Plan  - BP is uncontrolled, will adjust as follows: continuing home carvedilol and losartan    Coronary artery disease of native artery of native heart with stable angina pectoris  Patient with known CAD s/p stent placement & on external defibrillator, which is uncontrolled. ICD evaluated 4/2024. Will continue ASA, Plavix, and Statin and monitor for S/Sx of angina/ACS. Continue to monitor on telemetry.     Chronic combined systolic and diastolic CHF (congestive heart failure)  Patient has Combined Systolic and Diastolic heart failure that is Acute on chronic. On presentation their CHF was well compensated. Most recent BNP and echo results are listed below.  Recent  Labs     11/08/24  1650   *       Latest ECHO  Results for orders placed during the hospital encounter of 08/09/23    Echo    Interpretation Summary    Left Ventricle: The left ventricle is normal in size. Normal wall thickness. Mild global hypokinesis present. There is mildly reduced systolic function with a visually estimated ejection fraction of 40 - 45%.    Right Ventricle: Normal right ventricular cavity size. Wall thickness is normal. Right ventricle wall motion  is normal. Systolic function is normal.    Pulmonary Artery: The estimated pulmonary artery systolic pressure is 23 mmHg.    IVC/SVC: Normal venous pressure at 3 mmHg.    Current Heart Failure Medications  carvediloL tablet 12.5 mg, 2 times daily, Oral  losartan tablet 25 mg, Daily, Oral    Plan  - Monitor strict I&Os and daily weights.    - Place on telemetry  - Low sodium diet once no longer NPO  - Place on fluid restriction of 1 L.   - Cardiology has been consulted and [facility] planned for 11/11 this am  - The patient's volume status is stable but not at their baseline as indicated by elevated BNP    Type 2 diabetes mellitus with neurologic complication  Patient's FSGs are uncontrolled due to hyperglycemia on current medication regimen.  Last A1c reviewed-   Lab Results   Component Value Date    HGBA1C 8.0 (H) 09/18/2024     Most recent fingerstick glucose reviewed-   Recent Labs   Lab 11/09/24  2053 11/10/24  0450 11/10/24  0621 11/10/24  1143   POCTGLUCOSE 68* 107 118* 230*     Current correctional scale  High  Maintain anti-hyperglycemic dose as follows-   Antihyperglycemics (From admission, onward)      Start     Stop Route Frequency Ordered    11/09/24 0745  insulin aspart U-100 pen 15 Units         -- SubQ 3 times daily with meals 11/08/24 1958 11/08/24 2100  insulin glargine U-100 (Lantus) pen 35 Units         -- SubQ Nightly 11/08/24 1958 11/08/24 2041  insulin aspart U-100 pen 1-10 Units         -- SubQ Before meals & nightly PRN  11/08/24 1944          Hold Oral hypoglycemics while patient is in the hospital.      VTE Risk Mitigation (From admission, onward)           Ordered     heparin infusion 1,000 units/500 ml in 0.9% NaCl (pressure line flush)  Intra-op continuous PRN         11/11/24 1123     enoxaparin injection 40 mg  Daily         11/08/24 1944     IP VTE HIGH RISK PATIENT  Once         11/08/24 1944     Place sequential compression device  Until discontinued         11/08/24 1944                    Discharge Planning   MARTHA:      Code Status: Full Code   Is the patient medically ready for discharge?:     Reason for patient still in hospital (select all that apply): Trending pt condition           Andreina Montes MD- 1  Department of Hospital Medicine   [facility]

## 2024-11-11 NOTE — NURSING
Pt arrived back to floor from cath lab. A&Ox4. Room air. Right wrist dressing CDI. . Wife at bedside.

## 2024-11-11 NOTE — PROGRESS NOTES
Virtual Nurse:Discharge orders noted; additional clinical references attached.  and pharmacy tech notified.  Patient's discharge instruction packet given by bedside RN.    Cued into patient's room.  Permission received per patient to turn camera to view patient. Introduced as VN that will be instructing on discharge instructions.  Wife, Marie, at bedside.  Educated patient and his wife on reason for admission; medications to hold, continue, and start, appointment to follow-up with doctor, and when to return to ED. Teach back method used. Verbalized understanding  Number given for 24/7 Nurse Line. Opportunity given for questions and questions answered.  Bedside nurse updated. Transport to [facility] requested.        11/11/24 1719   Shift   Virtual Nurse - Patient Verbalized Approval Of Camera Use;VN Rounding   Type of Frequent Check   Type Patient Rounds   Safety/Activity   Patient Rounds visualized patient   Safety Promotion/Fall Prevention Fall Risk reviewed with patient/family   Activity Assistance Provided independent   Positioning   Body Position supine   Head of Bed (HOB) Positioning HOB at 45 degrees

## 2024-11-11 NOTE — PLAN OF CARE
CM met with pt and wife Marie Avalos      Pt underwent LHC today  Pt is AAO x 3; confirmed demographics.  No dme, no hh prior to admit - pt drives    Pharmacy  -- [facility] - able to afford meds.         11/11/24 1650   Discharge Planning   Assessment Type Discharge Planning Brief Assessment   Resource/Environmental Concerns none   Support Systems Spouse/significant other  (wife Marie Avalos )   Equipment Currently Used at Home none   Current Living Arrangements home   Patient/Family Anticipates Transition to home with family;home   Patient/Family Anticipated Services at Transition none   DME Needed Upon Discharge  none   Discharge Plan A Home;Home with family   Discharge Plan B Home;Home with family   Physical Activity   On average, how many days per week do you engage in moderate to strenuous exercise (like a brisk walk)? 0 days   Financial Resource Strain   How hard is it for you to pay for the very basics like food, housing, medical care, and heating? Not hard   Housing Stability   In the last 12 months, was there a time when you were not able to pay the mortgage or rent on time? N   At any time in the past 12 months, were you homeless or living in a shelter (including now)? N   Transportation Needs   Has the lack of transportation kept you from medical appointments, meetings, work or from getting things needed for daily living? No   Food Insecurity   Within the past 12 months, you worried that your food would run out before you got the money to buy more. Never true   Within the past 12 months, the food you bought just didn't last and you didn't have money to get more. Never true   Social Isolation   How often do you feel lonely or isolated from those around you?  Never   Utilities   In the past 12 months has the electric, gas, oil, or water company threatened to shut off services in your home? No   Health Literacy   How often do you need to have someone help you when you read instructions,  pamphlets, or other written material from your doctor or pharmacy? Never

## 2024-11-12 ENCOUNTER — TELEPHONE (OUTPATIENT)
Dept: CARDIOLOGY | Facility: CLINIC | Age: 74
End: 2024-11-12
Payer: MEDICARE

## 2024-11-12 ENCOUNTER — TELEPHONE (OUTPATIENT)
Dept: PRIMARY CARE CLINIC | Facility: CLINIC | Age: 74
End: 2024-11-12
Payer: MEDICARE

## 2024-11-12 ENCOUNTER — PATIENT MESSAGE (OUTPATIENT)
Dept: CARDIOLOGY | Facility: CLINIC | Age: 74
End: 2024-11-12
Payer: MEDICARE

## 2024-11-12 ENCOUNTER — TELEPHONE (OUTPATIENT)
Dept: FAMILY MEDICINE | Facility: CLINIC | Age: 74
End: 2024-11-12
Payer: MEDICARE

## 2024-11-12 NOTE — TELEPHONE ENCOUNTER
----- Message from Felicia sent at 11/12/2024  8:45 AM CST -----  Regarding: HFU  Patient is being discharged from [facility] and is requiring a hospital follow up appointment with their Primary Care Provider in 7 days. Patient is established. I am unable to schedule an appointment in that time frame. Please schedule patient a sooner appointment and message me back so Discharge Nurse can advise patient prior to discharge.    Patient had C.  Dr Landeros is scheduling week of Thanksgiving. Let me know if you can schedule sooner.    DX:Chest discomfort         Thank you, Yolis  Physician Referral Specialist/Discharge

## 2024-11-12 NOTE — TELEPHONE ENCOUNTER
Left voicemail to schedule patient a hospital follow up appointment   requested return call and left phone number [phone]

## 2024-11-12 NOTE — BRIEF OP NOTE
[facility]  Surgery Department  Operative Note    SUMMARY   POST CATH NOTE    Date of Procedure: 11/11/2024     Procedure: Procedure(s) (LRB):  Left heart cath (Left)  Instantaneous Wave-Free Ratio (IFR) (N/A)  ANGIOGRAM, CORONARY ARTERY (N/A)     Surgeons and Role:     * Luis Felipe Wright MD - Primary    Assisting Surgeon: None    Pre-Operative Diagnosis: Chest pain [R07.9]  Unstable angina [I20.0]    Post-Operative Diagnosis: Post-Op Diagnosis Codes:     * Chest pain [R07.9]     * Unstable angina [I20.0]    s/p catheterization secondary to:     Cath Results:  Access: Us guided RRA    Coronary Anatomy:     Left Main Coronary Artery: The left main is a short andlarge caliber vessel arising normally from the left sinus of valsalva.  It bifurcates into the left anterior descending and left circumflex coronary artery.  It is free of evidence of obstructive coronary artery disease. PATRIA III flow     Left Anterior Descending: The left anterior descending coronary artery is a large caliber vessel arising normally from the left main and extending to the apex.  It gives rise to small to moderate caliber diagonal arteries. Ostial to proximal LAD (before stent) 50-60% stenosed angiographically. Mild to moderate ISR with patent stents with PATRIA 2 flow. iFR of Proximal LAD negative,     Left Circumflex: The left circumflex is a large caliber vessel arising normally from the left main coronary artery, it is non-dominant.  It gives rise to moderate caliber obtuse marginal branches.  Prox to mid Lcx  patent stent with mild IRS. Jailed AV Cx  (small <2.5 mm) diffuse disease at proximal vessel. PATRIA III flow     Right Coronary Artery: The right coronary artery is a large caliber vessel arising normally from the right sinus of valsalva.  It is dominant giving rise to a PDA and PLV branch.  The right coronary artery is free of obstructive disease. PATRIA III flow    LVgram: LVEDP 9 mmHg    Intervention:   iFR  JL4 guide  The  "0.014" Omniwire was connected to the console, calibrated and equalized. The 0.014"  Omniwire was then advanced into the left main outside of the catheter and flushed with saline  with the introducer removed. The pressure was normalized and then the wire was advanced  across Proximal LAD lesion. 0.96-0.95 iFR recordings were obtained. The 0.014" Omniwire was then pulled  back slowly across the lesion to assess for step up and electronic drift. The wire was removed,  and final angiography was performed.    Closure device:  Patient tolerated procedure well, no complications    Post Cath Exam:  BP (!) 153/73 (BP Location: Left arm, Patient Position: Lying)   Pulse 71   Temp 98.7 °F (37.1 °C) (Oral)   Resp 16   Ht 5' 11" (1.803 m)   Wt 107.5 kg (237 lb)   SpO2 97%   BMI 33.05 kg/m²     Anesthesia: RN IV Sedation      Estimated Blood Loss (EBL): * No values recorded between 11/11/2024 11:40 AM and 11/11/2024 12:19 PM *           Specimens:   Specimen (24h ago, onward)      None             Assessment:   Ostial to proximal LAD iFR negative   LAD stents patent with PATRIA 2   Lcx stent patent    Plan:     - Patient tolerated procedure well. No immediate complications  - Continue aspirin and Plavix   - EKG when arrives to floor  - Routine post cath protocol  - Maximize medical management  - Further care by the primary team  - IVF at  100cc/hr  for 2 hours  - Follow up with me  -PET stress for viability       "

## 2024-11-12 NOTE — TELEPHONE ENCOUNTER
Attempted ot reach in regards to arranging PET Stress    Appt with Randi previously arranged ofor 11/26 at 4 pm    Left detailed VM with appt for PET stress on 12/4 at 1230 with instructions to discuss during follow up appt with Randi.               ----- Message from OMID Benson, ANP sent at 11/11/2024  3:16 PM CST -----  Needs cardiac PET and follow up in clinic

## 2024-11-13 ENCOUNTER — TELEPHONE (OUTPATIENT)
Dept: CARDIOLOGY | Facility: CLINIC | Age: 74
End: 2024-11-13
Payer: MEDICARE

## 2024-11-13 ENCOUNTER — PATIENT OUTREACH (OUTPATIENT)
Dept: ADMINISTRATIVE | Facility: CLINIC | Age: 74
End: 2024-11-13
Payer: MEDICARE

## 2024-11-13 DIAGNOSIS — I34.0 MITRAL VALVE INSUFFICIENCY, UNSPECIFIED ETIOLOGY: ICD-10-CM

## 2024-11-13 DIAGNOSIS — I50.41 ACUTE COMBINED SYSTOLIC AND DIASTOLIC CONGESTIVE HEART FAILURE: ICD-10-CM

## 2024-11-13 DIAGNOSIS — I10 ESSENTIAL HYPERTENSION: ICD-10-CM

## 2024-11-13 LAB
OHS QRS DURATION: 142 MS
OHS QTC CALCULATION: 441 MS

## 2024-11-13 RX ORDER — FUROSEMIDE 20 MG/1
20 TABLET ORAL 2 TIMES DAILY
Qty: 180 TABLET | Refills: 4 | Status: SHIPPED | OUTPATIENT
Start: 2024-11-13

## 2024-11-13 NOTE — HOSPITAL COURSE
"Serial troponins were obtained.  Echocardiogram was obtained that showed that  the left ventricle was severely dilated. There was mild eccentric hypertrophy, inferolateral wall hypokinesis and mildly reduced systolic function with a visually estimated ejection fraction of 40 - 45%. Grade I diastolic dysfunction.  Pt was made NPO after midnight on Sunday for left heart catheterization. Per cardiology record  the heart cath showed Ostial to proximal LAD with 50-60% stenosis angiographically with iFR negative. The LAD stents patent with PATRIA 2  and the Lcx stent patent. Pt to follow up with cardiology upon discharge. Pt was asymptomatic prior to discharge.     On day of discharge, patient was hemodynamically stable. She  was sent home with instructions to continue home medications, change dosage/frequency of home medications, and/or take new medications as mentioned under "Medication Reconciliation" below. These changes were further explained by patient's nurse or the clinical pharmacist. Patient will need to schedule with their PCP for hospital discharge followup. Patient agreeable to discharge plan & expressed understanding of all aforementioned changes. All questions were answered and return precautions were discussed & detailed in the after-visit summary.    "

## 2024-11-13 NOTE — TELEPHONE ENCOUNTER
Care Due:                  Date            Visit Type   Department     Provider  --------------------------------------------------------------------------------                                EP -                              [facility]    Britton Dumontkimmie  Last Visit: 09-      CARE ([facility])   MEDICINE       [facility]iel                              [facility]  Next Visit: 01-      CARE ([facility])   MEDICINE       Jaciel                                                            Last  Test          Frequency    Reason                     Performed    Due Date  --------------------------------------------------------------------------------    Uric Acid...  12 months..  colchicine...............  Not Found    Overdue    [employer] Embedded Care Due Messages. Reference number: [id number].   11/13/2024 1:16:26 PM CST

## 2024-11-13 NOTE — TELEPHONE ENCOUNTER
----- Message from Luis Felipe Hung MD sent at 11/11/2024  9:12 PM CST -----  He needs a follow up appointment with me please. I cath'ed him today.   Thanks

## 2024-11-13 NOTE — DISCHARGE SUMMARY
"[facility] Medicine  Discharge Summary      Patient Name: Clifton Wilson  MRN: [id number]  EMIR: [id number]  Patient Class: IP- Inpatient  Admission Date: 11/8/2024  Hospital Length of Stay: 2 days  Discharge Date and Time:  11/13/2024 1:45 PM  Attending Physician: No att. providers found   Discharging Provider: Andreina Montes MD  Primary Care Provider: Britton Grissom MD    Primary Care Team: Networked reference to record PCT     HPI:   73-year-old man, PMHx of HTN, CAD (NSTEMI 2011 and 2015), ICM (EF 25->45%), and ICD (2017), CHF, DMT2, presented to [facility] ER 11/8/24 for chest discomfort.  Patient states that a sensation in his chest started when he sat down on the couch in the morning to drink coffee. He described the sensation as "discomfort" that was similar to the sensation in 2015, at which time he required stent placement. (Upon chart review, 2015 was also the year of patient's NSTEMI.) The sensation has been constant, and progressive, but patient denies that it is pain/painful. Patient denies exacerbating/relieving factors, denies reflux or history of heartburn, SOB, or syncope. He reports that he took his antihypertensives and all other morning medications before the start of the discomfort. He attempted to take his blood pressure when the discomfort started, but the machine read as "EE." He does not consistently take pressures at home but reports that his pressures at doctor's office visits typically run in the 110s systolic. He did not take his PRN nitroglycerine. Of note, patient does endorse feeling fatigued for the last 2 week. He reports that his wife, who was also seen in the ER today, is sick with cough and sinus congestion.  In the ER, initial vitals as follows: Temp 97.4F, /88, HR 59, O2 99%. Labs with normal Troponin 0.008,  (normal baseline). EKG showing sinus bradycardia & right bundle branch consistent with previous EKG. Patient was admitted to " [facility] for observation of chest discomfort and evaluation by Cardiology in the morning.    Procedure(s) (LRB):  Left heart cath (Left)  Instantaneous Wave-Free Ratio (IFR) (N/A)  ANGIOGRAM, CORONARY ARTERY (N/A)      Hospital Course:   No notes on file     Goals of Care Treatment Preferences:  Code Status: Full Code    Physical Exam  Constitutional:       Appearance: Normal appearance.   HENT:      Head: Normocephalic and atraumatic.      Nose: Nose normal.      Mouth/Throat:      Mouth: Mucous membranes are moist.   Eyes:      Extraocular Movements: Extraocular movements intact.      Pupils: Pupils are equal, round, and reactive to light.   Cardiovascular:      Rate and Rhythm: Normal rate and regular rhythm.      Heart sounds: No murmur heard.     No friction rub. No gallop.   Pulmonary:      Effort: Pulmonary effort is normal. No respiratory distress.      Breath sounds: Normal breath sounds. No stridor. No wheezing, rhonchi or rales.   Chest:      Chest wall: No tenderness.   Abdominal:      General: Bowel sounds are normal. There is no distension.      Palpations: Abdomen is soft.      Tenderness: There is no abdominal tenderness. There is no guarding.   Musculoskeletal:      Cervical back: Normal range of motion and neck supple.      Right lower leg: No edema.      Left lower leg: No edema.   Skin:     General: Skin is warm and dry.      Capillary Refill: Capillary refill takes less than 2 seconds.      Findings: No rash.   Neurological:      General: No focal deficit present.      Mental Status: He is alert.   Psychiatric:         Behavior: Behavior normal.      SDOH Screening:  The patient was screened for utility difficulties, food insecurity, transport difficulties, housing insecurity, and interpersonal safety and there were no concerns identified this admission.     Consults:   Consults (From admission, onward)          Status Ordering Provider     [facility]   Once        Provider:  (Not yet assigned)    Completed MICHAEL PINEDA            No new Assessment & Plan notes have been filed under this hospital service since the last note was generated.  Service: Hospital Medicine    Final Active Diagnoses:    Diagnosis Date Noted POA    PRINCIPAL PROBLEM:  Chest discomfort [R07.89] 11/08/2024 Yes    Essential hypertension [I10] 04/24/2017 Yes    Coronary artery disease of native artery of native heart with stable angina pectoris [I25.118] 02/22/2016 Yes    Chronic combined systolic and diastolic CHF (congestive heart failure) [I50.42] 02/11/2016 Yes    Type 2 diabetes mellitus with neurologic complication [E11.49] 12/04/2015 Yes      Problems Resolved During this Admission:       Discharged Condition: good    Disposition: Home or Self Care    Follow Up:   Follow-up Information       Britton Grissom MD Follow up.    Specialty: Internal Medicine  Why: f/u apt requested - you will be contacted with apt  Contact information:  [address]  [address]  [facility] [address] [phone]               Jamey Albright MD Follow up.    Specialties: Interventional Cardiology, Cardiology  Why: you will be contacted with a hospital f/u apt  Contact information:  [address]  [address]  [place] [address] [phone]                           Patient Instructions:      Ambulatory referral/consult to Cardiology   Standing Status: Future   Referral Priority: Routine Referral Type: Consultation   Referral Reason: Specialty Services Required   Requested Specialty: Cardiology   Number of Visits Requested: 1     Diet Cardiac     Notify your health care provider if you experience any of the following:  temperature >100.4     Notify your health care provider if you experience any of the following:  persistent nausea and vomiting or diarrhea     Notify your health care provider if you experience any of the following:  difficulty breathing or increased cough     Notify your health care  "provider if you experience any of the following:  increased confusion or weakness     Activity as tolerated       Significant Diagnostic Studies: N/A    Pending Diagnostic Studies:       None           Medications:  Reconciled Home Medications:      Medication List        START taking these medications      isosorbide mononitrate 30 MG 24 hr tablet  Commonly known as: IMDUR  Take 1 tablet (30 mg total) by mouth once daily.            CONTINUE taking these medications      ACCU-CHEK CATHRYN CONTROL SOLN Soln  Generic drug: blood glucose control high,low  Use as directed to check blood sugar     ALCOHOL PREP PAD SPACER  Generic drug: alcohol swabs  USE FOUR TIMES DAILY     aspirin 81 MG EC tablet  Commonly known as: ECOTRIN  Take 81 mg by mouth once daily.     atorvastatin 40 MG tablet  Commonly known as: LIPITOR  Take 1 tablet (40 mg total) by mouth once daily.     BD ULTRA-FINE ISNA PEN NEEDLE 32 gauge x 5/32" Ndle  Generic drug: pen needle, diabetic  Use four times daily for insulin administration     * blood-glucose meter Misc  Commonly known as: ACCU-CHEK CATHRYN PLUS METER  USE AS DIRECTED     * TRUE METRIX GLUCOSE METER Misc  Generic drug: blood-glucose meter  use as directed     carvediloL 12.5 MG tablet  Commonly known as: COREG  Take 1 tablet (12.5 mg total) by mouth 2 (two) times daily.     clopidogreL 75 mg tablet  Commonly known as: PLAVIX  Take 1 tablet (75 mg total) by mouth once daily.     cyanocobalamin 1000 MCG tablet  Commonly known as: VITAMIN B-12  TAKE ONE TABLET BY MOUTH ONCE DAILY FOR VITAMIN DEFICIENCIES     * EASY TOUCH TWIST LANCETS 30 gauge Misc  Generic drug: lancets  by Misc.(Non-Drug; Combo Route) route 4 (four) times daily.     * EASY TOUCH TWIST LANCETS 30 gauge Misc  Generic drug: lancets  usea s directed to check blood glucose four times daily     furosemide 20 MG tablet  Commonly known as: LASIX  Take 1 tablet (20 mg total) by mouth 2 (two) times daily.     gabapentin 300 MG " capsule  Commonly known as: NEURONTIN  Take 2 capsules (600 mg total) by mouth 2 (two) times daily.     ibuprofen 600 MG tablet  Commonly known as: ADVIL,MOTRIN  Take 1 tablet (600 mg total) by mouth every 6 (six) hours as needed for Pain.     insulin aspart U-100 100 unit/mL (3 mL) Inpn pen  Commonly known as: NovoLOG Flexpen U-100 Insulin  Inject 18 Units into the skin 3 (three) times daily with meals.     LANTUS SOLOSTAR U-100 INSULIN 100 unit/mL (3 mL) Inpn pen  Generic drug: insulin glargine U-100 (Lantus)  Inject 40 Units into the skin every evening.     losartan 25 MG tablet  Commonly known as: COZAAR  Take 1 tablet (25 mg total) by mouth once daily.     metFORMIN 1000 MG tablet  Commonly known as: GLUCOPHAGE  Take 1 tablet (1,000 mg total) by mouth 2 (two) times daily with meals.     MITIGARE 0.6 mg Cap  Generic drug: colchicine  Take 1 capsule (0.6 mg total) by mouth once daily.     nitroGLYCERIN 0.4 MG SL tablet  Commonly known as: NITROSTAT  Place 1 tablet (0.4 mg total) under the tongue every 5 (five) minutes as needed for Chest pain.     promethazine-dextromethorphan 6.25-15 mg/5 mL Syrp  Commonly known as: PROMETHAZINE-DM  Take 5 mLs by mouth 2 (two) times daily as needed (cough).     * TRUE METRIX GLUCOSE TEST STRIP Strp  Generic drug: blood sugar diagnostic  Use to test four times daily     * TRUE METRIX GLUCOSE TEST STRIP Strp  Generic drug: blood sugar diagnostic  test blood sugar as directed four times daily     zolpidem 5 MG Tab  Commonly known as: AMBIEN  TAKE 1 TABLET BY MOUTH EVERY NIGHT AS NEEDED           * This list has 6 medication(s) that are the same as other medications prescribed for you. Read the directions carefully, and ask your doctor or other care provider to review them with you.                  Indwelling Lines/Drains at time of discharge:   Lines/Drains/Airways       None                   Time spent on the discharge of patient: 35 minutes         Andreina Montes  MD  Department of [facility] Medicine  [place] [facility]

## 2024-11-13 NOTE — PROGRESS NOTES
C3 nurse attempted to contact Clifton Wilson for a TCC post hospital discharge follow up call. No answer. Left voicemail with callback information. The patient has a scheduled HOSFU appointment with Shelia Landeros on 11/14/24 @ 1300.

## 2024-11-14 ENCOUNTER — OFFICE VISIT (OUTPATIENT)
Dept: PRIMARY CARE CLINIC | Facility: CLINIC | Age: 74
End: 2024-11-14
Payer: MEDICARE

## 2024-11-14 ENCOUNTER — PATIENT MESSAGE (OUTPATIENT)
Dept: ADMINISTRATIVE | Facility: CLINIC | Age: 74
End: 2024-11-14
Payer: MEDICARE

## 2024-11-14 VITALS
OXYGEN SATURATION: 97 % | HEART RATE: 66 BPM | SYSTOLIC BLOOD PRESSURE: 108 MMHG | TEMPERATURE: 98 F | BODY MASS INDEX: 33.61 KG/M2 | WEIGHT: 240.06 LBS | DIASTOLIC BLOOD PRESSURE: 71 MMHG | HEIGHT: 71 IN

## 2024-11-14 DIAGNOSIS — R07.9 CHEST PAIN, UNSPECIFIED TYPE: Primary | ICD-10-CM

## 2024-11-14 DIAGNOSIS — I50.42 CHRONIC COMBINED SYSTOLIC AND DIASTOLIC CONGESTIVE HEART FAILURE: ICD-10-CM

## 2024-11-14 PROBLEM — R78.81 GRAM-NEGATIVE BACTEREMIA: Status: RESOLVED | Noted: 2023-03-26 | Resolved: 2024-11-14

## 2024-11-14 PROBLEM — R07.89 CHEST DISCOMFORT: Status: RESOLVED | Noted: 2024-11-08 | Resolved: 2024-11-14

## 2024-11-14 PROBLEM — R60.9 EDEMA: Status: RESOLVED | Noted: 2018-04-25 | Resolved: 2024-11-14

## 2024-11-14 PROCEDURE — 99999 PR PBB SHADOW E&M-EST. PATIENT-LVL V: CPT | Mod: PBBFAC,HCNC,, | Performed by: INTERNAL MEDICINE

## 2024-11-14 NOTE — DISCHARGE SUMMARY
"[facility] Medicine  Discharge Summary      Patient Name: Clifton Wilson  MRN: [id number]  EMIR: [id number]  Patient Class: IP- Inpatient  Admission Date: 11/8/2024  Hospital Length of Stay: 2 days  Discharge Date and Time: 11/11/2024  5:39 PM  Attending Physician: No att. providers found   Discharging Provider: Andreina Montes MD  Primary Care Provider: Britton Grissom MD    Primary Care Team: Networked reference to record PCT     HPI:   73-year-old man, PMHx of HTN, CAD (NSTEMI 2011 and 2015), ICM (EF 25->45%), and ICD (2017), CHF, DMT2, presented to [facility] ER 11/8/24 for chest discomfort.  Patient states that a sensation in his chest started when he sat down on the couch in the morning to drink coffee. He described the sensation as "discomfort" that was similar to the sensation in 2015, at which time he required stent placement. (Upon chart review, 2015 was also the year of patient's NSTEMI.) The sensation has been constant, and progressive, but patient denies that it is pain/painful. Patient denies exacerbating/relieving factors, denies reflux or history of heartburn, SOB, or syncope. He reports that he took his antihypertensives and all other morning medications before the start of the discomfort. He attempted to take his blood pressure when the discomfort started, but the machine read as "EE." He does not consistently take pressures at home but reports that his pressures at doctor's office visits typically run in the 110s systolic. He did not take his PRN nitroglycerine. Of note, patient does endorse feeling fatigued for the last 2 week. He reports that his wife, who was also seen in the ER today, is sick with cough and sinus congestion.  In the ER, initial vitals as follows: Temp 97.4F, /88, HR 59, O2 99%. Labs with normal Troponin 0.008,  (normal baseline). EKG showing sinus bradycardia & right bundle branch consistent with previous EKG. Patient was [facility] to " [facility] for observation of chest discomfort and evaluation by Cardiology in the morning.    Procedure(s) (LRB):  Left heart cath (Left)  Instantaneous Wave-Free Ratio (IFR) (N/A)  ANGIOGRAM, CORONARY ARTERY (N/A)      Hospital Course:   0     Goals of Care Treatment Preferences:  Code Status: Full Code      SDOH Screening:  The patient was screened for utility difficulties, food insecurity, transport difficulties, housing insecurity, and interpersonal safety and there were no concerns identified this admission.     Consults:   Consults (From admission, onward)          Status Ordering Provider     [facility]  [facility]        Provider:  (Not yet assigned)    Completed MICHAEL PINEDA            No new Assessment & Plan notes have been filed under this hospital service since the last note was generated.  Service: Hospital Medicine    Final Active Diagnoses:    Diagnosis Date Noted POA    PRINCIPAL PROBLEM:  Chest discomfort [R07.89] 11/08/2024 Yes    Essential hypertension [I10] 04/24/2017 Yes    Coronary artery disease of native artery of native heart with stable angina pectoris [I25.118] 02/22/2016 Yes    Chronic combined systolic and diastolic CHF (congestive heart failure) [I50.42] 02/11/2016 Yes    Type 2 diabetes mellitus with neurologic complication [E11.49] 12/04/2015 Yes      Problems Resolved During this Admission:       Discharged Condition: fair    Disposition: Home or Self Care    Follow Up:   Follow-up Information       Britton Grissom MD Follow up.    Specialty: Internal Medicine  Why: f/u apt requested - you will be contacted with apt  Contact information:  [address]  [address]  [place] [address] [phone]               Jamey Albright MD Follow up.    Specialties: Interventional Cardiology, Cardiology  Why: you will be contacted with a hospital f/u apt  Contact information:  [address]  [address]  [place] [address] [phone]                   "         Patient Instructions:      Ambulatory referral/consult to Cardiology   Standing Status: Future   Referral Priority: Routine Referral Type: Consultation   Referral Reason: Specialty Services Required   Requested Specialty: Cardiology   Number of Visits Requested: 1     Diet Cardiac     Notify your health care provider if you experience any of the following:  temperature >100.4     Notify your health care provider if you experience any of the following:  persistent nausea and vomiting or diarrhea     Notify your health care provider if you experience any of the following:  difficulty breathing or increased cough     Notify your health care provider if you experience any of the following:  increased confusion or weakness     Activity as tolerated       Significant Diagnostic Studies: Labs: BMP: No results for input(s): "GLU", "NA", "K", "CL", "CO2", "BUN", "CREATININE", "CALCIUM", "MG" in the last 48 hours., CMP No results for input(s): "NA", "K", "CL", "CO2", "GLU", "BUN", "CREATININE", "CALCIUM", "PROT", "ALBUMIN", "BILITOT", "ALKPHOS", "AST", "ALT", "ANIONGAP", "ESTGFRAFRICA", "EGFRNONAA" in the last 48 hours., CBC No results for input(s): "WBC", "HGB", "HCT", "PLT" in the last 48 hours., INR   Lab Results   Component Value Date    INR 1.0 11/05/2019    INR 1.0 06/06/2017    INR 1.0 11/06/2016   , Lipid Panel   Lab Results   Component Value Date    CHOL 122 07/08/2024    HDL 26 (L) 07/08/2024    LDLCALC 42.6 (L) 07/08/2024    TRIG 267 (H) 07/08/2024    CHOLHDL 21.3 07/08/2024   , Troponin   Recent Labs   Lab 11/08/24  1650 11/08/24  1932   TROPONINI 0.008 0.010   , and A1C:   Recent Labs   Lab 07/08/24  1002 09/18/24  1004   HGBA1C 8.5* 8.0*     Microbiology: Blood Culture   Lab Results   Component Value Date    LABBLOO No growth after 5 days. 03/25/2023    LABBLOO No growth after 5 days. 03/25/2023     Radiology: X-Ray: CXR: X-Ray Chest 1 View (CXR): 11/8/24    FINDINGS:  Twin lead defibrillator.  Lungs " "clear.  Cardiac vascular stent on the left.  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  No infiltrate or effusion.  No mediastinal contour abnormality.     Impression:     Stable chest with findings as above but no acute cardiopulmonary process radiographically.      Left Cardiac catherization 11/11/24:    Assessment:   Ostial to proximal LAD with 50-60% stenosis angiographically with iFR negative   LAD stents patent with PATRIA 2   Lcx stent patent    Pending Diagnostic Studies:       None           Medications:  Reconciled Home Medications:      Medication List        START taking these medications      isosorbide mononitrate 30 MG 24 hr tablet  Commonly known as: IMDUR  Take 1 tablet (30 mg total) by mouth once daily.            CONTINUE taking these medications      ACCU-CHEK CATHRYN CONTROL SOLN Soln  Generic drug: blood glucose control high,low  Use as directed to check blood sugar     ALCOHOL PREP PAD SPACER  Generic drug: alcohol swabs  USE FOUR TIMES DAILY     aspirin 81 MG EC tablet  Commonly known as: ECOTRIN  Take 81 mg by mouth once daily.     atorvastatin 40 MG tablet  Commonly known as: LIPITOR  Take 1 tablet (40 mg total) by mouth once daily.     BD ULTRA-FINE SINA PEN NEEDLE 32 gauge x 5/32" Ndle  Generic drug: pen needle, diabetic  Use four times daily for insulin administration     * blood-glucose meter Misc  Commonly known as: ACCU-CHEK CATHRYN PLUS METER  USE AS DIRECTED     * TRUE METRIX GLUCOSE METER Misc  Generic drug: blood-glucose meter  use as directed     carvediloL 12.5 MG tablet  Commonly known as: COREG  Take 1 tablet (12.5 mg total) by mouth 2 (two) times daily.     clopidogreL 75 mg tablet  Commonly known as: PLAVIX  Take 1 tablet (75 mg total) by mouth once daily.     cyanocobalamin 1000 MCG tablet  Commonly known as: VITAMIN B-12  TAKE ONE TABLET BY MOUTH ONCE DAILY FOR VITAMIN DEFICIENCIES     * EASY TOUCH TWIST LANCETS 30 gauge Misc  Generic drug: lancets  by Misc.(Non-Drug; Combo Route) route 4 " (four) times daily.     * EASY TOUCH TWIST LANCETS 30 gauge Misc  Generic drug: lancets  usea s directed to check blood glucose four times daily     gabapentin 300 MG capsule  Commonly known as: NEURONTIN  Take 2 capsules (600 mg total) by mouth 2 (two) times daily.     ibuprofen 600 MG tablet  Commonly known as: ADVIL,MOTRIN  Take 1 tablet (600 mg total) by mouth every 6 (six) hours as needed for Pain.     insulin aspart U-100 100 unit/mL (3 mL) Inpn pen  Commonly known as: NovoLOG Flexpen U-100 Insulin  Inject 18 Units into the skin 3 (three) times daily with meals.     LANTUS SOLOSTAR U-100 INSULIN 100 unit/mL (3 mL) Inpn pen  Generic drug: insulin glargine U-100 (Lantus)  Inject 40 Units into the skin every evening.     losartan 25 MG tablet  Commonly known as: COZAAR  Take 1 tablet (25 mg total) by mouth once daily.     metFORMIN 1000 MG tablet  Commonly known as: GLUCOPHAGE  Take 1 tablet (1,000 mg total) by mouth 2 (two) times daily with meals.     MITIGARE 0.6 mg Cap  Generic drug: colchicine  Take 1 capsule (0.6 mg total) by mouth once daily.     nitroGLYCERIN 0.4 MG SL tablet  Commonly known as: NITROSTAT  Place 1 tablet (0.4 mg total) under the tongue every 5 (five) minutes as needed for Chest pain.     promethazine-dextromethorphan 6.25-15 mg/5 mL Syrp  Commonly known as: PROMETHAZINE-DM  Take 5 mLs by mouth 2 (two) times daily as needed (cough).     * TRUE METRIX GLUCOSE TEST STRIP Strp  Generic drug: blood sugar diagnostic  Use to test four times daily     * TRUE METRIX GLUCOSE TEST STRIP Strp  Generic drug: blood sugar diagnostic  test blood sugar as directed four times daily     zolpidem 5 MG Tab  Commonly known as: AMBIEN  TAKE 1 TABLET BY MOUTH EVERY NIGHT AS NEEDED           * This list has 6 medication(s) that are the same as other medications prescribed for you. Read the directions carefully, and ask your doctor or other care provider to review them with you.                  Indwelling  Lines/Drains at time of discharge:   Lines/Drains/Airways       None                   Time spent on the discharge of patient: 35 minutes         Andreina Montes MD-  1  Department of [facility] Medicine  [facility]

## 2024-11-14 NOTE — PROGRESS NOTES
3rd attempt for TCC Call; Left voicemail. Please call [phone] leave first and last name and  and ask for [place]. I will return your call as soon as possible.       3rd attempt to contact patient. Manually enrolled in Post Discharge Tracker.

## 2024-11-14 NOTE — PROGRESS NOTES
[facility]   New Visit Progress Note   Recent Hospital Discharge     PRESENTING HISTORY     Chief Complaint/Reason for Admission:  Follow up Hospital Discharge   PCP: Britton Grissom MD    History of Present Illness:  Mr. Clifton Wilson is a 73 y.o. male who was recently admitted to the hospital.    [facility] Medicine  Discharge Summary        Patient Name: Clifton Wilson  MRN: [id number]  EMIR: [id number]  Patient Class: IP- Inpatient  Admission Date: 11/8/2024  Hospital Length of Stay: 2 days  Discharge Date and Time: 11/11/2024  5:39 PM  Attending Physician: No att. providers found   Discharging Provider: Andreina Montes MD  Primary Care Provider: Britton Grissom MD  ___________________________________________________________________    Today:  [facility] to [facility] for initial hospital follow up.  Recently hospitalized for evaluation of chest pain.  Admitted to [facility] service with Cardiology consultation.  EGK with RBBB, unchanged from prior.  TTE with EF 40-45%, Grade 1 diastolic dysfunction.  Troponin NEG x 2.  Underwent LHC 11/11/24-> patent existing stents.  Medical management and outpatient cardiac PET recommended.  Patient discharged to home.     Patient unaccompanied today.  Ambulatory and independent with ADL's.  Brought all medication bottles for review.   Lasix prescribed BID but he is only taking Q Day.  He takes extra if he notices swelling.   No recurrence of chest pain since hospital discharge.  No dypsnea or orthopnea.  New onset cough, sinus congestion-> known sick exposure from wife who has URI.       Review of Systems  General ROS: negative for chills, fever or weight loss  Psychological ROS: negative for hallucination, depression or suicidal ideation  Ophthalmic ROS: negative for blurry vision, photophobia or eye pain  ENT ROS: negative for epistaxis, sore throat or rhinorrhea  + nasal congestion, post nasal drip   Respiratory ROS: +   cough, no shortness of breath, or wheezing  Cardiovascular ROS: no chest pain or dyspnea on exertion  Gastrointestinal ROS: no abdominal pain, change in bowel habits, or black/ bloody stools  Genito-Urinary ROS: no dysuria, trouble voiding, or hematuria  Musculoskeletal ROS: negative for gait disturbance or muscular weakness  Neurological ROS: no syncope or seizures; no ataxia  Dermatological ROS: negative for pruritis, rash and jaundice      PAST HISTORY:     Past Medical History:   Diagnosis Date    Anticoagulant long-term use     Cardiomyopathy     CHF (congestive heart failure)     Coronary artery disease     Diabetes mellitus     Gout     Heart attack     Hypertension     Pneumonia        Past Surgical History:   Procedure Laterality Date    APPENDECTOMY      CARDIAC CATHETERIZATION      7 stents    CARDIAC DEFIBRILLATOR PLACEMENT      CATARACT EXTRACTION Bilateral 2011    COLONOSCOPY N/A 8/10/2018    Procedure: COLONOSCOPY golytely;  Surgeon: Valentine Burger MD;  Location: [facility] ENDO;  Service: Endoscopy;  Laterality: N/A;    CORONARY ANGIOGRAPHY N/A 11/11/2024    Procedure: ANGIOGRAM, CORONARY ARTERY;  Surgeon: Luis Felipe Wright MD;  Location: [facility] CATH LAB/EP;  Service: Cardiology;  Laterality: N/A;    EYE SURGERY      INSTANTANEOUS WAVE-FREE RATIO (IFR) N/A 11/11/2024    Procedure: Instantaneous Wave-Free Ratio (IFR);  Surgeon: Luis Felipe Wright MD;  Location: [facility] CATH LAB/EP;  Service: Cardiology;  Laterality: N/A;    LEFT HEART CATHETERIZATION Left 11/11/2024    Procedure: Left heart cath;  Surgeon: Luis Felipe Wright MD;  Location: [facility] CATH LAB/EP;  Service: Cardiology;  Laterality: Left;    REVISION OF IMPLANTABLE CARDIOVERTER-DEFIBRILLATOR (ICD) ELECTRODE LEAD PLACEMENT N/A 11/11/2019    Procedure: REVISION, INSERTION, ELECTRODE LEAD, ICD;  Surgeon: Shukri Walsh MD;  Location: [facility] EP LAB;  Service: Cardiology;  Laterality: N/A;  Lead malfxn, RV lead ICD, SJM, anes, DM, 3 PREP        Family History   Problem Relation Name Age of Onset    Heart disease Mother      Heart disease Father      Amblyopia Neg Hx      Blindness Neg Hx      Cataracts Neg Hx      Glaucoma Neg Hx      Macular degeneration Neg Hx      Retinal detachment Neg Hx      Strabismus Neg Hx           MEDICATIONS & ALLERGIES:     Current Outpatient Medications on File Prior to Visit   Medication Sig Dispense Refill    alcohol swabs (BD ALCOHOL SWABS) PadM USE FOUR TIMES DAILY 400 each 3    aspirin (ECOTRIN) 81 MG EC tablet Take 81 mg by mouth once daily.      atorvastatin (LIPITOR) 40 MG tablet Take 1 tablet (40 mg total) by mouth once daily. 90 tablet 3    blood glucose control high,low (ACCU-CHEK CATHRYN CONTROL SOLN) Soln Use as directed to check blood sugar 1 each 1    blood sugar diagnostic Strp Use to test four times daily 400 each 11    blood sugar diagnostic Strp test blood sugar as directed four times daily 100 each 3    blood-glucose meter (ACCU-CHEK CATHRYN PLUS METER) Misc USE AS DIRECTED 1 each 0    blood-glucose meter (TRUE METRIX GLUCOSE METER) Misc use as directed 1 each 0    carvediloL (COREG) 12.5 MG tablet Take 1 tablet (12.5 mg total) by mouth 2 (two) times daily. 180 tablet 2    clopidogreL (PLAVIX) 75 mg tablet Take 1 tablet (75 mg total) by mouth once daily. 90 tablet 3    colchicine (MITIGARE) 0.6 mg Cap Take 1 capsule (0.6 mg total) by mouth once daily. 90 capsule 3    cyanocobalamin (VITAMIN B-12) 1000 MCG tablet TAKE ONE TABLET BY MOUTH ONCE DAILY FOR VITAMIN DEFICIENCIES      furosemide (LASIX) 20 MG tablet Take 1 tablet (20 mg total) by mouth 2 (two) times daily. 180 tablet 4    gabapentin (NEURONTIN) 300 MG capsule Take 2 capsules (600 mg total) by mouth 2 (two) times daily. 360 capsule 1    ibuprofen (ADVIL,MOTRIN) 600 MG tablet Take 1 tablet (600 mg total) by mouth every 6 (six) hours as needed for Pain. 20 tablet 0    insulin aspart U-100 (NOVOLOG FLEXPEN U-100 INSULIN) 100 unit/mL (3 mL) InPn pen  "Inject 18 Units into the skin 3 (three) times daily with meals. 45 mL 5    insulin glargine U-100, Lantus, (LANTUS SOLOSTAR U-100 INSULIN) 100 unit/mL (3 mL) InPn pen Inject 40 Units into the skin every evening. 30 mL 1    isosorbide mononitrate (IMDUR) 30 MG 24 hr tablet Take 1 tablet (30 mg total) by mouth once daily. 30 tablet 0    lancets 30 gauge Misc by Misc.(Non-Drug; Combo Route) route 4 (four) times daily. 400 each 1    lancets 30 gauge Misc usea s directed to check blood glucose four times daily 100 each 3    losartan (COZAAR) 25 MG tablet Take 1 tablet (25 mg total) by mouth once daily. 90 tablet 3    metFORMIN (GLUCOPHAGE) 1000 MG tablet Take 1 tablet (1,000 mg total) by mouth 2 (two) times daily with meals. 180 tablet 4    nitroGLYCERIN (NITROSTAT) 0.4 MG SL tablet Place 1 tablet (0.4 mg total) under the tongue every 5 (five) minutes as needed for Chest pain. 25 tablet 3    pen needle, diabetic (BD ULTRA-FINE SINA PEN NEEDLE) 32 gauge x 5/32" Ndle Use four times daily for insulin administration 400 each 3    promethazine-dextromethorphan (PROMETHAZINE-DM) 6.25-15 mg/5 mL Syrp Take 5 mLs by mouth 2 (two) times daily as needed (cough). 180 mL 1    zolpidem (AMBIEN) 5 MG Tab TAKE 1 TABLET BY MOUTH EVERY NIGHT AS NEEDED 90 tablet 3     No current facility-administered medications on file prior to visit.        Review of patient's allergies indicates:  No Known Allergies    OBJECTIVE:     Vital Signs:  /71 (BP Location: Left arm, Patient Position: Sitting)   Pulse 66   Temp 97.9 °F (36.6 °C) (Oral)   Ht 5' 11" (1.803 m)   Wt 108.9 kg (240 lb 1.3 oz)   SpO2 97%   BMI 33.48 kg/m²   Wt Readings from Last 3 Encounters:   11/09/24 1445 107.5 kg (237 lb)   11/09/24 0600 107.5 kg (237 lb)   11/08/24 2224 107.5 kg (237 lb)   11/08/24 1554 107.5 kg (237 lb)   09/18/24 0825 106.1 kg (233 lb 14.5 oz)   07/08/24 0924 104 kg (229 lb 4.5 oz)     Body mass index is 33.48 kg/m².        Physical Exam:  /71 " "(BP Location: Left arm, Patient Position: Sitting)   Pulse 66   Temp 97.9 °F (36.6 °C) (Oral)   Ht 5' 11" (1.803 m)   Wt 108.9 kg (240 lb 1.3 oz)   SpO2 97%   BMI 33.48 kg/m²   General appearance: alert, cooperative, no distress  Constitutional:Oriented to person, place, and time  + obese    HEENT: Normocephalic, atraumatic, neck symmetrical, no nasal discharge   Eyes: conjunctivae/corneas clear, PERRL, EOM's intact  Lungs: clear to auscultation bilaterally, no dullness to percussion bilaterally  Heart: regular rate and rhythm without rub; no displacement of the PMI   Abdomen: soft, non-tender; bowel sounds normoactive; no organomegaly  Extremities: extremities symmetric; no clubbing, cyanosis, or edema  Integument: Skin color, texture, turgor normal; no rashes; hair distrubution normal  Neurologic: Alert and oriented X 3, normal strength, normal coordination and gait  Psychiatric: no pressured speech; normal affect; no evidence of impaired cognition     Laboratory  Lab Results   Component Value Date    WBC 7.93 11/11/2024    HGB 12.7 (L) 11/11/2024    HCT 37.2 (L) 11/11/2024    MCV 95 11/11/2024     (L) 11/11/2024     BMP  Lab Results   Component Value Date     11/11/2024    K 4.0 11/11/2024     11/11/2024    CO2 22 (L) 11/11/2024    BUN 16 11/11/2024    CREATININE 1.1 11/11/2024    CALCIUM 9.3 11/11/2024    ANIONGAP 10 11/11/2024    EGFRNORACEVR >60 11/11/2024     Lab Results   Component Value Date    ALT 18 11/11/2024    AST 13 11/11/2024    ALKPHOS 65 11/11/2024    BILITOT 1.4 (H) 11/11/2024     Lab Results   Component Value Date    INR 1.0 11/05/2019    INR 1.0 06/06/2017    INR 1.0 11/06/2016     Lab Results   Component Value Date    HGBA1C 8.0 (H) 09/18/2024       Diagnostic Results:    [facility] 11/11/24:  Assessment:   Ostial to proximal LAD with 50-60% stenosis angiographically with iFR negative   LAD stents patent with PATRIA 2   Lcx stent patent     Plan:      - Patient tolerated " procedure well. No immediate complications  - Continue aspirin and Plavix   - EKG when arrives to floor  - Routine post cath protocol  - Maximize medical management  - Further care by the primary team  - IVF at  100cc/hr  for 2 hours  - Follow up with me  -PET stress for viability M    TTE 11/9/24:    Left Ventricle: The left ventricle is severely dilated. There is mild eccentric hypertrophy. Poor echocardiogram windows.  Definity contrast not used.  Unable to comment on wall motion abnormalities  however I do see  inferolateral wall hypokinesis There is mildly reduced systolic function with a visually estimated ejection fraction of 40 - 45%. Grade I diastolic dysfunction. Normal left ventricular filling pressure.    Right Ventricle: Normal right ventricular cavity size. Systolic function is normal. Pacemaker lead present in the ventricle.    Left Atrium: Left atrium was not well visualized.    Aortic Valve: The aortic valve is a trileaflet valve. There is no stenosis. Aortic valve peak velocity is 1.0 m/s. Mean gradient is 2.0 mmHg. There is no significant regurgitation.    Mitral Valve: The mitral valve is structurally normal.    Tricuspid Valve: Not well visualized due to poor acoustic window.    Aorta: Aortic root is normal in size measuring 3.14 cm.    IVC/SVC: Normal venous pressure at 3 mmHg.    Pericardium: There is no pericardial effusion.       TRANSITION OF CARE:     [facility] On Call Contact Note: 3 unsuccessful attempts  11/14/23 @ 12:43 pm  11/14/24 @ 8:43 AM  11/13/24 @ 11:50 AM     Family and/or Caretaker present at visit?  No.  Diagnostic tests reviewed/disposition: I have reviewed all completed as well as pending diagnostic tests at the time of discharge.  Disease/illness education: Yes  Home health/community services discussion/referrals: Patient does not have home health established from hospital visit.  They do not need home health.  If needed, we will set up home health for the patient.  "  Establishment or re-establishment of referral orders for community resources: No other necessary community resources.   Discussion with other health care providers: No discussion with other health care providers necessary.     ASSESSMENT & PLAN:     Chest pain, unspecified type  - recent hospitalization as above  - cardiac work up, including coronary catheterization, unremarkable  - continue current medication regimen  - Cardiac PET pending 12/4/24  - see Cardiology team 11/26/24    Chronic combined systolic and diastolic congestive heart failure  - appears euvolemic presently   - continue current medication regimen    Patient will be released from hospital follow up clinic.  Follow up as detailed below.     Instructions for the patient:      Scheduled Follow-up :  Future Appointments   Date Time Provider Department Center   11/26/2024  4:00 PM Luis Felipe Wright MD [facility] CARDIO [facility]   12/4/2024 12:30 PM CARDIAC, PET IMAGING [facility]   1/7/2025  9:20 AM Britton Grissom MD [facility] FAM MED [facility]       Post Visit Medication List:     Medication List            Accurate as of November 14, 2024  2:21 PM. If you have any questions, ask your nurse or doctor.                CONTINUE taking these medications      ACCU-CHEK CATHRYN CONTROL SOLN Soln  Generic drug: blood glucose control high,low  Use as directed to check blood sugar     ALCOHOL PREP PAD SPACER  Generic drug: alcohol swabs  USE FOUR TIMES DAILY     aspirin 81 MG EC tablet  Commonly known as: ECOTRIN     atorvastatin 40 MG tablet  Commonly known as: LIPITOR  Take 1 tablet (40 mg total) by mouth once daily.     BD ULTRA-FINE SINA PEN NEEDLE 32 gauge x 5/32" Ndle  Generic drug: pen needle, diabetic  Use four times daily for insulin administration     * blood-glucose meter Misc  Commonly known as: ACCU-CHEK CATHRYN PLUS METER  USE AS DIRECTED     * TRUE METRIX GLUCOSE METER Misc  Generic drug: blood-glucose meter  use as directed   "   carvediloL 12.5 MG tablet  Commonly known as: COREG  Take 1 tablet (12.5 mg total) by mouth 2 (two) times daily.     clopidogreL 75 mg tablet  Commonly known as: PLAVIX  Take 1 tablet (75 mg total) by mouth once daily.     cyanocobalamin 1000 MCG tablet  Commonly known as: VITAMIN B-12     * EASY TOUCH TWIST LANCETS 30 gauge Misc  Generic drug: lancets  by Misc.(Non-Drug; Combo Route) route 4 (four) times daily.     * EASY TOUCH TWIST LANCETS 30 gauge Misc  Generic drug: lancets  usea s directed to check blood glucose four times daily     furosemide 20 MG tablet  Commonly known as: LASIX  Take 1 tablet (20 mg total) by mouth 2 (two) times daily.     gabapentin 300 MG capsule  Commonly known as: NEURONTIN  Take 2 capsules (600 mg total) by mouth 2 (two) times daily.     ibuprofen 600 MG tablet  Commonly known as: ADVIL,MOTRIN  Take 1 tablet (600 mg total) by mouth every 6 (six) hours as needed for Pain.     isosorbide mononitrate 30 MG 24 hr tablet  Commonly known as: IMDUR  Take 1 tablet (30 mg total) by mouth once daily.     LANTUS SOLOSTAR U-100 INSULIN 100 unit/mL (3 mL) Inpn pen  Generic drug: insulin glargine U-100 (Lantus)  Inject 40 Units into the skin every evening.     losartan 25 MG tablet  Commonly known as: COZAAR  Take 1 tablet (25 mg total) by mouth once daily.     metFORMIN 1000 MG tablet  Commonly known as: GLUCOPHAGE  Take 1 tablet (1,000 mg total) by mouth 2 (two) times daily with meals.     MITIGARE 0.6 mg Cap  Generic drug: colchicine  Take 1 capsule (0.6 mg total) by mouth once daily.     nitroGLYCERIN 0.4 MG SL tablet  Commonly known as: NITROSTAT  Place 1 tablet (0.4 mg total) under the tongue every 5 (five) minutes as needed for Chest pain.     NovoLOG Flexpen U-100 Insulin 100 unit/mL (3 mL) Inpn pen  Generic drug: insulin aspart U-100  Inject 18 Units into the skin 3 (three) times daily with meals.     promethazine-dextromethorphan 6.25-15 mg/5 mL Syrp  Commonly known as:  PROMETHAZINE-DM  Take 5 mLs by mouth 2 (two) times daily as needed (cough).     * TRUE METRIX GLUCOSE TEST STRIP Strp  Generic drug: blood sugar diagnostic  Use to test four times daily     * TRUE METRIX GLUCOSE TEST STRIP Strp  Generic drug: blood sugar diagnostic  test blood sugar as directed four times daily     zolpidem 5 MG Tab  Commonly known as: AMBIEN  TAKE 1 TABLET BY MOUTH EVERY NIGHT AS NEEDED           * This list has 6 medication(s) that are the same as other medications prescribed for you. Read the directions carefully, and ask your doctor or other care provider to review them with you.                  Signing Physician:  Shelia Landeros MD

## 2024-11-14 NOTE — PROGRESS NOTES
2nd attempt to make [facility] Call. Left voicemail. Please call [phone] leave your first and last name and date of birth and ask for [place]. I will return your call as soon as possible.

## 2024-11-16 NOTE — DISCHARGE SUMMARY
"[facility] Medicine  Discharge Summary      Patient Name: Clifton Wilson  MRN: [id number]  EMIR: [id number]  Patient Class: IP- Inpatient  Admission Date: 11/8/2024  Hospital Length of Stay: 2 days  Discharge Date and Time: 11/11/2024  5:39 PM  Attending Physician: No att. providers found   Discharging Provider: Andreina Montes MD  Primary Care Provider: Britton Grissom MD    Primary Care Team: Networked reference to record PCT     HPI:   73-year-old man, PMHx of HTN, CAD (NSTEMI 2011 and 2015), ICM (EF 25->45%), and ICD (2017), CHF, DMT2, presented to [facility] 11/8/24 for chest discomfort.  Patient states that a sensation in his chest started when he sat down on the couch in the morning to drink coffee. He described the sensation as "discomfort" that was similar to the sensation in 2015, at which time he required stent placement. (Upon chart review, 2015 was also the year of patient's NSTEMI.) The sensation has been constant, and progressive, but patient denies that it is pain/painful. Patient denies exacerbating/relieving factors, denies reflux or history of heartburn, SOB, or syncope. He reports that he took his antihypertensives and all other morning medications before the start of the discomfort. He attempted to take his blood pressure when the discomfort started, but the machine read as "EE." He does not consistently take pressures at home but reports that his pressures at doctor's office visits typically run in the 110s systolic. He did not take his PRN nitroglycerine. Of note, patient does endorse feeling fatigued for the last 2 week. He reports that his wife, who was also seen in the ER today, is sick with cough and sinus congestion.  In the ER, initial vitals as follows: Temp 97.4F, /88, HR 59, O2 99%. Labs with normal Troponin 0.008,  (normal baseline). EKG showing sinus bradycardia & right bundle branch consistent with previous EKG. Patient was admitted to " "[facility] for observation of chest discomfort and evaluation by Cardiology in the morning.    Procedure(s) (LRB):  Left heart cath (Left)  Instantaneous Wave-Free Ratio (IFR) (N/A)  ANGIOGRAM, CORONARY ARTERY (N/A)      Hospital Course:   Serial troponins were obtained.  Echocardiogram was obtained that showed that  the left ventricle was severely dilated. There was mild eccentric hypertrophy, inferolateral wall hypokinesis and mildly reduced systolic function with a visually estimated ejection fraction of 40 - 45%. Grade I diastolic dysfunction.  Pt was made NPO after midnight on Sunday for left heart catheterization. Per cardiology record  the heart cath showed Ostial to proximal LAD with 50-60% stenosis angiographically with iFR negative. The LAD stents patent with PATRIA 2  and the Lcx stent patent. Pt to follow up with cardiology upon discharge. Pt was asymptomatic prior to discharge.     On day of discharge, patient was hemodynamically stable. She  was sent home with instructions to continue home medications, change dosage/frequency of home medications, and/or take new medications as mentioned under "Medication Reconciliation" below. These changes were further explained by patient's nurse or the clinical pharmacist. Patient will need to schedule with their PCP for hospital discharge followup. Patient agreeable to discharge plan & expressed understanding of all aforementioned changes. All questions were answered and return precautions were discussed & detailed in the after-visit summary.       Goals of Care Treatment Preferences:  Code Status: Full Code    Physical Exam  Constitutional:       Appearance: Normal appearance.   HENT:      Head: Normocephalic and atraumatic.      Nose: Nose normal.      Mouth/Throat:      Mouth: Mucous membranes are moist.   Eyes:      Extraocular Movements: Extraocular movements intact.      Pupils: Pupils are equal, round, and reactive to light.   Cardiovascular:      " Rate and Rhythm: Normal rate and regular rhythm.      Heart sounds: No murmur heard.     No friction rub. No gallop.   Pulmonary:      Effort: Pulmonary effort is normal. No respiratory distress.      Breath sounds: Normal breath sounds. No stridor. No wheezing, rhonchi or rales.   Chest:      Chest wall: No tenderness.   Abdominal:      General: Bowel sounds are normal. There is no distension.      Palpations: Abdomen is soft.      Tenderness: There is no abdominal tenderness. There is no guarding.   Musculoskeletal:      Cervical back: Normal range of motion and neck supple.      Right lower leg: No edema.      Left lower leg: No edema.   Skin:     General: Skin is warm and dry.      Capillary Refill: Capillary refill takes less than 2 seconds.      Findings: No rash.   Neurological:      General: No focal deficit present.      Mental Status: He is alert.   Psychiatric:         Behavior: Behavior normal.     SDOH Screening:  The patient was screened for utility difficulties, food insecurity, transport difficulties, housing insecurity, and interpersonal safety and there were no concerns identified this admission.     Consults:   Consults (From admission, onward)          Status Ordering Provider     Inpatient consult to Cardiology-[facility]  Once        Provider:  (Not yet assigned)    MICHAEL Mari            No new Assessment & Plan notes have been filed under this hospital service since the last note was generated.  Service: Hospital Medicine    Final Active Diagnoses:    Diagnosis Date Noted POA    Essential hypertension [I10] 04/24/2017 Yes    Coronary artery disease of native artery of native heart with stable angina pectoris [I25.118] 02/22/2016 Yes    Type 2 diabetes mellitus with neurologic complication [E11.49] 12/04/2015 Yes      Problems Resolved During this Admission:    Diagnosis Date Noted Date Resolved POA    PRINCIPAL PROBLEM:  Chest discomfort [R07.89] 11/08/2024 11/14/2024 Yes    Chronic  combined systolic and diastolic CHF (congestive heart failure) [I50.42] 02/11/2016 11/14/2024 Yes       Discharged Condition: good    Disposition: Home or Self Care    Follow Up:   Follow-up Information       Britton Grissom MD Follow up.    Specialty: Internal Medicine  Why: f/u apt requested - you will be contacted with apt  Contact information:  [address]  [address]  [place] [id number] [phone]               Jamey Albright MD Follow up.    Specialties: Interventional Cardiology, Cardiology  Why: you will be contacted with a hospital f/u apt  Contact information:  [address]  [address]  [place] [address]  [phone]                           Patient Instructions:      Ambulatory referral/consult to Cardiology   Standing Status: Future   Referral Priority: Routine Referral Type: Consultation   Referral Reason: Specialty Services Required   Requested Specialty: Cardiology   Number of Visits Requested: 1     Diet Cardiac     Notify your health care provider if you experience any of the following:  temperature >100.4     Notify your health care provider if you experience any of the following:  persistent nausea and vomiting or diarrhea     Notify your health care provider if you experience any of the following:  difficulty breathing or increased cough     Notify your health care provider if you experience any of the following:  increased confusion or weakness     Activity as tolerated       Significant Diagnostic Studies: Labs:  Lab Results   Component Value Date    WBC 7.93 11/11/2024    HGB 12.7 (L) 11/11/2024    HCT 37.2 (L) 11/11/2024    MCV 95 11/11/2024     (L) 11/11/2024         BMP  Lab Results   Component Value Date     11/11/2024    K 4.0 11/11/2024     11/11/2024    CO2 22 (L) 11/11/2024    BUN 16 11/11/2024    CREATININE 1.1 11/11/2024    CALCIUM 9.3 11/11/2024    ANIONGAP 10 11/11/2024    ESTGFRAFRICA >60 05/03/2022    EGFRNONAA >60 05/03/2022       ABG  No results  "for input(s): "PH", "PO2", "PCO2", "HCO3", "BE" in the last 168 hours.    INR   Lab Results   Component Value Date    INR 1.0 11/05/2019    INR 1.0 06/06/2017    INR 1.0 11/06/2016   , Lipid Panel   Lab Results   Component Value Date    CHOL 122 07/08/2024    HDL 26 (L) 07/08/2024    LDLCALC 42.6 (L) 07/08/2024    TRIG 267 (H) 07/08/2024    CHOLHDL 21.3 07/08/2024   , Troponin No results for input(s): "TROPONINI" in the last 168 hours., and A1C:   Recent Labs   Lab 07/08/24  1002 09/18/24  1004   HGBA1C 8.5* 8.0*     Microbiology: none  Radiology:   EXAMINATION:  XR CHEST AP PORTABLE     CLINICAL HISTORY:  Chest Pain;     TECHNIQUE:  Single frontal view of the chest was performed.     COMPARISON:  None     FINDINGS:  Twin lead defibrillator.  Lungs clear.  Cardiac vascular stent on the left.  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  No infiltrate or effusion.  No mediastinal contour abnormality.     Impression:     Stable chest with findings as above but no acute cardiopulmonary process radiographically.      POST CATH NOTE     Date of Procedure: 11/11/2024      Procedure: Procedure(s) (LRB):  Left heart cath (Left)  Instantaneous Wave-Free Ratio (IFR) (N/A)  ANGIOGRAM, CORONARY ARTERY (N/A)      Surgeons and Role:     * Luis Felipe Wright MD - Primary     Assisting Surgeon: None     Pre-Operative Diagnosis: Chest pain [R07.9]  Unstable angina [I20.0]     Post-Operative Diagnosis: Post-Op Diagnosis Codes:     * Chest pain [R07.9]     * Unstable angina [I20.0]     s/p catheterization secondary to:      Cath Results:  Access: Us guided RRA     Coronary Anatomy:     Left Main Coronary Artery: The left main is a short andlarge caliber vessel arising normally from the left sinus of valsalva.  It bifurcates into the left anterior descending and left circumflex coronary artery.  It is free of evidence of obstructive coronary artery disease. PATRIA III flow     Left Anterior Descending: The left anterior descending coronary artery " "is a large caliber vessel arising normally from the left main and extending to the apex.  It gives rise to small to moderate caliber diagonal arteries. Ostial to proximal LAD (before stent) 50-60% stenosed angiographically. Mild to moderate ISR with patent stents with PATRIA 2 flow. iFR of Proximal LAD negative,     Left Circumflex: The left circumflex is a large caliber vessel arising normally from the left main coronary artery, it is non-dominant.  It gives rise to moderate caliber obtuse marginal branches.  Prox to mid Lcx  patent stent with mild IRS. Jailed AV Cx  (small <2.5 mm) diffuse disease at proximal vessel. PATRIA III flow     Right Coronary Artery: The right coronary artery is a large caliber vessel arising normally from the right sinus of valsalva.  It is dominant giving rise to a PDA and PLV branch.  The right coronary artery is free of obstructive disease. PATRIA III flow     LVgram: LVEDP 9 mmHg     Intervention:   iFR  JL4 guide  The 0.014" Omniwire was connected to the console, calibrated and equalized. The 0.014"  Omniwire was then advanced into the left main outside of the catheter and flushed with saline  with the introducer removed. The pressure was normalized and then the wire was advanced  across Proximal LAD lesion. 0.96-0.95 iFR recordings were obtained. The 0.014" Omniwire was then pulled  back slowly across the lesion to assess for step up and electronic drift. The wire was removed,  and final angiography was performed.     Closure device:  Patient tolerated procedure well, no complications     Post Cath Exam:  BP (!) 153/73 (BP Location: Left arm, Patient Position: Lying)   Pulse 71   Temp 98.7 °F (37.1 °C) (Oral)   Resp 16   Ht 5' 11" (1.803 m)   Wt 107.5 kg (237 lb)   SpO2 97%   BMI 33.05 kg/m²      Anesthesia: RN IV Sedation        Estimated Blood Loss (EBL): * No values recorded between 11/11/2024 11:40 AM and 11/11/2024 12:19 PM *           Specimens:   Specimen (24h ago, onward) " "       None                Assessment:   Ostial to proximal LAD with 50-60% stenosis angiographically with iFR negative   LAD stents patent with PATRIA 2   Lcx stent patent     Plan:      - Patient tolerated procedure well. No immediate complications  - Continue aspirin and Plavix   - EKG when arrives to floor  - Routine post cath protocol  - Maximize medical management  - Further care by the primary team  - IVF at  100cc/hr  for 2 hours  - Follow up with me  -PET stress for viability M      Pending Diagnostic Studies:       None           Medications:  Reconciled Home Medications:      Medication List        START taking these medications      isosorbide mononitrate 30 MG 24 hr tablet  Commonly known as: IMDUR  Take 1 tablet (30 mg total) by mouth once daily.            CONTINUE taking these medications      ACCU-CHEK CATHRYN CONTROL SOLN Soln  Generic drug: blood glucose control high,low  Use as directed to check blood sugar     ALCOHOL PREP PAD SPACER  Generic drug: alcohol swabs  USE FOUR TIMES DAILY     aspirin 81 MG EC tablet  Commonly known as: ECOTRIN  Take 81 mg by mouth once daily.     atorvastatin 40 MG tablet  Commonly known as: LIPITOR  Take 1 tablet (40 mg total) by mouth once daily.     BD ULTRA-FINE SINA PEN NEEDLE 32 gauge x 5/32" Ndle  Generic drug: pen needle, diabetic  Use four times daily for insulin administration     * blood-glucose meter Misc  Commonly known as: ACCU-CHEK CATHRYN PLUS METER  USE AS DIRECTED     * TRUE METRIX GLUCOSE METER Misc  Generic drug: blood-glucose meter  use as directed     carvediloL 12.5 MG tablet  Commonly known as: COREG  Take 1 tablet (12.5 mg total) by mouth 2 (two) times daily.     clopidogreL 75 mg tablet  Commonly known as: PLAVIX  Take 1 tablet (75 mg total) by mouth once daily.     cyanocobalamin 1000 MCG tablet  Commonly known as: VITAMIN B-12  TAKE ONE TABLET BY MOUTH ONCE DAILY FOR VITAMIN DEFICIENCIES     * EASY TOUCH TWIST LANCETS 30 gauge Misc  Generic " drug: lancets  by Misc.(Non-Drug; Combo Route) route 4 (four) times daily.     * EASY TOUCH TWIST LANCETS 30 gauge Misc  Generic drug: lancets  usea s directed to check blood glucose four times daily     gabapentin 300 MG capsule  Commonly known as: NEURONTIN  Take 2 capsules (600 mg total) by mouth 2 (two) times daily.     ibuprofen 600 MG tablet  Commonly known as: ADVIL,MOTRIN  Take 1 tablet (600 mg total) by mouth every 6 (six) hours as needed for Pain.     LANTUS SOLOSTAR U-100 INSULIN 100 unit/mL (3 mL) Inpn pen  Generic drug: insulin glargine U-100 (Lantus)  Inject 40 Units into the skin every evening.     losartan 25 MG tablet  Commonly known as: COZAAR  Take 1 tablet (25 mg total) by mouth once daily.     metFORMIN 1000 MG tablet  Commonly known as: GLUCOPHAGE  Take 1 tablet (1,000 mg total) by mouth 2 (two) times daily with meals.     MITIGARE 0.6 mg Cap  Generic drug: colchicine  Take 1 capsule (0.6 mg total) by mouth once daily.     nitroGLYCERIN 0.4 MG SL tablet  Commonly known as: NITROSTAT  Place 1 tablet (0.4 mg total) under the tongue every 5 (five) minutes as needed for Chest pain.     NovoLOG Flexpen U-100 Insulin 100 unit/mL (3 mL) Inpn pen  Generic drug: insulin aspart U-100  Inject 18 Units into the skin 3 (three) times daily with meals.     promethazine-dextromethorphan 6.25-15 mg/5 mL Syrp  Commonly known as: PROMETHAZINE-DM  Take 5 mLs by mouth 2 (two) times daily as needed (cough).     * TRUE METRIX GLUCOSE TEST STRIP Strp  Generic drug: blood sugar diagnostic  Use to test four times daily     * TRUE METRIX GLUCOSE TEST STRIP Strp  Generic drug: blood sugar diagnostic  test blood sugar as directed four times daily     zolpidem 5 MG Tab  Commonly known as: AMBIEN  TAKE 1 TABLET BY MOUTH EVERY NIGHT AS NEEDED           * This list has 6 medication(s) that are the same as other medications prescribed for you. Read the directions carefully, and ask your doctor or other care provider to review  them with you.                  Indwelling Lines/Drains at time of discharge:   Lines/Drains/Airways       None                   Time spent on the discharge of patient: 35 minutes         Andreina Montes MD  1    Department of [facility] Medicine  [facility]

## 2024-11-20 NOTE — PHYSICIAN QUERY
Provider, please clarify the Acuity of the Combined Systolic and Diastolic Heart Failure.    Chronic combined systolic and diastolic heart failure

## 2024-11-22 ENCOUNTER — PATIENT MESSAGE (OUTPATIENT)
Dept: FAMILY MEDICINE | Facility: CLINIC | Age: 74
End: 2024-11-22
Payer: MEDICARE

## 2024-11-22 ENCOUNTER — TELEPHONE (OUTPATIENT)
Dept: FAMILY MEDICINE | Facility: CLINIC | Age: 74
End: 2024-11-22
Payer: MEDICARE

## 2024-11-22 ENCOUNTER — HOSPITAL ENCOUNTER (OUTPATIENT)
Facility: HOSPITAL | Age: 74
Discharge: HOME | End: 2024-11-23
Attending: STUDENT IN AN ORGANIZED HEALTH CARE EDUCATION/TRAINING PROGRAM | Admitting: STUDENT IN AN ORGANIZED HEALTH CARE EDUCATION/TRAINING PROGRAM
Payer: MEDICARE

## 2024-11-22 ENCOUNTER — PATIENT MESSAGE (OUTPATIENT)
Dept: CARDIOLOGY | Facility: CLINIC | Age: 74
End: 2024-11-22

## 2024-11-22 ENCOUNTER — TELEPHONE (OUTPATIENT)
Dept: CARDIOLOGY | Facility: CLINIC | Age: 74
End: 2024-11-22
Payer: MEDICARE

## 2024-11-22 DIAGNOSIS — I15.2 HYPERTENSION ASSOCIATED WITH DIABETES: ICD-10-CM

## 2024-11-22 DIAGNOSIS — R07.9 CHEST PAIN: ICD-10-CM

## 2024-11-22 DIAGNOSIS — I21.4 NSTEMI (NON-ST ELEVATED MYOCARDIAL INFARCTION): Primary | ICD-10-CM

## 2024-11-22 DIAGNOSIS — E11.59 HYPERTENSION ASSOCIATED WITH DIABETES: ICD-10-CM

## 2024-11-22 PROBLEM — I10 HYPERTENSION: Status: ACTIVE | Noted: 2024-11-22

## 2024-11-22 LAB
ALBUMIN SERPL BCP-MCNC: 3.9 G/DL (ref 3.5–5.2)
ALP SERPL-CCNC: 75 U/L (ref 40–150)
ALT SERPL W/O P-5'-P-CCNC: 18 U/L (ref 10–44)
ANION GAP SERPL CALC-SCNC: 11 MMOL/L (ref 8–16)
APTT PPP: 26.1 SEC (ref 21–32)
AST SERPL-CCNC: 20 U/L (ref 10–40)
BASOPHILS # BLD AUTO: 0.06 K/UL (ref 0–0.2)
BASOPHILS # BLD AUTO: 0.07 K/UL (ref 0–0.2)
BASOPHILS NFR BLD: 0.9 % (ref 0–1.9)
BASOPHILS NFR BLD: 1 % (ref 0–1.9)
BILIRUB SERPL-MCNC: 1.1 MG/DL (ref 0.1–1)
BNP SERPL-MCNC: 130 PG/ML (ref 0–99)
BUN SERPL-MCNC: 10 MG/DL (ref 8–23)
CALCIUM SERPL-MCNC: 9.3 MG/DL (ref 8.7–10.5)
CHLORIDE SERPL-SCNC: 106 MMOL/L (ref 95–110)
CO2 SERPL-SCNC: 25 MMOL/L (ref 23–29)
CREAT SERPL-MCNC: 1.2 MG/DL (ref 0.5–1.4)
DIFFERENTIAL METHOD BLD: ABNORMAL
DIFFERENTIAL METHOD BLD: ABNORMAL
EOSINOPHIL # BLD AUTO: 0.2 K/UL (ref 0–0.5)
EOSINOPHIL # BLD AUTO: 0.2 K/UL (ref 0–0.5)
EOSINOPHIL NFR BLD: 2.5 % (ref 0–8)
EOSINOPHIL NFR BLD: 3.6 % (ref 0–8)
ERYTHROCYTE [DISTWIDTH] IN BLOOD BY AUTOMATED COUNT: 12.4 % (ref 11.5–14.5)
ERYTHROCYTE [DISTWIDTH] IN BLOOD BY AUTOMATED COUNT: 12.6 % (ref 11.5–14.5)
EST. GFR  (NO RACE VARIABLE): >60 ML/MIN/1.73 M^2
GLUCOSE SERPL-MCNC: 110 MG/DL (ref 70–110)
HCT VFR BLD AUTO: 37.7 % (ref 40–54)
HCT VFR BLD AUTO: 38.5 % (ref 40–54)
HGB BLD-MCNC: 12.8 G/DL (ref 14–18)
HGB BLD-MCNC: 13.2 G/DL (ref 14–18)
IMM GRANULOCYTES # BLD AUTO: 0.02 K/UL (ref 0–0.04)
IMM GRANULOCYTES # BLD AUTO: 0.02 K/UL (ref 0–0.04)
IMM GRANULOCYTES NFR BLD AUTO: 0.3 % (ref 0–0.5)
IMM GRANULOCYTES NFR BLD AUTO: 0.3 % (ref 0–0.5)
INR PPP: 1.1 (ref 0.8–1.2)
LYMPHOCYTES # BLD AUTO: 1.5 K/UL (ref 1–4.8)
LYMPHOCYTES # BLD AUTO: 2 K/UL (ref 1–4.8)
LYMPHOCYTES NFR BLD: 22.4 % (ref 18–48)
LYMPHOCYTES NFR BLD: 30.2 % (ref 18–48)
MCH RBC QN AUTO: 32.3 PG (ref 27–31)
MCH RBC QN AUTO: 32.8 PG (ref 27–31)
MCHC RBC AUTO-ENTMCNC: 34 G/DL (ref 32–36)
MCHC RBC AUTO-ENTMCNC: 34.3 G/DL (ref 32–36)
MCV RBC AUTO: 95 FL (ref 82–98)
MCV RBC AUTO: 96 FL (ref 82–98)
MONOCYTES # BLD AUTO: 0.5 K/UL (ref 0.3–1)
MONOCYTES # BLD AUTO: 0.8 K/UL (ref 0.3–1)
MONOCYTES NFR BLD: 11.5 % (ref 4–15)
MONOCYTES NFR BLD: 7.3 % (ref 4–15)
NEUTROPHILS # BLD AUTO: 3.6 K/UL (ref 1.8–7.7)
NEUTROPHILS # BLD AUTO: 4.5 K/UL (ref 1.8–7.7)
NEUTROPHILS NFR BLD: 53.4 % (ref 38–73)
NEUTROPHILS NFR BLD: 66.6 % (ref 38–73)
NRBC BLD-RTO: 0 /100 WBC
NRBC BLD-RTO: 0 /100 WBC
OHS QRS DURATION: 150 MS
OHS QTC CALCULATION: 455 MS
PLATELET # BLD AUTO: 201 K/UL (ref 150–450)
PLATELET # BLD AUTO: 212 K/UL (ref 150–450)
PMV BLD AUTO: 10.1 FL (ref 9.2–12.9)
PMV BLD AUTO: 10.5 FL (ref 9.2–12.9)
POTASSIUM SERPL-SCNC: 4.3 MMOL/L (ref 3.5–5.1)
PROT SERPL-MCNC: 7.4 G/DL (ref 6–8.4)
PROTHROMBIN TIME: 11.4 SEC (ref 9–12.5)
RBC # BLD AUTO: 3.96 M/UL (ref 4.6–6.2)
RBC # BLD AUTO: 4.02 M/UL (ref 4.6–6.2)
SODIUM SERPL-SCNC: 142 MMOL/L (ref 136–145)
TROPONIN I SERPL DL<=0.01 NG/ML-MCNC: 0.01 NG/ML (ref 0–0.03)
TROPONIN I SERPL DL<=0.01 NG/ML-MCNC: 0.04 NG/ML (ref 0–0.03)
WBC # BLD AUTO: 6.72 K/UL (ref 3.9–12.7)
WBC # BLD AUTO: 6.82 K/UL (ref 3.9–12.7)

## 2024-11-22 PROCEDURE — 80053 COMPREHEN METABOLIC PANEL: CPT | Mod: HCNC

## 2024-11-22 PROCEDURE — 25000003 PHARM REV CODE 250: Mod: HCNC

## 2024-11-22 PROCEDURE — 93005 ELECTROCARDIOGRAM TRACING: CPT | Mod: HCNC

## 2024-11-22 PROCEDURE — 84484 ASSAY OF TROPONIN QUANT: CPT | Mod: HCNC

## 2024-11-22 PROCEDURE — 85730 THROMBOPLASTIN TIME PARTIAL: CPT | Mod: HCNC

## 2024-11-22 PROCEDURE — 85025 COMPLETE CBC W/AUTO DIFF WBC: CPT | Mod: HCNC

## 2024-11-22 PROCEDURE — 99285 EMERGENCY DEPT VISIT HI MDM: CPT | Mod: 25,HCNC

## 2024-11-22 PROCEDURE — 93010 ELECTROCARDIOGRAM REPORT: CPT | Mod: HCNC,,, | Performed by: INTERNAL MEDICINE

## 2024-11-22 PROCEDURE — G0378 HOSPITAL OBSERVATION PER HR: HCPCS | Mod: HCNC

## 2024-11-22 PROCEDURE — 83880 ASSAY OF NATRIURETIC PEPTIDE: CPT | Mod: HCNC

## 2024-11-22 PROCEDURE — 85610 PROTHROMBIN TIME: CPT | Mod: HCNC

## 2024-11-22 RX ORDER — INSULIN ASPART 100 [IU]/ML
0-5 INJECTION, SOLUTION INTRAVENOUS; SUBCUTANEOUS
Status: DISCONTINUED | OUTPATIENT
Start: 2024-11-22 | End: 2024-11-23 | Stop reason: HOSPADM

## 2024-11-22 RX ORDER — AMLODIPINE BESYLATE 5 MG/1
5 TABLET ORAL DAILY
Status: DISCONTINUED | OUTPATIENT
Start: 2024-11-22 | End: 2024-11-22

## 2024-11-22 RX ORDER — ONDANSETRON HYDROCHLORIDE 2 MG/ML
4 INJECTION, SOLUTION INTRAVENOUS EVERY 8 HOURS PRN
Status: DISCONTINUED | OUTPATIENT
Start: 2024-11-22 | End: 2024-11-23 | Stop reason: HOSPADM

## 2024-11-22 RX ORDER — ASPIRIN 325 MG
325 TABLET ORAL
Status: COMPLETED | OUTPATIENT
Start: 2024-11-22 | End: 2024-11-22

## 2024-11-22 RX ORDER — SODIUM CHLORIDE 0.9 % (FLUSH) 0.9 %
5 SYRINGE (ML) INJECTION
Status: DISCONTINUED | OUTPATIENT
Start: 2024-11-22 | End: 2024-11-23 | Stop reason: HOSPADM

## 2024-11-22 RX ORDER — IBUPROFEN 200 MG
24 TABLET ORAL
Status: DISCONTINUED | OUTPATIENT
Start: 2024-11-22 | End: 2024-11-23 | Stop reason: HOSPADM

## 2024-11-22 RX ORDER — CARVEDILOL 12.5 MG/1
12.5 TABLET ORAL 2 TIMES DAILY
Status: DISCONTINUED | OUTPATIENT
Start: 2024-11-22 | End: 2024-11-23 | Stop reason: HOSPADM

## 2024-11-22 RX ORDER — GLUCAGON 1 MG
1 KIT INJECTION
Status: DISCONTINUED | OUTPATIENT
Start: 2024-11-22 | End: 2024-11-23 | Stop reason: HOSPADM

## 2024-11-22 RX ORDER — HEPARIN SODIUM,PORCINE/D5W 25000/250
0-40 INTRAVENOUS SOLUTION INTRAVENOUS CONTINUOUS
Status: DISCONTINUED | OUTPATIENT
Start: 2024-11-22 | End: 2024-11-23

## 2024-11-22 RX ORDER — AMLODIPINE BESYLATE 5 MG/1
10 TABLET ORAL DAILY
Status: DISCONTINUED | OUTPATIENT
Start: 2024-11-22 | End: 2024-11-23

## 2024-11-22 RX ORDER — IBUPROFEN 200 MG
16 TABLET ORAL
Status: DISCONTINUED | OUTPATIENT
Start: 2024-11-22 | End: 2024-11-23 | Stop reason: HOSPADM

## 2024-11-22 RX ORDER — POLYETHYLENE GLYCOL 3350 17 G/17G
17 POWDER, FOR SOLUTION ORAL DAILY PRN
Status: DISCONTINUED | OUTPATIENT
Start: 2024-11-22 | End: 2024-11-23 | Stop reason: HOSPADM

## 2024-11-22 RX ORDER — ACETAMINOPHEN 325 MG/1
650 TABLET ORAL EVERY 4 HOURS PRN
Status: DISCONTINUED | OUTPATIENT
Start: 2024-11-22 | End: 2024-11-23 | Stop reason: HOSPADM

## 2024-11-22 RX ORDER — NALOXONE HCL 0.4 MG/ML
0.02 VIAL (ML) INJECTION
Status: DISCONTINUED | OUTPATIENT
Start: 2024-11-22 | End: 2024-11-23 | Stop reason: HOSPADM

## 2024-11-22 RX ORDER — TALC
6 POWDER (GRAM) TOPICAL NIGHTLY PRN
Status: DISCONTINUED | OUTPATIENT
Start: 2024-11-22 | End: 2024-11-23 | Stop reason: HOSPADM

## 2024-11-22 RX ADMIN — ASPIRIN 325 MG ORAL TABLET 325 MG: 325 PILL ORAL at 10:11

## 2024-11-22 RX ADMIN — CARVEDILOL 12.5 MG: 12.5 TABLET, FILM COATED ORAL at 10:11

## 2024-11-22 NOTE — TELEPHONE ENCOUNTER
----- Message from Mervin sent at 11/22/2024 10:53 AM CST -----  Contact: pt  Type: Requesting to speak with nurse        Who Called: PT  Regarding: pt would like to report nausea please advise   Would the patient rather a call back or a response via [facility]? Call back  Best Call Back Number: [phone]   Additional Information:

## 2024-11-23 ENCOUNTER — CLINICAL SUPPORT (OUTPATIENT)
Dept: CARDIOLOGY | Facility: HOSPITAL | Age: 74
End: 2024-11-23

## 2024-11-23 ENCOUNTER — CLINICAL SUPPORT (OUTPATIENT)
Dept: CARDIOLOGY | Facility: HOSPITAL | Age: 74
End: 2024-11-23
Attending: INTERNAL MEDICINE
Payer: MEDICARE

## 2024-11-23 VITALS
RESPIRATION RATE: 18 BRPM | TEMPERATURE: 98 F | BODY MASS INDEX: 33.6 KG/M2 | HEART RATE: 67 BPM | HEIGHT: 71 IN | OXYGEN SATURATION: 96 % | DIASTOLIC BLOOD PRESSURE: 71 MMHG | WEIGHT: 240 LBS | SYSTOLIC BLOOD PRESSURE: 118 MMHG

## 2024-11-23 DIAGNOSIS — I25.5 ISCHEMIC CARDIOMYOPATHY: ICD-10-CM

## 2024-11-23 DIAGNOSIS — Z95.810 PRESENCE OF AUTOMATIC (IMPLANTABLE) CARDIAC DEFIBRILLATOR: ICD-10-CM

## 2024-11-23 LAB
ALBUMIN SERPL BCP-MCNC: 3.8 G/DL (ref 3.5–5.2)
ALP SERPL-CCNC: 74 U/L (ref 40–150)
ALT SERPL W/O P-5'-P-CCNC: 18 U/L (ref 10–44)
ANION GAP SERPL CALC-SCNC: 8 MMOL/L (ref 8–16)
APICAL FOUR CHAMBER EJECTION FRACTION: 36 %
APICAL TWO CHAMBER EJECTION FRACTION: 48 %
APTT PPP: 44.4 SEC (ref 21–32)
APTT PPP: 48.1 SEC (ref 21–32)
ASCENDING AORTA: 3.23 CM
AST SERPL-CCNC: 18 U/L (ref 10–40)
BASOPHILS # BLD AUTO: 0.07 K/UL (ref 0–0.2)
BASOPHILS # BLD AUTO: 0.07 K/UL (ref 0–0.2)
BASOPHILS NFR BLD: 1 % (ref 0–1.9)
BASOPHILS NFR BLD: 1 % (ref 0–1.9)
BILIRUB SERPL-MCNC: 1.4 MG/DL (ref 0.1–1)
BSA FOR ECHO PROCEDURE: 2.34 M2
BUN SERPL-MCNC: 11 MG/DL (ref 8–23)
CALCIUM SERPL-MCNC: 9.4 MG/DL (ref 8.7–10.5)
CHLORIDE SERPL-SCNC: 106 MMOL/L (ref 95–110)
CO2 SERPL-SCNC: 27 MMOL/L (ref 23–29)
CREAT SERPL-MCNC: 1 MG/DL (ref 0.5–1.4)
CV ECHO LV RWT: 0.48 CM
DIFFERENTIAL METHOD BLD: ABNORMAL
DIFFERENTIAL METHOD BLD: ABNORMAL
DOP CALC LVOT AREA: 3.5 CM2
DOP CALC LVOT DIAMETER: 2.1 CM
ECHO LV POSTERIOR WALL: 1.5 CM (ref 0.6–1.1)
EOSINOPHIL # BLD AUTO: 0.3 K/UL (ref 0–0.5)
EOSINOPHIL # BLD AUTO: 0.3 K/UL (ref 0–0.5)
EOSINOPHIL NFR BLD: 3.7 % (ref 0–8)
EOSINOPHIL NFR BLD: 3.7 % (ref 0–8)
ERYTHROCYTE [DISTWIDTH] IN BLOOD BY AUTOMATED COUNT: 12.4 % (ref 11.5–14.5)
ERYTHROCYTE [DISTWIDTH] IN BLOOD BY AUTOMATED COUNT: 12.4 % (ref 11.5–14.5)
EST. GFR  (NO RACE VARIABLE): >60 ML/MIN/1.73 M^2
FRACTIONAL SHORTENING: 33.9 % (ref 28–44)
GLUCOSE SERPL-MCNC: 110 MG/DL (ref 70–110)
HCT VFR BLD AUTO: 39 % (ref 40–54)
HCT VFR BLD AUTO: 39 % (ref 40–54)
HGB BLD-MCNC: 13.3 G/DL (ref 14–18)
HGB BLD-MCNC: 13.3 G/DL (ref 14–18)
IMM GRANULOCYTES # BLD AUTO: 0.02 K/UL (ref 0–0.04)
IMM GRANULOCYTES # BLD AUTO: 0.02 K/UL (ref 0–0.04)
IMM GRANULOCYTES NFR BLD AUTO: 0.3 % (ref 0–0.5)
IMM GRANULOCYTES NFR BLD AUTO: 0.3 % (ref 0–0.5)
INTERVENTRICULAR SEPTUM: 1.5 CM (ref 0.6–1.1)
LEFT ATRIUM AREA SYSTOLIC (APICAL 4 CHAMBER): 23.86 CM2
LEFT INTERNAL DIMENSION IN SYSTOLE: 4.1 CM (ref 2.1–4)
LEFT VENTRICLE DIASTOLIC VOLUME INDEX: 84.18 ML/M2
LEFT VENTRICLE DIASTOLIC VOLUME: 191.92 ML
LEFT VENTRICLE END DIASTOLIC VOLUME APICAL 2 CHAMBER: 137.31 ML
LEFT VENTRICLE END DIASTOLIC VOLUME APICAL 4 CHAMBER: 109.59 ML
LEFT VENTRICLE END SYSTOLIC VOLUME APICAL 4 CHAMBER: 72.54 ML
LEFT VENTRICLE MASS INDEX: 197.4 G/M2
LEFT VENTRICLE SYSTOLIC VOLUME INDEX: 32.4 ML/M2
LEFT VENTRICLE SYSTOLIC VOLUME: 73.98 ML
LEFT VENTRICULAR INTERNAL DIMENSION IN DIASTOLE: 6.2 CM (ref 3.5–6)
LEFT VENTRICULAR MASS: 450.2 G
LVED V (TEICH): 191.92 ML
LVES V (TEICH): 73.98 ML
LYMPHOCYTES # BLD AUTO: 2.3 K/UL (ref 1–4.8)
LYMPHOCYTES # BLD AUTO: 2.3 K/UL (ref 1–4.8)
LYMPHOCYTES NFR BLD: 32.7 % (ref 18–48)
LYMPHOCYTES NFR BLD: 32.7 % (ref 18–48)
MAGNESIUM SERPL-MCNC: 1.6 MG/DL (ref 1.6–2.6)
MCH RBC QN AUTO: 32.4 PG (ref 27–31)
MCH RBC QN AUTO: 32.4 PG (ref 27–31)
MCHC RBC AUTO-ENTMCNC: 34.1 G/DL (ref 32–36)
MCHC RBC AUTO-ENTMCNC: 34.1 G/DL (ref 32–36)
MCV RBC AUTO: 95 FL (ref 82–98)
MCV RBC AUTO: 95 FL (ref 82–98)
MONOCYTES # BLD AUTO: 0.7 K/UL (ref 0.3–1)
MONOCYTES # BLD AUTO: 0.7 K/UL (ref 0.3–1)
MONOCYTES NFR BLD: 10.2 % (ref 4–15)
MONOCYTES NFR BLD: 10.2 % (ref 4–15)
NEUTROPHILS # BLD AUTO: 3.7 K/UL (ref 1.8–7.7)
NEUTROPHILS # BLD AUTO: 3.7 K/UL (ref 1.8–7.7)
NEUTROPHILS NFR BLD: 52.1 % (ref 38–73)
NEUTROPHILS NFR BLD: 52.1 % (ref 38–73)
NRBC BLD-RTO: 0 /100 WBC
NRBC BLD-RTO: 0 /100 WBC
PHOSPHATE SERPL-MCNC: 3.5 MG/DL (ref 2.7–4.5)
PLATELET # BLD AUTO: 213 K/UL (ref 150–450)
PLATELET # BLD AUTO: 213 K/UL (ref 150–450)
PMV BLD AUTO: 10.4 FL (ref 9.2–12.9)
PMV BLD AUTO: 10.4 FL (ref 9.2–12.9)
POCT GLUCOSE: 103 MG/DL (ref 70–110)
POCT GLUCOSE: 196 MG/DL (ref 70–110)
POTASSIUM SERPL-SCNC: 3.5 MMOL/L (ref 3.5–5.1)
PROT SERPL-MCNC: 7.2 G/DL (ref 6–8.4)
RBC # BLD AUTO: 4.1 M/UL (ref 4.6–6.2)
RBC # BLD AUTO: 4.1 M/UL (ref 4.6–6.2)
SINUS: 3.4 CM
SODIUM SERPL-SCNC: 141 MMOL/L (ref 136–145)
STJ: 3.11 CM
TROPONIN I SERPL DL<=0.01 NG/ML-MCNC: 0.02 NG/ML (ref 0–0.03)
WBC # BLD AUTO: 7.06 K/UL (ref 3.9–12.7)
WBC # BLD AUTO: 7.06 K/UL (ref 3.9–12.7)
Z-SCORE OF LEFT VENTRICULAR DIMENSION IN END DIASTOLE: -3.26
Z-SCORE OF LEFT VENTRICULAR DIMENSION IN END SYSTOLE: -1.87

## 2024-11-23 PROCEDURE — 96366 THER/PROPH/DIAG IV INF ADDON: CPT

## 2024-11-23 PROCEDURE — G0378 HOSPITAL OBSERVATION PER HR: HCPCS | Mod: HCNC

## 2024-11-23 PROCEDURE — 84484 ASSAY OF TROPONIN QUANT: CPT | Mod: HCNC

## 2024-11-23 PROCEDURE — 25000003 PHARM REV CODE 250: Mod: HCNC

## 2024-11-23 PROCEDURE — 83735 ASSAY OF MAGNESIUM: CPT | Mod: HCNC

## 2024-11-23 PROCEDURE — 85025 COMPLETE CBC W/AUTO DIFF WBC: CPT | Mod: HCNC

## 2024-11-23 PROCEDURE — 96365 THER/PROPH/DIAG IV INF INIT: CPT

## 2024-11-23 PROCEDURE — 63600175 PHARM REV CODE 636 W HCPCS: Mod: HCNC

## 2024-11-23 PROCEDURE — 80053 COMPREHEN METABOLIC PANEL: CPT | Mod: HCNC

## 2024-11-23 PROCEDURE — 93296 REM INTERROG EVL PM/IDS: CPT | Mod: HCNC | Performed by: INTERNAL MEDICINE

## 2024-11-23 PROCEDURE — 84100 ASSAY OF PHOSPHORUS: CPT | Mod: HCNC

## 2024-11-23 PROCEDURE — 85730 THROMBOPLASTIN TIME PARTIAL: CPT | Mod: HCNC

## 2024-11-23 PROCEDURE — 93295 DEV INTERROG REMOTE 1/2/MLT: CPT | Mod: HCNC,,, | Performed by: INTERNAL MEDICINE

## 2024-11-23 PROCEDURE — 36415 COLL VENOUS BLD VENIPUNCTURE: CPT | Mod: HCNC | Performed by: STUDENT IN AN ORGANIZED HEALTH CARE EDUCATION/TRAINING PROGRAM

## 2024-11-23 PROCEDURE — 85730 THROMBOPLASTIN TIME PARTIAL: CPT | Mod: 91,HCNC | Performed by: STUDENT IN AN ORGANIZED HEALTH CARE EDUCATION/TRAINING PROGRAM

## 2024-11-23 RX ORDER — LOSARTAN POTASSIUM 50 MG/1
50 TABLET ORAL DAILY
Status: DISCONTINUED | OUTPATIENT
Start: 2024-11-23 | End: 2024-11-23 | Stop reason: HOSPADM

## 2024-11-23 RX ORDER — INSULIN ASPART 100 [IU]/ML
9 INJECTION, SOLUTION INTRAVENOUS; SUBCUTANEOUS
Status: DISCONTINUED | OUTPATIENT
Start: 2024-11-23 | End: 2024-11-23 | Stop reason: HOSPADM

## 2024-11-23 RX ORDER — GABAPENTIN 300 MG/1
600 CAPSULE ORAL 2 TIMES DAILY
Status: DISCONTINUED | OUTPATIENT
Start: 2024-11-23 | End: 2024-11-23 | Stop reason: HOSPADM

## 2024-11-23 RX ORDER — NAPROXEN SODIUM 220 MG/1
81 TABLET, FILM COATED ORAL DAILY
Status: DISCONTINUED | OUTPATIENT
Start: 2024-11-23 | End: 2024-11-23 | Stop reason: HOSPADM

## 2024-11-23 RX ORDER — FUROSEMIDE 20 MG/1
20 TABLET ORAL DAILY
Status: DISCONTINUED | OUTPATIENT
Start: 2024-11-23 | End: 2024-11-23 | Stop reason: HOSPADM

## 2024-11-23 RX ORDER — ISOSORBIDE MONONITRATE 30 MG/1
30 TABLET, EXTENDED RELEASE ORAL DAILY
Status: DISCONTINUED | OUTPATIENT
Start: 2024-11-23 | End: 2024-11-23 | Stop reason: HOSPADM

## 2024-11-23 RX ORDER — ATORVASTATIN CALCIUM 40 MG/1
40 TABLET, FILM COATED ORAL DAILY
Status: DISCONTINUED | OUTPATIENT
Start: 2024-11-23 | End: 2024-11-23 | Stop reason: HOSPADM

## 2024-11-23 RX ORDER — INSULIN GLARGINE 100 [IU]/ML
20 INJECTION, SOLUTION SUBCUTANEOUS NIGHTLY
Status: DISCONTINUED | OUTPATIENT
Start: 2024-11-23 | End: 2024-11-23 | Stop reason: HOSPADM

## 2024-11-23 RX ORDER — LOSARTAN POTASSIUM 25 MG/1
50 TABLET ORAL DAILY
Qty: 90 TABLET | Refills: 0 | Status: SHIPPED | OUTPATIENT
Start: 2024-11-23 | End: 2024-11-26

## 2024-11-23 RX ADMIN — FUROSEMIDE 20 MG: 20 TABLET ORAL at 10:11

## 2024-11-23 RX ADMIN — ATORVASTATIN CALCIUM 40 MG: 40 TABLET, FILM COATED ORAL at 09:11

## 2024-11-23 RX ADMIN — CARVEDILOL 12.5 MG: 12.5 TABLET, FILM COATED ORAL at 09:11

## 2024-11-23 RX ADMIN — GABAPENTIN 600 MG: 300 CAPSULE ORAL at 09:11

## 2024-11-23 RX ADMIN — LOSARTAN POTASSIUM 50 MG: 50 TABLET, FILM COATED ORAL at 02:11

## 2024-11-23 RX ADMIN — ASPIRIN 81 MG CHEWABLE TABLET 81 MG: 81 TABLET CHEWABLE at 09:11

## 2024-11-23 RX ADMIN — AMLODIPINE BESYLATE 10 MG: 5 TABLET ORAL at 12:11

## 2024-11-23 RX ADMIN — HEPARIN SODIUM 12 UNITS/KG/HR: 10000 INJECTION, SOLUTION INTRAVENOUS at 12:11

## 2024-11-23 RX ADMIN — ISOSORBIDE MONONITRATE 30 MG: 30 TABLET, EXTENDED RELEASE ORAL at 09:11

## 2024-11-23 NOTE — H&P
"[facility] Medicine  History & Physical    Patient Name: Clifton Wilson  MRN: [id number]  Patient Class: OP- Observation  Admission Date: 11/22/2024  Attending Physician: Jenna Small MD   Primary Care Provider: Britton Grissom MD         Patient information was obtained from patient, spouse/SO, and ER records.     Subjective:     Principal Problem:NSTEMI (non-ST elevated myocardial infarction)    Chief Complaint:   Chief Complaint   Patient presents with    Chest Pain     Patient reports to the ED complaining of substernal chest pain described as heaviness that started this morning. Patient endorses nausea, emesis. Patient endorses slight relief with NTG. Pitting edema to lower extremities. Ambulatory, NAD noted.        HPI: Clifton Wilson is a 73 year old male with a past medical history of HTN, CAD (NSTEMI 2011 and 2015), ICM (EF 25->45%), and ICD (2017), CHF (EF: 40-45%, Grade 1 diastolic dysfunction, DMT2 who presented to [facility] for chest pain. The patient states that yesterday he started to feel nauseous and that this feeling worsened in the morning. He states that he then developed a "feeling"/ "pain" similar to his prior cardiac events. Activity did not appear to worsen the pain, he took nitro x3 at home and states that the third improved his symptoms. He denies any headaches, SOB, fever, but endorses some swelling in his hands. Upon arrival to the ED, he states his chest pain is improving.    In the ED, BP: 182/85, HR: 56, Temp: 97.6F, SpO2: 98%. CMP: Na: 142, K: 4.3, BUN/Cr: 10/1.2. CBC: WBC: 6.82, H/H: 13.2/38.5, Plt: 212. BNP: 130, initial troponin negative, repeat positive at 0.035. CXR with no acute abnormalities. Cardiology was consulted by the ED, and recommended carvedilol, ASA loading, and start on heparin gtt. The patient was admitted to [facility]  for NSTEMI.     Past Medical History:   Diagnosis Date    Anticoagulant long-term use     Cardiomyopathy     CHF (congestive " heart failure)     Coronary artery disease     Diabetes mellitus     Gout     Heart attack     Hypertension     Pneumonia        Past Surgical History:   Procedure Laterality Date    APPENDECTOMY      CARDIAC CATHETERIZATION      7 stents    CARDIAC DEFIBRILLATOR PLACEMENT      CATARACT EXTRACTION Bilateral 2011    COLONOSCOPY N/A 8/10/2018    Procedure: COLONOSCOPY golytely;  Surgeon: Valentine Burger MD;  Location: [facility] ENDO;  Service: Endoscopy;  Laterality: N/A;    CORONARY ANGIOGRAPHY N/A 11/11/2024    Procedure: ANGIOGRAM, CORONARY ARTERY;  Surgeon: Luis Felipe Wright MD;  Location: [facility] CATH LAB/EP;  Service: Cardiology;  Laterality: N/A;    EYE SURGERY      INSTANTANEOUS WAVE-FREE RATIO (IFR) N/A 11/11/2024    Procedure: Instantaneous Wave-Free Ratio (IFR);  Surgeon: Luis Felipe Wright MD;  Location: [facility] CATH LAB/EP;  Service: Cardiology;  Laterality: N/A;    LEFT HEART CATHETERIZATION Left 11/11/2024    Procedure: Left heart cath;  Surgeon: Luis Felipe Wright MD;  Location: [facility] CATH LAB/EP;  Service: Cardiology;  Laterality: Left;    REVISION OF IMPLANTABLE CARDIOVERTER-DEFIBRILLATOR (ICD) ELECTRODE LEAD PLACEMENT N/A 11/11/2019    Procedure: REVISION, INSERTION, ELECTRODE LEAD, ICD;  Surgeon: Shukri Walsh MD;  Location: [facility] EP LAB;  Service: Cardiology;  Laterality: N/A;  Lead malfxn, RV lead ICD, SJM, anes, DM, 3 PREP       Review of patient's allergies indicates:  No Known Allergies    No current facility-administered medications on file prior to encounter.     Current Outpatient Medications on File Prior to Encounter   Medication Sig    alcohol swabs (BD ALCOHOL SWABS) PadM USE FOUR TIMES DAILY    aspirin (ECOTRIN) 81 MG EC tablet Take 81 mg by mouth once daily.    atorvastatin (LIPITOR) 40 MG tablet Take 1 tablet (40 mg total) by mouth once daily.    blood glucose control high,low (ACCU-CHEK CATHRYN CONTROL SOLN) Soln Use as directed to check blood sugar    blood sugar diagnostic Strp Use  to test four times daily    blood sugar diagnostic Strp test blood sugar as directed four times daily    blood-glucose meter (ACCU-CHEK CATHRYN PLUS METER) Misc USE AS DIRECTED    blood-glucose meter (TRUE METRIX GLUCOSE METER) Misc use as directed    carvediloL (COREG) 12.5 MG tablet Take 1 tablet (12.5 mg total) by mouth 2 (two) times daily.    clopidogreL (PLAVIX) 75 mg tablet Take 1 tablet (75 mg total) by mouth once daily.    colchicine (MITIGARE) 0.6 mg Cap Take 1 capsule (0.6 mg total) by mouth once daily.    cyanocobalamin (VITAMIN B-12) 1000 MCG tablet TAKE ONE TABLET BY MOUTH ONCE DAILY FOR VITAMIN DEFICIENCIES    furosemide (LASIX) 20 MG tablet Take 1 tablet (20 mg total) by mouth 2 (two) times daily.    gabapentin (NEURONTIN) 300 MG capsule Take 2 capsules (600 mg total) by mouth 2 (two) times daily.    ibuprofen (ADVIL,MOTRIN) 600 MG tablet Take 1 tablet (600 mg total) by mouth every 6 (six) hours as needed for Pain. (Patient not taking: Reported on 11/14/2024)    insulin aspart U-100 (NOVOLOG FLEXPEN U-100 INSULIN) 100 unit/mL (3 mL) InPn pen Inject 18 Units into the skin 3 (three) times daily with meals.    insulin glargine U-100, Lantus, (LANTUS SOLOSTAR U-100 INSULIN) 100 unit/mL (3 mL) InPn pen Inject 40 Units into the skin every evening.    isosorbide mononitrate (IMDUR) 30 MG 24 hr tablet Take 1 tablet (30 mg total) by mouth once daily.    lancets 30 gauge Misc by Misc.(Non-Drug; Combo Route) route 4 (four) times daily.    lancets 30 gauge Misc usea s directed to check blood glucose four times daily    losartan (COZAAR) 25 MG tablet Take 1 tablet (25 mg total) by mouth once daily.    metFORMIN (GLUCOPHAGE) 1000 MG tablet Take 1 tablet (1,000 mg total) by mouth 2 (two) times daily with meals.    nitroGLYCERIN (NITROSTAT) 0.4 MG SL tablet Place 1 tablet (0.4 mg total) under the tongue every 5 (five) minutes as needed for Chest pain.    pen needle, diabetic (BD ULTRA-FINE SINA PEN NEEDLE) 32 gauge x  "5/32" Ndle Use four times daily for insulin administration    promethazine-dextromethorphan (PROMETHAZINE-DM) 6.25-15 mg/5 mL Syrp Take 5 mLs by mouth 2 (two) times daily as needed (cough).    zolpidem (AMBIEN) 5 MG Tab TAKE 1 TABLET BY MOUTH EVERY NIGHT AS NEEDED     Family History       Problem Relation (Age of Onset)    Heart disease Mother, Father          Tobacco Use    Smoking status: Former    Smokeless tobacco: Never   Substance and Sexual Activity    Alcohol use: Yes     Comment: socially    Drug use: No    Sexual activity: Yes     Review of Systems   Constitutional:  Negative for chills and fever.   Respiratory:  Negative for shortness of breath.    Cardiovascular:  Negative for chest pain.   Gastrointestinal:  Positive for nausea. Negative for abdominal pain.     Objective:     Vital Signs (Most Recent):  Temp: 97.6 °F (36.4 °C) (11/22/24 1525)  Pulse: (!) 54 (11/22/24 2239)  Resp: 16 (11/22/24 2239)  BP: (!) 212/101 (11/22/24 2231)  SpO2: 98 % (11/22/24 2239) Vital Signs (24h Range):  Temp:  [97.6 °F (36.4 °C)] 97.6 °F (36.4 °C)  Pulse:  [53-64] 54  Resp:  [14-20] 16  SpO2:  [98 %-100 %] 98 %  BP: (161-212)/() 212/101     Weight: 108.9 kg (240 lb)  Body mass index is 33.47 kg/m².     Physical Exam  Constitutional:       General: He is not in acute distress.     Appearance: He is not toxic-appearing.   HENT:      Head: Normocephalic.      Right Ear: External ear normal.      Left Ear: External ear normal.      Nose: Nose normal.   Eyes:      Extraocular Movements: Extraocular movements intact.   Cardiovascular:      Pulses: Normal pulses.      Heart sounds: Normal heart sounds. No murmur heard.     No gallop.   Pulmonary:      Effort: Pulmonary effort is normal. No respiratory distress.      Breath sounds: Normal breath sounds. No wheezing.   Abdominal:      Palpations: Abdomen is soft.      Tenderness: There is no abdominal tenderness. There is no guarding or rebound.   Musculoskeletal:         " General: Normal range of motion.      Right lower leg: No edema.      Left lower leg: No edema.   Skin:     General: Skin is warm and dry.   Neurological:      Mental Status: He is alert.   Psychiatric:         Mood and Affect: Mood normal.                Significant Labs: All pertinent labs within the past 24 hours have been reviewed.  CBC:   Recent Labs   Lab 11/22/24  1759 11/22/24  2253   WBC 6.82 6.72   HGB 13.2* 12.8*   HCT 38.5* 37.7*    201     CMP:   Recent Labs   Lab 11/22/24  1759      K 4.3      CO2 25      BUN 10   CREATININE 1.2   CALCIUM 9.3   PROT 7.4   ALBUMIN 3.9   BILITOT 1.1*   ALKPHOS 75   AST 20   ALT 18   ANIONGAP 11       Significant Imaging: I have reviewed all pertinent imaging results/findings within the past 24 hours.  Assessment/Plan:     * NSTEMI (non-ST elevated myocardial infarction)  Patient with chest pain simliar to previous events. Has significant cardiac history. Troponin elevated to 0.035, Cardiology consulted by ED and recommended heparin gtt and ASA loading.    Plan:  - Troponin Q6hr till downtrending  - Cardiology consulted, appreciate recommendations  - Continue with heparin gtt for now  - Blood pressure control     Type 2 diabetes mellitus with neurologic complication  Continue with 20u long acting insulin and sliding scale  Will increase as needed    Hypertension  Patients blood pressure range in the last 24 hours was: BP  Min: 161/84  Max: 212/101.The patient's inpatient anti-hypertensive regimen is listed below:  Current Antihypertensives  carvediloL tablet 12.5 mg, 2 times daily, Oral  amLODIPine tablet 5 mg, Daily, Oral    Plan  - BP is uncontrolled, will adjust as follows: will add amlodipine 5mg  - Will consider possible spironolactone as well       VTE Risk Mitigation (From admission, onward)           Ordered     heparin 25,000 units in dextrose 5% (100 units/ml) IV bolus from bag LOW INTENSITY nomogram - [facility]  As needed (PRN)         Question:  Heparin Infusion Adjustment (DO NOT MODIFY ANSWER)  Answer:  [email or web address]\epic\Images\Pharmacy\HeparinInfusions\heparin LOW INTENSITY nomogram for [facility] [id number].pdf    11/22/24 2141     heparin 25,000 units in dextrose 5% (100 units/ml) IV bolus from bag LOW INTENSITY nomogram - [facility]  As needed (PRN)        Question:  Heparin Infusion Adjustment (DO NOT MODIFY ANSWER)  Answer:  [email or web address]\epic\Images\Pharmacy\HeparinInfusions\heparin LOW INTENSITY nomogram for [facility] [id number].pdf    11/22/24 2141     IP VTE HIGH RISK PATIENT  Once         11/22/24 2153     Place sequential compression device  Until discontinued         11/22/24 2153     heparin 25,000 units in dextrose 5% (100 units/ml) IV bolus from bag LOW INTENSITY nomogram - [facility]  Once        Question:  Heparin Infusion Adjustment (DO NOT MODIFY ANSWER)  Answer:  [email or web address]\epic\Images\Pharmacy\HeparinInfusions\heparin LOW INTENSITY nomogram for [facility] [id number].pdf    11/22/24 2141     heparin 25,000 units in dextrose 5% 250 mL (100 units/mL) infusion LOW INTENSITY nomogram - [facility]  Continuous        Question:  Begin at (units/kg/hr)  Answer:  12    11/22/24 2141                                  Johnnie Kirk MD  Department of Hospital Medicine  [facility] Emergency Dept

## 2024-11-23 NOTE — PLAN OF CARE
VIRTUAL NURSE:  Labs, notes, orders, and careplan reviewed.   VN to be available as needed.       Problem: Adult Inpatient Plan of Care  Goal: Plan of Care Review  Outcome: Progressing  Goal: Patient-Specific Goal (Individualized)  Outcome: Progressing      11/23/24 0419   Nurse Notification   Bedside Nurse Notified? Yes   Name of Bedside Nurse Yesica   Nurse Notfication Method Secure Chat   Nurse Notified Of   (Admission profile is complete)   Admission   Initial VN Admission Questions Complete   Communication Issues? None   Shift   Virtual Nurse - Rounding Complete   Virtual Nurse - Patient Verbalized Approval Of Camera Use;VN Rounding   Type of Frequent Check   Type Patient Rounds   Safety/Activity   Patient Rounds bed in low position;placement of personal items at bedside;call light in patient/parent reach;clutter free environment maintained;visualized patient   Pain/Comfort/Sleep   Comfort/Acceptable Pain Level 0   Pain Rating (0-10): Rest 0   Pain Rating (0-10): Activity 0

## 2024-11-23 NOTE — PROGRESS NOTES
[facility] - Telemetry  Discharge Final Note    Primary Care Provider: Britton Grissom MD    Expected Discharge Date: 11/23/2024        Important Message from Medicare               Future Appointments   Date Time Provider Department Center   11/26/2024  4:00 PM Luis Felipe Wright MD [facility] CARDIO [facility]   12/4/2024 12:30 PM CARDIAC, PET IMAGING [facility]   1/7/2025  9:20 AM Britton Grissom MD [facility] FAM MED [facility]    Patient's chart has been reviewed by CM. Patient does not have any additional needs. Patient is cleared to discharge home with family.

## 2024-11-23 NOTE — DISCHARGE SUMMARY
"[facility] Medicine  Discharge Summary      Patient Name: Clifton Wilson  MRN: [id number]  EMIR: [id number]  Patient Class: OP- Observation  Admission Date: 11/22/2024  Hospital Length of Stay: 0 days  Discharge Date and Time: 11/23/2024  1:14 PM  Attending Physician: No att. providers found   Discharging Provider: Ayala Catherine MD  Primary Care Provider: Britton Grissom MD    Primary Care Team: Networked reference to record PCT     HPI:   Clifton Wilson is a 73 year old male with a past medical history of HTN, CAD (NSTEMI 2011 and 2015), ICM (EF 25->45%), and ICD (2017), CHF (EF: 40-45%, Grade 1 diastolic dysfunction, DMT2 who presented to [facility] for chest pain. The patient states that yesterday he started to feel nauseous and that this feeling worsened in the morning. He states that he then developed a "feeling"/ "pain" similar to his prior cardiac events. Activity did not appear to worsen the pain, he took nitro x3 at home and states that the third improved his symptoms. He denies any headaches, SOB, fever, but endorses some swelling in his hands. Upon arrival to the ED, he states his chest pain is improving.    In the ED, BP: 182/85, HR: 56, Temp: 97.6F, SpO2: 98%. CMP: Na: 142, K: 4.3, BUN/Cr: 10/1.2. CBC: WBC: 6.82, H/H: 13.2/38.5, Plt: 212. BNP: 130, initial troponin negative, repeat positive at 0.035. CXR with no acute abnormalities. Cardiology was consulted by the ED, and recommended carvedilol, ASA loading, and start on heparin gtt. The patient was admitted to [facility]  for NSTEMI.     * No surgery found *      Hospital Course:   Patient admitted for NSTEMI. Patient was given Losartan 50mg for elevated BP up to 212/101 in the ED. He had resolution of his chest pain and troponins downtrended. EKG appears unchanged compared to previous.  Likely that troponemia and symptoms related to high blood pressure. Pt does not take BP at home.Cardiology following, obtained echo and recommended some " medication changes for BP management.   On the day of discharge patient was back to baseline and hemodynamically stable. He was sent home to resume home meds (as below). Cardiology recommended patient to take Losartan 50 daily, add Jardiance 10mg daily, continue Imdur 30 daily, continue lasix 20, atorvastatin 40, carvedilol 12.5 bid, aspirin 81, and plavix 75mg daily..  Close outpatient f/u was advised with PCP. Patient has appointment with Dr. Bryan on Tuesday 11/26 and cardiac PET. Could consider Entresto for GDMT therapy. The importance of medication adherence was again stressed to the patient. Patient agreeable to discharge plan, expressed understanding, all questions answered, return precautions were discussed.      Physical Exam  Constitutional:       General: He is not in acute distress.     Appearance: He is not toxic-appearing.   HENT:      Head: Normocephalic.      Right Ear: External ear normal.      Left Ear: External ear normal.      Nose: Nose normal.   Eyes:      Extraocular Movements: Extraocular movements intact.   Cardiovascular:      Pulses: Normal pulses.      Heart sounds: Normal heart sounds. No murmur heard.     No gallop.   Pulmonary:      Effort: Pulmonary effort is normal. No respiratory distress.      Breath sounds: Normal breath sounds. No wheezing.   Abdominal:      Palpations: Abdomen is soft.      Tenderness: There is no abdominal tenderness. There is no guarding or rebound.   Musculoskeletal:         General: Normal range of motion.      Right lower leg: No edema.      Left lower leg: No edema.   Skin:     General: Skin is warm and dry.   Neurological:      Mental Status: He is alert.   Psychiatric:         Mood and Affect: Mood normal.    Goals of Care Treatment Preferences:  Code Status: Full Code      SDOH Screening:  The patient was screened for utility difficulties, food insecurity, transport difficulties, housing insecurity, and interpersonal safety and there were no  concerns identified this admission.     Consults:   Consults (From admission, onward)          Status Ordering Provider     Inpatient consult to [facility]iology-[facility]  Once        Provider:  Farida Jean MD    Completed KALIN STEPHENSON            No new Assessment & Plan notes have been filed under this hospital service since the last note was generated.  Service: Hospital Medicine    Final Active Diagnoses:    Diagnosis Date Noted POA    PRINCIPAL PROBLEM:  NSTEMI (non-ST elevated myocardial infarction) [I21.4] 11/22/2024 Yes    Hypertension [I10] 11/22/2024 Yes    Chronic combined systolic and diastolic congestive heart failure [I50.42] 12/07/2015 Yes    Type 2 diabetes mellitus with neurologic complication [E11.49] 12/04/2015 Yes      Problems Resolved During this Admission:       Discharged Condition: stable    Disposition: Home or Self Care    Follow Up:    Patient Instructions:      Diet Cardiac     Notify your health care provider if you experience any of the following:  temperature >100.4     Notify your health care provider if you experience any of the following:  severe uncontrolled pain     Notify your health care provider if you experience any of the following:  persistent dizziness, light-headedness, or visual disturbances     Notify your health care provider if you experience any of the following:  increased confusion or weakness     Activity as tolerated       Significant Diagnostic Studies: Labs: CMP   Recent Labs   Lab 11/22/24  1759 11/23/24  0358    141   K 4.3 3.5    106   CO2 25 27    110   BUN 10 11   CREATININE 1.2 1.0   CALCIUM 9.3 9.4   PROT 7.4 7.2   ALBUMIN 3.9 3.8   BILITOT 1.1* 1.4*   ALKPHOS 75 74   AST 20 18   ALT 18 18   ANIONGAP 11 8   , CBC   Recent Labs   Lab 11/22/24  1759 11/22/24  2253 11/23/24  0358   WBC 6.82 6.72 7.06  7.06   HGB 13.2* 12.8* 13.3*  13.3*   HCT 38.5* 37.7* 39.0*  39.0*    201 213  213   , and Troponin   Recent Labs   Lab  11/22/24  1759 11/22/24 2047 11/23/24  0358   TROPONINI 0.011 0.035* 0.022     Cardiac Graphics:     Pending Diagnostic Studies:       Procedure Component Value Units Date/Time    Echo Ultrasound enhancing contrast? Yes (definity/optison) [[id number]]  (Abnormal) Resulted: 11/23/24 1449    Order Status: Sent Lab Status: In process Updated: 11/23/24 1039     BSA 2.34 m2      Flores's Biplane MOD Ejection Fraction 43 %      A2C EF 48 %      A4C EF 36 %      LVIDd 6.2 cm      LV Systolic Volume 73.98 mL      LV Systolic Volume Index 32.4 mL/m2      LVIDs 4.1 cm      LV Diastolic Volume 191.92 mL      LV ESV A4C 72.54 mL      LV Diastolic Volume Index 84.18 mL/m2      LV EDV A2C [id number] mL      LV EDV A4C 109.59 mL      Left Ventricular End Systolic Volume by Teichholz Method 73.98 mL      Left Ventricular End Diastolic Volume by Teichholz Method 191.92 mL      IVS 1.5 cm      LVOT diameter 2.1 cm      LVOT area 3.5 cm2      FS 33.9 %      Left Ventricle Relative Wall Thickness 0.48 cm      PW 1.5 cm      LV mass 450.2 g      LV Mass Index 197.4 g/m2      Sinus 3.40 cm      STJ 3.11 cm      Ascending aorta 3.23 cm      ZLVIDS -1.87     ZLVIDD -3.26     LA area A4C 23.86 cm2     Narrative:        Left Ventricle: Regional wall motion abnormalities present. See diagram   for wall motion findings. There is mildly reduced systolic function with a   visually estimated ejection fraction of 40 - 45%.      Impression:                 Medications:  Reconciled Home Medications:      Medication List        START taking these medications      empagliflozin 10 mg tablet  Commonly known as: JARDIANCE  Take 1 tablet (10 mg total) by mouth once daily.            CHANGE how you take these medications      EASY TOUCH TWIST LANCETS 30 gauge Misc  Generic drug: lancets  usea s directed to check blood glucose four times daily  What changed: Another medication with the same name was removed. Continue taking this medication, and  "follow the directions you see here.     losartan 25 MG tablet  Commonly known as: COZAAR  Take 2 tablets (50 mg total) by mouth once daily.  What changed: how much to take     TRUE METRIX GLUCOSE METER Misc  Generic drug: blood-glucose meter  use as directed  What changed: Another medication with the same name was removed. Continue taking this medication, and follow the directions you see here.     TRUE METRIX GLUCOSE TEST STRIP Strp  Generic drug: blood sugar diagnostic  test blood sugar as directed four times daily  What changed: Another medication with the same name was removed. Continue taking this medication, and follow the directions you see here.            CONTINUE taking these medications      ACCU-CHEK CATHRYN CONTROL SOLN Soln  Generic drug: blood glucose control high,low  Use as directed to check blood sugar     ALCOHOL PREP PAD SPACER  Generic drug: alcohol swabs  USE FOUR TIMES DAILY     aspirin 81 MG EC tablet  Commonly known as: ECOTRIN  Take 81 mg by mouth once daily.     atorvastatin 40 MG tablet  Commonly known as: LIPITOR  Take 1 tablet (40 mg total) by mouth once daily.     BD ULTRA-FINE SINA PEN NEEDLE 32 gauge x 5/32" Ndle  Generic drug: pen needle, diabetic  Use four times daily for insulin administration     carvediloL 12.5 MG tablet  Commonly known as: COREG  Take 1 tablet (12.5 mg total) by mouth 2 (two) times daily.     clopidogreL 75 mg tablet  Commonly known as: PLAVIX  Take 1 tablet (75 mg total) by mouth once daily.     cyanocobalamin 1000 MCG tablet  Commonly known as: VITAMIN B-12  TAKE ONE TABLET BY MOUTH ONCE DAILY FOR VITAMIN DEFICIENCIES     furosemide 20 MG tablet  Commonly known as: LASIX  Take 1 tablet (20 mg total) by mouth 2 (two) times daily.     gabapentin 300 MG capsule  Commonly known as: NEURONTIN  Take 2 capsules (600 mg total) by mouth 2 (two) times daily.     isosorbide mononitrate 30 MG 24 hr tablet  Commonly known as: IMDUR  Take 1 tablet (30 mg total) by mouth once " daily.     LANTUS SOLOSTAR U-100 INSULIN 100 unit/mL (3 mL) Inpn pen  Generic drug: insulin glargine U-100 (Lantus)  Inject 40 Units into the skin every evening.     metFORMIN 1000 MG tablet  Commonly known as: GLUCOPHAGE  Take 1 tablet (1,000 mg total) by mouth 2 (two) times daily with meals.     MITIGARE 0.6 mg Cap  Generic drug: colchicine  Take 1 capsule (0.6 mg total) by mouth once daily.     nitroGLYCERIN 0.4 MG SL tablet  Commonly known as: NITROSTAT  Place 1 tablet (0.4 mg total) under the tongue every 5 (five) minutes as needed for Chest pain.     NovoLOG Flexpen U-100 Insulin 100 unit/mL (3 mL) Inpn pen  Generic drug: insulin aspart U-100  Inject 18 Units into the skin 3 (three) times daily with meals.     promethazine-dextromethorphan 6.25-15 mg/5 mL Syrp  Commonly known as: PROMETHAZINE-DM  Take 5 mLs by mouth 2 (two) times daily as needed (cough).     zolpidem 5 MG Tab  Commonly known as: AMBIEN  TAKE 1 TABLET BY MOUTH EVERY NIGHT AS NEEDED            STOP taking these medications      ibuprofen 600 MG tablet  Commonly known as: ADVIL,MOTRIN              Indwelling Lines/Drains at time of discharge:   Lines/Drains/Airways       None                   Time spent on the discharge of patient: 30 minutes         Aayla Catherine MD  Department of Hospital Medicine  [place] [facility]

## 2024-11-23 NOTE — ED NOTES
Beulah Madden made aware of pt's BP slowly increasing. Pt denies an increase in intensity r/t chest pain. Denies SOB.

## 2024-11-23 NOTE — HOSPITAL COURSE
Patient admitted for NSTEMI. Patient was given Losartan 50mg for elevated BP up to 212/101 in the ED. He had resolution of his chest pain and troponins downtrended. EKG appears unchanged compared to previous.  Likely that troponemia and symptoms related to high blood pressure. Pt does not take BP at home.Cardiology following, obtained echo and recommended some medication changes for BP management.   On the day of discharge patient was back to baseline and hemodynamically stable. He was sent home to resume home meds (as below). Cardiology recommended patient to take Losartan 50 daily, add Jardiance 10mg daily, continue Imdur 30 daily, continue lasix 20, atorvastatin 40, carvedilol 12.5 bid, aspirin 81, and plavix 75mg daily..  Close outpatient f/u was advised with PCP. Patient has appointment with Dr. Bryan on Tuesday 11/26 and cardiac PET. Could consider Entresto for GDMT therapy. The importance of medication adherence was again stressed to the patient. Patient agreeable to discharge plan, expressed understanding, all questions answered, return precautions were discussed.

## 2024-11-23 NOTE — CONSULTS
[facility] Consult Note     Attending Physician: Jenna Small MD  Cardiology Attending Physician: Farida Jean MD  Date of Admit: 11/22/2024  Reason for Consult:  Elevated troponins, chest pain      History of Present Illness:       Clifton Wilson is a pleasant 73 y.o. male with PMH noted below in A/P who presents with sudden onset substernal chest pressure rating 3/10 intensity with radiations to bilateral chest areas.  He denies any associated symptoms, including shortness of breath, palpitations.  Did endorse some mild headache.  He took 3 nitroglycerin tablets with improvement in symptoms.  Has been compliant with the remainder of his medications.  No chest pain on my interview this morning.  Back to baseline    History obtained by patient interview and supplemented by nursing documentation and chart review.   Objective   PMHx:  has a past medical history of Anticoagulant long-term use, Cardiomyopathy, CHF (congestive heart failure), Coronary artery disease, Diabetes mellitus, Gout, Heart attack, Hypertension, and Pneumonia.   SurgHx:  has a past surgical history that includes Appendectomy; Cataract extraction (Bilateral, 2011); Eye surgery; Cardiac defibrillator placement; Cardiac catheterization; Colonoscopy (N/A, 8/10/2018); Revision of implantable cardioverter-defibrillator (ICD) electrode lead placement (N/A, 11/11/2019); Left heart catheterization (Left, 11/11/2024); instantaneous wave-free ratio (ifr) (N/A, 11/11/2024); and Coronary angiography (N/A, 11/11/2024).   FamHx: family history includes Heart disease in his father and mother.   SocialHx:  reports that he has quit smoking. He has never used smokeless tobacco. He reports current alcohol use. He reports that he does not use drugs.  Home Meds:  Current Outpatient Medications   Medication Instructions    alcohol swabs (BD ALCOHOL SWABS) PadM USE FOUR TIMES DAILY    aspirin (ECOTRIN) 81 mg, Daily    atorvastatin (LIPITOR) 40 mg,  "Oral, Daily    blood glucose control high,low (ACCU-CHEK CATHRYN CONTROL SOLN) Soln Use as directed to check blood sugar    blood sugar diagnostic Strp Use to test four times daily    blood sugar diagnostic Strp test blood sugar as directed four times daily    blood-glucose meter (ACCU-CHEK CATHRYN PLUS METER) Misc USE AS DIRECTED    blood-glucose meter (TRUE METRIX GLUCOSE METER) Misc use as directed    carvediloL (COREG) 12.5 mg, Oral, 2 times daily    clopidogreL (PLAVIX) 75 mg, Oral, Daily    cyanocobalamin (VITAMIN B-12) 1000 MCG tablet TAKE ONE TABLET BY MOUTH ONCE DAILY FOR VITAMIN DEFICIENCIES    furosemide (LASIX) 20 mg, Oral, 2 times daily    gabapentin (NEURONTIN) 600 mg, Oral, 2 times daily    ibuprofen (ADVIL,MOTRIN) 600 mg, Oral, Every 6 hours PRN    isosorbide mononitrate (IMDUR) 30 mg, Oral, Daily    lancets 30 gauge Misc Misc.(Non-Drug; Combo Route), 4 times daily    lancets 30 gauge Misc usea s directed to check blood glucose four times daily    LANTUS SOLOSTAR U-100 INSULIN 40 Units, Subcutaneous, Nightly    losartan (COZAAR) 25 mg, Oral, Daily    metFORMIN (GLUCOPHAGE) 1,000 mg, Oral, 2 times daily with meals    MITIGARE 0.6 mg, Oral, Daily    nitroGLYCERIN (NITROSTAT) 0.4 mg, Sublingual, Every 5 min PRN    NovoLOG Flexpen U-100 Insulin 18 Units, Subcutaneous, 3 times daily with meals    pen needle, diabetic (BD ULTRA-FINE SINA PEN NEEDLE) 32 gauge x 5/32" Ndle Use four times daily for insulin administration    promethazine-dextromethorphan (PROMETHAZINE-DM) 6.25-15 mg/5 mL Syrp 5 mLs, Oral, 2 times daily PRN    zolpidem (AMBIEN) 5 MG Tab TAKE 1 TABLET BY MOUTH EVERY NIGHT AS NEEDED     Review of Systems: Comprehensive ROS was performed and is negative unless otherwise noted in HPI.     Objective:   Last 24 Hour Vital Signs:  BP  Min: 161/84  Max: 212/101  Temp  Av.8 °F (36.6 °C)  Min: 97.6 °F (36.4 °C)  Max: 98.3 °F (36.8 °C)  Pulse  Av.7  Min: 53  Max: 65  Resp  Av.1  Min: 14  Max: " "20  SpO2  Av.7 %  Min: 95 %  Max: 100 %  Height  Av' 11" (180.3 cm)  Min: 5' 11" (180.3 cm)  Max: 5' 11" (180.3 cm)  Weight  Av.9 kg (240 lb)  Min: 108.9 kg (240 lb)  Max: 108.9 kg (240 lb)  No intake/output data recorded.  Physical Examination:  Constitutional: NAD, Atraumatic   HEENT: MMM  Cardiovascular: RRR, no murmurs noted, no chest tenderness to palpation, 2+ radial pulses b/l  Pulmonary: normal respiratory effort, CTAB, no crackles, wheezes  Abdominal: S/NT/ND  Musculoskeletal: No lower extremity edema noted  Neurological: Alert & oriented to self, location, time and situation. No gross neurological deficits  Skin: W/D/I  Psych: Appropriate affect, normal mood    Laboratory:  Personally reviewed    Other Results:  TTE:  Results for orders placed during the hospital encounter of 24    Echo    Interpretation Summary    Left Ventricle: The left ventricle is severely dilated. There is mild eccentric hypertrophy. Poor echocardiogram windows.  Definity contrast not used.  Unable to comment on wall motion abnormalities  however I do see  inferolateral wall hypokinesis There is mildly reduced systolic function with a visually estimated ejection fraction of 40 - 45%. Grade I diastolic dysfunction. Normal left ventricular filling pressure.    Right Ventricle: Normal right ventricular cavity size. Systolic function is normal. Pacemaker lead present in the ventricle.    Left Atrium: Left atrium was not well visualized.    Aortic Valve: The aortic valve is a trileaflet valve. There is no stenosis. Aortic valve peak velocity is 1.0 m/s. Mean gradient is 2.0 mmHg. There is no significant regurgitation.    Mitral Valve: The mitral valve is structurally normal.    Tricuspid Valve: Not well visualized due to poor acoustic window.    Aorta: Aortic root is normal in size measuring 3.14 cm.    IVC/SVC: Normal venous pressure at 3 mmHg.    Pericardium: There is no pericardial effusion.    Stress Testing:   No " results found for this or any previous visit.     Coronary Angiogram:  Results for orders placed during the hospital encounter of 11/08/24    Cardiac catheterization    Conclusion  Table formatting from the original result was not included.      The estimated blood loss was none.    [facility]  Surgery Department  Operative Note    SUMMARY  POST CATH NOTE    Date of Procedure: 11/11/2024    Procedure: Procedure(s) (LRB):  Left heart cath (Left)  Instantaneous Wave-Free Ratio (IFR) (N/A)  ANGIOGRAM, CORONARY ARTERY (N/A)    Surgeons and Role:  * Luis Felipe Wright MD - Primary    Assisting Surgeon: None    Pre-Operative Diagnosis: Chest pain [R07.9]  Unstable angina [I20.0]    Post-Operative Diagnosis: Post-Op Diagnosis Codes:  * Chest pain [R07.9]  * Unstable angina [I20.0]    s/p catheterization secondary to:    Cath Results:  Access: Us guided RRA    Coronary Anatomy:    Left Main Coronary Artery: The left main is a short andlarge caliber vessel arising normally from the left sinus of valsalva.  It bifurcates into the left anterior descending and left circumflex coronary artery.  It is free of evidence of obstructive coronary artery disease. PATRIA III flow    Left Anterior Descending: The left anterior descending coronary artery is a large caliber vessel arising normally from the left main and extending to the apex.  It gives rise to small to moderate caliber diagonal arteries. Ostial to proximal LAD (before stent) 50-60% stenosed angiographically. Mild to moderate ISR with patent stents with PATRIA 2 flow. iFR of Proximal LAD negative,    Left Circumflex: The left circumflex is a large caliber vessel arising normally from the left main coronary artery, it is non-dominant.  It gives rise to moderate caliber obtuse marginal branches.  Prox to mid Lcx  patent stent with mild IRS. Jailed AV Cx  (small <2.5 mm) diffuse disease at proximal vessel. PATRIA III flow    Right Coronary Artery: The right coronary  "artery is a large caliber vessel arising normally from the right sinus of valsalva.  It is dominant giving rise to a PDA and PLV branch.  The right coronary artery is free of obstructive disease. PATRIA III flow    LVgram: LVEDP 9 mmHg    Intervention:  iFR  JL4 guide  The 0.014" Omniwire was connected to the console, calibrated and equalized. The 0.014"  Omniwire was then advanced into the left main outside of the catheter and flushed with saline  with the introducer removed. The pressure was normalized and then the wire was advanced  across Proximal LAD lesion. 0.96-0.95 iFR recordings were obtained. The 0.014" Omniwire was then pulled  back slowly across the lesion to assess for step up and electronic drift. The wire was removed,  and final angiography was performed.    Closure device:  Patient tolerated procedure well, no complications    Post Cath Exam:  BP (!) 153/73 (BP Location: Left arm, Patient Position: Lying)   Pulse 71   Temp 98.7 °F (37.1 °C) (Oral)   Resp 16   Ht 5' 11" (1.803 m)   Wt 107.5 kg (237 lb)   SpO2 97%   BMI 33.05 kg/m²    Anesthesia: RN IV Sedation      Estimated Blood Loss (EBL): * No values recorded between 11/11/2024 11:40 AM and 11/11/2024 12:19 PM *    Specimens:  Specimen (24h ago, onward)    None          Assessment:  Ostial to proximal LAD with 50-60% stenosis angiographically with iFR negative  LAD stents patent with PATRIA 2  Lcx stent patent    Plan:    - Patient tolerated procedure well. No immediate complications  - Continue aspirin and Plavix  - EKG when arrives to floor  - Routine post cath protocol  - Maximize medical management  - Further care by the primary team  - IVF at  100cc/hr  for 2 hours  - Follow up with me  -PET stress for viability M      76 minutes were spent on this visit including time personally:  -Reviewing Medical records & lab results  -Independently reviewing and interpreting (if not documented by myself) EKGs, echocardiograms, catherizations "   -Obtaining a history, performing a relevant exam, counseling/educating the patient/family  -Documenting clinical information in the EMR including ordering of tests  -Care coordination and communicating with other health care providers         Assessment/Plan:     Active Hospital Problems    Diagnosis    *NSTEMI (non-ST elevated myocardial infarction)    Hypertension    Chronic combined systolic and diastolic congestive heart failure    Type 2 diabetes mellitus with neurologic complication     Peripheral neuropathy         Clifton Wilson is a 73 y.o. male with a PMHx of IDDM, HTN, HLD, ICM (EF 40% s/p ICD 2017) CAD s/p multiple PCI (last angio 11/11/24, iFR negative pLAD 50% PATRIA II flow) admitted with chest pain with mild trop elevation in setting of being markedly hypertensive. ECG with NSR, RBBB-unchanged compared to prior.      -elevated troponins likely in the setting of significant hypertension on arrival.  Has appointment with Dr. Bryan this Tuesday 11/26 with Cardiac PET the following week.  Pending limited echo, stable for discharge with below medication adjustments  -can stop heparin drip  -continue aspirin 81, Plavix 75 mg daily  -increase losartan to 50 mg daily  -Add jardiance 10mg daily  -add Imdur 30 mg daily  -continue Lasix 20 mg daily  -continue atorvastatin 40  -continue carvedilol to 12.5 mg b.i.d.    Thank you for the opportunity to care for this patient. Please don't hesitate to reach out with any questions/concerns,      Farida Jean MD  Interventional Cardiology  [facility]  [place]

## 2024-11-23 NOTE — ASSESSMENT & PLAN NOTE
Patient with chest pain simliar to previous events. Has significant cardiac history. Troponin elevated to 0.035, Cardiology consulted by ED and recommended heparin gtt and ASA loading.    Plan:  - Troponin Q6hr till downtrending  - Cardiology consulted, appreciate recommendations  - Continue with heparin gtt for now  - Blood pressure control

## 2024-11-23 NOTE — ED NOTES
Introduced self to pt and wife. Explained the plan of care happening with the pt currently d/t him asking why does the other blood work they want have to be drawn so late. Pt verbalized understanding. Monitor in place. Water given to pt upon request. No other current needs at this time. Pt appears to be in no acute distress. Pt states pain level to be a 3 after taking 3 nitro PTA. Pt denies SOB.

## 2024-11-23 NOTE — SUBJECTIVE & OBJECTIVE
Past Medical History:   Diagnosis Date    Anticoagulant long-term use     Cardiomyopathy     CHF (congestive heart failure)     Coronary artery disease     Diabetes mellitus     Gout     Heart attack     Hypertension     Pneumonia        Past Surgical History:   Procedure Laterality Date    APPENDECTOMY      CARDIAC CATHETERIZATION      7 stents    CARDIAC DEFIBRILLATOR PLACEMENT      CATARACT EXTRACTION Bilateral 2011    COLONOSCOPY N/A 8/10/2018    Procedure: COLONOSCOPY golytely;  Surgeon: Valentine Burger MD;  Location: [facility] ENDO;  Service: Endoscopy;  Laterality: N/A;    CORONARY ANGIOGRAPHY N/A 11/11/2024    Procedure: ANGIOGRAM, CORONARY ARTERY;  Surgeon: Luis Felipe Wright MD;  Location: [facility] CATH LAB/EP;  Service: Cardiology;  Laterality: N/A;    EYE SURGERY      INSTANTANEOUS WAVE-FREE RATIO (IFR) N/A 11/11/2024    Procedure: Instantaneous Wave-Free Ratio (IFR);  Surgeon: Luis Felipe Wright MD;  Location: [facility] CATH LAB/EP;  Service: Cardiology;  Laterality: N/A;    LEFT HEART CATHETERIZATION Left 11/11/2024    Procedure: Left heart cath;  Surgeon: Luis Felipe Wright MD;  Location: [facility] CATH LAB/EP;  Service: Cardiology;  Laterality: Left;    REVISION OF IMPLANTABLE CARDIOVERTER-DEFIBRILLATOR (ICD) ELECTRODE LEAD PLACEMENT N/A 11/11/2019    Procedure: REVISION, INSERTION, ELECTRODE LEAD, ICD;  Surgeon: Shukri Walsh MD;  Location: [facility] EP LAB;  Service: Cardiology;  Laterality: N/A;  Lead malfxn, RV lead ICD, SJM, anes, DM, 3 PREP       Review of patient's allergies indicates:  No Known Allergies    No current facility-administered medications on file prior to encounter.     Current Outpatient Medications on File Prior to Encounter   Medication Sig    alcohol swabs (BD ALCOHOL SWABS) PadM USE FOUR TIMES DAILY    aspirin (ECOTRIN) 81 MG EC tablet Take 81 mg by mouth once daily.    atorvastatin (LIPITOR) 40 MG tablet Take 1 tablet (40 mg total) by mouth once daily.    blood glucose control  high,low (ACCU-CHEK CATHRYN CONTROL SOLN) Soln Use as directed to check blood sugar    blood sugar diagnostic Strp Use to test four times daily    blood sugar diagnostic Strp test blood sugar as directed four times daily    blood-glucose meter (ACCU-CHEK CATHRYN PLUS METER) Misc USE AS DIRECTED    blood-glucose meter (TRUE METRIX GLUCOSE METER) Misc use as directed    carvediloL (COREG) 12.5 MG tablet Take 1 tablet (12.5 mg total) by mouth 2 (two) times daily.    clopidogreL (PLAVIX) 75 mg tablet Take 1 tablet (75 mg total) by mouth once daily.    colchicine (MITIGARE) 0.6 mg Cap Take 1 capsule (0.6 mg total) by mouth once daily.    cyanocobalamin (VITAMIN B-12) 1000 MCG tablet TAKE ONE TABLET BY MOUTH ONCE DAILY FOR VITAMIN DEFICIENCIES    furosemide (LASIX) 20 MG tablet Take 1 tablet (20 mg total) by mouth 2 (two) times daily.    gabapentin (NEURONTIN) 300 MG capsule Take 2 capsules (600 mg total) by mouth 2 (two) times daily.    ibuprofen (ADVIL,MOTRIN) 600 MG tablet Take 1 tablet (600 mg total) by mouth every 6 (six) hours as needed for Pain. (Patient not taking: Reported on 11/14/2024)    insulin aspart U-100 (NOVOLOG FLEXPEN U-100 INSULIN) 100 unit/mL (3 mL) InPn pen Inject 18 Units into the skin 3 (three) times daily with meals.    insulin glargine U-100, Lantus, (LANTUS SOLOSTAR U-100 INSULIN) 100 unit/mL (3 mL) InPn pen Inject 40 Units into the skin every evening.    isosorbide mononitrate (IMDUR) 30 MG 24 hr tablet Take 1 tablet (30 mg total) by mouth once daily.    lancets 30 gauge Misc by Misc.(Non-Drug; Combo Route) route 4 (four) times daily.    lancets 30 gauge Misc usea s directed to check blood glucose four times daily    losartan (COZAAR) 25 MG tablet Take 1 tablet (25 mg total) by mouth once daily.    metFORMIN (GLUCOPHAGE) 1000 MG tablet Take 1 tablet (1,000 mg total) by mouth 2 (two) times daily with meals.    nitroGLYCERIN (NITROSTAT) 0.4 MG SL tablet Place 1 tablet (0.4 mg total) under the tongue  "every 5 (five) minutes as needed for Chest pain.    pen needle, diabetic (BD ULTRA-FINE SINA PEN NEEDLE) 32 gauge x 5/32" Ndle Use four times daily for insulin administration    promethazine-dextromethorphan (PROMETHAZINE-DM) 6.25-15 mg/5 mL Syrp Take 5 mLs by mouth 2 (two) times daily as needed (cough).    zolpidem (AMBIEN) 5 MG Tab TAKE 1 TABLET BY MOUTH EVERY NIGHT AS NEEDED     Family History       Problem Relation (Age of Onset)    Heart disease Mother, Father          Tobacco Use    Smoking status: Former    Smokeless tobacco: Never   Substance and Sexual Activity    Alcohol use: Yes     Comment: socially    Drug use: No    Sexual activity: Yes     Review of Systems   Constitutional:  Negative for chills and fever.   Respiratory:  Negative for shortness of breath.    Cardiovascular:  Negative for chest pain.   Gastrointestinal:  Positive for nausea. Negative for abdominal pain.     Objective:     Vital Signs (Most Recent):  Temp: 97.6 °F (36.4 °C) (11/22/24 1525)  Pulse: (!) 54 (11/22/24 2239)  Resp: 16 (11/22/24 2239)  BP: (!) 212/101 (11/22/24 2231)  SpO2: 98 % (11/22/24 2239) Vital Signs (24h Range):  Temp:  [97.6 °F (36.4 °C)] 97.6 °F (36.4 °C)  Pulse:  [53-64] 54  Resp:  [14-20] 16  SpO2:  [98 %-100 %] 98 %  BP: (161-212)/() 212/101     Weight: 108.9 kg (240 lb)  Body mass index is 33.47 kg/m².     Physical Exam  Constitutional:       General: He is not in acute distress.     Appearance: He is not toxic-appearing.   HENT:      Head: Normocephalic.      Right Ear: External ear normal.      Left Ear: External ear normal.      Nose: Nose normal.   Eyes:      Extraocular Movements: Extraocular movements intact.   Cardiovascular:      Pulses: Normal pulses.      Heart sounds: Normal heart sounds. No murmur heard.     No gallop.   Pulmonary:      Effort: Pulmonary effort is normal. No respiratory distress.      Breath sounds: Normal breath sounds. No wheezing.   Abdominal:      Palpations: Abdomen is " soft.      Tenderness: There is no abdominal tenderness. There is no guarding or rebound.   Musculoskeletal:         General: Normal range of motion.      Right lower leg: No edema.      Left lower leg: No edema.   Skin:     General: Skin is warm and dry.   Neurological:      Mental Status: He is alert.   Psychiatric:         Mood and Affect: Mood normal.                Significant Labs: All pertinent labs within the past 24 hours have been reviewed.  CBC:   Recent Labs   Lab 11/22/24  1759 11/22/24  2253   WBC 6.82 6.72   HGB 13.2* 12.8*   HCT 38.5* 37.7*    201     CMP:   Recent Labs   Lab 11/22/24  1759      K 4.3      CO2 25      BUN 10   CREATININE 1.2   CALCIUM 9.3   PROT 7.4   ALBUMIN 3.9   BILITOT 1.1*   ALKPHOS 75   AST 20   ALT 18   ANIONGAP 11       Significant Imaging: I have reviewed all pertinent imaging results/findings within the past 24 hours.

## 2024-11-23 NOTE — PROGRESS NOTES
Discharge orders noted. Additional clinical references attached. Patient's discharge instructions given by bedside RN. Virtual nurse cued into room and reviewed discharge instructions. Education provided on new medication, diagnosis, and follow-up appointments. Teach back method used. Patient verbalized understanding. All questions answered. Transport to [facility] requested. Bedside nurse updated on patient status and transportation request.      11/23/24 1206   AVS Confirmation   Discharge instructions and AVS provided to and reviewed with patient and/or significant other. Yes

## 2024-11-23 NOTE — ASSESSMENT & PLAN NOTE
Patients blood pressure range in the last 24 hours was: BP  Min: 161/84  Max: 212/101.The patient's inpatient anti-hypertensive regimen is listed below:  Current Antihypertensives  carvediloL tablet 12.5 mg, 2 times daily, Oral  amLODIPine tablet 5 mg, Daily, Oral    Plan  - BP is uncontrolled, will adjust as follows: will add amlodipine 5mg  - Will consider possible spironolactone as well

## 2024-11-23 NOTE — ED PROVIDER NOTES
"Encounter Date: 11/22/2024       History     Chief Complaint   Patient presents with    Chest Pain     Patient reports to the ED complaining of substernal chest pain described as heaviness that started this morning. Patient endorses nausea, emesis. Patient endorses slight relief with NTG. Pitting edema to lower extremities. Ambulatory, NAD noted.     Patient is a 73-year-old male with a past medical history of anticoagulant long-term use, cardiomyopathy, CHF, CAD, diabetes mellitus, gout, heart attack, hypertension, and pneumonia who presents to emergency room for midsternal chest pain that onset this morning.  Patient states that he has been constant in severity and described as a "heaviness." He has had 7 stents placed in the past.  He took nitroglycerin which slightly relieved the pain.  However, did not completely go away.  He has some associated nausea.  No episodes of vomiting.  No shortness of breath, headache, weakness, numbness, tingling, cough, or others at this time.  Does endorse some bilateral lower leg swelling.    The history is provided by the patient. No  was used.     Review of patient's allergies indicates:  No Known Allergies  Past Medical History:   Diagnosis Date    Anticoagulant long-term use     Cardiomyopathy     CHF (congestive heart failure)     Coronary artery disease     Diabetes mellitus     Gout     Heart attack     Hypertension     Pneumonia      Past Surgical History:   Procedure Laterality Date    APPENDECTOMY      CARDIAC CATHETERIZATION      7 stents    CARDIAC DEFIBRILLATOR PLACEMENT      CATARACT EXTRACTION Bilateral 2011    COLONOSCOPY N/A 8/10/2018    Procedure: COLONOSCOPY golytely;  Surgeon: Valentine Burger MD;  Location: [facility] ENDO;  Service: Endoscopy;  Laterality: N/A;    CORONARY ANGIOGRAPHY N/A 11/11/2024    Procedure: ANGIOGRAM, CORONARY ARTERY;  Surgeon: Luis Felipe Wright MD;  Location: [facility] CATH LAB/EP;  Service: Cardiology;  Laterality: N/A; "    EYE SURGERY      INSTANTANEOUS WAVE-FREE RATIO (IFR) N/A 11/11/2024    Procedure: Instantaneous Wave-Free Ratio (IFR);  Surgeon: Luis Felipe Wright MD;  Location: [facility] CATH LAB/EP;  Service: Cardiology;  Laterality: N/A;    LEFT HEART CATHETERIZATION Left 11/11/2024    Procedure: Left heart cath;  Surgeon: Luis Felipe Wright MD;  Location: [facility] CATH LAB/EP;  Service: Cardiology;  Laterality: Left;    REVISION OF IMPLANTABLE CARDIOVERTER-DEFIBRILLATOR (ICD) ELECTRODE LEAD PLACEMENT N/A 11/11/2019    Procedure: REVISION, INSERTION, ELECTRODE LEAD, ICD;  Surgeon: Shukri Walsh MD;  Location: [facility] EP LAB;  Service: Cardiology;  Laterality: N/A;  Lead malfxn, RV lead ICD, SJM, anes, DM, 3 PREP     Family History   Problem Relation Name Age of Onset    Heart disease Mother      Heart disease Father      Amblyopia Neg Hx      Blindness Neg Hx      Cataracts Neg Hx      Glaucoma Neg Hx      Macular degeneration Neg Hx      Retinal detachment Neg Hx      Strabismus Neg Hx       Social History     Tobacco Use    Smoking status: Former    Smokeless tobacco: Never   Substance Use Topics    Alcohol use: Yes     Comment: socially    Drug use: No     Review of Systems   Constitutional:  Negative for chills, diaphoresis, fatigue and fever.   HENT:  Negative for congestion, sore throat and trouble swallowing.    Respiratory:  Negative for cough and shortness of breath.    Cardiovascular:  Positive for chest pain and leg swelling. Negative for palpitations.   Gastrointestinal:  Positive for nausea. Negative for abdominal pain, blood in stool, constipation, diarrhea and vomiting.   Genitourinary:  Negative for difficulty urinating, dysuria, frequency and hematuria.   Musculoskeletal:  Negative for back pain and myalgias.   Skin:  Negative for rash and wound.   Neurological:  Negative for weakness, light-headedness, numbness and headaches.       Physical Exam     Initial Vitals [11/22/24 1525]   BP Pulse Resp Temp SpO2    (!) 182/85 (!) 56 18 97.6 °F (36.4 °C) 98 %      MAP       --         Physical Exam    Nursing note and vitals reviewed.  Constitutional: He appears well-developed and well-nourished. He is not diaphoretic. No distress.   Patient well-appearing.  Awake and alert.  No acute distress.  Maintaining airway appropriately.  Speaking in complete sentences.   HENT:   Head: Normocephalic and atraumatic.   Right Ear: External ear normal.   Left Ear: External ear normal.   Eyes: Conjunctivae and EOM are normal.   Neck: Neck supple.   Normal range of motion.  Pulmonary/Chest: No respiratory distress.   Musculoskeletal:         General: No tenderness or edema. Normal range of motion.      Cervical back: Normal range of motion and neck supple.     Neurological: He is alert and oriented to person, place, and time. He has normal strength.   Skin: Skin is warm. Capillary refill takes less than 2 seconds.   Psychiatric: He has a normal mood and affect. His behavior is normal. Thought content normal.         ED Course   Critical Care    Date/Time: 11/22/2024 10:22 PM    Performed by: Beulah Madden PA-C  Authorized by: Cristy Flynn MD  Direct patient critical care time: 15 minutes  Ordering / reviewing critical care time: 5 minutes  Documentation critical care time: 10 minutes  Consulting other physicians critical care time: 5 minutes  Total critical care time (exclusive of procedural time) : 35 minutes  Critical care time was exclusive of separately billable procedures and treating other patients and teaching time.  Critical care was necessary to treat or prevent imminent or life-threatening deterioration of the following conditions: cardiac failure.  Critical care was time spent personally by me on the following activities: blood draw for specimens, development of treatment plan with patient or surrogate, discussions with consultants, interpretation of cardiac output measurements, evaluation of patient's response to  treatment, examination of patient, obtaining history from patient or surrogate, ordering and performing treatments and interventions, ordering and review of laboratory studies, ordering and review of radiographic studies, pulse oximetry, re-evaluation of patient's condition and review of old charts.  Comments: Patient with NSTEMI requiring Heparin drip.        Labs Reviewed   CBC W/ AUTO DIFFERENTIAL - Abnormal       Result Value    WBC 6.82      RBC 4.02 (*)     Hemoglobin 13.2 (*)     Hematocrit 38.5 (*)     MCV 96      MCH 32.8 (*)     MCHC 34.3      RDW 12.4      Platelets 212      MPV 10.5      Immature Granulocytes 0.3      Gran # (ANC) 4.5      Immature Grans (Abs) 0.02      Lymph # 1.5      Mono # 0.5      Eos # 0.2      Baso # 0.06      nRBC 0      Gran % 66.6      Lymph % 22.4      Mono % 7.3      Eosinophil % 2.5      Basophil % 0.9      Differential Method Automated     COMPREHENSIVE METABOLIC PANEL - Abnormal    Sodium 142      Potassium 4.3      Chloride 106      CO2 25      Glucose 110      BUN 10      Creatinine 1.2      Calcium 9.3      Total Protein 7.4      Albumin 3.9      Total Bilirubin 1.1 (*)     Alkaline Phosphatase 75      AST 20      ALT 18      eGFR >60      Anion Gap 11     B-TYPE NATRIURETIC PEPTIDE - Abnormal     (*)    TROPONIN I - Abnormal    Troponin I 0.035 (*)    TROPONIN I    Troponin I 0.011     APTT   PROTIME-INR   CBC W/ AUTO DIFFERENTIAL        ECG Results              EKG 12-lead (Final result)        Collection Time Result Time QRS Duration [facility] QTC Calculation    11/22/24 15:26:21 11/22/24 21:25:49 150 455                     Final result by Interface, Lab In [facility] (11/22/24 21:25:56)                   Narrative:    Test Reason : R07.9,    Vent. Rate :  55 BPM     Atrial Rate :  55 BPM     P-R Int : 162 ms          QRS Dur : 150 ms      QT Int : 476 ms       P-R-T Axes :  50 100  63 degrees    QTcB Int : 455 ms    Sinus bradycardia  Right bundle branch  block  Possible Lateral infarct ,age undetermined  Abnormal ECG  When compared with ECG of 08-Nov-2024 15:53,  No significant change was found  Confirmed by Farida Jean ([id number]) on 11/22/2024 9:25:45 PM    Referred By: AAAREFERRAL SELF           Confirmed By: Farida Jean                                  Imaging Results              X-Ray Chest AP Portable (Final result)  Result time 11/22/24 18:57:01      Final result by Casimiro Hernandez MD (11/22/24 18:57:01)                   Impression:      No acute findings evident the chest.      Electronically signed by: Casimiro Hernandez  Date:    11/22/2024  Time:    18:57               Narrative:    EXAMINATION:  XR CHEST AP PORTABLE    CLINICAL HISTORY:  Chest Pain;    TECHNIQUE:  Single frontal view of the chest was performed.    COMPARISON:  11/08/2024    FINDINGS:  Heart is not enlarged the trachea is midline.  Coronary artery stents.  Twin lead defibrillator leads from left chest wall generator.  Lungs are clear.  Azygous lobe on the right.  Bones intact with spondylosis and degenerative shoulder changes.                                       Medications   carvediloL tablet 12.5 mg (12.5 mg Oral Given 11/22/24 2226)   heparin 25,000 units in dextrose 5% (100 units/ml) IV bolus from bag LOW INTENSITY nomogram - [facility] (has no administration in time range)   heparin 25,000 units in dextrose 5% 250 mL (100 units/mL) infusion LOW INTENSITY nomogram - [facility] (has no administration in time range)   heparin 25,000 units in dextrose 5% (100 units/ml) IV bolus from bag LOW INTENSITY nomogram - [facility] (has no administration in time range)   heparin 25,000 units in dextrose 5% (100 units/ml) IV bolus from bag LOW INTENSITY nomogram - [facility] (has no administration in time range)   sodium chloride 0.9% flush 5 mL (has no administration in time range)   insulin aspart U-100 pen 0-5 Units (has no administration in time range)   melatonin tablet 6 mg (has no administration in time range)    ondansetron injection 4 mg (has no administration in time range)   polyethylene glycol packet 17 g (has no administration in time range)   acetaminophen tablet 650 mg (has no administration in time range)   naloxone 0.4 mg/mL injection 0.02 mg (has no administration in time range)   glucose chewable tablet 16 g (has no administration in time range)   glucose chewable tablet 24 g (has no administration in time range)   glucagon (human recombinant) injection 1 mg (has no administration in time range)   dextrose 10% bolus 125 mL 125 mL (has no administration in time range)   dextrose 10% bolus 250 mL 250 mL (has no administration in time range)   aspirin tablet 325 mg (325 mg Oral Given 11/22/24 2226)     Medical Decision Making  Patient presents to emergency room for chest pain.  Vital signs stable.  Physical exam as stated above.    Differential Diagnosis includes, but is not limited to ACS/MI, PE, aortic dissection, pneumothorax, cardiac tamponade, pericarditis/myocarditis, pneumonia, infection/abscess, lung mass, trauma/fracture, costochondritis/pleurisy, MSK pain/contusion, GERD, biliary disease, pancreatitis, or anemia. Chest x-ray without mediastinal widening.  Unlikely dissection.  Chest x-ray without signs of pneumothorax, pneumonia, or mass.  No cardiomegaly.  I do not suspect pericarditis/myocarditis.  Lab work relatively unremarkable.  No anemia or electrolyte abnormalities.  EKG stable from previous.  Troponin 1 WNL, however second troponin elevated.  Patient with heart score of 7.  Patient will need admission for further ACS workup.    All available findings were reviewed with the patient/family in detail along with the indications for hospitalization in order to receive CHEST PAIN evaluation with cardiac monitoring, serial troponin levels, and further cardiac investigation given elevated risk for MACE as determined by a HEART SCORE of 7.  All remaining questions and concerns were addressed at this time  and the patient/family communicates understanding and agrees to proceed accordingly.  Similarly, all pertinent details of the encounter were discussed with [facility] family Medicine who agrees to receive the patient at [facility] for further care as outlined above.     Problems Addressed:  Chest pain: acute illness or injury  NSTEMI (non-ST elevated myocardial infarction): acute illness or injury    Amount and/or Complexity of Data Reviewed  External Data Reviewed: notes.     Details: Admitted from the emergency room on 11/08/2024 for similar presentation:   TTE with EF 40-45%, Grade 1 diastolic dysfunction.  Troponin NEG x 2.  Underwent LHC 11/11/24-> patent existing stents.  Medical management and outpatient cardiac PET recommended.    Last seen by Internal Medicine on 11/14/2024.  Labs: ordered. Decision-making details documented in ED Course.  Radiology: ordered. Decision-making details documented in ED Course.  ECG/medicine tests: ordered. Decision-making details documented in ED Course.    Risk  OTC drugs.  Prescription drug management.  Decision regarding hospitalization.  Risk Details: Comorbidities taken into consideration during the patient's evaluation and treatment include anticoagulant long-term use, cardiomyopathy, CHF, CAD, diabetes mellitus, gout, heart attack, hypertension, and pneumonia.  Social determinants of health taken into consideration during development of our treatment plan include difficulty in obtaining follow-up, obtaining medications, health literacy, access to healthy options for preventative/conservative management, and/or support systems due to, but not limited to, transportation limitations, socioeconomic status, and environmental factors.     Critical Care  Total time providing critical care: 35 minutes      Additional MDM:   Heart Score:    History:          Highly suspicious.  ECG:             Nonspecific repolarisation disturbance  Age:               >65 years  Risk factors: >=  3 risk factors or history of atherosclerotic disease  Troponin:       Less than or equal to normal limit  Heart Score = 7                ED Course as of 11/22/24 2228 Fri Nov 22, 2024 1841 Troponin I: 0.011  Troponin within normal limits. [BJ]   1841 CBC auto differential(!)  CBC relatively unremarkable.  No leukocytosis.  Hemoglobin stable at 13.2.  Platelet count within normal limits. [BJ]   1841 Comprehensive metabolic panel(!)  CMP relatively unremarkable.  Electrolytes within normal limits.  BUN and creatinine within normal limits.  LFTs within normal limits. [BJ]   1842 BNP(!): 130  BNP slightly elevated to 130.  This was seen on previous lab work done 2 weeks ago. [BJ]   1900 X-Ray Chest AP Portable  Independent interpretation of chest x-ray without effusion, consolidation, or pneumothorax.  Trachea midline. No cardiomegaly. Agree with radiology reading.  [BJ]   2018 Pending second troponin. [BJ]   2119 Troponin I #2(!)  Troponin elevated to .035. [BJ]   2126 Discussed patient with Dr. Albrecht, cardiology, who recommends starting heparin drip, along with aspirin and carvedilol home dose for high blood pressure.  Patient will need admission with Cardiology consult. [BJ]   2209 Discussed patient with Dr. Kirk, [facility] family Medicine, who will accept patient for serial troponins and further ACS workup. [BJ]   2225 EKG 12-lead  Independent interpretation of EKG sinus bradycardia at 54 beats per minute.  No ST elevations.  Right bundle-branch block noted.  DC interval of 160 milliseconds.  In comparison to previous, no significant change was found. [BJ]      ED Course User Index  [BJ] Beulah Madden PA-C                             Clinical Impression:  Final diagnoses:  [R07.9] Chest pain  [I21.4] NSTEMI (non-ST elevated myocardial infarction) (Primary)          ED Disposition Condition    Observation                This note was partially created using [employer] Voice Recognition software. Typographical and  content errors may occur with this process. While efforts are made to detect and correct such errors, in some cases errors will persist. For this reason, wording in this document should be considered in the proper context and not strictly verbatim.        Beulah Madden PA-C  11/22/24 2226

## 2024-11-26 ENCOUNTER — PATIENT OUTREACH (OUTPATIENT)
Dept: ADMINISTRATIVE | Facility: CLINIC | Age: 74
End: 2024-11-26
Payer: MEDICARE

## 2024-11-26 ENCOUNTER — OFFICE VISIT (OUTPATIENT)
Dept: CARDIOLOGY | Facility: CLINIC | Age: 74
End: 2024-11-26
Payer: MEDICARE

## 2024-11-26 VITALS
DIASTOLIC BLOOD PRESSURE: 55 MMHG | HEIGHT: 71 IN | WEIGHT: 241.06 LBS | SYSTOLIC BLOOD PRESSURE: 89 MMHG | OXYGEN SATURATION: 97 % | BODY MASS INDEX: 33.75 KG/M2 | HEART RATE: 62 BPM

## 2024-11-26 DIAGNOSIS — E11.59 HYPERTENSION ASSOCIATED WITH DIABETES: ICD-10-CM

## 2024-11-26 DIAGNOSIS — G62.9 NEUROPATHY: ICD-10-CM

## 2024-11-26 DIAGNOSIS — Z95.810 PRESENCE OF AUTOMATIC (IMPLANTABLE) CARDIAC DEFIBRILLATOR: ICD-10-CM

## 2024-11-26 DIAGNOSIS — I25.118 CORONARY ARTERY DISEASE OF NATIVE ARTERY OF NATIVE HEART WITH STABLE ANGINA PECTORIS: Primary | ICD-10-CM

## 2024-11-26 DIAGNOSIS — I50.41 ACUTE COMBINED SYSTOLIC AND DIASTOLIC CONGESTIVE HEART FAILURE: ICD-10-CM

## 2024-11-26 DIAGNOSIS — I15.2 HYPERTENSION ASSOCIATED WITH DIABETES: ICD-10-CM

## 2024-11-26 DIAGNOSIS — I20.0 UNSTABLE ANGINA: ICD-10-CM

## 2024-11-26 DIAGNOSIS — I10 ESSENTIAL HYPERTENSION: ICD-10-CM

## 2024-11-26 DIAGNOSIS — I34.0 MITRAL VALVE INSUFFICIENCY, UNSPECIFIED ETIOLOGY: ICD-10-CM

## 2024-11-26 PROCEDURE — 99215 OFFICE O/P EST HI 40 MIN: CPT | Mod: HCNC,S$GLB,, | Performed by: STUDENT IN AN ORGANIZED HEALTH CARE EDUCATION/TRAINING PROGRAM

## 2024-11-26 PROCEDURE — 1111F DSCHRG MED/CURRENT MED MERGE: CPT | Mod: HCNC,CPTII,S$GLB, | Performed by: STUDENT IN AN ORGANIZED HEALTH CARE EDUCATION/TRAINING PROGRAM

## 2024-11-26 PROCEDURE — 3008F BODY MASS INDEX DOCD: CPT | Mod: HCNC,CPTII,S$GLB, | Performed by: STUDENT IN AN ORGANIZED HEALTH CARE EDUCATION/TRAINING PROGRAM

## 2024-11-26 PROCEDURE — 3061F NEG MICROALBUMINURIA REV: CPT | Mod: HCNC,CPTII,S$GLB, | Performed by: STUDENT IN AN ORGANIZED HEALTH CARE EDUCATION/TRAINING PROGRAM

## 2024-11-26 PROCEDURE — 3078F DIAST BP <80 MM HG: CPT | Mod: HCNC,CPTII,S$GLB, | Performed by: STUDENT IN AN ORGANIZED HEALTH CARE EDUCATION/TRAINING PROGRAM

## 2024-11-26 PROCEDURE — 4010F ACE/ARB THERAPY RXD/TAKEN: CPT | Mod: HCNC,CPTII,S$GLB, | Performed by: STUDENT IN AN ORGANIZED HEALTH CARE EDUCATION/TRAINING PROGRAM

## 2024-11-26 PROCEDURE — 3288F FALL RISK ASSESSMENT DOCD: CPT | Mod: HCNC,CPTII,S$GLB, | Performed by: STUDENT IN AN ORGANIZED HEALTH CARE EDUCATION/TRAINING PROGRAM

## 2024-11-26 PROCEDURE — 3066F NEPHROPATHY DOC TX: CPT | Mod: HCNC,CPTII,S$GLB, | Performed by: STUDENT IN AN ORGANIZED HEALTH CARE EDUCATION/TRAINING PROGRAM

## 2024-11-26 PROCEDURE — 99999 PR PBB SHADOW E&M-EST. PATIENT-LVL IV: CPT | Mod: PBBFAC,HCNC,, | Performed by: STUDENT IN AN ORGANIZED HEALTH CARE EDUCATION/TRAINING PROGRAM

## 2024-11-26 PROCEDURE — 3052F HG A1C>EQUAL 8.0%<EQUAL 9.0%: CPT | Mod: HCNC,CPTII,S$GLB, | Performed by: STUDENT IN AN ORGANIZED HEALTH CARE EDUCATION/TRAINING PROGRAM

## 2024-11-26 PROCEDURE — 1126F AMNT PAIN NOTED NONE PRSNT: CPT | Mod: HCNC,CPTII,S$GLB, | Performed by: STUDENT IN AN ORGANIZED HEALTH CARE EDUCATION/TRAINING PROGRAM

## 2024-11-26 PROCEDURE — 3074F SYST BP LT 130 MM HG: CPT | Mod: HCNC,CPTII,S$GLB, | Performed by: STUDENT IN AN ORGANIZED HEALTH CARE EDUCATION/TRAINING PROGRAM

## 2024-11-26 PROCEDURE — 1101F PT FALLS ASSESS-DOCD LE1/YR: CPT | Mod: HCNC,CPTII,S$GLB, | Performed by: STUDENT IN AN ORGANIZED HEALTH CARE EDUCATION/TRAINING PROGRAM

## 2024-11-26 PROCEDURE — G2211 COMPLEX E/M VISIT ADD ON: HCPCS | Mod: HCNC,S$GLB,, | Performed by: STUDENT IN AN ORGANIZED HEALTH CARE EDUCATION/TRAINING PROGRAM

## 2024-11-26 RX ORDER — LOSARTAN POTASSIUM 25 MG/1
25 TABLET ORAL DAILY
Start: 2024-11-26

## 2024-11-26 RX ORDER — CARVEDILOL 6.25 MG/1
6.25 TABLET ORAL 2 TIMES DAILY
Start: 2024-11-26 | End: 2025-11-26

## 2024-11-26 NOTE — PROGRESS NOTES
C3 nurse attempted to contact Clifton Wilson  for a TCC post hospital discharge follow up call. No answer. Left voicemail with callback information. The patient does not have a scheduled HOSFU appointment. Message sent to PCP staff for assistance with scheduling visit with patient.

## 2024-11-26 NOTE — PROGRESS NOTES
2nd Attempt made to reach patient for TCC call. Left voicemail please call [phone] leave first name, last name, and  for Ashley I will return your call.

## 2024-11-26 NOTE — PROGRESS NOTES
Cardiology Clinic Visit    History of Present Illness:     Former Dr. Clement and Jillian patient  Clifton Wilson is a pleasant 73 y.o. male with PMHx of CAD, HTN, HLD, HFrEF 40-45% s/p ICD here for follow up. He reports he feel like old=fatigue/tired. I reviewed coronary angiogram and has ostial to prox LAD disease with negative Ifr. Cardiac PET scheduled 133/04/24 to assess any benefit of intervention. Also, BP runs 120-130s/50-60 but today his BP 89/55; 62 which like is contributing to his symptoms. Denies cp, sob, palpitations, presyncope/dizziness, syncope, orthopnea, PND, bendopnea, NVDC, fever, chills.        Assessment:   Ostial to proximal LAD with 50-60% stenosis angiographically with iFR negative   LAD stents patent with PATRIA 2   Lcx stent patent     EF    Left Ventricle: Regional wall motion abnormalities present. See diagram for wall motion findings. There is mildly reduced systolic function with a visually estimated ejection fraction of 40 - 45%.       History obtained by patient interview and supplemented by nursing documentation and chart review.   PMHx:  has a past medical history of Anticoagulant long-term use, Cardiomyopathy, CHF (congestive heart failure), Coronary artery disease, Diabetes mellitus, Gout, Heart attack, Hypertension, and Pneumonia.   SurgHx:  has a past surgical history that includes Appendectomy; Cataract extraction (Bilateral, 2011); Eye surgery; Cardiac defibrillator placement; Cardiac catheterization; Colonoscopy (N/A, 8/10/2018); Revision of implantable cardioverter-defibrillator (ICD) electrode lead placement (N/A, 11/11/2019); Left heart catheterization (Left, 11/11/2024); instantaneous wave-free ratio (ifr) (N/A, 11/11/2024); and Coronary angiography (N/A, 11/11/2024).   FamHx: family history includes Heart disease in his father and mother.   SocialHx:  reports that he has quit smoking. He has never used smokeless tobacco. He reports current alcohol use. He reports that  "he does not use drugs.  Home Meds:  Current Outpatient Medications   Medication Instructions    alcohol swabs (BD ALCOHOL SWABS) PadM USE FOUR TIMES DAILY    aspirin (ECOTRIN) 81 mg, Daily    atorvastatin (LIPITOR) 40 mg, Oral, Daily    blood glucose control high,low (ACCU-CHEK CATHRYN CONTROL SOLN) Soln Use as directed to check blood sugar    blood sugar diagnostic Strp test blood sugar as directed four times daily    blood-glucose meter (TRUE METRIX GLUCOSE METER) Misc use as directed    carvediloL (COREG) 6.25 mg, Oral, 2 times daily    clopidogreL (PLAVIX) 75 mg, Oral, Daily    cyanocobalamin (VITAMIN B-12) 1000 MCG tablet TAKE ONE TABLET BY MOUTH ONCE DAILY FOR VITAMIN DEFICIENCIES    furosemide (LASIX) 20 mg, Oral, 2 times daily    gabapentin (NEURONTIN) 600 mg, Oral, 2 times daily    isosorbide mononitrate (IMDUR) 30 mg, Oral, Daily    JARDIANCE 10 mg, Oral, Daily    lancets 30 gauge Misc usea s directed to check blood glucose four times daily    LANTUS SOLOSTAR U-100 INSULIN 40 Units, Subcutaneous, Nightly    losartan (COZAAR) 25 mg, Oral, Daily    metFORMIN (GLUCOPHAGE) 1,000 mg, Oral, 2 times daily with meals    MITIGARE 0.6 mg, Oral, Daily    nitroGLYCERIN (NITROSTAT) 0.4 mg, Sublingual, Every 5 min PRN    NovoLOG Flexpen U-100 Insulin 18 Units, Subcutaneous, 3 times daily with meals    pen needle, diabetic (BD ULTRA-FINE SINA PEN NEEDLE) 32 gauge x 5/32" Ndle Use four times daily for insulin administration    promethazine-dextromethorphan (PROMETHAZINE-DM) 6.25-15 mg/5 mL Syrp 5 mLs, Oral, 2 times daily PRN    zolpidem (AMBIEN) 5 MG Tab TAKE 1 TABLET BY MOUTH EVERY NIGHT AS NEEDED       Review of Systems: Comprehensive ROS was performed and is negative unless otherwise noted in HPI.   Objective   Objective/Exam:   BP (!) 89/55 (BP Location: Right arm, Patient Position: Sitting)   Pulse 62   Ht 5' 11" (1.803 m)   Wt 109.4 kg (241 lb 1.2 oz)   SpO2 97%   BMI 33.62 kg/m²    Wt Readings from Last 4 " "Encounters:   11/26/24 109.4 kg (241 lb 1.2 oz)   11/23/24 108.9 kg (240 lb)   11/14/24 108.9 kg (240 lb 1.3 oz)   11/09/24 107.5 kg (237 lb)      Constitutional: NAD, Atraumatic, Conversant   HEENT: MMM, Sclera anicteric, No JVD   Cardiovascular: RRR, no murmurs noted, no chest tenderness to palpation, 2+ radial pulses b/l  Pulmonary: normal respiratory effort, CTAB, no crackles, wheezes  Abdominal: S/NT/ND  Musculoskeletal: No lower extremity edema noted b/l  Neurological: No gross neurological deficits  Skin: W/D/I  Psych: Appropriate affect, normal mood  Labs/Imaging/Procedures   Personally reviewed  Lab Results   Component Value Date     11/23/2024    K 3.5 11/23/2024     11/23/2024    CO2 27 11/23/2024    BUN 11 11/23/2024    CREATININE 1.0 11/23/2024    ANIONGAP 8 11/23/2024     Lab Results   Component Value Date    HGBA1C 8.0 (H) 09/18/2024     Lab Results   Component Value Date     (H) 11/22/2024     (H) 11/08/2024    BNP 96 04/23/2023      Lab Results   Component Value Date    WBC 7.06 11/23/2024    WBC 7.06 11/23/2024    HGB 13.3 (L) 11/23/2024    HGB 13.3 (L) 11/23/2024    HCT 39.0 (L) 11/23/2024    HCT 39.0 (L) 11/23/2024     11/23/2024     11/23/2024    GRAN 3.7 11/23/2024    GRAN 52.1 11/23/2024    GRAN 3.7 11/23/2024    GRAN 52.1 11/23/2024     Lab Results   Component Value Date    CHOL 122 07/08/2024    HDL 26 (L) 07/08/2024    LDLCALC 42.6 (L) 07/08/2024    LDLCALC 66.6 05/03/2023    LDLCALC 71.2 05/03/2022    TRIG 267 (H) 07/08/2024     Lab Results   Component Value Date    TSH 0.861 12/04/2015     No results found for: "APOLIPOPROTE"  No results found for: "LIPOA"     TTE:  Results for orders placed during the hospital encounter of 11/22/24    Echo Ultrasound enhancing contrast? Yes (definity/optison)    Interpretation Summary    Left Ventricle: Regional wall motion abnormalities present. See diagram for wall motion findings. There is mildly reduced " systolic function with a visually estimated ejection fraction of 40 - 45%.    Stress Testing:   No results found for this or any previous visit.     Coronary Angiogram:  Results for orders placed during the hospital encounter of 11/08/24    Cardiac catheterization    Conclusion  Table formatting from the original result was not included.      The estimated blood loss was none.    [facility]  Surgery Department  Operative Note    SUMMARY  POST CATH NOTE    Date of Procedure: 11/11/2024    Procedure: Procedure(s) (LRB):  Left heart cath (Left)  Instantaneous Wave-Free Ratio (IFR) (N/A)  ANGIOGRAM, CORONARY ARTERY (N/A)    Surgeons and Role:  * Luis Felipe Wright MD - Primary    Assisting Surgeon: None    Pre-Operative Diagnosis: Chest pain [R07.9]  Unstable angina [I20.0]    Post-Operative Diagnosis: Post-Op Diagnosis Codes:  * Chest pain [R07.9]  * Unstable angina [I20.0]    s/p catheterization secondary to:    Cath Results:  Access: Us guided RRA    Coronary Anatomy:    Left Main Coronary Artery: The left main is a short andlarge caliber vessel arising normally from the left sinus of valsalva.  It bifurcates into the left anterior descending and left circumflex coronary artery.  It is free of evidence of obstructive coronary artery disease. PATRIA III flow    Left Anterior Descending: The left anterior descending coronary artery is a large caliber vessel arising normally from the left main and extending to the apex.  It gives rise to small to moderate caliber diagonal arteries. Ostial to proximal LAD (before stent) 50-60% stenosed angiographically. Mild to moderate ISR with patent stents with PATRIA 2 flow. iFR of Proximal LAD negative,    Left Circumflex: The left circumflex is a large caliber vessel arising normally from the left main coronary artery, it is non-dominant.  It gives rise to moderate caliber obtuse marginal branches.  Prox to mid Lcx  patent stent with mild IRS. Jailed AV Cx  (small <2.5 mm)  "diffuse disease at proximal vessel. PATRIA III flow    Right Coronary Artery: The right coronary artery is a large caliber vessel arising normally from the right sinus of valsalva.  It is dominant giving rise to a PDA and PLV branch.  The right coronary artery is free of obstructive disease. PATRIA III flow    LVgram: LVEDP 9 mmHg    Intervention:  iFR  JL4 guide  The 0.014" Omniwire was connected to the console, calibrated and equalized. The 0.014"  Omniwire was then advanced into the left main outside of the catheter and flushed with saline  with the introducer removed. The pressure was normalized and then the wire was advanced  across Proximal LAD lesion. 0.96-0.95 iFR recordings were obtained. The 0.014" Omniwire was then pulled  back slowly across the lesion to assess for step up and electronic drift. The wire was removed,  and final angiography was performed.    Closure device:  Patient tolerated procedure well, no complications    Post Cath Exam:  BP (!) 153/73 (BP Location: Left arm, Patient Position: Lying)   Pulse 71   Temp 98.7 °F (37.1 °C) (Oral)   Resp 16   Ht 5' 11" (1.803 m)   Wt 107.5 kg (237 lb)   SpO2 97%   BMI 33.05 kg/m²    Anesthesia: RN IV Sedation      Estimated Blood Loss (EBL): * No values recorded between 11/11/2024 11:40 AM and 11/11/2024 12:19 PM *    Specimens:  Specimen (24h ago, onward)    None          Assessment:  Ostial to proximal LAD with 50-60% stenosis angiographically with iFR negative  LAD stents patent with PATRIA 2  Lcx stent patent    Plan:    - Patient tolerated procedure well. No immediate complications  - Continue aspirin and Plavix  - EKG when arrives to floor  - Routine post cath protocol  - Maximize medical management  - Further care by the primary team  - IVF at  100cc/hr  for 2 hours  - Follow up with me  -PET stress for viability M    42 minutes were spent on this visit including time personally:  -Reviewing Medical records & lab results  -Independently reviewing " and interpreting (if not documented by myself) EKGs, echocardiograms, catherizations   -Obtaining a history, performing a relevant exam, counseling/educating the patient/family  -Documenting clinical information in the EMR including ordering of tests  -Care coordination and communicating with other health care providers       Problem List:     1. Coronary artery disease of native artery of native heart with stable angina pectoris    2. Unstable angina    3. Essential hypertension    4. Presence of automatic (implantable) cardiac defibrillator    5. Hypertension associated with diabetes    6. Mitral valve insufficiency, unspecified etiology    7. Acute combined systolic and diastolic congestive heart failure      Assessment/Plan:   Clifton Wilson is a pleasant 73 y.o. male with PMHx of CAD, HTN, HLD, HFrEF 40-45% s/p ICD here for follow up. Patient feeling fatigue/tired likely multifactorial low BP and CAD.     CAD with multiple PCIs in LAD and Lcx- Ostial LAD with 50-60% stenosed with negative iFR. patent stents but LAD stents have PATRIA II flow. Will await PET stress results to see if there is any benefit of revascularization. Continue DAPT, statin.   HFrEF 40-45% s/p ICD- needs follow up for EP. Decreased Coreg 12.5 to 6.25 bid, decrease losartan 50 to 25 mg qd (will transition to entresto next visit if BP tolerates), continue imdur, continue jardiance next visit will start spironolactone. Repeat labs next visit. Lasix prn.   HTN - lower side decrease meds as above.   HLD continue statin  Visit today included increased complexity associated with the care of the episodic problem CAD s/p PCI, HFrEF, HTN, HLD addressed and managing the longitudinal care of the patient due to the serious and/or complex managed problems.        Preventative Care:  Lipids:  PVD(V/A)      Patient expressed verbal understanding and agreed with the plan     Return sooner for concerns or questions. If symptoms persist go to the ED.  I  have reviewed all pertinent data including patient's medical history in detail and updated the computerized patient record.     Total time spent counseling greater than fifty percent of total visit time.  Counseling included discussion regarding imaging findings, diagnosis, possibilities, treatment options, risks and benefits.      Thank you for the opportunity to care for this patient. Please don't hesitate to reach out with any questions/concerns         Luis Felipe Bryan MD  Cardiovascular Disease; Interventional Cardiology  [facility]

## 2024-11-27 ENCOUNTER — PATIENT MESSAGE (OUTPATIENT)
Dept: ADMINISTRATIVE | Facility: CLINIC | Age: 74
End: 2024-11-27
Payer: MEDICARE

## 2024-11-27 NOTE — PROGRESS NOTES
3rd Attempt made to reach patient for TCC call. Left voicemail please call [phone] leave first name, last name, and  for Ashley I will return your call.  Message sent via [facility] portal for Post Discharge Attempt.

## 2024-11-30 RX ORDER — NITROGLYCERIN 0.4 MG/1
0.4 TABLET SUBLINGUAL EVERY 5 MIN PRN
Qty: 25 TABLET | Refills: 3 | Status: SHIPPED | OUTPATIENT
Start: 2024-11-30 | End: 2025-11-30

## 2024-12-02 ENCOUNTER — TELEPHONE (OUTPATIENT)
Dept: CARDIOLOGY | Facility: CLINIC | Age: 74
End: 2024-12-02
Payer: MEDICARE

## 2024-12-04 ENCOUNTER — PATIENT MESSAGE (OUTPATIENT)
Dept: CARDIOLOGY | Facility: CLINIC | Age: 74
End: 2024-12-04
Payer: MEDICARE

## 2024-12-04 ENCOUNTER — HOSPITAL ENCOUNTER (OUTPATIENT)
Dept: CARDIOLOGY | Facility: HOSPITAL | Age: 74
Discharge: HOME | End: 2024-12-04
Attending: NURSE PRACTITIONER
Payer: MEDICARE

## 2024-12-04 VITALS
DIASTOLIC BLOOD PRESSURE: 83 MMHG | SYSTOLIC BLOOD PRESSURE: 141 MMHG | BODY MASS INDEX: 33.74 KG/M2 | HEIGHT: 71 IN | HEART RATE: 58 BPM | WEIGHT: 241 LBS

## 2024-12-04 DIAGNOSIS — I25.118 CORONARY ARTERY DISEASE OF NATIVE ARTERY OF NATIVE HEART WITH STABLE ANGINA PECTORIS: ICD-10-CM

## 2024-12-04 LAB
CFR FLOW - ANTERIOR: 1.81
CFR FLOW - INFERIOR: 1.47
CFR FLOW - LATERAL: 1.93
CFR FLOW - MAX: 3.07
CFR FLOW - MIN: 1.09
CFR FLOW - SEPTAL: 2.37
CFR FLOW - WHOLE HEART: 1.9
CV PHARM DOSE: 0.4 MG
CV STRESS BASE HR: 58 BPM
DIASTOLIC BLOOD PRESSURE: 83 MMHG
EJECTION FRACTION- HIGH: 59 %
END DIASTOLIC INDEX-HIGH: 155 ML/M2
END DIASTOLIC INDEX-LOW: 91 ML/M2
END SYSTOLIC INDEX-HIGH: 78 ML/M2
END SYSTOLIC INDEX-LOW: 40 ML/M2
NUC REST DIASTOLIC VOLUME INDEX: 97
NUC REST EJECTION FRACTION: 36
NUC REST SYSTOLIC VOLUME INDEX: 54
NUC STRESS DIASTOLIC VOLUME INDEX: 77
NUC STRESS EJECTION FRACTION: 45 %
NUC STRESS SYSTOLIC VOLUME INDEX: 62
OHS CV CPX 85 PERCENT MAX PREDICTED HEART RATE MALE: 126
OHS CV CPX MAX PREDICTED HEART RATE: 148
OHS CV CPX PATIENT IS FEMALE: 0
OHS CV CPX PATIENT IS MALE: 1
OHS CV CPX PEAK DIASTOLIC BLOOD PRESSURE: 72 MMHG
OHS CV CPX PEAK HEAR RATE: 62 BPM
OHS CV CPX PEAK RATE PRESSURE PRODUCT: 8370
OHS CV CPX PEAK SYSTOLIC BLOOD PRESSURE: 135 MMHG
OHS CV CPX PERCENT MAX PREDICTED HEART RATE ACHIEVED: 42
OHS CV CPX RATE PRESSURE PRODUCT PRESENTING: 8178
OHS CV INITIAL DOSE: 33.2 MCG/KG/MIN
OHS CV MODERATELY REDUCED FLOW CAPACITY: 22 %
OHS CV NO ISCHEMIA MILDLY REDUCED FLOW CAPACTY: 57 %
OHS CV NO ISCHEMIA MINIMALLY REDUCED FLOW CAPACITY: 18 %
OHS CV NORMAL FLOW CAPACITY COMPARABLE TO HEALTHY YOUNG VOLUNTEERS: 1 %
OHS CV PEAK DOSE: 32.6 MCG/KG/MIN
OHS CV PET ID: 7745
OHS CV SEVERELY REDUCED FLOW CAPACITY: 2 %
OHS CV TOTAL EXAM DLP: 543.89 MGY-CM
PERFUSION DEFECT 1 SIZE IN %: 4 %
PERFUSION DEFECT 2 SIZE IN %: 3 %
REST FLOW - ANTERIOR: 0.52 CC/MIN/G
REST FLOW - INFERIOR: 0.66 CC/MIN/G
REST FLOW - LATERAL: 0.6 CC/MIN/G
REST FLOW - MAX: 0.85 CC/MIN/G
REST FLOW - MIN: 0.27 CC/MIN/G
REST FLOW - SEPTAL: 0.48 CC/MIN/G
REST FLOW - WHOLE HEART: 0.57 CC/MIN/G
RETIRED EF AND QEF - SEE NOTES: 47 %
STRESS FLOW - ANTERIOR: 0.92 CC/MIN/G
STRESS FLOW - INFERIOR: 0.95 CC/MIN/G
STRESS FLOW - LATERAL: 1.14 CC/MIN/G
STRESS FLOW - MAX: 1.57 CC/MIN/G
STRESS FLOW - MIN: 0.53 CC/MIN/G
STRESS FLOW - SEPTAL: 1.1 CC/MIN/G
STRESS FLOW - WHOLE HEART: 1.03 CC/MIN/G
SYSTOLIC BLOOD PRESSURE: 141 MMHG

## 2024-12-04 PROCEDURE — 78431 MYOCRD IMG PET RST&STRS CT: CPT | Mod: 26,HCNC,, | Performed by: INTERNAL MEDICINE

## 2024-12-04 PROCEDURE — 63600175 PHARM REV CODE 636 W HCPCS: Mod: HCNC | Performed by: NURSE PRACTITIONER

## 2024-12-04 PROCEDURE — 78434 AQMBF PET REST & RX STRESS: CPT | Mod: HCNC

## 2024-12-04 PROCEDURE — 78434 AQMBF PET REST & RX STRESS: CPT | Mod: 26,HCNC,, | Performed by: INTERNAL MEDICINE

## 2024-12-04 PROCEDURE — 93016 CV STRESS TEST SUPVJ ONLY: CPT | Mod: HCNC,,, | Performed by: INTERNAL MEDICINE

## 2024-12-04 PROCEDURE — 93018 CV STRESS TEST I&R ONLY: CPT | Mod: HCNC,,, | Performed by: INTERNAL MEDICINE

## 2024-12-04 PROCEDURE — A9555 RB82 RUBIDIUM: HCPCS | Mod: HCNC | Performed by: NURSE PRACTITIONER

## 2024-12-04 RX ORDER — REGADENOSON 0.08 MG/ML
0.4 INJECTION, SOLUTION INTRAVENOUS
Status: COMPLETED | OUTPATIENT
Start: 2024-12-04 | End: 2024-12-04

## 2024-12-04 RX ORDER — AMINOPHYLLINE 25 MG/ML
75 INJECTION, SOLUTION INTRAVENOUS ONCE
Status: COMPLETED | OUTPATIENT
Start: 2024-12-04 | End: 2024-12-04

## 2024-12-04 RX ADMIN — AMINOPHYLLINE 75 MG: 25 INJECTION, SOLUTION INTRAVENOUS at 12:12

## 2024-12-04 RX ADMIN — RUBIDIUM CHLORIDE RB-82 33.2 MILLICURIE: 150 INJECTION, SOLUTION INTRAVENOUS at 12:12

## 2024-12-04 RX ADMIN — RUBIDIUM CHLORIDE RB-82 32.6 MILLICURIE: 150 INJECTION, SOLUTION INTRAVENOUS at 12:12

## 2024-12-04 RX ADMIN — REGADENOSON 0.4 MG: 0.08 INJECTION, SOLUTION INTRAVENOUS at 12:12

## 2024-12-07 DIAGNOSIS — I34.0 MITRAL VALVE INSUFFICIENCY, UNSPECIFIED ETIOLOGY: ICD-10-CM

## 2024-12-07 DIAGNOSIS — I50.41 ACUTE COMBINED SYSTOLIC AND DIASTOLIC CONGESTIVE HEART FAILURE: ICD-10-CM

## 2024-12-07 DIAGNOSIS — I10 ESSENTIAL HYPERTENSION: ICD-10-CM

## 2024-12-07 NOTE — TELEPHONE ENCOUNTER
No care due was identified.  Health [facility] Embedded Care Due Messages. Reference number: [id number].   12/07/2024 8:24:50 AM CST

## 2024-12-09 RX ORDER — FUROSEMIDE 20 MG/1
20 TABLET ORAL 2 TIMES DAILY
Qty: 180 TABLET | Refills: 4 | Status: SHIPPED | OUTPATIENT
Start: 2024-12-09

## 2024-12-10 DIAGNOSIS — I10 ESSENTIAL HYPERTENSION: ICD-10-CM

## 2024-12-10 DIAGNOSIS — E11.40 TYPE 2 DIABETES MELLITUS WITH DIABETIC NEUROPATHY, WITH LONG-TERM CURRENT USE OF INSULIN: ICD-10-CM

## 2024-12-10 DIAGNOSIS — I15.2 HYPERTENSION ASSOCIATED WITH DIABETES: ICD-10-CM

## 2024-12-10 DIAGNOSIS — I50.41 ACUTE COMBINED SYSTOLIC AND DIASTOLIC CONGESTIVE HEART FAILURE: ICD-10-CM

## 2024-12-10 DIAGNOSIS — E11.9 DIABETES MELLITUS TYPE 2 WITHOUT RETINOPATHY: ICD-10-CM

## 2024-12-10 DIAGNOSIS — Z79.4 TYPE 2 DIABETES MELLITUS WITH DIABETIC NEUROPATHY, WITH LONG-TERM CURRENT USE OF INSULIN: ICD-10-CM

## 2024-12-10 DIAGNOSIS — I34.0 MITRAL VALVE INSUFFICIENCY, UNSPECIFIED ETIOLOGY: ICD-10-CM

## 2024-12-10 DIAGNOSIS — E11.59 HYPERTENSION ASSOCIATED WITH DIABETES: ICD-10-CM

## 2024-12-10 RX ORDER — CARVEDILOL 6.25 MG/1
6.25 TABLET ORAL 2 TIMES DAILY
Qty: 180 TABLET | Refills: 3
Start: 2024-12-10 | End: 2025-12-10

## 2024-12-10 RX ORDER — FUROSEMIDE 20 MG/1
20 TABLET ORAL 2 TIMES DAILY
Qty: 180 TABLET | Refills: 3 | OUTPATIENT
Start: 2024-12-10

## 2024-12-10 RX ORDER — LOSARTAN POTASSIUM 25 MG/1
25 TABLET ORAL DAILY
Start: 2024-12-10

## 2024-12-10 RX ORDER — INSULIN GLARGINE 100 [IU]/ML
40 INJECTION, SOLUTION SUBCUTANEOUS NIGHTLY
Qty: 30 ML | Refills: 1 | Status: SHIPPED | OUTPATIENT
Start: 2024-12-10

## 2024-12-10 NOTE — TELEPHONE ENCOUNTER
No care due was identified.  Health [facility] Embedded Care Due Messages. Reference number: [id number].   12/10/2024 10:42:15 AM CST

## 2024-12-10 NOTE — TELEPHONE ENCOUNTER
No care due was identified.  [facility] Embedded Care Due Messages. Reference number: [id number].   12/10/2024 4:53:42 PM CST

## 2024-12-17 DIAGNOSIS — I50.41 ACUTE COMBINED SYSTOLIC AND DIASTOLIC CONGESTIVE HEART FAILURE: ICD-10-CM

## 2024-12-17 DIAGNOSIS — I15.2 HYPERTENSION ASSOCIATED WITH DIABETES: ICD-10-CM

## 2024-12-17 DIAGNOSIS — I10 ESSENTIAL HYPERTENSION: ICD-10-CM

## 2024-12-17 DIAGNOSIS — I34.0 MITRAL VALVE INSUFFICIENCY, UNSPECIFIED ETIOLOGY: ICD-10-CM

## 2024-12-17 DIAGNOSIS — E11.59 HYPERTENSION ASSOCIATED WITH DIABETES: ICD-10-CM

## 2024-12-17 RX ORDER — FUROSEMIDE 20 MG/1
20 TABLET ORAL 2 TIMES DAILY
Qty: 180 TABLET | Refills: 2 | Status: SHIPPED | OUTPATIENT
Start: 2024-12-17

## 2024-12-17 RX ORDER — CARVEDILOL 12.5 MG/1
12.5 TABLET ORAL 2 TIMES DAILY
Qty: 180 TABLET | Refills: 2 | Status: SHIPPED | OUTPATIENT
Start: 2024-12-17 | End: 2024-12-18

## 2024-12-17 RX ORDER — LOSARTAN POTASSIUM 25 MG/1
25 TABLET ORAL DAILY
Qty: 90 TABLET | Refills: 3 | Status: SHIPPED | OUTPATIENT
Start: 2024-12-17

## 2024-12-17 NOTE — TELEPHONE ENCOUNTER
Care Due:                  Date            Visit Type   Department     Provider  --------------------------------------------------------------------------------                                EP [facility]    Britton Dumontkimmie  Last Visit: 09-      CARE ([facility])   MEDICINE       Jaciel                              [facility]  Next Visit: 01-      CARE ([facility])   MEDICINE       Jaciel                                                            Last  Test          Frequency    Reason                     Performed    Due Date  --------------------------------------------------------------------------------    HBA1C.......  6 months...  insulin, metFORMIN.......  09- 03-    [facility] Embedded Care Due Messages. Reference number: [id number].   12/17/2024 2:35:27 PM CST

## 2024-12-18 ENCOUNTER — PATIENT MESSAGE (OUTPATIENT)
Dept: CARDIOLOGY | Facility: CLINIC | Age: 74
End: 2024-12-18
Payer: MEDICARE

## 2024-12-18 DIAGNOSIS — I34.0 MITRAL VALVE INSUFFICIENCY, UNSPECIFIED ETIOLOGY: ICD-10-CM

## 2024-12-18 DIAGNOSIS — I10 ESSENTIAL HYPERTENSION: ICD-10-CM

## 2024-12-18 DIAGNOSIS — I50.41 ACUTE COMBINED SYSTOLIC AND DIASTOLIC CONGESTIVE HEART FAILURE: ICD-10-CM

## 2024-12-18 RX ORDER — CARVEDILOL 6.25 MG/1
6.25 TABLET ORAL 2 TIMES DAILY
Qty: 180 TABLET | Refills: 3 | Status: SHIPPED | OUTPATIENT
Start: 2024-12-18 | End: 2025-12-18

## 2024-12-27 NOTE — PROGRESS NOTES
Cardiology Clinic Visit    History of Present Illness:     Former Dr. Clement and Jillian patient  Clifton Wilson is a pleasant 74 y.o. male with PMHx of CAD, HTN, HLD, HFrEF 40-45% s/p ICD here for follow up. He reports he feel like old=fatigue/tired. I reviewed coronary angiogram and has ostial to prox LAD disease with negative Ifr. Cardiac PET scheduled 12/04/24 to assess any benefit of intervention. Also, BP runs 120-130s/50-60 but today his BP 89/55; 62 which like is contributing to his symptoms. Denies cp, sob, palpitations, presyncope/dizziness, syncope, orthopnea, PND, bendopnea, NVDC, fever, chills.    01/07/2025  Here for follow up. Few episodes of 200s associated with HA. Currently taking Losartan 25 bid, coreg 6.25 bid but BP not at goal     PET 12/4/24    There are two significant perfusion abnormalities.    Perfusion Abnormality #1 - There is a small sized, moderate intensity mid anterior and anteroseptal fixed perfusion abnormality involving 4% of LV myocardium in the distribution of the LAD territory consistent with a non-transmural scar.    Perfusion Abnormality #2 - There is a very small (<5%) sized, mild intensity basal lateral fixed perfusion abnormality involving 3% of LV myocardium in the distribution of the LCX territory consistent with a non-transmural scar.    There are no other significant perfusion abnormalities.    The whole heart absolute myocardial perfusion values were reduced at rest, moderately reduced during stress and CFR is mildly reduced.    CT attenuation images demonstrate moderate diffuse coronary calcifications in the LAD, LCX , mild calcifications in the RCA territory and mild diffuse aortic calcifications in the descending aorta. Stents are present in the LAD and CX.    The gated perfusion images showed an ejection fraction of 36% at rest and 45% during stress. A normal ejection fraction is greater than 47%.    There is regional hypokinesis at rest of the anterior and  anteroseptal wall(s) and regional hypokinesis during stress of the anterior and anteroseptal wall(s).    The study's ECG is negative for ischemia.    Assessment:   Ostial to proximal LAD with 50-60% stenosis angiographically with iFR negative   LAD stents patent with PATRIA 2   Lcx stent patent     EF    Left Ventricle: Regional wall motion abnormalities present. See diagram for wall motion findings. There is mildly reduced systolic function with a visually estimated ejection fraction of 40 - 45%.       History obtained by patient interview and supplemented by nursing documentation and chart review.   PMHx:  has a past medical history of Anticoagulant long-term use, Cardiomyopathy, CHF (congestive heart failure), Coronary artery disease, Diabetes mellitus, Gout, Heart attack, Hypertension, and Pneumonia.   SurgHx:  has a past surgical history that includes Appendectomy; Cataract extraction (Bilateral, 2011); Eye surgery; Cardiac defibrillator placement; Cardiac catheterization; Colonoscopy (N/A, 8/10/2018); Revision of implantable cardioverter-defibrillator (ICD) electrode lead placement (N/A, 11/11/2019); Left heart catheterization (Left, 11/11/2024); instantaneous wave-free ratio (ifr) (N/A, 11/11/2024); and Coronary angiography (N/A, 11/11/2024).   FamHx: family history includes Heart disease in his father and mother.   SocialHx:  reports that he has quit smoking. He has never used smokeless tobacco. He reports current alcohol use. He reports that he does not use drugs.  Home Meds:  Current Outpatient Medications   Medication Instructions    alcohol swabs (BD ALCOHOL SWABS) PadM USE FOUR TIMES DAILY    aspirin (ECOTRIN) 81 mg, Daily    atorvastatin (LIPITOR) 40 mg, Oral, Daily    benzonatate (TESSALON) 200 mg, Oral, 3 times daily PRN    blood glucose control high,low (ACCU-CHEK CATHRYN CONTROL SOLN) Soln Use as directed to check blood sugar    blood sugar diagnostic Strp test blood sugar as directed four times daily  "   blood-glucose meter (TRUE METRIX GLUCOSE METER) Misc use as directed    carvediloL (COREG) 6.25 mg, Oral, 2 times daily    clopidogreL (PLAVIX) 75 mg, Oral, Daily    cyanocobalamin (VITAMIN B-12) 1000 MCG tablet TAKE ONE TABLET BY MOUTH ONCE DAILY FOR VITAMIN DEFICIENCIES    furosemide (LASIX) 20 mg, Oral, 2 times daily    furosemide (LASIX) 20 mg, Oral, 2 times daily    gabapentin (NEURONTIN) 600 mg, Oral, 2 times daily    isosorbide mononitrate (IMDUR) 30 mg, Oral, Daily    JARDIANCE 10 mg, Oral, Daily    lancets 30 gauge Misc usea s directed to check blood glucose four times daily    LANTUS SOLOSTAR U-100 INSULIN 40 Units, Subcutaneous, Nightly    losartan (COZAAR) 25 mg, Oral, Daily    metFORMIN (GLUCOPHAGE) 1,000 mg, Oral, 2 times daily with meals    MITIGARE 0.6 mg, Oral, Daily    nitroGLYCERIN (NITROSTAT) 0.4 mg, Sublingual, Every 5 min PRN    NovoLOG Flexpen U-100 Insulin 18 Units, Subcutaneous, 3 times daily with meals    pen needle, diabetic (BD ULTRA-FINE SINA PEN NEEDLE) 32 gauge x 5/32" Ndle Use four times daily for insulin administration    promethazine-dextromethorphan (PROMETHAZINE-DM) 6.25-15 mg/5 mL Syrp 5 mLs, Oral, 2 times daily PRN    zolpidem (AMBIEN) 5 MG Tab TAKE 1 TABLET BY MOUTH EVERY NIGHT AS NEEDED       Review of Systems: Comprehensive ROS was performed and is negative unless otherwise noted in HPI.   Objective   Objective/Exam:   BP (!) 145/86 (BP Location: Right arm)   Pulse 100   Ht 5' 11" (1.803 m)   Wt 110 kg (242 lb 8.1 oz)   BMI 33.82 kg/m²    Wt Readings from Last 4 Encounters:   01/07/25 110 kg (242 lb 8.1 oz)   01/07/25 110 kg (242 lb 8.1 oz)   12/04/24 109.3 kg (241 lb)   11/26/24 109.4 kg (241 lb 1.2 oz)      Constitutional: NAD, Atraumatic, Conversant   HEENT: MMM, Sclera anicteric, No JVD   Cardiovascular: RRR, no murmurs noted, no chest tenderness to palpation, 2+ radial pulses b/l  Pulmonary: normal respiratory effort, CTAB, no crackles, wheezes  Abdominal: " "S/NT/ND  Musculoskeletal: No lower extremity edema noted b/l  Neurological: No gross neurological deficits  Skin: W/D/I  Psych: Appropriate affect, normal mood  Labs/Imaging/Procedures   Personally reviewed  Lab Results   Component Value Date     01/07/2025    K 3.9 01/07/2025     01/07/2025    CO2 24 01/07/2025    BUN 15 01/07/2025    CREATININE 1.1 01/07/2025    ANIONGAP 13 01/07/2025     Lab Results   Component Value Date    HGBA1C 6.6 (H) 01/07/2025     Lab Results   Component Value Date     (H) 11/22/2024     (H) 11/08/2024    BNP 96 04/23/2023      Lab Results   Component Value Date    WBC 7.06 11/23/2024    WBC 7.06 11/23/2024    HGB 13.3 (L) 11/23/2024    HGB 13.3 (L) 11/23/2024    HCT 39.0 (L) 11/23/2024    HCT 39.0 (L) 11/23/2024     11/23/2024     11/23/2024    GRAN 3.7 11/23/2024    GRAN 52.1 11/23/2024    GRAN 3.7 11/23/2024    GRAN 52.1 11/23/2024     Lab Results   Component Value Date    CHOL 122 07/08/2024    HDL 26 (L) 07/08/2024    LDLCALC 42.6 (L) 07/08/2024    LDLCALC 66.6 05/03/2023    LDLCALC 71.2 05/03/2022    TRIG 267 (H) 07/08/2024     Lab Results   Component Value Date    TSH 0.861 12/04/2015     No results found for: "APOLIPOPROTE"  No results found for: "LIPOA"     TTE:  Results for orders placed during the hospital encounter of 11/22/24    Echo Ultrasound enhancing contrast? Yes (definity/optison)    Interpretation Summary    Left Ventricle: Regional wall motion abnormalities present. See diagram for wall motion findings. There is mildly reduced systolic function with a visually estimated ejection fraction of 40 - 45%.    Stress Testing:   No results found for this or any previous visit.     Coronary Angiogram:  Results for orders placed during the hospital encounter of 11/08/24    Cardiac catheterization    Conclusion  Table formatting from the original result was not included.      The estimated blood loss was none.    [facility]  Surgery " "Department  Operative Note    SUMMARY  POST CATH NOTE    Date of Procedure: 11/11/2024    Procedure: Procedure(s) (LRB):  Left heart cath (Left)  Instantaneous Wave-Free Ratio (IFR) (N/A)  ANGIOGRAM, CORONARY ARTERY (N/A)    Surgeons and Role:  * Luis Felipe Wright MD - Primary    Assisting Surgeon: None    Pre-Operative Diagnosis: Chest pain [R07.9]  Unstable angina [I20.0]    Post-Operative Diagnosis: Post-Op Diagnosis Codes:  * Chest pain [R07.9]  * Unstable angina [I20.0]    s/p catheterization secondary to:    Cath Results:  Access: Us guided RRA    Coronary Anatomy:    Left Main Coronary Artery: The left main is a short andlarge caliber vessel arising normally from the left sinus of valsalva.  It bifurcates into the left anterior descending and left circumflex coronary artery.  It is free of evidence of obstructive coronary artery disease. PATRIA III flow    Left Anterior Descending: The left anterior descending coronary artery is a large caliber vessel arising normally from the left main and extending to the apex.  It gives rise to small to moderate caliber diagonal arteries. Ostial to proximal LAD (before stent) 50-60% stenosed angiographically. Mild to moderate ISR with patent stents with PATRIA 2 flow. iFR of Proximal LAD negative,    Left Circumflex: The left circumflex is a large caliber vessel arising normally from the left main coronary artery, it is non-dominant.  It gives rise to moderate caliber obtuse marginal branches.  Prox to mid Lcx  patent stent with mild IRS. Jailed AV Cx  (small <2.5 mm) diffuse disease at proximal vessel. PATRIA III flow    Right Coronary Artery: The right coronary artery is a large caliber vessel arising normally from the right sinus of valsalva.  It is dominant giving rise to a PDA and PLV branch.  The right coronary artery is free of obstructive disease. PATRIA III flow    LVgram: LVEDP 9 mmHg    Intervention:  iFR  JL4 guide  The 0.014" Omniwire was connected to the console, " "calibrated and equalized. The 0.014"  Omniwire was then advanced into the left main outside of the catheter and flushed with saline  with the introducer removed. The pressure was normalized and then the wire was advanced  across Proximal LAD lesion. 0.96-0.95 iFR recordings were obtained. The 0.014" Omniwire was then pulled  back slowly across the lesion to assess for step up and electronic drift. The wire was removed,  and final angiography was performed.    Closure device:  Patient tolerated procedure well, no complications    Post Cath Exam:  BP (!) 153/73 (BP Location: Left arm, Patient Position: Lying)   Pulse 71   Temp 98.7 °F (37.1 °C) (Oral)   Resp 16   Ht 5' 11" (1.803 m)   Wt 107.5 kg (237 lb)   SpO2 97%   BMI 33.05 kg/m²    Anesthesia: RN IV Sedation      Estimated Blood Loss (EBL): * No values recorded between 11/11/2024 11:40 AM and 11/11/2024 12:19 PM *    Specimens:  Specimen (24h ago, onward)    None          Assessment:  Ostial to proximal LAD with 50-60% stenosis angiographically with iFR negative  LAD stents patent with PATRIA 2  Lcx stent patent    Plan:    - Patient tolerated procedure well. No immediate complications  - Continue aspirin and Plavix  - EKG when arrives to floor  - Routine post cath protocol  - Maximize medical management  - Further care by the primary team  - IVF at  100cc/hr  for 2 hours  - Follow up with me  -PET stress for viability M    42 minutes were spent on this visit including time personally:  -Reviewing Medical records & lab results  -Independently reviewing and interpreting (if not documented by myself) EKGs, echocardiograms, catherizations   -Obtaining a history, performing a relevant exam, counseling/educating the patient/family  -Documenting clinical information in the EMR including ordering of tests  -Care coordination and communicating with other health care providers       Problem List:     1. Acute combined systolic and diastolic congestive heart failure  "   2. Essential hypertension    3. Ventricular tachycardia    4. Coronary artery disease of native artery of native heart with stable angina pectoris    5. Ischemic cardiomyopathy    6. Hypertension associated with diabetes    7. NSTEMI (non-ST elevated myocardial infarction)    8. DM type 2 without retinopathy    9. Dyslipidemia        Assessment/Plan:   Clifton Wilson is a pleasant 73 y.o. male with PMHx of CAD, HTN, HLD, HFrEF 40-45% s/p ICD here for follow up. Patient feeling fatigue/tired likely multifactorial low BP and CAD.     CAD with multiple PCIs in LAD and Lcx- Ostial LAD with 50-60% stenosed with negative iFR. patent stents but LAD stents have PATRIA II flow. PET negative for ischemiaContinue DAPT, statin.   HFrEF 40-45% s/p ICD- needs follow up for EP. Decreased Coreg 12.5 to 6.25 bid, decrease losartan 50 to 25 mg qd (will transition to entresto next visit if BP tolerates), continue imdur, continue jardiance next visit will start spironolactone. Repeat labs next visit. Lasix prn.   HTN - lower side decrease meds as above.   HLD continue statin    DIGITAL HYPERTENSION ORDERED     Preventative Care:  Lipids:  PVD(V/A)      Patient expressed verbal understanding and agreed with the plan     Return sooner for concerns or questions. If symptoms persist go to the ED.  I have reviewed all pertinent data including patient's medical history in detail and updated the computerized patient record.     Total time spent counseling greater than fifty percent of total visit time.  Counseling included discussion regarding imaging findings, diagnosis, possibilities, treatment options, risks and benefits.      Thank you for the opportunity to care for this patient. Please don't hesitate to reach out with any questions/concerns         Luis Felipe Bryan MD  Cardiovascular Disease; Interventional Cardiology  [facility]

## 2024-12-30 ENCOUNTER — TELEPHONE (OUTPATIENT)
Dept: ELECTROPHYSIOLOGY | Facility: CLINIC | Age: 74
End: 2024-12-30
Payer: MEDICARE

## 2024-12-30 DIAGNOSIS — I25.5 ISCHEMIC CARDIOMYOPATHY: Primary | ICD-10-CM

## 2024-12-30 NOTE — TELEPHONE ENCOUNTER
Called patient's spouse to confirm/reschedule 3/17/2025 office visit with Dr. Sims, no answer, LVM, asking spouse to have patient call me back at my desk number to either confirm or reschedule the appointment.

## 2024-12-30 NOTE — TELEPHONE ENCOUNTER
----- Message from PADMINI Hopkins sent at 12/27/2024  3:14 PM CST -----  Regarding: FW: Overdue annual    ----- Message -----  From: Libby Ramey RN  Sent: 12/27/2024  12:54 PM CST  To: [facility] Staff  Subject: Overdue annual                                   Pt is overdue for annual follow up. Former pt of Dr. Walsh. Please call to schedule 11/2024 recall. Thank you!    Libby

## 2024-12-30 NOTE — TELEPHONE ENCOUNTER
Called patient to ask him to come in for 3/17/2025 starting with EKG at 8:00am, device check at 8:20am, and office visit at 8:40am. No answer, LVM

## 2024-12-30 NOTE — TELEPHONE ENCOUNTER
Patient is a former patient of Dr. Walsh. With his departure, patient will be scheduled with Dr. Sims to establish care. Patient agrees to transition to Dr. Sims and agrees with appointment date and time.

## 2025-01-03 LAB
OHS CV DC REMOTE DEVICE TYPE: NORMAL
OHS CV RV PACING PERCENT: 0 %

## 2025-01-07 ENCOUNTER — LAB VISIT (OUTPATIENT)
Dept: LAB | Facility: HOSPITAL | Age: 75
End: 2025-01-07
Attending: FAMILY MEDICINE
Payer: MEDICARE

## 2025-01-07 ENCOUNTER — OFFICE VISIT (OUTPATIENT)
Dept: FAMILY MEDICINE | Facility: CLINIC | Age: 75
End: 2025-01-07
Attending: FAMILY MEDICINE
Payer: MEDICARE

## 2025-01-07 ENCOUNTER — OFFICE VISIT (OUTPATIENT)
Dept: CARDIOLOGY | Facility: CLINIC | Age: 75
End: 2025-01-07
Payer: MEDICARE

## 2025-01-07 VITALS
HEIGHT: 71 IN | HEART RATE: 100 BPM | SYSTOLIC BLOOD PRESSURE: 145 MMHG | BODY MASS INDEX: 33.95 KG/M2 | DIASTOLIC BLOOD PRESSURE: 86 MMHG | WEIGHT: 242.5 LBS

## 2025-01-07 VITALS
TEMPERATURE: 98 F | OXYGEN SATURATION: 98 % | BODY MASS INDEX: 33.82 KG/M2 | HEART RATE: 60 BPM | SYSTOLIC BLOOD PRESSURE: 129 MMHG | DIASTOLIC BLOOD PRESSURE: 79 MMHG | WEIGHT: 242.5 LBS

## 2025-01-07 DIAGNOSIS — I25.118 CORONARY ARTERY DISEASE OF NATIVE ARTERY OF NATIVE HEART WITH STABLE ANGINA PECTORIS: ICD-10-CM

## 2025-01-07 DIAGNOSIS — E11.59 HYPERTENSION ASSOCIATED WITH DIABETES: ICD-10-CM

## 2025-01-07 DIAGNOSIS — I15.2 HYPERTENSION ASSOCIATED WITH DIABETES: ICD-10-CM

## 2025-01-07 DIAGNOSIS — I50.42 CHRONIC COMBINED SYSTOLIC AND DIASTOLIC CONGESTIVE HEART FAILURE: ICD-10-CM

## 2025-01-07 DIAGNOSIS — E11.40 TYPE 2 DIABETES MELLITUS WITH DIABETIC NEUROPATHY, WITH LONG-TERM CURRENT USE OF INSULIN: Primary | ICD-10-CM

## 2025-01-07 DIAGNOSIS — E11.9 DM TYPE 2 WITHOUT RETINOPATHY: ICD-10-CM

## 2025-01-07 DIAGNOSIS — E11.9 INSULIN DEPENDENT TYPE 2 DIABETES MELLITUS: ICD-10-CM

## 2025-01-07 DIAGNOSIS — Z79.4 INSULIN DEPENDENT TYPE 2 DIABETES MELLITUS: ICD-10-CM

## 2025-01-07 DIAGNOSIS — E11.40 TYPE 2 DIABETES MELLITUS WITH DIABETIC NEUROPATHY, WITH LONG-TERM CURRENT USE OF INSULIN: ICD-10-CM

## 2025-01-07 DIAGNOSIS — I50.41 ACUTE COMBINED SYSTOLIC AND DIASTOLIC CONGESTIVE HEART FAILURE: Primary | ICD-10-CM

## 2025-01-07 DIAGNOSIS — I25.5 ISCHEMIC CARDIOMYOPATHY: ICD-10-CM

## 2025-01-07 DIAGNOSIS — Z79.4 TYPE 2 DIABETES MELLITUS WITH DIABETIC NEUROPATHY, WITH LONG-TERM CURRENT USE OF INSULIN: ICD-10-CM

## 2025-01-07 DIAGNOSIS — Z79.4 TYPE 2 DIABETES MELLITUS WITH DIABETIC NEUROPATHY, WITH LONG-TERM CURRENT USE OF INSULIN: Primary | ICD-10-CM

## 2025-01-07 DIAGNOSIS — I47.20 VENTRICULAR TACHYCARDIA: ICD-10-CM

## 2025-01-07 DIAGNOSIS — I21.4 NSTEMI (NON-ST ELEVATED MYOCARDIAL INFARCTION): ICD-10-CM

## 2025-01-07 DIAGNOSIS — R05.8 DRY COUGH: ICD-10-CM

## 2025-01-07 DIAGNOSIS — E11.9 DIABETES MELLITUS TYPE 2 WITHOUT RETINOPATHY: ICD-10-CM

## 2025-01-07 DIAGNOSIS — I10 ESSENTIAL HYPERTENSION: ICD-10-CM

## 2025-01-07 DIAGNOSIS — E78.5 DYSLIPIDEMIA: ICD-10-CM

## 2025-01-07 DIAGNOSIS — Z95.810 ICD (IMPLANTABLE CARDIOVERTER-DEFIBRILLATOR), SINGLE, IN SITU: ICD-10-CM

## 2025-01-07 LAB
ALBUMIN SERPL BCP-MCNC: 3.6 G/DL (ref 3.5–5.2)
ALP SERPL-CCNC: 83 U/L (ref 40–150)
ALT SERPL W/O P-5'-P-CCNC: 18 U/L (ref 10–44)
ANION GAP SERPL CALC-SCNC: 13 MMOL/L (ref 8–16)
AST SERPL-CCNC: 16 U/L (ref 10–40)
BILIRUB SERPL-MCNC: 0.8 MG/DL (ref 0.1–1)
BUN SERPL-MCNC: 15 MG/DL (ref 8–23)
CALCIUM SERPL-MCNC: 9.6 MG/DL (ref 8.7–10.5)
CHLORIDE SERPL-SCNC: 106 MMOL/L (ref 95–110)
CO2 SERPL-SCNC: 24 MMOL/L (ref 23–29)
CREAT SERPL-MCNC: 1.1 MG/DL (ref 0.5–1.4)
EST. GFR  (NO RACE VARIABLE): >60 ML/MIN/1.73 M^2
ESTIMATED AVG GLUCOSE: 143 MG/DL (ref 68–131)
GLUCOSE SERPL-MCNC: 112 MG/DL (ref 70–110)
HBA1C MFR BLD: 6.6 % (ref 4–5.6)
POTASSIUM SERPL-SCNC: 3.9 MMOL/L (ref 3.5–5.1)
PROT SERPL-MCNC: 7.4 G/DL (ref 6–8.4)
SODIUM SERPL-SCNC: 143 MMOL/L (ref 136–145)

## 2025-01-07 PROCEDURE — 80053 COMPREHEN METABOLIC PANEL: CPT | Mod: HCNC | Performed by: FAMILY MEDICINE

## 2025-01-07 PROCEDURE — 3078F DIAST BP <80 MM HG: CPT | Mod: HCNC,CPTII,S$GLB, | Performed by: FAMILY MEDICINE

## 2025-01-07 PROCEDURE — 83036 HEMOGLOBIN GLYCOSYLATED A1C: CPT | Mod: HCNC | Performed by: FAMILY MEDICINE

## 2025-01-07 PROCEDURE — 3074F SYST BP LT 130 MM HG: CPT | Mod: HCNC,CPTII,S$GLB, | Performed by: FAMILY MEDICINE

## 2025-01-07 PROCEDURE — 1160F RVW MEDS BY RX/DR IN RCRD: CPT | Mod: HCNC,CPTII,S$GLB, | Performed by: FAMILY MEDICINE

## 2025-01-07 PROCEDURE — 3008F BODY MASS INDEX DOCD: CPT | Mod: HCNC,CPTII,S$GLB, | Performed by: FAMILY MEDICINE

## 2025-01-07 PROCEDURE — 99215 OFFICE O/P EST HI 40 MIN: CPT | Mod: HCNC,S$GLB,, | Performed by: FAMILY MEDICINE

## 2025-01-07 PROCEDURE — 99999 PR PBB SHADOW E&M-EST. PATIENT-LVL III: CPT | Mod: PBBFAC,HCNC,, | Performed by: FAMILY MEDICINE

## 2025-01-07 PROCEDURE — 1159F MED LIST DOCD IN RCRD: CPT | Mod: HCNC,CPTII,S$GLB, | Performed by: FAMILY MEDICINE

## 2025-01-07 PROCEDURE — 99999 PR PBB SHADOW E&M-EST. PATIENT-LVL II: CPT | Mod: PBBFAC,HCNC,, | Performed by: STUDENT IN AN ORGANIZED HEALTH CARE EDUCATION/TRAINING PROGRAM

## 2025-01-07 RX ORDER — BENZONATATE 200 MG/1
200 CAPSULE ORAL 3 TIMES DAILY PRN
Qty: 30 CAPSULE | Refills: 0 | Status: SHIPPED | OUTPATIENT
Start: 2025-01-07 | End: 2025-01-17

## 2025-01-07 RX ORDER — ISOSORBIDE MONONITRATE 30 MG/1
30 TABLET, EXTENDED RELEASE ORAL DAILY
Qty: 30 TABLET | Refills: 11 | Status: SHIPPED | OUTPATIENT
Start: 2025-01-07 | End: 2026-01-07

## 2025-01-07 NOTE — PROGRESS NOTES
Subjective:       Patient ID: Clifton Wilson is a 74 y.o. male.    Chief Complaint: Follow-up    74 yr old pleasant white male with DM II, HTN, HLD, CAD, Combined chronic CHF, MR, obesity, neuropathy, presents today for diabetes and left shoulder numbness. He is already on gabapentin 600 BID. No pain or discomfort. No chest pain/SOB/palpitations.      DM II - controlled  -   HGBA1C                   8.0 (H)             09/18/2024                                             - on metformin and insulin and sugars improving - UTD eye exam - foot exam UTD - on ASA and plavix. Losartan. He ate too much jamie cake during mardi gras.      HTN - chronic - controlled - on losartan, lasix - compliant - no side effects      HLD - chronic -  LDLCALC                  42.6 (L)            07/08/2024                                                   - controlled -       CAD/combined CHF - EF 35-40% - on external defibrillator - medical management - on ASA/plavix/ARB - no symptoms - following cardiology    Gout - improving - uses colchicine for flare up -     History as below - reviewed            Edema  Associated symptoms include arthralgias, joint swelling and myalgias. Pertinent negatives include no chest pain, congestion, coughing, diaphoresis, fatigue, headaches, neck pain, rash, visual change, vomiting or weakness.   Shoulder Pain   Pertinent negatives include no headaches. There is no history of diabetes.   Follow-up  Associated symptoms include arthralgias, joint swelling and myalgias. Pertinent negatives include no chest pain, congestion, coughing, diaphoresis, fatigue, headaches, neck pain, rash, visual change, vomiting or weakness.   Diabetes  He presents for his follow-up diabetic visit. He has type 2 diabetes mellitus. No MedicAlert identification noted. The initial diagnosis of diabetes was made 27 years ago. His disease course has been worsening. Hypoglycemia symptoms include sleepiness. Pertinent negatives for  hypoglycemia include no confusion, dizziness, headaches, hunger, mood changes, nervousness/anxiousness, pallor, seizures, speech difficulty, sweats or tremors. Associated symptoms include foot paresthesias. Pertinent negatives for diabetes include no blurred vision, no chest pain, no fatigue, no foot ulcerations, no polydipsia, no polyphagia, no polyuria, no visual change, no weakness and no weight loss. Hypoglycemia complications include blackouts and hospitalization. Pertinent negatives for hypoglycemia complications include no nocturnal hypoglycemia, no required assistance and no required glucagon injection. Symptoms are stable. Diabetic complications include autonomic neuropathy, heart disease, impotence and peripheral neuropathy. Pertinent negatives for diabetic complications include no CVA, nephropathy, PVD or retinopathy. Risk factors for coronary artery disease include hypertension, obesity, diabetes mellitus and male sex. Current diabetic treatment includes diet, insulin injections and oral agent (monotherapy). He is compliant with treatment all of the time. He is currently taking insulin pre-breakfast, pre-lunch, pre-dinner and at bedtime. Insulin injections are given by patient. Rotation sites for injection include the abdominal wall, arms and thighs. His weight is fluctuating minimally. He is following a diabetic, generally healthy, low fat/cholesterol and low salt diet. Meal planning includes avoidance of concentrated sweets and carbohydrate counting. He has not had a previous visit with a dietitian. He participates in exercise three times a week. He monitors blood glucose at home 3-4 x per day. He monitors urine at home <1 x per month. Blood glucose monitoring compliance is excellent. His home blood glucose trend is decreasing steadily. An ACE inhibitor/angiotensin II receptor blocker is being taken. He does not see a podiatrist.Eye exam is current.   Knee Pain     Swelling  This is a chronic problem.  The current episode started more than 1 month ago. The problem occurs intermittently. The problem has been gradually worsening. Associated symptoms include arthralgias, joint swelling and myalgias. Pertinent negatives include no chest pain, congestion, coughing, diaphoresis, fatigue, headaches, neck pain, rash, visual change, vomiting or weakness.   Hypertension  This is a chronic problem. The current episode started more than 1 year ago. The problem has been gradually improving since onset. The problem is controlled. Pertinent negatives include no blurred vision, chest pain, headaches, malaise/fatigue, neck pain, palpitations, peripheral edema, PND or sweats. Risk factors for coronary artery disease include diabetes mellitus, dyslipidemia, male gender and obesity. Past treatments include angiotensin blockers and diuretics. The current treatment provides significant improvement. There are no compliance problems.  Hypertensive end-organ damage includes CAD/MI and heart failure. There is no history of CVA, left ventricular hypertrophy, PVD or retinopathy. There is no history of chronic renal disease, hypercortisolism, hyperparathyroidism, pheochromocytoma, renovascular disease or a thyroid problem.   Hyperlipidemia  This is a chronic problem. The current episode started more than 1 year ago. The problem is controlled. Recent lipid tests were reviewed and are normal. Exacerbating diseases include obesity. He has no history of chronic renal disease or diabetes. There are no known factors aggravating his hyperlipidemia. Associated symptoms include myalgias. Pertinent negatives include no chest pain or focal sensory loss. Current antihyperlipidemic treatment includes statins. The current treatment provides moderate improvement of lipids. There are no compliance problems.  Risk factors for coronary artery disease include diabetes mellitus, dyslipidemia, male sex, hypertension and obesity.     Review of Systems    Constitutional: Negative.  Negative for activity change, diaphoresis, fatigue, malaise/fatigue, unexpected weight change and weight loss.   HENT: Negative.  Negative for nasal congestion, ear pain, mouth sores, rhinorrhea and voice change.    Eyes: Negative.  Negative for blurred vision, pain, discharge and visual disturbance.   Respiratory: Negative.  Negative for apnea, cough and wheezing.    Cardiovascular: Negative.  Negative for chest pain, palpitations and PND.   Gastrointestinal: Negative.  Negative for abdominal distention, anal bleeding, diarrhea and vomiting.   Endocrine: Negative.  Negative for cold intolerance, polydipsia, polyphagia and polyuria.   Genitourinary:  Positive for impotence. Negative for decreased urine volume, difficulty urinating, discharge, frequency and scrotal swelling.   Musculoskeletal:  Positive for arthralgias, joint swelling and myalgias. Negative for back pain, neck pain and neck stiffness.   Integumentary:  Negative for color change, pallor and rash. Negative.   Allergic/Immunologic: Negative.  Negative for environmental allergies.   Neurological: Negative.  Negative for dizziness, tremors, seizures, speech difficulty, weakness, light-headedness and headaches.   Hematological: Negative.    Psychiatric/Behavioral: Negative.  Negative for agitation, confusion, dysphoric mood and suicidal ideas. The patient is not nervous/anxious.          PMH/PSH/FH/SH/MED/ALLERGY reviewed    Past Medical History:   Diagnosis Date    Anticoagulant long-term use     Cardiomyopathy     CHF (congestive heart failure)     Coronary artery disease     Diabetes mellitus     Gout     Heart attack     Hypertension     Pneumonia        Past Surgical History:   Procedure Laterality Date    APPENDECTOMY      CARDIAC CATHETERIZATION      7 stents    CARDIAC DEFIBRILLATOR PLACEMENT      CATARACT EXTRACTION Bilateral 2011    COLONOSCOPY N/A 8/10/2018    Procedure: COLONOSCOPY golytely;  Surgeon: Valentine HANKINS  MD Tao;  Location: [facility] ENDO;  Service: Endoscopy;  Laterality: N/A;    CORONARY ANGIOGRAPHY N/A 11/11/2024    Procedure: ANGIOGRAM, CORONARY ARTERY;  Surgeon: Luis Felipe Wright MD;  Location: [facility] CATH LAB/EP;  Service: Cardiology;  Laterality: N/A;    EYE SURGERY      INSTANTANEOUS WAVE-FREE RATIO (IFR) N/A 11/11/2024    Procedure: Instantaneous Wave-Free Ratio (IFR);  Surgeon: Luis Felipe Wright MD;  Location: [facility] CATH LAB/EP;  Service: Cardiology;  Laterality: N/A;    LEFT HEART CATHETERIZATION Left 11/11/2024    Procedure: Left heart cath;  Surgeon: Luis Felipe Wright MD;  Location: [facility] CATH LAB/EP;  Service: Cardiology;  Laterality: Left;    REVISION OF IMPLANTABLE CARDIOVERTER-DEFIBRILLATOR (ICD) ELECTRODE LEAD PLACEMENT N/A 11/11/2019    Procedure: REVISION, INSERTION, ELECTRODE LEAD, ICD;  Surgeon: Shukri Walsh MD;  Location: [facility] EP LAB;  Service: Cardiology;  Laterality: N/A;  Lead malfxn, RV lead ICD, SJM, anes, DM, 3 PREP       Family History   Problem Relation Name Age of Onset    Heart disease Mother      Heart disease Father      Amblyopia Neg Hx      Blindness Neg Hx      Cataracts Neg Hx      Glaucoma Neg Hx      Macular degeneration Neg Hx      Retinal detachment Neg Hx      Strabismus Neg Hx         Social History     Socioeconomic History    Marital status:    Tobacco Use    Smoking status: Former    Smokeless tobacco: Never   Substance and Sexual Activity    Alcohol use: Yes     Comment: socially    Drug use: No    Sexual activity: Yes     Social Drivers of Health     Financial Resource Strain: Low Risk  (11/23/2024)    Overall Financial Resource Strain (CARDIA)     Difficulty of Paying Living Expenses: Not hard at all   Food Insecurity: No Food Insecurity (11/23/2024)    Hunger Vital Sign     Worried About Running Out of Food in the Last Year: Never true     Ran Out of Food in the Last Year: Never true   Transportation Needs: No Transportation Needs (11/23/2024)     TRANSPORTATION NEEDS     Transportation : No   Physical Activity: Insufficiently Active (11/19/2024)    Exercise Vital Sign     Days of Exercise per Week: 3 days     Minutes of Exercise per Session: 20 min   Stress: No Stress Concern Present (11/23/2024)    Citizen of the [place] [employer] Occupational Health - Occupational Stress Questionnaire     Feeling of Stress : Not at all   Housing Stability: Low Risk  (11/23/2024)    Housing Stability Vital Sign     Unable to Pay for Housing in the Last Year: No     Homeless in the Last Year: No       Current Outpatient Medications   Medication Sig Dispense Refill    aspirin (ECOTRIN) 81 MG EC tablet Take 81 mg by mouth once daily.      atorvastatin (LIPITOR) 40 MG tablet Take 1 tablet (40 mg total) by mouth once daily. 90 tablet 3    carvediloL (COREG) 6.25 MG tablet Take 1 tablet (6.25 mg total) by mouth 2 (two) times daily. 180 tablet 3    clopidogreL (PLAVIX) 75 mg tablet Take 1 tablet (75 mg total) by mouth once daily. 90 tablet 3    colchicine (MITIGARE) 0.6 mg Cap Take 1 capsule (0.6 mg total) by mouth once daily. 90 capsule 3    empagliflozin (JARDIANCE) 10 mg tablet Take 1 tablet (10 mg total) by mouth once daily. 30 tablet 0    furosemide (LASIX) 20 MG tablet Take 1 tablet (20 mg total) by mouth 2 (two) times daily. 180 tablet 4    furosemide (LASIX) 20 MG tablet Take 1 tablet (20 mg total) by mouth 2 (two) times daily. 180 tablet 2    gabapentin (NEURONTIN) 300 MG capsule Take 2 capsules (600 mg total) by mouth 2 (two) times daily. 360 capsule 1    insulin aspart U-100 (NOVOLOG FLEXPEN U-100 INSULIN) 100 unit/mL (3 mL) InPn pen Inject 18 Units into the skin 3 (three) times daily with meals. 45 mL 5    insulin glargine U-100, Lantus, (LANTUS SOLOSTAR U-100 INSULIN) 100 unit/mL (3 mL) InPn pen Inject 40 Units into the skin every evening. 30 mL 1    losartan (COZAAR) 25 MG tablet Take 1 tablet (25 mg total) by mouth once daily. 90 tablet 3    metFORMIN (GLUCOPHAGE) 1000 MG  "tablet Take 1 tablet (1,000 mg total) by mouth 2 (two) times daily with meals. 180 tablet 4    zolpidem (AMBIEN) 5 MG Tab TAKE 1 TABLET BY MOUTH EVERY NIGHT AS NEEDED 90 tablet 3    alcohol swabs (BD ALCOHOL SWABS) PadM USE FOUR TIMES DAILY 400 each 3    benzonatate (TESSALON) 200 MG capsule Take 1 capsule (200 mg total) by mouth 3 (three) times daily as needed for Cough. 30 capsule 0    blood glucose control high,low (ACCU-CHEK CATHRYN CONTROL SOLN) Soln Use as directed to check blood sugar 1 each 1    blood sugar diagnostic Strp test blood sugar as directed four times daily 100 each 3    blood-glucose meter (TRUE METRIX GLUCOSE METER) Misc use as directed 1 each 0    cyanocobalamin (VITAMIN B-12) 1000 MCG tablet TAKE ONE TABLET BY MOUTH ONCE DAILY FOR VITAMIN DEFICIENCIES      isosorbide mononitrate (IMDUR) 30 MG 24 hr tablet Take 1 tablet (30 mg total) by mouth once daily. 30 tablet 0    lancets 30 gauge Misc usea s directed to check blood glucose four times daily 100 each 3    nitroGLYCERIN (NITROSTAT) 0.4 MG SL tablet Place 1 tablet (0.4 mg total) under the tongue every 5 (five) minutes as needed for Chest pain. 25 tablet 3    pen needle, diabetic (BD ULTRA-FINE SINA PEN NEEDLE) 32 gauge x 5/32" Ndle Use four times daily for insulin administration 400 each 3    promethazine-dextromethorphan (PROMETHAZINE-DM) 6.25-15 mg/5 mL Syrp Take 5 mLs by mouth 2 (two) times daily as needed (cough). 180 mL 1     No current facility-administered medications for this visit.       Review of patient's allergies indicates:  No Known Allergies      Objective:       Vitals:    01/07/25 0934   BP: 129/79   Pulse: 60   Temp: 98 °F (36.7 °C)       Physical Exam  Constitutional:       Appearance: He is well-developed.   HENT:      Head: Normocephalic and atraumatic.      Right Ear: External ear normal.      Left Ear: External ear normal.      Nose: Nose normal.      Mouth/Throat:      Pharynx: No oropharyngeal exudate.   Eyes:      " General: No scleral icterus.        Right eye: No discharge.         Left eye: No discharge.      Conjunctiva/sclera: Conjunctivae normal.      Pupils: Pupils are equal, round, and reactive to light.   Neck:      Thyroid: No thyromegaly.      Vascular: No JVD.      Trachea: No tracheal deviation.   Cardiovascular:      Rate and Rhythm: Normal rate and regular rhythm.      Heart sounds: Normal heart sounds. No murmur heard.     No friction rub. No gallop.   Pulmonary:      Effort: Pulmonary effort is normal. No respiratory distress.      Breath sounds: Normal breath sounds. No stridor. No wheezing or rales.   Chest:      Chest wall: No tenderness.   Abdominal:      General: Bowel sounds are normal. There is no distension.      Palpations: Abdomen is soft. There is no mass.      Tenderness: There is no abdominal tenderness. There is no guarding or rebound.      Hernia: No hernia is present.   Musculoskeletal:         General: No swelling or tenderness. Normal range of motion.      Cervical back: Normal range of motion and neck supple.      Comments: Left knee swelling, warm and TTP.   Lymphadenopathy:      Cervical: No cervical adenopathy.   Skin:     General: Skin is warm and dry.      Coloration: Skin is not pale.      Findings: No erythema or rash.   Neurological:      Mental Status: He is alert and oriented to person, place, and time.      Cranial Nerves: No cranial nerve deficit.      Motor: No abnormal muscle tone.      Coordination: Coordination normal.      Deep Tendon Reflexes: Reflexes are normal and symmetric. Reflexes normal.   Psychiatric:         Behavior: Behavior normal.         Thought Content: Thought content normal.         Judgment: Judgment normal.         Assessment:       Problem List Items Addressed This Visit       Type 2 diabetes mellitus with diabetic neuropathy - Primary    Relevant Orders    Comprehensive Metabolic Panel    Hemoglobin A1C    Insulin dependent type 2 diabetes mellitus     ICD (implantable cardioverter-defibrillator), single, in situ    Hypertension associated with diabetes    Diabetes mellitus type 2 without retinopathy    Chronic combined systolic and diastolic congestive heart failure     Other Visit Diagnoses       Dry cough        Relevant Medications    benzonatate (TESSALON) 200 MG capsule            Plan:           Clifton was seen today for follow-up.    Diagnoses and all orders for this visit:    Type 2 diabetes mellitus with diabetic neuropathy, with long-term current use of insulin  -     Comprehensive Metabolic Panel; Future  -     Hemoglobin A1C; Future    Hypertension associated with diabetes    Chronic combined systolic and diastolic congestive heart failure    Diabetes mellitus type 2 without retinopathy    Insulin dependent type 2 diabetes mellitus    ICD (implantable cardioverter-defibrillator), single, in situ    Dry cough  -     benzonatate (TESSALON) 200 MG capsule; Take 1 capsule (200 mg total) by mouth 3 (three) times daily as needed for Cough.        DM II controlled/hyperglycemia  - improving since started insulin  -lantus and novolog to continue  -strict diet control        HTN  -controlled    HLD  -controlled    Combined CHF  -follows cardiology  -on external defibrillator    Neuropathy  -increase neurontin and increase dose to 600 mgTID    Obesity  -diet and exercise as tolerated    chronic gout  -uric acid levels  -conchicine as needed    Spent adequate time in obtaining history and explaining differentials    40 minutes spent during this visit of which greater than 50% devoted to face-face counseling and coordination of care regarding diagnosis and management plan    Follow up in about 6 months (around 7/7/2025), or if symptoms worsen or fail to improve.

## 2025-01-16 ENCOUNTER — HOSPITAL ENCOUNTER (OUTPATIENT)
Facility: HOSPITAL | Age: 75
Discharge: HOME | End: 2025-01-18
Attending: EMERGENCY MEDICINE | Admitting: INTERNAL MEDICINE
Payer: MEDICARE

## 2025-01-16 DIAGNOSIS — R55 SYNCOPE: ICD-10-CM

## 2025-01-16 DIAGNOSIS — R55 NEAR SYNCOPE: ICD-10-CM

## 2025-01-16 DIAGNOSIS — R07.9 CHEST PAIN: ICD-10-CM

## 2025-01-16 DIAGNOSIS — R55 SYNCOPE AND COLLAPSE: Primary | ICD-10-CM

## 2025-01-16 DIAGNOSIS — R55 SYNCOPE AND COLLAPSE: ICD-10-CM

## 2025-01-16 LAB
ALBUMIN SERPL BCP-MCNC: 3.7 G/DL (ref 3.5–5.2)
ALP SERPL-CCNC: 76 U/L (ref 40–150)
ALT SERPL W/O P-5'-P-CCNC: 19 U/L (ref 10–44)
ANION GAP SERPL CALC-SCNC: 13 MMOL/L (ref 8–16)
AST SERPL-CCNC: 17 U/L (ref 10–40)
BASOPHILS # BLD AUTO: 0.07 K/UL (ref 0–0.2)
BASOPHILS NFR BLD: 0.7 % (ref 0–1.9)
BILIRUB SERPL-MCNC: 1 MG/DL (ref 0.1–1)
BNP SERPL-MCNC: 42 PG/ML (ref 0–99)
BUN SERPL-MCNC: 23 MG/DL (ref 8–23)
CALCIUM SERPL-MCNC: 9 MG/DL (ref 8.7–10.5)
CHLORIDE SERPL-SCNC: 104 MMOL/L (ref 95–110)
CO2 SERPL-SCNC: 23 MMOL/L (ref 23–29)
CREAT SERPL-MCNC: 1.4 MG/DL (ref 0.5–1.4)
DIFFERENTIAL METHOD BLD: ABNORMAL
EOSINOPHIL # BLD AUTO: 0.2 K/UL (ref 0–0.5)
EOSINOPHIL NFR BLD: 2.5 % (ref 0–8)
ERYTHROCYTE [DISTWIDTH] IN BLOOD BY AUTOMATED COUNT: 12 % (ref 11.5–14.5)
EST. GFR  (NO RACE VARIABLE): 53 ML/MIN/1.73 M^2
GLUCOSE SERPL-MCNC: 129 MG/DL (ref 70–110)
HCT VFR BLD AUTO: 37.3 % (ref 40–54)
HGB BLD-MCNC: 12.3 G/DL (ref 14–18)
IMM GRANULOCYTES # BLD AUTO: 0.04 K/UL (ref 0–0.04)
IMM GRANULOCYTES NFR BLD AUTO: 0.4 % (ref 0–0.5)
INR PPP: 1 (ref 0.8–1.2)
LYMPHOCYTES # BLD AUTO: 1.3 K/UL (ref 1–4.8)
LYMPHOCYTES NFR BLD: 13.9 % (ref 18–48)
MCH RBC QN AUTO: 32.1 PG (ref 27–31)
MCHC RBC AUTO-ENTMCNC: 33 G/DL (ref 32–36)
MCV RBC AUTO: 97 FL (ref 82–98)
MONOCYTES # BLD AUTO: 0.7 K/UL (ref 0.3–1)
MONOCYTES NFR BLD: 7 % (ref 4–15)
NEUTROPHILS # BLD AUTO: 7.1 K/UL (ref 1.8–7.7)
NEUTROPHILS NFR BLD: 75.5 % (ref 38–73)
NRBC BLD-RTO: 0 /100 WBC
PLATELET # BLD AUTO: 216 K/UL (ref 150–450)
PMV BLD AUTO: 10 FL (ref 9.2–12.9)
POCT GLUCOSE: 131 MG/DL (ref 70–110)
POTASSIUM SERPL-SCNC: 4.5 MMOL/L (ref 3.5–5.1)
PROT SERPL-MCNC: 7 G/DL (ref 6–8.4)
PROTHROMBIN TIME: 11.3 SEC (ref 9–12.5)
RBC # BLD AUTO: 3.83 M/UL (ref 4.6–6.2)
SODIUM SERPL-SCNC: 140 MMOL/L (ref 136–145)
TROPONIN I SERPL DL<=0.01 NG/ML-MCNC: 0.01 NG/ML (ref 0–0.03)
TROPONIN I SERPL DL<=0.01 NG/ML-MCNC: 0.01 NG/ML (ref 0–0.03)
WBC # BLD AUTO: 9.44 K/UL (ref 3.9–12.7)

## 2025-01-16 PROCEDURE — 93005 ELECTROCARDIOGRAM TRACING: CPT | Mod: HCNC

## 2025-01-16 PROCEDURE — 99285 EMERGENCY DEPT VISIT HI MDM: CPT | Mod: 25,HCNC

## 2025-01-16 PROCEDURE — 85610 PROTHROMBIN TIME: CPT | Mod: HCNC | Performed by: EMERGENCY MEDICINE

## 2025-01-16 PROCEDURE — 94761 N-INVAS EAR/PLS OXIMETRY MLT: CPT | Mod: HCNC

## 2025-01-16 PROCEDURE — 80053 COMPREHEN METABOLIC PANEL: CPT | Mod: HCNC | Performed by: EMERGENCY MEDICINE

## 2025-01-16 PROCEDURE — G0378 HOSPITAL OBSERVATION PER HR: HCPCS | Mod: HCNC

## 2025-01-16 PROCEDURE — 63600175 PHARM REV CODE 636 W HCPCS: Mod: HCNC | Performed by: INTERNAL MEDICINE

## 2025-01-16 PROCEDURE — 85025 COMPLETE CBC W/AUTO DIFF WBC: CPT | Mod: HCNC | Performed by: EMERGENCY MEDICINE

## 2025-01-16 PROCEDURE — 84484 ASSAY OF TROPONIN QUANT: CPT | Mod: 91,HCNC | Performed by: EMERGENCY MEDICINE

## 2025-01-16 PROCEDURE — 83880 ASSAY OF NATRIURETIC PEPTIDE: CPT | Mod: HCNC | Performed by: EMERGENCY MEDICINE

## 2025-01-16 PROCEDURE — 25500020 PHARM REV CODE 255: Mod: HCNC | Performed by: EMERGENCY MEDICINE

## 2025-01-16 PROCEDURE — 96372 THER/PROPH/DIAG INJ SC/IM: CPT | Performed by: INTERNAL MEDICINE

## 2025-01-16 PROCEDURE — 99900035 HC TECH TIME PER 15 MIN (STAT): Mod: HCNC

## 2025-01-16 PROCEDURE — 93010 ELECTROCARDIOGRAM REPORT: CPT | Mod: HCNC,,, | Performed by: INTERNAL MEDICINE

## 2025-01-16 RX ORDER — INSULIN GLARGINE 100 [IU]/ML
35 INJECTION, SOLUTION SUBCUTANEOUS NIGHTLY
Status: DISCONTINUED | OUTPATIENT
Start: 2025-01-17 | End: 2025-01-18 | Stop reason: HOSPADM

## 2025-01-16 RX ORDER — SODIUM CHLORIDE 0.9 % (FLUSH) 0.9 %
10 SYRINGE (ML) INJECTION EVERY 12 HOURS PRN
Status: DISCONTINUED | OUTPATIENT
Start: 2025-01-16 | End: 2025-01-18 | Stop reason: HOSPADM

## 2025-01-16 RX ORDER — ONDANSETRON HYDROCHLORIDE 2 MG/ML
4 INJECTION, SOLUTION INTRAVENOUS EVERY 8 HOURS PRN
Status: DISCONTINUED | OUTPATIENT
Start: 2025-01-16 | End: 2025-01-18 | Stop reason: HOSPADM

## 2025-01-16 RX ORDER — ENOXAPARIN SODIUM 100 MG/ML
40 INJECTION SUBCUTANEOUS EVERY 24 HOURS
Status: DISCONTINUED | OUTPATIENT
Start: 2025-01-16 | End: 2025-01-18 | Stop reason: HOSPADM

## 2025-01-16 RX ORDER — CLOPIDOGREL BISULFATE 75 MG/1
75 TABLET ORAL DAILY
Status: DISCONTINUED | OUTPATIENT
Start: 2025-01-17 | End: 2025-01-18 | Stop reason: HOSPADM

## 2025-01-16 RX ORDER — ASPIRIN 81 MG/1
81 TABLET ORAL DAILY
Status: DISCONTINUED | OUTPATIENT
Start: 2025-01-17 | End: 2025-01-18 | Stop reason: HOSPADM

## 2025-01-16 RX ORDER — NALOXONE HCL 0.4 MG/ML
0.02 VIAL (ML) INJECTION
Status: DISCONTINUED | OUTPATIENT
Start: 2025-01-16 | End: 2025-01-18 | Stop reason: HOSPADM

## 2025-01-16 RX ORDER — ACETAMINOPHEN 325 MG/1
650 TABLET ORAL EVERY 4 HOURS PRN
Status: DISCONTINUED | OUTPATIENT
Start: 2025-01-16 | End: 2025-01-18 | Stop reason: HOSPADM

## 2025-01-16 RX ORDER — TALC
6 POWDER (GRAM) TOPICAL NIGHTLY PRN
Status: DISCONTINUED | OUTPATIENT
Start: 2025-01-16 | End: 2025-01-18 | Stop reason: HOSPADM

## 2025-01-16 RX ORDER — IBUPROFEN 200 MG
24 TABLET ORAL
Status: DISCONTINUED | OUTPATIENT
Start: 2025-01-16 | End: 2025-01-18 | Stop reason: HOSPADM

## 2025-01-16 RX ORDER — ATORVASTATIN CALCIUM 40 MG/1
40 TABLET, FILM COATED ORAL DAILY
Status: DISCONTINUED | OUTPATIENT
Start: 2025-01-17 | End: 2025-01-18 | Stop reason: HOSPADM

## 2025-01-16 RX ORDER — ACETAMINOPHEN 325 MG/1
650 TABLET ORAL EVERY 8 HOURS PRN
Status: DISCONTINUED | OUTPATIENT
Start: 2025-01-16 | End: 2025-01-18 | Stop reason: HOSPADM

## 2025-01-16 RX ORDER — IPRATROPIUM BROMIDE AND ALBUTEROL SULFATE 2.5; .5 MG/3ML; MG/3ML
3 SOLUTION RESPIRATORY (INHALATION) EVERY 4 HOURS PRN
Status: DISCONTINUED | OUTPATIENT
Start: 2025-01-16 | End: 2025-01-18 | Stop reason: HOSPADM

## 2025-01-16 RX ORDER — GLUCAGON 1 MG
1 KIT INJECTION
Status: DISCONTINUED | OUTPATIENT
Start: 2025-01-16 | End: 2025-01-18 | Stop reason: HOSPADM

## 2025-01-16 RX ORDER — IBUPROFEN 200 MG
16 TABLET ORAL
Status: DISCONTINUED | OUTPATIENT
Start: 2025-01-16 | End: 2025-01-18 | Stop reason: HOSPADM

## 2025-01-16 RX ADMIN — ENOXAPARIN SODIUM 40 MG: 40 INJECTION SUBCUTANEOUS at 08:01

## 2025-01-16 RX ADMIN — IOHEXOL 100 ML: 350 INJECTION, SOLUTION INTRAVENOUS at 02:01

## 2025-01-16 NOTE — PHARMACY MED REC
"        [facility]           Pharmacy  Admission Medication History     The home medication history was taken by Ruth Washington.      Medication history obtained from Medications listed below were obtained from: Patient/family.    Based on information gathered for medication list, you may go to "Admission" then "Reconcile Home Medications" tabs to review and/or act upon those items.     The home medication list has been updated by the Pharmacy department.   Please read ALL comments highlighted in yellow.   Please address this information as you see fit.    Feel free to contact us if you have any questions or require assistance.        No current facility-administered medications on file prior to encounter.     Current Outpatient Medications on File Prior to Encounter   Medication Sig Dispense Refill    aspirin (ECOTRIN) 81 MG EC tablet Take 81 mg by mouth once daily.      atorvastatin (LIPITOR) 40 MG tablet Take 1 tablet (40 mg total) by mouth once daily. 90 tablet 3    benzonatate (TESSALON) 200 MG capsule Take 1 capsule (200 mg total) by mouth 3 (three) times daily as needed for Cough. 30 capsule 0    carvediloL (COREG) 6.25 MG tablet Take 1 tablet (6.25 mg total) by mouth 2 (two) times daily. 180 tablet 3    clopidogreL (PLAVIX) 75 mg tablet Take 1 tablet (75 mg total) by mouth once daily. 90 tablet 3    colchicine (MITIGARE) 0.6 mg Cap Take 1 capsule (0.6 mg total) by mouth once daily. 90 capsule 3    cyanocobalamin (VITAMIN B-12) 1000 MCG tablet Take 1,000 mcg by mouth once daily.      empagliflozin (JARDIANCE) 10 mg tablet Take 1 tablet (10 mg total) by mouth once daily. 30 tablet 11    furosemide (LASIX) 20 MG tablet Take 1 tablet (20 mg total) by mouth 2 (two) times daily. 180 tablet 4    gabapentin (NEURONTIN) 300 MG capsule Take 2 capsules (600 mg total) by mouth 2 (two) times daily. 360 capsule 1    insulin aspart U-100 (NOVOLOG FLEXPEN U-100 INSULIN) 100 unit/mL (3 mL) InPn pen Inject " "18 Units into the skin 3 (three) times daily with meals. (Patient taking differently: Inject 15 Units into the skin as needed.) 45 mL 5    insulin glargine U-100, Lantus, (LANTUS SOLOSTAR U-100 INSULIN) 100 unit/mL (3 mL) InPn pen Inject 40 Units into the skin every evening. (Patient taking differently: Inject 35 Units into the skin every evening.) 30 mL 1    isosorbide mononitrate (IMDUR) 30 MG 24 hr tablet Take 1 tablet (30 mg total) by mouth once daily. 30 tablet 11    losartan (COZAAR) 25 MG tablet Take 1 tablet (25 mg total) by mouth once daily. 90 tablet 3    metFORMIN (GLUCOPHAGE) 1000 MG tablet Take 1 tablet (1,000 mg total) by mouth 2 (two) times daily with meals. 180 tablet 4    multivit-minerals/FA/lycopene (ONE-A-DAY MEN'S 50 PLUS ORAL) Take 1 tablet by mouth once daily.      promethazine-dextromethorphan (PROMETHAZINE-DM) 6.25-15 mg/5 mL Syrp Take 5 mLs by mouth 2 (two) times daily as needed (cough). 180 mL 1    zolpidem (AMBIEN) 5 MG Tab TAKE 1 TABLET BY MOUTH EVERY NIGHT AS NEEDED (Patient taking differently: Take 5 mg by mouth nightly as needed.) 90 tablet 3    alcohol swabs (BD ALCOHOL SWABS) PadM USE FOUR TIMES DAILY 400 each 3    blood glucose control high,low (ACCU-CHEK CATHRYN CONTROL SOLN) Soln Use as directed to check blood sugar 1 each 1    blood sugar diagnostic Strp test blood sugar as directed four times daily 100 each 3    blood-glucose meter (TRUE METRIX GLUCOSE METER) Misc use as directed 1 each 0    furosemide (LASIX) 20 MG tablet Take 1 tablet (20 mg total) by mouth 2 (two) times daily. 180 tablet 2    lancets 30 gauge Misc usea s directed to check blood glucose four times daily 100 each 3    nitroGLYCERIN (NITROSTAT) 0.4 MG SL tablet Place 1 tablet (0.4 mg total) under the tongue every 5 (five) minutes as needed for Chest pain. 25 tablet 3    pen needle, diabetic (BD ULTRA-FINE SINA PEN NEEDLE) 32 gauge x 5/32" Ndle Use four times daily for insulin administration 400 each 3 "       Please address this information as you see fit.  Feel free to contact us if you have any questions or require assistance.    Ruth Washington  [phone]          .

## 2025-01-16 NOTE — ED NOTES
Pt. Returned from CT scan and ambulated to bathroom. Wife at bedside. Denies any dizziness with ambulating or orthostatic changes.
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
## 2025-01-17 LAB
ALBUMIN SERPL BCP-MCNC: 3.6 G/DL (ref 3.5–5.2)
ALP SERPL-CCNC: 80 U/L (ref 40–150)
ALT SERPL W/O P-5'-P-CCNC: 20 U/L (ref 10–44)
ANION GAP SERPL CALC-SCNC: 13 MMOL/L (ref 8–16)
APICAL FOUR CHAMBER EJECTION FRACTION: 65 %
APICAL TWO CHAMBER EJECTION FRACTION: 45 %
ASCENDING AORTA: 3.82 CM
AST SERPL-CCNC: 18 U/L (ref 10–40)
BILIRUB SERPL-MCNC: 1 MG/DL (ref 0.1–1)
BSA FOR ECHO PROCEDURE: 2.31 M2
BUN SERPL-MCNC: 27 MG/DL (ref 8–23)
CALCIUM SERPL-MCNC: 9 MG/DL (ref 8.7–10.5)
CHLORIDE SERPL-SCNC: 103 MMOL/L (ref 95–110)
CHOLEST SERPL-MCNC: 131 MG/DL (ref 120–199)
CHOLEST/HDLC SERPL: 4.4 {RATIO} (ref 2–5)
CO2 SERPL-SCNC: 25 MMOL/L (ref 23–29)
CREAT SERPL-MCNC: 1.5 MG/DL (ref 0.5–1.4)
CV ECHO LV RWT: 0.45 CM
DOP CALC LVOT AREA: 4.5 CM2
DOP CALC LVOT DIAMETER: 2.4 CM
ECHO LV POSTERIOR WALL: 1.2 CM (ref 0.6–1.1)
EST. GFR  (NO RACE VARIABLE): 49 ML/MIN/1.73 M^2
FRACTIONAL SHORTENING: 20.8 % (ref 28–44)
GLUCOSE SERPL-MCNC: 137 MG/DL (ref 70–110)
HDLC SERPL-MCNC: 30 MG/DL (ref 40–75)
HDLC SERPL: 22.9 % (ref 20–50)
INTERVENTRICULAR SEPTUM: 0.9 CM (ref 0.6–1.1)
LDLC SERPL CALC-MCNC: 49.6 MG/DL (ref 63–159)
LEFT INTERNAL DIMENSION IN SYSTOLE: 4.2 CM (ref 2.1–4)
LEFT VENTRICLE DIASTOLIC VOLUME INDEX: 61.03 ML/M2
LEFT VENTRICLE DIASTOLIC VOLUME: 137.92 ML
LEFT VENTRICLE END DIASTOLIC VOLUME APICAL 2 CHAMBER: 74.55 ML
LEFT VENTRICLE END DIASTOLIC VOLUME APICAL 4 CHAMBER: 102.77 ML
LEFT VENTRICLE MASS INDEX: 94.6 G/M2
LEFT VENTRICLE SYSTOLIC VOLUME INDEX: 34.3 ML/M2
LEFT VENTRICLE SYSTOLIC VOLUME: 77.51 ML
LEFT VENTRICULAR INTERNAL DIMENSION IN DIASTOLE: 5.3 CM (ref 3.5–6)
LEFT VENTRICULAR MASS: 213.9 G
LVED V (TEICH): 137.92 ML
LVES V (TEICH): 77.51 ML
MAGNESIUM SERPL-MCNC: 2.1 MG/DL (ref 1.6–2.6)
NONHDLC SERPL-MCNC: 101 MG/DL
OHS LV EJECTION FRACTION SIMPSONS BIPLANE MOD: 54 %
PISA TR MAX VEL: 1.8 M/S
POCT GLUCOSE: 126 MG/DL (ref 70–110)
POCT GLUCOSE: 153 MG/DL (ref 70–110)
POCT GLUCOSE: 215 MG/DL (ref 70–110)
POTASSIUM SERPL-SCNC: 3.8 MMOL/L (ref 3.5–5.1)
PROT SERPL-MCNC: 7 G/DL (ref 6–8.4)
SINUS: 3.68 CM
SODIUM SERPL-SCNC: 141 MMOL/L (ref 136–145)
STJ: 2.93 CM
TR MAX PG: 13 MMHG
TRIGL SERPL-MCNC: 257 MG/DL (ref 30–150)
TSH SERPL DL<=0.005 MIU/L-ACNC: 0.77 UIU/ML (ref 0.4–4)
Z-SCORE OF LEFT VENTRICULAR DIMENSION IN END DIASTOLE: -4.47
Z-SCORE OF LEFT VENTRICULAR DIMENSION IN END SYSTOLE: -1.41

## 2025-01-17 PROCEDURE — 83735 ASSAY OF MAGNESIUM: CPT | Mod: HCNC | Performed by: INTERNAL MEDICINE

## 2025-01-17 PROCEDURE — 96372 THER/PROPH/DIAG INJ SC/IM: CPT | Performed by: INTERNAL MEDICINE

## 2025-01-17 PROCEDURE — 25000003 PHARM REV CODE 250: Mod: HCNC | Performed by: HOSPITALIST

## 2025-01-17 PROCEDURE — 63600175 PHARM REV CODE 636 W HCPCS: Mod: HCNC | Performed by: INTERNAL MEDICINE

## 2025-01-17 PROCEDURE — 99900035 HC TECH TIME PER 15 MIN (STAT): Mod: HCNC

## 2025-01-17 PROCEDURE — 80053 COMPREHEN METABOLIC PANEL: CPT | Mod: HCNC | Performed by: INTERNAL MEDICINE

## 2025-01-17 PROCEDURE — 94761 N-INVAS EAR/PLS OXIMETRY MLT: CPT | Mod: HCNC

## 2025-01-17 PROCEDURE — 84443 ASSAY THYROID STIM HORMONE: CPT | Mod: HCNC | Performed by: INTERNAL MEDICINE

## 2025-01-17 PROCEDURE — G0378 HOSPITAL OBSERVATION PER HR: HCPCS | Mod: HCNC

## 2025-01-17 PROCEDURE — 99214 OFFICE O/P EST MOD 30 MIN: CPT | Mod: FS,HCNC,, | Performed by: INTERNAL MEDICINE

## 2025-01-17 PROCEDURE — 25000003 PHARM REV CODE 250: Mod: HCNC | Performed by: INTERNAL MEDICINE

## 2025-01-17 PROCEDURE — 36415 COLL VENOUS BLD VENIPUNCTURE: CPT | Mod: HCNC | Performed by: INTERNAL MEDICINE

## 2025-01-17 PROCEDURE — 80061 LIPID PANEL: CPT | Mod: HCNC | Performed by: INTERNAL MEDICINE

## 2025-01-17 RX ORDER — GABAPENTIN 300 MG/1
600 CAPSULE ORAL 2 TIMES DAILY
Status: DISCONTINUED | OUTPATIENT
Start: 2025-01-17 | End: 2025-01-18 | Stop reason: HOSPADM

## 2025-01-17 RX ADMIN — ATORVASTATIN CALCIUM 40 MG: 40 TABLET, FILM COATED ORAL at 11:01

## 2025-01-17 RX ADMIN — CLOPIDOGREL BISULFATE 75 MG: 75 TABLET ORAL at 11:01

## 2025-01-17 RX ADMIN — GABAPENTIN 600 MG: 300 CAPSULE ORAL at 09:01

## 2025-01-17 RX ADMIN — INSULIN GLARGINE 35 UNITS: 100 INJECTION, SOLUTION SUBCUTANEOUS at 09:01

## 2025-01-17 RX ADMIN — ENOXAPARIN SODIUM 40 MG: 40 INJECTION SUBCUTANEOUS at 05:01

## 2025-01-17 RX ADMIN — ASPIRIN 81 MG: 81 TABLET, COATED ORAL at 11:01

## 2025-01-17 RX ADMIN — GABAPENTIN 600 MG: 300 CAPSULE ORAL at 01:01
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
## 2025-01-17 NOTE — SUBJECTIVE & OBJECTIVE
Past Medical History:   Diagnosis Date    Anticoagulant long-term use     Cardiomyopathy     CHF (congestive heart failure)     Coronary artery disease     Diabetes mellitus     Gout     Heart attack     Hypertension     Pneumonia        Past Surgical History:   Procedure Laterality Date    APPENDECTOMY      CARDIAC CATHETERIZATION      7 stents    CARDIAC DEFIBRILLATOR PLACEMENT      CATARACT EXTRACTION Bilateral 2011    COLONOSCOPY N/A 8/10/2018    Procedure: COLONOSCOPY golytely;  Surgeon: Valentine Burger MD;  Location: [facility] ENDO;  Service: Endoscopy;  Laterality: N/A;    CORONARY ANGIOGRAPHY N/A 11/11/2024    Procedure: ANGIOGRAM, CORONARY ARTERY;  Surgeon: Luis Felipe Wright MD;  Location: [facility] CATH LAB/EP;  Service: Cardiology;  Laterality: N/A;    EYE SURGERY      INSTANTANEOUS WAVE-FREE RATIO (IFR) N/A 11/11/2024    Procedure: Instantaneous Wave-Free Ratio (IFR);  Surgeon: Luis Felipe Wright MD;  Location: [facility] CATH LAB/EP;  Service: Cardiology;  Laterality: N/A;    LEFT HEART CATHETERIZATION Left 11/11/2024    Procedure: Left heart cath;  Surgeon: Luis Felipe Wright MD;  Location: [facility] CATH LAB/EP;  Service: Cardiology;  Laterality: Left;    REVISION OF IMPLANTABLE CARDIOVERTER-DEFIBRILLATOR (ICD) ELECTRODE LEAD PLACEMENT N/A 11/11/2019    Procedure: REVISION, INSERTION, ELECTRODE LEAD, ICD;  Surgeon: Shukri Walsh MD;  Location: [facility] EP LAB;  Service: Cardiology;  Laterality: N/A;  Lead malfxn, RV lead ICD, SJM, anes, DM, 3 PREP       Review of patient's allergies indicates:  No Known Allergies    No current facility-administered medications on file prior to encounter.     Current Outpatient Medications on File Prior to Encounter   Medication Sig    aspirin (ECOTRIN) 81 MG EC tablet Take 81 mg by mouth once daily.    atorvastatin (LIPITOR) 40 MG tablet Take 1 tablet (40 mg total) by mouth once daily.    benzonatate (TESSALON) 200 MG capsule Take 1 capsule (200 mg total) by mouth 3 (three)  times daily as needed for Cough.    carvediloL (COREG) 6.25 MG tablet Take 1 tablet (6.25 mg total) by mouth 2 (two) times daily.    clopidogreL (PLAVIX) 75 mg tablet Take 1 tablet (75 mg total) by mouth once daily.    colchicine (MITIGARE) 0.6 mg Cap Take 1 capsule (0.6 mg total) by mouth once daily.    cyanocobalamin (VITAMIN B-12) 1000 MCG tablet Take 1,000 mcg by mouth once daily.    empagliflozin (JARDIANCE) 10 mg tablet Take 1 tablet (10 mg total) by mouth once daily.    furosemide (LASIX) 20 MG tablet Take 1 tablet (20 mg total) by mouth 2 (two) times daily.    gabapentin (NEURONTIN) 300 MG capsule Take 2 capsules (600 mg total) by mouth 2 (two) times daily.    insulin aspart U-100 (NOVOLOG FLEXPEN U-100 INSULIN) 100 unit/mL (3 mL) InPn pen Inject 18 Units into the skin 3 (three) times daily with meals. (Patient taking differently: Inject 15 Units into the skin as needed.)    insulin glargine U-100, Lantus, (LANTUS SOLOSTAR U-100 INSULIN) 100 unit/mL (3 mL) InPn pen Inject 40 Units into the skin every evening. (Patient taking differently: Inject 35 Units into the skin every evening.)    isosorbide mononitrate (IMDUR) 30 MG 24 hr tablet Take 1 tablet (30 mg total) by mouth once daily.    losartan (COZAAR) 25 MG tablet Take 1 tablet (25 mg total) by mouth once daily.    metFORMIN (GLUCOPHAGE) 1000 MG tablet Take 1 tablet (1,000 mg total) by mouth 2 (two) times daily with meals.    multivit-minerals/FA/lycopene (ONE-A-DAY MEN'S 50 PLUS ORAL) Take 1 tablet by mouth once daily.    promethazine-dextromethorphan (PROMETHAZINE-DM) 6.25-15 mg/5 mL Syrp Take 5 mLs by mouth 2 (two) times daily as needed (cough).    zolpidem (AMBIEN) 5 MG Tab TAKE 1 TABLET BY MOUTH EVERY NIGHT AS NEEDED (Patient taking differently: Take 5 mg by mouth nightly as needed.)    alcohol swabs (BD ALCOHOL SWABS) PadM USE FOUR TIMES DAILY    blood glucose control high,low (ACCU-CHEK CATHRYN CONTROL SOLN) Soln Use as directed to check blood sugar  "   blood sugar diagnostic Strp test blood sugar as directed four times daily    blood-glucose meter (TRUE METRIX GLUCOSE METER) Misc use as directed    furosemide (LASIX) 20 MG tablet Take 1 tablet (20 mg total) by mouth 2 (two) times daily.    lancets 30 gauge Misc usea s directed to check blood glucose four times daily    nitroGLYCERIN (NITROSTAT) 0.4 MG SL tablet Place 1 tablet (0.4 mg total) under the tongue every 5 (five) minutes as needed for Chest pain.    pen needle, diabetic (BD ULTRA-FINE SINA PEN NEEDLE) 32 gauge x 5/32" Ndle Use four times daily for insulin administration     Family History       Problem Relation (Age of Onset)    Heart disease Mother, Father          Tobacco Use    Smoking status: Former    Smokeless tobacco: Never   Substance and Sexual Activity    Alcohol use: Yes     Comment: socially    Drug use: No    Sexual activity: Yes     Review of Systems   Constitutional: Negative for chills, decreased appetite, diaphoresis, fever, malaise/fatigue, weight gain and weight loss.   Cardiovascular:  Positive for syncope. Negative for chest pain, claudication, dyspnea on exertion, irregular heartbeat, leg swelling, near-syncope, orthopnea, palpitations and paroxysmal nocturnal dyspnea.   Respiratory:  Negative for cough, shortness of breath, snoring, sputum production and wheezing.    Endocrine: Negative for cold intolerance, heat intolerance, polydipsia, polyphagia and polyuria.   Skin:  Negative for color change, dry skin, itching, nail changes and poor wound healing.   Musculoskeletal:  Negative for back pain, gout, joint pain and joint swelling.   Gastrointestinal:  Negative for bloating, abdominal pain, constipation, diarrhea, hematemesis, hematochezia, melena, nausea and vomiting.   Genitourinary:  Negative for dysuria, hematuria and nocturia.   Neurological:  Negative for dizziness, headaches, light-headedness, numbness, paresthesias and weakness.   Psychiatric/Behavioral:  Negative for " altered mental status, depression and memory loss.      Objective:     Vital Signs (Most Recent):  Temp: 98.5 °F (36.9 °C) (01/17/25 0358)  Pulse: (!) 54 (01/17/25 0421)  Resp: 18 (01/17/25 0358)  BP: (!) 161/75 (01/17/25 0358)  SpO2: 96 % (01/17/25 0358) Vital Signs (24h Range):  Temp:  [97.6 °F (36.4 °C)-98.5 °F (36.9 °C)] 98.5 °F (36.9 °C)  Pulse:  [54-78] 54  Resp:  [11-39] 18  SpO2:  [93 %-100 %] 96 %  BP: (113-161)/(64-98) 161/75     Weight: 104 kg (229 lb 4.5 oz)  Body mass index is 31.98 kg/m².    SpO2: 96 %         Intake/Output Summary (Last 24 hours) at 1/17/2025 1058  Last data filed at 1/17/2025 0400  Gross per 24 hour   Intake 420 ml   Output --   Net 420 ml       Lines/Drains/Airways       Peripheral Intravenous Line  Duration                  Peripheral IV - Single Lumen 01/16/25 1208 18 G Right Antecubital <1 day                     Physical Exam  Constitutional:       General: He is not in acute distress.     Appearance: He is well-developed.   Neck:      Vascular: No JVD.   Cardiovascular:      Rate and Rhythm: Normal rate and regular rhythm.      Heart sounds: No murmur heard.     No gallop.   Pulmonary:      Effort: Pulmonary effort is normal. No respiratory distress.      Breath sounds: Normal breath sounds. No wheezing.   Abdominal:      General: Bowel sounds are normal. There is no distension.      Palpations: Abdomen is soft.      Tenderness: There is no abdominal tenderness.   Musculoskeletal:      Cervical back: Normal range of motion and neck supple.   Skin:     General: Skin is warm and dry.   Neurological:      Mental Status: He is alert.      Comments: Disoriented    Psychiatric:         Behavior: Behavior normal.         Thought Content: Thought content normal.         Judgment: Judgment normal.          Significant Labs: BMP:   Recent Labs   Lab 01/16/25  1255 01/17/25  0159   * 137*    141   K 4.5 3.8    103   CO2 23 25   BUN 23 27*   CREATININE 1.4 1.5*   CALCIUM  9.0 9.0   MG  --  2.1   , CMP   Recent Labs   Lab 01/16/25  1255 01/17/25  0159    141   K 4.5 3.8    103   CO2 23 25   * 137*   BUN 23 27*   CREATININE 1.4 1.5*   CALCIUM 9.0 9.0   PROT 7.0 7.0   ALBUMIN 3.7 3.6   BILITOT 1.0 1.0   ALKPHOS 76 80   AST 17 18   ALT 19 20   ANIONGAP 13 13   , CBC   Recent Labs   Lab 01/16/25  1255   WBC 9.44   HGB 12.3*   HCT 37.3*        Recent Labs   Lab 01/16/25  1601   TROPONINI 0.009        Significant Imaging: Echocardiogram: Transthoracic echo (TTE) complete (Cupid Only):   Results for orders placed or performed during the hospital encounter of 11/22/24   Echo Ultrasound enhancing contrast? Yes (definity/optison)   Result Value Ref Range    BSA 2.34 m2    A2C EF 48 %    A4C EF 36 %    LVIDd 6.2 (A) 3.5 - 6.0 cm    LV Systolic Volume 73.98 mL    LV Systolic Volume Index 32.4 mL/m2    LVIDs 4.1 (A) 2.1 - 4.0 cm    LV Diastolic Volume 191.92 mL    LV ESV A4C 72.54 mL    LV Diastolic Volume Index 84.18 mL/m2    LV EDV A2C 137.441600888052889 mL    LV EDV A4C 109.59 mL    Left Ventricular End Systolic Volume by Teichholz Method 73.98 mL    Left Ventricular End Diastolic Volume by Teichholz Method 191.92 mL    IVS 1.5 (A) 0.6 - 1.1 cm    LVOT diameter 2.1 cm    LVOT area 3.5 cm2    FS 33.9 28 - 44 %    Left Ventricle Relative Wall Thickness 0.48 cm    PW 1.5 (A) 0.6 - 1.1 cm    LV mass 450.2 g    LV Mass Index 197.4 g/m2    Sinus 3.40 cm    STJ 3.11 cm    Ascending aorta 3.23 cm    ZLVIDS -1.87     ZLVIDD -3.26     LA area A4C 23.86 cm2    Narrative      Left Ventricle: Regional wall motion abnormalities present. See diagram   for wall motion findings. There is mildly reduced systolic function with a   visually estimated ejection fraction of 40 - 45%.
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
## 2025-01-17 NOTE — ASSESSMENT & PLAN NOTE
DDx:  Vasovagal syncope (likely): Triggered by situational factors and transient. No evidence of prolonged hypotension or bradycardia.  Cardiac arrhythmia (possible): History of CAD, ICMP, and AICD increases risk. Requires telemetry monitoring.  Orthostatic hypotension (possible): History of hypotension and medication adjustments; needs orthostatic BP measurements.  Neurological causes (e.g., TIA, seizure) (less likely): No focal neurological deficits on presentation; transient eye deviation could be incidental or related to syncope.    Dx:  Obtain orthostats  Continuous telemetry  Echocardiogram  Consider MRI brain  AICD interrogation     Tx:  Hold antihypertensive medications  Cardiology consult

## 2025-01-17 NOTE — SUBJECTIVE & OBJECTIVE
Interval History:   MARCEL Burroughs has no complaints  Denies cp/sob  No f/c/n/v    Review of Systems  Objective:     Vital Signs (Most Recent):  Temp: 97.7 °F (36.5 °C) (01/17/25 1113)  Pulse: 74 (01/17/25 1113)  Resp: 17 (01/17/25 1113)  BP: (!) 147/76 (01/17/25 1113)  SpO2: 96 % (01/17/25 1113) Vital Signs (24h Range):  Temp:  [97.6 °F (36.4 °C)-98.5 °F (36.9 °C)] 97.7 °F (36.5 °C)  Pulse:  [54-78] 74  Resp:  [11-39] 17  SpO2:  [93 %-100 %] 96 %  BP: (113-161)/(64-98) 147/76     Weight: 106.6 kg (235 lb)  Body mass index is 32.78 kg/m².    Intake/Output Summary (Last 24 hours) at 1/17/2025 1148  Last data filed at 1/17/2025 0400  Gross per 24 hour   Intake 420 ml   Output --   Net 420 ml         Physical Exam  Constitutional:       General: He is not in acute distress.     Appearance: Normal appearance. He is not ill-appearing or toxic-appearing.   Eyes:      Extraocular Movements: Extraocular movements intact.      Conjunctiva/sclera: Conjunctivae normal.   Cardiovascular:      Rate and Rhythm: Normal rate and regular rhythm.   Pulmonary:      Effort: Pulmonary effort is normal. No respiratory distress.   Abdominal:      General: There is no distension.      Tenderness: There is no abdominal tenderness.   Musculoskeletal:         General: Normal range of motion.      Right lower leg: No edema.      Left lower leg: No edema.   Skin:     General: Skin is warm and dry.   Neurological:      General: No focal deficit present.      Mental Status: He is alert and oriented to person, place, and time.   Psychiatric:         Mood and Affect: Mood normal.         Behavior: Behavior normal.             Significant Labs: All pertinent labs within the past 24 hours have been reviewed.    Significant Imaging: I have reviewed all pertinent imaging results/findings within the past 24 hours.

## 2025-01-17 NOTE — ASSESSMENT & PLAN NOTE
- AICD in place ([employer]) for primary prevention  - followed by EP for regular checks  - admitted with syncope and interrogation pending

## 2025-01-17 NOTE — ED NOTES
Pt. Is stable, without distress. Wife at bedside. Awaiting completion of admission orders. Requesting food. Pt. Given cardiac diet.
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
## 2025-01-17 NOTE — ED NOTES
Medicine team consulted for evaluation and admission. Pt. And wife updated on awaiting admit evaluation.
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
## 2025-01-17 NOTE — CARE UPDATE
AICD interrogated. No reports of VTach noted. VT monitor zone 171bpm decreased to 150. Monitor on telemetry. Updated primary team. Anticipate possible MRI +/- Neurology inpatient vs outpatient. If MRI needed device is MRI conditional but MRI would need to be arranged with [facility]
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
## 2025-01-17 NOTE — NURSING
Assumed care of patient from ER, RN, patient is AAOX4, room air, vitals WNL, independent to bathroom, refusing SCD's and bed alarm, urinal at bedside, swallows pills whole, no current c/o pain or discomfort, all safety precautions are in place, call bell and tray table are within reach of the patient and no additional questions or concerns from the patient at this time.

## 2025-01-17 NOTE — PLAN OF CARE
SW met with pt at bedside to discuss dc planning. All information confirmed on chart. Pt resides in home with his wife, step daughter, and grandchildren. Pt is independent in ADLs. Pt has listed dme below. Pt reports that he is currently not utilizing any dme to ambulate. At time of dc pts wife will provide transport home. SW will continue to follow pt throughout his transitions of care and assist with any dc needs.     SW requested PCP follow up appointments.     Future Appointments   Date Time Provider Department Center   1/17/2025  9:45 AM [facility] US[facility]   3/17/2025  8:00 AM EKG, APPT [facility] EKG [facility]   3/17/2025  8:20 AM COORDINATED DEVICE CHECK [facility] ARRHPRO [facility]   3/17/2025  8:40 AM Luca Sims MD [facility] ARRHYTH [facility]   5/7/2025  2:00 PM Luis Felipe Wright MD [facility] CARDIO [facility]   7/7/2025  9:40 AM Britton Grissom MD [facility] FAM MED [facility]        01/17/25 0930   Discharge Assessment   Assessment Type Discharge Planning Assessment   Confirmed/corrected address, phone number and insurance Yes   Confirmed Demographics Correct on Facesheet   Source of Information patient   Communicated MARTHA with patient/caregiver Yes   Reason For Admission Principal Syncope and collapse   People in Home spouse;child(jonas), adult;grandchild(jonas)   Do you expect to return to your current living situation? Yes   Do you have help at home or someone to help you manage your care at home? Yes   Who are your caregiver(s) and their phone number(s)? Marie Gore (Spouse)  [phone]   Prior to hospitilization cognitive status: Alert/Oriented   Current cognitive status: Alert/Oriented   Walking or Climbing Stairs Difficulty no   Dressing/Bathing Difficulty no   Home Accessibility not wheelchair accessible   Home Layout Able to live on 1st floor   Equipment Currently Used at Home wheelchair;walker, rolling;cane, straight   Readmission within 30 days? No   Patient currently being followed by outpatient  case management? No   Do you currently have service(s) that help you manage your care at home? No   Do you take prescription medications? Yes   Do you have prescription coverage? Yes   Coverage HU[employer] MANAGED MEDICARE - HU[employer]NA MEDICARE HMO - CAPITATED   Do you have any problems affording any of your prescribed medications? No   Is the patient taking medications as prescribed? yes   Who is going to help you get home at discharge? Marie Gore (Spouse)  [phone]   How do you get to doctors appointments? car, drives self;family or friend will provide   Are you on dialysis? No   Do you take coumadin? No   Discharge Plan A Home with family   DME Needed Upon Discharge  none   Discharge Plan discussed with: Patient   Transition of Care Barriers None   Financial Resource Strain   How hard is it for you to pay for the very basics like food, housing, medical care, and heating? Not hard   Housing Stability   In the last 12 months, was there a time when you were not able to pay the mortgage or rent on time? N   At any time in the past 12 months, were you homeless or living in a shelter (including now)? N   Transportation Needs   Has the lack of transportation kept you from medical appointments, meetings, work or from getting things needed for daily living? No   Food Insecurity   Within the past 12 months, you worried that your food would run out before you got the money to buy more. Never true   Within the past 12 months, the food you bought just didn't last and you didn't have money to get more. Never true   Stress   Do you feel stress - tense, restless, nervous, or anxious, or unable to sleep at night because your mind is troubled all the time - these days? Not at all   Social Isolation   How often do you feel lonely or isolated from those around you?  Never   Alcohol Use   Q1: How often do you have a drink containing alcohol? 2-4 pr month   Q2: How many drinks containing alcohol do you have on a typical day when you are  drinking? 1 or 2   Q3: How often do you have six or more drinks on one occasion? Monthly   Utilities   In the past 12 months has the electric, gas, oil, or water company threatened to shut off services in your home? No   Health Literacy   How often do you need to have someone help you when you read instructions, pamphlets, or other written material from your doctor or pharmacy? Never   OTHER   Name(s) of People in Home BaoMarie (Spouse)  [phone], step daughter, and grandchildren

## 2025-01-17 NOTE — SUBJECTIVE & OBJECTIVE
Past Medical History:   Diagnosis Date    Anticoagulant long-term use     Cardiomyopathy     CHF (congestive heart failure)     Coronary artery disease     Diabetes mellitus     Gout     Heart attack     Hypertension     Pneumonia        Past Surgical History:   Procedure Laterality Date    APPENDECTOMY      CARDIAC CATHETERIZATION      7 stents    CARDIAC DEFIBRILLATOR PLACEMENT      CATARACT EXTRACTION Bilateral 2011    COLONOSCOPY N/A 8/10/2018    Procedure: COLONOSCOPY golytely;  Surgeon: Valentine Burger MD;  Location: [facility] ENDO;  Service: Endoscopy;  Laterality: N/A;    CORONARY ANGIOGRAPHY N/A 11/11/2024    Procedure: ANGIOGRAM, CORONARY ARTERY;  Surgeon: Luis Felipe Wright MD;  Location: [facility] CATH LAB/EP;  Service: Cardiology;  Laterality: N/A;    EYE SURGERY      INSTANTANEOUS WAVE-FREE RATIO (IFR) N/A 11/11/2024    Procedure: Instantaneous Wave-Free Ratio (IFR);  Surgeon: Luis Felipe Wright MD;  Location: [facility] CATH LAB/EP;  Service: Cardiology;  Laterality: N/A;    LEFT HEART CATHETERIZATION Left 11/11/2024    Procedure: Left heart cath;  Surgeon: Luis Felipe Wright MD;  Location: [facility] CATH LAB/EP;  Service: Cardiology;  Laterality: Left;    REVISION OF IMPLANTABLE CARDIOVERTER-DEFIBRILLATOR (ICD) ELECTRODE LEAD PLACEMENT N/A 11/11/2019    Procedure: REVISION, INSERTION, ELECTRODE LEAD, ICD;  Surgeon: Shukri Walsh MD;  Location: [facility] EP LAB;  Service: Cardiology;  Laterality: N/A;  Lead malfxn, RV lead ICD, SJM, anes, DM, 3 PREP       Review of patient's allergies indicates:  No Known Allergies    No current facility-administered medications on file prior to encounter.     Current Outpatient Medications on File Prior to Encounter   Medication Sig    aspirin (ECOTRIN) 81 MG EC tablet Take 81 mg by mouth once daily.    atorvastatin (LIPITOR) 40 MG tablet Take 1 tablet (40 mg total) by mouth once daily.    benzonatate (TESSALON) 200 MG capsule Take 1 capsule (200 mg total) by mouth 3 (three)  times daily as needed for Cough.    carvediloL (COREG) 6.25 MG tablet Take 1 tablet (6.25 mg total) by mouth 2 (two) times daily.    clopidogreL (PLAVIX) 75 mg tablet Take 1 tablet (75 mg total) by mouth once daily.    colchicine (MITIGARE) 0.6 mg Cap Take 1 capsule (0.6 mg total) by mouth once daily.    cyanocobalamin (VITAMIN B-12) 1000 MCG tablet Take 1,000 mcg by mouth once daily.    empagliflozin (JARDIANCE) 10 mg tablet Take 1 tablet (10 mg total) by mouth once daily.    furosemide (LASIX) 20 MG tablet Take 1 tablet (20 mg total) by mouth 2 (two) times daily.    gabapentin (NEURONTIN) 300 MG capsule Take 2 capsules (600 mg total) by mouth 2 (two) times daily.    insulin aspart U-100 (NOVOLOG FLEXPEN U-100 INSULIN) 100 unit/mL (3 mL) InPn pen Inject 18 Units into the skin 3 (three) times daily with meals. (Patient taking differently: Inject 15 Units into the skin as needed.)    insulin glargine U-100, Lantus, (LANTUS SOLOSTAR U-100 INSULIN) 100 unit/mL (3 mL) InPn pen Inject 40 Units into the skin every evening. (Patient taking differently: Inject 35 Units into the skin every evening.)    isosorbide mononitrate (IMDUR) 30 MG 24 hr tablet Take 1 tablet (30 mg total) by mouth once daily.    losartan (COZAAR) 25 MG tablet Take 1 tablet (25 mg total) by mouth once daily.    metFORMIN (GLUCOPHAGE) 1000 MG tablet Take 1 tablet (1,000 mg total) by mouth 2 (two) times daily with meals.    multivit-minerals/FA/lycopene (ONE-A-DAY MEN'S 50 PLUS ORAL) Take 1 tablet by mouth once daily.    promethazine-dextromethorphan (PROMETHAZINE-DM) 6.25-15 mg/5 mL Syrp Take 5 mLs by mouth 2 (two) times daily as needed (cough).    zolpidem (AMBIEN) 5 MG Tab TAKE 1 TABLET BY MOUTH EVERY NIGHT AS NEEDED (Patient taking differently: Take 5 mg by mouth nightly as needed.)    alcohol swabs (BD ALCOHOL SWABS) PadM USE FOUR TIMES DAILY    blood glucose control high,low (ACCU-CHEK CATHRYN CONTROL SOLN) Soln Use as directed to check blood sugar  "   blood sugar diagnostic Strp test blood sugar as directed four times daily    blood-glucose meter (TRUE METRIX GLUCOSE METER) Misc use as directed    furosemide (LASIX) 20 MG tablet Take 1 tablet (20 mg total) by mouth 2 (two) times daily.    lancets 30 gauge Misc usea s directed to check blood glucose four times daily    nitroGLYCERIN (NITROSTAT) 0.4 MG SL tablet Place 1 tablet (0.4 mg total) under the tongue every 5 (five) minutes as needed for Chest pain.    pen needle, diabetic (BD ULTRA-FINE SINA PEN NEEDLE) 32 gauge x 5/32" Ndle Use four times daily for insulin administration     Family History       Problem Relation (Age of Onset)    Heart disease Mother, Father          Tobacco Use    Smoking status: Former    Smokeless tobacco: Never   Substance and Sexual Activity    Alcohol use: Yes     Comment: socially    Drug use: No    Sexual activity: Yes     Review of Systems   Constitutional:  Positive for activity change and fatigue.   HENT: Negative.     Eyes: Negative.    Respiratory: Negative.     Cardiovascular:  Positive for palpitations.   Gastrointestinal: Negative.    Genitourinary: Negative.    Allergic/Immunologic: Negative.    Neurological:  Positive for syncope.   Hematological: Negative.    Psychiatric/Behavioral: Negative.       Objective:     Vital Signs (Most Recent):  Temp: 97.6 °F (36.4 °C) (01/16/25 2055)  Pulse: 67 (01/16/25 2055)  Resp: 18 (01/16/25 2055)  BP: (!) 141/98 (01/16/25 2055)  SpO2: 98 % (01/16/25 2055) Vital Signs (24h Range):  Temp:  [97.6 °F (36.4 °C)-98 °F (36.7 °C)] 97.6 °F (36.4 °C)  Pulse:  [61-78] 67  Resp:  [11-39] 18  SpO2:  [93 %-100 %] 98 %  BP: (113-141)/(64-98) 141/98     Weight: 104 kg (229 lb 4.5 oz)  Body mass index is 31.98 kg/m².     Physical Exam           Significant Labs: All pertinent labs within the past 24 hours have been reviewed.  A1C:   Recent Labs   Lab 09/18/24  1004 01/07/25  0950   HGBA1C 8.0* 6.6*     ABGs: No results for input(s): "PH", "PCO2", " ""HCO3", "POCSATURATED", "BE", "TOTALHB", "COHB", "METHB", "O2HB", "POCFIO2", "PO2" in the last 48 hours.  Bilirubin:   Recent Labs   Lab 01/07/25  0950 01/16/25  1255   BILITOT 0.8 1.0     Blood Culture: No results for input(s): "LABBLOO" in the last 48 hours.  BMP:   Recent Labs   Lab 01/16/25  1255   *      K 4.5      CO2 23   BUN 23   CREATININE 1.4   CALCIUM 9.0     CBC:   Recent Labs   Lab 01/16/25  1255   WBC 9.44   HGB 12.3*   HCT 37.3*        CMP:   Recent Labs   Lab 01/16/25  1255      K 4.5      CO2 23   *   BUN 23   CREATININE 1.4   CALCIUM 9.0   PROT 7.0   ALBUMIN 3.7   BILITOT 1.0   ALKPHOS 76   AST 17   ALT 19   ANIONGAP 13     Cardiac Markers:   Recent Labs   Lab 01/16/25  1255   BNP 42     Coagulation:   Recent Labs   Lab 01/16/25  1255   INR 1.0     Lactic Acid: No results for input(s): "LACTATE" in the last 48 hours.  Lipase: No results for input(s): "LIPASE" in the last 48 hours.  Lipid Panel: No results for input(s): "CHOL", "HDL", "LDLCALC", "TRIG", "CHOLHDL" in the last 48 hours.  Magnesium: No results for input(s): "MG" in the last 48 hours.    Significant Imaging: I have reviewed all pertinent imaging results/findings within the past 24 hours.  I have reviewed and interpreted all pertinent imaging results/findings within the past 24 hours.  "

## 2025-01-17 NOTE — ED NOTES
Taken over patient from day staff. Patient is conscious, coherent, oriented x4, alert and responding to verbal commands appropriately. Breathing even, unlabored and saturation within normal limits on room air. Not in distress. Denies pain. IV line in situ, no infiltration noted. Connected to continuous cardiac monitor. Vitals stable. Bed kept in lowest position, breaks on, side rails up and call bell in reach. Diet provided as ordered. Patient's family at bedside. No complaints made at this time.

## 2025-01-17 NOTE — ASSESSMENT & PLAN NOTE
- SBP 110s-160s overnight  - on Coreg, Losartan, Imdur as an outpatient; Coreg recently decreased to 6.25 from 12.5 and Losartan to 25 from 50\  - only occassional isolated readings above goal of less than 130/80  - given syncope and reports of hypotension with BB and ARB reduction will monitor BP for now; anticipate slow resumption of regimen; no plans to reduce outpatient doses further at this time

## 2025-01-17 NOTE — PLAN OF CARE
01/16/25 2156   Admission   Initial VN Admission Questions Complete   Communication Issues? None   Shift   Pain Management Interventions relaxation techniques promoted;quiet environment facilitated   Virtual Nurse - Patient Verbalized Approval Of Camera Use;VN Rounding   Type of Frequent Check   Type Patient Rounds;Telemetry Monitoring   Safety/Activity   Patient Rounds bed in low position;bed wheels locked;call light in patient/parent reach;clutter free environment maintained;ID band on;visualized patient;placement of personal items at bedside   Safety Promotion/Fall Prevention side rails raised x 2;assistive device/personal item within reach;bed alarm set;Fall Risk reviewed with patient/family;instructed to call staff for mobility;nonskid shoes/socks when out of bed;patient expresses understanding of fall risk and prevention;room near unit station   Safety Precautions emergency equipment at bedside   Safety Bands on Patient Fall Risk Band   Activity Management Ambulated in room - L4   Activity Assistance Provided assistance, stand-by   Elimination Assistance urinal within reach   Positioning   Body Position position changed independently   Head of Bed (HOB) Positioning HOB elevated   Positioning/Transfer Devices pillows;in use        VIRTUAL NURSE: Pt arrived to unit. Permission received per patient to turn camera to view patient. VIP model explained; patient informed this VN will be working with bedside nurse and the rest of the care team. Plan of care reviewed with patient.  Educated patient on VTE, fall risk and fall risk precautions in place. Call light within reach, side rails up x2. Admission questions completed. Patient instucted to ask staff for assistance. Patient verbalized complete understanding. Patient denies complaints or any needs at this time. Will continue to be available and intervene as needed.

## 2025-01-17 NOTE — PLAN OF CARE
Problem: Adult Inpatient Plan of Care  Goal: Plan of Care Review  Outcome: Progressing  Goal: Patient-Specific Goal (Individualized)  Outcome: Progressing  Goal: Optimal Comfort and Wellbeing  Outcome: Progressing  Goal: Readiness for Transition of Care  Outcome: Progressing     Problem: Diabetes Comorbidity  Goal: Blood Glucose Level Within Targeted Range  Outcome: Progressing     Problem: Wound  Goal: Optimal Coping  Outcome: Progressing  Goal: Optimal Pain Control and Function  Outcome: Progressing     Problem: Comorbidity Management  Goal: Maintenance of Heart Failure Symptom Control  Outcome: Progressing     Problem: Fall Injury Risk  Goal: Absence of Fall and Fall-Related Injury  Outcome: Progressing

## 2025-01-17 NOTE — ASSESSMENT & PLAN NOTE
- history of PCI to LAD and LCX; recent LHC with patent LAD and LCX with mild ISR and iFR negative to LAD  - troponin negative x 2; EKG with RBBB unchanged  - continue DAPT, statin; on BB, ARB, Imdur and Lasix as an outpatient as well- on hold currently but anticipate resumption upon discharge  - no concern for ACS; CAD stable

## 2025-01-17 NOTE — ED NOTES
Patient placed on telemetry box and shifted to medical surgical unit , room 505A in stable condition.
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
## 2025-01-18 ENCOUNTER — PATIENT MESSAGE (OUTPATIENT)
Dept: CASE MANAGEMENT | Facility: HOSPITAL | Age: 75
End: 2025-01-18
Payer: MEDICARE

## 2025-01-18 VITALS
OXYGEN SATURATION: 96 % | HEIGHT: 71 IN | HEART RATE: 75 BPM | WEIGHT: 235 LBS | BODY MASS INDEX: 32.9 KG/M2 | RESPIRATION RATE: 18 BRPM | DIASTOLIC BLOOD PRESSURE: 77 MMHG | TEMPERATURE: 98 F | SYSTOLIC BLOOD PRESSURE: 144 MMHG

## 2025-01-18 LAB
ALBUMIN SERPL BCP-MCNC: 3.6 G/DL (ref 3.5–5.2)
ALP SERPL-CCNC: 76 U/L (ref 40–150)
ALT SERPL W/O P-5'-P-CCNC: 19 U/L (ref 10–44)
ANION GAP SERPL CALC-SCNC: 13 MMOL/L (ref 8–16)
AST SERPL-CCNC: 14 U/L (ref 10–40)
BILIRUB SERPL-MCNC: 0.8 MG/DL (ref 0.1–1)
BUN SERPL-MCNC: 19 MG/DL (ref 8–23)
CALCIUM SERPL-MCNC: 9.2 MG/DL (ref 8.7–10.5)
CHLORIDE SERPL-SCNC: 108 MMOL/L (ref 95–110)
CO2 SERPL-SCNC: 23 MMOL/L (ref 23–29)
CREAT SERPL-MCNC: 1.2 MG/DL (ref 0.5–1.4)
EST. GFR  (NO RACE VARIABLE): >60 ML/MIN/1.73 M^2
GLUCOSE SERPL-MCNC: 143 MG/DL (ref 70–110)
MAGNESIUM SERPL-MCNC: 2 MG/DL (ref 1.6–2.6)
POCT GLUCOSE: 139 MG/DL (ref 70–110)
POTASSIUM SERPL-SCNC: 3.8 MMOL/L (ref 3.5–5.1)
PROT SERPL-MCNC: 7 G/DL (ref 6–8.4)
SODIUM SERPL-SCNC: 144 MMOL/L (ref 136–145)

## 2025-01-18 PROCEDURE — 83735 ASSAY OF MAGNESIUM: CPT | Mod: HCNC | Performed by: INTERNAL MEDICINE

## 2025-01-18 PROCEDURE — G0378 HOSPITAL OBSERVATION PER HR: HCPCS | Mod: HCNC

## 2025-01-18 PROCEDURE — 80053 COMPREHEN METABOLIC PANEL: CPT | Mod: HCNC | Performed by: INTERNAL MEDICINE

## 2025-01-18 PROCEDURE — 36415 COLL VENOUS BLD VENIPUNCTURE: CPT | Mod: HCNC | Performed by: INTERNAL MEDICINE

## 2025-01-18 PROCEDURE — 25000003 PHARM REV CODE 250: Mod: HCNC | Performed by: INTERNAL MEDICINE

## 2025-01-18 PROCEDURE — 25000003 PHARM REV CODE 250: Mod: HCNC | Performed by: HOSPITALIST

## 2025-01-18 RX ADMIN — CLOPIDOGREL BISULFATE 75 MG: 75 TABLET ORAL at 08:01

## 2025-01-18 RX ADMIN — ATORVASTATIN CALCIUM 40 MG: 40 TABLET, FILM COATED ORAL at 08:01

## 2025-01-18 RX ADMIN — ASPIRIN 81 MG: 81 TABLET, COATED ORAL at 08:01

## 2025-01-18 RX ADMIN — GABAPENTIN 600 MG: 300 CAPSULE ORAL at 08:01

## 2025-01-18 NOTE — PLAN OF CARE
Problem: Adult Inpatient Plan of Care  Goal: Patient-Specific Goal (Individualized)  Outcome: Progressing  Goal: Optimal Comfort and Wellbeing  Outcome: Progressing     Problem: Diabetes Comorbidity  Goal: Blood Glucose Level Within Targeted Range  Outcome: Progressing     Problem: Chest Pain  Goal: Resolution of Chest Pain Symptoms  Outcome: Progressing     Problem: Fall Injury Risk  Goal: Absence of Fall and Fall-Related Injury  Outcome: Progressing

## 2025-01-18 NOTE — NURSING
VN cued into room and permission is given to adjust the camera towards patient who is sitting up in bed and in no apparent distress. Spouse is at bedside and will accompany patient home.  AVS reviewed with patient and he verbalized complete understanding on the discharge instructions. Patient prefers to walk to the Exit.  Floor Nurse notified.  Voiced no other concerns.

## 2025-01-18 NOTE — PROGRESS NOTES
The pt's wife is transporting him home at d/c. The sw stressed the importance of the pt going to his hsp f/u's and taking his medications. The pt acknowledged understanding and states he will comply. The pt has no further questions or Case Management needs. The pt's clear to d/c.     Future Appointments   Date Time Provider Department Center   1/27/2025 10:30 AM Shelia Landeros MD [facility] IMPRI [facility] [facility]   3/17/2025  8:00 AM EKG, APPT [facility] EKG [facility]   3/17/2025  8:20 AM COORDINATED DEVICE CHECK [facility] ARRHPRO [facility]   3/17/2025  8:40 AM Luca Sims MD [facility] ARRHYTH [facility]   5/7/2025  2:00 PM Luis Felipe Wright MD [facility] CARDIO [facility]   7/7/2025  9:40 AM Britton Grissom MD [facility] FAM MED [facility]    [place] [facility] Med Surg  Discharge Final Note    Primary Care Provider: Britton Grissom MD    Expected Discharge Date: 1/18/2025    Final Discharge Note (most recent)       Final Note - 01/18/25 1146          Final Note    Assessment Type Discharge Planning Brief Assessment                     Important Message from Medicare             Contact Info       Shelia Landeros MD   Specialty: Internal Medicine    [address]  [address]  [place] [address]   Phone: [phone]       Next Steps: Follow up on 1/27/2025    Instructions: 10:30am [facility] F/U DX:Syncope and collapse

## 2025-01-18 NOTE — PLAN OF CARE
The sw spoke to the pt to complete the assessment. The pt lives with his wife Marie Gore [phone] in [place]. The pt's a retired . The pt has a very supportive family. The pt's independent with his ADL's and doesn't use dme. The pt still drives but his wife will transport him home at d/c. The sw left her name and contact info with the pt. The sw will continue to follow the pt throughout his transitions of care and will assist with any d/c needs. The sw encouraged the pt to call if he has any further questions or concerns.     [place] [facility] Surg  Discharge Assessment    Primary Care Provider: Britton Grissom MD     Discharge Assessment (most recent)       BRIEF DISCHARGE ASSESSMENT - 01/18/25 1146          Discharge Planning    Assessment Type Discharge Planning Brief Assessment (P)      Resource/Environmental Concerns none (P)      Support Systems Spouse/significant other;Children;Family members;Friends/neighbors;Confucianism/dk community (P)      Equipment Currently Used at Home none (P)      Current Living Arrangements home (P)      Patient/Family Anticipates Transition to home with family (P)      Patient/Family Anticipated Services at Transition none (P)      DME Needed Upon Discharge  none (P)      Discharge Plan A Home with family (P)      Discharge Plan B Home Health (P)

## 2025-01-21 LAB
OHS QRS DURATION: 146 MS
OHS QTC CALCULATION: 464 MS
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
## 2025-01-22 ENCOUNTER — NURSE TRIAGE (OUTPATIENT)
Dept: ADMINISTRATIVE | Facility: CLINIC | Age: 75
End: 2025-01-22
Payer: MEDICARE

## 2025-01-22 ENCOUNTER — PATIENT MESSAGE (OUTPATIENT)
Dept: ADMINISTRATIVE | Facility: CLINIC | Age: 75
End: 2025-01-22
Payer: MEDICARE

## 2025-01-22 ENCOUNTER — PATIENT OUTREACH (OUTPATIENT)
Dept: ADMINISTRATIVE | Facility: CLINIC | Age: 75
End: 2025-01-22
Payer: MEDICARE

## 2025-01-22 NOTE — PROGRESS NOTES
C3 nurse attempted to return TCM message left by pt. For TCC Call. Left voicemail. Please call [phone] leave your first and last name and date of birth and ask for Massiel. I will return your call as soon as possible.

## 2025-01-22 NOTE — TELEPHONE ENCOUNTER
Pt's /90, 164/86. Pt was discharged from hospital on Saturday for Syncope.  Pt was wondering about his medications. No chest pain or difficulty breathing. No pain at all.   Care advice recommends- See PCP within 3 days. Declines PCP appointment, Requesting call back from Cardiologist. Encounter routed to Cardiologist.          Reason for Disposition   Systolic BP  >= 160 OR Diastolic >= 100    Additional Information   Negative: Difficult to awaken or acting confused (e.g., disoriented, slurred speech)   Negative: SEVERE difficulty breathing (e.g., struggling for each breath, speaks in single words)   Negative: [1] Weakness of the face, arm or leg on one side of the body AND [2] new-onset   Negative: [1] Numbness (i.e., loss of sensation) of the face, arm or leg on one side of the body AND [2] new-onset   Negative: [1] Chest pain lasts > 5 minutes AND [2] history of heart disease (i.e., heart attack, bypass surgery, angina, angioplasty, CHF)   Negative: [1] Chest pain AND [2] took nitroglycerin AND [3] pain was not relieved   Negative: Sounds like a life-threatening emergency to the triager   Negative: [1] Systolic BP  >= 160 OR Diastolic >= 100 AND [2] cardiac (e.g., breathing difficulty, chest pain) or neurologic symptoms (e.g., new-onset blurred or double vision, unsteady gait)   Negative: [1] Systolic BP  >= 200 OR Diastolic >= 120 AND [2] having NO cardiac or neurologic symptoms   Negative: [1] Systolic BP  >= 180 OR Diastolic >= 110 AND [2] missed most recent dose of blood pressure medication   Negative: Systolic BP  >= 180 OR Diastolic >= 110   Negative: Ran out of BP medications    Protocols used: [facility]

## 2025-01-22 NOTE — PROGRESS NOTES
C3 nurse attempted to contact Clifton Wilson for a TCC post hospital discharge follow up call. No answer. Left voicemail with callback information. The patient has a scheduled HOSFU appointment with Shelia Landeros on 01/27/25 @ 1030.

## 2025-01-22 NOTE — PROGRESS NOTES
2nd attempt to returning TCM message left by pt. To make TCC Call. No answer.  No voicemail.

## 2025-01-23 NOTE — PROGRESS NOTES
3rd attempt to make TCC Call. No answer.  No voicemail.      3rd attempt to contact patient. Manually enrolled in Post Discharge Tracker.

## 2025-01-23 NOTE — PROGRESS NOTES
3rd attempt for TCC Call; Left voicemail. Please call [phone] leave first and last name and  and ask for Massiel. I will return your call as soon as possible.

## 2025-01-24 ENCOUNTER — PATIENT MESSAGE (OUTPATIENT)
Dept: CARDIOLOGY | Facility: CLINIC | Age: 75
End: 2025-01-24
Payer: MEDICARE

## 2025-01-24 NOTE — TELEPHONE ENCOUNTER
Reached out to patient.  No answer.  Left detailed message with appointment date, time and location.

## 2025-01-28 ENCOUNTER — PATIENT MESSAGE (OUTPATIENT)
Dept: CARDIOLOGY | Facility: CLINIC | Age: 75
End: 2025-01-28
Payer: MEDICARE

## 2025-01-30 ENCOUNTER — PATIENT MESSAGE (OUTPATIENT)
Dept: CARDIOLOGY | Facility: CLINIC | Age: 75
End: 2025-01-30
Payer: MEDICARE

## 2025-01-30 DIAGNOSIS — Z00.00 ENCOUNTER FOR MEDICARE ANNUAL WELLNESS EXAM: ICD-10-CM

## 2025-01-30 DIAGNOSIS — I10 ESSENTIAL HYPERTENSION: Primary | ICD-10-CM

## 2025-02-03 RX ORDER — LOSARTAN POTASSIUM 50 MG/1
50 TABLET ORAL DAILY
Qty: 90 TABLET | Refills: 3 | Status: SHIPPED | OUTPATIENT
Start: 2025-02-03 | End: 2026-02-03

## 2025-02-03 NOTE — TELEPHONE ENCOUNTER
Patient has a clinic appointment on 02/18/2025 to discuss resuming BP meds.  He has resumed the Losartan 50 mg on his own since his BP was elevated.  Since resuming Losartan his BP has been normal.  He needs a refill for Losartan 50 mg.  Please approve the Rx is you want patient to continue.

## 2025-02-05 ENCOUNTER — PATIENT MESSAGE (OUTPATIENT)
Dept: FAMILY MEDICINE | Facility: CLINIC | Age: 75
End: 2025-02-05
Payer: MEDICARE

## 2025-02-05 DIAGNOSIS — L03.119 CELLULITIS OF LOWER EXTREMITY, UNSPECIFIED LATERALITY: Primary | ICD-10-CM

## 2025-02-06 VITALS — DIASTOLIC BLOOD PRESSURE: 78 MMHG | SYSTOLIC BLOOD PRESSURE: 128 MMHG

## 2025-02-06 RX ORDER — DOXYCYCLINE 100 MG/1
100 CAPSULE ORAL 2 TIMES DAILY
Qty: 20 CAPSULE | Refills: 0 | Status: SHIPPED | OUTPATIENT
Start: 2025-02-06 | End: 2025-02-13 | Stop reason: SDUPTHER

## 2025-02-13 ENCOUNTER — OFFICE VISIT (OUTPATIENT)
Dept: FAMILY MEDICINE | Facility: CLINIC | Age: 75
End: 2025-02-13
Attending: FAMILY MEDICINE
Payer: MEDICARE

## 2025-02-13 VITALS
SYSTOLIC BLOOD PRESSURE: 129 MMHG | OXYGEN SATURATION: 99 % | HEIGHT: 71 IN | DIASTOLIC BLOOD PRESSURE: 70 MMHG | HEART RATE: 72 BPM | WEIGHT: 238.13 LBS | BODY MASS INDEX: 33.34 KG/M2

## 2025-02-13 DIAGNOSIS — I50.41 ACUTE COMBINED SYSTOLIC AND DIASTOLIC CONGESTIVE HEART FAILURE: ICD-10-CM

## 2025-02-13 DIAGNOSIS — E11.9 DIABETES MELLITUS TYPE 2 WITHOUT RETINOPATHY: ICD-10-CM

## 2025-02-13 DIAGNOSIS — L03.119 CELLULITIS OF LOWER EXTREMITY, UNSPECIFIED LATERALITY: Primary | ICD-10-CM

## 2025-02-13 DIAGNOSIS — I50.42 CHRONIC COMBINED SYSTOLIC AND DIASTOLIC CONGESTIVE HEART FAILURE: ICD-10-CM

## 2025-02-13 DIAGNOSIS — E11.9 INSULIN DEPENDENT TYPE 2 DIABETES MELLITUS: ICD-10-CM

## 2025-02-13 DIAGNOSIS — I34.0 MITRAL VALVE INSUFFICIENCY, UNSPECIFIED ETIOLOGY: ICD-10-CM

## 2025-02-13 DIAGNOSIS — E11.40 TYPE 2 DIABETES MELLITUS WITH DIABETIC NEUROPATHY, WITH LONG-TERM CURRENT USE OF INSULIN: ICD-10-CM

## 2025-02-13 DIAGNOSIS — Z79.4 INSULIN DEPENDENT TYPE 2 DIABETES MELLITUS: ICD-10-CM

## 2025-02-13 DIAGNOSIS — Z79.4 TYPE 2 DIABETES MELLITUS WITH DIABETIC NEUROPATHY, WITH LONG-TERM CURRENT USE OF INSULIN: ICD-10-CM

## 2025-02-13 DIAGNOSIS — E11.59 HYPERTENSION ASSOCIATED WITH DIABETES: ICD-10-CM

## 2025-02-13 DIAGNOSIS — I10 ESSENTIAL HYPERTENSION: ICD-10-CM

## 2025-02-13 DIAGNOSIS — Z95.810 ICD (IMPLANTABLE CARDIOVERTER-DEFIBRILLATOR), SINGLE, IN SITU: ICD-10-CM

## 2025-02-13 DIAGNOSIS — I15.2 HYPERTENSION ASSOCIATED WITH DIABETES: ICD-10-CM

## 2025-02-13 PROCEDURE — 99214 OFFICE O/P EST MOD 30 MIN: CPT | Mod: HCNC,S$GLB,, | Performed by: FAMILY MEDICINE

## 2025-02-13 PROCEDURE — 3008F BODY MASS INDEX DOCD: CPT | Mod: HCNC,CPTII,S$GLB, | Performed by: FAMILY MEDICINE

## 2025-02-13 PROCEDURE — 4010F ACE/ARB THERAPY RXD/TAKEN: CPT | Mod: HCNC,CPTII,S$GLB, | Performed by: FAMILY MEDICINE

## 2025-02-13 PROCEDURE — 99999 PR PBB SHADOW E&M-EST. PATIENT-LVL IV: CPT | Mod: PBBFAC,HCNC,, | Performed by: FAMILY MEDICINE

## 2025-02-13 PROCEDURE — 1160F RVW MEDS BY RX/DR IN RCRD: CPT | Mod: HCNC,CPTII,S$GLB, | Performed by: FAMILY MEDICINE

## 2025-02-13 PROCEDURE — 3078F DIAST BP <80 MM HG: CPT | Mod: HCNC,CPTII,S$GLB, | Performed by: FAMILY MEDICINE

## 2025-02-13 PROCEDURE — 3074F SYST BP LT 130 MM HG: CPT | Mod: HCNC,CPTII,S$GLB, | Performed by: FAMILY MEDICINE

## 2025-02-13 PROCEDURE — 1101F PT FALLS ASSESS-DOCD LE1/YR: CPT | Mod: HCNC,CPTII,S$GLB, | Performed by: FAMILY MEDICINE

## 2025-02-13 PROCEDURE — 1159F MED LIST DOCD IN RCRD: CPT | Mod: HCNC,CPTII,S$GLB, | Performed by: FAMILY MEDICINE

## 2025-02-13 PROCEDURE — 3288F FALL RISK ASSESSMENT DOCD: CPT | Mod: HCNC,CPTII,S$GLB, | Performed by: FAMILY MEDICINE

## 2025-02-13 PROCEDURE — 3044F HG A1C LEVEL LT 7.0%: CPT | Mod: HCNC,CPTII,S$GLB, | Performed by: FAMILY MEDICINE

## 2025-02-13 RX ORDER — DOXYCYCLINE 100 MG/1
100 CAPSULE ORAL 2 TIMES DAILY
Qty: 14 CAPSULE | Refills: 0 | Status: SHIPPED | OUTPATIENT
Start: 2025-02-13 | End: 2025-02-20

## 2025-02-13 RX ORDER — FUROSEMIDE 20 MG/1
20 TABLET ORAL EVERY 8 HOURS
Qty: 180 TABLET | Refills: 4 | Status: SHIPPED | OUTPATIENT
Start: 2025-02-13

## 2025-02-13 NOTE — PROGRESS NOTES
Subjective:       Patient ID: Clifton Wilson is a 74 y.o. male.    Chief Complaint: Leg Swelling (Right lower leg,  red warm to touch )    74 yr old pleasant white male with DM II, HTN, HLD, CAD, Combined chronic CHF, MR, obesity, neuropathy, presents today for evaluation of right leg pain. Redness and warmth. Started antibiotics and it looks better. No fever or chills. Started 2 weeks ago. No sick contacts/travel.      DM II - controlled  -    LDLCALC                  49.6 (L)            01/17/2025                                                  - on metformin and insulin and sugars improving - UTD eye exam - foot exam UTD - on ASA and plavix. Losartan. He ate too much jamie cake during mardi gras.      HTN - chronic - controlled - on losartan, lasix - compliant - no side effects      HLD - chronic -  LDLCALC                  42.6 (L)            07/08/2024                                                   - controlled -       CAD/combined CHF - EF 35-40% - on external defibrillator - medical management - on ASA/plavix/ARB - no symptoms - following cardiology    Gout - improving - uses colchicine for flare up -     History as below - reviewed            Edema  Associated symptoms include arthralgias, joint swelling and myalgias. Pertinent negatives include no chest pain, congestion, coughing, diaphoresis, fatigue, headaches, neck pain, rash, visual change, vomiting or weakness.   Shoulder Pain   Pertinent negatives include no headaches. There is no history of diabetes.   Follow-up  Associated symptoms include arthralgias, joint swelling and myalgias. Pertinent negatives include no chest pain, congestion, coughing, diaphoresis, fatigue, headaches, neck pain, rash, visual change, vomiting or weakness.   Diabetes  He presents for his follow-up diabetic visit. He has type 2 diabetes mellitus. No MedicAlert identification noted. The initial diagnosis of diabetes was made 27 years ago. His disease course has been  worsening. Hypoglycemia symptoms include sleepiness. Pertinent negatives for hypoglycemia include no confusion, dizziness, headaches, hunger, mood changes, nervousness/anxiousness, pallor, seizures, speech difficulty, sweats or tremors. Associated symptoms include foot paresthesias. Pertinent negatives for diabetes include no blurred vision, no chest pain, no fatigue, no foot ulcerations, no polydipsia, no polyphagia, no polyuria, no visual change, no weakness and no weight loss. Hypoglycemia complications include blackouts and hospitalization. Pertinent negatives for hypoglycemia complications include no nocturnal hypoglycemia, no required assistance and no required glucagon injection. Symptoms are stable. Diabetic complications include autonomic neuropathy, heart disease, impotence and peripheral neuropathy. Pertinent negatives for diabetic complications include no CVA, nephropathy, PVD or retinopathy. Risk factors for coronary artery disease include hypertension, obesity, diabetes mellitus and male sex. Current diabetic treatment includes diet, insulin injections and oral agent (monotherapy). He is compliant with treatment all of the time. He is currently taking insulin pre-breakfast, pre-lunch, pre-dinner and at bedtime. Insulin injections are given by patient. Rotation sites for injection include the abdominal wall, arms and thighs. His weight is fluctuating minimally. He is following a diabetic, generally healthy, low fat/cholesterol and low salt diet. Meal planning includes avoidance of concentrated sweets and carbohydrate counting. He has not had a previous visit with a dietitian. He participates in exercise three times a week. He monitors blood glucose at home 3-4 x per day. He monitors urine at home <1 x per month. Blood glucose monitoring compliance is excellent. His home blood glucose trend is decreasing steadily. An ACE inhibitor/angiotensin II receptor blocker is being taken. He does not see a  podiatrist.Eye exam is current.   Knee Pain     Swelling  This is a chronic problem. The current episode started more than 1 month ago. The problem occurs intermittently. The problem has been gradually worsening. Associated symptoms include arthralgias, joint swelling and myalgias. Pertinent negatives include no chest pain, congestion, coughing, diaphoresis, fatigue, headaches, neck pain, rash, visual change, vomiting or weakness.   Hypertension  This is a chronic problem. The current episode started more than 1 year ago. The problem has been gradually improving since onset. The problem is controlled. Pertinent negatives include no blurred vision, chest pain, headaches, malaise/fatigue, neck pain, palpitations, peripheral edema, PND or sweats. Risk factors for coronary artery disease include diabetes mellitus, dyslipidemia, male gender and obesity. Past treatments include angiotensin blockers and diuretics. The current treatment provides significant improvement. There are no compliance problems.  Hypertensive end-organ damage includes CAD/MI and heart failure. There is no history of CVA, left ventricular hypertrophy, PVD or retinopathy. There is no history of chronic renal disease, hypercortisolism, hyperparathyroidism, pheochromocytoma, renovascular disease or a thyroid problem.   Hyperlipidemia  This is a chronic problem. The current episode started more than 1 year ago. The problem is controlled. Recent lipid tests were reviewed and are normal. Exacerbating diseases include obesity. He has no history of chronic renal disease or diabetes. There are no known factors aggravating his hyperlipidemia. Associated symptoms include myalgias. Pertinent negatives include no chest pain or focal sensory loss. Current antihyperlipidemic treatment includes statins. The current treatment provides moderate improvement of lipids. There are no compliance problems.  Risk factors for coronary artery disease include diabetes mellitus,  dyslipidemia, male sex, hypertension and obesity.     Review of Systems   Constitutional: Negative.  Negative for activity change, diaphoresis, fatigue, malaise/fatigue, unexpected weight change and weight loss.   HENT: Negative.  Negative for nasal congestion, ear pain, mouth sores, rhinorrhea and voice change.    Eyes: Negative.  Negative for blurred vision, pain, discharge and visual disturbance.   Respiratory: Negative.  Negative for apnea, cough and wheezing.    Cardiovascular: Negative.  Negative for chest pain, palpitations and PND.   Gastrointestinal: Negative.  Negative for abdominal distention, anal bleeding, diarrhea and vomiting.   Endocrine: Negative.  Negative for cold intolerance, polydipsia, polyphagia and polyuria.   Genitourinary:  Positive for impotence. Negative for decreased urine volume, difficulty urinating, discharge, frequency and scrotal swelling.   Musculoskeletal:  Positive for arthralgias, joint swelling and myalgias. Negative for back pain, neck pain and neck stiffness.   Integumentary:  Positive for color change. Negative for pallor and rash.   Allergic/Immunologic: Negative.  Negative for environmental allergies.   Neurological: Negative.  Negative for dizziness, tremors, seizures, speech difficulty, weakness, light-headedness and headaches.   Hematological: Negative.    Psychiatric/Behavioral: Negative.  Negative for agitation, confusion, dysphoric mood and suicidal ideas. The patient is not nervous/anxious.          PMH/PSH/FH/SH/MED/ALLERGY reviewed    Past Medical History:   Diagnosis Date    Anticoagulant long-term use     Cardiomyopathy     CHF (congestive heart failure)     Coronary artery disease     Diabetes mellitus     Gout     Heart attack     Hypertension     Pneumonia        Past Surgical History:   Procedure Laterality Date    APPENDECTOMY      CARDIAC CATHETERIZATION      7 stents    CARDIAC DEFIBRILLATOR PLACEMENT      CATARACT EXTRACTION Bilateral 2011    COLONOSCOPY  N/A 8/10/2018    Procedure: COLONOSCOPY golytely;  Surgeon: Valentine Burger MD;  Location: [facility] ENDO;  Service: Endoscopy;  Laterality: N/A;    CORONARY ANGIOGRAPHY N/A 11/11/2024    Procedure: ANGIOGRAM, CORONARY ARTERY;  Surgeon: Luis Felipe Wright MD;  Location: [facility] CATH LAB/EP;  Service: Cardiology;  Laterality: N/A;    EYE SURGERY      INSTANTANEOUS WAVE-FREE RATIO (IFR) N/A 11/11/2024    Procedure: Instantaneous Wave-Free Ratio (IFR);  Surgeon: Luis Felipe Wright MD;  Location: [facility] CATH LAB/EP;  Service: Cardiology;  Laterality: N/A;    LEFT HEART CATHETERIZATION Left 11/11/2024    Procedure: Left heart cath;  Surgeon: Luis Felipe Wright MD;  Location: [facility] CATH LAB/EP;  Service: Cardiology;  Laterality: Left;    REVISION OF IMPLANTABLE CARDIOVERTER-DEFIBRILLATOR (ICD) ELECTRODE LEAD PLACEMENT N/A 11/11/2019    Procedure: REVISION, INSERTION, ELECTRODE LEAD, ICD;  Surgeon: Shukri Walsh MD;  Location: [facility] EP LAB;  Service: Cardiology;  Laterality: N/A;  Lead malfxn, RV lead ICD, SJM, anes, DM, 3 PREP       Family History   Problem Relation Name Age of Onset    Heart disease Mother      Heart disease Father      Amblyopia Neg Hx      Blindness Neg Hx      Cataracts Neg Hx      Glaucoma Neg Hx      Macular degeneration Neg Hx      Retinal detachment Neg Hx      Strabismus Neg Hx         Social History     Socioeconomic History    Marital status:    Tobacco Use    Smoking status: Former    Smokeless tobacco: Never   Substance and Sexual Activity    Alcohol use: Yes     Comment: socially    Drug use: No    Sexual activity: Yes     Social Drivers of Health     Financial Resource Strain: Low Risk  (1/17/2025)    Overall Financial Resource Strain (CARDIA)     Difficulty of Paying Living Expenses: Not hard at all   Food Insecurity: No Food Insecurity (1/17/2025)    Hunger Vital Sign     Worried About Running Out of Food in the Last Year: Never true     Ran Out of Food in the Last Year: Never  true   Transportation Needs: No Transportation Needs (1/17/2025)    TRANSPORTATION NEEDS     Transportation : No   Physical Activity: Insufficiently Active (11/19/2024)    Exercise Vital Sign     Days of Exercise per Week: 3 days     Minutes of Exercise per Session: 20 min   Stress: No Stress Concern Present (1/17/2025)    [employer] - Occupational Stress Questionnaire     Feeling of Stress : Not at all   Housing Stability: Unknown (1/17/2025)    Housing Stability Vital Sign     Unable to Pay for Housing in the Last Year: No     Homeless in the Last Year: No       Current Outpatient Medications   Medication Sig Dispense Refill    alcohol swabs (BD ALCOHOL SWABS) PadM USE FOUR TIMES DAILY 400 each 3    aspirin (ECOTRIN) 81 MG EC tablet Take 81 mg by mouth once daily.      atorvastatin (LIPITOR) 40 MG tablet Take 1 tablet (40 mg total) by mouth once daily. 90 tablet 3    blood glucose control high,low (ACCU-CHEK CATHRYN CONTROL SOLN) Soln Use as directed to check blood sugar 1 each 1    blood sugar diagnostic Strp test blood sugar as directed four times daily 100 each 3    blood-glucose meter (TRUE METRIX GLUCOSE METER) Misc use as directed 1 each 0    clopidogreL (PLAVIX) 75 mg tablet Take 1 tablet (75 mg total) by mouth once daily. 90 tablet 3    colchicine (MITIGARE) 0.6 mg Cap Take 1 capsule (0.6 mg total) by mouth once daily. 90 capsule 3    cyanocobalamin (VITAMIN B-12) 1000 MCG tablet Take 1,000 mcg by mouth once daily.      empagliflozin (JARDIANCE) 10 mg tablet Take 1 tablet (10 mg total) by mouth once daily. 30 tablet 11    gabapentin (NEURONTIN) 300 MG capsule Take 2 capsules (600 mg total) by mouth 2 (two) times daily. 360 capsule 1    insulin aspart U-100 (NOVOLOG FLEXPEN U-100 INSULIN) 100 unit/mL (3 mL) InPn pen Inject 18 Units into the skin 3 (three) times daily with meals. (Patient taking differently: Inject 15 Units into the skin as needed.) 45 mL 5    insulin glargine  "U-100, Lantus, (LANTUS SOLOSTAR U-100 INSULIN) 100 unit/mL (3 mL) InPn pen Inject 40 Units into the skin every evening. (Patient taking differently: Inject 35 Units into the skin every evening.) 30 mL 1    lancets 30 gauge Misc usea s directed to check blood glucose four times daily 100 each 3    losartan (COZAAR) 50 MG tablet Take 1 tablet (50 mg total) by mouth once daily. 90 tablet 3    metFORMIN (GLUCOPHAGE) 1000 MG tablet Take 1 tablet (1,000 mg total) by mouth 2 (two) times daily with meals. 180 tablet 4    multivit-minerals/FA/lycopene (ONE-A-DAY MEN'S 50 PLUS ORAL) Take 1 tablet by mouth once daily.      nitroGLYCERIN (NITROSTAT) 0.4 MG SL tablet Place 1 tablet (0.4 mg total) under the tongue every 5 (five) minutes as needed for Chest pain. 25 tablet 3    pen needle, diabetic (BD ULTRA-FINE SINA PEN NEEDLE) 32 gauge x 5/32" Ndle Use four times daily for insulin administration 400 each 3    promethazine-dextromethorphan (PROMETHAZINE-DM) 6.25-15 mg/5 mL Syrp Take 5 mLs by mouth 2 (two) times daily as needed (cough). 180 mL 1    zolpidem (AMBIEN) 5 MG Tab TAKE 1 TABLET BY MOUTH EVERY NIGHT AS NEEDED (Patient taking differently: Take 5 mg by mouth nightly as needed.) 90 tablet 3    doxycycline (MONODOX) 100 MG capsule Take 1 capsule (100 mg total) by mouth 2 (two) times daily. for 7 days 14 capsule 0    furosemide (LASIX) 20 MG tablet Take 1 tablet (20 mg total) by mouth every 8 (eight) hours. 180 tablet 4     No current facility-administered medications for this visit.       Review of patient's allergies indicates:  No Known Allergies      Objective:       Vitals:    02/13/25 0818   BP: 129/70   Pulse: 72       Physical Exam  Constitutional:       Appearance: He is well-developed.   HENT:      Head: Normocephalic and atraumatic.      Right Ear: External ear normal.      Left Ear: External ear normal.      Nose: Nose normal.      Mouth/Throat:      Pharynx: No oropharyngeal exudate.   Eyes:      General: No " scleral icterus.        Right eye: No discharge.         Left eye: No discharge.      Conjunctiva/sclera: Conjunctivae normal.      Pupils: Pupils are equal, round, and reactive to light.   Neck:      Thyroid: No thyromegaly.      Vascular: No JVD.      Trachea: No tracheal deviation.   Cardiovascular:      Rate and Rhythm: Normal rate and regular rhythm.      Heart sounds: Normal heart sounds. No murmur heard.     No friction rub. No gallop.   Pulmonary:      Effort: Pulmonary effort is normal. No respiratory distress.      Breath sounds: Normal breath sounds. No stridor. No wheezing or rales.   Chest:      Chest wall: No tenderness.   Abdominal:      General: Bowel sounds are normal. There is no distension.      Palpations: Abdomen is soft. There is no mass.      Tenderness: There is no abdominal tenderness. There is no guarding or rebound.      Hernia: No hernia is present.   Musculoskeletal:         General: No swelling or tenderness. Normal range of motion.      Cervical back: Normal range of motion and neck supple.      Right lower leg: Edema present.      Comments: Right lle warm and nontender   Lymphadenopathy:      Cervical: No cervical adenopathy.   Skin:     General: Skin is warm and dry.      Coloration: Skin is not pale.      Findings: Erythema and rash present.   Neurological:      Mental Status: He is alert and oriented to person, place, and time.      Cranial Nerves: No cranial nerve deficit.      Motor: No abnormal muscle tone.      Coordination: Coordination normal.      Deep Tendon Reflexes: Reflexes are normal and symmetric. Reflexes normal.   Psychiatric:         Behavior: Behavior normal.         Thought Content: Thought content normal.         Judgment: Judgment normal.         Assessment:       Problem List Items Addressed This Visit       Type 2 diabetes mellitus with diabetic neuropathy    Mitral regurgitation    Relevant Medications    furosemide (LASIX) 20 MG tablet    Insulin dependent  type 2 diabetes mellitus    ICD (implantable cardioverter-defibrillator), single, in situ    Hypertension associated with diabetes    Essential hypertension    Relevant Medications    furosemide (LASIX) 20 MG tablet    Diabetes mellitus type 2 without retinopathy    Chronic combined systolic and diastolic congestive heart failure    Relevant Medications    furosemide (LASIX) 20 MG tablet     Other Visit Diagnoses       Cellulitis of lower extremity, unspecified laterality    -  Primary    Relevant Medications    doxycycline (MONODOX) 100 MG capsule    Acute combined systolic and diastolic congestive heart failure        Relevant Medications    furosemide (LASIX) 20 MG tablet            Plan:           Clifton was seen today for leg swelling.    Diagnoses and all orders for this visit:    Cellulitis of lower extremity, unspecified laterality  -     doxycycline (MONODOX) 100 MG capsule; Take 1 capsule (100 mg total) by mouth 2 (two) times daily. for 7 days    Type 2 diabetes mellitus with diabetic neuropathy, with long-term current use of insulin    Hypertension associated with diabetes    Chronic combined systolic and diastolic congestive heart failure    Diabetes mellitus type 2 without retinopathy    Insulin dependent type 2 diabetes mellitus    ICD (implantable cardioverter-defibrillator), single, in situ    Essential hypertension  -     furosemide (LASIX) 20 MG tablet; Take 1 tablet (20 mg total) by mouth every 8 (eight) hours.    Mitral valve insufficiency, unspecified etiology  -     furosemide (LASIX) 20 MG tablet; Take 1 tablet (20 mg total) by mouth every 8 (eight) hours.    Acute combined systolic and diastolic congestive heart failure  -     furosemide (LASIX) 20 MG tablet; Take 1 tablet (20 mg total) by mouth every 8 (eight) hours.        Rle cellulitis  -icing  -doxy x 7 days    DM II controlled/hyperglycemia  - improving since started insulin  -lantus and novolog to continue  -strict diet  control        HTN  -controlled    HLD  -controlled    Combined CHF  -follows cardiology  -on external defibrillator    Neuropathy  -increase neurontin and increase dose to 600 mgTID    Obesity  -diet and exercise as tolerated    chronic gout  -uric acid levels  -conchicine as needed    Spent adequate time in obtaining history and explaining differentials    30 minutes spent during this visit of which greater than 50% devoted to face-face counseling and coordination of care regarding diagnosis and management plan    Follow up in about 5 months (around 7/7/2025), or if symptoms worsen or fail to improve.

## 2025-02-16 NOTE — PROGRESS NOTES
Cardiology Clinic Visit    History of Present Illness:     Former Dr. Clement and Jillian patient; former police office  Clifton Wilson is a pleasant 74 y.o. male with PMHx of CAD, HTN, HLD, HFrEF 40-45% s/p ICD here for follow up post hospital follow for syncope (droves multiple red lights and does not recalled much). He was admitted and change his BP meds. Recently restarted Losartan 50 mg qd bc BP was elevated. Work up negative. Reports he feels swollen hands, face and lower extremities. Visible LE edema, lungs clear. Currently doing well and will Dr. Sims (EP). Denies cp, sob, palpitations, presyncope/dizziness, syncope, orthopnea, PND, bendopnea, decrease ET, NVDC, fever, chills.    PET 12/4/24    There are two significant perfusion abnormalities.    Perfusion Abnormality #1 - There is a small sized, moderate intensity mid anterior and anteroseptal fixed perfusion abnormality involving 4% of LV myocardium in the distribution of the LAD territory consistent with a non-transmural scar.    Perfusion Abnormality #2 - There is a very small (<5%) sized, mild intensity basal lateral fixed perfusion abnormality involving 3% of LV myocardium in the distribution of the LCX territory consistent with a non-transmural scar.    There are no other significant perfusion abnormalities.    The whole heart absolute myocardial perfusion values were reduced at rest, moderately reduced during stress and CFR is mildly reduced.    CT attenuation images demonstrate moderate diffuse coronary calcifications in the LAD, LCX , mild calcifications in the RCA territory and mild diffuse aortic calcifications in the descending aorta. Stents are present in the LAD and CX.    The gated perfusion images showed an ejection fraction of 36% at rest and 45% during stress. A normal ejection fraction is greater than 47%.    There is regional hypokinesis at rest of the anterior and anteroseptal wall(s) and regional hypokinesis during stress of the  anterior and anteroseptal wall(s).    The study's ECG is negative for ischemia.    Assessment:   Ostial to proximal LAD with 50-60% stenosis angiographically with iFR negative   LAD stents patent with PATRIA 2   Lcx stent patent     EF    Left Ventricle: Regional wall motion abnormalities present. See diagram for wall motion findings. There is mildly reduced systolic function with a visually estimated ejection fraction of 40 - 45%.       History obtained by patient interview and supplemented by nursing documentation and chart review.   PMHx:  has a past medical history of Anticoagulant long-term use, Cardiomyopathy, CHF (congestive heart failure), Coronary artery disease, Diabetes mellitus, Gout, Heart attack, Hypertension, and Pneumonia.   SurgHx:  has a past surgical history that includes Appendectomy; Cataract extraction (Bilateral, 2011); Eye surgery; Cardiac defibrillator placement; Cardiac catheterization; Colonoscopy (N/A, 8/10/2018); Revision of implantable cardioverter-defibrillator (ICD) electrode lead placement (N/A, 11/11/2019); Left heart catheterization (Left, 11/11/2024); instantaneous wave-free ratio (ifr) (N/A, 11/11/2024); and Coronary angiography (N/A, 11/11/2024).   FamHx: family history includes Heart disease in his father and mother.   SocialHx:  reports that he has quit smoking. He has never used smokeless tobacco. He reports current alcohol use. He reports that he does not use drugs.  Home Meds:  Current Outpatient Medications   Medication Instructions    alcohol swabs (BD ALCOHOL SWABS) PadM USE FOUR TIMES DAILY    aspirin (ECOTRIN) 81 mg, Daily    atorvastatin (LIPITOR) 40 mg, Oral, Daily    blood glucose control high,low (ACCU-CHEK CATHRYN CONTROL SOLN) Soln Use as directed to check blood sugar    blood sugar diagnostic Strp test blood sugar as directed four times daily    blood-glucose meter (TRUE METRIX GLUCOSE METER) Misc use as directed    bumetanide (BUMEX) 1 mg, Oral, Daily     "clopidogreL (PLAVIX) 75 mg, Oral, Daily    colchicine (MITIGARE) 0.6 mg, Oral, Daily    cyanocobalamin (VITAMIN B-12) 1,000 mcg, Daily    doxycycline (MONODOX) 100 mg, Oral, 2 times daily    gabapentin (NEURONTIN) 600 mg, Oral, 2 times daily    JARDIANCE 10 mg, Oral, Daily    lancets 30 gauge Misc usea s directed to check blood glucose four times daily    LANTUS SOLOSTAR U-100 INSULIN 40 Units, Subcutaneous, Nightly    losartan (COZAAR) 50 mg, Oral, Daily    metFORMIN (GLUCOPHAGE) 1,000 mg, Oral, 2 times daily with meals    multivit-minerals/FA/lycopene (ONE-A-DAY MEN'S 50 PLUS ORAL) 1 tablet, Daily    nitroGLYCERIN (NITROSTAT) 0.4 mg, Sublingual, Every 5 min PRN    NovoLOG Flexpen U-100 Insulin 18 Units, Subcutaneous, 3 times daily with meals    pen needle, diabetic (BD ULTRA-FINE SINA PEN NEEDLE) 32 gauge x 5/32" Ndle Use four times daily for insulin administration    promethazine-dextromethorphan (PROMETHAZINE-DM) 6.25-15 mg/5 mL Syrp 5 mLs, Oral, 2 times daily PRN    zolpidem (AMBIEN) 5 MG Tab TAKE 1 TABLET BY MOUTH EVERY NIGHT AS NEEDED       Review of Systems: Comprehensive ROS was performed and is negative unless otherwise noted in HPI.   Objective   Objective/Exam:   /81 (BP Location: Right arm, Patient Position: Sitting)   Pulse 67   Ht 5' 11" (1.803 m)   Wt 107.1 kg (236 lb)   SpO2 100%   BMI 32.92 kg/m²    Wt Readings from Last 4 Encounters:   02/18/25 107.1 kg (236 lb)   02/13/25 108 kg (238 lb 1.6 oz)   01/17/25 106.6 kg (235 lb)   01/07/25 110 kg (242 lb 8.1 oz)      Constitutional: NAD, Atraumatic, Conversant   HEENT: MMM, Sclera anicteric, No JVD   Cardiovascular: RRR, no murmurs noted, no chest tenderness to palpation, 2+ radial pulses b/l  Pulmonary: normal respiratory effort, CTAB, no crackles, wheezes  Abdominal: S/NT/ND  Musculoskeletal: No lower extremity edema noted b/l  Neurological: No gross neurological deficits  Skin: W/D/I  Psych: Appropriate affect, normal " "mood  Labs/Imaging/Procedures   Personally reviewed  Lab Results   Component Value Date     01/18/2025    K 3.8 01/18/2025     01/18/2025    CO2 23 01/18/2025    BUN 19 01/18/2025    CREATININE 1.2 01/18/2025    ANIONGAP 13 01/18/2025     Lab Results   Component Value Date    HGBA1C 6.6 (H) 01/07/2025     Lab Results   Component Value Date    BNP 42 01/16/2025     (H) 11/22/2024     (H) 11/08/2024      Lab Results   Component Value Date    WBC 9.44 01/16/2025    HGB 12.3 (L) 01/16/2025    HCT 37.3 (L) 01/16/2025     01/16/2025    GRAN 7.1 01/16/2025    GRAN 75.5 (H) 01/16/2025     Lab Results   Component Value Date    CHOL 131 01/17/2025    HDL 30 (L) 01/17/2025    LDLCALC 49.6 (L) 01/17/2025    LDLCALC 42.6 (L) 07/08/2024    LDLCALC 66.6 05/03/2023    TRIG 257 (H) 01/17/2025     Lab Results   Component Value Date    TSH 0.770 01/17/2025     No results found for: "APOLIPOPROTE"  No results found for: "LIPOA"     TTE:  Results for orders placed during the hospital encounter of 11/22/24    Echo Ultrasound enhancing contrast? Yes (definity/optison)    Interpretation Summary    Left Ventricle: Regional wall motion abnormalities present. See diagram for wall motion findings. There is mildly reduced systolic function with a visually estimated ejection fraction of 40 - 45%.    Stress Testing:   No results found for this or any previous visit.     Coronary Angiogram:  Results for orders placed during the hospital encounter of 11/08/24    Cardiac catheterization    Conclusion  Table formatting from the original result was not included.      The estimated blood loss was none.    [facility]  Surgery Department  Operative Note    SUMMARY  POST CATH NOTE    Date of Procedure: 11/11/2024    Procedure: Procedure(s) (LRB):  Left heart cath (Left)  Instantaneous Wave-Free Ratio (IFR) (N/A)  ANGIOGRAM, CORONARY ARTERY (N/A)    Surgeons and Role:  * Luis Felipe Wright MD - " "Primary    Assisting Surgeon: None    Pre-Operative Diagnosis: Chest pain [R07.9]  Unstable angina [I20.0]    Post-Operative Diagnosis: Post-Op Diagnosis Codes:  * Chest pain [R07.9]  * Unstable angina [I20.0]    s/p catheterization secondary to:    Cath Results:  Access: Us guided RRA    Coronary Anatomy:    Left Main Coronary Artery: The left main is a short andlarge caliber vessel arising normally from the left sinus of valsalva.  It bifurcates into the left anterior descending and left circumflex coronary artery.  It is free of evidence of obstructive coronary artery disease. PATRIA III flow    Left Anterior Descending: The left anterior descending coronary artery is a large caliber vessel arising normally from the left main and extending to the apex.  It gives rise to small to moderate caliber diagonal arteries. Ostial to proximal LAD (before stent) 50-60% stenosed angiographically. Mild to moderate ISR with patent stents with PATRIA 2 flow. iFR of Proximal LAD negative,    Left Circumflex: The left circumflex is a large caliber vessel arising normally from the left main coronary artery, it is non-dominant.  It gives rise to moderate caliber obtuse marginal branches.  Prox to mid Lcx  patent stent with mild IRS. Jailed AV Cx  (small <2.5 mm) diffuse disease at proximal vessel. PATRIA III flow    Right Coronary Artery: The right coronary artery is a large caliber vessel arising normally from the right sinus of valsalva.  It is dominant giving rise to a PDA and PLV branch.  The right coronary artery is free of obstructive disease. PATRIA III flow    LVgram: LVEDP 9 mmHg    Intervention:  iFR  JL4 guide  The 0.014" Omniwire was connected to the console, calibrated and equalized. The 0.014"  Omniwire was then advanced into the left main outside of the catheter and flushed with saline  with the introducer removed. The pressure was normalized and then the wire was advanced  across Proximal LAD lesion. 0.96-0.95 iFR " "recordings were obtained. The 0.014" Omniwire was then pulled  back slowly across the lesion to assess for step up and electronic drift. The wire was removed,  and final angiography was performed.    Closure device:  Patient tolerated procedure well, no complications    Post Cath Exam:  BP (!) 153/73 (BP Location: Left arm, Patient Position: Lying)   Pulse 71   Temp 98.7 °F (37.1 °C) (Oral)   Resp 16   Ht 5' 11" (1.803 m)   Wt 107.5 kg (237 lb)   SpO2 97%   BMI 33.05 kg/m²    Anesthesia: RN IV Sedation      Estimated Blood Loss (EBL): * No values recorded between 11/11/2024 11:40 AM and 11/11/2024 12:19 PM *    Specimens:  Specimen (24h ago, onward)    None          Assessment:  Ostial to proximal LAD with 50-60% stenosis angiographically with iFR negative  LAD stents patent with PATRIA 2  Lcx stent patent    Plan:    - Patient tolerated procedure well. No immediate complications  - Continue aspirin and Plavix  - EKG when arrives to floor  - Routine post cath protocol  - Maximize medical management  - Further care by the primary team  - IVF at  100cc/hr  for 2 hours  - Follow up with me  -PET stress for viability M    42 minutes were spent on this visit including time personally:  -Reviewing Medical records & lab results  -Independently reviewing and interpreting (if not documented by myself) EKGs, echocardiograms, catherizations   -Obtaining a history, performing a relevant exam, counseling/educating the patient/family  -Documenting clinical information in the EMR including ordering of tests  -Care coordination and communicating with other health care providers       Problem List:     1. Essential hypertension    2. Synovitis of knee    3. Coronary artery disease of native artery of native heart with stable angina pectoris    4. Ischemic cardiomyopathy    5. Hypertension associated with diabetes    6. NSTEMI (non-ST elevated myocardial infarction)    7. DM type 2 without retinopathy    8. Dyslipidemia    9. " Type 2 diabetes mellitus with diabetic neuropathy, with long-term current use of insulin    10. ICD (implantable cardioverter-defibrillator), single, in situ    11. Chronic combined systolic and diastolic CHF (congestive heart failure)    12. Generalized edema          Assessment/Plan:   Clifton Wilson is a pleasant 73 y.o. male with PMHx of CAD, HTN, HLD, HFrEF 40-45% s/p ICD here for follow up. Patient feeling fatigue/tired likely multifactorial low BP and CAD. Here post hospital discharged 2/2 syncope.     CAD with multiple PCIs in LAD and Lcx- Ostial LAD with 50-60% stenosed with negative iFR. patent stents but LAD stents have PATRIA II flow. PET negative for ischemiaContinue DAPT, statin.   Syncope- found with low BP, meds adjusted. Negative work up in the hospital. No more episodes. Losartan restarted PCP. Will see EP.   HFrEF 40-45% s/p ICD- needs follow up for EP. Decreased Coreg 12.5 to 6.25 bid, decrease losartan 50 to 25 mg qd (will transition to entresto next visit if BP tolerates), continue imdur, continue jardiance next visit will start spironolactone. Stop lasix and start bumex 1 mg qd for 1 week followed with prn-->repeat labs and close follow up with me 2-3 weeks.  HTN - at goal. Continue current regiment  HLD continue statin  Gout- repeat uric acid. He is aware that diuretics and diet will increase uric acid levels.     DIGITAL HYPERTENSION ORDERED     Preventative Care:  Lipids:  PVD(V/A)      Patient expressed verbal understanding and agreed with the plan     Return sooner for concerns or questions. If symptoms persist go to the ED.  I have reviewed all pertinent data including patient's medical history in detail and updated the computerized patient record.     Total time spent counseling greater than fifty percent of total visit time.  Counseling included discussion regarding imaging findings, diagnosis, possibilities, treatment options, risks and benefits.      Thank you for the opportunity to  care for this patient. Please don't hesitate to reach out with any questions/concerns         Luis Felipe Bryan MD  Cardiovascular Disease; Interventional Cardiology  [facility]

## 2025-02-18 ENCOUNTER — OFFICE VISIT (OUTPATIENT)
Dept: CARDIOLOGY | Facility: CLINIC | Age: 75
End: 2025-02-18
Payer: MEDICARE

## 2025-02-18 ENCOUNTER — PATIENT MESSAGE (OUTPATIENT)
Dept: CARDIOLOGY | Facility: CLINIC | Age: 75
End: 2025-02-18

## 2025-02-18 ENCOUNTER — PATIENT MESSAGE (OUTPATIENT)
Dept: ELECTROPHYSIOLOGY | Facility: CLINIC | Age: 75
End: 2025-02-18
Payer: MEDICARE

## 2025-02-18 VITALS
OXYGEN SATURATION: 100 % | BODY MASS INDEX: 33.04 KG/M2 | WEIGHT: 236 LBS | HEART RATE: 67 BPM | DIASTOLIC BLOOD PRESSURE: 81 MMHG | SYSTOLIC BLOOD PRESSURE: 134 MMHG | HEIGHT: 71 IN

## 2025-02-18 DIAGNOSIS — E78.5 DYSLIPIDEMIA: ICD-10-CM

## 2025-02-18 DIAGNOSIS — I21.4 NSTEMI (NON-ST ELEVATED MYOCARDIAL INFARCTION): ICD-10-CM

## 2025-02-18 DIAGNOSIS — Z95.810 ICD (IMPLANTABLE CARDIOVERTER-DEFIBRILLATOR), SINGLE, IN SITU: ICD-10-CM

## 2025-02-18 DIAGNOSIS — I15.2 HYPERTENSION ASSOCIATED WITH DIABETES: ICD-10-CM

## 2025-02-18 DIAGNOSIS — M65.969 SYNOVITIS OF KNEE: ICD-10-CM

## 2025-02-18 DIAGNOSIS — R60.1 GENERALIZED EDEMA: ICD-10-CM

## 2025-02-18 DIAGNOSIS — E11.59 HYPERTENSION ASSOCIATED WITH DIABETES: ICD-10-CM

## 2025-02-18 DIAGNOSIS — I10 ESSENTIAL HYPERTENSION: Primary | ICD-10-CM

## 2025-02-18 DIAGNOSIS — I50.42 CHRONIC COMBINED SYSTOLIC AND DIASTOLIC CHF (CONGESTIVE HEART FAILURE): ICD-10-CM

## 2025-02-18 DIAGNOSIS — I25.118 CORONARY ARTERY DISEASE OF NATIVE ARTERY OF NATIVE HEART WITH STABLE ANGINA PECTORIS: ICD-10-CM

## 2025-02-18 DIAGNOSIS — E11.9 DM TYPE 2 WITHOUT RETINOPATHY: ICD-10-CM

## 2025-02-18 DIAGNOSIS — Z79.4 TYPE 2 DIABETES MELLITUS WITH DIABETIC NEUROPATHY, WITH LONG-TERM CURRENT USE OF INSULIN: ICD-10-CM

## 2025-02-18 DIAGNOSIS — E11.40 TYPE 2 DIABETES MELLITUS WITH DIABETIC NEUROPATHY, WITH LONG-TERM CURRENT USE OF INSULIN: ICD-10-CM

## 2025-02-18 DIAGNOSIS — I25.5 ISCHEMIC CARDIOMYOPATHY: ICD-10-CM

## 2025-02-18 PROCEDURE — 3079F DIAST BP 80-89 MM HG: CPT | Mod: HCNC,CPTII,S$GLB, | Performed by: STUDENT IN AN ORGANIZED HEALTH CARE EDUCATION/TRAINING PROGRAM

## 2025-02-18 PROCEDURE — 99215 OFFICE O/P EST HI 40 MIN: CPT | Mod: HCNC,S$GLB,, | Performed by: STUDENT IN AN ORGANIZED HEALTH CARE EDUCATION/TRAINING PROGRAM

## 2025-02-18 RX ORDER — BUMETANIDE 1 MG/1
1 TABLET ORAL DAILY
Qty: 30 TABLET | Refills: 11 | Status: SHIPPED | OUTPATIENT
Start: 2025-02-18 | End: 2026-02-18

## 2025-02-18 RX ORDER — COLCHICINE 0.6 MG/1
0.6 CAPSULE ORAL DAILY
Qty: 90 CAPSULE | Refills: 3 | Status: SHIPPED | OUTPATIENT
Start: 2025-02-18

## 2025-02-22 ENCOUNTER — CLINICAL SUPPORT (OUTPATIENT)
Dept: CARDIOLOGY | Facility: HOSPITAL | Age: 75
End: 2025-02-22
Attending: INTERNAL MEDICINE
Payer: MEDICARE

## 2025-02-22 ENCOUNTER — CLINICAL SUPPORT (OUTPATIENT)
Dept: CARDIOLOGY | Facility: HOSPITAL | Age: 75
End: 2025-02-22
Payer: MEDICARE

## 2025-02-22 ENCOUNTER — LAB VISIT (OUTPATIENT)
Dept: LAB | Facility: HOSPITAL | Age: 75
End: 2025-02-22
Attending: STUDENT IN AN ORGANIZED HEALTH CARE EDUCATION/TRAINING PROGRAM
Payer: MEDICARE

## 2025-02-22 DIAGNOSIS — I50.42 CHRONIC COMBINED SYSTOLIC AND DIASTOLIC CHF (CONGESTIVE HEART FAILURE): ICD-10-CM

## 2025-02-22 DIAGNOSIS — I25.5 ISCHEMIC CARDIOMYOPATHY: ICD-10-CM

## 2025-02-22 DIAGNOSIS — R60.1 GENERALIZED EDEMA: ICD-10-CM

## 2025-02-22 DIAGNOSIS — Z95.810 ICD (IMPLANTABLE CARDIOVERTER-DEFIBRILLATOR), SINGLE, IN SITU: ICD-10-CM

## 2025-02-22 DIAGNOSIS — I10 ESSENTIAL HYPERTENSION: ICD-10-CM

## 2025-02-22 DIAGNOSIS — Z95.810 PRESENCE OF AUTOMATIC (IMPLANTABLE) CARDIAC DEFIBRILLATOR: ICD-10-CM

## 2025-02-22 LAB
ALBUMIN SERPL BCP-MCNC: 3.3 G/DL (ref 3.5–5.2)
ALP SERPL-CCNC: 90 U/L (ref 40–150)
ALT SERPL W/O P-5'-P-CCNC: 8 U/L (ref 10–44)
ANION GAP SERPL CALC-SCNC: 13 MMOL/L (ref 8–16)
AST SERPL-CCNC: 13 U/L (ref 10–40)
BILIRUB SERPL-MCNC: 0.6 MG/DL (ref 0.1–1)
BUN SERPL-MCNC: 21 MG/DL (ref 8–23)
CALCIUM SERPL-MCNC: 8.8 MG/DL (ref 8.7–10.5)
CHLORIDE SERPL-SCNC: 109 MMOL/L (ref 95–110)
CO2 SERPL-SCNC: 24 MMOL/L (ref 23–29)
CREAT SERPL-MCNC: 1.1 MG/DL (ref 0.5–1.4)
EST. GFR  (NO RACE VARIABLE): >60 ML/MIN/1.73 M^2
GLUCOSE SERPL-MCNC: 132 MG/DL (ref 70–110)
POTASSIUM SERPL-SCNC: 3.2 MMOL/L (ref 3.5–5.1)
PROT SERPL-MCNC: 7.3 G/DL (ref 6–8.4)
SODIUM SERPL-SCNC: 146 MMOL/L (ref 136–145)
URATE SERPL-MCNC: 7.6 MG/DL (ref 3.4–7)

## 2025-02-22 PROCEDURE — 36415 COLL VENOUS BLD VENIPUNCTURE: CPT | Mod: HCNC | Performed by: STUDENT IN AN ORGANIZED HEALTH CARE EDUCATION/TRAINING PROGRAM

## 2025-02-22 PROCEDURE — 84550 ASSAY OF BLOOD/URIC ACID: CPT | Mod: HCNC | Performed by: STUDENT IN AN ORGANIZED HEALTH CARE EDUCATION/TRAINING PROGRAM

## 2025-02-22 PROCEDURE — 80053 COMPREHEN METABOLIC PANEL: CPT | Mod: HCNC | Performed by: STUDENT IN AN ORGANIZED HEALTH CARE EDUCATION/TRAINING PROGRAM

## 2025-02-22 PROCEDURE — 93296 REM INTERROG EVL PM/IDS: CPT | Mod: HCNC | Performed by: INTERNAL MEDICINE

## 2025-02-22 PROCEDURE — 93295 DEV INTERROG REMOTE 1/2/MLT: CPT | Mod: HCNC,,, | Performed by: INTERNAL MEDICINE

## 2025-02-28 LAB
OHS CV DC REMOTE DEVICE TYPE: NORMAL
OHS CV RV PACING PERCENT: 0 %

## 2025-03-05 DIAGNOSIS — F51.01 PRIMARY INSOMNIA: ICD-10-CM

## 2025-03-05 NOTE — TELEPHONE ENCOUNTER
No care due was identified.  [facility] Embedded Care Due Messages. Reference number: [id number].   3/05/2025 5:08:25 PM CST

## 2025-03-06 RX ORDER — ZOLPIDEM TARTRATE 5 MG/1
TABLET ORAL
Qty: 90 TABLET | Refills: 3 | Status: SHIPPED | OUTPATIENT
Start: 2025-03-06

## 2025-03-06 NOTE — TELEPHONE ENCOUNTER
Refill Routing Note   Medication(s) are not appropriate for processing by [facility] for the following reason(s):        Outside of protocol    [facility] action(s):  Route               Appointments  past 12m or future 3m with PCP    Date Provider   Last Visit   2/13/2025 Britton Grissom MD   Next Visit   7/7/2025 Britton Grissom MD   ED visits in past 90 days: 0        Note composed:9:06 AM 03/06/2025

## 2025-03-10 ENCOUNTER — TELEPHONE (OUTPATIENT)
Dept: CARDIOLOGY | Facility: CLINIC | Age: 75
End: 2025-03-10
Payer: MEDICARE

## 2025-03-10 NOTE — TELEPHONE ENCOUNTER
----- Message from Kandi sent at 3/10/2025  3:22 PM CDT -----  Type:  Needs Medical AdviceWho Called: [facility] Call Back Number: [phone] Information: Patient is requesting a call back in regards to his Friday appt. He states he was rescheduled for Friday and cannot do that day. He would like  call back.

## 2025-03-13 ENCOUNTER — PATIENT MESSAGE (OUTPATIENT)
Dept: OTHER | Facility: OTHER | Age: 75
End: 2025-03-13
Payer: MEDICARE

## 2025-03-18 ENCOUNTER — OFFICE VISIT (OUTPATIENT)
Dept: CARDIOLOGY | Facility: CLINIC | Age: 75
End: 2025-03-18
Payer: MEDICARE

## 2025-03-18 ENCOUNTER — HOSPITAL ENCOUNTER (OUTPATIENT)
Facility: HOSPITAL | Age: 75
Discharge: HOME | End: 2025-03-25
Attending: EMERGENCY MEDICINE | Admitting: HOSPITALIST
Payer: MEDICARE

## 2025-03-18 DIAGNOSIS — M62.81 MUSCLE WEAKNESS: Primary | ICD-10-CM

## 2025-03-18 DIAGNOSIS — Z95.810 PRESENCE OF AUTOMATIC (IMPLANTABLE) CARDIAC DEFIBRILLATOR: ICD-10-CM

## 2025-03-18 DIAGNOSIS — R55 NEAR SYNCOPE: ICD-10-CM

## 2025-03-18 DIAGNOSIS — E11.9 DM TYPE 2 WITHOUT RETINOPATHY: ICD-10-CM

## 2025-03-18 DIAGNOSIS — G95.9 CERVICAL MYELOPATHY: ICD-10-CM

## 2025-03-18 DIAGNOSIS — E11.9 DIABETES MELLITUS TYPE 2 WITHOUT RETINOPATHY: ICD-10-CM

## 2025-03-18 DIAGNOSIS — I50.9 CHF (CONGESTIVE HEART FAILURE): ICD-10-CM

## 2025-03-18 DIAGNOSIS — M48.02 SPINAL STENOSIS IN CERVICAL REGION: ICD-10-CM

## 2025-03-18 DIAGNOSIS — Z79.4 TYPE 2 DIABETES MELLITUS WITH DIABETIC NEUROPATHY, WITH LONG-TERM CURRENT USE OF INSULIN: ICD-10-CM

## 2025-03-18 DIAGNOSIS — R41.0 TRANSIENT CONFUSION: ICD-10-CM

## 2025-03-18 DIAGNOSIS — I25.5 ISCHEMIC CARDIOMYOPATHY: Primary | ICD-10-CM

## 2025-03-18 DIAGNOSIS — I95.9 HYPOTENSION, UNSPECIFIED HYPOTENSION TYPE: ICD-10-CM

## 2025-03-18 DIAGNOSIS — M62.81 MUSCLE WEAKNESS OF UPPER EXTREMITY: ICD-10-CM

## 2025-03-18 DIAGNOSIS — Z79.4 INSULIN DEPENDENT TYPE 2 DIABETES MELLITUS: ICD-10-CM

## 2025-03-18 DIAGNOSIS — G62.9 NEUROPATHY: ICD-10-CM

## 2025-03-18 DIAGNOSIS — E87.6 HYPOKALEMIA: ICD-10-CM

## 2025-03-18 DIAGNOSIS — M17.10 ARTHRITIS OF KNEE: ICD-10-CM

## 2025-03-18 DIAGNOSIS — I10 ESSENTIAL HYPERTENSION: ICD-10-CM

## 2025-03-18 DIAGNOSIS — E83.42 HYPOMAGNESEMIA: ICD-10-CM

## 2025-03-18 DIAGNOSIS — E11.40 TYPE 2 DIABETES MELLITUS WITH DIABETIC NEUROPATHY, WITH LONG-TERM CURRENT USE OF INSULIN: ICD-10-CM

## 2025-03-18 DIAGNOSIS — R07.9 CHEST PAIN: ICD-10-CM

## 2025-03-18 DIAGNOSIS — M25.462 KNEE EFFUSION, LEFT: ICD-10-CM

## 2025-03-18 DIAGNOSIS — E11.9 INSULIN DEPENDENT TYPE 2 DIABETES MELLITUS: ICD-10-CM

## 2025-03-18 PROBLEM — M10.9 GOUT: Status: ACTIVE | Noted: 2025-03-18

## 2025-03-18 LAB
ALBUMIN SERPL BCP-MCNC: 3.1 G/DL (ref 3.5–5.2)
ALP SERPL-CCNC: 114 U/L (ref 40–150)
ALT SERPL W/O P-5'-P-CCNC: 44 U/L (ref 10–44)
ANION GAP SERPL CALC-SCNC: 16 MMOL/L (ref 8–16)
AST SERPL-CCNC: 57 U/L (ref 10–40)
BASOPHILS # BLD AUTO: 0.05 K/UL (ref 0–0.2)
BASOPHILS NFR BLD: 0.7 % (ref 0–1.9)
BILIRUB SERPL-MCNC: 0.7 MG/DL (ref 0.1–1)
BILIRUB UR QL STRIP: NEGATIVE
BNP SERPL-MCNC: 160 PG/ML (ref 0–99)
BUN SERPL-MCNC: 17 MG/DL (ref 8–23)
CALCIUM SERPL-MCNC: 9 MG/DL (ref 8.7–10.5)
CHLORIDE SERPL-SCNC: 104 MMOL/L (ref 95–110)
CK SERPL-CCNC: 44 U/L (ref 20–200)
CLARITY UR: CLEAR
CO2 SERPL-SCNC: 20 MMOL/L (ref 23–29)
COLOR UR: YELLOW
CREAT SERPL-MCNC: 1.4 MG/DL (ref 0.5–1.4)
DIFFERENTIAL METHOD BLD: ABNORMAL
EOSINOPHIL # BLD AUTO: 0.1 K/UL (ref 0–0.5)
EOSINOPHIL NFR BLD: 1.6 % (ref 0–8)
ERYTHROCYTE [DISTWIDTH] IN BLOOD BY AUTOMATED COUNT: 12.3 % (ref 11.5–14.5)
EST. GFR  (NO RACE VARIABLE): 53 ML/MIN/1.73 M^2
GLUCOSE SERPL-MCNC: 147 MG/DL (ref 70–110)
GLUCOSE UR QL STRIP: NEGATIVE
HCT VFR BLD AUTO: 35.5 % (ref 40–54)
HGB BLD-MCNC: 11.8 G/DL (ref 14–18)
HGB UR QL STRIP: NEGATIVE
IMM GRANULOCYTES # BLD AUTO: 0.04 K/UL (ref 0–0.04)
IMM GRANULOCYTES NFR BLD AUTO: 0.6 % (ref 0–0.5)
KETONES UR QL STRIP: ABNORMAL
LACTATE SERPL-SCNC: 1.3 MMOL/L (ref 0.5–2.2)
LEUKOCYTE ESTERASE UR QL STRIP: NEGATIVE
LYMPHOCYTES # BLD AUTO: 0.8 K/UL (ref 1–4.8)
LYMPHOCYTES NFR BLD: 12 % (ref 18–48)
MAGNESIUM SERPL-MCNC: 1.3 MG/DL (ref 1.6–2.6)
MCH RBC QN AUTO: 30.5 PG (ref 27–31)
MCHC RBC AUTO-ENTMCNC: 33.2 G/DL (ref 32–36)
MCV RBC AUTO: 92 FL (ref 82–98)
MONOCYTES # BLD AUTO: 0.8 K/UL (ref 0.3–1)
MONOCYTES NFR BLD: 11.7 % (ref 4–15)
NEUTROPHILS # BLD AUTO: 4.9 K/UL (ref 1.8–7.7)
NEUTROPHILS NFR BLD: 73.4 % (ref 38–73)
NITRITE UR QL STRIP: NEGATIVE
NRBC BLD-RTO: 0 /100 WBC
PH UR STRIP: 6 [PH] (ref 5–8)
PLATELET # BLD AUTO: 334 K/UL (ref 150–450)
PMV BLD AUTO: 10.3 FL (ref 9.2–12.9)
POCT GLUCOSE: 112 MG/DL (ref 70–110)
POCT GLUCOSE: 152 MG/DL (ref 70–110)
POTASSIUM SERPL-SCNC: 3.1 MMOL/L (ref 3.5–5.1)
PROT SERPL-MCNC: 7.5 G/DL (ref 6–8.4)
PROT UR QL STRIP: NEGATIVE
RBC # BLD AUTO: 3.87 M/UL (ref 4.6–6.2)
SODIUM SERPL-SCNC: 140 MMOL/L (ref 136–145)
SP GR UR STRIP: 1.01 (ref 1–1.03)
TROPONIN I SERPL DL<=0.01 NG/ML-MCNC: 0.02 NG/ML (ref 0–0.03)
TROPONIN I SERPL DL<=0.01 NG/ML-MCNC: 0.02 NG/ML (ref 0–0.03)
TROPONIN I SERPL DL<=0.01 NG/ML-MCNC: 0.04 NG/ML (ref 0–0.03)
URATE SERPL-MCNC: 9.1 MG/DL (ref 3.4–7)
URN SPEC COLLECT METH UR: ABNORMAL
UROBILINOGEN UR STRIP-ACNC: NEGATIVE EU/DL
WBC # BLD AUTO: 6.67 K/UL (ref 3.9–12.7)

## 2025-03-18 PROCEDURE — 80053 COMPREHEN METABOLIC PANEL: CPT | Mod: HCNC | Performed by: EMERGENCY MEDICINE

## 2025-03-18 PROCEDURE — G0378 HOSPITAL OBSERVATION PER HR: HCPCS | Mod: HCNC

## 2025-03-18 PROCEDURE — 83880 ASSAY OF NATRIURETIC PEPTIDE: CPT | Mod: HCNC | Performed by: EMERGENCY MEDICINE

## 2025-03-18 PROCEDURE — 85025 COMPLETE CBC W/AUTO DIFF WBC: CPT | Mod: HCNC | Performed by: EMERGENCY MEDICINE

## 2025-03-18 PROCEDURE — 84550 ASSAY OF BLOOD/URIC ACID: CPT | Mod: HCNC | Performed by: EMERGENCY MEDICINE

## 2025-03-18 PROCEDURE — 99285 EMERGENCY DEPT VISIT HI MDM: CPT | Mod: 25,HCNC

## 2025-03-18 PROCEDURE — 82550 ASSAY OF CK (CPK): CPT | Mod: HCNC | Performed by: EMERGENCY MEDICINE

## 2025-03-18 PROCEDURE — 96365 THER/PROPH/DIAG IV INF INIT: CPT | Mod: HCNC

## 2025-03-18 PROCEDURE — 82962 GLUCOSE BLOOD TEST: CPT | Mod: HCNC

## 2025-03-18 PROCEDURE — 93010 ELECTROCARDIOGRAM REPORT: CPT | Mod: HCNC,,, | Performed by: INTERNAL MEDICINE

## 2025-03-18 PROCEDURE — 96361 HYDRATE IV INFUSION ADD-ON: CPT | Mod: HCNC

## 2025-03-18 PROCEDURE — 83735 ASSAY OF MAGNESIUM: CPT | Mod: HCNC | Performed by: EMERGENCY MEDICINE

## 2025-03-18 PROCEDURE — 84484 ASSAY OF TROPONIN QUANT: CPT | Mod: 91,HCNC | Performed by: HOSPITALIST

## 2025-03-18 PROCEDURE — 63600175 PHARM REV CODE 636 W HCPCS: Mod: HCNC | Performed by: HOSPITALIST

## 2025-03-18 PROCEDURE — 83605 ASSAY OF LACTIC ACID: CPT | Mod: HCNC | Performed by: EMERGENCY MEDICINE

## 2025-03-18 PROCEDURE — 81003 URINALYSIS AUTO W/O SCOPE: CPT | Mod: HCNC | Performed by: EMERGENCY MEDICINE

## 2025-03-18 PROCEDURE — 93005 ELECTROCARDIOGRAM TRACING: CPT | Mod: HCNC

## 2025-03-18 PROCEDURE — 25000003 PHARM REV CODE 250: Mod: HCNC | Performed by: EMERGENCY MEDICINE

## 2025-03-18 PROCEDURE — 63600175 PHARM REV CODE 636 W HCPCS: Mod: HCNC | Performed by: EMERGENCY MEDICINE

## 2025-03-18 PROCEDURE — 96372 THER/PROPH/DIAG INJ SC/IM: CPT | Performed by: HOSPITALIST

## 2025-03-18 PROCEDURE — 84484 ASSAY OF TROPONIN QUANT: CPT | Mod: HCNC | Performed by: EMERGENCY MEDICINE

## 2025-03-18 PROCEDURE — 25000003 PHARM REV CODE 250: Mod: HCNC | Performed by: HOSPITALIST

## 2025-03-18 PROCEDURE — 36415 COLL VENOUS BLD VENIPUNCTURE: CPT | Mod: HCNC | Performed by: HOSPITALIST

## 2025-03-18 RX ORDER — LANOLIN ALCOHOL/MO/W.PET/CERES
400 CREAM (GRAM) TOPICAL DAILY
Status: DISCONTINUED | OUTPATIENT
Start: 2025-03-19 | End: 2025-03-25 | Stop reason: HOSPADM

## 2025-03-18 RX ORDER — PROCHLORPERAZINE EDISYLATE 5 MG/ML
5 INJECTION INTRAMUSCULAR; INTRAVENOUS EVERY 6 HOURS PRN
Status: DISCONTINUED | OUTPATIENT
Start: 2025-03-18 | End: 2025-03-25 | Stop reason: HOSPADM

## 2025-03-18 RX ORDER — CLOPIDOGREL BISULFATE 75 MG/1
75 TABLET ORAL DAILY
Status: DISCONTINUED | OUTPATIENT
Start: 2025-03-19 | End: 2025-03-22

## 2025-03-18 RX ORDER — IBUPROFEN 200 MG
24 TABLET ORAL
Status: DISCONTINUED | OUTPATIENT
Start: 2025-03-18 | End: 2025-03-24

## 2025-03-18 RX ORDER — SODIUM CHLORIDE 0.9 % (FLUSH) 0.9 %
10 SYRINGE (ML) INJECTION EVERY 12 HOURS PRN
Status: DISCONTINUED | OUTPATIENT
Start: 2025-03-18 | End: 2025-03-25 | Stop reason: HOSPADM

## 2025-03-18 RX ORDER — ACETAMINOPHEN 325 MG/1
650 TABLET ORAL EVERY 4 HOURS PRN
Status: DISCONTINUED | OUTPATIENT
Start: 2025-03-18 | End: 2025-03-25 | Stop reason: HOSPADM

## 2025-03-18 RX ORDER — POLYETHYLENE GLYCOL 3350 17 G/17G
17 POWDER, FOR SOLUTION ORAL 2 TIMES DAILY PRN
Status: DISCONTINUED | OUTPATIENT
Start: 2025-03-18 | End: 2025-03-25 | Stop reason: HOSPADM

## 2025-03-18 RX ORDER — INSULIN ASPART 100 [IU]/ML
0-5 INJECTION, SOLUTION INTRAVENOUS; SUBCUTANEOUS
Status: DISCONTINUED | OUTPATIENT
Start: 2025-03-18 | End: 2025-03-21

## 2025-03-18 RX ORDER — POTASSIUM CHLORIDE 20 MEQ/1
40 TABLET, EXTENDED RELEASE ORAL
Status: COMPLETED | OUTPATIENT
Start: 2025-03-18 | End: 2025-03-18

## 2025-03-18 RX ORDER — ONDANSETRON HYDROCHLORIDE 2 MG/ML
4 INJECTION, SOLUTION INTRAVENOUS EVERY 8 HOURS PRN
Status: DISCONTINUED | OUTPATIENT
Start: 2025-03-18 | End: 2025-03-25 | Stop reason: HOSPADM

## 2025-03-18 RX ORDER — FUROSEMIDE 20 MG/1
20 TABLET ORAL DAILY
Status: ON HOLD | COMMUNITY

## 2025-03-18 RX ORDER — GABAPENTIN 300 MG/1
600 CAPSULE ORAL 2 TIMES DAILY
Status: DISCONTINUED | OUTPATIENT
Start: 2025-03-18 | End: 2025-03-25 | Stop reason: HOSPADM

## 2025-03-18 RX ORDER — TALC
6 POWDER (GRAM) TOPICAL NIGHTLY PRN
Status: DISCONTINUED | OUTPATIENT
Start: 2025-03-18 | End: 2025-03-25 | Stop reason: HOSPADM

## 2025-03-18 RX ORDER — LANOLIN ALCOHOL/MO/W.PET/CERES
1000 CREAM (GRAM) TOPICAL DAILY
Status: DISCONTINUED | OUTPATIENT
Start: 2025-03-19 | End: 2025-03-25 | Stop reason: HOSPADM

## 2025-03-18 RX ORDER — AMOXICILLIN 250 MG
1 CAPSULE ORAL 2 TIMES DAILY PRN
Status: DISCONTINUED | OUTPATIENT
Start: 2025-03-18 | End: 2025-03-25 | Stop reason: HOSPADM

## 2025-03-18 RX ORDER — MAGNESIUM SULFATE 1 G/100ML
1 INJECTION INTRAVENOUS ONCE
Status: COMPLETED | OUTPATIENT
Start: 2025-03-18 | End: 2025-03-18

## 2025-03-18 RX ORDER — COLCHICINE 0.6 MG/1
0.6 TABLET, FILM COATED ORAL DAILY
Status: DISCONTINUED | OUTPATIENT
Start: 2025-03-19 | End: 2025-03-21

## 2025-03-18 RX ORDER — SODIUM CHLORIDE 9 MG/ML
500 INJECTION, SOLUTION INTRAVENOUS
Status: COMPLETED | OUTPATIENT
Start: 2025-03-18 | End: 2025-03-18

## 2025-03-18 RX ORDER — IBUPROFEN 200 MG
16 TABLET ORAL
Status: DISCONTINUED | OUTPATIENT
Start: 2025-03-18 | End: 2025-03-24

## 2025-03-18 RX ORDER — BISACODYL 10 MG/1
10 SUPPOSITORY RECTAL DAILY PRN
Status: DISCONTINUED | OUTPATIENT
Start: 2025-03-18 | End: 2025-03-25 | Stop reason: HOSPADM

## 2025-03-18 RX ORDER — SIMETHICONE 80 MG
1 TABLET,CHEWABLE ORAL 4 TIMES DAILY PRN
Status: DISCONTINUED | OUTPATIENT
Start: 2025-03-18 | End: 2025-03-25 | Stop reason: HOSPADM

## 2025-03-18 RX ORDER — NALOXONE HCL 0.4 MG/ML
0.02 VIAL (ML) INJECTION
Status: DISCONTINUED | OUTPATIENT
Start: 2025-03-18 | End: 2025-03-25 | Stop reason: HOSPADM

## 2025-03-18 RX ORDER — ENOXAPARIN SODIUM 100 MG/ML
40 INJECTION SUBCUTANEOUS EVERY 24 HOURS
Status: DISCONTINUED | OUTPATIENT
Start: 2025-03-18 | End: 2025-03-25 | Stop reason: HOSPADM

## 2025-03-18 RX ORDER — INSULIN GLARGINE 100 [IU]/ML
20 INJECTION, SOLUTION SUBCUTANEOUS NIGHTLY
Status: DISCONTINUED | OUTPATIENT
Start: 2025-03-18 | End: 2025-03-23

## 2025-03-18 RX ORDER — HYDROCODONE BITARTRATE AND ACETAMINOPHEN 5; 325 MG/1; MG/1
1 TABLET ORAL EVERY 4 HOURS PRN
Refills: 0 | Status: DISCONTINUED | OUTPATIENT
Start: 2025-03-18 | End: 2025-03-25 | Stop reason: HOSPADM

## 2025-03-18 RX ORDER — GLUCAGON 1 MG
1 KIT INJECTION
Status: DISCONTINUED | OUTPATIENT
Start: 2025-03-18 | End: 2025-03-25 | Stop reason: HOSPADM

## 2025-03-18 RX ORDER — ASPIRIN 81 MG/1
81 TABLET ORAL DAILY
Status: DISCONTINUED | OUTPATIENT
Start: 2025-03-19 | End: 2025-03-22

## 2025-03-18 RX ADMIN — SODIUM CHLORIDE 500 ML: 0.9 INJECTION, SOLUTION INTRAVENOUS at 04:03

## 2025-03-18 RX ADMIN — GABAPENTIN 600 MG: 300 CAPSULE ORAL at 08:03

## 2025-03-18 RX ADMIN — INSULIN GLARGINE 20 UNITS: 100 INJECTION, SOLUTION SUBCUTANEOUS at 08:03

## 2025-03-18 RX ADMIN — ENOXAPARIN SODIUM 40 MG: 40 INJECTION SUBCUTANEOUS at 08:03

## 2025-03-18 RX ADMIN — MAGNESIUM SULFATE IN DEXTROSE 1 G: 10 INJECTION, SOLUTION INTRAVENOUS at 06:03

## 2025-03-18 RX ADMIN — POTASSIUM CHLORIDE 40 MEQ: 1500 TABLET, EXTENDED RELEASE ORAL at 06:03
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
## 2025-03-18 NOTE — ED TRIAGE NOTES
"Patient arrives to the ED w/ complaints of fatigue and low BP. Reports experiencing dizziness today while in shower and SOB when bending over to put shoes on. Reports after putting shoes on, patient stood up straight and sat down and became lightheaded. Reports referral to ED from cardiologist today for BP 69/48. Reports tingling in bilateral hands x 2 wk. Reports tingling in L hand at this time. Reports intermittent pain to bilateral hands x 2 wk r/t gout. Reports feeling "puffy" and like he's holding fluid.   At time of triage, patient SBP improved to ~ 116. Patient reports feeling lightheaded in triage, and then has no memory until being brought to back ED. Triage provider reports patient was giving info about symptoms, when suddenly his face appeared blank and unresponsive. Reports patient was unresponsive to voice and painful stimuli. By the time patient arrived to core of ED, patient was at his baseline and speaking.  Reports recently experiencing periods of loss and confusion. Reports he was driving down [address] and then next thing he remembers, [place] was [place] the boat [place] ~ 20 ft from water. Reports vision turning white and blurry like fog while driving. States he saw colorless stop lights.  Wife at the bedside. Wife reports patient face posturing to the L and eyes rolled back, while in triage. Reports patient looked pale and was unresponsive.   "

## 2025-03-18 NOTE — ASSESSMENT & PLAN NOTE
Patient has Combined Systolic and Diastolic heart failure that is Acute on chronic. On presentation their CHF was decompensated. Evidence of decompensated CHF on presentation includes: edema. The etiology of their decompensation is likely increased fluid intake. Most recent BNP and echo results are listed below.  Recent Labs     03/18/25  1547   *     Latest ECHO  Results for orders placed during the hospital encounter of 01/16/25    Echo    Interpretation Summary    Left Ventricle: The left ventricle is normal in size. There is concentric remodeling. Mild global hypokinesis and regional wall motion abnormalities present. There is mildly reduced systolic function with a visually estimated ejection fraction of 45 - 50%.    Right Ventricle: Normal right ventricular cavity size. Wall thickness is normal. Systolic function is normal. Pacemaker lead present in the ventricle.    IVC/SVC: Low central venous pressure.    Limited echo for EF    Current Heart Failure Medications       Plan  - Monitor strict I&Os and daily weights.    - Place on telemetry  - Low sodium diet  - Cardiology has been consulted  - The patient's volume status is at their baseline      Dx:    - Echocardiogram     Tx:    - Optimize GDMT while avoiding further hypotension    - Monitor renal function and electrolytes closely    - cardiology consultation

## 2025-03-18 NOTE — ED NOTES
Verbal order received to give 100 mL of NaCl instead of 500 mL. IV pump and channel adjusted to new order.

## 2025-03-18 NOTE — SUBJECTIVE & OBJECTIVE
Past Medical History:   Diagnosis Date    Anticoagulant long-term use     Cardiomyopathy     CHF (congestive heart failure)     Coronary artery disease     Diabetes mellitus     Gout     Heart attack     Hypertension     Pneumonia        Past Surgical History:   Procedure Laterality Date    APPENDECTOMY      CARDIAC CATHETERIZATION      7 stents    CARDIAC DEFIBRILLATOR PLACEMENT      CATARACT EXTRACTION Bilateral 2011    COLONOSCOPY N/A 8/10/2018    Procedure: COLONOSCOPY golytely;  Surgeon: Valentine Burger MD;  Location: [facility] ENDO;  Service: Endoscopy;  Laterality: N/A;    CORONARY ANGIOGRAPHY N/A 11/11/2024    Procedure: ANGIOGRAM, CORONARY ARTERY;  Surgeon: Luis Felipe Wright MD;  Location: [facility] CATH LAB/EP;  Service: Cardiology;  Laterality: N/A;    EYE SURGERY      INSTANTANEOUS WAVE-FREE RATIO (IFR) N/A 11/11/2024    Procedure: Instantaneous Wave-Free Ratio (IFR);  Surgeon: Luis Felipe Wright MD;  Location: [facility] CATH LAB/EP;  Service: Cardiology;  Laterality: N/A;    LEFT HEART CATHETERIZATION Left 11/11/2024    Procedure: Left heart cath;  Surgeon: Luis Felipe Wright MD;  Location: [facility] CATH LAB/EP;  Service: Cardiology;  Laterality: Left;    REVISION OF IMPLANTABLE CARDIOVERTER-DEFIBRILLATOR (ICD) ELECTRODE LEAD PLACEMENT N/A 11/11/2019    Procedure: REVISION, INSERTION, ELECTRODE LEAD, ICD;  Surgeon: Shukri Walsh MD;  Location: [facility] EP LAB;  Service: Cardiology;  Laterality: N/A;  Lead malfxn, RV lead ICD, SJM, anes, DM, 3 PREP       Review of patient's allergies indicates:  No Known Allergies    No current facility-administered medications on file prior to encounter.     Current Outpatient Medications on File Prior to Encounter   Medication Sig    aspirin (ECOTRIN) 81 MG EC tablet Take 81 mg by mouth once daily.    atorvastatin (LIPITOR) 40 MG tablet Take 1 tablet (40 mg total) by mouth once daily.    bumetanide (BUMEX) 1 MG tablet Take 1 tablet (1 mg total) by mouth once daily. (Patient  taking differently: Take 1 mg by mouth once daily. Taking in am)    clopidogreL (PLAVIX) 75 mg tablet Take 1 tablet (75 mg total) by mouth once daily.    colchicine (MITIGARE) 0.6 mg Cap Take 1 capsule (0.6 mg total) by mouth once daily.    cyanocobalamin (VITAMIN B-12) 1000 MCG tablet Take 1,000 mcg by mouth once daily.    empagliflozin (JARDIANCE) 10 mg tablet Take 1 tablet (10 mg total) by mouth once daily.    furosemide (LASIX) 20 MG tablet Take 20 mg by mouth once daily. In afternoon    gabapentin (NEURONTIN) 300 MG capsule Take 2 capsules (600 mg total) by mouth 2 (two) times daily.    insulin aspart U-100 (NOVOLOG FLEXPEN U-100 INSULIN) 100 unit/mL (3 mL) InPn pen Inject 18 Units into the skin 3 (three) times daily with meals. (Patient taking differently: Inject 15 Units into the skin 3 (three) times daily with meals.)    insulin glargine U-100, Lantus, (LANTUS SOLOSTAR U-100 INSULIN) 100 unit/mL (3 mL) InPn pen Inject 40 Units into the skin every evening. (Patient taking differently: Inject 35 Units into the skin every evening.)    losartan (COZAAR) 50 MG tablet Take 1 tablet (50 mg total) by mouth once daily.    metFORMIN (GLUCOPHAGE) 1000 MG tablet Take 1 tablet (1,000 mg total) by mouth 2 (two) times daily with meals.    multivit-minerals/FA/lycopene (ONE-A-DAY MEN'S 50 PLUS ORAL) Take 1 tablet by mouth once daily.    zolpidem (AMBIEN) 5 MG Tab TAKE 1 TABLET BY MOUTH EVERY NIGHT AS NEEDED (Patient taking differently: Take 5 mg by mouth nightly as needed.)    alcohol swabs (BD ALCOHOL SWABS) PadM USE FOUR TIMES DAILY    blood glucose control high,low (ACCU-CHEK CATHRYN CONTROL SOLN) Soln Use as directed to check blood sugar    blood sugar diagnostic Strp test blood sugar as directed four times daily    blood-glucose meter (TRUE METRIX GLUCOSE METER) Misc use as directed    lancets 30 gauge Misc usea s directed to check blood glucose four times daily    nitroGLYCERIN (NITROSTAT) 0.4 MG SL tablet Place 1  "tablet (0.4 mg total) under the tongue every 5 (five) minutes as needed for Chest pain.    pen needle, diabetic (BD ULTRA-FINE SINA PEN NEEDLE) 32 gauge x 5/32" Ndle Use four times daily for insulin administration    [DISCONTINUED] promethazine-dextromethorphan (PROMETHAZINE-DM) 6.25-15 mg/5 mL Syrp Take 5 mLs by mouth 2 (two) times daily as needed (cough).     Family History       Problem Relation (Age of Onset)    Heart disease Mother, Father          Tobacco Use    Smoking status: Former    Smokeless tobacco: Never   Substance and Sexual Activity    Alcohol use: Yes     Comment: socially    Drug use: No    Sexual activity: Yes     Review of Systems   Constitutional:  Negative for chills, fatigue and fever.   Respiratory:  Negative for cough, choking, chest tightness and shortness of breath.    Cardiovascular:  Positive for leg swelling. Negative for chest pain and palpitations.   Gastrointestinal:  Negative for abdominal distention, abdominal pain, anal bleeding, diarrhea, nausea and vomiting.   Genitourinary:  Negative for difficulty urinating and dysuria.   Musculoskeletal:  Positive for arthralgias.   Neurological:  Positive for syncope. Negative for dizziness, speech difficulty, weakness and headaches.   Psychiatric/Behavioral:  Positive for confusion. Negative for agitation.      Objective:     Vital Signs (Most Recent):  Temp: 98.5 °F (36.9 °C) (03/18/25 1647)  Pulse: 65 (03/18/25 1647)  Resp: 12 (03/18/25 1647)  BP: 118/64 (03/18/25 1647)  SpO2: 97 % (03/18/25 1647) Vital Signs (24h Range):  Temp:  [98.5 °F (36.9 °C)] 98.5 °F (36.9 °C)  Pulse:  [65-70] 65  Resp:  [12-28] 12  SpO2:  [96 %-98 %] 97 %  BP: ()/(51-64) 118/64        There is no height or weight on file to calculate BMI.     Physical Exam  Constitutional:       General: He is not in acute distress.     Appearance: Normal appearance. He is obese. He is not ill-appearing or toxic-appearing.   Eyes:      Extraocular Movements: Extraocular " movements intact.      Conjunctiva/sclera: Conjunctivae normal.   Cardiovascular:      Rate and Rhythm: Normal rate and regular rhythm.   Pulmonary:      Effort: Pulmonary effort is normal. No respiratory distress.      Breath sounds: Normal breath sounds. No wheezing or rales.   Abdominal:      General: There is no distension.      Tenderness: There is no abdominal tenderness. There is no guarding.   Musculoskeletal:         General: Normal range of motion.      Right lower leg: Edema present.      Left lower leg: Edema present.   Skin:     General: Skin is warm and dry.      Coloration: Skin is not jaundiced or pale.   Neurological:      General: No focal deficit present.      Mental Status: He is alert and oriented to person, place, and time.      Cranial Nerves: No cranial nerve deficit.   Psychiatric:         Mood and Affect: Mood normal.         Behavior: Behavior normal.                Significant Labs: All pertinent labs within the past 24 hours have been reviewed.    Significant Imaging: I have reviewed all pertinent imaging results/findings within the past 24 hours.

## 2025-03-18 NOTE — ASSESSMENT & PLAN NOTE
Patient's most recent potassium results are listed below.   Recent Labs     03/18/25  1547   K 3.1*     Plan  - Replete potassium per protocol  - Monitor potassium Daily  - Patient's hypokalemia is stable  -     Tx:    Replete potassium   Replete magnesium

## 2025-03-18 NOTE — ASSESSMENT & PLAN NOTE
Recurrent episodes of hypotension, including a witnessed syncopal event with seizure-like activity (likely convulsive syncope).    DDx:    - Medication-related hypotension (likely): Patient had restarted Imdur (on his own accord) 1 week prior to presentation. Recent decrease in Coreg and losartan, as well as transition from Lasix to Bumex, may have contributed.  - Orthostatic hypotension (possible): Symptoms triggered by postural changes  - Neurogenic syncope (unlikely)    Dx:    - Continuous telemetry and cardiac monitoring    - Echocardiogram  - Orthostatics  - Troponins  - Consider MRI brain (given ICD, patient would need transfer to [facility] for this study)  - Consider repeat ICD interrogation (was unremarkable last month after prior episode)    Tx:    - Maintain BP support with cautious IV fluids given history of heart failure    - Consider midodrine if persistent symptomatic orthostasis    - Neurology and Cardiology consultation

## 2025-03-18 NOTE — PROGRESS NOTES
Cardiology Clinic Visit    History of Present Illness:     Former Dr. Clement and Jillian patient; former police office  Clifton Wilson is a pleasant 74 y.o. male with PMHx of CAD, HTN, HLD, HFrEF 40-45% s/p ICD here for follow up post hospital follow for syncope (droves multiple red lights and does not recalled much). He was admitted and changed his BP meds. Patient was recently seen in the clinic post hospital follow up and restarted his bp medications and double his diuretics dose by himself. He self adjust his medications without any monitoring. He was admitted to the hospital in January due to similar issues upon discharged hospitalist team adjusted his med and restarted taking meds without any doctors instruction.   Today during office visit he did mentioned to me that double his diuretics because his UOP decreased. Patient was placed on the floor and legs were raised with improved in his BP. Patient was taken to the ER.     PET 12/4/24    There are two significant perfusion abnormalities.    Perfusion Abnormality #1 - There is a small sized, moderate intensity mid anterior and anteroseptal fixed perfusion abnormality involving 4% of LV myocardium in the distribution of the LAD territory consistent with a non-transmural scar.    Perfusion Abnormality #2 - There is a very small (<5%) sized, mild intensity basal lateral fixed perfusion abnormality involving 3% of LV myocardium in the distribution of the LCX territory consistent with a non-transmural scar.    There are no other significant perfusion abnormalities.    The whole heart absolute myocardial perfusion values were reduced at rest, moderately reduced during stress and CFR is mildly reduced.    CT attenuation images demonstrate moderate diffuse coronary calcifications in the LAD, LCX , mild calcifications in the RCA territory and mild diffuse aortic calcifications in the descending aorta. Stents are present in the LAD and CX.    The gated perfusion  images showed an ejection fraction of 36% at rest and 45% during stress. A normal ejection fraction is greater than 47%.    There is regional hypokinesis at rest of the anterior and anteroseptal wall(s) and regional hypokinesis during stress of the anterior and anteroseptal wall(s).    The study's ECG is negative for ischemia.    Assessment:   Ostial to proximal LAD with 50-60% stenosis angiographically with iFR negative   LAD stents patent with PATRIA 2   Lcx stent patent     EF    Left Ventricle: Regional wall motion abnormalities present. See diagram for wall motion findings. There is mildly reduced systolic function with a visually estimated ejection fraction of 40 - 45%.       History obtained by patient interview and supplemented by nursing documentation and chart review.   PMHx:  has a past medical history of Anticoagulant long-term use, Cardiomyopathy, CHF (congestive heart failure), Coronary artery disease, Diabetes mellitus, Gout, Heart attack, Hypertension, and Pneumonia.   SurgHx:  has a past surgical history that includes Appendectomy; Cataract extraction (Bilateral, 2011); Eye surgery; Cardiac defibrillator placement; Cardiac catheterization; Colonoscopy (N/A, 8/10/2018); Revision of implantable cardioverter-defibrillator (ICD) electrode lead placement (N/A, 11/11/2019); Left heart catheterization (Left, 11/11/2024); instantaneous wave-free ratio (ifr) (N/A, 11/11/2024); and Coronary angiography (N/A, 11/11/2024).   FamHx: family history includes Heart disease in his father and mother.   SocialHx:  reports that he has quit smoking. He has never used smokeless tobacco. He reports current alcohol use. He reports that he does not use drugs.  Home Meds:  Current Outpatient Medications   Medication Instructions    alcohol swabs (BD ALCOHOL SWABS) PadM USE FOUR TIMES DAILY    aspirin (ECOTRIN) 81 mg, Daily    atorvastatin (LIPITOR) 40 mg, Oral, Daily    blood glucose control high,low (ACCU-CHEK CATHRYN CONTROL  "SOLN) Soln Use as directed to check blood sugar    blood sugar diagnostic Strp test blood sugar as directed four times daily    blood-glucose meter (TRUE METRIX GLUCOSE METER) Misc use as directed    bumetanide (BUMEX) 1 mg, Oral, Daily    clopidogreL (PLAVIX) 75 mg, Oral, Daily    cyanocobalamin (VITAMIN B-12) 1,000 mcg, Daily    furosemide (LASIX) 20 mg, Daily    gabapentin (NEURONTIN) 600 mg, Oral, 2 times daily    JARDIANCE 10 mg, Oral, Daily    lancets 30 gauge Misc usea s directed to check blood glucose four times daily    LANTUS SOLOSTAR U-100 INSULIN 40 Units, Subcutaneous, Nightly    losartan (COZAAR) 50 mg, Oral, Daily    metFORMIN (GLUCOPHAGE) 1,000 mg, Oral, 2 times daily with meals    MITIGARE 0.6 mg, Oral, Daily    multivit-minerals/FA/lycopene (ONE-A-DAY MEN'S 50 PLUS ORAL) 1 tablet, Daily    nitroGLYCERIN (NITROSTAT) 0.4 mg, Sublingual, Every 5 min PRN    NovoLOG Flexpen U-100 Insulin 18 Units, Subcutaneous, 3 times daily with meals    pen needle, diabetic (BD ULTRA-FINE SINA PEN NEEDLE) 32 gauge x 5/32" Ndle Use four times daily for insulin administration    zolpidem (AMBIEN) 5 MG Tab TAKE 1 TABLET BY MOUTH EVERY NIGHT AS NEEDED       Review of Systems: Comprehensive ROS was performed and is negative unless otherwise noted in HPI.   Objective   Objective/Exam:   There were no vitals taken for this visit.   Wt Readings from Last 4 Encounters:   03/18/25 100.7 kg (222 lb)   02/18/25 107.1 kg (236 lb)   02/13/25 108 kg (238 lb 1.6 oz)   01/17/25 106.6 kg (235 lb)      Constitutional: NAD, Atraumatic, Conversant   HEENT: MMM, Sclera anicteric, No JVD   Cardiovascular: RRR, no murmurs noted, no chest tenderness to palpation, 2+ radial pulses b/l  Pulmonary: normal respiratory effort, CTAB, no crackles, wheezes  Abdominal: S/NT/ND  Musculoskeletal: No lower extremity edema noted b/l  Neurological: No gross neurological deficits  Skin: W/D/I  Psych: Appropriate affect, normal mood  Labs/Imaging/Procedures " "  Personally reviewed  Lab Results   Component Value Date     03/18/2025    K 3.1 (L) 03/18/2025     03/18/2025    CO2 20 (L) 03/18/2025    BUN 17 03/18/2025    CREATININE 1.4 03/18/2025    ANIONGAP 16 03/18/2025     Lab Results   Component Value Date    HGBA1C 6.6 (H) 01/07/2025     Lab Results   Component Value Date     (H) 03/18/2025    BNP 42 01/16/2025     (H) 11/22/2024      Lab Results   Component Value Date    WBC 6.67 03/18/2025    HGB 11.8 (L) 03/18/2025    HCT 35.5 (L) 03/18/2025     03/18/2025    GRAN 4.9 03/18/2025    GRAN 73.4 (H) 03/18/2025     Lab Results   Component Value Date    CHOL 131 01/17/2025    HDL 30 (L) 01/17/2025    LDLCALC 49.6 (L) 01/17/2025    LDLCALC 42.6 (L) 07/08/2024    LDLCALC 66.6 05/03/2023    TRIG 257 (H) 01/17/2025     Lab Results   Component Value Date    TSH 0.770 01/17/2025     No results found for: "APOLIPOPROTE"  No results found for: "LIPOA"     TTE:  Results for orders placed during the hospital encounter of 11/22/24    Echo Ultrasound enhancing contrast? Yes (definity/optison)    Interpretation Summary    Left Ventricle: Regional wall motion abnormalities present. See diagram for wall motion findings. There is mildly reduced systolic function with a visually estimated ejection fraction of 40 - 45%.    Stress Testing:   No results found for this or any previous visit.     Coronary Angiogram:  Results for orders placed during the hospital encounter of 11/08/24    Cardiac catheterization    Conclusion  Table formatting from the original result was not included.      The estimated blood loss was none.    [facility]  Surgery Department  Operative Note    SUMMARY  POST CATH NOTE    Date of Procedure: 11/11/2024    Procedure: Procedure(s) (LRB):  Left heart cath (Left)  Instantaneous Wave-Free Ratio (IFR) (N/A)  ANGIOGRAM, CORONARY ARTERY (N/A)    Surgeons and Role:  * Luis Felipe Wright MD - Primary    Assisting Surgeon: " "None    Pre-Operative Diagnosis: Chest pain [R07.9]  Unstable angina [I20.0]    Post-Operative Diagnosis: Post-Op Diagnosis Codes:  * Chest pain [R07.9]  * Unstable angina [I20.0]    s/p catheterization secondary to:    Cath Results:  Access: Us guided RRA    Coronary Anatomy:    Left Main Coronary Artery: The left main is a short andlarge caliber vessel arising normally from the left sinus of valsalva.  It bifurcates into the left anterior descending and left circumflex coronary artery.  It is free of evidence of obstructive coronary artery disease. PATRIA III flow    Left Anterior Descending: The left anterior descending coronary artery is a large caliber vessel arising normally from the left main and extending to the apex.  It gives rise to small to moderate caliber diagonal arteries. Ostial to proximal LAD (before stent) 50-60% stenosed angiographically. Mild to moderate ISR with patent stents with PATRIA 2 flow. iFR of Proximal LAD negative,    Left Circumflex: The left circumflex is a large caliber vessel arising normally from the left main coronary artery, it is non-dominant.  It gives rise to moderate caliber obtuse marginal branches.  Prox to mid Lcx  patent stent with mild IRS. Jailed AV Cx  (small <2.5 mm) diffuse disease at proximal vessel. PATRIA III flow    Right Coronary Artery: The right coronary artery is a large caliber vessel arising normally from the right sinus of valsalva.  It is dominant giving rise to a PDA and PLV branch.  The right coronary artery is free of obstructive disease. PATRIA III flow    LVgram: LVEDP 9 mmHg    Intervention:  iFR  JL4 guide  The 0.014" Omniwire was connected to the console, calibrated and equalized. The 0.014"  Omniwire was then advanced into the left main outside of the catheter and flushed with saline  with the introducer removed. The pressure was normalized and then the wire was advanced  across Proximal LAD lesion. 0.96-0.95 iFR recordings were obtained. The 0.014" " "Omniwire was then pulled  back slowly across the lesion to assess for step up and electronic drift. The wire was removed,  and final angiography was performed.    Closure device:  Patient tolerated procedure well, no complications    Post Cath Exam:  BP (!) 153/73 (BP Location: Left arm, Patient Position: Lying)   Pulse 71   Temp 98.7 °F (37.1 °C) (Oral)   Resp 16   Ht 5' 11" (1.803 m)   Wt 107.5 kg (237 lb)   SpO2 97%   BMI 33.05 kg/m²    Anesthesia: RN IV Sedation      Estimated Blood Loss (EBL): * No values recorded between 11/11/2024 11:40 AM and 11/11/2024 12:19 PM *    Specimens:  Specimen (24h ago, onward)    None          Assessment:  Ostial to proximal LAD with 50-60% stenosis angiographically with iFR negative  LAD stents patent with PATRIA 2  Lcx stent patent    Plan:    - Patient tolerated procedure well. No immediate complications  - Continue aspirin and Plavix  - EKG when arrives to floor  - Routine post cath protocol  - Maximize medical management  - Further care by the primary team  - IVF at  100cc/hr  for 2 hours  - Follow up with me  -PET stress for viability M    42 minutes were spent on this visit including time personally:  -Reviewing Medical records & lab results  -Independently reviewing and interpreting (if not documented by myself) EKGs, echocardiograms, catherizations   -Obtaining a history, performing a relevant exam, counseling/educating the patient/family  -Documenting clinical information in the EMR including ordering of tests  -Care coordination and communicating with other health care providers       Problem List:     1. Ischemic cardiomyopathy    2. Presence of automatic (implantable) cardiac defibrillator    3. Essential hypertension    4. 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 male with PMHx of CAD, HTN, HLD, HFrEF 40-45% s/p ICD here for follow up. Patient feeling fatigue/tired likely multifactorial low BP and CAD. " Here post hospital discharged 2/2 syncope.     CAD with multiple PCIs in LAD and Lcx- Ostial LAD with 50-60% stenosed with negative iFR. patent stents but LAD stents have PATRIA II flow. PET negative for ischemiaContinue DAPT, statin.   Syncope- found with low BP, meds adjusted but he self medicated and readjust all his meds without consultation likely causing his syncopal episodes.   HFrEF 40-45% s/p ICD- we adjusted his BP meds previous visit but he self adjust his meds without consultation, including diuretics.   HTN - as above. Challenging to manage.Self adjusted meds: double his BB, diuretics.   HLD continue statin        Preventative Care:  Lipids:  PVD(V/A)      Patient expressed verbal understanding and agreed with the plan     Return sooner for concerns or questions. If symptoms persist go to the ED.  I have reviewed all pertinent data including patient's medical history in detail and updated the computerized patient record.     Total time spent counseling greater than fifty percent of total visit time.  Counseling included discussion regarding imaging findings, diagnosis, possibilities, treatment options, risks and benefits.      Thank you for the opportunity to care for this patient. Please don't hesitate to reach out with any questions/concerns         Luis Felipe Bryan MD  Cardiovascular Disease; Interventional Cardiology  [facility]

## 2025-03-18 NOTE — PHARMACY MED REC
"      [facility]           Pharmacy  Admission Medication History     The home medication history was taken by Ruth Washington.      Medication history obtained from Medications listed below were obtained from: Patient/family.    Based on information gathered for medication list, you may go to "Admission" then "Reconcile Home Medications" tabs to review and/or act upon those items.     The home medication list has been updated by the Pharmacy department.   Please read ALL comments highlighted in yellow.   Please address this information as you see fit.    Feel free to contact us if you have any questions or require assistance.    The medications listed below were removed from the home medication list.  Please reorder if appropriate:    Patient reports NOT TAKING the following medication(s):  Promethazine-DM 6.25-15 mg/5 mL syrup    No current facility-administered medications on file prior to encounter.     Current Outpatient Medications on File Prior to Encounter   Medication Sig Dispense Refill    aspirin (ECOTRIN) 81 MG EC tablet Take 81 mg by mouth once daily.      atorvastatin (LIPITOR) 40 MG tablet Take 1 tablet (40 mg total) by mouth once daily. 90 tablet 3    bumetanide (BUMEX) 1 MG tablet Take 1 tablet (1 mg total) by mouth once daily. (Patient taking differently: Take 1 mg by mouth once daily. Taking in am) 30 tablet 11    clopidogreL (PLAVIX) 75 mg tablet Take 1 tablet (75 mg total) by mouth once daily. 90 tablet 3    colchicine (MITIGARE) 0.6 mg Cap Take 1 capsule (0.6 mg total) by mouth once daily. 90 capsule 3    cyanocobalamin (VITAMIN B-12) 1000 MCG tablet Take 1,000 mcg by mouth once daily.      empagliflozin (JARDIANCE) 10 mg tablet Take 1 tablet (10 mg total) by mouth once daily. 30 tablet 11    furosemide (LASIX) 20 MG tablet Take 20 mg by mouth once daily. In afternoon      gabapentin (NEURONTIN) 300 MG capsule Take 2 capsules (600 mg total) by mouth 2 (two) times daily. 360 " "capsule 1    insulin aspart U-100 (NOVOLOG FLEXPEN U-100 INSULIN) 100 unit/mL (3 mL) InPn pen Inject 18 Units into the skin 3 (three) times daily with meals. (Patient taking differently: Inject 15 Units into the skin 3 (three) times daily with meals.) 45 mL 5    insulin glargine U-100, Lantus, (LANTUS SOLOSTAR U-100 INSULIN) 100 unit/mL (3 mL) InPn pen Inject 40 Units into the skin every evening. (Patient taking differently: Inject 35 Units into the skin every evening.) 30 mL 1    losartan (COZAAR) 50 MG tablet Take 1 tablet (50 mg total) by mouth once daily. 90 tablet 3    metFORMIN (GLUCOPHAGE) 1000 MG tablet Take 1 tablet (1,000 mg total) by mouth 2 (two) times daily with meals. 180 tablet 4    multivit-minerals/FA/lycopene (ONE-A-DAY MEN'S 50 PLUS ORAL) Take 1 tablet by mouth once daily.      zolpidem (AMBIEN) 5 MG Tab TAKE 1 TABLET BY MOUTH EVERY NIGHT AS NEEDED (Patient taking differently: Take 5 mg by mouth nightly as needed.) 90 tablet 3    alcohol swabs (BD ALCOHOL SWABS) PadM USE FOUR TIMES DAILY 400 each 3    blood glucose control high,low (ACCU-CHEK CATHRYN CONTROL SOLN) Soln Use as directed to check blood sugar 1 each 1    blood sugar diagnostic Strp test blood sugar as directed four times daily 100 each 3    blood-glucose meter (TRUE METRIX GLUCOSE METER) Misc use as directed 1 each 0    lancets 30 gauge Misc usea s directed to check blood glucose four times daily 100 each 3    nitroGLYCERIN (NITROSTAT) 0.4 MG SL tablet Place 1 tablet (0.4 mg total) under the tongue every 5 (five) minutes as needed for Chest pain. 25 tablet 3    pen needle, diabetic (BD ULTRA-FINE SINA PEN NEEDLE) 32 gauge x 5/32" Ndle Use four times daily for insulin administration 400 each 3       Please address this information as you see fit.  Feel free to contact us if you have any questions or require assistance.    Ruth Washington  [phone]            .          "

## 2025-03-18 NOTE — HPI
"74-year-old male with a history of diabetes type 2, coronary artery disease with multiple percutaneous coronary interventions, hypertension, hyperlipidemia, HFrEF 40-45% status post implantable cardioverter-defibrillator, presents to the emergency department following an episode of hypotension at his cardiologists office. Earlier today, he was noted to have a blood pressure of 69/48 mmHg without loss of consciousness and was referred to the ED for further evaluation. He reports generalized fatigue over the past several days, dizziness while showering, and shortness of breath when bending over to put on his shoes. After standing up, he became lightheaded and had to sit down. He also describes tingling in both hands for two weeks, currently affecting the left hand, as well as intermittent bilateral hand pain related to gout. He reports feeling "puffy" and retaining fluid.  Upon arrival at the ED, his systolic blood pressure improved to approximately 116 mmHg. However, while in triage, he experienced a witnessed episode of syncope with loss of consciousness and seizure-like activity. His wife reports that during this event, his face postured to the left, his eyes rolled back, and he appeared pale and unresponsive. The triage provider noted that he was initially providing history when he suddenly became blank-faced and unresponsive to voice and painful stimuli. By the time he was brought into the ED core, he had regained consciousness and was at baseline. He also endorses recent intermittent episodes of confusion, including a concerning event while driving, in which he lost awareness of his surroundings and later found himself near [place] with no recollection of how he got there. He describes transient visual disturbances, including a foggy, colorless appearance to traffic.  "

## 2025-03-19 ENCOUNTER — TELEPHONE (OUTPATIENT)
Dept: CARDIOLOGY | Facility: CLINIC | Age: 75
End: 2025-03-19

## 2025-03-19 PROBLEM — E83.42 HYPOMAGNESEMIA: Status: ACTIVE | Noted: 2025-03-19

## 2025-03-19 LAB
ANION GAP SERPL CALC-SCNC: 17 MMOL/L (ref 8–16)
APICAL FOUR CHAMBER EJECTION FRACTION: 42 %
APICAL TWO CHAMBER EJECTION FRACTION: 42 %
ASCENDING AORTA: 3.47 CM
AV INDEX (PROSTH): 0.83
AV MEAN GRADIENT: 4 MMHG
AV PEAK GRADIENT: 6 MMHG
AV VALVE AREA BY VELOCITY RATIO: 3.5 CM²
AV VALVE AREA: 3.5 CM²
AV VELOCITY RATIO: 0.83
BASOPHILS # BLD AUTO: 0.04 K/UL (ref 0–0.2)
BASOPHILS NFR BLD: 0.7 % (ref 0–1.9)
BSA FOR ECHO PROCEDURE: 2.24 M2
BUN SERPL-MCNC: 17 MG/DL (ref 8–23)
CALCIUM SERPL-MCNC: 9.1 MG/DL (ref 8.7–10.5)
CHLORIDE SERPL-SCNC: 107 MMOL/L (ref 95–110)
CK SERPL-CCNC: 52 U/L (ref 20–200)
CK SERPL-CCNC: 52 U/L (ref 20–200)
CO2 SERPL-SCNC: 20 MMOL/L (ref 23–29)
CREAT SERPL-MCNC: 1.1 MG/DL (ref 0.5–1.4)
CV ECHO LV RWT: 0.37 CM
DIFFERENTIAL METHOD BLD: ABNORMAL
DOP CALC AO PEAK VEL: 1.2 M/S
DOP CALC AO VTI: 26.7 CM
DOP CALC LVOT AREA: 4.2 CM2
DOP CALC LVOT DIAMETER: 2.3 CM
DOP CALC LVOT PEAK VEL: 1 M/S
DOP CALC LVOT STROKE VOLUME: 92.2 CM3
DOP CALC MV VTI: 21.7 CM
DOP CALCLVOT PEAK VEL VTI: 22.2 CM
E WAVE DECELERATION TIME: 459 MSEC
E/A RATIO: 0.54
E/E' RATIO: 7 M/S
ECHO LV POSTERIOR WALL: 1.1 CM (ref 0.6–1.1)
EOSINOPHIL # BLD AUTO: 0.2 K/UL (ref 0–0.5)
EOSINOPHIL NFR BLD: 2.7 % (ref 0–8)
ERYTHROCYTE [DISTWIDTH] IN BLOOD BY AUTOMATED COUNT: 12.2 % (ref 11.5–14.5)
EST. GFR  (NO RACE VARIABLE): >60 ML/MIN/1.73 M^2
FRACTIONAL SHORTENING: 25.4 % (ref 28–44)
GLUCOSE SERPL-MCNC: 85 MG/DL (ref 70–110)
HCT VFR BLD AUTO: 33.9 % (ref 40–54)
HGB BLD-MCNC: 11.3 G/DL (ref 14–18)
IMM GRANULOCYTES # BLD AUTO: 0.01 K/UL (ref 0–0.04)
IMM GRANULOCYTES NFR BLD AUTO: 0.2 % (ref 0–0.5)
INTERVENTRICULAR SEPTUM: 1.2 CM (ref 0.6–1.1)
IVC DIAMETER: 1.18 CM
LDH SERPL L TO P-CCNC: 230 U/L (ref 110–260)
LEFT ATRIUM AREA SYSTOLIC (APICAL 2 CHAMBER): 24.33 CM2
LEFT ATRIUM AREA SYSTOLIC (APICAL 4 CHAMBER): 17.15 CM2
LEFT ATRIUM VOLUME INDEX MOD: 30 ML/M2
LEFT ATRIUM VOLUME MOD: 66 ML
LEFT INTERNAL DIMENSION IN SYSTOLE: 4.4 CM (ref 2.1–4)
LEFT VENTRICLE DIASTOLIC VOLUME INDEX: 78.18 ML/M2
LEFT VENTRICLE DIASTOLIC VOLUME: 172 ML
LEFT VENTRICLE END DIASTOLIC VOLUME APICAL 2 CHAMBER: 110.05 ML
LEFT VENTRICLE END DIASTOLIC VOLUME APICAL 4 CHAMBER: 123.05 ML
LEFT VENTRICLE END SYSTOLIC VOLUME APICAL 2 CHAMBER: 81.4 ML
LEFT VENTRICLE END SYSTOLIC VOLUME APICAL 4 CHAMBER: 43.46 ML
LEFT VENTRICLE MASS INDEX: 131.1 G/M2
LEFT VENTRICLE SYSTOLIC VOLUME INDEX: 39.5 ML/M2
LEFT VENTRICLE SYSTOLIC VOLUME: 87 ML
LEFT VENTRICULAR INTERNAL DIMENSION IN DIASTOLE: 5.9 CM (ref 3.5–6)
LEFT VENTRICULAR MASS: 288.5 G
LV LATERAL E/E' RATIO: 8.7 M/S
LV SEPTAL E/E' RATIO: 5.8 M/S
LVED V (TEICH): 171.52 ML
LVES V (TEICH): 87.06 ML
LVOT MG: 1.74 MMHG
LVOT MV: 0.61 CM/S
LYMPHOCYTES # BLD AUTO: 1 K/UL (ref 1–4.8)
LYMPHOCYTES NFR BLD: 18.2 % (ref 18–48)
MAGNESIUM SERPL-MCNC: 1.5 MG/DL (ref 1.6–2.6)
MCH RBC QN AUTO: 30.8 PG (ref 27–31)
MCHC RBC AUTO-ENTMCNC: 33.3 G/DL (ref 32–36)
MCV RBC AUTO: 92 FL (ref 82–98)
MONOCYTES # BLD AUTO: 0.8 K/UL (ref 0.3–1)
MONOCYTES NFR BLD: 14.4 % (ref 4–15)
MV MEAN GRADIENT: 2 MMHG
MV PEAK A VEL: 0.96 M/S
MV PEAK E VEL: 0.52 M/S
MV PEAK GRADIENT: 5 MMHG
MV STENOSIS PRESSURE HALF TIME: 133.14 MS
MV VALVE AREA BY CONTINUITY EQUATION: 4.25 CM2
MV VALVE AREA P 1/2 METHOD: 1.65 CM2
NEUTROPHILS # BLD AUTO: 3.5 K/UL (ref 1.8–7.7)
NEUTROPHILS NFR BLD: 63.8 % (ref 38–73)
NRBC BLD-RTO: 0 /100 WBC
OHS CV RV/LV RATIO: 0.75 CM
OHS LV EJECTION FRACTION SIMPSONS BIPLANE MOD: 43 %
OHS QRS DURATION: 158 MS
OHS QTC CALCULATION: 476 MS
PISA MRMAX VEL: 3.85 M/S
PISA TR MAX VEL: 1.9 M/S
PLATELET # BLD AUTO: 316 K/UL (ref 150–450)
PMV BLD AUTO: 10.5 FL (ref 9.2–12.9)
POCT GLUCOSE: 108 MG/DL (ref 70–110)
POCT GLUCOSE: 161 MG/DL (ref 70–110)
POCT GLUCOSE: 174 MG/DL (ref 70–110)
POCT GLUCOSE: 176 MG/DL (ref 70–110)
POTASSIUM SERPL-SCNC: 3 MMOL/L (ref 3.5–5.1)
PULM VEIN S/D RATIO: 1.6
PV MV: 0.66 M/S
PV PEAK D VEL: 0.35 M/S
PV PEAK GRADIENT: 3 MMHG
PV PEAK S VEL: 0.56 M/S
PV PEAK VELOCITY: 0.91 M/S
RA PRESSURE ESTIMATED: 3 MMHG
RA VOL SYS: 36.82 ML
RBC # BLD AUTO: 3.67 M/UL (ref 4.6–6.2)
RIGHT ATRIAL AREA: 14.3 CM2
RIGHT ATRIUM VOLUME AREA LENGTH APICAL 4 CHAMBER: 36.22 ML
RIGHT VENTRICLE DIASTOLIC BASEL DIMENSION: 4.4 CM
RV TB RVSP: 5 MMHG
RV TISSUE DOPPLER FREE WALL SYSTOLIC VELOCITY 1 (APICAL 4 CHAMBER VIEW): 9.05 CM/S
SINUS: 3.55 CM
SODIUM SERPL-SCNC: 144 MMOL/L (ref 136–145)
STJ: 3.63 CM
TDI LATERAL: 0.06 M/S
TDI SEPTAL: 0.09 M/S
TDI: 0.08 M/S
TR MAX PG: 15 MMHG
TRICUSPID ANNULAR PLANE SYSTOLIC EXCURSION: 1.68 CM
TSH SERPL DL<=0.005 MIU/L-ACNC: 0.77 UIU/ML (ref 0.4–4)
TV REST PULMONARY ARTERY PRESSURE: 17 MMHG
VIT B12 SERPL-MCNC: >2000 PG/ML (ref 210–950)
WBC # BLD AUTO: 5.49 K/UL (ref 3.9–12.7)
Z-SCORE OF LEFT VENTRICULAR DIMENSION IN END DIASTOLE: -2.42
Z-SCORE OF LEFT VENTRICULAR DIMENSION IN END SYSTOLE: -0.29

## 2025-03-19 PROCEDURE — 25000003 PHARM REV CODE 250: Mod: HCNC | Performed by: HOSPITALIST

## 2025-03-19 PROCEDURE — 63600175 PHARM REV CODE 636 W HCPCS: Mod: HCNC | Performed by: NURSE PRACTITIONER

## 2025-03-19 PROCEDURE — 80048 BASIC METABOLIC PNL TOTAL CA: CPT | Mod: HCNC | Performed by: HOSPITALIST

## 2025-03-19 PROCEDURE — 36415 COLL VENOUS BLD VENIPUNCTURE: CPT | Mod: HCNC | Performed by: HOSPITALIST

## 2025-03-19 PROCEDURE — 96372 THER/PROPH/DIAG INJ SC/IM: CPT | Performed by: HOSPITALIST

## 2025-03-19 PROCEDURE — 84443 ASSAY THYROID STIM HORMONE: CPT | Mod: HCNC | Performed by: HOSPITALIST

## 2025-03-19 PROCEDURE — 99214 OFFICE O/P EST MOD 30 MIN: CPT | Mod: HCNC,25,FS, | Performed by: INTERNAL MEDICINE

## 2025-03-19 PROCEDURE — 82085 ASSAY OF ALDOLASE: CPT | Mod: HCNC | Performed by: INTERNAL MEDICINE

## 2025-03-19 PROCEDURE — 82607 VITAMIN B-12: CPT | Mod: HCNC | Performed by: HOSPITALIST

## 2025-03-19 PROCEDURE — 85025 COMPLETE CBC W/AUTO DIFF WBC: CPT | Mod: HCNC | Performed by: HOSPITALIST

## 2025-03-19 PROCEDURE — 63600175 PHARM REV CODE 636 W HCPCS: Mod: HCNC | Performed by: HOSPITALIST

## 2025-03-19 PROCEDURE — 86038 ANTINUCLEAR ANTIBODIES: CPT | Mod: HCNC | Performed by: INTERNAL MEDICINE

## 2025-03-19 PROCEDURE — 36415 COLL VENOUS BLD VENIPUNCTURE: CPT | Mod: HCNC | Performed by: NURSE PRACTITIONER

## 2025-03-19 PROCEDURE — 82550 ASSAY OF CK (CPK): CPT | Mod: 91,HCNC | Performed by: INTERNAL MEDICINE

## 2025-03-19 PROCEDURE — 36415 COLL VENOUS BLD VENIPUNCTURE: CPT | Mod: HCNC,XB | Performed by: INTERNAL MEDICINE

## 2025-03-19 PROCEDURE — 86039 ANTINUCLEAR ANTIBODIES (ANA): CPT | Mod: HCNC | Performed by: INTERNAL MEDICINE

## 2025-03-19 PROCEDURE — 82550 ASSAY OF CK (CPK): CPT | Mod: HCNC | Performed by: NURSE PRACTITIONER

## 2025-03-19 PROCEDURE — 83615 LACTATE (LD) (LDH) ENZYME: CPT | Mod: HCNC | Performed by: INTERNAL MEDICINE

## 2025-03-19 PROCEDURE — 86235 NUCLEAR ANTIGEN ANTIBODY: CPT | Mod: HCNC | Performed by: INTERNAL MEDICINE

## 2025-03-19 PROCEDURE — 86235 NUCLEAR ANTIGEN ANTIBODY: CPT | Mod: 59,HCNC | Performed by: INTERNAL MEDICINE

## 2025-03-19 PROCEDURE — G0378 HOSPITAL OBSERVATION PER HR: HCPCS | Mod: HCNC

## 2025-03-19 PROCEDURE — 99215 OFFICE O/P EST HI 40 MIN: CPT | Mod: HCNC,,, | Performed by: STUDENT IN AN ORGANIZED HEALTH CARE EDUCATION/TRAINING PROGRAM

## 2025-03-19 PROCEDURE — 25000003 PHARM REV CODE 250: Mod: HCNC | Performed by: NURSE PRACTITIONER

## 2025-03-19 PROCEDURE — 83735 ASSAY OF MAGNESIUM: CPT | Mod: HCNC | Performed by: HOSPITALIST

## 2025-03-19 PROCEDURE — 86036 ANCA SCREEN EACH ANTIBODY: CPT | Mod: 59,HCNC | Performed by: INTERNAL MEDICINE

## 2025-03-19 PROCEDURE — 96366 THER/PROPH/DIAG IV INF ADDON: CPT

## 2025-03-19 RX ORDER — POTASSIUM CHLORIDE 20 MEQ/1
40 TABLET, EXTENDED RELEASE ORAL
Status: COMPLETED | OUTPATIENT
Start: 2025-03-19 | End: 2025-03-19

## 2025-03-19 RX ORDER — MAGNESIUM SULFATE HEPTAHYDRATE 40 MG/ML
2 INJECTION, SOLUTION INTRAVENOUS ONCE
Status: COMPLETED | OUTPATIENT
Start: 2025-03-19 | End: 2025-03-19

## 2025-03-19 RX ADMIN — HYDROCODONE BITARTRATE AND ACETAMINOPHEN 1 TABLET: 5; 325 TABLET ORAL at 08:03

## 2025-03-19 RX ADMIN — CLOPIDOGREL 75 MG: 75 TABLET ORAL at 08:03

## 2025-03-19 RX ADMIN — COLCHICINE 0.6 MG: 0.6 TABLET ORAL at 09:03

## 2025-03-19 RX ADMIN — HYDROCODONE BITARTRATE AND ACETAMINOPHEN 1 TABLET: 5; 325 TABLET ORAL at 07:03

## 2025-03-19 RX ADMIN — MELATONIN TAB 3 MG 6 MG: 3 TAB at 08:03

## 2025-03-19 RX ADMIN — GABAPENTIN 600 MG: 300 CAPSULE ORAL at 08:03

## 2025-03-19 RX ADMIN — ASPIRIN 81 MG: 81 TABLET, COATED ORAL at 08:03

## 2025-03-19 RX ADMIN — MAGNESIUM SULFATE HEPTAHYDRATE 2 G: 40 INJECTION, SOLUTION INTRAVENOUS at 10:03

## 2025-03-19 RX ADMIN — INSULIN GLARGINE 20 UNITS: 100 INJECTION, SOLUTION SUBCUTANEOUS at 08:03

## 2025-03-19 RX ADMIN — MAGNESIUM OXIDE TAB 400 MG (241.3 MG ELEMENTAL MG) 400 MG: 400 (241.3 MG) TAB at 08:03

## 2025-03-19 RX ADMIN — ENOXAPARIN SODIUM 40 MG: 40 INJECTION SUBCUTANEOUS at 05:03

## 2025-03-19 RX ADMIN — POTASSIUM CHLORIDE 40 MEQ: 1500 TABLET, EXTENDED RELEASE ORAL at 12:03

## 2025-03-19 RX ADMIN — POTASSIUM CHLORIDE 40 MEQ: 1500 TABLET, EXTENDED RELEASE ORAL at 02:03

## 2025-03-19 RX ADMIN — CYANOCOBALAMIN TAB 1000 MCG 1000 MCG: 1000 TAB at 08:03

## 2025-03-19 RX ADMIN — POTASSIUM CHLORIDE 40 MEQ: 1500 TABLET, EXTENDED RELEASE ORAL at 10:03

## 2025-03-19 NOTE — HOSPITAL COURSE
Patient was admitted initially for hypotension which resolved , he was complaining of bilateral upper extremities weakness more than baseline,   3/19 Request transfer for MRI brain with AICD but Per Transfer Center pt's AICD is not MRI compatible, CT spine showed  evere canal stenosis and severe bilateral foraminal narrowing at C3-4 and C4-5.  Multilevel cervical spine foraminal narrowing as detailed above.  No significant canal stenosis of the thoracic spine.  Multilevel canal stenosis and foraminal narrowing.  Severe canal stenosis at L2-3, moderate-severe canal stenosis at L3-4 and moderate canal stenosis at L4-5.  Please see details of each levels above.  Patient was evaluated by neurosurgery who recommended outpatient follow up in the clinic    He then developed L knee seolling and pain, likely gout flare. Ortho is consulted.  Patient was started on colchicine with significant improvement, patient  also received knee Kenaolog injections with significant improvement in pain and ambulation    Patient was evaluated by PT who recommended home with home health.
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
## 2025-03-19 NOTE — SUBJECTIVE & OBJECTIVE
Past Medical History:   Diagnosis Date    Anticoagulant long-term use     Cardiomyopathy     CHF (congestive heart failure)     Coronary artery disease     Diabetes mellitus     Gout     Heart attack     Hypertension     Pneumonia        Past Surgical History:   Procedure Laterality Date    APPENDECTOMY      CARDIAC CATHETERIZATION      7 stents    CARDIAC DEFIBRILLATOR PLACEMENT      CATARACT EXTRACTION Bilateral 2011    COLONOSCOPY N/A 8/10/2018    Procedure: COLONOSCOPY golytely;  Surgeon: Valentine Burger MD;  Location: [facility] ENDO;  Service: Endoscopy;  Laterality: N/A;    CORONARY ANGIOGRAPHY N/A 11/11/2024    Procedure: ANGIOGRAM, CORONARY ARTERY;  Surgeon: Luis Felipe Wright MD;  Location: [facility] CATH LAB/EP;  Service: Cardiology;  Laterality: N/A;    EYE SURGERY      INSTANTANEOUS WAVE-FREE RATIO (IFR) N/A 11/11/2024    Procedure: Instantaneous Wave-Free Ratio (IFR);  Surgeon: Luis Felipe Wright MD;  Location: [facility] CATH LAB/EP;  Service: Cardiology;  Laterality: N/A;    LEFT HEART CATHETERIZATION Left 11/11/2024    Procedure: Left heart cath;  Surgeon: Luis Felipe Wright MD;  Location: [facility] CATH LAB/EP;  Service: Cardiology;  Laterality: Left;    REVISION OF IMPLANTABLE CARDIOVERTER-DEFIBRILLATOR (ICD) ELECTRODE LEAD PLACEMENT N/A 11/11/2019    Procedure: REVISION, INSERTION, ELECTRODE LEAD, ICD;  Surgeon: Shukri Walsh MD;  Location: [facility] EP LAB;  Service: Cardiology;  Laterality: N/A;  Lead malfxn, RV lead ICD, SJM, anes, DM, 3 PREP       Review of patient's allergies indicates:  No Known Allergies    No current facility-administered medications on file prior to encounter.     Current Outpatient Medications on File Prior to Encounter   Medication Sig    aspirin (ECOTRIN) 81 MG EC tablet Take 81 mg by mouth once daily.    atorvastatin (LIPITOR) 40 MG tablet Take 1 tablet (40 mg total) by mouth once daily.    bumetanide (BUMEX) 1 MG tablet Take 1 tablet (1 mg total) by mouth once daily. (Patient  taking differently: Take 1 mg by mouth once daily. Taking in am)    clopidogreL (PLAVIX) 75 mg tablet Take 1 tablet (75 mg total) by mouth once daily.    colchicine (MITIGARE) 0.6 mg Cap Take 1 capsule (0.6 mg total) by mouth once daily.    cyanocobalamin (VITAMIN B-12) 1000 MCG tablet Take 1,000 mcg by mouth once daily.    empagliflozin (JARDIANCE) 10 mg tablet Take 1 tablet (10 mg total) by mouth once daily.    furosemide (LASIX) 20 MG tablet Take 20 mg by mouth once daily. In afternoon    gabapentin (NEURONTIN) 300 MG capsule Take 2 capsules (600 mg total) by mouth 2 (two) times daily.    insulin aspart U-100 (NOVOLOG FLEXPEN U-100 INSULIN) 100 unit/mL (3 mL) InPn pen Inject 18 Units into the skin 3 (three) times daily with meals. (Patient taking differently: Inject 15 Units into the skin 3 (three) times daily with meals.)    insulin glargine U-100, Lantus, (LANTUS SOLOSTAR U-100 INSULIN) 100 unit/mL (3 mL) InPn pen Inject 40 Units into the skin every evening. (Patient taking differently: Inject 35 Units into the skin every evening.)    losartan (COZAAR) 50 MG tablet Take 1 tablet (50 mg total) by mouth once daily.    metFORMIN (GLUCOPHAGE) 1000 MG tablet Take 1 tablet (1,000 mg total) by mouth 2 (two) times daily with meals.    multivit-minerals/FA/lycopene (ONE-A-DAY MEN'S 50 PLUS ORAL) Take 1 tablet by mouth once daily.    zolpidem (AMBIEN) 5 MG Tab TAKE 1 TABLET BY MOUTH EVERY NIGHT AS NEEDED (Patient taking differently: Take 5 mg by mouth nightly as needed.)    alcohol swabs (BD ALCOHOL SWABS) PadM USE FOUR TIMES DAILY    blood glucose control high,low (ACCU-CHEK CATHRYN CONTROL SOLN) Soln Use as directed to check blood sugar    blood sugar diagnostic Strp test blood sugar as directed four times daily    blood-glucose meter (TRUE METRIX GLUCOSE METER) Misc use as directed    lancets 30 gauge Misc usea s directed to check blood glucose four times daily    nitroGLYCERIN (NITROSTAT) 0.4 MG SL tablet Place 1  "tablet (0.4 mg total) under the tongue every 5 (five) minutes as needed for Chest pain.    pen needle, diabetic (BD ULTRA-FINE SINA PEN NEEDLE) 32 gauge x 5/32" Ndle Use four times daily for insulin administration     Family History       Problem Relation (Age of Onset)    Heart disease Mother, Father          Tobacco Use    Smoking status: Former    Smokeless tobacco: Never   Substance and Sexual Activity    Alcohol use: Yes     Comment: socially    Drug use: No    Sexual activity: Yes     Review of Systems   Constitutional: Positive for malaise/fatigue. Negative for chills, decreased appetite, diaphoresis, fever, weight gain and weight loss.   Cardiovascular:  Positive for near-syncope. Negative for chest pain, claudication, dyspnea on exertion, irregular heartbeat, leg swelling, orthopnea, palpitations and paroxysmal nocturnal dyspnea.   Respiratory:  Negative for cough, shortness of breath, snoring, sputum production and wheezing.    Endocrine: Negative for cold intolerance, heat intolerance, polydipsia, polyphagia and polyuria.   Skin:  Negative for color change, dry skin, itching, nail changes and poor wound healing.   Musculoskeletal:  Negative for back pain, gout, joint pain and joint swelling.   Gastrointestinal:  Negative for bloating, abdominal pain, constipation, diarrhea, hematemesis, hematochezia, melena, nausea and vomiting.   Genitourinary:  Negative for dysuria, hematuria and nocturia.   Neurological:  Positive for light-headedness. Negative for dizziness, headaches, numbness, paresthesias and weakness.   Psychiatric/Behavioral:  Negative for altered mental status, depression and memory loss.      Objective:     Vital Signs (Most Recent):  Temp: 98 °F (36.7 °C) (03/19/25 1114)  Pulse: 67 (03/19/25 1114)  Resp: 20 (03/19/25 1114)  BP: (!) 159/75 (03/19/25 1114)  SpO2: 95 % (03/19/25 1114) Vital Signs (24h Range):  Temp:  [97.7 °F (36.5 °C)-98.5 °F (36.9 °C)] 98 °F (36.7 °C)  Pulse:  [55-93] 67  Resp: "  [11-28] 20  SpO2:  [93 %-98 %] 95 %  BP: ()/(51-93) 159/75     Weight: 100.2 kg (221 lb)  Body mass index is 30.82 kg/m².    SpO2: 95 %         Intake/Output Summary (Last 24 hours) at 3/19/2025 1208  Last data filed at 3/19/2025 0612  Gross per 24 hour   Intake 240 ml   Output 400 ml   Net -160 ml       Lines/Drains/Airways       Peripheral Intravenous Line  Duration                  Peripheral IV - Single Lumen 03/18/25 1548 20 G Right Antecubital <1 day                     Physical Exam  Constitutional:       General: He is not in acute distress.     Appearance: He is well-developed.   Neck:      Vascular: No JVD.   Cardiovascular:      Rate and Rhythm: Normal rate and regular rhythm.      Heart sounds: No murmur heard.     No gallop.   Pulmonary:      Effort: Pulmonary effort is normal. No respiratory distress.      Breath sounds: Normal breath sounds. No wheezing.   Abdominal:      General: Bowel sounds are normal. There is no distension.      Palpations: Abdomen is soft.      Tenderness: There is no abdominal tenderness.   Musculoskeletal:      Cervical back: Normal range of motion and neck supple.   Skin:     General: Skin is warm and dry.   Neurological:      Mental Status: He is alert.      Comments: Disoriented    Psychiatric:         Behavior: Behavior normal.         Thought Content: Thought content normal.         Judgment: Judgment normal.              Significant Imaging: Echocardiogram: Transthoracic echo (TTE) complete (Cupid Only):   Results for orders placed or performed during the hospital encounter of 03/18/25   Echo Saline Bubble? Yes   Result Value Ref Range    BSA 2.24 m2    Flores's Biplane MOD Ejection Fraction 43 %    A2C EF 42 %    A4C EF 42 %    LVOT stroke volume 92.2 cm3    LVIDd 5.9 3.5 - 6.0 cm    LV Systolic Volume 87 mL    LV Systolic Volume Index 39.5 mL/m2    LVIDs 4.4 (A) 2.1 - 4.0 cm    LV ESV A2C 81.40 mL    LV Diastolic Volume 172 mL    LV ESV A4C 43.46 mL    LV  Diastolic Volume Index 78.18 mL/m2    LV EDV A2C 110.589862454867244 mL    LV EDV A4C 123.05 mL    Left Ventricular End Systolic Volume by Teichholz Method 87.06 mL    Left Ventricular End Diastolic Volume by Teichholz Method 171.52 mL    IVS 1.2 (A) 0.6 - 1.1 cm    LVOT diameter 2.3 cm    LVOT area 4.2 cm2    FS 25.4 (A) 28 - 44 %    Left Ventricle Relative Wall Thickness 0.37 cm    PW 1.1 0.6 - 1.1 cm    LV mass 288.5 g    LV Mass Index 131.1 g/m2    MV Peak E [place] 0.52 m/s    TDI LATERAL 0.06 m/s    TDI SEPTAL 0.09 m/s    E/E' ratio 7 m/s    MV Peak A [place] 0.96 m/s    TR Max Rmoe 1.9 m/s    E/A ratio 0.54     E wave deceleration time 459 msec    LV SEPTAL E/E' RATIO 5.8 m/s    LV LATERAL E/E' RATIO 8.7 m/s    PV Peak S [place] 0.56 m/s    PV Peak D [place] 0.35 m/s    Pulm vein S/D ratio 1.60     LVOT peak rome 1.0 m/s    Left Ventricular Outflow Tract Mean Velocity 0.61 cm/s    Left Ventricular Outflow Tract Mean Gradient 1.74 mmHg    RV- estes basal diam 4.4 cm    RV S' 9.05 cm/s    TAPSE 1.68 cm    RV/LV Ratio 0.75 cm    LA Vol (MOD) 66 mL    SANDRITA (MOD) 30 mL/m2    RA area length vol 36.22 mL    RA Area 14.3 cm2    RA Vol 36.82 mL    AV mean gradient 4 mmHg    AV peak gradient 6 mmHg    Ao peak rome 1.2 m/s    Ao VTI 26.7 cm    LVOT peak VTI 22.2 cm    AV valve area 3.5 cm²    AV Velocity Ratio 0.83     AV index (prosthetic) 0.83     BEV by Velocity Ratio 3.5 cm²    Mr max rome 3.85 m/s    MV mean gradient 2 mmHg    MV peak gradient 5 mmHg    MV stenosis pressure 1/2 time 133.14 ms    MV valve area p 1/2 method 1.65 cm2    MV valve area by continuity eq 4.25 cm2    MV VTI 21.7 cm    Triscuspid Valve Regurgitation Peak Gradient 15 mmHg    PV PEAK VELOCITY 0.91 m/s    PV peak gradient 3 mmHg    Pulmonary Valve Mean Velocity 0.66 m/s    Sinus 3.55 cm    STJ 3.63 cm    Ascending aorta 3.47 cm    IVC diameter 1.18 cm    Mean e' 0.08 m/s    ZLVIDS -0.29     ZLVIDD -2.42     LA area A4C 17.15 cm2    LA area A2C 24.33 cm2

## 2025-03-19 NOTE — SUBJECTIVE & OBJECTIVE
Interval History: Lying down, feels better but his arms and hands feel swollen and tingling, more so in the first 3 fingers of the left hand    K 3.0 Mag 1.5 replacement ordered    Review of Systems   Constitutional:  Negative for chills, fatigue and fever.   Respiratory:  Negative for cough, choking, chest tightness and shortness of breath.    Cardiovascular:  Positive for leg swelling. Negative for chest pain and palpitations.   Gastrointestinal:  Negative for abdominal distention, abdominal pain, anal bleeding, diarrhea, nausea and vomiting.   Genitourinary:  Negative for difficulty urinating and dysuria.   Musculoskeletal:  Positive for arthralgias.   Neurological:  Positive for syncope. Negative for dizziness, speech difficulty, weakness and headaches.   Psychiatric/Behavioral:  Positive for confusion. Negative for agitation.      Objective:     Vital Signs (Most Recent):  Temp: 98 °F (36.7 °C) (03/19/25 1114)  Pulse: 67 (03/19/25 1114)  Resp: 20 (03/19/25 1114)  BP: (!) 159/75 (03/19/25 1114)  SpO2: 95 % (03/19/25 1114) Vital Signs (24h Range):  Temp:  [97.7 °F (36.5 °C)-98.5 °F (36.9 °C)] 98 °F (36.7 °C)  Pulse:  [55-93] 67  Resp:  [11-28] 20  SpO2:  [93 %-98 %] 95 %  BP: ()/(51-93) 159/75     Weight: 100.2 kg (221 lb)  Body mass index is 30.82 kg/m².    Intake/Output Summary (Last 24 hours) at 3/19/2025 1205  Last data filed at 3/19/2025 0612  Gross per 24 hour   Intake 240 ml   Output 400 ml   Net -160 ml         Physical Exam  Constitutional:       General: He is not in acute distress.     Appearance: Normal appearance. He is obese. He is not ill-appearing or toxic-appearing.   Eyes:      Extraocular Movements: Extraocular movements intact.      Conjunctiva/sclera: Conjunctivae normal.   Cardiovascular:      Rate and Rhythm: Normal rate and regular rhythm.   Pulmonary:      Effort: Pulmonary effort is normal. No respiratory distress.      Breath sounds: Normal breath sounds. No wheezing or rales.    Abdominal:      General: There is no distension.      Tenderness: There is no abdominal tenderness. There is no guarding.   Musculoskeletal:         General: Normal range of motion.      Right lower leg: Edema present.      Left lower leg: Edema present.   Skin:     General: Skin is warm and dry.      Coloration: Skin is not jaundiced or pale.   Neurological:      General: No focal deficit present.      Mental Status: He is alert and oriented to person, place, and time.      Cranial Nerves: No cranial nerve deficit.   Psychiatric:         Mood and Affect: Mood normal.         Behavior: Behavior normal.               Significant Labs: All pertinent labs within the past 24 hours have been reviewed.  CBC:   Recent Labs   Lab 03/18/25  1547 03/19/25  0537   WBC 6.67 5.49   HGB 11.8* 11.3*   HCT 35.5* 33.9*    316     CMP:   Recent Labs   Lab 03/18/25  1547 03/19/25  0536    144   K 3.1* 3.0*    107   CO2 20* 20*   * 85   BUN 17 17   CREATININE 1.4 1.1   CALCIUM 9.0 9.1   PROT 7.5  --    ALBUMIN 3.1*  --    BILITOT 0.7  --    ALKPHOS 114  --    AST 57*  --    ALT 44  --    ANIONGAP 16 17*       Significant Imaging: I have reviewed all pertinent imaging results/findings within the past 24 hours.
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
## 2025-03-19 NOTE — PLAN OF CARE
Problem: Adult Inpatient Plan of Care  Goal: Plan of Care Review  Outcome: Progressing  Goal: Patient-Specific Goal (Individualized)  Outcome: Progressing  Goal: Absence of Hospital-Acquired Illness or Injury  Outcome: Progressing  Goal: Optimal Comfort and Wellbeing  Outcome: Progressing  Goal: Readiness for Transition of Care  Outcome: Progressing     Problem: Diabetes Comorbidity  Goal: Blood Glucose Level Within Targeted Range  Outcome: Progressing     Problem: Fall Injury Risk  Goal: Absence of Fall and Fall-Related Injury  Outcome: Progressing     Problem: Pain Acute  Goal: Optimal Pain Control and Function  Outcome: Progressing     Problem: Comorbidity Management  Goal: Blood Pressure in Desired Range  Outcome: Progressing

## 2025-03-19 NOTE — CONSULTS
[facility] - Telemetry  Cardiology  Consult Note    Patient Name: Clifton Wilson  MRN: [id number]  Admission Date: 3/18/2025  Hospital Length of Stay: 0 days  Code Status: Full Code   Attending Provider: Nicole Chapman MD   Consulting Provider: OMID Raphael ANP  Primary Care Physician: Britton Grissom MD  Principal Problem:Hypotension    Patient information was obtained from patient, past medical records, and ER records.     Inpatient consult to [facility]  Consult performed by: Marie Bear APRN, ANP  Consult ordered by: Christophe Ortiz MD  Reason for consult: hypotension; HFrEF        Subjective:     Chief Complaint:  dizziness, lightheadedness and fatigue      HPI:   75yo male with chronic combined systolic and diastolic heart failure EF 45-50%, hypokalemia, hypotension, DMII, HLP, MR, NSVT, ICM, thrombocytopenia, CAD s/p LAD and LCX IVONNE with history of ISR LAD, HTN, HLP, PAD who presented to the ER upon advice of cardiology clinic with dizziness and low BP. He is followed by Dr. Bryan in the clinic and was admitted earlier this year with syncope and hypotension and had his ARB and BB stopped with continuation of Lasix. He reports swelling to his legs and hands concerning for volume overload and independently adjusted his diuretics back continuing Bumex 1mg daily along with Lasix 20mg in the afternoon. He denies any SOB with exertion with his swelling. His BP was noted to be in the 60s in the clinic yesterday therefore he was sent to the ER for evaluation. He reports feeling better this AM with SBP 110s-160s. Labs CBC with H&H 11.8&35.5 BMP with K+ 3.0 BUN 1.1 improved from 1.4 Mg 1.3 Troponin 0.044-0.024-0.024 EKG NSR with LBBB. Admitted to [facility] Medicine and Cardiology consulted for evaluation of hypotension and HFrEF.     Past Medical History:   Diagnosis Date    Anticoagulant long-term use     Cardiomyopathy     CHF (congestive heart failure)     Coronary artery  disease     Diabetes mellitus     Gout     Heart attack     Hypertension     Pneumonia        Past Surgical History:   Procedure Laterality Date    APPENDECTOMY      CARDIAC CATHETERIZATION      7 stents    CARDIAC DEFIBRILLATOR PLACEMENT      CATARACT EXTRACTION Bilateral 2011    COLONOSCOPY N/A 8/10/2018    Procedure: COLONOSCOPY golytely;  Surgeon: Valentine Burger MD;  Location: [facility] ENDO;  Service: Endoscopy;  Laterality: N/A;    CORONARY ANGIOGRAPHY N/A 11/11/2024    Procedure: ANGIOGRAM, CORONARY ARTERY;  Surgeon: Luis Felipe Wright MD;  Location: [facility] CATH LAB/EP;  Service: Cardiology;  Laterality: N/A;    EYE SURGERY      INSTANTANEOUS WAVE-FREE RATIO (IFR) N/A 11/11/2024    Procedure: Instantaneous Wave-Free Ratio (IFR);  Surgeon: Luis Felipe Wright MD;  Location: [facility] CATH LAB/EP;  Service: Cardiology;  Laterality: N/A;    LEFT HEART CATHETERIZATION Left 11/11/2024    Procedure: Left heart cath;  Surgeon: Luis Felipe Wright MD;  Location: [facility] CATH LAB/EP;  Service: Cardiology;  Laterality: Left;    REVISION OF IMPLANTABLE CARDIOVERTER-DEFIBRILLATOR (ICD) ELECTRODE LEAD PLACEMENT N/A 11/11/2019    Procedure: REVISION, INSERTION, ELECTRODE LEAD, ICD;  Surgeon: Shukri Walsh MD;  Location: [facility] EP LAB;  Service: Cardiology;  Laterality: N/A;  Lead malfxn, RV lead ICD, SJM, anes, DM, 3 PREP       Review of patient's allergies indicates:  No Known Allergies    No current facility-administered medications on file prior to encounter.     Current Outpatient Medications on File Prior to Encounter   Medication Sig    aspirin (ECOTRIN) 81 MG EC tablet Take 81 mg by mouth once daily.    atorvastatin (LIPITOR) 40 MG tablet Take 1 tablet (40 mg total) by mouth once daily.    bumetanide (BUMEX) 1 MG tablet Take 1 tablet (1 mg total) by mouth once daily. (Patient taking differently: Take 1 mg by mouth once daily. Taking in am)    clopidogreL (PLAVIX) 75 mg tablet Take 1 tablet (75 mg total) by mouth once  daily.    colchicine (MITIGARE) 0.6 mg Cap Take 1 capsule (0.6 mg total) by mouth once daily.    cyanocobalamin (VITAMIN B-12) 1000 MCG tablet Take 1,000 mcg by mouth once daily.    empagliflozin (JARDIANCE) 10 mg tablet Take 1 tablet (10 mg total) by mouth once daily.    furosemide (LASIX) 20 MG tablet Take 20 mg by mouth once daily. In afternoon    gabapentin (NEURONTIN) 300 MG capsule Take 2 capsules (600 mg total) by mouth 2 (two) times daily.    insulin aspart U-100 (NOVOLOG FLEXPEN U-100 INSULIN) 100 unit/mL (3 mL) InPn pen Inject 18 Units into the skin 3 (three) times daily with meals. (Patient taking differently: Inject 15 Units into the skin 3 (three) times daily with meals.)    insulin glargine U-100, Lantus, (LANTUS SOLOSTAR U-100 INSULIN) 100 unit/mL (3 mL) InPn pen Inject 40 Units into the skin every evening. (Patient taking differently: Inject 35 Units into the skin every evening.)    losartan (COZAAR) 50 MG tablet Take 1 tablet (50 mg total) by mouth once daily.    metFORMIN (GLUCOPHAGE) 1000 MG tablet Take 1 tablet (1,000 mg total) by mouth 2 (two) times daily with meals.    multivit-minerals/FA/lycopene (ONE-A-DAY MEN'S 50 PLUS ORAL) Take 1 tablet by mouth once daily.    zolpidem (AMBIEN) 5 MG Tab TAKE 1 TABLET BY MOUTH EVERY NIGHT AS NEEDED (Patient taking differently: Take 5 mg by mouth nightly as needed.)    alcohol swabs (BD ALCOHOL SWABS) PadM USE FOUR TIMES DAILY    blood glucose control high,low (ACCU-CHEK CATHRYN CONTROL SOLN) Soln Use as directed to check blood sugar    blood sugar diagnostic Strp test blood sugar as directed four times daily    blood-glucose meter (TRUE METRIX GLUCOSE METER) Misc use as directed    lancets 30 gauge Misc usea s directed to check blood glucose four times daily    nitroGLYCERIN (NITROSTAT) 0.4 MG SL tablet Place 1 tablet (0.4 mg total) under the tongue every 5 (five) minutes as needed for Chest pain.    pen needle, diabetic (BD ULTRA-FINE SINA PEN NEEDLE) 32  "gauge x 5/32" Ndle Use four times daily for insulin administration     Family History       Problem Relation (Age of Onset)    Heart disease Mother, Father          Tobacco Use    Smoking status: Former    Smokeless tobacco: Never   Substance and Sexual Activity    Alcohol use: Yes     Comment: socially    Drug use: No    Sexual activity: Yes     Review of Systems   Constitutional: Positive for malaise/fatigue. Negative for chills, decreased appetite, diaphoresis, fever, weight gain and weight loss.   Cardiovascular:  Positive for near-syncope. Negative for chest pain, claudication, dyspnea on exertion, irregular heartbeat, leg swelling, orthopnea, palpitations and paroxysmal nocturnal dyspnea.   Respiratory:  Negative for cough, shortness of breath, snoring, sputum production and wheezing.    Endocrine: Negative for cold intolerance, heat intolerance, polydipsia, polyphagia and polyuria.   Skin:  Negative for color change, dry skin, itching, nail changes and poor wound healing.   Musculoskeletal:  Negative for back pain, gout, joint pain and joint swelling.   Gastrointestinal:  Negative for bloating, abdominal pain, constipation, diarrhea, hematemesis, hematochezia, melena, nausea and vomiting.   Genitourinary:  Negative for dysuria, hematuria and nocturia.   Neurological:  Positive for light-headedness. Negative for dizziness, headaches, numbness, paresthesias and weakness.   Psychiatric/Behavioral:  Negative for altered mental status, depression and memory loss.      Objective:     Vital Signs (Most Recent):  Temp: 98 °F (36.7 °C) (03/19/25 1114)  Pulse: 67 (03/19/25 1114)  Resp: 20 (03/19/25 1114)  BP: (!) 159/75 (03/19/25 1114)  SpO2: 95 % (03/19/25 1114) Vital Signs (24h Range):  Temp:  [97.7 °F (36.5 °C)-98.5 °F (36.9 °C)] 98 °F (36.7 °C)  Pulse:  [55-93] 67  Resp:  [11-28] 20  SpO2:  [93 %-98 %] 95 %  BP: ()/(51-93) 159/75     Weight: 100.2 kg (221 lb)  Body mass index is 30.82 kg/m².    SpO2: 95 %   "       Intake/Output Summary (Last 24 hours) at 3/19/2025 1208  Last data filed at 3/19/2025 0612  Gross per 24 hour   Intake 240 ml   Output 400 ml   Net -160 ml       Lines/Drains/Airways       Peripheral Intravenous Line  Duration                  Peripheral IV - Single Lumen 03/18/25 1548 20 G Right Antecubital <1 day                     Physical Exam  Constitutional:       General: He is not in acute distress.     Appearance: He is well-developed.   Neck:      Vascular: No JVD.   Cardiovascular:      Rate and Rhythm: Normal rate and regular rhythm.      Heart sounds: No murmur heard.     No gallop.   Pulmonary:      Effort: Pulmonary effort is normal. No respiratory distress.      Breath sounds: Normal breath sounds. No wheezing.   Abdominal:      General: Bowel sounds are normal. There is no distension.      Palpations: Abdomen is soft.      Tenderness: There is no abdominal tenderness.   Musculoskeletal:      Cervical back: Normal range of motion and neck supple.   Skin:     General: Skin is warm and dry.   Neurological:      Mental Status: He is alert.      Comments: Disoriented    Psychiatric:         Behavior: Behavior normal.         Thought Content: Thought content normal.         Judgment: Judgment normal.              Significant Imaging: Echocardiogram: Transthoracic echo (TTE) complete (Cupid Only):   Results for orders placed or performed during the hospital encounter of 03/18/25   Echo Saline Bubble? Yes   Result Value Ref Range    BSA 2.24 m2    Flores's Biplane MOD Ejection Fraction 43 %    A2C EF 42 %    A4C EF 42 %    LVOT stroke volume 92.2 cm3    LVIDd 5.9 3.5 - 6.0 cm    LV Systolic Volume 87 mL    LV Systolic Volume Index 39.5 mL/m2    LVIDs 4.4 (A) 2.1 - 4.0 cm    LV ESV A2C 81.40 mL    LV Diastolic Volume 172 mL    LV ESV A4C 43.46 mL    LV Diastolic Volume Index 78.18 mL/m2    LV EDV A2C 110.916730565398248 mL    LV EDV A4C 123.05 mL    Left Ventricular End Systolic Volume by Teichholz  Method 87.06 mL    Left Ventricular End Diastolic Volume by Teichholz Method 171.52 mL    IVS 1.2 (A) 0.6 - 1.1 cm    LVOT diameter 2.3 cm    LVOT area 4.2 cm2    FS 25.4 (A) 28 - 44 %    Left Ventricle Relative Wall Thickness 0.37 cm    PW 1.1 0.6 - 1.1 cm    LV mass 288.5 g    LV Mass Index 131.1 g/m2    MV Peak E [place] 0.52 m/s    TDI LATERAL 0.06 m/s    TDI SEPTAL 0.09 m/s    E/E' ratio 7 m/s    MV Peak A [place] 0.96 m/s    TR Max [place] 1.9 m/s    E/A ratio 0.54     E wave deceleration time 459 msec    LV SEPTAL E/E' RATIO 5.8 m/s    LV LATERAL E/E' RATIO 8.7 m/s    PV Peak S [place] 0.56 m/s    PV Peak D [place] 0.35 m/s    Pulm vein S/D ratio 1.60     LVOT peak rome 1.0 m/s    Left Ventricular Outflow Tract Mean Velocity 0.61 cm/s    Left Ventricular Outflow Tract Mean Gradient 1.74 mmHg    RV- estes basal diam 4.4 cm    RV S' 9.05 cm/s    TAPSE 1.68 cm    RV/LV Ratio 0.75 cm    LA Vol (MOD) 66 mL    SANDRITA (MOD) 30 mL/m2    RA area length vol 36.22 mL    RA Area 14.3 cm2    RA Vol 36.82 mL    AV mean gradient 4 mmHg    AV peak gradient 6 mmHg    Ao peak rome 1.2 m/s    Ao VTI 26.7 cm    LVOT peak VTI 22.2 cm    AV valve area 3.5 cm²    AV Velocity Ratio 0.83     AV index (prosthetic) 0.83     BEV by Velocity Ratio 3.5 cm²    Mr max rome 3.85 m/s    MV mean gradient 2 mmHg    MV peak gradient 5 mmHg    MV stenosis pressure 1/2 time 133.14 ms    MV valve area p 1/2 method 1.65 cm2    MV valve area by continuity eq 4.25 cm2    MV VTI 21.7 cm    Triscuspid Valve Regurgitation Peak Gradient 15 mmHg    PV PEAK VELOCITY 0.91 m/s    PV peak gradient 3 mmHg    Pulmonary Valve Mean Velocity 0.66 m/s    Sinus 3.55 cm    STJ 3.63 cm    Ascending aorta 3.47 cm    IVC diameter 1.18 cm    Mean e' 0.08 m/s    ZLVIDS -0.29     ZLVIDD -2.42     LA area A4C 17.15 cm2    LA area A2C 24.33 cm2     Assessment and Plan:     * Hypotension  - presented with fatigue, lightheadedness and near syncope; SBP 60s in clinic; admitted for observation  - SBP  110s-160s overnight; feel hypotension related to self medication with diuretics (Bumex 2mg QAM and Lasix 20mg QPM)  - previously on Coreg and Losartan as an outpatient but held given hypotension and SOPHIA- will continue to hold for now and monitor      Hypomagnesemia  - Mg 1.5 this AM  - related to diuretic use   - will replace with Mg rider 2gm  - goal Mg > 2.0    ICD (implantable cardioverter-defibrillator), single, in situ  - in place for primary prevention  - no indication for inpatient interrogation  - follow up as an outpatient for ongoing routine checks     Hypokalemia  - initial K+ 3.1 with repeat 3.0 this AM  - related to diuretic use  - will replace with oral KDur     Chronic combined systolic and diastolic congestive heart failure  - echo with EF 35% in 2019 improved to 45-50% on recent echo 1/2025  - ; CXR with no acute pulmonary edema  - previously on Losartan and Coreg and held given hypotension and will continue to hold  - no concern for volume overload on exam; encouraged to avoid self medication with both Bumex and Lasix and instructed to use Bumex only; reports of BLE edema possibly related to other etiology such as venous insufficiency as well- will arrange for venous insufficiency study as an outpatient         VTE Risk Mitigation (From admission, onward)           Ordered     Place NOAH hose  Until discontinued         03/19/25 1021     enoxaparin injection 40 mg  Daily         03/18/25 1831     IP VTE HIGH RISK PATIENT  Once         03/18/25 1831     Place sequential compression device  Until discontinued         03/18/25 1831                    Thank you for your consult. I will follow-up with patient. Please contact us if you have any additional questions.    OMID Raphael, ANP  Cardiology   [place] - Telemetry

## 2025-03-19 NOTE — ASSESSMENT & PLAN NOTE
Recurrent episodes of hypotension, including a witnessed syncopal event with seizure-like activity (likely convulsive syncope).    DDx:    - Medication-related hypotension (likely): Patient had restarted Imdur (on his own accord) 1 week prior to presentation. Recent decrease in Coreg and losartan, as well as transition from Lasix to Bumex, may have contributed.  - Orthostatic hypotension (possible): Symptoms triggered by postural changes  - Neurogenic syncope (unlikely)    Dx:    - Continuous telemetry and cardiac monitoring    - Echocardiogram  - Orthostatics  - Troponins  - Consider MRI brain (given ICD, patient would need transfer to [facility] for this study)  - Consider repeat ICD interrogation (was unremarkable last month after prior episode)    Tx:    - Maintain BP support with cautious IV fluids given history of heart failure    - Consider midodrine if persistent symptomatic orthostasis    - Neurology and Cardiology consultation

## 2025-03-19 NOTE — ASSESSMENT & PLAN NOTE
- in place for primary prevention  - no indication for inpatient interrogation  - follow up as an outpatient for ongoing routine checks

## 2025-03-19 NOTE — ASSESSMENT & PLAN NOTE
Patient's most recent potassium results are listed below.   Recent Labs     03/18/25  1547 03/19/25  0536   K 3.1* 3.0*     Plan  - Replete potassium per protocol  - Monitor potassium Daily  - Patient's hypokalemia is stable  -     Tx:    Replete potassium   Replete magnesium

## 2025-03-19 NOTE — ASSESSMENT & PLAN NOTE
- echo with EF 35% in 2019 improved to 45-50% on recent echo 1/2025  - ; CXR with no acute pulmonary edema  - previously on Losartan and Coreg and held given hypotension and will continue to hold  - no concern for volume overload on exam; encouraged to avoid self medication with both Bumex and Lasix and instructed to use Bumex only; reports of BLE edema possibly related to other etiology such as venous insufficiency as well- will arrange for venous insufficiency study as an outpatient

## 2025-03-19 NOTE — PLAN OF CARE
VIRTUAL NURSE: Pt arrived to unit. Permission received to turn camera to view patient. VIP model explained; Patient informed this VN will be working with bedside nurse and the rest of the care team.      Admission questions completed.  Plan of care reviewed with patient.  Educated patient on VTE and fall risk. Safety precautions in place. Call light within reach, side rails up x2.     Patient instucted to ask staff for assistance. Patient verbalized complete understanding.  Will continue to be available and intervene as needed.    Labs, notes, orders, and careplan reviewed.     Problem: Adult Inpatient Plan of Care  Goal: Plan of Care Review  Outcome: Progressing  Goal: Patient-Specific Goal (Individualized)  Outcome: Progressing      03/18/25 2241   Nurse Notification   Bedside Nurse Notified? Yes   Name of Bedside Nurse Fahad   Nurse Notfication Method Secure Chat   Nurse Notified Of Other  (Admission profile is completed)   Admission   Initial VN Admission Questions Complete   Communication Issues? None   Shift   Virtual Nurse - Rounding Complete   Virtual Nurse - Patient Verbalized Approval Of Camera Use;VN Rounding   Type of Frequent Check   Type Patient Rounds;Telemetry Monitoring   Safety/Activity   Patient Rounds bed in low position;placement of personal items at bedside;call light in patient/parent reach;clutter free environment maintained;visualized patient   Safety Promotion/Fall Prevention Fall Risk signage in place;medications reviewed;instructed to call staff for mobility;patient expresses understanding of fall risk and prevention;side rails raised x 2   Positioning   Body Position position maintained;neutral body alignment;neutral head position   Pain/Comfort/Sleep   Preferred Pain Scale number (Numeric Rating Pain Scale)   Comfort/Acceptable Pain Level 0   Pain Rating (0-10): Rest 0   Pain Rating (0-10): Activity 0   Sleep/Rest/Relaxation awake   Cardiac   Cardiac/Telemetry Monitor On Yes

## 2025-03-19 NOTE — ASSESSMENT & PLAN NOTE
- presented with fatigue, lightheadedness and near syncope; SBP 60s in clinic; admitted for observation  - SBP 110s-160s overnight; feel hypotension related to self medication with diuretics (Bumex 2mg QAM and Lasix 20mg QPM)  - previously on Coreg and Losartan as an outpatient but held given hypotension and SOPHIA- will continue to hold for now and monitor

## 2025-03-19 NOTE — HPI
73yo male with chronic combined systolic and diastolic heart failure EF 45-50%, hypokalemia, hypotension, DMII, HLP, MR, NSVT, ICM, thrombocytopenia, CAD s/p LAD and LCX IVONNE with history of ISR LAD, HTN, HLP, PAD who presented to the ER upon advice of cardiology clinic with dizziness and low BP. He is followed by Dr. Bryan in the clinic and was admitted earlier this year with syncope and hypotension and had his ARB and BB stopped with continuation of Lasix. He reports swelling to his legs and hands concerning for volume overload and independently adjusted his diuretics back continuing Bumex 1mg daily along with Lasix 20mg in the afternoon. He denies any SOB with exertion with his swelling. His BP was noted to be in the 60s in the clinic yesterday therefore he was sent to the ER for evaluation. He reports feeling better this AM with SBP 110s-160s. Labs CBC with H&H 11.8&35.5 BMP with K+ 3.0 BUN 1.1 improved from 1.4 Mg 1.3 Troponin 0.044-0.024-0.024 EKG NSR with LBBB. Admitted to [facility] Medicine and Cardiology consulted for evaluation of hypotension and HFrEF.

## 2025-03-19 NOTE — ASSESSMENT & PLAN NOTE
- initial K+ 3.1 with repeat 3.0 this AM  - related to diuretic use  - will replace with oral KDur

## 2025-03-19 NOTE — TELEPHONE ENCOUNTER
----- Message from  Latrice sent at 3/19/2025  1:55 PM CDT -----  Regarding: hospfu appt  Patient will need a cardiology hospital follow up appointment. Please call the pt with appt date & time. Thanks !

## 2025-03-19 NOTE — PLAN OF CARE
Problem: Adult Inpatient Plan of Care  Goal: Plan of Care Review  Outcome: Progressing     Problem: Adult Inpatient Plan of Care  Goal: Patient-Specific Goal (Individualized)  Outcome: Progressing    VN note:   Patient's chart, labs and vital signs reviewed, will be available to intervene if needed.

## 2025-03-19 NOTE — PLAN OF CARE
"0820  Hospfu appt scheduled for the pt with NP Reyna Morris on 3/24/2025 at 0930. Information added to the pt's discharge paperwork.     Order for "ambulatory referral to cardiology" entered requesting a hospfu appt with Dr Bryan. Pt will be notified of appt date & time. Information added to the pt's discharge paperwork.        03/19/25 1005   Rounds   Attendance Provider;Nurse    Discharge Plan A Home with family   Why the patient remains in the hospital Requires continued medical care     1005  CM was informed by Dr Chapman that the pt is not medically stable to discharge home & is scheduled to have a TTE done today. Pt was admitted with hypotension & is being followed by cards and neuro. BP 95/55 in the ED & was 184/82 this AM.      [place] - Telemetry  Initial Discharge Assessment       Primary Care Provider: Britton Grissom MD    Admission Diagnosis: Hypokalemia [E87.6]  Hypomagnesemia [E83.42]  CHF (congestive heart failure) [I50.9]  Chest pain [R07.9]  Near syncope [R55]  Hypotension, unspecified hypotension type [I95.9]    Admission Date: 3/18/2025  Expected Discharge Date: 3/21/2025    Consult: card & neuro    Payor: [employer] MANAGED MEDICARE / Plan: [employer] MEDICARE HMO / Product Type: Capitation /     Extended Emergency Contact Information  Primary Emergency Contact: Marie Gore  Address: [address] [place] [address]  Home Phone: [phone]  Mobile Phone: [phone]  Relation: Spouse    Discharge Plan A: (P) Home with family  Discharge Plan B: (P) Home Health      [facility] [place]  [address]  [place] [address]  Phone: [phone] Fax: [phone]    [id number] [place] - [address]  [address] [facility] [facility] [address] [place] [address]  Phone: [phone] Fax: [phone]      Initial Assessment (most recent)       Adult Discharge Assessment - 03/19/25 1040          Discharge Assessment    " Assessment Type Discharge Planning Assessment     Confirmed/corrected address, phone number and insurance Yes     Confirmed Demographics Correct on Facesheet     Source of Information patient;family   spouse, Marie Gore ([phone])    Communicated MARTHA with patient/caregiver Date not available/Unable to determine     People in Home spouse   spouse, Marie Gore ([phone])    Do you expect to return to your current living situation? Yes     Do you have help at home or someone to help you manage your care at home? Yes     Prior to hospitilization cognitive status: Alert/Oriented     Current cognitive status: Alert/Oriented     Equipment Currently Used at Home blood pressure machine;glucometer   pt has but does not use a rollator, WC , & shower chair    Readmission within 30 days? No     Patient currently being followed by outpatient case management? No     Do you currently have service(s) that help you manage your care at home? No     Do you take prescription medications? Yes     Do you have prescription coverage? Yes     Do you have any problems affording any of your prescribed medications? No     Is the patient taking medications as prescribed? yes     How do you get to doctors appointments? car, drives self;family or friend will provide     Are you on dialysis? No     Do you take coumadin? No     Discharge Plan A Home with family (P)      Discharge Plan B Home Health (P)      DME Needed Upon Discharge  none (P)      Discharge Plan discussed with: Patient;Spouse/sig other (P)                    1040  Patient resting quietly in bed with spouse, Marie Gore, at the bedside when CM rounded.     Patient lives with his spouse, is independent of all ADLs, & denied the need for assistance with transportation at time of discharge.     CM informed the pt & spouse of the scheduled hospital follow up appointment with MIGUEL Morris & that he will be notified of a hospfu appt with Dr Bryan (cards). Pt  verbalized understanding.    CM updated patient's whiteboard with CM name & contact information.       Will continue to follow.

## 2025-03-19 NOTE — ED NOTES
TANA Suarez to take patient to floor via wheelchair. Telemetry box in situ, belongings bagged and labelled. No other voiced concerns when asked, no visible distress or discomfort.

## 2025-03-20 ENCOUNTER — TELEPHONE (OUTPATIENT)
Dept: CARDIOLOGY | Facility: HOSPITAL | Age: 75
End: 2025-03-20
Payer: MEDICARE

## 2025-03-20 ENCOUNTER — TELEPHONE (OUTPATIENT)
Dept: CARDIOLOGY | Facility: CLINIC | Age: 75
End: 2025-03-20
Payer: MEDICARE

## 2025-03-20 ENCOUNTER — PATIENT MESSAGE (OUTPATIENT)
Dept: CARDIOLOGY | Facility: CLINIC | Age: 75
End: 2025-03-20
Payer: MEDICARE

## 2025-03-20 DIAGNOSIS — R60.0 LOWER EXTREMITY EDEMA: Primary | ICD-10-CM

## 2025-03-20 PROBLEM — R41.0 TRANSIENT CONFUSION: Status: ACTIVE | Noted: 2025-03-20

## 2025-03-20 PROBLEM — M62.81 MUSCLE WEAKNESS: Status: ACTIVE | Noted: 2025-03-20

## 2025-03-20 LAB
ANION GAP SERPL CALC-SCNC: 12 MMOL/L (ref 8–16)
BASOPHILS # BLD AUTO: 0.03 K/UL (ref 0–0.2)
BASOPHILS NFR BLD: 0.4 % (ref 0–1.9)
BUN SERPL-MCNC: 14 MG/DL (ref 8–23)
CALCIUM SERPL-MCNC: 8.9 MG/DL (ref 8.7–10.5)
CHLORIDE SERPL-SCNC: 108 MMOL/L (ref 95–110)
CO2 SERPL-SCNC: 21 MMOL/L (ref 23–29)
CREAT SERPL-MCNC: 0.9 MG/DL (ref 0.5–1.4)
DIFFERENTIAL METHOD BLD: ABNORMAL
EOSINOPHIL # BLD AUTO: 0.1 K/UL (ref 0–0.5)
EOSINOPHIL NFR BLD: 1.2 % (ref 0–8)
ERYTHROCYTE [DISTWIDTH] IN BLOOD BY AUTOMATED COUNT: 12.1 % (ref 11.5–14.5)
EST. GFR  (NO RACE VARIABLE): >60 ML/MIN/1.73 M^2
GLUCOSE SERPL-MCNC: 154 MG/DL (ref 70–110)
HCT VFR BLD AUTO: 35.8 % (ref 40–54)
HGB BLD-MCNC: 11.9 G/DL (ref 14–18)
IMM GRANULOCYTES # BLD AUTO: 0.03 K/UL (ref 0–0.04)
IMM GRANULOCYTES NFR BLD AUTO: 0.4 % (ref 0–0.5)
LYMPHOCYTES # BLD AUTO: 0.8 K/UL (ref 1–4.8)
LYMPHOCYTES NFR BLD: 11.4 % (ref 18–48)
MAGNESIUM SERPL-MCNC: 1.5 MG/DL (ref 1.6–2.6)
MCH RBC QN AUTO: 30.7 PG (ref 27–31)
MCHC RBC AUTO-ENTMCNC: 33.2 G/DL (ref 32–36)
MCV RBC AUTO: 93 FL (ref 82–98)
MONOCYTES # BLD AUTO: 0.9 K/UL (ref 0.3–1)
MONOCYTES NFR BLD: 13 % (ref 4–15)
NEUTROPHILS # BLD AUTO: 5.3 K/UL (ref 1.8–7.7)
NEUTROPHILS NFR BLD: 73.6 % (ref 38–73)
NRBC BLD-RTO: 0 /100 WBC
PLATELET # BLD AUTO: 303 K/UL (ref 150–450)
PMV BLD AUTO: 10.5 FL (ref 9.2–12.9)
POCT GLUCOSE: 164 MG/DL (ref 70–110)
POCT GLUCOSE: 195 MG/DL (ref 70–110)
POCT GLUCOSE: 196 MG/DL (ref 70–110)
POCT GLUCOSE: 223 MG/DL (ref 70–110)
POTASSIUM SERPL-SCNC: 3.4 MMOL/L (ref 3.5–5.1)
RBC # BLD AUTO: 3.87 M/UL (ref 4.6–6.2)
SODIUM SERPL-SCNC: 141 MMOL/L (ref 136–145)
WBC # BLD AUTO: 7.22 K/UL (ref 3.9–12.7)

## 2025-03-20 PROCEDURE — 83735 ASSAY OF MAGNESIUM: CPT | Mod: HCNC | Performed by: INTERNAL MEDICINE

## 2025-03-20 PROCEDURE — 96372 THER/PROPH/DIAG INJ SC/IM: CPT | Performed by: HOSPITALIST

## 2025-03-20 PROCEDURE — 85025 COMPLETE CBC W/AUTO DIFF WBC: CPT | Mod: HCNC | Performed by: HOSPITALIST

## 2025-03-20 PROCEDURE — 97162 PT EVAL MOD COMPLEX 30 MIN: CPT | Mod: HCNC

## 2025-03-20 PROCEDURE — G0378 HOSPITAL OBSERVATION PER HR: HCPCS | Mod: HCNC

## 2025-03-20 PROCEDURE — 97530 THERAPEUTIC ACTIVITIES: CPT | Mod: HCNC

## 2025-03-20 PROCEDURE — 99214 OFFICE O/P EST MOD 30 MIN: CPT | Mod: HCNC,,, | Performed by: NURSE PRACTITIONER

## 2025-03-20 PROCEDURE — 25000003 PHARM REV CODE 250: Mod: HCNC | Performed by: HOSPITALIST

## 2025-03-20 PROCEDURE — 25000003 PHARM REV CODE 250: Mod: HCNC | Performed by: INTERNAL MEDICINE

## 2025-03-20 PROCEDURE — 96366 THER/PROPH/DIAG IV INF ADDON: CPT

## 2025-03-20 PROCEDURE — 99215 OFFICE O/P EST HI 40 MIN: CPT | Mod: HCNC,,, | Performed by: STUDENT IN AN ORGANIZED HEALTH CARE EDUCATION/TRAINING PROGRAM

## 2025-03-20 PROCEDURE — 86596 VOLTAGE-GTD CA CHNL ANTB EA: CPT | Mod: HCNC | Performed by: INTERNAL MEDICINE

## 2025-03-20 PROCEDURE — 63600175 PHARM REV CODE 636 W HCPCS: Mod: HCNC | Performed by: INTERNAL MEDICINE

## 2025-03-20 PROCEDURE — 25000003 PHARM REV CODE 250: Mod: HCNC | Performed by: NURSE PRACTITIONER

## 2025-03-20 PROCEDURE — 80048 BASIC METABOLIC PNL TOTAL CA: CPT | Mod: HCNC | Performed by: HOSPITALIST

## 2025-03-20 PROCEDURE — 36415 COLL VENOUS BLD VENIPUNCTURE: CPT | Mod: HCNC | Performed by: INTERNAL MEDICINE

## 2025-03-20 PROCEDURE — 36415 COLL VENOUS BLD VENIPUNCTURE: CPT | Mod: HCNC | Performed by: HOSPITALIST

## 2025-03-20 PROCEDURE — 63600175 PHARM REV CODE 636 W HCPCS: Mod: HCNC | Performed by: HOSPITALIST

## 2025-03-20 PROCEDURE — 36415 COLL VENOUS BLD VENIPUNCTURE: CPT | Mod: HCNC,XB | Performed by: INTERNAL MEDICINE

## 2025-03-20 PROCEDURE — 97165 OT EVAL LOW COMPLEX 30 MIN: CPT | Mod: HCNC

## 2025-03-20 RX ORDER — POTASSIUM CHLORIDE 20 MEQ/1
40 TABLET, EXTENDED RELEASE ORAL
Status: COMPLETED | OUTPATIENT
Start: 2025-03-20 | End: 2025-03-20

## 2025-03-20 RX ORDER — MAGNESIUM SULFATE HEPTAHYDRATE 40 MG/ML
2 INJECTION, SOLUTION INTRAVENOUS ONCE
Status: DISCONTINUED | OUTPATIENT
Start: 2025-03-20 | End: 2025-03-20 | Stop reason: SDUPTHER

## 2025-03-20 RX ORDER — HYDRALAZINE HYDROCHLORIDE 25 MG/1
25 TABLET, FILM COATED ORAL EVERY 8 HOURS
Status: DISCONTINUED | OUTPATIENT
Start: 2025-03-20 | End: 2025-03-22

## 2025-03-20 RX ORDER — LOSARTAN POTASSIUM 50 MG/1
50 TABLET ORAL DAILY
Status: DISCONTINUED | OUTPATIENT
Start: 2025-03-21 | End: 2025-03-25 | Stop reason: HOSPADM

## 2025-03-20 RX ORDER — BUMETANIDE 1 MG/1
1 TABLET ORAL DAILY
Status: DISCONTINUED | OUTPATIENT
Start: 2025-03-21 | End: 2025-03-25 | Stop reason: HOSPADM

## 2025-03-20 RX ORDER — MAGNESIUM SULFATE HEPTAHYDRATE 40 MG/ML
2 INJECTION, SOLUTION INTRAVENOUS ONCE
Status: COMPLETED | OUTPATIENT
Start: 2025-03-20 | End: 2025-03-20

## 2025-03-20 RX ORDER — LOSARTAN POTASSIUM 25 MG/1
25 TABLET ORAL DAILY
Status: DISCONTINUED | OUTPATIENT
Start: 2025-03-20 | End: 2025-03-20

## 2025-03-20 RX ADMIN — INSULIN ASPART 1 UNITS: 100 INJECTION, SOLUTION INTRAVENOUS; SUBCUTANEOUS at 09:03

## 2025-03-20 RX ADMIN — CYANOCOBALAMIN TAB 1000 MCG 1000 MCG: 1000 TAB at 09:03

## 2025-03-20 RX ADMIN — HYDROCODONE BITARTRATE AND ACETAMINOPHEN 1 TABLET: 5; 325 TABLET ORAL at 09:03

## 2025-03-20 RX ADMIN — HYDRALAZINE HYDROCHLORIDE 25 MG: 25 TABLET ORAL at 09:03

## 2025-03-20 RX ADMIN — HYDROCODONE BITARTRATE AND ACETAMINOPHEN 1 TABLET: 5; 325 TABLET ORAL at 05:03

## 2025-03-20 RX ADMIN — INSULIN GLARGINE 20 UNITS: 100 INJECTION, SOLUTION SUBCUTANEOUS at 09:03

## 2025-03-20 RX ADMIN — POTASSIUM CHLORIDE 40 MEQ: 1500 TABLET, EXTENDED RELEASE ORAL at 10:03

## 2025-03-20 RX ADMIN — POTASSIUM CHLORIDE 40 MEQ: 1500 TABLET, EXTENDED RELEASE ORAL at 09:03

## 2025-03-20 RX ADMIN — MAGNESIUM OXIDE TAB 400 MG (241.3 MG ELEMENTAL MG) 400 MG: 400 (241.3 MG) TAB at 09:03

## 2025-03-20 RX ADMIN — MAGNESIUM SULFATE HEPTAHYDRATE 2 G: 40 INJECTION, SOLUTION INTRAVENOUS at 11:03

## 2025-03-20 RX ADMIN — CLOPIDOGREL 75 MG: 75 TABLET ORAL at 09:03

## 2025-03-20 RX ADMIN — GABAPENTIN 600 MG: 300 CAPSULE ORAL at 09:03

## 2025-03-20 RX ADMIN — ENOXAPARIN SODIUM 40 MG: 40 INJECTION SUBCUTANEOUS at 04:03

## 2025-03-20 RX ADMIN — ASPIRIN 81 MG: 81 TABLET, COATED ORAL at 09:03

## 2025-03-20 RX ADMIN — HYDROCODONE BITARTRATE AND ACETAMINOPHEN 1 TABLET: 5; 325 TABLET ORAL at 04:03

## 2025-03-20 RX ADMIN — LOSARTAN POTASSIUM 25 MG: 25 TABLET, FILM COATED ORAL at 09:03

## 2025-03-20 RX ADMIN — HYDRALAZINE HYDROCHLORIDE 25 MG: 25 TABLET ORAL at 04:03

## 2025-03-20 RX ADMIN — MELATONIN TAB 3 MG 6 MG: 3 TAB at 09:03

## 2025-03-20 RX ADMIN — COLCHICINE 0.6 MG: 0.6 TABLET ORAL at 09:03

## 2025-03-20 NOTE — PLAN OF CARE
Pt would benefit from cont OT services in order to maximize functional independence. Recommending High intensity therapy upon d/c. Pt reports ~6-7 weeks ago he was Ind. Now experiencing UE weakness, unable to lift BUE over head, decreased  strength. Pt with B knee buckling upon standing, unable to take steps at this time. Will progress as able.     Problem: Occupational Therapy  Goal: Occupational Therapy Goal  Description: Goals to be met by: 04/20/2025     Patient will increase functional independence with ADLs by performing:    Toileting from bedside commode with Stand-by Assistance for hygiene and clothing management.   Supine to sit with Stand-by Assistance.  Step transfer with Minimal Assistance  Toilet transfer to bedside commode with Minimal Assistance.    Outcome: Progressing

## 2025-03-20 NOTE — ASSESSMENT & PLAN NOTE
Patient has Combined Systolic and Diastolic heart failure that is Acute on chronic. On presentation their CHF was decompensated. Evidence of decompensated CHF on presentation includes: edema. The etiology of their decompensation is likely increased fluid intake. Most recent BNP and echo results are listed below.  Recent Labs     03/18/25  1547   *     Latest ECHO  Results for orders placed during the hospital encounter of 01/16/25    Echo    Interpretation Summary    Left Ventricle: The left ventricle is normal in size. There is concentric remodeling. Mild global hypokinesis and regional wall motion abnormalities present. There is mildly reduced systolic function with a visually estimated ejection fraction of 45 - 50%.    Right Ventricle: Normal right ventricular cavity size. Wall thickness is normal. Systolic function is normal. Pacemaker lead present in the ventricle.    IVC/SVC: Low central venous pressure.    Limited echo for EF    Current Heart Failure Medications  losartan tablet 25 mg, Daily, Oral    Plan  - Monitor strict I&Os and daily weights.    - Place on telemetry  - Low sodium diet  - Cardiology has been consulted  - The patient's volume status is at their baseline      Dx:    - Echocardiogram     Tx:    - Optimize GDMT while avoiding further hypotension    - Monitor renal function and electrolytes closely    - cardiology consultation

## 2025-03-20 NOTE — TELEPHONE ENCOUNTER
----- Message from Tech Ana sent at 3/20/2025 11:23 AM CDT -----  Regarding: [facility] With ICD ABBOTT [facility] got a [facility] from [facility] center trying to send pt from Emma has ABBOTT ICD this is his info:ICD MURGUIA [employer] VR 1357-40Q serial# [id number] 6/6/2017 implantV lead [facility] [employer] 7122Q/65cm serial# [id number] try to help Please call [phone] you

## 2025-03-20 NOTE — ASSESSMENT & PLAN NOTE
- presented with fatigue, lightheadedness and near syncope; SBP 60s in clinic; admitted for observation  - SBP 160s-180s overnight; feel hypotension related to self medication with diuretics (Bumex 2mg QAM and Lasix 20mg QPM)  - previously on Coreg and Losartan as an outpatient but held given hypotension and SOPHIA- ARB up titrated and will monitor BP; goal BP less than 130/80

## 2025-03-20 NOTE — SUBJECTIVE & OBJECTIVE
Review of Systems   Constitutional: Negative for chills, decreased appetite, diaphoresis, fever, malaise/fatigue, weight gain and weight loss.   Cardiovascular:  Negative for chest pain, claudication, dyspnea on exertion, irregular heartbeat, leg swelling, near-syncope, orthopnea, palpitations and paroxysmal nocturnal dyspnea.   Respiratory:  Negative for cough, shortness of breath, snoring, sputum production and wheezing.    Endocrine: Negative for cold intolerance, heat intolerance, polydipsia, polyphagia and polyuria.   Skin:  Negative for color change, dry skin, itching, nail changes and poor wound healing.   Musculoskeletal:  Negative for back pain, gout, joint pain and joint swelling.   Gastrointestinal:  Negative for bloating, abdominal pain, constipation, diarrhea, hematemesis, hematochezia, melena, nausea and vomiting.   Genitourinary:  Negative for dysuria, hematuria and nocturia.   Neurological:  Negative for dizziness, headaches, light-headedness, numbness, paresthesias and weakness.   Psychiatric/Behavioral:  Negative for altered mental status, depression and memory loss.      Objective:     Vital Signs (Most Recent):  Temp: 98 °F (36.7 °C) (03/20/25 0755)  Pulse: 67 (03/20/25 0755)  Resp: 18 (03/20/25 0942)  BP: (!) 169/75 (03/20/25 0755)  SpO2: (!) 94 % (03/20/25 0755) Vital Signs (24h Range):  Temp:  [97.7 °F (36.5 °C)-98.7 °F (37.1 °C)] 98 °F (36.7 °C)  Pulse:  [63-71] 67  Resp:  [18-20] 18  SpO2:  [94 %-96 %] 94 %  BP: (150-189)/(75-99) 169/75     Weight: 100.2 kg (221 lb)  Body mass index is 30.82 kg/m².     SpO2: (!) 94 %         Intake/Output Summary (Last 24 hours) at 3/20/2025 1020  Last data filed at 3/20/2025 0831  Gross per 24 hour   Intake 275 ml   Output 1000 ml   Net -725 ml       Lines/Drains/Airways       Peripheral Intravenous Line  Duration                  Peripheral IV - Single Lumen 03/18/25 1548 20 G Right Antecubital 1 day                       Physical Exam  Constitutional:        General: He is not in acute distress.     Appearance: He is well-developed.   Neck:      Vascular: No JVD.   Cardiovascular:      Rate and Rhythm: Normal rate and regular rhythm.      Heart sounds: No murmur heard.     No gallop.   Pulmonary:      Effort: Pulmonary effort is normal. No respiratory distress.      Breath sounds: Normal breath sounds. No wheezing.   Abdominal:      General: Bowel sounds are normal. There is no distension.      Palpations: Abdomen is soft.      Tenderness: There is no abdominal tenderness.   Musculoskeletal:      Cervical back: Normal range of motion and neck supple.   Skin:     General: Skin is warm and dry.   Neurological:      Mental Status: He is alert.      Comments: Disoriented    Psychiatric:         Behavior: Behavior normal.         Thought Content: Thought content normal.         Judgment: Judgment normal.                Significant Imaging: Echocardiogram: Transthoracic echo (TTE) complete (Cupid Only):   Results for orders placed or performed during the hospital encounter of 03/18/25   Echo Saline Bubble? Yes   Result Value Ref Range    BSA 2.24 m2    Flores's Biplane MOD Ejection Fraction 43 %    A2C EF 42 %    A4C EF 42 %    LVOT stroke volume 92.2 cm3    LVIDd 5.9 3.5 - 6.0 cm    LV Systolic Volume 87 mL    LV Systolic Volume Index 39.5 mL/m2    LVIDs 4.4 (A) 2.1 - 4.0 cm    LV ESV A2C 81.40 mL    LV Diastolic Volume 172 mL    LV ESV A4C 43.46 mL    LV Diastolic Volume Index 78.18 mL/m2    LV EDV A2C [id number] mL    LV EDV A4C 123.05 mL    Left Ventricular End Systolic Volume by Teichholz Method 87.06 mL    Left Ventricular End Diastolic Volume by Teichholz Method 171.52 mL    IVS 1.2 (A) 0.6 - 1.1 cm    LVOT diameter 2.3 cm    LVOT area 4.2 cm2    FS 25.4 (A) 28 - 44 %    Left Ventricle Relative Wall Thickness 0.37 cm    PW 1.1 0.6 - 1.1 cm    LV mass 288.5 g    LV Mass Index 131.1 g/m2    MV Peak E [place] 0.52 m/s    TDI LATERAL 0.06 m/s    TDI SEPTAL 0.09  m/s    E/E' ratio 7 m/s    MV Peak A [place] 0.96 m/s    TR Max [place] 1.9 m/s    E/A ratio 0.54     E wave deceleration time 459 msec    LV SEPTAL E/E' RATIO 5.8 m/s    LV LATERAL E/E' RATIO 8.7 m/s    PV Peak S [place] 0.56 m/s    PV Peak D [place] 0.35 m/s    Pulm vein S/D ratio 1.60     LVOT peak rome 1.0 m/s    Left Ventricular Outflow Tract Mean Velocity 0.61 cm/s    Left Ventricular Outflow Tract Mean Gradient 1.74 mmHg    RV- estes basal diam 4.4 cm    RV S' 9.05 cm/s    TAPSE 1.68 cm    RV/LV Ratio 0.75 cm    LA Vol (MOD) 66 mL    SANDRITA (MOD) 30 mL/m2    RA area length vol 36.22 mL    RA Area 14.3 cm2    RA Vol 36.82 mL    AV mean gradient 4 mmHg    AV peak gradient 6 mmHg    Ao peak rome 1.2 m/s    Ao VTI 26.7 cm    LVOT peak VTI 22.2 cm    AV valve area 3.5 cm²    AV Velocity Ratio 0.83     AV index (prosthetic) 0.83     BEV by Velocity Ratio 3.5 cm²    Mr max rome 3.85 m/s    MV mean gradient 2 mmHg    MV peak gradient 5 mmHg    MV stenosis pressure 1/2 time 133.14 ms    MV valve area p 1/2 method 1.65 cm2    MV valve area by continuity eq 4.25 cm2    MV VTI 21.7 cm    Triscuspid Valve Regurgitation Peak Gradient 15 mmHg    PV PEAK VELOCITY 0.91 m/s    PV peak gradient 3 mmHg    Pulmonary Valve Mean Velocity 0.66 m/s    Sinus 3.55 cm    STJ 3.63 cm    Ascending aorta 3.47 cm    IVC diameter 1.18 cm    Mean e' 0.08 m/s    ZLVIDS -0.29     ZLVIDD -2.42     LA area A4C 17.15 cm2    LA area A2C 24.33 cm2    TV resting pulmonary artery pressure 17 mmHg    RV TB RVSP 5 mmHg    Est. RA pres 3 mmHg    Narrative      Left Ventricle: The left ventricle is mildly dilated. There is   eccentric hypertrophy. Regional wall motion abnormalities present. See   diagram for wall motion findings. There is mildly reduced systolic   function with a visually estimated ejection fraction of 40 - 45%.   Quantitated ejection fraction is 43%. There is diastolic dysfunction.   Normal left ventricular filling pressure.    Right Ventricle: The right  ventricle has mild enlargement. Systolic   function is mildly reduced. Pacemaker lead present in the ventricle.    Pulmonary Artery: The estimated pulmonary artery systolic pressure is   17 mmHg.    IVC/SVC: Normal venous pressure at 3 mmHg.

## 2025-03-20 NOTE — PLAN OF CARE
MOON Message     Medicare Outpatient and Observation Notification regarding financial responsibility Explained to patient/caregiver; Signed/date by patient/caregiver   Date MATHIS was signed 3/20/2025   Time MATHIS was signed 1025

## 2025-03-20 NOTE — ASSESSMENT & PLAN NOTE
- initial K+ 3.1 with repeat 3.4 this AM  - related to diuretic use as well as decreased po intake  - will replace with oral KDur

## 2025-03-20 NOTE — ASSESSMENT & PLAN NOTE
Patient's most recent potassium results are listed below.   Recent Labs     03/18/25  1547 03/19/25  0536 03/20/25  0513   K 3.1* 3.0* 3.4*     Plan  - Replete potassium per protocol  - Monitor potassium Daily  - Patient's hypokalemia is stable  -     Tx:    Replete potassium   Replete magnesium

## 2025-03-20 NOTE — PT/OT/SLP EVAL
Physical Therapy Evaluation    Patient Name:  Clifton Wilson   MRN:  [id number]    Recommendations:     Discharge Recommendations: High Intensity Therapy   Discharge Equipment Recommendations: to be determined by next level of care   Barriers to discharge:  limited functional endurance/independence    Assessment:     Clifton Wilson is a 74 y.o. male admitted with a medical diagnosis of Hypotension.  He presents with the following impairments/functional limitations: weakness, impaired endurance, impaired self care skills, impaired functional mobility, gait instability, impaired balance, pain, decreased lower extremity function, decreased upper extremity function, decreased ROM, edema.    PT/OT co-evaluation completed due to anticipated complexity of pt's presentation. Pt's PLOF: Independent with no AD; but reports weakness/functional decline in past 6-7 weeks. Pt at this time requires MAX Ax2 with bed mobility and transfers to standing with use of RW. Therapy recommending high intensity therapy to assist pt in return to independent PLOF. Therapy will continue to progress pt as able.    Rehab Prognosis: Good; patient would benefit from acute skilled PT services to address these deficits and reach maximum level of function.    Recent Surgery: * No surgery found *      Plan:     During this hospitalization, patient to be seen 5 x/week to address the identified rehab impairments via gait training, therapeutic activities, therapeutic exercises, neuromuscular re-education, wheelchair management/training and progress toward the following goals:    Plan of Care Expires:  04/20/25    Subjective     Chief Complaint: pain/weakness  Patient/Family Comments/goals: to progress functional mobility  Pain/Comfort:  Pain Rating 1:  (not rated but reports pain to BLE with mobility)  Pain Addressed 1: Reposition, Cessation of Activity, Nurse notified  Pain Rating Post-Intervention 1:  (not rated)    Patients cultural, spiritual,  Scientology conflicts given the current situation: no    Living Environment:  Lives with spouse in 1 story home with threshold to enter. Tub/shower.  Prior to admission, patients level of function was: Independent with no AD; but reports weakness/functional decline in past 6-7 weeks..  Equipment used at home: wheelchair, cane, straight, rollator.  DME owned (not currently used): none.  Upon discharge, patient will have assistance from family (unsure of 24/7 assistance).    Objective:     Communicated with Nurse prior to session.  Patient found HOB elevated with bed alarm, telemetry  upon PT entry to room.    General Precautions: Standard, fall  Orthopedic Precautions:N/A   Braces: N/A  Respiratory Status: Room air    Exams:  Cognitive Exam:  Patient is oriented to Person, Place, Time, and Situation  Sensation:    -       Intact  light/touch to BLE  RLE ROM: limited due to weakness  RLE Strength: 3-/5 hip flexion, knee extension, ankle PF/DF  LLE ROM: limited due to weakness  LLE Strength: 2-/5 hip flexion, 2/5 knee extension and 2+/5 ankle PF/DF    Functional Mobility:  Bed Mobility:     Scooting: lateral scooting sitting EOB; CGA with use of BUE  Supine to Sit: maximal assistance and of 2 persons  Sit to Supine: maximal assistance and of 2 persons  Transfers:     Sit to Stand:  maximal assistance and of 2 persons with rolling walker; noted incomplete BLE knee extension and poor forward trunk posture.      AM-PAC 6 CLICK MOBILITY  Total Score:9       Treatment & Education:  Pt educated on role of PT, safety/hand positioning with transfers and use of RW.   Pt reports increased BLE pain with mobility specifically L thigh/knee.  Pt educated on use of call button; pt understanding.     Patient left HOB elevated with all lines intact, call button in reach, bed alarm on, and Nurse notified.    GOALS:   Multidisciplinary Problems       Physical Therapy Goals          Problem: Physical Therapy    Goal Priority Disciplines  Outcome Interventions   Physical Therapy Goal     PT, PT/OT Progressing    Description: Goals to be met by: 25     Patient will increase functional independence with mobility by performin. Supine to sit with Moderate Assistance  2. Sit to supine with Moderate Assistance  3. Sit to stand transfer with Moderate Assistance with use of RW.                           History:     Past Medical History:   Diagnosis Date    Anticoagulant long-term use     Cardiomyopathy     CHF (congestive heart failure)     Coronary artery disease     Diabetes mellitus     Gout     Heart attack     Hypertension     Pneumonia        Past Surgical History:   Procedure Laterality Date    APPENDECTOMY      CARDIAC CATHETERIZATION      7 stents    CARDIAC DEFIBRILLATOR PLACEMENT      CATARACT EXTRACTION Bilateral     COLONOSCOPY N/A 8/10/2018    Procedure: COLONOSCOPY golytely;  Surgeon: Valentine Burger MD;  Location: [facility] ENDO;  Service: Endoscopy;  Laterality: N/A;    CORONARY ANGIOGRAPHY N/A 2024    Procedure: ANGIOGRAM, CORONARY ARTERY;  Surgeon: Luis Felipe Wright MD;  Location: [facility] CATH LAB/EP;  Service: Cardiology;  Laterality: N/A;    EYE SURGERY      INSTANTANEOUS WAVE-FREE RATIO (IFR) N/A 2024    Procedure: Instantaneous Wave-Free Ratio (IFR);  Surgeon: Luis Felipe Wright MD;  Location: [facility] CATH LAB/EP;  Service: Cardiology;  Laterality: N/A;    LEFT HEART CATHETERIZATION Left 2024    Procedure: Left heart cath;  Surgeon: Luis Felipe Wright MD;  Location: [facility] CATH LAB/EP;  Service: Cardiology;  Laterality: Left;    REVISION OF IMPLANTABLE CARDIOVERTER-DEFIBRILLATOR (ICD) ELECTRODE LEAD PLACEMENT N/A 2019    Procedure: REVISION, INSERTION, ELECTRODE LEAD, ICD;  Surgeon: Shukri Walsh MD;  Location: [facility] EP LAB;  Service: Cardiology;  Laterality: N/A;  Lead malfxn, RV lead ICD, SJM, anes, DM, 3 PREP       Time Tracking:     PT Received On: 25  PT Start Time: 1413     PT Stop  Time: 1433  PT Total Time (min): 20 min With OT    Billable Minutes: Evaluation 15      03/20/2025
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
## 2025-03-20 NOTE — PT/OT/SLP EVAL
Occupational Therapy   Evaluation    Name: Clifton Wilson  MRN: [id number]  Admitting Diagnosis: Hypotension  Recent Surgery: * No surgery found *      Recommendations:     Discharge Recommendations: High Intensity Therapy  Discharge Equipment Recommendations:  to be determined by next level of care, walker, rolling  Barriers to discharge:  Other (Comment) (Pt requires increased level of assist)    Assessment:     Clifton Wilson is a 74 y.o. male with a medical diagnosis of Hypotension.  He presents with The primary encounter diagnosis was Hypotension, unspecified hypotension type. Diagnoses of Near syncope, Hypomagnesemia, Hypokalemia, Chest pain, CHF (congestive heart failure), Muscle weakness of upper extremity, Transient confusion, and Muscle weakness were also pertinent to this visit. Performance deficits affecting function: weakness, impaired functional mobility, gait instability, impaired endurance, decreased upper extremity function, decreased lower extremity function, decreased ROM, impaired self care skills, impaired balance, edema, decreased coordination, impaired fine motor, impaired joint extensibility, decreased safety awareness, pain.      Rehab Prognosis: Good; patient would benefit from acute skilled OT services to address these deficits and reach maximum level of function.       Plan:     Patient to be seen 5 x/week to address the above listed problems via self-care/home management, therapeutic activities, therapeutic exercises  Plan of Care Expires: 04/20/25  Plan of Care Reviewed with: patient    Subjective     Chief Complaint: pain  Patient/Family Comments/goals: progress with mobility    Occupational Profile:  Lives with spouse in 1 story home with threshold to enter. Tub/shower.  Prior to admission, patients level of function was: Independent with no AD; but reports weakness/functional decline in past 6-7 weeks..  Equipment used at home: wheelchair, cane, straight, rollator.  DME owned  (not currently used): none.  Upon discharge, patient will have assistance from family (unsure of 24/7 assistance).    Pain/Comfort:  Pain Rating 1: other (see comments) (not rated but reports pain to BLE with mobility)  Pain Addressed 1: Reposition, Distraction, Cessation of Activity, Nurse notified    Patients cultural, spiritual, Orthodoxy conflicts given the current situation: no    Objective:     Communicated with: nsg prior to session.  Patient found HOB elevated with bed alarm, telemetry upon OT entry to room.    General Precautions: Standard, fall  Orthopedic Precautions: N/A  Braces: N/A  Respiratory Status: Room air    Occupational Performance:    Bed Mobility:    Patient completed Supine to Sit with maximal assistance and 2 persons  Patient completed Sit to Supine with maximal assistance and 2 persons    Functional Mobility/Transfers:  Patient completed Sit <> Stand Transfer with maximal assistance and of 2 persons  with  rolling walker   Functional Mobility: Unable to take steps 2/2 B knees buckling    Cognitive/Visual Perceptual:  Cognitive/Psychosocial Skills:     -       Oriented to: Person, Place, Time, and Situation   -       Memory: No Deficits noted  -       Mood/Affect/Coping skills/emotional control: Cooperative    Physical Exam:  Sensation: Reports tingling in BUE that is new  Upper Extremity Range of Motion:     -       Right Upper Extremity: Deficits: shoulder flex ~10 degrees  -       Left Upper Extremity: Deficits: shoulder flex ~30 degrees  Upper Extremity Strength:    -       Right Upper Extremity:  3+/5 distally  -       Left Upper Extremity:  3+/5 distally   Strength:    -       Right Upper Extremity:  Poor  -       Left Upper Extremity:  Poor    AMPAC 6 Click ADL:  AMPAC Total Score: 16    Treatment & Education:  Pt would benefit from cont OT services in order to maximize functional independence.   Pt reports ~6-7 weeks ago he was Ind.   Now experiencing UE weakness, unable to lift  BUE over head, decreased  strength.   Pt with B knee buckling upon standing, unable to take steps at this time.   Neurology in room during session and assessing pt, able to see STS.  Will progress as able.     Patient left HOB elevated with all lines intact, call button in reach, bed alarm on, and nsg notified    GOALS:   Multidisciplinary Problems       Occupational Therapy Goals          Problem: Occupational Therapy    Goal Priority Disciplines Outcome Interventions   Occupational Therapy Goal     OT, PT/OT Progressing    Description: Goals to be met by: 04/20/2025     Patient will increase functional independence with ADLs by performing:    Toileting from bedside commode with Stand-by Assistance for hygiene and clothing management.   Supine to sit with Stand-by Assistance.  Step transfer with Minimal Assistance  Toilet transfer to bedside commode with Minimal Assistance.                         History:     Past Medical History:   Diagnosis Date    Anticoagulant long-term use     Cardiomyopathy     CHF (congestive heart failure)     Coronary artery disease     Diabetes mellitus     Gout     Heart attack     Hypertension     Pneumonia          Past Surgical History:   Procedure Laterality Date    APPENDECTOMY      CARDIAC CATHETERIZATION      7 stents    CARDIAC DEFIBRILLATOR PLACEMENT      CATARACT EXTRACTION Bilateral 2011    COLONOSCOPY N/A 8/10/2018    Procedure: COLONOSCOPY golytely;  Surgeon: Valentine Burger MD;  Location: [facility] ENDO;  Service: Endoscopy;  Laterality: N/A;    CORONARY ANGIOGRAPHY N/A 11/11/2024    Procedure: ANGIOGRAM, CORONARY ARTERY;  Surgeon: Luis Felipe Wright MD;  Location: [facility] CATH LAB/EP;  Service: Cardiology;  Laterality: N/A;    EYE SURGERY      INSTANTANEOUS WAVE-FREE RATIO (IFR) N/A 11/11/2024    Procedure: Instantaneous Wave-Free Ratio (IFR);  Surgeon: Luis Felipe Wright MD;  Location: [facility] CATH LAB/EP;  Service: Cardiology;  Laterality: N/A;    LEFT HEART  CATHETERIZATION Left 11/11/2024    Procedure: Left heart cath;  Surgeon: Luis Felipe Wright MD;  Location: [facility] CATH LAB/EP;  Service: Cardiology;  Laterality: Left;    REVISION OF IMPLANTABLE CARDIOVERTER-DEFIBRILLATOR (ICD) ELECTRODE LEAD PLACEMENT N/A 11/11/2019    Procedure: REVISION, INSERTION, ELECTRODE LEAD, ICD;  Surgeon: Shukri Walsh MD;  Location: [facility] EP LAB;  Service: Cardiology;  Laterality: N/A;  Lead malfxn, RV lead ICD, SJM, anes, DM, 3 PREP       Time Tracking:     OT Date of Treatment: 03/20/25  OT Start Time: 1409  OT Stop Time: 1435  OT Total Time (min): 26 min    Billable Minutes:Evaluation 10  Therapeutic Activity 16    3/20/2025

## 2025-03-20 NOTE — HOSPITAL COURSE
3/20/2025 SBP 160s-180s overnight HR 60s BMP with K+ 3.4 creatinine 0.9 Mg 1.5. CBC with H&H 11.9&35.8

## 2025-03-20 NOTE — PLAN OF CARE
Problem: Adult Inpatient Plan of Care  Goal: Plan of Care Review  Outcome: Progressing  Goal: Patient-Specific Goal (Individualized)  Outcome: Progressing  Goal: Absence of Hospital-Acquired Illness or Injury  Outcome: Progressing  Goal: Optimal Comfort and Wellbeing  Outcome: Progressing  Goal: Readiness for Transition of Care  Outcome: Progressing     Problem: Diabetes Comorbidity  Goal: Blood Glucose Level Within Targeted Range  Outcome: Progressing     Problem: Fall Injury Risk  Goal: Absence of Fall and Fall-Related Injury  Outcome: Progressing     Problem: Pain Acute  Goal: Optimal Pain Control and Function  Outcome: Progressing     Problem: Comorbidity Management  Goal: Blood Pressure in Desired Range  Outcome: Progressing     Problem: Syncope  Goal: Absence of Syncopal Symptoms  Outcome: Progressing

## 2025-03-20 NOTE — ASSESSMENT & PLAN NOTE
- echo with EF 35% in 2019 improved to 45-50% on recent echo 1/2025  - ; CXR with no acute pulmonary edema  - previously on Losartan and Coreg and held given hypotension with resumption of ARB; initially held given hypotension   - no concern for volume overload on exam; encouraged to avoid self medication with both Bumex and Lasix and instructed to use Bumex only; reports of BLE edema possibly related to other etiology such as venous insufficiency as well- will arrange for venous insufficiency study as an outpatient   - plan to continue Bumex 1 daily and Losartan upon discharge; instructed to monitor daily weights with extra Bumex dose for weight gain of 2-3lbs

## 2025-03-20 NOTE — PROGRESS NOTES
[facility] - Telemetry  Cardiology  Progress Note    Patient Name: Clifton Wilson  MRN: [id number]  Admission Date: 3/18/2025  Hospital Length of Stay: 0 days  Code Status: Full Code   Attending Physician: Nicole Chapman MD   Primary Care Physician: Britton Grissom MD  Expected Discharge Date: 3/21/2025  Principal Problem:Hypotension    Subjective:     Hospital Course:   3/20/2025 SBP 160s-180s overnight HR 60s BMP with K+ 3.4 creatinine 0.9 Mg 1.5. CBC with H&H 11.9&35.8          Review of Systems   Constitutional: Negative for chills, decreased appetite, diaphoresis, fever, malaise/fatigue, weight gain and weight loss.   Cardiovascular:  Negative for chest pain, claudication, dyspnea on exertion, irregular heartbeat, leg swelling, near-syncope, orthopnea, palpitations and paroxysmal nocturnal dyspnea.   Respiratory:  Negative for cough, shortness of breath, snoring, sputum production and wheezing.    Endocrine: Negative for cold intolerance, heat intolerance, polydipsia, polyphagia and polyuria.   Skin:  Negative for color change, dry skin, itching, nail changes and poor wound healing.   Musculoskeletal:  Negative for back pain, gout, joint pain and joint swelling.   Gastrointestinal:  Negative for bloating, abdominal pain, constipation, diarrhea, hematemesis, hematochezia, melena, nausea and vomiting.   Genitourinary:  Negative for dysuria, hematuria and nocturia.   Neurological:  Negative for dizziness, headaches, light-headedness, numbness, paresthesias and weakness.   Psychiatric/Behavioral:  Negative for altered mental status, depression and memory loss.      Objective:     Vital Signs (Most Recent):  Temp: 98 °F (36.7 °C) (03/20/25 0755)  Pulse: 67 (03/20/25 0755)  Resp: 18 (03/20/25 0942)  BP: (!) 169/75 (03/20/25 0755)  SpO2: (!) 94 % (03/20/25 0755) Vital Signs (24h Range):  Temp:  [97.7 °F (36.5 °C)-98.7 °F (37.1 °C)] 98 °F (36.7 °C)  Pulse:  [63-71] 67  Resp:  [18-20] 18  SpO2:  [94 %-96 %] 94 %  BP:  (150-189)/(75-99) 169/75     Weight: 100.2 kg (221 lb)  Body mass index is 30.82 kg/m².     SpO2: (!) 94 %         Intake/Output Summary (Last 24 hours) at 3/20/2025 1020  Last data filed at 3/20/2025 0831  Gross per 24 hour   Intake 275 ml   Output 1000 ml   Net -725 ml       Lines/Drains/Airways       Peripheral Intravenous Line  Duration                  Peripheral IV - Single Lumen 03/18/25 1548 20 G Right Antecubital 1 day                       Physical Exam  Constitutional:       General: He is not in acute distress.     Appearance: He is well-developed.   Neck:      Vascular: No JVD.   Cardiovascular:      Rate and Rhythm: Normal rate and regular rhythm.      Heart sounds: No murmur heard.     No gallop.   Pulmonary:      Effort: Pulmonary effort is normal. No respiratory distress.      Breath sounds: Normal breath sounds. No wheezing.   Abdominal:      General: Bowel sounds are normal. There is no distension.      Palpations: Abdomen is soft.      Tenderness: There is no abdominal tenderness.   Musculoskeletal:      Cervical back: Normal range of motion and neck supple.   Skin:     General: Skin is warm and dry.   Neurological:      Mental Status: He is alert.      Comments: Disoriented    Psychiatric:         Behavior: Behavior normal.         Thought Content: Thought content normal.         Judgment: Judgment normal.                Significant Imaging: Echocardiogram: Transthoracic echo (TTE) complete (Cupid Only):   Results for orders placed or performed during the hospital encounter of 03/18/25   Echo Saline Bubble? Yes   Result Value Ref Range    BSA 2.24 m2    Flores's Biplane MOD Ejection Fraction 43 %    A2C EF 42 %    A4C EF 42 %    LVOT stroke volume 92.2 cm3    LVIDd 5.9 3.5 - 6.0 cm    LV Systolic Volume 87 mL    LV Systolic Volume Index 39.5 mL/m2    LVIDs 4.4 (A) 2.1 - 4.0 cm    LV ESV A2C 81.40 mL    LV Diastolic Volume 172 mL    LV ESV A4C 43.46 mL    LV Diastolic Volume Index 78.18 mL/m2     LV EDV A2C 110.387770351942933 mL    LV EDV A4C 123.05 mL    Left Ventricular End Systolic Volume by Teichholz Method 87.06 mL    Left Ventricular End Diastolic Volume by Teichholz Method 171.52 mL    IVS 1.2 (A) 0.6 - 1.1 cm    LVOT diameter 2.3 cm    LVOT area 4.2 cm2    FS 25.4 (A) 28 - 44 %    Left Ventricle Relative Wall Thickness 0.37 cm    PW 1.1 0.6 - 1.1 cm    LV mass 288.5 g    LV Mass Index 131.1 g/m2    MV Peak E [place] 0.52 m/s    TDI LATERAL 0.06 m/s    TDI SEPTAL 0.09 m/s    E/E' ratio 7 m/s    MV Peak A [place] 0.96 m/s    TR Max [place] 1.9 m/s    E/A ratio 0.54     E wave deceleration time 459 msec    LV SEPTAL E/E' RATIO 5.8 m/s    LV LATERAL E/E' RATIO 8.7 m/s    PV Peak S [place] 0.56 m/s    PV Peak D [place] 0.35 m/s    Pulm vein S/D ratio 1.60     LVOT peak rome 1.0 m/s    Left Ventricular Outflow Tract Mean Velocity 0.61 cm/s    Left Ventricular Outflow Tract Mean Gradient 1.74 mmHg    RV- estes basal diam 4.4 cm    RV S' 9.05 cm/s    TAPSE 1.68 cm    RV/LV Ratio 0.75 cm    LA Vol (MOD) 66 mL    SANDRITA (MOD) 30 mL/m2    RA area length vol 36.22 mL    RA Area 14.3 cm2    RA Vol 36.82 mL    AV mean gradient 4 mmHg    AV peak gradient 6 mmHg    Ao peak rome 1.2 m/s    Ao VTI 26.7 cm    LVOT peak VTI 22.2 cm    AV valve area 3.5 cm²    AV Velocity Ratio 0.83     AV index (prosthetic) 0.83     BEV by Velocity Ratio 3.5 cm²    Mr max rome 3.85 m/s    MV mean gradient 2 mmHg    MV peak gradient 5 mmHg    MV stenosis pressure 1/2 time 133.14 ms    MV valve area p 1/2 method 1.65 cm2    MV valve area by continuity eq 4.25 cm2    MV VTI 21.7 cm    Triscuspid Valve Regurgitation Peak Gradient 15 mmHg    PV PEAK VELOCITY 0.91 m/s    PV peak gradient 3 mmHg    Pulmonary Valve Mean Velocity 0.66 m/s    Sinus 3.55 cm    STJ 3.63 cm    Ascending aorta 3.47 cm    IVC diameter 1.18 cm    Mean e' 0.08 m/s    ZLVIDS -0.29     ZLVIDD -2.42     LA area A4C 17.15 cm2    LA area A2C 24.33 cm2    TV resting pulmonary artery pressure 17 mmHg     RV TB RVSP 5 mmHg    Est. RA pres 3 mmHg    Narrative      Left Ventricle: The left ventricle is mildly dilated. There is   eccentric hypertrophy. Regional wall motion abnormalities present. See   diagram for wall motion findings. There is mildly reduced systolic   function with a visually estimated ejection fraction of 40 - 45%.   Quantitated ejection fraction is 43%. There is diastolic dysfunction.   Normal left ventricular filling pressure.    Right Ventricle: The right ventricle has mild enlargement. Systolic   function is mildly reduced. Pacemaker lead present in the ventricle.    Pulmonary Artery: The estimated pulmonary artery systolic pressure is   17 mmHg.    IVC/SVC: Normal venous pressure at 3 mmHg.       Assessment and Plan:     Brief HPI: Seen this morning on AM NP rounds while resting in bed. Denies any complaints of chest pain or SOB. Discussed POC as detailed below-verbalized understanding and agrees with POC      * Hypotension  - presented with fatigue, lightheadedness and near syncope; SBP 60s in clinic; admitted for observation  - SBP 160s-180s overnight; feel hypotension related to self medication with diuretics (Bumex 2mg QAM and Lasix 20mg QPM)  - previously on Coreg and Losartan as an outpatient but held given hypotension and SOPHIA- ARB up titrated and will monitor BP; goal BP less than 130/80      Hypomagnesemia  - Mg 1.5 this AM  - related to diuretic use   - will replace with Mg rider 2gm  - goal Mg > 2.0    ICD (implantable cardioverter-defibrillator), single, in situ  - in place for primary prevention  - no indication for inpatient interrogation  - follow up as an outpatient for ongoing routine checks     Hypokalemia  - initial K+ 3.1 with repeat 3.4 this AM  - related to diuretic use as well as decreased po intake  - will replace with oral KDur     Chronic combined systolic and diastolic congestive heart failure  - echo with EF 35% in 2019 improved to 45-50% on recent echo 1/2025  - BNP  160; CXR with no acute pulmonary edema  - previously on Losartan and Coreg and held given hypotension with resumption of ARB; initially held given hypotension   - no concern for volume overload on exam; encouraged to avoid self medication with both Bumex and Lasix and instructed to use Bumex only; reports of BLE edema possibly related to other etiology such as venous insufficiency as well- will arrange for venous insufficiency study as an outpatient   - plan to continue Bumex 1 daily and Losartan upon discharge; instructed to monitor daily weights with extra Bumex dose for weight gain of 2-3lbs        VTE Risk Mitigation (From admission, onward)           Ordered     Place NOAH hose  Until discontinued         03/19/25 1021     enoxaparin injection 40 mg  Daily         03/18/25 1831     IP VTE HIGH RISK PATIENT  Once         03/18/25 1831     Place sequential compression device  Until discontinued         03/18/25 1831                    OMID Raphael, ANP  Cardiology  [facility] - Telemetry

## 2025-03-20 NOTE — TELEPHONE ENCOUNTER
Received a message from Ana in the MRI scheduling Dept in relation to this Inpatient @ [facility] needing to have a MRI and has a [employer] ICD.  (Please see attached message) Please see information below as it relates to patient's Device being MRI compatible/conditional.  To meet protocol the Pacemaker/ICD must have been implanted no less than 6 weeks prior to scheduled date of Scan.  ICD/PPM and leads must be from same .       This pt has an abandoned/capped RV Lead.  Pt is UNABLE to have a MRI.  Spoke to Cherelle LOYA) @ [facility] and provided this information.  Understanding was verbalized.
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
## 2025-03-20 NOTE — ASSESSMENT & PLAN NOTE
Dx:  - Check uric acid, x-ray    Tx:    - Consider colchicine or NSAIDs for acute gout flare  - may need tap

## 2025-03-20 NOTE — ASSESSMENT & PLAN NOTE
Patient has Abnormal Magnesium: hypomagnesemia. Will continue to monitor electrolytes closely. Will replace the affected electrolytes and repeat labs to be done after interventions completed. The patient's magnesium results have been reviewed and are listed below.  Recent Labs   Lab 03/20/25  0513   MG 1.5*

## 2025-03-20 NOTE — ASSESSMENT & PLAN NOTE
Recurrent episodes of hypotension, including a witnessed syncopal event with seizure-like activity (likely convulsive syncope).    DDx:    - Medication-related hypotension (likely): Patient had restarted Imdur (on his own accord) 1 week prior to presentation. Recent decrease in Coreg and losartan, as well as transition from Lasix to Bumex, may have contributed.  - Orthostatic hypotension (possible): Symptoms triggered by postural changes  - Neurogenic syncope (unlikely)    Dx:    - Continuous telemetry and cardiac monitoring    - Echocardiogram  - Orthostatics  - Troponins  - Consider MRI brain (given ICD, patient would need transfer to [facility] for this study)  - Consider repeat ICD interrogation (was unremarkable last month after prior episode)    Tx:    - Maintain BP support with cautious IV fluids given history of heart failure    - Consider midodrine if persistent symptomatic orthostasis    - Neurology and Cardiology consultation

## 2025-03-20 NOTE — PLAN OF CARE
"0840  Scheduled cardiology appt with Dr Bryan on 3/25/2025 at 1120 noted. Information added to the pt's discharge paperwork.      03/20/25 1005   Rounds   Attendance Provider;Nurse    Discharge Plan A Home with family   Why the patient remains in the hospital Requires continued medical care     1005  CM was informed by Dr Man that the pt is not medically stable to discharge home, will have an MRI done today, & will need a neuro hospfu appt.     1120  Patient resting quietly in bed with his spouse, Marie Gore ([phone]), at the bedside when CM rounded. CM informed the pt & spouse of the scheduled hospfu appt with Dr Bryan (cards) & neuro hospfu appt needed. Pt stated he does not currently have a neuro MD.     1435  Order for "ambulatory referral to neuro" entered. Pt will be notified of a neuro hospfu appt. Information added to the pt's discharge paperwork.       Will continue to follow.   "

## 2025-03-20 NOTE — TELEPHONE ENCOUNTER
----- Message from PADMINI Ann sent at 3/20/2025  9:14 AM CDT -----    ----- Message -----  From: Marie Bear APRN, ANP  Sent: 3/20/2025   7:38 AM CDT  To: Marissa Santos RN    Please arrange for venous insufficiency ultrasoundBH

## 2025-03-20 NOTE — SUBJECTIVE & OBJECTIVE
Interval History: Feels that his arms are both swollen and his knee hurts a lot. Worried about his gout flaring, on colchicine currently    Review of Systems   Constitutional:  Negative for chills, fatigue and fever.   Respiratory:  Negative for cough, choking, chest tightness and shortness of breath.    Cardiovascular:  Positive for leg swelling. Negative for chest pain and palpitations.   Gastrointestinal:  Negative for abdominal distention, abdominal pain, anal bleeding, diarrhea, nausea and vomiting.   Genitourinary:  Negative for difficulty urinating and dysuria.   Musculoskeletal:  Positive for arthralgias.   Neurological:  Positive for syncope. Negative for dizziness, speech difficulty, weakness and headaches.   Psychiatric/Behavioral:  Positive for confusion. Negative for agitation.      Objective:     Vital Signs (Most Recent):  Temp: 97.5 °F (36.4 °C) (03/20/25 1136)  Pulse: 68 (03/20/25 1225)  Resp: 20 (03/20/25 1136)  BP: (!) 173/82 (03/20/25 1136)  SpO2: (!) 94 % (03/20/25 1136) Vital Signs (24h Range):  Temp:  [97.5 °F (36.4 °C)-98.7 °F (37.1 °C)] 97.5 °F (36.4 °C)  Pulse:  [62-71] 68  Resp:  [18-20] 20  SpO2:  [94 %-96 %] 94 %  BP: (150-189)/(75-99) 173/82     Weight: 100.2 kg (221 lb)  Body mass index is 30.82 kg/m².    Intake/Output Summary (Last 24 hours) at 3/20/2025 1301  Last data filed at 3/20/2025 0831  Gross per 24 hour   Intake 275 ml   Output 1000 ml   Net -725 ml         Physical Exam  Constitutional:       General: He is not in acute distress.     Appearance: Normal appearance. He is obese. He is not ill-appearing or toxic-appearing.   Eyes:      Extraocular Movements: Extraocular movements intact.      Conjunctiva/sclera: Conjunctivae normal.   Cardiovascular:      Rate and Rhythm: Normal rate and regular rhythm.   Pulmonary:      Effort: Pulmonary effort is normal. No respiratory distress.      Breath sounds: Normal breath sounds. No wheezing or rales.   Abdominal:      General: There  is no distension.      Tenderness: There is no abdominal tenderness. There is no guarding.   Musculoskeletal:         General: Normal range of motion.      Right lower leg: Edema present.      Left lower leg: Edema present.      Comments: L knee swelling with warmth, no erythema   Skin:     General: Skin is warm and dry.      Coloration: Skin is not jaundiced or pale.   Neurological:      General: No focal deficit present.      Mental Status: He is alert and oriented to person, place, and time.      Cranial Nerves: No cranial nerve deficit.   Psychiatric:         Mood and Affect: Mood normal.         Behavior: Behavior normal.               Significant Labs: All pertinent labs within the past 24 hours have been reviewed.  CBC:   Recent Labs   Lab 03/18/25  1547 03/19/25  0537 03/20/25  0513   WBC 6.67 5.49 7.22   HGB 11.8* 11.3* 11.9*   HCT 35.5* 33.9* 35.8*    316 303     CMP:   Recent Labs   Lab 03/18/25  1547 03/19/25  0536 03/20/25  0513    144 141   K 3.1* 3.0* 3.4*    107 108   CO2 20* 20* 21*   * 85 154*   BUN 17 17 14   CREATININE 1.4 1.1 0.9   CALCIUM 9.0 9.1 8.9   PROT 7.5  --   --    ALBUMIN 3.1*  --   --    BILITOT 0.7  --   --    ALKPHOS 114  --   --    AST 57*  --   --    ALT 44  --   --    ANIONGAP 16 17* 12       Significant Imaging: I have reviewed all pertinent imaging results/findings within the past 24 hours.

## 2025-03-20 NOTE — PLAN OF CARE
Problem: Physical Therapy  Goal: Physical Therapy Goal  Description: Goals to be met by: 25     Patient will increase functional independence with mobility by performin. Supine to sit with Moderate Assistance  2. Sit to supine with Moderate Assistance  3. Sit to stand transfer with Moderate Assistance with use of RW.    Outcome: Progressing     PT/OT co-evaluation completed due to anticipated complexity of pt's presentation. Pt's PLOF: Independent with no AD; but reports weakness/functional decline in past 6-7 weeks. Pt at this time requires MAX Ax2 with bed mobility and transfers to standing with use of RW. Therapy recommending high intensity therapy to assist pt in return to independent PLOF. Therapy will continue to progress pt as able.

## 2025-03-21 DIAGNOSIS — R53.1 WEAKNESS: Primary | ICD-10-CM

## 2025-03-21 DIAGNOSIS — R29.818 OTHER SYMPTOMS AND SIGNS INVOLVING THE NERVOUS SYSTEM: ICD-10-CM

## 2025-03-21 PROBLEM — M17.10 ARTHRITIS OF KNEE: Status: ACTIVE | Noted: 2025-03-21

## 2025-03-21 LAB
ALBUMIN SERPL BCP-MCNC: 3.1 G/DL (ref 3.5–5.2)
ALDOLASE SERPL-CCNC: 6.6 U/L (ref 1.2–7.6)
ALP SERPL-CCNC: 119 U/L (ref 40–150)
ALT SERPL W/O P-5'-P-CCNC: 78 U/L (ref 10–44)
ANA PATTERN 1: NORMAL
ANA SER QL IF: POSITIVE
ANA TITR SER IF: NORMAL {TITER}
ANION GAP SERPL CALC-SCNC: 9 MMOL/L (ref 8–16)
AST SERPL-CCNC: 40 U/L (ref 10–40)
BASOPHILS # BLD AUTO: 0.04 K/UL (ref 0–0.2)
BASOPHILS NFR BLD: 0.6 % (ref 0–1.9)
BILIRUB DIRECT SERPL-MCNC: 0.3 MG/DL (ref 0.1–0.3)
BILIRUB SERPL-MCNC: 0.6 MG/DL (ref 0.1–1)
BUN SERPL-MCNC: 13 MG/DL (ref 8–23)
CALCIUM SERPL-MCNC: 8.9 MG/DL (ref 8.7–10.5)
CHLORIDE SERPL-SCNC: 107 MMOL/L (ref 95–110)
CO2 SERPL-SCNC: 24 MMOL/L (ref 23–29)
CREAT SERPL-MCNC: 0.9 MG/DL (ref 0.5–1.4)
DIFFERENTIAL METHOD BLD: ABNORMAL
EOSINOPHIL # BLD AUTO: 0.1 K/UL (ref 0–0.5)
EOSINOPHIL NFR BLD: 1.3 % (ref 0–8)
ERYTHROCYTE [DISTWIDTH] IN BLOOD BY AUTOMATED COUNT: 12.2 % (ref 11.5–14.5)
EST. GFR  (NO RACE VARIABLE): >60 ML/MIN/1.73 M^2
FOLATE SERPL-MCNC: 10.6 NG/ML (ref 4–24)
GLUCOSE SERPL-MCNC: 138 MG/DL (ref 70–110)
HCT VFR BLD AUTO: 35.5 % (ref 40–54)
HGB BLD-MCNC: 11.7 G/DL (ref 14–18)
HIV 1+2 AB+HIV1 P24 AG SERPL QL IA: NORMAL
IMM GRANULOCYTES # BLD AUTO: 0.02 K/UL (ref 0–0.04)
IMM GRANULOCYTES NFR BLD AUTO: 0.3 % (ref 0–0.5)
LYMPHOCYTES # BLD AUTO: 1.1 K/UL (ref 1–4.8)
LYMPHOCYTES NFR BLD: 15.6 % (ref 18–48)
MAGNESIUM SERPL-MCNC: 1.5 MG/DL (ref 1.6–2.6)
MCH RBC QN AUTO: 30.6 PG (ref 27–31)
MCHC RBC AUTO-ENTMCNC: 33 G/DL (ref 32–36)
MCV RBC AUTO: 93 FL (ref 82–98)
MONOCYTES # BLD AUTO: 1 K/UL (ref 0.3–1)
MONOCYTES NFR BLD: 14.3 % (ref 4–15)
NEUTROPHILS # BLD AUTO: 4.7 K/UL (ref 1.8–7.7)
NEUTROPHILS NFR BLD: 67.9 % (ref 38–73)
NRBC BLD-RTO: 0 /100 WBC
PLATELET # BLD AUTO: 286 K/UL (ref 150–450)
PMV BLD AUTO: 10.6 FL (ref 9.2–12.9)
POCT GLUCOSE: 155 MG/DL (ref 70–110)
POCT GLUCOSE: 234 MG/DL (ref 70–110)
POCT GLUCOSE: 236 MG/DL (ref 70–110)
POCT GLUCOSE: 354 MG/DL (ref 70–110)
POTASSIUM SERPL-SCNC: 3.7 MMOL/L (ref 3.5–5.1)
PROT SERPL-MCNC: 7.7 G/DL (ref 6–8.4)
RBC # BLD AUTO: 3.82 M/UL (ref 4.6–6.2)
SODIUM SERPL-SCNC: 140 MMOL/L (ref 136–145)
TREPONEMA PALLIDUM IGG+IGM AB [PRESENCE] IN SERUM OR PLASMA BY IMMUNOASSAY: NONREACTIVE
WBC # BLD AUTO: 6.92 K/UL (ref 3.9–12.7)

## 2025-03-21 PROCEDURE — 86334 IMMUNOFIX E-PHORESIS SERUM: CPT | Mod: HCNC | Performed by: STUDENT IN AN ORGANIZED HEALTH CARE EDUCATION/TRAINING PROGRAM

## 2025-03-21 PROCEDURE — 86366 MUSCLE-SPECIFIC KINASE ANTB: CPT | Mod: HCNC | Performed by: INTERNAL MEDICINE

## 2025-03-21 PROCEDURE — G0378 HOSPITAL OBSERVATION PER HR: HCPCS | Mod: HCNC

## 2025-03-21 PROCEDURE — 84165 PROTEIN E-PHORESIS SERUM: CPT | Mod: HCNC | Performed by: STUDENT IN AN ORGANIZED HEALTH CARE EDUCATION/TRAINING PROGRAM

## 2025-03-21 PROCEDURE — 99213 OFFICE O/P EST LOW 20 MIN: CPT | Mod: HCNC,25,, | Performed by: ORTHOPAEDIC SURGERY

## 2025-03-21 PROCEDURE — 84425 ASSAY OF VITAMIN B-1: CPT | Mod: HCNC | Performed by: STUDENT IN AN ORGANIZED HEALTH CARE EDUCATION/TRAINING PROGRAM

## 2025-03-21 PROCEDURE — 20610 DRAIN/INJ JOINT/BURSA W/O US: CPT | Mod: HCNC,LT,, | Performed by: ORTHOPAEDIC SURGERY

## 2025-03-21 PROCEDURE — 82595 ASSAY OF CRYOGLOBULIN: CPT | Mod: HCNC | Performed by: STUDENT IN AN ORGANIZED HEALTH CARE EDUCATION/TRAINING PROGRAM

## 2025-03-21 PROCEDURE — 85025 COMPLETE CBC W/AUTO DIFF WBC: CPT | Mod: HCNC | Performed by: HOSPITALIST

## 2025-03-21 PROCEDURE — 82746 ASSAY OF FOLIC ACID SERUM: CPT | Mod: HCNC | Performed by: STUDENT IN AN ORGANIZED HEALTH CARE EDUCATION/TRAINING PROGRAM

## 2025-03-21 PROCEDURE — 80076 HEPATIC FUNCTION PANEL: CPT | Mod: HCNC | Performed by: STUDENT IN AN ORGANIZED HEALTH CARE EDUCATION/TRAINING PROGRAM

## 2025-03-21 PROCEDURE — 25500020 PHARM REV CODE 255: Mod: HCNC | Performed by: INTERNAL MEDICINE

## 2025-03-21 PROCEDURE — 86334 IMMUNOFIX E-PHORESIS SERUM: CPT | Mod: 26,HCNC,, | Performed by: PATHOLOGY

## 2025-03-21 PROCEDURE — 86593 SYPHILIS TEST NON-TREP QUANT: CPT | Mod: HCNC | Performed by: STUDENT IN AN ORGANIZED HEALTH CARE EDUCATION/TRAINING PROGRAM

## 2025-03-21 PROCEDURE — 36415 COLL VENOUS BLD VENIPUNCTURE: CPT | Mod: HCNC | Performed by: HOSPITALIST

## 2025-03-21 PROCEDURE — 36415 COLL VENOUS BLD VENIPUNCTURE: CPT | Mod: HCNC | Performed by: INTERNAL MEDICINE

## 2025-03-21 PROCEDURE — 25000003 PHARM REV CODE 250: Mod: HCNC | Performed by: INTERNAL MEDICINE

## 2025-03-21 PROCEDURE — 87389 HIV-1 AG W/HIV-1&-2 AB AG IA: CPT | Mod: HCNC | Performed by: STUDENT IN AN ORGANIZED HEALTH CARE EDUCATION/TRAINING PROGRAM

## 2025-03-21 PROCEDURE — 96372 THER/PROPH/DIAG INJ SC/IM: CPT | Performed by: HOSPITALIST

## 2025-03-21 PROCEDURE — 36415 COLL VENOUS BLD VENIPUNCTURE: CPT | Mod: HCNC,XB | Performed by: STUDENT IN AN ORGANIZED HEALTH CARE EDUCATION/TRAINING PROGRAM

## 2025-03-21 PROCEDURE — 83516 IMMUNOASSAY NONANTIBODY: CPT | Mod: 59,HCNC | Performed by: STUDENT IN AN ORGANIZED HEALTH CARE EDUCATION/TRAINING PROGRAM

## 2025-03-21 PROCEDURE — 63600175 PHARM REV CODE 636 W HCPCS: Mod: HCNC | Performed by: HOSPITALIST

## 2025-03-21 PROCEDURE — 83735 ASSAY OF MAGNESIUM: CPT | Mod: HCNC | Performed by: STUDENT IN AN ORGANIZED HEALTH CARE EDUCATION/TRAINING PROGRAM

## 2025-03-21 PROCEDURE — 80048 BASIC METABOLIC PNL TOTAL CA: CPT | Mod: HCNC | Performed by: HOSPITALIST

## 2025-03-21 PROCEDURE — 86596 VOLTAGE-GTD CA CHNL ANTB EA: CPT | Mod: HCNC | Performed by: INTERNAL MEDICINE

## 2025-03-21 PROCEDURE — 82607 VITAMIN B-12: CPT | Mod: HCNC | Performed by: STUDENT IN AN ORGANIZED HEALTH CARE EDUCATION/TRAINING PROGRAM

## 2025-03-21 PROCEDURE — 99215 OFFICE O/P EST HI 40 MIN: CPT | Mod: HCNC,,, | Performed by: STUDENT IN AN ORGANIZED HEALTH CARE EDUCATION/TRAINING PROGRAM

## 2025-03-21 PROCEDURE — 25000003 PHARM REV CODE 250: Mod: HCNC | Performed by: HOSPITALIST

## 2025-03-21 PROCEDURE — 84165 PROTEIN E-PHORESIS SERUM: CPT | Mod: 26,HCNC,, | Performed by: PATHOLOGY

## 2025-03-21 PROCEDURE — 82525 ASSAY OF COPPER: CPT | Mod: HCNC | Performed by: STUDENT IN AN ORGANIZED HEALTH CARE EDUCATION/TRAINING PROGRAM

## 2025-03-21 RX ORDER — COLCHICINE 0.6 MG/1
0.6 TABLET, FILM COATED ORAL 2 TIMES DAILY
Status: DISCONTINUED | OUTPATIENT
Start: 2025-03-21 | End: 2025-03-25 | Stop reason: HOSPADM

## 2025-03-21 RX ADMIN — HYDROCODONE BITARTRATE AND ACETAMINOPHEN 1 TABLET: 5; 325 TABLET ORAL at 09:03

## 2025-03-21 RX ADMIN — CYANOCOBALAMIN TAB 1000 MCG 1000 MCG: 1000 TAB at 10:03

## 2025-03-21 RX ADMIN — HYDRALAZINE HYDROCHLORIDE 25 MG: 25 TABLET ORAL at 05:03

## 2025-03-21 RX ADMIN — IOHEXOL 75 ML: 350 INJECTION, SOLUTION INTRAVENOUS at 12:03

## 2025-03-21 RX ADMIN — HYDROCODONE BITARTRATE AND ACETAMINOPHEN 1 TABLET: 5; 325 TABLET ORAL at 04:03

## 2025-03-21 RX ADMIN — INSULIN ASPART 2 UNITS: 100 INJECTION, SOLUTION INTRAVENOUS; SUBCUTANEOUS at 12:03

## 2025-03-21 RX ADMIN — GABAPENTIN 600 MG: 300 CAPSULE ORAL at 09:03

## 2025-03-21 RX ADMIN — MELATONIN TAB 3 MG 6 MG: 3 TAB at 09:03

## 2025-03-21 RX ADMIN — MAGNESIUM OXIDE TAB 400 MG (241.3 MG ELEMENTAL MG) 400 MG: 400 (241.3 MG) TAB at 10:03

## 2025-03-21 RX ADMIN — BUMETANIDE 1 MG: 1 TABLET ORAL at 10:03

## 2025-03-21 RX ADMIN — INSULIN GLARGINE 20 UNITS: 100 INJECTION, SOLUTION SUBCUTANEOUS at 09:03

## 2025-03-21 RX ADMIN — GABAPENTIN 600 MG: 300 CAPSULE ORAL at 10:03

## 2025-03-21 RX ADMIN — ASPIRIN 81 MG: 81 TABLET, COATED ORAL at 10:03

## 2025-03-21 RX ADMIN — COLCHICINE 0.6 MG: 0.6 TABLET ORAL at 09:03

## 2025-03-21 RX ADMIN — CLOPIDOGREL 75 MG: 75 TABLET ORAL at 10:03

## 2025-03-21 RX ADMIN — ENOXAPARIN SODIUM 40 MG: 40 INJECTION SUBCUTANEOUS at 06:03

## 2025-03-21 RX ADMIN — LOSARTAN POTASSIUM 50 MG: 50 TABLET, FILM COATED ORAL at 10:03

## 2025-03-21 RX ADMIN — HYDROCODONE BITARTRATE AND ACETAMINOPHEN 1 TABLET: 5; 325 TABLET ORAL at 03:03

## 2025-03-21 RX ADMIN — HYDRALAZINE HYDROCHLORIDE 25 MG: 25 TABLET ORAL at 09:03

## 2025-03-21 RX ADMIN — COLCHICINE 0.6 MG: 0.6 TABLET ORAL at 10:03

## 2025-03-21 RX ADMIN — HYDRALAZINE HYDROCHLORIDE 25 MG: 25 TABLET ORAL at 02:03

## 2025-03-21 NOTE — PROGRESS NOTES
"[facility] Medicine  Progress Note    Patient Name: Clifton Wilson  MRN: [id number]  Patient Class: OP- Observation   Admission Date: 3/18/2025  Length of Stay: 0 days  Attending Physician: Nicole Chapman MD  Primary Care Provider: Britton Grissom MD        Subjective     Principal Problem:Hypotension        HPI:  74-year-old male with a history of diabetes type 2, coronary artery disease with multiple percutaneous coronary interventions, hypertension, hyperlipidemia, HFrEF 40-45% status post implantable cardioverter-defibrillator, presents to the emergency department following an episode of hypotension at his cardiologists office. Earlier today, he was noted to have a blood pressure of 69/48 mmHg without loss of consciousness and was referred to the ED for further evaluation. He reports generalized fatigue over the past several days, dizziness while showering, and shortness of breath when bending over to put on his shoes. After standing up, he became lightheaded and had to sit down. He also describes tingling in both hands for two weeks, currently affecting the left hand, as well as intermittent bilateral hand pain related to gout. He reports feeling "puffy" and retaining fluid.  Upon arrival at the ED, his systolic blood pressure improved to approximately 116 mmHg. However, while in triage, he experienced a witnessed episode of syncope with loss of consciousness and seizure-like activity. His wife reports that during this event, his face postured to the left, his eyes rolled back, and he appeared pale and unresponsive. The triage provider noted that he was initially providing history when he suddenly became blank-faced and unresponsive to voice and painful stimuli. By the time he was brought into the ED core, he had regained consciousness and was at baseline. He also endorses recent intermittent episodes of confusion, including a concerning event while driving, in which he lost awareness of " his surroundings and later found himself near a [place] with no recollection of how he got there. He describes transient visual disturbances, including a foggy, colorless appearance to traffic.    Overview/Hospital Course:  3/19 Request transfer for MRI brain with AICD  3/20 Per Transfer Center pt's AICD is not MRI compatible  3/21 Feels that weakness in upper and lower extremity has gotten worse while in the hospital, L knee swollen, likely gout flare. Ortho is consulted. CT cervical spine shows spinal canal stenosis and multi level neural foraminal narrowing, NSGY consulted    No new subjective & objective note has been filed under this hospital service since the last note was generated.        Assessment & Plan  Hypotension  Recurrent episodes of hypotension, including a witnessed syncopal event with seizure-like activity (likely convulsive syncope).    DDx:    - Medication-related hypotension (likely): Patient had restarted Imdur (on his own accord) 1 week prior to presentation. Recent decrease in Coreg and losartan, as well as transition from Lasix to Bumex, may have contributed.  - Orthostatic hypotension (possible): Symptoms triggered by postural changes  - Neurogenic syncope (unlikely)    Dx:    - Continuous telemetry and cardiac monitoring    - Echocardiogram  - Orthostatics  - Troponins  - Consider MRI brain (given ICD, patient would need transfer to [facility] for this study)  - Consider repeat ICD interrogation (was unremarkable last month after prior episode)    Tx:    - Maintain BP support with cautious IV fluids given history of heart failure    - Consider midodrine if persistent symptomatic orthostasis    - Neurology and Cardiology consultation  Type 2 diabetes mellitus with neurologic complication  Dx:  A1c 6.6    Tx:  SSI  Chronic combined systolic and diastolic congestive heart failure  Patient has Combined Systolic and Diastolic heart failure that is Acute on chronic. On presentation their  CHF was decompensated. Evidence of decompensated CHF on presentation includes: edema. The etiology of their decompensation is likely increased fluid intake. Most recent BNP and echo results are listed below.  Recent Labs     03/18/25  1547   *     Latest ECHO  Results for orders placed during the hospital encounter of 01/16/25    Echo    Interpretation Summary    Left Ventricle: The left ventricle is normal in size. There is concentric remodeling. Mild global hypokinesis and regional wall motion abnormalities present. There is mildly reduced systolic function with a visually estimated ejection fraction of 45 - 50%.    Right Ventricle: Normal right ventricular cavity size. Wall thickness is normal. Systolic function is normal. Pacemaker lead present in the ventricle.    IVC/SVC: Low central venous pressure.    Limited echo for EF    Current Heart Failure Medications  losartan tablet 50 mg, Daily, Oral  bumetanide tablet 1 mg, Daily, Oral  hydrALAZINE tablet 25 mg, Every 8 hours, Oral    Plan  - Monitor strict I&Os and daily weights.    - Place on telemetry  - Low sodium diet  - Cardiology has been consulted  - The patient's volume status is at their baseline      Dx:    - Echocardiogram     Tx:    - Optimize GDMT while avoiding further hypotension    - Monitor renal function and electrolytes closely    - cardiology consultation    Hypokalemia  Patient's most recent potassium results are listed below.   Recent Labs     03/19/25  0536 03/20/25  0513 03/21/25  0457   K 3.0* 3.4* 3.7     Plan  - Replete potassium per protocol  - Monitor potassium Daily  - Patient's hypokalemia is stable  -     Tx:    Replete potassium   Replete magnesium   ICD (implantable cardioverter-defibrillator), single, in situ  Is non MRI compatible    Gout  Dx:  - Check uric acid, x-ray    Tx:    - Consider colchicine or NSAIDs for acute gout flare-->increasing dose to 0.6mg BID  - may need tap, Ortho is consulted to r/o septic  arthritis  Confusion  Intermittent episodes of confusion, including an event while driving with loss of awareness.     Dx:    - Consider Brain MRI/MRA  - monitor blood glucose    Tx:    - Address hypotension and medication adjustments    - Neurology consult  Hypomagnesemia  Patient has Abnormal Magnesium: hypomagnesemia. Will continue to monitor electrolytes closely. Will replace the affected electrolytes and repeat labs to be done after interventions completed. The patient's magnesium results have been reviewed and are listed below.  Recent Labs   Lab 03/21/25  0854   MG 1.5*      Muscle weakness      Transient confusion        VTE Risk Mitigation (From admission, onward)           Ordered     Place NOAH hose  Until discontinued         03/19/25 1021     enoxaparin injection 40 mg  Daily         03/18/25 1831     IP VTE HIGH RISK PATIENT  Once         03/18/25 1831     Place sequential compression device  Until discontinued         03/18/25 1831                    Discharge Planning   MARTHA: 3/24/2025     Code Status: Full Code   Medical Readiness for Discharge Date:   Discharge Plan A: Rehab                Please place Justification for DME        Nicole Chapman MD  Department of Hospital Medicine   [place] [facility]

## 2025-03-21 NOTE — ASSESSMENT & PLAN NOTE
Patient has Combined Systolic and Diastolic heart failure that is Acute on chronic. On presentation their CHF was decompensated. Evidence of decompensated CHF on presentation includes: edema. The etiology of their decompensation is likely increased fluid intake. Most recent BNP and echo results are listed below.  Recent Labs     03/18/25  1547   *     Latest ECHO  Results for orders placed during the hospital encounter of 01/16/25    Echo    Interpretation Summary    Left Ventricle: The left ventricle is normal in size. There is concentric remodeling. Mild global hypokinesis and regional wall motion abnormalities present. There is mildly reduced systolic function with a visually estimated ejection fraction of 45 - 50%.    Right Ventricle: Normal right ventricular cavity size. Wall thickness is normal. Systolic function is normal. Pacemaker lead present in the ventricle.    IVC/SVC: Low central venous pressure.    Limited echo for EF    Current Heart Failure Medications  losartan tablet 50 mg, Daily, Oral  bumetanide tablet 1 mg, Daily, Oral  hydrALAZINE tablet 25 mg, Every 8 hours, Oral    Plan  - Monitor strict I&Os and daily weights.    - Place on telemetry  - Low sodium diet  - Cardiology has been consulted  - The patient's volume status is at their baseline      Dx:    - Echocardiogram     Tx:    - Optimize GDMT while avoiding further hypotension    - Monitor renal function and electrolytes closely    - cardiology consultation

## 2025-03-21 NOTE — CONSULTS
Subjective:      Patient ID: Clifton Wilson is a 74 y.o. male.    Chief Complaint: Fatigue (C/o increased fatigue, light headedness, near syncope, pale, and hypotension. SBP in the 60's per wife PTA. During triage, pt states he began feeling worse, became unresponsive to verbal and pain w/ fixed forward gaze. )      HPI  Clifton Wilson is a  74 y.o. male presenting today for left knee pain and swelling.  There was not a history of trauma.  Onset of symptoms began about a week ago   He does have a history of gout   He has noticed some swelling and pain left knee   No history of trauma   .      Review of patient's allergies indicates:  No Known Allergies      Current Medications[1]    Past Medical History:   Diagnosis Date    Anticoagulant long-term use     Cardiomyopathy     CHF (congestive heart failure)     Coronary artery disease     Diabetes mellitus     Gout     Heart attack     Hypertension     Pneumonia        Past Surgical History:   Procedure Laterality Date    APPENDECTOMY      CARDIAC CATHETERIZATION      7 stents    CARDIAC DEFIBRILLATOR PLACEMENT      CATARACT EXTRACTION Bilateral 2011    COLONOSCOPY N/A 8/10/2018    Procedure: COLONOSCOPY golytely;  Surgeon: Valentine Burger MD;  Location: [facility] ENDO;  Service: Endoscopy;  Laterality: N/A;    CORONARY ANGIOGRAPHY N/A 11/11/2024    Procedure: ANGIOGRAM, CORONARY ARTERY;  Surgeon: Luis Felipe Wright MD;  Location: [facility] CATH LAB/EP;  Service: Cardiology;  Laterality: N/A;    EYE SURGERY      INSTANTANEOUS WAVE-FREE RATIO (IFR) N/A 11/11/2024    Procedure: Instantaneous Wave-Free Ratio (IFR);  Surgeon: Luis Felipe Wright MD;  Location: [facility] CATH LAB/EP;  Service: Cardiology;  Laterality: N/A;    LEFT HEART CATHETERIZATION Left 11/11/2024    Procedure: Left heart cath;  Surgeon: Luis Felipe Wright MD;  Location: [facility] CATH LAB/EP;  Service: Cardiology;  Laterality: Left;    REVISION OF IMPLANTABLE CARDIOVERTER-DEFIBRILLATOR (ICD) ELECTRODE LEAD  "PLACEMENT N/A 11/11/2019    Procedure: REVISION, INSERTION, ELECTRODE LEAD, ICD;  Surgeon: Shukri Walsh MD;  Location: [facility] EP LAB;  Service: Cardiology;  Laterality: N/A;  Lead malfxn, RV lead ICD, SJM, anes, DM, 3 PREP       Review of Systems:  ROS    OBJECTIVE:     PHYSICAL EXAM:  Height: 5' 11" (180.3 cm) Weight: 100.2 kg (221 lb)  Vitals:    03/21/25 1133 03/21/25 1216 03/21/25 1501 03/21/25 1650   BP: (!) 157/77   125/81   Pulse: 72 77  76   Resp: 18  16 18   Temp: 98 °F (36.7 °C)   98.4 °F (36.9 °C)   TempSrc: Oral   Oral   SpO2: (!) 94%   97%   Weight:       Height:         Well developed, well nourished male in no acute distress  Alert and oriented x 3  HEENT- Normal exam  Lungs- Clear to auscultation  Heart- Regular rate and rhythm  Abdomen- Soft nontender  Extremity exam- examination left knee there is a moderate effusion noted no evidence of infection no redness range of motion slightly decreased he does have some mild crepitation   Mild tenderness no warmth    RADIOGRAPHS:  AP lateral x-ray left knee shows severe degenerative changes as well as calcification of the menisci consistent with chondrocalcinosis  Comments: I have personally reviewed the imaging and I agree with the above radiologist's report.    ASSESSMENT/PLAN:     IMPRESSION:  Gout/pseudogout left knee    PLAN:  I explained the nature of the problem to the patient   I have recommended a cortisone injection   After pause for time-out identified the left knee injected with Kenalog 40 mg 2 cc xylocaine sterile technique   Tolerated the procedure well without complication   I have also recommended ice to the knee anti-inflammatory medication by mouth and physical therapy as well  Follow up 2 weeks       - We talked at length about the anatomy and pathophysiology of @DX@        Disclaimer: This note has been generated using voice-recognition software. There may be typographical errors that have been missed during proof-reading.        [1] "   Current Facility-Administered Medications   Medication Dose Route Frequency Provider Last Rate Last Admin    acetaminophen tablet 650 mg  650 mg Oral Q4H PRN Christophe Ortiz MD        aspirin EC tablet 81 mg  81 mg Oral Daily Christophe Ortiz MD   81 mg at 03/21/25 1005    bisacodyL suppository 10 mg  10 mg Rectal Daily PRN Christophe Ortiz MD        bumetanide tablet 1 mg  1 mg Oral Daily Nicole Chapman MD   1 mg at 03/21/25 1004    clopidogreL tablet 75 mg  75 mg Oral Daily Christophe Ortiz MD   75 mg at 03/21/25 1005    colchicine capsule/tablet 0.6 mg  0.6 mg Oral BID Nicole Chapman MD        cyanocobalamin tablet 1,000 mcg  1,000 mcg Oral Daily Christophe Ortiz MD   1,000 mcg at 03/21/25 1004    dextrose 50% injection 12.5 g  12.5 g Intravenous PRN Christophe Ortiz MD        dextrose 50% injection 25 g  25 g Intravenous PRN Christophe Ortiz MD        enoxaparin injection 40 mg  40 mg Subcutaneous Daily Christophe Ortiz MD   40 mg at 03/20/25 1632    gabapentin capsule 600 mg  600 mg Oral BID Christophe Ortiz MD   600 mg at 03/21/25 1004    glucagon (human recombinant) injection 1 mg  1 mg Intramuscular PRN Christophe Ortiz MD        glucose chewable tablet 16 g  16 g Oral PRN Christophe Ortiz MD        glucose chewable tablet 24 g  24 g Oral PRN Christophe Ortiz MD        hydrALAZINE tablet 25 mg  25 mg Oral Q8H Nicole Chapman MD   25 mg at 03/21/25 1459    HYDROcodone-acetaminophen 5-325 mg per tablet 1 tablet  1 tablet Oral Q4H PRN Christophe Ortiz MD   1 tablet at 03/21/25 1501    insulin glargine U-100 (Lantus) pen 20 Units  20 Units Subcutaneous QHS Christophe Ortiz MD   20 Units at 03/20/25 2158    losartan tablet 50 mg  50 mg Oral Daily Nicole Chapman MD   50 mg at 03/21/25 1005    magnesium oxide tablet 400 mg  400 mg Oral Daily Christophe Ortiz MD   400 mg at 03/21/25 1004    melatonin tablet 6 mg  6 mg Oral Nightly PRN Christophe Ortiz MD   6 mg at 03/20/25 8871     naloxone 0.4 mg/mL injection 0.02 mg  0.02 mg Intravenous PRN Christophe Ortiz MD        ondansetron injection 4 mg  4 mg Intravenous Q8H PRN Christophe Ortiz MD        polyethylene glycol packet 17 g  17 g Oral BID PRN Christophe Ortiz MD        prochlorperazine injection Soln 5 mg  5 mg Intravenous Q6H PRN Christophe Ortiz MD        senna-docusate 8.6-50 mg per tablet 1 tablet  1 tablet Oral BID PRN Christophe Ortiz MD        simethicone chewable tablet 80 mg  1 tablet Oral QID PRN Christophe Ortiz MD        sodium chloride 0.9% flush 10 mL  10 mL Intravenous Q12H PRN Christophe Ortiz MD

## 2025-03-21 NOTE — PT/OT/SLP PROGRESS
Physical Therapy      Patient Name:  Clifton Wilson   MRN:  [id number]    Patient not seen today secondary to Pain. Pt refuses OOB mobility at this time due pain/weakness; reporting he needs medication prior; per nursing in room pt had received pain medications less than an hour ago; pt educated this would be the perfect time to attempt mobility since he had received medication prior; pt continues to decline. Will follow-up as able.    3/21/24- 10:55

## 2025-03-21 NOTE — PLAN OF CARE
0815  Referral sent to [facility] via [employer]. Awaiting response.     1040  Patient resting quietly in bed with spouse, Marie Gore ([phone]), asleep at the bedside when CM rounded. CM informed the pt of PT/OT recs for IRF placement following discharge. Pt verbalized understanding & agreement to be admitted to [facility]. CM informed pt that Latrice koehler/[facility] will meet with him today.      03/21/25 1115   Rounds   Attendance Provider;Nurse    Discharge Plan A Rehab   Why the patient remains in the hospital Requires continued medical care   Transition of Care Barriers Transportation     1115  CM was informed by Dr Thomas that the pt is not medically stable to discharge today. CM informed MD of PT/OT recs for IRF.     1300  CM was informed by Latrice that she met with the pt, that the pt has been accepted, & that she will submit for ins auth when pt can tolerate therapy.       Will continue to follow.

## 2025-03-21 NOTE — PT/OT/SLP PROGRESS
Occupational Therapy      Patient Name:  Clifton Wilson   MRN:  [id number]    Patient not seen today secondary to Other (Comment) (Pt reports increased pain this date, declining therapy at this time). Pt states he attempted to sit up this morning and limited by pain, nsg reports pt was pre-medicated ~1 hour prior. Will follow-up as able.    3/21/2025

## 2025-03-21 NOTE — ASSESSMENT & PLAN NOTE
Patient's most recent potassium results are listed below.   Recent Labs     03/19/25  0536 03/20/25  0513 03/21/25  0457   K 3.0* 3.4* 3.7     Plan  - Replete potassium per protocol  - Monitor potassium Daily  - Patient's hypokalemia is stable  -     Tx:    Replete potassium   Replete magnesium

## 2025-03-21 NOTE — ASSESSMENT & PLAN NOTE
Patient has Abnormal Magnesium: hypomagnesemia. Will continue to monitor electrolytes closely. Will replace the affected electrolytes and repeat labs to be done after interventions completed. The patient's magnesium results have been reviewed and are listed below.  Recent Labs   Lab 03/21/25  0854   MG 1.5*

## 2025-03-21 NOTE — ASSESSMENT & PLAN NOTE
Recurrent episodes of hypotension, including a witnessed syncopal event with seizure-like activity (likely convulsive syncope).    DDx:    - Medication-related hypotension (likely): Patient had restarted Imdur (on his own accord) 1 week prior to presentation. Recent decrease in Coreg and losartan, as well as transition from Lasix to Bumex, may have contributed.  - Orthostatic hypotension (possible): Symptoms triggered by postural changes  - Neurogenic syncope (unlikely)    Dx:    - Continuous telemetry and cardiac monitoring    - Echocardiogram  - Orthostatics  - Troponins  - Consider MRI brain (given ICD, patient would need transfer to [facility] for this study)  - Consider repeat ICD interrogation (was unremarkable last month after prior episode)    Tx:    - Maintain BP support with cautious IV fluids given history of heart failure    - Consider midodrine if persistent symptomatic orthostasis    - Neurology and Cardiology consultation

## 2025-03-21 NOTE — ASSESSMENT & PLAN NOTE
Dx:  - Check uric acid, x-ray    Tx:    - Consider colchicine or NSAIDs for acute gout flare-->increasing dose to 0.6mg BID  - may need tap, Ortho is consulted to r/o septic arthritis
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
## 2025-03-21 NOTE — SUBJECTIVE & OBJECTIVE
Interval History: Feels that his weakness is getting worse both in his arms and his legs. His LLE from hip to his knee is pain with knee swelling, thinks it's his gout    CT cervical spine with mod-severe spinal canal stenosis, Neurosurgery consult placed.    Review of Systems   Constitutional:  Negative for chills, fatigue and fever.   Respiratory:  Negative for cough, choking, chest tightness and shortness of breath.    Cardiovascular:  Positive for leg swelling. Negative for chest pain and palpitations.   Gastrointestinal:  Negative for abdominal distention, abdominal pain, anal bleeding, diarrhea, nausea and vomiting.   Genitourinary:  Negative for difficulty urinating and dysuria.   Musculoskeletal:  Positive for arthralgias.   Neurological:  Positive for syncope. Negative for dizziness, speech difficulty, weakness and headaches.   Psychiatric/Behavioral:  Positive for confusion. Negative for agitation.      Objective:     Vital Signs (Most Recent):  Temp: 98 °F (36.7 °C) (03/21/25 1133)  Pulse: 77 (03/21/25 1216)  Resp: 18 (03/21/25 1133)  BP: (!) 157/77 (03/21/25 1133)  SpO2: (!) 94 % (03/21/25 1133) Vital Signs (24h Range):  Temp:  [98 °F (36.7 °C)-98.8 °F (37.1 °C)] 98 °F (36.7 °C)  Pulse:  [63-90] 77  Resp:  [18] 18  SpO2:  [93 %-96 %] 94 %  BP: (140-183)/(77-94) 157/77     Weight: 100.2 kg (221 lb)  Body mass index is 30.82 kg/m².    Intake/Output Summary (Last 24 hours) at 3/21/2025 1428  Last data filed at 3/21/2025 1154  Gross per 24 hour   Intake --   Output 1200 ml   Net -1200 ml         Physical Exam  Constitutional:       General: He is not in acute distress.     Appearance: Normal appearance. He is obese. He is not ill-appearing or toxic-appearing.   Eyes:      Extraocular Movements: Extraocular movements intact.      Conjunctiva/sclera: Conjunctivae normal.   Cardiovascular:      Rate and Rhythm: Normal rate and regular rhythm.   Pulmonary:      Effort: Pulmonary effort is normal. No respiratory  distress.      Breath sounds: Normal breath sounds. No wheezing or rales.   Abdominal:      General: There is no distension.      Tenderness: There is no abdominal tenderness. There is no guarding.   Musculoskeletal:         General: Normal range of motion.      Right lower leg: Edema present.      Left lower leg: Edema present.   Skin:     General: Skin is warm and dry.      Coloration: Skin is not jaundiced or pale.   Neurological:      General: No focal deficit present.      Mental Status: He is alert and oriented to person, place, and time.      Cranial Nerves: No cranial nerve deficit.   Psychiatric:         Mood and Affect: Mood normal.         Behavior: Behavior normal.               Significant Labs: All pertinent labs within the past 24 hours have been reviewed.  CBC:   Recent Labs   Lab 03/20/25 0513 03/21/25 0457   WBC 7.22 6.92   HGB 11.9* 11.7*   HCT 35.8* 35.5*    286     CMP:   Recent Labs   Lab 03/20/25 0513 03/21/25 0457 03/21/25  0854    140  --    K 3.4* 3.7  --     107  --    CO2 21* 24  --    * 138*  --    BUN 14 13  --    CREATININE 0.9 0.9  --    CALCIUM 8.9 8.9  --    PROT  --   --  7.7   ALBUMIN  --   --  3.1*   BILITOT  --   --  0.6   ALKPHOS  --   --  119   AST  --   --  40   ALT  --   --  78*   ANIONGAP 12 9  --        Significant Imaging: I have reviewed all pertinent imaging results/findings within the past 24 hours.

## 2025-03-22 PROBLEM — M43.02 CERVICAL SPONDYLOLYSIS: Status: ACTIVE | Noted: 2025-03-22

## 2025-03-22 LAB
ABSOLUTE EOSINOPHIL (OHS): 0 K/UL
ABSOLUTE MONOCYTE (OHS): 0.58 K/UL (ref 0.3–1)
ABSOLUTE NEUTROPHIL COUNT (OHS): 6.09 K/UL (ref 1.8–7.7)
ANION GAP (OHS): 12 MMOL/L (ref 8–16)
ANTI SM ANTIBODY: 0.08 RATIO (ref 0–0.99)
ANTI SM/RNP ANTIBODY: 0.11 RATIO (ref 0–0.99)
ANTI SM/RNP ANTIBODY: 0.11 RATIO (ref 0–0.99)
ANTI-SM INTERPRETATION: NEGATIVE
ANTI-SM/RNP INTERPRETATION: NEGATIVE
ANTI-SM/RNP INTERPRETATION: NEGATIVE
ANTI-SSA ANTIBODY: 0.08 RATIO (ref 0–0.99)
ANTI-SSA INTERPRETATION: NEGATIVE
ANTI-SSB ANTIBODY: 0.08 RATIO (ref 0–0.99)
ANTI-SSB INTERPRETATION: NEGATIVE
BASOPHILS # BLD AUTO: 0.01 K/UL
BASOPHILS NFR BLD AUTO: 0.1 %
BUN SERPL-MCNC: 25 MG/DL (ref 8–23)
CALCIUM SERPL-MCNC: 9.2 MG/DL (ref 8.7–10.5)
CHLORIDE SERPL-SCNC: 100 MMOL/L (ref 95–110)
CO2 SERPL-SCNC: 22 MMOL/L (ref 23–29)
CREAT SERPL-MCNC: 1.2 MG/DL (ref 0.5–1.4)
DSDNA AB SER-ACNC: NORMAL [IU]/ML
ERYTHROCYTE [DISTWIDTH] IN BLOOD BY AUTOMATED COUNT: 12.3 % (ref 11.5–14.5)
GFR SERPLBLD CREATININE-BSD FMLA CKD-EPI: >60 ML/MIN/1.73/M2
GLUCOSE SERPL-MCNC: 436 MG/DL (ref 70–110)
HCT VFR BLD AUTO: 35.2 % (ref 40–54)
HGB BLD-MCNC: 11.7 GM/DL (ref 14–18)
IMM GRANULOCYTES # BLD AUTO: 0.03 K/UL (ref 0–0.04)
IMM GRANULOCYTES NFR BLD AUTO: 0.4 % (ref 0–0.5)
LYMPHOCYTES # BLD AUTO: 0.59 K/UL (ref 1–4.8)
MCH RBC QN AUTO: 30.8 PG (ref 27–50)
MCHC RBC AUTO-ENTMCNC: 33.2 G/DL (ref 32–36)
MCV RBC AUTO: 93 FL (ref 82–98)
NUCLEATED RBC (/100WBC) (OHS): 0 /100 WBC
PLATELET # BLD AUTO: 299 K/UL (ref 150–450)
PMV BLD AUTO: 10.4 FL (ref 9.2–12.9)
POCT GLUCOSE: 279 MG/DL (ref 70–110)
POCT GLUCOSE: 286 MG/DL (ref 70–110)
POCT GLUCOSE: 375 MG/DL (ref 70–110)
POCT GLUCOSE: 416 MG/DL (ref 70–110)
POTASSIUM SERPL-SCNC: 4.2 MMOL/L (ref 3.5–5.1)
RBC # BLD AUTO: 3.8 M/UL (ref 4.6–6.2)
RELATIVE EOSINOPHIL (OHS): 0 %
RELATIVE LYMPHOCYTE (OHS): 8.1 % (ref 18–48)
RELATIVE MONOCYTE (OHS): 7.9 % (ref 4–15)
RELATIVE NEUTROPHIL (OHS): 83.5 % (ref 38–73)
SODIUM SERPL-SCNC: 134 MMOL/L (ref 136–145)
WBC # BLD AUTO: 7.3 K/UL (ref 3.9–12.7)

## 2025-03-22 PROCEDURE — 63600175 PHARM REV CODE 636 W HCPCS: Mod: HCNC | Performed by: INTERNAL MEDICINE

## 2025-03-22 PROCEDURE — 80048 BASIC METABOLIC PNL TOTAL CA: CPT | Mod: HCNC | Performed by: HOSPITALIST

## 2025-03-22 PROCEDURE — 96372 THER/PROPH/DIAG INJ SC/IM: CPT | Performed by: HOSPITALIST

## 2025-03-22 PROCEDURE — 63600175 PHARM REV CODE 636 W HCPCS: Mod: HCNC | Performed by: HOSPITALIST

## 2025-03-22 PROCEDURE — 96372 THER/PROPH/DIAG INJ SC/IM: CPT | Performed by: INTERNAL MEDICINE

## 2025-03-22 PROCEDURE — 25000003 PHARM REV CODE 250: Mod: HCNC | Performed by: HOSPITALIST

## 2025-03-22 PROCEDURE — 63600175 PHARM REV CODE 636 W HCPCS: Mod: HCNC | Performed by: REGISTERED NURSE

## 2025-03-22 PROCEDURE — 96375 TX/PRO/DX INJ NEW DRUG ADDON: CPT

## 2025-03-22 PROCEDURE — G0378 HOSPITAL OBSERVATION PER HR: HCPCS | Mod: HCNC

## 2025-03-22 PROCEDURE — 25000003 PHARM REV CODE 250: Mod: HCNC | Performed by: INTERNAL MEDICINE

## 2025-03-22 PROCEDURE — 85025 COMPLETE CBC W/AUTO DIFF WBC: CPT | Mod: HCNC | Performed by: HOSPITALIST

## 2025-03-22 PROCEDURE — 99214 OFFICE O/P EST MOD 30 MIN: CPT | Mod: HCNC,,, | Performed by: PHYSICIAN ASSISTANT

## 2025-03-22 PROCEDURE — 36415 COLL VENOUS BLD VENIPUNCTURE: CPT | Mod: HCNC | Performed by: HOSPITALIST

## 2025-03-22 RX ORDER — HYDRALAZINE HYDROCHLORIDE 25 MG/1
50 TABLET, FILM COATED ORAL EVERY 8 HOURS
Status: DISCONTINUED | OUTPATIENT
Start: 2025-03-22 | End: 2025-03-25 | Stop reason: HOSPADM

## 2025-03-22 RX ORDER — HYDRALAZINE HYDROCHLORIDE 20 MG/ML
10 INJECTION INTRAMUSCULAR; INTRAVENOUS ONCE
Status: COMPLETED | OUTPATIENT
Start: 2025-03-22 | End: 2025-03-22

## 2025-03-22 RX ORDER — GLUCAGON 1 MG
1 KIT INJECTION
Status: DISCONTINUED | OUTPATIENT
Start: 2025-03-22 | End: 2025-03-25 | Stop reason: HOSPADM

## 2025-03-22 RX ORDER — IBUPROFEN 200 MG
24 TABLET ORAL
Status: DISCONTINUED | OUTPATIENT
Start: 2025-03-22 | End: 2025-03-25 | Stop reason: HOSPADM

## 2025-03-22 RX ORDER — INSULIN ASPART 100 [IU]/ML
0-10 INJECTION, SOLUTION INTRAVENOUS; SUBCUTANEOUS
Status: DISCONTINUED | OUTPATIENT
Start: 2025-03-22 | End: 2025-03-25 | Stop reason: HOSPADM

## 2025-03-22 RX ORDER — IBUPROFEN 200 MG
16 TABLET ORAL
Status: DISCONTINUED | OUTPATIENT
Start: 2025-03-22 | End: 2025-03-25 | Stop reason: HOSPADM

## 2025-03-22 RX ADMIN — GABAPENTIN 600 MG: 300 CAPSULE ORAL at 09:03

## 2025-03-22 RX ADMIN — INSULIN ASPART 10 UNITS: 100 INJECTION, SOLUTION INTRAVENOUS; SUBCUTANEOUS at 01:03

## 2025-03-22 RX ADMIN — GABAPENTIN 600 MG: 300 CAPSULE ORAL at 08:03

## 2025-03-22 RX ADMIN — BUMETANIDE 1 MG: 1 TABLET ORAL at 08:03

## 2025-03-22 RX ADMIN — INSULIN GLARGINE 20 UNITS: 100 INJECTION, SOLUTION SUBCUTANEOUS at 09:03

## 2025-03-22 RX ADMIN — ENOXAPARIN SODIUM 40 MG: 40 INJECTION SUBCUTANEOUS at 05:03

## 2025-03-22 RX ADMIN — HYDRALAZINE HYDROCHLORIDE 50 MG: 25 TABLET ORAL at 02:03

## 2025-03-22 RX ADMIN — MAGNESIUM OXIDE TAB 400 MG (241.3 MG ELEMENTAL MG) 400 MG: 400 (241.3 MG) TAB at 08:03

## 2025-03-22 RX ADMIN — COLCHICINE 0.6 MG: 0.6 TABLET ORAL at 09:03

## 2025-03-22 RX ADMIN — LOSARTAN POTASSIUM 50 MG: 50 TABLET, FILM COATED ORAL at 08:03

## 2025-03-22 RX ADMIN — COLCHICINE 0.6 MG: 0.6 TABLET ORAL at 08:03

## 2025-03-22 RX ADMIN — HYDRALAZINE HYDROCHLORIDE 25 MG: 25 TABLET ORAL at 06:03

## 2025-03-22 RX ADMIN — HYDRALAZINE HYDROCHLORIDE 50 MG: 25 TABLET ORAL at 09:03

## 2025-03-22 RX ADMIN — INSULIN ASPART 10 UNITS: 100 INJECTION, SOLUTION INTRAVENOUS; SUBCUTANEOUS at 05:03

## 2025-03-22 RX ADMIN — CYANOCOBALAMIN TAB 1000 MCG 1000 MCG: 1000 TAB at 08:03

## 2025-03-22 RX ADMIN — MELATONIN TAB 3 MG 6 MG: 3 TAB at 09:03

## 2025-03-22 RX ADMIN — HYDRALAZINE HYDROCHLORIDE 10 MG: 20 INJECTION, SOLUTION INTRAMUSCULAR; INTRAVENOUS at 02:03

## 2025-03-22 RX ADMIN — INSULIN ASPART 3 UNITS: 100 INJECTION, SOLUTION INTRAVENOUS; SUBCUTANEOUS at 09:03

## 2025-03-22 RX ADMIN — CLOPIDOGREL 75 MG: 75 TABLET ORAL at 08:03

## 2025-03-22 RX ADMIN — ASPIRIN 81 MG: 81 TABLET, COATED ORAL at 08:03

## 2025-03-22 NOTE — NURSING
PT'S /87 HR 73.  ON CALL PROVIDER (NG) NOTIFIED BY THIS NURSE (RM).  SINCE SBP LESS THAN 180 THIS NURSE (RM) ASKED IF ONE TIME DOSE OF HYDRALAZINE SHOULD BE GIVEN.  ON CALL PROVIDER (NG) NEVER RESPONDED.  WILL GIVE ONE TIME DOSE OF HYDRALAZINE AS ORDERED.

## 2025-03-22 NOTE — NURSING
PT'S /88 HR 83.  PT SCHEDULED TO GET HYDRALAZINE TONIGHT.  ON CALL PROVIDER (NG) NOTIFIED BY THIS NURSE (RM).  PER ON CALL PROVIDER (NG) UPDATE ME WITH PATIENT'S BLOOD PRESSURE AFTER HYDRALAZINE IS GIVEN.

## 2025-03-22 NOTE — ASSESSMENT & PLAN NOTE
Concern that upper extremity weakness could be due to nerve impingement vs NMJ disorder such as Lambert-Eaton syndrome  CT cervical spine with contrast  There is advanced multilevel degenerative disc disease, noting marked disc height loss with sub endplate marrow changes and uncovertebral spurring. There is advanced multilevel facet joint arthropathy. Broad-based posterior disc osteophyte complexes noted. These findings contribute to moderate right-sided neural foraminal narrowing at C2-3, severe bilateral neural foraminal narrowing at C3-4, C4-5, right-side at C5-6, and left-sided at C6-7. There is moderate/ severe spinal canal stenosis at C3-4 and C4-5. Further evaluation could be performed with MRI, if indicated.

## 2025-03-22 NOTE — ASSESSMENT & PLAN NOTE
Recurrent episodes of hypotension, including a witnessed syncopal event with seizure-like activity (likely convulsive syncope).    DDx:    - Medication-related hypotension (likely): Patient had restarted Imdur (on his own accord) 1 week prior to presentation. Recent decrease in Coreg and losartan, as well as transition from Lasix to Bumex, may have contributed.  - Orthostatic hypotension (possible): Symptoms triggered by postural changes  - Neurogenic syncope (unlikely)    Dx:    - Continuous telemetry and cardiac monitoring    - Echocardiogram  - Orthostatics  - Troponins  - Consider MRI brain (given ICD, patient would need transfer to [facility] for this study)  - Consider repeat ICD interrogation (was unremarkable last month after prior episode)    Tx:    - Maintain BP support with cautious IV fluids given history of heart failure    - Consider midodrine if persistent symptomatic orthostasis    - Neurology and Cardiology consultation

## 2025-03-22 NOTE — CONSULTS
[facility], [place]  History & Physical  Neurosurgery    SUBJECTIVE:     Chief Complaint/Reason for Admission: Hypotension and near syncope    History of Present Illness:   Patient is a 74 y.o. who states for the last 6-8 weeks he has had progressively worsening BL UE and LE weakness and also with pain in the shoulders and arms and bilateral thighs and hips and left knee.  States he had difficulty raising from a sitting to standing position but once he was up he could walk but if he stood still would need to sit down because his legs would get weak and he would feel like they would buckle.  He denies any recent falls.  Also with hand weakness and dropping things.  In the last 2 weeks these symptoms have gotten much worse and he hadn't been able to get up since three days ago.    He got a steroid injection in his left knee and states all of his previous pain in all joints is gone and now he is able to stand and walk to the bathroom and his strength in his arms and legs has improved.    Denies history of spine surgery or ESIs.      Patient Active Problem List    Diagnosis Date Noted    Cervical spondylolysis 03/22/2025    Arthritis of knee 03/21/2025    Muscle weakness 03/20/2025    Transient confusion 03/20/2025    Hypomagnesemia 03/19/2025    Hypotension 03/18/2025    Gout 03/18/2025    Confusion 03/18/2025    Syncope and collapse 01/16/2025    NSTEMI (non-ST elevated myocardial infarction) 11/22/2024    Hypertension 11/22/2024    Olecranon bursitis, right elbow 02/01/2024    Sensorineural hearing loss, bilateral 02/01/2024    PAD (peripheral artery disease) 07/11/2023    Chronic pain of left knee 09/13/2021    Difficulty walking 09/13/2021    Thrombocytopenia 02/19/2020    Insulin dependent type 2 diabetes mellitus 12/20/2018    Dyslipidemia associated with type 2 diabetes mellitus 09/24/2018    Hypertension associated with diabetes 09/24/2018    Vitreous detachment 06/11/2018    Erectile dysfunction 04/25/2018     ICD (implantable cardioverter-defibrillator), single, in situ 09/19/2017    Ischemic cardiomyopathy 06/06/2017    PCO (posterior capsular opacification), right 05/08/2017    Post-operative state 05/08/2017    DM type 2 without retinopathy 04/24/2017    Essential hypertension 04/24/2017    Pseudophakia 04/24/2017    Diabetes mellitus type 2 without retinopathy 03/11/2016    Status post intraocular lens implant 03/11/2016    Bilateral posterior capsular opacification 03/11/2016    Type 2 diabetes mellitus with diabetic neuropathy     Coronary artery disease of native artery of native heart with stable angina pectoris 02/22/2016    Dyslipidemia 02/11/2016    Hypokalemia 12/09/2015    Coronary artery disease of native artery with stable angina pectoris 12/08/2015    Ventricular tachycardia, nonsustained post-MI 12/08/2015    Chronic combined systolic and diastolic congestive heart failure 12/07/2015    Mitral regurgitation 12/05/2015    Type 2 diabetes mellitus with neurologic complication 12/04/2015     Past Medical History:   Diagnosis Date    Anticoagulant long-term use     Cardiomyopathy     CHF (congestive heart failure)     Coronary artery disease     Diabetes mellitus     Gout     Heart attack     Hypertension     Pneumonia       Past Surgical History:   Procedure Laterality Date    APPENDECTOMY      CARDIAC CATHETERIZATION      7 stents    CARDIAC DEFIBRILLATOR PLACEMENT      CATARACT EXTRACTION Bilateral 2011    COLONOSCOPY N/A 8/10/2018    Procedure: COLONOSCOPY golytely;  Surgeon: Valentine Burger MD;  Location: [facility] ENDO;  Service: Endoscopy;  Laterality: N/A;    CORONARY ANGIOGRAPHY N/A 11/11/2024    Procedure: ANGIOGRAM, CORONARY ARTERY;  Surgeon: Luis Felipe Wright MD;  Location: [facility] CATH LAB/EP;  Service: Cardiology;  Laterality: N/A;    EYE SURGERY      INSTANTANEOUS WAVE-FREE RATIO (IFR) N/A 11/11/2024    Procedure: Instantaneous Wave-Free Ratio (IFR);  Surgeon: Luis Felipe Wright MD;  Location:  [facility] CATH LAB/EP;  Service: Cardiology;  Laterality: N/A;    LEFT HEART CATHETERIZATION Left 11/11/2024    Procedure: Left heart cath;  Surgeon: Luis Felipe Wright MD;  Location: [facility] CATH LAB/EP;  Service: Cardiology;  Laterality: Left;    REVISION OF IMPLANTABLE CARDIOVERTER-DEFIBRILLATOR (ICD) ELECTRODE LEAD PLACEMENT N/A 11/11/2019    Procedure: REVISION, INSERTION, ELECTRODE LEAD, ICD;  Surgeon: Shukri Walsh MD;  Location: [facility] EP LAB;  Service: Cardiology;  Laterality: N/A;  Lead malfxn, RV lead ICD, SJM, anes, DM, 3 PREP      Prescriptions Prior to Admission[1]  Review of patient's allergies indicates:  No Known Allergies   Social History     Tobacco Use    Smoking status: Former    Smokeless tobacco: Never   Substance Use Topics    Alcohol use: Yes     Comment: socially          OBJECTIVE:     Vital signs in last 24 hours:  Temp:  [97.6 °F (36.4 °C)-98.4 °F (36.9 °C)] 98 °F (36.7 °C)  Pulse:  [69-91] 83  Resp:  [18] 18  SpO2:  [92 %-97 %] 97 %  BP: (116-184)/(57-89) 169/76    General: well developed, well nourished, no distress  Mental Status: Awake, Alert, Oriented X3.Thought content appropriate  GCS: Motor: 6/Verbal: 5/Eyes: 4 GCS Total: 15    Motor Strength:  Strength  Deltoids Triceps Biceps Wrist Extension Wrist Flexion Hand    Upper: R 5/5 5/5 5/5 4/5 4/5 4/5    L 5/5 4/5 5/5 4/5 4/5 4/5     HF KF KE DF PF EHL   Lower: R 5/5 5/5 5/5 5/5 5/5 5/5    L 5/5 5/5 5/5 5/5 5/5 5/5     3+ BL UE reflexes  2+ BL LE reflexes    Gurrola: absent B/L  Clonus: absent B/L    Imaging results:  Narrative & Impression  EXAMINATION:  CT CERVICAL SPINE W WO CONTRAST     CLINICAL HISTORY:  Motor neuron disease suspected;     TECHNIQUE:  Routine multiplanar CT cervical spine protocol performed with the administration of 75 cc Omnipaque 350 IV contrast.     COMPARISON:  None     FINDINGS:  No fractures, marrow replacement process, or evidence of osteomyelitis.  No areas of abnormal enhancement.  The cervical cord is  grossly unremarkable, noting limited assessment of soft tissues with CT.  There is moderate scattered calcific atherosclerosis.  No paraspinal masses or fluid collections.  No lymphadenopathy.  The lung apices are clear.     Degenerative spondylosis:     There is advanced multilevel degenerative disc disease, noting marked disc height loss with sub endplate marrow changes and uncovertebral spurring.  There is advanced multilevel facet joint arthropathy.  Broad-based posterior disc osteophyte complexes noted.  These findings contribute to moderate right-sided neural foraminal narrowing at C2-3, severe bilateral neural foraminal narrowing at C3-4, C4-5, right-side at C5-6, and left-sided at C6-7.  There is moderate/ severe spinal canal stenosis at C3-4 and C4-5.  Further evaluation could be performed with MRI, if indicated.     Impression:     No acute findings.     Advanced multilevel cervical spondylosis, as above.        Electronically signed by:Chapin Resendiz MD  Date:                                            03/21/2025  Time:                                           14:11    ASSESSMENT/PLAN:       Probable cervical myelopathy from cervical stenosis C3-4, C4-5    -PT/OT  -Stop ASA and Plavix  -Consult IR for C/T/L spine myelogram since unable to get MRI    All of the above discussed and reviewed with Dr. Clifford.    Leonie Crump, [facility], PA-C  Neurosurgery  [facility]  03/22/2025                         [1]   Medications Prior to Admission   Medication Sig Dispense Refill Last Dose/Taking    aspirin (ECOTRIN) 81 MG EC tablet Take 81 mg by mouth once daily.   3/18/2025 Morning    atorvastatin (LIPITOR) 40 MG tablet Take 1 tablet (40 mg total) by mouth once daily. 90 tablet 3 3/18/2025    bumetanide (BUMEX) 1 MG tablet Take 1 tablet (1 mg total) by mouth once daily. (Patient taking differently: Take 1 mg by mouth once daily. Taking in am) 30 tablet 11 3/18/2025    clopidogreL (PLAVIX) 75 mg tablet Take 1  tablet (75 mg total) by mouth once daily. 90 tablet 3 3/18/2025    colchicine (MITIGARE) 0.6 mg Cap Take 1 capsule (0.6 mg total) by mouth once daily. 90 capsule 3 3/18/2025    cyanocobalamin (VITAMIN B-12) 1000 MCG tablet Take 1,000 mcg by mouth once daily.   3/18/2025    empagliflozin (JARDIANCE) 10 mg tablet Take 1 tablet (10 mg total) by mouth once daily. 30 tablet 11 Past Week    furosemide (LASIX) 20 MG tablet Take 20 mg by mouth once daily. In afternoon   Past Week    gabapentin (NEURONTIN) 300 MG capsule Take 2 capsules (600 mg total) by mouth 2 (two) times daily. 360 capsule 1 3/18/2025    insulin aspart U-100 (NOVOLOG FLEXPEN U-100 INSULIN) 100 unit/mL (3 mL) InPn pen Inject 18 Units into the skin 3 (three) times daily with meals. (Patient taking differently: Inject 15 Units into the skin 3 (three) times daily with meals.) 45 mL 5 Taking Differently    insulin glargine U-100, Lantus, (LANTUS SOLOSTAR U-100 INSULIN) 100 unit/mL (3 mL) InPn pen Inject 40 Units into the skin every evening. (Patient taking differently: Inject 35 Units into the skin every evening.) 30 mL 1 Taking Differently    losartan (COZAAR) 50 MG tablet Take 1 tablet (50 mg total) by mouth once daily. 90 tablet 3 3/18/2025    metFORMIN (GLUCOPHAGE) 1000 MG tablet Take 1 tablet (1,000 mg total) by mouth 2 (two) times daily with meals. 180 tablet 4 3/18/2025    multivit-minerals/FA/lycopene (ONE-A-DAY MEN'S 50 PLUS ORAL) Take 1 tablet by mouth once daily.   3/18/2025    zolpidem (AMBIEN) 5 MG Tab TAKE 1 TABLET BY MOUTH EVERY NIGHT AS NEEDED (Patient taking differently: Take 5 mg by mouth nightly as needed.) 90 tablet 3 3/17/2025 Evening    alcohol swabs (BD ALCOHOL SWABS) PadM USE FOUR TIMES DAILY 400 each 3     blood glucose control high,low (ACCU-CHEK CATHRYN CONTROL SOLN) Soln Use as directed to check blood sugar 1 each 1     blood sugar diagnostic Strp test blood sugar as directed four times daily 100 each 3     blood-glucose meter (TRUE  "METRIX GLUCOSE METER) Misc use as directed 1 each 0     lancets 30 gauge Misc usea s directed to check blood glucose four times daily 100 each 3     nitroGLYCERIN (NITROSTAT) 0.4 MG SL tablet Place 1 tablet (0.4 mg total) under the tongue every 5 (five) minutes as needed for Chest pain. 25 tablet 3     pen needle, diabetic (BD ULTRA-FINE SINA PEN NEEDLE) 32 gauge x 5/32" Ndle Use four times daily for insulin administration 400 each 3      "

## 2025-03-22 NOTE — SUBJECTIVE & OBJECTIVE
Interval History: In good spirits today because his knee feels better, he believes that it's due to the injection and receiving Colchrys     Review of Systems   Constitutional:  Negative for chills, fatigue and fever.   Respiratory:  Negative for cough, choking, chest tightness and shortness of breath.    Cardiovascular:  Positive for leg swelling. Negative for chest pain and palpitations.   Gastrointestinal:  Negative for abdominal distention, abdominal pain, anal bleeding, diarrhea, nausea and vomiting.   Genitourinary:  Negative for difficulty urinating and dysuria.   Musculoskeletal:  Positive for arthralgias.   Neurological:  Negative for dizziness, syncope, speech difficulty, weakness and headaches.   Psychiatric/Behavioral:  Negative for agitation and confusion.      Objective:     Vital Signs (Most Recent):  Temp: 98 °F (36.7 °C) (03/22/25 1135)  Pulse: 91 (03/22/25 1135)  Resp: 18 (03/22/25 1135)  BP: (!) 169/76 (03/22/25 1135)  SpO2: 97 % (03/22/25 1135) Vital Signs (24h Range):  Temp:  [97.6 °F (36.4 °C)-98.4 °F (36.9 °C)] 98 °F (36.7 °C)  Pulse:  [69-91] 91  Resp:  [16-18] 18  SpO2:  [92 %-97 %] 97 %  BP: (116-184)/(57-89) 169/76     Weight: 100.2 kg (221 lb)  Body mass index is 30.82 kg/m².    Intake/Output Summary (Last 24 hours) at 3/22/2025 1356  Last data filed at 3/22/2025 0005  Gross per 24 hour   Intake --   Output 1100 ml   Net -1100 ml         Physical Exam  Constitutional:       General: He is not in acute distress.     Appearance: Normal appearance. He is obese. He is not ill-appearing or toxic-appearing.   Eyes:      Extraocular Movements: Extraocular movements intact.      Conjunctiva/sclera: Conjunctivae normal.   Cardiovascular:      Rate and Rhythm: Normal rate and regular rhythm.   Pulmonary:      Effort: Pulmonary effort is normal. No respiratory distress.      Breath sounds: Normal breath sounds. No wheezing or rales.   Abdominal:      General: There is no distension.      Tenderness:  There is no abdominal tenderness. There is no guarding.   Musculoskeletal:         General: Normal range of motion.      Right lower leg: Edema present.      Left lower leg: Edema present.      Comments: L knee swelling is improved   Skin:     General: Skin is warm and dry.      Coloration: Skin is not jaundiced or pale.   Neurological:      General: No focal deficit present.      Mental Status: He is alert and oriented to person, place, and time.      Cranial Nerves: No cranial nerve deficit.   Psychiatric:         Mood and Affect: Mood normal.         Behavior: Behavior normal.               Significant Labs: All pertinent labs within the past 24 hours have been reviewed.  CBC:   Recent Labs   Lab 03/21/25 0457   WBC 6.92   HGB 11.7*   HCT 35.5*        CMP:   Recent Labs   Lab 03/21/25  0457 03/21/25  0854     --    K 3.7  --      --    CO2 24  --    *  --    BUN 13  --    CREATININE 0.9  --    CALCIUM 8.9  --    PROT  --  7.7   ALBUMIN  --  3.1*   BILITOT  --  0.6   ALKPHOS  --  119   AST  --  40   ALT  --  78*   ANIONGAP 9  --        Significant Imaging: I have reviewed all pertinent imaging results/findings within the past 24 hours.

## 2025-03-22 NOTE — NURSING
PT'S /84 HR 71 AFTER HYDRALAZINE WAS GIVEN.  ON CALL PROVIDER (NG) NOTIFIED BY THIS NURSE (RM).  ON CALL PROVIDER (NG) TO ENTER NEW ORDERS.

## 2025-03-22 NOTE — ASSESSMENT & PLAN NOTE
"Patient has Combined Systolic and Diastolic heart failure that is Acute on chronic. On presentation their CHF was decompensated. Evidence of decompensated CHF on presentation includes: edema. The etiology of their decompensation is likely increased fluid intake. Most recent BNP and echo results are listed below.  No results for input(s): "BNP" in the last 72 hours.    Latest ECHO  Results for orders placed during the hospital encounter of 01/16/25    Echo    Interpretation Summary    Left Ventricle: The left ventricle is normal in size. There is concentric remodeling. Mild global hypokinesis and regional wall motion abnormalities present. There is mildly reduced systolic function with a visually estimated ejection fraction of 45 - 50%.    Right Ventricle: Normal right ventricular cavity size. Wall thickness is normal. Systolic function is normal. Pacemaker lead present in the ventricle.    IVC/SVC: Low central venous pressure.    Limited echo for EF    Current Heart Failure Medications  losartan tablet 50 mg, Daily, Oral  bumetanide tablet 1 mg, Daily, Oral  hydrALAZINE tablet 50 mg, Every 8 hours, Oral    Plan  - Monitor strict I&Os and daily weights.    - Place on telemetry  - Low sodium diet  - Cardiology has been consulted  - The patient's volume status is at their baseline      Dx:    - Echocardiogram     Tx:    - Optimize GDMT while avoiding further hypotension    - Monitor renal function and electrolytes closely    - cardiology consultation    "

## 2025-03-22 NOTE — ASSESSMENT & PLAN NOTE
Patient's most recent potassium results are listed below.   Recent Labs     03/20/25  0513 03/21/25  0457   K 3.4* 3.7     Plan  - Replete potassium per protocol  - Monitor potassium Daily  - Patient's hypokalemia is stable  -     Tx:    Replete potassium   Replete magnesium

## 2025-03-22 NOTE — ASSESSMENT & PLAN NOTE
Dx:  - Check uric acid, x-ray    Tx:    - Consider colchicine or NSAIDs for acute gout flare-->increasing dose to 0.6mg BID  - may need tap, Ortho is consulted -->gout/pseudogout given
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
## 2025-03-23 LAB
ABSOLUTE EOSINOPHIL (OHS): 0 K/UL
ABSOLUTE MONOCYTE (OHS): 0.74 K/UL (ref 0.3–1)
ABSOLUTE NEUTROPHIL COUNT (OHS): 6.94 K/UL (ref 1.8–7.7)
ALBUMIN SERPL ELPH-MCNC: 3.15 G/DL (ref 3.35–5.55)
ALPHA1 GLOB SERPL ELPH-MCNC: 0.39 G/DL (ref 0.17–0.41)
ALPHA2 GLOB SERPL ELPH-MCNC: 1.03 G/DL (ref 0.43–0.99)
ANION GAP (OHS): 9 MMOL/L (ref 8–16)
B-GLOBULIN SERPL ELPH-MCNC: 1 G/DL (ref 0.5–1.1)
BASOPHILS # BLD AUTO: 0.01 K/UL
BASOPHILS NFR BLD AUTO: 0.1 %
BUN SERPL-MCNC: 28 MG/DL (ref 8–23)
CALCIUM SERPL-MCNC: 9.2 MG/DL (ref 8.7–10.5)
CHLORIDE SERPL-SCNC: 104 MMOL/L (ref 95–110)
CO2 SERPL-SCNC: 24 MMOL/L (ref 23–29)
CREAT SERPL-MCNC: 1 MG/DL (ref 0.5–1.4)
ERYTHROCYTE [DISTWIDTH] IN BLOOD BY AUTOMATED COUNT: 12.3 % (ref 11.5–14.5)
GAMMA GLOB SERPL ELPH-MCNC: 1.03 G/DL (ref 0.67–1.58)
GFR SERPLBLD CREATININE-BSD FMLA CKD-EPI: >60 ML/MIN/1.73/M2
GLUCOSE SERPL-MCNC: 229 MG/DL (ref 70–110)
HCT VFR BLD AUTO: 34.3 % (ref 40–54)
HGB BLD-MCNC: 11.4 GM/DL (ref 14–18)
IMM GRANULOCYTES # BLD AUTO: 0.03 K/UL (ref 0–0.04)
IMM GRANULOCYTES NFR BLD AUTO: 0.4 % (ref 0–0.5)
INTERPRETATION SERPL IFE-IMP: NORMAL
LYMPHOCYTES # BLD AUTO: 0.81 K/UL (ref 1–4.8)
MCH RBC QN AUTO: 30.6 PG (ref 27–50)
MCHC RBC AUTO-ENTMCNC: 33.2 G/DL (ref 32–36)
MCV RBC AUTO: 92 FL (ref 82–98)
NUCLEATED RBC (/100WBC) (OHS): 0 /100 WBC
PLATELET # BLD AUTO: 324 K/UL (ref 150–450)
PMV BLD AUTO: 10.4 FL (ref 9.2–12.9)
POCT GLUCOSE: 223 MG/DL (ref 70–110)
POCT GLUCOSE: 267 MG/DL (ref 70–110)
POCT GLUCOSE: 275 MG/DL (ref 70–110)
POCT GLUCOSE: 312 MG/DL (ref 70–110)
POTASSIUM SERPL-SCNC: 4 MMOL/L (ref 3.5–5.1)
PROT SERPL-MCNC: 6.6 G/DL (ref 6–8.4)
RBC # BLD AUTO: 3.73 M/UL (ref 4.6–6.2)
RELATIVE EOSINOPHIL (OHS): 0 %
RELATIVE LYMPHOCYTE (OHS): 9.5 % (ref 18–48)
RELATIVE MONOCYTE (OHS): 8.7 % (ref 4–15)
RELATIVE NEUTROPHIL (OHS): 81.3 % (ref 38–73)
SODIUM SERPL-SCNC: 137 MMOL/L (ref 136–145)
WBC # BLD AUTO: 8.53 K/UL (ref 3.9–12.7)

## 2025-03-23 PROCEDURE — 63600175 PHARM REV CODE 636 W HCPCS: Mod: HCNC | Performed by: HOSPITALIST

## 2025-03-23 PROCEDURE — 80048 BASIC METABOLIC PNL TOTAL CA: CPT | Mod: HCNC | Performed by: HOSPITALIST

## 2025-03-23 PROCEDURE — 96372 THER/PROPH/DIAG INJ SC/IM: CPT | Performed by: HOSPITALIST

## 2025-03-23 PROCEDURE — 99214 OFFICE O/P EST MOD 30 MIN: CPT | Mod: HCNC,,, | Performed by: PHYSICIAN ASSISTANT

## 2025-03-23 PROCEDURE — 36415 COLL VENOUS BLD VENIPUNCTURE: CPT | Mod: HCNC | Performed by: HOSPITALIST

## 2025-03-23 PROCEDURE — 25000003 PHARM REV CODE 250: Mod: HCNC | Performed by: INTERNAL MEDICINE

## 2025-03-23 PROCEDURE — 85025 COMPLETE CBC W/AUTO DIFF WBC: CPT | Mod: HCNC | Performed by: HOSPITALIST

## 2025-03-23 PROCEDURE — 25000003 PHARM REV CODE 250: Mod: HCNC | Performed by: HOSPITALIST

## 2025-03-23 PROCEDURE — G0378 HOSPITAL OBSERVATION PER HR: HCPCS | Mod: HCNC

## 2025-03-23 RX ORDER — INSULIN GLARGINE 100 [IU]/ML
23 INJECTION, SOLUTION SUBCUTANEOUS NIGHTLY
Status: DISCONTINUED | OUTPATIENT
Start: 2025-03-23 | End: 2025-03-25 | Stop reason: HOSPADM

## 2025-03-23 RX ADMIN — INSULIN ASPART 6 UNITS: 100 INJECTION, SOLUTION INTRAVENOUS; SUBCUTANEOUS at 05:03

## 2025-03-23 RX ADMIN — GABAPENTIN 600 MG: 300 CAPSULE ORAL at 08:03

## 2025-03-23 RX ADMIN — INSULIN GLARGINE 23 UNITS: 100 INJECTION, SOLUTION SUBCUTANEOUS at 09:03

## 2025-03-23 RX ADMIN — INSULIN ASPART 4 UNITS: 100 INJECTION, SOLUTION INTRAVENOUS; SUBCUTANEOUS at 06:03

## 2025-03-23 RX ADMIN — LOSARTAN POTASSIUM 50 MG: 50 TABLET, FILM COATED ORAL at 08:03

## 2025-03-23 RX ADMIN — COLCHICINE 0.6 MG: 0.6 TABLET ORAL at 09:03

## 2025-03-23 RX ADMIN — INSULIN ASPART 6 UNITS: 100 INJECTION, SOLUTION INTRAVENOUS; SUBCUTANEOUS at 12:03

## 2025-03-23 RX ADMIN — BUMETANIDE 1 MG: 1 TABLET ORAL at 08:03

## 2025-03-23 RX ADMIN — HYDRALAZINE HYDROCHLORIDE 50 MG: 25 TABLET ORAL at 06:03

## 2025-03-23 RX ADMIN — GABAPENTIN 600 MG: 300 CAPSULE ORAL at 09:03

## 2025-03-23 RX ADMIN — CYANOCOBALAMIN TAB 1000 MCG 1000 MCG: 1000 TAB at 08:03

## 2025-03-23 RX ADMIN — MELATONIN TAB 3 MG 6 MG: 3 TAB at 09:03

## 2025-03-23 RX ADMIN — HYDRALAZINE HYDROCHLORIDE 50 MG: 25 TABLET ORAL at 09:03

## 2025-03-23 RX ADMIN — INSULIN ASPART 4 UNITS: 100 INJECTION, SOLUTION INTRAVENOUS; SUBCUTANEOUS at 09:03

## 2025-03-23 RX ADMIN — ENOXAPARIN SODIUM 40 MG: 40 INJECTION SUBCUTANEOUS at 05:03

## 2025-03-23 RX ADMIN — COLCHICINE 0.6 MG: 0.6 TABLET ORAL at 08:03

## 2025-03-23 RX ADMIN — HYDRALAZINE HYDROCHLORIDE 50 MG: 25 TABLET ORAL at 02:03

## 2025-03-23 RX ADMIN — MAGNESIUM OXIDE TAB 400 MG (241.3 MG ELEMENTAL MG) 400 MG: 400 (241.3 MG) TAB at 08:03

## 2025-03-23 NOTE — ASSESSMENT & PLAN NOTE
Patient's most recent potassium results are listed below.   Recent Labs     03/21/25  0457 03/22/25  1502 03/23/25  0658   K 3.7 4.2 4.0     Plan  - Replete potassium per protocol  - Monitor potassium Daily  - Patient's hypokalemia is stable  -     Tx:    Replete potassium   Replete magnesium

## 2025-03-23 NOTE — ASSESSMENT & PLAN NOTE
Recurrent episodes of hypotension, including a witnessed syncopal event with seizure-like activity (likely convulsive syncope).    DDx:    - Medication-related hypotension (likely): Patient had restarted Imdur (on his own accord) 1 week prior to presentation. Recent decrease in Coreg and losartan, as well as transition from Lasix to Bumex, may have contributed.  - Orthostatic hypotension (possible): Symptoms triggered by postural changes  - Neurogenic syncope (unlikely)    Dx:    - Continuous telemetry and cardiac monitoring    - Echocardiogram  - Orthostatics  - Troponins  - Consider MRI brain (given ICD, patient would need transfer to [facility] for this study)  - Consider repeat ICD interrogation (was unremarkable last month after prior episode)    Tx:    - Maintain BP support with cautious IV fluids given history of heart failure    - Consider midodrine if persistent symptomatic orthostasis    - Neurology and Cardiology consultation

## 2025-03-23 NOTE — ASSESSMENT & PLAN NOTE
Concern that upper extremity weakness could be due to nerve impingement vs NMJ disorder such as Lambert-Eaton syndrome  CT cervical spine with contrast  There is advanced multilevel degenerative disc disease, noting marked disc height loss with sub endplate marrow changes and uncovertebral spurring. There is advanced multilevel facet joint arthropathy. Broad-based posterior disc osteophyte complexes noted. These findings contribute to moderate right-sided neural foraminal narrowing at C2-3, severe bilateral neural foraminal narrowing at C3-4, C4-5, right-side at C5-6, and left-sided at C6-7. There is moderate/ severe spinal canal stenosis at C3-4 and C4-5. Further evaluation could be performed with MRI, if indicated.       Neurosurgery consulted  CT myelogram in am with IR, aspirin and plavix are stopped

## 2025-03-23 NOTE — PLAN OF CARE
Problem: Adult Inpatient Plan of Care  Goal: Plan of Care Review  Outcome: Progressing  Goal: Patient-Specific Goal (Individualized)  Outcome: Progressing  Goal: Absence of Hospital-Acquired Illness or Injury  Outcome: Progressing  Goal: Optimal Comfort and Wellbeing  Outcome: Progressing  Goal: Readiness for Transition of Care  Outcome: Progressing     Problem: Diabetes Comorbidity  Goal: Blood Glucose Level Within Targeted Range  Outcome: Progressing     Problem: Fall Injury Risk  Goal: Absence of Fall and Fall-Related Injury  Outcome: Progressing     Problem: Pain Acute  Goal: Optimal Pain Control and Function  Outcome: Progressing     Problem: Comorbidity Management  Goal: Blood Pressure in Desired Range  Outcome: Progressing  Goal: Maintenance of Heart Failure Symptom Control  Outcome: Progressing     Problem: Syncope  Goal: Absence of Syncopal Symptoms  Outcome: Progressing

## 2025-03-23 NOTE — SUBJECTIVE & OBJECTIVE
Interval History: Feels better, weakness is improving. Understands plan for CT myelogram in am     Review of Systems   Constitutional:  Negative for chills, fatigue and fever.   Respiratory:  Negative for cough, choking, chest tightness and shortness of breath.    Cardiovascular:  Positive for leg swelling. Negative for chest pain and palpitations.   Gastrointestinal:  Negative for abdominal distention, abdominal pain, anal bleeding, diarrhea, nausea and vomiting.   Genitourinary:  Negative for difficulty urinating and dysuria.   Musculoskeletal:  Positive for arthralgias.   Neurological:  Negative for dizziness, syncope, speech difficulty, weakness and headaches.   Psychiatric/Behavioral:  Negative for agitation and confusion.      Objective:     Vital Signs (Most Recent):  Temp: 98 °F (36.7 °C) (03/23/25 1129)  Pulse: 72 (03/23/25 1129)  Resp: 18 (03/23/25 1129)  BP: (!) 152/74 (03/23/25 1129)  SpO2: 95 % (03/23/25 1129) Vital Signs (24h Range):  Temp:  [97.6 °F (36.4 °C)-98.7 °F (37.1 °C)] 98 °F (36.7 °C)  Pulse:  [62-83] 72  Resp:  [18] 18  SpO2:  [94 %-96 %] 95 %  BP: (135-163)/(65-81) 152/74     Weight: 100.2 kg (221 lb)  Body mass index is 30.82 kg/m².  No intake or output data in the 24 hours ending 03/23/25 1330      Physical Exam  Constitutional:       General: He is not in acute distress.     Appearance: Normal appearance. He is obese. He is not ill-appearing or toxic-appearing.   Eyes:      Extraocular Movements: Extraocular movements intact.      Conjunctiva/sclera: Conjunctivae normal.   Cardiovascular:      Rate and Rhythm: Normal rate and regular rhythm.   Pulmonary:      Effort: Pulmonary effort is normal. No respiratory distress.      Breath sounds: Normal breath sounds. No wheezing or rales.   Abdominal:      General: There is no distension.      Tenderness: There is no abdominal tenderness. There is no guarding.   Musculoskeletal:         General: Normal range of motion.      Right lower leg:  Edema present.      Left lower leg: Edema present.      Comments: L knee swelling is improved   Skin:     General: Skin is warm and dry.      Coloration: Skin is not jaundiced or pale.   Neurological:      General: No focal deficit present.      Mental Status: He is alert and oriented to person, place, and time.      Cranial Nerves: No cranial nerve deficit.   Psychiatric:         Mood and Affect: Mood normal.         Behavior: Behavior normal.               Significant Labs: All pertinent labs within the past 24 hours have been reviewed.  CBC:   Recent Labs   Lab 03/22/25  1502 03/23/25  0658   WBC 7.30 8.53   HGB 11.7* 11.4*   HCT 35.2* 34.3*    324     CMP:   Recent Labs   Lab 03/22/25  1502 03/23/25  0658   * 137   K 4.2 4.0    104   CO2 22* 24   BUN 25* 28*   CREATININE 1.2 1.0   CALCIUM 9.2 9.2   ANIONGAP 12 9       Significant Imaging: I have reviewed all pertinent imaging results/findings within the past 24 hours.

## 2025-03-23 NOTE — PROGRESS NOTES
[facility]  [facility] & Physical  Neurosurgery    SUBJECTIVE:     Chief Complaint/Reason for Admission: Hypotension and near syncope    History of Present Illness:   Patient is a 74 y.o. who states for the last 6-8 weeks he has had progressively worsening BL UE and LE weakness and also with pain in the shoulders and arms and bilateral thighs and hips and left knee.  States he had difficulty raising from a sitting to standing position but once he was up he could walk but if he stood still would need to sit down because his legs would get weak and he would feel like they would buckle.  He denies any recent falls.  Also with hand weakness and dropping things.  In the last 2 weeks these symptoms have gotten much worse and he hadn't been able to get up since three days ago.    He got a steroid injection in his left knee and states all of his previous pain in all joints is gone and now he is able to stand and walk to the bathroom and his strength in his arms and legs has improved.    Denies history of spine surgery or ESIs.    Interval History:   03/23/2025    Still without pain and no weakness.  Has been walking in room.      Patient Active Problem List    Diagnosis Date Noted    Cervical spondylolysis 03/22/2025    Arthritis of knee 03/21/2025    Muscle weakness 03/20/2025    Transient confusion 03/20/2025    Hypomagnesemia 03/19/2025    Hypotension 03/18/2025    Gout 03/18/2025    Confusion 03/18/2025    Syncope and collapse 01/16/2025    NSTEMI (non-ST elevated myocardial infarction) 11/22/2024    Hypertension 11/22/2024    Olecranon bursitis, right elbow 02/01/2024    Sensorineural hearing loss, bilateral 02/01/2024    PAD (peripheral artery disease) 07/11/2023    Chronic pain of left knee 09/13/2021    Difficulty walking 09/13/2021    Thrombocytopenia 02/19/2020    Insulin dependent type 2 diabetes mellitus 12/20/2018    Dyslipidemia associated with type 2 diabetes mellitus 09/24/2018    Hypertension  associated with diabetes 09/24/2018    Vitreous detachment 06/11/2018    Erectile dysfunction 04/25/2018    ICD (implantable cardioverter-defibrillator), single, in situ 09/19/2017    Ischemic cardiomyopathy 06/06/2017    PCO (posterior capsular opacification), right 05/08/2017    Post-operative state 05/08/2017    DM type 2 without retinopathy 04/24/2017    Essential hypertension 04/24/2017    Pseudophakia 04/24/2017    Diabetes mellitus type 2 without retinopathy 03/11/2016    Status post intraocular lens implant 03/11/2016    Bilateral posterior capsular opacification 03/11/2016    Type 2 diabetes mellitus with diabetic neuropathy     Coronary artery disease of native artery of native heart with stable angina pectoris 02/22/2016    Dyslipidemia 02/11/2016    Hypokalemia 12/09/2015    Coronary artery disease of native artery with stable angina pectoris 12/08/2015    Ventricular tachycardia, nonsustained post-MI 12/08/2015    Chronic combined systolic and diastolic congestive heart failure 12/07/2015    Mitral regurgitation 12/05/2015    Type 2 diabetes mellitus with neurologic complication 12/04/2015     Past Medical History:   Diagnosis Date    Anticoagulant long-term use     Cardiomyopathy     CHF (congestive heart failure)     Coronary artery disease     Diabetes mellitus     Gout     Heart attack     Hypertension     Pneumonia       Past Surgical History:   Procedure Laterality Date    APPENDECTOMY      CARDIAC CATHETERIZATION      7 stents    CARDIAC DEFIBRILLATOR PLACEMENT      CATARACT EXTRACTION Bilateral 2011    COLONOSCOPY N/A 8/10/2018    Procedure: COLONOSCOPY golytely;  Surgeon: Valentine Burger MD;  Location: [facility] ENDO;  Service: Endoscopy;  Laterality: N/A;    CORONARY ANGIOGRAPHY N/A 11/11/2024    Procedure: ANGIOGRAM, CORONARY ARTERY;  Surgeon: Luis Felipe Wright MD;  Location: [facility] CATH LAB/EP;  Service: Cardiology;  Laterality: N/A;    EYE SURGERY      INSTANTANEOUS WAVE-FREE RATIO (IFR) N/A  11/11/2024    Procedure: Instantaneous Wave-Free Ratio (IFR);  Surgeon: Luis Felipe Wright MD;  Location: [facility] CATH LAB/EP;  Service: Cardiology;  Laterality: N/A;    LEFT HEART CATHETERIZATION Left 11/11/2024    Procedure: Left heart cath;  Surgeon: Luis Felipe Wright MD;  Location: [facility] CATH LAB/EP;  Service: Cardiology;  Laterality: Left;    REVISION OF IMPLANTABLE CARDIOVERTER-DEFIBRILLATOR (ICD) ELECTRODE LEAD PLACEMENT N/A 11/11/2019    Procedure: REVISION, INSERTION, ELECTRODE LEAD, ICD;  Surgeon: Shukri Walsh MD;  Location: [facility] EP LAB;  Service: Cardiology;  Laterality: N/A;  Lead malfxn, RV lead ICD, SJM, anes, DM, 3 PREP      Prescriptions Prior to Admission[1]  Review of patient's allergies indicates:  No Known Allergies   Social History     Tobacco Use    Smoking status: Former    Smokeless tobacco: Never   Substance Use Topics    Alcohol use: Yes     Comment: socially          OBJECTIVE:     Vital signs in last 24 hours:  Temp:  [97.6 °F (36.4 °C)-98.7 °F (37.1 °C)] 98 °F (36.7 °C)  Pulse:  [62-75] 75  Resp:  [18-20] 20  SpO2:  [94 %-97 %] 97 %  BP: (135-171)/(65-89) 171/89    General: well developed, well nourished, no distress  Mental Status: Awake, Alert, Oriented X3.Thought content appropriate  GCS: Motor: 6/Verbal: 5/Eyes: 4 GCS Total: 15    Motor Strength:  Strength  Deltoids Triceps Biceps Wrist Extension Wrist Flexion Hand    Upper: R 5/5 5/5 5/5 5/5 5/5 4/5    L 5/5 4/5 5/5 4/5 4/5 4/5     HF KF KE DF PF EHL   Lower: R 5/5 5/5 5/5 5/5 5/5 5/5    L 5/5 5/5 5/5 5/5 5/5 5/5     3+ BL UE reflexes  2+ BL LE reflexes    Gurrola: absent B/L  Clonus: absent B/L    Imaging results:  Narrative & Impression  EXAMINATION:  CT CERVICAL SPINE W WO CONTRAST     CLINICAL HISTORY:  Motor neuron disease suspected;     TECHNIQUE:  Routine multiplanar CT cervical spine protocol performed with the administration of 75 cc Omnipaque 350 IV contrast.     COMPARISON:  None     FINDINGS:  No fractures,  marrow replacement process, or evidence of osteomyelitis.  No areas of abnormal enhancement.  The cervical cord is grossly unremarkable, noting limited assessment of soft tissues with CT.  There is moderate scattered calcific atherosclerosis.  No paraspinal masses or fluid collections.  No lymphadenopathy.  The lung apices are clear.     Degenerative spondylosis:     There is advanced multilevel degenerative disc disease, noting marked disc height loss with sub endplate marrow changes and uncovertebral spurring.  There is advanced multilevel facet joint arthropathy.  Broad-based posterior disc osteophyte complexes noted.  These findings contribute to moderate right-sided neural foraminal narrowing at C2-3, severe bilateral neural foraminal narrowing at C3-4, C4-5, right-side at C5-6, and left-sided at C6-7.  There is moderate/ severe spinal canal stenosis at C3-4 and C4-5.  Further evaluation could be performed with MRI, if indicated.     Impression:     No acute findings.     Advanced multilevel cervical spondylosis, as above.        Electronically signed by:Chapin Resendiz MD  Date:                                            03/21/2025  Time:                                           14:11    ASSESSMENT/PLAN:       Probable cervical myelopathy from cervical stenosis C3-4, C4-5    -PT/OT  -ASA and Plavix stopped on 02/23  -Consult IR for C/T/L spine myelogram since unable to get MRI    All of the above discussed and reviewed with Dr. Clifford.    Leonie Crump, [facility], PA-C  Neurosurgery  [facility]  03/23/2025                         [1]   Medications Prior to Admission   Medication Sig Dispense Refill Last Dose/Taking    aspirin (ECOTRIN) 81 MG EC tablet Take 81 mg by mouth once daily.   3/18/2025 Morning    atorvastatin (LIPITOR) 40 MG tablet Take 1 tablet (40 mg total) by mouth once daily. 90 tablet 3 3/18/2025    bumetanide (BUMEX) 1 MG tablet Take 1 tablet (1 mg total) by mouth once daily. (Patient taking  differently: Take 1 mg by mouth once daily. Taking in am) 30 tablet 11 3/18/2025    clopidogreL (PLAVIX) 75 mg tablet Take 1 tablet (75 mg total) by mouth once daily. 90 tablet 3 3/18/2025    colchicine (MITIGARE) 0.6 mg Cap Take 1 capsule (0.6 mg total) by mouth once daily. 90 capsule 3 3/18/2025    cyanocobalamin (VITAMIN B-12) 1000 MCG tablet Take 1,000 mcg by mouth once daily.   3/18/2025    empagliflozin (JARDIANCE) 10 mg tablet Take 1 tablet (10 mg total) by mouth once daily. 30 tablet 11 Past Week    furosemide (LASIX) 20 MG tablet Take 20 mg by mouth once daily. In afternoon   Past Week    gabapentin (NEURONTIN) 300 MG capsule Take 2 capsules (600 mg total) by mouth 2 (two) times daily. 360 capsule 1 3/18/2025    insulin aspart U-100 (NOVOLOG FLEXPEN U-100 INSULIN) 100 unit/mL (3 mL) InPn pen Inject 18 Units into the skin 3 (three) times daily with meals. (Patient taking differently: Inject 15 Units into the skin 3 (three) times daily with meals.) 45 mL 5 Taking Differently    insulin glargine U-100, Lantus, (LANTUS SOLOSTAR U-100 INSULIN) 100 unit/mL (3 mL) InPn pen Inject 40 Units into the skin every evening. (Patient taking differently: Inject 35 Units into the skin every evening.) 30 mL 1 Taking Differently    losartan (COZAAR) 50 MG tablet Take 1 tablet (50 mg total) by mouth once daily. 90 tablet 3 3/18/2025    metFORMIN (GLUCOPHAGE) 1000 MG tablet Take 1 tablet (1,000 mg total) by mouth 2 (two) times daily with meals. 180 tablet 4 3/18/2025    multivit-minerals/FA/lycopene (ONE-A-DAY MEN'S 50 PLUS ORAL) Take 1 tablet by mouth once daily.   3/18/2025    zolpidem (AMBIEN) 5 MG Tab TAKE 1 TABLET BY MOUTH EVERY NIGHT AS NEEDED (Patient taking differently: Take 5 mg by mouth nightly as needed.) 90 tablet 3 3/17/2025 Evening    alcohol swabs (BD ALCOHOL SWABS) PadM USE FOUR TIMES DAILY 400 each 3     blood glucose control high,low (ACCU-CHEK CATHRYN CONTROL SOLN) Soln Use as directed to check blood sugar 1  "each 1     blood sugar diagnostic Strp test blood sugar as directed four times daily 100 each 3     blood-glucose meter (TRUE METRIX GLUCOSE METER) Misc use as directed 1 each 0     lancets 30 gauge Misc usea s directed to check blood glucose four times daily 100 each 3     nitroGLYCERIN (NITROSTAT) 0.4 MG SL tablet Place 1 tablet (0.4 mg total) under the tongue every 5 (five) minutes as needed for Chest pain. 25 tablet 3     pen needle, diabetic (BD ULTRA-FINE SINA PEN NEEDLE) 32 gauge x 5/32" Ndle Use four times daily for insulin administration 400 each 3      "
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
## 2025-03-24 ENCOUNTER — TELEPHONE (OUTPATIENT)
Dept: CARDIOLOGY | Facility: CLINIC | Age: 75
End: 2025-03-24
Payer: MEDICARE

## 2025-03-24 LAB
ABSOLUTE EOSINOPHIL (OHS): 0.03 K/UL
ABSOLUTE MONOCYTE (OHS): 0.73 K/UL (ref 0.3–1)
ABSOLUTE NEUTROPHIL COUNT (OHS): 4.36 K/UL (ref 1.8–7.7)
ANCA AB TITR SER IF: NORMAL TITER
ANION GAP (OHS): 9 MMOL/L (ref 8–16)
BASOPHILS # BLD AUTO: 0.01 K/UL
BASOPHILS NFR BLD AUTO: 0.2 %
BUN SERPL-MCNC: 31 MG/DL (ref 8–23)
CALCIUM SERPL-MCNC: 8.9 MG/DL (ref 8.7–10.5)
CHLORIDE SERPL-SCNC: 105 MMOL/L (ref 95–110)
CO2 SERPL-SCNC: 26 MMOL/L (ref 23–29)
CREAT SERPL-MCNC: 1 MG/DL (ref 0.5–1.4)
ERYTHROCYTE [DISTWIDTH] IN BLOOD BY AUTOMATED COUNT: 12.3 % (ref 11.5–14.5)
GFR SERPLBLD CREATININE-BSD FMLA CKD-EPI: >60 ML/MIN/1.73/M2
GLUCOSE SERPL-MCNC: 176 MG/DL (ref 70–110)
HCT VFR BLD AUTO: 34.2 % (ref 40–54)
HGB BLD-MCNC: 11.1 GM/DL (ref 14–18)
IMM GRANULOCYTES # BLD AUTO: 0.03 K/UL (ref 0–0.04)
IMM GRANULOCYTES NFR BLD AUTO: 0.5 % (ref 0–0.5)
LYMPHOCYTES # BLD AUTO: 1.25 K/UL (ref 1–4.8)
MCH RBC QN AUTO: 30.3 PG (ref 27–50)
MCHC RBC AUTO-ENTMCNC: 32.5 G/DL (ref 32–36)
MCV RBC AUTO: 93 FL (ref 82–98)
NUCLEATED RBC (/100WBC) (OHS): 0 /100 WBC
P-ANCA TITR SER IF: NORMAL TITER
PATHOLOGIST INTERPRETATION IFE: NORMAL
PATHOLOGIST INTERPRETATION SPE: NORMAL
PLATELET # BLD AUTO: 291 K/UL (ref 150–450)
PMV BLD AUTO: 10.7 FL (ref 9.2–12.9)
POCT GLUCOSE: 155 MG/DL (ref 70–110)
POCT GLUCOSE: 178 MG/DL (ref 70–110)
POCT GLUCOSE: 226 MG/DL (ref 70–110)
POCT GLUCOSE: 309 MG/DL (ref 70–110)
POTASSIUM SERPL-SCNC: 4 MMOL/L (ref 3.5–5.1)
RBC # BLD AUTO: 3.66 M/UL (ref 4.6–6.2)
RELATIVE EOSINOPHIL (OHS): 0.5 %
RELATIVE LYMPHOCYTE (OHS): 19.5 % (ref 18–48)
RELATIVE MONOCYTE (OHS): 11.4 % (ref 4–15)
RELATIVE NEUTROPHIL (OHS): 67.9 % (ref 38–73)
SODIUM SERPL-SCNC: 140 MMOL/L (ref 136–145)
VIT B12 SERPL-MCNC: 1123 NG/L (ref 180–914)
WBC # BLD AUTO: 6.41 K/UL (ref 3.9–12.7)

## 2025-03-24 PROCEDURE — 97535 SELF CARE MNGMENT TRAINING: CPT | Mod: HCNC,CO

## 2025-03-24 PROCEDURE — 25000003 PHARM REV CODE 250: Mod: HCNC | Performed by: INTERNAL MEDICINE

## 2025-03-24 PROCEDURE — 97530 THERAPEUTIC ACTIVITIES: CPT | Mod: HCNC,CO

## 2025-03-24 PROCEDURE — 25000003 PHARM REV CODE 250: Mod: HCNC | Performed by: HOSPITALIST

## 2025-03-24 PROCEDURE — G0378 HOSPITAL OBSERVATION PER HR: HCPCS | Mod: HCNC

## 2025-03-24 PROCEDURE — 80048 BASIC METABOLIC PNL TOTAL CA: CPT | Mod: HCNC | Performed by: HOSPITALIST

## 2025-03-24 PROCEDURE — 96372 THER/PROPH/DIAG INJ SC/IM: CPT | Performed by: HOSPITALIST

## 2025-03-24 PROCEDURE — 63600175 PHARM REV CODE 636 W HCPCS: Mod: HCNC | Performed by: RADIOLOGY

## 2025-03-24 PROCEDURE — 85025 COMPLETE CBC W/AUTO DIFF WBC: CPT | Mod: HCNC | Performed by: HOSPITALIST

## 2025-03-24 PROCEDURE — 63600175 PHARM REV CODE 636 W HCPCS: Mod: HCNC | Performed by: HOSPITALIST

## 2025-03-24 PROCEDURE — 36415 COLL VENOUS BLD VENIPUNCTURE: CPT | Mod: HCNC | Performed by: HOSPITALIST

## 2025-03-24 PROCEDURE — 97116 GAIT TRAINING THERAPY: CPT | Mod: HCNC

## 2025-03-24 PROCEDURE — 99213 OFFICE O/P EST LOW 20 MIN: CPT | Mod: HCNC,,, | Performed by: PHYSICIAN ASSISTANT

## 2025-03-24 RX ORDER — LIDOCAINE HYDROCHLORIDE 10 MG/ML
INJECTION, SOLUTION INFILTRATION; PERINEURAL
Status: COMPLETED | OUTPATIENT
Start: 2025-03-24 | End: 2025-03-24

## 2025-03-24 RX ADMIN — INSULIN ASPART 8 UNITS: 100 INJECTION, SOLUTION INTRAVENOUS; SUBCUTANEOUS at 04:03

## 2025-03-24 RX ADMIN — INSULIN GLARGINE 23 UNITS: 100 INJECTION, SOLUTION SUBCUTANEOUS at 09:03

## 2025-03-24 RX ADMIN — GABAPENTIN 600 MG: 300 CAPSULE ORAL at 09:03

## 2025-03-24 RX ADMIN — HYDRALAZINE HYDROCHLORIDE 50 MG: 25 TABLET ORAL at 03:03

## 2025-03-24 RX ADMIN — MELATONIN TAB 3 MG 6 MG: 3 TAB at 09:03

## 2025-03-24 RX ADMIN — LOSARTAN POTASSIUM 50 MG: 50 TABLET, FILM COATED ORAL at 09:03

## 2025-03-24 RX ADMIN — BUMETANIDE 1 MG: 1 TABLET ORAL at 09:03

## 2025-03-24 RX ADMIN — INSULIN ASPART 2 UNITS: 100 INJECTION, SOLUTION INTRAVENOUS; SUBCUTANEOUS at 09:03

## 2025-03-24 RX ADMIN — CYANOCOBALAMIN TAB 1000 MCG 1000 MCG: 1000 TAB at 09:03

## 2025-03-24 RX ADMIN — HYDRALAZINE HYDROCHLORIDE 50 MG: 25 TABLET ORAL at 09:03

## 2025-03-24 RX ADMIN — ENOXAPARIN SODIUM 40 MG: 40 INJECTION SUBCUTANEOUS at 05:03

## 2025-03-24 RX ADMIN — INSULIN ASPART 2 UNITS: 100 INJECTION, SOLUTION INTRAVENOUS; SUBCUTANEOUS at 06:03

## 2025-03-24 RX ADMIN — COLCHICINE 0.6 MG: 0.6 TABLET ORAL at 09:03

## 2025-03-24 RX ADMIN — LIDOCAINE HYDROCHLORIDE 10 ML: 10 INJECTION, SOLUTION INFILTRATION; PERINEURAL at 02:03

## 2025-03-24 RX ADMIN — MAGNESIUM OXIDE TAB 400 MG (241.3 MG ELEMENTAL MG) 400 MG: 400 (241.3 MG) TAB at 09:03

## 2025-03-24 NOTE — ASSESSMENT & PLAN NOTE
Recurrent episodes of hypotension, including a witnessed syncopal event with seizure-like activity (likely convulsive syncope).    DDx:    - Medication-related hypotension (likely): Patient had restarted Imdur (on his own accord) 1 week prior to presentation. Recent decrease in Coreg and losartan, as well as transition from Lasix to Bumex, may have contributed.  - Orthostatic hypotension (possible): Symptoms triggered by postural changes  - Neurogenic syncope (unlikely)    Dx:    - Continuous telemetry and cardiac monitoring    - Echocardiogram  - Orthostatics  - Troponins  - Consider MRI brain (given ICD, patient would need transfer to [facility] for this study)  - Consider repeat ICD interrogation (was unremarkable last month after prior episode)    Tx:    - Maintain BP support with cautious IV fluids given history of heart failure    - Consider midodrine if persistent symptomatic orthostasis    - Neurology and Cardiology consultation

## 2025-03-24 NOTE — SEDATION DOCUMENTATION
Procedure completed. Patient tolerated well; VSS. Site CDI. Patient to be transported to CT and then back to room on floor for recovery. report to be given at the bedside.

## 2025-03-24 NOTE — PROGRESS NOTES
[facility]  [facility] Physical  Neurosurgery    SUBJECTIVE:     Chief Complaint/Reason for Admission: Hypotension and near syncope    History of Present Illness:   Patient is a 74 y.o. who states for the last 6-8 weeks he has had progressively worsening BL UE and LE weakness and also with pain in the shoulders and arms and bilateral thighs and hips and left knee.  States he had difficulty raising from a sitting to standing position but once he was up he could walk but if he stood still would need to sit down because his legs would get weak and he would feel like they would buckle.  He denies any recent falls.  Also with hand weakness and dropping things.  In the last 2 weeks these symptoms have gotten much worse and he hadn't been able to get up since three days ago.    He got a steroid injection in his left knee and states all of his previous pain in all joints is gone and now he is able to stand and walk to the bathroom and his strength in his arms and legs has improved.    Denies history of spine surgery or ESIs.    Interval History:   03/24/25    Still without pain and no weakness.  Has been walking in room.    Interval History:   03/24/2025    No pain or weakness.  Walking around room.    Patient Active Problem List    Diagnosis Date Noted    Cervical spondylolysis 03/22/2025    Arthritis of knee 03/21/2025    Muscle weakness 03/20/2025    Transient confusion 03/20/2025    Hypomagnesemia 03/19/2025    Hypotension 03/18/2025    Gout 03/18/2025    Confusion 03/18/2025    Syncope and collapse 01/16/2025    NSTEMI (non-ST elevated myocardial infarction) 11/22/2024    Hypertension 11/22/2024    Olecranon bursitis, right elbow 02/01/2024    Sensorineural hearing loss, bilateral 02/01/2024    PAD (peripheral artery disease) 07/11/2023    Chronic pain of left knee 09/13/2021    Difficulty walking 09/13/2021    Thrombocytopenia 02/19/2020    Insulin dependent type 2 diabetes mellitus 12/20/2018    Dyslipidemia  associated with type 2 diabetes mellitus 09/24/2018    Hypertension associated with diabetes 09/24/2018    Vitreous detachment 06/11/2018    Erectile dysfunction 04/25/2018    ICD (implantable cardioverter-defibrillator), single, in situ 09/19/2017    Ischemic cardiomyopathy 06/06/2017    PCO (posterior capsular opacification), right 05/08/2017    Post-operative state 05/08/2017    DM type 2 without retinopathy 04/24/2017    Essential hypertension 04/24/2017    Pseudophakia 04/24/2017    Diabetes mellitus type 2 without retinopathy 03/11/2016    Status post intraocular lens implant 03/11/2016    Bilateral posterior capsular opacification 03/11/2016    Type 2 diabetes mellitus with diabetic neuropathy     Coronary artery disease of native artery of native heart with stable angina pectoris 02/22/2016    Dyslipidemia 02/11/2016    Hypokalemia 12/09/2015    Coronary artery disease of native artery with stable angina pectoris 12/08/2015    Ventricular tachycardia, nonsustained post-MI 12/08/2015    Chronic combined systolic and diastolic congestive heart failure 12/07/2015    Mitral regurgitation 12/05/2015    Type 2 diabetes mellitus with neurologic complication 12/04/2015     Past Medical History:   Diagnosis Date    Anticoagulant long-term use     Cardiomyopathy     CHF (congestive heart failure)     Coronary artery disease     Diabetes mellitus     Gout     Heart attack     Hypertension     Pneumonia       Past Surgical History:   Procedure Laterality Date    APPENDECTOMY      CARDIAC CATHETERIZATION      7 stents    CARDIAC DEFIBRILLATOR PLACEMENT      CATARACT EXTRACTION Bilateral 2011    COLONOSCOPY N/A 8/10/2018    Procedure: COLONOSCOPY golytely;  Surgeon: Valentine Burger MD;  Location: [facility] ENDO;  Service: Endoscopy;  Laterality: N/A;    CORONARY ANGIOGRAPHY N/A 11/11/2024    Procedure: ANGIOGRAM, CORONARY ARTERY;  Surgeon: Luis Felipe Wright MD;  Location: [facility] CATH LAB/EP;  Service: Cardiology;   Laterality: N/A;    EYE SURGERY      INSTANTANEOUS WAVE-FREE RATIO (IFR) N/A 11/11/2024    Procedure: Instantaneous Wave-Free Ratio (IFR);  Surgeon: Luis Felipe Wright MD;  Location: [facility] CATH LAB/EP;  Service: Cardiology;  Laterality: N/A;    LEFT HEART CATHETERIZATION Left 11/11/2024    Procedure: Left heart cath;  Surgeon: Luis Felipe Wright MD;  Location: [facility] CATH LAB/EP;  Service: Cardiology;  Laterality: Left;    REVISION OF IMPLANTABLE CARDIOVERTER-DEFIBRILLATOR (ICD) ELECTRODE LEAD PLACEMENT N/A 11/11/2019    Procedure: REVISION, INSERTION, ELECTRODE LEAD, ICD;  Surgeon: Shukri Walsh MD;  Location: [facility] EP LAB;  Service: Cardiology;  Laterality: N/A;  Lead malfxn, RV lead ICD, SJM, anes, DM, 3 PREP      Prescriptions Prior to Admission[1]  Review of patient's allergies indicates:  No Known Allergies   Social History     Tobacco Use    Smoking status: Former    Smokeless tobacco: Never   Substance Use Topics    Alcohol use: Yes     Comment: socially          OBJECTIVE:     Vital signs in last 24 hours:  Temp:  [97.6 °F (36.4 °C)-98.7 °F (37.1 °C)] 98 °F (36.7 °C)  Pulse:  [58-80] 75  Resp:  [18-20] 18  SpO2:  [93 %-98 %] 98 %  BP: (125-171)/(54-89) 131/85    General: well developed, well nourished, no distress  Mental Status: Awake, Alert, Oriented X3.Thought content appropriate  GCS: Motor: 6/Verbal: 5/Eyes: 4 GCS Total: 15    Motor Strength:  Strength  Deltoids Triceps Biceps Wrist Extension Wrist Flexion Hand    Upper: R 5/5 4/5 5/5 5/5 5/5 4/5    L 5/5 4/5 5/5 5/5 5/5 4/5     HF KF KE DF PF EHL   Lower: R 5/5 5/5 5/5 5/5 5/5 5/5    L 5/5 5/5 5/5 5/5 5/5 5/5         Gurrola: absent B/L  Clonus: absent B/L    Imaging results:  Narrative & Impression  EXAMINATION:  CT CERVICAL SPINE W WO CONTRAST     CLINICAL HISTORY:  Motor neuron disease suspected;     TECHNIQUE:  Routine multiplanar CT cervical spine protocol performed with the administration of 75 cc Omnipaque 350 IV contrast.      COMPARISON:  None     FINDINGS:  No fractures, marrow replacement process, or evidence of osteomyelitis.  No areas of abnormal enhancement.  The cervical cord is grossly unremarkable, noting limited assessment of soft tissues with CT.  There is moderate scattered calcific atherosclerosis.  No paraspinal masses or fluid collections.  No lymphadenopathy.  The lung apices are clear.     Degenerative spondylosis:     There is advanced multilevel degenerative disc disease, noting marked disc height loss with sub endplate marrow changes and uncovertebral spurring.  There is advanced multilevel facet joint arthropathy.  Broad-based posterior disc osteophyte complexes noted.  These findings contribute to moderate right-sided neural foraminal narrowing at C2-3, severe bilateral neural foraminal narrowing at C3-4, C4-5, right-side at C5-6, and left-sided at C6-7.  There is moderate/ severe spinal canal stenosis at C3-4 and C4-5.  Further evaluation could be performed with MRI, if indicated.     Impression:     No acute findings.     Advanced multilevel cervical spondylosis, as above.        Electronically signed by:Chapin Resendiz MD  Date:                                            03/21/2025  Time:                                           14:11    ASSESSMENT/PLAN:       Probable cervical myelopathy from cervical stenosis C3-4, C4-5    -PT/OT  -CT myelogram whole spine today    All of the above discussed and reviewed with Dr. Clifford.    Leonie Crump, [facility], PA-C  Neurosurgery  [facility]  03/24/2025                         [1]   Medications Prior to Admission   Medication Sig Dispense Refill Last Dose/Taking    aspirin (ECOTRIN) 81 MG EC tablet Take 81 mg by mouth once daily.   3/18/2025 Morning    atorvastatin (LIPITOR) 40 MG tablet Take 1 tablet (40 mg total) by mouth once daily. 90 tablet 3 3/18/2025    bumetanide (BUMEX) 1 MG tablet Take 1 tablet (1 mg total) by mouth once daily. (Patient taking differently:  Take 1 mg by mouth once daily. Taking in am) 30 tablet 11 3/18/2025    clopidogreL (PLAVIX) 75 mg tablet Take 1 tablet (75 mg total) by mouth once daily. 90 tablet 3 3/18/2025    colchicine (MITIGARE) 0.6 mg Cap Take 1 capsule (0.6 mg total) by mouth once daily. 90 capsule 3 3/18/2025    cyanocobalamin (VITAMIN B-12) 1000 MCG tablet Take 1,000 mcg by mouth once daily.   3/18/2025    empagliflozin (JARDIANCE) 10 mg tablet Take 1 tablet (10 mg total) by mouth once daily. 30 tablet 11 Past Week    furosemide (LASIX) 20 MG tablet Take 20 mg by mouth once daily. In afternoon   Past Week    gabapentin (NEURONTIN) 300 MG capsule Take 2 capsules (600 mg total) by mouth 2 (two) times daily. 360 capsule 1 3/18/2025    insulin aspart U-100 (NOVOLOG FLEXPEN U-100 INSULIN) 100 unit/mL (3 mL) InPn pen Inject 18 Units into the skin 3 (three) times daily with meals. (Patient taking differently: Inject 15 Units into the skin 3 (three) times daily with meals.) 45 mL 5 Taking Differently    insulin glargine U-100, Lantus, (LANTUS SOLOSTAR U-100 INSULIN) 100 unit/mL (3 mL) InPn pen Inject 40 Units into the skin every evening. (Patient taking differently: Inject 35 Units into the skin every evening.) 30 mL 1 Taking Differently    losartan (COZAAR) 50 MG tablet Take 1 tablet (50 mg total) by mouth once daily. 90 tablet 3 3/18/2025    metFORMIN (GLUCOPHAGE) 1000 MG tablet Take 1 tablet (1,000 mg total) by mouth 2 (two) times daily with meals. 180 tablet 4 3/18/2025    multivit-minerals/FA/lycopene (ONE-A-DAY MEN'S 50 PLUS ORAL) Take 1 tablet by mouth once daily.   3/18/2025    zolpidem (AMBIEN) 5 MG Tab TAKE 1 TABLET BY MOUTH EVERY NIGHT AS NEEDED (Patient taking differently: Take 5 mg by mouth nightly as needed.) 90 tablet 3 3/17/2025 Evening    alcohol swabs (BD ALCOHOL SWABS) PadM USE FOUR TIMES DAILY 400 each 3     blood glucose control high,low (ACCU-CHEK CATHRYN CONTROL SOLN) Soln Use as directed to check blood sugar 1 each 1      "blood sugar diagnostic Strp test blood sugar as directed four times daily 100 each 3     blood-glucose meter (TRUE METRIX GLUCOSE METER) Misc use as directed 1 each 0     lancets 30 gauge Misc usea s directed to check blood glucose four times daily 100 each 3     nitroGLYCERIN (NITROSTAT) 0.4 MG SL tablet Place 1 tablet (0.4 mg total) under the tongue every 5 (five) minutes as needed for Chest pain. 25 tablet 3     pen needle, diabetic (BD ULTRA-FINE SINA PEN NEEDLE) 32 gauge x 5/32" Ndle Use four times daily for insulin administration 400 each 3      "

## 2025-03-24 NOTE — ASSESSMENT & PLAN NOTE
Patient's most recent potassium results are listed below.   Recent Labs     03/22/25  1502 03/23/25  0658 03/24/25  0617   K 4.2 4.0 4.0     Plan  - Replete potassium per protocol  - Monitor potassium Daily  - Patient's hypokalemia is stable  -     Tx:    Replete potassium   Replete magnesium

## 2025-03-24 NOTE — PLAN OF CARE
"0905  Order for "ambulatory referral to hand surg" with Dr Mclain entered. Patient will be notified of appt date & time. Information added to the pt's discharge paperwork.     1120  CM was informed by PT/OT that the pt has improved over the weekend & that they now rec HH services following discharge.     1205  CM informed Latrice koehler/[facility] of the change in discharge plan.     1540  HH referrals sent via [employer]. Awaiting response.       Will continue to follow.   "

## 2025-03-24 NOTE — TELEPHONE ENCOUNTER
Spoke to patient. He had a question concerning his Myelogram. Informed result would be read by a Neurologist or Orthopedic doctor.

## 2025-03-24 NOTE — CONSULTS
Interventional Radiology Consult Note        Reason for Consult: myelogram    SUBJECTIVE:     Chief Complaint:  fatigue    History of Present Illness:  Clifton Wilson is a 74 y.o. male with a PMHx of diabetes type 2, coronary artery disease with multiple percutaneous coronary interventions, hypertension, hyperlipidemia, HFrEF 40-45% status post implantable cardioverter-defibrillator who was admitted for hypotension, weakness, cervical spondylolysis. Interventional Radiology has been consulted for myelogram as AICD is not MRI compatible.  Pt has had recent head imaging including a  Ct head  on 3/21 which revealed no acute abnormality.  He is currently afebrile. The pt is hemodynamically stable.     Review of Systems   Constitutional:  Positive for malaise/fatigue. Negative for chills, fever and weight loss.   Respiratory:  Negative for cough and shortness of breath.    Cardiovascular:  Negative for chest pain, palpitations and leg swelling.   Gastrointestinal:  Negative for abdominal pain, diarrhea, nausea and vomiting.   Genitourinary:  Negative for dysuria and flank pain.   Musculoskeletal:  Negative for back pain and myalgias.   Neurological:  Positive for weakness. Negative for headaches.       Scheduled Meds:   bumetanide  1 mg Oral Daily    colchicine  0.6 mg Oral BID    cyanocobalamin  1,000 mcg Oral Daily    enoxparin  40 mg Subcutaneous Daily    gabapentin  600 mg Oral BID    hydrALAZINE  50 mg Oral Q8H    insulin glargine U-100 (Lantus)  23 Units Subcutaneous QHS    losartan  50 mg Oral Daily    magnesium oxide  400 mg Oral Daily     Continuous Infusions:  PRN Meds:  Current Facility-Administered Medications:     acetaminophen, 650 mg, Oral, Q4H PRN    bisacodyL, 10 mg, Rectal, Daily PRN    dextrose 50%, 12.5 g, Intravenous, PRN    dextrose 50%, 12.5 g, Intravenous, PRN    dextrose 50%, 25 g, Intravenous, PRN    dextrose 50%, 25 g, Intravenous, PRN    glucagon (human recombinant), 1 mg, Intramuscular,  PRN    glucagon (human recombinant), 1 mg, Intramuscular, PRN    glucose, 16 g, Oral, PRN    glucose, 16 g, Oral, PRN    glucose, 24 g, Oral, PRN    glucose, 24 g, Oral, PRN    HYDROcodone-acetaminophen, 1 tablet, Oral, Q4H PRN    insulin aspart U-100, 0-10 Units, Subcutaneous, QID (AC + HS) PRN    melatonin, 6 mg, Oral, Nightly PRN    naloxone, 0.02 mg, Intravenous, PRN    ondansetron, 4 mg, Intravenous, Q8H PRN    polyethylene glycol, 17 g, Oral, BID PRN    prochlorperazine, 5 mg, Intravenous, Q6H PRN    senna-docusate 8.6-50 mg, 1 tablet, Oral, BID PRN    simethicone, 1 tablet, Oral, QID PRN    sodium chloride 0.9%, 10 mL, Intravenous, Q12H PRN    Review of patient's allergies indicates:  No Known Allergies    Past Medical History:   Diagnosis Date    Anticoagulant long-term use     Cardiomyopathy     CHF (congestive heart failure)     Coronary artery disease     Diabetes mellitus     Gout     Heart attack     Hypertension     Pneumonia      Past Surgical History:   Procedure Laterality Date    APPENDECTOMY      CARDIAC CATHETERIZATION      7 stents    CARDIAC DEFIBRILLATOR PLACEMENT      CATARACT EXTRACTION Bilateral 2011    COLONOSCOPY N/A 8/10/2018    Procedure: COLONOSCOPY golytely;  Surgeon: Valentine Burger MD;  Location: [facility] ENDO;  Service: Endoscopy;  Laterality: N/A;    CORONARY ANGIOGRAPHY N/A 11/11/2024    Procedure: ANGIOGRAM, CORONARY ARTERY;  Surgeon: Luis Felipe Wright MD;  Location: [facility] CATH LAB/EP;  Service: Cardiology;  Laterality: N/A;    EYE SURGERY      INSTANTANEOUS WAVE-FREE RATIO (IFR) N/A 11/11/2024    Procedure: Instantaneous Wave-Free Ratio (IFR);  Surgeon: Luis Felipe Wright MD;  Location: [facility] CATH LAB/EP;  Service: Cardiology;  Laterality: N/A;    LEFT HEART CATHETERIZATION Left 11/11/2024    Procedure: Left heart cath;  Surgeon: Luis Felipe Wright MD;  Location: [facility] CATH LAB/EP;  Service: Cardiology;  Laterality: Left;    REVISION OF IMPLANTABLE  "CARDIOVERTER-DEFIBRILLATOR (ICD) ELECTRODE LEAD PLACEMENT N/A 11/11/2019    Procedure: REVISION, INSERTION, ELECTRODE LEAD, ICD;  Surgeon: Shukri Walsh MD;  Location: [facility];  Service: Cardiology;  Laterality: N/A;  Lead malfxn, RV lead ICD, SJM, anes, DM, 3 PREP     Family History   Problem Relation Name Age of Onset    Heart disease Mother      Heart disease Father      Amblyopia Neg Hx      Blindness Neg Hx      Cataracts Neg Hx      Glaucoma Neg Hx      Macular degeneration Neg Hx      Retinal detachment Neg Hx      Strabismus Neg Hx       Social History[1]    OBJECTIVE:     Vital Signs (Most Recent)  Temp: 98.7 °F (37.1 °C) (03/24/25 0750)  Pulse: (!) 58 (03/24/25 0750)  Resp: 18 (03/24/25 0750)  BP: (!) 152/63 (03/24/25 0750)  SpO2: 95 % (03/24/25 0750)    Physical Exam:  Physical Exam  Vitals and nursing note reviewed.   Constitutional:       Appearance: Normal appearance.   HENT:      Head: Normocephalic and atraumatic.   Eyes:      General: No scleral icterus.     Extraocular Movements: Extraocular movements intact.   Cardiovascular:      Rate and Rhythm: Normal rate.   Pulmonary:      Effort: Pulmonary effort is normal.   Musculoskeletal:         General: Normal range of motion.      Cervical back: Normal range of motion.   Skin:     General: Skin is warm and dry.      Coloration: Skin is not jaundiced.   Neurological:      Mental Status: He is alert and oriented to person, place, and time.   Psychiatric:         Mood and Affect: Mood normal.         Behavior: Behavior normal.         Thought Content: Thought content normal.         Judgment: Judgment normal.         Laboratory  I have reviewed all pertinent lab results within the past 24 hours.  CBC:   Recent Labs   Lab 03/24/25  0617   WBC 6.41   RBC 3.66*   HGB 11.1*   HCT 34.2*      MCV 93   MCH 30.3   MCHC 32.5     Coagulation: No results for input(s): "LABPROT", "INR", "APTT" in the last 168 hours.    ASA/Mallampati  ASA: " 3  Mallampati: n/a    Imaging:  Recent imaging studies including  CT head  on 3/21 which was independently reviewed by Massiel Pal PA-C, agree no acute abnormality.     ASSESSMENT/PLAN:     Assessment:  74 y.o. male with a PMHx of diabetes type 2, coronary artery disease with multiple percutaneous coronary interventions, hypertension, hyperlipidemia, HFrEF 40-45% status post implantable cardioverter-defibrillator who has been referred to IR for myelogram as AICD is not MRI compatible and pt with new muscle weakness.  Pt does not have PSH spinal surgery.    The procedure, rationale for and against, goals of care (diagnosis), expectations and risks (including but not limited to, pain, bleeding, infection, damage to nearby structures, need for additional procedures), potential benefits, and alternatives were discussed with the patient.  All questions were answered to the best of my abilities.  The patient wishes to proceed.  Case discussed and imaging reviewed with Dr Reese and neurosurgery.    Plan:  Will proceed with myelogram Procedure order placed.  Pt DOES NOT need to be NPO.  Recent head imaging reviewed independently, no mass or acute abnormalities noted.  Pt is not on isolation.    Anticoagulation history reviewed. No antiplatelets need to be withheld, SIR guidelines.  Plts WNL.  Allergies reviewed.  Pt does not have allergy to betadine or contrast.      Thank you for the consult. Please contact with questions via [employer] secure chat.    Massiel Pal PA-C  Interventional Radiology        [1]   Social History  Tobacco Use    Smoking status: Former    Smokeless tobacco: Never   Substance Use Topics    Alcohol use: Yes     Comment: socially    Drug use: No

## 2025-03-24 NOTE — PROCEDURES
Radiology Post-Procedure Note     Pre Op Diagnosis: Back pain  Post Op Diagnosis: Same     Procedure: Fluoroscopic guided lumbar myelogram     Procedure performed by: Frank Reese MD     Written Informed Consent Obtained: Yes  Specimen Removed: NO  Estimated Blood Loss: Minimal     Findings:   Thecal sac punctured at L4-L5 level with a 20g spinal needle, 10 mL of Omnipaque-300 injected. Patient to present to CT for CT  myelogram.     Patient tolerated procedure well.     Frank Reese MD  Interventional Radiology  Department of Radiology

## 2025-03-24 NOTE — PT/OT/SLP PROGRESS
Occupational Therapy   Treatment    Name: Clifton Wilson  MRN: [id number]  Admitting Diagnosis:  Hypotension       Recommendations:     Discharge Recommendations: Low Intensity Therapy (OP PT)  Discharge Equipment Recommendations:  none  Barriers to discharge:  None    Assessment:     Clifton Wilson is a 74 y.o. male with a medical diagnosis of Hypotension.  Performance deficits affecting function are decreased coordination, gait instability, impaired balance, decreased lower extremity function.     Rehab Prognosis:  Good; patient would benefit from acute skilled OT services to address these deficits and reach maximum level of function.       Plan:     Patient to be seen 5 x/week to address the above listed problems via self-care/home management, therapeutic activities, therapeutic exercises  Plan of Care Expires: 04/20/25  Plan of Care Reviewed with: patient    Subjective     Chief Complaint: none; feeling much better  Patient/Family Comments/goals: return to PLOF  Pain/Comfort:  Pain Rating 1: 0/10  Pain Rating Post-Intervention 1: 0/10    Objective:     Communicated with: nurseLillian prior to session.  Patient found  in bathroom  with telemetry upon OT entry to room.    General Precautions: Standard, fall    Orthopedic Precautions:N/A  Braces: N/A  Respiratory Status: Room air    Functional Mobility/Transfers:  Patient completed Sit <> Stand Transfer with supervision  with  no assistive device   Functional Mobility: ambulated in room and hallway, [facility] AD, with SBA; noted inattention and decreased safety awareness    Activities of Daily Living:  Grooming: modified independence wash hands at sink  Toileting: modified independence able to perform katie care and manage underwear    [facility] 6 Click ADL: 22    Treatment & Education:  Educated on purpose/role of OT  Patient ambulatory in room (nsg confirmed this and is aware)  Discussed ADL and mobility safety, as well as update to post-acute recs of OP PT  All  questions answered in OT scope    Patient left up in chair with all lines intact, call button in reach, and nsg notified    GOALS:   Multidisciplinary Problems       Occupational Therapy Goals       Not on file              Multidisciplinary Problems (Resolved)          Problem: Occupational Therapy    Goal Priority Disciplines Outcome Interventions   Occupational Therapy Goal   (Resolved)     OT, PT/OT Met    Description: Goals to be met by: 04/20/2025     Patient will increase functional independence with ADLs by performing:    Toileting from bedside commode with Stand-by Assistance for hygiene and clothing management.   Supine to sit with Stand-by Assistance.  Step transfer with Minimal Assistance  Toilet transfer to bedside commode with Minimal Assistance.                         DME Justifications:  No DME recommended requiring DME justifications    Time Tracking:     OT Date of Treatment: 03/24/25  OT Start Time: 1057  OT Stop Time: 1114  OT Total Time (min): 17 min    Billable Minutes:Self Care/Home Management 9  Therapeutic Activity 8          Number of OMAIRA visits since last OT visit: 0    3/24/2025

## 2025-03-24 NOTE — PLAN OF CARE
Problem: Physical Therapy  Goal: Physical Therapy Goal  Description: Goals to be met by: 25     Patient will increase functional independence with mobility by performin. Supine to sit with independence  2. Sit to supine with independence  3. Sit to stand transfer with [place] with no AD  4. Ambulation 150ft with no AD and independence    Outcome: Progressing     PT/OT co-session to safely progress pt's functional mobility due to requiring x2 person assistance on prior evaluation. Pt at this time SBA with mobility with no AD. PT recommending low intensity therapy- OP PT.

## 2025-03-24 NOTE — PT/OT/SLP PROGRESS
"Physical Therapy Treatment    Patient Name:  Clifton Wilson   MRN:  [id number]    Recommendations:     Discharge Recommendations: Low Intensity Therapy (OP PT)  Discharge Equipment Recommendations: none  Barriers to discharge: None    Assessment:     Clifton Wilson is a 74 y.o. male admitted with a medical diagnosis of Hypotension.  He presents with the following impairments/functional limitations: gait instability, impaired balance, decreased safety awareness.    PT/OT co-session to safely progress pt's functional mobility due to requiring x2 person assistance on prior evaluation. Pt at this time SBA with mobility with no AD. PT recommending low intensity therapy- OP PT.     Rehab Prognosis: Good; patient would benefit from acute skilled PT services to address these deficits and reach maximum level of function.    Recent Surgery: * No surgery found *      Plan:     During this hospitalization, patient to be seen 2 x/week to address the identified rehab impairments via therapeutic activities, gait training, therapeutic exercises, neuromuscular re-education and progress toward the following goals:    Plan of Care Expires:  04/20/25    Subjective     Chief Complaint: "feeling much better"  Patient/Family Comments/goals: to return to PLOF at home  Pain/Comfort:  Pain Rating 1: 0/10  Pain Rating Post-Intervention 1: 0/10      Objective:     Communicated with Nurse prior to session.  Patient found ambulatory in room/trammell/in bathroom with telemetry upon PT entry to room.     General Precautions: Standard, fall  Orthopedic Precautions: N/A  Braces: N/A  Respiratory Status: Room air     Functional Mobility:  Transfers:     Sit to Stand:  supervision with no AD  Toilet Transfer: supervision with  no AD  using  Step Transfer  Gait: ~150ft with no AD and SBA; verbal cues to slow down when completing turns to improve balance/stability and safety      AM-PAC 6 CLICK MOBILITY  Turning over in bed (including adjusting " bedclothes, sheets and blankets)?: 3  Sitting down on and standing up from a chair with arms (e.g., wheelchair, bedside commode, etc.): 3  Moving from lying on back to sitting on the side of the bed?: 3  Moving to and from a bed to a chair (including a wheelchair)?: 3  Need to walk in hospital room?: 3  Climbing 3-5 steps with a railing?: 3  Basic Mobility Total Score: 18       Treatment & Education:  Pt educated on use of call button for mobility assistance in room; pt understanding.     Patient left up in chair with all lines intact, call button in reach, Nurse notified, and Nurse present.    GOALS:   Multidisciplinary Problems       Physical Therapy Goals          Problem: Physical Therapy    Goal Priority Disciplines Outcome Interventions   Physical Therapy Goal     PT, PT/OT Progressing    Description: Goals to be met by: 25     Patient will increase functional independence with mobility by performin. Supine to sit with independence  2. Sit to supine with independence  3. Sit to stand transfer with [place] with no AD  4. Ambulation 150ft with no AD and independence                           Time Tracking:     PT Received On: 25  PT Start Time: 1057     PT Stop Time: 1112  PT Total Time (min): 15 min With OT    Billable Minutes: Gait Training 10    Treatment Type: Treatment  PT/PTA: PT     Number of PTA visits since last PT visit: 0     2025
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
## 2025-03-24 NOTE — SUBJECTIVE & OBJECTIVE
Interval History: Feeling better, ambulating well,     Review of Systems   Constitutional:  Negative for chills, fatigue and fever.   Respiratory:  Negative for cough, choking, chest tightness and shortness of breath.    Cardiovascular:  Positive for leg swelling. Negative for chest pain and palpitations.   Gastrointestinal:  Negative for abdominal distention, abdominal pain, anal bleeding, diarrhea, nausea and vomiting.   Genitourinary:  Negative for difficulty urinating and dysuria.   Musculoskeletal:  Positive for arthralgias.   Neurological:  Negative for dizziness, syncope, speech difficulty, weakness and headaches.   Psychiatric/Behavioral:  Negative for agitation and confusion.      Objective:     Vital Signs (Most Recent):  Temp: 98 °F (36.7 °C) (03/24/25 1144)  Pulse: 75 (03/24/25 1445)  Resp: 18 (03/24/25 1445)  BP: (!) 143/79 (03/24/25 1445)  SpO2: 96 % (03/24/25 1445) Vital Signs (24h Range):  Temp:  [97.6 °F (36.4 °C)-98.7 °F (37.1 °C)] 98 °F (36.7 °C)  Pulse:  [58-87] 75  Resp:  [13-20] 18  SpO2:  [93 %-98 %] 96 %  BP: (125-177)/(54-89) 143/79     Weight: 100.2 kg (221 lb)  Body mass index is 30.82 kg/m².    Intake/Output Summary (Last 24 hours) at 3/24/2025 1540  Last data filed at 3/24/2025 0421  Gross per 24 hour   Intake --   Output 1900 ml   Net -1900 ml         Physical Exam  Constitutional:       General: He is not in acute distress.     Appearance: Normal appearance. He is obese. He is not ill-appearing or toxic-appearing.   Eyes:      Extraocular Movements: Extraocular movements intact.      Conjunctiva/sclera: Conjunctivae normal.   Cardiovascular:      Rate and Rhythm: Normal rate and regular rhythm.   Pulmonary:      Effort: Pulmonary effort is normal. No respiratory distress.      Breath sounds: Normal breath sounds. No wheezing or rales.   Abdominal:      General: There is no distension.      Tenderness: There is no abdominal tenderness. There is no guarding.   Musculoskeletal:          General: Normal range of motion.      Right lower leg: Edema present.      Left lower leg: Edema present.      Comments: L knee swelling is improved   Skin:     General: Skin is warm and dry.      Coloration: Skin is not jaundiced or pale.   Neurological:      General: No focal deficit present.      Mental Status: He is alert and oriented to person, place, and time.      Cranial Nerves: No cranial nerve deficit.   Psychiatric:         Mood and Affect: Mood normal.         Behavior: Behavior normal.               Significant Labs: All pertinent labs within the past 24 hours have been reviewed.  CBC:   Recent Labs   Lab 03/23/25  0658 03/24/25  0617   WBC 8.53 6.41   HGB 11.4* 11.1*   HCT 34.3* 34.2*    291     CMP:   Recent Labs   Lab 03/23/25  0658 03/24/25  0617    140   K 4.0 4.0    105   CO2 24 26   BUN 28* 31*   CREATININE 1.0 1.0   CALCIUM 9.2 8.9   ANIONGAP 9 9       Significant Imaging: I have reviewed all pertinent imaging results/findings within the past 24 hours.

## 2025-03-24 NOTE — PLAN OF CARE
Pt VSS and in NAD. CRM equipment removed. Pt questions and concerns addressed. Report given to PADMINI Snyder. No further needs at this time. Pt discharged from recovery area at 1420.      Parent(s)/Self

## 2025-03-24 NOTE — PLAN OF CARE
Problem: Occupational Therapy  Goal: Occupational Therapy Goal  Description: Goals to be met by: 04/20/2025     Patient will increase functional independence with ADLs by performing:    Toileting from bedside commode with Stand-by Assistance for hygiene and clothing management.   Supine to sit with Stand-by Assistance.  Step transfer with Minimal Assistance  Toilet transfer to bedside commode with Minimal Assistance.    Outcome: Met     All goals met this date. Patient with significant improvement from previous session.   Rec D/C OT and follow-up OP PT, after acute stay.

## 2025-03-25 ENCOUNTER — PATIENT MESSAGE (OUTPATIENT)
Dept: FAMILY MEDICINE | Facility: CLINIC | Age: 75
End: 2025-03-25
Payer: MEDICARE

## 2025-03-25 VITALS
TEMPERATURE: 98 F | WEIGHT: 221 LBS | DIASTOLIC BLOOD PRESSURE: 81 MMHG | SYSTOLIC BLOOD PRESSURE: 153 MMHG | RESPIRATION RATE: 18 BRPM | BODY MASS INDEX: 30.94 KG/M2 | HEART RATE: 100 BPM | OXYGEN SATURATION: 98 % | HEIGHT: 71 IN

## 2025-03-25 LAB
ABSOLUTE EOSINOPHIL (OHS): 0.09 K/UL
ABSOLUTE MONOCYTE (OHS): 0.72 K/UL (ref 0.3–1)
ABSOLUTE NEUTROPHIL COUNT (OHS): 3.25 K/UL (ref 1.8–7.7)
ANION GAP (OHS): 10 MMOL/L (ref 8–16)
BASOPHILS # BLD AUTO: 0.03 K/UL
BASOPHILS NFR BLD AUTO: 0.5 %
BUN SERPL-MCNC: 33 MG/DL (ref 8–23)
CALCIUM SERPL-MCNC: 8.9 MG/DL (ref 8.7–10.5)
CHLORIDE SERPL-SCNC: 105 MMOL/L (ref 95–110)
CO2 SERPL-SCNC: 25 MMOL/L (ref 23–29)
COPPER SERPL-MCNC: 1232 UG/L (ref 665–1480)
CREAT SERPL-MCNC: 1.1 MG/DL (ref 0.5–1.4)
DEPRECATED GD1B DISIALYL IGG SER QL: NEGATIVE
DEPRECATED GD1B DISIALYL IGM SER QL: NEGATIVE
ERYTHROCYTE [DISTWIDTH] IN BLOOD BY AUTOMATED COUNT: 12.5 % (ref 11.5–14.5)
GFR SERPLBLD CREATININE-BSD FMLA CKD-EPI: >60 ML/MIN/1.73/M2
GLUCOSE SERPL-MCNC: 174 MG/DL (ref 70–110)
GM1 GANGL IGG SER QL: NEGATIVE
GM1 GANGL IGM SER QL: NEGATIVE
GQ1B GANGL IGG TITR SER: NEGATIVE {TITER}
HCT VFR BLD AUTO: 35 % (ref 40–54)
HGB BLD-MCNC: 11.5 GM/DL (ref 14–18)
IMM GRANULOCYTES # BLD AUTO: 0.02 K/UL (ref 0–0.04)
IMM GRANULOCYTES NFR BLD AUTO: 0.4 % (ref 0–0.5)
IMMUNOLOGIST REVIEW: NORMAL
LYMPHOCYTES # BLD AUTO: 1.53 K/UL (ref 1–4.8)
MCH RBC QN AUTO: 30.7 PG (ref 27–50)
MCHC RBC AUTO-ENTMCNC: 32.9 G/DL (ref 32–36)
MCV RBC AUTO: 93 FL (ref 82–98)
NUCLEATED RBC (/100WBC) (OHS): 0 /100 WBC
PLATELET # BLD AUTO: 309 K/UL (ref 150–450)
PMV BLD AUTO: 10.7 FL (ref 9.2–12.9)
POCT GLUCOSE: 178 MG/DL (ref 70–110)
POTASSIUM SERPL-SCNC: 3.8 MMOL/L (ref 3.5–5.1)
RBC # BLD AUTO: 3.75 M/UL (ref 4.6–6.2)
RELATIVE EOSINOPHIL (OHS): 1.6 %
RELATIVE LYMPHOCYTE (OHS): 27.1 % (ref 18–48)
RELATIVE MONOCYTE (OHS): 12.8 % (ref 4–15)
RELATIVE NEUTROPHIL (OHS): 57.6 % (ref 38–73)
SODIUM SERPL-SCNC: 140 MMOL/L (ref 136–145)
WBC # BLD AUTO: 5.64 K/UL (ref 3.9–12.7)

## 2025-03-25 PROCEDURE — 85025 COMPLETE CBC W/AUTO DIFF WBC: CPT | Mod: HCNC | Performed by: HOSPITALIST

## 2025-03-25 PROCEDURE — 25000003 PHARM REV CODE 250: Mod: HCNC | Performed by: HOSPITALIST

## 2025-03-25 PROCEDURE — G0378 HOSPITAL OBSERVATION PER HR: HCPCS | Mod: HCNC

## 2025-03-25 PROCEDURE — 80048 BASIC METABOLIC PNL TOTAL CA: CPT | Mod: HCNC | Performed by: HOSPITALIST

## 2025-03-25 PROCEDURE — 25000003 PHARM REV CODE 250: Mod: HCNC | Performed by: INTERNAL MEDICINE

## 2025-03-25 PROCEDURE — 36415 COLL VENOUS BLD VENIPUNCTURE: CPT | Mod: HCNC | Performed by: HOSPITALIST

## 2025-03-25 RX ORDER — HYDRALAZINE HYDROCHLORIDE 50 MG/1
50 TABLET, FILM COATED ORAL EVERY 8 HOURS
Qty: 90 TABLET | Refills: 11 | Status: SHIPPED | OUTPATIENT
Start: 2025-03-25 | End: 2026-03-25

## 2025-03-25 RX ORDER — BISACODYL 10 MG/1
10 SUPPOSITORY RECTAL DAILY PRN
Qty: 12 SUPPOSITORY | Refills: 0 | Status: SHIPPED | OUTPATIENT
Start: 2025-03-25 | End: 2025-04-01

## 2025-03-25 RX ORDER — INSULIN GLARGINE 100 [IU]/ML
35 INJECTION, SOLUTION SUBCUTANEOUS NIGHTLY
Qty: 15 ML | Refills: 0 | Status: SHIPPED | OUTPATIENT
Start: 2025-03-25 | End: 2025-05-08

## 2025-03-25 RX ORDER — COLCHICINE 0.6 MG/1
0.6 TABLET ORAL 2 TIMES DAILY
Qty: 6 TABLET | Refills: 0 | Status: SHIPPED | OUTPATIENT
Start: 2025-03-25 | End: 2025-03-26 | Stop reason: SDUPTHER

## 2025-03-25 RX ORDER — GABAPENTIN 300 MG/1
600 CAPSULE ORAL 2 TIMES DAILY
Qty: 360 CAPSULE | Refills: 1 | Status: SHIPPED | OUTPATIENT
Start: 2025-03-25

## 2025-03-25 RX ORDER — INSULIN ASPART 100 [IU]/ML
15 INJECTION, SOLUTION INTRAVENOUS; SUBCUTANEOUS
Qty: 15 ML | Refills: 0 | Status: SHIPPED | OUTPATIENT
Start: 2025-03-25 | End: 2025-04-29

## 2025-03-25 RX ADMIN — MAGNESIUM OXIDE TAB 400 MG (241.3 MG ELEMENTAL MG) 400 MG: 400 (241.3 MG) TAB at 08:03

## 2025-03-25 RX ADMIN — COLCHICINE 0.6 MG: 0.6 TABLET ORAL at 08:03

## 2025-03-25 RX ADMIN — GABAPENTIN 600 MG: 300 CAPSULE ORAL at 08:03

## 2025-03-25 RX ADMIN — CYANOCOBALAMIN TAB 1000 MCG 1000 MCG: 1000 TAB at 08:03

## 2025-03-25 RX ADMIN — HYDRALAZINE HYDROCHLORIDE 50 MG: 25 TABLET ORAL at 06:03

## 2025-03-25 RX ADMIN — BUMETANIDE 1 MG: 1 TABLET ORAL at 08:03

## 2025-03-25 RX ADMIN — LOSARTAN POTASSIUM 50 MG: 50 TABLET, FILM COATED ORAL at 08:03

## 2025-03-25 RX ADMIN — INSULIN ASPART 2 UNITS: 100 INJECTION, SOLUTION INTRAVENOUS; SUBCUTANEOUS at 06:03
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
## 2025-03-25 NOTE — PT/OT/SLP DISCHARGE
Immediate Brief Procedure Note    Patient: Saman Nuno    Pre-op Diagnosis: SAH    Post-op Diagnosis: Left PCOM aneurysm    Procedure: Three vessel angiogram, coil embolization    Proceduralist: Nilda Bazan MD    Assistants: Collins Bess MD    Anesthesia Staff: Anesthesiologist: Alissa Mohr MD; Artem Elaine MD    Anesthesia Type: GA    Findings: A small left PCOM aneurysm with minimal residual neck, measuring approximately 1.88 mm x 4 mm, neck measuring approximately 1 mm with small residual neck    Estimated Blood Loss: 50 ml       Occupational Therapy Discharge Summary    Clifton Wilson  MRN: [id number]   Principal Problem: Hypotension      Patient Discharged from acute Occupational Therapy on 03/25/2025.  Please refer to prior OT notes for functional status.    Assessment:      Patient has met all goals and is not appropriate for therapy.    Objective:     GOALS:   Multidisciplinary Problems       Occupational Therapy Goals       Not on file              Multidisciplinary Problems (Resolved)          Problem: Occupational Therapy    Goal Priority Disciplines Outcome Interventions   Occupational Therapy Goal   (Resolved)     OT, PT/OT Met    Description: Goals to be met by: 04/20/2025     Patient will increase functional independence with ADLs by performing:    Toileting from bedside commode with Stand-by Assistance for hygiene and clothing management.   Supine to sit with Stand-by Assistance.  Step transfer with Minimal Assistance  Toilet transfer to bedside commode with Minimal Assistance.                         Reasons for Discontinuation of Therapy Services  Satisfactory goal achievement.      Plan:     Patient Discharged to: Home with Home Health Service    3/25/2025
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
## 2025-03-25 NOTE — PROGRESS NOTES
NEUROSURGICAL PROGRESS NOTE    DATE OF SERVICE:  03/25/2025    ATTENDING PHYSICIAN:  Shukri Clifford MD    SUBJECTIVE:    INTERIM HISTORY:    This is a very pleasant 74 y.o. male, admitted for upper extremity weakness.  Reports doing better after receiving steroid shots in his knee.  Denies having significant gait imbalance.              PAST MEDICAL HISTORY:  Active Ambulatory Problems     Diagnosis Date Noted    Type 2 diabetes mellitus with neurologic complication 12/04/2015    Mitral regurgitation 12/05/2015    Chronic combined systolic and diastolic congestive heart failure 12/07/2015    Coronary artery disease of native artery with stable angina pectoris 12/08/2015    Ventricular tachycardia, nonsustained post-MI 12/08/2015    Hypokalemia 12/09/2015    Dyslipidemia 02/11/2016    Coronary artery disease of native artery of native heart with stable angina pectoris 02/22/2016    Type 2 diabetes mellitus with diabetic neuropathy     Diabetes mellitus type 2 without retinopathy 03/11/2016    Status post intraocular lens implant 03/11/2016    Bilateral posterior capsular opacification 03/11/2016    DM type 2 without retinopathy 04/24/2017    Essential hypertension 04/24/2017    Pseudophakia 04/24/2017    PCO (posterior capsular opacification), right 05/08/2017    Post-operative state 05/08/2017    Ischemic cardiomyopathy 06/06/2017    ICD (implantable cardioverter-defibrillator), single, in situ 09/19/2017    Erectile dysfunction 04/25/2018    Vitreous detachment 06/11/2018    Dyslipidemia associated with type 2 diabetes mellitus 09/24/2018    Hypertension associated with diabetes 09/24/2018    Insulin dependent type 2 diabetes mellitus 12/20/2018    Thrombocytopenia 02/19/2020    Chronic pain of left knee 09/13/2021    Difficulty walking 09/13/2021    PAD (peripheral artery disease) 07/11/2023    Olecranon bursitis, right elbow 02/01/2024    Sensorineural hearing loss, bilateral 02/01/2024    NSTEMI (non-ST  elevated myocardial infarction) 11/22/2024    Hypertension 11/22/2024    Syncope and collapse 01/16/2025     Resolved Ambulatory Problems     Diagnosis Date Noted    Acute decompensated heart failure 12/03/2015    Pneumonitis 12/04/2015    Shortness of breath 12/23/2015    Chronic combined systolic and diastolic CHF (congestive heart failure) 02/11/2016    BMI 31.0-31.9,adult 02/11/2016    BMI 32.0-32.9,adult 03/10/2016    Obesity, Class I, BMI 30.0-34.9 (see actual BMI) 08/25/2016    Chest pain 11/06/2016    Severe obesity (BMI 35.0-35.9 with comorbidity) 03/22/2017    Chest pain 04/23/2018    Edema 04/25/2018    Screening for malignant neoplasm of colon 08/10/2018    Morbid obesity 09/24/2018    Severe obesity (BMI 35.0-35.9 with comorbidity) 12/20/2018    Severe obesity (BMI 35.0-39.9) with comorbidity 03/20/2019    Severe obesity (BMI 35.0-39.9) with comorbidity 07/18/2019    Severe obesity (BMI 35.0-39.9) with comorbidity 07/18/2019    Implanted defibrillator electrode lead fracture     Pyelonephritis 03/26/2023    Gram-negative bacteremia 03/26/2023    Chest discomfort 11/08/2024     Past Medical History:   Diagnosis Date    Anticoagulant long-term use     Cardiomyopathy     CHF (congestive heart failure)     Coronary artery disease     Diabetes mellitus     Gout     Heart attack     Pneumonia        PAST SURGICAL HISTORY:  Past Surgical History:   Procedure Laterality Date    APPENDECTOMY      CARDIAC CATHETERIZATION      7 stents    CARDIAC DEFIBRILLATOR PLACEMENT      CATARACT EXTRACTION Bilateral 2011    COLONOSCOPY N/A 8/10/2018    Procedure: COLONOSCOPY golytely;  Surgeon: Valentine Burger MD;  Location: [facility] ENDO;  Service: Endoscopy;  Laterality: N/A;    CORONARY ANGIOGRAPHY N/A 11/11/2024    Procedure: ANGIOGRAM, CORONARY ARTERY;  Surgeon: Luis Felipe Wright MD;  Location: [facility] CATH LAB/EP;  Service: Cardiology;  Laterality: N/A;    EYE SURGERY      INSTANTANEOUS WAVE-FREE RATIO (IFR) N/A  11/11/2024    Procedure: Instantaneous Wave-Free Ratio (IFR);  Surgeon: Luis Felipe Wright MD;  Location: [facility] CATH LAB/EP;  Service: Cardiology;  Laterality: N/A;    LEFT HEART CATHETERIZATION Left 11/11/2024    Procedure: Left heart cath;  Surgeon: Luis Felipe Wright MD;  Location: [facility] CATH LAB/EP;  Service: Cardiology;  Laterality: Left;    REVISION OF IMPLANTABLE CARDIOVERTER-DEFIBRILLATOR (ICD) ELECTRODE LEAD PLACEMENT N/A 11/11/2019    Procedure: REVISION, INSERTION, ELECTRODE LEAD, ICD;  Surgeon: Shukri Walsh MD;  Location: [facility] EP LAB;  Service: Cardiology;  Laterality: N/A;  Lead malfxn, RV lead ICD, SJM, anes, DM, 3 PREP       SOCIAL HISTORY:   Social History[1]    FAMILY HISTORY:  Family History   Problem Relation Name Age of Onset    Heart disease Mother      Heart disease Father      Amblyopia Neg Hx      Blindness Neg Hx      Cataracts Neg Hx      Glaucoma Neg Hx      Macular degeneration Neg Hx      Retinal detachment Neg Hx      Strabismus Neg Hx         CURRENTS MEDICATIONS:  Medications Ordered Prior to Encounter[2]    ALLERGIES:  Review of patient's allergies indicates:  No Known Allergies    REVIEW OF SYSTEMS:  ROS      OBJECTIVE:    PHYSICAL EXAMINATION:   Vitals:    03/25/25 0730   BP: (!) 153/81   Pulse: 62   Resp: 18   Temp: 98 °F (36.7 °C)       Physical Exam:    Neurological:        DTRs: Brachioradialis reflexes are 1+ on the right side and 1+ on the left side. Patellar reflexes are 1+ on the right side and 1+ on the left side.       Right Shoulder Exam     Muscle Strength   Abduction: 5/5       Left Shoulder Exam     Muscle Strength   Abduction: 5/5              SI joint:   Palpation at the right and left SI joints not painful  JAYSON test is negative bilaterally  Gaenslen test is negative bilaterally  Thigh thrust test is negative bilaterally    Neurological Exam    Motor                                               Right                     Left   Shoulder abduction                5                          5  Elbow flexion                         5                          5  Elbow extension                    4-                          4-  Finger extension                    4-                          4-  Hip flexion                              5                          5    Reflexes                                            Right                      Left  Brachioradialis                    1+                         1+  Patellar                                1+                         1+    Right pathological reflexes: Zoya's absent. Ankle clonus absent.  Left pathological reflexes: Zoya's absent. Ankle clonus absent.        DIAGNOSTIC DATA:  I personally interpreted the following imaging:   CT myelogram of the cervical, thoracic and lumbar spine reviewed showing moderate-to-severe spinal stenosis at C3-4 and C4-5    ASSESSMENT:  This is a 74 y.o. male with     Problem List Items Addressed This Visit          Neuro    Transient confusion       Cardiac/Vascular    * (Principal) Hypotension    Relevant Orders    Ambulatory referral/consult to Cardiology       Renal/    Hypokalemia    Hypomagnesemia    Relevant Orders    Magnesium (Completed)       Orthopedic    Muscle weakness - Primary    Relevant Orders    Vitamin B1    Vitamin B12 Deficiency Panel (Completed)    Copper, serum (Completed)    Protein electrophoresis, serum (Completed)    Immunofixation electrophoresis (Completed)    HIV 1/2 Ag/Ab (4th Gen) (Completed)    Treponema Pallidium Antibodies IgG, IgM (Completed)    Cryoglobulin    Ganglioside Antibody Panel, Serum    Folate (Completed)    Hepatic function panel (Completed)    Arthritis of knee     Other Visit Diagnoses         Near syncope        Relevant Orders    EKG 12-lead (Completed)    X-Ray Chest AP Portable (Completed)      Chest pain        Relevant Orders    EKG 12-lead      CHF (congestive heart failure)        Relevant Orders    Echo Saline Bubble?  Yes (Completed)      Muscle weakness of upper extremity        Relevant Orders    Ambulatory referral/consult to Neurology      Spinal stenosis in cervical region        Relevant Orders    Inpatient consult to Interventional Radiology (Completed)    IR MYELOGRAM CERVICAL, INJECTION ONLY WITH CT TO FOLLOW (XPD)    IR MYELOGRAM THORACIC, INJECTION ONLY WITH CT TO FOLLOW (XPD)    IR MYELOGRAM LUMBAR, INJECTION ONLY WITH CT TO FOLLOW (XPD)      Cervical myelopathy        Relevant Orders    Inpatient consult to Interventional Radiology (Completed)    IR MYELOGRAM CERVICAL, INJECTION ONLY WITH CT TO FOLLOW (XPD)    IR MYELOGRAM THORACIC, INJECTION ONLY WITH CT TO FOLLOW (XPD)    IR MYELOGRAM LUMBAR, INJECTION ONLY WITH CT TO FOLLOW (XPD)      Knee effusion, left        Relevant Orders    Ambulatory referral/consult to Hand Surgery              PLAN:  Patient appears to have chronic myelopathy but his bilateral upper extremity weakness has improved since admission and the patient reports to be back to his baseline level.    No emergent surgical intervention necessary at this time.    We will schedule outpatient follow up with Neurosurgery.    All questions answered          Shukri Clifford MD  Cell:[phone]         [1]   Social History  Socioeconomic History    Marital status:    Tobacco Use    Smoking status: Former    Smokeless tobacco: Never   Substance and Sexual Activity    Alcohol use: Yes     Comment: socially    Drug use: No    Sexual activity: Yes     Social Drivers of Health     Financial Resource Strain: Low Risk  (3/20/2025)    Overall Financial Resource Strain (CARDIA)     Difficulty of Paying Living Expenses: Not hard at all   Food Insecurity: No Food Insecurity (3/20/2025)    Hunger Vital Sign     Worried About Running Out of Food in the Last Year: Never true     Ran Out of Food in the Last Year: Never true   Transportation Needs: No Transportation Needs (1/17/2025)    TRANSPORTATION NEEDS      Transportation : No   Physical Activity: Insufficiently Active (11/19/2024)    Exercise Vital Sign     Days of Exercise per Week: 3 days     Minutes of Exercise per Session: 20 min   Stress: No Stress Concern Present (3/20/2025)    [employer] - Occupational Stress Questionnaire     Feeling of Stress : Not at all   Housing Stability: Low Risk  (3/20/2025)    Housing Stability Vital Sign     Unable to Pay for Housing in the Last Year: No     Homeless in the Last Year: No   [2]   No current facility-administered medications on file prior to encounter.     Current Outpatient Medications on File Prior to Encounter   Medication Sig Dispense Refill    aspirin (ECOTRIN) 81 MG EC tablet Take 81 mg by mouth once daily.      atorvastatin (LIPITOR) 40 MG tablet Take 1 tablet (40 mg total) by mouth once daily. 90 tablet 3    bumetanide (BUMEX) 1 MG tablet Take 1 tablet (1 mg total) by mouth once daily. (Patient taking differently: Take 1 mg by mouth once daily. Taking in am) 30 tablet 11    clopidogreL (PLAVIX) 75 mg tablet Take 1 tablet (75 mg total) by mouth once daily. 90 tablet 3    colchicine (MITIGARE) 0.6 mg Cap Take 1 capsule (0.6 mg total) by mouth once daily. 90 capsule 3    cyanocobalamin (VITAMIN B-12) 1000 MCG tablet Take 1,000 mcg by mouth once daily.      empagliflozin (JARDIANCE) 10 mg tablet Take 1 tablet (10 mg total) by mouth once daily. 30 tablet 11    furosemide (LASIX) 20 MG tablet Take 20 mg by mouth once daily. In afternoon      gabapentin (NEURONTIN) 300 MG capsule Take 2 capsules (600 mg total) by mouth 2 (two) times daily. 360 capsule 1    insulin aspart U-100 (NOVOLOG FLEXPEN U-100 INSULIN) 100 unit/mL (3 mL) InPn pen Inject 18 Units into the skin 3 (three) times daily with meals. (Patient taking differently: Inject 15 Units into the skin 3 (three) times daily with meals.) 45 mL 5    insulin glargine U-100, Lantus, (LANTUS SOLOSTAR U-100 INSULIN) 100 unit/mL (3 mL) InPn  "pen Inject 40 Units into the skin every evening. (Patient taking differently: Inject 35 Units into the skin every evening.) 30 mL 1    losartan (COZAAR) 50 MG tablet Take 1 tablet (50 mg total) by mouth once daily. 90 tablet 3    metFORMIN (GLUCOPHAGE) 1000 MG tablet Take 1 tablet (1,000 mg total) by mouth 2 (two) times daily with meals. 180 tablet 4    multivit-minerals/FA/lycopene (ONE-A-DAY MEN'S 50 PLUS ORAL) Take 1 tablet by mouth once daily.      zolpidem (AMBIEN) 5 MG Tab TAKE 1 TABLET BY MOUTH EVERY NIGHT AS NEEDED (Patient taking differently: Take 5 mg by mouth nightly as needed.) 90 tablet 3    alcohol swabs (BD ALCOHOL SWABS) PadM USE FOUR TIMES DAILY 400 each 3    blood glucose control high,low (ACCU-CHEK CATHRYN CONTROL SOLN) Soln Use as directed to check blood sugar 1 each 1    blood sugar diagnostic Strp test blood sugar as directed four times daily 100 each 3    blood-glucose meter (TRUE METRIX GLUCOSE METER) Misc use as directed 1 each 0    lancets 30 gauge Misc usea s directed to check blood glucose four times daily 100 each 3    nitroGLYCERIN (NITROSTAT) 0.4 MG SL tablet Place 1 tablet (0.4 mg total) under the tongue every 5 (five) minutes as needed for Chest pain. 25 tablet 3    pen needle, diabetic (BD ULTRA-FINE SINA PEN NEEDLE) 32 gauge x 5/32" Ndle Use four times daily for insulin administration 400 each 3     "

## 2025-03-25 NOTE — PLAN OF CARE
Problem: Adult Inpatient Plan of Care  Goal: Plan of Care Review  Outcome: Progressing  Goal: Patient-Specific Goal (Individualized)  Outcome: Progressing  Goal: Absence of Hospital-Acquired Illness or Injury  Outcome: Progressing  Goal: Optimal Comfort and Wellbeing  Outcome: Progressing  Goal: Readiness for Transition of Care  Outcome: Progressing     Problem: Diabetes Comorbidity  Goal: Blood Glucose Level Within Targeted Range  Outcome: Progressing     Problem: Fall Injury Risk  Goal: Absence of Fall and Fall-Related Injury  Outcome: Progressing     Problem: Pain Acute  Goal: Optimal Pain Control and Function  Outcome: Progressing     Problem: Comorbidity Management  Goal: Blood Pressure in Desired Range  Outcome: Progressing  Goal: Maintenance of Heart Failure Symptom Control  Outcome: Progressing     Problem: Syncope  Goal: Absence of Syncopal Symptoms  Outcome: Progressing     Problem: Wound  Goal: Optimal Coping  Outcome: Progressing  Goal: Optimal Functional Ability  Outcome: Progressing  Goal: Absence of Infection Signs and Symptoms  Outcome: Progressing  Goal: Improved Oral Intake  Outcome: Progressing  Goal: Optimal Pain Control and Function  Outcome: Progressing  Goal: Skin Health and Integrity  Outcome: Progressing  Goal: Optimal Wound Healing  Outcome: Progressing

## 2025-03-25 NOTE — PLAN OF CARE
03/25/25 0945   Rounds   Attendance Provider;Nurse    Discharge Plan A Home Health       0945  Patient resting quietly in bed with spouse, Marie Gore ([phone]), at the bedside when [facility] participated in SIBR with Dr Rachel. CM informed pt & spouse

## 2025-03-25 NOTE — ASSESSMENT & PLAN NOTE
Concern that upper extremity weakness could be due to nerve impingement vs NMJ disorder such as Lambert-Eaton syndrome  CT cervical spine with contrast  There is advanced multilevel degenerative disc disease, noting marked disc height loss with sub endplate marrow changes and uncovertebral spurring. There is advanced multilevel facet joint arthropathy. Broad-based posterior disc osteophyte complexes noted. These findings contribute to moderate right-sided neural foraminal narrowing at C2-3, severe bilateral neural foraminal narrowing at C3-4, C4-5, right-side at C5-6, and left-sided at C6-7. There is moderate/ severe spinal canal stenosis at C3-4 and C4-5. Further evaluation could be performed with MRI, if indicated.       Neurosurgery consulted, recommended outpatient follow up

## 2025-03-25 NOTE — PLAN OF CARE
"0755  Rescheduled appt with Dr Bryan (cards) on 4/8/2025 at 1000 due to the pt's continued hospitalization noted. Information updated on the pt's discharge paperwork.     Pt pending HH acceptance.        03/25/25 0945   Rounds   Attendance Provider;Nurse    Discharge Plan A Home Health       0945  Patient resting quietly in bed with spouse, Marie Gore ([phone]), at the bedside when CM participated in SIBR with Dr Rachel. CM informed pt & spouse that he is medically stable to discharge home with HH. Both verbalized understanding & agreement. Spouse stated she will provide transportation at time of discharge. Awaitng HH orders.     CM informed both of rescheduled appt with Dr Bryan & that he will be notified of hospfu appts with neuro & neuro-surg. Both verbalized understanding.     1210  DC order noted. CM informed Nicole ([phone]) with [facility] of the pt's discharge status & questioned referral status. Awaiting response.     1225  CM was informed by Nicole that the pt has been accepted & that services will start on 3/28/2025. Information added to the pt's discharge paperwork.     1235  Message sent nurse Divya, charge nurse Stephanie, & virtual nurse Lorena informing that the pt is cleared to discharge.     1245  CM informed the pt via phone that he has been accepted by [facility]. Pt verbalized understanding. Phone consent obtained & "Pt Choice" form to be placed in the pt's chart.     1445  HH orders sent to [facility] via [facility].      Will continue to follow.   "

## 2025-03-25 NOTE — ASSESSMENT & PLAN NOTE
Recurrent episodes of hypotension, including a witnessed syncopal event with seizure-like activity (likely convulsive syncope).    DDx:    - Medication-related hypotension (likely): Patient had restarted Imdur (on his own accord) 1 week prior to presentation. Recent decrease in Coreg and losartan, as well as transition from Lasix to Bumex, may have contributed.  - Orthostatic hypotension (possible): Symptoms triggered by postural changes  - Neurogenic syncope (unlikely)    Resolved

## 2025-03-25 NOTE — ASSESSMENT & PLAN NOTE
Patient's most recent potassium results are listed below.   Recent Labs     03/23/25  0658 03/24/25  0617 03/25/25  0516   K 4.0 4.0 3.8     Resolved

## 2025-03-25 NOTE — PLAN OF CARE
[place] - Telemetry      HOME HEALTH ORDERS  FACE TO FACE ENCOUNTER    Patient Name: Clifton Wilson  YOB: 1950    PCP: Britton Grissom MD   PCP Address: [address] / [place] [address]  PCP Phone Number: [phone]  PCP Fax: [phone]    Encounter Date: 3/18/25    Admit to Home Health    Diagnoses:  Active Hospital Problems    Diagnosis  POA    *Hypotension [I95.9]  Yes    Cervical spondylolysis [M43.02]  Not Applicable    Arthritis of knee [M17.10]  Yes    Muscle weakness [M62.81]  Unknown    Transient confusion [R41.0]  Unknown    Hypomagnesemia [E83.42]  Yes    Gout [M10.9]  Yes    Confusion [R41.0]  Yes    ICD (implantable cardioverter-defibrillator), single, in situ [Z95.810]  Yes    Hypokalemia [E87.6]  Yes    Chronic combined systolic and diastolic congestive heart failure [I50.42]  Yes    Type 2 diabetes mellitus with neurologic complication [E11.49]  Yes     Peripheral neuropathy        Resolved Hospital Problems   No resolved problems to display.       Follow Up Appointments:  Future Appointments   Date Time Provider Department Center   4/8/2025 10:00 AM Luis Felipe Wright MD [facility] CARDIO [facility]   4/9/2025  2:30 PM [place] [facility]TR[facility] CARD [facility]   4/16/2025  2:30 PM Leonie Crump PA-C [facility] NEUROSU [facility]   7/7/2025  9:40 AM [place] Grissom MD [facility] FAM MED [facility]   7/25/2025  9:20 AM Genaro Flores MD [facility] NEURO [facility]       Allergies:Review of patient's allergies indicates:  No Known Allergies    Medications: Review discharge medications with patient and family and provide education.    Current Medications[1]     Medication List        START taking these medications      bisacodyL 10 mg Supp  Commonly known as: DULCOLAX  Place 1 suppository (10 mg total) rectally daily as needed (Until bowel movement if patient has no bowel movement for 2 days).     colchicine 0.6 mg tablet  Commonly known as: COLCRYS  Take 1  "tablet (0.6 mg total) by mouth 2 (two) times daily. for 3 days  Replaces: MITIGARE 0.6 mg Cap     hydrALAZINE 50 MG tablet  Commonly known as: APRESOLINE  Take 1 tablet (50 mg total) by mouth every 8 (eight) hours.            CHANGE how you take these medications      zolpidem 5 MG Tab  Commonly known as: AMBIEN  TAKE 1 TABLET BY MOUTH EVERY NIGHT AS NEEDED  What changed:   how much to take  how to take this  when to take this  reasons to take this  additional instructions            CONTINUE taking these medications      ACCU-CHEK CATHRYN CONTROL SOLN Soln  Generic drug: blood glucose control high,low  Use as directed to check blood sugar     ALCOHOL PREP PAD SPACER  Generic drug: alcohol swabs  USE FOUR TIMES DAILY     aspirin 81 MG EC tablet  Commonly known as: ECOTRIN  Take 81 mg by mouth once daily.     atorvastatin 40 MG tablet  Commonly known as: LIPITOR  Take 1 tablet (40 mg total) by mouth once daily.     BD ULTRA-FINE SINA PEN NEEDLE 32 gauge x 5/32" Ndle  Generic drug: pen needle, diabetic  Use four times daily for insulin administration     bumetanide 1 MG tablet  Commonly known as: BUMEX  Take 1 tablet (1 mg total) by mouth once daily.     clopidogreL 75 mg tablet  Commonly known as: PLAVIX  Take 1 tablet (75 mg total) by mouth once daily.     cyanocobalamin 1000 MCG tablet  Commonly known as: VITAMIN B-12  Take 1,000 mcg by mouth once daily.     EASY TOUCH TWIST LANCETS 30 gauge Misc  Generic drug: lancets  usea s directed to check blood glucose four times daily     gabapentin 300 MG capsule  Commonly known as: NEURONTIN  Take 2 capsules (600 mg total) by mouth 2 (two) times daily.     insulin aspart U-100 100 unit/mL (3 mL) Inpn pen  Commonly known as: NovoLOG Flexpen U-100 Insulin  Inject 15 Units into the skin 3 (three) times daily with meals.     JARDIANCE 10 mg tablet  Generic drug: empagliflozin  Take 1 tablet (10 mg total) by mouth once daily.     LANTUS SOLOSTAR U-100 INSULIN 100 unit/mL (3 mL) " Inpn pen  Generic drug: insulin glargine U-100 (Lantus)  Inject 35 Units into the skin every evening.     losartan 50 MG tablet  Commonly known as: COZAAR  Take 1 tablet (50 mg total) by mouth once daily.     metFORMIN 1000 MG tablet  Commonly known as: GLUCOPHAGE  Take 1 tablet (1,000 mg total) by mouth 2 (two) times daily with meals.     nitroGLYCERIN 0.4 MG SL tablet  Commonly known as: NITROSTAT  Place 1 tablet (0.4 mg total) under the tongue every 5 (five) minutes as needed for Chest pain.     ONE-A-DAY MEN'S 50 PLUS ORAL  Take 1 tablet by mouth once daily.     TRUE METRIX GLUCOSE METER Misc  Generic drug: blood-glucose meter  use as directed     TRUE METRIX GLUCOSE TEST STRIP Strp  Generic drug: blood sugar diagnostic  test blood sugar as directed four times daily            STOP taking these medications      furosemide 20 MG tablet  Commonly known as: LASIX     MITIGARE 0.6 mg Cap  Generic drug: colchicine  Replaced by: colchicine 0.6 mg tablet                I have seen and examined this patient within the last 30 days. My clinical findings that support the need for the home health skilled services and home bound status are the following:no   Weakness/numbness causing balance and gait disturbance due to Weakness/Debility making it taxing to leave home.     Diet:   2 gram sodium diet    Referrals/ Consults  Physical Therapy to evaluate and treat. Evaluate for home safety and equipment needs; Establish/upgrade home exercise program. Perform / instruct on therapeutic exercises, gait training, transfer training, and Range of Motion.  Occupational Therapy to evaluate and treat. Evaluate home environment for safety and equipment needs. Perform/Instruct on transfers, ADL training, ROM, and therapeutic exercises.   to evaluate for community resources/long-range planning.  Aide to provide assistance with personal care, ADLs, and vital signs.    Activities:   activity as tolerated    Nursing:   Agency to  admit patient within 24 hours of hospital discharge unless specified on physician order or at patient request    SN to complete comprehensive assessment including routine vital signs. Instruct on disease process and s/s of complications to report to MD. Review/verify medication list sent home with the patient at time of discharge  and instruct patient/caregiver as needed. Frequency may be adjusted depending on start of care date.     Skilled nurse to perform up to 3 visits PRN for symptoms related to diagnosis    Ok to schedule additional visits based on staff availability and patient request on consecutive days within the home health episode.    When multiple disciplines ordered:    Start of Care occurs on Sunday - Wednesday schedule remaining discipline evaluations as ordered on separate consecutive days following the start of care.    Thursday SOC -schedule subsequent evaluations Friday and Monday the following week.     Friday - Saturday SOC - schedule subsequent discipline evaluations on consecutive days starting Monday of the following week.    Home Health Aide:  Physical Therapy every 48 hours, Occupational Therapy every 48 hours, and Home Health Aide every 48 hours    Wound Care Orders  no    I certify that this patient is confined to his home and needs intermittent skilled nursing care, physical therapy, and occupational therapy.              [1]   Current Facility-Administered Medications   Medication Dose Route Frequency Provider Last Rate Last Admin    acetaminophen tablet 650 mg  650 mg Oral Q4H PRN Christophe Ortiz MD        bisacodyL suppository 10 mg  10 mg Rectal Daily PRN Christophe Ortiz MD        bumetanide tablet 1 mg  1 mg Oral Daily Nicole Chapman MD   1 mg at 03/25/25 0830    colchicine capsule/tablet 0.6 mg  0.6 mg Oral BID Nicole Chapamn MD   0.6 mg at 03/25/25 0831    cyanocobalamin tablet 1,000 mcg  1,000 mcg Oral Daily Christophe Ortiz MD   1,000 mcg at 03/25/25 0829     dextrose 50% injection 12.5 g  12.5 g Intravenous PRN Nicole Chapman MD        dextrose 50% injection 25 g  25 g Intravenous PRN Nicole Chapman MD        enoxaparin injection 40 mg  40 mg Subcutaneous Daily Christophe Ortiz MD   40 mg at 03/24/25 1711    gabapentin capsule 600 mg  600 mg Oral BID Christophe Ortiz MD   600 mg at 03/25/25 0829    glucagon (human recombinant) injection 1 mg  1 mg Intramuscular PRN Christophe Ortiz MD        glucagon (human recombinant) injection 1 mg  1 mg Intramuscular PRN Nicole Chapman MD        glucose chewable tablet 16 g  16 g Oral PRN Nicole Chapman MD        glucose chewable tablet 24 g  24 g Oral PRN Nicole Chapman MD        hydrALAZINE tablet 50 mg  50 mg Oral Q8H Nicole Chapman MD   50 mg at 03/25/25 0604    HYDROcodone-acetaminophen 5-325 mg per tablet 1 tablet  1 tablet Oral Q4H PRN Christophe Ortiz MD   1 tablet at 03/21/25 2120    insulin aspart U-100 pen 0-10 Units  0-10 Units Subcutaneous QID (AC + HS) PRN Nicole Chapman MD   2 Units at 03/25/25 0604    insulin glargine U-100 (Lantus) pen 23 Units  23 Units Subcutaneous QHS Nicole Chapman MD   23 Units at 03/24/25 2107    losartan tablet 50 mg  50 mg Oral Daily Nicole Chapman MD   50 mg at 03/25/25 0830    magnesium oxide tablet 400 mg  400 mg Oral Daily Christophe Ortiz MD   400 mg at 03/25/25 0829    melatonin tablet 6 mg  6 mg Oral Nightly PRN Christophe Ortiz MD   6 mg at 03/24/25 2107    naloxone 0.4 mg/mL injection 0.02 mg  0.02 mg Intravenous PRN Christophe Ortiz MD        ondansetron injection 4 mg  4 mg Intravenous Q8H PRN Christophe Ortiz MD        polyethylene glycol packet 17 g  17 g Oral BID PRN Christophe Ortiz MD        prochlorperazine injection Soln 5 mg  5 mg Intravenous Q6H PRN Christophe Ortiz MD        senna-docusate 8.6-50 mg per tablet 1 tablet  1 tablet Oral BID Christophe Hayden MD        simethicone chewable tablet 80 mg  1 tablet Oral  QID PRN Christophe Ortiz MD        sodium chloride 0.9% flush 10 mL  10 mL Intravenous Q12H PRN Christophe Ortiz MD

## 2025-03-25 NOTE — ASSESSMENT & PLAN NOTE
"Patient has Combined Systolic and Diastolic heart failure that is Acute on chronic. On presentation their CHF was decompensated. Evidence of decompensated CHF on presentation includes: edema. The etiology of their decompensation is likely increased fluid intake. Most recent BNP and echo results are listed below.  No results for input(s): "BNP" in the last 72 hours.    Latest ECHO  Results for orders placed during the hospital encounter of 01/16/25    Echo    Interpretation Summary    Left Ventricle: The left ventricle is normal in size. There is concentric remodeling. Mild global hypokinesis and regional wall motion abnormalities present. There is mildly reduced systolic function with a visually estimated ejection fraction of 45 - 50%.    Right Ventricle: Normal right ventricular cavity size. Wall thickness is normal. Systolic function is normal. Pacemaker lead present in the ventricle.    IVC/SVC: Low central venous pressure.    Limited echo for EF    Current Heart Failure Medications  losartan tablet 50 mg, Daily, Oral  bumetanide tablet 1 mg, Daily, Oral  hydrALAZINE tablet 50 mg, Every 8 hours, Oral  hydrALAZINE (APRESOLINE) tablet, Every 8 hours, Oral    Plan  - Monitor strict I&Os and daily weights.    - Place on telemetry  - Low sodium diet  - Cardiology has been consulted  - The patient's volume status is at their baseline      Dx:    - Echocardiogram     Tx:    - Optimize GDMT while avoiding further hypotension    - Monitor renal function and electrolytes closely    - cardiology consultation    "

## 2025-03-25 NOTE — PLAN OF CARE
AVS and educational attachments shared with patient and wife via [employer]. Discussed thoroughly. Patient and wife verbalized clear understanding using teach back method. Notified bedside nurse of completion of education. At present no distress noted. Patient to be discharged via w/c with escort service and family with all of patient's belonings. Will cont to be available to patient and family and intervene prn.

## 2025-03-25 NOTE — PLAN OF CARE
VN note: VN completed AVS and attachments and notified bedside nurse, Divya. Will cont to be available and intervene prn.

## 2025-03-26 ENCOUNTER — E-VISIT (OUTPATIENT)
Dept: FAMILY MEDICINE | Facility: CLINIC | Age: 75
End: 2025-03-26
Attending: FAMILY MEDICINE
Payer: MEDICARE

## 2025-03-26 ENCOUNTER — PATIENT OUTREACH (OUTPATIENT)
Dept: ADMINISTRATIVE | Facility: CLINIC | Age: 75
End: 2025-03-26
Payer: MEDICARE

## 2025-03-26 VITALS — DIASTOLIC BLOOD PRESSURE: 79 MMHG | SYSTOLIC BLOOD PRESSURE: 129 MMHG

## 2025-03-26 DIAGNOSIS — M10.9 GOUT, UNSPECIFIED CAUSE, UNSPECIFIED CHRONICITY, UNSPECIFIED SITE: Primary | ICD-10-CM

## 2025-03-26 LAB
MUSK ANTIBODY TEST: 0 NMOL/L (ref 0–0.02)
VIT B1 BLD-MCNC: 84 UG/L (ref 38–122)

## 2025-03-26 RX ORDER — COLCHICINE 0.6 MG/1
0.6 TABLET ORAL 2 TIMES DAILY
Qty: 30 TABLET | Refills: 2 | Status: SHIPPED | OUTPATIENT
Start: 2025-03-26

## 2025-03-26 NOTE — PROGRESS NOTES
Patient ID: Clifton Wilson is a 74 y.o. male.    Chief Complaint: General Illness (Entered automatically based on patient selection in [employer].)    The patient initiated a request through [employer] on 3/26/2025 for evaluation and management with a chief complaint of General Illness (Entered automatically based on patient selection in [employer].)     I evaluated the questionnaire submission on 3/26/25.    [facility] Peq Evisit Supergroup-Medication    3/26/2025  4:31 PM CDT - Filed by Patient   What do you need help with? Medication Management   Do you agree to participate in an E-Visit? Yes   If you have any of the following symptoms, please present to your local emergency room or call 911:  I acknowledge   Medication requests for narcotics will not be addressed via an E-Visit.  Please schedule an appointment. I acknowledge   Do you want to address a new or existing medication? I would like to start a new medication that I do not already take   What is the main issue you would like addressed today? I want to have ColCrys prescribed for sme asap.   What is the name of the medication that you would like to start? ColCrys   Have you taken a similar medication in the past? Yes   What was the name of the similar medication? ColCrys   Why are you no longer on that medication? Medication not available   What issue are you having with availability? Pharmacy does not carry the medication anymore   Do you want to change medications or send your current medication to a new pharmacy? Change pharmacy    What medical condition is the  medication intended to treat? Gout   Provide any additional information you feel is important. GUERDA tejeda ColCrys is available at [facility]   Please attach any relevant images or files    Are you able to take your vital signs? Yes   Systolic Blood Pressure: 147   Diastolic Blood Pressure: 78   Weight: 220   Height: 71   Pulse: 68   Temperature: 98.2   Respiration rate:    Pulse Oxygen:           Encounter Diagnosis   Name Primary?    Gout, unspecified cause, unspecified chronicity, unspecified site Yes        No orders of the defined types were placed in this encounter.     Medications Ordered This Encounter   Medications    colchicine (COLCRYS) 0.6 mg tablet     Sig: Take 1 tablet (0.6 mg total) by mouth 2 (two) times daily.     Dispense:  30 tablet     Refill:  2        Follow up if symptoms worsen or fail to improve.      E-Visit Time Tracking:    Day 1 Time (in minutes): 21    Total Time (in minutes): 21

## 2025-03-26 NOTE — PROGRESS NOTES
C3 nurse spoke with Clifton Wilson for a TCC post hospital discharge follow up call. The patient does not have a scheduled HOSFU appointment with Britton Grissom MD within 5-7 days post hospital discharge date 03/25/25. C3 nurse was unable to schedule HOSFU appointment in [facility].    Message sent to PCP staff requesting they contact patient and schedule follow up appointment.

## 2025-03-27 NOTE — PLAN OF CARE
[place] - Telemetry  Discharge Final Note    Primary Care Provider: Britton Grissom MD    Expected Discharge Date: 3/25/2025    Final Discharge Note (most recent)       Final Note - 03/27/25 0753          Final Note    Assessment Type Final Discharge Note (P)      Anticipated Discharge Disposition Home-Health Care Svc (P)    [facility]-NO    Hospital Resources/Appts/Education Provided Appointments scheduled and added to AVS (P)         Post-Acute Status    Post-Acute Authorization Home Health (P)      Home Health Status Set-up Complete/Auth obtained (P)    [facility]-NO                    Contact Info       Luis Felipe [place] MD   Specialty: Cardiology, Interventional Cardiology   Relationship: Hypertension Digital Medicine Responsible Provider    [address]  [address]  [place] [address]   Phone: [phone]       Next Steps: Follow up on 4/8/2025    Instructions: at 10:00 AM; cardiology hospital follow up appointment.    [facility] Neurology   Specialty: Neurology    [address]  [address]  [facility] [address]   Phone: [phone]       Next Steps: Follow up    Instructions: Patient will be notified of a neurology hospital follow up appointment for bilateral upper extremity weakness. Call the clinic in 3 days if the appointment has not been scheduled.    Chris Mclain Jr., MD   Specialty: Hand Surgery, Orthopedic Surgery    [address]  [address]  [facility] [address]   Phone: [phone]       Next Steps: Follow up in 2 week(s)    Instructions: Patient will be notified of a hand surgery hospital follow up appointmet. Call the clinic in 3 days if an appointment has not been scheduled.    Shukri Clifford MD   Specialty: Neurosurgery    [address]  [address]  [facility] [address]   Phone: [phone]       Next Steps: Follow up    Instructions: Patient will be notified of a neuro-surg hospital follow up appointment. Call the clinic in 3 days if the appointment has not been scheduled.    *[facility]   Specialty: Home Health Services, Home Therapy Services, Home Living Aide Services    [address] [facility] [address]  [place] [address]   Phone: [phone]       Next Steps: Follow up on 3/28/2025    Instructions: will provide home health services

## 2025-03-28 LAB
ACHR BIND AB SER-SCNC: 0 NMOL/L
VGCC-N BIND AB SER-SCNC: NORMAL PMOL/L
VGCC-P/Q BIND AB SER-SCNC: 0 NMOL/L

## 2025-04-04 ENCOUNTER — PATIENT MESSAGE (OUTPATIENT)
Dept: FAMILY MEDICINE | Facility: CLINIC | Age: 75
End: 2025-04-04
Payer: MEDICARE

## 2025-04-04 DIAGNOSIS — M10.9 GOUT, UNSPECIFIED CAUSE, UNSPECIFIED CHRONICITY, UNSPECIFIED SITE: ICD-10-CM

## 2025-04-04 RX ORDER — COLCHICINE 0.6 MG/1
0.6 TABLET ORAL 2 TIMES DAILY
Qty: 180 TABLET | Refills: 4 | Status: SHIPPED | OUTPATIENT
Start: 2025-04-04

## 2025-04-08 ENCOUNTER — HOSPITAL ENCOUNTER (EMERGENCY)
Facility: HOSPITAL | Age: 75
Discharge: HOME | End: 2025-04-08
Attending: EMERGENCY MEDICINE
Payer: MEDICARE

## 2025-04-08 VITALS
HEART RATE: 76 BPM | WEIGHT: 218 LBS | TEMPERATURE: 98 F | DIASTOLIC BLOOD PRESSURE: 77 MMHG | RESPIRATION RATE: 10 BRPM | HEIGHT: 71 IN | OXYGEN SATURATION: 99 % | SYSTOLIC BLOOD PRESSURE: 142 MMHG | BODY MASS INDEX: 30.52 KG/M2

## 2025-04-08 DIAGNOSIS — E83.42 HYPOMAGNESEMIA: ICD-10-CM

## 2025-04-08 DIAGNOSIS — E86.0 DEHYDRATION: Primary | ICD-10-CM

## 2025-04-08 DIAGNOSIS — I95.9 HYPOTENSION: ICD-10-CM

## 2025-04-08 LAB
ABSOLUTE EOSINOPHIL (OHS): 0.05 K/UL
ABSOLUTE MONOCYTE (OHS): 0.83 K/UL (ref 0.3–1)
ABSOLUTE NEUTROPHIL COUNT (OHS): 4.27 K/UL (ref 1.8–7.7)
ALBUMIN SERPL BCP-MCNC: 3 G/DL (ref 3.5–5.2)
ALP SERPL-CCNC: 111 UNIT/L (ref 40–150)
ALT SERPL W/O P-5'-P-CCNC: 61 UNIT/L (ref 10–44)
ANION GAP (OHS): 16 MMOL/L (ref 8–16)
AST SERPL-CCNC: 50 UNIT/L (ref 11–45)
BASOPHILS # BLD AUTO: 0.04 K/UL
BASOPHILS NFR BLD AUTO: 0.7 %
BILIRUB SERPL-MCNC: 0.5 MG/DL (ref 0.1–1)
BILIRUB UR QL STRIP.AUTO: NEGATIVE
BNP SERPL-MCNC: 38 PG/ML (ref 0–99)
BUN SERPL-MCNC: 18 MG/DL (ref 8–23)
CALCIUM SERPL-MCNC: 9.1 MG/DL (ref 8.7–10.5)
CHLORIDE SERPL-SCNC: 109 MMOL/L (ref 95–110)
CLARITY UR: CLEAR
CO2 SERPL-SCNC: 13 MMOL/L (ref 23–29)
COLOR UR AUTO: YELLOW
CREAT SERPL-MCNC: 1.4 MG/DL (ref 0.5–1.4)
ERYTHROCYTE [DISTWIDTH] IN BLOOD BY AUTOMATED COUNT: 12.8 % (ref 11.5–14.5)
GFR SERPLBLD CREATININE-BSD FMLA CKD-EPI: 53 ML/MIN/1.73/M2
GLUCOSE SERPL-MCNC: 158 MG/DL (ref 70–110)
GLUCOSE UR QL STRIP: NEGATIVE
HCT VFR BLD AUTO: 35 % (ref 40–54)
HGB BLD-MCNC: 10.7 GM/DL (ref 14–18)
HGB UR QL STRIP: NEGATIVE
IMM GRANULOCYTES # BLD AUTO: 0.02 K/UL (ref 0–0.04)
IMM GRANULOCYTES NFR BLD AUTO: 0.3 % (ref 0–0.5)
KETONES UR QL STRIP: NEGATIVE
LEUKOCYTE ESTERASE UR QL STRIP: NEGATIVE
LYMPHOCYTES # BLD AUTO: 0.88 K/UL (ref 1–4.8)
MAGNESIUM SERPL-MCNC: 1.4 MG/DL (ref 1.6–2.6)
MCH RBC QN AUTO: 30.3 PG (ref 27–31)
MCHC RBC AUTO-ENTMCNC: 30.6 G/DL (ref 32–36)
MCV RBC AUTO: 99 FL (ref 82–98)
NITRITE UR QL STRIP: NEGATIVE
NUCLEATED RBC (/100WBC) (OHS): 0 /100 WBC
OHS QRS DURATION: 158 MS
OHS QTC CALCULATION: 502 MS
PH UR STRIP: 6 [PH]
PLATELET # BLD AUTO: 145 K/UL (ref 150–450)
PMV BLD AUTO: 11.4 FL (ref 9.2–12.9)
POCT GLUCOSE: 168 MG/DL (ref 70–110)
POTASSIUM SERPL-SCNC: 4.2 MMOL/L (ref 3.5–5.1)
PROT SERPL-MCNC: 7.5 GM/DL (ref 6–8.4)
PROT UR QL STRIP: NEGATIVE
RBC # BLD AUTO: 3.53 M/UL (ref 4.6–6.2)
RELATIVE EOSINOPHIL (OHS): 0.8 %
RELATIVE LYMPHOCYTE (OHS): 14.4 % (ref 18–48)
RELATIVE MONOCYTE (OHS): 13.6 % (ref 4–15)
RELATIVE NEUTROPHIL (OHS): 70.2 % (ref 38–73)
SODIUM SERPL-SCNC: 138 MMOL/L (ref 136–145)
SP GR UR STRIP: 1.01
TROPONIN I SERPL DL<=0.01 NG/ML-MCNC: 0.02 NG/ML
UROBILINOGEN UR STRIP-ACNC: NEGATIVE EU/DL
WBC # BLD AUTO: 6.09 K/UL (ref 3.9–12.7)

## 2025-04-08 PROCEDURE — 83880 ASSAY OF NATRIURETIC PEPTIDE: CPT | Mod: HCNC | Performed by: EMERGENCY MEDICINE

## 2025-04-08 PROCEDURE — 99285 EMERGENCY DEPT VISIT HI MDM: CPT | Mod: 25,HCNC

## 2025-04-08 PROCEDURE — 81003 URINALYSIS AUTO W/O SCOPE: CPT | Mod: HCNC | Performed by: EMERGENCY MEDICINE

## 2025-04-08 PROCEDURE — 63600175 PHARM REV CODE 636 W HCPCS: Mod: HCNC | Performed by: EMERGENCY MEDICINE

## 2025-04-08 PROCEDURE — 82962 GLUCOSE BLOOD TEST: CPT | Mod: HCNC

## 2025-04-08 PROCEDURE — 96365 THER/PROPH/DIAG IV INF INIT: CPT | Mod: HCNC

## 2025-04-08 PROCEDURE — 82040 ASSAY OF SERUM ALBUMIN: CPT | Mod: HCNC | Performed by: EMERGENCY MEDICINE

## 2025-04-08 PROCEDURE — 93005 ELECTROCARDIOGRAM TRACING: CPT | Mod: HCNC

## 2025-04-08 PROCEDURE — 85025 COMPLETE CBC W/AUTO DIFF WBC: CPT | Mod: HCNC | Performed by: EMERGENCY MEDICINE

## 2025-04-08 PROCEDURE — 84484 ASSAY OF TROPONIN QUANT: CPT | Mod: HCNC | Performed by: EMERGENCY MEDICINE

## 2025-04-08 PROCEDURE — 96361 HYDRATE IV INFUSION ADD-ON: CPT | Mod: HCNC

## 2025-04-08 PROCEDURE — 83735 ASSAY OF MAGNESIUM: CPT | Mod: HCNC | Performed by: EMERGENCY MEDICINE

## 2025-04-08 PROCEDURE — 93010 ELECTROCARDIOGRAM REPORT: CPT | Mod: HCNC,,, | Performed by: INTERNAL MEDICINE

## 2025-04-08 RX ORDER — MAGNESIUM SULFATE HEPTAHYDRATE 40 MG/ML
2 INJECTION, SOLUTION INTRAVENOUS ONCE
Status: COMPLETED | OUTPATIENT
Start: 2025-04-08 | End: 2025-04-08

## 2025-04-08 RX ADMIN — SODIUM CHLORIDE, POTASSIUM CHLORIDE, SODIUM LACTATE AND CALCIUM CHLORIDE 1000 ML: 600; 310; 30; 20 INJECTION, SOLUTION INTRAVENOUS at 11:04

## 2025-04-08 RX ADMIN — MAGNESIUM SULFATE HEPTAHYDRATE 2 G: 40 INJECTION, SOLUTION INTRAVENOUS at 01:04

## 2025-04-08 NOTE — DISCHARGE INSTRUCTIONS

## 2025-04-08 NOTE — ED NOTES
Pt presents to ED for generalized weakness at appt. Found to be hypotensive. Pt has been seen for this in the past, diuretic was switched from lasix to bumex. Pt endorses taking all home meds this am. Hx of CHF. +defibrillator. Denies CP, SOB, dizziness.

## 2025-04-08 NOTE — PHARMACY MED REC
"  [facility]           Pharmacy  Admission Medication History     The home medication history was taken by Yeni Jackson.      Medication history obtained from Medications listed below were obtained from: Patient/family    Based on information gathered for medication list, you may go to "Admission" then "Reconcile Home Medications" tabs to review and/or act upon those items.     The home medication list has been updated by the Pharmacy department.   Please read ALL comments highlighted in yellow.   Please address this information as you see fit.    Feel free to contact us if you have any questions or require assistance.      No current facility-administered medications on file prior to encounter.     Current Outpatient Medications on File Prior to Encounter   Medication Sig Dispense Refill    aspirin (ECOTRIN) 81 MG EC tablet Take 81 mg by mouth once daily.      atorvastatin (LIPITOR) 40 MG tablet Take 1 tablet (40 mg total) by mouth once daily. 90 tablet 3    bumetanide (BUMEX) 1 MG tablet Take 1 tablet (1 mg total) by mouth once daily. 30 tablet 11    clopidogreL (PLAVIX) 75 mg tablet Take 1 tablet (75 mg total) by mouth once daily. 90 tablet 3    colchicine (COLCRYS) 0.6 mg tablet Take 1 tablet (0.6 mg total) by mouth 2 (two) times daily. 180 tablet 4    cyanocobalamin (VITAMIN B-12) 1000 MCG tablet Take 1,000 mcg by mouth once daily.      empagliflozin (JARDIANCE) 10 mg tablet Take 1 tablet (10 mg total) by mouth once daily. 30 tablet 11    gabapentin (NEURONTIN) 300 MG capsule Take 2 capsules (600 mg total) by mouth 2 (two) times daily. 360 capsule 1    hydrALAZINE (APRESOLINE) 50 MG tablet Take 1 tablet (50 mg total) by mouth every 8 (eight) hours. 90 tablet 11    insulin aspart U-100 (NOVOLOG FLEXPEN U-100 INSULIN) 100 unit/mL (3 mL) InPn pen Inject 15 Units into the skin 3 (three) times daily with meals. 15 mL 0    insulin glargine U-100, Lantus, (LANTUS SOLOSTAR U-100 INSULIN) 100 " "unit/mL (3 mL) InPn pen Inject 35 Units into the skin every evening. 15 mL 0    losartan (COZAAR) 50 MG tablet Take 1 tablet (50 mg total) by mouth once daily. 90 tablet 3    metFORMIN (GLUCOPHAGE) 1000 MG tablet Take 1 tablet (1,000 mg total) by mouth 2 (two) times daily with meals. 180 tablet 4    multivit-minerals/FA/lycopene (ONE-A-DAY MEN'S 50 PLUS ORAL) Take 1 tablet by mouth once daily.      nitroGLYCERIN (NITROSTAT) 0.4 MG SL tablet Place 1 tablet (0.4 mg total) under the tongue every 5 (five) minutes as needed for Chest pain. 25 tablet 3    zolpidem (AMBIEN) 5 MG Tab TAKE 1 TABLET BY MOUTH EVERY NIGHT AS NEEDED (Patient taking differently: Take 5 mg by mouth nightly as needed.) 90 tablet 3    alcohol swabs (BD ALCOHOL SWABS) PadM USE FOUR TIMES DAILY 400 each 3    blood glucose control high,low (ACCU-CHEK CATHRYN CONTROL SOLN) Soln Use as directed to check blood sugar 1 each 1    blood sugar diagnostic Strp test blood sugar as directed four times daily 100 each 3    blood-glucose meter (TRUE METRIX GLUCOSE METER) Misc use as directed 1 each 0    lancets 30 gauge Misc usea s directed to check blood glucose four times daily 100 each 3    pen needle, diabetic (BD ULTRA-FINE SINA PEN NEEDLE) 32 gauge x 5/32" Ndle Use four times daily for insulin administration 400 each 3       Please address this information as you see fit.  Feel free to contact us if you have any questions or require assistance.    Yeni Jackson  [phone]                .          "

## 2025-04-08 NOTE — ED PROVIDER NOTES
Encounter Date: 4/8/2025       History     Chief Complaint   Patient presents with    Fatigue     Weakness, referred from clinic for hypotension. Denies nausea, emesis. 85/53 in triage.     74 y.o. male with PMHx of CAD, HTN, HLD, HFrEF 40-45% s/p ICD presents for evaluation of hypotension.  Patient says that he had an outpatient visit today where his blood pressure was in the 100 systolic.  He was then sent to the emergency department for evaluation and his blood pressure was in the 80s systolic in triage.  He denies any recent fever, chills, chest pain, shortness of breath, abdominal pain, vomiting, diarrhea, dysuria, hematuria.  He says that he has been eating and drinking normally.  He says that he has been taking all his medications prescribed including Bumex.  Took all of his medications this morning    The history is provided by the patient and the spouse.     Review of patient's allergies indicates:  No Known Allergies  Past Medical History:   Diagnosis Date    Anticoagulant long-term use     Cardiomyopathy     CHF (congestive heart failure)     Coronary artery disease     Diabetes mellitus     Gout     Heart attack     Hypertension     Pneumonia      Past Surgical History:   Procedure Laterality Date    APPENDECTOMY      CARDIAC CATHETERIZATION      7 stents    CARDIAC DEFIBRILLATOR PLACEMENT      CATARACT EXTRACTION Bilateral 2011    COLONOSCOPY N/A 8/10/2018    Procedure: COLONOSCOPY golytely;  Surgeon: Valentine Burger MD;  Location: [facility] ENDO;  Service: Endoscopy;  Laterality: N/A;    CORONARY ANGIOGRAPHY N/A 11/11/2024    Procedure: ANGIOGRAM, CORONARY ARTERY;  Surgeon: Luis Felipe Wright MD;  Location: [facility] CATH LAB/EP;  Service: Cardiology;  Laterality: N/A;    EYE SURGERY      INSTANTANEOUS WAVE-FREE RATIO (IFR) N/A 11/11/2024    Procedure: Instantaneous Wave-Free Ratio (IFR);  Surgeon: Luis Felipe Wright MD;  Location: [facility] CATH LAB/EP;  Service: Cardiology;  Laterality: N/A;    LEFT HEART  CATHETERIZATION Left 11/11/2024    Procedure: Left heart cath;  Surgeon: Luis Felipe Wright MD;  Location: [facility] CATH LAB/EP;  Service: Cardiology;  Laterality: Left;    REVISION OF IMPLANTABLE CARDIOVERTER-DEFIBRILLATOR (ICD) ELECTRODE LEAD PLACEMENT N/A 11/11/2019    Procedure: REVISION, INSERTION, ELECTRODE LEAD, ICD;  Surgeon: Shukri Walsh MD;  Location: [facility] EP LAB;  Service: Cardiology;  Laterality: N/A;  Lead malfxn, RV lead ICD, SJM, anes, DM, 3 PREP     Family History   Problem Relation Name Age of Onset    Heart disease Mother      Heart disease Father      Amblyopia Neg Hx      Blindness Neg Hx      Cataracts Neg Hx      Glaucoma Neg Hx      Macular degeneration Neg Hx      Retinal detachment Neg Hx      Strabismus Neg Hx       Social History[1]  Review of Systems    Physical Exam     Initial Vitals [04/08/25 1116]   BP Pulse Resp Temp SpO2   (!) 85/53 88 18 97.6 °F (36.4 °C) 98 %      MAP       --         Physical Exam    Constitutional: He appears well-developed and well-nourished. He is not diaphoretic. No distress.   HENT:   Head: Normocephalic and atraumatic.   Eyes: EOM are normal.   Neck: Neck supple.   Normal range of motion.  Cardiovascular:  Normal rate and regular rhythm.           Pulmonary/Chest: Breath sounds normal. No respiratory distress. He has no wheezes.   Abdominal: Abdomen is soft. He exhibits no distension. There is no abdominal tenderness.   Musculoskeletal:         General: No tenderness or edema. Normal range of motion.      Cervical back: Normal range of motion and neck supple.     Neurological: He is alert and oriented to person, place, and time.   Skin: Skin is warm and dry.   Psychiatric: He has a normal mood and affect.         ED Course   ED US Lung    Date/Time: 4/8/2025 12:12 PM    Performed by: Amber Velazquez MD  Authorized by: Amber Velazquez MD    Indication:  Hypotension  Identified Structures:  The thoracic cavities and diaphragm were  examined  Lung sliding:  Present  Pleural effusion:  Absent  B-lines:  Absent (3 or less)  Lung sliding:  Present  Pleural effusion:  Absent  B-lines:  Absent (3 or less)   Normal thoracic evaluation, no pneumothorax or edema  ED US Echo    Date/Time: 4/8/2025 12:13 PM    Performed by: Amber Velazquez MD  Authorized by: Amber Velazquez MD    Indication:  Hypotension  Identified Structures:     The pericardial sac, myocardium, and 4 chambers were identified with the following echocardiographic windows:  Parasternal long axis, Parasternal short axis, Apical 4-chamber and IVC  Findings:     Pericardial Effusion:  Absent    Left Ventricle Ejection Fraction:  Moderately reduced (35-55%)  IVC:     Diameter:  < 2cm    Impression:  Hypovolemia    Labs Reviewed   COMPREHENSIVE METABOLIC PANEL - Abnormal       Result Value    Sodium 138      Potassium 4.2      Chloride 109      CO2 13 (*)     Glucose 158 (*)     BUN 18      Creatinine 1.4      Calcium 9.1      Protein Total 7.5      Albumin 3.0 (*)     Bilirubin Total 0.5            AST 50 (*)     ALT 61 (*)     Anion Gap 16      eGFR 53 (*)    MAGNESIUM - Abnormal    Magnesium  1.4 (*)    CBC WITH DIFFERENTIAL - Abnormal    WBC 6.09      RBC 3.53 (*)     HGB 10.7 (*)     HCT 35.0 (*)     MCV 99 (*)     MCH 30.3      MCHC 30.6 (*)     RDW 12.8      Platelet Count 145 (*)     MPV 11.4      Nucleated RBC 0      Neut % 70.2      Lymph % 14.4 (*)     Mono % 13.6      Eos % 0.8      Basophil % 0.7      Imm Grans % 0.3      Neut # 4.27      Lymph # 0.88 (*)     Mono # 0.83      Eos # 0.05      Baso # 0.04      Imm Grans # 0.02     POCT GLUCOSE - Abnormal    POCT Glucose 168 (*)    TROPONIN I - Normal    Troponin-I 0.019     B-TYPE NATRIURETIC PEPTIDE - Normal    BNP 38     URINALYSIS, REFLEX TO URINE CULTURE - Normal    Color, UA Yellow      Appearance, UA Clear      pH, UA 6.0      Spec Grav UA 1.015      Protein, UA Negative      Glucose, UA Negative       Ketones, UA Negative      Bilirubin, UA Negative      Blood, UA Negative      Nitrites, UA Negative      Urobilinogen, UA Negative      Leukocyte Esterase, UA Negative     CBC W/ AUTO DIFFERENTIAL    Narrative:     The following orders were created for panel order CBC auto differential.  Procedure                               Abnormality         Status                     ---------                               -----------         ------                     CBC with Differential[[id number]]       Abnormal            Final result                 Please view results for these tests on the individual orders.   EXTRA TUBES    Narrative:     The following orders were created for panel order EXTRA TUBES.  Procedure                               Abnormality         Status                     ---------                               -----------         ------                     Lavender Top Hold[[id number]]                                                            Please view results for these tests on the individual orders.   LAVENDER TOP HOLD     EKG Readings: (Independently Interpreted)   Initial Reading: No STEMI. Previous EKG: Compared with most recent EKG Rhythm: Normal Sinus Rhythm. Heart Rate: 75. Ectopy: PVCs. Conduction: RBBB. Axis: Normal. Clinical Impression: Normal Sinus Rhythm     ECG Results              EKG 12-lead (In process)        Collection Time Result Time QRS Duration OHS QTC Calculation    04/08/25 11:48:36 04/08/25 15:10:34 158 502                     In process by Interface, Lab In [facility] (04/08/25 15:10:41)                   Narrative:    Test Reason : I95.9,    Vent. Rate :  75 BPM     Atrial Rate :  75 BPM     P-R Int : 172 ms          QRS Dur : 158 ms      QT Int : 450 ms       P-R-T Axes :  58  88  42 degrees    QTcB Int : 502 ms    Sinus rhythm with occasional Premature ventricular complexes  Right bundle branch block  Abnormal ECG  When compared with ECG of 18-Mar-2025 14:24,  Premature  ventricular complexes are now Present    Referred By: AAAREFERRAL SELF           Confirmed By:                       In process by Interface, Lab In [facility] (04/08/25 15:02:22)                   Narrative:    Test Reason : I95.9,    Vent. Rate :  75 BPM     Atrial Rate :  75 BPM     P-R Int : 172 ms          QRS Dur : 158 ms      QT Int : 450 ms       P-R-T Axes :  58  88  42 degrees    QTcB Int : 502 ms    Sinus rhythm with occasional Premature ventricular complexes  Right bundle branch block  Abnormal ECG  When compared with ECG of 18-Mar-2025 14:24,  Premature ventricular complexes are now Present    Referred By: AAAREFERRAL SELF           Confirmed By:                                   Imaging Results              X-Ray Chest AP Portable (Final result)  Result time 04/08/25 13:06:02      Final result by Hector Almanzar MD (04/08/25 13:06:02)                   Impression:      No acute abnormality.      Electronically signed by: Hector Almanzar  Date:    04/08/2025  Time:    13:06               Narrative:    EXAMINATION:  XR CHEST AP PORTABLE    CLINICAL HISTORY:  Hypotension, unspecified    TECHNIQUE:  Single frontal view of the chest was performed.    COMPARISON:  Chest radiograph 03/18/2025    FINDINGS:  Lines and tubes: Left chest wall AICD.    Heart and mediastinum: Coronary artery stent.    Pleura: No pleural effusion or pneumothorax.  Azygous fissure, a normal variant.    Lungs: Lungs are well inflated. No focal consolidations or evidence of pulmonary edema.    Soft tissue/bone: Unremarkable.                                       Medications   lactated ringers bolus 1,000 mL (0 mLs Intravenous Stopped 4/8/25 1240)   magnesium sulfate 2g in water 50mL IVPB (premix) (0 g Intravenous Stopped 4/8/25 1427)     Medical Decision Making  74-year-old male past medical history as above sent from Cardiology Clinic after he was found to be hypotensive.  Differential includes but isn't limited to infection, hypovolemia,  medication side effect.  Patient noted to have a collapsible IVC on ultrasound.  Ordered IV fluids with improvement in blood pressure.  Labs as noted below, no acute findings. Discussed with Dr. Bryan via secure chat who agrees with plan for discharge with outpatient f/u. Discussed checking BP daily with pt, says he has a cuff at home, and not taking his antihypertensive agents if his systolic pressure is under 120 in order to avoid hypotension.     No acute emergent medical condition has been identified. The patient appears to be low risk for an emergent medical condition is appropriate for discharge with outpatient f/u as detailed in discharge instructions for reevaluation and possible continued outpatient workup and management. I have discussed the workup with the patient, who has verbalized understanding of the plan and need for outpatient follow-up.  This evaluation does not preclude the development of an emergent condition so in addition, I have advised the patient that they can return to the ED at any time with worsening or change of their symptoms, or with any other medical complaint.       Amount and/or Complexity of Data Reviewed  Independent Historian: spouse  External Data Reviewed: notes.     Details: Seen today by Dr. Bryan and found to have hypotension in clinic and sent to the emergency department for evaluation  Labs: ordered. Decision-making details documented in ED Course.  Radiology: ordered and independent interpretation performed.  ECG/medicine tests: ordered and independent interpretation performed.    Risk  OTC drugs.  Prescription drug management.  Decision regarding hospitalization.               ED Course as of 04/08/25 1844   Tue Apr 08, 2025   1200 3/18/25 TTE ·  Left Ventricle: The left ventricle is mildly dilated. There is eccentric hypertrophy. Regional wall motion abnormalities present. See diagram for wall motion findings. There is mildly reduced systolic function with a  visually estimated ejection fraction of 40 - 45%. Quantitated ejection fraction is 43%. There is diastolic dysfunction. Normal left ventricular filling pressure. [AT]   1203 On reassessment patient's blood pressure has not improved to 114/57 [AT]   1227 WBC: 6.09  Normal  [AT]   1227 Hemoglobin(!): 10.7  Chronic anemia [AT]   1248 Troponin I: 0.019  Normal  [AT]   1255 Pt updated on test results thus far.  [AT]   1259 Magnesium (!): 1.4  Chronically low, 2 mg IV repletion ordered [AT]   1308 CXR Impression:   No acute abnormality. [AT]   1320 Creatinine: 1.4  Normal  [AT]   1320 Potassium: 4.2  Normal  [AT]   1327 Discussed with Dr. Bryan via secure chat, agrees with plan for discharge.  [AT]   1340 Urinalysis, Reflex to Urine Culture  Normal  [AT]      ED Course User Index  [AT] Amber Velazquez MD                           Clinical Impression:  Final diagnoses:  [I95.9] Hypotension  [E86.0] Dehydration (Primary)  [E83.42] Hypomagnesemia          ED Disposition Condition    Discharge Stable          ED Prescriptions    None       Follow-up Information       Follow up With Specialties Details Why Contact Info    Britton Grissom MD Internal Medicine Schedule an appointment as soon as possible for a visit in 2 days  [address]  [address]  [facility] [address] [phone]      Luis Felipe Wright MD Cardiology, Interventional Cardiology Schedule an appointment as soon as possible for a visit in 2 days  [address]  [address]  [facility] [address] [phone]      [facility] - Emergency Dept Emergency Medicine  As needed, If symptoms worsen [address] [address] [phone]               [1]   Social History  Tobacco Use    Smoking status: Former    Smokeless tobacco: Never   Substance Use Topics    Alcohol use: Yes     Comment: socially    Drug use: No        Amber Velazquez MD  04/08/25 [id number]

## 2025-04-09 ENCOUNTER — HOSPITAL ENCOUNTER (OUTPATIENT)
Dept: CARDIOLOGY | Facility: HOSPITAL | Age: 75
Discharge: HOME | End: 2025-04-09
Attending: FAMILY MEDICINE
Payer: MEDICARE

## 2025-04-09 ENCOUNTER — TELEPHONE (OUTPATIENT)
Facility: OTHER | Age: 75
End: 2025-04-09
Payer: MEDICARE

## 2025-04-09 DIAGNOSIS — R60.0 LOWER EXTREMITY EDEMA: ICD-10-CM

## 2025-04-09 LAB
LEFT GREAT SAPHENOUS DISTAL THIGH DIA: 0.3 CM
LEFT GREAT SAPHENOUS JUNCTION DIA: 0.53 CM
LEFT GREAT SAPHENOUS KNEE DIA: 0.32 CM
LEFT GREAT SAPHENOUS MIDDLE THIGH DIA: 0.44 CM
LEFT GREAT SAPHENOUS PROXIMAL CALF DIA: 0.31 CM
LEFT SMALL SAPHENOUS KNEE DIA: 0.3 CM
RIGHT GREAT SAPHENOUS DISTAL THIGH DIA: 0.44 CM
RIGHT GREAT SAPHENOUS JUNCTION DIA: 0.42 CM
RIGHT GREAT SAPHENOUS KNEE DIA: 0.43 CM
RIGHT GREAT SAPHENOUS MIDDLE THIGH DIA: 0.47 CM
RIGHT GREAT SAPHENOUS PROXIMAL CALF DIA: 0.21 CM
RIGHT SMALL SAPHENOUS KNEE DIA: 0.28 CM

## 2025-04-09 PROCEDURE — 93970 EXTREMITY STUDY: CPT | Mod: TC,HCNC

## 2025-04-09 NOTE — PROGRESS NOTES
Patient seen in the ED on 4/8/2025 for fatigue.  Patient's call escalated to post ED text tracker.  Spoke with patient to assess for any concerns or follow up needs.  Patient reported feeling better but unsure if he will need to start therapy due to difficulty walking.  Patient has follow up appointment on 4/10/25 at 8:45 am with Aditya Brooks MD, orthopedics.  Patient confirmed appointment and stated he will wait until after appointment for further instruction.  Encounter closed at this time.

## 2025-04-11 ENCOUNTER — OFFICE VISIT (OUTPATIENT)
Dept: CARDIOLOGY | Facility: CLINIC | Age: 75
End: 2025-04-11
Payer: MEDICARE

## 2025-04-11 ENCOUNTER — TELEPHONE (OUTPATIENT)
Dept: CARDIOLOGY | Facility: CLINIC | Age: 75
End: 2025-04-11
Payer: MEDICARE

## 2025-04-11 VITALS
WEIGHT: 216 LBS | HEIGHT: 71 IN | HEART RATE: 84 BPM | SYSTOLIC BLOOD PRESSURE: 92 MMHG | OXYGEN SATURATION: 97 % | BODY MASS INDEX: 30.24 KG/M2 | DIASTOLIC BLOOD PRESSURE: 62 MMHG

## 2025-04-11 DIAGNOSIS — E11.9 INSULIN DEPENDENT TYPE 2 DIABETES MELLITUS: ICD-10-CM

## 2025-04-11 DIAGNOSIS — Z95.810 PRESENCE OF AUTOMATIC (IMPLANTABLE) CARDIAC DEFIBRILLATOR: ICD-10-CM

## 2025-04-11 DIAGNOSIS — I10 ESSENTIAL HYPERTENSION: ICD-10-CM

## 2025-04-11 DIAGNOSIS — Z79.4 INSULIN DEPENDENT TYPE 2 DIABETES MELLITUS: ICD-10-CM

## 2025-04-11 DIAGNOSIS — I47.20 VENTRICULAR TACHYCARDIA: ICD-10-CM

## 2025-04-11 DIAGNOSIS — I95.9 HYPOTENSION, UNSPECIFIED HYPOTENSION TYPE: ICD-10-CM

## 2025-04-11 DIAGNOSIS — I25.5 ISCHEMIC CARDIOMYOPATHY: ICD-10-CM

## 2025-04-11 DIAGNOSIS — I50.22 CHRONIC SYSTOLIC CONGESTIVE HEART FAILURE: Primary | ICD-10-CM

## 2025-04-11 DIAGNOSIS — E78.5 DYSLIPIDEMIA: ICD-10-CM

## 2025-04-11 PROCEDURE — 99999 PR PBB SHADOW E&M-EST. PATIENT-LVL IV: CPT | Mod: PBBFAC,HCNC,, | Performed by: STUDENT IN AN ORGANIZED HEALTH CARE EDUCATION/TRAINING PROGRAM

## 2025-04-11 NOTE — PROGRESS NOTES
Cardiology Clinic Visit    History of Present Illness:     Former Dr. Clement and Jillian patient; former police office  Clifton Wilson is a pleasant 74 y.o. male with PMHx of CAD, HTN, HLD, HFrEF 40-45% s/p ICD here for follow up post hospital follow for syncope (droves multiple red lights and does not recalled much). He was admitted and changed his BP meds. Patient was recently seen in the clinic post hospital follow up and restarted his bp medications and double his diuretics dose by himself. He self adjust his medications without any monitoring. He was admitted to the hospital in January due to similar issues upon discharged hospitalist team adjusted his med and restarted taking meds without any doctors instruction.   Today during office visit he did mentioned to me that double his diuretics because his UOP decreased. Patient was placed on the floor and legs were raised with improved in his BP. Patient was taken to the ER.     4/11/25  Today he HDS, asymptomatic. Only c/o of muscle aches 2/2 deconditioned. BP at 120s-130s/70s    PET 12/4/24    There are two significant perfusion abnormalities.    Perfusion Abnormality #1 - There is a small sized, moderate intensity mid anterior and anteroseptal fixed perfusion abnormality involving 4% of LV myocardium in the distribution of the LAD territory consistent with a non-transmural scar.    Perfusion Abnormality #2 - There is a very small (<5%) sized, mild intensity basal lateral fixed perfusion abnormality involving 3% of LV myocardium in the distribution of the LCX territory consistent with a non-transmural scar.    There are no other significant perfusion abnormalities.    The whole heart absolute myocardial perfusion values were reduced at rest, moderately reduced during stress and CFR is mildly reduced.    CT attenuation images demonstrate moderate diffuse coronary calcifications in the LAD, LCX , mild calcifications in the RCA territory and mild diffuse aortic  calcifications in the descending aorta. Stents are present in the LAD and CX.    The gated perfusion images showed an ejection fraction of 36% at rest and 45% during stress. A normal ejection fraction is greater than 47%.    There is regional hypokinesis at rest of the anterior and anteroseptal wall(s) and regional hypokinesis during stress of the anterior and anteroseptal wall(s).    The study's ECG is negative for ischemia.    Assessment:   Ostial to proximal LAD with 50-60% stenosis angiographically with iFR negative   LAD stents patent with PATRIA 2   Lcx stent patent     EF    Left Ventricle: Regional wall motion abnormalities present. See diagram for wall motion findings. There is mildly reduced systolic function with a visually estimated ejection fraction of 40 - 45%.       History obtained by patient interview and supplemented by nursing documentation and chart review.   PMHx:  has a past medical history of Anticoagulant long-term use, Cardiomyopathy, CHF (congestive heart failure), Coronary artery disease, Diabetes mellitus, Gout, Heart attack, Hypertension, and Pneumonia.   SurgHx:  has a past surgical history that includes Appendectomy; Cataract extraction (Bilateral, 2011); Eye surgery; Cardiac defibrillator placement; Cardiac catheterization; Colonoscopy (N/A, 8/10/2018); Revision of implantable cardioverter-defibrillator (ICD) electrode lead placement (N/A, 11/11/2019); Left heart catheterization (Left, 11/11/2024); instantaneous wave-free ratio (ifr) (N/A, 11/11/2024); and Coronary angiography (N/A, 11/11/2024).   FamHx: family history includes Heart disease in his father and mother.   SocialHx:  reports that he has quit smoking. He has never used smokeless tobacco. He reports current alcohol use. He reports that he does not use drugs.  Home Meds:  Current Outpatient Medications   Medication Instructions    alcohol swabs (BD ALCOHOL SWABS) PadM USE FOUR TIMES DAILY    aspirin (ECOTRIN) 81 mg, Daily     "atorvastatin (LIPITOR) 40 mg, Oral, Daily    blood glucose control high,low (ACCU-CHEK CATHRYN CONTROL SOLN) Soln Use as directed to check blood sugar    blood sugar diagnostic Strp test blood sugar as directed four times daily    blood-glucose meter (TRUE METRIX GLUCOSE METER) Misc use as directed    bumetanide (BUMEX) 1 mg, Oral, Daily    clopidogreL (PLAVIX) 75 mg, Oral, Daily    colchicine (COLCRYS) 0.6 mg, Oral, 2 times daily    cyanocobalamin (VITAMIN B-12) 1,000 mcg, Daily    gabapentin (NEURONTIN) 600 mg, Oral, 2 times daily    hydrALAZINE (APRESOLINE) 50 mg, Oral, Every 8 hours    insulin aspart U-100 (NOVOLOG FLEXPEN U-100 INSULIN) 15 Units, Subcutaneous, 3 times daily with meals    JARDIANCE 10 mg, Oral, Daily    lancets 30 gauge Misc usea s directed to check blood glucose four times daily    LANTUS SOLOSTAR U-100 INSULIN 35 Units, Subcutaneous, Nightly    losartan (COZAAR) 50 mg, Oral, Daily    metFORMIN (GLUCOPHAGE) 1,000 mg, Oral, 2 times daily with meals    multivit-minerals/FA/lycopene (ONE-A-DAY MEN'S 50 PLUS ORAL) 1 tablet, Daily    nitroGLYCERIN (NITROSTAT) 0.4 mg, Sublingual, Every 5 min PRN    pen needle, diabetic (BD ULTRA-FINE SINA PEN NEEDLE) 32 gauge x 5/32" Ndle Use four times daily for insulin administration    zolpidem (AMBIEN) 5 MG Tab TAKE 1 TABLET BY MOUTH EVERY NIGHT AS NEEDED       Review of Systems: Comprehensive ROS was performed and is negative unless otherwise noted in HPI.   Objective   Objective/Exam:   BP 92/62 (BP Location: Left arm, Patient Position: Sitting)   Pulse 84   Ht 5' 11" (1.803 m)   Wt 98 kg (216 lb)   SpO2 97%   BMI 30.13 kg/m²    Wt Readings from Last 4 Encounters:   04/11/25 98 kg (216 lb)   04/08/25 98.9 kg (218 lb)   04/08/25 99.3 kg (218 lb 14.7 oz)   03/19/25 100.2 kg (221 lb)      Constitutional: NAD, Atraumatic, Conversant   HEENT: MMM, Sclera anicteric, No JVD   Cardiovascular: RRR, no murmurs noted, no chest tenderness to palpation, 2+ radial pulses " "b/l  Pulmonary: normal respiratory effort, CTAB, no crackles, wheezes  Abdominal: S/NT/ND  Musculoskeletal: No lower extremity edema noted b/l  Neurological: No gross neurological deficits  Skin: W/D/I  Psych: Appropriate affect, normal mood  Labs/Imaging/Procedures   Personally reviewed  Lab Results   Component Value Date     04/08/2025     03/21/2025    K 4.2 04/08/2025    K 3.7 03/21/2025     04/08/2025     03/21/2025    CO2 13 (L) 04/08/2025    CO2 24 03/21/2025    BUN 18 04/08/2025    CREATININE 1.4 04/08/2025    GLUCOSE 158 (H) 04/08/2025    ANIONGAP 16 04/08/2025     Lab Results   Component Value Date    HGBA1C 6.6 (H) 01/07/2025     Lab Results   Component Value Date    BNP 38 04/08/2025     (H) 03/18/2025    BNP 42 01/16/2025     (H) 11/22/2024      Lab Results   Component Value Date    WBC 6.09 04/08/2025    HGB 10.7 (L) 04/08/2025    HGB 11.7 (L) 03/21/2025    HCT 35.0 (L) 04/08/2025    HCT 35.5 (L) 03/21/2025     (L) 04/08/2025     03/21/2025    GRAN 4.7 03/21/2025    GRAN 67.9 03/21/2025     Lab Results   Component Value Date    CHOL 131 01/17/2025    HDL 30 (L) 01/17/2025    LDLCALC 49.6 (L) 01/17/2025    LDLCALC 42.6 (L) 07/08/2024    LDLCALC 66.6 05/03/2023    TRIG 257 (H) 01/17/2025     Lab Results   Component Value Date    TSH 0.766 03/19/2025     No results found for: "APOLIPOPROTE"  No results found for: "LIPOA"     TTE:  Results for orders placed during the hospital encounter of 11/22/24    Echo Ultrasound enhancing contrast? Yes (definity/optison)    Interpretation Summary    Left Ventricle: Regional wall motion abnormalities present. See diagram for wall motion findings. There is mildly reduced systolic function with a visually estimated ejection fraction of 40 - 45%.    Stress Testing:   No results found for this or any previous visit.     Coronary Angiogram:  Results for orders placed during the hospital encounter of 11/08/24    Cardiac " catheterization    Conclusion  Table formatting from the original result was not included.      The estimated blood loss was none.    [facility]  Surgery Department  Operative Note    SUMMARY  POST CATH NOTE    Date of Procedure: 11/11/2024    Procedure: Procedure(s) (LRB):  Left heart cath (Left)  Instantaneous Wave-Free Ratio (IFR) (N/A)  ANGIOGRAM, CORONARY ARTERY (N/A)    Surgeons and Role:  * Luis Felipe Wright MD - Primary    Assisting Surgeon: None    Pre-Operative Diagnosis: Chest pain [R07.9]  Unstable angina [I20.0]    Post-Operative Diagnosis: Post-Op Diagnosis Codes:  * Chest pain [R07.9]  * Unstable angina [I20.0]    s/p catheterization secondary to:    Cath Results:  Access: Us guided RRA    Coronary Anatomy:    Left Main Coronary Artery: The left main is a short andlarge caliber vessel arising normally from the left sinus of valsalva.  It bifurcates into the left anterior descending and left circumflex coronary artery.  It is free of evidence of obstructive coronary artery disease. PATRIA III flow    Left Anterior Descending: The left anterior descending coronary artery is a large caliber vessel arising normally from the left main and extending to the apex.  It gives rise to small to moderate caliber diagonal arteries. Ostial to proximal LAD (before stent) 50-60% stenosed angiographically. Mild to moderate ISR with patent stents with PATRIA 2 flow. iFR of Proximal LAD negative,    Left Circumflex: The left circumflex is a large caliber vessel arising normally from the left main coronary artery, it is non-dominant.  It gives rise to moderate caliber obtuse marginal branches.  Prox to mid Lcx  patent stent with mild IRS. Jailed AV Cx  (small <2.5 mm) diffuse disease at proximal vessel. PATRIA III flow    Right Coronary Artery: The right coronary artery is a large caliber vessel arising normally from the right sinus of valsalva.  It is dominant giving rise to a PDA and PLV branch.  The right  "coronary artery is free of obstructive disease. PATRIA III flow    LVgram: LVEDP 9 mmHg    Intervention:  iFR  JL4 guide  The 0.014" Omniwire was connected to the console, calibrated and equalized. The 0.014"  Omniwire was then advanced into the left main outside of the catheter and flushed with saline  with the introducer removed. The pressure was normalized and then the wire was advanced  across Proximal LAD lesion. 0.96-0.95 iFR recordings were obtained. The 0.014" Omniwire was then pulled  back slowly across the lesion to assess for step up and electronic drift. The wire was removed,  and final angiography was performed.    Closure device:  Patient tolerated procedure well, no complications    Post Cath Exam:  BP (!) 153/73 (BP Location: Left arm, Patient Position: Lying)   Pulse 71   Temp 98.7 °F (37.1 °C) (Oral)   Resp 16   Ht 5' 11" (1.803 m)   Wt 107.5 kg (237 lb)   SpO2 97%   BMI 33.05 kg/m²    Anesthesia: RN IV Sedation      Estimated Blood Loss (EBL): * No values recorded between 11/11/2024 11:40 AM and 11/11/2024 12:19 PM *    Specimens:  Specimen (24h ago, onward)    None          Assessment:  Ostial to proximal LAD with 50-60% stenosis angiographically with iFR negative  LAD stents patent with PATRIA 2  Lcx stent patent    Plan:    - Patient tolerated procedure well. No immediate complications  - Continue aspirin and Plavix  - EKG when arrives to floor  - Routine post cath protocol  - Maximize medical management  - Further care by the primary team  - IVF at  100cc/hr  for 2 hours  - Follow up with me  -PET stress for viability M    42 minutes were spent on this visit including time personally:  -Reviewing Medical records & lab results  -Independently reviewing and interpreting (if not documented by myself) EKGs, echocardiograms, catherizations   -Obtaining a history, performing a relevant exam, counseling/educating the patient/family  -Documenting clinical information in the EMR including ordering " of tests  -Care coordination and communicating with other health care providers       Problem List:     1. Chronic systolic congestive heart failure    2. Hypotension, unspecified hypotension type    3. Ischemic cardiomyopathy    4. Insulin dependent type 2 diabetes mellitus    5. Essential hypertension    6. Presence of automatic (implantable) cardiac defibrillator    7. Ventricular tachycardia, nonsustained post-MI    8. Dyslipidemia              Assessment/Plan:   Clifton Wilson is a pleasant 73 y.o. male with PMHx of CAD, HTN, HLD, HFrEF 40-45% s/p ICD here for follow up. Patient feeling fatigue/tired likely multifactorial low BP and CAD.     CAD with multiple PCIs in LAD and Lcx- Ostial LAD with 50-60% stenosed with negative iFR. patent stents but LAD stents have PATRIA II flow. PET negative for ischemiaContinue DAPT, statin.   HFrEF s/p ICD with PET stress EF 36% with class II,stage B-C. Currently euvolemic. Dry. Warm. However feels weak which is likely due to deconditioned due to multiple admissions. Will slowly reintroduce meds as tolerated.   HTN - as above. Challenging to manage.Self adjusted meds: double his BB, diuretics. However, wife is in charge and will slowly start meds with BP check q8h. Take only losartan 25 mg qam--> recheck BP and his BP is constant >150/80 take an extra 25 mg qd. Bumex only as needed. Holding hydralazine.   HLD continue statin    RTC 2 week with blood work      Preventative Care:  Lipids:  PVD(V/A)      Patient expressed verbal understanding and agreed with the plan     Return sooner for concerns or questions. If symptoms persist go to the ED.  I have reviewed all pertinent data including patient's medical history in detail and updated the computerized patient record.     Total time spent counseling greater than fifty percent of total visit time.  Counseling included discussion regarding imaging findings, diagnosis, possibilities, treatment options, risks and  benefits.      Thank you for the opportunity to care for this patient. Please don't hesitate to reach out with any questions/concerns         Luis Felipe Bryan MD  Cardiovascular Disease; Interventional Cardiology  [facility]

## 2025-04-13 LAB
OHS QRS DURATION: 158 MS
OHS QTC CALCULATION: 502 MS

## 2025-04-14 ENCOUNTER — PATIENT MESSAGE (OUTPATIENT)
Dept: FAMILY MEDICINE | Facility: CLINIC | Age: 75
End: 2025-04-14
Payer: MEDICARE

## 2025-04-16 ENCOUNTER — TELEPHONE (OUTPATIENT)
Dept: CARDIOLOGY | Facility: CLINIC | Age: 75
End: 2025-04-16
Payer: MEDICARE

## 2025-04-16 NOTE — TELEPHONE ENCOUNTER
----- Message from Kandi sent at 4/16/2025 12:46 PM CDT -----  Type:  Needs Medical AdviceWho Called: Patient's Ender Call Back Number: [phone] Information: Patient is requesting a call back in regards to cardiac rehab and not hearing back from someone, she asks that she get a call back as soon as possible regarding this.

## 2025-04-17 ENCOUNTER — TELEPHONE (OUTPATIENT)
Dept: FAMILY MEDICINE | Facility: CLINIC | Age: 75
End: 2025-04-17
Payer: MEDICARE

## 2025-04-17 DIAGNOSIS — M62.81 MUSCLE WEAKNESS: Primary | ICD-10-CM

## 2025-04-17 DIAGNOSIS — M65.969 SYNOVITIS OF KNEE: ICD-10-CM

## 2025-04-17 DIAGNOSIS — M48.02 SPINAL STENOSIS IN CERVICAL REGION: ICD-10-CM

## 2025-04-17 DIAGNOSIS — G95.9 CERVICAL MYELOPATHY: ICD-10-CM

## 2025-04-17 DIAGNOSIS — M25.462 KNEE EFFUSION, LEFT: ICD-10-CM

## 2025-04-17 DIAGNOSIS — M62.81 MUSCLE WEAKNESS OF UPPER EXTREMITY: ICD-10-CM

## 2025-04-17 DIAGNOSIS — M17.10 ARTHRITIS OF KNEE: ICD-10-CM

## 2025-04-17 DIAGNOSIS — G62.9 NEUROPATHY: ICD-10-CM

## 2025-04-17 NOTE — TELEPHONE ENCOUNTER
----- Message from Liliana sent at 4/16/2025  2:10 PM CDT -----  Regarding: Call back  Contact: [phone]  Who Called; PT Patient is calling to get orders for in patient rehab due to patient had a fall on Monday.

## 2025-04-17 NOTE — TELEPHONE ENCOUNTER
"Spoke with pt stated he would need to be seen to have correct "rehab" ordered for a fall.  He is also still waiting on his cardiac rehab as well .  Pt will keep his appt next week   "

## 2025-04-20 ENCOUNTER — PATIENT MESSAGE (OUTPATIENT)
Dept: FAMILY MEDICINE | Facility: CLINIC | Age: 75
End: 2025-04-20
Payer: MEDICARE

## 2025-04-22 ENCOUNTER — OFFICE VISIT (OUTPATIENT)
Dept: FAMILY MEDICINE | Facility: CLINIC | Age: 75
End: 2025-04-22
Attending: FAMILY MEDICINE
Payer: MEDICARE

## 2025-04-22 VITALS — DIASTOLIC BLOOD PRESSURE: 56 MMHG | SYSTOLIC BLOOD PRESSURE: 102 MMHG | OXYGEN SATURATION: 98 % | HEART RATE: 74 BPM

## 2025-04-22 DIAGNOSIS — E11.59 HYPERTENSION ASSOCIATED WITH DIABETES: ICD-10-CM

## 2025-04-22 DIAGNOSIS — Z95.810 ICD (IMPLANTABLE CARDIOVERTER-DEFIBRILLATOR), SINGLE, IN SITU: ICD-10-CM

## 2025-04-22 DIAGNOSIS — Z79.4 INSULIN DEPENDENT TYPE 2 DIABETES MELLITUS: ICD-10-CM

## 2025-04-22 DIAGNOSIS — I15.2 HYPERTENSION ASSOCIATED WITH DIABETES: ICD-10-CM

## 2025-04-22 DIAGNOSIS — I50.42 CHRONIC COMBINED SYSTOLIC AND DIASTOLIC CONGESTIVE HEART FAILURE: ICD-10-CM

## 2025-04-22 DIAGNOSIS — M1A.9XX0 CHRONIC GOUT INVOLVING TOE WITHOUT TOPHUS, UNSPECIFIED CAUSE, UNSPECIFIED LATERALITY: ICD-10-CM

## 2025-04-22 DIAGNOSIS — M62.81 MUSCLE WEAKNESS: ICD-10-CM

## 2025-04-22 DIAGNOSIS — F51.01 PRIMARY INSOMNIA: ICD-10-CM

## 2025-04-22 DIAGNOSIS — E11.40 TYPE 2 DIABETES MELLITUS WITH DIABETIC NEUROPATHY, WITH LONG-TERM CURRENT USE OF INSULIN: Primary | ICD-10-CM

## 2025-04-22 DIAGNOSIS — E11.9 INSULIN DEPENDENT TYPE 2 DIABETES MELLITUS: ICD-10-CM

## 2025-04-22 DIAGNOSIS — E11.9 DIABETES MELLITUS TYPE 2 WITHOUT RETINOPATHY: ICD-10-CM

## 2025-04-22 DIAGNOSIS — Z79.4 TYPE 2 DIABETES MELLITUS WITH DIABETIC NEUROPATHY, WITH LONG-TERM CURRENT USE OF INSULIN: Primary | ICD-10-CM

## 2025-04-22 PROCEDURE — 99215 OFFICE O/P EST HI 40 MIN: CPT | Mod: HCNC,S$GLB,, | Performed by: FAMILY MEDICINE

## 2025-04-22 PROCEDURE — 3044F HG A1C LEVEL LT 7.0%: CPT | Mod: HCNC,CPTII,S$GLB, | Performed by: FAMILY MEDICINE

## 2025-04-22 PROCEDURE — 99999 PR PBB SHADOW E&M-EST. PATIENT-LVL IV: CPT | Mod: PBBFAC,HCNC,, | Performed by: FAMILY MEDICINE

## 2025-04-22 PROCEDURE — 1100F PTFALLS ASSESS-DOCD GE2>/YR: CPT | Mod: HCNC,CPTII,S$GLB, | Performed by: FAMILY MEDICINE

## 2025-04-22 PROCEDURE — 4010F ACE/ARB THERAPY RXD/TAKEN: CPT | Mod: HCNC,CPTII,S$GLB, | Performed by: FAMILY MEDICINE

## 2025-04-22 PROCEDURE — 3074F SYST BP LT 130 MM HG: CPT | Mod: HCNC,CPTII,S$GLB, | Performed by: FAMILY MEDICINE

## 2025-04-22 PROCEDURE — 1160F RVW MEDS BY RX/DR IN RCRD: CPT | Mod: HCNC,CPTII,S$GLB, | Performed by: FAMILY MEDICINE

## 2025-04-22 PROCEDURE — 3288F FALL RISK ASSESSMENT DOCD: CPT | Mod: HCNC,CPTII,S$GLB, | Performed by: FAMILY MEDICINE

## 2025-04-22 PROCEDURE — 3078F DIAST BP <80 MM HG: CPT | Mod: HCNC,CPTII,S$GLB, | Performed by: FAMILY MEDICINE

## 2025-04-22 PROCEDURE — 1159F MED LIST DOCD IN RCRD: CPT | Mod: HCNC,CPTII,S$GLB, | Performed by: FAMILY MEDICINE

## 2025-04-22 RX ORDER — FEBUXOSTAT 40 MG/1
40 TABLET, FILM COATED ORAL DAILY
Qty: 30 TABLET | Refills: 2 | Status: ON HOLD | OUTPATIENT
Start: 2025-04-22

## 2025-04-22 RX ORDER — FUROSEMIDE 20 MG/1
20 TABLET ORAL 2 TIMES DAILY
Qty: 60 TABLET | Refills: 11 | Status: ON HOLD | OUTPATIENT
Start: 2025-04-22 | End: 2026-04-22

## 2025-04-23 NOTE — PROGRESS NOTES
Subjective:       Patient ID: Clifton Wilson is a 74 y.o. male.    Chief Complaint: Follow-up (Was in ED 4/9  needs eval and needs orders for PT because he had a fall as well )    74 yr old pleasant white male with DM II, HTN, HLD, CAD, Combined chronic CHF, MR, obesity, neuropathy, presents today for evaluation of  weakness in legs and arms. Started after last hospitalization. Unable to do his activities of daily living.      DM II - controlled  -      HGBA1C                   6.6 (H)             01/07/2025                                                          - on metformin and insulin and sugars improving - UTD eye exam - foot exam UTD - on ASA and plavix. Losartan. He ate too much jamie cake during mardi gras.      HTN - chronic - controlled - on losartan, lasix - compliant - no side effects      HLD - chronic -  LDLCALC                  42.6 (L)            07/08/2024                                                   - controlled -       CAD/combined CHF - EF 35-40% - on external defibrillator - medical management - on ASA/plavix/ARB - no symptoms - following cardiology    Gout - improving - uses colchicine for flare up -     History as below - reviewed            Follow-up  Associated symptoms include arthralgias, joint swelling and myalgias. Pertinent negatives include no chest pain, congestion, coughing, diaphoresis, fatigue, headaches, neck pain, rash, visual change, vomiting or weakness.   Edema  Associated symptoms include arthralgias, joint swelling and myalgias. Pertinent negatives include no chest pain, congestion, coughing, diaphoresis, fatigue, headaches, neck pain, rash, visual change, vomiting or weakness.   Shoulder Pain   Pertinent negatives include no headaches. There is no history of diabetes.   Diabetes  He presents for his follow-up diabetic visit. He has type 2 diabetes mellitus. No MedicAlert identification noted. The initial diagnosis of diabetes was made 27 years ago. His disease  course has been worsening. Hypoglycemia symptoms include sleepiness. Pertinent negatives for hypoglycemia include no confusion, dizziness, headaches, hunger, mood changes, nervousness/anxiousness, pallor, seizures, speech difficulty, sweats or tremors. Associated symptoms include foot paresthesias. Pertinent negatives for diabetes include no blurred vision, no chest pain, no fatigue, no foot ulcerations, no polydipsia, no polyphagia, no polyuria, no visual change, no weakness and no weight loss. Hypoglycemia complications include blackouts and hospitalization. Pertinent negatives for hypoglycemia complications include no nocturnal hypoglycemia, no required assistance and no required glucagon injection. Symptoms are stable. Diabetic complications include autonomic neuropathy, heart disease, impotence and peripheral neuropathy. Pertinent negatives for diabetic complications include no CVA, nephropathy, PVD or retinopathy. Risk factors for coronary artery disease include hypertension, obesity, diabetes mellitus and male sex. Current diabetic treatment includes diet, insulin injections and oral agent (monotherapy). He is compliant with treatment all of the time. He is currently taking insulin pre-breakfast, pre-lunch, pre-dinner and at bedtime. Insulin injections are given by patient. Rotation sites for injection include the abdominal wall, arms and thighs. His weight is fluctuating minimally. He is following a diabetic, generally healthy, low fat/cholesterol and low salt diet. Meal planning includes avoidance of concentrated sweets and carbohydrate counting. He has not had a previous visit with a dietitian. He participates in exercise three times a week. He monitors blood glucose at home 3-4 x per day. He monitors urine at home <1 x per month. Blood glucose monitoring compliance is excellent. His home blood glucose trend is decreasing steadily. An ACE inhibitor/angiotensin II receptor blocker is being taken. He does  not see a podiatrist.Eye exam is current.   Knee Pain     Swelling  This is a chronic problem. The current episode started more than 1 month ago. The problem occurs intermittently. The problem has been gradually worsening. Associated symptoms include arthralgias, joint swelling and myalgias. Pertinent negatives include no chest pain, congestion, coughing, diaphoresis, fatigue, headaches, neck pain, rash, visual change, vomiting or weakness.   Hypertension  This is a chronic problem. The current episode started more than 1 year ago. The problem has been gradually improving since onset. The problem is controlled. Pertinent negatives include no blurred vision, chest pain, headaches, malaise/fatigue, neck pain, palpitations, peripheral edema, PND or sweats. Risk factors for coronary artery disease include diabetes mellitus, dyslipidemia, male gender and obesity. Past treatments include angiotensin blockers and diuretics. The current treatment provides significant improvement. There are no compliance problems.  Hypertensive end-organ damage includes CAD/MI and heart failure. There is no history of CVA, left ventricular hypertrophy, PVD or retinopathy. There is no history of chronic renal disease, hypercortisolism, hyperparathyroidism, pheochromocytoma, renovascular disease or a thyroid problem.   Hyperlipidemia  This is a chronic problem. The current episode started more than 1 year ago. The problem is controlled. Recent lipid tests were reviewed and are normal. Exacerbating diseases include obesity. He has no history of chronic renal disease or diabetes. There are no known factors aggravating his hyperlipidemia. Associated symptoms include myalgias. Pertinent negatives include no chest pain or focal sensory loss. Current antihyperlipidemic treatment includes statins. The current treatment provides moderate improvement of lipids. There are no compliance problems.  Risk factors for coronary artery disease include diabetes  mellitus, dyslipidemia, male sex, hypertension and obesity.     Review of Systems   Constitutional: Negative.  Negative for activity change, diaphoresis, fatigue, malaise/fatigue, unexpected weight change and weight loss.   HENT: Negative.  Negative for nasal congestion, ear pain, mouth sores, rhinorrhea and voice change.    Eyes: Negative.  Negative for blurred vision, pain, discharge and visual disturbance.   Respiratory: Negative.  Negative for apnea, cough and wheezing.    Cardiovascular: Negative.  Negative for chest pain, palpitations and PND.   Gastrointestinal: Negative.  Negative for abdominal distention, anal bleeding, diarrhea and vomiting.   Endocrine: Negative.  Negative for cold intolerance, polydipsia, polyphagia and polyuria.   Genitourinary:  Positive for impotence. Negative for decreased urine volume, difficulty urinating, discharge, frequency and scrotal swelling.   Musculoskeletal:  Positive for arthralgias, joint swelling and myalgias. Negative for back pain, neck pain and neck stiffness.   Integumentary:  Positive for color change. Negative for pallor and rash.   Allergic/Immunologic: Negative.  Negative for environmental allergies.   Neurological: Negative.  Negative for dizziness, tremors, seizures, speech difficulty, weakness, light-headedness and headaches.   Hematological: Negative.    Psychiatric/Behavioral: Negative.  Negative for agitation, confusion, dysphoric mood and suicidal ideas. The patient is not nervous/anxious.          PMH/PSH/FH/SH/MED/ALLERGY reviewed    Past Medical History:   Diagnosis Date    Anticoagulant long-term use     Cardiomyopathy     CHF (congestive heart failure)     Coronary artery disease     Diabetes mellitus     Gout     Heart attack     Hypertension     Pneumonia        Past Surgical History:   Procedure Laterality Date    APPENDECTOMY      CARDIAC CATHETERIZATION      7 stents    CARDIAC DEFIBRILLATOR PLACEMENT      CATARACT EXTRACTION Bilateral 2011     COLONOSCOPY N/A 08/10/2018    Procedure: COLONOSCOPY golytely;  Surgeon: Valentine Burger MD;  Location: [facility] ENDO;  Service: Endoscopy;  Laterality: N/A;    CORONARY ANGIOGRAPHY N/A 11/11/2024    Procedure: ANGIOGRAM, CORONARY ARTERY;  Surgeon: Luis Felipe Wright MD;  Location: [facility] CATH LAB/EP;  Service: Cardiology;  Laterality: N/A;    EYE SURGERY      INSTANTANEOUS WAVE-FREE RATIO (IFR) N/A 11/11/2024    Procedure: Instantaneous Wave-Free Ratio (IFR);  Surgeon: Luis Felipe Wright MD;  Location: [facility] CATH LAB/EP;  Service: Cardiology;  Laterality: N/A;    LEFT HEART CATHETERIZATION Left 11/11/2024    Procedure: Left heart cath;  Surgeon: Luis Felipe Wright MD;  Location: [facility] CATH LAB/EP;  Service: Cardiology;  Laterality: Left;    REVISION OF IMPLANTABLE CARDIOVERTER-DEFIBRILLATOR (ICD) ELECTRODE LEAD PLACEMENT N/A 11/11/2019    Procedure: REVISION, INSERTION, ELECTRODE LEAD, ICD;  Surgeon: Shukri Walsh MD;  Location: [facility] EP LAB;  Service: Cardiology;  Laterality: N/A;  Lead malfxn, RV lead ICD, SJM, anes, DM, 3 PREP    TONSILLECTOMY  1960s    VASECTOMY  2000       Family History   Problem Relation Name Age of Onset    Heart disease Mother Miriam Gillespie     Hypertension Mother Miriam Gillespie     Heart disease Father Juan Antonio Wilson Sr     Stroke Father Juan Antonio Wilson Sr     Amblyopia Neg Hx      Blindness Neg Hx      Cataracts Neg Hx      Glaucoma Neg Hx      Macular degeneration Neg Hx      Retinal detachment Neg Hx      Strabismus Neg Hx         Social History     Socioeconomic History    Marital status:    Tobacco Use    Smoking status: Former    Smokeless tobacco: Never   Substance and Sexual Activity    Alcohol use: Yes     Comment: socially    Drug use: No    Sexual activity: Yes     Social Drivers of Health     Financial Resource Strain: Low Risk  (3/20/2025)    Overall Financial Resource Strain (CARDIA)     Difficulty of Paying Living Expenses: Not hard at all   Food Insecurity:  No Food Insecurity (3/20/2025)    Hunger Vital Sign     Worried About Running Out of Food in the Last Year: Never true     Ran Out of Food in the Last Year: Never true   Transportation Needs: No Transportation Needs (1/17/2025)    TRANSPORTATION NEEDS     Transportation : No   Physical Activity: Insufficiently Active (11/19/2024)    Exercise Vital Sign     Days of Exercise per Week: 3 days     Minutes of Exercise per Session: 20 min   Stress: No Stress Concern Present (3/20/2025)    [employer] - Occupational Stress Questionnaire     Feeling of Stress : Not at all   Housing Stability: Low Risk  (3/20/2025)    Housing Stability Vital Sign     Unable to Pay for Housing in the Last Year: No     Homeless in the Last Year: No       Current Outpatient Medications   Medication Sig Dispense Refill    alcohol swabs (BD ALCOHOL SWABS) PadM USE FOUR TIMES DAILY 400 each 3    aspirin (ECOTRIN) 81 MG EC tablet Take 81 mg by mouth once daily.      blood glucose control high,low (ACCU-CHEK CATHRYN CONTROL SOLN) Soln Use as directed to check blood sugar 1 each 1    blood sugar diagnostic Strp test blood sugar as directed four times daily 100 each 3    blood-glucose meter (TRUE METRIX GLUCOSE METER) Misc use as directed 1 each 0    clopidogreL (PLAVIX) 75 mg tablet Take 1 tablet (75 mg total) by mouth once daily. 90 tablet 3    colchicine (COLCRYS) 0.6 mg tablet Take 1 tablet (0.6 mg total) by mouth 2 (two) times daily. 180 tablet 4    cyanocobalamin (VITAMIN B-12) 1000 MCG tablet Take 1,000 mcg by mouth once daily.      empagliflozin (JARDIANCE) 10 mg tablet Take 1 tablet (10 mg total) by mouth once daily. 30 tablet 11    gabapentin (NEURONTIN) 300 MG capsule Take 2 capsules (600 mg total) by mouth 2 (two) times daily. 360 capsule 1    hydrALAZINE (APRESOLINE) 50 MG tablet Take 1 tablet (50 mg total) by mouth every 8 (eight) hours. 90 tablet 11    insulin aspart U-100 (NOVOLOG FLEXPEN U-100 INSULIN) 100  "unit/mL (3 mL) InPn pen Inject 15 Units into the skin 3 (three) times daily with meals. 15 mL 0    insulin glargine U-100, Lantus, (LANTUS SOLOSTAR U-100 INSULIN) 100 unit/mL (3 mL) InPn pen Inject 35 Units into the skin every evening. 15 mL 0    lancets 30 gauge Misc usea s directed to check blood glucose four times daily 100 each 3    losartan (COZAAR) 50 MG tablet Take 1 tablet (50 mg total) by mouth once daily. 90 tablet 3    metFORMIN (GLUCOPHAGE) 1000 MG tablet Take 1 tablet (1,000 mg total) by mouth 2 (two) times daily with meals. 180 tablet 4    multivit-minerals/FA/lycopene (ONE-A-DAY MEN'S 50 PLUS ORAL) Take 1 tablet by mouth once daily.      nitroGLYCERIN (NITROSTAT) 0.4 MG SL tablet Place 1 tablet (0.4 mg total) under the tongue every 5 (five) minutes as needed for Chest pain. 25 tablet 3    pen needle, diabetic (BD ULTRA-FINE SINA PEN NEEDLE) 32 gauge x 5/32" Ndle Use four times daily for insulin administration 400 each 3    zolpidem (AMBIEN) 5 MG Tab TAKE 1 TABLET BY MOUTH EVERY NIGHT AS NEEDED 90 tablet 3    febuxostat (ULORIC) 40 mg Tab Take 1 tablet (40 mg total) by mouth once daily. 30 tablet 2    furosemide (LASIX) 20 MG tablet Take 1 tablet (20 mg total) by mouth 2 (two) times daily. 60 tablet 11     No current facility-administered medications for this visit.       Review of patient's allergies indicates:  No Known Allergies      Objective:       Vitals:    04/22/25 1407   BP: (!) 102/56   Pulse: 74       Physical Exam  Constitutional:       Appearance: He is well-developed.   HENT:      Head: Normocephalic and atraumatic.      Right Ear: External ear normal.      Left Ear: External ear normal.      Nose: Nose normal.      Mouth/Throat:      Pharynx: No oropharyngeal exudate.   Eyes:      General: No scleral icterus.        Right eye: No discharge.         Left eye: No discharge.      Conjunctiva/sclera: Conjunctivae normal.      Pupils: Pupils are equal, round, and reactive to light.   Neck:    "   Thyroid: No thyromegaly.      Vascular: No JVD.      Trachea: No tracheal deviation.   Cardiovascular:      Rate and Rhythm: Normal rate and regular rhythm.      Heart sounds: Normal heart sounds. No murmur heard.     No friction rub. No gallop.   Pulmonary:      Effort: Pulmonary effort is normal. No respiratory distress.      Breath sounds: Normal breath sounds. No stridor. No wheezing or rales.   Chest:      Chest wall: No tenderness.   Abdominal:      General: Bowel sounds are normal. There is no distension.      Palpations: Abdomen is soft. There is no mass.      Tenderness: There is no abdominal tenderness. There is no guarding or rebound.      Hernia: No hernia is present.   Musculoskeletal:         General: No swelling or tenderness. Normal range of motion.      Cervical back: Normal range of motion and neck supple.      Right lower leg: Edema present.      Comments: Right lle warm and nontender   Lymphadenopathy:      Cervical: No cervical adenopathy.   Skin:     General: Skin is warm and dry.      Coloration: Skin is not pale.      Findings: Erythema and rash present.   Neurological:      Mental Status: He is alert and oriented to person, place, and time.      Cranial Nerves: No cranial nerve deficit.      Motor: No abnormal muscle tone.      Coordination: Coordination normal.      Deep Tendon Reflexes: Reflexes are normal and symmetric. Reflexes normal.   Psychiatric:         Behavior: Behavior normal.         Thought Content: Thought content normal.         Judgment: Judgment normal.         Assessment:       Problem List Items Addressed This Visit       Type 2 diabetes mellitus with diabetic neuropathy - Primary    Muscle weakness    Insulin dependent type 2 diabetes mellitus    ICD (implantable cardioverter-defibrillator), single, in situ    Hypertension associated with diabetes    Gout    Relevant Medications    febuxostat (ULORIC) 40 mg Tab    Diabetes mellitus type 2 without retinopathy    Chronic  combined systolic and diastolic congestive heart failure     Other Visit Diagnoses         Primary insomnia        Relevant Medications    furosemide (LASIX) 20 MG tablet            Plan:           Clifton was seen today for follow-up.    Diagnoses and all orders for this visit:    Type 2 diabetes mellitus with diabetic neuropathy, with long-term current use of insulin    Primary insomnia  -     furosemide (LASIX) 20 MG tablet; Take 1 tablet (20 mg total) by mouth 2 (two) times daily.    Hypertension associated with diabetes    Chronic combined systolic and diastolic congestive heart failure    Diabetes mellitus type 2 without retinopathy    Insulin dependent type 2 diabetes mellitus    ICD (implantable cardioverter-defibrillator), single, in situ    Muscle weakness    Chronic gout involving toe without tophus, unspecified cause, unspecified laterality  -     febuxostat (ULORIC) 40 mg Tab; Take 1 tablet (40 mg total) by mouth once daily.            DM II controlled/hyperglycemia  - improving since started insulin  -lantus and novolog to continue  -strict diet control    Weakness LE and UE  -recommend IPR  -ER precautions given        HTN  -controlled    HLD  -controlled    Combined CHF  -follows cardiology  -on external defibrillator    Neuropathy  -increase neurontin and increase dose to 600 mgTID    Obesity  -diet and exercise as tolerated    chronic gout  -uric acid levels  -conchicine as needed    Spent adequate time in obtaining history and explaining differentials    40 minutes spent during this visit of which greater than 50% devoted to face-face counseling and coordination of care regarding diagnosis and management plan    Follow up in about 4 weeks (around 5/20/2025), or if symptoms worsen or fail to improve.

## 2025-04-24 ENCOUNTER — HOSPITAL ENCOUNTER (INPATIENT)
Facility: HOSPITAL | Age: 75
LOS: 6 days | Discharge: NURSING, REHAB OR LONG-TERM CARE | DRG: 291 | End: 2025-05-01
Attending: EMERGENCY MEDICINE | Admitting: HOSPITALIST
Payer: MEDICARE

## 2025-04-24 DIAGNOSIS — R06.02 SOB (SHORTNESS OF BREATH): ICD-10-CM

## 2025-04-24 DIAGNOSIS — I50.43 ACUTE ON CHRONIC COMBINED SYSTOLIC AND DIASTOLIC CONGESTIVE HEART FAILURE: ICD-10-CM

## 2025-04-24 DIAGNOSIS — R26.2 DIFFICULTY WALKING: ICD-10-CM

## 2025-04-24 DIAGNOSIS — I50.9 CHF (CONGESTIVE HEART FAILURE): ICD-10-CM

## 2025-04-24 DIAGNOSIS — R06.02 SHORTNESS OF BREATH: ICD-10-CM

## 2025-04-24 DIAGNOSIS — I10 ESSENTIAL HYPERTENSION: ICD-10-CM

## 2025-04-24 DIAGNOSIS — F51.01 PRIMARY INSOMNIA: ICD-10-CM

## 2025-04-24 DIAGNOSIS — I50.9 CHF EXACERBATION: Primary | ICD-10-CM

## 2025-04-24 DIAGNOSIS — M43.02 CERVICAL SPONDYLOLYSIS: ICD-10-CM

## 2025-04-24 DIAGNOSIS — E11.9 DM TYPE 2 WITHOUT RETINOPATHY: ICD-10-CM

## 2025-04-24 DIAGNOSIS — R79.89 TROPONIN LEVEL ELEVATED: ICD-10-CM

## 2025-04-24 DIAGNOSIS — M10.9 GOUT, UNSPECIFIED CAUSE, UNSPECIFIED CHRONICITY, UNSPECIFIED SITE: ICD-10-CM

## 2025-04-24 LAB
ABSOLUTE EOSINOPHIL (OHS): 0.06 K/UL
ABSOLUTE MONOCYTE (OHS): 0.96 K/UL (ref 0.3–1)
ABSOLUTE NEUTROPHIL COUNT (OHS): 3.96 K/UL (ref 1.8–7.7)
ALBUMIN SERPL BCP-MCNC: 3.1 G/DL (ref 3.5–5.2)
ALP SERPL-CCNC: 95 UNIT/L (ref 40–150)
ALT SERPL W/O P-5'-P-CCNC: 28 UNIT/L (ref 10–44)
ANION GAP (OHS): 11 MMOL/L (ref 8–16)
AST SERPL-CCNC: 23 UNIT/L (ref 11–45)
BASOPHILS # BLD AUTO: 0.05 K/UL
BASOPHILS NFR BLD AUTO: 0.8 %
BILIRUB SERPL-MCNC: 0.5 MG/DL (ref 0.1–1)
BNP SERPL-MCNC: 147 PG/ML (ref 0–99)
BUN SERPL-MCNC: 18 MG/DL (ref 8–23)
CALCIUM SERPL-MCNC: 9.5 MG/DL (ref 8.7–10.5)
CHLORIDE SERPL-SCNC: 110 MMOL/L (ref 95–110)
CO2 SERPL-SCNC: 20 MMOL/L (ref 23–29)
CREAT SERPL-MCNC: 1.1 MG/DL (ref 0.5–1.4)
EAG (OHS): 151 MG/DL (ref 68–131)
ERYTHROCYTE [DISTWIDTH] IN BLOOD BY AUTOMATED COUNT: 13.3 % (ref 11.5–14.5)
GFR SERPLBLD CREATININE-BSD FMLA CKD-EPI: >60 ML/MIN/1.73/M2
GLUCOSE SERPL-MCNC: 149 MG/DL (ref 70–110)
HBA1C MFR BLD: 6.9 % (ref 4–5.6)
HCT VFR BLD AUTO: 34.1 % (ref 40–54)
HGB BLD-MCNC: 11.2 GM/DL (ref 14–18)
HOLD SPECIMEN: NORMAL
IMM GRANULOCYTES # BLD AUTO: 0.03 K/UL (ref 0–0.04)
IMM GRANULOCYTES NFR BLD AUTO: 0.5 % (ref 0–0.5)
LYMPHOCYTES # BLD AUTO: 1.15 K/UL (ref 1–4.8)
MAGNESIUM SERPL-MCNC: 1.6 MG/DL (ref 1.6–2.6)
MCH RBC QN AUTO: 29.4 PG (ref 27–31)
MCHC RBC AUTO-ENTMCNC: 32.8 G/DL (ref 32–36)
MCV RBC AUTO: 90 FL (ref 82–98)
NUCLEATED RBC (/100WBC) (OHS): 0 /100 WBC
PLATELET # BLD AUTO: 382 K/UL (ref 150–450)
PMV BLD AUTO: 9.6 FL (ref 9.2–12.9)
POCT GLUCOSE: 124 MG/DL (ref 70–110)
POCT GLUCOSE: 186 MG/DL (ref 70–110)
POTASSIUM SERPL-SCNC: 3.4 MMOL/L (ref 3.5–5.1)
PROT SERPL-MCNC: 7.7 GM/DL (ref 6–8.4)
RBC # BLD AUTO: 3.81 M/UL (ref 4.6–6.2)
RELATIVE EOSINOPHIL (OHS): 1 %
RELATIVE LYMPHOCYTE (OHS): 18.5 % (ref 18–48)
RELATIVE MONOCYTE (OHS): 15.5 % (ref 4–15)
RELATIVE NEUTROPHIL (OHS): 63.7 % (ref 38–73)
SODIUM SERPL-SCNC: 141 MMOL/L (ref 136–145)
TROPONIN I SERPL DL<=0.01 NG/ML-MCNC: 0.03 NG/ML
URATE SERPL-MCNC: 6.1 MG/DL (ref 3.4–7)
WBC # BLD AUTO: 6.21 K/UL (ref 3.9–12.7)

## 2025-04-24 PROCEDURE — G0378 HOSPITAL OBSERVATION PER HR: HCPCS | Mod: HCNC

## 2025-04-24 PROCEDURE — 63600175 PHARM REV CODE 636 W HCPCS: Mod: HCNC

## 2025-04-24 PROCEDURE — 84484 ASSAY OF TROPONIN QUANT: CPT | Mod: HCNC | Performed by: NURSE PRACTITIONER

## 2025-04-24 PROCEDURE — 93010 ELECTROCARDIOGRAM REPORT: CPT | Mod: HCNC,76,, | Performed by: INTERNAL MEDICINE

## 2025-04-24 PROCEDURE — 94761 N-INVAS EAR/PLS OXIMETRY MLT: CPT | Mod: HCNC

## 2025-04-24 PROCEDURE — 36415 COLL VENOUS BLD VENIPUNCTURE: CPT | Mod: HCNC | Performed by: NURSE PRACTITIONER

## 2025-04-24 PROCEDURE — 99900035 HC TECH TIME PER 15 MIN (STAT): Mod: HCNC

## 2025-04-24 PROCEDURE — 85025 COMPLETE CBC W/AUTO DIFF WBC: CPT | Mod: HCNC | Performed by: NURSE PRACTITIONER

## 2025-04-24 PROCEDURE — 25000003 PHARM REV CODE 250: Mod: HCNC

## 2025-04-24 PROCEDURE — 93005 ELECTROCARDIOGRAM TRACING: CPT | Mod: HCNC

## 2025-04-24 PROCEDURE — 84550 ASSAY OF BLOOD/URIC ACID: CPT | Mod: HCNC | Performed by: EMERGENCY MEDICINE

## 2025-04-24 PROCEDURE — 83880 ASSAY OF NATRIURETIC PEPTIDE: CPT | Mod: HCNC | Performed by: NURSE PRACTITIONER

## 2025-04-24 PROCEDURE — 36415 COLL VENOUS BLD VENIPUNCTURE: CPT | Mod: HCNC | Performed by: EMERGENCY MEDICINE

## 2025-04-24 PROCEDURE — 93010 ELECTROCARDIOGRAM REPORT: CPT | Mod: HCNC,,, | Performed by: INTERNAL MEDICINE

## 2025-04-24 PROCEDURE — 80053 COMPREHEN METABOLIC PANEL: CPT | Mod: HCNC | Performed by: NURSE PRACTITIONER

## 2025-04-24 PROCEDURE — 99285 EMERGENCY DEPT VISIT HI MDM: CPT | Mod: 25,HCNC

## 2025-04-24 PROCEDURE — 83036 HEMOGLOBIN GLYCOSYLATED A1C: CPT | Mod: HCNC

## 2025-04-24 PROCEDURE — 83735 ASSAY OF MAGNESIUM: CPT | Mod: HCNC

## 2025-04-24 PROCEDURE — 84484 ASSAY OF TROPONIN QUANT: CPT | Mod: 91,HCNC | Performed by: NURSE PRACTITIONER

## 2025-04-24 RX ORDER — ZOLPIDEM TARTRATE 5 MG/1
5 TABLET ORAL NIGHTLY PRN
Status: DISCONTINUED | OUTPATIENT
Start: 2025-04-24 | End: 2025-05-01 | Stop reason: HOSPADM

## 2025-04-24 RX ORDER — SODIUM CHLORIDE 0.9 % (FLUSH) 0.9 %
5 SYRINGE (ML) INJECTION
Status: DISCONTINUED | OUTPATIENT
Start: 2025-04-24 | End: 2025-05-01 | Stop reason: HOSPADM

## 2025-04-24 RX ORDER — SODIUM CHLORIDE 0.9 % (FLUSH) 0.9 %
10 SYRINGE (ML) INJECTION
Status: DISCONTINUED | OUTPATIENT
Start: 2025-04-24 | End: 2025-05-01 | Stop reason: HOSPADM

## 2025-04-24 RX ORDER — NITROGLYCERIN 0.4 MG/1
0.4 TABLET SUBLINGUAL EVERY 5 MIN PRN
Status: DISCONTINUED | OUTPATIENT
Start: 2025-04-24 | End: 2025-05-01 | Stop reason: HOSPADM

## 2025-04-24 RX ORDER — CLOPIDOGREL BISULFATE 75 MG/1
75 TABLET ORAL DAILY
Status: DISCONTINUED | OUTPATIENT
Start: 2025-04-25 | End: 2025-05-01 | Stop reason: HOSPADM

## 2025-04-24 RX ORDER — AMOXICILLIN 250 MG
1 CAPSULE ORAL 2 TIMES DAILY
Status: DISCONTINUED | OUTPATIENT
Start: 2025-04-24 | End: 2025-05-01 | Stop reason: HOSPADM

## 2025-04-24 RX ORDER — INSULIN ASPART 100 [IU]/ML
0-5 INJECTION, SOLUTION INTRAVENOUS; SUBCUTANEOUS
Status: DISCONTINUED | OUTPATIENT
Start: 2025-04-24 | End: 2025-05-01 | Stop reason: HOSPADM

## 2025-04-24 RX ORDER — POTASSIUM CHLORIDE 20 MEQ/1
40 TABLET, EXTENDED RELEASE ORAL
Status: COMPLETED | OUTPATIENT
Start: 2025-04-24 | End: 2025-04-24

## 2025-04-24 RX ORDER — NALOXONE HCL 0.4 MG/ML
0.02 VIAL (ML) INJECTION
Status: DISCONTINUED | OUTPATIENT
Start: 2025-04-24 | End: 2025-05-01 | Stop reason: HOSPADM

## 2025-04-24 RX ORDER — ASPIRIN 81 MG/1
81 TABLET ORAL DAILY
Status: DISCONTINUED | OUTPATIENT
Start: 2025-04-25 | End: 2025-05-01 | Stop reason: HOSPADM

## 2025-04-24 RX ORDER — ONDANSETRON HYDROCHLORIDE 2 MG/ML
4 INJECTION, SOLUTION INTRAVENOUS EVERY 8 HOURS PRN
Status: DISCONTINUED | OUTPATIENT
Start: 2025-04-24 | End: 2025-05-01 | Stop reason: HOSPADM

## 2025-04-24 RX ORDER — TALC
6 POWDER (GRAM) TOPICAL NIGHTLY PRN
Status: DISCONTINUED | OUTPATIENT
Start: 2025-04-24 | End: 2025-04-24 | Stop reason: SDUPTHER

## 2025-04-24 RX ORDER — GABAPENTIN 300 MG/1
600 CAPSULE ORAL 2 TIMES DAILY
Status: DISCONTINUED | OUTPATIENT
Start: 2025-04-24 | End: 2025-05-01 | Stop reason: HOSPADM

## 2025-04-24 RX ORDER — FUROSEMIDE 10 MG/ML
20 INJECTION INTRAMUSCULAR; INTRAVENOUS
Status: COMPLETED | OUTPATIENT
Start: 2025-04-24 | End: 2025-04-24

## 2025-04-24 RX ORDER — FUROSEMIDE 10 MG/ML
40 INJECTION INTRAMUSCULAR; INTRAVENOUS 2 TIMES DAILY
Status: DISCONTINUED | OUTPATIENT
Start: 2025-04-24 | End: 2025-04-26

## 2025-04-24 RX ORDER — ACETAMINOPHEN 325 MG/1
650 TABLET ORAL EVERY 4 HOURS PRN
Status: DISCONTINUED | OUTPATIENT
Start: 2025-04-24 | End: 2025-05-01 | Stop reason: HOSPADM

## 2025-04-24 RX ORDER — LOSARTAN POTASSIUM 50 MG/1
50 TABLET ORAL DAILY
Status: DISCONTINUED | OUTPATIENT
Start: 2025-04-25 | End: 2025-04-24

## 2025-04-24 RX ORDER — COLCHICINE 0.6 MG/1
0.6 TABLET, FILM COATED ORAL 2 TIMES DAILY
Status: DISCONTINUED | OUTPATIENT
Start: 2025-04-24 | End: 2025-05-01 | Stop reason: HOSPADM

## 2025-04-24 RX ORDER — POLYETHYLENE GLYCOL 3350 17 G/17G
17 POWDER, FOR SOLUTION ORAL DAILY PRN
Status: DISCONTINUED | OUTPATIENT
Start: 2025-04-24 | End: 2025-05-01 | Stop reason: HOSPADM

## 2025-04-24 RX ORDER — IBUPROFEN 200 MG
24 TABLET ORAL
Status: DISCONTINUED | OUTPATIENT
Start: 2025-04-24 | End: 2025-05-01 | Stop reason: HOSPADM

## 2025-04-24 RX ORDER — IBUPROFEN 200 MG
16 TABLET ORAL
Status: DISCONTINUED | OUTPATIENT
Start: 2025-04-24 | End: 2025-05-01 | Stop reason: HOSPADM

## 2025-04-24 RX ORDER — GLUCAGON 1 MG
1 KIT INJECTION
Status: DISCONTINUED | OUTPATIENT
Start: 2025-04-24 | End: 2025-05-01 | Stop reason: HOSPADM

## 2025-04-24 RX ORDER — LOSARTAN POTASSIUM 50 MG/1
50 TABLET ORAL DAILY
Status: DISCONTINUED | OUTPATIENT
Start: 2025-04-25 | End: 2025-05-01 | Stop reason: HOSPADM

## 2025-04-24 RX ADMIN — ACETAMINOPHEN 650 MG: 325 TABLET ORAL at 11:04

## 2025-04-24 RX ADMIN — COLCHICINE 0.6 MG: 0.6 TABLET ORAL at 08:04

## 2025-04-24 RX ADMIN — SENNOSIDES AND DOCUSATE SODIUM 1 TABLET: 50; 8.6 TABLET ORAL at 08:04

## 2025-04-24 RX ADMIN — POTASSIUM CHLORIDE 40 MEQ: 1500 TABLET, EXTENDED RELEASE ORAL at 04:04

## 2025-04-24 RX ADMIN — FUROSEMIDE 20 MG: 10 INJECTION, SOLUTION INTRAMUSCULAR; INTRAVENOUS at 04:04

## 2025-04-24 RX ADMIN — FUROSEMIDE 40 MG: 10 INJECTION, SOLUTION INTRAVENOUS at 08:04

## 2025-04-24 RX ADMIN — GABAPENTIN 600 MG: 300 CAPSULE ORAL at 08:04

## 2025-04-24 NOTE — ASSESSMENT & PLAN NOTE
The patient reports progressive muscle weakness, particularly affecting the lower extremities, over the past several months. He experiences significant difficulty with ambulation and prolonged standing due to this weakness. He was recently admitted to a rehabilitation facility to address his declining mobility. An extensive workup has been performed, with positive antinuclear antibodies (MARLEN) as the only notable finding thus far.     Plan  -PT/OT  -Continue Gabapentin

## 2025-04-24 NOTE — HPI
Patient is a 75 yo male w/ PMHx of CAD, HTN, HLD, HFrEF 40-45% s/p ICD x7 ,Type 2 DM and gout who presents to the Emergency Department with worsening shortness of breath and progressive bilateral lower extremity edema over the past 3 days. He was seen by his primary care physician two days ago, at which time it was noted that he had not been on any diuretic therapy for approximately two months. Due to signs of fluid overload, furosemide (Lasix) 20 mg daily was restarted. The patient has since taken two doses and reports increased urinary output but no significant improvement in his shortness of breath. He denies chest pain, orthopnea, or upper respiratory symptoms.    He also endorses a gradual but marked functional decline over the last several months, including worsening generalized weakness and reduced mobility, requiring frequent rest while ambulating at home. He was recently recommended for inpatient rehabilitation by his PCP, but has not yet received any follow-up communication regarding the referral.    Notably, the patient was admitted on 04/08 for hypotension and volume depletion. At that time, he was seen in an outpatient setting and noted to have systolic blood pressure in the low 100s, but was referred to the ED when it dropped into the 80s. He was evaluated and treated for hypotension during that admission. He states that following discharge, no diuretic therapy was prescribed.     In the ED, initial vital signs /58 mmhg, HR 84, Temp 98.2, SpO2 100% on room air. Labs include CBC with stable H/H 11.2/34.1 and WBC within normal limits . CMP with Na 141, K 3.4,  and BUN/Cr 18/1.1 (baseline Cr 1.1), Troponin 0.029, , Uric Acid 6.1,. CXR The cardiac silhouette is normal in size, midline.  The pulmonary vascularity is normal.  Coronary artery calcifications seen. No focal area of airspace consolidation.  No pleural effusion.  No pneumothorax. EKG showed Right bundle branch block. Septal  infarct ,age undetermined .  Initiated on Laxis 20 mg. \[facility]\"" Family Medicine Team admitted the patient with CHF exacerbation.

## 2025-04-24 NOTE — PHARMACY MED REC
"      [facility]           Pharmacy  Admission Medication History     The home medication history was taken by Ruth Washington.      Medication history obtained from Medications listed below were obtained from: Patient/family.    Based on information gathered for medication list, you may go to "Admission" then "Reconcile Home Medications" tabs to review and/or act upon those items.     The home medication list has been updated by the Pharmacy department.   Please read ALL comments highlighted in yellow.   Please address this information as you see fit.    Feel free to contact us if you have any questions or require assistance.        No current facility-administered medications on file prior to encounter.     Current Outpatient Medications on File Prior to Encounter   Medication Sig Dispense Refill    aspirin (ECOTRIN) 81 MG EC tablet Take 81 mg by mouth once daily.      clopidogreL (PLAVIX) 75 mg tablet Take 1 tablet (75 mg total) by mouth once daily. 90 tablet 3    colchicine (COLCRYS) 0.6 mg tablet Take 1 tablet (0.6 mg total) by mouth 2 (two) times daily. 180 tablet 4    cyanocobalamin (VITAMIN B-12) 1000 MCG tablet Take 1,000 mcg by mouth once daily.      empagliflozin (JARDIANCE) 10 mg tablet Take 1 tablet (10 mg total) by mouth once daily. 30 tablet 11    gabapentin (NEURONTIN) 300 MG capsule Take 2 capsules (600 mg total) by mouth 2 (two) times daily. 360 capsule 1    hydrALAZINE (APRESOLINE) 50 MG tablet Take 1 tablet (50 mg total) by mouth every 8 (eight) hours. 90 tablet 11    insulin aspart U-100 (NOVOLOG FLEXPEN U-100 INSULIN) 100 unit/mL (3 mL) InPn pen Inject 15 Units into the skin 3 (three) times daily with meals. 15 mL 0    insulin glargine U-100, Lantus, (LANTUS SOLOSTAR U-100 INSULIN) 100 unit/mL (3 mL) InPn pen Inject 35 Units into the skin every evening. 15 mL 0    losartan (COZAAR) 50 MG tablet Take 1 tablet (50 mg total) by mouth once daily. 90 tablet 3    metFORMIN " (GLUCOPHAGE) 1000 MG tablet Take 1 tablet (1,000 mg total) by mouth 2 (two) times daily with meals. 180 tablet 4    multivit-minerals/FA/lycopene (ONE-A-DAY MEN'S 50 PLUS ORAL) Take 1 tablet by mouth once daily.      zolpidem (AMBIEN) 5 MG Tab TAKE 1 TABLET BY MOUTH EVERY NIGHT AS NEEDED (Patient taking differently: Take 5 mg by mouth nightly as needed.) 90 tablet 3    febuxostat (ULORIC) 40 mg Tab Take 1 tablet (40 mg total) by mouth once daily. 30 tablet 2    furosemide (LASIX) 20 MG tablet Take 1 tablet (20 mg total) by mouth 2 (two) times daily. 60 tablet 11       Please address this information as you see fit.  Feel free to contact us if you have any questions or require assistance.    Ruth Washington  [phone]            .

## 2025-04-24 NOTE — FIRST PROVIDER EVALUATION
Emergency Department TeleTriage Encounter Note      CHIEF COMPLAINT    Chief Complaint   Patient presents with    Shortness of Breath     C/o increased SOB w/ increased fluid retention x3 days. Hx CHF. Off daily lasix x2 months. Lung sounds clear and equal bilaterally, no increased resp effort or rate, and SPO2 100% RA.        VITAL SIGNS   Initial Vitals [04/24/25 1348]   BP Pulse Resp Temp SpO2   (!) 124/58 84 20 98.2 °F (36.8 °C) 100 %      MAP       --            ALLERGIES    Review of patient's allergies indicates:  No Known Allergies    PROVIDER TRIAGE NOTE  This is a teletriage evaluation of a 74 y.o. male presenting to the ED complaining of worsening SOB over the past three days. Recently resumed lasix.  No CP.     Alert, speaking in complete sentences.     Initial orders will be placed and care will be transferred to an alternate provider when patient is roomed for a full evaluation. Any additional orders and the final disposition will be determined by that provider.         ORDERS  Labs Reviewed   CBC W/ AUTO DIFFERENTIAL    Narrative:     The following orders were created for panel order CBC auto differential.  Procedure                               Abnormality         Status                     ---------                               -----------         ------                     CBC with Differential[[id number]]                                                        Please view results for these tests on the individual orders.   COMPREHENSIVE METABOLIC PANEL   TROPONIN I   B-TYPE NATRIURETIC PEPTIDE   CBC WITH DIFFERENTIAL       ED Orders (720h ago, onward)      Start Ordered     Status Ordering Provider    04/24/25 1500 04/24/25 1401  Strict intake and output Monitor and record urine output (in I's & O's section) every hour after initial furosemide injection given in ED  Every hour        Comments: Monitor and record urine output (in I's & O's section) every hour after initial furosemide injection  given in ED    Ordered SCHOTTELKOTTE, POOJA N.    04/24/25 1401 04/24/25 1401  Confirm Patient is not Eligible for Congestive Heart Failure Pathway  Until discontinued         Ordered SCHOTTELKOTTE, POOJA N.    04/24/25 1401 04/24/25 1401  Vital signs  Every 30 min         Ordered SCHOTTELKOTTE, POOJA N.    04/24/25 1401 04/24/25 1401  Cardiac Monitoring - Adult  Continuous        Comments: Notify Physician If:    Ordered SCHOTTELKOTTE, POOJA N.    04/24/25 1401 04/24/25 1401  Pulse Oximetry Continuous  Continuous         Ordered SCHOTTELKOTTE, POOJA N.    04/24/25 1401 04/24/25 1401  Insert peripheral IV  Once         Ordered SCHOTTELJOETTE, POOJA N.    04/24/25 1401 04/24/25 1401  CBC auto differential  STAT         Ordered CHIDI SEOIKA N.    04/24/25 1401 04/24/25 1401  Comprehensive metabolic panel  STAT         Ordered SCHOTTELJOETTRIZWANA DIEZPOOJA N.    04/24/25 1401 04/24/25 1401  Troponin I  STAT         Ordered SCHOTTELRIZWANA BARRERASSIKA N.    04/24/25 1401 04/24/25 1401  Brain natriuretic peptide  STAT         Ordered SCHOTTELJOETTECHIDIPOOJA N.    04/24/25 1401 04/24/25 1401  EKG 12-lead  Once         Ordered SCHCHIDI LIVINGSTONIKA N.    04/24/25 1401 04/24/25 1401  X-Ray Chest AP Portable  1 time imaging         Ordered TARSHAECHIDIPOOJA N.    04/24/25 1401 04/24/25 1401  CBC with Differential  PROCEDURE ONCE         Ordered SCHOTTELJOETTERIZWANAPOOJA N.    04/24/25 1350 04/24/25 1349  EKG 12-lead (Shortness of Breath) Age > 50  Once        Comments: If patient is > 50 yrs.    Completed by DEVI FABIAN on 4/24/2025 at  1:50 PM [facility]              Virtual Visit Note: The provider triage portion of this emergency department evaluation and documentation was performed via [employer], a HIPAA-compliant telemedicine application, in concert with a tele-presenter in the room. A face to face patient evaluation with one of my colleagues will occur once the patient is placed in an  emergency department room.      DISCLAIMER: This note was prepared with [email or web address] voice recognition transcription software. Garbled syntax, mangled pronouns, and other bizarre constructions may be attributed to that software system.

## 2025-04-24 NOTE — ASSESSMENT & PLAN NOTE
Patient has known history of Gout. Patient hasn't had a flare in a couple of years.    Plan   Continue current medications.

## 2025-04-24 NOTE — SUBJECTIVE & OBJECTIVE
Past Medical History:   Diagnosis Date    Anticoagulant long-term use     Cardiomyopathy     CHF (congestive heart failure)     Coronary artery disease     Diabetes mellitus     Gout     Heart attack     Hypertension     Pneumonia        Past Surgical History:   Procedure Laterality Date    APPENDECTOMY      CARDIAC CATHETERIZATION      7 stents    CARDIAC DEFIBRILLATOR PLACEMENT      CATARACT EXTRACTION Bilateral 2011    COLONOSCOPY N/A 08/10/2018    Procedure: COLONOSCOPY golytely;  Surgeon: Valentine Burger MD;  Location: [facility] ENDO;  Service: Endoscopy;  Laterality: N/A;    CORONARY ANGIOGRAPHY N/A 11/11/2024    Procedure: ANGIOGRAM, CORONARY ARTERY;  Surgeon: Luis Felipe Wright MD;  Location: [facility] CATH LAB/EP;  Service: Cardiology;  Laterality: N/A;    EYE SURGERY      INSTANTANEOUS WAVE-FREE RATIO (IFR) N/A 11/11/2024    Procedure: Instantaneous Wave-Free Ratio (IFR);  Surgeon: Luis Felipe Wright MD;  Location: [facility] CATH LAB/EP;  Service: Cardiology;  Laterality: N/A;    LEFT HEART CATHETERIZATION Left 11/11/2024    Procedure: Left heart cath;  Surgeon: Luis Felipe Wright MD;  Location: [facility] CATH LAB/EP;  Service: Cardiology;  Laterality: Left;    REVISION OF IMPLANTABLE CARDIOVERTER-DEFIBRILLATOR (ICD) ELECTRODE LEAD PLACEMENT N/A 11/11/2019    Procedure: REVISION, INSERTION, ELECTRODE LEAD, ICD;  Surgeon: Shukri Walsh MD;  Location: [facility] EP LAB;  Service: Cardiology;  Laterality: N/A;  Lead malfxn, RV lead ICD, SJM, anes, DM, 3 PREP    TONSILLECTOMY  1960s    VASECTOMY  2000       Review of patient's allergies indicates:  No Known Allergies    No current facility-administered medications on file prior to encounter.     Current Outpatient Medications on File Prior to Encounter   Medication Sig    aspirin (ECOTRIN) 81 MG EC tablet Take 81 mg by mouth once daily.    clopidogreL (PLAVIX) 75 mg tablet Take 1 tablet (75 mg total) by mouth once daily.    colchicine (COLCRYS) 0.6 mg tablet Take 1 tablet  "(0.6 mg total) by mouth 2 (two) times daily.    cyanocobalamin (VITAMIN B-12) 1000 MCG tablet Take 1,000 mcg by mouth once daily.    empagliflozin (JARDIANCE) 10 mg tablet Take 1 tablet (10 mg total) by mouth once daily.    gabapentin (NEURONTIN) 300 MG capsule Take 2 capsules (600 mg total) by mouth 2 (two) times daily.    hydrALAZINE (APRESOLINE) 50 MG tablet Take 1 tablet (50 mg total) by mouth every 8 (eight) hours.    insulin aspart U-100 (NOVOLOG FLEXPEN U-100 INSULIN) 100 unit/mL (3 mL) InPn pen Inject 15 Units into the skin 3 (three) times daily with meals.    insulin glargine U-100, Lantus, (LANTUS SOLOSTAR U-100 INSULIN) 100 unit/mL (3 mL) InPn pen Inject 35 Units into the skin every evening.    losartan (COZAAR) 50 MG tablet Take 1 tablet (50 mg total) by mouth once daily.    metFORMIN (GLUCOPHAGE) 1000 MG tablet Take 1 tablet (1,000 mg total) by mouth 2 (two) times daily with meals.    multivit-minerals/FA/lycopene (ONE-A-DAY MEN'S 50 PLUS ORAL) Take 1 tablet by mouth once daily.    zolpidem (AMBIEN) 5 MG Tab TAKE 1 TABLET BY MOUTH EVERY NIGHT AS NEEDED (Patient taking differently: Take 5 mg by mouth nightly as needed.)    alcohol swabs (BD ALCOHOL SWABS) PadM USE FOUR TIMES DAILY    blood glucose control high,low (ACCU-CHEK CATHRYN CONTROL SOLN) Soln Use as directed to check blood sugar    blood sugar diagnostic Strp test blood sugar as directed four times daily    blood-glucose meter (TRUE METRIX GLUCOSE METER) Misc use as directed    febuxostat (ULORIC) 40 mg Tab Take 1 tablet (40 mg total) by mouth once daily.    furosemide (LASIX) 20 MG tablet Take 1 tablet (20 mg total) by mouth 2 (two) times daily.    lancets 30 gauge Misc usea s directed to check blood glucose four times daily    nitroGLYCERIN (NITROSTAT) 0.4 MG SL tablet Place 1 tablet (0.4 mg total) under the tongue every 5 (five) minutes as needed for Chest pain.    pen needle, diabetic (BD ULTRA-FINE SINA PEN NEEDLE) 32 gauge x 5/32" Ndle Use " four times daily for insulin administration     Family History       Problem Relation (Age of Onset)    Heart disease Mother, Father    Hypertension Mother    Stroke Father          Tobacco Use    Smoking status: Former    Smokeless tobacco: Never   Substance and Sexual Activity    Alcohol use: Yes     Comment: socially    Drug use: No    Sexual activity: Yes     Review of Systems   Constitutional:  Positive for unexpected weight change.   HENT: Negative.     Eyes: Negative.    Respiratory:  Positive for shortness of breath.    Cardiovascular:  Positive for leg swelling.   Gastrointestinal: Negative.    Endocrine: Negative.    Genitourinary: Negative.    Musculoskeletal:  Positive for gait problem.   Skin: Negative.    Allergic/Immunologic: Negative.    Hematological: Negative.    Psychiatric/Behavioral: Negative.       Objective:     Vital Signs (Most Recent):  Temp: 98.2 °F (36.8 °C) (04/24/25 1348)  Pulse: 70 (04/24/25 1600)  Resp: (!) 23 (04/24/25 1600)  BP: 124/76 (04/24/25 1500)  SpO2: 96 % (04/24/25 1600) Vital Signs (24h Range):  Temp:  [98.2 °F (36.8 °C)] 98.2 °F (36.8 °C)  Pulse:  [] 70  Resp:  [19-34] 23  SpO2:  [91 %-100 %] 96 %  BP: (124-145)/(58-81) 124/76     Weight: 97.1 kg (214 lb)  Body mass index is 29.85 kg/m².     Physical Exam  Vitals and nursing note reviewed. Exam conducted with a chaperone present.   Constitutional:       Appearance: Normal appearance. He is normal weight.   HENT:      Head: Normocephalic and atraumatic.      Mouth/Throat:      Mouth: Mucous membranes are moist.   Eyes:      Conjunctiva/sclera: Conjunctivae normal.   Cardiovascular:      Rate and Rhythm: Normal rate and regular rhythm.      Pulses: Normal pulses.      Heart sounds: Normal heart sounds.   Pulmonary:      Effort: Pulmonary effort is normal.      Breath sounds: Normal breath sounds.   Abdominal:      General: Bowel sounds are normal.      Palpations: Abdomen is soft.   Musculoskeletal:         General:  Swelling present.      Cervical back: Normal range of motion and neck supple.      Right lower leg: Edema present.      Left lower leg: Edema present.      Comments: Bilateral upper extremity edema    Skin:     General: Skin is warm.      Capillary Refill: Capillary refill takes less than 2 seconds.   Neurological:      General: No focal deficit present.      Mental Status: He is alert and oriented to person, place, and time.   Psychiatric:         Mood and Affect: Mood normal.         Behavior: Behavior normal.                Significant Labs: All pertinent labs within the past 24 hours have been reviewed.    Significant Imaging: I have reviewed all pertinent imaging results/findings within the past 24 hours.

## 2025-04-24 NOTE — H&P
[facility] Emergency [facility] Medicine  History & Physical    Patient Name: Clifton Wilson  MRN: [id number]  Patient Class: OP- Observation  Admission Date: 4/24/2025  Attending Physician: Christophe Ortiz MD   Primary Care Provider: Britton Grissom MD         Patient information was obtained from patient, spouse/SO, and ER records.     Subjective:     Principal Problem:Acute on chronic combined systolic and diastolic congestive heart failure    Chief Complaint:   Chief Complaint   Patient presents with    Shortness of Breath     C/o increased SOB w/ increased fluid retention x3 days. Hx CHF. Off daily lasix x2 months. Lung sounds clear and equal bilaterally, no increased resp effort or rate, and SPO2 100% RA.         HPI: Patient is a 75 yo male w/ PMHx of CAD, HTN, HLD, HFrEF 40-45% s/p ICD x7 ,Type 2 DM, gout and Cervical Spondylolysis who presents to the Emergency Department with worsening shortness of breath and progressive bilateral lower extremity edema over the past 3 days. He was seen by his primary care physician two days ago, at which time it was noted that he had not been on any diuretic therapy for approximately two months. Due to signs of fluid overload, furosemide (Lasix) 20 mg daily was restarted. The patient has since taken two doses and reports increased urinary output but no significant improvement in his shortness of breath. He denies chest pain, orthopnea, or upper respiratory symptoms.    He also endorses a gradual but marked functional decline over the last several months, including worsening generalized weakness and reduced mobility, requiring frequent rest while ambulating at home. He was recently recommended for inpatient rehabilitation by his PCP, but has not yet received any follow-up communication regarding the referral.    Notably, the patient was admitted on 04/08 for hypotension and volume depletion. At that time, he was seen in an outpatient setting and noted to have systolic  blood pressure in the low 100s, but was referred to the ED when it dropped into the 80s. He was evaluated and treated for hypotension during that admission. He states that following discharge, no diuretic therapy was prescribed.     In the ED, initial vital signs /58 mmhg, HR 84, Temp 98.2, SpO2 100% on room air. Labs include CBC with stable H/H 11.2/34.1 and WBC within normal limits . CMP with Na 141, K 3.4,  and BUN/Cr 18/1.1 (baseline Cr 1.1), Troponin 0.029, , Uric Acid 6.1,. CXR The cardiac silhouette is normal in size, midline.  The pulmonary vascularity is normal.  Coronary artery calcifications seen. No focal area of airspace consolidation.  No pleural effusion.  No pneumothorax. EKG showed Right bundle branch block. Septal infarct ,age undetermined .  Initiated on Laxis 20 mg. [facility] Team admitted the patient with CHF exacerbation.      Past Medical History:   Diagnosis Date    Anticoagulant long-term use     Cardiomyopathy     CHF (congestive heart failure)     Coronary artery disease     Diabetes mellitus     Gout     Heart attack     Hypertension     Pneumonia        Past Surgical History:   Procedure Laterality Date    APPENDECTOMY      CARDIAC CATHETERIZATION      7 stents    CARDIAC DEFIBRILLATOR PLACEMENT      CATARACT EXTRACTION Bilateral 2011    COLONOSCOPY N/A 08/10/2018    Procedure: COLONOSCOPY golytely;  Surgeon: Valentine Burger MD;  Location: [facility] ENDO;  Service: Endoscopy;  Laterality: N/A;    CORONARY ANGIOGRAPHY N/A 11/11/2024    Procedure: ANGIOGRAM, CORONARY ARTERY;  Surgeon: Luis Felipe Wright MD;  Location: [facility] CATH LAB/EP;  Service: Cardiology;  Laterality: N/A;    EYE SURGERY      INSTANTANEOUS WAVE-FREE RATIO (IFR) N/A 11/11/2024    Procedure: Instantaneous Wave-Free Ratio (IFR);  Surgeon: Luis Felipe Wright MD;  Location: [facility] CATH LAB/EP;  Service: Cardiology;  Laterality: N/A;    LEFT HEART CATHETERIZATION Left 11/11/2024    Procedure: Left  heart cath;  Surgeon: Luis Felipe Wright MD;  Location: [facility] CATH LAB/EP;  Service: Cardiology;  Laterality: Left;    REVISION OF IMPLANTABLE CARDIOVERTER-DEFIBRILLATOR (ICD) ELECTRODE LEAD PLACEMENT N/A 11/11/2019    Procedure: REVISION, INSERTION, ELECTRODE LEAD, ICD;  Surgeon: Shukri Walsh MD;  Location: [facility] EP LAB;  Service: Cardiology;  Laterality: N/A;  Lead malfxn, RV lead ICD, SJM, anes, DM, 3 PREP    TONSILLECTOMY  1960s    VASECTOMY  2000       Review of patient's allergies indicates:  No Known Allergies    No current facility-administered medications on file prior to encounter.     Current Outpatient Medications on File Prior to Encounter   Medication Sig    aspirin (ECOTRIN) 81 MG EC tablet Take 81 mg by mouth once daily.    clopidogreL (PLAVIX) 75 mg tablet Take 1 tablet (75 mg total) by mouth once daily.    colchicine (COLCRYS) 0.6 mg tablet Take 1 tablet (0.6 mg total) by mouth 2 (two) times daily.    cyanocobalamin (VITAMIN B-12) 1000 MCG tablet Take 1,000 mcg by mouth once daily.    empagliflozin (JARDIANCE) 10 mg tablet Take 1 tablet (10 mg total) by mouth once daily.    gabapentin (NEURONTIN) 300 MG capsule Take 2 capsules (600 mg total) by mouth 2 (two) times daily.    hydrALAZINE (APRESOLINE) 50 MG tablet Take 1 tablet (50 mg total) by mouth every 8 (eight) hours.    insulin aspart U-100 (NOVOLOG FLEXPEN U-100 INSULIN) 100 unit/mL (3 mL) InPn pen Inject 15 Units into the skin 3 (three) times daily with meals.    insulin glargine U-100, Lantus, (LANTUS SOLOSTAR U-100 INSULIN) 100 unit/mL (3 mL) InPn pen Inject 35 Units into the skin every evening.    losartan (COZAAR) 50 MG tablet Take 1 tablet (50 mg total) by mouth once daily.    metFORMIN (GLUCOPHAGE) 1000 MG tablet Take 1 tablet (1,000 mg total) by mouth 2 (two) times daily with meals.    multivit-minerals/FA/lycopene (ONE-A-DAY MEN'S 50 PLUS ORAL) Take 1 tablet by mouth once daily.    zolpidem (AMBIEN) 5 MG Tab TAKE 1 TABLET BY  "MOUTH EVERY NIGHT AS NEEDED (Patient taking differently: Take 5 mg by mouth nightly as needed.)    alcohol swabs (BD ALCOHOL SWABS) PadM USE FOUR TIMES DAILY    blood glucose control high,low (ACCU-CHEK CATHRYN CONTROL SOLN) Soln Use as directed to check blood sugar    blood sugar diagnostic Strp test blood sugar as directed four times daily    blood-glucose meter (TRUE METRIX GLUCOSE METER) Misc use as directed    febuxostat (ULORIC) 40 mg Tab Take 1 tablet (40 mg total) by mouth once daily.    furosemide (LASIX) 20 MG tablet Take 1 tablet (20 mg total) by mouth 2 (two) times daily.    lancets 30 gauge Misc usea s directed to check blood glucose four times daily    nitroGLYCERIN (NITROSTAT) 0.4 MG SL tablet Place 1 tablet (0.4 mg total) under the tongue every 5 (five) minutes as needed for Chest pain.    pen needle, diabetic (BD ULTRA-FINE SINA PEN NEEDLE) 32 gauge x 5/32" Ndle Use four times daily for insulin administration     Family History       Problem Relation (Age of Onset)    Heart disease Mother, Father    Hypertension Mother    Stroke Father          Tobacco Use    Smoking status: Former    Smokeless tobacco: Never   Substance and Sexual Activity    Alcohol use: Yes     Comment: socially    Drug use: No    Sexual activity: Yes     Review of Systems   Constitutional:  Positive for unexpected weight change.   HENT: Negative.     Eyes: Negative.    Respiratory:  Positive for shortness of breath.    Cardiovascular:  Positive for leg swelling.   Gastrointestinal: Negative.    Endocrine: Negative.    Genitourinary: Negative.    Musculoskeletal:  Positive for gait problem.   Skin: Negative.    Allergic/Immunologic: Negative.    Hematological: Negative.    Psychiatric/Behavioral: Negative.       Objective:     Vital Signs (Most Recent):  Temp: 98.2 °F (36.8 °C) (04/24/25 1348)  Pulse: 70 (04/24/25 1600)  Resp: (!) 23 (04/24/25 1600)  BP: 124/76 (04/24/25 1500)  SpO2: 96 % (04/24/25 1600) Vital Signs (24h " Range):  Temp:  [98.2 °F (36.8 °C)] 98.2 °F (36.8 °C)  Pulse:  [] 70  Resp:  [19-34] 23  SpO2:  [91 %-100 %] 96 %  BP: (124-145)/(58-81) 124/76     Weight: 97.1 kg (214 lb)  Body mass index is 29.85 kg/m².     Physical Exam  Vitals and nursing note reviewed. Exam conducted with a chaperone present.   Constitutional:       Appearance: Normal appearance. He is normal weight.   HENT:      Head: Normocephalic and atraumatic.      Mouth/Throat:      Mouth: Mucous membranes are moist.   Eyes:      Conjunctiva/sclera: Conjunctivae normal.   Cardiovascular:      Rate and Rhythm: Normal rate and regular rhythm.      Pulses: Normal pulses.      Heart sounds: Normal heart sounds.   Pulmonary:      Effort: Pulmonary effort is normal.      Breath sounds: Normal breath sounds.   Abdominal:      General: Bowel sounds are normal.      Palpations: Abdomen is soft.   Musculoskeletal:         General: Swelling present.      Cervical back: Normal range of motion and neck supple.      Right lower leg: Edema present.      Left lower leg: Edema present.      Comments: Bilateral upper extremity edema    Skin:     General: Skin is warm.      Capillary Refill: Capillary refill takes less than 2 seconds.   Neurological:      General: No focal deficit present.      Mental Status: He is alert and oriented to person, place, and time.   Psychiatric:         Mood and Affect: Mood normal.         Behavior: Behavior normal.                Significant Labs: All pertinent labs within the past 24 hours have been reviewed.    Significant Imaging: I have reviewed all pertinent imaging results/findings within the past 24 hours.  Assessment/Plan:     Assessment & Plan  Acute on chronic combined systolic and diastolic congestive heart failure  Patient has Combined Systolic and Diastolic heart failure that is Acute on chronic. On presentation their CHF was decompensated. Evidence of decompensated CHF on presentation includes: edema and shortness of  breath. The etiology of their decompensation is likely medication non-compliance. Most recent BNP and echo results are listed below.  Recent Labs     04/24/25  1447   *     Latest ECHO  Results for orders placed during the hospital encounter of 03/18/25    Echo Saline Bubble? Yes    Interpretation Summary    Left Ventricle: The left ventricle is mildly dilated. There is eccentric hypertrophy. Regional wall motion abnormalities present. See diagram for wall motion findings. There is mildly reduced systolic function with a visually estimated ejection fraction of 40 - 45%. Quantitated ejection fraction is 43%. There is diastolic dysfunction. Normal left ventricular filling pressure.    Right Ventricle: The right ventricle has mild enlargement. Systolic function is mildly reduced. Pacemaker lead present in the ventricle.    Pulmonary Artery: The estimated pulmonary artery systolic pressure is 17 mmHg.    IVC/SVC: Normal venous pressure at 3 mmHg.    Current Heart Failure Medications  furosemide injection 40 mg, 2 times daily, Intravenous    POCUS done in ED :Bilateral lung sliding is present. A-lines visualized throughout anterior lung fields. No B-lines, consolidations, pleural effusion, or pneumothorax identified. Pleural line is smooth and continuous.  Cardiac POCUS reveals normal left ventricular systolic function with no regional wall motion abnormalities. Right ventricle is normal in size. No pericardial effusion.     Plan  - Monitor strict I&Os and daily weights.    - Place on telemetry  - Low sodium diet  - Place on fluid restriction of 1.5 L.   - Cardiology has not been consulted  - The patient's volume status is improving but not at their baseline as indicated by edema  -Lasix 40 mg IV BID  DM type 2 without retinopathy  Patient's FSGs are controlled on current medication regimen.  Last A1c reviewed-   Lab Results   Component Value Date    HGBA1C 6.9 (H) 04/24/2025     Most recent fingerstick glucose  reviewed-   Recent Labs   Lab 04/24/25  1654   POCTGLUCOSE 124*     Current correctional scale  Low  Maintain anti-hyperglycemic dose as follows-   Antihyperglycemics (From admission, onward)      Start     Stop Route Frequency Ordered    04/24/25 1743  insulin aspart U-100 pen 0-5 Units         -- SubQ Before meals & nightly PRN 04/24/25 1645          Plan  -Monitor glucose.Glucose goal 140-180  -SSI  Essential hypertension  Patient's blood pressure range in the last 24 hours was: BP  Min: 117/77  Max: 145/81.The patient's inpatient anti-hypertensive regimen is listed below:  Current Antihypertensives  nitroGLYCERIN SL tablet 0.4 mg, Every 5 min PRN, Sublingual  furosemide injection 40 mg, 2 times daily, Intravenous    Plan  - BP is controlled, no changes needed to their regimen  - We are going to held Bps medications secondary to low Bps reading  Gout  Patient has known history of Gout. Patient hasn't had a flare in a couple of years.    Plan   Continue current medications.    Muscular weakness  The patient reports progressive muscle weakness, particularly affecting the lower extremities, over the past several months. He experiences significant difficulty with ambulation and prolonged standing due to this weakness. He was recently admitted to a rehabilitation facility to address his declining mobility. An extensive workup has been performed, with positive antinuclear antibodies (MARLEN) as the only notable finding thus far.     Plan  -PT/OT  -Continue Gabapentin   Cervical spondylolysis  Patient has a history of Chronic back pain, with numbness and tingling. Patient has diagnosis of Cervical Spondylolysis.    Plan  Continue home medications.    Troponin level elevated  Patient presented with shortness of breath and mildly elevated Troponin 0.029, .    Plan  -Cardiac monitoring   -Monitor electrolytes   -Trend Troponin      VTE Risk Mitigation (From admission, onward)           Ordered     Reason for No  Pharmacological VTE Prophylaxis  Once        Question:  Reasons:  Answer:  Already adequately anticoagulated on oral Anticoagulants    04/24/25 1645     IP VTE HIGH RISK PATIENT  Once         04/24/25 1645     Place sequential compression device  Until discontinued         04/24/25 1645     Place sequential compression device  Until discontinued         04/24/25 1643                         Roya Crews MD\  Family Medicine PGY-1  Department of Hospital Medicine  [facility] - Emergency Dept

## 2025-04-24 NOTE — ED NOTES
Pt transferred from be to standing position with moderate assistance, then ambulated from side of bed to door way with a notable decrease in o2 sats on room air from 99% to 91%, Pt became unsteady on feet while ambulating back to bed and was required moderate assist to transfer back in bed. Pt's work of breathing and respiratory rate increased during this activity and Pt endorsed shortness of breath with muscle weakness with this exertion.

## 2025-04-24 NOTE — ASSESSMENT & PLAN NOTE
Patient's FSGs are controlled on current medication regimen.  Last A1c reviewed-   Lab Results   Component Value Date    HGBA1C 6.9 (H) 04/24/2025     Most recent fingerstick glucose reviewed-   Recent Labs   Lab 04/24/25  1654   POCTGLUCOSE 124*     Current correctional scale  Low  Maintain anti-hyperglycemic dose as follows-   Antihyperglycemics (From admission, onward)      Start     Stop Route Frequency Ordered    04/24/25 1743  insulin aspart U-100 pen 0-5 Units         -- SubQ Before meals & nightly PRN 04/24/25 1645          Plan  -Monitor glucose.Glucose goal 140-180  -SSI

## 2025-04-24 NOTE — ASSESSMENT & PLAN NOTE
Patient's blood pressure range in the last 24 hours was: BP  Min: 117/77  Max: 145/81.The patient's inpatient anti-hypertensive regimen is listed below:  Current Antihypertensives  nitroGLYCERIN SL tablet 0.4 mg, Every 5 min PRN, Sublingual  furosemide injection 40 mg, 2 times daily, Intravenous    Plan  - BP is controlled, no changes needed to their regimen  - We are going to held Bps medications secondary to low Bps reading

## 2025-04-24 NOTE — ED TRIAGE NOTES
Reports SOB with  for past several days with peripheral edema. States unable to ambulate unassisted and without becoming SOB. Reports weight loss and general decline in health over past few months. Denies pain at this time. Presents awake, alert.

## 2025-04-24 NOTE — ASSESSMENT & PLAN NOTE
Patient presented with shortness of breath and mildly elevated Troponin 0.029, .    Plan  -Cardiac monitoring   -Monitor electrolytes   -Trend Troponin

## 2025-04-24 NOTE — ASSESSMENT & PLAN NOTE
Patient has a history of Chronic back pain, with numbness and tingling. Patient has diagnosis of Cervical Spondylolysis.    Plan  Continue home medications.

## 2025-04-24 NOTE — ED PROVIDER NOTES
Encounter Date: 4/24/2025       History     Chief Complaint   Patient presents with    Shortness of Breath     C/o increased SOB w/ increased fluid retention x3 days. Hx CHF. Off daily lasix x2 months. Lung sounds clear and equal bilaterally, no increased resp effort or rate, and SPO2 100% RA.      HPI    74-year-old male with past medical history HFrEF (35-40%), cardiomyopathy, CAD status post PCI x7, diabetes, hypertension, presents with worsening shortness of breath with leg swelling over last 3 days.  Patient reports he had his primary care physician 2 days ago, he had not been on any diuretic for the last 2 months before this, his Lasix was restarted at 20 mg daily.  He was able to take 2 doses and notes increased urinary output however his shortness breath has not improved.  He reports no nasal congestion, sore throat.  He reports no chest pain.  He reports it over last several months he has continued to decline precipitously.  He is unable to get up and move around the house and has to stop frequently and his weakness has significantly progressed.  He was recommended to inpatient rehab by his primary care physician a few days ago however he has not received a call or any further instructions.  He denies any additional complaints.    Review of patient's allergies indicates:  No Known Allergies  Past Medical History:   Diagnosis Date    Anticoagulant long-term use     Cardiomyopathy     CHF (congestive heart failure)     Coronary artery disease     Diabetes mellitus     Gout     Heart attack     Hypertension     Pneumonia      Past Surgical History:   Procedure Laterality Date    APPENDECTOMY      CARDIAC CATHETERIZATION      7 stents    CARDIAC DEFIBRILLATOR PLACEMENT      CATARACT EXTRACTION Bilateral 2011    COLONOSCOPY N/A 08/10/2018    Procedure: COLONOSCOPY golytely;  Surgeon: Valentine Burger MD;  Location: [facility];  Service: Endoscopy;  Laterality: N/A;    CORONARY ANGIOGRAPHY N/A 11/11/2024     Procedure: ANGIOGRAM, CORONARY ARTERY;  Surgeon: Luis Felipe Wright MD;  Location: [facility] CATH LAB/EP;  Service: Cardiology;  Laterality: N/A;    EYE SURGERY      INSTANTANEOUS WAVE-FREE RATIO (IFR) N/A 11/11/2024    Procedure: Instantaneous Wave-Free Ratio (IFR);  Surgeon: Luis Felipe Wright MD;  Location: [facility] CATH LAB/EP;  Service: Cardiology;  Laterality: N/A;    LEFT HEART CATHETERIZATION Left 11/11/2024    Procedure: Left heart cath;  Surgeon: Luis Felipe Wright MD;  Location: [facility] CATH LAB/EP;  Service: Cardiology;  Laterality: Left;    REVISION OF IMPLANTABLE CARDIOVERTER-DEFIBRILLATOR (ICD) ELECTRODE LEAD PLACEMENT N/A 11/11/2019    Procedure: REVISION, INSERTION, ELECTRODE LEAD, ICD;  Surgeon: Shukri Walsh MD;  Location: [facility] EP LAB;  Service: Cardiology;  Laterality: N/A;  Lead malfxn, RV lead ICD, SJM, anes, DM, 3 PREP    TONSILLECTOMY  1960s    VASECTOMY  2000     Family History   Problem Relation Name Age of Onset    Heart disease Mother Miriam Gillespie     Hypertension Mother Miriam Gillespie     Heart disease Father Juan Atnonio Wilson Sr     Stroke Father Juan Antonio Wilson Sr     Amblyopia Neg Hx      Blindness Neg Hx      Cataracts Neg Hx      Glaucoma Neg Hx      Macular degeneration Neg Hx      Retinal detachment Neg Hx      Strabismus Neg Hx       Social History[1]  Review of Systems   Constitutional: Negative.    HENT: Negative.     Eyes: Negative.    Respiratory:  Positive for shortness of breath.    Cardiovascular:  Positive for leg swelling.   Gastrointestinal: Negative.    Genitourinary: Negative.    Musculoskeletal: Negative.    Skin: Negative.    Neurological: Negative.        Physical Exam     Initial Vitals [04/24/25 1348]   BP Pulse Resp Temp SpO2   (!) 124/58 84 20 98.2 °F (36.8 °C) 100 %      MAP       --         Physical Exam    Nursing note and vitals reviewed.  Constitutional: He appears well-developed. He is not diaphoretic. He appears distressed (mildly).   HENT:   Head:  Normocephalic and atraumatic.   Nose: Nose normal. Mouth/Throat: No oropharyngeal exudate.   Eyes: EOM are normal. Pupils are equal, round, and reactive to light.   Neck: Neck supple. No tracheal deviation present. No JVD present.   Normal range of motion.  Cardiovascular:  Normal rate, regular rhythm, normal heart sounds and intact distal pulses.           Pulmonary/Chest: He is in respiratory distress (diminished in the bases otherwise clear, mild tachypnea).   Abdominal: Abdomen is soft. Bowel sounds are normal. He exhibits no distension. There is no abdominal tenderness. There is no rebound and no guarding.   Musculoskeletal:         General: Edema (1+ pitting edema noted BLE) present. No tenderness. Normal range of motion.      Cervical back: Normal range of motion and neck supple.     Neurological: He is alert and oriented to person, place, and time. He has normal strength.   Skin: Skin is warm and dry. Capillary refill takes less than 2 seconds. No rash noted. No erythema. There is pallor.         ED Course   Procedures  Labs Reviewed   COMPREHENSIVE METABOLIC PANEL - Abnormal       Result Value    Sodium 141      Potassium 3.4 (*)     Chloride 110      CO2 20 (*)     Glucose 149 (*)     BUN 18      Creatinine 1.1      Calcium 9.5      Protein Total 7.7      Albumin 3.1 (*)     Bilirubin Total 0.5      ALP 95      AST 23      ALT 28      Anion Gap 11      eGFR >60     TROPONIN I - Abnormal    Troponin-I 0.029 (*)    B-TYPE NATRIURETIC PEPTIDE - Abnormal     (*)    CBC WITH DIFFERENTIAL - Abnormal    WBC 6.21      RBC 3.81 (*)     HGB 11.2 (*)     HCT 34.1 (*)     MCV 90      MCH 29.4      MCHC 32.8      RDW 13.3      Platelet Count 382      MPV 9.6      Nucleated RBC 0      Neut % 63.7      Lymph % 18.5      Mono % 15.5 (*)     Eos % 1.0      Basophil % 0.8      Imm Grans % 0.5      Neut # 3.96      Lymph # 1.15      Mono # 0.96      Eos # 0.06      Baso # 0.05      Imm Grans # 0.03     CBC W/ AUTO  DIFFERENTIAL    Narrative:     The following orders were created for panel order CBC auto differential.  Procedure                               Abnormality         Status                     ---------                               -----------         ------                     CBC with Differential[[id number]]       Abnormal            Final result                 Please view results for these tests on the individual orders.   EXTRA TUBES    Narrative:     The following orders were created for panel order EXTRA TUBES.  Procedure                               Abnormality         Status                     ---------                               -----------         ------                     Light Green Top Hold[[id number]]                            Final result                 Please view results for these tests on the individual orders.   LIGHT GREEN TOP HOLD    Extra Tube Hold for add-ons.     URIC ACID        ECG Results              EKG 12-lead (Shortness of Breath) Age > 50 (In process)        Collection Time Result Time QRS Duration OHS QTC Calculation    04/24/25 13:43:01 04/24/25 15:08:10 156 489                     In process by Interface, Lab In [facility] (04/24/25 15:08:18)                   Narrative:    Test Reason : R06.02,    Vent. Rate :  84 BPM     Atrial Rate :  84 BPM     P-R Int : 152 ms          QRS Dur : 156 ms      QT Int : 414 ms       P-R-T Axes :  50  95  53 degrees    QTcB Int : 489 ms    Normal sinus rhythm  Right bundle branch block  Septal infarct ,age undetermined  Abnormal ECG  When compared with ECG of 08-Apr-2025 11:48,  Premature ventricular complexes are no longer Present  Septal infarct is now Present    Referred By: AAAREFERRAL SELF           Confirmed By:                                   Imaging Results              X-Ray Chest AP Portable (Final result)  Result time 04/24/25 15:23:23      Final result by Orin Rouse MD (04/24/25 15:23:23)                    Impression:      No acute intrathoracic process seen.      Electronically signed by: Orin Rouse MD  Date:    04/24/2025  Time:    15:23               Narrative:    EXAMINATION:  XR CHEST AP PORTABLE    CLINICAL HISTORY:  CHF;    TECHNIQUE:  Single frontal view of the chest was performed.    COMPARISON:  04/08/2025    FINDINGS:  There is a cardio stimulator device in the left upper chest with dual leads.    The cardiac silhouette is normal in size, midline.  The pulmonary vascularity is normal.  Coronary artery calcifications seen.    No focal area of airspace consolidation.  No pleural effusion.  No pneumothorax.  The osseous structures appear normal.                                       Medications   potassium chloride SA CR tablet 40 mEq (has no administration in time range)   furosemide injection 20 mg (has no administration in time range)   aspirin EC tablet 81 mg (has no administration in time range)   clopidogreL tablet 75 mg (has no administration in time range)   colchicine capsule/tablet 0.6 mg (has no administration in time range)   allopurinol split tablet 50 mg (has no administration in time range)   losartan tablet 50 mg (has no administration in time range)   nitroGLYCERIN SL tablet 0.4 mg (has no administration in time range)   zolpidem tablet 5 mg (has no administration in time range)     Medical Decision Making  Amount and/or Complexity of Data Reviewed  Labs: ordered.      MDM:    74-year-old male with past medical history HFrEF (35-40%), cardiomyopathy, CAD status post PCI x7, diabetes, hypertension, presents with worsening shortness of breath with leg swelling over last 3 days. Differential Diagnosis includes:  Acute mi/ACS, CHF exacerbation, COPD exacerbation, electrolyte abnormality.  Physical exam as noted above.  ED workup notable for CBC white blood cell count 6.21, hemoglobin 11.2, CMP with BUN 18, creatinine 1.1, glucose 149, troponin 0.029, , chest x-ray is unremarkable,  EKG shows normal sinus rhythm rate of 84 beats per minute, right bundle-branch block present, nonspecific ST and T-wave changes noted, no STEMI.  Patient presentation appears more consistent with mild CHF exacerbation.  Evidence of hypovolemia, patient able to walk 10-15 steps without becoming significantly tachypneic, tachycardic and short of breath, not hypoxic during these events.  Small dose of Lasix given here, PT/OT consulted, social work consulted.  Patient meets criteria for observation at this point.  Discussed further with family Medicine who will continue evaluation and management in the hospital.  Discussed diagnosis and further treatment with patient and family at bedside. All questions answered, patient transferred to floor improved and stable.      Note was created using voice recognition software. Note may have occasional typographical or grammatical errors, garbled syntax, and other bizarre constructions that may not have been identified and edited despite good dk initial review prior to signing.                                       Clinical Impression:  Final diagnoses:  [R06.02] SOB (shortness of breath)  [R06.02] Shortness of breath  [I50.9] CHF exacerbation          ED Disposition Condition    Observation                     [1]   Social History  Tobacco Use    Smoking status: Former    Smokeless tobacco: Never   Substance Use Topics    Alcohol use: Yes     Comment: socially    Drug use: No        Genaro Anand MD  04/24/25 2857

## 2025-04-24 NOTE — ASSESSMENT & PLAN NOTE
Patient has Combined Systolic and Diastolic heart failure that is Acute on chronic. On presentation their CHF was decompensated. Evidence of decompensated CHF on presentation includes: edema and shortness of breath. The etiology of their decompensation is likely medication non-compliance. Most recent BNP and echo results are listed below.  Recent Labs     04/24/25  1447   *     Latest ECHO  Results for orders placed during the hospital encounter of 03/18/25    Echo Saline Bubble? Yes    Interpretation Summary    Left Ventricle: The left ventricle is mildly dilated. There is eccentric hypertrophy. Regional wall motion abnormalities present. See diagram for wall motion findings. There is mildly reduced systolic function with a visually estimated ejection fraction of 40 - 45%. Quantitated ejection fraction is 43%. There is diastolic dysfunction. Normal left ventricular filling pressure.    Right Ventricle: The right ventricle has mild enlargement. Systolic function is mildly reduced. Pacemaker lead present in the ventricle.    Pulmonary Artery: The estimated pulmonary artery systolic pressure is 17 mmHg.    IVC/SVC: Normal venous pressure at 3 mmHg.    Current Heart Failure Medications  furosemide injection 40 mg, 2 times daily, Intravenous    POCUS done in ED :Bilateral lung sliding is present. A-lines visualized throughout anterior lung fields. No B-lines, consolidations, pleural effusion, or pneumothorax identified. Pleural line is smooth and continuous.  Cardiac POCUS reveals normal left ventricular systolic function with no regional wall motion abnormalities. Right ventricle is normal in size. No pericardial effusion.     Plan  - Monitor strict I&Os and daily weights.    - Place on telemetry  - Low sodium diet  - Place on fluid restriction of 1.5 L.   - Cardiology has not been consulted  - The patient's volume status is improving but not at their baseline as indicated by edema  -Lasix 40 mg IV BID

## 2025-04-24 NOTE — ED NOTES
Pt seen in room AAOx4 states he has been short of breath since a recent hospital admission (3/18/25). Has had a recent fall at home, and cannot get rid of the built up fluid. Pt's wife at bedside endorses this history. Pt was able to transfer from WC to bed with minimal assistance. Pt noted to have generalized +3 edema to extremities. Denies pain at this time.

## 2025-04-25 LAB
ABSOLUTE EOSINOPHIL (OHS): 0.07 K/UL
ABSOLUTE MONOCYTE (OHS): 1.13 K/UL (ref 0.3–1)
ABSOLUTE NEUTROPHIL COUNT (OHS): 3.89 K/UL (ref 1.8–7.7)
APICAL FOUR CHAMBER EJECTION FRACTION: 40 %
APICAL TWO CHAMBER EJECTION FRACTION: 31 %
ASCENDING AORTA: 3.5 CM
AV INDEX (PROSTH): 0.5
AV MEAN GRADIENT: 6 MMHG
AV PEAK GRADIENT: 12 MMHG
AV VALVE AREA BY VELOCITY RATIO: 2.2 CM²
AV VALVE AREA: 2.1 CM²
AV VELOCITY RATIO: 0.53
BASOPHILS # BLD AUTO: 0.04 K/UL
BASOPHILS NFR BLD AUTO: 0.6 %
BSA FOR ECHO PROCEDURE: 2.21 M2
CV ECHO LV RWT: 0.33 CM
DOP CALC AO PEAK VEL: 1.7 M/S
DOP CALC AO VTI: 34.3 CM
DOP CALC LVOT AREA: 4.2 CM2
DOP CALC LVOT DIAMETER: 2.3 CM
DOP CALC LVOT PEAK VEL: 0.9 M/S
DOP CALC LVOT STROKE VOLUME: 71.8 CM3
DOP CALC MV VTI: 29.8 CM
DOP CALCLVOT PEAK VEL VTI: 17.3 CM
E WAVE DECELERATION TIME: 259 MSEC
E/A RATIO: 0.65
E/E' RATIO: 9 M/S
ECHO LV POSTERIOR WALL: 1 CM (ref 0.6–1.1)
ERYTHROCYTE [DISTWIDTH] IN BLOOD BY AUTOMATED COUNT: 13.3 % (ref 11.5–14.5)
FRACTIONAL SHORTENING: 21.3 % (ref 28–44)
GLUCOSE SERPL-MCNC: 137 MG/DL (ref 70–110)
GLUCOSE SERPL-MCNC: 186 MG/DL (ref 70–110)
HCT VFR BLD AUTO: 33.2 % (ref 40–54)
HGB BLD-MCNC: 10.8 GM/DL (ref 14–18)
IMM GRANULOCYTES # BLD AUTO: 0.02 K/UL (ref 0–0.04)
IMM GRANULOCYTES NFR BLD AUTO: 0.3 % (ref 0–0.5)
INTERVENTRICULAR SEPTUM: 1 CM (ref 0.6–1.1)
IVC DIAMETER: 1.93 CM
LEFT ATRIUM AREA SYSTOLIC (APICAL 2 CHAMBER): 23.66 CM2
LEFT ATRIUM AREA SYSTOLIC (APICAL 4 CHAMBER): 23.11 CM2
LEFT ATRIUM VOLUME INDEX MOD: 32 ML/M2
LEFT ATRIUM VOLUME MOD: 69 ML
LEFT INTERNAL DIMENSION IN SYSTOLE: 4.8 CM (ref 2.1–4)
LEFT VENTRICLE DIASTOLIC VOLUME INDEX: 85.25 ML/M2
LEFT VENTRICLE DIASTOLIC VOLUME: 185 ML
LEFT VENTRICLE END DIASTOLIC VOLUME APICAL 2 CHAMBER: 124.36 ML
LEFT VENTRICLE END DIASTOLIC VOLUME APICAL 4 CHAMBER: 144 ML
LEFT VENTRICLE END SYSTOLIC VOLUME APICAL 2 CHAMBER: 72.49 ML
LEFT VENTRICLE END SYSTOLIC VOLUME APICAL 4 CHAMBER: 64.49 ML
LEFT VENTRICLE MASS INDEX: 117 G/M2
LEFT VENTRICLE SYSTOLIC VOLUME INDEX: 48.8 ML/M2
LEFT VENTRICLE SYSTOLIC VOLUME: 106 ML
LEFT VENTRICULAR INTERNAL DIMENSION IN DIASTOLE: 6.1 CM (ref 3.5–6)
LEFT VENTRICULAR MASS: 253.9 G
LV LATERAL E/E' RATIO: 7.6 M/S
LV SEPTAL E/E' RATIO: 10.6 M/S
LVED V (TEICH): 184.9 ML
LVES V (TEICH): 105.57 ML
LVOT MG: 1.74 MMHG
LVOT MV: 0.62 CM/S
LYMPHOCYTES # BLD AUTO: 1.16 K/UL (ref 1–4.8)
MCH RBC QN AUTO: 29.7 PG (ref 27–31)
MCHC RBC AUTO-ENTMCNC: 32.5 G/DL (ref 32–36)
MCV RBC AUTO: 91 FL (ref 82–98)
MV MEAN GRADIENT: 1 MMHG
MV PEAK A VEL: 0.82 M/S
MV PEAK E VEL: 0.53 M/S
MV PEAK GRADIENT: 4 MMHG
MV STENOSIS PRESSURE HALF TIME: 75.18 MS
MV VALVE AREA BY CONTINUITY EQUATION: 2.41 CM2
MV VALVE AREA P 1/2 METHOD: 2.93 CM2
NUCLEATED RBC (/100WBC) (OHS): 0 /100 WBC
OHS CV RV/LV RATIO: 0.62 CM
OHS LV EJECTION FRACTION SIMPSONS BIPLANE MOD: 36 %
PISA MRMAX VEL: 5.07 M/S
PLATELET # BLD AUTO: 359 K/UL (ref 150–450)
PMV BLD AUTO: 10 FL (ref 9.2–12.9)
POCT GLUCOSE: 137 MG/DL (ref 70–110)
POCT GLUCOSE: 150 MG/DL (ref 70–110)
POCT GLUCOSE: 165 MG/DL (ref 70–110)
POCT GLUCOSE: 172 MG/DL (ref 70–110)
PV PEAK GRADIENT: 4 MMHG
PV PEAK VELOCITY: 0.99 M/S
RA PRESSURE ESTIMATED: 3 MMHG
RA VOL SYS: 35.33 ML
RBC # BLD AUTO: 3.64 M/UL (ref 4.6–6.2)
RELATIVE EOSINOPHIL (OHS): 1.1 %
RELATIVE LYMPHOCYTE (OHS): 18.4 % (ref 18–48)
RELATIVE MONOCYTE (OHS): 17.9 % (ref 4–15)
RELATIVE NEUTROPHIL (OHS): 61.7 % (ref 38–73)
RIGHT ATRIAL AREA: 14.7 CM2
RIGHT ATRIUM VOLUME AREA LENGTH APICAL 4 CHAMBER: 34.8 ML
RIGHT VENTRICLE DIASTOLIC BASEL DIMENSION: 3.8 CM
RV TISSUE DOPPLER FREE WALL SYSTOLIC VELOCITY 1 (APICAL 4 CHAMBER VIEW): 11.56 CM/S
SINUS: 4.03 CM
STJ: 2.7 CM
TDI LATERAL: 0.07 M/S
TDI SEPTAL: 0.05 M/S
TDI: 0.06 M/S
TRICUSPID ANNULAR PLANE SYSTOLIC EXCURSION: 1.9 CM
TROPONIN I SERPL DL<=0.01 NG/ML-MCNC: 0.03 NG/ML
TROPONIN I SERPL DL<=0.01 NG/ML-MCNC: 0.03 NG/ML
WBC # BLD AUTO: 6.31 K/UL (ref 3.9–12.7)
Z-SCORE OF LEFT VENTRICULAR DIMENSION IN END DIASTOLE: -1.63
Z-SCORE OF LEFT VENTRICULAR DIMENSION IN END SYSTOLE: 0.74

## 2025-04-25 PROCEDURE — 85025 COMPLETE CBC W/AUTO DIFF WBC: CPT | Mod: HCNC

## 2025-04-25 PROCEDURE — 36415 COLL VENOUS BLD VENIPUNCTURE: CPT | Mod: HCNC

## 2025-04-25 PROCEDURE — 97530 THERAPEUTIC ACTIVITIES: CPT | Mod: HCNC

## 2025-04-25 PROCEDURE — 99223 1ST HOSP IP/OBS HIGH 75: CPT | Mod: HCNC,,, | Performed by: INTERNAL MEDICINE

## 2025-04-25 PROCEDURE — 94761 N-INVAS EAR/PLS OXIMETRY MLT: CPT | Mod: HCNC

## 2025-04-25 PROCEDURE — 25000003 PHARM REV CODE 250: Mod: HCNC

## 2025-04-25 PROCEDURE — 97166 OT EVAL MOD COMPLEX 45 MIN: CPT | Mod: HCNC

## 2025-04-25 PROCEDURE — 11000001 HC ACUTE MED/SURG PRIVATE ROOM: Mod: HCNC

## 2025-04-25 PROCEDURE — 97110 THERAPEUTIC EXERCISES: CPT | Mod: HCNC

## 2025-04-25 PROCEDURE — 99900035 HC TECH TIME PER 15 MIN (STAT): Mod: HCNC

## 2025-04-25 PROCEDURE — 97162 PT EVAL MOD COMPLEX 30 MIN: CPT | Mod: HCNC

## 2025-04-25 PROCEDURE — 97116 GAIT TRAINING THERAPY: CPT | Mod: HCNC

## 2025-04-25 PROCEDURE — 97535 SELF CARE MNGMENT TRAINING: CPT | Mod: HCNC

## 2025-04-25 PROCEDURE — 84484 ASSAY OF TROPONIN QUANT: CPT | Mod: HCNC

## 2025-04-25 PROCEDURE — 63600175 PHARM REV CODE 636 W HCPCS: Mod: HCNC

## 2025-04-25 RX ADMIN — LOSARTAN POTASSIUM 50 MG: 50 TABLET, FILM COATED ORAL at 10:04

## 2025-04-25 RX ADMIN — FUROSEMIDE 40 MG: 10 INJECTION, SOLUTION INTRAVENOUS at 10:04

## 2025-04-25 RX ADMIN — ACETAMINOPHEN 650 MG: 325 TABLET ORAL at 06:04

## 2025-04-25 RX ADMIN — COLCHICINE 0.6 MG: 0.6 TABLET ORAL at 09:04

## 2025-04-25 RX ADMIN — COLCHICINE 0.6 MG: 0.6 TABLET ORAL at 10:04

## 2025-04-25 RX ADMIN — ASPIRIN 81 MG: 81 TABLET, COATED ORAL at 10:04

## 2025-04-25 RX ADMIN — FUROSEMIDE 40 MG: 10 INJECTION, SOLUTION INTRAVENOUS at 09:04

## 2025-04-25 RX ADMIN — GABAPENTIN 600 MG: 300 CAPSULE ORAL at 09:04

## 2025-04-25 RX ADMIN — ALLOPURINOL 50 MG: 300 TABLET ORAL at 10:04

## 2025-04-25 RX ADMIN — ZOLPIDEM TARTRATE 5 MG: 5 TABLET, FILM COATED ORAL at 12:04

## 2025-04-25 RX ADMIN — CLOPIDOGREL BISULFATE 75 MG: 75 TABLET, FILM COATED ORAL at 10:04

## 2025-04-25 RX ADMIN — ZOLPIDEM TARTRATE 5 MG: 5 TABLET, FILM COATED ORAL at 09:04

## 2025-04-25 RX ADMIN — GABAPENTIN 600 MG: 300 CAPSULE ORAL at 10:04

## 2025-04-25 NOTE — ASSESSMENT & PLAN NOTE
Patient's blood pressure range in the last 24 hours was: BP  Min: 117/77  Max: 180/94.The patient's inpatient anti-hypertensive regimen is listed below:  Current Antihypertensives  nitroGLYCERIN SL tablet 0.4 mg, Every 5 min PRN, Sublingual  furosemide injection 40 mg, 2 times daily, Intravenous  losartan tablet 50 mg, Daily, Oral    Plan  - BP is controlled, no changes needed to their regimen  - We are going to held Bps medications secondary to low Bps reading

## 2025-04-25 NOTE — ASSESSMENT & PLAN NOTE
Patient's FSGs are controlled on current medication regimen.  Last A1c reviewed-   Lab Results   Component Value Date    HGBA1C 6.9 (H) 04/24/2025     Most recent fingerstick glucose reviewed-   Recent Labs   Lab 04/24/25  1654 04/24/25  2046 04/25/25  0609   POCTGLUCOSE 124* 186* 137*     Current correctional scale  Low  Maintain anti-hyperglycemic dose as follows-   Antihyperglycemics (From admission, onward)      Start     Stop Route Frequency Ordered    04/24/25 1743  insulin aspart U-100 pen 0-5 Units         -- SubQ Before meals & nightly PRN 04/24/25 1645          Plan  -Monitor glucose.Glucose goal 140-180  -SSI

## 2025-04-25 NOTE — ASSESSMENT & PLAN NOTE
Patient has Combined Systolic and Diastolic heart failure that is Acute on chronic. On presentation their CHF was decompensated. Evidence of decompensated CHF on presentation includes: edema and shortness of breath. The etiology of their decompensation is likely medication non-compliance. Most recent BNP and echo results are listed below.  Recent Labs     04/24/25  1447   *     Latest ECHO  Results for orders placed during the hospital encounter of 03/18/25    Echo Saline Bubble? Yes    Interpretation Summary    Left Ventricle: The left ventricle is mildly dilated. There is eccentric hypertrophy. Regional wall motion abnormalities present. See diagram for wall motion findings. There is mildly reduced systolic function with a visually estimated ejection fraction of 40 - 45%. Quantitated ejection fraction is 43%. There is diastolic dysfunction. Normal left ventricular filling pressure.    Right Ventricle: The right ventricle has mild enlargement. Systolic function is mildly reduced. Pacemaker lead present in the ventricle.    Pulmonary Artery: The estimated pulmonary artery systolic pressure is 17 mmHg.    IVC/SVC: Normal venous pressure at 3 mmHg.    Current Heart Failure Medications  furosemide injection 40 mg, 2 times daily, Intravenous  losartan tablet 50 mg, Daily, Oral    POCUS done in ED :Bilateral lung sliding is present. A-lines visualized throughout anterior lung fields. No B-lines, consolidations, pleural effusion, or pneumothorax identified. Pleural line is smooth and continuous.  Cardiac POCUS reveals normal left ventricular systolic function with no regional wall motion abnormalities. Right ventricle is normal in size. No pericardial effusion.     Plan  - Monitor strict I&Os and daily weights.    - Place on telemetry  - Low sodium diet  - Place on fluid restriction of 1.5 L.   - Cardiology has not been consulted  - The patient's volume status is improving but not at their baseline as indicated by  edema  -Lasix 40 mg IV BID

## 2025-04-25 NOTE — ASSESSMENT & PLAN NOTE
Patient's blood pressure range in the last 24 hours was: BP  Min: 117/77  Max: 180/94.The patient's inpatient anti-hypertensive regimen is listed below:  Current Antihypertensives  nitroGLYCERIN SL tablet 0.4 mg, Every 5 min PRN, Sublingual  furosemide injection 40 mg, 2 times daily, Intravenous  losartan tablet 50 mg, Daily, Oral    Plan  - BP is controlled, no changes needed to their regimen

## 2025-04-25 NOTE — CONSULTS
[place] - Telemetry  Cardiology  Consult Note    Patient Name: Clifton Wilson  MRN: [id number]  Admission Date: 4/24/2025  Hospital Length of Stay: 0 days  Code Status: Full Code   Attending Provider: Christophe Ortiz MD   Consulting Provider: Andrew Morales NP  Primary Care Physician: Britton Grissom MD  Principal Problem:Acute on chronic combined systolic and diastolic congestive heart failure    Patient information was obtained from patient, past medical records, and ER records.     Inpatient consult to [facility]iology[facility]  Consult performed by: Andrew Morales NP  Consult ordered by: Roya Crews MD        Subjective:     Chief Complaint:  SOB, edema, weakness     HPI:   73 yo male w/ PMHx of CAD, HTN, HLD, HFrEF 40-45% s/p ICD,Type 2 DM, gout and Cervical Spondylolysis. Patient presented to the ED with SOB, edema, back and leg pain.  He was seen by his primary care physician two days ago, at which time it was noted that he had not been on any diuretic therapy for approximately two months. Due to signs of fluid overload, furosemide (Lasix) 20 mg daily was restarted. The patient has since taken two doses and reports increased urinary output but no significant improvement in his shortness of breath. Patient denies CP, orthopnea, PND. He appears euvolemic on exam and reports filling 7 urinals overnight and has 150 cc in the 8th with IV diuresis.     Past Medical History:   Diagnosis Date    Anticoagulant long-term use     Cardiomyopathy     CHF (congestive heart failure)     Coronary artery disease     Diabetes mellitus     Gout     Heart attack     Hypertension     Pneumonia        Past Surgical History:   Procedure Laterality Date    APPENDECTOMY      CARDIAC CATHETERIZATION      7 stents    CARDIAC DEFIBRILLATOR PLACEMENT      CATARACT EXTRACTION Bilateral 2011    COLONOSCOPY N/A 08/10/2018    Procedure: COLONOSCOPY golytely;  Surgeon: Valentine Burger MD;  Location: [facility];  Service: Endoscopy;   Laterality: N/A;    CORONARY ANGIOGRAPHY N/A 11/11/2024    Procedure: ANGIOGRAM, CORONARY ARTERY;  Surgeon: Luis Felipe Wright MD;  Location: [facility] CATH LAB/EP;  Service: Cardiology;  Laterality: N/A;    EYE SURGERY      INSTANTANEOUS WAVE-FREE RATIO (IFR) N/A 11/11/2024    Procedure: Instantaneous Wave-Free Ratio (IFR);  Surgeon: Luis Felipe Wright MD;  Location: [facility] CATH LAB/EP;  Service: Cardiology;  Laterality: N/A;    LEFT HEART CATHETERIZATION Left 11/11/2024    Procedure: Left heart cath;  Surgeon: Luis Felipe Wright MD;  Location: [facility] CATH LAB/EP;  Service: Cardiology;  Laterality: Left;    REVISION OF IMPLANTABLE CARDIOVERTER-DEFIBRILLATOR (ICD) ELECTRODE LEAD PLACEMENT N/A 11/11/2019    Procedure: REVISION, INSERTION, ELECTRODE LEAD, ICD;  Surgeon: Shukri Walsh MD;  Location: [facility] EP LAB;  Service: Cardiology;  Laterality: N/A;  Lead malfxn, RV lead ICD, SJM, anes, DM, 3 PREP    TONSILLECTOMY  1960s    VASECTOMY  2000       Review of patient's allergies indicates:  No Known Allergies    No current facility-administered medications on file prior to encounter.     Current Outpatient Medications on File Prior to Encounter   Medication Sig    aspirin (ECOTRIN) 81 MG EC tablet Take 81 mg by mouth once daily.    clopidogreL (PLAVIX) 75 mg tablet Take 1 tablet (75 mg total) by mouth once daily.    colchicine (COLCRYS) 0.6 mg tablet Take 1 tablet (0.6 mg total) by mouth 2 (two) times daily.    cyanocobalamin (VITAMIN B-12) 1000 MCG tablet Take 1,000 mcg by mouth once daily.    empagliflozin (JARDIANCE) 10 mg tablet Take 1 tablet (10 mg total) by mouth once daily.    gabapentin (NEURONTIN) 300 MG capsule Take 2 capsules (600 mg total) by mouth 2 (two) times daily.    hydrALAZINE (APRESOLINE) 50 MG tablet Take 1 tablet (50 mg total) by mouth every 8 (eight) hours.    insulin aspart U-100 (NOVOLOG FLEXPEN U-100 INSULIN) 100 unit/mL (3 mL) InPn pen Inject 15 Units into the skin 3 (three) times daily  "with meals.    insulin glargine U-100, Lantus, (LANTUS SOLOSTAR U-100 INSULIN) 100 unit/mL (3 mL) InPn pen Inject 35 Units into the skin every evening.    losartan (COZAAR) 50 MG tablet Take 1 tablet (50 mg total) by mouth once daily.    metFORMIN (GLUCOPHAGE) 1000 MG tablet Take 1 tablet (1,000 mg total) by mouth 2 (two) times daily with meals.    multivit-minerals/FA/lycopene (ONE-A-DAY MEN'S 50 PLUS ORAL) Take 1 tablet by mouth once daily.    zolpidem (AMBIEN) 5 MG Tab TAKE 1 TABLET BY MOUTH EVERY NIGHT AS NEEDED (Patient taking differently: Take 5 mg by mouth nightly as needed.)    alcohol swabs (BD ALCOHOL SWABS) PadM USE FOUR TIMES DAILY    blood glucose control high,low (ACCU-CHEK CATHRYN CONTROL SOLN) Soln Use as directed to check blood sugar    blood sugar diagnostic Strp test blood sugar as directed four times daily    blood-glucose meter (TRUE METRIX GLUCOSE METER) Misc use as directed    febuxostat (ULORIC) 40 mg Tab Take 1 tablet (40 mg total) by mouth once daily.    furosemide (LASIX) 20 MG tablet Take 1 tablet (20 mg total) by mouth 2 (two) times daily.    lancets 30 gauge Misc usea s directed to check blood glucose four times daily    nitroGLYCERIN (NITROSTAT) 0.4 MG SL tablet Place 1 tablet (0.4 mg total) under the tongue every 5 (five) minutes as needed for Chest pain.    pen needle, diabetic (BD ULTRA-FINE SINA PEN NEEDLE) 32 gauge x 5/32" Ndle Use four times daily for insulin administration     Family History       Problem Relation (Age of Onset)    Heart disease Mother, Father    Hypertension Mother    Stroke Father          Tobacco Use    Smoking status: Former    Smokeless tobacco: Never   Substance and Sexual Activity    Alcohol use: Yes     Comment: socially    Drug use: No    Sexual activity: Yes     Review of Systems   Constitutional: Negative. Negative for diaphoresis.   HENT: Negative.     Eyes: Negative.    Cardiovascular:  Positive for dyspnea on exertion. Negative for chest pain, " near-syncope, orthopnea, palpitations, paroxysmal nocturnal dyspnea and syncope.   Respiratory:  Positive for shortness of breath.    Endocrine: Negative.    Hematologic/Lymphatic: Negative.    Musculoskeletal:  Positive for back pain and myalgias.   Gastrointestinal:  Negative for nausea and vomiting.   Genitourinary: Negative.    Neurological:  Positive for weakness.   Allergic/Immunologic: Negative.      Objective:     Vital Signs (Most Recent):  Temp: 97.9 °F (36.6 °C) (04/25/25 0750)  Pulse: 78 (04/25/25 0750)  Resp: 18 (04/25/25 0750)  BP: (!) 149/89 (04/25/25 0750)  SpO2: 97 % (04/25/25 0750) Vital Signs (24h Range):  Temp:  [97.3 °F (36.3 °C)-99 °F (37.2 °C)] 97.9 °F (36.6 °C)  Pulse:  [] 78  Resp:  [16-34] 18  SpO2:  [91 %-100 %] 97 %  BP: (117-180)/(58-94) 149/89     Weight: 97.1 kg (214 lb)  Body mass index is 29.85 kg/m².    SpO2: 97 %         Intake/Output Summary (Last 24 hours) at 4/25/2025 0951  Last data filed at 4/25/2025 0900  Gross per 24 hour   Intake 236 ml   Output 1475 ml   Net -1239 ml       Lines/Drains/Airways       Peripheral Intravenous Line  Duration                  Peripheral IV - Single Lumen 04/24/25 1435 20 G Anterior;Proximal;Right Forearm <1 day                     Physical Exam  Constitutional:       General: He is not in acute distress.     Appearance: He is not diaphoretic.   HENT:      Head: Atraumatic.   Eyes:      General:         Right eye: No discharge.         Left eye: No discharge.   Cardiovascular:      Rate and Rhythm: Normal rate and regular rhythm.   Pulmonary:      Effort: Pulmonary effort is normal.      Breath sounds: Normal breath sounds. No rales.   Abdominal:      General: Bowel sounds are normal.      Palpations: Abdomen is soft.   Musculoskeletal:      Right lower leg: No edema.      Left lower leg: No edema.   Neurological:      Mental Status: He is alert and oriented to person, place, and time.          Significant Labs: BMP:   Recent Labs   Lab  "04/24/25  1447 04/24/25  1654     --    K 3.4*  --      --    CO2 20*  --    BUN 18  --    CREATININE 1.1  --    CALCIUM 9.5  --    MG  --  1.6   , CMP   Recent Labs   Lab 04/24/25  1447      K 3.4*      CO2 20*   BUN 18   CREATININE 1.1   CALCIUM 9.5   ALBUMIN 3.1*   BILITOT 0.5   ALKPHOS 95   AST 23   ALT 28   ANIONGAP 11   , CBC   Recent Labs   Lab 04/24/25  1447 04/25/25  0914   WBC 6.21 6.31   HGB 11.2* 10.8*   HCT 34.1* 33.2*    359   , INR No results for input(s): "INR", "PROTIME" in the last 48 hours., Lipid Panel No results for input(s): "CHOL", "HDL", "LDLCALC", "TRIG", "CHOLHDL" in the last 48 hours., Troponin No results for input(s): "TROPONINIHS" in the last 48 hours., and All pertinent lab results from the last 24 hours have been reviewed.    Significant Imaging: Echocardiogram: Transthoracic echo (TTE) complete (Cupid Only):   Results for orders placed or performed during the hospital encounter of 03/18/25   Echo Saline Bubble? Yes   Result Value Ref Range    BSA 2.24 m2    Flores's Biplane MOD Ejection Fraction 43 %    A2C EF 42 %    A4C EF 42 %    LVOT stroke volume 92.2 cm3    LVIDd 5.9 3.5 - 6.0 cm    LV Systolic Volume 87 mL    LV Systolic Volume Index 39.5 mL/m2    LVIDs 4.4 (A) 2.1 - 4.0 cm    LV ESV A2C 81.40 mL    LV Diastolic Volume 172 mL    LV ESV A4C 43.46 mL    LV Diastolic Volume Index 78.18 mL/m2    LV EDV A2C 110.412747500281884 mL    LV EDV A4C 123.05 mL    Left Ventricular End Systolic Volume by Teichholz Method 87.06 mL    Left Ventricular End Diastolic Volume by Teichholz Method 171.52 mL    IVS 1.2 (A) 0.6 - 1.1 cm    LVOT diameter 2.3 cm    LVOT area 4.2 cm2    FS 25.4 (A) 28 - 44 %    Left Ventricle Relative Wall Thickness 0.37 cm    PW 1.1 0.6 - 1.1 cm    LV mass 288.5 g    LV Mass Index 131.1 g/m2    MV Peak E [place] 0.52 m/s    TDI LATERAL 0.06 m/s    TDI SEPTAL 0.09 m/s    E/E' ratio 7 m/s    MV Peak A [place] 0.96 m/s    TR Max [place] 1.9 m/s    E/A " ratio 0.54     E wave deceleration time 459 msec    LV SEPTAL E/E' RATIO 5.8 m/s    LV LATERAL E/E' RATIO 8.7 m/s    PV Peak S [place] 0.56 m/s    PV Peak D [place] 0.35 m/s    Pulm vein S/D ratio 1.60     LVOT peak [place] 1.0 m/s    Left Ventricular Outflow Tract Mean Velocity 0.61 cm/s    Left Ventricular Outflow Tract Mean Gradient 1.74 mmHg    RV- estes basal diam 4.4 cm    RV S' 9.05 cm/s    TAPSE 1.68 cm    RV/LV Ratio 0.75 cm    LA Vol (MOD) 66 mL    SANDRITA (MOD) 30 mL/m2    RA area length vol 36.22 mL    RA Area 14.3 cm2    RA Vol 36.82 mL    AV mean gradient 4 mmHg    AV peak gradient 6 mmHg    Ao peak rome 1.2 m/s    Ao VTI 26.7 cm    LVOT peak VTI 22.2 cm    AV valve area 3.5 cm²    AV Velocity Ratio 0.83     AV index (prosthetic) 0.83     BEV by Velocity Ratio 3.5 cm²    Mr max rome 3.85 m/s    MV mean gradient 2 mmHg    MV peak gradient 5 mmHg    MV stenosis pressure 1/2 time 133.14 ms    MV valve area p 1/2 method 1.65 cm2    MV valve area by continuity eq 4.25 cm2    MV VTI 21.7 cm    Triscuspid Valve Regurgitation Peak Gradient 15 mmHg    PV PEAK VELOCITY 0.91 m/s    PV peak gradient 3 mmHg    Pulmonary Valve Mean Velocity 0.66 m/s    Sinus 3.55 cm    STJ 3.63 cm    Ascending aorta 3.47 cm    IVC diameter 1.18 cm    Mean e' 0.08 m/s    ZLVIDS -0.29     ZLVIDD -2.42     LA area A4C 17.15 cm2    LA area A2C 24.33 cm2    TV resting pulmonary artery pressure 17 mmHg    RV TB RVSP 5 mmHg    Est. RA pres 3 mmHg    Narrative      Left Ventricle: The left ventricle is mildly dilated. There is   eccentric hypertrophy. Regional wall motion abnormalities present. See   diagram for wall motion findings. There is mildly reduced systolic   function with a visually estimated ejection fraction of 40 - 45%.   Quantitated ejection fraction is 43%. There is diastolic dysfunction.   Normal left ventricular filling pressure.    Right Ventricle: The right ventricle has mild enlargement. Systolic   function is mildly reduced. Pacemaker  lead present in the ventricle.    Pulmonary Artery: The estimated pulmonary artery systolic pressure is   17 mmHg.    IVC/SVC: Normal venous pressure at 3 mmHg.       Assessment and Plan:     * Acute on chronic combined systolic and diastolic congestive heart failure  Patient has Systolic (HFrEF) heart failure that is Acute on chronic. On presentation their CHF was well compensated. Most recent BNP and echo results are listed below.  Recent Labs     04/24/25  1447   *     Latest ECHO  Results for orders placed during the hospital encounter of 03/18/25    Echo Saline Bubble? Yes    Interpretation Summary    Left Ventricle: The left ventricle is mildly dilated. There is eccentric hypertrophy. Regional wall motion abnormalities present. See diagram for wall motion findings. There is mildly reduced systolic function with a visually estimated ejection fraction of 40 - 45%. Quantitated ejection fraction is 43%. There is diastolic dysfunction. Normal left ventricular filling pressure.    Right Ventricle: The right ventricle has mild enlargement. Systolic function is mildly reduced. Pacemaker lead present in the ventricle.    Pulmonary Artery: The estimated pulmonary artery systolic pressure is 17 mmHg.    IVC/SVC: Normal venous pressure at 3 mmHg.    Current Heart Failure Medications  furosemide injection 40 mg, 2 times daily, Intravenous  losartan tablet 50 mg, Daily, Oral    Plan  - Monitor strict I&Os and daily weights.    - Place on telemetry  - Low sodium diet  - Place on fluid restriction of 1.5 L.     - Ambulate  -CPK, ESR, CPR, T4 pending     Troponin level elevated  Mild troponin elevation in setting of ADHF  No acute ischemic changes per EKG      Essential hypertension  Patient's blood pressure range in the last 24 hours was: BP  Min: 117/77  Max: 180/94.The patient's inpatient anti-hypertensive regimen is listed below:  Current Antihypertensives  nitroGLYCERIN SL tablet 0.4 mg, Every 5 min PRN,  Sublingual  furosemide injection 40 mg, 2 times daily, Intravenous  losartan tablet 50 mg, Daily, Oral    Plan  - BP is controlled, no changes needed to their regimen          VTE Risk Mitigation (From admission, onward)           Ordered     Reason for No Pharmacological VTE Prophylaxis  Once        Question:  Reasons:  Answer:  Already adequately anticoagulated on oral Anticoagulants    04/24/25 1645     IP VTE HIGH RISK PATIENT  Once         04/24/25 1645     Place sequential compression device  Until discontinued         04/24/25 1645     Place sequential compression device  Until discontinued         04/24/25 1643                    Thank you for your consult. I will follow-up with patient. Please contact us if you have any additional questions.    Andrew Morales, MIGUEL  Cardiology   [place] - Telemetry

## 2025-04-25 NOTE — NURSING
VIRTUAL NURSE: Pt arrived to unit. Permission received to turn camera to view patient. VIP model explained  Admission questions completed.  Plan of care reviewed with patient. Educated patient on fall risk/POC. Call light within reach, side rails up x2. Patient instucted to call staff for assistance.     04/24/25 2016   Admission Complete   Admission Complete by VN Complete

## 2025-04-25 NOTE — PLAN OF CARE
Problem: Physical Therapy  Goal: Physical Therapy Goal  Description: Goals to be met by: 25     Patient will increase functional independence with mobility by performin. Supine to sit with Stand-by Assistance  2. Sit to supine with Stand-by Assistance  3. Sit to stand transfer with Minimal Assistance  4. Bed to chair transfer with Minimal Assistance using Rolling Walker  5. Gait  x 25 feet with Minimal Assistance using Rolling Walker.     Outcome: Progressing     Recommending moderate intensity therapy due to change in functional mobility from baseline and increased risk for falls.

## 2025-04-25 NOTE — PLAN OF CARE
Problem: Occupational Therapy  Goal: Occupational Therapy Goal  Description: Goals to be met by: 5/25/2025     Patient will increase functional independence with ADLs by performing:    Feeding with Modified [place] and Assistive Devices as needed.  UE Dressing with Modified [place].  LE Dressing with Modified [place] and Assistive Devices as needed.  Grooming while standing at sink with Modified [place].  Toileting from bedside commode with Modified [place] for hygiene and clothing management.   Rolling to Bilateral with Modified [place].   Supine to sit with Modified [place].  Step transfer with Modified [place]  Toilet transfer to bedside commode with Modified [place].  Increased functional strength to WFL for ADLS.  Upper extremity exercise program x10 reps per handout, with independence.    Outcome: Progressing

## 2025-04-25 NOTE — PT/OT/SLP EVAL
"Physical Therapy Evaluation and Treatment    Patient Name: Clifton Wilson   MRN: [id number]  Recent Surgery: * No surgery found *      Recommendations:     Discharge Recommendations: Moderate Intensity Therapy   Discharge Equipment Recommendations: to be determined by next level of care   Barriers to discharge: Increased level of assist and Ongoing medical treatment    Assessment:     Clifton Wilson is a 74 y.o. male admitted with a medical diagnosis of Acute on chronic combined systolic and diastolic congestive heart failure. He presents with the following impairments/functional limitations: impaired functional mobility, weakness, gait instability, impaired endurance, impaired self care skills, decreased upper extremity function, impaired balance, decreased lower extremity function, pain, edema. Recommending moderate intensity therapy due to change in functional mobility from baseline and increased risk for falls.     Rehab Prognosis: Good; patient would benefit from acute PT services to address these deficits and reach maximum level of function.    Plan:     During this hospitalization, patient to be seen 5 x/week to address the above listed problems via gait training, therapeutic activities, therapeutic exercises, neuromuscular re-education, wheelchair management/training    Plan of Care Expires: 05/25/25    Subjective     Chief Complaint: edema and gout pain  Patient Comments/Goals: to return to PLOF  Pain/Comfort:  Pain Rating 1: 4/10 (in all of his joints due to "gout")  Pain Rating Post-Intervention 1: 4/10    Social History:  Living Environment: Patient lives with their spouse in a single story home with tub-shower combo and threshold entry  Prior Level of Function: Prior to admission, patient was modified independent until the past 2-3 weeks , pt reports requiring increased assist and impaired mobility  Equipment Used at Home: cane, straight, wheelchair, rollator, bath bench  DME owned (not currently " used): none  Assistance Upon Discharge: family    Objective:     Communicated with nsg prior to session. Patient found HOB elevated with bed alarm, telemetry upon PT entry to room.    General Precautions: Standard, fall, diabetic   Orthopedic Precautions: N/A   Braces: N/A    Respiratory Status: Room air    Exams:  Cognition: Patient is oriented to Person, Place, Time, Situation  RLE ROM: WFL  RLE Strength: WFL except 3+/5 hip   LLE ROM: WFL  LLE Strength: WFL except 3+/5 hip  Skin Integrity/Edema:     -       Skin integrity: Visible skin intact  -       Edema: Moderate BUE    Functional Mobility:  Gait belt applied - Yes  Bed Mobility  Scooting: moderate assistance  Supine to Sit: maximal assistance for LE management and trunk management, HOB elevated, use of bed rail, increased time and effort, vc's for effective technique  Sit to Supine: moderate assistance for LE management  Transfers  Sit to Stand: moderate assistance with rolling walker and with cues for hand placement and bed height elevated , verbal and visual cues for hand/foot placement and sequencing for maintaining precautions    Gait  Patient ambulated ~4-5 side steps L toward HOB with rolling walker and minimum assistance. Patient demonstrates unsteady gait, decreased step length, and flexed posture. Assist for Rw management. All lines remained intact throughout ambulation trail., flexed posture  Balance  Sitting: stand by assistance  Standing: minimum assistance      Therapeutic Activities and Exercises:   Patient educated on role of acute care PT and PT POC, safety while in hospital including calling nurse for mobility, and call light usage  Educated pt on d/c recs  Facilitated 2 sit <> stands as above and standing marches, weight shifts and side steps, pre gait training performed     AM-PAC 6 CLICK MOBILITY  Total Score:11    Patient left HOB elevated with all lines intact, call button in reach, RN notified, bed alarm on, and spouse  present.    GOALS:   Multidisciplinary Problems       Physical Therapy Goals          Problem: Physical Therapy    Goal Priority Disciplines Outcome Interventions   Physical Therapy Goal     PT, PT/OT Progressing    Description: Goals to be met by: 25     Patient will increase functional independence with mobility by performin. Supine to sit with Stand-by Assistance  2. Sit to supine with Stand-by Assistance  3. Sit to stand transfer with Minimal Assistance  4. Bed to chair transfer with Minimal Assistance using Rolling Walker  5. Gait  x 25 feet with Minimal Assistance using Rolling Walker.                          History:     Past Medical History:   Diagnosis Date    Anticoagulant long-term use     Cardiomyopathy     CHF (congestive heart failure)     Coronary artery disease     Diabetes mellitus     Gout     Heart attack     Hypertension     Pneumonia        Past Surgical History:   Procedure Laterality Date    APPENDECTOMY      CARDIAC CATHETERIZATION      7 stents    CARDIAC DEFIBRILLATOR PLACEMENT      CATARACT EXTRACTION Bilateral     COLONOSCOPY N/A 08/10/2018    Procedure: COLONOSCOPY golytely;  Surgeon: Valentine Burger MD;  Location: [facility] ENDO;  Service: Endoscopy;  Laterality: N/A;    CORONARY ANGIOGRAPHY N/A 2024    Procedure: ANGIOGRAM, CORONARY ARTERY;  Surgeon: Luis Felipe Wright MD;  Location: [facility] CATH LAB/EP;  Service: Cardiology;  Laterality: N/A;    EYE SURGERY      INSTANTANEOUS WAVE-FREE RATIO (IFR) N/A 2024    Procedure: Instantaneous Wave-Free Ratio (IFR);  Surgeon: Luis Felipe Wright MD;  Location: [facility] CATH LAB/EP;  Service: Cardiology;  Laterality: N/A;    LEFT HEART CATHETERIZATION Left 2024    Procedure: Left heart cath;  Surgeon: Luis Felipe Wright MD;  Location: [facility] CATH LAB/EP;  Service: Cardiology;  Laterality: Left;    REVISION OF IMPLANTABLE CARDIOVERTER-DEFIBRILLATOR (ICD) ELECTRODE LEAD PLACEMENT N/A 2019    Procedure: REVISION,  INSERTION, ELECTRODE LEAD, ICD;  Surgeon: Shukri Walsh MD;  Location: [facility] EP LAB;  Service: Cardiology;  Laterality: N/A;  Lead malfxn, RV lead ICD, SJM, anes, DM, 3 PREP    TONSILLECTOMY  1960s    VASECTOMY  2000       Time Tracking:     PT Received On: 04/25/25  PT Start Time: 1005  PT Stop Time: 1029  PT Total Time (min): 24 min     Billable Minutes: Evaluation 8, Gait Training 8, and Therapeutic Activity 8    4/25/2025

## 2025-04-25 NOTE — PT/OT/SLP EVAL
Occupational Therapy   Evaluation, tx    Name: Clifton Wilson  MRN: [id number]  Admitting Diagnosis: Acute on chronic combined systolic and diastolic congestive heart failure  Recent Surgery: * No surgery found *    Diagnoses of SOB (shortness of breath), Shortness of breath, CHF exacerbation, CHF (congestive heart failure), and Troponin level elevated were pertinent to this visit.    Recommendations:     Discharge Recommendations: Moderate Intensity Therapy  Discharge Equipment Recommendations:  to be determined by next level of care  Barriers to discharge:  Decreased caregiver support (increased level of assist for ADLS, fx mobilty,  edema and pain)    Assessment:     Clifton Wilson is a 74 y.o. male with a medical diagnosis of Acute on chronic combined systolic and diastolic congestive heart failure.  He presents with Performance deficits affecting function: weakness, impaired endurance, impaired sensation, impaired self care skills, impaired functional mobility, gait instability, impaired balance, decreased upper extremity function, decreased lower extremity function, decreased coordination, decreased safety awareness, pain, decreased ROM, impaired coordination, impaired fine motor, edema, impaired cardiopulmonary response to activity, impaired joint extensibility.      Pt c/o significant pain in joints w/ noted significant edema in L knee, B wrist and all digits. Pt was unable to raise R shoulder against gravity or to make a complete fist which limits functional use for ADLs and fx mobility. Pt performed bed mobility Mod A to roll to R side w/ rail, moaning /yelling out in pain,  supine>sit Max A-yelling out in pain when knee bent over EOB. Pt sat w/ Supervision on EOB due to slight dizziness. BP soft. Pt performed g/hygiene SBA seated EOB using B hands intermittently to perform tasks. Pt has chronic neuropathy in hands, edema and pain limited coordination. Self feeding w/ setup after pt issued and  instructed in use of small built up handle placed on utensil which improved grasp. Pt performed sit<>stand Max A w/RW from standard bed height, seated surface elevated and pt stood w/ Min-Mod A w/ RW -cues for increased trunk anterior ws and foot placement. Pt side stepped 5 steps top right w/ min a w RW; knees flexed, trunk flexed forward. Pt remained seated EOB to eat lunch vs sitting UIC due to seat height being to low for pt to stand from at this time. Pt would benefit from CHUCK at [place]. Pt not near baseline and he is a high risk for further decline.  He reports weakness progressed over last 6 weeks.                     Rehab Prognosis: Good; patient would benefit from acute skilled OT services to address these deficits and reach maximum level of function.       Plan:     Patient to be seen 5 x/week to address the above listed problems via self-care/home management, therapeutic activities, therapeutic exercises  Plan of Care Expires: 05/25/25  Plan of Care Reviewed with: patient, spouse    Subjective     Chief Complaint: pain all joints from Gout.  Patient/Family Comments/goals: Pt agreeable to OT. I was walking and doing for myself up to 6 weeks ago.     Occupational Profile:  Living Environment: pt lives with spouse in [facility] with incline up to THE; t/s combo w/ TTB.  Previous level of function: pt assisted recently w/ self care act; pt having pain in joints, weakness; wife lifts legs over tub using TTB (w/sliding seat); pt reports ambulating Indep prior to 6 weeks ago; has had progressive weakness and functional decline.   Roles and Routines: sedentary due to progressive weakness.   Equipment Used at Home: wheelchair, bath bench  Assistance upon Discharge: limited assist via spouse due to health issues.    Pain/Comfort:  Pain Rating 1: 4/10  Location - Side 1: Right  Location - Orientation 1: generalized  Location 1: shoulder (finger joints; R knee -pt yelling out in pain when knee flexed over side of bed when  performing supine>sit)  Pain Addressed 1: Reposition, Distraction, Cessation of Activity (warm compresses)  Pain Rating Post-Intervention 1: 4/10  Pain Rating 2: 1/10  Location - Side 2: Left  Location - Orientation 2: generalized  Location 2: shoulder (finger joints and knee)  Pain Addressed 2: Reposition, Distraction, Cessation of Activity (warm compresses)  Pain Rating Post-Intervention 2: 1/10 (1/10 shld pain; L knee pain not rated)    Patients cultural, spiritual, Anabaptist conflicts given the current situation: no    Objective:     Communicated with: nurse prior to session.  Patient found HOB elevated with bed alarm, telemetry upon OT entry to room.    General Precautions: Standard, fall, diabetic (fluid restriction, swollen painful gouty joints)  Orthopedic Precautions: N/A  Braces: N/A  Respiratory Status: Room air    Occupational Performance:    Bed Mobility:    Patient completed Rolling/Turning to Right with moderate assistance, with side rail, and segmental, pt yelling out in pain  Patient completed Scooting/Bridging with moderate assistance  Patient completed Supine to Sit with maximal assistance, with side rail, and segmental tech, yelling out in pain    Functional Mobility/Transfers:  Patient completed Sit <> Stand Transfer with maximal assistance  with  rolling walker   Functional Mobility: side stepped Min A w/ RW 5 steps along EOB; unsteady, flexed at trunk and knees; painful stepping  Sit<>stand trials x 3. Pt. initially Max A to stand from standard height surface. Bed raised, pt stood w/min-mod a w/ RW 2 subsequent trials. Vc for nose over toes and feet behind knee.     Activities of Daily Living:  Feeding:  setup built up handle on utensil, pt sat EOB   Grooming: stand by assistance to wash face B hands, brush teeth same; wife combed hair seated EOB  Lower Body Dressing: total assistance socks seated EOB due to limited LE ROM and painful joints  Toileting: unable to transfer to [facility]      Cognitive/Visual Perceptual:  Cognitive/Psychosocial Skills:     -       Oriented to: Person, Place, Time, and Situation   -       Follows Commands/attention:Follows multistep  commands  -       Communication: clear/fluent  -       Memory: No Deficits noted  -       Safety awareness/insight to disability: impaired   -       Mood/Affect/Coping skills/emotional control: Cooperative and Anxious  Visual/Perceptual:      -Intact grossly    Physical Exam:  Balance:    -       sitting; good  dynamic fair  standing; poor  dynamic: absent  Postural examination/scapula alignment:    -       Rounded shoulders  -       Posterior pelvic tilt  Edema:  Severe wrist/digits L knee  Sensation:    -       Impaired  neuropathy in hands  Dominant hand:    -       right  Upper Extremity Range of Motion:     -       Right Upper Extremity: WFL except shld movement-unable to raise against gravity; PROM /AAROM to ~80 due to pain; limited finger flexion   -       Left Upper Extremity: WFL  Upper Extremity Strength:    -       Right Upper Extremity: Deficits: shld 1-2-/5; distally 3+/5  -       Left Upper Extremity: Deficits: shld 3-/5, distally 3+/5   Strength:    -       Right Upper Extremity: Deficits: fair plus  -       Left Upper Extremity: Deficits: fair plus  Fine Motor Coordination:    -       Impaired  Left hand, manipulation of objects poor and Right hand, manipulation of objects poor    Therapeutic Exercises:  Pt performed LUE AROM ex x 5 reps  w/ HOB elevated/against gravity.  R shld AAROM ex to   80-90 degrees x 5 reps due to pain w/ ex; isometric hold x 3 seconds at end of range and slow reversal to increase muscle activation and strengthening. Pt instructed on table slides for R shld AROM ex in gravity eliminated plane when seated EOB. He and spouse verbalized understanding.    AMPA 6 Click ADL:  AMPAC Total Score: 14    Treatment & Education:  Pt seen for safe self care activities w/ adaptive devices and fx mobility  retraining as stated above.  All questions/concerns addressed within scope.  Call staff for BTB/OOB assist. Call bell use explained. Pt acknowledged.  Purpose of OT and POC  Impt of OOB activity and sitting UIC explained to pt to prevent negative effects of prolonged bed rest. Pt verbalized understanding and agreeable to sit EOB due  inability to transfer to BS chair this day.    Initiated FT w/ spouse. Ongoing.  UE therex to increase functional use.      Patient left sitting edge of bed with all lines intact, call button in reach, bed alarm on, and spouse present    GOALS:   Multidisciplinary Problems       Occupational Therapy Goals          Problem: Occupational Therapy    Goal Priority Disciplines Outcome Interventions   Occupational Therapy Goal     OT, PT/OT Progressing    Description: Goals to be met by: 5/25/2025     Patient will increase functional independence with ADLs by performing:    Feeding with Modified [place] and Assistive Devices as needed.  UE Dressing with Modified [place].  LE Dressing with Modified [place] and Assistive Devices as needed.  Grooming while standing at sink with Modified [place].  Toileting from bedside commode with Modified [place] for hygiene and clothing management.   Rolling to Bilateral with Modified [place].   Supine to sit with Modified [place].  Step transfer with Modified [place]  Toilet transfer to bedside commode with Modified [place].  Increased functional strength to WFL for ADLS.  Upper extremity exercise program x10 reps per handout, with independence.                         DME Justifications:  No DME recommended requiring DME justifications    History:     Past Medical History:   Diagnosis Date    Anticoagulant long-term use     Cardiomyopathy     CHF (congestive heart failure)     Coronary artery disease     Diabetes mellitus     Gout     Heart attack     Hypertension     Pneumonia          Past Surgical History:    Procedure Laterality Date    APPENDECTOMY      CARDIAC CATHETERIZATION      7 stents    CARDIAC DEFIBRILLATOR PLACEMENT      CATARACT EXTRACTION Bilateral 2011    COLONOSCOPY N/A 08/10/2018    Procedure: COLONOSCOPY golytely;  Surgeon: Valentine Burger MD;  Location: [facility] ENDO;  Service: Endoscopy;  Laterality: N/A;    CORONARY ANGIOGRAPHY N/A 11/11/2024    Procedure: ANGIOGRAM, CORONARY ARTERY;  Surgeon: Luis Felipe Wright MD;  Location: [facility] CATH LAB/EP;  Service: Cardiology;  Laterality: N/A;    EYE SURGERY      INSTANTANEOUS WAVE-FREE RATIO (IFR) N/A 11/11/2024    Procedure: Instantaneous Wave-Free Ratio (IFR);  Surgeon: Luis Felipe Wright MD;  Location: [facility] CATH LAB/EP;  Service: Cardiology;  Laterality: N/A;    LEFT HEART CATHETERIZATION Left 11/11/2024    Procedure: Left heart cath;  Surgeon: Luis Felipe Wright MD;  Location: [facility] CATH LAB/EP;  Service: Cardiology;  Laterality: Left;    REVISION OF IMPLANTABLE CARDIOVERTER-DEFIBRILLATOR (ICD) ELECTRODE LEAD PLACEMENT N/A 11/11/2019    Procedure: REVISION, INSERTION, ELECTRODE LEAD, ICD;  Surgeon: Shukri Walsh MD;  Location: [facility] EP LAB;  Service: Cardiology;  Laterality: N/A;  Lead malfxn, RV lead ICD, SJM, anes, DM, 3 PREP    TONSILLECTOMY  1960s    VASECTOMY  2000       Time Tracking:     OT Date of Treatment: 04/25/25  OT Start Time: 1100  OT Stop Time: 1157  OT Total Time (min): 57 min    Billable Minutes:Evaluation 10  Self Care/Home Management 20  Therapeutic Activity 15  Therapeutic Exercise 12  Total Time 57    4/25/2025

## 2025-04-25 NOTE — SUBJECTIVE & OBJECTIVE
Past Medical History:   Diagnosis Date    Anticoagulant long-term use     Cardiomyopathy     CHF (congestive heart failure)     Coronary artery disease     Diabetes mellitus     Gout     Heart attack     Hypertension     Pneumonia        Past Surgical History:   Procedure Laterality Date    APPENDECTOMY      CARDIAC CATHETERIZATION      7 stents    CARDIAC DEFIBRILLATOR PLACEMENT      CATARACT EXTRACTION Bilateral 2011    COLONOSCOPY N/A 08/10/2018    Procedure: COLONOSCOPY golytely;  Surgeon: Valentine Burger MD;  Location: [facility] ENDO;  Service: Endoscopy;  Laterality: N/A;    CORONARY ANGIOGRAPHY N/A 11/11/2024    Procedure: ANGIOGRAM, CORONARY ARTERY;  Surgeon: Luis Felipe Wright MD;  Location: [facility] CATH LAB/EP;  Service: Cardiology;  Laterality: N/A;    EYE SURGERY      INSTANTANEOUS WAVE-FREE RATIO (IFR) N/A 11/11/2024    Procedure: Instantaneous Wave-Free Ratio (IFR);  Surgeon: Luis Felipe Wright MD;  Location: [facility] CATH LAB/EP;  Service: Cardiology;  Laterality: N/A;    LEFT HEART CATHETERIZATION Left 11/11/2024    Procedure: Left heart cath;  Surgeon: Luis Felipe Wright MD;  Location: [facility] CATH LAB/EP;  Service: Cardiology;  Laterality: Left;    REVISION OF IMPLANTABLE CARDIOVERTER-DEFIBRILLATOR (ICD) ELECTRODE LEAD PLACEMENT N/A 11/11/2019    Procedure: REVISION, INSERTION, ELECTRODE LEAD, ICD;  Surgeon: Shukri Walsh MD;  Location: [facility] EP LAB;  Service: Cardiology;  Laterality: N/A;  Lead malfxn, RV lead ICD, SJM, anes, DM, 3 PREP    TONSILLECTOMY  1960s    VASECTOMY  2000       Review of patient's allergies indicates:  No Known Allergies    No current facility-administered medications on file prior to encounter.     Current Outpatient Medications on File Prior to Encounter   Medication Sig    aspirin (ECOTRIN) 81 MG EC tablet Take 81 mg by mouth once daily.    clopidogreL (PLAVIX) 75 mg tablet Take 1 tablet (75 mg total) by mouth once daily.    colchicine (COLCRYS) 0.6 mg tablet Take 1 tablet  "(0.6 mg total) by mouth 2 (two) times daily.    cyanocobalamin (VITAMIN B-12) 1000 MCG tablet Take 1,000 mcg by mouth once daily.    empagliflozin (JARDIANCE) 10 mg tablet Take 1 tablet (10 mg total) by mouth once daily.    gabapentin (NEURONTIN) 300 MG capsule Take 2 capsules (600 mg total) by mouth 2 (two) times daily.    hydrALAZINE (APRESOLINE) 50 MG tablet Take 1 tablet (50 mg total) by mouth every 8 (eight) hours.    insulin aspart U-100 (NOVOLOG FLEXPEN U-100 INSULIN) 100 unit/mL (3 mL) InPn pen Inject 15 Units into the skin 3 (three) times daily with meals.    insulin glargine U-100, Lantus, (LANTUS SOLOSTAR U-100 INSULIN) 100 unit/mL (3 mL) InPn pen Inject 35 Units into the skin every evening.    losartan (COZAAR) 50 MG tablet Take 1 tablet (50 mg total) by mouth once daily.    metFORMIN (GLUCOPHAGE) 1000 MG tablet Take 1 tablet (1,000 mg total) by mouth 2 (two) times daily with meals.    multivit-minerals/FA/lycopene (ONE-A-DAY MEN'S 50 PLUS ORAL) Take 1 tablet by mouth once daily.    zolpidem (AMBIEN) 5 MG Tab TAKE 1 TABLET BY MOUTH EVERY NIGHT AS NEEDED (Patient taking differently: Take 5 mg by mouth nightly as needed.)    alcohol swabs (BD ALCOHOL SWABS) PadM USE FOUR TIMES DAILY    blood glucose control high,low (ACCU-CHEK CATHRYN CONTROL SOLN) Soln Use as directed to check blood sugar    blood sugar diagnostic Strp test blood sugar as directed four times daily    blood-glucose meter (TRUE METRIX GLUCOSE METER) Misc use as directed    febuxostat (ULORIC) 40 mg Tab Take 1 tablet (40 mg total) by mouth once daily.    furosemide (LASIX) 20 MG tablet Take 1 tablet (20 mg total) by mouth 2 (two) times daily.    lancets 30 gauge Misc usea s directed to check blood glucose four times daily    nitroGLYCERIN (NITROSTAT) 0.4 MG SL tablet Place 1 tablet (0.4 mg total) under the tongue every 5 (five) minutes as needed for Chest pain.    pen needle, diabetic (BD ULTRA-FINE SINA PEN NEEDLE) 32 gauge x 5/32" Ndle Use " four times daily for insulin administration     Family History       Problem Relation (Age of Onset)    Heart disease Mother, Father    Hypertension Mother    Stroke Father          Tobacco Use    Smoking status: Former    Smokeless tobacco: Never   Substance and Sexual Activity    Alcohol use: Yes     Comment: socially    Drug use: No    Sexual activity: Yes     Review of Systems   Constitutional: Negative. Negative for diaphoresis.   HENT: Negative.     Eyes: Negative.    Cardiovascular:  Positive for dyspnea on exertion. Negative for chest pain, near-syncope, orthopnea, palpitations, paroxysmal nocturnal dyspnea and syncope.   Respiratory:  Positive for shortness of breath.    Endocrine: Negative.    Hematologic/Lymphatic: Negative.    Musculoskeletal:  Positive for back pain and myalgias.   Gastrointestinal:  Negative for nausea and vomiting.   Genitourinary: Negative.    Neurological:  Positive for weakness.   Allergic/Immunologic: Negative.      Objective:     Vital Signs (Most Recent):  Temp: 97.9 °F (36.6 °C) (04/25/25 0750)  Pulse: 78 (04/25/25 0750)  Resp: 18 (04/25/25 0750)  BP: (!) 149/89 (04/25/25 0750)  SpO2: 97 % (04/25/25 0750) Vital Signs (24h Range):  Temp:  [97.3 °F (36.3 °C)-99 °F (37.2 °C)] 97.9 °F (36.6 °C)  Pulse:  [] 78  Resp:  [16-34] 18  SpO2:  [91 %-100 %] 97 %  BP: (117-180)/(58-94) 149/89     Weight: 97.1 kg (214 lb)  Body mass index is 29.85 kg/m².    SpO2: 97 %         Intake/Output Summary (Last 24 hours) at 4/25/2025 0951  Last data filed at 4/25/2025 0900  Gross per 24 hour   Intake 236 ml   Output 1475 ml   Net -1239 ml       Lines/Drains/Airways       Peripheral Intravenous Line  Duration                  Peripheral IV - Single Lumen 04/24/25 1435 20 G Anterior;Proximal;Right Forearm <1 day                     Physical Exam  Constitutional:       General: He is not in acute distress.     Appearance: He is not diaphoretic.   HENT:      Head: Atraumatic.   Eyes:      General:   "       Right eye: No discharge.         Left eye: No discharge.   Cardiovascular:      Rate and Rhythm: Normal rate and regular rhythm.   Pulmonary:      Effort: Pulmonary effort is normal.      Breath sounds: Normal breath sounds. No rales.   Abdominal:      General: Bowel sounds are normal.      Palpations: Abdomen is soft.   Musculoskeletal:      Right lower leg: No edema.      Left lower leg: No edema.   Neurological:      Mental Status: He is alert and oriented to person, place, and time.          Significant Labs: BMP:   Recent Labs   Lab 04/24/25  1447 04/24/25  1654     --    K 3.4*  --      --    CO2 20*  --    BUN 18  --    CREATININE 1.1  --    CALCIUM 9.5  --    MG  --  1.6   , CMP   Recent Labs   Lab 04/24/25  1447      K 3.4*      CO2 20*   BUN 18   CREATININE 1.1   CALCIUM 9.5   ALBUMIN 3.1*   BILITOT 0.5   ALKPHOS 95   AST 23   ALT 28   ANIONGAP 11   , CBC   Recent Labs   Lab 04/24/25  1447 04/25/25  0914   WBC 6.21 6.31   HGB 11.2* 10.8*   HCT 34.1* 33.2*    359   , INR No results for input(s): "INR", "PROTIME" in the last 48 hours., Lipid Panel No results for input(s): "CHOL", "HDL", "LDLCALC", "TRIG", "CHOLHDL" in the last 48 hours., Troponin No results for input(s): "TROPONINIHS" in the last 48 hours., and All pertinent lab results from the last 24 hours have been reviewed.    Significant Imaging: Echocardiogram: Transthoracic echo (TTE) complete (Cupid Only):   Results for orders placed or performed during the hospital encounter of 03/18/25   Echo Saline Bubble? Yes   Result Value Ref Range    BSA 2.24 m2    Flores's Biplane MOD Ejection Fraction 43 %    A2C EF 42 %    A4C EF 42 %    LVOT stroke volume 92.2 cm3    LVIDd 5.9 3.5 - 6.0 cm    LV Systolic Volume 87 mL    LV Systolic Volume Index 39.5 mL/m2    LVIDs 4.4 (A) 2.1 - 4.0 cm    LV ESV A2C 81.40 mL    LV Diastolic Volume 172 mL    LV ESV A4C 43.46 mL    LV Diastolic Volume Index 78.18 mL/m2    LV EDV A2C " [id number] mL    LV EDV A4C 123.05 mL    Left Ventricular End Systolic Volume by Teichholz Method 87.06 mL    Left Ventricular End Diastolic Volume by Teichholz Method 171.52 mL    IVS 1.2 (A) 0.6 - 1.1 cm    LVOT diameter 2.3 cm    LVOT area 4.2 cm2    FS 25.4 (A) 28 - 44 %    Left Ventricle Relative Wall Thickness 0.37 cm    PW 1.1 0.6 - 1.1 cm    LV mass 288.5 g    LV Mass Index 131.1 g/m2    MV Peak E [place] 0.52 m/s    TDI LATERAL 0.06 m/s    TDI SEPTAL 0.09 m/s    E/E' ratio 7 m/s    MV Peak A [place] 0.96 m/s    TR Max [place] 1.9 m/s    E/A ratio 0.54     E wave deceleration time 459 msec    LV SEPTAL E/E' RATIO 5.8 m/s    LV LATERAL E/E' RATIO 8.7 m/s    PV Peak S [place] 0.56 m/s    PV Peak D [place] 0.35 m/s    Pulm vein S/D ratio 1.60     LVOT peak rome 1.0 m/s    Left Ventricular Outflow Tract Mean Velocity 0.61 cm/s    Left Ventricular Outflow Tract Mean Gradient 1.74 mmHg    RV- estes basal diam 4.4 cm    RV S' 9.05 cm/s    TAPSE 1.68 cm    RV/LV Ratio 0.75 cm    LA Vol (MOD) 66 mL    SANDRITA (MOD) 30 mL/m2    RA area length vol 36.22 mL    RA Area 14.3 cm2    RA Vol 36.82 mL    AV mean gradient 4 mmHg    AV peak gradient 6 mmHg    Ao peak rome 1.2 m/s    Ao VTI 26.7 cm    LVOT peak VTI 22.2 cm    AV valve area 3.5 cm²    AV Velocity Ratio 0.83     AV index (prosthetic) 0.83     BEV by Velocity Ratio 3.5 cm²    Mr max rome 3.85 m/s    MV mean gradient 2 mmHg    MV peak gradient 5 mmHg    MV stenosis pressure 1/2 time 133.14 ms    MV valve area p 1/2 method 1.65 cm2    MV valve area by continuity eq 4.25 cm2    MV VTI 21.7 cm    Triscuspid Valve Regurgitation Peak Gradient 15 mmHg    PV PEAK VELOCITY 0.91 m/s    PV peak gradient 3 mmHg    Pulmonary Valve Mean Velocity 0.66 m/s    Sinus 3.55 cm    STJ 3.63 cm    Ascending aorta 3.47 cm    IVC diameter 1.18 cm    Mean e' 0.08 m/s    ZLVIDS -0.29     ZLVIDD -2.42     LA area A4C 17.15 cm2    LA area A2C 24.33 cm2    TV resting pulmonary artery pressure 17 mmHg    RV TB  RVSP 5 mmHg    Est. RA pres 3 mmHg    Narrative      Left Ventricle: The left ventricle is mildly dilated. There is   eccentric hypertrophy. Regional wall motion abnormalities present. See   diagram for wall motion findings. There is mildly reduced systolic   function with a visually estimated ejection fraction of 40 - 45%.   Quantitated ejection fraction is 43%. There is diastolic dysfunction.   Normal left ventricular filling pressure.    Right Ventricle: The right ventricle has mild enlargement. Systolic   function is mildly reduced. Pacemaker lead present in the ventricle.    Pulmonary Artery: The estimated pulmonary artery systolic pressure is   17 mmHg.    IVC/SVC: Normal venous pressure at 3 mmHg.

## 2025-04-25 NOTE — PROGRESS NOTES
[facility] Medicine  Progress Note    Patient Name: Clifton Wilson  MRN: [id number]  Patient Class: OP- Observation   Admission Date: 4/24/2025  Length of Stay: 0 days  Attending Physician: Christophe Ortiz MD  Primary Care Provider: Britton Grissom MD        Subjective     Principal Problem:Acute on chronic combined systolic and diastolic congestive heart failure        HPI:  Patient is a 73 yo male w/ PMHx of CAD, HTN, HLD, HFrEF 40-45% s/p ICD x7 ,Type 2 DM and gout who presents to the Emergency Department with worsening shortness of breath and progressive bilateral lower extremity edema over the past 3 days. He was seen by his primary care physician two days ago, at which time it was noted that he had not been on any diuretic therapy for approximately two months. Due to signs of fluid overload, furosemide (Lasix) 20 mg daily was restarted. The patient has since taken two doses and reports increased urinary output but no significant improvement in his shortness of breath. He denies chest pain, orthopnea, or upper respiratory symptoms.    He also endorses a gradual but marked functional decline over the last several months, including worsening generalized weakness and reduced mobility, requiring frequent rest while ambulating at home. He was recently recommended for inpatient rehabilitation by his PCP, but has not yet received any follow-up communication regarding the referral.    Notably, the patient was admitted on 04/08 for hypotension and volume depletion. At that time, he was seen in an outpatient setting and noted to have systolic blood pressure in the low 100s, but was referred to the ED when it dropped into the 80s. He was evaluated and treated for hypotension during that admission. He states that following discharge, no diuretic therapy was prescribed.     In the ED, initial vital signs /58 mmhg, HR 84, Temp 98.2, SpO2 100% on room air. Labs include CBC with stable H/H 11.2/34.1  and WBC within normal limits . CMP with Na 141, K 3.4,  and BUN/Cr 18/1.1 (baseline Cr 1.1), Troponin 0.029, , Uric Acid 6.1,. CXR The cardiac silhouette is normal in size, midline.  The pulmonary vascularity is normal.  Coronary artery calcifications seen. No focal area of airspace consolidation.  No pleural effusion.  No pneumothorax. EKG showed Right bundle branch block. Septal infarct ,age undetermined .  Initiated on Laxis 20 mg. [facility] Team admitted the patient with CHF exacerbation.      Overview/Hospital Course:  No notes on file    Interval History: NAEON,VSS,AF. Patient resting comfortable at bedside. Patient still complaining of muscle weakness, will work with PT/OT today    Review of Systems   Constitutional: Negative.    HENT: Negative.     Eyes: Negative.    Respiratory: Negative.     Cardiovascular: Negative.    Gastrointestinal: Negative.    Endocrine: Negative.    Genitourinary: Negative.    Musculoskeletal:  Positive for arthralgias.   Skin: Negative.    Allergic/Immunologic: Negative.    Neurological: Negative.    Psychiatric/Behavioral: Negative.       Objective:     Vital Signs (Most Recent):  Temp: 97.9 °F (36.6 °C) (04/25/25 0750)  Pulse: 78 (04/25/25 0750)  Resp: 18 (04/25/25 0750)  BP: (!) 149/89 (04/25/25 0750)  SpO2: 97 % (04/25/25 0750) Vital Signs (24h Range):  Temp:  [97.3 °F (36.3 °C)-99 °F (37.2 °C)] 97.9 °F (36.6 °C)  Pulse:  [] 78  Resp:  [16-34] 18  SpO2:  [91 %-100 %] 97 %  BP: (117-180)/(58-94) 149/89     Weight: 97.1 kg (214 lb)  Body mass index is 29.85 kg/m².    Intake/Output Summary (Last 24 hours) at 4/25/2025 0846  Last data filed at 4/24/2025 1920  Gross per 24 hour   Intake --   Output 1150 ml   Net -1150 ml         Physical Exam  Vitals and nursing note reviewed.   Constitutional:       Appearance: Normal appearance.   HENT:      Head: Normocephalic and atraumatic.   Eyes:      Conjunctiva/sclera: Conjunctivae normal.   Cardiovascular:       Rate and Rhythm: Normal rate and regular rhythm.      Pulses: Normal pulses.      Heart sounds: Normal heart sounds.   Pulmonary:      Effort: Pulmonary effort is normal.      Breath sounds: Normal breath sounds.   Abdominal:      General: Bowel sounds are normal.      Palpations: Abdomen is soft.   Musculoskeletal:      Cervical back: Normal range of motion and neck supple.      Comments: Improving bilateral upper extremity edema   Skin:     General: Skin is warm.      Capillary Refill: Capillary refill takes less than 2 seconds.   Neurological:      General: No focal deficit present.      Mental Status: He is alert and oriented to person, place, and time.   Psychiatric:         Mood and Affect: Mood normal.         Behavior: Behavior normal.               Significant Labs: All pertinent labs within the past 24 hours have been reviewed.    Significant Imaging: I have reviewed all pertinent imaging results/findings within the past 24 hours.      Assessment & Plan  Acute on chronic combined systolic and diastolic congestive heart failure  Patient has Combined Systolic and Diastolic heart failure that is Acute on chronic. On presentation their CHF was decompensated. Evidence of decompensated CHF on presentation includes: edema and shortness of breath. The etiology of their decompensation is likely medication non-compliance. Most recent BNP and echo results are listed below.  Recent Labs     04/24/25  1447   *     Latest ECHO  Results for orders placed during the hospital encounter of 03/18/25    Echo Saline Bubble? Yes    Interpretation Summary    Left Ventricle: The left ventricle is mildly dilated. There is eccentric hypertrophy. Regional wall motion abnormalities present. See diagram for wall motion findings. There is mildly reduced systolic function with a visually estimated ejection fraction of 40 - 45%. Quantitated ejection fraction is 43%. There is diastolic dysfunction. Normal left ventricular filling  pressure.    Right Ventricle: The right ventricle has mild enlargement. Systolic function is mildly reduced. Pacemaker lead present in the ventricle.    Pulmonary Artery: The estimated pulmonary artery systolic pressure is 17 mmHg.    IVC/SVC: Normal venous pressure at 3 mmHg.    Current Heart Failure Medications  furosemide injection 40 mg, 2 times daily, Intravenous  losartan tablet 50 mg, Daily, Oral    POCUS done in ED :Bilateral lung sliding is present. A-lines visualized throughout anterior lung fields. No B-lines, consolidations, pleural effusion, or pneumothorax identified. Pleural line is smooth and continuous.  Cardiac POCUS reveals normal left ventricular systolic function with no regional wall motion abnormalities. Right ventricle is normal in size. No pericardial effusion.     Plan  - Monitor strict I&Os and daily weights.    - Place on telemetry  - Low sodium diet  - Place on fluid restriction of 1.5 L.   - Cardiology has not been consulted  - The patient's volume status is improving but not at their baseline as indicated by edema  -Lasix 40 mg IV BID  DM type 2 without retinopathy  Patient's FSGs are controlled on current medication regimen.  Last A1c reviewed-   Lab Results   Component Value Date    HGBA1C 6.9 (H) 04/24/2025     Most recent fingerstick glucose reviewed-   Recent Labs   Lab 04/24/25  1654 04/24/25  2046 04/25/25  0609   POCTGLUCOSE 124* 186* 137*     Current correctional scale  Low  Maintain anti-hyperglycemic dose as follows-   Antihyperglycemics (From admission, onward)      Start     Stop Route Frequency Ordered    04/24/25 1743  insulin aspart U-100 pen 0-5 Units         -- SubQ Before meals & nightly PRN 04/24/25 1645          Plan  -Monitor glucose.Glucose goal 140-180  -SSI  Essential hypertension  Patient's blood pressure range in the last 24 hours was: BP  Min: 117/77  Max: 180/94.The patient's inpatient anti-hypertensive regimen is listed below:  Current  Antihypertensives  nitroGLYCERIN SL tablet 0.4 mg, Every 5 min PRN, Sublingual  furosemide injection 40 mg, 2 times daily, Intravenous  losartan tablet 50 mg, Daily, Oral    Plan  - BP is controlled, no changes needed to their regimen  - We are going to held Bps medications secondary to low Bps reading  Gout  Patient has known history of Gout. Patient hasn't had a flare in a couple of years.    Plan   Continue current medications.    Muscular weakness  The patient reports progressive muscle weakness, particularly affecting the lower extremities, over the past several months. He experiences significant difficulty with ambulation and prolonged standing due to this weakness. He was recently admitted to a rehabilitation facility to address his declining mobility. An extensive workup has been performed, with positive antinuclear antibodies (MARLEN) as the only notable finding thus far.     Plan  -PT/OT  -Continue Gabapentin   Cervical spondylolysis  Patient has a history of Chronic back pain, with numbness and tingling. Patient has diagnosis of Cervical Spondylolysis.    Plan  Continue home medications.    Troponin level elevated  Patient presented with shortness of breath and mildly elevated Troponin 0.029, .    Plan  -Cardiac monitoring   -Monitor electrolytes   -Trend Troponin      VTE Risk Mitigation (From admission, onward)           Ordered     Reason for No Pharmacological VTE Prophylaxis  Once        Question:  Reasons:  Answer:  Already adequately anticoagulated on oral Anticoagulants    04/24/25 1645     IP VTE HIGH RISK PATIENT  Once         04/24/25 1645     Place sequential compression device  Until discontinued         04/24/25 1645     Place sequential compression device  Until discontinued         04/24/25 1643                    Discharge Planning   MARTHA:      Code Status: Full Code   Medical Readiness for Discharge Date:   Discharge Plan A:  (tbd)   Discharge Delays: (!) Consult Recommendations  Completed          Roya Crews MD  Family Medicine  PGY-1  Department of [facility]

## 2025-04-25 NOTE — ASSESSMENT & PLAN NOTE
Patient has Systolic (HFrEF) heart failure that is Acute on chronic. On presentation their CHF was well compensated. Most recent BNP and echo results are listed below.  Recent Labs     04/24/25  1447   *     Latest ECHO  Results for orders placed during the hospital encounter of 03/18/25    Echo Saline Bubble? Yes    Interpretation Summary    Left Ventricle: The left ventricle is mildly dilated. There is eccentric hypertrophy. Regional wall motion abnormalities present. See diagram for wall motion findings. There is mildly reduced systolic function with a visually estimated ejection fraction of 40 - 45%. Quantitated ejection fraction is 43%. There is diastolic dysfunction. Normal left ventricular filling pressure.    Right Ventricle: The right ventricle has mild enlargement. Systolic function is mildly reduced. Pacemaker lead present in the ventricle.    Pulmonary Artery: The estimated pulmonary artery systolic pressure is 17 mmHg.    IVC/SVC: Normal venous pressure at 3 mmHg.    Current Heart Failure Medications  furosemide injection 40 mg, 2 times daily, Intravenous  losartan tablet 50 mg, Daily, Oral    Plan  - Monitor strict I&Os and daily weights.    - Place on telemetry  - Low sodium diet  - Place on fluid restriction of 1.5 L.     - Ambulate  -CPK, ESR, CPR, T4 pending

## 2025-04-25 NOTE — HPI
75 yo male w/ PMHx of CAD, HTN, HLD, HFrEF 40-45% s/p ICD,Type 2 DM, gout and Cervical Spondylolysis. Patient presented to the ED with SOB, edema, back and leg pain.  He was seen by his primary care physician two days ago, at which time it was noted that he had not been on any diuretic therapy for approximately two months. Due to signs of fluid overload, furosemide (Lasix) 20 mg daily was restarted. The patient has since taken two doses and reports increased urinary output but no significant improvement in his shortness of breath. Patient denies CP, orthopnea, PND. He appears euvolemic on exam and reports filling 7 urinals overnight and has 150 cc in the 8th with IV diuresis.

## 2025-04-25 NOTE — SUBJECTIVE & OBJECTIVE
Interval History: NAEON,VSS,AF. Patient resting comfortable at bedside. Patient still complaining of muscle weakness, will work with PT/OT today    Review of Systems   Constitutional: Negative.    HENT: Negative.     Eyes: Negative.    Respiratory: Negative.     Cardiovascular: Negative.    Gastrointestinal: Negative.    Endocrine: Negative.    Genitourinary: Negative.    Musculoskeletal:  Positive for arthralgias.   Skin: Negative.    Allergic/Immunologic: Negative.    Neurological: Negative.    Psychiatric/Behavioral: Negative.       Objective:     Vital Signs (Most Recent):  Temp: 97.9 °F (36.6 °C) (04/25/25 0750)  Pulse: 78 (04/25/25 0750)  Resp: 18 (04/25/25 0750)  BP: (!) 149/89 (04/25/25 0750)  SpO2: 97 % (04/25/25 0750) Vital Signs (24h Range):  Temp:  [97.3 °F (36.3 °C)-99 °F (37.2 °C)] 97.9 °F (36.6 °C)  Pulse:  [] 78  Resp:  [16-34] 18  SpO2:  [91 %-100 %] 97 %  BP: (117-180)/(58-94) 149/89     Weight: 97.1 kg (214 lb)  Body mass index is 29.85 kg/m².    Intake/Output Summary (Last 24 hours) at 4/25/2025 0846  Last data filed at 4/24/2025 1920  Gross per 24 hour   Intake --   Output 1150 ml   Net -1150 ml         Physical Exam  Vitals and nursing note reviewed.   Constitutional:       Appearance: Normal appearance.   HENT:      Head: Normocephalic and atraumatic.   Eyes:      Conjunctiva/sclera: Conjunctivae normal.   Cardiovascular:      Rate and Rhythm: Normal rate and regular rhythm.      Pulses: Normal pulses.      Heart sounds: Normal heart sounds.   Pulmonary:      Effort: Pulmonary effort is normal.      Breath sounds: Normal breath sounds.   Abdominal:      General: Bowel sounds are normal.      Palpations: Abdomen is soft.   Musculoskeletal:      Cervical back: Normal range of motion and neck supple.      Comments: Improving bilateral upper extremity edema   Skin:     General: Skin is warm.      Capillary Refill: Capillary refill takes less than 2 seconds.   Neurological:      General: No  focal deficit present.      Mental Status: He is alert and oriented to person, place, and time.   Psychiatric:         Mood and Affect: Mood normal.         Behavior: Behavior normal.               Significant Labs: All pertinent labs within the past 24 hours have been reviewed.    Significant Imaging: I have reviewed all pertinent imaging results/findings within the past 24 hours.

## 2025-04-25 NOTE — PLAN OF CARE
MOON Message     Medicare Outpatient and Observation Notification regarding financial responsibility Signed/date by patient/caregiver; Explained to patient/caregiver   Date MATHIS was signed 4/25/2025   Time MATHIS was signed [id number]

## 2025-04-25 NOTE — PLAN OF CARE
[facility] - Telemetry  Initial Discharge Assessment       Primary Care Provider: Britton Grissom MD    Admission Diagnosis: Shortness of breath [R06.02]  CHF (congestive heart failure) [I50.9]  SOB (shortness of breath) [R06.02]  CHF exacerbation [I50.9]  Troponin level elevated [R79.89]    Admission Date: 4/24/2025  Expected Discharge Date: 4/26/2025    Transition of Care Barriers: (P) None    Payor: [employer] MANAGED MEDICARE / Plan: [employer] MEDICARE HMO / Product Type: Capitation /     Extended Emergency Contact Information  Primary Emergency Contact: Marie Gore  Address: [address] [place] [address] [facility]  Home Phone: [phone]  Mobile Phone: [phone]  Relation: Spouse    Discharge Plan A: (P) Home with family         [facility]  [address]  [place] [address]  Phone: [phone] Fax: [phone]      Initial Assessment (most recent)       Adult Discharge Assessment - 04/25/25 1358          Discharge Assessment    Assessment Type Discharge Planning Assessment (P)      Confirmed/corrected address, phone number and insurance Yes (P)      Confirmed Demographics Correct on Facesheet (P)      Source of Information patient (P)      When was your last doctors appointment? 04/22/25 (P)      Communicated MARTHA with patient/caregiver Date not available/Unable to determine (P)      Reason For Admission Acute on chronic combined systolic and diastolic congestive heart failure (P)      People in Home spouse (P)      Do you expect to return to your current living situation? Yes (P)      Do you have help at home or someone to help you manage your care at home? Yes (P)      Who are your caregiver(s) and their phone number(s)? Marie Gore (Spouse)  [phone] (P)      Prior to hospitilization cognitive status: Alert/Oriented (P)      Current cognitive status: Alert/Oriented (P)      Walking or Climbing Stairs Difficulty no (P)      Dressing/Bathing Difficulty no  (P)      Equipment Currently Used at Home wheelchair (P)    as needed    Readmission within 30 days? Yes (P)      Patient currently being followed by outpatient case management? No (P)      Do you currently have service(s) that help you manage your care at home? No (P)      Do you take prescription medications? Yes (P)      Do you have prescription coverage? Yes (P)      Do you have any problems affording any of your prescribed medications? No (P)      Is the patient taking medications as prescribed? yes (P)      Who is going to help you get home at discharge? wife (P)      How do you get to doctors appointments? car, drives self;family or friend will provide (P)      Are you on dialysis? No (P)      Do you take coumadin? No (P)      Discharge Plan A Home with family (P)      DME Needed Upon Discharge  other (see comments) (P)    TBD    Discharge Plan discussed with: Spouse/sig other;Patient (P)      Name(s) and Number(s) Mraie Gore (Spouse)  [phone] (P)      Transition of Care Barriers None (P)         Physical Activity    On average, how many days per week do you engage in moderate to strenuous exercise (like a brisk walk)? 0 days (P)         Financial Resource Strain    How hard is it for you to pay for the very basics like food, housing, medical care, and heating? Not very hard (P)         Housing Stability    In the last 12 months, was there a time when you were not able to pay the mortgage or rent on time? No (P)      At any time in the past 12 months, were you homeless or living in a shelter (including now)? No (P)         Transportation Needs    In the past 12 months, has lack of transportation kept you from medical appointments or from getting medications? No (P)      In the past 12 months, has lack of transportation kept you from meetings, work, or from getting things needed for daily living? No (P)         Food Insecurity    Within the past 12 months, you worried that your food would run out  before you got the money to buy more. Never true (P)      Within the past 12 months, the food you bought just didn't last and you didn't have money to get more. Never true (P)                       CM met with pt and his wife at the bedside to obtain DCA. Pt lives with spouse and she will provide transport upon discharge. Pt has a w/c at home to use if needed, but normally ambulates without assistance. He has used a wheelchair the past few days since he was feeling weak and SOB. No HH. PCC appointment is scheduled.    Future Appointments   Date Time Provider Department Center   4/30/2025  2:30 PM Shelia Landeros MD [facility] IMPRI [facility]   5/5/2025  3:00 PM Deisy Peres MD [facility]   5/6/2025  1:40 PM Luis Felipe Wright MD [facility] CARDIO [facility]   7/7/2025  9:40 AM Britton Grissom MD [facility] FAM MED [facility]   7/25/2025  9:20 AM Genaro Flores MD [facility] NEURO [facility]     Emily Burt RN, [facility]  Case Management- [place]  [phone]

## 2025-04-26 LAB
ABSOLUTE EOSINOPHIL (OHS): 0.04 K/UL
ABSOLUTE MONOCYTE (OHS): 1.07 K/UL (ref 0.3–1)
ABSOLUTE NEUTROPHIL COUNT (OHS): 4.46 K/UL (ref 1.8–7.7)
ANION GAP (OHS): 12 MMOL/L (ref 8–16)
BASOPHILS # BLD AUTO: 0.04 K/UL
BASOPHILS NFR BLD AUTO: 0.6 %
BUN SERPL-MCNC: 20 MG/DL (ref 8–23)
CALCIUM SERPL-MCNC: 9.6 MG/DL (ref 8.7–10.5)
CHLORIDE SERPL-SCNC: 105 MMOL/L (ref 95–110)
CK SERPL-CCNC: 43 U/L (ref 20–200)
CO2 SERPL-SCNC: 24 MMOL/L (ref 23–29)
CREAT SERPL-MCNC: 1 MG/DL (ref 0.5–1.4)
CRP SERPL-MCNC: 75.2 MG/L
ERYTHROCYTE [DISTWIDTH] IN BLOOD BY AUTOMATED COUNT: 13.3 % (ref 11.5–14.5)
ERYTHROCYTE [SEDIMENTATION RATE] IN BLOOD BY PHOTOMETRIC METHOD: >120 MM/HR
GFR SERPLBLD CREATININE-BSD FMLA CKD-EPI: >60 ML/MIN/1.73/M2
GLUCOSE SERPL-MCNC: 146 MG/DL (ref 70–110)
HCT VFR BLD AUTO: 33.6 % (ref 40–54)
HGB BLD-MCNC: 11.1 GM/DL (ref 14–18)
IMM GRANULOCYTES # BLD AUTO: 0.03 K/UL (ref 0–0.04)
IMM GRANULOCYTES NFR BLD AUTO: 0.4 % (ref 0–0.5)
LYMPHOCYTES # BLD AUTO: 1.03 K/UL (ref 1–4.8)
MAGNESIUM SERPL-MCNC: 1.6 MG/DL (ref 1.6–2.6)
MCH RBC QN AUTO: 29.6 PG (ref 27–31)
MCHC RBC AUTO-ENTMCNC: 33 G/DL (ref 32–36)
MCV RBC AUTO: 90 FL (ref 82–98)
NUCLEATED RBC (/100WBC) (OHS): 0 /100 WBC
PHOSPHATE SERPL-MCNC: 4.2 MG/DL (ref 2.7–4.5)
PLATELET # BLD AUTO: 342 K/UL (ref 150–450)
PMV BLD AUTO: 9.5 FL (ref 9.2–12.9)
POCT GLUCOSE: 144 MG/DL (ref 70–110)
POCT GLUCOSE: 191 MG/DL (ref 70–110)
POCT GLUCOSE: 238 MG/DL (ref 70–110)
POCT GLUCOSE: 289 MG/DL (ref 70–110)
POTASSIUM SERPL-SCNC: 2.8 MMOL/L (ref 3.5–5.1)
RBC # BLD AUTO: 3.75 M/UL (ref 4.6–6.2)
RELATIVE EOSINOPHIL (OHS): 0.6 %
RELATIVE LYMPHOCYTE (OHS): 15.4 % (ref 18–48)
RELATIVE MONOCYTE (OHS): 16 % (ref 4–15)
RELATIVE NEUTROPHIL (OHS): 67 % (ref 38–73)
SODIUM SERPL-SCNC: 141 MMOL/L (ref 136–145)
T4 SERPL-MCNC: 6 UG/DL (ref 4.5–11.5)
WBC # BLD AUTO: 6.67 K/UL (ref 3.9–12.7)

## 2025-04-26 PROCEDURE — 11000001 HC ACUTE MED/SURG PRIVATE ROOM: Mod: HCNC

## 2025-04-26 PROCEDURE — 85652 RBC SED RATE AUTOMATED: CPT | Mod: HCNC | Performed by: NURSE PRACTITIONER

## 2025-04-26 PROCEDURE — 99900035 HC TECH TIME PER 15 MIN (STAT): Mod: HCNC

## 2025-04-26 PROCEDURE — 25000003 PHARM REV CODE 250: Mod: HCNC

## 2025-04-26 PROCEDURE — 84436 ASSAY OF TOTAL THYROXINE: CPT | Mod: HCNC | Performed by: NURSE PRACTITIONER

## 2025-04-26 PROCEDURE — 99233 SBSQ HOSP IP/OBS HIGH 50: CPT | Mod: HCNC,95,, | Performed by: INTERNAL MEDICINE

## 2025-04-26 PROCEDURE — 63600175 PHARM REV CODE 636 W HCPCS: Mod: HCNC

## 2025-04-26 PROCEDURE — 94761 N-INVAS EAR/PLS OXIMETRY MLT: CPT | Mod: HCNC

## 2025-04-26 PROCEDURE — 82550 ASSAY OF CK (CPK): CPT | Mod: HCNC | Performed by: NURSE PRACTITIONER

## 2025-04-26 PROCEDURE — 83735 ASSAY OF MAGNESIUM: CPT | Mod: HCNC

## 2025-04-26 PROCEDURE — 85025 COMPLETE CBC W/AUTO DIFF WBC: CPT | Mod: HCNC

## 2025-04-26 PROCEDURE — 80048 BASIC METABOLIC PNL TOTAL CA: CPT | Mod: HCNC

## 2025-04-26 PROCEDURE — 86140 C-REACTIVE PROTEIN: CPT | Mod: HCNC | Performed by: NURSE PRACTITIONER

## 2025-04-26 PROCEDURE — 84100 ASSAY OF PHOSPHORUS: CPT | Mod: HCNC

## 2025-04-26 PROCEDURE — 36415 COLL VENOUS BLD VENIPUNCTURE: CPT | Mod: HCNC

## 2025-04-26 RX ORDER — DICLOFENAC SODIUM 10 MG/G
2 GEL TOPICAL DAILY
Status: DISCONTINUED | OUTPATIENT
Start: 2025-04-26 | End: 2025-05-01 | Stop reason: HOSPADM

## 2025-04-26 RX ORDER — LIDOCAINE 50 MG/G
1 PATCH TOPICAL
Status: DISCONTINUED | OUTPATIENT
Start: 2025-04-26 | End: 2025-05-01 | Stop reason: HOSPADM

## 2025-04-26 RX ORDER — MAGNESIUM SULFATE HEPTAHYDRATE 40 MG/ML
2 INJECTION, SOLUTION INTRAVENOUS ONCE
Status: CANCELLED | OUTPATIENT
Start: 2025-04-26 | End: 2025-04-26

## 2025-04-26 RX ORDER — POTASSIUM CHLORIDE 20 MEQ/1
40 TABLET, EXTENDED RELEASE ORAL EVERY 4 HOURS
Status: COMPLETED | OUTPATIENT
Start: 2025-04-26 | End: 2025-04-26

## 2025-04-26 RX ADMIN — COLCHICINE 0.6 MG: 0.6 TABLET ORAL at 09:04

## 2025-04-26 RX ADMIN — ASPIRIN 81 MG: 81 TABLET, COATED ORAL at 09:04

## 2025-04-26 RX ADMIN — POTASSIUM CHLORIDE 40 MEQ: 1500 TABLET, EXTENDED RELEASE ORAL at 05:04

## 2025-04-26 RX ADMIN — INSULIN ASPART 1 UNITS: 100 INJECTION, SOLUTION INTRAVENOUS; SUBCUTANEOUS at 09:04

## 2025-04-26 RX ADMIN — DICLOFENAC SODIUM 2 G: 10 GEL TOPICAL at 11:04

## 2025-04-26 RX ADMIN — ALLOPURINOL 50 MG: 300 TABLET ORAL at 09:04

## 2025-04-26 RX ADMIN — ACETAMINOPHEN 650 MG: 325 TABLET ORAL at 02:04

## 2025-04-26 RX ADMIN — ZOLPIDEM TARTRATE 5 MG: 5 TABLET, FILM COATED ORAL at 09:04

## 2025-04-26 RX ADMIN — LIDOCAINE 1 PATCH: 50 PATCH CUTANEOUS at 12:04

## 2025-04-26 RX ADMIN — POTASSIUM CHLORIDE 40 MEQ: 1500 TABLET, EXTENDED RELEASE ORAL at 02:04

## 2025-04-26 RX ADMIN — INSULIN ASPART 2 UNITS: 100 INJECTION, SOLUTION INTRAVENOUS; SUBCUTANEOUS at 05:04

## 2025-04-26 RX ADMIN — CLOPIDOGREL BISULFATE 75 MG: 75 TABLET, FILM COATED ORAL at 09:04

## 2025-04-26 RX ADMIN — LOSARTAN POTASSIUM 50 MG: 50 TABLET, FILM COATED ORAL at 09:04

## 2025-04-26 RX ADMIN — FUROSEMIDE 40 MG: 10 INJECTION, SOLUTION INTRAVENOUS at 09:04

## 2025-04-26 RX ADMIN — GABAPENTIN 600 MG: 300 CAPSULE ORAL at 09:04

## 2025-04-26 RX ADMIN — PREDNISONE 25 MG: 20 TABLET ORAL at 12:04

## 2025-04-26 NOTE — PT/OT/SLP PROGRESS
Physical Therapy      Patient Name:  Clifton Wilson   MRN:  [id number]    Patient not seen today secondary to deferred treatment due to c/o 6-7/10 back/(B)LE pain.  Nursing notified.  Attempted treatment at 12:23 following medication.  Pt deferred treatment. Will follow-up 4/27/2025.

## 2025-04-26 NOTE — PROGRESS NOTES
[facility] Note     HPI/Interval:       Clifton Wilson is a pleasant 74 y.o. male with problems noted below in A/P   From breathing standpoint feeling much better this morning.  Still with significant myalgias and orthopedic pains.  Tele reviewed.      History obtained by patient interview and supplemented by nursing documentation and chart review.   Objective   PMHx:  has a past medical history of Anticoagulant long-term use, Cardiomyopathy, CHF (congestive heart failure), Coronary artery disease, Diabetes mellitus, Gout, Heart attack, Hypertension, and Pneumonia.   SurgHx:  has a past surgical history that includes Appendectomy; Cataract extraction (Bilateral, 2011); Eye surgery; Cardiac defibrillator placement; Cardiac catheterization; Colonoscopy (N/A, 08/10/2018); Revision of implantable cardioverter-defibrillator (ICD) electrode lead placement (N/A, 11/11/2019); Left heart catheterization (Left, 11/11/2024); instantaneous wave-free ratio (ifr) (N/A, 11/11/2024); Coronary angiography (N/A, 11/11/2024); Vasectomy (2000); and Tonsillectomy (1960s).   FamHx: family history includes Heart disease in his father and mother; Hypertension in his mother; Stroke in his father.   SocialHx:  reports that he has quit smoking. He has never used smokeless tobacco. He reports current alcohol use. He reports that he does not use drugs.  Home Meds:  Current Outpatient Medications   Medication Instructions    alcohol swabs (BD ALCOHOL SWABS) PadM USE FOUR TIMES DAILY    aspirin (ECOTRIN) 81 mg, Daily    blood glucose control high,low (ACCU-CHEK CATHRYN CONTROL SOLN) Soln Use as directed to check blood sugar    blood sugar diagnostic Strp test blood sugar as directed four times daily    blood-glucose meter (TRUE METRIX GLUCOSE METER) Misc use as directed    clopidogreL (PLAVIX) 75 mg, Oral, Daily    colchicine (COLCRYS) 0.6 mg, Oral, 2 times daily    cyanocobalamin (VITAMIN B-12) 1,000 mcg, Daily    febuxostat  "(ULORIC) 40 mg, Oral, Daily    furosemide (LASIX) 20 mg, Oral, 2 times daily    gabapentin (NEURONTIN) 600 mg, Oral, 2 times daily    hydrALAZINE (APRESOLINE) 50 mg, Oral, Every 8 hours    JARDIANCE 10 mg, Oral, Daily    lancets 30 gauge Misc usea s directed to check blood glucose four times daily    LANTUS SOLOSTAR U-100 INSULIN 35 Units, Subcutaneous, Nightly    losartan (COZAAR) 50 mg, Oral, Daily    metFORMIN (GLUCOPHAGE) 1,000 mg, Oral, 2 times daily with meals    multivit-minerals/FA/lycopene (ONE-A-DAY MEN'S 50 PLUS ORAL) 1 tablet, Daily    nitroGLYCERIN (NITROSTAT) 0.4 mg, Sublingual, Every 5 min PRN    NovoLOG Flexpen U-100 Insulin 15 Units, Subcutaneous, 3 times daily with meals    pen needle, diabetic (BD ULTRA-FINE SINA PEN NEEDLE) 32 gauge x 5/32" Ndle Use four times daily for insulin administration    zolpidem (AMBIEN) 5 MG Tab TAKE 1 TABLET BY MOUTH EVERY NIGHT AS NEEDED     Review of Systems: Comprehensive ROS was performed and is negative unless otherwise noted in HPI.     Objective:   Last 24 Hour Vital Signs:  BP  Min: 121/73  Max: 153/80  Temp  Av.3 °F (36.8 °C)  Min: 97.6 °F (36.4 °C)  Max: 98.8 °F (37.1 °C)  Pulse  Av.6  Min: 74  Max: 91  Resp  Av  Min: 16  Max: 20  SpO2  Av.3 %  Min: 94 %  Max: 97 %  Weight  Av kg (213 lb 13.5 oz)  Min: 97 kg (213 lb 13.5 oz)  Max: 97 kg (213 lb 13.5 oz)  I/O last 3 completed shifts:  In: 832 [P.O.:832]  Out: 3275 [Urine:3275]    Physical Examination:  Constitutional: NAD, Atraumatic   HEENT: MMM  Cardiovascular: RRR, no murmurs noted, no chest tenderness to palpation, 2+ radial pulses b/l  Pulmonary: normal respiratory effort, CTAB, no crackles, wheezes  Abdominal: S/NT/ND  Musculoskeletal: No lower extremity edema noted  Neurological: Alert & oriented to self, location, time and situation. No gross neurological deficits  Skin: W/D/I  Psych: Appropriate affect, normal mood    Laboratory:  Personally reviewed    Other " Results:  TTE:  Results for orders placed during the hospital encounter of 04/24/25    Echo    Interpretation Summary    Left Ventricle: The left ventricle is mildly dilated. Normal wall thickness. There is eccentric hypertrophy. Global hypokinesis present. Septal motion is consistent with bundle branch block. There is moderately reduced systolic function with a visually estimated ejection fraction of 35 - 40%. There is diastolic dysfunction but grade cannot be determined. Normal left ventricular filling pressure.    Right Ventricle: The right ventricle is normal in size. Systolic function is reduced.    Mitral Valve: There is mild regurgitation.    IVC/SVC: Normal venous pressure at 3 mmHg.    Stress Testing:   No results found for this or any previous visit.     Coronary Angiogram:  Results for orders placed during the hospital encounter of 11/08/24    Cardiac catheterization    Conclusion  Table formatting from the original result was not included.      The estimated blood loss was none.    [facility]  Surgery Department  Operative Note    SUMMARY  POST CATH NOTE    Date of Procedure: 11/11/2024    Procedure: Procedure(s) (LRB):  Left heart cath (Left)  Instantaneous Wave-Free Ratio (IFR) (N/A)  ANGIOGRAM, CORONARY ARTERY (N/A)    Surgeons and Role:  * Luis Felipe Wright MD - Primary    Assisting Surgeon: None    Pre-Operative Diagnosis: Chest pain [R07.9]  Unstable angina [I20.0]    Post-Operative Diagnosis: Post-Op Diagnosis Codes:  * Chest pain [R07.9]  * Unstable angina [I20.0]    s/p catheterization secondary to:    Cath Results:  Access: Us guided RRA    Coronary Anatomy:    Left Main Coronary Artery: The left main is a short andlarge caliber vessel arising normally from the left sinus of valsalva.  It bifurcates into the left anterior descending and left circumflex coronary artery.  It is free of evidence of obstructive coronary artery disease. PATRIA III flow    Left Anterior Descending: The left  "anterior descending coronary artery is a large caliber vessel arising normally from the left main and extending to the apex.  It gives rise to small to moderate caliber diagonal arteries. Ostial to proximal LAD (before stent) 50-60% stenosed angiographically. Mild to moderate ISR with patent stents with PATRIA 2 flow. iFR of Proximal LAD negative,    Left Circumflex: The left circumflex is a large caliber vessel arising normally from the left main coronary artery, it is non-dominant.  It gives rise to moderate caliber obtuse marginal branches.  Prox to mid Lcx  patent stent with mild IRS. Jailed AV Cx  (small <2.5 mm) diffuse disease at proximal vessel. PATRIA III flow    Right Coronary Artery: The right coronary artery is a large caliber vessel arising normally from the right sinus of valsalva.  It is dominant giving rise to a PDA and PLV branch.  The right coronary artery is free of obstructive disease. PATRIA III flow    LVgram: LVEDP 9 mmHg    Intervention:  iFR  JL4 guide  The 0.014" Omniwire was connected to the console, calibrated and equalized. The 0.014"  Omniwire was then advanced into the left main outside of the catheter and flushed with saline  with the introducer removed. The pressure was normalized and then the wire was advanced  across Proximal LAD lesion. 0.96-0.95 iFR recordings were obtained. The 0.014" Omniwire was then pulled  back slowly across the lesion to assess for step up and electronic drift. The wire was removed,  and final angiography was performed.    Closure device:  Patient tolerated procedure well, no complications    Post Cath Exam:  BP (!) 153/73 (BP Location: Left arm, Patient Position: Lying)   Pulse 71   Temp 98.7 °F (37.1 °C) (Oral)   Resp 16   Ht 5' 11" (1.803 m)   Wt 107.5 kg (237 lb)   SpO2 97%   BMI 33.05 kg/m²    Anesthesia: RN IV Sedation      Estimated Blood Loss (EBL): * No values recorded between 11/11/2024 11:40 AM and 11/11/2024 12:19 PM *    Specimens:  Specimen " (24h ago, onward)    None          Assessment:  Ostial to proximal LAD with 50-60% stenosis angiographically with iFR negative  LAD stents patent with PATRIA 2  Lcx stent patent    Plan:    - Patient tolerated procedure well. No immediate complications  - Continue aspirin and Plavix  - EKG when arrives to floor  - Routine post cath protocol  - Maximize medical management  - Further care by the primary team  - IVF at  100cc/hr  for 2 hours  - Follow up with me  -PET stress for viability M      Time spent on this visit included personally:  -Reviewing Medical records & lab results  -Independently reviewing and interpreting (if not documented by myself) EKGs, echocardiograms, catherizations   -Obtaining a history, performing a relevant exam, counseling/educating the patient/family  -Documenting clinical information in the EMR including ordering of tests  -Care coordination and communicating with other health care providers         Assessment/Plan:     Active Hospital Problems    Diagnosis    *Acute on chronic combined systolic and diastolic congestive heart failure    Troponin level elevated    Cervical spondylolysis    Muscular weakness    Gout    Essential hypertension    DM type 2 without retinopathy       Clifton Wilson is a 74 y.o. male with CAD (s/p multiple PCIs, PET w/o ischemia 12/2024), HTN, NIDDM, HLD, HFrecEF (40%) s/p ICD, DM p/w diffuse weakness/myalgias, deconditioning and mild SOB treated for mild volume overload.   Doing better today per above from CV standpoint.    -ESR/CRP significantly elevated--there may be a rheumatologic component to his myalgias--may benefit from trial of steroids and/or further workup  -Needs PT and possible rehab  -continue ASA and Plavix  -can start PCSK9 as outpatient in event ES statin myopathy   -continue losartan 50 mg daily   -can transition to lasix 40mg daily (was on 20 bid at home but may be easier to simplify)  -can restart jardiance 10mg daily      Thank you for  the opportunity to care for this patient. Please don't hesitate to reach out with any questions/concerns,  Speech recognition software used for parts of this note. Please excuse grammar, out of context phrases, and/or syntax issues.

## 2025-04-26 NOTE — PROGRESS NOTES
[facility] Medicine  Progress Note    Patient Name: Clifton Wilson  MRN: [id number]  Patient Class: IP- Inpatient   Admission Date: 4/24/2025  Length of Stay: 1 days  Attending Physician: Christophe Ortiz MD  Primary Care Provider: Britton Grissom MD        Subjective     Principal Problem:Acute on chronic combined systolic and diastolic congestive heart failure        HPI:  Patient is a 73 yo male w/ PMHx of CAD, HTN, HLD, HFrEF 40-45% s/p ICD x7 ,Type 2 DM and gout who presents to the Emergency Department with worsening shortness of breath and progressive bilateral lower extremity edema over the past 3 days. He was seen by his primary care physician two days ago, at which time it was noted that he had not been on any diuretic therapy for approximately two months. Due to signs of fluid overload, furosemide (Lasix) 20 mg daily was restarted. The patient has since taken two doses and reports increased urinary output but no significant improvement in his shortness of breath. He denies chest pain, orthopnea, or upper respiratory symptoms.    He also endorses a gradual but marked functional decline over the last several months, including worsening generalized weakness and reduced mobility, requiring frequent rest while ambulating at home. He was recently recommended for inpatient rehabilitation by his PCP, but has not yet received any follow-up communication regarding the referral.    Notably, the patient was admitted on 04/08 for hypotension and volume depletion. At that time, he was seen in an outpatient setting and noted to have systolic blood pressure in the low 100s, but was referred to the ED when it dropped into the 80s. He was evaluated and treated for hypotension during that admission. He states that following discharge, no diuretic therapy was prescribed.     In the ED, initial vital signs /58 mmhg, HR 84, Temp 98.2, SpO2 100% on room air. Labs include CBC with stable H/H 11.2/34.1 and  WBC within normal limits . CMP with Na 141, K 3.4,  and BUN/Cr 18/1.1 (baseline Cr 1.1), Troponin 0.029, , Uric Acid 6.1,. CXR The cardiac silhouette is normal in size, midline.  The pulmonary vascularity is normal.  Coronary artery calcifications seen. No focal area of airspace consolidation.  No pleural effusion.  No pneumothorax. EKG showed Right bundle branch block. Septal infarct ,age undetermined .  Initiated on Laxis 20 mg. [facility] Team admitted the patient with CHF exacerbation.      Overview/Hospital Course:  No notes on file    No new subjective & objective note has been filed under this hospital service since the last note was generated.        Assessment & Plan  Acute on chronic combined systolic and diastolic congestive heart failure  Patient has Combined Systolic and Diastolic heart failure that is Acute on chronic. On presentation their CHF was decompensated. Evidence of decompensated CHF on presentation includes: edema and shortness of breath. The etiology of their decompensation is likely medication non-compliance. Most recent BNP and echo results are listed below.  Recent Labs     04/24/25  1447   *     Latest ECHO  Results for orders placed during the hospital encounter of 03/18/25    Echo Saline Bubble? Yes    Interpretation Summary    Left Ventricle: The left ventricle is mildly dilated. There is eccentric hypertrophy. Regional wall motion abnormalities present. See diagram for wall motion findings. There is mildly reduced systolic function with a visually estimated ejection fraction of 40 - 45%. Quantitated ejection fraction is 43%. There is diastolic dysfunction. Normal left ventricular filling pressure.    Right Ventricle: The right ventricle has mild enlargement. Systolic function is mildly reduced. Pacemaker lead present in the ventricle.    Pulmonary Artery: The estimated pulmonary artery systolic pressure is 17 mmHg.    IVC/SVC: Normal venous pressure at 3  mmHg.    Current Heart Failure Medications  losartan tablet 50 mg, Daily, Oral    POCUS done in ED :Bilateral lung sliding is present. A-lines visualized throughout anterior lung fields. No B-lines, consolidations, pleural effusion, or pneumothorax identified. Pleural line is smooth and continuous.  Cardiac POCUS reveals normal left ventricular systolic function with no regional wall motion abnormalities. Right ventricle is normal in size. No pericardial effusion.     Plan  - Monitor strict I&Os and daily weights.    - Place on telemetry  - Low sodium diet  - Place on fluid restriction of 1.5 L.   - Cardiology has not been consulted  - The patient's volume status is improving but not at their baseline as indicated by edema  -Lasix 40 mg IV BID  DM type 2 without retinopathy  Patient's FSGs are controlled on current medication regimen.  Last A1c reviewed-   Lab Results   Component Value Date    HGBA1C 6.9 (H) 04/24/2025     Most recent fingerstick glucose reviewed-   Recent Labs   Lab 04/25/25  1139 04/25/25  1602 04/25/25 2018 04/26/25  0606   POCTGLUCOSE 150* 165* 172* 144*     Current correctional scale  Low  Maintain anti-hyperglycemic dose as follows-   Antihyperglycemics (From admission, onward)      Start     Stop Route Frequency Ordered    04/24/25 1743  insulin aspart U-100 pen 0-5 Units         -- SubQ Before meals & nightly PRN 04/24/25 1645          Plan  -Monitor glucose.Glucose goal 140-180  -SSI  Essential hypertension  Patient's blood pressure range in the last 24 hours was: BP  Min: 109/70  Max: 153/80.The patient's inpatient anti-hypertensive regimen is listed below:  Current Antihypertensives  nitroGLYCERIN SL tablet 0.4 mg, Every 5 min PRN, Sublingual  losartan tablet 50 mg, Daily, Oral    Plan  - BP is controlled, no changes needed to their regimen  - We are going to held Bps medications secondary to low Bps reading  Gout  Patient has known history of Gout. Patient hasn't had a flare in a couple  of years.    Plan   Continue current medications.    Muscular weakness  The patient reports progressive muscle weakness, particularly affecting the lower extremities, over the past several months. He experiences significant difficulty with ambulation and prolonged standing due to this weakness. He was recently admitted to a rehabilitation facility to address his declining mobility. An extensive workup has been performed, with positive antinuclear antibodies (MARLEN) as the only notable finding thus far.     Plan  -PT/OT  -Continue Gabapentin   -Prednisone 25 mg daily for 5 days   CPK,MARLEN,CCP,RF and Aldolase  -Rheumatology consult ?Muscle biopsy ?    Cervical spondylolysis  Patient has a history of Chronic back pain, with numbness and tingling. Patient has diagnosis of Cervical Spondylolysis.    Plan  Continue home medications.    Troponin level elevated  Patient presented with shortness of breath and mildly elevated Troponin 0.029, .    Plan  -Cardiac monitoring   -Monitor electrolytes   -Trend Troponin        VTE Risk Mitigation (From admission, onward)           Ordered     Reason for No Pharmacological VTE Prophylaxis  Once        Question:  Reasons:  Answer:  Already adequately anticoagulated on oral Anticoagulants    04/24/25 1645     IP VTE HIGH RISK PATIENT  Once         04/24/25 1645     Place sequential compression device  Until discontinued         04/24/25 1645                    Discharge Planning   MARTHA: 4/26/2025     Code Status: Full Code   Medical Readiness for Discharge Date:   Discharge Plan A: Home with family   Discharge Delays: (!) Consult Recommendations Completed        Roya Crews MD  [facility], PGY-1  04/26/2025

## 2025-04-26 NOTE — ASSESSMENT & PLAN NOTE
Patient's blood pressure range in the last 24 hours was: BP  Min: 109/70  Max: 153/80.The patient's inpatient anti-hypertensive regimen is listed below:  Current Antihypertensives  nitroGLYCERIN SL tablet 0.4 mg, Every 5 min PRN, Sublingual  losartan tablet 50 mg, Daily, Oral    Plan  - BP is controlled, no changes needed to their regimen  - We are going to held Bps medications secondary to low Bps reading

## 2025-04-26 NOTE — ASSESSMENT & PLAN NOTE
Patient has Combined Systolic and Diastolic heart failure that is Acute on chronic. On presentation their CHF was decompensated. Evidence of decompensated CHF on presentation includes: edema and shortness of breath. The etiology of their decompensation is likely medication non-compliance. Most recent BNP and echo results are listed below.  Recent Labs     04/24/25  1447   *     Latest ECHO  Results for orders placed during the hospital encounter of 03/18/25    Echo Saline Bubble? Yes    Interpretation Summary    Left Ventricle: The left ventricle is mildly dilated. There is eccentric hypertrophy. Regional wall motion abnormalities present. See diagram for wall motion findings. There is mildly reduced systolic function with a visually estimated ejection fraction of 40 - 45%. Quantitated ejection fraction is 43%. There is diastolic dysfunction. Normal left ventricular filling pressure.    Right Ventricle: The right ventricle has mild enlargement. Systolic function is mildly reduced. Pacemaker lead present in the ventricle.    Pulmonary Artery: The estimated pulmonary artery systolic pressure is 17 mmHg.    IVC/SVC: Normal venous pressure at 3 mmHg.    Current Heart Failure Medications  losartan tablet 50 mg, Daily, Oral    POCUS done in ED :Bilateral lung sliding is present. A-lines visualized throughout anterior lung fields. No B-lines, consolidations, pleural effusion, or pneumothorax identified. Pleural line is smooth and continuous.  Cardiac POCUS reveals normal left ventricular systolic function with no regional wall motion abnormalities. Right ventricle is normal in size. No pericardial effusion.     Plan  - Monitor strict I&Os and daily weights.    - Place on telemetry  - Low sodium diet  - Place on fluid restriction of 1.5 L.   - Cardiology has not been consulted  - The patient's volume status is improving but not at their baseline as indicated by edema  -Lasix 40 mg IV BID

## 2025-04-26 NOTE — PLAN OF CARE
Problem: Adult Inpatient Plan of Care  Goal: Plan of Care Review  Outcome: Not Progressing  Goal: Patient-Specific Goal (Individualized)  Outcome: Not Progressing  Goal: Absence of Hospital-Acquired Illness or Injury  Outcome: Not Progressing  Goal: Optimal Comfort and Wellbeing  Outcome: Not Progressing  Goal: Readiness for Transition of Care  Outcome: Not Progressing     Problem: Diabetes Comorbidity  Goal: Blood Glucose Level Within Targeted Range  Outcome: Not Progressing     Problem: Wound  Goal: Optimal Coping  Outcome: Not Progressing  Goal: Optimal Functional Ability  Outcome: Not Progressing  Goal: Absence of Infection Signs and Symptoms  Outcome: Not Progressing  Goal: Improved Oral Intake  Outcome: Not Progressing  Goal: Optimal Pain Control and Function  Outcome: Not Progressing  Goal: Skin Health and Integrity  Outcome: Not Progressing  Goal: Optimal Wound Healing  Outcome: Not Progressing     Problem: Heart Failure  Goal: Optimal Coping  Outcome: Not Progressing  Goal: Optimal Cardiac Output  Outcome: Not Progressing  Goal: Stable Heart Rate and Rhythm  Outcome: Not Progressing  Goal: Optimal Functional Ability  Outcome: Not Progressing  Goal: Fluid and Electrolyte Balance  Outcome: Not Progressing  Goal: Improved Oral Intake  Outcome: Not Progressing  Goal: Effective Oxygenation and Ventilation  Outcome: Not Progressing  Goal: Effective Breathing Pattern During Sleep  Outcome: Not Progressing     Problem: Fall Injury Risk  Goal: Absence of Fall and Fall-Related Injury  Outcome: Not Progressing     Problem: Comorbidity Management  Goal: Blood Pressure in Desired Range  Outcome: Not Progressing     Problem: Fatigue  Goal: Improved Activity Tolerance  Outcome: Not Progressing     Problem: Skin Injury Risk Increased  Goal: Skin Health and Integrity  Outcome: Not Progressing

## 2025-04-26 NOTE — SUBJECTIVE & OBJECTIVE
Interval History: NAEON,VSS,AF. Patient resting comfortable at bedside. Worked yesterday with PT/OT. Patient still complainig of muscle weakness and knee pain.    Review of Systems   Constitutional: Negative.    HENT: Negative.     Eyes: Negative.    Respiratory: Negative.     Cardiovascular: Negative.    Gastrointestinal: Negative.    Endocrine: Negative.    Genitourinary: Negative.    Musculoskeletal:  Positive for gait problem and myalgias.   Skin: Negative.    Allergic/Immunologic: Negative.    Hematological: Negative.    Psychiatric/Behavioral: Negative.       Objective:     Vital Signs (Most Recent):  Temp: 98.1 °F (36.7 °C) (04/26/25 0802)  Pulse: 79 (04/26/25 0802)  Resp: 16 (04/26/25 0802)  BP: 139/85 (04/26/25 0802)  SpO2: 97 % (04/26/25 0802) Vital Signs (24h Range):  Temp:  [97.5 °F (36.4 °C)-98.8 °F (37.1 °C)] 98.1 °F (36.7 °C)  Pulse:  [74-91] 79  Resp:  [16-20] 16  SpO2:  [94 %-97 %] 97 %  BP: (109-153)/(69-85) 139/85     Weight: 97 kg (213 lb 13.5 oz)  Body mass index is 29.83 kg/m².    Intake/Output Summary (Last 24 hours) at 4/26/2025 1051  Last data filed at 4/26/2025 0646  Gross per 24 hour   Intake 596 ml   Output 2500 ml   Net -1904 ml         Physical Exam  Vitals and nursing note reviewed.   Constitutional:       Appearance: Normal appearance.   HENT:      Head: Normocephalic and atraumatic.   Eyes:      Conjunctiva/sclera: Conjunctivae normal.   Cardiovascular:      Rate and Rhythm: Normal rate and regular rhythm.      Pulses: Normal pulses.      Heart sounds: Normal heart sounds.   Pulmonary:      Effort: Pulmonary effort is normal.      Breath sounds: Normal breath sounds.   Abdominal:      General: Bowel sounds are normal.      Palpations: Abdomen is soft.   Musculoskeletal:      Cervical back: Normal range of motion and neck supple.      Comments: Improving bilateral upper extremity edema   Skin:     General: Skin is warm.      Capillary Refill: Capillary refill takes less than 2  seconds.   Neurological:      General: No focal deficit present.      Mental Status: He is alert and oriented to person, place, and time.   Psychiatric:         Mood and Affect: Mood normal.         Behavior: Behavior normal.           Significant Labs: All pertinent labs within the past 24 hours have been reviewed.    Significant Imaging: I have reviewed all pertinent imaging results/findings within the past 24 hours.

## 2025-04-26 NOTE — PLAN OF CARE
Problem: Adult Inpatient Plan of Care  Goal: Plan of Care Review  Outcome: Progressing   Updated plan of care.

## 2025-04-26 NOTE — ASSESSMENT & PLAN NOTE
The patient reports progressive muscle weakness, particularly affecting the lower extremities, over the past several months. He experiences significant difficulty with ambulation and prolonged standing due to this weakness. He was recently admitted to a rehabilitation facility to address his declining mobility. An extensive workup has been performed, with positive antinuclear antibodies (MARLEN) as the only notable finding thus far.     Plan  -PT/OT  -Continue Gabapentin   -Prednisone 25 mg daily for 5 days   -CCP,CPK,MARLEN,RF and Aldolase ordered  -Rheumatology consult ?Muscle biopsy ?

## 2025-04-26 NOTE — ASSESSMENT & PLAN NOTE
The patient reports progressive muscle weakness, particularly affecting the lower extremities, over the past several months. He experiences significant difficulty with ambulation and prolonged standing due to this weakness. He was recently admitted to a rehabilitation facility to address his declining mobility. An extensive workup has been performed, with positive antinuclear antibodies (MARLEN) as the only notable finding thus far.     Plan  -PT/OT  -Continue Gabapentin   -Prednisone 25 mg daily for 5 days   CPK,MARLEN,CCP,RF and Aldolase  -Rheumatology consult ?Muscle biopsy ?

## 2025-04-26 NOTE — ASSESSMENT & PLAN NOTE
Patient's FSGs are controlled on current medication regimen.  Last A1c reviewed-   Lab Results   Component Value Date    HGBA1C 6.9 (H) 04/24/2025     Most recent fingerstick glucose reviewed-   Recent Labs   Lab 04/25/25  1139 04/25/25  1602 04/25/25 2018 04/26/25  0606   POCTGLUCOSE 150* 165* 172* 144*     Current correctional scale  Low  Maintain anti-hyperglycemic dose as follows-   Antihyperglycemics (From admission, onward)      Start     Stop Route Frequency Ordered    04/24/25 1743  insulin aspart U-100 pen 0-5 Units         -- SubQ Before meals & nightly PRN 04/24/25 1645          Plan  -Monitor glucose.Glucose goal 140-180  -SSI

## 2025-04-26 NOTE — PT/OT/SLP PROGRESS
Physical Therapy      Patient Name:  Clifton Wilson   MRN:  [id number]    Patient not seen today secondary to Patient unwilling to participate (Pt reports that he ambulated to restroom earlier and just received pain medication.  Pt unwilling to perform therapeutic activity at this time.). Will follow-up 4/27/2025.

## 2025-04-27 LAB
ABSOLUTE EOSINOPHIL (OHS): 0.05 K/UL
ABSOLUTE MONOCYTE (OHS): 0.95 K/UL (ref 0.3–1)
ABSOLUTE NEUTROPHIL COUNT (OHS): 4.48 K/UL (ref 1.8–7.7)
ANION GAP (OHS): 13 MMOL/L (ref 8–16)
BASOPHILS # BLD AUTO: 0.04 K/UL
BASOPHILS NFR BLD AUTO: 0.6 %
BUN SERPL-MCNC: 28 MG/DL (ref 8–23)
CALCIUM SERPL-MCNC: 9.9 MG/DL (ref 8.7–10.5)
CHLORIDE SERPL-SCNC: 105 MMOL/L (ref 95–110)
CO2 SERPL-SCNC: 23 MMOL/L (ref 23–29)
CREAT SERPL-MCNC: 1.2 MG/DL (ref 0.5–1.4)
CYCLIC CITRULLINATED PEPTIDE (CCP) (OHS): 1 U/ML
ERYTHROCYTE [DISTWIDTH] IN BLOOD BY AUTOMATED COUNT: 13.3 % (ref 11.5–14.5)
GFR SERPLBLD CREATININE-BSD FMLA CKD-EPI: >60 ML/MIN/1.73/M2
GLUCOSE SERPL-MCNC: 186 MG/DL (ref 70–110)
HCT VFR BLD AUTO: 34 % (ref 40–54)
HGB BLD-MCNC: 11 GM/DL (ref 14–18)
IMM GRANULOCYTES # BLD AUTO: 0.02 K/UL (ref 0–0.04)
IMM GRANULOCYTES NFR BLD AUTO: 0.3 % (ref 0–0.5)
LYMPHOCYTES # BLD AUTO: 1.56 K/UL (ref 1–4.8)
MAGNESIUM SERPL-MCNC: 1.9 MG/DL (ref 1.6–2.6)
MCH RBC QN AUTO: 29.3 PG (ref 27–31)
MCHC RBC AUTO-ENTMCNC: 32.4 G/DL (ref 32–36)
MCV RBC AUTO: 90 FL (ref 82–98)
NUCLEATED RBC (/100WBC) (OHS): 0 /100 WBC
PHOSPHATE SERPL-MCNC: 3.5 MG/DL (ref 2.7–4.5)
PLATELET # BLD AUTO: 380 K/UL (ref 150–450)
PMV BLD AUTO: 9.8 FL (ref 9.2–12.9)
POCT GLUCOSE: 167 MG/DL (ref 70–110)
POCT GLUCOSE: 223 MG/DL (ref 70–110)
POCT GLUCOSE: 231 MG/DL (ref 70–110)
POCT GLUCOSE: 301 MG/DL (ref 70–110)
POTASSIUM SERPL-SCNC: 3.5 MMOL/L (ref 3.5–5.1)
RBC # BLD AUTO: 3.76 M/UL (ref 4.6–6.2)
RELATIVE EOSINOPHIL (OHS): 0.7 %
RELATIVE LYMPHOCYTE (OHS): 22 % (ref 18–48)
RELATIVE MONOCYTE (OHS): 13.4 % (ref 4–15)
RELATIVE NEUTROPHIL (OHS): 63 % (ref 38–73)
RHEUMATOID FACT SERPL-ACNC: <13 IU/ML
SODIUM SERPL-SCNC: 141 MMOL/L (ref 136–145)
WBC # BLD AUTO: 7.1 K/UL (ref 3.9–12.7)

## 2025-04-27 PROCEDURE — 63600175 PHARM REV CODE 636 W HCPCS: Mod: HCNC

## 2025-04-27 PROCEDURE — 86235 NUCLEAR ANTIGEN ANTIBODY: CPT | Mod: HCNC

## 2025-04-27 PROCEDURE — 25000003 PHARM REV CODE 250: Mod: HCNC

## 2025-04-27 PROCEDURE — 85025 COMPLETE CBC W/AUTO DIFF WBC: CPT | Mod: HCNC

## 2025-04-27 PROCEDURE — 86039 ANTINUCLEAR ANTIBODIES (ANA): CPT | Mod: HCNC

## 2025-04-27 PROCEDURE — 99900035 HC TECH TIME PER 15 MIN (STAT): Mod: HCNC

## 2025-04-27 PROCEDURE — 36415 COLL VENOUS BLD VENIPUNCTURE: CPT | Mod: HCNC

## 2025-04-27 PROCEDURE — 86225 DNA ANTIBODY NATIVE: CPT | Mod: HCNC

## 2025-04-27 PROCEDURE — 94761 N-INVAS EAR/PLS OXIMETRY MLT: CPT | Mod: HCNC

## 2025-04-27 PROCEDURE — 86431 RHEUMATOID FACTOR QUANT: CPT | Mod: HCNC

## 2025-04-27 PROCEDURE — 11000001 HC ACUTE MED/SURG PRIVATE ROOM: Mod: HCNC

## 2025-04-27 PROCEDURE — 80048 BASIC METABOLIC PNL TOTAL CA: CPT | Mod: HCNC

## 2025-04-27 PROCEDURE — 84100 ASSAY OF PHOSPHORUS: CPT | Mod: HCNC

## 2025-04-27 PROCEDURE — 83735 ASSAY OF MAGNESIUM: CPT | Mod: HCNC

## 2025-04-27 PROCEDURE — 99233 SBSQ HOSP IP/OBS HIGH 50: CPT | Mod: HCNC,95,, | Performed by: INTERNAL MEDICINE

## 2025-04-27 PROCEDURE — 82085 ASSAY OF ALDOLASE: CPT | Mod: HCNC

## 2025-04-27 PROCEDURE — 86200 CCP ANTIBODY: CPT | Mod: HCNC

## 2025-04-27 RX ORDER — FUROSEMIDE 40 MG/1
40 TABLET ORAL DAILY
Status: DISCONTINUED | OUTPATIENT
Start: 2025-04-27 | End: 2025-04-28

## 2025-04-27 RX ADMIN — ALLOPURINOL 50 MG: 300 TABLET ORAL at 08:04

## 2025-04-27 RX ADMIN — INSULIN ASPART 1 UNITS: 100 INJECTION, SOLUTION INTRAVENOUS; SUBCUTANEOUS at 09:04

## 2025-04-27 RX ADMIN — INSULIN ASPART 4 UNITS: 100 INJECTION, SOLUTION INTRAVENOUS; SUBCUTANEOUS at 05:04

## 2025-04-27 RX ADMIN — ASPIRIN 81 MG: 81 TABLET, COATED ORAL at 08:04

## 2025-04-27 RX ADMIN — CLOPIDOGREL BISULFATE 75 MG: 75 TABLET, FILM COATED ORAL at 08:04

## 2025-04-27 RX ADMIN — GABAPENTIN 600 MG: 300 CAPSULE ORAL at 08:04

## 2025-04-27 RX ADMIN — SENNOSIDES AND DOCUSATE SODIUM 1 TABLET: 50; 8.6 TABLET ORAL at 08:04

## 2025-04-27 RX ADMIN — DICLOFENAC SODIUM 2 G: 10 GEL TOPICAL at 10:04

## 2025-04-27 RX ADMIN — EMPAGLIFLOZIN 10 MG: 10 TABLET, FILM COATED ORAL at 02:04

## 2025-04-27 RX ADMIN — COLCHICINE 0.6 MG: 0.6 TABLET ORAL at 09:04

## 2025-04-27 RX ADMIN — LOSARTAN POTASSIUM 50 MG: 50 TABLET, FILM COATED ORAL at 08:04

## 2025-04-27 RX ADMIN — FUROSEMIDE 40 MG: 40 TABLET ORAL at 02:04

## 2025-04-27 RX ADMIN — LIDOCAINE 1 PATCH: 50 PATCH CUTANEOUS at 12:04

## 2025-04-27 RX ADMIN — COLCHICINE 0.6 MG: 0.6 TABLET ORAL at 08:04

## 2025-04-27 RX ADMIN — GABAPENTIN 600 MG: 300 CAPSULE ORAL at 09:04

## 2025-04-27 RX ADMIN — PREDNISONE 25 MG: 20 TABLET ORAL at 08:04

## 2025-04-27 RX ADMIN — ZOLPIDEM TARTRATE 5 MG: 5 TABLET, FILM COATED ORAL at 09:04

## 2025-04-27 RX ADMIN — INSULIN ASPART 2 UNITS: 100 INJECTION, SOLUTION INTRAVENOUS; SUBCUTANEOUS at 12:04

## 2025-04-27 NOTE — ASSESSMENT & PLAN NOTE
Patient's blood pressure range in the last 24 hours was: BP  Min: 114/67  Max: 158/88.The patient's inpatient anti-hypertensive regimen is listed below:  Current Antihypertensives  nitroGLYCERIN SL tablet 0.4 mg, Every 5 min PRN, Sublingual  losartan tablet 50 mg, Daily, Oral    Plan  - BP is controlled, no changes needed to their regimen  - We are going to held Bps medications secondary to low Bps reading

## 2025-04-27 NOTE — SUBJECTIVE & OBJECTIVE
Interval History: Patient reports feeling much better and having increased strength in his arms and legs.    Review of Systems   All other systems reviewed and are negative.    Objective:     Vital Signs (Most Recent):  Temp: 97.9 °F (36.6 °C) (04/27/25 0753)  Pulse: 69 (04/27/25 0753)  Resp: 18 (04/27/25 0753)  BP: (!) 158/88 (04/27/25 0753)  SpO2: 96 % (04/27/25 0754) Vital Signs (24h Range):  Temp:  [97.9 °F (36.6 °C)-98.7 °F (37.1 °C)] 97.9 °F (36.6 °C)  Pulse:  [69-88] 69  Resp:  [16-18] 18  SpO2:  [93 %-96 %] 96 %  BP: (114-158)/(67-88) 158/88     Weight: 97 kg (213 lb 13.5 oz)  Body mass index is 29.83 kg/m².    Intake/Output Summary (Last 24 hours) at 4/27/2025 0827  Last data filed at 4/27/2025 0655  Gross per 24 hour   Intake 120 ml   Output 1200 ml   Net -1080 ml         Physical Exam  Constitutional:       General: He is not in acute distress.     Appearance: Normal appearance. He is not ill-appearing, toxic-appearing or diaphoretic.   HENT:      Head: Normocephalic and atraumatic.      Right Ear: External ear normal.      Left Ear: External ear normal.      Nose: Nose normal.      Mouth/Throat:      Mouth: Mucous membranes are moist.      Pharynx: Oropharynx is clear.   Eyes:      General: No scleral icterus.        Right eye: No discharge.         Left eye: No discharge.      Extraocular Movements: Extraocular movements intact.      Conjunctiva/sclera: Conjunctivae normal.   Cardiovascular:      Rate and Rhythm: Normal rate and regular rhythm.      Pulses: Normal pulses.      Heart sounds: Normal heart sounds. No murmur heard.  Pulmonary:      Effort: Pulmonary effort is normal. No respiratory distress.      Breath sounds: Normal breath sounds. No wheezing.   Abdominal:      General: Bowel sounds are normal. There is no distension.      Palpations: Abdomen is soft.      Tenderness: There is no abdominal tenderness.   Musculoskeletal:         General: No swelling or tenderness.   Skin:     General: Skin  is warm and dry.      Capillary Refill: Capillary refill takes less than 2 seconds.   Neurological:      Mental Status: He is alert and oriented to person, place, and time.   Psychiatric:         Mood and Affect: Mood normal.         Behavior: Behavior normal.         Thought Content: Thought content normal.         Judgment: Judgment normal.               Significant Labs: All pertinent labs within the past 24 hours have been reviewed.    Significant Imaging: I have reviewed all pertinent imaging results/findings within the past 24 hours.

## 2025-04-27 NOTE — ASSESSMENT & PLAN NOTE
Patient has Combined Systolic and Diastolic heart failure that is Acute on chronic. On presentation their CHF was decompensated. Evidence of decompensated CHF on presentation includes: edema and shortness of breath. The etiology of their decompensation is likely medication non-compliance. Most recent BNP and echo results are listed below.  Recent Labs     04/24/25  1447   *     Latest ECHO  Results for orders placed during the hospital encounter of 03/18/25    Echo Saline Bubble? Yes    Interpretation Summary    Left Ventricle: The left ventricle is mildly dilated. There is eccentric hypertrophy. Regional wall motion abnormalities present. See diagram for wall motion findings. There is mildly reduced systolic function with a visually estimated ejection fraction of 40 - 45%. Quantitated ejection fraction is 43%. There is diastolic dysfunction. Normal left ventricular filling pressure.    Right Ventricle: The right ventricle has mild enlargement. Systolic function is mildly reduced. Pacemaker lead present in the ventricle.    Pulmonary Artery: The estimated pulmonary artery systolic pressure is 17 mmHg.    IVC/SVC: Normal venous pressure at 3 mmHg.    Current Heart Failure Medications  losartan tablet 50 mg, Daily, Oral    POCUS done in ED :Bilateral lung sliding is present. A-lines visualized throughout anterior lung fields. No B-lines, consolidations, pleural effusion, or pneumothorax identified. Pleural line is smooth and continuous.  Cardiac POCUS reveals normal left ventricular systolic function with no regional wall motion abnormalities. Right ventricle is normal in size. No pericardial effusion.     Plan  - Monitor strict I&Os and daily weights.    - Place on telemetry  - Low sodium diet  - Place on fluid restriction of 1.5 L.   - Cardiology has not been consulted  - The patient's volume status is improving but not at their baseline as indicated by edema  - Lasix 40 mg IV BID   Ipledge Number (Optional): [id number] Ipledge Number (Optional): [id number]

## 2025-04-27 NOTE — PLAN OF CARE
Problem: Adult Inpatient Plan of Care  Goal: Plan of Care Review  Outcome: Progressing  Goal: Patient-Specific Goal (Individualized)  Outcome: Progressing  Goal: Absence of Hospital-Acquired Illness or Injury  Outcome: Progressing  Goal: Optimal Comfort and Wellbeing  Outcome: Progressing  Goal: Readiness for Transition of Care  Outcome: Progressing     Problem: Diabetes Comorbidity  Goal: Blood Glucose Level Within Targeted Range  Outcome: Progressing     Problem: Wound  Goal: Optimal Coping  Outcome: Progressing  Goal: Optimal Functional Ability  Outcome: Progressing  Goal: Absence of Infection Signs and Symptoms  Outcome: Progressing  Goal: Improved Oral Intake  Outcome: Progressing  Goal: Optimal Pain Control and Function  Outcome: Progressing  Goal: Skin Health and Integrity  Outcome: Progressing  Goal: Optimal Wound Healing  Outcome: Progressing     Problem: Heart Failure  Goal: Optimal Coping  Outcome: Progressing  Goal: Optimal Cardiac Output  Outcome: Progressing  Goal: Stable Heart Rate and Rhythm  Outcome: Progressing  Goal: Optimal Functional Ability  Outcome: Progressing  Goal: Fluid and Electrolyte Balance  Outcome: Progressing  Goal: Improved Oral Intake  Outcome: Progressing  Goal: Effective Oxygenation and Ventilation  Outcome: Progressing  Goal: Effective Breathing Pattern During Sleep  Outcome: Progressing     Problem: Fall Injury Risk  Goal: Absence of Fall and Fall-Related Injury  Outcome: Progressing     Problem: Comorbidity Management  Goal: Blood Pressure in Desired Range  Outcome: Progressing     Problem: Fatigue  Goal: Improved Activity Tolerance  Outcome: Progressing     Problem: Skin Injury Risk Increased  Goal: Skin Health and Integrity  Outcome: Progressing

## 2025-04-27 NOTE — PROGRESS NOTES
[facility] Note     HPI/Interval:       Reports significantly decreased pain in his extremities as well as improvement in strength after steroids.  Telemetry reviewed, no events.  Euvolemic.    History obtained by patient interview and supplemented by nursing documentation and chart review.   Objective   PMHx:  has a past medical history of Anticoagulant long-term use, Cardiomyopathy, CHF (congestive heart failure), Coronary artery disease, Diabetes mellitus, Gout, Heart attack, Hypertension, and Pneumonia.   SurgHx:  has a past surgical history that includes Appendectomy; Cataract extraction (Bilateral, 2011); Eye surgery; Cardiac defibrillator placement; Cardiac catheterization; Colonoscopy (N/A, 08/10/2018); Revision of implantable cardioverter-defibrillator (ICD) electrode lead placement (N/A, 11/11/2019); Left heart catheterization (Left, 11/11/2024); instantaneous wave-free ratio (ifr) (N/A, 11/11/2024); Coronary angiography (N/A, 11/11/2024); Vasectomy (2000); and Tonsillectomy (1960s).   FamHx: family history includes Heart disease in his father and mother; Hypertension in his mother; Stroke in his father.   SocialHx:  reports that he has quit smoking. He has never used smokeless tobacco. He reports current alcohol use. He reports that he does not use drugs.  Home Meds:  Current Outpatient Medications   Medication Instructions    alcohol swabs (BD ALCOHOL SWABS) PadM USE FOUR TIMES DAILY    aspirin (ECOTRIN) 81 mg, Daily    blood glucose control high,low (ACCU-CHEK CATHRYN CONTROL SOLN) Soln Use as directed to check blood sugar    blood sugar diagnostic Strp test blood sugar as directed four times daily    blood-glucose meter (TRUE METRIX GLUCOSE METER) Misc use as directed    clopidogreL (PLAVIX) 75 mg, Oral, Daily    colchicine (COLCRYS) 0.6 mg, Oral, 2 times daily    cyanocobalamin (VITAMIN B-12) 1,000 mcg, Daily    febuxostat (ULORIC) 40 mg, Oral, Daily    furosemide (LASIX) 20 mg, Oral, 2  "times daily    gabapentin (NEURONTIN) 600 mg, Oral, 2 times daily    hydrALAZINE (APRESOLINE) 50 mg, Oral, Every 8 hours    JARDIANCE 10 mg, Oral, Daily    lancets 30 gauge Misc usea s directed to check blood glucose four times daily    LANTUS SOLOSTAR U-100 INSULIN 35 Units, Subcutaneous, Nightly    losartan (COZAAR) 50 mg, Oral, Daily    metFORMIN (GLUCOPHAGE) 1,000 mg, Oral, 2 times daily with meals    multivit-minerals/FA/lycopene (ONE-A-DAY MEN'S 50 PLUS ORAL) 1 tablet, Daily    nitroGLYCERIN (NITROSTAT) 0.4 mg, Sublingual, Every 5 min PRN    NovoLOG Flexpen U-100 Insulin 15 Units, Subcutaneous, 3 times daily with meals    pen needle, diabetic (BD ULTRA-FINE SINA PEN NEEDLE) 32 gauge x 5/32" Ndle Use four times daily for insulin administration    zolpidem (AMBIEN) 5 MG Tab TAKE 1 TABLET BY MOUTH EVERY NIGHT AS NEEDED     Review of Systems: Comprehensive ROS was performed and is negative unless otherwise noted in HPI.     Objective:   Last 24 Hour Vital Signs:  BP  Min: 114/67  Max: 158/88  Temp  Av.2 °F (36.8 °C)  Min: 97.6 °F (36.4 °C)  Max: 98.7 °F (37.1 °C)  Pulse  Av.5  Min: 69  Max: 88  Resp  Av  Min: 16  Max: 20  SpO2  Av.2 %  Min: 93 %  Max: 96 %  Weight  Av kg (213 lb 13.5 oz)  Min: 97 kg (213 lb 13.5 oz)  Max: 97 kg (213 lb 13.5 oz)  I/O last 3 completed shifts:  In: 120 [P.O.:120]  Out: 3500 [Urine:3500]    Physical Examination:  Constitutional: NAD, Atraumatic   HEENT: MMM  Cardiovascular: RRR, no murmurs noted, no chest tenderness to palpation, 2+ radial pulses b/l  Pulmonary: normal respiratory effort, CTAB, no crackles, wheezes  Abdominal: S/NT/ND  Musculoskeletal: No lower extremity edema noted  Neurological: Alert & oriented to self, location, time and situation. No gross neurological deficits  Skin: W/D/I  Psych: Appropriate affect, normal mood    Laboratory:  Personally reviewed    Other Results:  TTE:  Results for orders placed during the hospital encounter of " 04/24/25    Echo    Interpretation Summary    Left Ventricle: The left ventricle is mildly dilated. Normal wall thickness. There is eccentric hypertrophy. Global hypokinesis present. Septal motion is consistent with bundle branch block. There is moderately reduced systolic function with a visually estimated ejection fraction of 35 - 40%. There is diastolic dysfunction but grade cannot be determined. Normal left ventricular filling pressure.    Right Ventricle: The right ventricle is normal in size. Systolic function is reduced.    Mitral Valve: There is mild regurgitation.    IVC/SVC: Normal venous pressure at 3 mmHg.    Stress Testing:   No results found for this or any previous visit.     Coronary Angiogram:  Results for orders placed during the hospital encounter of 11/08/24    Cardiac catheterization    Conclusion  Table formatting from the original result was not included.      The estimated blood loss was none.    [facility]  Surgery Department  Operative Note    SUMMARY  POST CATH NOTE    Date of Procedure: 11/11/2024    Procedure: Procedure(s) (LRB):  Left heart cath (Left)  Instantaneous Wave-Free Ratio (IFR) (N/A)  ANGIOGRAM, CORONARY ARTERY (N/A)    Surgeons and Role:  * Luis Felipe Wright MD - Primary    Assisting Surgeon: None    Pre-Operative Diagnosis: Chest pain [R07.9]  Unstable angina [I20.0]    Post-Operative Diagnosis: Post-Op Diagnosis Codes:  * Chest pain [R07.9]  * Unstable angina [I20.0]    s/p catheterization secondary to:    Cath Results:  Access: Us guided RRA    Coronary Anatomy:    Left Main Coronary Artery: The left main is a short andlarge caliber vessel arising normally from the left sinus of valsalva.  It bifurcates into the left anterior descending and left circumflex coronary artery.  It is free of evidence of obstructive coronary artery disease. PATRIA III flow    Left Anterior Descending: The left anterior descending coronary artery is a large caliber vessel arising  "normally from the left main and extending to the apex.  It gives rise to small to moderate caliber diagonal arteries. Ostial to proximal LAD (before stent) 50-60% stenosed angiographically. Mild to moderate ISR with patent stents with PATRIA 2 flow. iFR of Proximal LAD negative,    Left Circumflex: The left circumflex is a large caliber vessel arising normally from the left main coronary artery, it is non-dominant.  It gives rise to moderate caliber obtuse marginal branches.  Prox to mid Lcx  patent stent with mild IRS. Jailed AV Cx  (small <2.5 mm) diffuse disease at proximal vessel. PATRIA III flow    Right Coronary Artery: The right coronary artery is a large caliber vessel arising normally from the right sinus of valsalva.  It is dominant giving rise to a PDA and PLV branch.  The right coronary artery is free of obstructive disease. PATRIA III flow    LVgram: LVEDP 9 mmHg    Intervention:  iFR  JL4 guide  The 0.014" Omniwire was connected to the console, calibrated and equalized. The 0.014"  Omniwire was then advanced into the left main outside of the catheter and flushed with saline  with the introducer removed. The pressure was normalized and then the wire was advanced  across Proximal LAD lesion. 0.96-0.95 iFR recordings were obtained. The 0.014" Omniwire was then pulled  back slowly across the lesion to assess for step up and electronic drift. The wire was removed,  and final angiography was performed.    Closure device:  Patient tolerated procedure well, no complications    Post Cath Exam:  BP (!) 153/73 (BP Location: Left arm, Patient Position: Lying)   Pulse 71   Temp 98.7 °F (37.1 °C) (Oral)   Resp 16   Ht 5' 11" (1.803 m)   Wt 107.5 kg (237 lb)   SpO2 97%   BMI 33.05 kg/m²    Anesthesia: RN IV Sedation      Estimated Blood Loss (EBL): * No values recorded between 11/11/2024 11:40 AM and 11/11/2024 12:19 PM *    Specimens:  Specimen (24h ago, onward)    None          Assessment:  Ostial to proximal LAD " with 50-60% stenosis angiographically with iFR negative  LAD stents patent with PATRIA 2  Lcx stent patent    Plan:    - Patient tolerated procedure well. No immediate complications  - Continue aspirin and Plavix  - EKG when arrives to floor  - Routine post cath protocol  - Maximize medical management  - Further care by the primary team  - IVF at  100cc/hr  for 2 hours  - Follow up with me  -PET stress for viability M      Time spent on this visit included personally:  -Reviewing Medical records & lab results  -Independently reviewing and interpreting (if not documented by myself) EKGs, echocardiograms, catherizations   -Obtaining a history, performing a relevant exam, counseling/educating the patient/family  -Documenting clinical information in the EMR including ordering of tests  -Care coordination and communicating with other health care providers         Assessment/Plan:     Active Hospital Problems    Diagnosis    *Acute on chronic combined systolic and diastolic congestive heart failure    Troponin level elevated    Cervical spondylolysis    Muscular weakness    Gout    Essential hypertension    DM type 2 without retinopathy       Clifton Wilson is a 74 y.o. male with CAD (s/p multiple PCIs, PET w/o ischemia 12/2024), HTN, NIDDM, HLD, HFrecEF (40%) s/p ICD, DM p/w diffuse weakness/myalgias, deconditioning and mild SOB treated for mild volume overload.   Doing better today per above from CV standpoint back to baseline.  ESR greater than 120, CRP 75, CPK 43.    Significant improvement in muscular pains and weakness with steroids.  I still suspect there is a rheumatologic component to his myalgias/weakness.    -agree with continued steroids and rheumatologic workup.  -Needs PT and possible rehab  -continue ASA and Plavix  -can start PCSK9 as outpatient in event ES statin myopathy, although CPK grossly normal.  -continue losartan 50 mg daily     -can transition to lasix 40mg daily (was on 20 bid at home but may  be easier to simplify)  -can restart jardiance 10mg daily      Thank you for the opportunity to care for this patient. Please don't hesitate to reach out with any questions/concerns,  Speech recognition software used for parts of this note. Please excuse grammar, out of context phrases, and/or syntax issues.

## 2025-04-27 NOTE — ASSESSMENT & PLAN NOTE
Patient's FSGs are controlled on current medication regimen.  Last A1c reviewed-   Lab Results   Component Value Date    HGBA1C 6.9 (H) 04/24/2025     Most recent fingerstick glucose reviewed-   Recent Labs   Lab 04/26/25  1130 04/26/25  1536 04/26/25  2112 04/27/25  0607   POCTGLUCOSE 191* 238* 289* 167*     Current correctional scale  Low  Maintain anti-hyperglycemic dose as follows-   Antihyperglycemics (From admission, onward)      Start     Stop Route Frequency Ordered    04/24/25 1743  insulin aspart U-100 pen 0-5 Units         -- SubQ Before meals & nightly PRN 04/24/25 1645          Plan  -Monitor glucose.Glucose goal 140-180  -SSI

## 2025-04-27 NOTE — PROGRESS NOTES
[facility] Medicine  Progress Note    Patient Name: Clifton Wilson  MRN: [id number]  Patient Class: IP- Inpatient   Admission Date: 4/24/2025  Length of Stay: 2 days  Attending Physician: Christophe Ortiz MD  Primary Care Provider: Britton Grissom MD        Subjective     Principal Problem:Acute on chronic combined systolic and diastolic congestive heart failure        HPI:  Patient is a 75 yo male w/ PMHx of CAD, HTN, HLD, HFrEF 40-45% s/p ICD x7 ,Type 2 DM and gout who presents to the Emergency Department with worsening shortness of breath and progressive bilateral lower extremity edema over the past 3 days. He was seen by his primary care physician two days ago, at which time it was noted that he had not been on any diuretic therapy for approximately two months. Due to signs of fluid overload, furosemide (Lasix) 20 mg daily was restarted. The patient has since taken two doses and reports increased urinary output but no significant improvement in his shortness of breath. He denies chest pain, orthopnea, or upper respiratory symptoms.    He also endorses a gradual but marked functional decline over the last several months, including worsening generalized weakness and reduced mobility, requiring frequent rest while ambulating at home. He was recently recommended for inpatient rehabilitation by his PCP, but has not yet received any follow-up communication regarding the referral.    Notably, the patient was admitted on 04/08 for hypotension and volume depletion. At that time, he was seen in an outpatient setting and noted to have systolic blood pressure in the low 100s, but was referred to the ED when it dropped into the 80s. He was evaluated and treated for hypotension during that admission. He states that following discharge, no diuretic therapy was prescribed.     In the ED, initial vital signs /58 mmhg, HR 84, Temp 98.2, SpO2 100% on room air. Labs include CBC with stable H/H 11.2/34.1 and  WBC within normal limits . CMP with Na 141, K 3.4,  and BUN/Cr 18/1.1 (baseline Cr 1.1), Troponin 0.029, , Uric Acid 6.1,. CXR The cardiac silhouette is normal in size, midline.  The pulmonary vascularity is normal.  Coronary artery calcifications seen. No focal area of airspace consolidation.  No pleural effusion.  No pneumothorax. EKG showed Right bundle branch block. Septal infarct ,age undetermined .  Initiated on Laxis 20 mg. [facility] Team admitted the patient with CHF exacerbation.      Overview/Hospital Course:  No notes on file    Interval History: Patient reports feeling much better and having increased strength in his arms and legs.    Review of Systems   All other systems reviewed and are negative.    Objective:     Vital Signs (Most Recent):  Temp: 97.9 °F (36.6 °C) (04/27/25 0753)  Pulse: 69 (04/27/25 0753)  Resp: 18 (04/27/25 0753)  BP: (!) 158/88 (04/27/25 0753)  SpO2: 96 % (04/27/25 0754) Vital Signs (24h Range):  Temp:  [97.9 °F (36.6 °C)-98.7 °F (37.1 °C)] 97.9 °F (36.6 °C)  Pulse:  [69-88] 69  Resp:  [16-18] 18  SpO2:  [93 %-96 %] 96 %  BP: (114-158)/(67-88) 158/88     Weight: 97 kg (213 lb 13.5 oz)  Body mass index is 29.83 kg/m².    Intake/Output Summary (Last 24 hours) at 4/27/2025 0849  Last data filed at 4/27/2025 0655  Gross per 24 hour   Intake 120 ml   Output 1200 ml   Net -1080 ml         Physical Exam  Constitutional:       General: He is not in acute distress.     Appearance: Normal appearance. He is not ill-appearing, toxic-appearing or diaphoretic.   HENT:      Head: Normocephalic and atraumatic.      Right Ear: External ear normal.      Left Ear: External ear normal.      Nose: Nose normal.      Mouth/Throat:      Mouth: Mucous membranes are moist.      Pharynx: Oropharynx is clear.   Eyes:      General: No scleral icterus.        Right eye: No discharge.         Left eye: No discharge.      Extraocular Movements: Extraocular movements intact.       Conjunctiva/sclera: Conjunctivae normal.   Cardiovascular:      Rate and Rhythm: Normal rate and regular rhythm.      Pulses: Normal pulses.      Heart sounds: Normal heart sounds. No murmur heard.  Pulmonary:      Effort: Pulmonary effort is normal. No respiratory distress.      Breath sounds: Normal breath sounds. No wheezing.   Abdominal:      General: Bowel sounds are normal. There is no distension.      Palpations: Abdomen is soft.      Tenderness: There is no abdominal tenderness.   Musculoskeletal:         General: No swelling or tenderness.   Skin:     General: Skin is warm and dry.      Capillary Refill: Capillary refill takes less than 2 seconds.   Neurological:      Mental Status: He is alert and oriented to person, place, and time.   Psychiatric:         Mood and Affect: Mood normal.         Behavior: Behavior normal.         Thought Content: Thought content normal.         Judgment: Judgment normal.               Significant Labs: All pertinent labs within the past 24 hours have been reviewed.    Significant Imaging: I have reviewed all pertinent imaging results/findings within the past 24 hours.      Assessment & Plan  Acute on chronic combined systolic and diastolic congestive heart failure  Patient has Combined Systolic and Diastolic heart failure that is Acute on chronic. On presentation their CHF was decompensated. Evidence of decompensated CHF on presentation includes: edema and shortness of breath. The etiology of their decompensation is likely medication non-compliance. Most recent BNP and echo results are listed below.  Recent Labs     04/24/25  1447   *     Latest ECHO  Results for orders placed during the hospital encounter of 03/18/25    Echo Saline Bubble? Yes    Interpretation Summary    Left Ventricle: The left ventricle is mildly dilated. There is eccentric hypertrophy. Regional wall motion abnormalities present. See diagram for wall motion findings. There is mildly reduced  systolic function with a visually estimated ejection fraction of 40 - 45%. Quantitated ejection fraction is 43%. There is diastolic dysfunction. Normal left ventricular filling pressure.    Right Ventricle: The right ventricle has mild enlargement. Systolic function is mildly reduced. Pacemaker lead present in the ventricle.    Pulmonary Artery: The estimated pulmonary artery systolic pressure is 17 mmHg.    IVC/SVC: Normal venous pressure at 3 mmHg.    Current Heart Failure Medications  losartan tablet 50 mg, Daily, Oral    POCUS done in ED :Bilateral lung sliding is present. A-lines visualized throughout anterior lung fields. No B-lines, consolidations, pleural effusion, or pneumothorax identified. Pleural line is smooth and continuous.  Cardiac POCUS reveals normal left ventricular systolic function with no regional wall motion abnormalities. Right ventricle is normal in size. No pericardial effusion.     Plan  - Monitor strict I&Os and daily weights.    - Place on telemetry  - Low sodium diet  - Place on fluid restriction of 1.5 L.   - Cardiology has not been consulted  - The patient's volume status is improving but not at their baseline as indicated by edema  - Lasix 40 mg IV BID  DM type 2 without retinopathy  Patient's FSGs are controlled on current medication regimen.  Last A1c reviewed-   Lab Results   Component Value Date    HGBA1C 6.9 (H) 04/24/2025     Most recent fingerstick glucose reviewed-   Recent Labs   Lab 04/26/25  1130 04/26/25  1536 04/26/25  2112 04/27/25  0607   POCTGLUCOSE 191* 238* 289* 167*     Current correctional scale  Low  Maintain anti-hyperglycemic dose as follows-   Antihyperglycemics (From admission, onward)      Start     Stop Route Frequency Ordered    04/24/25 1743  insulin aspart U-100 pen 0-5 Units         -- SubQ Before meals & nightly PRN 04/24/25 1645          Plan  -Monitor glucose.Glucose goal 140-180  -SSI  Essential hypertension  Patient's blood pressure range in the  last 24 hours was: BP  Min: 114/67  Max: 158/88.The patient's inpatient anti-hypertensive regimen is listed below:  Current Antihypertensives  nitroGLYCERIN SL tablet 0.4 mg, Every 5 min PRN, Sublingual  losartan tablet 50 mg, Daily, Oral    Plan  - BP is controlled, no changes needed to their regimen  - We are going to held Bps medications secondary to low Bps reading  Gout  Patient has known history of Gout. Patient hasn't had a flare in a couple of years.    Plan   Continue current medications.    Muscular weakness  The patient reports progressive muscle weakness, particularly affecting the lower extremities, over the past several months. He experiences significant difficulty with ambulation and prolonged standing due to this weakness. He was recently admitted to a rehabilitation facility to address his declining mobility. An extensive workup has been performed, with positive antinuclear antibodies (MARLEN) as the only notable finding thus far.     Plan  -PT/OT  -Continue Gabapentin   -Prednisone 25 mg daily for 5 days   -CCP,CPK,MARLEN,RF and Aldolase ordered  -Rheumatology consult ?Muscle biopsy ?    Cervical spondylolysis  Patient has a history of Chronic back pain, with numbness and tingling. Patient has diagnosis of Cervical Spondylolysis.    Plan  Continue home medications.    Troponin level elevated  Patient presented with shortness of breath and mildly elevated Troponin 0.029, .    Plan  -Cardiac monitoring   -Monitor electrolytes   -Trend Troponin      VTE Risk Mitigation (From admission, onward)           Ordered     Reason for No Pharmacological VTE Prophylaxis  Once        Question:  Reasons:  Answer:  Already adequately anticoagulated on oral Anticoagulants    04/24/25 1645     IP VTE HIGH RISK PATIENT  Once         04/24/25 1645     Place sequential compression device  Until discontinued         04/24/25 1645                    Discharge Planning   MARTHA: 4/26/2025     Code Status: Full Code    Medical Readiness for Discharge Date:   Discharge Plan A: Home with family   Discharge Delays: (!) Consult Recommendations Completed            Please place Justification for DME    _________________________________________  Agustín Moore MD, PGY-1  [facility] [place]  04/27/2025

## 2025-04-28 LAB
ABSOLUTE EOSINOPHIL (OHS): 0.03 K/UL
ABSOLUTE MONOCYTE (OHS): 0.78 K/UL (ref 0.3–1)
ABSOLUTE NEUTROPHIL COUNT (OHS): 4.95 K/UL (ref 1.8–7.7)
ALDOLASE (OHS): 5.1 U/L (ref 1.2–7.6)
ANA (OHS): POSITIVE
ANA PATTERN 1 (OHS): ABNORMAL
ANA PATTERN 2 (OHS): ABNORMAL
ANA TITER 1 (OHS): ABNORMAL
ANA TITER 2 (OHS): ABNORMAL
ANION GAP (OHS): 12 MMOL/L (ref 8–16)
BASOPHILS # BLD AUTO: 0.03 K/UL
BASOPHILS NFR BLD AUTO: 0.4 %
BUN SERPL-MCNC: 37 MG/DL (ref 8–23)
CALCIUM SERPL-MCNC: 9.5 MG/DL (ref 8.7–10.5)
CHLORIDE SERPL-SCNC: 107 MMOL/L (ref 95–110)
CO2 SERPL-SCNC: 22 MMOL/L (ref 23–29)
CREAT SERPL-MCNC: 1.2 MG/DL (ref 0.5–1.4)
ERYTHROCYTE [DISTWIDTH] IN BLOOD BY AUTOMATED COUNT: 13.2 % (ref 11.5–14.5)
GFR SERPLBLD CREATININE-BSD FMLA CKD-EPI: >60 ML/MIN/1.73/M2
GLUCOSE SERPL-MCNC: 166 MG/DL (ref 70–110)
HCT VFR BLD AUTO: 32.6 % (ref 40–54)
HGB BLD-MCNC: 10.6 GM/DL (ref 14–18)
IMM GRANULOCYTES # BLD AUTO: 0.02 K/UL (ref 0–0.04)
IMM GRANULOCYTES NFR BLD AUTO: 0.3 % (ref 0–0.5)
LYMPHOCYTES # BLD AUTO: 1.66 K/UL (ref 1–4.8)
MAGNESIUM SERPL-MCNC: 1.8 MG/DL (ref 1.6–2.6)
MCH RBC QN AUTO: 29.3 PG (ref 27–31)
MCHC RBC AUTO-ENTMCNC: 32.5 G/DL (ref 32–36)
MCV RBC AUTO: 90 FL (ref 82–98)
NUCLEATED RBC (/100WBC) (OHS): 0 /100 WBC
PHOSPHATE SERPL-MCNC: 4 MG/DL (ref 2.7–4.5)
PLATELET # BLD AUTO: 390 K/UL (ref 150–450)
PMV BLD AUTO: 10 FL (ref 9.2–12.9)
POCT GLUCOSE: 173 MG/DL (ref 70–110)
POCT GLUCOSE: 221 MG/DL (ref 70–110)
POCT GLUCOSE: 277 MG/DL (ref 70–110)
POCT GLUCOSE: 316 MG/DL (ref 70–110)
POTASSIUM SERPL-SCNC: 3.5 MMOL/L (ref 3.5–5.1)
RBC # BLD AUTO: 3.62 M/UL (ref 4.6–6.2)
RELATIVE EOSINOPHIL (OHS): 0.4 %
RELATIVE LYMPHOCYTE (OHS): 22.2 % (ref 18–48)
RELATIVE MONOCYTE (OHS): 10.4 % (ref 4–15)
RELATIVE NEUTROPHIL (OHS): 66.3 % (ref 38–73)
SARS-COV-2 RDRP RESP QL NAA+PROBE: NEGATIVE
SODIUM SERPL-SCNC: 141 MMOL/L (ref 136–145)
WBC # BLD AUTO: 7.47 K/UL (ref 3.9–12.7)

## 2025-04-28 PROCEDURE — U0002 COVID-19 LAB TEST NON-CDC: HCPCS | Mod: HCNC | Performed by: HOSPITALIST

## 2025-04-28 PROCEDURE — 99900035 HC TECH TIME PER 15 MIN (STAT): Mod: HCNC

## 2025-04-28 PROCEDURE — 85025 COMPLETE CBC W/AUTO DIFF WBC: CPT | Mod: HCNC

## 2025-04-28 PROCEDURE — 94761 N-INVAS EAR/PLS OXIMETRY MLT: CPT | Mod: HCNC

## 2025-04-28 PROCEDURE — 97116 GAIT TRAINING THERAPY: CPT | Mod: HCNC,CQ

## 2025-04-28 PROCEDURE — 84100 ASSAY OF PHOSPHORUS: CPT | Mod: HCNC

## 2025-04-28 PROCEDURE — 25000003 PHARM REV CODE 250: Mod: HCNC

## 2025-04-28 PROCEDURE — 80048 BASIC METABOLIC PNL TOTAL CA: CPT | Mod: HCNC

## 2025-04-28 PROCEDURE — 11000001 HC ACUTE MED/SURG PRIVATE ROOM: Mod: HCNC

## 2025-04-28 PROCEDURE — 36415 COLL VENOUS BLD VENIPUNCTURE: CPT | Mod: HCNC

## 2025-04-28 PROCEDURE — 83735 ASSAY OF MAGNESIUM: CPT | Mod: HCNC

## 2025-04-28 PROCEDURE — 99233 SBSQ HOSP IP/OBS HIGH 50: CPT | Mod: HCNC,,, | Performed by: NURSE PRACTITIONER

## 2025-04-28 PROCEDURE — 97110 THERAPEUTIC EXERCISES: CPT | Mod: HCNC,CQ

## 2025-04-28 PROCEDURE — 97530 THERAPEUTIC ACTIVITIES: CPT | Mod: HCNC,CQ

## 2025-04-28 PROCEDURE — 63600175 PHARM REV CODE 636 W HCPCS: Mod: HCNC

## 2025-04-28 RX ADMIN — GABAPENTIN 600 MG: 300 CAPSULE ORAL at 08:04

## 2025-04-28 RX ADMIN — ZOLPIDEM TARTRATE 5 MG: 5 TABLET, FILM COATED ORAL at 08:04

## 2025-04-28 RX ADMIN — GABAPENTIN 600 MG: 300 CAPSULE ORAL at 09:04

## 2025-04-28 RX ADMIN — ALLOPURINOL 50 MG: 300 TABLET ORAL at 09:04

## 2025-04-28 RX ADMIN — INSULIN ASPART 2 UNITS: 100 INJECTION, SOLUTION INTRAVENOUS; SUBCUTANEOUS at 12:04

## 2025-04-28 RX ADMIN — COLCHICINE 0.6 MG: 0.6 TABLET ORAL at 09:04

## 2025-04-28 RX ADMIN — EMPAGLIFLOZIN 10 MG: 10 TABLET, FILM COATED ORAL at 09:04

## 2025-04-28 RX ADMIN — CLOPIDOGREL BISULFATE 75 MG: 75 TABLET, FILM COATED ORAL at 09:04

## 2025-04-28 RX ADMIN — PREDNISONE 25 MG: 20 TABLET ORAL at 09:04

## 2025-04-28 RX ADMIN — LIDOCAINE 1 PATCH: 50 PATCH CUTANEOUS at 12:04

## 2025-04-28 RX ADMIN — INSULIN ASPART 4 UNITS: 100 INJECTION, SOLUTION INTRAVENOUS; SUBCUTANEOUS at 05:04

## 2025-04-28 RX ADMIN — ASPIRIN 81 MG: 81 TABLET, COATED ORAL at 09:04

## 2025-04-28 RX ADMIN — LOSARTAN POTASSIUM 50 MG: 50 TABLET, FILM COATED ORAL at 09:04

## 2025-04-28 RX ADMIN — FUROSEMIDE 40 MG: 40 TABLET ORAL at 09:04

## 2025-04-28 RX ADMIN — DICLOFENAC SODIUM 2 G: 10 GEL TOPICAL at 09:04

## 2025-04-28 RX ADMIN — INSULIN ASPART 1 UNITS: 100 INJECTION, SOLUTION INTRAVENOUS; SUBCUTANEOUS at 08:04

## 2025-04-28 RX ADMIN — COLCHICINE 0.6 MG: 0.6 TABLET ORAL at 08:04

## 2025-04-28 NOTE — ASSESSMENT & PLAN NOTE
The patient reports progressive muscle weakness, particularly affecting the lower extremities, over the past several months. He experiences significant difficulty with ambulation and prolonged standing due to this weakness. He was recently admitted to a rehabilitation facility to address his declining mobility. An extensive workup has been performed, with positive antinuclear antibodies (MARLEN) as the only notable finding thus far.     Plan  -PT/OT  -Continue Gabapentin   -Prednisone 25 mg daily for 5 days   - Follow up Rheum labs

## 2025-04-28 NOTE — ASSESSMENT & PLAN NOTE
Patient's blood pressure range in the last 24 hours was: BP  Min: 145/87  Max: 165/90.The patient's inpatient anti-hypertensive regimen is listed below:  Current Antihypertensives  nitroGLYCERIN SL tablet 0.4 mg, Every 5 min PRN, Sublingual  losartan tablet 50 mg, Daily, Oral  furosemide tablet 40 mg, Daily, Oral    Plan  - BP is controlled, no changes needed to their regimen

## 2025-04-28 NOTE — ASSESSMENT & PLAN NOTE
Patient's FSGs are controlled on current medication regimen.  Last A1c reviewed-   Lab Results   Component Value Date    HGBA1C 6.9 (H) 04/24/2025     Most recent fingerstick glucose reviewed-   Recent Labs   Lab 04/27/25  1157 04/27/25  1640 04/27/25  2045 04/28/25  0602   POCTGLUCOSE 223* 301* 231* 173*     Current correctional scale  Low  Maintain anti-hyperglycemic dose as follows-   Antihyperglycemics (From admission, onward)      Start     Stop Route Frequency Ordered    04/27/25 1400  empagliflozin (Jardiance) tablet 10 mg        Question Answer Comment   Does this patient have a diagnosis of heart failure? Yes    Does this patient have type 1 diabetes or diabetic ketoacidosis? No    Does this patient have symptomatic hypotension? No    Is the patient NPO or pending major surgery in next 3 days or less? No        -- Oral Daily 04/27/25 1250    04/24/25 1743  insulin aspart U-100 pen 0-5 Units         -- SubQ Before meals & nightly PRN 04/24/25 1645          Plan  -Monitor glucose.Glucose goal 140-180  -SSI

## 2025-04-28 NOTE — SUBJECTIVE & OBJECTIVE
Interval History: Patient states he's doing well this AM, feels better after steroids    Review of Systems   Constitutional:  Negative for chills and fever.   Respiratory:  Negative for shortness of breath.    Cardiovascular:  Negative for chest pain.   Gastrointestinal:  Negative for nausea and vomiting.     Objective:     Vital Signs (Most Recent):  Temp: 97.8 °F (36.6 °C) (04/28/25 0744)  Pulse: 71 (04/28/25 0744)  Resp: 18 (04/28/25 0744)  BP: (!) 165/90 (04/28/25 0744)  SpO2: 96 % (04/28/25 0834) Vital Signs (24h Range):  Temp:  [97.6 °F (36.4 °C)-98 °F (36.7 °C)] 97.8 °F (36.6 °C)  Pulse:  [69-81] 71  Resp:  [18-20] 18  SpO2:  [94 %-96 %] 96 %  BP: (145-165)/(82-90) 165/90     Weight: 97 kg (213 lb 13.5 oz)  Body mass index is 29.83 kg/m².    Intake/Output Summary (Last 24 hours) at 4/28/2025 0854  Last data filed at 4/28/2025 0632  Gross per 24 hour   Intake 220 ml   Output 1400 ml   Net -1180 ml         Physical Exam  Constitutional:       General: He is not in acute distress.     Appearance: He is not toxic-appearing.   HENT:      Head: Normocephalic and atraumatic.      Nose: Nose normal.   Cardiovascular:      Pulses: Normal pulses.      Heart sounds: Normal heart sounds.   Pulmonary:      Effort: Pulmonary effort is normal.      Breath sounds: Normal breath sounds.   Skin:     General: Skin is warm and dry.   Neurological:      Mental Status: He is alert.   Psychiatric:         Mood and Affect: Mood normal.               Significant Labs: All pertinent labs within the past 24 hours have been reviewed.  CBC:   Recent Labs   Lab 04/27/25  0919 04/28/25 0523   WBC 7.10 7.47   HGB 11.0* 10.6*   HCT 34.0* 32.6*    390     CMP:   Recent Labs   Lab 04/27/25  0919 04/28/25  0523    141   K 3.5 3.5    107   CO2 23 22*   BUN 28* 37*   CREATININE 1.2 1.2   CALCIUM 9.9 9.5   ANIONGAP 13 12       Significant Imaging: I have reviewed all pertinent imaging results/findings within the past 24  hours.

## 2025-04-28 NOTE — PT/OT/SLP PROGRESS
Occupational Therapy  Visit Attempts    Patient Name:  Clifton Wilson   MRN:  [id number]    Patient not seen today secondary to: 10:25 -  Other (patient getting bath by spouse), 14:23 - Patient fatigued and declining OT at this time.     4/28/2025

## 2025-04-28 NOTE — SUBJECTIVE & OBJECTIVE
Past Medical History:   Diagnosis Date    Anticoagulant long-term use     Cardiomyopathy     CHF (congestive heart failure)     Coronary artery disease     Diabetes mellitus     Gout     Heart attack     Hypertension     Pneumonia        Past Surgical History:   Procedure Laterality Date    APPENDECTOMY      CARDIAC CATHETERIZATION      7 stents    CARDIAC DEFIBRILLATOR PLACEMENT      CATARACT EXTRACTION Bilateral 2011    COLONOSCOPY N/A 08/10/2018    Procedure: COLONOSCOPY golytely;  Surgeon: Valentine Burger MD;  Location: [facility] ENDO;  Service: Endoscopy;  Laterality: N/A;    CORONARY ANGIOGRAPHY N/A 11/11/2024    Procedure: ANGIOGRAM, CORONARY ARTERY;  Surgeon: Luis Felipe Wright MD;  Location: [facility] CATH LAB/EP;  Service: Cardiology;  Laterality: N/A;    EYE SURGERY      INSTANTANEOUS WAVE-FREE RATIO (IFR) N/A 11/11/2024    Procedure: Instantaneous Wave-Free Ratio (IFR);  Surgeon: Luis Felipe Wright MD;  Location: [facility] CATH LAB/EP;  Service: Cardiology;  Laterality: N/A;    LEFT HEART CATHETERIZATION Left 11/11/2024    Procedure: Left heart cath;  Surgeon: Luis Felipe Wright MD;  Location: [facility] CATH LAB/EP;  Service: Cardiology;  Laterality: Left;    REVISION OF IMPLANTABLE CARDIOVERTER-DEFIBRILLATOR (ICD) ELECTRODE LEAD PLACEMENT N/A 11/11/2019    Procedure: REVISION, INSERTION, ELECTRODE LEAD, ICD;  Surgeon: Shukri Walsh MD;  Location: [facility] EP LAB;  Service: Cardiology;  Laterality: N/A;  Lead malfxn, RV lead ICD, SJM, anes, DM, 3 PREP    TONSILLECTOMY  1960s    VASECTOMY  2000       Review of patient's allergies indicates:  No Known Allergies    No current facility-administered medications on file prior to encounter.     Current Outpatient Medications on File Prior to Encounter   Medication Sig    aspirin (ECOTRIN) 81 MG EC tablet Take 81 mg by mouth once daily.    clopidogreL (PLAVIX) 75 mg tablet Take 1 tablet (75 mg total) by mouth once daily.    colchicine (COLCRYS) 0.6 mg tablet Take 1 tablet  "(0.6 mg total) by mouth 2 (two) times daily.    cyanocobalamin (VITAMIN B-12) 1000 MCG tablet Take 1,000 mcg by mouth once daily.    empagliflozin (JARDIANCE) 10 mg tablet Take 1 tablet (10 mg total) by mouth once daily.    gabapentin (NEURONTIN) 300 MG capsule Take 2 capsules (600 mg total) by mouth 2 (two) times daily.    hydrALAZINE (APRESOLINE) 50 MG tablet Take 1 tablet (50 mg total) by mouth every 8 (eight) hours.    insulin aspart U-100 (NOVOLOG FLEXPEN U-100 INSULIN) 100 unit/mL (3 mL) InPn pen Inject 15 Units into the skin 3 (three) times daily with meals.    insulin glargine U-100, Lantus, (LANTUS SOLOSTAR U-100 INSULIN) 100 unit/mL (3 mL) InPn pen Inject 35 Units into the skin every evening.    losartan (COZAAR) 50 MG tablet Take 1 tablet (50 mg total) by mouth once daily.    metFORMIN (GLUCOPHAGE) 1000 MG tablet Take 1 tablet (1,000 mg total) by mouth 2 (two) times daily with meals.    multivit-minerals/FA/lycopene (ONE-A-DAY MEN'S 50 PLUS ORAL) Take 1 tablet by mouth once daily.    zolpidem (AMBIEN) 5 MG Tab TAKE 1 TABLET BY MOUTH EVERY NIGHT AS NEEDED (Patient taking differently: Take 5 mg by mouth nightly as needed.)    alcohol swabs (BD ALCOHOL SWABS) PadM USE FOUR TIMES DAILY    blood glucose control high,low (ACCU-CHEK CATHRYN CONTROL SOLN) Soln Use as directed to check blood sugar    blood sugar diagnostic Strp test blood sugar as directed four times daily    blood-glucose meter (TRUE METRIX GLUCOSE METER) Misc use as directed    febuxostat (ULORIC) 40 mg Tab Take 1 tablet (40 mg total) by mouth once daily.    furosemide (LASIX) 20 MG tablet Take 1 tablet (20 mg total) by mouth 2 (two) times daily.    lancets 30 gauge Misc usea s directed to check blood glucose four times daily    nitroGLYCERIN (NITROSTAT) 0.4 MG SL tablet Place 1 tablet (0.4 mg total) under the tongue every 5 (five) minutes as needed for Chest pain.    pen needle, diabetic (BD ULTRA-FINE SINA PEN NEEDLE) 32 gauge x 5/32" Ndle Use " four times daily for insulin administration     Family History       Problem Relation (Age of Onset)    Heart disease Mother, Father    Hypertension Mother    Stroke Father          Tobacco Use    Smoking status: Former    Smokeless tobacco: Never   Substance and Sexual Activity    Alcohol use: Yes     Comment: socially    Drug use: No    Sexual activity: Yes     Review of Systems   Constitutional: Negative. Negative for diaphoresis.   HENT: Negative.     Eyes: Negative.    Cardiovascular:  Negative for chest pain, near-syncope, orthopnea, palpitations, paroxysmal nocturnal dyspnea and syncope.   Respiratory:  Negative for shortness of breath.    Endocrine: Negative.    Hematologic/Lymphatic: Negative.    Musculoskeletal:  Positive for back pain and myalgias.   Gastrointestinal:  Negative for nausea and vomiting.   Genitourinary: Negative.    Neurological:  Positive for weakness.   Allergic/Immunologic: Negative.      Objective:     Vital Signs (Most Recent):  Temp: 97.8 °F (36.6 °C) (04/28/25 0744)  Pulse: 71 (04/28/25 0744)  Resp: 18 (04/28/25 0744)  BP: (!) 165/90 (04/28/25 0744)  SpO2: 96 % (04/28/25 0834) Vital Signs (24h Range):  Temp:  [97.6 °F (36.4 °C)-98 °F (36.7 °C)] 97.8 °F (36.6 °C)  Pulse:  [69-81] 71  Resp:  [18-20] 18  SpO2:  [94 %-96 %] 96 %  BP: (145-165)/(82-90) 165/90     Weight: 97 kg (213 lb 13.5 oz)  Body mass index is 29.83 kg/m².    SpO2: 96 %         Intake/Output Summary (Last 24 hours) at 4/28/2025 1029  Last data filed at 4/28/2025 0632  Gross per 24 hour   Intake 220 ml   Output 1400 ml   Net -1180 ml       Lines/Drains/Airways       Peripheral Intravenous Line  Duration                  Peripheral IV - Single Lumen 04/24/25 1435 20 G Anterior;Proximal;Right Forearm 3 days                     Physical Exam  Constitutional:       General: He is not in acute distress.     Appearance: He is not diaphoretic.   HENT:      Head: Atraumatic.   Eyes:      General:         Right eye: No  "discharge.         Left eye: No discharge.   Cardiovascular:      Rate and Rhythm: Normal rate and regular rhythm.   Pulmonary:      Effort: Pulmonary effort is normal.      Breath sounds: Normal breath sounds. No rales.   Abdominal:      General: Bowel sounds are normal.      Palpations: Abdomen is soft.   Musculoskeletal:      Right lower leg: No edema.      Left lower leg: No edema.   Neurological:      Mental Status: He is alert and oriented to person, place, and time.          Significant Labs: BMP:   Recent Labs   Lab 04/27/25  0919 04/28/25  0523    141   K 3.5 3.5    107   CO2 23 22*   BUN 28* 37*   CREATININE 1.2 1.2   CALCIUM 9.9 9.5   MG 1.9 1.8   , CMP   Recent Labs   Lab 04/27/25  0919 04/28/25  0523    141   K 3.5 3.5    107   CO2 23 22*   BUN 28* 37*   CREATININE 1.2 1.2   CALCIUM 9.9 9.5   ANIONGAP 13 12   , CBC   Recent Labs   Lab 04/27/25  0919 04/28/25 0523   WBC 7.10 7.47   HGB 11.0* 10.6*   HCT 34.0* 32.6*    390   , INR No results for input(s): "INR", "PROTIME" in the last 48 hours., Lipid Panel No results for input(s): "CHOL", "HDL", "LDLCALC", "TRIG", "CHOLHDL" in the last 48 hours., Troponin No results for input(s): "TROPONINIHS" in the last 48 hours., and All pertinent lab results from the last 24 hours have been reviewed.    Significant Imaging: Echocardiogram: Transthoracic echo (TTE) complete (Cupid Only):   Results for orders placed or performed during the hospital encounter of 04/24/25   Echo   Result Value Ref Range    LA Vol (MOD) 69 mL    LV ESV A2C 72.49 mL    LV EDV A4C 144.00 mL    LA area A4C 23.11 cm2    LA area A2C 23.66 cm2    LV ESV A4C 64.49 mL    LV EDV A2C [id number] mL    RA Vol 35.33 mL    LV Diastolic Volume 185 mL    LV Systolic Volume 106 mL    MV Peak A [place] 0.82 m/s    MV stenosis pressure 1/2 time 75.18 ms    MV VTI 29.8 cm    MV Peak E [place] 0.53 m/s    MV peak gradient 4 mmHg    Mr max rome 5.07 m/s    Ao VTI 34.3 cm    Ao " peak balwinder 1.7 m/s    LVOT peak VTI 17.3 cm    LVOT peak balwinder 0.9 m/s    LVOT diameter 2.3 cm    E wave deceleration time 259 msec    MV mean gradient 1 mmHg    AV mean gradient 6 mmHg    RA area length vol 34.80 mL    RV- estes basal diam 3.8 cm    A4C EF 40 %    A2C EF 31 %    RA Area 14.7 cm2    RV S' 11.56 cm/s    TAPSE 1.9 cm    Ascending aorta 3.5 cm    STJ 2.7 cm    Sinus 4.03 cm    LVIDs 4.8 (A) 2.1 - 4.0 cm    PW 1.0 0.6 - 1.1 cm    IVS 1.0 0.6 - 1.1 cm    LVIDd 6.1 (A) 3.5 - 6.0 cm    PV PEAK VELOCITY 0.99 m/s    TDI LATERAL 0.07 m/s    Left Ventricular Outflow Tract Mean Gradient 1.74 mmHg    Left Ventricular Outflow Tract Mean Velocity 0.62 cm/s    Left Ventricular End Systolic Volume by Teichholz Method 105.57 mL    Left Ventricular End Diastolic Volume by Teichholz Method 184.90 mL    Flores's Biplane MOD Ejection Fraction 36 %    IVC diameter 1.93 cm    TDI SEPTAL 0.05 m/s    LV LATERAL E/E' RATIO 7.6 m/s    LV SEPTAL E/E' RATIO 10.6 m/s    RV/LV Ratio 0.62 cm    FS 21.3 (A) 28 - 44 %    LV mass 253.9 g    ZLVIDD -1.63     ZLVIDS 0.74     Left Ventricle Relative Wall Thickness 0.33 cm    AV valve area 2.1 cm²    AV Velocity Ratio 0.53     AV index (prosthetic) 0.50     MV valve area p 1/2 method 2.93 cm2    MV valve area by continuity eq 2.41 cm2    PV peak gradient 4 mmHg    E/A ratio 0.65     Mean e' 0.06 m/s    LVOT area 4.2 cm2    LVOT stroke volume 71.8 cm3    AV peak gradient 12 mmHg    E/E' ratio 9 m/s    LV Systolic Volume Index 48.8 mL/m2    LV Diastolic Volume Index 85.25 mL/m2    LV Mass Index 117.0 g/m2    SANDRITA (MOD) 32 mL/m2    BEV by Velocity Ratio 2.2 cm²    BSA 2.21 m2    Est. RA pres 3 mmHg    Narrative      Left Ventricle: The left ventricle is mildly dilated. Normal wall   thickness. There is eccentric hypertrophy. Global hypokinesis present.   Septal motion is consistent with bundle branch block. There is moderately   reduced systolic function with a visually estimated ejection  fraction of   35 - 40%. There is diastolic dysfunction but grade cannot be determined.   Normal left ventricular filling pressure.    Right Ventricle: The right ventricle is normal in size. Systolic   function is reduced.    Mitral Valve: There is mild regurgitation.    IVC/SVC: Normal venous pressure at 3 mmHg.

## 2025-04-28 NOTE — PT/OT/SLP PROGRESS
Physical Therapy Treatment    Patient Name:  Clifton Wilson   MRN:  [id number]    Recommendations:     Discharge Recommendations: Moderate Intensity Therapy  Discharge Equipment Recommendations: to be determined by next level of care  Barriers to discharge:  dexcreased mobility,strength and endurance    Assessment:     Clifton Wilson is a 74 y.o. male admitted with a medical diagnosis of Acute on chronic combined systolic and diastolic congestive heart failure.  He presents with the following impairments/functional limitations: weakness, impaired endurance, impaired functional mobility, gait instability, impaired balance, decreased lower extremity function, decreased upper extremity function, pain, decreased ROM, impaired coordination, impaired joint extensibility,pt with good participation and requires assistance with all mobility at this time and more than his spouse can provide,pt will benefit from moderate intensity therapy upon discharge.    Rehab Prognosis: Good; patient would benefit from acute skilled PT services to address these deficits and reach maximum level of function.    Recent Surgery: * No surgery found *      Plan:     During this hospitalization, patient to be seen 5 x/week to address the identified rehab impairments via gait training, therapeutic activities, therapeutic exercises, neuromuscular re-education and progress toward the following goals:    Plan of Care Expires:  05/25/25    Subjective     Chief Complaint: n/a  Patient/Family Comments/goals: pt's pain is less from steroid shot.  Pain/Comfort:  Pain Rating 1: 3/10  Location - Side 1: Left  Location - Orientation 1: generalized  Location 1: shoulder  Pain Addressed 1: Reposition, Distraction  Pain Rating Post-Intervention 1: 4/10      Objective:     Communicated with nsg prior to session.  Patient found supine with PureWick, peripheral IV, telemetry upon PT entry to room.     General Precautions: Standard, fall  Orthopedic Precautions:  N/A  Braces: N/A  Respiratory Status: Room air     Functional Mobility:  Bed Mobility:     Supine to Sit: moderate assistance  Sit to Supine: moderate assistance  Transfers:     Sit to Stand:  moderate assistance and X 3 trials with rolling walker  Gait: amb ~8' X 2 and 3-4 sidesteps with RW and Mod A X 2 seated rests  Balance: fair standing balance with RW      AM-PAC 6 CLICK MOBILITY  Turning over in bed (including adjusting bedclothes, sheets and blankets)?: 2  Sitting down on and standing up from a chair with arms (e.g., wheelchair, bedside commode, etc.): 2  Moving from lying on back to sitting on the side of the bed?: 2  Moving to and from a bed to a chair (including a wheelchair)?: 2  Need to walk in hospital room?: 2  Climbing 3-5 steps with a railing?: 1  Basic Mobility Total Score: 11       Treatment & Education: le seated ex's X 10-12 reps inc: ap,laq and hip flex,replaced wet pads from [employer],requests met.      Patient left sitting edge of bed with all lines intact, call button in reach, pct notified, and spouse present..    GOALS: see general POC  Multidisciplinary Problems       Physical Therapy Goals          Problem: Physical Therapy    Goal Priority Disciplines Outcome Interventions   Physical Therapy Goal     PT, PT/OT Progressing    Description: Goals to be met by: 25     Patient will increase functional independence with mobility by performin. Supine to sit with Stand-by Assistance  2. Sit to supine with Stand-by Assistance  3. Sit to stand transfer with Minimal Assistance  4. Bed to chair transfer with Minimal Assistance using Rolling Walker  5. Gait  x 25 feet with Minimal Assistance using Rolling Walker.                          DME Justifications:  No DME recommended requiring DME justifications    Time Tracking:     PT Received On: 25  PT Start Time: 902     PT Stop Time: 926  PT Total Time (min): 24 min     Billable Minutes: Gait Training 15, Therapeutic Activity 12,  and Therapeutic Exercise 11    Treatment Type: Treatment        Number of PTA visits since last PT visit: 1     04/28/2025

## 2025-04-28 NOTE — PROGRESS NOTES
[facility] Telemetry  Cardiology  Progress Note    Patient Name: Clifton Wilson  MRN: [id number]  Admission Date: 4/24/2025  Hospital Length of Stay: 3 days  Code Status: Full Code   Attending Physician: Christophe Ortiz MD   Primary Care Physician: Britton Grissom MD  Expected Discharge Date: 4/28/2025  Principal Problem:Acute on chronic combined systolic and diastolic congestive heart failure    Subjective:     Hospital Course:   04/25/2025 Per HPI   04/28/2025 Euvolemic on exam. On p.o. Lasix. Hemodynamically stable.     Past Medical History:   Diagnosis Date    Anticoagulant long-term use     Cardiomyopathy     CHF (congestive heart failure)     Coronary artery disease     Diabetes mellitus     Gout     Heart attack     Hypertension     Pneumonia        Past Surgical History:   Procedure Laterality Date    APPENDECTOMY      CARDIAC CATHETERIZATION      7 stents    CARDIAC DEFIBRILLATOR PLACEMENT      CATARACT EXTRACTION Bilateral 2011    COLONOSCOPY N/A 08/10/2018    Procedure: COLONOSCOPY golytely;  Surgeon: Valentnie Burger MD;  Location: [facility] ENDO;  Service: Endoscopy;  Laterality: N/A;    CORONARY ANGIOGRAPHY N/A 11/11/2024    Procedure: ANGIOGRAM, CORONARY ARTERY;  Surgeon: Luis Felipe Wright MD;  Location: [facility] CATH LAB/EP;  Service: Cardiology;  Laterality: N/A;    EYE SURGERY      INSTANTANEOUS WAVE-FREE RATIO (IFR) N/A 11/11/2024    Procedure: Instantaneous Wave-Free Ratio (IFR);  Surgeon: Luis Felipe Wright MD;  Location: [facility] CATH LAB/EP;  Service: Cardiology;  Laterality: N/A;    LEFT HEART CATHETERIZATION Left 11/11/2024    Procedure: Left heart cath;  Surgeon: Luis Felipe Wright MD;  Location: [facility] CATH LAB/EP;  Service: Cardiology;  Laterality: Left;    REVISION OF IMPLANTABLE CARDIOVERTER-DEFIBRILLATOR (ICD) ELECTRODE LEAD PLACEMENT N/A 11/11/2019    Procedure: REVISION, INSERTION, ELECTRODE LEAD, ICD;  Surgeon: Shukri Walsh MD;  Location: [facility] EP LAB;  Service: Cardiology;   Laterality: N/A;  Lead malfxn, RV lead ICD, SJM, anes, DM, 3 PREP    TONSILLECTOMY  1960s    VASECTOMY  2000       Review of patient's allergies indicates:  No Known Allergies    No current facility-administered medications on file prior to encounter.     Current Outpatient Medications on File Prior to Encounter   Medication Sig    aspirin (ECOTRIN) 81 MG EC tablet Take 81 mg by mouth once daily.    clopidogreL (PLAVIX) 75 mg tablet Take 1 tablet (75 mg total) by mouth once daily.    colchicine (COLCRYS) 0.6 mg tablet Take 1 tablet (0.6 mg total) by mouth 2 (two) times daily.    cyanocobalamin (VITAMIN B-12) 1000 MCG tablet Take 1,000 mcg by mouth once daily.    empagliflozin (JARDIANCE) 10 mg tablet Take 1 tablet (10 mg total) by mouth once daily.    gabapentin (NEURONTIN) 300 MG capsule Take 2 capsules (600 mg total) by mouth 2 (two) times daily.    hydrALAZINE (APRESOLINE) 50 MG tablet Take 1 tablet (50 mg total) by mouth every 8 (eight) hours.    insulin aspart U-100 (NOVOLOG FLEXPEN U-100 INSULIN) 100 unit/mL (3 mL) InPn pen Inject 15 Units into the skin 3 (three) times daily with meals.    insulin glargine U-100, Lantus, (LANTUS SOLOSTAR U-100 INSULIN) 100 unit/mL (3 mL) InPn pen Inject 35 Units into the skin every evening.    losartan (COZAAR) 50 MG tablet Take 1 tablet (50 mg total) by mouth once daily.    metFORMIN (GLUCOPHAGE) 1000 MG tablet Take 1 tablet (1,000 mg total) by mouth 2 (two) times daily with meals.    multivit-minerals/FA/lycopene (ONE-A-DAY MEN'S 50 PLUS ORAL) Take 1 tablet by mouth once daily.    zolpidem (AMBIEN) 5 MG Tab TAKE 1 TABLET BY MOUTH EVERY NIGHT AS NEEDED (Patient taking differently: Take 5 mg by mouth nightly as needed.)    alcohol swabs (BD ALCOHOL SWABS) PadM USE FOUR TIMES DAILY    blood glucose control high,low (ACCU-CHEK CATHRYN CONTROL SOLN) Soln Use as directed to check blood sugar    blood sugar diagnostic Strp test blood sugar as directed four times daily     "blood-glucose meter (TRUE METRIX GLUCOSE METER) Misc use as directed    febuxostat (ULORIC) 40 mg Tab Take 1 tablet (40 mg total) by mouth once daily.    furosemide (LASIX) 20 MG tablet Take 1 tablet (20 mg total) by mouth 2 (two) times daily.    lancets 30 gauge Misc usea s directed to check blood glucose four times daily    nitroGLYCERIN (NITROSTAT) 0.4 MG SL tablet Place 1 tablet (0.4 mg total) under the tongue every 5 (five) minutes as needed for Chest pain.    pen needle, diabetic (BD ULTRA-FINE SINA PEN NEEDLE) 32 gauge x 5/32" Ndle Use four times daily for insulin administration     Family History       Problem Relation (Age of Onset)    Heart disease Mother, Father    Hypertension Mother    Stroke Father          Tobacco Use    Smoking status: Former    Smokeless tobacco: Never   Substance and Sexual Activity    Alcohol use: Yes     Comment: socially    Drug use: No    Sexual activity: Yes     Review of Systems   Constitutional: Negative. Negative for diaphoresis.   HENT: Negative.     Eyes: Negative.    Cardiovascular:  Negative for chest pain, near-syncope, orthopnea, palpitations, paroxysmal nocturnal dyspnea and syncope.   Respiratory:  Negative for shortness of breath.    Endocrine: Negative.    Hematologic/Lymphatic: Negative.    Musculoskeletal:  Positive for back pain and myalgias.   Gastrointestinal:  Negative for nausea and vomiting.   Genitourinary: Negative.    Neurological:  Positive for weakness.   Allergic/Immunologic: Negative.      Objective:     Vital Signs (Most Recent):  Temp: 97.8 °F (36.6 °C) (04/28/25 0744)  Pulse: 71 (04/28/25 0744)  Resp: 18 (04/28/25 0744)  BP: (!) 165/90 (04/28/25 0744)  SpO2: 96 % (04/28/25 0834) Vital Signs (24h Range):  Temp:  [97.6 °F (36.4 °C)-98 °F (36.7 °C)] 97.8 °F (36.6 °C)  Pulse:  [69-81] 71  Resp:  [18-20] 18  SpO2:  [94 %-96 %] 96 %  BP: (145-165)/(82-90) 165/90     Weight: 97 kg (213 lb 13.5 oz)  Body mass index is 29.83 kg/m².    SpO2: 96 %   " "      Intake/Output Summary (Last 24 hours) at 4/28/2025 1029  Last data filed at 4/28/2025 0632  Gross per 24 hour   Intake 220 ml   Output 1400 ml   Net -1180 ml       Lines/Drains/Airways       Peripheral Intravenous Line  Duration                  Peripheral IV - Single Lumen 04/24/25 1435 20 G Anterior;Proximal;Right Forearm 3 days                     Physical Exam  Constitutional:       General: He is not in acute distress.     Appearance: He is not diaphoretic.   HENT:      Head: Atraumatic.   Eyes:      General:         Right eye: No discharge.         Left eye: No discharge.   Cardiovascular:      Rate and Rhythm: Normal rate and regular rhythm.   Pulmonary:      Effort: Pulmonary effort is normal.      Breath sounds: Normal breath sounds. No rales.   Abdominal:      General: Bowel sounds are normal.      Palpations: Abdomen is soft.   Musculoskeletal:      Right lower leg: No edema.      Left lower leg: No edema.   Neurological:      Mental Status: He is alert and oriented to person, place, and time.          Significant Labs: BMP:   Recent Labs   Lab 04/27/25  0919 04/28/25  0523    141   K 3.5 3.5    107   CO2 23 22*   BUN 28* 37*   CREATININE 1.2 1.2   CALCIUM 9.9 9.5   MG 1.9 1.8   , CMP   Recent Labs   Lab 04/27/25  0919 04/28/25  0523    141   K 3.5 3.5    107   CO2 23 22*   BUN 28* 37*   CREATININE 1.2 1.2   CALCIUM 9.9 9.5   ANIONGAP 13 12   , CBC   Recent Labs   Lab 04/27/25  0919 04/28/25  0523   WBC 7.10 7.47   HGB 11.0* 10.6*   HCT 34.0* 32.6*    390   , INR No results for input(s): "INR", "PROTIME" in the last 48 hours., Lipid Panel No results for input(s): "CHOL", "HDL", "LDLCALC", "TRIG", "CHOLHDL" in the last 48 hours., Troponin No results for input(s): "TROPONINIHS" in the last 48 hours., and All pertinent lab results from the last 24 hours have been reviewed.    Significant Imaging: Echocardiogram: Transthoracic echo (TTE) complete (Cupid Only):   Results " for orders placed or performed during the hospital encounter of 04/24/25   Echo   Result Value Ref Range    LA Vol (MOD) 69 mL    LV ESV A2C 72.49 mL    LV EDV A4C 144.00 mL    LA area A4C 23.11 cm2    LA area A2C 23.66 cm2    LV ESV A4C 64.49 mL    LV EDV A2C 124.900901213488673 mL    RA Vol 35.33 mL    LV Diastolic Volume 185 mL    LV Systolic Volume 106 mL    MV Peak A [place] 0.82 m/s    MV stenosis pressure 1/2 time 75.18 ms    MV VTI 29.8 cm    MV Peak E [place] 0.53 m/s    MV peak gradient 4 mmHg    Mr max rome 5.07 m/s    Ao VTI 34.3 cm    Ao peak rome 1.7 m/s    LVOT peak VTI 17.3 cm    LVOT peak rome 0.9 m/s    LVOT diameter 2.3 cm    E wave deceleration time 259 msec    MV mean gradient 1 mmHg    AV mean gradient 6 mmHg    RA area length vol 34.80 mL    RV- estes basal diam 3.8 cm    A4C EF 40 %    A2C EF 31 %    RA Area 14.7 cm2    RV S' 11.56 cm/s    TAPSE 1.9 cm    Ascending aorta 3.5 cm    STJ 2.7 cm    Sinus 4.03 cm    LVIDs 4.8 (A) 2.1 - 4.0 cm    PW 1.0 0.6 - 1.1 cm    IVS 1.0 0.6 - 1.1 cm    LVIDd 6.1 (A) 3.5 - 6.0 cm    PV PEAK VELOCITY 0.99 m/s    TDI LATERAL 0.07 m/s    Left Ventricular Outflow Tract Mean Gradient 1.74 mmHg    Left Ventricular Outflow Tract Mean Velocity 0.62 cm/s    Left Ventricular End Systolic Volume by Teichholz Method 105.57 mL    Left Ventricular End Diastolic Volume by Teichholz Method 184.90 mL    Flores's Biplane MOD Ejection Fraction 36 %    IVC diameter 1.93 cm    TDI SEPTAL 0.05 m/s    LV LATERAL E/E' RATIO 7.6 m/s    LV SEPTAL E/E' RATIO 10.6 m/s    RV/LV Ratio 0.62 cm    FS 21.3 (A) 28 - 44 %    LV mass 253.9 g    ZLVIDD -1.63     ZLVIDS 0.74     Left Ventricle Relative Wall Thickness 0.33 cm    AV valve area 2.1 cm²    AV Velocity Ratio 0.53     AV index (prosthetic) 0.50     MV valve area p 1/2 method 2.93 cm2    MV valve area by continuity eq 2.41 cm2    PV peak gradient 4 mmHg    E/A ratio 0.65     Mean e' 0.06 m/s    LVOT area 4.2 cm2    LVOT stroke volume 71.8 cm3     "AV peak gradient 12 mmHg    E/E' ratio 9 m/s    LV Systolic Volume Index 48.8 mL/m2    LV Diastolic Volume Index 85.25 mL/m2    LV Mass Index 117.0 g/m2    SANDRITA (MOD) 32 mL/m2    BEV by Velocity Ratio 2.2 cm²    BSA 2.21 m2    Est. RA pres 3 mmHg    Narrative      Left Ventricle: The left ventricle is mildly dilated. Normal wall   thickness. There is eccentric hypertrophy. Global hypokinesis present.   Septal motion is consistent with bundle branch block. There is moderately   reduced systolic function with a visually estimated ejection fraction of   35 - 40%. There is diastolic dysfunction but grade cannot be determined.   Normal left ventricular filling pressure.    Right Ventricle: The right ventricle is normal in size. Systolic   function is reduced.    Mitral Valve: There is mild regurgitation.    IVC/SVC: Normal venous pressure at 3 mmHg.       Assessment and Plan:     Brief HPI: Patient seen this morning on rounds without cardiac complaint. Hemodynamically stable.     * Acute on chronic combined systolic and diastolic congestive heart failure  Patient has Systolic (HFrEF) heart failure that is Acute on chronic. On presentation their CHF was well compensated. Most recent BNP and echo results are listed below.  No results for input(s): "BNP" in the last 72 hours.    Latest ECHO  Results for orders placed during the hospital encounter of 03/18/25    Echo Saline Bubble? Yes    Interpretation Summary    Left Ventricle: The left ventricle is mildly dilated. There is eccentric hypertrophy. Regional wall motion abnormalities present. See diagram for wall motion findings. There is mildly reduced systolic function with a visually estimated ejection fraction of 40 - 45%. Quantitated ejection fraction is 43%. There is diastolic dysfunction. Normal left ventricular filling pressure.    Right Ventricle: The right ventricle has mild enlargement. Systolic function is mildly reduced. Pacemaker lead present in the ventricle.   "  Pulmonary Artery: The estimated pulmonary artery systolic pressure is 17 mmHg.    IVC/SVC: Normal venous pressure at 3 mmHg.    Current Heart Failure Medications  losartan tablet 50 mg, Daily, Oral  furosemide tablet 40 mg, Daily, Oral  empagliflozin (Jardiance) tablet 10 mg, Daily, Oral    Plan  - Monitor strict I&Os and daily weights.    - Place on telemetry  - Low sodium diet  - Place on fluid restriction of 1.5 L.   -Patient appropriate for DC from CV perspective   -Continue Rheumatologic work up for CP, steroids      Troponin level elevated  Mild troponin elevation in setting of ADHF  No acute ischemic changes per EKG      Essential hypertension  Patient's blood pressure range in the last 24 hours was: BP  Min: 145/87  Max: 165/90.The patient's inpatient anti-hypertensive regimen is listed below:  Current Antihypertensives  nitroGLYCERIN SL tablet 0.4 mg, Every 5 min PRN, Sublingual  losartan tablet 50 mg, Daily, Oral  furosemide tablet 40 mg, Daily, Oral    Plan  - BP is controlled, no changes needed to their regimen          VTE Risk Mitigation (From admission, onward)           Ordered     Reason for No Pharmacological VTE Prophylaxis  Once        Question:  Reasons:  Answer:  Already adequately anticoagulated on oral Anticoagulants    04/24/25 1645     IP VTE HIGH RISK PATIENT  Once         04/24/25 1645     Place sequential compression device  Until discontinued         04/24/25 1645                    Andrew Morales NP  Cardiology  [facility] - Telemetry

## 2025-04-28 NOTE — PLAN OF CARE
CM met with pt and wife Marie  [phone]    Pt and wife both express desire for post acute skilled placement.    Ms. Marie hopes that pt can be accepted at [facility] - as she has been there in the past.    Therapy recc [facility].   CM explained that beds are limited at that facility but referral has been sent to [facility], [place], [facility] and [facility] for review.      LOCET called in  Passr entered per Assessment Pro.         04/28/25 1140   Post-Acute Status   Post-Acute Authorization Placement   Post-Acute Placement Status Referrals Sent   Discharge Plan   Discharge Plan A Skilled Nursing Facility   Discharge Plan B Skilled Nursing Facility

## 2025-04-28 NOTE — PLAN OF CARE
MOON Message     Medicare Outpatient and Observation Notification regarding financial responsibility Signed/date by patient/caregiver; Explained to patient/caregiver Other (comments); Explained to patient/caregiverMedicare Outpatient and Observation Notification regarding financial responsibility. Other (comments); Explained to patient/caregiver. Has comment. Taken on 4/28/25 1604   Date MATHIS was signed 4/25/2025 4/28/2025   Time MATHIS was signed [phone]

## 2025-04-28 NOTE — ASSESSMENT & PLAN NOTE
"Patient has Systolic (HFrEF) heart failure that is Acute on chronic. On presentation their CHF was well compensated. Most recent BNP and echo results are listed below.  No results for input(s): "BNP" in the last 72 hours.    Latest ECHO  Results for orders placed during the hospital encounter of 03/18/25    Echo Saline Bubble? Yes    Interpretation Summary    Left Ventricle: The left ventricle is mildly dilated. There is eccentric hypertrophy. Regional wall motion abnormalities present. See diagram for wall motion findings. There is mildly reduced systolic function with a visually estimated ejection fraction of 40 - 45%. Quantitated ejection fraction is 43%. There is diastolic dysfunction. Normal left ventricular filling pressure.    Right Ventricle: The right ventricle has mild enlargement. Systolic function is mildly reduced. Pacemaker lead present in the ventricle.    Pulmonary Artery: The estimated pulmonary artery systolic pressure is 17 mmHg.    IVC/SVC: Normal venous pressure at 3 mmHg.    Current Heart Failure Medications  losartan tablet 50 mg, Daily, Oral  furosemide tablet 40 mg, Daily, Oral  empagliflozin (Jardiance) tablet 10 mg, Daily, Oral    Plan  - Monitor strict I&Os and daily weights.    - Place on telemetry  - Low sodium diet  - Place on fluid restriction of 1.5 L.   -Patient appropriate for DC from CV perspective   -Continue Rheumatologic work up for CP, steroids    "

## 2025-04-28 NOTE — ASSESSMENT & PLAN NOTE
Patient presented with shortness of breath and mildly elevated Troponin 0.029, .    Plan  RESOLVED

## 2025-04-28 NOTE — PLAN OF CARE
Problem: Physical Therapy  Goal: Physical Therapy Goal  Description: Goals to be met by: 25     Patient will increase functional independence with mobility by performin. Supine to sit with Stand-by Assistance  2. Sit to supine with Stand-by Assistance  3. Sit to stand transfer with Minimal Assistance  4. Bed to chair transfer with Minimal Assistance using Rolling Walker  5. Gait  x 25 feet with Minimal Assistance using Rolling Walker.     Outcome: Progressing

## 2025-04-28 NOTE — ASSESSMENT & PLAN NOTE
Patient's blood pressure range in the last 24 hours was: BP  Min: 145/87  Max: 165/90.The patient's inpatient anti-hypertensive regimen is listed below:  Current Antihypertensives  nitroGLYCERIN SL tablet 0.4 mg, Every 5 min PRN, Sublingual  losartan tablet 50 mg, Daily, Oral  furosemide tablet 40 mg, Daily, Oral    Plan  - BP is controlled, no changes needed to their regimen  - Will continue BP medications as appropriate

## 2025-04-28 NOTE — CARE UPDATE
04/27/25 1954   PRE-TX-O2   Device (Oxygen Therapy) room air   SpO2 96 %   Pulse Oximetry Type Intermittent   $ Pulse Oximetry - Multiple Charge Pulse Oximetry - Multiple

## 2025-04-28 NOTE — PROGRESS NOTES
[facility] Medicine  Progress Note    Patient Name: Clifton Wilson  MRN: [id number]  Patient Class: IP- Inpatient   Admission Date: 4/24/2025  Length of Stay: 3 days  Attending Physician: Christophe Ortiz MD  Primary Care Provider: Britton Grissom MD        Subjective     Principal Problem:Acute on chronic combined systolic and diastolic congestive heart failure        HPI:  Patient is a 75 yo male w/ PMHx of CAD, HTN, HLD, HFrEF 40-45% s/p ICD x7 ,Type 2 DM and gout who presents to the Emergency Department with worsening shortness of breath and progressive bilateral lower extremity edema over the past 3 days. He was seen by his primary care physician two days ago, at which time it was noted that he had not been on any diuretic therapy for approximately two months. Due to signs of fluid overload, furosemide (Lasix) 20 mg daily was restarted. The patient has since taken two doses and reports increased urinary output but no significant improvement in his shortness of breath. He denies chest pain, orthopnea, or upper respiratory symptoms.    He also endorses a gradual but marked functional decline over the last several months, including worsening generalized weakness and reduced mobility, requiring frequent rest while ambulating at home. He was recently recommended for inpatient rehabilitation by his PCP, but has not yet received any follow-up communication regarding the referral.    Notably, the patient was admitted on 04/08 for hypotension and volume depletion. At that time, he was seen in an outpatient setting and noted to have systolic blood pressure in the low 100s, but was referred to the ED when it dropped into the 80s. He was evaluated and treated for hypotension during that admission. He states that following discharge, no diuretic therapy was prescribed.     In the ED, initial vital signs /58 mmhg, HR 84, Temp 98.2, SpO2 100% on room air. Labs include CBC with stable H/H 11.2/34.1 and  WBC within normal limits . CMP with Na 141, K 3.4,  and BUN/Cr 18/1.1 (baseline Cr 1.1), Troponin 0.029, , Uric Acid 6.1,. CXR The cardiac silhouette is normal in size, midline.  The pulmonary vascularity is normal.  Coronary artery calcifications seen. No focal area of airspace consolidation.  No pleural effusion.  No pneumothorax. EKG showed Right bundle branch block. Septal infarct ,age undetermined .  Initiated on Laxis 20 mg. [facility] Team admitted the patient with CHF exacerbation.      Overview/Hospital Course:  No notes on file    Interval History: Patient states he's doing well this AM, feels better after steroids    Review of Systems   Constitutional:  Negative for chills and fever.   Respiratory:  Negative for shortness of breath.    Cardiovascular:  Negative for chest pain.   Gastrointestinal:  Negative for nausea and vomiting.     Objective:     Vital Signs (Most Recent):  Temp: 97.8 °F (36.6 °C) (04/28/25 0744)  Pulse: 71 (04/28/25 0744)  Resp: 18 (04/28/25 0744)  BP: (!) 165/90 (04/28/25 0744)  SpO2: 96 % (04/28/25 0834) Vital Signs (24h Range):  Temp:  [97.6 °F (36.4 °C)-98 °F (36.7 °C)] 97.8 °F (36.6 °C)  Pulse:  [69-81] 71  Resp:  [18-20] 18  SpO2:  [94 %-96 %] 96 %  BP: (145-165)/(82-90) 165/90     Weight: 97 kg (213 lb 13.5 oz)  Body mass index is 29.83 kg/m².    Intake/Output Summary (Last 24 hours) at 4/28/2025 0854  Last data filed at 4/28/2025 0632  Gross per 24 hour   Intake 220 ml   Output 1400 ml   Net -1180 ml         Physical Exam  Constitutional:       General: He is not in acute distress.     Appearance: He is not toxic-appearing.   HENT:      Head: Normocephalic and atraumatic.      Nose: Nose normal.   Cardiovascular:      Pulses: Normal pulses.      Heart sounds: Normal heart sounds.   Pulmonary:      Effort: Pulmonary effort is normal.      Breath sounds: Normal breath sounds.   Skin:     General: Skin is warm and dry.   Neurological:      Mental Status: He  "is alert.   Psychiatric:         Mood and Affect: Mood normal.               Significant Labs: All pertinent labs within the past 24 hours have been reviewed.  CBC:   Recent Labs   Lab 04/27/25  0919 04/28/25  0523   WBC 7.10 7.47   HGB 11.0* 10.6*   HCT 34.0* 32.6*    390     CMP:   Recent Labs   Lab 04/27/25  0919 04/28/25  0523    141   K 3.5 3.5    107   CO2 23 22*   BUN 28* 37*   CREATININE 1.2 1.2   CALCIUM 9.9 9.5   ANIONGAP 13 12       Significant Imaging: I have reviewed all pertinent imaging results/findings within the past 24 hours.      Assessment & Plan  Acute on chronic combined systolic and diastolic congestive heart failure  Patient has Combined Systolic and Diastolic heart failure that is Acute on chronic. On presentation their CHF was decompensated. Evidence of decompensated CHF on presentation includes: edema and shortness of breath. The etiology of their decompensation is likely medication non-compliance. Most recent BNP and echo results are listed below.  No results for input(s): "BNP" in the last 72 hours.    Latest ECHO  Results for orders placed during the hospital encounter of 03/18/25    Echo Saline Bubble? Yes    Interpretation Summary    Left Ventricle: The left ventricle is mildly dilated. There is eccentric hypertrophy. Regional wall motion abnormalities present. See diagram for wall motion findings. There is mildly reduced systolic function with a visually estimated ejection fraction of 40 - 45%. Quantitated ejection fraction is 43%. There is diastolic dysfunction. Normal left ventricular filling pressure.    Right Ventricle: The right ventricle has mild enlargement. Systolic function is mildly reduced. Pacemaker lead present in the ventricle.    Pulmonary Artery: The estimated pulmonary artery systolic pressure is 17 mmHg.    IVC/SVC: Normal venous pressure at 3 mmHg.    Current Heart Failure Medications  losartan tablet 50 mg, Daily, Oral  furosemide tablet 40 mg, " Daily, Oral  empagliflozin (Jardiance) tablet 10 mg, Daily, Oral    POCUS done in ED :Bilateral lung sliding is present. A-lines visualized throughout anterior lung fields. No B-lines, consolidations, pleural effusion, or pneumothorax identified. Pleural line is smooth and continuous.  Cardiac POCUS reveals normal left ventricular systolic function with no regional wall motion abnormalities. Right ventricle is normal in size. No pericardial effusion.     Plan  - Monitor strict I&Os and daily weights.    - Place on telemetry  - Low sodium diet  - Place on fluid restriction of 1.5 L.   - Cardiology has not been consulted  - The patient's volume status is improving but not at their baseline as indicated by edema  - Lasix 40 mg PO BID  DM type 2 without retinopathy  Patient's FSGs are controlled on current medication regimen.  Last A1c reviewed-   Lab Results   Component Value Date    HGBA1C 6.9 (H) 04/24/2025     Most recent fingerstick glucose reviewed-   Recent Labs   Lab 04/27/25  1157 04/27/25  1640 04/27/25  2045 04/28/25  0602   POCTGLUCOSE 223* 301* 231* 173*     Current correctional scale  Low  Maintain anti-hyperglycemic dose as follows-   Antihyperglycemics (From admission, onward)      Start     Stop Route Frequency Ordered    04/27/25 1400  empagliflozin (Jardiance) tablet 10 mg        Question Answer Comment   Does this patient have a diagnosis of heart failure? Yes    Does this patient have type 1 diabetes or diabetic ketoacidosis? No    Does this patient have symptomatic hypotension? No    Is the patient NPO or pending major surgery in next 3 days or less? No        -- Oral Daily 04/27/25 1250    04/24/25 1743  insulin aspart U-100 pen 0-5 Units         -- SubQ Before meals & nightly PRN 04/24/25 1645          Plan  -Monitor glucose.Glucose goal 140-180  -SSI  Essential hypertension  Patient's blood pressure range in the last 24 hours was: BP  Min: 145/87  Max: 165/90.The patient's inpatient  anti-hypertensive regimen is listed below:  Current Antihypertensives  nitroGLYCERIN SL tablet 0.4 mg, Every 5 min PRN, Sublingual  losartan tablet 50 mg, Daily, Oral  furosemide tablet 40 mg, Daily, Oral    Plan  - BP is controlled, no changes needed to their regimen  - Will continue BP medications as appropriate  Gout  Patient has known history of Gout. Patient hasn't had a flare in a couple of years.    Plan   Continue current medications.    Muscular weakness  The patient reports progressive muscle weakness, particularly affecting the lower extremities, over the past several months. He experiences significant difficulty with ambulation and prolonged standing due to this weakness. He was recently admitted to a rehabilitation facility to address his declining mobility. An extensive workup has been performed, with positive antinuclear antibodies (MARLEN) as the only notable finding thus far.     Plan  -PT/OT  -Continue Gabapentin   -Prednisone 25 mg daily for 5 days   - Follow up Rheum labs    Cervical spondylolysis  Patient has a history of Chronic back pain, with numbness and tingling. Patient has diagnosis of Cervical Spondylolysis.    Plan  Continue home medications.    Troponin level elevated  Patient presented with shortness of breath and mildly elevated Troponin 0.029, .    Plan  RESOLVED      VTE Risk Mitigation (From admission, onward)           Ordered     Reason for No Pharmacological VTE Prophylaxis  Once        Question:  Reasons:  Answer:  Already adequately anticoagulated on oral Anticoagulants    04/24/25 1645     IP VTE HIGH RISK PATIENT  Once         04/24/25 1645     Place sequential compression device  Until discontinued         04/24/25 1645                    Discharge Planning   MARTHA: 4/28/2025     Code Status: Full Code   Medical Readiness for Discharge Date:   Discharge Plan A: Home with family   Discharge Delays: (!) Consult Recommendations Completed            Please place  Justification for DME        Johnnie Kirk MD  Department of Hospital Medicine   [place] [facility]

## 2025-04-28 NOTE — ASSESSMENT & PLAN NOTE
"Patient has Combined Systolic and Diastolic heart failure that is Acute on chronic. On presentation their CHF was decompensated. Evidence of decompensated CHF on presentation includes: edema and shortness of breath. The etiology of their decompensation is likely medication non-compliance. Most recent BNP and echo results are listed below.  No results for input(s): "BNP" in the last 72 hours.    Latest ECHO  Results for orders placed during the hospital encounter of 03/18/25    Echo Saline Bubble? Yes    Interpretation Summary    Left Ventricle: The left ventricle is mildly dilated. There is eccentric hypertrophy. Regional wall motion abnormalities present. See diagram for wall motion findings. There is mildly reduced systolic function with a visually estimated ejection fraction of 40 - 45%. Quantitated ejection fraction is 43%. There is diastolic dysfunction. Normal left ventricular filling pressure.    Right Ventricle: The right ventricle has mild enlargement. Systolic function is mildly reduced. Pacemaker lead present in the ventricle.    Pulmonary Artery: The estimated pulmonary artery systolic pressure is 17 mmHg.    IVC/SVC: Normal venous pressure at 3 mmHg.    Current Heart Failure Medications  losartan tablet 50 mg, Daily, Oral  furosemide tablet 40 mg, Daily, Oral  empagliflozin (Jardiance) tablet 10 mg, Daily, Oral    POCUS done in ED :Bilateral lung sliding is present. A-lines visualized throughout anterior lung fields. No B-lines, consolidations, pleural effusion, or pneumothorax identified. Pleural line is smooth and continuous.  Cardiac POCUS reveals normal left ventricular systolic function with no regional wall motion abnormalities. Right ventricle is normal in size. No pericardial effusion.     Plan  - Monitor strict I&Os and daily weights.    - Place on telemetry  - Low sodium diet  - Place on fluid restriction of 1.5 L.   - Cardiology has not been consulted  - The patient's volume status is " improving but not at their baseline as indicated by edema  - Lasix 40 mg PO BID

## 2025-04-29 LAB
ABSOLUTE EOSINOPHIL (OHS): 0.03 K/UL
ABSOLUTE MONOCYTE (OHS): 0.84 K/UL (ref 0.3–1)
ABSOLUTE NEUTROPHIL COUNT (OHS): 4.93 K/UL (ref 1.8–7.7)
ANION GAP (OHS): 12 MMOL/L (ref 8–16)
ANION GAP (OHS): 13 MMOL/L (ref 8–16)
BASOPHILS # BLD AUTO: 0.05 K/UL
BASOPHILS NFR BLD AUTO: 0.7 %
BUN SERPL-MCNC: 46 MG/DL (ref 8–23)
BUN SERPL-MCNC: 47 MG/DL (ref 8–23)
CALCIUM SERPL-MCNC: 9.3 MG/DL (ref 8.7–10.5)
CALCIUM SERPL-MCNC: 9.4 MG/DL (ref 8.7–10.5)
CHLORIDE SERPL-SCNC: 102 MMOL/L (ref 95–110)
CHLORIDE SERPL-SCNC: 105 MMOL/L (ref 95–110)
CO2 SERPL-SCNC: 22 MMOL/L (ref 23–29)
CO2 SERPL-SCNC: 23 MMOL/L (ref 23–29)
CREAT SERPL-MCNC: 1.2 MG/DL (ref 0.5–1.4)
CREAT SERPL-MCNC: 1.3 MG/DL (ref 0.5–1.4)
DSDNA ANTIBODY (OHS): NORMAL
DSDNA ANTIBODY TITER (OHS): NORMAL
ERYTHROCYTE [DISTWIDTH] IN BLOOD BY AUTOMATED COUNT: 13.2 % (ref 11.5–14.5)
GFR SERPLBLD CREATININE-BSD FMLA CKD-EPI: 58 ML/MIN/1.73/M2
GFR SERPLBLD CREATININE-BSD FMLA CKD-EPI: >60 ML/MIN/1.73/M2
GLUCOSE SERPL-MCNC: 185 MG/DL (ref 70–110)
GLUCOSE SERPL-MCNC: 379 MG/DL (ref 70–110)
HCT VFR BLD AUTO: 34.2 % (ref 40–54)
HGB BLD-MCNC: 11 GM/DL (ref 14–18)
IMM GRANULOCYTES # BLD AUTO: 0.03 K/UL (ref 0–0.04)
IMM GRANULOCYTES NFR BLD AUTO: 0.4 % (ref 0–0.5)
LYMPHOCYTES # BLD AUTO: 1.63 K/UL (ref 1–4.8)
MAGNESIUM SERPL-MCNC: 1.9 MG/DL (ref 1.6–2.6)
MCH RBC QN AUTO: 29.3 PG (ref 27–31)
MCHC RBC AUTO-ENTMCNC: 32.2 G/DL (ref 32–36)
MCV RBC AUTO: 91 FL (ref 82–98)
NUCLEATED RBC (/100WBC) (OHS): 0 /100 WBC
OHS QRS DURATION: 156 MS
OHS QRS DURATION: 162 MS
OHS QTC CALCULATION: 470 MS
OHS QTC CALCULATION: 489 MS
PHOSPHATE SERPL-MCNC: 4.6 MG/DL (ref 2.7–4.5)
PLATELET # BLD AUTO: 387 K/UL (ref 150–450)
PMV BLD AUTO: 10.1 FL (ref 9.2–12.9)
POCT GLUCOSE: 186 MG/DL (ref 70–110)
POCT GLUCOSE: 339 MG/DL (ref 70–110)
POCT GLUCOSE: 356 MG/DL (ref 70–110)
POCT GLUCOSE: 365 MG/DL (ref 70–110)
POTASSIUM SERPL-SCNC: 3.3 MMOL/L (ref 3.5–5.1)
POTASSIUM SERPL-SCNC: 4.4 MMOL/L (ref 3.5–5.1)
RBC # BLD AUTO: 3.76 M/UL (ref 4.6–6.2)
RELATIVE EOSINOPHIL (OHS): 0.4 %
RELATIVE LYMPHOCYTE (OHS): 21.7 % (ref 18–48)
RELATIVE MONOCYTE (OHS): 11.2 % (ref 4–15)
RELATIVE NEUTROPHIL (OHS): 65.6 % (ref 38–73)
SODIUM SERPL-SCNC: 137 MMOL/L (ref 136–145)
SODIUM SERPL-SCNC: 140 MMOL/L (ref 136–145)
WBC # BLD AUTO: 7.51 K/UL (ref 3.9–12.7)

## 2025-04-29 PROCEDURE — 97535 SELF CARE MNGMENT TRAINING: CPT | Mod: HCNC,CO

## 2025-04-29 PROCEDURE — 36415 COLL VENOUS BLD VENIPUNCTURE: CPT | Mod: HCNC

## 2025-04-29 PROCEDURE — 11000001 HC ACUTE MED/SURG PRIVATE ROOM: Mod: HCNC

## 2025-04-29 PROCEDURE — 94761 N-INVAS EAR/PLS OXIMETRY MLT: CPT | Mod: HCNC

## 2025-04-29 PROCEDURE — 63600175 PHARM REV CODE 636 W HCPCS: Mod: HCNC

## 2025-04-29 PROCEDURE — 85025 COMPLETE CBC W/AUTO DIFF WBC: CPT | Mod: HCNC

## 2025-04-29 PROCEDURE — 25000003 PHARM REV CODE 250: Mod: HCNC

## 2025-04-29 PROCEDURE — 82374 ASSAY BLOOD CARBON DIOXIDE: CPT | Mod: HCNC

## 2025-04-29 PROCEDURE — 84100 ASSAY OF PHOSPHORUS: CPT | Mod: HCNC

## 2025-04-29 PROCEDURE — 30200315 PPD INTRADERMAL TEST REV CODE 302: Mod: HCNC

## 2025-04-29 PROCEDURE — 83735 ASSAY OF MAGNESIUM: CPT | Mod: HCNC

## 2025-04-29 PROCEDURE — 99900035 HC TECH TIME PER 15 MIN (STAT): Mod: HCNC

## 2025-04-29 PROCEDURE — 97530 THERAPEUTIC ACTIVITIES: CPT | Mod: HCNC,CO

## 2025-04-29 PROCEDURE — 97110 THERAPEUTIC EXERCISES: CPT | Mod: HCNC,CQ

## 2025-04-29 PROCEDURE — 80048 BASIC METABOLIC PNL TOTAL CA: CPT | Mod: HCNC

## 2025-04-29 PROCEDURE — 97116 GAIT TRAINING THERAPY: CPT | Mod: HCNC,CQ

## 2025-04-29 PROCEDURE — 86580 TB INTRADERMAL TEST: CPT | Mod: HCNC

## 2025-04-29 RX ORDER — POTASSIUM CHLORIDE 20 MEQ/1
40 TABLET, EXTENDED RELEASE ORAL ONCE
Status: COMPLETED | OUTPATIENT
Start: 2025-04-29 | End: 2025-04-29

## 2025-04-29 RX ORDER — INSULIN GLARGINE 100 [IU]/ML
15 INJECTION, SOLUTION SUBCUTANEOUS NIGHTLY
Status: DISCONTINUED | OUTPATIENT
Start: 2025-04-29 | End: 2025-05-01 | Stop reason: HOSPADM

## 2025-04-29 RX ORDER — POTASSIUM CHLORIDE 20 MEQ/1
20 TABLET, EXTENDED RELEASE ORAL ONCE
Status: COMPLETED | OUTPATIENT
Start: 2025-04-29 | End: 2025-04-29

## 2025-04-29 RX ADMIN — LOSARTAN POTASSIUM 50 MG: 50 TABLET, FILM COATED ORAL at 07:04

## 2025-04-29 RX ADMIN — SENNOSIDES AND DOCUSATE SODIUM 1 TABLET: 50; 8.6 TABLET ORAL at 08:04

## 2025-04-29 RX ADMIN — INSULIN GLARGINE 15 UNITS: 100 INJECTION, SOLUTION SUBCUTANEOUS at 04:04

## 2025-04-29 RX ADMIN — GABAPENTIN 600 MG: 300 CAPSULE ORAL at 08:04

## 2025-04-29 RX ADMIN — CLOPIDOGREL BISULFATE 75 MG: 75 TABLET, FILM COATED ORAL at 07:04

## 2025-04-29 RX ADMIN — SODIUM CHLORIDE, POTASSIUM CHLORIDE, SODIUM LACTATE AND CALCIUM CHLORIDE 250 ML: 600; 310; 30; 20 INJECTION, SOLUTION INTRAVENOUS at 01:04

## 2025-04-29 RX ADMIN — INSULIN ASPART 3 UNITS: 100 INJECTION, SOLUTION INTRAVENOUS; SUBCUTANEOUS at 09:04

## 2025-04-29 RX ADMIN — LIDOCAINE 1 PATCH: 50 PATCH CUTANEOUS at 11:04

## 2025-04-29 RX ADMIN — INSULIN ASPART 5 UNITS: 100 INJECTION, SOLUTION INTRAVENOUS; SUBCUTANEOUS at 04:04

## 2025-04-29 RX ADMIN — INSULIN ASPART 4 UNITS: 100 INJECTION, SOLUTION INTRAVENOUS; SUBCUTANEOUS at 11:04

## 2025-04-29 RX ADMIN — ALLOPURINOL 50 MG: 300 TABLET ORAL at 07:04

## 2025-04-29 RX ADMIN — POTASSIUM CHLORIDE 20 MEQ: 1500 TABLET, EXTENDED RELEASE ORAL at 05:04

## 2025-04-29 RX ADMIN — COLCHICINE 0.6 MG: 0.6 TABLET ORAL at 07:04

## 2025-04-29 RX ADMIN — POTASSIUM CHLORIDE 40 MEQ: 1500 TABLET, EXTENDED RELEASE ORAL at 07:04

## 2025-04-29 RX ADMIN — ASPIRIN 81 MG: 81 TABLET, COATED ORAL at 07:04

## 2025-04-29 RX ADMIN — PREDNISONE 25 MG: 20 TABLET ORAL at 07:04

## 2025-04-29 RX ADMIN — COLCHICINE 0.6 MG: 0.6 TABLET ORAL at 08:04

## 2025-04-29 RX ADMIN — DICLOFENAC SODIUM 2 G: 10 GEL TOPICAL at 09:04

## 2025-04-29 RX ADMIN — ZOLPIDEM TARTRATE 5 MG: 5 TABLET, FILM COATED ORAL at 08:04

## 2025-04-29 RX ADMIN — TUBERCULIN PURIFIED PROTEIN DERIVATIVE 5 UNITS: 5 INJECTION INTRADERMAL at 01:04

## 2025-04-29 RX ADMIN — EMPAGLIFLOZIN 10 MG: 10 TABLET, FILM COATED ORAL at 07:04

## 2025-04-29 RX ADMIN — GABAPENTIN 600 MG: 300 CAPSULE ORAL at 07:04

## 2025-04-29 NOTE — PLAN OF CARE
Problem: Adult Inpatient Plan of Care  Goal: Plan of Care Review  Outcome: Progressing     Problem: Diabetes Comorbidity  Goal: Blood Glucose Level Within Targeted Range  Outcome: Progressing     Problem: Heart Failure  Goal: Optimal Cardiac Output  Outcome: Progressing  Goal: Optimal Functional Ability  Outcome: Progressing  Goal: Fluid and Electrolyte Balance  Outcome: Progressing     Problem: Fall Injury Risk  Goal: Absence of Fall and Fall-Related Injury  Outcome: Progressing     Problem: Comorbidity Management  Goal: Blood Pressure in Desired Range  Outcome: Progressing

## 2025-04-29 NOTE — PT/OT/SLP PROGRESS
Physical Therapy Treatment    Patient Name:  Clifton Wilson   MRN:  [id number]    Recommendations:     Discharge Recommendations: Moderate Intensity Therapy  Discharge Equipment Recommendations: to be determined by next level of care  Barriers to discharge:  decreased mobility,strength and endurance    Assessment:     Clifton Wilson is a 74 y.o. male admitted with a medical diagnosis of Acute on chronic combined systolic and diastolic congestive heart failure.  He presents with the following impairments/functional limitations: weakness, impaired endurance, impaired functional mobility, gait instability, impaired balance, decreased lower extremity function, decreased ROM, impaired coordination, impaired skin,pt with good participation and requires assistance with mobility at this time,pt will benefit from moderate intensity therapy upon discharge.    Rehab Prognosis: Good; patient would benefit from acute skilled PT services to address these deficits and reach maximum level of function.    Recent Surgery: * No surgery found *      Plan:     During this hospitalization, patient to be seen 5 x/week to address the identified rehab impairments via gait training, therapeutic activities, therapeutic exercises, neuromuscular re-education and progress toward the following goals:    Plan of Care Expires:  05/25/25    Subjective     Chief Complaint: n/a  Patient/Family Comments/goals: pt agreeable to rx.  Pain/Comfort:  Pain Rating 1:  (no c/o's)      Objective:     Communicated with nsg prior to session.  Patient found up in chair with PureWick, peripheral IV, telemetry upon PT entry to room.     General Precautions: Standard, fall  Orthopedic Precautions: N/A  Braces: N/A  Respiratory Status: Room air     Functional Mobility:  Transfers:     Sit to Stand:  moderate assistance and from lower surface with rolling walker  Gait: amb ~28' with RW and Min A with v/c's and weak le's  Balance: fair standing balance with  RW      AM-PAC 6 CLICK MOBILITY  Turning over in bed (including adjusting bedclothes, sheets and blankets)?: 2  Sitting down on and standing up from a chair with arms (e.g., wheelchair, bedside commode, etc.): 2  Moving from lying on back to sitting on the side of the bed?: 2  Moving to and from a bed to a chair (including a wheelchair)?: 3  Need to walk in hospital room?: 3  Climbing 3-5 steps with a railing?: 1  Basic Mobility Total Score: 13       Treatment & Education: le seated ex's X 10-12 reps inc: ap,laq's and hip flex,pt requests met.      Patient left up in chair with all lines intact, call button in reach, and nsg notified..    GOALS: see general POC  Multidisciplinary Problems       Physical Therapy Goals          Problem: Physical Therapy    Goal Priority Disciplines Outcome Interventions   Physical Therapy Goal     PT, PT/OT Progressing    Description: Goals to be met by: 25     Patient will increase functional independence with mobility by performin. Supine to sit with Stand-by Assistance  2. Sit to supine with Stand-by Assistance  3. Sit to stand transfer with Minimal Assistance  4. Bed to chair transfer with Minimal Assistance using Rolling Walker  5. Gait  x 25 feet with Minimal Assistance using Rolling Walker.                          DME Justifications:  No DME recommended requiring DME justifications    Time Tracking:     PT Received On: 25  PT Start Time: 1041     PT Stop Time: 1106  PT Total Time (min): 25 min     Billable Minutes: Gait Training 15 and Therapeutic Exercise 10    Treatment Type: Treatment  PT/PTA: PTA     Number of PTA visits since last PT visit: 2     2025

## 2025-04-29 NOTE — PROGRESS NOTES
1530 -- CM met with pt and and wife Marie -- she rode to [facility] today - prefers [facility] there for pt vs. [facility] -- CM to advise Emi and Daksha.  -  -- [phone]   Daksha to reach out to wife to complete paperwork.  Advised her that pt is ready to d/c tomorrow, Wed 4/29.    ----------------------------------------------------------------    Per Sherry - no beds available at [facility] today or tomorrow.      CM called and spoke with pt's wife Marie--wife Marie  - advised her pt has been declined by [facility]  but has been accepted by [facility] and [facility].    ---------------  142 rec'd from [facility]      CM called [facility]  -   [phone];  CM spoke with Enrique in admissions  [phone]  -- asked that he reach out to pt's wife today.  She is still deciding between [facility] and another facility.      [facility] called Emi at [facility]  [phone] -- she will ask  Daksha  -- to reach out to pt's wife Marie today.

## 2025-04-29 NOTE — ASSESSMENT & PLAN NOTE
The patient reports progressive muscle weakness, particularly affecting the lower extremities, over the past several months. He experiences significant difficulty with ambulation and prolonged standing due to this weakness. He was recently admitted to a rehabilitation facility to address his declining mobility. An extensive workup has been performed, with positive antinuclear antibodies (MARLEN) as the only notable finding thus far.     Plan  -PT/OT; will follow up for possible placement for therapy   -Continue Gabapentin   -Prednisone 25 mg daily for 5 days   - Follow up Rheum labs, outpatient referral to rheum

## 2025-04-29 NOTE — ASSESSMENT & PLAN NOTE
"Patient has Combined Systolic and Diastolic heart failure that is Acute on chronic. On presentation their CHF was decompensated. Evidence of decompensated CHF on presentation includes: edema and shortness of breath. The etiology of their decompensation is likely medication non-compliance. Most recent BNP and echo results are listed below.  No results for input(s): "BNP" in the last 72 hours.    Latest ECHO  Results for orders placed during the hospital encounter of 03/18/25    Echo Saline Bubble? Yes    Interpretation Summary    Left Ventricle: The left ventricle is mildly dilated. There is eccentric hypertrophy. Regional wall motion abnormalities present. See diagram for wall motion findings. There is mildly reduced systolic function with a visually estimated ejection fraction of 40 - 45%. Quantitated ejection fraction is 43%. There is diastolic dysfunction. Normal left ventricular filling pressure.    Right Ventricle: The right ventricle has mild enlargement. Systolic function is mildly reduced. Pacemaker lead present in the ventricle.    Pulmonary Artery: The estimated pulmonary artery systolic pressure is 17 mmHg.    IVC/SVC: Normal venous pressure at 3 mmHg.    Current Heart Failure Medications  losartan tablet 50 mg, Daily, Oral  empagliflozin (Jardiance) tablet 10 mg, Daily, Oral    POCUS done in ED :Bilateral lung sliding is present. A-lines visualized throughout anterior lung fields. No B-lines, consolidations, pleural effusion, or pneumothorax identified. Pleural line is smooth and continuous.  Cardiac POCUS reveals normal left ventricular systolic function with no regional wall motion abnormalities. Right ventricle is normal in size. No pericardial effusion.     Plan  - Will continue to monitor  - Will add lasix as needed  - Will give small 250cc bolus given recent increase in Cr, possible overdiuresis  "

## 2025-04-29 NOTE — ASSESSMENT & PLAN NOTE
Patient's FSGs are controlled on current medication regimen.  Last A1c reviewed-   Lab Results   Component Value Date    HGBA1C 6.9 (H) 04/24/2025     Most recent fingerstick glucose reviewed-   Recent Labs   Lab 04/28/25  1212 04/28/25  1657 04/28/25  2039 04/29/25  0622   POCTGLUCOSE 221* 316* 277* 186*     Current correctional scale  Low  Maintain anti-hyperglycemic dose as follows-   Antihyperglycemics (From admission, onward)      Start     Stop Route Frequency Ordered    04/27/25 1400  empagliflozin (Jardiance) tablet 10 mg        Question Answer Comment   Does this patient have a diagnosis of heart failure? Yes    Does this patient have type 1 diabetes or diabetic ketoacidosis? No    Does this patient have symptomatic hypotension? No    Is the patient NPO or pending major surgery in next 3 days or less? No        -- Oral Daily 04/27/25 1250    04/24/25 1743  insulin aspart U-100 pen 0-5 Units         -- SubQ Before meals & nightly PRN 04/24/25 1645          Plan  -Monitor glucose.Glucose goal 140-180  -SSI

## 2025-04-29 NOTE — PT/OT/SLP PROGRESS
Occupational Therapy   Treatment    Name: Clifton Wilson  MRN: [id number]  Admitting Diagnosis:  Acute on chronic combined systolic and diastolic congestive heart failure       Recommendations:     Discharge Recommendations: Moderate Intensity Therapy  Discharge Equipment Recommendations:  to be determined by next level of care  Barriers to discharge:       Assessment:     Clifton Wilson is a 74 y.o. male with a medical diagnosis of Acute on chronic combined systolic and diastolic congestive heart failure.  Performance deficits affecting function are weakness, impaired endurance, impaired self care skills, impaired functional mobility, gait instability, impaired balance, decreased lower extremity function, decreased safety awareness, pain, decreased ROM, impaired coordination.     Rehab Prognosis:  Good; patient would benefit from acute skilled OT services to address these deficits and reach maximum level of function.       Plan:     Patient to be seen 5 x/week to address the above listed problems via self-care/home management, therapeutic activities, therapeutic exercises  Plan of Care Expires: 05/25/25  Plan of Care Reviewed with: patient    Subjective     Chief Complaint: feeling better today  Patient/Family Comments/goals: return to PLOF  Pain/Comfort:  Pain Rating 1: 0/10    Objective:     Communicated with: nurseWaldemar prior to session.  Patient found HOB elevated with bed alarm, telemetry, PureWick upon OT entry to room.    General Precautions: Standard, fall    Orthopedic Precautions:N/A  Braces: N/A  Respiratory Status: Room air    Bed Mobility:    Patient completed Scooting/Bridging with contact guard assistance  Patient completed Supine to Sit with minimum assistance     Functional Mobility/Transfers:  Patient completed Sit <> Stand Transfer with minimum assistance and moderate assistance  with  rolling walker and elevated bed height   Patient completed Bed <> Chair Transfer using Step Transfer  technique with minimum assistance with rolling walker  Functional Mobility: 4-5 turning steps to bedside chair; VCs for RW mgmt/proximity    Activities of Daily Living:  Grooming: set-up chair level      AMPA 6 Click ADL: 17    Treatment & Education:  Continued education on purpose/role of OT  Requires increased time, effort and VCs for all tasks/transitions  Attempted stand from regular (lowest height) bed, but patient unable to successfully complete  Stand, transfer and ADLs as above  Educated to call for assistance    Patient left up in chair with all lines intact and call button in reach    GOALS:   Multidisciplinary Problems       Occupational Therapy Goals          Problem: Occupational Therapy    Goal Priority Disciplines Outcome Interventions   Occupational Therapy Goal     OT, PT/OT Progressing    Description: Goals to be met by: 5/25/2025     Patient will increase functional independence with ADLs by performing:    Feeding with Modified [place] and Assistive Devices as needed.  UE Dressing with Modified [place].  LE Dressing with Modified [place] and Assistive Devices as needed.  Grooming while standing at sink with Modified [place].  Toileting from bedside commode with Modified [place] for hygiene and clothing management.   Rolling to Bilateral with Modified [place].   Supine to sit with Modified [place].  Step transfer with Modified [place]  Toilet transfer to bedside commode with Modified [place].  Increased functional strength to WFL for ADLS.  Upper extremity exercise program x10 reps per handout, with independence.                         DME Justifications:  No DME recommended requiring DME justifications    Time Tracking:     OT Date of Treatment: 04/29/25  OT Start Time: 0927  OT Stop Time: 0953  OT Total Time (min): 26 min    Billable Minutes:Self Care/Home Management 10  Therapeutic Activity 16    4/29/2025

## 2025-04-29 NOTE — SUBJECTIVE & OBJECTIVE
Interval History: Patient seen this AM, doing well, states he is doing better with steroids. Will work on placement    Review of Systems   Constitutional:  Negative for chills and fever.   Respiratory:  Negative for shortness of breath.    Cardiovascular:  Negative for chest pain.   Gastrointestinal:  Negative for abdominal pain, nausea and vomiting.   Neurological:  Negative for dizziness and light-headedness.     Objective:     Vital Signs (Most Recent):  Temp: 97.7 °F (36.5 °C) (04/29/25 0745)  Pulse: 71 (04/29/25 0745)  Resp: 18 (04/29/25 0745)  BP: (!) 150/89 (04/29/25 0745)  SpO2: (!) 94 % (04/29/25 0745) Vital Signs (24h Range):  Temp:  [97.5 °F (36.4 °C)-98.5 °F (36.9 °C)] 97.7 °F (36.5 °C)  Pulse:  [68-87] 71  Resp:  [16-20] 18  SpO2:  [94 %-98 %] 94 %  BP: (122-159)/(73-89) 150/89     Weight: 97 kg (213 lb 13.5 oz)  Body mass index is 29.83 kg/m².    Intake/Output Summary (Last 24 hours) at 4/29/2025 0957  Last data filed at 4/29/2025 0519  Gross per 24 hour   Intake --   Output 2300 ml   Net -2300 ml         Physical Exam  Constitutional:       General: He is not in acute distress.     Appearance: He is not toxic-appearing.   HENT:      Head: Normocephalic and atraumatic.      Nose: Nose normal.   Cardiovascular:      Pulses: Normal pulses.      Heart sounds: Normal heart sounds.   Pulmonary:      Effort: Pulmonary effort is normal.      Breath sounds: Normal breath sounds.   Skin:     General: Skin is warm and dry.   Neurological:      Mental Status: He is alert.   Psychiatric:         Mood and Affect: Mood normal.               Significant Labs: All pertinent labs within the past 24 hours have been reviewed.  CBC:   Recent Labs   Lab 04/28/25 0523 04/29/25 0424   WBC 7.47 7.51   HGB 10.6* 11.0*   HCT 32.6* 34.2*    387     CMP:   Recent Labs   Lab 04/28/25 0523 04/29/25  0424    140   K 3.5 3.3*    105   CO2 22* 22*   * 185*   BUN 37* 46*   CREATININE 1.2 1.2   CALCIUM 9.5  9.3   ANIONGAP 12 13       Significant Imaging: I have reviewed all pertinent imaging results/findings within the past 24 hours.

## 2025-04-29 NOTE — PROGRESS NOTES
[facility] Medicine  Progress Note    Patient Name: Clifton Wilson  MRN: [id number]  Patient Class: IP- Inpatient   Admission Date: 4/24/2025  Length of Stay: 4 days  Attending Physician: Jenna Small MD  Primary Care Provider: Britton Grissom MD        Subjective     Principal Problem:Acute on chronic combined systolic and diastolic congestive heart failure        HPI:  Patient is a 73 yo male w/ PMHx of CAD, HTN, HLD, HFrEF 40-45% s/p ICD x7 ,Type 2 DM and gout who presents to the Emergency Department with worsening shortness of breath and progressive bilateral lower extremity edema over the past 3 days. He was seen by his primary care physician two days ago, at which time it was noted that he had not been on any diuretic therapy for approximately two months. Due to signs of fluid overload, furosemide (Lasix) 20 mg daily was restarted. The patient has since taken two doses and reports increased urinary output but no significant improvement in his shortness of breath. He denies chest pain, orthopnea, or upper respiratory symptoms.    He also endorses a gradual but marked functional decline over the last several months, including worsening generalized weakness and reduced mobility, requiring frequent rest while ambulating at home. He was recently recommended for inpatient rehabilitation by his PCP, but has not yet received any follow-up communication regarding the referral.    Notably, the patient was admitted on 04/08 for hypotension and volume depletion. At that time, he was seen in an outpatient setting and noted to have systolic blood pressure in the low 100s, but was referred to the ED when it dropped into the 80s. He was evaluated and treated for hypotension during that admission. He states that following discharge, no diuretic therapy was prescribed.     In the ED, initial vital signs /58 mmhg, HR 84, Temp 98.2, SpO2 100% on room air. Labs include CBC with stable H/H 11.2/34.1  and WBC within normal limits . CMP with Na 141, K 3.4,  and BUN/Cr 18/1.1 (baseline Cr 1.1), Troponin 0.029, , Uric Acid 6.1,. CXR The cardiac silhouette is normal in size, midline.  The pulmonary vascularity is normal.  Coronary artery calcifications seen. No focal area of airspace consolidation.  No pleural effusion.  No pneumothorax. EKG showed Right bundle branch block. Septal infarct ,age undetermined .  Initiated on Laxis 20 mg. [facility] Team admitted the patient with CHF exacerbation.      Overview/Hospital Course:  No notes on file    Interval History: Patient seen this AM, doing well, states he is doing better with steroids. Will work on placement    Review of Systems   Constitutional:  Negative for chills and fever.   Respiratory:  Negative for shortness of breath.    Cardiovascular:  Negative for chest pain.   Gastrointestinal:  Negative for abdominal pain, nausea and vomiting.   Neurological:  Negative for dizziness and light-headedness.     Objective:     Vital Signs (Most Recent):  Temp: 97.7 °F (36.5 °C) (04/29/25 0745)  Pulse: 71 (04/29/25 0745)  Resp: 18 (04/29/25 0745)  BP: (!) 150/89 (04/29/25 0745)  SpO2: (!) 94 % (04/29/25 0745) Vital Signs (24h Range):  Temp:  [97.5 °F (36.4 °C)-98.5 °F (36.9 °C)] 97.7 °F (36.5 °C)  Pulse:  [68-87] 71  Resp:  [16-20] 18  SpO2:  [94 %-98 %] 94 %  BP: (122-159)/(73-89) 150/89     Weight: 97 kg (213 lb 13.5 oz)  Body mass index is 29.83 kg/m².    Intake/Output Summary (Last 24 hours) at 4/29/2025 0912  Last data filed at 4/29/2025 0519  Gross per 24 hour   Intake --   Output 2300 ml   Net -2300 ml         Physical Exam  Constitutional:       General: He is not in acute distress.     Appearance: He is not toxic-appearing.   HENT:      Head: Normocephalic and atraumatic.      Nose: Nose normal.   Cardiovascular:      Pulses: Normal pulses.      Heart sounds: Normal heart sounds.   Pulmonary:      Effort: Pulmonary effort is normal.       "Breath sounds: Normal breath sounds.   Skin:     General: Skin is warm and dry.   Neurological:      Mental Status: He is alert.   Psychiatric:         Mood and Affect: Mood normal.               Significant Labs: All pertinent labs within the past 24 hours have been reviewed.  CBC:   Recent Labs   Lab 04/28/25  0523 04/29/25  0424   WBC 7.47 7.51   HGB 10.6* 11.0*   HCT 32.6* 34.2*    387     CMP:   Recent Labs   Lab 04/28/25 0523 04/29/25  0424    140   K 3.5 3.3*    105   CO2 22* 22*   * 185*   BUN 37* 46*   CREATININE 1.2 1.2   CALCIUM 9.5 9.3   ANIONGAP 12 13       Significant Imaging: I have reviewed all pertinent imaging results/findings within the past 24 hours.      Assessment & Plan  Acute on chronic combined systolic and diastolic congestive heart failure  Patient has Combined Systolic and Diastolic heart failure that is Acute on chronic. On presentation their CHF was decompensated. Evidence of decompensated CHF on presentation includes: edema and shortness of breath. The etiology of their decompensation is likely medication non-compliance. Most recent BNP and echo results are listed below.  No results for input(s): "BNP" in the last 72 hours.    Latest ECHO  Results for orders placed during the hospital encounter of 03/18/25    Echo Saline Bubble? Yes    Interpretation Summary    Left Ventricle: The left ventricle is mildly dilated. There is eccentric hypertrophy. Regional wall motion abnormalities present. See diagram for wall motion findings. There is mildly reduced systolic function with a visually estimated ejection fraction of 40 - 45%. Quantitated ejection fraction is 43%. There is diastolic dysfunction. Normal left ventricular filling pressure.    Right Ventricle: The right ventricle has mild enlargement. Systolic function is mildly reduced. Pacemaker lead present in the ventricle.    Pulmonary Artery: The estimated pulmonary artery systolic pressure is 17 mmHg.    " IVC/SVC: Normal venous pressure at 3 mmHg.    Current Heart Failure Medications  losartan tablet 50 mg, Daily, Oral  empagliflozin (Jardiance) tablet 10 mg, Daily, Oral    POCUS done in ED :Bilateral lung sliding is present. A-lines visualized throughout anterior lung fields. No B-lines, consolidations, pleural effusion, or pneumothorax identified. Pleural line is smooth and continuous.  Cardiac POCUS reveals normal left ventricular systolic function with no regional wall motion abnormalities. Right ventricle is normal in size. No pericardial effusion.     Plan  - Will continue to monitor  - Will add lasix as needed  - Will give small 250cc bolus given recent increase in Cr, possible overdiuresis  DM type 2 without retinopathy  Patient's FSGs are controlled on current medication regimen.  Last A1c reviewed-   Lab Results   Component Value Date    HGBA1C 6.9 (H) 04/24/2025     Most recent fingerstick glucose reviewed-   Recent Labs   Lab 04/28/25  1212 04/28/25  1657 04/28/25  2039 04/29/25  0622   POCTGLUCOSE 221* 316* 277* 186*     Current correctional scale  Low  Maintain anti-hyperglycemic dose as follows-   Antihyperglycemics (From admission, onward)      Start     Stop Route Frequency Ordered    04/27/25 1400  empagliflozin (Jardiance) tablet 10 mg        Question Answer Comment   Does this patient have a diagnosis of heart failure? Yes    Does this patient have type 1 diabetes or diabetic ketoacidosis? No    Does this patient have symptomatic hypotension? No    Is the patient NPO or pending major surgery in next 3 days or less? No        -- Oral Daily 04/27/25 1250    04/24/25 1743  insulin aspart U-100 pen 0-5 Units         -- SubQ Before meals & nightly PRN 04/24/25 1645          Plan  -Monitor glucose.Glucose goal 140-180  -SSI  Essential hypertension  Patient's blood pressure range in the last 24 hours was: BP  Min: 122/76  Max: 159/81.The patient's inpatient anti-hypertensive regimen is listed  below:  Current Antihypertensives  nitroGLYCERIN SL tablet 0.4 mg, Every 5 min PRN, Sublingual  losartan tablet 50 mg, Daily, Oral    Plan  - BP is controlled, no changes needed to their regimen  - Will continue BP medications as appropriate  Gout  Patient has known history of Gout. Patient hasn't had a flare in a couple of years.    Plan   Continue current medications.    Muscular weakness  The patient reports progressive muscle weakness, particularly affecting the lower extremities, over the past several months. He experiences significant difficulty with ambulation and prolonged standing due to this weakness. He was recently admitted to a rehabilitation facility to address his declining mobility. An extensive workup has been performed, with positive antinuclear antibodies (MARLEN) as the only notable finding thus far.     Plan  -PT/OT; will follow up for possible placement for therapy   -Continue Gabapentin   -Prednisone 25 mg daily for 5 days   - Follow up Rheum labs, outpatient referral to rheum    Cervical spondylolysis  Patient has a history of Chronic back pain, with numbness and tingling. Patient has diagnosis of Cervical Spondylolysis.    Plan  Continue home medications.    Troponin level elevated  Patient presented with shortness of breath and mildly elevated Troponin 0.029, .    Plan  RESOLVED      VTE Risk Mitigation (From admission, onward)           Ordered     Reason for No Pharmacological VTE Prophylaxis  Once        Question:  Reasons:  Answer:  Already adequately anticoagulated on oral Anticoagulants    04/24/25 1645     IP VTE HIGH RISK PATIENT  Once         04/24/25 1645     Place sequential compression device  Until discontinued         04/24/25 1645                    Discharge Planning   MARTHA: 4/30/2025     Code Status: Full Code   Medical Readiness for Discharge Date:   Discharge Plan A: Skilled Nursing Facility   Discharge Delays: (!) Consult Recommendations Completed            Please  place Justification for DME        Johnnie Kirk MD  Department of Hospital Medicine   [facility]

## 2025-04-29 NOTE — PLAN OF CARE
Recommendation:  1. Will add low purine restrictions to current diet 2/2 gout.   2. Encourage intake at meals as tolerated. 3. Monitor weight/labs.   4. RD to follow to monitor po intake.    Goals:  Pt will tolerate diet with at least 50-75% intake at meals by RD follow up  Nutrition Goal Status: new

## 2025-04-29 NOTE — ASSESSMENT & PLAN NOTE
Patient's blood pressure range in the last 24 hours was: BP  Min: 122/76  Max: 159/81.The patient's inpatient anti-hypertensive regimen is listed below:  Current Antihypertensives  nitroGLYCERIN SL tablet 0.4 mg, Every 5 min PRN, Sublingual  losartan tablet 50 mg, Daily, Oral    Plan  - BP is controlled, no changes needed to their regimen  - Will continue BP medications as appropriate

## 2025-04-29 NOTE — PROGRESS NOTES
[facility] - Telemetry  Adult Nutrition  Progress Note    SUMMARY       Recommendations    Recommendation:  1. Will add low purine restrictions to current diet 2/2 gout.   2. Encourage intake at meals as tolerated. 3. Monitor weight/labs.   4. RD to follow to monitor po intake.    Goals:  Pt will tolerate diet with at least 50-75% intake at meals by RD follow up  Nutrition Goal Status: new  Communication of RD Recs: reviewed with RN    Nutrition Discharge Planning  Nutrition Discharge Planning: Therapeutic diet (comments)  Therapeutic diet (comments): low Na low purine    Assessment and Plan  Nutrition Problem  Inadequate energy intake    Related to (etiology):   Gout, CHF    Signs and Symptoms (as evidenced by):   Weight loss    Interventions:  Collaboration with other providers    Nutrition Diagnosis Status:   New      Malnutrition Assessment  Weight Loss (Malnutrition):  (9% x 6 months)   Orbital Region (Subcutaneous Fat Loss): well nourished  Upper Arm Region (Subcutaneous Fat Loss): well nourished  Thoracic and Lumbar Region: well nourished   Sikh Region (Muscle Loss): well nourished  Clavicle Bone Region (Muscle Loss): well nourished  Clavicle and Acromion Bone Region (Muscle Loss): well nourished  Scapular Bone Region (Muscle Loss): well nourished  Dorsal Hand (Muscle Loss): well nourished  Patellar Region (Muscle Loss): well nourished  Anterior Thigh Region (Muscle Loss): well nourished  Posterior Calf Region (Muscle Loss): well nourished       Reason for Assessment  Reason For Assessment: identified at risk by screening criteria (MST)  Diagnosis:  (CHF)  General Information Comments: Pt admitted with shortness of breath and fluid retention. Pt reports he has been suffering with gout. Pt on 2gm low Na 1500ml fluid diet. Pt with fair intake at meals. Noted 24lb weight loss x 6 months. Pt reports weight loss 2/2 gout and fluid retention. Ronak 17- skin intact. NFPE completed today 4/28-nourished.    Past  "Medical History:   Diagnosis Date    Anticoagulant long-term use     Cardiomyopathy     CHF (congestive heart failure)     Coronary artery disease     Diabetes mellitus     Gout     Heart attack     Hypertension     Pneumonia         Nutrition/Diet History  Food Preferences: no Anabaptism or cultural food prefs identified  Spiritual, Cultural Beliefs, Buddhism Practices, Values that Affect Care: no  Factors Affecting Nutritional Intake: None identified at this time    Anthropometrics  Height: 5' 11" (180.3 cm)  Height (inches): 71 in  Height Method: Measured  Weight: 97 kg (213 lb 13.5 oz)  Weight (lb): 213.85 lb  Weight Method: Bed Scale  Ideal Body Weight (IBW), Male: 172 lb  % Ideal Body Weight, Male (lb): 124.33 %  BMI (Calculated): 29.8  BMI Grade: 25 - 29.9 - overweight  Usual Body Weight (UBW), k.5 kg ()  % Usual Body Weight: 90.42  % Weight Change From Usual Weight: -9.77 %     Lab/Procedures/Meds  Pertinent Labs Reviewed: reviewed  Pertinent Labs Comments: BUN 37H, Glu 166H  Pertinent Medications Reviewed: reviewed  Pertinent Medications Comments: aspirin, Lasix, gabapentin, senna, prednisone    Estimated/Assessed Needs  Weight Used For Calorie Calculations: 78.1 kg (172 lb 2.9 oz) (IBW)  Energy Calorie Requirements (kcal): 2343 (30 kcal/kg IBW)  Energy Need Method: Kcal/kg  Protein Requirements: 78g (1.0g/kg IBW)  Weight Used For Protein Calculations: 78.1 kg (172 lb 2.9 oz) (IBW)  Estimated Fluid Requirement Method: RDA Method  RDA Method (mL): 2343  CHO Requirement: 225g    Nutrition Prescription Ordered  Current Diet Order: 2gm low Na 1500ml fluid    Evaluation of Received Nutrient/Fluid Intake  I/O: 220/1400  Energy Calories Required: meeting needs  Protein Required: meeting needs  Fluid Required: meeting needs  Comments: LBM 4/24  % Intake of Estimated Energy Needs: 50 - 75 %  % Meal Intake: 50 - 75 %    Nutrition Risk  Level of Risk/Frequency of Follow-up:  (2xweekly)     Monitor and " Evaluation  Monitor and Evaluation: Food and beverage intake     Nutrition Related Social Determinants of Health: SDOH: Adequate food in home environment     Nutrition Follow-Up  RD Follow-up?: Yes

## 2025-04-30 ENCOUNTER — PATIENT MESSAGE (OUTPATIENT)
Facility: CLINIC | Age: 75
End: 2025-04-30
Payer: MEDICARE

## 2025-04-30 LAB
ABSOLUTE EOSINOPHIL (OHS): 0.07 K/UL
ABSOLUTE MONOCYTE (OHS): 0.66 K/UL (ref 0.3–1)
ABSOLUTE NEUTROPHIL COUNT (OHS): 4.42 K/UL (ref 1.8–7.7)
ANION GAP (OHS): 11 MMOL/L (ref 8–16)
BASOPHILS # BLD AUTO: 0.04 K/UL
BASOPHILS NFR BLD AUTO: 0.6 %
BUN SERPL-MCNC: 43 MG/DL (ref 8–23)
CALCIUM SERPL-MCNC: 9.5 MG/DL (ref 8.7–10.5)
CHLORIDE SERPL-SCNC: 108 MMOL/L (ref 95–110)
CO2 SERPL-SCNC: 22 MMOL/L (ref 23–29)
CREAT SERPL-MCNC: 1.1 MG/DL (ref 0.5–1.4)
ERYTHROCYTE [DISTWIDTH] IN BLOOD BY AUTOMATED COUNT: 13.2 % (ref 11.5–14.5)
GFR SERPLBLD CREATININE-BSD FMLA CKD-EPI: >60 ML/MIN/1.73/M2
GLUCOSE SERPL-MCNC: 157 MG/DL (ref 70–110)
HCT VFR BLD AUTO: 33.1 % (ref 40–54)
HGB BLD-MCNC: 10.8 GM/DL (ref 14–18)
IMM GRANULOCYTES # BLD AUTO: 0.04 K/UL (ref 0–0.04)
IMM GRANULOCYTES NFR BLD AUTO: 0.6 % (ref 0–0.5)
LYMPHOCYTES # BLD AUTO: 1.97 K/UL (ref 1–4.8)
MAGNESIUM SERPL-MCNC: 2 MG/DL (ref 1.6–2.6)
MCH RBC QN AUTO: 29.4 PG (ref 27–31)
MCHC RBC AUTO-ENTMCNC: 32.6 G/DL (ref 32–36)
MCV RBC AUTO: 90 FL (ref 82–98)
NUCLEATED RBC (/100WBC) (OHS): 0 /100 WBC
PHOSPHATE SERPL-MCNC: 4.3 MG/DL (ref 2.7–4.5)
PLATELET # BLD AUTO: 358 K/UL (ref 150–450)
PMV BLD AUTO: 10.1 FL (ref 9.2–12.9)
POCT GLUCOSE: 160 MG/DL (ref 70–110)
POCT GLUCOSE: 268 MG/DL (ref 70–110)
POCT GLUCOSE: 288 MG/DL (ref 70–110)
POCT GLUCOSE: 307 MG/DL (ref 70–110)
POTASSIUM SERPL-SCNC: 3.7 MMOL/L (ref 3.5–5.1)
RBC # BLD AUTO: 3.67 M/UL (ref 4.6–6.2)
RELATIVE EOSINOPHIL (OHS): 1 %
RELATIVE LYMPHOCYTE (OHS): 27.4 % (ref 18–48)
RELATIVE MONOCYTE (OHS): 9.2 % (ref 4–15)
RELATIVE NEUTROPHIL (OHS): 61.2 % (ref 38–73)
SM  ANTIBODY (OHS): 0.1 RATIO
SM INTERPRETATION (OHS): NEGATIVE
SM/RNP ANTIBODY (OHS): 0.12 RATIO
SM/RNP INTERPRETATION (OHS): NEGATIVE
SODIUM SERPL-SCNC: 141 MMOL/L (ref 136–145)
SSA  ANTIBODY (OHS): 0.34 RATIO (ref 0–0.99)
SSA INTERPRETATION (OHS): NEGATIVE
WBC # BLD AUTO: 7.2 K/UL (ref 3.9–12.7)

## 2025-04-30 PROCEDURE — 80048 BASIC METABOLIC PNL TOTAL CA: CPT | Mod: HCNC

## 2025-04-30 PROCEDURE — 84100 ASSAY OF PHOSPHORUS: CPT | Mod: HCNC

## 2025-04-30 PROCEDURE — 25000003 PHARM REV CODE 250: Mod: HCNC

## 2025-04-30 PROCEDURE — 63600175 PHARM REV CODE 636 W HCPCS: Mod: HCNC

## 2025-04-30 PROCEDURE — 83735 ASSAY OF MAGNESIUM: CPT | Mod: HCNC

## 2025-04-30 PROCEDURE — 11000001 HC ACUTE MED/SURG PRIVATE ROOM: Mod: HCNC

## 2025-04-30 PROCEDURE — 97535 SELF CARE MNGMENT TRAINING: CPT | Mod: HCNC,CO

## 2025-04-30 PROCEDURE — 97110 THERAPEUTIC EXERCISES: CPT | Mod: HCNC,CQ

## 2025-04-30 PROCEDURE — 99900035 HC TECH TIME PER 15 MIN (STAT): Mod: HCNC

## 2025-04-30 PROCEDURE — 97116 GAIT TRAINING THERAPY: CPT | Mod: HCNC,CQ

## 2025-04-30 PROCEDURE — 85025 COMPLETE CBC W/AUTO DIFF WBC: CPT | Mod: HCNC

## 2025-04-30 PROCEDURE — 36415 COLL VENOUS BLD VENIPUNCTURE: CPT | Mod: HCNC

## 2025-04-30 PROCEDURE — 94761 N-INVAS EAR/PLS OXIMETRY MLT: CPT | Mod: HCNC

## 2025-04-30 PROCEDURE — 97530 THERAPEUTIC ACTIVITIES: CPT | Mod: HCNC,CO

## 2025-04-30 RX ORDER — ZOLPIDEM TARTRATE 5 MG/1
TABLET ORAL
Qty: 30 TABLET | Refills: 0 | Status: SHIPPED | OUTPATIENT
Start: 2025-04-30 | End: 2025-04-30

## 2025-04-30 RX ORDER — ZOLPIDEM TARTRATE 5 MG/1
TABLET ORAL
Qty: 30 TABLET | Refills: 0 | Status: SHIPPED | OUTPATIENT
Start: 2025-04-30

## 2025-04-30 RX ADMIN — PREDNISONE 25 MG: 20 TABLET ORAL at 08:04

## 2025-04-30 RX ADMIN — LIDOCAINE 1 PATCH: 50 PATCH CUTANEOUS at 12:04

## 2025-04-30 RX ADMIN — INSULIN ASPART 1 UNITS: 100 INJECTION, SOLUTION INTRAVENOUS; SUBCUTANEOUS at 09:04

## 2025-04-30 RX ADMIN — EMPAGLIFLOZIN 10 MG: 10 TABLET, FILM COATED ORAL at 08:04

## 2025-04-30 RX ADMIN — GABAPENTIN 600 MG: 300 CAPSULE ORAL at 08:04

## 2025-04-30 RX ADMIN — SENNOSIDES AND DOCUSATE SODIUM 1 TABLET: 50; 8.6 TABLET ORAL at 08:04

## 2025-04-30 RX ADMIN — COLCHICINE 0.6 MG: 0.6 TABLET ORAL at 08:04

## 2025-04-30 RX ADMIN — ALLOPURINOL 50 MG: 300 TABLET ORAL at 08:04

## 2025-04-30 RX ADMIN — INSULIN ASPART 3 UNITS: 100 INJECTION, SOLUTION INTRAVENOUS; SUBCUTANEOUS at 12:04

## 2025-04-30 RX ADMIN — INSULIN GLARGINE 15 UNITS: 100 INJECTION, SOLUTION SUBCUTANEOUS at 08:04

## 2025-04-30 RX ADMIN — CLOPIDOGREL BISULFATE 75 MG: 75 TABLET, FILM COATED ORAL at 08:04

## 2025-04-30 RX ADMIN — INSULIN ASPART 4 UNITS: 100 INJECTION, SOLUTION INTRAVENOUS; SUBCUTANEOUS at 04:04

## 2025-04-30 RX ADMIN — LOSARTAN POTASSIUM 50 MG: 50 TABLET, FILM COATED ORAL at 08:04

## 2025-04-30 RX ADMIN — ASPIRIN 81 MG: 81 TABLET, COATED ORAL at 08:04

## 2025-04-30 RX ADMIN — ZOLPIDEM TARTRATE 5 MG: 5 TABLET, FILM COATED ORAL at 08:04

## 2025-04-30 RX ADMIN — DICLOFENAC SODIUM 2 G: 10 GEL TOPICAL at 11:04

## 2025-04-30 NOTE — ASSESSMENT & PLAN NOTE
- Patient doing well s/p diuresis  - Will continue to monitor volume status and add diuresis as needed

## 2025-04-30 NOTE — DISCHARGE SUMMARY
[facility] Medicine  Discharge Summary      Patient Name: Clifton Wilson  MRN: [id number]  EMIR: [id number]  Patient Class: IP- Inpatient  Admission Date: 4/24/2025  Hospital Length of Stay: 5 days  Discharge Date and Time: 04/30/2025 1:49 PM  Attending Physician: Jenna Small MD   Discharging Provider: Johnnie Kirk MD  Primary Care Provider: Britton Grissom MD    Primary Care Team: Networked reference to record PCT     HPI:   Patient is a 75 yo male w/ PMHx of CAD, HTN, HLD, HFrEF 40-45% s/p ICD x7 ,Type 2 DM and gout who presents to the Emergency Department with worsening shortness of breath and progressive bilateral lower extremity edema over the past 3 days. He was seen by his primary care physician two days ago, at which time it was noted that he had not been on any diuretic therapy for approximately two months. Due to signs of fluid overload, furosemide (Lasix) 20 mg daily was restarted. The patient has since taken two doses and reports increased urinary output but no significant improvement in his shortness of breath. He denies chest pain, orthopnea, or upper respiratory symptoms.    He also endorses a gradual but marked functional decline over the last several months, including worsening generalized weakness and reduced mobility, requiring frequent rest while ambulating at home. He was recently recommended for inpatient rehabilitation by his PCP, but has not yet received any follow-up communication regarding the referral.    Notably, the patient was admitted on 04/08 for hypotension and volume depletion. At that time, he was seen in an outpatient setting and noted to have systolic blood pressure in the low 100s, but was referred to the ED when it dropped into the 80s. He was evaluated and treated for hypotension during that admission. He states that following discharge, no diuretic therapy was prescribed.     In the ED, initial vital signs /58 mmhg, HR 84, Temp 98.2, SpO2  100% on room air. Labs include CBC with stable H/H 11.2/34.1 and WBC within normal limits . CMP with Na 141, K 3.4,  and BUN/Cr 18/1.1 (baseline Cr 1.1), Troponin 0.029, , Uric Acid 6.1,. CXR The cardiac silhouette is normal in size, midline.  The pulmonary vascularity is normal.  Coronary artery calcifications seen. No focal area of airspace consolidation.  No pleural effusion.  No pneumothorax. EKG showed Right bundle branch block. Septal infarct ,age undetermined .  Initiated on Laxis 20 mg. [facility] Team admitted the patient with CHF exacerbation.      * No surgery found *      Hospital Course:   Clifton Wilson is a 74 year old male with past medical history of CAD, HTN, HLD, HFrEF 40-45% s/p ICD x7 ,Type 2 DM, gout and Cervical Spondylolysis who presents to the Emergency Department with worsening shortness of breath and progressive bilateral lower extremity edema over the past 3 days.  Patient was admitted to the hospital for CHF exacerbation and volume overload and was started on IV Lasix for diuresis.  Cardiology was also consulted and troponin was trended.  Patient responded well to diuresis, however during his stay he continued to have weakness.  This raise concerns for possible rheumatologic origin and labs were drawn.  Patient was started on a short course of steroids during his time in the hospital on the steroids, patient showed great improvement in weakness.  Patient was able to work with Physical therapy recommended to do moderate intensity therapy. Patient continued to have improvement in functional status, respiratory status, and decreased leg pain. Patient's kidneys were noted to have trending up BUN/Cr, held lasix and provided small amount of fluids and this improved greatly.     At discharge:   - Provided patient with referrals to Rheumatology    Patient medically stable for discharge at this time. Return precautions discussed. All questions answered. Patient verbalized  "understanding.     Physical Exam  Constitutional:       General: He is not in acute distress.     Appearance: He is not toxic-appearing or diaphoretic.   HENT:      Head: Normocephalic and atraumatic.      Right Ear: External ear normal.      Left Ear: External ear normal.      Nose: Nose normal.   Eyes:      Extraocular Movements: Extraocular movements intact.   Cardiovascular:      Pulses: Normal pulses.      Heart sounds: Normal heart sounds. No murmur heard.     No gallop.   Pulmonary:      Effort: Pulmonary effort is normal. No respiratory distress.      Breath sounds: Normal breath sounds. No wheezing.   Musculoskeletal:         General: Normal range of motion.   Skin:     General: Skin is warm and dry.   Neurological:      Mental Status: He is alert.   Psychiatric:         Mood and Affect: Mood normal.           Goals of Care Treatment Preferences:  Code Status: Full Code      SDOH Screening:  The patient was screened for utility difficulties, food insecurity, transport difficulties, housing insecurity, and interpersonal safety and there were no concerns identified this admission.     Consults:   Consults (From admission, onward)          Status Ordering Provider     Inpatient consult to Cardiology-[facility]  Once        Provider:  (Not yet assigned)    Completed SHIN OZUNA     Inpatient consult to Social Work  Once        Provider:  (Not yet assigned)    Completed SHIN OZUNA            Assessment & Plan  Acute on chronic combined systolic and diastolic congestive heart failure  Patient has Combined Systolic and Diastolic heart failure that is Acute on chronic. On presentation their CHF was decompensated. Evidence of decompensated CHF on presentation includes: edema and shortness of breath. The etiology of their decompensation is likely medication non-compliance. Most recent BNP and echo results are listed below.  No results for input(s): "BNP" in the last 72 hours.    Latest ECHO  Results for " orders placed during the hospital encounter of 03/18/25    Echo Saline Bubble? Yes    Interpretation Summary    Left Ventricle: The left ventricle is mildly dilated. There is eccentric hypertrophy. Regional wall motion abnormalities present. See diagram for wall motion findings. There is mildly reduced systolic function with a visually estimated ejection fraction of 40 - 45%. Quantitated ejection fraction is 43%. There is diastolic dysfunction. Normal left ventricular filling pressure.    Right Ventricle: The right ventricle has mild enlargement. Systolic function is mildly reduced. Pacemaker lead present in the ventricle.    Pulmonary Artery: The estimated pulmonary artery systolic pressure is 17 mmHg.    IVC/SVC: Normal venous pressure at 3 mmHg.    Current Heart Failure Medications  losartan tablet 50 mg, Daily, Oral  empagliflozin (Jardiance) tablet 10 mg, Daily, Oral    POCUS done in ED :Bilateral lung sliding is present. A-lines visualized throughout anterior lung fields. No B-lines, consolidations, pleural effusion, or pneumothorax identified. Pleural line is smooth and continuous.  Cardiac POCUS reveals normal left ventricular systolic function with no regional wall motion abnormalities. Right ventricle is normal in size. No pericardial effusion.     Plan  - Will continue to monitor  - Will add lasix as needed  - Will give small 250cc bolus given recent increase in Cr, possible overdiuresis  DM type 2 without retinopathy  Patient's FSGs are controlled on current medication regimen.  Last A1c reviewed-   Lab Results   Component Value Date    HGBA1C 6.9 (H) 04/24/2025     Most recent fingerstick glucose reviewed-   Recent Labs   Lab 04/29/25  1613 04/29/25  2040 04/30/25  0602 04/30/25  1121   POCTGLUCOSE 365* 356* 160* 288*     Current correctional scale  Low  Maintain anti-hyperglycemic dose as follows-   Antihyperglycemics (From admission, onward)      Start     Stop Route Frequency Ordered    04/29/25 1530   insulin glargine U-100 (Lantus) pen 15 Units         -- SubQ Nightly 04/29/25 1521    04/27/25 1400  empagliflozin (Jardiance) tablet 10 mg        Question Answer Comment   Does this patient have a diagnosis of heart failure? Yes    Does this patient have type 1 diabetes or diabetic ketoacidosis? No    Does this patient have symptomatic hypotension? No    Is the patient NPO or pending major surgery in next 3 days or less? No        -- Oral Daily 04/27/25 1250    04/24/25 1743  insulin aspart U-100 pen 0-5 Units         -- SubQ Before meals & nightly PRN 04/24/25 1645          Plan  -Monitor glucose.Glucose goal 140-180  -SSI  Essential hypertension  Patient's blood pressure range in the last 24 hours was: BP  Min: 114/75  Max: 154/86.The patient's inpatient anti-hypertensive regimen is listed below:  Current Antihypertensives  nitroGLYCERIN SL tablet 0.4 mg, Every 5 min PRN, Sublingual  losartan tablet 50 mg, Daily, Oral    Plan  - BP is controlled, no changes needed to their regimen  - Will continue BP medications as appropriate  Gout  Patient has known history of Gout. Patient hasn't had a flare in a couple of years.    Plan   Continue current medications.    Muscular weakness  The patient reports progressive muscle weakness, particularly affecting the lower extremities, over the past several months. He experiences significant difficulty with ambulation and prolonged standing due to this weakness. He was recently admitted to a rehabilitation facility to address his declining mobility. An extensive workup has been performed, with positive antinuclear antibodies (MARLEN) as the only notable finding thus far.     Plan  -PT/OT; will follow up for possible placement for therapy   -Continue Gabapentin   -Prednisone 25 mg daily for 5 days   - Follow up Rheum labs, outpatient referral to rheum    Cervical spondylolysis  Patient has a history of Chronic back pain, with numbness and tingling. Patient has diagnosis of Cervical  Spondylolysis.    Plan  Continue home medications.    Troponin level elevated  Patient presented with shortness of breath and mildly elevated Troponin 0.029, .    Plan  RESOLVED      Final Active Diagnoses:    Diagnosis Date Noted POA    PRINCIPAL PROBLEM:  Acute on chronic combined systolic and diastolic congestive heart failure [I50.43] 12/07/2015 Yes    Troponin level elevated [R79.89] 04/24/2025 Yes    Cervical spondylolysis [M43.02] 03/22/2025 Not Applicable    Muscular weakness [M62.81] 03/20/2025 Unknown    Gout [M10.9] 03/18/2025 Yes    Essential hypertension [I10] 04/24/2017 Yes    DM type 2 without retinopathy [E11.9] 04/24/2017 Yes      Problems Resolved During this Admission:       Discharged Condition: stable    Disposition:     Follow Up:   Follow-up Information       RHEUMATOLOGY Follow up.    Why: REQUIRES RHEUMATOLOGY FOLLOW UP                         Patient Instructions:      Ambulatory referral/consult to Outpatient Case Management   Referral Priority: Routine Referral Type: Consultation   Referral Reason: Specialty Services Required   Number of Visits Requested: 1     Ambulatory referral/consult to Neurology   Standing Status: Future   Referral Priority: Routine Referral Type: Consultation   Referral Reason: Specialty Services Required   Requested Specialty: Neurology   Number of Visits Requested: 1     Ambulatory referral/consult to Psychiatry   Standing Status: Future   Referral Priority: Routine Referral Type: Psychiatric   Referral Reason: Specialty Services Required   Requested Specialty: Psychiatry   Number of Visits Requested: 1       Significant Diagnostic Studies: Labs: CMP   Recent Labs   Lab 04/29/25  0424 04/29/25  1447 04/30/25  0552    137 141   K 3.3* 4.4 3.7    102 108   CO2 22* 23 22*   * 379* 157*   BUN 46* 47* 43*   CREATININE 1.2 1.3 1.1   CALCIUM 9.3 9.4 9.5   ANIONGAP 13 12 11    and CBC   Recent Labs   Lab 04/29/25  0424 04/30/25  0552   WBC 7.51  7.20   HGB 11.0* 10.8*   HCT 34.2* 33.1*    358       Pending Diagnostic Studies:       Procedure Component Value Units Date/Time    Anti Sm/RNP Antibody [[id number]] Collected: 04/27/25 0919    Order Status: Sent Lab Status: In process Updated: 04/28/25 1557    Specimen: Blood     Anti-Smith Antibody [[id number]] Collected: 04/27/25 0919    Order Status: Sent Lab Status: In process Updated: 04/28/25 1557    Specimen: Blood     Basic metabolic panel [[id number]]     Order Status: Sent Lab Status: No result     Specimen: Blood     Magnesium [[id number]]     Order Status: Sent Lab Status: No result     Specimen: Blood     Phosphorus [[id number]]     Order Status: Sent Lab Status: No result     Specimen: Blood     Sjogrens syndrome-A extractable nuclear antibody [[id number]] Collected: 04/27/25 0919    Order Status: Sent Lab Status: In process Updated: 04/28/25 1557    Specimen: Blood     Sjogrens syndrome-B extractable nuclear antibody [[id number]] Collected: 04/27/25 0919    Order Status: Sent Lab Status: In process Updated: 04/28/25 1557    Specimen: Blood            Medications:  Reconciled Home Medications:      Medication List        PAUSE taking these medications      hydrALAZINE 50 MG tablet  Wait to take this until your doctor or other care provider tells you to start again.  Commonly known as: APRESOLINE  Take 1 tablet (50 mg total) by mouth every 8 (eight) hours.            CHANGE how you take these medications      zolpidem 5 MG Tab  Commonly known as: AMBIEN  TAKE 1 TABLET BY MOUTH EVERY NIGHT AS NEEDED  What changed:   how much to take  how to take this  when to take this  reasons to take this  additional instructions            CONTINUE taking these medications      ACCU-CHEK CATHRYN CONTROL SOLN Soln  Generic drug: blood glucose control high,low  Use as directed to check blood sugar     ALCOHOL PREP PAD SPACER  Generic drug: alcohol swabs  USE FOUR TIMES DAILY     aspirin 81 MG EC tablet  Commonly  "known as: ECOTRIN  Take 81 mg by mouth once daily.     BD ULTRA-FINE SINA PEN NEEDLE 32 gauge x 5/32" Ndle  Generic drug: pen needle, diabetic  Use four times daily for insulin administration     clopidogreL 75 mg tablet  Commonly known as: PLAVIX  Take 1 tablet (75 mg total) by mouth once daily.     colchicine 0.6 mg tablet  Commonly known as: COLCRYS  Take 1 tablet (0.6 mg total) by mouth 2 (two) times daily.     cyanocobalamin 1000 MCG tablet  Commonly known as: VITAMIN B-12  Take 1,000 mcg by mouth once daily.     EASY TOUCH TWIST LANCETS 30 gauge Misc  Generic drug: lancets  usea s directed to check blood glucose four times daily     febuxostat 40 mg Tab  Commonly known as: ULORIC  Take 1 tablet (40 mg total) by mouth once daily.     furosemide 20 MG tablet  Commonly known as: LASIX  Take 1 tablet (20 mg total) by mouth 2 (two) times daily.     gabapentin 300 MG capsule  Commonly known as: NEURONTIN  Take 2 capsules (600 mg total) by mouth 2 (two) times daily.     JARDIANCE 10 mg tablet  Generic drug: empagliflozin  Take 1 tablet (10 mg total) by mouth once daily.     LANTUS SOLOSTAR U-100 INSULIN 100 unit/mL (3 mL) Inpn pen  Generic drug: insulin glargine U-100 (Lantus)  Inject 35 Units into the skin every evening.     losartan 50 MG tablet  Commonly known as: COZAAR  Take 1 tablet (50 mg total) by mouth once daily.     metFORMIN 1000 MG tablet  Commonly known as: GLUCOPHAGE  Take 1 tablet (1,000 mg total) by mouth 2 (two) times daily with meals.     nitroGLYCERIN 0.4 MG SL tablet  Commonly known as: NITROSTAT  Place 1 tablet (0.4 mg total) under the tongue every 5 (five) minutes as needed for Chest pain.     NovoLOG Flexpen U-100 Insulin 100 unit/mL (3 mL) Inpn pen  Generic drug: insulin aspart U-100  Inject 15 Units into the skin 3 (three) times daily with meals.     ONE-A-DAY MEN'S 50 PLUS ORAL  Take 1 tablet by mouth once daily.     TRUE METRIX GLUCOSE METER Misc  Generic drug: blood-glucose meter  use as " directed     TRUE METRIX GLUCOSE TEST STRIP Strp  Generic drug: blood sugar diagnostic  test blood sugar as directed four times daily              Indwelling Lines/Drains at time of discharge:   Lines/Drains/Airways       None                   Time spent on the discharge of patient: 45 minutes         Johnnie Kirk MD  Department of [facility] Medicine  [facility]

## 2025-04-30 NOTE — ASSESSMENT & PLAN NOTE
Patient's FSGs are controlled on current medication regimen.  Last A1c reviewed-   Lab Results   Component Value Date    HGBA1C 6.9 (H) 04/24/2025     Most recent fingerstick glucose reviewed-   Recent Labs   Lab 04/29/25  1613 04/29/25  2040 04/30/25  0602 04/30/25  1121   POCTGLUCOSE 365* 356* 160* 288*     Current correctional scale  Low  Maintain anti-hyperglycemic dose as follows-   Antihyperglycemics (From admission, onward)      Start     Stop Route Frequency Ordered    04/29/25 1530  insulin glargine U-100 (Lantus) pen 15 Units         -- SubQ Nightly 04/29/25 1521    04/27/25 1400  empagliflozin (Jardiance) tablet 10 mg        Question Answer Comment   Does this patient have a diagnosis of heart failure? Yes    Does this patient have type 1 diabetes or diabetic ketoacidosis? No    Does this patient have symptomatic hypotension? No    Is the patient NPO or pending major surgery in next 3 days or less? No        -- Oral Daily 04/27/25 1250    04/24/25 1743  insulin aspart U-100 pen 0-5 Units         -- SubQ Before meals & nightly PRN 04/24/25 1645          Plan  -Monitor glucose.Glucose goal 140-180  -SSI

## 2025-04-30 NOTE — PROGRESS NOTES
[facility] Medicine  Progress Note    Patient Name: Clifton Wilson  MRN: [id number]  Patient Class: IP- Inpatient   Admission Date: 4/24/2025  Length of Stay: 5 days  Attending Physician: Jenna Small MD  Primary Care Provider: Britton Grissom MD        Subjective     Principal Problem:Acute on chronic combined systolic and diastolic congestive heart failure        HPI:  Patient is a 75 yo male w/ PMHx of CAD, HTN, HLD, HFrEF 40-45% s/p ICD x7 ,Type 2 DM and gout who presents to the Emergency Department with worsening shortness of breath and progressive bilateral lower extremity edema over the past 3 days. He was seen by his primary care physician two days ago, at which time it was noted that he had not been on any diuretic therapy for approximately two months. Due to signs of fluid overload, furosemide (Lasix) 20 mg daily was restarted. The patient has since taken two doses and reports increased urinary output but no significant improvement in his shortness of breath. He denies chest pain, orthopnea, or upper respiratory symptoms.    He also endorses a gradual but marked functional decline over the last several months, including worsening generalized weakness and reduced mobility, requiring frequent rest while ambulating at home. He was recently recommended for inpatient rehabilitation by his PCP, but has not yet received any follow-up communication regarding the referral.    Notably, the patient was admitted on 04/08 for hypotension and volume depletion. At that time, he was seen in an outpatient setting and noted to have systolic blood pressure in the low 100s, but was referred to the ED when it dropped into the 80s. He was evaluated and treated for hypotension during that admission. He states that following discharge, no diuretic therapy was prescribed.     In the ED, initial vital signs /58 mmhg, HR 84, Temp 98.2, SpO2 100% on room air. Labs include CBC with stable H/H 11.2/34.1  and WBC within normal limits . CMP with Na 141, K 3.4,  and BUN/Cr 18/1.1 (baseline Cr 1.1), Troponin 0.029, , Uric Acid 6.1,. CXR The cardiac silhouette is normal in size, midline.  The pulmonary vascularity is normal.  Coronary artery calcifications seen. No focal area of airspace consolidation.  No pleural effusion.  No pneumothorax. EKG showed Right bundle branch block. Septal infarct ,age undetermined .  Initiated on Laxis 20 mg. [facility] Family Medicine Team admitted the patient with CHF exacerbation.      Overview/Hospital Course:  Clifton Wilson is a 74 year old male with past medical history of CAD, HTN, HLD, HFrEF 40-45% s/p ICD x7 ,Type 2 DM, gout and Cervical Spondylolysis who presents to the Emergency Department with worsening shortness of breath and progressive bilateral lower extremity edema over the past 3 days.  Patient was admitted to the hospital for CHF exacerbation and volume overload and was started on IV Lasix for diuresis.  Cardiology was also consulted and troponin was trended.  Patient responded well to diuresis, however during his stay he continued to have weakness.  This raise concerns for possible rheumatologic origin and labs were drawn.  Patient was started on a short course of steroids during his time in the hospital on the steroids, patient showed great improvement in weakness.  Patient was able to work with Physical therapy recommended to do moderate intensity therapy. Patient continued to have improvement in functional status, respiratory status, and decreased leg pain. Patient's kidneys were noted to have trending up BUN/Cr, held lasix and provided small amount of fluids and this improved greatly.     At discharge:   - Provided patient with referrals to Rheumatology    Patient medically stable for discharge at this time. Return precautions discussed. All questions answered. Patient verbalized understanding.      Interval History: Patient seen this AM, still doing well,  kidney function appears improved, continued work with PT/OT, will follow up placement.    Review of Systems   Constitutional:  Negative for chills and fever.   Respiratory:  Negative for shortness of breath.    Cardiovascular:  Negative for chest pain.   Gastrointestinal:  Negative for abdominal pain, nausea and vomiting.   Neurological:  Negative for dizziness and light-headedness.     Objective:     Vital Signs (Most Recent):  Temp: 97.4 °F (36.3 °C) (04/30/25 1143)  Pulse: 75 (04/30/25 1155)  Resp: 18 (04/30/25 1143)  BP: 114/75 (04/30/25 1143)  SpO2: 99 % (04/30/25 1143) Vital Signs (24h Range):  Temp:  [97.4 °F (36.3 °C)-98 °F (36.7 °C)] 97.4 °F (36.3 °C)  Pulse:  [60-78] 75  Resp:  [17-19] 18  SpO2:  [95 %-99 %] 99 %  BP: (114-154)/(75-86) 114/75     Weight: 97 kg (213 lb 13.5 oz)  Body mass index is 29.83 kg/m².    Intake/Output Summary (Last 24 hours) at 4/30/2025 1544  Last data filed at 4/30/2025 0923  Gross per 24 hour   Intake --   Output 1850 ml   Net -1850 ml         Physical Exam  Constitutional:       General: He is not in acute distress.     Appearance: He is not toxic-appearing.   HENT:      Head: Normocephalic and atraumatic.      Nose: Nose normal.   Cardiovascular:      Pulses: Normal pulses.      Heart sounds: Normal heart sounds.   Pulmonary:      Effort: Pulmonary effort is normal.      Breath sounds: Normal breath sounds.   Skin:     General: Skin is warm and dry.   Neurological:      Mental Status: He is alert.   Psychiatric:         Mood and Affect: Mood normal.               Significant Labs: All pertinent labs within the past 24 hours have been reviewed.  CBC:   Recent Labs   Lab 04/29/25 0424 04/30/25  0552   WBC 7.51 7.20   HGB 11.0* 10.8*   HCT 34.2* 33.1*    358     CMP:   Recent Labs   Lab 04/29/25  0424 04/29/25  1447 04/30/25  0552    137 141   K 3.3* 4.4 3.7    102 108   CO2 22* 23 22*   * 379* 157*   BUN 46* 47* 43*   CREATININE 1.2 1.3 1.1   CALCIUM  9.3 9.4 9.5   ANIONGAP 13 12 11       Significant Imaging: I have reviewed all pertinent imaging results/findings within the past 24 hours.      Assessment & Plan  Acute on chronic combined systolic and diastolic congestive heart failure  - Patient doing well s/p diuresis  - Will continue to monitor volume status and add diuresis as needed  DM type 2 without retinopathy  Patient's FSGs are controlled on current medication regimen.  Last A1c reviewed-   Lab Results   Component Value Date    HGBA1C 6.9 (H) 04/24/2025     Most recent fingerstick glucose reviewed-   Recent Labs   Lab 04/29/25  1613 04/29/25  2040 04/30/25  0602 04/30/25  1121   POCTGLUCOSE 365* 356* 160* 288*     Current correctional scale  Low  Maintain anti-hyperglycemic dose as follows-   Antihyperglycemics (From admission, onward)      Start     Stop Route Frequency Ordered    04/29/25 1530  insulin glargine U-100 (Lantus) pen 15 Units         -- SubQ Nightly 04/29/25 1521    04/27/25 1400  empagliflozin (Jardiance) tablet 10 mg        Question Answer Comment   Does this patient have a diagnosis of heart failure? Yes    Does this patient have type 1 diabetes or diabetic ketoacidosis? No    Does this patient have symptomatic hypotension? No    Is the patient NPO or pending major surgery in next 3 days or less? No        -- Oral Daily 04/27/25 1250    04/24/25 1743  insulin aspart U-100 pen 0-5 Units         -- SubQ Before meals & nightly PRN 04/24/25 1645          Plan  -Monitor glucose.Glucose goal 140-180  -SSI  Essential hypertension  - Continue losartan 50mg  - CTM, add BP meds as appropriate  Gout  Patient has known history of Gout. Patient hasn't had a flare in a couple of years.    Plan   Continue current medications.    Muscular weakness  The patient reports progressive muscle weakness, particularly affecting the lower extremities, over the past several months. He experiences significant difficulty with ambulation and prolonged standing due  to this weakness. He was recently admitted to a rehabilitation facility to address his declining mobility. An extensive workup has been performed, with positive antinuclear antibodies (MARLEN) as the only notable finding thus far.     Plan  -PT/OT; will follow up for possible placement for therapy   -Continue Gabapentin   -Prednisone 25 mg daily for 5 days   - Refer to [facility] as outpatient    Cervical spondylolysis  Patient has a history of Chronic back pain, with numbness and tingling. Patient has diagnosis of Cervical Spondylolysis.    Plan  Continue home medications.    VTE Risk Mitigation (From admission, onward)           Ordered     Reason for No Pharmacological VTE Prophylaxis  Once        Question:  Reasons:  Answer:  Already adequately anticoagulated on oral Anticoagulants    04/24/25 1645     IP VTE HIGH RISK PATIENT  Once         04/24/25 1645     Place sequential compression device  Until discontinued         04/24/25 1645                    Discharge Planning   MARTHA: 4/30/2025     Code Status: Full Code   Medical Readiness for Discharge Date:   Discharge Plan A: Skilled Nursing Facility   Discharge Delays: (!) Consult Recommendations Completed            Please place Justification for DME        Johnnie Kirk MD  Department of Hospital Medicine   [facility]

## 2025-04-30 NOTE — SUBJECTIVE & OBJECTIVE
Interval History: Patient seen this AM, still doing well, kidney function appears improved, continued work with PT/OT, will follow up placement.    Review of Systems   Constitutional:  Negative for chills and fever.   Respiratory:  Negative for shortness of breath.    Cardiovascular:  Negative for chest pain.   Gastrointestinal:  Negative for abdominal pain, nausea and vomiting.   Neurological:  Negative for dizziness and light-headedness.     Objective:     Vital Signs (Most Recent):  Temp: 97.4 °F (36.3 °C) (04/30/25 1143)  Pulse: 75 (04/30/25 1155)  Resp: 18 (04/30/25 1143)  BP: 114/75 (04/30/25 1143)  SpO2: 99 % (04/30/25 1143) Vital Signs (24h Range):  Temp:  [97.4 °F (36.3 °C)-98 °F (36.7 °C)] 97.4 °F (36.3 °C)  Pulse:  [60-78] 75  Resp:  [17-19] 18  SpO2:  [95 %-99 %] 99 %  BP: (114-154)/(75-86) 114/75     Weight: 97 kg (213 lb 13.5 oz)  Body mass index is 29.83 kg/m².    Intake/Output Summary (Last 24 hours) at 4/30/2025 1544  Last data filed at 4/30/2025 0923  Gross per 24 hour   Intake --   Output 1850 ml   Net -1850 ml         Physical Exam  Constitutional:       General: He is not in acute distress.     Appearance: He is not toxic-appearing.   HENT:      Head: Normocephalic and atraumatic.      Nose: Nose normal.   Cardiovascular:      Pulses: Normal pulses.      Heart sounds: Normal heart sounds.   Pulmonary:      Effort: Pulmonary effort is normal.      Breath sounds: Normal breath sounds.   Skin:     General: Skin is warm and dry.   Neurological:      Mental Status: He is alert.   Psychiatric:         Mood and Affect: Mood normal.               Significant Labs: All pertinent labs within the past 24 hours have been reviewed.  CBC:   Recent Labs   Lab 04/29/25  0424 04/30/25  0552   WBC 7.51 7.20   HGB 11.0* 10.8*   HCT 34.2* 33.1*    358     CMP:   Recent Labs   Lab 04/29/25  0424 04/29/25  1447 04/30/25  0552    137 141   K 3.3* 4.4 3.7    102 108   CO2 22* 23 22*   * 379*  157*   BUN 46* 47* 43*   CREATININE 1.2 1.3 1.1   CALCIUM 9.3 9.4 9.5   ANIONGAP 13 12 11       Significant Imaging: I have reviewed all pertinent imaging results/findings within the past 24 hours.

## 2025-04-30 NOTE — ASSESSMENT & PLAN NOTE
The patient reports progressive muscle weakness, particularly affecting the lower extremities, over the past several months. He experiences significant difficulty with ambulation and prolonged standing due to this weakness. He was recently admitted to a rehabilitation facility to address his declining mobility. An extensive workup has been performed, with positive antinuclear antibodies (MARLEN) as the only notable finding thus far.     Plan  -PT/OT; will follow up for possible placement for therapy   -Continue Gabapentin   -Prednisone 25 mg daily for 5 days   - Refer to [facility] as outpatient

## 2025-04-30 NOTE — PROGRESS NOTES
1516  -- CM spoke with Daksha  - she has not yet rec'd auth -- pt to d/c to [facility] Thurs 5/1   CM met with pt and wife Marie  [phone]  - explained that auth hasn't yet been rec'd - d/c expected Thurs 5/1.    Wife has completed paperwork   CM  emailed  142, cxr, ppd and covid to her at [email or web address]  Daksha will get orders from Epic to review.   IB messages sent to [place] and [facility] Rheumatology requesting an appointment.   Pt's upcoming apts emailed to Daksha LINDSEY asked to update orders 5/1   Pt is not on supplemental O2; able to transport per WC  dx:  Acute on chronic combined systolic and diastolic congestive heart failure    Future Appointments   Date Time Provider Department Center   5/6/2025 11:00 AM Sophie Pham MD [facility]   5/6/2025  1:40 PM Luis Felipe Wright MD [facility] CARDIO [place] [facility]   7/7/2025  9:40 AM Britton Grissom MD [facility] FAM MED [place] [facility]   7/25/2025  9:20 AM Genaro Flores MD [facility] NEURO [place] [facility]

## 2025-04-30 NOTE — PLAN OF CARE
Problem: Adult Inpatient Plan of Care  Goal: Plan of Care Review  Outcome: Progressing  Goal: Optimal Comfort and Wellbeing  Outcome: Progressing     Problem: Diabetes Comorbidity  Goal: Blood Glucose Level Within Targeted Range  Outcome: Progressing     Problem: Fall Injury Risk  Goal: Absence of Fall and Fall-Related Injury  Outcome: Progressing

## 2025-04-30 NOTE — CARE UPDATE
[facility] [place]  [address] [place]  [address]  [phone]           FACILITY TRANSFER ORDERS           Admit to:  Skilled Bed(NH)         Diagnoses:   Active Hospital Problems    Diagnosis  POA    *Acute on chronic combined systolic and diastolic congestive heart failure [I50.43]  Yes    Cervical spondylolysis [M43.02]  Not Applicable    Muscular weakness [M62.81]  Unknown    Gout [M10.9]  Yes    Essential hypertension [I10]  Yes    DM type 2 without retinopathy [E11.9]  Yes      Resolved Hospital Problems   No resolved problems to display.        Goals of Care Treatment Preferences:  Code Status: Full Code         Allergies:Review of patient's allergies indicates:  No Known Allergies    Vitals:      Every shift (Skilled Nursing patients)       Diet:Low sodium diet, 1500mL fluid restriction                             Activities:      - Up in a chair each morning as tolerated   - Ambulate with assistance to bathroom   - Scheduled walks once each shift (every 8 hours)   - May use walker, cane, or self-propelled wheelchair   - Weight bearing: As tolerated        LABS:  Per facility protocol    Nursing Precautions:    - Aspiration precautions:               -  Upright 90 degrees before during and after meals               -  Suction at bedside          - Fall precautions per nursing home protocol       CONSULTS:               Physical Therapy to evaluate and treat                 Occupational Therapy to evaluate and treat                 Nutrition to evaluate and recommend diet                    DIABETES CARE:       Check blood glucose once per day AM                 Report CBG < 60 or > 400 to physician.                                           Insulin Sliding Scale                 Glucose                      Novolog Insulin Subcutaneous               0 - 60                          Orange juice or glucose tablet, hold insulin                                       No insulin        "       201-250                       2 units              251-300                       4 units              301-350                       6 units              351-400                       8 units              >400                            10 units then call physician        Medications: Discontinue all previous medication orders, if any. See new list below.     Medication List        CHANGE how you take these medications      zolpidem 5 MG Tab  Commonly known as: AMBIEN  TAKE 1 TABLET BY MOUTH EVERY NIGHT AS NEEDED  What changed:   how much to take  how to take this  when to take this  reasons to take this  additional instructions            CONTINUE taking these medications      ACCU-CHEK CATHRYN CONTROL SOLN Soln  Generic drug: blood glucose control high,low  Use as directed to check blood sugar     ALCOHOL PREP PAD SPACER  Generic drug: alcohol swabs  USE FOUR TIMES DAILY     aspirin 81 MG EC tablet  Commonly known as: ECOTRIN  Take 81 mg by mouth once daily.     BD ULTRA-FINE SINA PEN NEEDLE 32 gauge x 5/32" Ndle  Generic drug: pen needle, diabetic  Use four times daily for insulin administration     clopidogreL 75 mg tablet  Commonly known as: PLAVIX  Take 1 tablet (75 mg total) by mouth once daily.     colchicine 0.6 mg tablet  Commonly known as: COLCRYS  Take 1 tablet (0.6 mg total) by mouth 2 (two) times daily.     cyanocobalamin 1000 MCG tablet  Commonly known as: VITAMIN B-12  Take 1,000 mcg by mouth once daily.     EASY TOUCH TWIST LANCETS 30 gauge Misc  Generic drug: lancets  usea s directed to check blood glucose four times daily     febuxostat 40 mg Tab  Commonly known as: ULORIC  Take 1 tablet (40 mg total) by mouth once daily.     furosemide 20 MG tablet  Commonly known as: LASIX  Take 1 tablet (20 mg total) by mouth 2 (two) times daily.     gabapentin 300 MG capsule  Commonly known as: NEURONTIN  Take 2 capsules (600 mg total) by mouth 2 (two) times daily.     JARDIANCE 10 mg tablet  Generic drug: " empagliflozin  Take 1 tablet (10 mg total) by mouth once daily.     LANTUS SOLOSTAR U-100 INSULIN 100 unit/mL (3 mL) Inpn pen  Generic drug: insulin glargine U-100 (Lantus)  Inject 35 Units into the skin every evening.     losartan 50 MG tablet  Commonly known as: COZAAR  Take 1 tablet (50 mg total) by mouth once daily.     metFORMIN 1000 MG tablet  Commonly known as: GLUCOPHAGE  Take 1 tablet (1,000 mg total) by mouth 2 (two) times daily with meals.     nitroGLYCERIN 0.4 MG SL tablet  Commonly known as: NITROSTAT  Place 1 tablet (0.4 mg total) under the tongue every 5 (five) minutes as needed for Chest pain.     NovoLOG Flexpen U-100 Insulin 100 unit/mL (3 mL) Inpn pen  Generic drug: insulin aspart U-100  Inject 15 Units into the skin 3 (three) times daily with meals.     ONE-A-DAY MEN'S 50 PLUS ORAL  Take 1 tablet by mouth once daily.     TRUE METRIX GLUCOSE METER Misc  Generic drug: blood-glucose meter  use as directed     TRUE METRIX GLUCOSE TEST STRIP Strp  Generic drug: blood sugar diagnostic  test blood sugar as directed four times daily            STOP taking these medications      hydrALAZINE 50 MG tablet  Commonly known as: APRESOLINE

## 2025-04-30 NOTE — PT/OT/SLP PROGRESS
Occupational Therapy   Treatment    Name: Clifton Wilson  MRN: [id number]  Admitting Diagnosis:  Acute on chronic combined systolic and diastolic congestive heart failure       Recommendations:     Discharge Recommendations: Moderate Intensity Therapy  Discharge Equipment Recommendations:  to be determined by next level of care  Barriers to discharge:       Assessment:     Clifton Wilson is a 74 y.o. male with a medical diagnosis of Acute on chronic combined systolic and diastolic congestive heart failure.  Performance deficits affecting function are weakness, impaired endurance, impaired self care skills, impaired functional mobility, gait instability, impaired balance, decreased upper extremity function, decreased lower extremity function, decreased safety awareness, impaired coordination.     Rehab Prognosis:  Good; patient would benefit from acute skilled OT services to address these deficits and reach maximum level of function.       Plan:     Patient to be seen 5 x/week to address the above listed problems via self-care/home management, therapeutic activities, therapeutic exercises  Plan of Care Expires: 05/25/25  Plan of Care Reviewed with: patient    Subjective     Chief Complaint: none  Patient/Family Comments/goals: return to PLOF  Pain/Comfort:  Pain Rating 1: 0/10  Pain Rating Post-Intervention 1: 0/10    Objective:     Communicated with: Richardson felder prior to session.  Patient found up in chair with telemetry upon OT entry to room.    General Precautions: Standard, fall    Orthopedic Precautions:N/A  Braces: N/A  Respiratory Status: Room air    Functional Mobility/Transfers:  Patient completed Sit <> Stand Transfer with minimum assistance and moderate assistance  with  rolling walker   Functional Mobility: ambulated to sink using RW /c CGA    Activities of Daily Living:  Grooming: supervision      [facility] 6 Click ADL: 17    Treatment & Education:  Patient required VCs to scoot fwd and stand from chair  as above  Able to complete ~60% of shaving task in stance with good balance; no buckling  Chair brought to patient to finish task in sitting    Patient left up in chair with all lines intact, call button in reach, and wife present    GOALS:   Multidisciplinary Problems       Occupational Therapy Goals          Problem: Occupational Therapy    Goal Priority Disciplines Outcome Interventions   Occupational Therapy Goal     OT, PT/OT Progressing    Description: Goals to be met by: 5/25/2025     Patient will increase functional independence with ADLs by performing:    Feeding with Modified [place] and Assistive Devices as needed.  UE Dressing with Modified [place].  LE Dressing with Modified [place] and Assistive Devices as needed.  Grooming while standing at sink with Modified [place].  Toileting from bedside commode with Modified [place] for hygiene and clothing management.   Rolling to Bilateral with Modified [place].   Supine to sit with Modified [place].  Step transfer with Modified [place]  Toilet transfer to bedside commode with Modified [place].  Increased functional strength to WFL for ADLS.  Upper extremity exercise program x10 reps per handout, with independence.                         DME Justifications:  No DME recommended requiring DME justifications    Time Tracking:     OT Date of Treatment: 04/30/25  OT Start Time: 1055  OT Stop Time: 1128  OT Total Time (min): 33 min    Billable Minutes:Self Care/Home Management 23  Therapeutic Activity 10    4/30/2025

## 2025-04-30 NOTE — PROGRESS NOTES
Future Appointments   Date Time Provider Department Center   5/6/2025 11:00 AM Sophie Pham MD [facility]   5/6/2025  1:40 PM Luis Felipe [place] MD [facility] CARDIO [place] [facility]   7/7/2025  9:40 AM Britton Grissom MD [facility] FAM MED [place] [facility]   7/25/2025  9:20 AM Genaro Flores MD [facility] NEURO [place] [facility]       RHEUMATOLOGY           Next Steps: Follow up  Instructions: PT REQUIRES RHEUMATOLOGY FOLLOW UP A MESSAGE HAS BEEN SENT PER [facility] [place] [facility] REQUESTING ST AVE'S AND PT'S WIFE BE CONTACTED WITH AN APT.    [facility] [phone] [address] [facility] [place] [address]     Next Steps: Follow up  Instructions: [facility]    Sophie Pham MD  Neurology [phone] [address] [place] [address]     Next Steps: Follow up on 5/6/2025  Instructions: 11:00 am    Luis Felipe Wright MD Hypertension Digital Medicine Responsible Provider Interventional Cardiology Cardiology [phone] [address] [place] [address]     Next Steps: Follow up on 5/6/2025  Instructions: 1:40 pm        Close

## 2025-04-30 NOTE — HOSPITAL COURSE
Clifton Wilson is a 74 year old male with past medical history of CAD, HTN, HLD, HFrEF 40-45% s/p ICD x7 ,Type 2 DM, gout and Cervical Spondylolysis who presents to the Emergency Department with worsening shortness of breath and progressive bilateral lower extremity edema over the past 3 days.  Patient was admitted to the hospital for CHF exacerbation and volume overload and was started on IV Lasix for diuresis.  Cardiology was also consulted and troponin was trended.  Patient responded well to diuresis, however during his stay he continued to have weakness.  This raise concerns for possible rheumatologic origin and labs were drawn.  Patient was started on a short course of steroids during his time in the hospital on the steroids, patient showed great improvement in weakness.  Patient was able to work with Physical therapy recommended to do moderate intensity therapy. Patient continued to have improvement in functional status, respiratory status, and decreased leg pain. Patient's kidneys were noted to have trending up BUN/Cr, held lasix and provided small amount of fluids and this improved greatly.     At discharge:   - Provided patient with referrals to Rheumatology, Neuro, Psych    Patient medically stable for discharge at this time. Return precautions discussed. All questions answered. Patient verbalized understanding.

## 2025-04-30 NOTE — PT/OT/SLP PROGRESS
Physical Therapy Treatment    Patient Name:  Clifton Wilson   MRN:  [id number]    Recommendations:     Discharge Recommendations: Moderate Intensity Therapy  Discharge Equipment Recommendations: to be determined by next level of care  Barriers to discharge:  decreased mobility,strength and endurance    Assessment:     Clifton Wilson is a 74 y.o. male admitted with a medical diagnosis of Acute on chronic combined systolic and diastolic congestive heart failure.  He presents with the following impairments/functional limitations: weakness, impaired endurance, impaired functional mobility, impaired balance, gait instability, decreased upper extremity function, decreased lower extremity function, decreased ROM, impaired coordination, impaired joint extensibility,pt with good participation and requires assistance with mobility at this time,pt will benefit from moderate intensity therapy upon discharge.    Rehab Prognosis: Good; patient would benefit from acute skilled PT services to address these deficits and reach maximum level of function.    Recent Surgery: * No surgery found *      Plan:     During this hospitalization, patient to be seen 5 x/week to address the identified rehab impairments via gait training, therapeutic activities, therapeutic exercises, neuromuscular re-education and progress toward the following goals:    Plan of Care Expires:  05/25/25    Subjective     Chief Complaint: n/a  Patient/Family Comments/goals: pt wants to shave today.  Pain/Comfort:  Pain Rating 1:  (no c/o's)      Objective:     Communicated with nsg prior to session.  Patient found supine with telemetry upon PT entry to room.     General Precautions: Standard, fall  Orthopedic Precautions: N/A  Braces: N/A  Respiratory Status: Room air     Functional Mobility:  Bed Mobility:     Supine to Sit: moderate assistance  Transfers:     Sit to Stand:  minimum assistance and with increased height with rolling walker  Bed to Chair: minimum  assistance with  rolling walker  using  ambulation  Gait: amb ~18' X 2 trials with RW and Min A with decreased pace and foot clearance  Balance: fair standing balance with RW      AM-PAC 6 CLICK MOBILITY  Turning over in bed (including adjusting bedclothes, sheets and blankets)?: 3  Sitting down on and standing up from a chair with arms (e.g., wheelchair, bedside commode, etc.): 2  Moving from lying on back to sitting on the side of the bed?: 3  Moving to and from a bed to a chair (including a wheelchair)?: 3  Need to walk in hospital room?: 3  Climbing 3-5 steps with a railing?: 1  Basic Mobility Total Score: 15       Treatment & Education: le seated ex's X 10-12 reps inc: ap,laq and hip flex,requests met.      Patient left up in chair with all lines intact, call button in reach, and spouse present..    GOALS: see general POC  Multidisciplinary Problems       Physical Therapy Goals          Problem: Physical Therapy    Goal Priority Disciplines Outcome Interventions   Physical Therapy Goal     PT, PT/OT Progressing    Description: Goals to be met by: 25     Patient will increase functional independence with mobility by performin. Supine to sit with Stand-by Assistance  2. Sit to supine with Stand-by Assistance  3. Sit to stand transfer with Minimal Assistance  4. Bed to chair transfer with Minimal Assistance using Rolling Walker  5. Gait  x 25 feet with Minimal Assistance using Rolling Walker.                          DME Justifications:  No DME recommended requiring DME justifications    Time Tracking:     PT Received On: 25  PT Start Time: 0948     PT Stop Time: 1014  PT Total Time (min): 26 min     Billable Minutes: Gait Training 16 and Therapeutic Exercise 10    Treatment Type: Treatment  PT/PTA: PTA     Number of PTA visits since last PT visit: 3     2025

## 2025-04-30 NOTE — ASSESSMENT & PLAN NOTE
Patient's blood pressure range in the last 24 hours was: BP  Min: 114/75  Max: 154/86.The patient's inpatient anti-hypertensive regimen is listed below:  Current Antihypertensives  nitroGLYCERIN SL tablet 0.4 mg, Every 5 min PRN, Sublingual  losartan tablet 50 mg, Daily, Oral    Plan  - BP is controlled, no changes needed to their regimen  - Will continue BP medications as appropriate

## 2025-04-30 NOTE — PROGRESS NOTES
CM spoke with Daksha at [place] [phone].    She has spoken with pt's wife Marie and has submitted for auth.         Subsequent Stages Histo Method Verbiage: Using a similar technique to that described above, a thin layer of tissue was removed from all areas where tumor was visible on the previous stage.  The tissue was again oriented, mapped, dyed, and processed as above.

## 2025-05-01 VITALS
SYSTOLIC BLOOD PRESSURE: 113 MMHG | WEIGHT: 213.88 LBS | OXYGEN SATURATION: 95 % | HEART RATE: 64 BPM | RESPIRATION RATE: 18 BRPM | DIASTOLIC BLOOD PRESSURE: 73 MMHG | BODY MASS INDEX: 29.94 KG/M2 | HEIGHT: 71 IN | TEMPERATURE: 98 F

## 2025-05-01 LAB
ABSOLUTE EOSINOPHIL (OHS): 0.05 K/UL
ABSOLUTE MONOCYTE (OHS): 0.66 K/UL (ref 0.3–1)
ABSOLUTE NEUTROPHIL COUNT (OHS): 4.53 K/UL (ref 1.8–7.7)
ANION GAP (OHS): 11 MMOL/L (ref 8–16)
BASOPHILS # BLD AUTO: 0.03 K/UL
BASOPHILS NFR BLD AUTO: 0.4 %
BUN SERPL-MCNC: 43 MG/DL (ref 8–23)
CALCIUM SERPL-MCNC: 9.5 MG/DL (ref 8.7–10.5)
CHLORIDE SERPL-SCNC: 109 MMOL/L (ref 95–110)
CO2 SERPL-SCNC: 21 MMOL/L (ref 23–29)
CREAT SERPL-MCNC: 1.1 MG/DL (ref 0.5–1.4)
ERYTHROCYTE [DISTWIDTH] IN BLOOD BY AUTOMATED COUNT: 13.4 % (ref 11.5–14.5)
GFR SERPLBLD CREATININE-BSD FMLA CKD-EPI: >60 ML/MIN/1.73/M2
GLUCOSE SERPL-MCNC: 151 MG/DL (ref 70–110)
HCT VFR BLD AUTO: 33.6 % (ref 40–54)
HGB BLD-MCNC: 11 GM/DL (ref 14–18)
IMM GRANULOCYTES # BLD AUTO: 0.03 K/UL (ref 0–0.04)
IMM GRANULOCYTES NFR BLD AUTO: 0.4 % (ref 0–0.5)
LYMPHOCYTES # BLD AUTO: 1.99 K/UL (ref 1–4.8)
MAGNESIUM SERPL-MCNC: 2 MG/DL (ref 1.6–2.6)
MCH RBC QN AUTO: 29.5 PG (ref 27–31)
MCHC RBC AUTO-ENTMCNC: 32.7 G/DL (ref 32–36)
MCV RBC AUTO: 90 FL (ref 82–98)
NUCLEATED RBC (/100WBC) (OHS): 0 /100 WBC
PHOSPHATE SERPL-MCNC: 4.3 MG/DL (ref 2.7–4.5)
PLATELET # BLD AUTO: 375 K/UL (ref 150–450)
PMV BLD AUTO: 10.3 FL (ref 9.2–12.9)
POCT GLUCOSE: 149 MG/DL (ref 70–110)
POCT GLUCOSE: 221 MG/DL (ref 70–110)
POCT GLUCOSE: 225 MG/DL (ref 70–110)
POTASSIUM SERPL-SCNC: 3.6 MMOL/L (ref 3.5–5.1)
RBC # BLD AUTO: 3.73 M/UL (ref 4.6–6.2)
RELATIVE EOSINOPHIL (OHS): 0.7 %
RELATIVE LYMPHOCYTE (OHS): 27.3 % (ref 18–48)
RELATIVE MONOCYTE (OHS): 9.1 % (ref 4–15)
RELATIVE NEUTROPHIL (OHS): 62.1 % (ref 38–73)
SODIUM SERPL-SCNC: 141 MMOL/L (ref 136–145)
SSB  ANTIBODY (OHS): 0.07 RATIO
SSB INTERPRETATION (OHS): NEGATIVE
WBC # BLD AUTO: 7.29 K/UL (ref 3.9–12.7)

## 2025-05-01 PROCEDURE — 25000003 PHARM REV CODE 250: Mod: HCNC

## 2025-05-01 PROCEDURE — 97535 SELF CARE MNGMENT TRAINING: CPT | Mod: HCNC

## 2025-05-01 PROCEDURE — 83735 ASSAY OF MAGNESIUM: CPT | Mod: HCNC

## 2025-05-01 PROCEDURE — 94761 N-INVAS EAR/PLS OXIMETRY MLT: CPT | Mod: HCNC

## 2025-05-01 PROCEDURE — 36415 COLL VENOUS BLD VENIPUNCTURE: CPT | Mod: HCNC

## 2025-05-01 PROCEDURE — 85025 COMPLETE CBC W/AUTO DIFF WBC: CPT | Mod: HCNC

## 2025-05-01 PROCEDURE — 97110 THERAPEUTIC EXERCISES: CPT | Mod: HCNC

## 2025-05-01 PROCEDURE — 84100 ASSAY OF PHOSPHORUS: CPT | Mod: HCNC

## 2025-05-01 PROCEDURE — 97530 THERAPEUTIC ACTIVITIES: CPT | Mod: HCNC,CQ

## 2025-05-01 PROCEDURE — 97530 THERAPEUTIC ACTIVITIES: CPT | Mod: HCNC

## 2025-05-01 PROCEDURE — 97116 GAIT TRAINING THERAPY: CPT | Mod: HCNC,CQ

## 2025-05-01 PROCEDURE — 99900035 HC TECH TIME PER 15 MIN (STAT): Mod: HCNC

## 2025-05-01 PROCEDURE — 80048 BASIC METABOLIC PNL TOTAL CA: CPT | Mod: HCNC

## 2025-05-01 RX ADMIN — INSULIN ASPART 2 UNITS: 100 INJECTION, SOLUTION INTRAVENOUS; SUBCUTANEOUS at 12:05

## 2025-05-01 RX ADMIN — ALLOPURINOL 50 MG: 300 TABLET ORAL at 08:05

## 2025-05-01 RX ADMIN — DICLOFENAC SODIUM 2 G: 10 GEL TOPICAL at 09:05

## 2025-05-01 RX ADMIN — GABAPENTIN 600 MG: 300 CAPSULE ORAL at 08:05

## 2025-05-01 RX ADMIN — COLCHICINE 0.6 MG: 0.6 TABLET ORAL at 08:05

## 2025-05-01 RX ADMIN — EMPAGLIFLOZIN 10 MG: 10 TABLET, FILM COATED ORAL at 08:05

## 2025-05-01 RX ADMIN — CLOPIDOGREL BISULFATE 75 MG: 75 TABLET, FILM COATED ORAL at 08:05

## 2025-05-01 RX ADMIN — INSULIN ASPART 2 UNITS: 100 INJECTION, SOLUTION INTRAVENOUS; SUBCUTANEOUS at 05:05

## 2025-05-01 RX ADMIN — ASPIRIN 81 MG: 81 TABLET, COATED ORAL at 08:05

## 2025-05-01 RX ADMIN — SENNOSIDES AND DOCUSATE SODIUM 1 TABLET: 50; 8.6 TABLET ORAL at 08:05

## 2025-05-01 RX ADMIN — LIDOCAINE 1 PATCH: 50 PATCH CUTANEOUS at 12:05

## 2025-05-01 RX ADMIN — LOSARTAN POTASSIUM 50 MG: 50 TABLET, FILM COATED ORAL at 08:05

## 2025-05-01 NOTE — DISCHARGE SUMMARY
[facility] Medicine  Discharge Summary      Patient Name: Clifton Wilson  MRN: [id number]  EMIR: [id number]  Patient Class: IP- Inpatient  Admission Date: 4/24/2025  Hospital Length of Stay: 6 days  Discharge Date and Time: 05/01/2025 3:39 PM  Attending Physician: Jenna Small MD   Discharging Provider: Wai Madden MD  Primary Care Provider: Britton Grissom MD    Primary Care Team: Networked reference to record PCT     HPI:   Patient is a 75 yo male w/ PMHx of CAD, HTN, HLD, HFrEF 40-45% s/p ICD x7 ,Type 2 DM and gout who presents to the Emergency Department with worsening shortness of breath and progressive bilateral lower extremity edema over the past 3 days. He was seen by his primary care physician two days ago, at which time it was noted that he had not been on any diuretic therapy for approximately two months. Due to signs of fluid overload, furosemide (Lasix) 20 mg daily was restarted. The patient has since taken two doses and reports increased urinary output but no significant improvement in his shortness of breath. He denies chest pain, orthopnea, or upper respiratory symptoms.    He also endorses a gradual but marked functional decline over the last several months, including worsening generalized weakness and reduced mobility, requiring frequent rest while ambulating at home. He was recently recommended for inpatient rehabilitation by his PCP, but has not yet received any follow-up communication regarding the referral.    Notably, the patient was admitted on 04/08 for hypotension and volume depletion. At that time, he was seen in an outpatient setting and noted to have systolic blood pressure in the low 100s, but was referred to the ED when it dropped into the 80s. He was evaluated and treated for hypotension during that admission. He states that following discharge, no diuretic therapy was prescribed.     In the ED, initial vital signs /58 mmhg, HR 84, Temp 98.2,  SpO2 100% on room air. Labs include CBC with stable H/H 11.2/34.1 and WBC within normal limits . CMP with Na 141, K 3.4,  and BUN/Cr 18/1.1 (baseline Cr 1.1), Troponin 0.029, , Uric Acid 6.1,. CXR The cardiac silhouette is normal in size, midline.  The pulmonary vascularity is normal.  Coronary artery calcifications seen. No focal area of airspace consolidation.  No pleural effusion.  No pneumothorax. EKG showed Right bundle branch block. Septal infarct ,age undetermined .  Initiated on Laxis 20 mg. [facility] Team admitted the patient with CHF exacerbation.      Physical Exam  Constitutional:       Appearance: Normal appearance. He is obese.   HENT:      Head: Normocephalic and atraumatic.   Eyes:      Extraocular Movements: Extraocular movements intact.   Cardiovascular:      Rate and Rhythm: Normal rate and regular rhythm.      Pulses: Normal pulses.      Heart sounds: Normal heart sounds. No murmur heard.  Pulmonary:      Effort: Pulmonary effort is normal. No respiratory distress.      Breath sounds: Normal breath sounds. No stridor. No wheezing, rhonchi or rales.   Abdominal:      General: Abdomen is flat.      Palpations: Abdomen is soft.      Tenderness: There is no abdominal tenderness.   Musculoskeletal:      Right lower leg: No edema.      Left lower leg: No edema.   Neurological:      General: No focal deficit present.      Mental Status: He is alert and oriented to person, place, and time.   Psychiatric:         Mood and Affect: Mood normal.         Behavior: Behavior normal.         Thought Content: Thought content normal.         Judgment: Judgment normal.           * No surgery found *      Hospital Course:   Clifton Wilson is a 74 year old male with past medical history of CAD, HTN, HLD, HFrEF 40-45% s/p ICD x7 ,Type 2 DM, gout and Cervical Spondylolysis who presents to the Emergency Department with worsening shortness of breath and progressive bilateral lower extremity edema  over the past 3 days.  Patient was admitted to the hospital for CHF exacerbation and volume overload and was started on IV Lasix for diuresis.  Cardiology was also consulted and troponin was trended.  Patient responded well to diuresis, however during his stay he continued to have weakness.  This raise concerns for possible rheumatologic origin and labs were drawn.  Patient was started on a short course of steroids during his time in the hospital on the steroids, patient showed great improvement in weakness.  Patient was able to work with Physical therapy recommended to do moderate intensity therapy. Patient continued to have improvement in functional status, respiratory status, and decreased leg pain. Patient's kidneys were noted to have trending up BUN/Cr, held lasix and provided small amount of fluids and this improved greatly.     At discharge:   - Provided patient with referrals to Rheumatology, Neuro, Psych    Patient medically stable for discharge at this time. Return precautions discussed. All questions answered. Patient verbalized understanding.       Goals of Care Treatment Preferences:  Code Status: Full Code      SDOH Screening:  The patient was screened for utility difficulties, food insecurity, transport difficulties, housing insecurity, and interpersonal safety and there were no concerns identified this admission.     Consults:   Consults (From admission, onward)          Status Ordering Provider     Inpatient consult to Diabetes educator  Once        Provider:  (Not yet assigned)    Completed DAMIR CAI     Inpatient consult to Cardiology-[facility]  Once        Provider:  (Not yet assigned)    Completed SHIN OZUNA     Inpatient consult to Social Work  Once        Provider:  (Not yet assigned)    Completed SHIN OZUNA            Assessment & Plan  Acute on chronic combined systolic and diastolic congestive heart failure  - Patient doing well s/p diuresis  - Will continue to monitor  volume status and add diuresis as needed  DM type 2 without retinopathy  Patient's FSGs are controlled on current medication regimen.  Last A1c reviewed-   Lab Results   Component Value Date    HGBA1C 6.9 (H) 04/24/2025     Most recent fingerstick glucose reviewed-   Recent Labs   Lab 04/30/25  1639 04/30/25  2050 05/01/25  0600 05/01/25  1205   POCTGLUCOSE 307* 268* 149* 225*     Current correctional scale  Low  Maintain anti-hyperglycemic dose as follows-   Antihyperglycemics (From admission, onward)      Start     Stop Route Frequency Ordered    04/29/25 1530  insulin glargine U-100 (Lantus) pen 15 Units         -- SubQ Nightly 04/29/25 1521    04/27/25 1400  empagliflozin (Jardiance) tablet 10 mg        Question Answer Comment   Does this patient have a diagnosis of heart failure? Yes    Does this patient have type 1 diabetes or diabetic ketoacidosis? No    Does this patient have symptomatic hypotension? No    Is the patient NPO or pending major surgery in next 3 days or less? No        -- Oral Daily 04/27/25 1250    04/24/25 1743  insulin aspart U-100 pen 0-5 Units         -- SubQ Before meals & nightly PRN 04/24/25 1645          Plan  -Monitor glucose.Glucose goal 140-180  -SSI  Essential hypertension  - Continue losartan 50mg  - CTM, add BP meds as appropriate  Gout  Patient has known history of Gout. Patient hasn't had a flare in a couple of years.    Plan   Continue current medications.    Muscular weakness  The patient reports progressive muscle weakness, particularly affecting the lower extremities, over the past several months. He experiences significant difficulty with ambulation and prolonged standing due to this weakness. He was recently admitted to a rehabilitation facility to address his declining mobility. An extensive workup has been performed, with positive antinuclear antibodies (MARLEN) as the only notable finding thus far.     Plan  -PT/OT; will follow up for possible placement for therapy    -Continue Gabapentin   -Prednisone 25 mg daily for 5 days   - Refer to [facility] as outpatient    Cervical spondylolysis  Patient has a history of Chronic back pain, with numbness and tingling. Patient has diagnosis of Cervical Spondylolysis.    Plan  Continue home medications.    Final Active Diagnoses:    Diagnosis Date Noted POA    PRINCIPAL PROBLEM:  Acute on chronic combined systolic and diastolic congestive heart failure [I50.43] 12/07/2015 Yes    Cervical spondylolysis [M43.02] 03/22/2025 Not Applicable    Muscular weakness [M62.81] 03/20/2025 Unknown    Gout [M10.9] 03/18/2025 Yes    Essential hypertension [I10] 04/24/2017 Yes    DM type 2 without retinopathy [E11.9] 04/24/2017 Yes      Problems Resolved During this Admission:       Discharged Condition: stable    Disposition: Skilled Nursing Facility    Follow Up:   Follow-up Information       RHEUMATOLOGY Follow up.    Why: PT REQUIRES RHEUMATOLOGY FOLLOW UP  A MESSAGE HAS BEEN SENT PER [employer] TO [facility] REQUESTING [facility] A[facility]'S AND PT'S WIFE BE CONTACTED WITH AN APT.             [facility] Follow up.    Specialty: Skilled Nursing Facility  Why: SNF  Contact information:  [address] [facility] [address]  [phone]             Sophie Becker MD Follow up on 5/6/2025.    Specialty: Neurology  Why: 11:00 am  Contact information:  [address]  [facility] [address]  [phone]               Luis Felipe Wright MD Follow up on 5/6/2025.    Specialties: Cardiology, Interventional Cardiology  Why: 1:40 pm  Contact information:  [address]  [facility] [address]  [facility] [address]  [phone]                           Patient Instructions:      Ambulatory referral/consult to Outpatient Case Management   Referral Priority: Routine Referral Type: Consultation   Referral Reason: Specialty Services Required   Number of Visits Requested: 1     Ambulatory referral/consult to Neurology   Standing Status: Future    Referral Priority: Routine Referral Type: Consultation   Referral Reason: Specialty Services Required   Requested Specialty: Neurology   Number of Visits Requested: 1     Ambulatory referral/consult to Psychiatry   Standing Status: Future   Referral Priority: Routine Referral Type: Psychiatric   Referral Reason: Specialty Services Required   Requested Specialty: Psychiatry   Number of Visits Requested: 1     Ambulatory referral/consult to Rheumatology   Standing Status: Future   Referral Priority: Routine Referral Type: Consultation   Referral Reason: Specialty Services Required   Requested Specialty: Rheumatology   Number of Visits Requested: 1     Notify your health care provider if you experience any of the following:  temperature >100.4     Notify your health care provider if you experience any of the following:  persistent nausea and vomiting or diarrhea     Notify your health care provider if you experience any of the following:  severe uncontrolled pain     Notify your health care provider if you experience any of the following:  redness, tenderness, or signs of infection (pain, swelling, redness, odor or green/yellow discharge around incision site)     Notify your health care provider if you experience any of the following:  difficulty breathing or increased cough     Notify your health care provider if you experience any of the following:  severe persistent headache     Notify your health care provider if you experience any of the following:  worsening rash     Notify your health care provider if you experience any of the following:  persistent dizziness, light-headedness, or visual disturbances     Notify your health care provider if you experience any of the following:  increased confusion or weakness     Activity as tolerated       Significant Diagnostic Studies: Labs: CMP   Recent Labs   Lab 04/30/25  0552 05/01/25  0531    141   K 3.7 3.6    109   CO2 22* 21*   * 151*   BUN 43* 43*  "  CREATININE 1.1 1.1   CALCIUM 9.5 9.5   ANIONGAP 11 11    and CBC   Recent Labs   Lab 04/30/25  0552 05/01/25  0531   WBC 7.20 7.29   HGB 10.8* 11.0*   HCT 33.1* 33.6*    375       Pending Diagnostic Studies:       None           Medications:  Reconciled Home Medications:      Medication List        CHANGE how you take these medications      zolpidem 5 MG Tab  Commonly known as: AMBIEN  TAKE 1 TABLET BY MOUTH EVERY NIGHT AS NEEDED  What changed:   how much to take  how to take this  when to take this  reasons to take this  additional instructions            CONTINUE taking these medications      ACCU-CHEK CATHRYN CONTROL SOLN Soln  Generic drug: blood glucose control high,low  Use as directed to check blood sugar     ALCOHOL PREP PAD SPACER  Generic drug: alcohol swabs  USE FOUR TIMES DAILY     aspirin 81 MG EC tablet  Commonly known as: ECOTRIN  Take 81 mg by mouth once daily.     BD ULTRA-FINE SINA PEN NEEDLE 32 gauge x 5/32" Ndle  Generic drug: pen needle, diabetic  Use four times daily for insulin administration     clopidogreL 75 mg tablet  Commonly known as: PLAVIX  Take 1 tablet (75 mg total) by mouth once daily.     colchicine 0.6 mg tablet  Commonly known as: COLCRYS  Take 1 tablet (0.6 mg total) by mouth 2 (two) times daily.     cyanocobalamin 1000 MCG tablet  Commonly known as: VITAMIN B-12  Take 1,000 mcg by mouth once daily.     EASY TOUCH TWIST LANCETS 30 gauge Misc  Generic drug: lancets  usea s directed to check blood glucose four times daily     febuxostat 40 mg Tab  Commonly known as: ULORIC  Take 1 tablet (40 mg total) by mouth once daily.     furosemide 20 MG tablet  Commonly known as: LASIX  Take 1 tablet (20 mg total) by mouth 2 (two) times daily.     gabapentin 300 MG capsule  Commonly known as: NEURONTIN  Take 2 capsules (600 mg total) by mouth 2 (two) times daily.     JARDIANCE 10 mg tablet  Generic drug: empagliflozin  Take 1 tablet (10 mg total) by mouth once daily.     LANTUS " SOLOSTAR U-100 INSULIN 100 unit/mL (3 mL) Inpn pen  Generic drug: insulin glargine U-100 (Lantus)  Inject 35 Units into the skin every evening.     losartan 50 MG tablet  Commonly known as: COZAAR  Take 1 tablet (50 mg total) by mouth once daily.     metFORMIN 1000 MG tablet  Commonly known as: GLUCOPHAGE  Take 1 tablet (1,000 mg total) by mouth 2 (two) times daily with meals.     nitroGLYCERIN 0.4 MG SL tablet  Commonly known as: NITROSTAT  Place 1 tablet (0.4 mg total) under the tongue every 5 (five) minutes as needed for Chest pain.     NovoLOG Flexpen U-100 Insulin 100 unit/mL (3 mL) Inpn pen  Generic drug: insulin aspart U-100  Inject 15 Units into the skin 3 (three) times daily with meals.     ONE-A-DAY MEN'S 50 PLUS ORAL  Take 1 tablet by mouth once daily.     TRUE METRIX GLUCOSE METER Misc  Generic drug: blood-glucose meter  use as directed     TRUE METRIX GLUCOSE TEST STRIP Strp  Generic drug: blood sugar diagnostic  test blood sugar as directed four times daily            STOP taking these medications      hydrALAZINE 50 MG tablet  Commonly known as: APRESOLINE              Indwelling Lines/Drains at time of discharge:   Lines/Drains/Airways       None                   Time spent on the discharge of patient: 40 minutes         Wai Madden MD  Department of Hospital Medicine  [facility]

## 2025-05-01 NOTE — ASSESSMENT & PLAN NOTE
Patient's FSGs are controlled on current medication regimen.  Last A1c reviewed-   Lab Results   Component Value Date    HGBA1C 6.9 (H) 04/24/2025     Most recent fingerstick glucose reviewed-   Recent Labs   Lab 04/30/25  1639 04/30/25  2050 05/01/25  0600 05/01/25  1205   POCTGLUCOSE 307* 268* 149* 225*     Current correctional scale  Low  Maintain anti-hyperglycemic dose as follows-   Antihyperglycemics (From admission, onward)      Start     Stop Route Frequency Ordered    04/29/25 1530  insulin glargine U-100 (Lantus) pen 15 Units         -- SubQ Nightly 04/29/25 1521    04/27/25 1400  empagliflozin (Jardiance) tablet 10 mg        Question Answer Comment   Does this patient have a diagnosis of heart failure? Yes    Does this patient have type 1 diabetes or diabetic ketoacidosis? No    Does this patient have symptomatic hypotension? No    Is the patient NPO or pending major surgery in next 3 days or less? No        -- Oral Daily 04/27/25 1250    04/24/25 1743  insulin aspart U-100 pen 0-5 Units         -- SubQ Before meals & nightly PRN 04/24/25 1645          Plan  -Monitor glucose.Glucose goal 140-180  -SSI

## 2025-05-01 NOTE — PLAN OF CARE
Medicare Message     Important Message from Medicare regarding Discharge Appeal Rights -- -- Explained to patient/caregiver; Signed/date by patient/caregiver   Date IMM was signed -- -- 5/1/2025   Time IMM was signed -- -- 0938

## 2025-05-01 NOTE — ASSESSMENT & PLAN NOTE
The patient reports progressive muscle weakness, particularly affecting the lower extremities, over the past several months. He experiences significant difficulty with ambulation and prolonged standing due to this weakness. He was recently admitted to a rehabilitation facility to address his declining mobility. An extensive workup has been performed, with positive antinuclear antibodies (MARLEN) as the only notable finding thus far.     Plan  -PT/OT; will follow up for possible placement for therapy   -Continue Gabapentin   -Prednisone 25 mg daily for 5 days   - Refer to [facility] as outpatient

## 2025-05-01 NOTE — PLAN OF CARE
Problem: Physical Therapy  Goal: Physical Therapy Goal  Description: Goals to be met by: 25     Patient will increase functional independence with mobility by performin. Supine to sit with Stand-by Assistance  2. Sit to supine with Stand-by Assistance  3. Sit to stand transfer with Minimal Assistance  4. Bed to chair transfer with Minimal Assistance using Rolling Walker  5. Gait  x 25 feet with Minimal Assistance using Rolling Walker.     Outcome: Progressing       Pt ambulated ~60ft and ~100ft using RW and SBA

## 2025-05-01 NOTE — PT/OT/SLP PROGRESS
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 Diagnoses of SOB (shortness of breath), Shortness of breath, CHF exacerbation, CHF (congestive heart failure), Troponin level elevated, Acute on chronic combined systolic and diastolic congestive heart failure [I50.43], Cervical spondylolysis [M43.02], DM type 2 without retinopathy [E11.9], Essential hypertension [I10], Gout, unspecified cause, unspecified chronicity, unspecified site [M10.9], and Primary insomnia were also pertinent to this visit.    Recommendations:     Discharge Recommendations: Moderate Intensity Therapy  Discharge Equipment Recommendations:  to be determined by next level of care  Barriers to discharge:   (debility)    Assessment:     Clifton Wilson is a 74 y.o. male with a medical diagnosis of Acute on chronic combined systolic and diastolic congestive heart failure.  He presents with Performance deficits affecting function are weakness, impaired endurance, impaired self care skills, impaired functional mobility, gait instability, impaired balance, decreased upper extremity function, decreased lower extremity function, decreased safety awareness, impaired coordination.     Pt has made good progress toward OT goals and has met self feeding goal of Mod I/[place].  His pain has significantly improved since OT initial eval. Pt only c/o pain 2/10 in R shld w/ AROM against gravity, however AROM wfl. He performed UE dressing SBA, LE dressing SBA seated in BS chair to don/doff socks. Pt ambulated CGA w/ RW in room to  bathroom, sink and back to BS chair approx 10ft x 2; no LOB; vc for upright posture; he performed sit<>stand CGA w/ momentum from low surfaces w/ RW and GB from toilet-decreased quads strength. Pt performed g/hygiene SBA standing at sink w/ RW. Continue  with OT POC.     Rehab Prognosis:  Good; patient would benefit from acute skilled OT services to address these deficits and reach maximum level of function.       Plan:     Patient to be seen 5 x/week to address the above listed problems via self-care/home management, therapeutic activities, therapeutic exercises  Plan of Care Expires: 05/25/25  Plan of Care Reviewed with: patient    Subjective     Chief Complaint: R shld pain 2/10 w/ flexion/any movement against gravity. No pain at rest.  Patient/Family Comments/goals: I am doing better, I want to get these legs stronger.   Pain/Comfort:  Pain Rating 1: 2/10  Location - Side 1: Right  Location - Orientation 1: generalized  Location 1: shoulder (w/ AROM aginst gravity)  Pain Addressed 1: Reposition, Cessation of Activity  Pain Rating Post-Intervention 1: 0/10    Objective:     Communicated with: nurse prior to session.  Patient found up in chair with peripheral IV upon OT entry to room.    General Precautions: Standard, fall, diabetic (fluid restriction)    Orthopedic Precautions:N/A  Braces: N/A  Respiratory Status: Room air     Occupational Performance:     Functional Mobility/Transfers:  Patient completed Sit <> Stand Transfer with contact guard assistance  with  rolling walker and vc for HP   Patient completed Toilet Transfer Step Transfer technique with contact guard assistance with  rolling walker, grab bars, and juana sit<>stand from low surface  Functional Mobility: pt ambulated approx 10ft x 2 to bathroom, sink and back to bs chair CGA w/ RW; no LOB; unsteady, flexed forward upper trunk; vc for upright posture.     Activities of Daily Living:  Feeding:  independence .  Grooming: stand by assistance and contact guard assistance to wash hands, brush teeth and wash face standing inside RW; occasional free standing w/o UE support, UE unilateral support sink  and B forearms on sink when washing face.  Upper Body Dressing: stand by assistance to don/doff large gown  "like robe seated BS chair  Lower Body Dressing: stand by assistance to don/doff socks bending down to feet seated BS chair; pt apprehensive but successful, felt " fearful of falling forward, however balance good.  Toileting: stand by assistance for gown management in stance w/ RW and seated; pt performed seated hygiene after BM.      [facility] 6 Click ADL: 23    Treatment & Education:  Pt seen for safe self care activities and fx mobility retraining as stated above.  All questions/concerns addressed within scope.  Call staff for BTB/OOB assist. Call bell use explained. Pt acknowledged.  Purpose of OT and POC  Pt instructed and performed RUE A/AAROM ex 10 reps w/ LUE assist for shld flexion, abd, hor abd due to R shld pain w/ movement against gravity;  int/ext rotation AROM ex performed pain free (gravity eliminated); elbow flex/ext 10 x 3 sets seated BS chair.  Pt instructed to do es 2x/a day. He verbalized understanding.    Patient left up in chair with all lines intact and call button in reach    GOALS:   Multidisciplinary Problems       Occupational Therapy Goals          Problem: Occupational Therapy    Goal Priority Disciplines Outcome Interventions   Occupational Therapy Goal     OT, PT/OT Progressing    Description: Goals to be met by: 5/25/2025     Patient will increase functional independence with ADLs by performing:    Feeding with Modified [place] and Assistive Devices as needed. Goal met 5/1/2025 w/ out adaptive device.  UE Dressing with Modified [place].  LE Dressing with Modified [place] and Assistive Devices as needed.  Grooming while standing at sink with Modified [place].  Toileting from bedside commode with Modified [place] for hygiene and clothing management.   Rolling to Bilateral with Modified [place].   Supine to sit with Modified [place].  Step transfer with Modified [place]  Toilet transfer to bedside commode with Modified [place].  Increased " functional strength to WFL for ADLS.  Upper extremity exercise program x10 reps per handout, with independence.                          DME Justifications:  No DME recommended requiring DME justifications    Time Tracking:     OT Date of Treatment: 05/01/25  OT Start Time: 1252  OT Stop Time: 1324  OT Total Time (min): 32 min    Billable Minutes:Self Care/Home Management 14  Therapeutic Activity 10  Therapeutic Exercise 8  Total Time 32    OT/OMAIRA: OT          5/1/2025

## 2025-05-01 NOTE — PLAN OF CARE
Problem: Occupational Therapy  Goal: Occupational Therapy Goal  Description: Goals to be met by: 5/25/2025     Patient will increase functional independence with ADLs by performing:    Feeding with Modified [place] and Assistive Devices as needed. Goal met 5/1/2025 w/ out adaptive device.  UE Dressing with Modified [place].  LE Dressing with Modified [place] and Assistive Devices as needed.  Grooming while standing at sink with Modified [place].  Toileting from bedside commode with Modified [place] for hygiene and clothing management.   Rolling to Bilateral with Modified [place].   Supine to sit with Modified [place].  Step transfer with Modified [place]  Toilet transfer to bedside commode with Modified [place].  Increased functional strength to WFL for ADLS.  Upper extremity exercise program x10 reps per handout, with independence.     Outcome: Progressing   Pt met feeding goal.

## 2025-05-01 NOTE — PROGRESS NOTES
The sw spoke to the pt and he's agreeable to d/c to [facility] today. The pt had no further questions or Case Management needs. The pt's clear to d/c.     Future Appointments   Date Time Provider Department Center   5/6/2025 11:00 AM Sophie Pham MD [facility]   5/6/2025  1:40 PM Luis Felipe Wright MD [facility] CARDIO [facility]   7/7/2025  9:40 AM Britton Grissom MD [facility] FAM MED [facility]   7/25/2025  9:20 AM Genaro Flores MD [facility] NEURO [facility]    [place] - Telemetry  Discharge Final Note    Primary Care Provider: Britton Grissom MD    Expected Discharge Date: 5/1/2025    Final Discharge Note (most recent)       Final Note - 05/01/25 1145          Post-Acute Status    Post-Acute Authorization Placement     Post-Acute Placement Status Pending payor review/awaiting authorization (if required)                     Important Message from Medicare             Contact Info       RHEUMATOLOGY        Next Steps: Follow up    Instructions: PT REQUIRES RHEUMATOLOGY FOLLOW UP  A MESSAGE HAS BEEN SENT PER [employer] TO [place] [facility] REQUESTING [facility] AND PT'S WIFE BE CONTACTED WITH AN APT.    [facility]   Specialty: Skilled Nursing Facility    [address]  [facility] [address]   Phone: [phone]       Next Steps: Follow up    Instructions: SNF    Sophie Pham MD   Specialty: Neurology    [address] [facility] [place] [address]   Phone: [phone]       Next Steps: Follow up on 5/6/2025    Instructions: 11:00 am    Luis Felipe Wright MD   Specialty: Interventional Cardiology, Cardiology   Relationship: Hypertension Digital Medicine Responsible Provider    [address]  [place] [address]   Phone: [phone]       Next Steps: Follow up on 5/6/2025    Instructions: 1:40 pm

## 2025-05-01 NOTE — CONSULTS
Patient with T2DM, checks his blood sugar 3-4 times per day, takes Lantus 35 qhs, Novolog 15 units TID (sometimes does not take all 15 per range of blood sugar), and does so faithfully.  He takes Jardiance 10mg daily as well.  He had questions regarding his blood sugars and whether the small amount of correction doses being given here should be increased.  He realized the steroids he was getting increased blood sugars and now that his steroids are complete, his blood sugars are getting better.  No other questions.  Patient very knowledgeable about his diabetes management at home.

## 2025-05-01 NOTE — PLAN OF CARE
The sw spoke to Daksha at [facility] and she states she needed the corrected d/c orders. The sw spoke to Dr. Kirk and asked him to correct the orders. He states he will correct the d/c orders. The sw will fax the Ambien script to [facility] at [phone]. She states she's still waiting on the [employer] auth but the paperwork has been signed.     1:20pm The sw faxed the pt's scripts and corrected d/c orders to [facility]. The sw called and left a message for her to return the call.     2:43pm The sw spoke to [facility] and she states the team is reviewing the orders currently. She's waiting for the snf auth.     3:07pm The sw faxed the corrected d/c orders to [facility] via [employer].     3:31pm The sw spoke to [facility] and she confirmed she received the corrected orders and the pt's clear to arrive. The sw spoke to the pt's nurse and gave her the contact info to call report. The copied chart is in the pt's cubby. The pt will be going to room 7B at [facility].      05/01/25 1145   Post-Acute Status   Post-Acute Authorization Placement   Post-Acute Placement Status Pending payor review/awaiting authorization (if required)   Discharge Plan   Discharge Plan A Skilled Nursing Facility   Discharge Plan B Other  (TBD)

## 2025-05-01 NOTE — PT/OT/SLP PROGRESS
Physical Therapy Treatment    Patient Name:  Clifton Wilson   MRN:  [id number]    Recommendations:     Discharge Recommendations: Moderate Intensity Therapy  Discharge Equipment Recommendations: to be determined by next level of care  Barriers to discharge: None    Assessment:     Clifton Wilson is a 74 y.o. male admitted with a medical diagnosis of Acute on chronic combined systolic and diastolic congestive heart failure.  He presents with the following impairments/functional limitations: weakness, impaired endurance, impaired functional mobility, impaired balance, gait instability, decreased upper extremity function, decreased lower extremity function, decreased ROM, impaired coordination, impaired joint extensibility.    Pt ambulated ~60ft and ~100ft using RW and SBA    Rehab Prognosis: Good; patient would benefit from acute skilled PT services to address these deficits and reach maximum level of function.    Recent Surgery: * No surgery found *      Plan:     During this hospitalization, patient to be seen 5 x/week to address the identified rehab impairments via gait training, therapeutic activities, therapeutic exercises, neuromuscular re-education and progress toward the following goals:    Plan of Care Expires:  05/25/25    Subjective     Chief Complaint: ready to go home  Patient/Family Comments/goals: Pt agreed to therapy  Pain/Comfort:  Pain Rating 1: 0/10      Objective:     Communicated with nursing prior to session.  Patient found HOB elevated with peripheral IV upon PT entry to room.     General Precautions: Standard, fall  Orthopedic Precautions: N/A  Braces: N/A  Respiratory Status: Room air     Functional Mobility:  Bed Mobility:     Scooting: modified independence  Supine to Sit: modified independence  Transfers: Gait belt donned, x10 trials from EOB, x1 trial from chair  Sit to Stand:  stand by assistance with rolling walker  Gait: Pt ambulated ~60ft and ~100ft using RW and SBA, pt with  decreased britton and step length, pt had no LOB during ambulation, required one seated prolonged rest break d/t fatigue  Balance: Pt with Fair sitting balance, fair- standing balance      AM-PAC 6 CLICK MOBILITY  Turning over in bed (including adjusting bedclothes, sheets and blankets)?: 4  Sitting down on and standing up from a chair with arms (e.g., wheelchair, bedside commode, etc.): 3  Moving from lying on back to sitting on the side of the bed?: 3  Moving to and from a bed to a chair (including a wheelchair)?: 3  Need to walk in hospital room?: 3  Climbing 3-5 steps with a railing?: 3  Basic Mobility Total Score: 19       Treatment & Education:  Pt educated on mobility, transfers and gait training as above    Patient left up in chair with all lines intact, call button in reach, and nursing notified..    GOALS:   Multidisciplinary Problems       Physical Therapy Goals          Problem: Physical Therapy    Goal Priority Disciplines Outcome Interventions   Physical Therapy Goal     PT, PT/OT Progressing    Description: Goals to be met by: 25     Patient will increase functional independence with mobility by performin. Supine to sit with Stand-by Assistance  2. Sit to supine with Stand-by Assistance  3. Sit to stand transfer with Minimal Assistance  4. Bed to chair transfer with Minimal Assistance using Rolling Walker  5. Gait  x 25 feet with Minimal Assistance using Rolling Walker.                          DME Justifications:      Time Tracking:     PT Received On: 25  PT Start Time: 1034     PT Stop Time: 1058  PT Total Time (min): 24 min     Billable Minutes: Gait Training 15 and Therapeutic Activity 9    Treatment Type: Treatment  PT/PTA: PTA     Number of PTA visits since last PT visit: 4     2025

## 2025-05-02 ENCOUNTER — TELEPHONE (OUTPATIENT)
Dept: ORTHOPEDICS | Facility: CLINIC | Age: 75
End: 2025-05-02
Payer: MEDICARE

## 2025-05-05 ENCOUNTER — PATIENT MESSAGE (OUTPATIENT)
Dept: FAMILY MEDICINE | Facility: CLINIC | Age: 75
End: 2025-05-05
Payer: MEDICARE

## 2025-05-05 DIAGNOSIS — M1A.9XX0 CHRONIC GOUT INVOLVING TOE WITHOUT TOPHUS, UNSPECIFIED CAUSE, UNSPECIFIED LATERALITY: Primary | ICD-10-CM

## 2025-05-06 ENCOUNTER — TELEPHONE (OUTPATIENT)
Dept: FAMILY MEDICINE | Facility: CLINIC | Age: 75
End: 2025-05-06
Payer: MEDICARE

## 2025-05-06 ENCOUNTER — OFFICE VISIT (OUTPATIENT)
Dept: CARDIOLOGY | Facility: CLINIC | Age: 75
End: 2025-05-06
Payer: MEDICARE

## 2025-05-06 ENCOUNTER — PROCEDURE VISIT (OUTPATIENT)
Facility: CLINIC | Age: 75
End: 2025-05-06
Payer: MEDICARE

## 2025-05-06 ENCOUNTER — TELEPHONE (OUTPATIENT)
Dept: CARDIOLOGY | Facility: CLINIC | Age: 75
End: 2025-05-06
Payer: MEDICARE

## 2025-05-06 ENCOUNTER — PATIENT MESSAGE (OUTPATIENT)
Dept: CARDIOLOGY | Facility: CLINIC | Age: 75
End: 2025-05-06

## 2025-05-06 VITALS
BODY MASS INDEX: 28.28 KG/M2 | WEIGHT: 202 LBS | OXYGEN SATURATION: 98 % | HEART RATE: 88 BPM | HEIGHT: 71 IN | SYSTOLIC BLOOD PRESSURE: 112 MMHG | DIASTOLIC BLOOD PRESSURE: 71 MMHG

## 2025-05-06 DIAGNOSIS — Z79.4 TYPE 2 DIABETES MELLITUS WITH DIABETIC NEUROPATHY, WITH LONG-TERM CURRENT USE OF INSULIN: ICD-10-CM

## 2025-05-06 DIAGNOSIS — I47.20 VENTRICULAR TACHYCARDIA: ICD-10-CM

## 2025-05-06 DIAGNOSIS — E11.42 DIABETIC POLYNEUROPATHY ASSOCIATED WITH TYPE 2 DIABETES MELLITUS: Primary | ICD-10-CM

## 2025-05-06 DIAGNOSIS — R29.818 OTHER SYMPTOMS AND SIGNS INVOLVING THE NERVOUS SYSTEM: ICD-10-CM

## 2025-05-06 DIAGNOSIS — I50.22 CHRONIC SYSTOLIC CONGESTIVE HEART FAILURE: ICD-10-CM

## 2025-05-06 DIAGNOSIS — M10.9 GOUT, UNSPECIFIED CAUSE, UNSPECIFIED CHRONICITY, UNSPECIFIED SITE: ICD-10-CM

## 2025-05-06 DIAGNOSIS — M54.16 LUMBAR RADICULOPATHY, CHRONIC: ICD-10-CM

## 2025-05-06 DIAGNOSIS — I34.0 MITRAL VALVE INSUFFICIENCY, UNSPECIFIED ETIOLOGY: ICD-10-CM

## 2025-05-06 DIAGNOSIS — R53.1 WEAKNESS: ICD-10-CM

## 2025-05-06 DIAGNOSIS — E11.40 TYPE 2 DIABETES MELLITUS WITH DIABETIC NEUROPATHY, WITH LONG-TERM CURRENT USE OF INSULIN: ICD-10-CM

## 2025-05-06 DIAGNOSIS — I10 ESSENTIAL HYPERTENSION: Primary | ICD-10-CM

## 2025-05-06 DIAGNOSIS — E78.5 DYSLIPIDEMIA: ICD-10-CM

## 2025-05-06 PROCEDURE — 95885 MUSC TST DONE W/NERV TST LIM: CPT | Mod: HCNC,S$GLB,, | Performed by: PSYCHIATRY & NEUROLOGY

## 2025-05-06 PROCEDURE — 95911 NRV CNDJ TEST 9-10 STUDIES: CPT | Mod: HCNC,S$GLB,, | Performed by: PSYCHIATRY & NEUROLOGY

## 2025-05-06 PROCEDURE — 99999 PR PBB SHADOW E&M-EST. PATIENT-LVL IV: CPT | Mod: PBBFAC,HCNC,, | Performed by: STUDENT IN AN ORGANIZED HEALTH CARE EDUCATION/TRAINING PROGRAM

## 2025-05-06 RX ORDER — PREDNISONE 20 MG/1
20 TABLET ORAL DAILY
Qty: 10 TABLET | Refills: 0 | Status: SHIPPED | OUTPATIENT
Start: 2025-05-06 | End: 2025-05-07 | Stop reason: SDUPTHER

## 2025-05-06 NOTE — TELEPHONE ENCOUNTER
----- Message from Med Assistant Irena sent at 5/6/2025 10:37 AM CDT -----  Type:  Needs Medical AdviceWho Called: PtWould the patient rather a call back or a response via [facility]? Call Sendy Call Back Number: [phone] Information: Pt reaching out to Dr. Grissom to check the status of a new Rx Prednisone be sent to his pharmacy on file.

## 2025-05-06 NOTE — PROCEDURES
EMG W/ ULTRASOUND AND NERVE CONDUCTION TEST 3 Extremities    Date/Time: 5/6/2025 11:00 AM    Performed by: Sophie Becker MD  Authorized by: Genaro Flores MD              Department of Neurology  Phone No: [phone], Fax: [phone]    Neurography & Electromyography Report        Full Name: Clifton Wilson Gender: Male  Patient ID: [id number] YOB: 1950      Visit Date: 5/6/2025 9:58 AM  Age: 74 Years  Examining Physician: Sophie Becker MD  Referring Physician: Genaro Flores MD  Height: 5 feet 11 inch  Weight: 202 lbs        Clifton Wilson [id number] 5/6/2025 9:58 AM     1 of 1  Reason for Referral:  Lower extremity weakness and gait disturbance    Relevant medical diagnoses:  Diabetes, gout, CHF (on Plavix).             Clifton Wilson [id number] 5/6/2025 9:58 AM     1 of 1    Summary:     Nerve conduction studies were performed on the peroneal, tibial, sural, and radial sensory nerves.    No responses were obtained for the sural nerves.  Conduction velocities were mildly reduced for all motor nerves.  No responses were obtained for the H-reflexes.  F-waves were normal.     To assess for a generalized neuropathy, the right radial sensory nerve was evaluated and showed decreased amplitude.    EMG was performed on select muscles of the lower extremities.  There was increased insertional and abnormal spontaneous activity in the tibialis and gastrocnemius muscles.  Motor unit potentials were increased in amplitude.      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 Site Segments Onset Lat Peak Lat Onset [place] Temp. Amp Distance      ms ms m/s °C µV mm   R Radial - Superficial (Antidromic)      Forearm Wrist Forearm - Wrist  1.72 2.34 58.2 32.6 6.0 100      Ref.  Ref. <=2.20 <=2.80   >=7.0    R Sural - (Antidromic)      Calf Ankle Calf - Ankle NR NR NR 30.7       Ref.  Ref. <=3.60 <=4.50   >=4.0    L Sural - (Antidromic)      Calf Ankle Calf - Ankle NR NR NR 30.3       Ref.  Ref. <=3.60 <=4.50   >=4.0        Motor NCS      Nerve / Sites Muscle Segments Latency Ref. Velocity Ref. Amplitude Ref. Temp. Dur. Distance      ms ms m/s m/s mV mV °C ms mm   R Peroneal - EDB      Ankle EDB Ankle - EDB 4.17 <=6.50   2.6 >=1.1 30.7 7.1 60      B. Fib Head EDB B. Fib Head - Ankle 12.63  38 >=36 2.4  30.7 8.1 325   L Peroneal - EDB      Ankle EDB Ankle - EDB 2.88 <=6.50   2.0 >=1.1 30.4 8.3 60      B. Fib Head EDB B. Fib Head - Ankle 13.25  33 >=36 1.5  30.3 8.2 340   R Tibial - AH      Ankle AH Ankle - AH 4.65 <=6.10   4.5 >=1.1 30.7 7.8 80      Knee AH Knee - Ankle 17.23  33 >=34 3.1  30.7 8.2 410   L Tibial - AH      Ankle AH Ankle - AH 4.25 <=6.10   2.4 >=1.1 30.4 9.0 80      Knee AH Knee - Ankle 15.75  37 >=34 2.4  30.4 10.0 420       F  Wave      Nerve F Latency Ref. M Latency F - M Lat    ms ms ms ms   R Peroneal - EDB 59.2 <=63.6 4.6 54.6   R Tibial - AH 67.3 <=68.5 4.5 62.8   L Peroneal - EDB 60.0 <=63.6 4.4 55.6   L Tibial - AH 64.6 <=68.5 5.5 59.1       H Reflex      Nerve H Latency Ref.    ms ms   R Tibial - [place] 7.5 <=35.0   L Tibial - [place] 7.0 <=35.0       EMG Summary Table     Spontaneous Recruitment MUAP   Muscle Nerve Roots IA Fib PSW Fasc Other Pattern Amp Dur. PPP   Vastus lateralis Femoral L2-L4 N None None None N N N N N   Gastrocnemius (Lateral head) Tibial S1-S2 N None None None N N N N N

## 2025-05-06 NOTE — TELEPHONE ENCOUNTER
----- Message from Vigo sent at 5/6/2025  3:30 PM CDT -----  Type: General Call Back Name of Caller:PtSymptoms:upcoming apptWould the patient rather a call back or a response via [facility]? Napoleon Nina Call Back Number:[phone] Additional Information: Pt would like to know the name of the endo provider that he has to see tomorrow and what floor she is on. He sts the provider is the wife of Dr. Bryan

## 2025-05-06 NOTE — PROGRESS NOTES
Cardiology Clinic Visit    History of Present Illness:     Former Dr. Clement and Jillian patient; former police office  Clifton Wilson is a pleasant 74 y.o. male with PMHx of CAD, HTN, HLD, HFrEF 40-45% s/p ICD here for follow up post hospital follow for syncope (droves multiple red lights and does not recalled much). He was admitted and changed his BP meds. Patient was recently seen in the clinic post hospital follow up and restarted his bp medications and double his diuretics dose by himself. He self adjust his medications without any monitoring. He was admitted to the hospital in January due to similar issues upon discharged hospitalist team adjusted his med and restarted taking meds without any doctors instruction.   Today during office visit he did mentioned to me that double his diuretics because his UOP decreased. Patient was placed on the floor and legs were raised with improved in his BP. Patient was taken to the ER.     4/11/25  Today he HDS, asymptomatic. Only c/o of muscle aches 2/2 deconditioned. BP at 120s-130s/70s    5/6/25  Currently on an excellent regimen for his CV standpoint. Reports issues with his mobility going from sitting to standing. Inflammatory marker elevated. Will refer to rheumatology for further assessment.     Echo 4/25/25      Left Ventricle: The left ventricle is mildly dilated. Normal wall thickness. There is eccentric hypertrophy. Global hypokinesis present. Septal motion is consistent with bundle branch block. There is moderately reduced systolic function with a visually estimated ejection fraction of 35 - 40%. There is diastolic dysfunction but grade cannot be determined. Normal left ventricular filling pressure.    Right Ventricle: The right ventricle is normal in size. Systolic function is reduced.    Mitral Valve: There is mild regurgitation.    IVC/SVC: Normal venous pressure at 3 mmHg.       PET 12/4/24    There are two significant perfusion abnormalities.    Perfusion  Abnormality #1 - There is a small sized, moderate intensity mid anterior and anteroseptal fixed perfusion abnormality involving 4% of LV myocardium in the distribution of the LAD territory consistent with a non-transmural scar.    Perfusion Abnormality #2 - There is a very small (<5%) sized, mild intensity basal lateral fixed perfusion abnormality involving 3% of LV myocardium in the distribution of the LCX territory consistent with a non-transmural scar.    There are no other significant perfusion abnormalities.    The whole heart absolute myocardial perfusion values were reduced at rest, moderately reduced during stress and CFR is mildly reduced.    CT attenuation images demonstrate moderate diffuse coronary calcifications in the LAD, LCX , mild calcifications in the RCA territory and mild diffuse aortic calcifications in the descending aorta. Stents are present in the LAD and CX.    The gated perfusion images showed an ejection fraction of 36% at rest and 45% during stress. A normal ejection fraction is greater than 47%.    There is regional hypokinesis at rest of the anterior and anteroseptal wall(s) and regional hypokinesis during stress of the anterior and anteroseptal wall(s).    The study's ECG is negative for ischemia.    Assessment:   Ostial to proximal LAD with 50-60% stenosis angiographically with iFR negative   LAD stents patent with PATRIA 2   Lcx stent patent     EF    Left Ventricle: Regional wall motion abnormalities present. See diagram for wall motion findings. There is mildly reduced systolic function with a visually estimated ejection fraction of 40 - 45%.       History obtained by patient interview and supplemented by nursing documentation and chart review.   PMHx:  has a past medical history of Anticoagulant long-term use, Cardiomyopathy, CHF (congestive heart failure), Coronary artery disease, Diabetes mellitus, Gout, Heart attack, Hypertension, and Pneumonia.   SurgHx:  has a past surgical  history that includes Appendectomy; Cataract extraction (Bilateral, 2011); Eye surgery; Cardiac defibrillator placement; Cardiac catheterization; Colonoscopy (N/A, 08/10/2018); Revision of implantable cardioverter-defibrillator (ICD) electrode lead placement (N/A, 11/11/2019); Left heart catheterization (Left, 11/11/2024); instantaneous wave-free ratio (ifr) (N/A, 11/11/2024); Coronary angiography (N/A, 11/11/2024); Vasectomy (2000); and Tonsillectomy (1960s).   FamHx: family history includes Heart disease in his father and mother; Hypertension in his mother; Stroke in his father.   SocialHx:  reports that he has quit smoking. He has never used smokeless tobacco. He reports current alcohol use. He reports that he does not use drugs.  Home Meds:  Current Outpatient Medications   Medication Instructions    alcohol swabs (BD ALCOHOL SWABS) PadM USE FOUR TIMES DAILY    aspirin (ECOTRIN) 81 mg, Daily    blood glucose control high,low (ACCU-CHEK CATHRYN CONTROL SOLN) Soln Use as directed to check blood sugar    blood sugar diagnostic Strp test blood sugar as directed four times daily    blood-glucose meter (TRUE METRIX GLUCOSE METER) Misc use as directed    clopidogreL (PLAVIX) 75 mg, Oral, Daily    colchicine (COLCRYS) 0.6 mg, Oral, 2 times daily    cyanocobalamin (VITAMIN B-12) 1,000 mcg, Daily    febuxostat (ULORIC) 40 mg, Oral, Daily    furosemide (LASIX) 20 mg, Oral, 2 times daily    gabapentin (NEURONTIN) 600 mg, Oral, 2 times daily    JARDIANCE 10 mg, Oral, Daily    lancets 30 gauge Misc usea s directed to check blood glucose four times daily    LANTUS SOLOSTAR U-100 INSULIN 35 Units, Subcutaneous, Nightly    losartan (COZAAR) 50 mg, Oral, Daily    metFORMIN (GLUCOPHAGE) 1,000 mg, Oral, 2 times daily with meals    multivit-minerals/FA/lycopene (ONE-A-DAY MEN'S 50 PLUS ORAL) 1 tablet, Daily    nitroGLYCERIN (NITROSTAT) 0.4 mg, Sublingual, Every 5 min PRN    NovoLOG Flexpen U-100 Insulin 15 Units, Subcutaneous, 3  "times daily with meals    pen needle, diabetic (BD ULTRA-FINE SINA PEN NEEDLE) 32 gauge x 5/32" Ndle Use four times daily for insulin administration    predniSONE (DELTASONE) 20 mg, Oral, Daily    zolpidem (AMBIEN) 5 MG Tab TAKE 1 TABLET BY MOUTH EVERY NIGHT AS NEEDED       Review of Systems: Comprehensive ROS was performed and is negative unless otherwise noted in HPI.   Objective   Objective/Exam:   /71 (BP Location: Right arm, Patient Position: Sitting)   Pulse 88   Ht 5' 11" (1.803 m)   Wt 91.6 kg (202 lb)   SpO2 98%   BMI 28.17 kg/m²    Wt Readings from Last 4 Encounters:   05/07/25 93.1 kg (205 lb 4 oz)   05/06/25 91.6 kg (202 lb)   04/28/25 97 kg (213 lb 13.5 oz)   04/11/25 98 kg (216 lb)      Constitutional: NAD, Atraumatic, Conversant   HEENT: MMM, Sclera anicteric, No JVD   Cardiovascular: RRR, no murmurs noted, no chest tenderness to palpation, 2+ radial pulses b/l  Pulmonary: normal respiratory effort, CTAB, no crackles, wheezes  Abdominal: S/NT/ND  Musculoskeletal: No lower extremity edema noted b/l  Neurological: No gross neurological deficits  Skin: W/D/I  Psych: Appropriate affect, normal mood  Labs/Imaging/Procedures   Personally reviewed  Lab Results   Component Value Date     05/01/2025     03/21/2025    K 3.6 05/01/2025    K 3.7 03/21/2025     05/01/2025     03/21/2025    CO2 21 (L) 05/01/2025    CO2 24 03/21/2025    BUN 43 (H) 05/01/2025    CREATININE 1.1 05/01/2025    ANIONGAP 11 05/01/2025     Lab Results   Component Value Date    HGBA1C 6.9 (H) 04/24/2025    HGBA1C 6.6 (H) 01/07/2025     Lab Results   Component Value Date     (H) 04/24/2025    BNP 38 04/08/2025     (H) 03/18/2025    BNP 42 01/16/2025     (H) 11/22/2024      Lab Results   Component Value Date    WBC 7.29 05/01/2025    HGB 11.0 (L) 05/01/2025    HGB 11.7 (L) 03/21/2025    HCT 33.6 (L) 05/01/2025    HCT 35.5 (L) 03/21/2025     05/01/2025     03/21/2025    GRAN " "4.7 03/21/2025    GRAN 67.9 03/21/2025     Lab Results   Component Value Date    CHOL 131 01/17/2025    HDL 30 (L) 01/17/2025    LDLCALC 49.6 (L) 01/17/2025    LDLCALC 42.6 (L) 07/08/2024    LDLCALC 66.6 05/03/2023    TRIG 257 (H) 01/17/2025     Lab Results   Component Value Date    TSH 0.766 03/19/2025     No results found for: "APOLIPOPROTE"  No results found for: "LIPOA"     TTE:  Results for orders placed during the hospital encounter of 11/22/24    Echo Ultrasound enhancing contrast? Yes (definity/optison)    Interpretation Summary    Left Ventricle: Regional wall motion abnormalities present. See diagram for wall motion findings. There is mildly reduced systolic function with a visually estimated ejection fraction of 40 - 45%.    Stress Testing:   No results found for this or any previous visit.     Coronary Angiogram:  Results for orders placed during the hospital encounter of 11/08/24    Cardiac catheterization    Conclusion  Table formatting from the original result was not included.      The estimated blood loss was none.    [facility]  Surgery Department  Operative Note    SUMMARY  POST CATH NOTE    Date of Procedure: 11/11/2024    Procedure: Procedure(s) (LRB):  Left heart cath (Left)  Instantaneous Wave-Free Ratio (IFR) (N/A)  ANGIOGRAM, CORONARY ARTERY (N/A)    Surgeons and Role:  * Luis Felipe Wright MD - Primary    Assisting Surgeon: None    Pre-Operative Diagnosis: Chest pain [R07.9]  Unstable angina [I20.0]    Post-Operative Diagnosis: Post-Op Diagnosis Codes:  * Chest pain [R07.9]  * Unstable angina [I20.0]    s/p catheterization secondary to:    Cath Results:  Access: Us guided RRA    Coronary Anatomy:    Left Main Coronary Artery: The left main is a short andlarge caliber vessel arising normally from the left sinus of valsalva.  It bifurcates into the left anterior descending and left circumflex coronary artery.  It is free of evidence of obstructive coronary artery disease. PATRIA III " "flow    Left Anterior Descending: The left anterior descending coronary artery is a large caliber vessel arising normally from the left main and extending to the apex.  It gives rise to small to moderate caliber diagonal arteries. Ostial to proximal LAD (before stent) 50-60% stenosed angiographically. Mild to moderate ISR with patent stents with PATRIA 2 flow. iFR of Proximal LAD negative,    Left Circumflex: The left circumflex is a large caliber vessel arising normally from the left main coronary artery, it is non-dominant.  It gives rise to moderate caliber obtuse marginal branches.  Prox to mid Lcx  patent stent with mild IRS. Jailed AV Cx  (small <2.5 mm) diffuse disease at proximal vessel. PATRIA III flow    Right Coronary Artery: The right coronary artery is a large caliber vessel arising normally from the right sinus of valsalva.  It is dominant giving rise to a PDA and PLV branch.  The right coronary artery is free of obstructive disease. PATRIA III flow    LVgram: LVEDP 9 mmHg    Intervention:  iFR  JL4 guide  The 0.014" Omniwire was connected to the console, calibrated and equalized. The 0.014"  Omniwire was then advanced into the left main outside of the catheter and flushed with saline  with the introducer removed. The pressure was normalized and then the wire was advanced  across Proximal LAD lesion. 0.96-0.95 iFR recordings were obtained. The 0.014" Omniwire was then pulled  back slowly across the lesion to assess for step up and electronic drift. The wire was removed,  and final angiography was performed.    Closure device:  Patient tolerated procedure well, no complications    Post Cath Exam:  BP (!) 153/73 (BP Location: Left arm, Patient Position: Lying)   Pulse 71   Temp 98.7 °F (37.1 °C) (Oral)   Resp 16   Ht 5' 11" (1.803 m)   Wt 107.5 kg (237 lb)   SpO2 97%   BMI 33.05 kg/m²    Anesthesia: RN IV Sedation      Estimated Blood Loss (EBL): * No values recorded between 11/11/2024 11:40 AM and " 11/11/2024 12:19 PM *    Specimens:  Specimen (24h ago, onward)    None          Assessment:  Ostial to proximal LAD with 50-60% stenosis angiographically with iFR negative  LAD stents patent with PATRIA 2  Lcx stent patent    Plan:    - Patient tolerated procedure well. No immediate complications  - Continue aspirin and Plavix  - EKG when arrives to floor  - Routine post cath protocol  - Maximize medical management  - Further care by the primary team  - IVF at  100cc/hr  for 2 hours  - Follow up with me  -PET stress for viability M    42 minutes were spent on this visit including time personally:  -Reviewing Medical records & lab results  -Independently reviewing and interpreting (if not documented by myself) EKGs, echocardiograms, catherizations   -Obtaining a history, performing a relevant exam, counseling/educating the patient/family  -Documenting clinical information in the EMR including ordering of tests  -Care coordination and communicating with other health care providers       Problem List:     1. Essential hypertension [I10]    2. Gout, unspecified cause, unspecified chronicity, unspecified site    3. Type 2 diabetes mellitus with diabetic neuropathy, with long-term current use of insulin    4. Chronic systolic congestive heart failure    5. Mitral valve insufficiency, unspecified etiology    6. Ventricular tachycardia, nonsustained post-MI    7. Dyslipidemia                Assessment/Plan:   Clifton Wilson is a pleasant 73 y.o. male with PMHx of CAD, HTN, HLD, HFrEF 40-45% s/p ICD here for follow up. Patient feeling fatigue/tired likely multifactorial low BP and CAD.     CAD with multiple PCIs in LAD and Lcx- Ostial LAD with 50-60% stenosed with negative iFR. patent stents but LAD stents have PATRIA II flow. PET negative for ischemiaContinue DAPT, statin.   HFrEF s/p ICD with PET stress EF 36% with class II,stage B-C. Currently euvolemic. Dry. Warm. Continue current medication regimen/ Will continue to  uptitrate as tolerated.  Elevated inflammatory markers-- Gout vs PMR. Rheum referral placed.   HTN - at goal continue current medication regimen.   HLD continue statin      Preventative Care:  Lipids:  PVD(V/A)      Patient expressed verbal understanding and agreed with the plan     Return sooner for concerns or questions. If symptoms persist go to the ED.  I have reviewed all pertinent data including patient's medical history in detail and updated the computerized patient record.     Total time spent counseling greater than fifty percent of total visit time.  Counseling included discussion regarding imaging findings, diagnosis, possibilities, treatment options, risks and benefits.      Thank you for the opportunity to care for this patient. Please don't hesitate to reach out with any questions/concerns         Luis Felipe Bryan MD  Cardiovascular Disease; Interventional Cardiology  [facility]

## 2025-05-07 ENCOUNTER — OUTPATIENT CASE MANAGEMENT (OUTPATIENT)
Dept: ADMINISTRATIVE | Facility: OTHER | Age: 75
End: 2025-05-07
Payer: MEDICARE

## 2025-05-07 ENCOUNTER — OFFICE VISIT (OUTPATIENT)
Dept: RHEUMATOLOGY | Facility: CLINIC | Age: 75
End: 2025-05-07
Payer: MEDICARE

## 2025-05-07 ENCOUNTER — HOSPITAL ENCOUNTER (OUTPATIENT)
Dept: RADIOLOGY | Facility: HOSPITAL | Age: 75
Discharge: HOME | End: 2025-05-07
Attending: STUDENT IN AN ORGANIZED HEALTH CARE EDUCATION/TRAINING PROGRAM
Payer: MEDICARE

## 2025-05-07 VITALS
HEIGHT: 71 IN | TEMPERATURE: 98 F | BODY MASS INDEX: 28.73 KG/M2 | WEIGHT: 205.25 LBS | SYSTOLIC BLOOD PRESSURE: 129 MMHG | OXYGEN SATURATION: 95 % | HEART RATE: 83 BPM | DIASTOLIC BLOOD PRESSURE: 81 MMHG

## 2025-05-07 DIAGNOSIS — M19.90 INFLAMMATORY ARTHRITIS: ICD-10-CM

## 2025-05-07 DIAGNOSIS — M19.90 INFLAMMATORY ARTHRITIS: Primary | ICD-10-CM

## 2025-05-07 DIAGNOSIS — Z71.89 COUNSELING AND COORDINATION OF CARE: ICD-10-CM

## 2025-05-07 PROCEDURE — 4010F ACE/ARB THERAPY RXD/TAKEN: CPT | Mod: CPTII,HCNC,S$GLB, | Performed by: STUDENT IN AN ORGANIZED HEALTH CARE EDUCATION/TRAINING PROGRAM

## 2025-05-07 PROCEDURE — 99999 PR PBB SHADOW E&M-EST. PATIENT-LVL V: CPT | Mod: PBBFAC,HCNC,, | Performed by: STUDENT IN AN ORGANIZED HEALTH CARE EDUCATION/TRAINING PROGRAM

## 2025-05-07 PROCEDURE — 3044F HG A1C LEVEL LT 7.0%: CPT | Mod: CPTII,HCNC,S$GLB, | Performed by: STUDENT IN AN ORGANIZED HEALTH CARE EDUCATION/TRAINING PROGRAM

## 2025-05-07 PROCEDURE — 99205 OFFICE O/P NEW HI 60 MIN: CPT | Mod: HCNC,S$GLB,, | Performed by: STUDENT IN AN ORGANIZED HEALTH CARE EDUCATION/TRAINING PROGRAM

## 2025-05-07 PROCEDURE — 1101F PT FALLS ASSESS-DOCD LE1/YR: CPT | Mod: CPTII,HCNC,S$GLB, | Performed by: STUDENT IN AN ORGANIZED HEALTH CARE EDUCATION/TRAINING PROGRAM

## 2025-05-07 PROCEDURE — 3079F DIAST BP 80-89 MM HG: CPT | Mod: CPTII,HCNC,S$GLB, | Performed by: STUDENT IN AN ORGANIZED HEALTH CARE EDUCATION/TRAINING PROGRAM

## 2025-05-07 PROCEDURE — G2211 COMPLEX E/M VISIT ADD ON: HCPCS | Mod: HCNC,S$GLB,, | Performed by: STUDENT IN AN ORGANIZED HEALTH CARE EDUCATION/TRAINING PROGRAM

## 2025-05-07 PROCEDURE — 1126F AMNT PAIN NOTED NONE PRSNT: CPT | Mod: CPTII,HCNC,S$GLB, | Performed by: STUDENT IN AN ORGANIZED HEALTH CARE EDUCATION/TRAINING PROGRAM

## 2025-05-07 PROCEDURE — 3288F FALL RISK ASSESSMENT DOCD: CPT | Mod: CPTII,HCNC,S$GLB, | Performed by: STUDENT IN AN ORGANIZED HEALTH CARE EDUCATION/TRAINING PROGRAM

## 2025-05-07 PROCEDURE — 3074F SYST BP LT 130 MM HG: CPT | Mod: CPTII,HCNC,S$GLB, | Performed by: STUDENT IN AN ORGANIZED HEALTH CARE EDUCATION/TRAINING PROGRAM

## 2025-05-07 PROCEDURE — 3008F BODY MASS INDEX DOCD: CPT | Mod: CPTII,HCNC,S$GLB, | Performed by: STUDENT IN AN ORGANIZED HEALTH CARE EDUCATION/TRAINING PROGRAM

## 2025-05-07 PROCEDURE — 1111F DSCHRG MED/CURRENT MED MERGE: CPT | Mod: CPTII,HCNC,S$GLB, | Performed by: STUDENT IN AN ORGANIZED HEALTH CARE EDUCATION/TRAINING PROGRAM

## 2025-05-07 PROCEDURE — 73130 X-RAY EXAM OF HAND: CPT | Mod: 26,50,HCNC, | Performed by: RADIOLOGY

## 2025-05-07 PROCEDURE — 73130 X-RAY EXAM OF HAND: CPT | Mod: TC,50,HCNC,FY

## 2025-05-07 RX ORDER — PREDNISONE 20 MG/1
20 TABLET ORAL DAILY
Qty: 10 TABLET | Refills: 0 | Status: SHIPPED | OUTPATIENT
Start: 2025-05-07

## 2025-05-07 NOTE — TELEPHONE ENCOUNTER
Please call [facility]   The number is [phone]. Get their Fax number and  my prednisone for my pt admitted there

## 2025-05-07 NOTE — LETTER
Clifton Wilson  [address] [address]      Dear Clifton Wilson,     I work with [facility] Outpatient Care Management Department. We received a referral to call you to discuss your medical history. These services are free of charge and are offered to [facility] patients who have recently been discharged from any of our facilities or who have medical conditions that may require the skill of a nurse to assist with management.     I am a Registered Nurse who specializes in connecting patients with available medical and financial resources as well as addressing any educational needs that may be indicated.    I attempted to reach you by telephone, but I was unsuccessful. Please call our department so that we can go over some questions with you, regarding your health.    The Outpatient Care Management Department can be reached at [phone], from 8:00AM to 4:30 PM, on Monday thru Friday.     Additionally, [facility] also has a program where a nurse is available 24/7 to answer questions or provide medical advice, their number is [phone].      Thanks,        Yanna Gutierrez RN  Outpatient Care Management  Phone #: [phone]

## 2025-05-07 NOTE — LETTER
Clifton Wilson  [address] [address]      Dear Clifton Wilson,     Welcome to [facility]s Outpatient Care Management Program. We are here to assist patients with multiple long-term (chronic) conditions who often need more personalized healthcare.    It was a pleasure talking with you today. My name is Yanna Gutierrez RN. I look forward to working with you as your Care Manager. I will be contacting you by telephone routinely to help coordinate care and resolve issues.    My goal is to help you function at the healthiest and highest level possible. You can contact me directly at [phone].    As an [facility] patient with [employer], some of the services we provide, at no cost to you, include:     Development of an individualized care plan with a Registered Nurse   Connection with a   Assistance from a Community Health Worker  Connection with available resources and services    Coordinate communication among your care team members   Provide coaching and education  Help you understand your doctor's treatment plan  Help you obtain information about your insurance coverage.    All services provided by [facility]s Outpatient Care Managers and other care team members are coordinated with and communicated to your primary care team.      As part of your enrollment, you will be receiving education materials and more information about these services in your [facility] account, by phone, or through the mail. If you do not wish to participate or receive information, you can Opt Out by contacting our office at [phone].     [facility] Patient Rights and Responsibilities available upon request.    Sincerely,      Yanna Gutierrez RN  [facility]   Outpatient Care Management

## 2025-05-07 NOTE — PROGRESS NOTES
RHEUMATOLOGY OUTPATIENT CLINIC NOTE    5/25/2025    Attending Rheumatologist: Lynne Noble  Primary Care Provider: Britton Grissom MD   Physician Requesting Consultation: Britton Grissom MD  [address]  [address]  [place] [address]  Chief Complaint/Reason For Consultation:  Establish Care      Subjective:       HPI  Clifton Wilson is a 74 y.o. White male with pmhx noted below referred for arthritis.  Patient reports that couple of months ago he started having muscle fatigue and stiffness mostly in shoulders and hips. Problems standing up from sitting position. There is pain and swelling in hands.   Responds to Prednisone - patient feels normal once started Prednisone.       Review of Systems   All other systems reviewed and are negative.       Chronic comorbid conditions affecting medical decision making today:  Past Medical History:   Diagnosis Date    Anticoagulant long-term use     Cardiomyopathy     CHF (congestive heart failure)     Coronary artery disease     Diabetes mellitus     Gout     Heart attack     Hypertension     Pneumonia      Past Surgical History:   Procedure Laterality Date    APPENDECTOMY      CARDIAC CATHETERIZATION      7 stents    CARDIAC DEFIBRILLATOR PLACEMENT      CATARACT EXTRACTION Bilateral 2011    COLONOSCOPY N/A 08/10/2018    Procedure: COLONOSCOPY golytely;  Surgeon: Valentine Burger MD;  Location: [facility] ENDO;  Service: Endoscopy;  Laterality: N/A;    CORONARY ANGIOGRAPHY N/A 11/11/2024    Procedure: ANGIOGRAM, CORONARY ARTERY;  Surgeon: Luis Felipe Wright MD;  Location: [facility] CATH LAB/EP;  Service: Cardiology;  Laterality: N/A;    EYE SURGERY      INSTANTANEOUS WAVE-FREE RATIO (IFR) N/A 11/11/2024    Procedure: Instantaneous Wave-Free Ratio (IFR);  Surgeon: Luis Felipe Wright MD;  Location: [facility] CATH LAB/EP;  Service: Cardiology;  Laterality: N/A;    LEFT HEART CATHETERIZATION Left 11/11/2024    Procedure: Left heart cath;  Surgeon: Luis Felipe Wright  MD;  Location: [facility] CATH LAB/EP;  Service: Cardiology;  Laterality: Left;    REVISION OF IMPLANTABLE CARDIOVERTER-DEFIBRILLATOR (ICD) ELECTRODE LEAD PLACEMENT N/A 11/11/2019    Procedure: REVISION, INSERTION, ELECTRODE LEAD, ICD;  Surgeon: Shukri Walsh MD;  Location: [facility] EP LAB;  Service: Cardiology;  Laterality: N/A;  Lead malfxn, RV lead ICD, SJM, anes, DM, 3 PREP    TONSILLECTOMY  1960s    VASECTOMY  2000     Family History   Problem Relation Name Age of Onset    Heart disease Mother Miriam Gillespie     Hypertension Mother Miriam Gillespie     Heart disease Father Juan Antonio Wilson Sr     Stroke Father Juan Antonio Wilson Sr     Amblyopia Neg Hx      Blindness Neg Hx      Cataracts Neg Hx      Glaucoma Neg Hx      Macular degeneration Neg Hx      Retinal detachment Neg Hx      Strabismus Neg Hx       Social History     Substance and Sexual Activity   Alcohol Use Yes    Comment: socially     Tobacco Use History[1]  Social History     Substance and Sexual Activity   Drug Use No     Current Medications[2]     Objective:         Vitals:    05/07/25 1018   BP: 129/81   Pulse: 83   Temp: 97.9 °F (36.6 °C)     Physical Exam   Constitutional: normal appearance.   HENT:   Head: Normocephalic.   Nose: Nose normal.   Mouth/Throat: Mucous membranes are moist.   Eyes: Conjunctivae are normal.   Cardiovascular: Regular rhythm and normal pulses. Tachycardia present.   Pulmonary/Chest: Effort normal.   Musculoskeletal:         General: Tenderness present.      Cervical back: Normal range of motion.   Neurological: He is alert.   Psychiatric: Mood normal.       Reviewed old and all outside pertinent medical records available.    All lab results personally reviewed and interpreted by me.  Lab Results   Component Value Date    WBC 7.29 05/01/2025    HGB 11.0 (L) 05/01/2025    HCT 33.6 (L) 05/01/2025    MCV 90 05/01/2025    MCH 29.5 05/01/2025    MCHC 32.7 05/01/2025    RDW 13.4 05/01/2025     05/01/2025    MPV 10.3 05/01/2025  "   PLTEST Normal 12/27/2011       Lab Results   Component Value Date     05/01/2025    K 3.6 05/01/2025     05/01/2025    CO2 21 (L) 05/01/2025     (H) 05/01/2025    BUN 43 (H) 05/01/2025    CALCIUM 9.5 05/01/2025    PROT 7.7 04/24/2025    ALBUMIN 3.1 (L) 04/24/2025    BILITOT 0.5 04/24/2025    AST 23 04/24/2025    ALKPHOS 95 04/24/2025    ALT 28 04/24/2025       Lab Results   Component Value Date    COLORU Yellow 04/08/2025    APPEARANCEUA Clear 04/08/2025    SPECGRAV 1.015 04/08/2025    PHUR 6.0 04/08/2025    PROTEINUA Negative 04/08/2025    KETONESU Trace (A) 03/18/2025    LEUKOCYTESUR Negative 04/08/2025    NITRITE Negative 04/08/2025    UROBILINOGEN Negative 04/08/2025       Lab Results   Component Value Date    CRP 17.9 (H) 05/07/2025       Lab Results   Component Value Date    SEDRATE 85 (H) 05/07/2025       Lab Results   Component Value Date    MARLEN Positive (A) 04/27/2025    RF <13.0 04/27/2025    SEDRATE 85 (H) 05/07/2025       No components found for: "25OHVITDTOT", "39CKTISF0", "38KKZPTX8", "METHODNOTE"    Lab Results   Component Value Date    URICACID 9.1 (H) 03/18/2025       No components found for: "TSPOTTB"    Lab Results   Component Value Date    MARLEN Positive (A) 04/27/2025        Imaging:  All imaging reviewed and independently interpreted by me.         ASSESSMENT / PLAN:     Clifton Wilson is a 74 y.o. White male with:      1. Inflammatory arthritis (Primary)  I think patient has a combination of Polymyalgia rheumatic and a possible inflammatory arthritis  Sed rate, crp elevated, stiffness and pain of shoulder and hip girdle, fatigue, weight loss and resolution with steroids   - Uric Acid; Future  - Sedimentation rate; Future  - C-Reactive Protein; Future  - Interleukin-6; Future  - X-Ray Hand 3 View Bilateral; Future  - Hepatitis C Antibody; Future  - Hepatitis B Surface Ab, Qualitative; Future  - Hepatitis B Surface Antigen; Future  - Hepatitis B Core Antibody, Total; " Future       2. Other specified counseling  - over 10 minutes spent regarding below topics:  - Immunization counseling done.  - Weight loss counseling done.  - Nutrition and exercise counseling.  - Limitation of alcohol consumption.  - Regular exercise:  Aerobic and resistance.  - Medication counseling provided.    Follow up in about 3 months (around 8/7/2025).    Method of contact with patient concerns: [facility]    Disclaimer:  This note is prepared using voice recognition software and as such is likely to have errors and has not been proof read. Please contact me for questions.     Time spent: 60 minutes in face to face discussion concerning diagnosis, prognosis, review of lab and test results, benefits of treatment as well as management of disease, counseling of patient and coordination of care between various health care providers.  Greater than half the time spent was used for coordination of care and counseling of patient.    Lynne Noble M.D.  Rheumatology Department   [facility]     Today's visit is associated with current or anticipated ongoing medical care related to this patient's diagnosis of chronic inflammatory arthritis, which is a chronic disease which will require regular follow up to manage symptoms and progression. The patient is to return to the office within a minimum of 3-6 months to review symptoms and function at that time. CPT code            [1]   Social History  Tobacco Use   Smoking Status Former   Smokeless Tobacco Never   [2]   Current Outpatient Medications:     alcohol swabs (BD ALCOHOL SWABS) PadM, USE FOUR TIMES DAILY, Disp: 400 each, Rfl: 3    aspirin (ECOTRIN) 81 MG EC tablet, Take 81 mg by mouth once daily., Disp: , Rfl:     blood glucose control high,low (ACCU-CHEK CATHRYN CONTROL SOLN) Soln, Use as directed to check blood sugar, Disp: 1 each, Rfl: 1    blood sugar diagnostic Strp, test blood sugar as directed four times daily, Disp: 100  "each, Rfl: 3    blood-glucose meter (TRUE METRIX GLUCOSE METER) Misc, use as directed, Disp: 1 each, Rfl: 0    clopidogreL (PLAVIX) 75 mg tablet, Take 1 tablet (75 mg total) by mouth once daily., Disp: 90 tablet, Rfl: 3    colchicine (COLCRYS) 0.6 mg tablet, Take 1 tablet (0.6 mg total) by mouth 2 (two) times daily., Disp: 180 tablet, Rfl: 4    cyanocobalamin (VITAMIN B-12) 1000 MCG tablet, Take 1,000 mcg by mouth once daily., Disp: , Rfl:     empagliflozin (JARDIANCE) 10 mg tablet, Take 1 tablet (10 mg total) by mouth once daily., Disp: 30 tablet, Rfl: 11    furosemide (LASIX) 20 MG tablet, Take 1 tablet (20 mg total) by mouth 2 (two) times daily., Disp: 60 tablet, Rfl: 11    gabapentin (NEURONTIN) 300 MG capsule, Take 2 capsules (600 mg total) by mouth 2 (two) times daily., Disp: 360 capsule, Rfl: 1    insulin aspart U-100 (NOVOLOG FLEXPEN U-100 INSULIN) 100 unit/mL (3 mL) InPn pen, Inject 15 Units into the skin 3 (three) times daily with meals., Disp: 15 mL, Rfl: 0    insulin glargine U-100, Lantus, (LANTUS SOLOSTAR U-100 INSULIN) 100 unit/mL (3 mL) InPn pen, Inject 35 Units into the skin every evening., Disp: 15 mL, Rfl: 0    lancets 30 gauge Misc, usea s directed to check blood glucose four times daily, Disp: 100 each, Rfl: 3    losartan (COZAAR) 50 MG tablet, Take 1 tablet (50 mg total) by mouth once daily., Disp: 90 tablet, Rfl: 3    multivit-minerals/FA/lycopene (ONE-A-DAY MEN'S 50 PLUS ORAL), Take 1 tablet by mouth once daily., Disp: , Rfl:     nitroGLYCERIN (NITROSTAT) 0.4 MG SL tablet, Place 1 tablet (0.4 mg total) under the tongue every 5 (five) minutes as needed for Chest pain., Disp: 25 tablet, Rfl: 3    pen needle, diabetic (BD ULTRA-FINE SINA PEN NEEDLE) 32 gauge x 5/32" Ndle, Use four times daily for insulin administration, Disp: 400 each, Rfl: 3    zolpidem (AMBIEN) 5 MG Tab, TAKE 1 TABLET BY MOUTH EVERY NIGHT AS NEEDED (Patient not taking: Reported on 5/15/2025), Disp: 30 tablet, Rfl: 0    " febuxostat (ULORIC) 40 mg Tab, Take 1 tablet (40 mg total) by mouth once daily., Disp: 90 tablet, Rfl: 4    metFORMIN (GLUCOPHAGE) 1000 MG tablet, Take 1 tablet (1,000 mg total) by mouth 2 (two) times daily with meals., Disp: 180 tablet, Rfl: 4    predniSONE (DELTASONE) 20 MG tablet, Take 1 tablet (20 mg total) by mouth once daily., Disp: 10 tablet, Rfl: 0

## 2025-05-08 ENCOUNTER — PATIENT OUTREACH (OUTPATIENT)
Dept: ADMINISTRATIVE | Facility: OTHER | Age: 75
End: 2025-05-08
Payer: MEDICARE

## 2025-05-08 DIAGNOSIS — M62.81 MUSCLE WEAKNESS (GENERALIZED): ICD-10-CM

## 2025-05-08 DIAGNOSIS — M43.02 SPONDYLOLYSIS OF CERVICAL REGION: Primary | ICD-10-CM

## 2025-05-08 DIAGNOSIS — M17.10 UNILATERAL PRIMARY OSTEOARTHRITIS, UNSPECIFIED KNEE: ICD-10-CM

## 2025-05-08 NOTE — PROGRESS NOTES
Outpatient Care Management  Initial Patient Assessment    Patient: Clifton Wilson  MRN: [id number]  Date of Service: 05/07/2025  Completed by: Yanna Gutierrez RN  Referral Date: 04/24/2025  Date of Eligibility: 4/24/2025  Program:   High Risk  Status: Ongoing  Effective Dates: 5/7/2025 - present  Responsible Staff: Yanna Gutierrez, RN        Reason for Visit   Patient presents with    Other     5/7/2025: missed enrollment attempt #1, voicemail left, missed attempt letter sent in portal.     Nursing Assessment     5/7/2025: Received return call from patient.    OPCM Enrollment Call     5/7/2025: Received return call from patient.       Brief Summary:  Clifton Wilson was referred by Dr. Genaro Anand, hospital provider, for SOB. Patient qualifies for program based on high risk. Patient with risk score of 73.5%.    Active problem list, medical, surgical and social history reviewed. Active comorbidities include Diabetes, mitral regurgitation, CHF, CAD, NSVT post MI, dyslipidemia, CAD, HTN, ischemic cardiomyopathy, thrombocytopenia, chronic pain, PAD, NSTEMI, syncope and collapse, gout, and arthritis. Areas of need identified by patient include education on chronic conditions of diabetes, CHF, CAD, and HTN, education on fall prevention, order [employer] Meals.   Next steps: RN to educate patient on chronic conditions of diabetes, CHF, CAD, and HTN. RN to educate patient on fall prevention. RN to order [employer] meals. Referral sent to CHW to complete SDOH.     Disability Status  Is the patient alert and oriented (person, place, time, and situation)?: Alert and oriented x 4  Hearing Difficulty or Deaf: yes  Hearing Management: other (patient wears hearing aids)  Visual Difficulty or Blind: yes  Visual and Hearing Conclusion Statement: Patient wears hearing aids and reading glasses  Vision Management: Other (Patient uises reading glasses.)  Difficulty Concentrating, Remembering or Making Decisions: no  Communication Difficulty:  no  Eating/Swallowing Difficulty: no  Walking or Climbing Stairs Difficulty: yes (Patient using a RW)  Walking or Climbing Stairs: ambulation difficulty, requires equipment  Mobility Management: PAtient using a RW for ambulation  Dressing/Bathing Difficulty: yes  Dressing/Bathing: bathing difficulty, requires equipment (Patient has a shower chair.)  Dressing/Bathing Management: PAtient is using a shower chair, spouse was present during bathing for safety.  Grooming: independent  Transferring (e.g., getting in and out of chairs): independent  Toileting : Independent  Continence : Continence - Not a problem  Difficulty Managing Errands Independently: yes  Errands Management: Patient was driving prior to hospital stay. Using transport for appointments currently until he can drive again. [facility] can assist with transport as well.  Equipment Currently Used at Home: shower chair; blood pressure machine; glucometer; scale  ADL Conclusion Statement: Patient was independent prior to hospital stay. Patient went to skilled post hospital and plan is for outpt therapy to help return to baseline.  Change in Functional Status Since Onset of Current Illness/Injury: yes        Spiritual Beliefs  Spiritual, Cultural Beliefs, Episcopal Practices, Values that Affect Care: no      Social History     Socioeconomic History    Marital status:    Tobacco Use    Smoking status: Former    Smokeless tobacco: Never   Substance and Sexual Activity    Alcohol use: Yes     Comment: socially    Drug use: No    Sexual activity: Yes     Social Drivers of Health     Financial Resource Strain: Low Risk  (4/26/2025)    Overall Financial Resource Strain (CARDIA)     Difficulty of Paying Living Expenses: Not very hard   Food Insecurity: No Food Insecurity (4/26/2025)    Hunger Vital Sign     Worried About Running Out of Food in the Last Year: Never true     Ran Out of Food in the Last Year: Never true   Transportation Needs: No Transportation Needs  (4/25/2025)    PRAPARE - Transportation     Lack of Transportation (Medical): No     Lack of Transportation (Non-Medical): No   Physical Activity: Inactive (4/25/2025)    Exercise Vital Sign     Days of Exercise per Week: 0 days     Minutes of Exercise per Session: 20 min   Stress: No Stress Concern Present (4/26/2025)    [employer] - Occupational Stress Questionnaire     Feeling of Stress : Not at all   Housing Stability: Low Risk  (4/26/2025)    Housing Stability Vital Sign     Unable to Pay for Housing in the Last Year: No     Number of Times Moved in the Last Year: 0     Homeless in the Last Year: No       Roles and Relationships  Primary Source of Support/Comfort: spouse  Name of Support/Comfort Primary Source: Marie  Secondary Source of Support/Comfort: child(jonas)  Name of Support/Comfort Secondary Source: Billy      Advance Directives (For Healthcare)  Advance Directive  (If Adv Dir status is received, view document under Adv Dir in header or Chart Review Media tab): Patient does not have Advance Directive, declines information.  Patient Requests Assistance: Patient will do independently        Patient Reported Insurance  Verified current insurance plan:: [employer] Medicare Advantage  [employer] benefits discussed:: OTC Prescription Discounts; Well Dine; Transportation; Silver Sneakers; Mail Order Pharmacy            5/7/2025     4:01 PM 4/22/2025     2:06 PM 2/13/2025     8:16 AM 9/18/2024     8:23 AM 7/8/2024     9:26 AM 1/8/2024     9:08 AM 5/3/2023     9:33 AM   Depression Patient Health Questionnaire   Over the last two weeks how often have you been bothered by little interest or pleasure in doing things Not at all Not at all Not at all Not at all Not at all Not at all Not at all   Over the last two weeks how often have you been bothered by feeling down, depressed or hopeless Not at all Not at all Not at all Not at all Not at all Not at all Not at all   PHQ-2 Total  Score 0 0 0 0 0 0 0       Learning Assessment       05/07/2025 1645 [facility] (5/7/2025 - Present)   Created by Yanna Gutierrez RN -  (Nurse) Status: Complete                 PRIMARY LEARNER     Primary Learner Name:  Clifton Wilson  - 05/07/2025 1645    Relationship:  Patient  - 05/07/2025 1645    Does the primary learner have any barriers to learning?:  No Barriers  - 05/07/2025 1645    What is the preferred language of the primary learner?:  English  - 05/07/2025 1645    Is an  required?:  No  - 05/07/2025 1645    How does the primary learner prefer to learn new concepts?:  Listening  - 05/07/2025 1645    How often do you need to have someone help you read instructions, pamphlets, or written material from your doctor or pharmacy?:  Never  - 05/07/2025 1645        CO-LEARNER #1     No question answered        CO-LEARNER #2     No question answered        SPECIAL TOPICS     No question answered        ANSWERED BY:     No question answered        Comments         Edit History       Yanna Gutierrez, RN -  (Nurse)   05/07/2025 1645

## 2025-05-08 NOTE — PROGRESS NOTES
This Community Health Worker (CHW) completed Social Determinant of Health (SDOH)  Questionnaire with patient/caregiver via telephone today.  Notified [facility] Yanna Gutierrez RN  of completion.      Patient denied any SDOH needs at this time. He stated that he is getting d/c from skilled nursing. He has requested to leave due to feeling he isnt getting the amount of physical therapy he needs. He stated he has lost muscle mass and laying in the bed or only doing arm movements with light leg work is not helping him. He has informed Yanna and GUERDA that he would like outpatient therapy at [facility] or [facility] in [place]. He would also like to get a walker if possible. Page spoke with Yanna and she has told the SW that will be D/C him and it should be order during discharged and delivered before he leaves. No additional assistance needed at this time.

## 2025-05-09 ENCOUNTER — TELEPHONE (OUTPATIENT)
Dept: FAMILY MEDICINE | Facility: CLINIC | Age: 75
End: 2025-05-09
Payer: MEDICARE

## 2025-05-09 ENCOUNTER — OUTPATIENT CASE MANAGEMENT (OUTPATIENT)
Dept: ADMINISTRATIVE | Facility: OTHER | Age: 75
End: 2025-05-09
Payer: MEDICARE

## 2025-05-09 NOTE — TELEPHONE ENCOUNTER
----- Message from Keeganjaylain sent at 5/9/2025 10:42 AM CDT -----  Type : Patient Call  Who Called : Alexandria calling from [facility]   Does the patient know what this is regarding?: Requesting a call back in regards to scheduling the pt a hospital follow up appt . The pt is discharging on today 5/9. This is the second message being sent over to get the pt scheduled. No dates generated for the provider and the providers soonest virtual appt was not until June. Please reach out to the pt to get him scheduled . Please Advise   Would the patient rather a call back or a response via [facility]? Call   Best Call Back Number: [phone]   Additional Information:

## 2025-05-12 ENCOUNTER — OFFICE VISIT (OUTPATIENT)
Dept: FAMILY MEDICINE | Facility: CLINIC | Age: 75
End: 2025-05-12
Attending: FAMILY MEDICINE
Payer: MEDICARE

## 2025-05-12 VITALS
SYSTOLIC BLOOD PRESSURE: 126 MMHG | WEIGHT: 205.94 LBS | BODY MASS INDEX: 28.83 KG/M2 | HEART RATE: 87 BPM | DIASTOLIC BLOOD PRESSURE: 80 MMHG | OXYGEN SATURATION: 99 % | HEIGHT: 71 IN

## 2025-05-12 DIAGNOSIS — E11.9 DIABETES MELLITUS TYPE 2 WITHOUT RETINOPATHY: ICD-10-CM

## 2025-05-12 DIAGNOSIS — M1A.9XX0 CHRONIC GOUT INVOLVING TOE WITHOUT TOPHUS, UNSPECIFIED CAUSE, UNSPECIFIED LATERALITY: ICD-10-CM

## 2025-05-12 DIAGNOSIS — E78.5 DYSLIPIDEMIA ASSOCIATED WITH TYPE 2 DIABETES MELLITUS: ICD-10-CM

## 2025-05-12 DIAGNOSIS — E11.40 TYPE 2 DIABETES MELLITUS WITH DIABETIC NEUROPATHY, UNSPECIFIED WHETHER LONG TERM INSULIN USE: ICD-10-CM

## 2025-05-12 DIAGNOSIS — Z79.4 INSULIN DEPENDENT TYPE 2 DIABETES MELLITUS: ICD-10-CM

## 2025-05-12 DIAGNOSIS — I15.2 HYPERTENSION ASSOCIATED WITH DIABETES: ICD-10-CM

## 2025-05-12 DIAGNOSIS — E11.9 INSULIN DEPENDENT TYPE 2 DIABETES MELLITUS: ICD-10-CM

## 2025-05-12 DIAGNOSIS — Z79.4 TYPE 2 DIABETES MELLITUS WITH DIABETIC NEUROPATHY, WITH LONG-TERM CURRENT USE OF INSULIN: Primary | ICD-10-CM

## 2025-05-12 DIAGNOSIS — E11.59 HYPERTENSION ASSOCIATED WITH DIABETES: ICD-10-CM

## 2025-05-12 DIAGNOSIS — I50.42 CHRONIC COMBINED SYSTOLIC AND DIASTOLIC CONGESTIVE HEART FAILURE: ICD-10-CM

## 2025-05-12 DIAGNOSIS — E11.40 TYPE 2 DIABETES MELLITUS WITH DIABETIC NEUROPATHY, WITH LONG-TERM CURRENT USE OF INSULIN: Primary | ICD-10-CM

## 2025-05-12 DIAGNOSIS — E11.69 DYSLIPIDEMIA ASSOCIATED WITH TYPE 2 DIABETES MELLITUS: ICD-10-CM

## 2025-05-12 PROCEDURE — 1159F MED LIST DOCD IN RCRD: CPT | Mod: CPTII,HCNC,S$GLB, | Performed by: FAMILY MEDICINE

## 2025-05-12 PROCEDURE — 1101F PT FALLS ASSESS-DOCD LE1/YR: CPT | Mod: CPTII,HCNC,S$GLB, | Performed by: FAMILY MEDICINE

## 2025-05-12 PROCEDURE — 1160F RVW MEDS BY RX/DR IN RCRD: CPT | Mod: CPTII,HCNC,S$GLB, | Performed by: FAMILY MEDICINE

## 2025-05-12 PROCEDURE — 3074F SYST BP LT 130 MM HG: CPT | Mod: CPTII,HCNC,S$GLB, | Performed by: FAMILY MEDICINE

## 2025-05-12 PROCEDURE — 99215 OFFICE O/P EST HI 40 MIN: CPT | Mod: HCNC,S$GLB,, | Performed by: FAMILY MEDICINE

## 2025-05-12 PROCEDURE — 3044F HG A1C LEVEL LT 7.0%: CPT | Mod: CPTII,HCNC,S$GLB, | Performed by: FAMILY MEDICINE

## 2025-05-12 PROCEDURE — 3288F FALL RISK ASSESSMENT DOCD: CPT | Mod: CPTII,HCNC,S$GLB, | Performed by: FAMILY MEDICINE

## 2025-05-12 PROCEDURE — 1111F DSCHRG MED/CURRENT MED MERGE: CPT | Mod: CPTII,HCNC,S$GLB, | Performed by: FAMILY MEDICINE

## 2025-05-12 PROCEDURE — 4010F ACE/ARB THERAPY RXD/TAKEN: CPT | Mod: CPTII,HCNC,S$GLB, | Performed by: FAMILY MEDICINE

## 2025-05-12 PROCEDURE — 99999 PR PBB SHADOW E&M-EST. PATIENT-LVL IV: CPT | Mod: PBBFAC,HCNC,, | Performed by: FAMILY MEDICINE

## 2025-05-12 PROCEDURE — 3079F DIAST BP 80-89 MM HG: CPT | Mod: CPTII,HCNC,S$GLB, | Performed by: FAMILY MEDICINE

## 2025-05-12 PROCEDURE — 3008F BODY MASS INDEX DOCD: CPT | Mod: CPTII,HCNC,S$GLB, | Performed by: FAMILY MEDICINE

## 2025-05-12 RX ORDER — FEBUXOSTAT 40 MG/1
40 TABLET, FILM COATED ORAL DAILY
Qty: 90 TABLET | Refills: 4 | Status: SHIPPED | OUTPATIENT
Start: 2025-05-12

## 2025-05-12 RX ORDER — METFORMIN HYDROCHLORIDE 1000 MG/1
1000 TABLET ORAL 2 TIMES DAILY WITH MEALS
Qty: 180 TABLET | Refills: 4 | Status: SHIPPED | OUTPATIENT
Start: 2025-05-12

## 2025-05-12 NOTE — PROGRESS NOTES
Subjective:       Patient ID: Clifton Wilson is a 74 y.o. male.    Chief Complaint: Hospital Follow Up    74 yr old pleasant white male with DM II, HTN, HLD, CAD, Combined chronic CHF, MR, obesity, neuropathy, presents today for hospital discharge follow up. He was admitted for weakness and sent to SNF. Doingw ell, walking now.    DM II - controlled  -    HGBA1C                   6.9 (H)             04/24/2025                                                         - on metformin and insulin and sugars improving - UTD eye exam - foot exam UTD - on ASA and plavix. Losartan. He ate too much jamie cake during mardi gras.      HTN - chronic - controlled - on losartan, lasix - compliant - no side effects      HLD - chronic -  LDLCALC                  42.6 (L)            07/08/2024                                                   - controlled -       CAD/combined CHF - EF 35-40% - on external defibrillator - medical management - on ASA/plavix/ARB - no symptoms - following cardiology    Gout - improving - uses colchicine for flare up -     History as below - reviewed            Follow-up  Associated symptoms include arthralgias, joint swelling and myalgias. Pertinent negatives include no chest pain, congestion, coughing, diaphoresis, fatigue, headaches, neck pain, rash, visual change, vomiting or weakness.   Edema  Associated symptoms include arthralgias, joint swelling and myalgias. Pertinent negatives include no chest pain, congestion, coughing, diaphoresis, fatigue, headaches, neck pain, rash, visual change, vomiting or weakness.   Shoulder Pain   Pertinent negatives include no headaches. There is no history of diabetes.   Diabetes  He presents for his follow-up diabetic visit. He has type 2 diabetes mellitus. No MedicAlert identification noted. The initial diagnosis of diabetes was made 27 years ago. His disease course has been worsening. Hypoglycemia symptoms include sleepiness. Pertinent negatives for hypoglycemia  include no confusion, dizziness, headaches, hunger, mood changes, nervousness/anxiousness, pallor, seizures, speech difficulty, sweats or tremors. Associated symptoms include foot paresthesias. Pertinent negatives for diabetes include no blurred vision, no chest pain, no fatigue, no foot ulcerations, no polydipsia, no polyphagia, no polyuria, no visual change, no weakness and no weight loss. Hypoglycemia complications include blackouts and hospitalization. Pertinent negatives for hypoglycemia complications include no nocturnal hypoglycemia, no required assistance and no required glucagon injection. Symptoms are stable. Diabetic complications include autonomic neuropathy, heart disease, impotence and peripheral neuropathy. Pertinent negatives for diabetic complications include no CVA, nephropathy, PVD or retinopathy. Risk factors for coronary artery disease include hypertension, obesity, diabetes mellitus and male sex. Current diabetic treatment includes diet, insulin injections and oral agent (monotherapy). He is compliant with treatment all of the time. He is currently taking insulin pre-breakfast, pre-lunch, pre-dinner and at bedtime. Insulin injections are given by patient. Rotation sites for injection include the abdominal wall, arms and thighs. His weight is fluctuating minimally. He is following a diabetic, generally healthy, low fat/cholesterol and low salt diet. Meal planning includes avoidance of concentrated sweets and carbohydrate counting. He has not had a previous visit with a dietitian. He participates in exercise three times a week. He monitors blood glucose at home 3-4 x per day. He monitors urine at home <1 x per month. Blood glucose monitoring compliance is excellent. His home blood glucose trend is decreasing steadily. An ACE inhibitor/angiotensin II receptor blocker is being taken. He does not see a podiatrist.Eye exam is current.   Knee Pain     Swelling  This is a chronic problem. The current  episode started more than 1 month ago. The problem occurs intermittently. The problem has been gradually worsening. Associated symptoms include arthralgias, joint swelling and myalgias. Pertinent negatives include no chest pain, congestion, coughing, diaphoresis, fatigue, headaches, neck pain, rash, visual change, vomiting or weakness.   Hypertension  This is a chronic problem. The current episode started more than 1 year ago. The problem has been gradually improving since onset. The problem is controlled. Pertinent negatives include no blurred vision, chest pain, headaches, malaise/fatigue, neck pain, palpitations, peripheral edema, PND or sweats. Risk factors for coronary artery disease include diabetes mellitus, dyslipidemia, male gender and obesity. Past treatments include angiotensin blockers and diuretics. The current treatment provides significant improvement. There are no compliance problems.  Hypertensive end-organ damage includes CAD/MI and heart failure. There is no history of CVA, left ventricular hypertrophy, PVD or retinopathy. There is no history of chronic renal disease, hypercortisolism, hyperparathyroidism, pheochromocytoma, renovascular disease or a thyroid problem.   Hyperlipidemia  This is a chronic problem. The current episode started more than 1 year ago. The problem is controlled. Recent lipid tests were reviewed and are normal. Exacerbating diseases include obesity. He has no history of chronic renal disease or diabetes. There are no known factors aggravating his hyperlipidemia. Associated symptoms include myalgias. Pertinent negatives include no chest pain or focal sensory loss. Current antihyperlipidemic treatment includes statins. The current treatment provides moderate improvement of lipids. There are no compliance problems.  Risk factors for coronary artery disease include diabetes mellitus, dyslipidemia, male sex, hypertension and obesity.     Review of Systems   Constitutional:  Negative.  Negative for activity change, diaphoresis, fatigue, malaise/fatigue, unexpected weight change and weight loss.   HENT: Negative.  Negative for nasal congestion, ear pain, mouth sores, rhinorrhea and voice change.    Eyes: Negative.  Negative for blurred vision, pain, discharge and visual disturbance.   Respiratory: Negative.  Negative for apnea, cough and wheezing.    Cardiovascular: Negative.  Negative for chest pain, palpitations and PND.   Gastrointestinal: Negative.  Negative for abdominal distention, anal bleeding, diarrhea and vomiting.   Endocrine: Negative.  Negative for cold intolerance, polydipsia, polyphagia and polyuria.   Genitourinary:  Positive for impotence. Negative for decreased urine volume, difficulty urinating, discharge, frequency and scrotal swelling.   Musculoskeletal:  Positive for arthralgias, joint swelling and myalgias. Negative for back pain, neck pain and neck stiffness.   Integumentary:  Positive for color change. Negative for pallor and rash.   Allergic/Immunologic: Negative.  Negative for environmental allergies.   Neurological: Negative.  Negative for dizziness, tremors, seizures, speech difficulty, weakness, light-headedness and headaches.   Hematological: Negative.    Psychiatric/Behavioral: Negative.  Negative for agitation, confusion, dysphoric mood and suicidal ideas. The patient is not nervous/anxious.          PMH/PSH/FH/SH/MED/ALLERGY reviewed    Past Medical History:   Diagnosis Date    Anticoagulant long-term use     Cardiomyopathy     CHF (congestive heart failure)     Coronary artery disease     Diabetes mellitus     Gout     Heart attack     Hypertension     Pneumonia        Past Surgical History:   Procedure Laterality Date    APPENDECTOMY      CARDIAC CATHETERIZATION      7 stents    CARDIAC DEFIBRILLATOR PLACEMENT      CATARACT EXTRACTION Bilateral 2011    COLONOSCOPY N/A 08/10/2018    Procedure: COLONOSCOPY golytely;  Surgeon: Valentine Burger MD;   Location: [facility] ENDO;  Service: Endoscopy;  Laterality: N/A;    CORONARY ANGIOGRAPHY N/A 11/11/2024    Procedure: ANGIOGRAM, CORONARY ARTERY;  Surgeon: Luis Felipe Wright MD;  Location: [facility] CATH LAB/EP;  Service: Cardiology;  Laterality: N/A;    EYE SURGERY      INSTANTANEOUS WAVE-FREE RATIO (IFR) N/A 11/11/2024    Procedure: Instantaneous Wave-Free Ratio (IFR);  Surgeon: Luis Felipe Wright MD;  Location: [facility] CATH LAB/EP;  Service: Cardiology;  Laterality: N/A;    LEFT HEART CATHETERIZATION Left 11/11/2024    Procedure: Left heart cath;  Surgeon: Luis Felipe Wright MD;  Location: [facility] CATH LAB/EP;  Service: Cardiology;  Laterality: Left;    REVISION OF IMPLANTABLE CARDIOVERTER-DEFIBRILLATOR (ICD) ELECTRODE LEAD PLACEMENT N/A 11/11/2019    Procedure: REVISION, INSERTION, ELECTRODE LEAD, ICD;  Surgeon: Shukri Walsh MD;  Location: [facility] EP LAB;  Service: Cardiology;  Laterality: N/A;  Lead malfxn, RV lead ICD, SJM, anes, DM, 3 PREP    TONSILLECTOMY  1960s    VASECTOMY  2000       Family History   Problem Relation Name Age of Onset    Heart disease Mother Miriam Gillespie     Hypertension Mother Miriam Gillespie     Heart disease Father Juan Antonio Wilson Sr     Stroke Father Juan Antonio Wilson Sr     Amblyopia Neg Hx      Blindness Neg Hx      Cataracts Neg Hx      Glaucoma Neg Hx      Macular degeneration Neg Hx      Retinal detachment Neg Hx      Strabismus Neg Hx         Social History     Socioeconomic History    Marital status:    Tobacco Use    Smoking status: Former    Smokeless tobacco: Never   Substance and Sexual Activity    Alcohol use: Yes     Comment: socially    Drug use: No    Sexual activity: Yes     Social Drivers of Health     Financial Resource Strain: Low Risk  (5/8/2025)    Overall Financial Resource Strain (CARDIA)     Difficulty of Paying Living Expenses: Not hard at all   Food Insecurity: No Food Insecurity (5/8/2025)    Hunger Vital Sign     Worried About Running Out of Food in the  Last Year: Never true     Ran Out of Food in the Last Year: Never true   Transportation Needs: No Transportation Needs (5/8/2025)    PRAPARE - Transportation     Lack of Transportation (Medical): No     Lack of Transportation (Non-Medical): No   Physical Activity: Insufficiently Active (5/8/2025)    Exercise Vital Sign     Days of Exercise per Week: 7 days     Minutes of Exercise per Session: 10 min   Stress: No Stress Concern Present (4/26/2025)    [employer] - Occupational Stress Questionnaire     Feeling of Stress : Not at all   Housing Stability: Low Risk  (5/8/2025)    Housing Stability Vital Sign     Unable to Pay for Housing in the Last Year: No     Number of Times Moved in the Last Year: 0     Homeless in the Last Year: No       Current Outpatient Medications   Medication Sig Dispense Refill    alcohol swabs (BD ALCOHOL SWABS) PadM USE FOUR TIMES DAILY 400 each 3    aspirin (ECOTRIN) 81 MG EC tablet Take 81 mg by mouth once daily.      blood glucose control high,low (ACCU-CHEK CATHRYN CONTROL SOLN) Soln Use as directed to check blood sugar 1 each 1    blood sugar diagnostic Strp test blood sugar as directed four times daily 100 each 3    blood-glucose meter (TRUE METRIX GLUCOSE METER) Misc use as directed 1 each 0    clopidogreL (PLAVIX) 75 mg tablet Take 1 tablet (75 mg total) by mouth once daily. 90 tablet 3    colchicine (COLCRYS) 0.6 mg tablet Take 1 tablet (0.6 mg total) by mouth 2 (two) times daily. 180 tablet 4    cyanocobalamin (VITAMIN B-12) 1000 MCG tablet Take 1,000 mcg by mouth once daily.      empagliflozin (JARDIANCE) 10 mg tablet Take 1 tablet (10 mg total) by mouth once daily. 30 tablet 11    furosemide (LASIX) 20 MG tablet Take 1 tablet (20 mg total) by mouth 2 (two) times daily. 60 tablet 11    gabapentin (NEURONTIN) 300 MG capsule Take 2 capsules (600 mg total) by mouth 2 (two) times daily. 360 capsule 1    insulin aspart U-100 (NOVOLOG FLEXPEN U-100 INSULIN)  "100 unit/mL (3 mL) InPn pen Inject 15 Units into the skin 3 (three) times daily with meals. 15 mL 0    insulin glargine U-100, Lantus, (LANTUS SOLOSTAR U-100 INSULIN) 100 unit/mL (3 mL) InPn pen Inject 35 Units into the skin every evening. 15 mL 0    lancets 30 gauge Misc usea s directed to check blood glucose four times daily 100 each 3    losartan (COZAAR) 50 MG tablet Take 1 tablet (50 mg total) by mouth once daily. 90 tablet 3    multivit-minerals/FA/lycopene (ONE-A-DAY MEN'S 50 PLUS ORAL) Take 1 tablet by mouth once daily.      nitroGLYCERIN (NITROSTAT) 0.4 MG SL tablet Place 1 tablet (0.4 mg total) under the tongue every 5 (five) minutes as needed for Chest pain. 25 tablet 3    pen needle, diabetic (BD ULTRA-FINE SINA PEN NEEDLE) 32 gauge x 5/32" Ndle Use four times daily for insulin administration 400 each 3    predniSONE (DELTASONE) 20 MG tablet Take 1 tablet (20 mg total) by mouth once daily. 10 tablet 0    zolpidem (AMBIEN) 5 MG Tab TAKE 1 TABLET BY MOUTH EVERY NIGHT AS NEEDED 30 tablet 0    febuxostat (ULORIC) 40 mg Tab Take 1 tablet (40 mg total) by mouth once daily. 90 tablet 4    metFORMIN (GLUCOPHAGE) 1000 MG tablet Take 1 tablet (1,000 mg total) by mouth 2 (two) times daily with meals. 180 tablet 4     No current facility-administered medications for this visit.       Review of patient's allergies indicates:  No Known Allergies      Objective:       Vitals:    05/12/25 0954   BP: 126/80   Pulse: 87       Physical Exam  Constitutional:       Appearance: He is well-developed.   HENT:      Head: Normocephalic and atraumatic.      Right Ear: External ear normal.      Left Ear: External ear normal.      Nose: Nose normal.      Mouth/Throat:      Pharynx: No oropharyngeal exudate.   Eyes:      General: No scleral icterus.        Right eye: No discharge.         Left eye: No discharge.      Conjunctiva/sclera: Conjunctivae normal.      Pupils: Pupils are equal, round, and reactive to light.   Neck:      " Thyroid: No thyromegaly.      Vascular: No JVD.      Trachea: No tracheal deviation.   Cardiovascular:      Rate and Rhythm: Normal rate and regular rhythm.      Heart sounds: Normal heart sounds. No murmur heard.     No friction rub. No gallop.   Pulmonary:      Effort: Pulmonary effort is normal. No respiratory distress.      Breath sounds: Normal breath sounds. No stridor. No wheezing or rales.   Chest:      Chest wall: No tenderness.   Abdominal:      General: Bowel sounds are normal. There is no distension.      Palpations: Abdomen is soft. There is no mass.      Tenderness: There is no abdominal tenderness. There is no guarding or rebound.      Hernia: No hernia is present.   Musculoskeletal:         General: No swelling or tenderness. Normal range of motion.      Cervical back: Normal range of motion and neck supple.      Right lower leg: No edema.      Comments: Right lle warm and nontender   Lymphadenopathy:      Cervical: No cervical adenopathy.   Skin:     General: Skin is warm and dry.      Coloration: Skin is not pale.      Findings: Rash present. No erythema.   Neurological:      General: No focal deficit present.      Mental Status: He is alert and oriented to person, place, and time. Mental status is at baseline.      Cranial Nerves: No cranial nerve deficit.      Motor: No weakness or abnormal muscle tone.      Coordination: Coordination normal.      Deep Tendon Reflexes: Reflexes are normal and symmetric. Reflexes normal.   Psychiatric:         Behavior: Behavior normal.         Thought Content: Thought content normal.         Judgment: Judgment normal.         Assessment:       Problem List Items Addressed This Visit       Type 2 diabetes mellitus with diabetic neuropathy - Primary    Relevant Medications    metFORMIN (GLUCOPHAGE) 1000 MG tablet    Insulin dependent type 2 diabetes mellitus    Relevant Medications    metFORMIN (GLUCOPHAGE) 1000 MG tablet    Hypertension associated with diabetes     Relevant Medications    metFORMIN (GLUCOPHAGE) 1000 MG tablet    Gout    Relevant Medications    febuxostat (ULORIC) 40 mg Tab    Dyslipidemia associated with type 2 diabetes mellitus    Relevant Medications    metFORMIN (GLUCOPHAGE) 1000 MG tablet    Other Relevant Orders    Lipids Digital Medicine (LDMP) Enrollment Order (Completed)    Lipids Digital Medicine (LDMP): Assign Onboarding Questionnaires (Completed)    Diabetes mellitus type 2 without retinopathy    Relevant Medications    metFORMIN (GLUCOPHAGE) 1000 MG tablet     Other Visit Diagnoses         Chronic combined systolic and diastolic congestive heart failure                Plan:           Clifton was seen today for hospital follow up.    Diagnoses and all orders for this visit:    Type 2 diabetes mellitus with diabetic neuropathy, with long-term current use of insulin    Diabetes mellitus type 2 without retinopathy    Insulin dependent type 2 diabetes mellitus    Chronic combined systolic and diastolic congestive heart failure    Hypertension associated with diabetes    Dyslipidemia associated with type 2 diabetes mellitus  -     Lipids Digital Medicine (LDMP) Enrollment Order  -     Lipids Digital Medicine (LDMP): Assign Onboarding Questionnaires    Chronic gout involving toe without tophus, unspecified cause, unspecified laterality  -     febuxostat (ULORIC) 40 mg Tab; Take 1 tablet (40 mg total) by mouth once daily.    Type 2 diabetes mellitus with diabetic neuropathy, unspecified whether long term insulin use  -     metFORMIN (GLUCOPHAGE) 1000 MG tablet; Take 1 tablet (1,000 mg total) by mouth 2 (two) times daily with meals.            DM II controlled/hyperglycemia  - improving since started insulin  -lantus and novolog to continue  -strict diet control            HTN  -controlled    HLD  -controlled    Combined CHF  -follows cardiology  -on external defibrillator    Neuropathy  -increase neurontin and increase dose to 600 mgTID    Obesity  -diet  and exercise as tolerated    chronic gout  -uric acid levels  -conchicine as needed    Spent adequate time in obtaining history and explaining differentials    40 minutes spent during this visit of which greater than 50% devoted to face-face counseling and coordination of care regarding diagnosis and management plan    Follow up in about 3 months (around 8/12/2025), or if symptoms worsen or fail to improve.

## 2025-05-14 ENCOUNTER — OUTPATIENT CASE MANAGEMENT (OUTPATIENT)
Dept: ADMINISTRATIVE | Facility: OTHER | Age: 75
End: 2025-05-14
Payer: MEDICARE

## 2025-05-15 ENCOUNTER — OFFICE VISIT (OUTPATIENT)
Dept: ORTHOPEDICS | Facility: CLINIC | Age: 75
End: 2025-05-15
Payer: MEDICARE

## 2025-05-15 DIAGNOSIS — M11.262 PSEUDOGOUT OF KNEE, LEFT: ICD-10-CM

## 2025-05-15 DIAGNOSIS — M1A.0620 IDIOPATHIC CHRONIC GOUT OF LEFT KNEE WITHOUT TOPHUS: Primary | ICD-10-CM

## 2025-05-15 DIAGNOSIS — E79.0 HYPERURICEMIA: ICD-10-CM

## 2025-05-15 DIAGNOSIS — M25.462 KNEE EFFUSION, LEFT: ICD-10-CM

## 2025-05-15 PROCEDURE — 99999 PR PBB SHADOW E&M-EST. PATIENT-LVL IV: CPT | Mod: PBBFAC,HCNC,, | Performed by: ORTHOPAEDIC SURGERY

## 2025-05-15 NOTE — PROGRESS NOTES
Subjective:      Patient ID: Clifton Wilson is a 74 y.o. male.    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  The patient reportedly had a flare after changing medication.  His medical team restarted colchicine and gave the patient a prescription for prednisone a couple of weeks ago.  The patient reports that he is improving rapidly.          Review of Systems   Constitutional: Negative for fever and weight loss.   HENT:  Negative for congestion.    Eyes:  Negative for visual disturbance.   Cardiovascular:  Negative for chest pain.   Respiratory:  Negative for shortness of breath.    Hematologic/Lymphatic: Negative for bleeding problem. Does not bruise/bleed easily.   Skin:  Negative for poor wound healing.   Musculoskeletal:  Positive for joint pain and joint swelling.   Gastrointestinal:  Negative for abdominal pain.   Genitourinary:  Negative for dysuria.   Neurological:  Negative for seizures.   Psychiatric/Behavioral:  Negative for altered mental status.    Allergic/Immunologic: Negative for persistent infections.         Objective:      Ortho/SPM Exam      Left knee    [unfilled]    The patient is not in acute distress.   Sclerae normal  Body habitus is normal.  Respiratory distress:  none   The patient walks without a limp.  Hip irritability  negative.   The skin over the knee is intact.  Knee effusion 2+  Palpation- nontender  Range of motion- 0-100  deg,   Ligament laxity exam:  Stable valgus and varus  Popliteal cyst negative  Patellar crepitation absent.  Flexion/pinch negative  Pulses DP present, PT present.  Motor normal 5/5 strength in all tested muscle groups.   Sensory normal.      Recent radiographs the left knee show no fracture or localized bone lesion.  There is extensive chondrocalcinosis.  Joint space is relatively preserved.        Assessment:       Encounter Diagnoses   Name Primary?    Knee effusion, left      Hyperuricemia     Idiopathic chronic gout of left knee without tophus Yes    Pseudogout of knee, left         The patient has a multifactorial crystalline arthropathy of the left knee.  Upon review of his recent labs and radiographs, he likely has gout and pseudogout.  He appears to be improving rapidly on his current regimen.          Plan:       Clifton was seen today for pain and consult.    Diagnoses and all orders for this visit:    Idiopathic chronic gout of left knee without tophus    Knee effusion, left  -     Ambulatory referral/consult to Hand Surgery    Hyperuricemia    Pseudogout of knee, left          I explained my diagnostic impression and the reasoning behind it in detail, using layman's terms.      I advised the patient to continue with his current regimen.  As long as he is improving, aspiration is probably not necessary.  If his effusion persists or worsens, it could be considered in the future

## 2025-05-17 DIAGNOSIS — E11.9 INSULIN DEPENDENT TYPE 2 DIABETES MELLITUS: ICD-10-CM

## 2025-05-17 DIAGNOSIS — Z79.4 TYPE 2 DIABETES MELLITUS WITH DIABETIC NEUROPATHY, WITH LONG-TERM CURRENT USE OF INSULIN: ICD-10-CM

## 2025-05-17 DIAGNOSIS — E11.9 DIABETES MELLITUS TYPE 2 WITHOUT RETINOPATHY: ICD-10-CM

## 2025-05-17 DIAGNOSIS — Z79.4 INSULIN DEPENDENT TYPE 2 DIABETES MELLITUS: ICD-10-CM

## 2025-05-17 DIAGNOSIS — E11.40 TYPE 2 DIABETES MELLITUS WITH DIABETIC NEUROPATHY, WITH LONG-TERM CURRENT USE OF INSULIN: ICD-10-CM

## 2025-05-17 RX ORDER — INSULIN ASPART 100 [IU]/ML
15 INJECTION, SOLUTION INTRAVENOUS; SUBCUTANEOUS
Qty: 15 ML | Refills: 0 | Status: CANCELLED | OUTPATIENT
Start: 2025-05-17 | End: 2025-06-16

## 2025-05-21 DIAGNOSIS — E11.9 INSULIN DEPENDENT TYPE 2 DIABETES MELLITUS: ICD-10-CM

## 2025-05-21 DIAGNOSIS — Z79.4 INSULIN DEPENDENT TYPE 2 DIABETES MELLITUS: ICD-10-CM

## 2025-05-21 DIAGNOSIS — Z79.4 TYPE 2 DIABETES MELLITUS WITH DIABETIC NEUROPATHY, WITH LONG-TERM CURRENT USE OF INSULIN: ICD-10-CM

## 2025-05-21 DIAGNOSIS — E11.40 TYPE 2 DIABETES MELLITUS WITH DIABETIC NEUROPATHY, WITH LONG-TERM CURRENT USE OF INSULIN: ICD-10-CM

## 2025-05-21 DIAGNOSIS — E11.9 DIABETES MELLITUS TYPE 2 WITHOUT RETINOPATHY: ICD-10-CM

## 2025-05-21 RX ORDER — INSULIN ASPART 100 [IU]/ML
15 INJECTION, SOLUTION INTRAVENOUS; SUBCUTANEOUS
Qty: 15 ML | Refills: 0 | Status: CANCELLED | OUTPATIENT
Start: 2025-05-17 | End: 2025-06-16

## 2025-05-23 ENCOUNTER — CLINICAL SUPPORT (OUTPATIENT)
Dept: REHABILITATION | Facility: HOSPITAL | Age: 75
End: 2025-05-23
Attending: INTERNAL MEDICINE
Payer: MEDICARE

## 2025-05-23 DIAGNOSIS — M1A.9XX0 CHRONIC GOUT INVOLVING TOE WITHOUT TOPHUS, UNSPECIFIED CAUSE, UNSPECIFIED LATERALITY: ICD-10-CM

## 2025-05-23 DIAGNOSIS — M17.10 UNILATERAL PRIMARY OSTEOARTHRITIS, UNSPECIFIED KNEE: ICD-10-CM

## 2025-05-23 DIAGNOSIS — M62.81 MUSCLE WEAKNESS (GENERALIZED): ICD-10-CM

## 2025-05-23 DIAGNOSIS — M43.02 SPONDYLOLYSIS OF CERVICAL REGION: Primary | ICD-10-CM

## 2025-05-23 PROCEDURE — 97110 THERAPEUTIC EXERCISES: CPT

## 2025-05-23 PROCEDURE — 97161 PT EVAL LOW COMPLEX 20 MIN: CPT

## 2025-05-23 RX ORDER — PREDNISONE 20 MG/1
20 TABLET ORAL DAILY
Qty: 10 TABLET | Refills: 0 | Status: SHIPPED | OUTPATIENT
Start: 2025-05-23

## 2025-05-23 NOTE — TELEPHONE ENCOUNTER
No care due was identified.  [facility] Embedded Care Due Messages. Reference number: [id number].   5/23/2025 10:06:50 AM CDT

## 2025-05-24 ENCOUNTER — CLINICAL SUPPORT (OUTPATIENT)
Dept: CARDIOLOGY | Facility: HOSPITAL | Age: 75
End: 2025-05-24
Attending: INTERNAL MEDICINE
Payer: MEDICARE

## 2025-05-24 ENCOUNTER — TELEPHONE (OUTPATIENT)
Dept: FAMILY MEDICINE | Facility: CLINIC | Age: 75
End: 2025-05-24
Payer: MEDICARE

## 2025-05-24 ENCOUNTER — CLINICAL SUPPORT (OUTPATIENT)
Dept: CARDIOLOGY | Facility: HOSPITAL | Age: 75
End: 2025-05-24
Payer: MEDICARE

## 2025-05-24 DIAGNOSIS — M1A.9XX0 CHRONIC GOUT INVOLVING TOE WITHOUT TOPHUS, UNSPECIFIED CAUSE, UNSPECIFIED LATERALITY: Primary | ICD-10-CM

## 2025-05-24 DIAGNOSIS — G89.29 CHRONIC PAIN OF LEFT KNEE: ICD-10-CM

## 2025-05-24 DIAGNOSIS — M25.562 CHRONIC PAIN OF LEFT KNEE: ICD-10-CM

## 2025-05-24 DIAGNOSIS — G62.9 NEUROPATHY: ICD-10-CM

## 2025-05-24 DIAGNOSIS — Z95.810 PRESENCE OF AUTOMATIC (IMPLANTABLE) CARDIAC DEFIBRILLATOR: ICD-10-CM

## 2025-05-24 DIAGNOSIS — M62.81 MUSCLE WEAKNESS: ICD-10-CM

## 2025-05-24 DIAGNOSIS — Z74.09 IMPAIRED MOBILITY AND ACTIVITIES OF DAILY LIVING: ICD-10-CM

## 2025-05-24 DIAGNOSIS — I25.5 ISCHEMIC CARDIOMYOPATHY: ICD-10-CM

## 2025-05-24 DIAGNOSIS — Z78.9 IMPAIRED MOBILITY AND ACTIVITIES OF DAILY LIVING: ICD-10-CM

## 2025-05-24 PROCEDURE — 93295 DEV INTERROG REMOTE 1/2/MLT: CPT | Mod: HCNC,,, | Performed by: INTERNAL MEDICINE

## 2025-05-24 PROCEDURE — 93296 REM INTERROG EVL PM/IDS: CPT | Mod: HCNC | Performed by: INTERNAL MEDICINE

## 2025-05-24 NOTE — TELEPHONE ENCOUNTER
----- Message from Nurse Tate sent at 5/14/2025  4:40 PM CDT -----  Regarding: FW: RW  Contact: Yanna Gutierrez RN    ----- Message -----  From: Yanna Gutierrez RN  Sent: 5/14/2025   4:33 PM CDT  To: Jaciel Jarquin Staff  Subject: RW                                               Hello,This is Yanna in outpatient case management. When talking to Clifton Wilson today he felt a rolling walker would be helpful for long distances. If agreeable please place order in Epic and  place justification needed for insurance approval in progress note. Patient is requesting a RW, stated he does not need a rollator. [facility] will need to be notified by phone or fax once order is placed as they will not process the order unless they are notified. [facility]:Phone: [phone]: [phone] Yanna reyna BSN, RNOchsner Outpatient Complex Case [email or web address]:  [phone] needed to be charted in progress note: RWThe mobility limitation cannot be sufficiently resolved by the use of a cane. Patient's functional mobility deficit can be sufficiently resolved with the use of a  Rolling Walker. Patient's mobility limitation significantly impairs their ability to participate in one of more activities of daily living. The use of a RW will significantly improve the patient's ability to participate in MRADLS and the patient will use it on regular basis in the home.

## 2025-05-25 RX ORDER — PREDNISONE 5 MG/1
TABLET ORAL
Qty: 147 TABLET | Refills: 0 | Status: SHIPPED | OUTPATIENT
Start: 2025-05-25 | End: 2025-05-28 | Stop reason: SDUPTHER

## 2025-05-27 ENCOUNTER — CLINICAL SUPPORT (OUTPATIENT)
Dept: REHABILITATION | Facility: HOSPITAL | Age: 75
End: 2025-05-27
Payer: MEDICARE

## 2025-05-27 DIAGNOSIS — M25.662 DECREASED RANGE OF MOTION (ROM) OF LEFT KNEE: Primary | ICD-10-CM

## 2025-05-27 PROCEDURE — 97110 THERAPEUTIC EXERCISES: CPT

## 2025-05-28 ENCOUNTER — PATIENT MESSAGE (OUTPATIENT)
Dept: RHEUMATOLOGY | Facility: CLINIC | Age: 75
End: 2025-05-28
Payer: MEDICARE

## 2025-05-28 ENCOUNTER — OFFICE VISIT (OUTPATIENT)
Dept: OPTOMETRY | Facility: CLINIC | Age: 75
End: 2025-05-28
Payer: COMMERCIAL

## 2025-05-28 DIAGNOSIS — Z98.890 S/P YAG CAPSULOTOMY, BILATERAL: ICD-10-CM

## 2025-05-28 DIAGNOSIS — Z96.1 PSEUDOPHAKIA OF BOTH EYES: ICD-10-CM

## 2025-05-28 DIAGNOSIS — I15.2 HYPERTENSION ASSOCIATED WITH DIABETES: ICD-10-CM

## 2025-05-28 DIAGNOSIS — Z79.4 INSULIN DEPENDENT TYPE 2 DIABETES MELLITUS: ICD-10-CM

## 2025-05-28 DIAGNOSIS — E11.59 HYPERTENSION ASSOCIATED WITH DIABETES: ICD-10-CM

## 2025-05-28 DIAGNOSIS — H52.4 PRESBYOPIA: Primary | ICD-10-CM

## 2025-05-28 DIAGNOSIS — E11.9 DIABETES MELLITUS TYPE 2 WITHOUT RETINOPATHY: ICD-10-CM

## 2025-05-28 DIAGNOSIS — E11.9 INSULIN DEPENDENT TYPE 2 DIABETES MELLITUS: ICD-10-CM

## 2025-05-28 DIAGNOSIS — M19.90 INFLAMMATORY ARTHRITIS: ICD-10-CM

## 2025-05-28 PROBLEM — H26.491 PCO (POSTERIOR CAPSULAR OPACIFICATION), RIGHT: Status: RESOLVED | Noted: 2017-05-08 | Resolved: 2025-05-28

## 2025-05-28 PROCEDURE — 92004 COMPRE OPH EXAM NEW PT 1/>: CPT | Mod: S$GLB,,, | Performed by: OPTOMETRIST

## 2025-05-28 PROCEDURE — 99999 PR PBB SHADOW E&M-EST. PATIENT-LVL III: CPT | Mod: PBBFAC,,, | Performed by: OPTOMETRIST

## 2025-05-28 PROCEDURE — 92015 DETERMINE REFRACTIVE STATE: CPT | Mod: S$GLB,,, | Performed by: OPTOMETRIST

## 2025-05-28 NOTE — PROGRESS NOTES
HPI    DLS: 2021 Dr. Cardoza    Eye Meds:     Pt is here for a diabetic eye exam. Last glucose readin    Pt states no eye pain or discomfort. No flashes or floaters. Uses OTC   readers to help with near vision. No complaints w/ vision.   Last edited by Ta Richardson MA on 2025 10:39 AM.            Assessment /Plan     For exam results, see Encounter Report.    Presbyopia   Ok to continue with OTC readers    Insulin dependent type 2 diabetes mellitus  Hypertension associated with diabetes  Diabetes mellitus type 2 without retinopathy    Pseudophakia of both eyes  S/P YAG capsulotomy, bilateral  Isabella  OS 2017, OD 2018    RTC 1 year. Sooner PRN

## 2025-05-29 ENCOUNTER — LAB VISIT (OUTPATIENT)
Dept: LAB | Facility: HOSPITAL | Age: 75
End: 2025-05-29
Attending: STUDENT IN AN ORGANIZED HEALTH CARE EDUCATION/TRAINING PROGRAM
Payer: MEDICARE

## 2025-05-29 DIAGNOSIS — M19.90 INFLAMMATORY ARTHRITIS: ICD-10-CM

## 2025-05-29 LAB
CRP SERPL-MCNC: 1.2 MG/L
ERYTHROCYTE [SEDIMENTATION RATE] IN BLOOD BY PHOTOMETRIC METHOD: 23 MM/HR

## 2025-05-29 PROCEDURE — 85652 RBC SED RATE AUTOMATED: CPT

## 2025-05-29 PROCEDURE — 83529 ASAY OF INTERLEUKIN-6 (IL-6): CPT

## 2025-05-29 PROCEDURE — 36415 COLL VENOUS BLD VENIPUNCTURE: CPT

## 2025-05-29 PROCEDURE — 86140 C-REACTIVE PROTEIN: CPT

## 2025-05-30 ENCOUNTER — CLINICAL SUPPORT (OUTPATIENT)
Dept: REHABILITATION | Facility: HOSPITAL | Age: 75
End: 2025-05-30
Payer: MEDICARE

## 2025-05-30 DIAGNOSIS — M25.662 DECREASED RANGE OF MOTION (ROM) OF LEFT KNEE: Primary | ICD-10-CM

## 2025-05-30 PROCEDURE — 97110 THERAPEUTIC EXERCISES: CPT

## 2025-05-30 RX ORDER — PREDNISONE 5 MG/1
TABLET ORAL
Qty: 147 TABLET | Refills: 0 | Status: SHIPPED | OUTPATIENT
Start: 2025-05-30 | End: 2025-07-11

## 2025-05-31 LAB — IL6 SERPL-MCNC: 10 PG/ML

## 2025-06-01 PROBLEM — M25.662 DECREASED RANGE OF MOTION (ROM) OF LEFT KNEE: Status: ACTIVE | Noted: 2025-06-01

## 2025-06-02 ENCOUNTER — PATIENT MESSAGE (OUTPATIENT)
Dept: CARDIOLOGY | Facility: CLINIC | Age: 75
End: 2025-06-02
Payer: MEDICARE

## 2025-06-03 ENCOUNTER — CLINICAL SUPPORT (OUTPATIENT)
Dept: REHABILITATION | Facility: HOSPITAL | Age: 75
End: 2025-06-03
Payer: MEDICARE

## 2025-06-03 DIAGNOSIS — M25.662 DECREASED RANGE OF MOTION (ROM) OF LEFT KNEE: Primary | ICD-10-CM

## 2025-06-03 PROCEDURE — 97110 THERAPEUTIC EXERCISES: CPT

## 2025-06-03 PROCEDURE — 97140 MANUAL THERAPY 1/> REGIONS: CPT

## 2025-06-04 ENCOUNTER — OUTPATIENT CASE MANAGEMENT (OUTPATIENT)
Dept: ADMINISTRATIVE | Facility: OTHER | Age: 75
End: 2025-06-04
Payer: MEDICARE

## 2025-06-04 VITALS — SYSTOLIC BLOOD PRESSURE: 108 MMHG | DIASTOLIC BLOOD PRESSURE: 71 MMHG

## 2025-06-06 ENCOUNTER — CLINICAL SUPPORT (OUTPATIENT)
Dept: REHABILITATION | Facility: HOSPITAL | Age: 75
End: 2025-06-06
Payer: MEDICARE

## 2025-06-06 DIAGNOSIS — M25.662 DECREASED RANGE OF MOTION (ROM) OF LEFT KNEE: Primary | ICD-10-CM

## 2025-06-06 PROCEDURE — 97110 THERAPEUTIC EXERCISES: CPT

## 2025-06-06 PROCEDURE — 97140 MANUAL THERAPY 1/> REGIONS: CPT

## 2025-06-06 NOTE — PROGRESS NOTES
Outpatient Rehab    Physical Therapy Visit    Patient Name: Clifton Wilson  MRN: [id number]  YOB: 1950  Encounter Date: 6/6/2025    Therapy Diagnosis:   Encounter Diagnosis   Name Primary?    Decreased range of motion (ROM) of left knee Yes     Physician: Order, Paper    Physician Orders: Eval and Treat  Medical Diagnosis: Spondylolysis of cervical region  Unilateral primary osteoarthritis, unspecified knee  Muscle weakness (generalized)    Visit # / Visits Authorized:  4 / 20  Insurance Authorization Period: 5/23/2025 to 8/31/2025  Date of Evaluation: 5/23/2025  Plan of Care Certification: 5/23/2025 to 7/23/2025      PT/PTA: PT   Number of PTA visits since last PT visit:   Time In: 0900   Time Out: 0955  Total Time (in minutes): 55   Total Billable Time (in minutes): 55 (25 minutes 1:1)    FOTO:  Intake Score:  %  Survey Score 2:  %  Survey Score 3:  %    Subjective   Patient reports no new concerns or complaints today..  Pain reported as 2/10.      Objective    Knee Passive range of motion:   Knee Left   Flexion 121   Ext -2 at start/ 0 at end          Treatment:   Therapeutic Exercise: 45 minutes    Seated upright bike 6 minutes  Supine heel prop with 5lb above and below with quad sets 20x 10 sec hold  Supine heel slide 20x 5 sec hold  Supine SLR 3x10 both sides  LAQ 10lb 3x10 both sides  Fwd step down 2 inch 2x10  Shuttle press 3.5b 3x10  Sit to stand 3x10  Tierra walking 4 inch 4 laps      NT:  Standing TKE blue thera band 20x 5 sec hold    Manual Therapy: 10 minutes    Tibiofemoral posterior mobilizations grade 3-4    Therapeutic Activities: 0 minutes    Balance/Neuromuscular Re-education: 00 minutes         Assessment & Plan   Assessment:     Patient presents with improved knee range of motion with ability to achieve 121 degrees flexion today. Patient progressed with OKC and closed kinetic chain knee loading to improve gait deviations and functional mobility today with good tolerance.    The  patient will continue to benefit from skilled outpatient physical therapy in order to address the deficits listed in the problem list on the initial evaluation, provide patient and family education, and maximize the patients level of independence in the home and community environments.     The patient's spiritual, cultural, and educational needs were considered, and the patient is agreeable to the plan of care and goals.           Plan: continue per initial plan of care    Goals:   GOALS: Short Term Goals:  4 weeks  2. Increase knee ROM to -5/115 in order to be able to perform ADLs without difficulty.  3. Increase strength by 1/3 MMT grade in quadriceps  to increase tolerance for ADL and work activities.  4. Pt to tolerate HEP to improve ROM and independence with ADL's     Long Term Goals: 8 weeks  Patient will be able to perform TUG without AD in under 11 seconds to demonstrate improved functional mobility  3.Increase strength to >/= 5/5 in knee extension  to increase tolerance for ADL and work activities.  4. Pt will report at level (20-40% impaired) on FOTO knee to demonstrate increase in LE function with every day tasks.      Natalio Saab, PT

## 2025-06-07 ENCOUNTER — PATIENT MESSAGE (OUTPATIENT)
Dept: FAMILY MEDICINE | Facility: CLINIC | Age: 75
End: 2025-06-07
Payer: MEDICARE

## 2025-06-07 DIAGNOSIS — E11.9 INSULIN DEPENDENT TYPE 2 DIABETES MELLITUS: ICD-10-CM

## 2025-06-07 DIAGNOSIS — Z79.4 INSULIN DEPENDENT TYPE 2 DIABETES MELLITUS: ICD-10-CM

## 2025-06-07 DIAGNOSIS — I50.41 ACUTE COMBINED SYSTOLIC AND DIASTOLIC CONGESTIVE HEART FAILURE: ICD-10-CM

## 2025-06-07 DIAGNOSIS — Z79.4 TYPE 2 DIABETES MELLITUS WITH DIABETIC NEUROPATHY, WITH LONG-TERM CURRENT USE OF INSULIN: ICD-10-CM

## 2025-06-07 DIAGNOSIS — E11.40 TYPE 2 DIABETES MELLITUS WITH DIABETIC NEUROPATHY, UNSPECIFIED WHETHER LONG TERM INSULIN USE: ICD-10-CM

## 2025-06-07 DIAGNOSIS — F51.01 PRIMARY INSOMNIA: ICD-10-CM

## 2025-06-07 DIAGNOSIS — E11.40 TYPE 2 DIABETES MELLITUS WITH DIABETIC NEUROPATHY, WITH LONG-TERM CURRENT USE OF INSULIN: ICD-10-CM

## 2025-06-07 DIAGNOSIS — E11.9 DIABETES MELLITUS TYPE 2 WITHOUT RETINOPATHY: ICD-10-CM

## 2025-06-07 DIAGNOSIS — I10 ESSENTIAL HYPERTENSION: ICD-10-CM

## 2025-06-09 DIAGNOSIS — E11.9 INSULIN DEPENDENT TYPE 2 DIABETES MELLITUS: ICD-10-CM

## 2025-06-09 DIAGNOSIS — E11.40 TYPE 2 DIABETES MELLITUS WITH DIABETIC NEUROPATHY, WITH LONG-TERM CURRENT USE OF INSULIN: ICD-10-CM

## 2025-06-09 DIAGNOSIS — E11.9 DIABETES MELLITUS TYPE 2 WITHOUT RETINOPATHY: ICD-10-CM

## 2025-06-09 DIAGNOSIS — Z79.4 INSULIN DEPENDENT TYPE 2 DIABETES MELLITUS: ICD-10-CM

## 2025-06-09 DIAGNOSIS — Z79.4 TYPE 2 DIABETES MELLITUS WITH DIABETIC NEUROPATHY, WITH LONG-TERM CURRENT USE OF INSULIN: ICD-10-CM

## 2025-06-09 DIAGNOSIS — F51.01 PRIMARY INSOMNIA: ICD-10-CM

## 2025-06-09 RX ORDER — INSULIN GLARGINE 100 [IU]/ML
35 INJECTION, SOLUTION SUBCUTANEOUS NIGHTLY
Qty: 15 ML | Refills: 0 | OUTPATIENT
Start: 2025-06-09 | End: 2025-07-09

## 2025-06-09 RX ORDER — INSULIN ASPART 100 [IU]/ML
15 INJECTION, SOLUTION INTRAVENOUS; SUBCUTANEOUS
Qty: 15 ML | Refills: 0 | OUTPATIENT
Start: 2025-06-09 | End: 2025-07-09

## 2025-06-09 RX ORDER — INSULIN ASPART 100 [IU]/ML
15 INJECTION, SOLUTION INTRAVENOUS; SUBCUTANEOUS
Qty: 15 ML | Refills: 0 | Status: SHIPPED | OUTPATIENT
Start: 2025-06-09 | End: 2025-07-14

## 2025-06-09 RX ORDER — INSULIN ASPART 100 [IU]/ML
15 INJECTION, SOLUTION INTRAVENOUS; SUBCUTANEOUS
Qty: 15 ML | Refills: 0 | Status: CANCELLED | OUTPATIENT
Start: 2025-06-09 | End: 2025-07-09

## 2025-06-09 RX ORDER — ZOLPIDEM TARTRATE 5 MG/1
TABLET ORAL
Qty: 90 TABLET | Refills: 3 | OUTPATIENT
Start: 2025-06-09

## 2025-06-09 NOTE — TELEPHONE ENCOUNTER
No care due was identified.  [facility] Embedded Care Due Messages. Reference number: [id number].   6/09/2025 10:27:52 AM CDT

## 2025-06-09 NOTE — TELEPHONE ENCOUNTER
No care due was identified.  [facility] Embedded Care Due Messages. Reference number: [id number].   6/09/2025 9:15:23 AM CDT

## 2025-06-10 LAB
OHS CV DC REMOTE DEVICE TYPE: NORMAL
OHS CV RV PACING PERCENT: 0 %

## 2025-06-10 RX ORDER — ZOLPIDEM TARTRATE 5 MG/1
TABLET ORAL
Qty: 30 TABLET | Refills: 2 | Status: SHIPPED | OUTPATIENT
Start: 2025-06-10

## 2025-06-10 RX ORDER — LOSARTAN POTASSIUM 50 MG/1
50 TABLET ORAL DAILY
Qty: 90 TABLET | Refills: 3 | Status: SHIPPED | OUTPATIENT
Start: 2025-06-10 | End: 2026-06-10

## 2025-06-10 RX ORDER — INSULIN GLARGINE 100 [IU]/ML
35 INJECTION, SOLUTION SUBCUTANEOUS NIGHTLY
Qty: 15 ML | Refills: 0 | Status: SHIPPED | OUTPATIENT
Start: 2025-06-10 | End: 2025-07-22

## 2025-06-10 RX ORDER — ZOLPIDEM TARTRATE 5 MG/1
TABLET ORAL
Qty: 30 TABLET | Refills: 0 | Status: SHIPPED | OUTPATIENT
Start: 2025-06-10 | End: 2025-06-10 | Stop reason: SDUPTHER

## 2025-06-10 RX ORDER — METFORMIN HYDROCHLORIDE 1000 MG/1
1000 TABLET ORAL 2 TIMES DAILY WITH MEALS
Qty: 180 TABLET | Refills: 4 | Status: SHIPPED | OUTPATIENT
Start: 2025-06-10

## 2025-06-12 ENCOUNTER — PATIENT MESSAGE (OUTPATIENT)
Dept: FAMILY MEDICINE | Facility: CLINIC | Age: 75
End: 2025-06-12
Payer: MEDICARE

## 2025-06-13 ENCOUNTER — CLINICAL SUPPORT (OUTPATIENT)
Dept: REHABILITATION | Facility: HOSPITAL | Age: 75
End: 2025-06-13
Payer: MEDICARE

## 2025-06-13 DIAGNOSIS — M25.662 DECREASED RANGE OF MOTION (ROM) OF LEFT KNEE: Primary | ICD-10-CM

## 2025-06-13 PROCEDURE — 97110 THERAPEUTIC EXERCISES: CPT

## 2025-06-13 PROCEDURE — 97140 MANUAL THERAPY 1/> REGIONS: CPT

## 2025-06-13 NOTE — PROGRESS NOTES
Outpatient Rehab    Physical Therapy Visit    Patient Name: Clifton Wilson  MRN: [id number]  YOB: 1950  Encounter Date: 6/13/2025    Therapy Diagnosis:   Encounter Diagnosis   Name Primary?    Decreased range of motion (ROM) of left knee Yes       Physician: Order, Paper    Physician Orders: Eval and Treat  Medical Diagnosis: Spondylolysis of cervical region  Unilateral primary osteoarthritis, unspecified knee  Muscle weakness (generalized)    Visit # / Visits Authorized:  7 / 20  Insurance Authorization Period: 5/23/2025 to 8/31/2025  Date of Evaluation: 5/23/2025  Plan of Care Certification: 5/23/2025 to 7/23/2025      PT/PTA: PT   Number of PTA visits since last PT visit:   Time In: 0900   Time Out: 0950  Total Time (in minutes): 50   Total Billable Time (in minutes): 50 (25 minutes 1:1)    FOTO:  Intake Score:  %  Survey Score 2:  %  Survey Score 3:  %    Subjective   Patient reports no new concerns or complaints upon entering physical therapy..  Pain reported as 0/10.      Objective    Knee Passive range of motion:   Knee Left   Flexion 118   Ext -2 at start/ 0 at end          Treatment:   Therapeutic Exercise: 45 minutes    Seated upright bike 6 minutes  Supine heel prop with 5lb above and below with quad sets 20x 10 sec hold  Supine heel slide 20x 5 sec hold  Supine SLR 3x10 both sides  LAQ 10lb 3x10 both sides  Fwd step down 2 inch 2x10  Shuttle press 4.5b 3x10  Sit to stand 3x10  Tierra walking 6 inch 4 laps    NT:  Standing TKE blue thera band 20x 5 sec hold    Manual Therapy: 10 minutes    Tibiofemoral posterior mobilizations grade 3-4    Therapeutic Activities: 0 minutes    Balance/Neuromuscular Re-education: 00 minutes         Assessment & Plan   Assessment:     Patient presents with limitations in knee extension and flexion that improved following LLLD and manual techniques.    The patient will continue to benefit from skilled outpatient physical therapy in order to address the  deficits listed in the problem list on the initial evaluation, provide patient and family education, and maximize the patients level of independence in the home and community environments.     The patient's spiritual, cultural, and educational needs were considered, and the patient is agreeable to the plan of care and goals.           Plan: continue per initial plan of care    Goals:   GOALS: Short Term Goals:  4 weeks  2. Increase knee ROM to -5/115 in order to be able to perform ADLs without difficulty.  3. Increase strength by 1/3 MMT grade in quadriceps  to increase tolerance for ADL and work activities.  4. Pt to tolerate HEP to improve ROM and independence with ADL's     Long Term Goals: 8 weeks  Patient will be able to perform TUG without AD in under 11 seconds to demonstrate improved functional mobility  3.Increase strength to >/= 5/5 in knee extension  to increase tolerance for ADL and work activities.  4. Pt will report at level (20-40% impaired) on FOTO knee to demonstrate increase in LE function with every day tasks.      Natalio Saab, PT

## 2025-06-17 ENCOUNTER — CLINICAL SUPPORT (OUTPATIENT)
Dept: REHABILITATION | Facility: HOSPITAL | Age: 75
End: 2025-06-17
Payer: MEDICARE

## 2025-06-17 DIAGNOSIS — M25.662 DECREASED RANGE OF MOTION (ROM) OF LEFT KNEE: Primary | ICD-10-CM

## 2025-06-17 PROCEDURE — 97140 MANUAL THERAPY 1/> REGIONS: CPT

## 2025-06-17 PROCEDURE — 97110 THERAPEUTIC EXERCISES: CPT

## 2025-06-17 NOTE — PROGRESS NOTES
Outpatient Rehab    Physical Therapy Visit    Patient Name: Clifton Wilson  MRN: [id number]  YOB: 1950  Encounter Date: 6/17/2025    Therapy Diagnosis:   Encounter Diagnosis   Name Primary?    Decreased range of motion (ROM) of left knee Yes       Physician: Order, Paper    Physician Orders: Eval and Treat  Medical Diagnosis: Spondylolysis of cervical region  Unilateral primary osteoarthritis, unspecified knee  Muscle weakness (generalized)    Visit # / Visits Authorized:  6 / 20  Insurance Authorization Period: 5/23/2025 to 8/31/2025  Date of Evaluation: 5/23/2025  Plan of Care Certification: 5/23/2025 to 7/23/2025      PT/PTA: PT   Number of PTA visits since last PT visit:   Time In: 1045   Time Out: 1140  Total Time (in minutes): 55   Total Billable Time (in minutes): 55 (35 minutes 1:1)    FOTO:  Intake Score:  %  Survey Score 2:  %  Survey Score 3:  %    Subjective   Patient reports that he being able to do more with his knee. He was able to navigate stairs and kneel down without a lot of irritability the other day..  Pain reported as 2/10.      Objective    Knee Passive range of motion:   Knee Left   Flexion 112   Ext -3 at start/ 0 at end          Treatment:   Therapeutic Exercise: 45 minutes    Seated upright bike 6 minutes  Supine heel prop with 5lb above and below with quad sets 20x 10 sec hold  Supine SLR 3x10 both sides  Supine heel slide 20x 5 sec hold  LAQ 10lb 3x12 both sides  Shuttle press 4.5b 3x10  Sit to stand 3x10  Tierra walking 6 inch 4 laps  Stair navigation 4 laps 4 stairs  Standing TKE blue thera band 20x 5 sec hold  Staggered stance heel raise 2x10 both sides with upper extremity support    Manual Therapy: 10 minutes    Tibiofemoral posterior mobilizations grade 3-4    Therapeutic Activities: 0 minutes    Balance/Neuromuscular Re-education: 00 minutes         Assessment & Plan   Assessment:     Patient presents continues to present with deficits in knee mobility.  Improvements observed following LLLD and manual techniques. Patient progressed with OKC and closed kinetic chain knee loading to improve gait deviations and functional mobility today with good tolerance. Lateral hip strength challenged further today as well along with higher sidra to encourage greater step clearance.    The patient will continue to benefit from skilled outpatient physical therapy in order to address the deficits listed in the problem list on the initial evaluation, provide patient and family education, and maximize the patients level of independence in the home and community environments.     The patient's spiritual, cultural, and educational needs were considered, and the patient is agreeable to the plan of care and goals.           Plan: continue per initial plan of care    Goals:   GOALS: Short Term Goals:  4 weeks  2. Increase knee ROM to -5/115 in order to be able to perform ADLs without difficulty.  3. Increase strength by 1/3 MMT grade in quadriceps  to increase tolerance for ADL and work activities.  4. Pt to tolerate HEP to improve ROM and independence with ADL's     Long Term Goals: 8 weeks  Patient will be able to perform TUG without AD in under 11 seconds to demonstrate improved functional mobility  3.Increase strength to >/= 5/5 in knee extension  to increase tolerance for ADL and work activities.  4. Pt will report at level (20-40% impaired) on FOTO knee to demonstrate increase in LE function with every day tasks.      Natalio Saab, PT

## 2025-06-19 ENCOUNTER — CLINICAL SUPPORT (OUTPATIENT)
Dept: REHABILITATION | Facility: HOSPITAL | Age: 75
End: 2025-06-19
Payer: MEDICARE

## 2025-06-19 DIAGNOSIS — M25.662 DECREASED RANGE OF MOTION (ROM) OF LEFT KNEE: Primary | ICD-10-CM

## 2025-06-19 PROCEDURE — 97110 THERAPEUTIC EXERCISES: CPT

## 2025-06-19 PROCEDURE — 97140 MANUAL THERAPY 1/> REGIONS: CPT

## 2025-06-19 NOTE — PROGRESS NOTES
Outpatient Rehab    Physical Therapy Discharge    Outpatient Rehab    Physical Therapy Visit    Patient Name: Clifton Wilson  MRN: [id number]  YOB: 1950  Encounter Date: 2025    Therapy Diagnosis:   Encounter Diagnosis   Name Primary?    Decreased range of motion (ROM) of left knee Yes       Physician: Order, Paper    Physician Orders: Eval and Treat  Medical Diagnosis: Spondylolysis of cervical region  Unilateral primary osteoarthritis, unspecified knee  Muscle weakness (generalized)    Visit # / Visits Authorized:    Insurance Authorization Period: 2025 to 2025  Date of Evaluation: 2025  Plan of Care Certification: 2025 to 2025      PT/PTA: PT   Number of PTA visits since last PT visit:   Time In: 0900   Time Out: 0955  Total Time (in minutes): 55   Total Billable Time (in minutes): 55 (35 minutes 1:1)    FOTO:  Intake Score:  %  Survey Score 2:  %  Survey Score 3:  %    Subjective   Patient reports that he feels his knee is back to normal. He is ready to make today his last day of physical therapy. He will begin going to the [email or web address] gym 1-2x a week and using machines..  Pain reported as 0/10.      Objective   2025   Knee Passive range of motion:   Knee Left   Flexion 118   Ext -3 at start/ 0 at end      30 sec STS: 10 reps no upper extremity support  TU.4 seconds no AD    Lower Extremity Strength:  Right LE   Left LE     Quadriceps: 5/5 Quadriceps: 5/5   Hamstrings: 5/5 Hamstrings: 5/5   Hip Flexion: 4-/5 Hip Flexion: 4-/5           Treatment:   Therapeutic Exercise: 45 minutes    Seated upright bike 6 minutes  Supine heel prop with 5lb above and below with quad sets 20x 10 sec hold  Supine SLR 3x10 both sides  Supine heel slide 20x 5 sec hold  Knee extension machine 15lb 2 up one down with left 2x10  Knee flexion machine 20lbs 2x10  Leg press machine 40lbs 2x10  Standing TKE blue thera band 20x 5 sec hold  Staggered stance heel raise 2x10 both sides  with upper extremity support    Manual Therapy: 10 minutes    Tibiofemoral posterior mobilizations grade 3-4  Knee MWM with tibiofemoral internal rotation    Therapeutic Activities: 0 minutes    Balance/Neuromuscular Re-education: 00 minutes         Assessment & Plan   Assessment:     Since beginning PT, pt has been seen 7 times since initial evaluation on 5/23/2025. Overall, he has made good, steady progress with his PT treatments and has worked hard towards all of his PT goals as evidenced by subjective and objective improvements. Since attending PT he has reached most of his PT goals. Patient educated on continued home exercise program and gym routine with explanation of progressions/regressions of movements. Pt agreeable to d/c.     The patient will continue to benefit from skilled outpatient physical therapy in order to address the deficits listed in the problem list on the initial evaluation, provide patient and family education, and maximize the patients level of independence in the home and community environments.     The patient's spiritual, cultural, and educational needs were considered, and the patient is agreeable to the plan of care and goals.           Plan: continue per initial plan of care    Goals:   GOALS: Short Term Goals:  4 weeks  2. Increase knee ROM to -5/115 in order to be able to perform ADLs without difficulty. MET  3. Increase strength by 1/3 MMT grade in quadriceps  to increase tolerance for ADL and work activities. MET  4. Pt to tolerate HEP to improve ROM and independence with ADL's MET     Long Term Goals: 8 weeks  Patient will be able to perform TUG without AD in under 11 seconds to demonstrate improved functional mobility MET  3.Increase strength to >/= 5/5 in knee extension  to increase tolerance for ADL and work activities. MET  4. Pt will report at level (20-40% impaired) on FOTO knee to demonstrate increase in LE function with every day tasks.  MET    Natalio Saab, PT

## 2025-06-25 ENCOUNTER — OUTPATIENT CASE MANAGEMENT (OUTPATIENT)
Dept: ADMINISTRATIVE | Facility: OTHER | Age: 75
End: 2025-06-25
Payer: MEDICARE

## 2025-06-29 DIAGNOSIS — M19.90 INFLAMMATORY ARTHRITIS: ICD-10-CM

## 2025-06-30 ENCOUNTER — OFFICE VISIT (OUTPATIENT)
Dept: FAMILY MEDICINE | Facility: CLINIC | Age: 75
End: 2025-06-30
Attending: FAMILY MEDICINE
Payer: MEDICARE

## 2025-06-30 VITALS
HEIGHT: 71 IN | WEIGHT: 220 LBS | DIASTOLIC BLOOD PRESSURE: 71 MMHG | OXYGEN SATURATION: 97 % | BODY MASS INDEX: 30.8 KG/M2 | SYSTOLIC BLOOD PRESSURE: 129 MMHG | HEART RATE: 74 BPM

## 2025-06-30 DIAGNOSIS — I10 ESSENTIAL HYPERTENSION: ICD-10-CM

## 2025-06-30 DIAGNOSIS — Z79.4 TYPE 2 DIABETES MELLITUS WITH DIABETIC NEUROPATHY, WITH LONG-TERM CURRENT USE OF INSULIN: Primary | ICD-10-CM

## 2025-06-30 DIAGNOSIS — E11.9 INSULIN DEPENDENT TYPE 2 DIABETES MELLITUS: ICD-10-CM

## 2025-06-30 DIAGNOSIS — E11.40 TYPE 2 DIABETES MELLITUS WITH DIABETIC NEUROPATHY, UNSPECIFIED WHETHER LONG TERM INSULIN USE: ICD-10-CM

## 2025-06-30 DIAGNOSIS — E11.9 DIABETES MELLITUS TYPE 2 WITHOUT RETINOPATHY: ICD-10-CM

## 2025-06-30 DIAGNOSIS — E11.40 TYPE 2 DIABETES MELLITUS WITH DIABETIC NEUROPATHY, WITH LONG-TERM CURRENT USE OF INSULIN: Primary | ICD-10-CM

## 2025-06-30 DIAGNOSIS — I50.41 ACUTE COMBINED SYSTOLIC AND DIASTOLIC CONGESTIVE HEART FAILURE: ICD-10-CM

## 2025-06-30 DIAGNOSIS — Z79.4 INSULIN DEPENDENT TYPE 2 DIABETES MELLITUS: ICD-10-CM

## 2025-06-30 PROCEDURE — 1159F MED LIST DOCD IN RCRD: CPT | Mod: CPTII,HCNC,S$GLB, | Performed by: FAMILY MEDICINE

## 2025-06-30 PROCEDURE — 3044F HG A1C LEVEL LT 7.0%: CPT | Mod: CPTII,HCNC,S$GLB, | Performed by: FAMILY MEDICINE

## 2025-06-30 PROCEDURE — 1101F PT FALLS ASSESS-DOCD LE1/YR: CPT | Mod: CPTII,HCNC,S$GLB, | Performed by: FAMILY MEDICINE

## 2025-06-30 PROCEDURE — 3078F DIAST BP <80 MM HG: CPT | Mod: CPTII,HCNC,S$GLB, | Performed by: FAMILY MEDICINE

## 2025-06-30 PROCEDURE — 3008F BODY MASS INDEX DOCD: CPT | Mod: CPTII,HCNC,S$GLB, | Performed by: FAMILY MEDICINE

## 2025-06-30 PROCEDURE — 3074F SYST BP LT 130 MM HG: CPT | Mod: CPTII,HCNC,S$GLB, | Performed by: FAMILY MEDICINE

## 2025-06-30 PROCEDURE — 3288F FALL RISK ASSESSMENT DOCD: CPT | Mod: CPTII,HCNC,S$GLB, | Performed by: FAMILY MEDICINE

## 2025-06-30 PROCEDURE — 1160F RVW MEDS BY RX/DR IN RCRD: CPT | Mod: CPTII,HCNC,S$GLB, | Performed by: FAMILY MEDICINE

## 2025-06-30 PROCEDURE — 99215 OFFICE O/P EST HI 40 MIN: CPT | Mod: HCNC,S$GLB,, | Performed by: FAMILY MEDICINE

## 2025-06-30 PROCEDURE — 99999 PR PBB SHADOW E&M-EST. PATIENT-LVL V: CPT | Mod: PBBFAC,HCNC,, | Performed by: FAMILY MEDICINE

## 2025-06-30 PROCEDURE — 4010F ACE/ARB THERAPY RXD/TAKEN: CPT | Mod: CPTII,HCNC,S$GLB, | Performed by: FAMILY MEDICINE

## 2025-06-30 NOTE — PROGRESS NOTES
Subjective:       Patient ID: Clifton Wilson is a 74 y.o. male.    Chief Complaint: Follow-up    74 yr old pleasant white male with DM II, HTN, HLD, CAD, Combined chronic CHF, MR, obesity, neuropathy, presents today for follow up.    DM II - controlled  -    HGBA1C                   6.9 (H)             04/24/2025                                                         - on metformin and insulin and sugars improving - UTD eye exam - foot exam UTD - on ASA and plavix. Losartan. He ate too much jamie cake during mardi gras.      HTN - chronic - controlled - on losartan, lasix - compliant - no side effects      HLD - chronic -  LDLCALC                  42.6 (L)            07/08/2024                                                   - controlled -       CAD/combined CHF - EF 35-40% - on external defibrillator - medical management - on ASA/plavix/ARB - no symptoms - following cardiology    Gout - improving - uses colchicine for flare up -     History as below - reviewed            Follow-up  Associated symptoms include arthralgias, joint swelling and myalgias. Pertinent negatives include no chest pain, congestion, coughing, diaphoresis, fatigue, headaches, neck pain, rash, visual change, vomiting or weakness.   Edema  Associated symptoms include arthralgias, joint swelling and myalgias. Pertinent negatives include no chest pain, congestion, coughing, diaphoresis, fatigue, headaches, neck pain, rash, visual change, vomiting or weakness.   Shoulder Pain   Pertinent negatives include no headaches. There is no history of diabetes.   Diabetes  He presents for his follow-up diabetic visit. He has type 2 diabetes mellitus. No MedicAlert identification noted. The initial diagnosis of diabetes was made 27 years ago. His disease course has been worsening. Hypoglycemia symptoms include sleepiness. Pertinent negatives for hypoglycemia include no confusion, dizziness, headaches, hunger, mood changes, nervousness/anxiousness, pallor,  seizures, speech difficulty, sweats or tremors. Associated symptoms include foot paresthesias. Pertinent negatives for diabetes include no blurred vision, no chest pain, no fatigue, no foot ulcerations, no polydipsia, no polyphagia, no polyuria, no visual change, no weakness and no weight loss. Hypoglycemia complications include blackouts and hospitalization. Pertinent negatives for hypoglycemia complications include no nocturnal hypoglycemia, no required assistance and no required glucagon injection. Symptoms are stable. Diabetic complications include autonomic neuropathy, heart disease, impotence and peripheral neuropathy. Pertinent negatives for diabetic complications include no CVA, nephropathy, PVD or retinopathy. Risk factors for coronary artery disease include hypertension, obesity, diabetes mellitus and male sex. Current diabetic treatment includes diet, insulin injections and oral agent (monotherapy). He is compliant with treatment all of the time. He is currently taking insulin pre-breakfast, pre-lunch, pre-dinner and at bedtime. Insulin injections are given by patient. Rotation sites for injection include the abdominal wall, arms and thighs. His weight is fluctuating minimally. He is following a diabetic, generally healthy, low fat/cholesterol and low salt diet. Meal planning includes avoidance of concentrated sweets and carbohydrate counting. He has not had a previous visit with a dietitian. He participates in exercise three times a week. He monitors blood glucose at home 3-4 x per day. He monitors urine at home <1 x per month. Blood glucose monitoring compliance is excellent. His home blood glucose trend is decreasing steadily. An ACE inhibitor/angiotensin II receptor blocker is being taken. He does not see a podiatrist.Eye exam is current.   Knee Pain     Swelling  This is a chronic problem. The current episode started more than 1 month ago. The problem occurs intermittently. The problem has been  gradually worsening. Associated symptoms include arthralgias, joint swelling and myalgias. Pertinent negatives include no chest pain, congestion, coughing, diaphoresis, fatigue, headaches, neck pain, rash, visual change, vomiting or weakness.   Hypertension  This is a chronic problem. The current episode started more than 1 year ago. The problem has been gradually improving since onset. The problem is controlled. Pertinent negatives include no blurred vision, chest pain, headaches, malaise/fatigue, neck pain, palpitations, peripheral edema, PND or sweats. Risk factors for coronary artery disease include diabetes mellitus, dyslipidemia, male gender and obesity. Past treatments include angiotensin blockers and diuretics. The current treatment provides significant improvement. There are no compliance problems.  Hypertensive end-organ damage includes CAD/MI and heart failure. There is no history of CVA, left ventricular hypertrophy, PVD or retinopathy. There is no history of chronic renal disease, hypercortisolism, hyperparathyroidism, pheochromocytoma, renovascular disease or a thyroid problem.   Hyperlipidemia  This is a chronic problem. The current episode started more than 1 year ago. The problem is controlled. Recent lipid tests were reviewed and are normal. Exacerbating diseases include obesity. He has no history of chronic renal disease or diabetes. There are no known factors aggravating his hyperlipidemia. Associated symptoms include myalgias. Pertinent negatives include no chest pain or focal sensory loss. Current antihyperlipidemic treatment includes statins. The current treatment provides moderate improvement of lipids. There are no compliance problems.  Risk factors for coronary artery disease include diabetes mellitus, dyslipidemia, male sex, hypertension and obesity.     Review of Systems   Constitutional: Negative.  Negative for activity change, diaphoresis, fatigue, malaise/fatigue, unexpected weight  change and weight loss.   HENT: Negative.  Negative for nasal congestion, ear pain, mouth sores, rhinorrhea and voice change.    Eyes: Negative.  Negative for blurred vision, pain, discharge and visual disturbance.   Respiratory: Negative.  Negative for apnea, cough and wheezing.    Cardiovascular: Negative.  Negative for chest pain, palpitations and PND.   Gastrointestinal: Negative.  Negative for abdominal distention, anal bleeding, diarrhea and vomiting.   Endocrine: Negative.  Negative for cold intolerance, polydipsia, polyphagia and polyuria.   Genitourinary:  Positive for impotence. Negative for decreased urine volume, difficulty urinating, discharge, frequency and scrotal swelling.   Musculoskeletal:  Positive for arthralgias, joint swelling and myalgias. Negative for back pain, neck pain and neck stiffness.   Integumentary:  Positive for color change. Negative for pallor and rash.   Allergic/Immunologic: Negative.  Negative for environmental allergies.   Neurological: Negative.  Negative for dizziness, tremors, seizures, speech difficulty, weakness, light-headedness and headaches.   Hematological: Negative.    Psychiatric/Behavioral: Negative.  Negative for agitation, confusion, dysphoric mood and suicidal ideas. The patient is not nervous/anxious.          PMH/PSH/FH/SH/MED/ALLERGY reviewed    Past Medical History:   Diagnosis Date    Anticoagulant long-term use     Cardiomyopathy     Cataract     CHF (congestive heart failure)     Coronary artery disease     Diabetes mellitus     Gout     Heart attack     Hypertension     Pneumonia        Past Surgical History:   Procedure Laterality Date    APPENDECTOMY      CARDIAC CATHETERIZATION      7 stents    CARDIAC DEFIBRILLATOR PLACEMENT      CATARACT EXTRACTION Bilateral 2011    COLONOSCOPY N/A 08/10/2018    Procedure: COLONOSCOPY golytely;  Surgeon: Valentine Burger MD;  Location: [facility];  Service: Endoscopy;  Laterality: N/A;    CORONARY ANGIOGRAPHY N/A  2024    Procedure: ANGIOGRAM, CORONARY ARTERY;  Surgeon: Luis Felipe Wright MD;  Location: [facility] CATH LAB/EP;  Service: Cardiology;  Laterality: N/A;    EYE SURGERY      INSTANTANEOUS WAVE-FREE RATIO (IFR) N/A 2024    Procedure: Instantaneous Wave-Free Ratio (IFR);  Surgeon: Luis Felipe Wright MD;  Location: [facility] CATH LAB/EP;  Service: Cardiology;  Laterality: N/A;    LEFT HEART CATHETERIZATION Left 2024    Procedure: Left heart cath;  Surgeon: Luis Felipe Wright MD;  Location: [facility] CATH LAB/EP;  Service: Cardiology;  Laterality: Left;    REVISION OF IMPLANTABLE CARDIOVERTER-DEFIBRILLATOR (ICD) ELECTRODE LEAD PLACEMENT N/A 2019    Procedure: REVISION, INSERTION, ELECTRODE LEAD, ICD;  Surgeon: Shukri Walsh MD;  Location: [facility] EP LAB;  Service: Cardiology;  Laterality: N/A;  Lead malfxn, RV lead ICD, SJM, anes, DM, 3 PREP    TONSILLECTOMY  1960s    VASECTOMY         Family History   Problem Relation Name Age of Onset    Heart disease Mother Miriam Gillespie     Hypertension Mother Miriam Gillespie     Heart disease Father Juan Antonio Wilson Sr     Stroke Father Juan Antonio Wilson Sr     Amblyopia Neg Hx      Blindness Neg Hx      Cataracts Neg Hx      Glaucoma Neg Hx      Macular degeneration Neg Hx      Retinal detachment Neg Hx      Strabismus Neg Hx         Social History     Socioeconomic History    Marital status:    Tobacco Use    Smoking status: Former     Current packs/day: 0.00     Average packs/day: 1 pack/day for 25.0 years (25.0 ttl pk-yrs)     Types: Cigarettes     Start date: 1964     Quit date: 10/1/1978     Years since quittin.7    Smokeless tobacco: Never   Substance and Sexual Activity    Alcohol use: Yes     Alcohol/week: 8.0 - 9.0 standard drinks of alcohol     Types: 6 Cans of beer, 2 - 3 Drinks containing 0.5 oz of alcohol per week     Comment: socially    Drug use: No    Sexual activity: Not Currently     Partners: Female     Social Drivers of  Health     Financial Resource Strain: Low Risk  (5/8/2025)    Overall Financial Resource Strain (CARDIA)     Difficulty of Paying Living Expenses: Not hard at all   Food Insecurity: No Food Insecurity (5/8/2025)    Hunger Vital Sign     Worried About Running Out of Food in the Last Year: Never true     Ran Out of Food in the Last Year: Never true   Transportation Needs: No Transportation Needs (5/8/2025)    PRAPARE - Transportation     Lack of Transportation (Medical): No     Lack of Transportation (Non-Medical): No   Physical Activity: Insufficiently Active (5/8/2025)    Exercise Vital Sign     Days of Exercise per Week: 7 days     Minutes of Exercise per Session: 10 min   Stress: No Stress Concern Present (4/26/2025)    [employer] - Occupational Stress Questionnaire     Feeling of Stress : Not at all   Housing Stability: Low Risk  (5/8/2025)    Housing Stability Vital Sign     Unable to Pay for Housing in the Last Year: No     Number of Times Moved in the Last Year: 0     Homeless in the Last Year: No       Current Outpatient Medications   Medication Sig Dispense Refill    alcohol swabs (BD ALCOHOL SWABS) PadM USE FOUR TIMES DAILY 400 each 3    aspirin (ECOTRIN) 81 MG EC tablet Take 81 mg by mouth once daily.      blood glucose control high,low (ACCU-CHEK CATHRYN CONTROL SOLN) Soln Use as directed to check blood sugar 1 each 1    blood sugar diagnostic Strp test blood sugar as directed four times daily 100 each 3    blood-glucose meter (TRUE METRIX GLUCOSE METER) Misc use as directed 1 each 0    clopidogreL (PLAVIX) 75 mg tablet Take 1 tablet (75 mg total) by mouth once daily. 90 tablet 3    colchicine (COLCRYS) 0.6 mg tablet Take 1 tablet (0.6 mg total) by mouth 2 (two) times daily. 180 tablet 4    cyanocobalamin (VITAMIN B-12) 1000 MCG tablet Take 1,000 mcg by mouth once daily.      empagliflozin (JARDIANCE) 10 mg tablet Take 1 tablet (10 mg total) by mouth once daily. 30 tablet 11     "febuxostat (ULORIC) 40 mg Tab Take 1 tablet (40 mg total) by mouth once daily. 90 tablet 4    furosemide (LASIX) 20 MG tablet Take 1 tablet (20 mg total) by mouth 2 (two) times daily. 60 tablet 11    gabapentin (NEURONTIN) 300 MG capsule Take 2 capsules (600 mg total) by mouth 2 (two) times daily. 360 capsule 1    insulin aspart U-100 (NOVOLOG FLEXPEN U-100 INSULIN) 100 unit/mL (3 mL) InPn pen Inject 15 Units into the skin 3 (three) times daily with meals. 15 mL 0    insulin glargine U-100, Lantus, (LANTUS SOLOSTAR U-100 INSULIN) 100 unit/mL (3 mL) InPn pen Inject 35 Units into the skin every evening. 15 mL 0    lancets 30 gauge Misc usea s directed to check blood glucose four times daily 100 each 3    losartan (COZAAR) 50 MG tablet Take 1 tablet (50 mg total) by mouth once daily. 90 tablet 3    metFORMIN (GLUCOPHAGE) 1000 MG tablet Take 1 tablet (1,000 mg total) by mouth 2 (two) times daily with meals. 180 tablet 4    multivit-minerals/FA/lycopene (ONE-A-DAY MEN'S 50 PLUS ORAL) Take 1 tablet by mouth once daily.      nitroGLYCERIN (NITROSTAT) 0.4 MG SL tablet Place 1 tablet (0.4 mg total) under the tongue every 5 (five) minutes as needed for Chest pain. 25 tablet 3    pen needle, diabetic (BD ULTRA-FINE SINA PEN NEEDLE) 32 gauge x 5/32" Ndle Use four times daily for insulin administration 400 each 3    predniSONE (DELTASONE) 5 MG tablet Take 4 tablets (20 mg total) by mouth once daily for 14 days, THEN 3.5 tablets (17.5 mg total) once daily for 14 days, THEN 3 tablets (15 mg total) once daily for 14 days. 147 tablet 0    zolpidem (AMBIEN) 5 MG Tab TAKE 1 TABLET BY MOUTH EVERY NIGHT AS NEEDED 30 tablet 2     No current facility-administered medications for this visit.       Review of patient's allergies indicates:  No Known Allergies      Objective:       Vitals:    06/30/25 1433   BP: 129/71   Pulse: 74       Physical Exam  Constitutional:       Appearance: He is well-developed.   HENT:      Head: Normocephalic and " atraumatic.      Right Ear: External ear normal.      Left Ear: External ear normal.      Nose: Nose normal.      Mouth/Throat:      Pharynx: No oropharyngeal exudate.   Eyes:      General: No scleral icterus.        Right eye: No discharge.         Left eye: No discharge.      Conjunctiva/sclera: Conjunctivae normal.      Pupils: Pupils are equal, round, and reactive to light.   Neck:      Thyroid: No thyromegaly.      Vascular: No JVD.      Trachea: No tracheal deviation.   Cardiovascular:      Rate and Rhythm: Normal rate and regular rhythm.      Heart sounds: Normal heart sounds. No murmur heard.     No friction rub. No gallop.   Pulmonary:      Effort: Pulmonary effort is normal. No respiratory distress.      Breath sounds: Normal breath sounds. No stridor. No wheezing or rales.   Chest:      Chest wall: No tenderness.   Abdominal:      General: Bowel sounds are normal. There is no distension.      Palpations: Abdomen is soft. There is no mass.      Tenderness: There is no abdominal tenderness. There is no guarding or rebound.      Hernia: No hernia is present.   Musculoskeletal:         General: No swelling or tenderness. Normal range of motion.      Cervical back: Normal range of motion and neck supple.      Right lower leg: No edema.      Comments: Right lle warm and nontender   Lymphadenopathy:      Cervical: No cervical adenopathy.   Skin:     General: Skin is warm and dry.      Coloration: Skin is not pale.      Findings: Rash present. No erythema.   Neurological:      General: No focal deficit present.      Mental Status: He is alert and oriented to person, place, and time. Mental status is at baseline.      Cranial Nerves: No cranial nerve deficit.      Motor: No weakness or abnormal muscle tone.      Coordination: Coordination normal.      Deep Tendon Reflexes: Reflexes are normal and symmetric. Reflexes normal.   Psychiatric:         Behavior: Behavior normal.         Thought Content: Thought content  normal.         Judgment: Judgment normal.         Assessment:       Problem List Items Addressed This Visit       Type 2 diabetes mellitus with diabetic neuropathy - Primary    Relevant Orders    CBC Auto Differential    Comprehensive Metabolic Panel    Hemoglobin A1C    Urinalysis    Microalbumin/Creatinine Ratio, Urine    CBC Auto Differential    Comprehensive Metabolic Panel    Hemoglobin A1C    Urinalysis    Microalbumin/Creatinine Ratio, Urine    Insulin dependent type 2 diabetes mellitus    Relevant Orders    CBC Auto Differential    Comprehensive Metabolic Panel    Hemoglobin A1C    Urinalysis    Microalbumin/Creatinine Ratio, Urine    Essential hypertension    Relevant Orders    CBC Auto Differential    Comprehensive Metabolic Panel    Hemoglobin A1C    Urinalysis    Microalbumin/Creatinine Ratio, Urine    Diabetes mellitus type 2 without retinopathy    Relevant Orders    CBC Auto Differential    Comprehensive Metabolic Panel    Hemoglobin A1C    Urinalysis    Microalbumin/Creatinine Ratio, Urine     Other Visit Diagnoses         Acute combined systolic and diastolic congestive heart failure        Relevant Orders    CBC Auto Differential    Comprehensive Metabolic Panel    Hemoglobin A1C    Urinalysis    Microalbumin/Creatinine Ratio, Urine            Plan:           Clifton was seen today for follow-up.    Diagnoses and all orders for this visit:    Type 2 diabetes mellitus with diabetic neuropathy, with long-term current use of insulin  -     CBC Auto Differential; Future  -     Comprehensive Metabolic Panel; Future  -     Hemoglobin A1C; Future  -     Urinalysis; Future  -     Microalbumin/Creatinine Ratio, Urine; Future    Insulin dependent type 2 diabetes mellitus  -     CBC Auto Differential; Future  -     Comprehensive Metabolic Panel; Future  -     Hemoglobin A1C; Future  -     Urinalysis; Future  -     Microalbumin/Creatinine Ratio, Urine; Future    Type 2 diabetes mellitus with diabetic  neuropathy, unspecified whether long term insulin use  -     CBC Auto Differential; Future  -     Comprehensive Metabolic Panel; Future  -     Hemoglobin A1C; Future  -     Urinalysis; Future  -     Microalbumin/Creatinine Ratio, Urine; Future    Essential hypertension  -     CBC Auto Differential; Future  -     Comprehensive Metabolic Panel; Future  -     Hemoglobin A1C; Future  -     Urinalysis; Future  -     Microalbumin/Creatinine Ratio, Urine; Future    Diabetes mellitus type 2 without retinopathy  -     CBC Auto Differential; Future  -     Comprehensive Metabolic Panel; Future  -     Hemoglobin A1C; Future  -     Urinalysis; Future  -     Microalbumin/Creatinine Ratio, Urine; Future    Acute combined systolic and diastolic congestive heart failure  -     CBC Auto Differential; Future  -     Comprehensive Metabolic Panel; Future  -     Hemoglobin A1C; Future  -     Urinalysis; Future  -     Microalbumin/Creatinine Ratio, Urine; Future            DM II controlled/hyperglycemia  - improving since started insulin  -lantus and novolog to continue  -strict diet control            HTN  -controlled    HLD  -controlled    Combined CHF  -follows cardiology  -on external defibrillator    Neuropathy  -increase neurontin and increase dose to 600 mgTID    Obesity  -diet and exercise as tolerated    chronic gout  -uric acid levels  -conchicine as needed    Spent adequate time in obtaining history and explaining differentials    40 minutes spent during this visit of which greater than 50% devoted to face-face counseling and coordination of care regarding diagnosis and management plan    Follow up in about 3 months (around 9/30/2025), or if symptoms worsen or fail to improve.    Addendum:     The mobility limitation cannot be sufficiently resolved by the use of a cane. Patient's functional mobility deficit can be sufficiently resolved with the use of a Rolling Walker. Patient's mobility limitation significantly impairs their  ability to participate in one of more activities of daily living. The use of a Rolling Walker will significantly improve the patient's ability to participate in MRADLS and the patient will use it on regular basis in the home.

## 2025-07-05 RX ORDER — PREDNISONE 5 MG/1
TABLET ORAL
Qty: 95 TABLET | Refills: 0 | Status: SHIPPED | OUTPATIENT
Start: 2025-07-05 | End: 2025-08-18

## 2025-07-06 ENCOUNTER — PATIENT MESSAGE (OUTPATIENT)
Dept: RHEUMATOLOGY | Facility: CLINIC | Age: 75
End: 2025-07-06
Payer: MEDICARE

## 2025-07-12 DIAGNOSIS — Z79.4 TYPE 2 DIABETES MELLITUS WITH DIABETIC NEUROPATHY, WITH LONG-TERM CURRENT USE OF INSULIN: ICD-10-CM

## 2025-07-12 DIAGNOSIS — E11.9 DIABETES MELLITUS TYPE 2 WITHOUT RETINOPATHY: ICD-10-CM

## 2025-07-12 DIAGNOSIS — E11.9 INSULIN DEPENDENT TYPE 2 DIABETES MELLITUS: ICD-10-CM

## 2025-07-12 DIAGNOSIS — E11.40 TYPE 2 DIABETES MELLITUS WITH DIABETIC NEUROPATHY, WITH LONG-TERM CURRENT USE OF INSULIN: ICD-10-CM

## 2025-07-12 DIAGNOSIS — Z79.4 INSULIN DEPENDENT TYPE 2 DIABETES MELLITUS: ICD-10-CM

## 2025-07-12 NOTE — TELEPHONE ENCOUNTER
No care due was identified.  [facility] Embedded Care Due Messages. Reference number: [id number].   7/12/2025 11:25:08 AM CDT

## 2025-07-14 RX ORDER — INSULIN ASPART 100 [IU]/ML
15 INJECTION, SOLUTION INTRAVENOUS; SUBCUTANEOUS
Qty: 45 ML | Refills: 1 | Status: SHIPPED | OUTPATIENT
Start: 2025-07-14

## 2025-07-14 NOTE — TELEPHONE ENCOUNTER
Refill Routing Note   Medication(s) are not appropriate for processing by [facility] for the following reason(s):        New or recently adjusted medication    [facility] action(s):  Defer               Appointments  past 12m or future 3m with PCP    Date Provider   Last Visit   6/30/2025 Britton Grissom MD   Next Visit   9/29/2025 Britton Grissom MD   ED visits in past 90 days: 0        Note composed:10:34 PM 07/13/2025

## 2025-07-15 ENCOUNTER — PATIENT MESSAGE (OUTPATIENT)
Dept: FAMILY MEDICINE | Facility: CLINIC | Age: 75
End: 2025-07-15
Payer: MEDICARE

## 2025-07-16 ENCOUNTER — OUTPATIENT CASE MANAGEMENT (OUTPATIENT)
Dept: ADMINISTRATIVE | Facility: OTHER | Age: 75
End: 2025-07-16
Payer: MEDICARE

## 2025-07-20 DIAGNOSIS — E11.9 DIABETES MELLITUS TYPE 2 WITHOUT RETINOPATHY: ICD-10-CM

## 2025-07-20 DIAGNOSIS — Z79.4 TYPE 2 DIABETES MELLITUS WITH DIABETIC NEUROPATHY, WITH LONG-TERM CURRENT USE OF INSULIN: ICD-10-CM

## 2025-07-20 DIAGNOSIS — E11.40 TYPE 2 DIABETES MELLITUS WITH DIABETIC NEUROPATHY, WITH LONG-TERM CURRENT USE OF INSULIN: ICD-10-CM

## 2025-07-20 RX ORDER — INSULIN GLARGINE 100 [IU]/ML
35 INJECTION, SOLUTION SUBCUTANEOUS NIGHTLY
Qty: 30 ML | Refills: 1 | Status: SHIPPED | OUTPATIENT
Start: 2025-07-20 | End: 2025-10-14

## 2025-07-20 NOTE — TELEPHONE ENCOUNTER
No care due was identified.  [facility] Embedded Care Due Messages. Reference number: [id number].   7/20/2025 10:27:54 AM CDT

## 2025-07-21 NOTE — TELEPHONE ENCOUNTER
Refill Routing Note   Medication(s) are not appropriate for processing by [facility] for the following reason(s):        New or recently adjusted medication    [facility] action(s):  Defer               Appointments  past 12m or future 3m with PCP    Date Provider   Last Visit   6/30/2025 Britton Grissom MD   Next Visit   9/29/2025 Britton Grissom MD   ED visits in past 90 days: 0        Note composed:8:57 PM 07/20/2025
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
## 2025-07-23 ENCOUNTER — OUTPATIENT CASE MANAGEMENT (OUTPATIENT)
Dept: ADMINISTRATIVE | Facility: OTHER | Age: 75
End: 2025-07-23
Payer: MEDICARE

## 2025-07-25 ENCOUNTER — OFFICE VISIT (OUTPATIENT)
Dept: NEUROLOGY | Facility: CLINIC | Age: 75
End: 2025-07-25
Payer: MEDICARE

## 2025-07-25 ENCOUNTER — OFFICE VISIT (OUTPATIENT)
Dept: RHEUMATOLOGY | Facility: CLINIC | Age: 75
End: 2025-07-25
Payer: MEDICARE

## 2025-07-25 VITALS
BODY MASS INDEX: 31.89 KG/M2 | DIASTOLIC BLOOD PRESSURE: 77 MMHG | WEIGHT: 227.75 LBS | HEIGHT: 71 IN | HEART RATE: 66 BPM | SYSTOLIC BLOOD PRESSURE: 122 MMHG

## 2025-07-25 VITALS
OXYGEN SATURATION: 95 % | WEIGHT: 229.25 LBS | DIASTOLIC BLOOD PRESSURE: 71 MMHG | SYSTOLIC BLOOD PRESSURE: 109 MMHG | TEMPERATURE: 98 F | BODY MASS INDEX: 32.1 KG/M2 | HEIGHT: 71 IN | HEART RATE: 80 BPM

## 2025-07-25 DIAGNOSIS — Z79.52 CURRENT USE OF STEROID MEDICATION: ICD-10-CM

## 2025-07-25 DIAGNOSIS — R26.2 DIFFICULTY WALKING: ICD-10-CM

## 2025-07-25 DIAGNOSIS — R40.4 TRANSIENT ALTERATION OF AWARENESS: Primary | ICD-10-CM

## 2025-07-25 DIAGNOSIS — Z71.89 COUNSELING AND COORDINATION OF CARE: ICD-10-CM

## 2025-07-25 DIAGNOSIS — M62.81 MUSCLE WEAKNESS OF UPPER EXTREMITY: ICD-10-CM

## 2025-07-25 DIAGNOSIS — M35.3 PMR (POLYMYALGIA RHEUMATICA): Primary | ICD-10-CM

## 2025-07-25 DIAGNOSIS — M19.90 INFLAMMATORY ARTHRITIS: ICD-10-CM

## 2025-07-25 PROCEDURE — 1101F PT FALLS ASSESS-DOCD LE1/YR: CPT | Mod: CPTII,HCNC,S$GLB, | Performed by: STUDENT IN AN ORGANIZED HEALTH CARE EDUCATION/TRAINING PROGRAM

## 2025-07-25 PROCEDURE — 1126F AMNT PAIN NOTED NONE PRSNT: CPT | Mod: CPTII,HCNC,S$GLB, | Performed by: STUDENT IN AN ORGANIZED HEALTH CARE EDUCATION/TRAINING PROGRAM

## 2025-07-25 PROCEDURE — 3288F FALL RISK ASSESSMENT DOCD: CPT | Mod: CPTII,HCNC,S$GLB, | Performed by: STUDENT IN AN ORGANIZED HEALTH CARE EDUCATION/TRAINING PROGRAM

## 2025-07-25 PROCEDURE — 4010F ACE/ARB THERAPY RXD/TAKEN: CPT | Mod: CPTII,HCNC,S$GLB, | Performed by: STUDENT IN AN ORGANIZED HEALTH CARE EDUCATION/TRAINING PROGRAM

## 2025-07-25 PROCEDURE — 99999 PR PBB SHADOW E&M-EST. PATIENT-LVL IV: CPT | Mod: PBBFAC,HCNC,, | Performed by: STUDENT IN AN ORGANIZED HEALTH CARE EDUCATION/TRAINING PROGRAM

## 2025-07-25 PROCEDURE — 99215 OFFICE O/P EST HI 40 MIN: CPT | Mod: HCNC,S$GLB,, | Performed by: STUDENT IN AN ORGANIZED HEALTH CARE EDUCATION/TRAINING PROGRAM

## 2025-07-25 PROCEDURE — 3074F SYST BP LT 130 MM HG: CPT | Mod: CPTII,HCNC,S$GLB, | Performed by: STUDENT IN AN ORGANIZED HEALTH CARE EDUCATION/TRAINING PROGRAM

## 2025-07-25 PROCEDURE — 3044F HG A1C LEVEL LT 7.0%: CPT | Mod: CPTII,HCNC,S$GLB, | Performed by: STUDENT IN AN ORGANIZED HEALTH CARE EDUCATION/TRAINING PROGRAM

## 2025-07-25 PROCEDURE — 99999 PR PBB SHADOW E&M-EST. PATIENT-LVL V: CPT | Mod: PBBFAC,HCNC,, | Performed by: STUDENT IN AN ORGANIZED HEALTH CARE EDUCATION/TRAINING PROGRAM

## 2025-07-25 PROCEDURE — 3008F BODY MASS INDEX DOCD: CPT | Mod: CPTII,HCNC,S$GLB, | Performed by: STUDENT IN AN ORGANIZED HEALTH CARE EDUCATION/TRAINING PROGRAM

## 2025-07-25 PROCEDURE — 1159F MED LIST DOCD IN RCRD: CPT | Mod: CPTII,HCNC,S$GLB, | Performed by: STUDENT IN AN ORGANIZED HEALTH CARE EDUCATION/TRAINING PROGRAM

## 2025-07-25 PROCEDURE — 3078F DIAST BP <80 MM HG: CPT | Mod: CPTII,HCNC,S$GLB, | Performed by: STUDENT IN AN ORGANIZED HEALTH CARE EDUCATION/TRAINING PROGRAM

## 2025-07-25 PROCEDURE — G2211 COMPLEX E/M VISIT ADD ON: HCPCS | Mod: HCNC,S$GLB,, | Performed by: STUDENT IN AN ORGANIZED HEALTH CARE EDUCATION/TRAINING PROGRAM

## 2025-07-25 RX ORDER — CLOPIDOGREL BISULFATE 75 MG/1
75 TABLET ORAL DAILY
Qty: 90 TABLET | Refills: 3 | Status: SHIPPED | OUTPATIENT
Start: 2025-07-25

## 2025-07-25 RX ORDER — ATORVASTATIN CALCIUM 40 MG/1
40 TABLET, FILM COATED ORAL DAILY
Qty: 90 TABLET | Refills: 3 | Status: SHIPPED | OUTPATIENT
Start: 2025-07-25 | End: 2026-07-25

## 2025-07-25 RX ORDER — PREDNISONE 1 MG/1
4 TABLET ORAL DAILY
Qty: 120 TABLET | Refills: 0 | Status: SHIPPED | OUTPATIENT
Start: 2025-07-25 | End: 2025-08-24

## 2025-07-30 ENCOUNTER — OUTPATIENT CASE MANAGEMENT (OUTPATIENT)
Dept: ADMINISTRATIVE | Facility: OTHER | Age: 75
End: 2025-07-30
Payer: MEDICARE

## 2025-08-03 DIAGNOSIS — M35.3 PMR (POLYMYALGIA RHEUMATICA): ICD-10-CM

## 2025-08-03 DIAGNOSIS — M19.90 INFLAMMATORY ARTHRITIS: ICD-10-CM

## 2025-08-05 RX ORDER — PREDNISONE 1 MG/1
4 TABLET ORAL DAILY
Qty: 120 TABLET | Refills: 0 | OUTPATIENT
Start: 2025-08-05 | End: 2025-09-04

## 2025-08-05 RX ORDER — PREDNISONE 5 MG/1
TABLET ORAL
Qty: 95 TABLET | Refills: 0 | OUTPATIENT
Start: 2025-08-05 | End: 2025-09-18

## 2025-08-06 ENCOUNTER — OUTPATIENT CASE MANAGEMENT (OUTPATIENT)
Dept: ADMINISTRATIVE | Facility: OTHER | Age: 75
End: 2025-08-06
Payer: MEDICARE

## 2025-08-13 ENCOUNTER — OFFICE VISIT (OUTPATIENT)
Dept: FAMILY MEDICINE | Facility: CLINIC | Age: 75
End: 2025-08-13
Payer: MEDICARE

## 2025-08-13 ENCOUNTER — LAB VISIT (OUTPATIENT)
Dept: LAB | Facility: HOSPITAL | Age: 75
End: 2025-08-13
Attending: FAMILY MEDICINE
Payer: MEDICARE

## 2025-08-13 ENCOUNTER — TELEPHONE (OUTPATIENT)
Dept: FAMILY MEDICINE | Facility: CLINIC | Age: 75
End: 2025-08-13

## 2025-08-13 VITALS
WEIGHT: 229.06 LBS | DIASTOLIC BLOOD PRESSURE: 84 MMHG | HEIGHT: 71 IN | HEART RATE: 111 BPM | SYSTOLIC BLOOD PRESSURE: 110 MMHG | TEMPERATURE: 99 F | OXYGEN SATURATION: 98 % | BODY MASS INDEX: 32.07 KG/M2

## 2025-08-13 DIAGNOSIS — R10.9 FLANK PAIN: ICD-10-CM

## 2025-08-13 DIAGNOSIS — K92.1 BLACK STOOLS: ICD-10-CM

## 2025-08-13 DIAGNOSIS — R19.5 DARK STOOLS: Primary | ICD-10-CM

## 2025-08-13 DIAGNOSIS — K92.1 BLACK STOOLS: Primary | ICD-10-CM

## 2025-08-13 DIAGNOSIS — R19.5 OCCULT BLOOD POSITIVE STOOL: ICD-10-CM

## 2025-08-13 LAB
ABSOLUTE EOSINOPHIL (OHS): 0 K/UL
ABSOLUTE MONOCYTE (OHS): 0.61 K/UL (ref 0.3–1)
ABSOLUTE NEUTROPHIL COUNT (OHS): 7.61 K/UL (ref 1.8–7.7)
ALBUMIN SERPL BCP-MCNC: 3.7 G/DL (ref 3.5–5.2)
ALP SERPL-CCNC: 94 UNIT/L (ref 40–150)
ALT SERPL W/O P-5'-P-CCNC: 59 UNIT/L (ref 10–44)
ANION GAP (OHS): 16 MMOL/L (ref 8–16)
AST SERPL-CCNC: 54 UNIT/L (ref 11–45)
BACTERIA #/AREA URNS AUTO: NORMAL /HPF
BASOPHILS # BLD AUTO: 0.02 K/UL
BASOPHILS NFR BLD AUTO: 0.2 %
BILIRUB SERPL-MCNC: 0.8 MG/DL (ref 0.1–1)
BILIRUB UR QL STRIP.AUTO: NEGATIVE
BUN SERPL-MCNC: 52 MG/DL (ref 8–23)
CALCIUM SERPL-MCNC: 8.7 MG/DL (ref 8.7–10.5)
CHLORIDE SERPL-SCNC: 104 MMOL/L (ref 95–110)
CLARITY UR: CLEAR
CO2 SERPL-SCNC: 23 MMOL/L (ref 23–29)
COLOR UR AUTO: YELLOW
CREAT SERPL-MCNC: 1.7 MG/DL (ref 0.5–1.4)
ERYTHROCYTE [DISTWIDTH] IN BLOOD BY AUTOMATED COUNT: 15 % (ref 11.5–14.5)
GFR SERPLBLD CREATININE-BSD FMLA CKD-EPI: 42 ML/MIN/1.73/M2
GLUCOSE SERPL-MCNC: 290 MG/DL (ref 70–110)
GLUCOSE UR QL STRIP: ABNORMAL
HCT VFR BLD AUTO: 32.8 % (ref 40–54)
HGB BLD-MCNC: 10.6 GM/DL (ref 14–18)
HGB UR QL STRIP: NEGATIVE
IMM GRANULOCYTES # BLD AUTO: 0.07 K/UL (ref 0–0.04)
IMM GRANULOCYTES NFR BLD AUTO: 0.7 % (ref 0–0.5)
KETONES UR QL STRIP: NEGATIVE
LEUKOCYTE ESTERASE UR QL STRIP: NEGATIVE
LYMPHOCYTES # BLD AUTO: 1.14 K/UL (ref 1–4.8)
MCH RBC QN AUTO: 31.2 PG (ref 27–31)
MCHC RBC AUTO-ENTMCNC: 32.3 G/DL (ref 32–36)
MCV RBC AUTO: 97 FL (ref 82–98)
MICROSCOPIC COMMENT: NORMAL
NITRITE UR QL STRIP: NEGATIVE
NUCLEATED RBC (/100WBC) (OHS): 0 /100 WBC
OB PNL STL: POSITIVE
PH UR STRIP: 6 [PH]
PLATELET # BLD AUTO: 190 K/UL (ref 150–450)
PMV BLD AUTO: 10.8 FL (ref 9.2–12.9)
POTASSIUM SERPL-SCNC: 3.9 MMOL/L (ref 3.5–5.1)
PROT SERPL-MCNC: 6.5 GM/DL (ref 6–8.4)
PROT UR QL STRIP: NEGATIVE
RBC # BLD AUTO: 3.4 M/UL (ref 4.6–6.2)
RBC #/AREA URNS AUTO: 1 /HPF (ref 0–4)
RELATIVE EOSINOPHIL (OHS): 0 %
RELATIVE LYMPHOCYTE (OHS): 12.1 % (ref 18–48)
RELATIVE MONOCYTE (OHS): 6.5 % (ref 4–15)
RELATIVE NEUTROPHIL (OHS): 80.5 % (ref 38–73)
SODIUM SERPL-SCNC: 143 MMOL/L (ref 136–145)
SP GR UR STRIP: 1.02
SQUAMOUS #/AREA URNS AUTO: <1 /HPF
UROBILINOGEN UR STRIP-ACNC: NEGATIVE EU/DL
WBC # BLD AUTO: 9.45 K/UL (ref 3.9–12.7)
WBC #/AREA URNS AUTO: 1 /HPF (ref 0–5)
YEAST UR QL AUTO: NORMAL /HPF

## 2025-08-13 PROCEDURE — 1126F AMNT PAIN NOTED NONE PRSNT: CPT | Mod: CPTII,HCNC,S$GLB, | Performed by: FAMILY MEDICINE

## 2025-08-13 PROCEDURE — 99214 OFFICE O/P EST MOD 30 MIN: CPT | Mod: HCNC,S$GLB,, | Performed by: FAMILY MEDICINE

## 2025-08-13 PROCEDURE — 1101F PT FALLS ASSESS-DOCD LE1/YR: CPT | Mod: CPTII,HCNC,S$GLB, | Performed by: FAMILY MEDICINE

## 2025-08-13 PROCEDURE — 3074F SYST BP LT 130 MM HG: CPT | Mod: CPTII,HCNC,S$GLB, | Performed by: FAMILY MEDICINE

## 2025-08-13 PROCEDURE — 99999 PR PBB SHADOW E&M-EST. PATIENT-LVL V: CPT | Mod: PBBFAC,HCNC,, | Performed by: FAMILY MEDICINE

## 2025-08-13 PROCEDURE — 4010F ACE/ARB THERAPY RXD/TAKEN: CPT | Mod: CPTII,HCNC,S$GLB, | Performed by: FAMILY MEDICINE

## 2025-08-13 PROCEDURE — 80053 COMPREHEN METABOLIC PANEL: CPT | Mod: HCNC

## 2025-08-13 PROCEDURE — 3079F DIAST BP 80-89 MM HG: CPT | Mod: CPTII,HCNC,S$GLB, | Performed by: FAMILY MEDICINE

## 2025-08-13 PROCEDURE — 3044F HG A1C LEVEL LT 7.0%: CPT | Mod: CPTII,HCNC,S$GLB, | Performed by: FAMILY MEDICINE

## 2025-08-13 PROCEDURE — 82272 OCCULT BLD FECES 1-3 TESTS: CPT | Mod: HCNC

## 2025-08-13 PROCEDURE — 85025 COMPLETE CBC W/AUTO DIFF WBC: CPT | Mod: HCNC

## 2025-08-13 PROCEDURE — 3008F BODY MASS INDEX DOCD: CPT | Mod: CPTII,HCNC,S$GLB, | Performed by: FAMILY MEDICINE

## 2025-08-13 PROCEDURE — 3288F FALL RISK ASSESSMENT DOCD: CPT | Mod: CPTII,HCNC,S$GLB, | Performed by: FAMILY MEDICINE

## 2025-08-13 PROCEDURE — 1159F MED LIST DOCD IN RCRD: CPT | Mod: CPTII,HCNC,S$GLB, | Performed by: FAMILY MEDICINE

## 2025-08-13 PROCEDURE — 36415 COLL VENOUS BLD VENIPUNCTURE: CPT | Mod: HCNC

## 2025-08-13 PROCEDURE — 81003 URINALYSIS AUTO W/O SCOPE: CPT | Mod: HCNC

## 2025-08-13 RX ORDER — TIZANIDINE 2 MG/1
2 TABLET ORAL NIGHTLY PRN
Qty: 10 TABLET | Refills: 0 | Status: SHIPPED | OUTPATIENT
Start: 2025-08-13

## 2025-08-14 ENCOUNTER — OFFICE VISIT (OUTPATIENT)
Dept: ORTHOPEDICS | Facility: CLINIC | Age: 75
End: 2025-08-14
Payer: MEDICARE

## 2025-08-14 ENCOUNTER — RESULTS FOLLOW-UP (OUTPATIENT)
Dept: FAMILY MEDICINE | Facility: CLINIC | Age: 75
End: 2025-08-14
Payer: MEDICARE

## 2025-08-14 ENCOUNTER — CLINICAL SUPPORT (OUTPATIENT)
Dept: ENDOSCOPY | Facility: HOSPITAL | Age: 75
End: 2025-08-14
Attending: FAMILY MEDICINE
Payer: MEDICARE

## 2025-08-14 VITALS — WEIGHT: 229 LBS | HEIGHT: 71 IN | BODY MASS INDEX: 32.06 KG/M2

## 2025-08-14 DIAGNOSIS — R19.5 OCCULT BLOOD POSITIVE STOOL: ICD-10-CM

## 2025-08-14 DIAGNOSIS — E79.0 HYPERURICEMIA: ICD-10-CM

## 2025-08-14 DIAGNOSIS — R19.5 DARK STOOLS: ICD-10-CM

## 2025-08-14 DIAGNOSIS — M11.262 PSEUDOGOUT OF KNEE, LEFT: Primary | ICD-10-CM

## 2025-08-14 PROCEDURE — 3044F HG A1C LEVEL LT 7.0%: CPT | Mod: CPTII,HCNC,S$GLB, | Performed by: ORTHOPAEDIC SURGERY

## 2025-08-14 PROCEDURE — 99999 PR PBB SHADOW E&M-EST. PATIENT-LVL IV: CPT | Mod: PBBFAC,HCNC,, | Performed by: ORTHOPAEDIC SURGERY

## 2025-08-14 PROCEDURE — 99212 OFFICE O/P EST SF 10 MIN: CPT | Mod: HCNC,S$GLB,, | Performed by: ORTHOPAEDIC SURGERY

## 2025-08-14 PROCEDURE — 1160F RVW MEDS BY RX/DR IN RCRD: CPT | Mod: CPTII,HCNC,S$GLB, | Performed by: ORTHOPAEDIC SURGERY

## 2025-08-14 PROCEDURE — 3288F FALL RISK ASSESSMENT DOCD: CPT | Mod: CPTII,HCNC,S$GLB, | Performed by: ORTHOPAEDIC SURGERY

## 2025-08-14 PROCEDURE — 4010F ACE/ARB THERAPY RXD/TAKEN: CPT | Mod: CPTII,HCNC,S$GLB, | Performed by: ORTHOPAEDIC SURGERY

## 2025-08-14 PROCEDURE — 1126F AMNT PAIN NOTED NONE PRSNT: CPT | Mod: CPTII,HCNC,S$GLB, | Performed by: ORTHOPAEDIC SURGERY

## 2025-08-14 PROCEDURE — 1101F PT FALLS ASSESS-DOCD LE1/YR: CPT | Mod: CPTII,HCNC,S$GLB, | Performed by: ORTHOPAEDIC SURGERY

## 2025-08-14 PROCEDURE — 1159F MED LIST DOCD IN RCRD: CPT | Mod: CPTII,HCNC,S$GLB, | Performed by: ORTHOPAEDIC SURGERY

## 2025-08-15 ENCOUNTER — HOSPITAL ENCOUNTER (OUTPATIENT)
Facility: HOSPITAL | Age: 75
Discharge: HOME | End: 2025-08-16
Attending: EMERGENCY MEDICINE | Admitting: STUDENT IN AN ORGANIZED HEALTH CARE EDUCATION/TRAINING PROGRAM
Payer: MEDICARE

## 2025-08-15 DIAGNOSIS — K92.1 MELENA: Primary | ICD-10-CM

## 2025-08-15 DIAGNOSIS — R07.9 CHEST PAIN: ICD-10-CM

## 2025-08-15 DIAGNOSIS — K92.2 GI BLEED: ICD-10-CM

## 2025-08-15 PROBLEM — D64.9 NORMOCYTIC ANEMIA: Status: ACTIVE | Noted: 2025-08-15

## 2025-08-15 LAB
ABORH RETYPE: NORMAL
ABSOLUTE EOSINOPHIL (OHS): 0.01 K/UL
ABSOLUTE EOSINOPHIL (OHS): 0.02 K/UL
ABSOLUTE MONOCYTE (OHS): 0.46 K/UL (ref 0.3–1)
ABSOLUTE MONOCYTE (OHS): 0.46 K/UL (ref 0.3–1)
ABSOLUTE NEUTROPHIL COUNT (OHS): 5.01 K/UL (ref 1.8–7.7)
ABSOLUTE NEUTROPHIL COUNT (OHS): 6.91 K/UL (ref 1.8–7.7)
ALBUMIN SERPL BCP-MCNC: 3.7 G/DL (ref 3.5–5.2)
ALP SERPL-CCNC: 91 UNIT/L (ref 40–150)
ALT SERPL W/O P-5'-P-CCNC: 52 UNIT/L (ref 10–44)
ANION GAP (OHS): 12 MMOL/L (ref 8–16)
AST SERPL-CCNC: 36 UNIT/L (ref 11–45)
BASOPHILS # BLD AUTO: 0.02 K/UL
BASOPHILS # BLD AUTO: 0.03 K/UL
BASOPHILS NFR BLD AUTO: 0.3 %
BASOPHILS NFR BLD AUTO: 0.4 %
BILIRUB SERPL-MCNC: 0.6 MG/DL (ref 0.1–1)
BUN SERPL-MCNC: 45 MG/DL (ref 8–23)
CALCIUM SERPL-MCNC: 8.6 MG/DL (ref 8.7–10.5)
CHLORIDE SERPL-SCNC: 108 MMOL/L (ref 95–110)
CO2 SERPL-SCNC: 22 MMOL/L (ref 23–29)
CREAT SERPL-MCNC: 1.5 MG/DL (ref 0.5–1.4)
EAG (OHS): 160 MG/DL (ref 68–131)
ERYTHROCYTE [DISTWIDTH] IN BLOOD BY AUTOMATED COUNT: 14.9 % (ref 11.5–14.5)
ERYTHROCYTE [DISTWIDTH] IN BLOOD BY AUTOMATED COUNT: 15.2 % (ref 11.5–14.5)
GFR SERPLBLD CREATININE-BSD FMLA CKD-EPI: 49 ML/MIN/1.73/M2
GLUCOSE SERPL-MCNC: 156 MG/DL (ref 70–110)
HBA1C MFR BLD: 7.2 % (ref 4–5.6)
HCT VFR BLD AUTO: 29.1 % (ref 40–54)
HCT VFR BLD AUTO: 30.1 % (ref 40–54)
HGB BLD-MCNC: 9.4 GM/DL (ref 14–18)
HGB BLD-MCNC: 9.9 GM/DL (ref 14–18)
IMM GRANULOCYTES # BLD AUTO: 0.03 K/UL (ref 0–0.04)
IMM GRANULOCYTES # BLD AUTO: 0.06 K/UL (ref 0–0.04)
IMM GRANULOCYTES NFR BLD AUTO: 0.5 % (ref 0–0.5)
IMM GRANULOCYTES NFR BLD AUTO: 0.7 % (ref 0–0.5)
INDIRECT COOMBS: NORMAL
LYMPHOCYTES # BLD AUTO: 0.76 K/UL (ref 1–4.8)
LYMPHOCYTES # BLD AUTO: 0.85 K/UL (ref 1–4.8)
MCH RBC QN AUTO: 31.3 PG (ref 27–31)
MCH RBC QN AUTO: 31.3 PG (ref 27–31)
MCHC RBC AUTO-ENTMCNC: 32.3 G/DL (ref 32–36)
MCHC RBC AUTO-ENTMCNC: 32.9 G/DL (ref 32–36)
MCV RBC AUTO: 95 FL (ref 82–98)
MCV RBC AUTO: 97 FL (ref 82–98)
NUCLEATED RBC (/100WBC) (OHS): 0 /100 WBC
NUCLEATED RBC (/100WBC) (OHS): 0 /100 WBC
OB PNL STL: POSITIVE
PLATELET # BLD AUTO: 144 K/UL (ref 150–450)
PLATELET # BLD AUTO: 172 K/UL (ref 150–450)
PMV BLD AUTO: 10.5 FL (ref 9.2–12.9)
PMV BLD AUTO: 11.2 FL (ref 9.2–12.9)
POCT GLUCOSE: 143 MG/DL (ref 70–110)
POCT GLUCOSE: 211 MG/DL (ref 70–110)
POTASSIUM SERPL-SCNC: 3.8 MMOL/L (ref 3.5–5.1)
PROT SERPL-MCNC: 6.4 GM/DL (ref 6–8.4)
RBC # BLD AUTO: 3 M/UL (ref 4.6–6.2)
RBC # BLD AUTO: 3.16 M/UL (ref 4.6–6.2)
RELATIVE EOSINOPHIL (OHS): 0.2 %
RELATIVE EOSINOPHIL (OHS): 0.2 %
RELATIVE LYMPHOCYTE (OHS): 13.3 % (ref 18–48)
RELATIVE LYMPHOCYTE (OHS): 9.2 % (ref 18–48)
RELATIVE MONOCYTE (OHS): 5.6 % (ref 4–15)
RELATIVE MONOCYTE (OHS): 7.2 % (ref 4–15)
RELATIVE NEUTROPHIL (OHS): 78.5 % (ref 38–73)
RELATIVE NEUTROPHIL (OHS): 83.9 % (ref 38–73)
RH BLD: NORMAL
SODIUM SERPL-SCNC: 142 MMOL/L (ref 136–145)
SPECIMEN OUTDATE: NORMAL
WBC # BLD AUTO: 6.38 K/UL (ref 3.9–12.7)
WBC # BLD AUTO: 8.24 K/UL (ref 3.9–12.7)

## 2025-08-15 PROCEDURE — G0378 HOSPITAL OBSERVATION PER HR: HCPCS | Mod: HCNC

## 2025-08-15 PROCEDURE — 99285 EMERGENCY DEPT VISIT HI MDM: CPT | Mod: 25,HCNC

## 2025-08-15 PROCEDURE — 83036 HEMOGLOBIN GLYCOSYLATED A1C: CPT | Mod: HCNC | Performed by: STUDENT IN AN ORGANIZED HEALTH CARE EDUCATION/TRAINING PROGRAM

## 2025-08-15 PROCEDURE — 85025 COMPLETE CBC W/AUTO DIFF WBC: CPT | Mod: HCNC | Performed by: EMERGENCY MEDICINE

## 2025-08-15 PROCEDURE — 85025 COMPLETE CBC W/AUTO DIFF WBC: CPT | Mod: 91,HCNC | Performed by: STUDENT IN AN ORGANIZED HEALTH CARE EDUCATION/TRAINING PROGRAM

## 2025-08-15 PROCEDURE — 93005 ELECTROCARDIOGRAM TRACING: CPT | Mod: HCNC

## 2025-08-15 PROCEDURE — 36415 COLL VENOUS BLD VENIPUNCTURE: CPT | Mod: HCNC | Performed by: STUDENT IN AN ORGANIZED HEALTH CARE EDUCATION/TRAINING PROGRAM

## 2025-08-15 PROCEDURE — 63600175 PHARM REV CODE 636 W HCPCS: Mod: HCNC | Performed by: STUDENT IN AN ORGANIZED HEALTH CARE EDUCATION/TRAINING PROGRAM

## 2025-08-15 PROCEDURE — 86850 RBC ANTIBODY SCREEN: CPT | Mod: HCNC | Performed by: EMERGENCY MEDICINE

## 2025-08-15 PROCEDURE — 96374 THER/PROPH/DIAG INJ IV PUSH: CPT | Mod: HCNC

## 2025-08-15 PROCEDURE — 25000003 PHARM REV CODE 250: Mod: HCNC | Performed by: EMERGENCY MEDICINE

## 2025-08-15 PROCEDURE — 82374 ASSAY BLOOD CARBON DIOXIDE: CPT | Mod: HCNC | Performed by: EMERGENCY MEDICINE

## 2025-08-15 PROCEDURE — 93010 ELECTROCARDIOGRAM REPORT: CPT | Mod: HCNC,,, | Performed by: INTERNAL MEDICINE

## 2025-08-15 PROCEDURE — 99284 EMERGENCY DEPT VISIT MOD MDM: CPT | Mod: HCNC,,, | Performed by: INTERNAL MEDICINE

## 2025-08-15 PROCEDURE — 82272 OCCULT BLD FECES 1-3 TESTS: CPT | Mod: HCNC | Performed by: EMERGENCY MEDICINE

## 2025-08-15 PROCEDURE — 25000003 PHARM REV CODE 250: Mod: HCNC | Performed by: STUDENT IN AN ORGANIZED HEALTH CARE EDUCATION/TRAINING PROGRAM

## 2025-08-15 PROCEDURE — 96372 THER/PROPH/DIAG INJ SC/IM: CPT | Performed by: STUDENT IN AN ORGANIZED HEALTH CARE EDUCATION/TRAINING PROGRAM

## 2025-08-15 PROCEDURE — 63600175 PHARM REV CODE 636 W HCPCS: Mod: HCNC | Performed by: EMERGENCY MEDICINE

## 2025-08-15 PROCEDURE — 96376 TX/PRO/DX INJ SAME DRUG ADON: CPT

## 2025-08-15 PROCEDURE — 96361 HYDRATE IV INFUSION ADD-ON: CPT | Mod: HCNC

## 2025-08-15 RX ORDER — GLUCAGON 1 MG
1 KIT INJECTION
Status: DISCONTINUED | OUTPATIENT
Start: 2025-08-15 | End: 2025-08-16 | Stop reason: HOSPADM

## 2025-08-15 RX ORDER — PANTOPRAZOLE SODIUM 40 MG/10ML
40 INJECTION, POWDER, LYOPHILIZED, FOR SOLUTION INTRAVENOUS 2 TIMES DAILY
Status: DISCONTINUED | OUTPATIENT
Start: 2025-08-15 | End: 2025-08-16 | Stop reason: HOSPADM

## 2025-08-15 RX ORDER — INSULIN GLARGINE 100 [IU]/ML
10 INJECTION, SOLUTION SUBCUTANEOUS NIGHTLY
Status: DISCONTINUED | OUTPATIENT
Start: 2025-08-15 | End: 2025-08-15

## 2025-08-15 RX ORDER — ATORVASTATIN CALCIUM 40 MG/1
40 TABLET, FILM COATED ORAL DAILY
Status: DISCONTINUED | OUTPATIENT
Start: 2025-08-15 | End: 2025-08-16 | Stop reason: HOSPADM

## 2025-08-15 RX ORDER — NALOXONE HCL 0.4 MG/ML
0.02 VIAL (ML) INJECTION
Status: DISCONTINUED | OUTPATIENT
Start: 2025-08-15 | End: 2025-08-16 | Stop reason: HOSPADM

## 2025-08-15 RX ORDER — IBUPROFEN 200 MG
24 TABLET ORAL
Status: DISCONTINUED | OUTPATIENT
Start: 2025-08-15 | End: 2025-08-16 | Stop reason: HOSPADM

## 2025-08-15 RX ORDER — PANTOPRAZOLE SODIUM 40 MG/10ML
80 INJECTION, POWDER, LYOPHILIZED, FOR SOLUTION INTRAVENOUS
Status: COMPLETED | OUTPATIENT
Start: 2025-08-15 | End: 2025-08-15

## 2025-08-15 RX ORDER — IBUPROFEN 200 MG
16 TABLET ORAL
Status: DISCONTINUED | OUTPATIENT
Start: 2025-08-15 | End: 2025-08-16 | Stop reason: HOSPADM

## 2025-08-15 RX ORDER — ZOLPIDEM TARTRATE 5 MG/1
5 TABLET ORAL NIGHTLY PRN
Status: DISCONTINUED | OUTPATIENT
Start: 2025-08-15 | End: 2025-08-16 | Stop reason: HOSPADM

## 2025-08-15 RX ORDER — SODIUM CHLORIDE 0.9 % (FLUSH) 0.9 %
10 SYRINGE (ML) INJECTION EVERY 12 HOURS PRN
Status: DISCONTINUED | OUTPATIENT
Start: 2025-08-15 | End: 2025-08-16 | Stop reason: HOSPADM

## 2025-08-15 RX ORDER — INSULIN ASPART 100 [IU]/ML
0-5 INJECTION, SOLUTION INTRAVENOUS; SUBCUTANEOUS
Status: DISCONTINUED | OUTPATIENT
Start: 2025-08-15 | End: 2025-08-16 | Stop reason: HOSPADM

## 2025-08-15 RX ORDER — COLCHICINE 0.6 MG/1
0.6 TABLET, FILM COATED ORAL 2 TIMES DAILY
Status: DISCONTINUED | OUTPATIENT
Start: 2025-08-15 | End: 2025-08-16 | Stop reason: HOSPADM

## 2025-08-15 RX ADMIN — PANTOPRAZOLE SODIUM 40 MG: 40 INJECTION, POWDER, FOR SOLUTION INTRAVENOUS at 09:08

## 2025-08-15 RX ADMIN — ZOLPIDEM TARTRATE 5 MG: 5 TABLET, FILM COATED ORAL at 11:08

## 2025-08-15 RX ADMIN — COLCHICINE 0.6 MG: 0.6 TABLET ORAL at 09:08

## 2025-08-15 RX ADMIN — PANTOPRAZOLE SODIUM 80 MG: 40 INJECTION, POWDER, FOR SOLUTION INTRAVENOUS at 01:08

## 2025-08-15 RX ADMIN — SODIUM CHLORIDE 500 ML: 9 INJECTION, SOLUTION INTRAVENOUS at 11:08

## 2025-08-15 RX ADMIN — INSULIN ASPART 1 UNITS: 100 INJECTION, SOLUTION INTRAVENOUS; SUBCUTANEOUS at 09:08

## 2025-08-16 VITALS
BODY MASS INDEX: 31.85 KG/M2 | OXYGEN SATURATION: 97 % | HEART RATE: 77 BPM | WEIGHT: 227.5 LBS | DIASTOLIC BLOOD PRESSURE: 73 MMHG | TEMPERATURE: 98 F | HEIGHT: 71 IN | RESPIRATION RATE: 17 BRPM | SYSTOLIC BLOOD PRESSURE: 169 MMHG

## 2025-08-16 LAB
ABSOLUTE EOSINOPHIL (OHS): 0.05 K/UL
ABSOLUTE MONOCYTE (OHS): 0.77 K/UL (ref 0.3–1)
ABSOLUTE NEUTROPHIL COUNT (OHS): 3.72 K/UL (ref 1.8–7.7)
ANION GAP (OHS): 8 MMOL/L (ref 8–16)
BASOPHILS # BLD AUTO: 0.02 K/UL
BASOPHILS NFR BLD AUTO: 0.3 %
BUN SERPL-MCNC: 31 MG/DL (ref 8–23)
CALCIUM SERPL-MCNC: 8.6 MG/DL (ref 8.7–10.5)
CHLORIDE SERPL-SCNC: 114 MMOL/L (ref 95–110)
CO2 SERPL-SCNC: 24 MMOL/L (ref 23–29)
CREAT SERPL-MCNC: 1.3 MG/DL (ref 0.5–1.4)
ERYTHROCYTE [DISTWIDTH] IN BLOOD BY AUTOMATED COUNT: 15.1 % (ref 11.5–14.5)
GFR SERPLBLD CREATININE-BSD FMLA CKD-EPI: 58 ML/MIN/1.73/M2
GLUCOSE SERPL-MCNC: 85 MG/DL (ref 70–110)
HCT VFR BLD AUTO: 28.7 % (ref 40–54)
HGB BLD-MCNC: 9.4 GM/DL (ref 14–18)
IMM GRANULOCYTES # BLD AUTO: 0.03 K/UL (ref 0–0.04)
IMM GRANULOCYTES NFR BLD AUTO: 0.5 % (ref 0–0.5)
LYMPHOCYTES # BLD AUTO: 1.37 K/UL (ref 1–4.8)
MAGNESIUM SERPL-MCNC: 2 MG/DL (ref 1.6–2.6)
MCH RBC QN AUTO: 31.8 PG (ref 27–31)
MCHC RBC AUTO-ENTMCNC: 32.8 G/DL (ref 32–36)
MCV RBC AUTO: 97 FL (ref 82–98)
NUCLEATED RBC (/100WBC) (OHS): 0 /100 WBC
PLATELET # BLD AUTO: 162 K/UL (ref 150–450)
PMV BLD AUTO: 11.2 FL (ref 9.2–12.9)
POCT GLUCOSE: 125 MG/DL (ref 70–110)
POCT GLUCOSE: 258 MG/DL (ref 70–110)
POCT GLUCOSE: 76 MG/DL (ref 70–110)
POTASSIUM SERPL-SCNC: 3.4 MMOL/L (ref 3.5–5.1)
RBC # BLD AUTO: 2.96 M/UL (ref 4.6–6.2)
RELATIVE EOSINOPHIL (OHS): 0.8 %
RELATIVE LYMPHOCYTE (OHS): 23 % (ref 18–48)
RELATIVE MONOCYTE (OHS): 12.9 % (ref 4–15)
RELATIVE NEUTROPHIL (OHS): 62.5 % (ref 38–73)
SODIUM SERPL-SCNC: 146 MMOL/L (ref 136–145)
WBC # BLD AUTO: 5.96 K/UL (ref 3.9–12.7)

## 2025-08-16 PROCEDURE — 25000003 PHARM REV CODE 250: Mod: HCNC | Performed by: STUDENT IN AN ORGANIZED HEALTH CARE EDUCATION/TRAINING PROGRAM

## 2025-08-16 PROCEDURE — 80048 BASIC METABOLIC PNL TOTAL CA: CPT | Mod: HCNC | Performed by: STUDENT IN AN ORGANIZED HEALTH CARE EDUCATION/TRAINING PROGRAM

## 2025-08-16 PROCEDURE — 83735 ASSAY OF MAGNESIUM: CPT | Mod: HCNC | Performed by: STUDENT IN AN ORGANIZED HEALTH CARE EDUCATION/TRAINING PROGRAM

## 2025-08-16 PROCEDURE — 63600175 PHARM REV CODE 636 W HCPCS: Mod: HCNC | Performed by: STUDENT IN AN ORGANIZED HEALTH CARE EDUCATION/TRAINING PROGRAM

## 2025-08-16 PROCEDURE — 36415 COLL VENOUS BLD VENIPUNCTURE: CPT | Mod: HCNC | Performed by: STUDENT IN AN ORGANIZED HEALTH CARE EDUCATION/TRAINING PROGRAM

## 2025-08-16 PROCEDURE — 96372 THER/PROPH/DIAG INJ SC/IM: CPT | Performed by: STUDENT IN AN ORGANIZED HEALTH CARE EDUCATION/TRAINING PROGRAM

## 2025-08-16 PROCEDURE — 96376 TX/PRO/DX INJ SAME DRUG ADON: CPT

## 2025-08-16 PROCEDURE — G0378 HOSPITAL OBSERVATION PER HR: HCPCS | Mod: HCNC

## 2025-08-16 PROCEDURE — 96375 TX/PRO/DX INJ NEW DRUG ADDON: CPT

## 2025-08-16 PROCEDURE — 85025 COMPLETE CBC W/AUTO DIFF WBC: CPT | Mod: HCNC | Performed by: STUDENT IN AN ORGANIZED HEALTH CARE EDUCATION/TRAINING PROGRAM

## 2025-08-16 RX ORDER — POTASSIUM CHLORIDE 20 MEQ/1
40 TABLET, EXTENDED RELEASE ORAL ONCE
Status: COMPLETED | OUTPATIENT
Start: 2025-08-16 | End: 2025-08-16

## 2025-08-16 RX ORDER — PANTOPRAZOLE SODIUM 40 MG/1
40 TABLET, DELAYED RELEASE ORAL 2 TIMES DAILY
Qty: 60 TABLET | Refills: 0 | Status: SHIPPED | OUTPATIENT
Start: 2025-08-16 | End: 2025-08-26 | Stop reason: SDUPTHER

## 2025-08-16 RX ADMIN — PANTOPRAZOLE SODIUM 40 MG: 40 INJECTION, POWDER, FOR SOLUTION INTRAVENOUS at 08:08

## 2025-08-16 RX ADMIN — ATORVASTATIN CALCIUM 40 MG: 40 TABLET, FILM COATED ORAL at 08:08

## 2025-08-16 RX ADMIN — INSULIN ASPART 3 UNITS: 100 INJECTION, SOLUTION INTRAVENOUS; SUBCUTANEOUS at 12:08

## 2025-08-16 RX ADMIN — PREDNISONE 9 MG: 5 TABLET ORAL at 08:08

## 2025-08-16 RX ADMIN — DEXTROSE MONOHYDRATE 12.5 G: 25 INJECTION, SOLUTION INTRAVENOUS at 06:08

## 2025-08-16 RX ADMIN — POTASSIUM CHLORIDE 40 MEQ: 1500 TABLET, EXTENDED RELEASE ORAL at 12:08

## 2025-08-16 RX ADMIN — COLCHICINE 0.6 MG: 0.6 TABLET ORAL at 08:08

## 2025-08-18 DIAGNOSIS — K92.1 MELENA: Primary | ICD-10-CM

## 2025-08-19 ENCOUNTER — PATIENT MESSAGE (OUTPATIENT)
Dept: GASTROENTEROLOGY | Facility: CLINIC | Age: 75
End: 2025-08-19
Payer: MEDICARE

## 2025-08-19 ENCOUNTER — PATIENT OUTREACH (OUTPATIENT)
Dept: ADMINISTRATIVE | Facility: CLINIC | Age: 75
End: 2025-08-19
Payer: MEDICARE

## 2025-08-19 ENCOUNTER — PATIENT MESSAGE (OUTPATIENT)
Dept: FAMILY MEDICINE | Facility: CLINIC | Age: 75
End: 2025-08-19
Payer: MEDICARE

## 2025-08-20 ENCOUNTER — TELEPHONE (OUTPATIENT)
Dept: ENDOSCOPY | Facility: HOSPITAL | Age: 75
End: 2025-08-20
Payer: MEDICARE

## 2025-08-20 DIAGNOSIS — Z12.11 SPECIAL SCREENING FOR MALIGNANT NEOPLASMS, COLON: Primary | ICD-10-CM

## 2025-08-20 DIAGNOSIS — K92.1 MELENA: ICD-10-CM

## 2025-08-20 LAB
OHS QRS DURATION: 154 MS
OHS QTC CALCULATION: 486 MS

## 2025-08-23 ENCOUNTER — CLINICAL SUPPORT (OUTPATIENT)
Dept: CARDIOLOGY | Facility: HOSPITAL | Age: 75
End: 2025-08-23
Attending: INTERNAL MEDICINE
Payer: MEDICARE

## 2025-08-23 ENCOUNTER — CLINICAL SUPPORT (OUTPATIENT)
Dept: CARDIOLOGY | Facility: HOSPITAL | Age: 75
End: 2025-08-23
Payer: MEDICARE

## 2025-08-23 DIAGNOSIS — I25.5 ISCHEMIC CARDIOMYOPATHY: ICD-10-CM

## 2025-08-23 DIAGNOSIS — Z95.810 PRESENCE OF AUTOMATIC (IMPLANTABLE) CARDIAC DEFIBRILLATOR: ICD-10-CM

## 2025-08-26 ENCOUNTER — TELEPHONE (OUTPATIENT)
Dept: ENDOSCOPY | Facility: HOSPITAL | Age: 75
End: 2025-08-26
Payer: MEDICARE

## 2025-08-26 ENCOUNTER — LAB VISIT (OUTPATIENT)
Dept: LAB | Facility: HOSPITAL | Age: 75
End: 2025-08-26
Attending: STUDENT IN AN ORGANIZED HEALTH CARE EDUCATION/TRAINING PROGRAM
Payer: MEDICARE

## 2025-08-26 ENCOUNTER — OFFICE VISIT (OUTPATIENT)
Dept: GASTROENTEROLOGY | Facility: CLINIC | Age: 75
End: 2025-08-26
Payer: MEDICARE

## 2025-08-26 ENCOUNTER — OFFICE VISIT (OUTPATIENT)
Dept: CARDIOLOGY | Facility: CLINIC | Age: 75
End: 2025-08-26
Payer: MEDICARE

## 2025-08-26 VITALS
OXYGEN SATURATION: 96 % | SYSTOLIC BLOOD PRESSURE: 93 MMHG | HEART RATE: 95 BPM | WEIGHT: 230.5 LBS | DIASTOLIC BLOOD PRESSURE: 62 MMHG | HEIGHT: 71 IN | BODY MASS INDEX: 32.27 KG/M2

## 2025-08-26 DIAGNOSIS — K92.1 MELENA: Primary | ICD-10-CM

## 2025-08-26 DIAGNOSIS — E78.5 DYSLIPIDEMIA: ICD-10-CM

## 2025-08-26 DIAGNOSIS — Z79.4 INSULIN DEPENDENT TYPE 2 DIABETES MELLITUS: ICD-10-CM

## 2025-08-26 DIAGNOSIS — Z09 HOSPITAL DISCHARGE FOLLOW-UP: ICD-10-CM

## 2025-08-26 DIAGNOSIS — Z01.810 PREOPERATIVE CARDIOVASCULAR EXAMINATION: ICD-10-CM

## 2025-08-26 DIAGNOSIS — E11.9 INSULIN DEPENDENT TYPE 2 DIABETES MELLITUS: ICD-10-CM

## 2025-08-26 DIAGNOSIS — K92.2 GASTROINTESTINAL HEMORRHAGE, UNSPECIFIED GASTROINTESTINAL HEMORRHAGE TYPE: Primary | ICD-10-CM

## 2025-08-26 DIAGNOSIS — E11.40 TYPE 2 DIABETES MELLITUS WITH DIABETIC NEUROPATHY, WITH LONG-TERM CURRENT USE OF INSULIN: ICD-10-CM

## 2025-08-26 DIAGNOSIS — E11.9 DM TYPE 2 WITHOUT RETINOPATHY: ICD-10-CM

## 2025-08-26 DIAGNOSIS — K92.1 HEMATOCHEZIA: ICD-10-CM

## 2025-08-26 DIAGNOSIS — I25.5 ISCHEMIC CARDIOMYOPATHY: ICD-10-CM

## 2025-08-26 DIAGNOSIS — Z95.810 PRESENCE OF AUTOMATIC (IMPLANTABLE) CARDIAC DEFIBRILLATOR: ICD-10-CM

## 2025-08-26 DIAGNOSIS — Z79.4 TYPE 2 DIABETES MELLITUS WITH DIABETIC NEUROPATHY, WITH LONG-TERM CURRENT USE OF INSULIN: ICD-10-CM

## 2025-08-26 DIAGNOSIS — I10 ESSENTIAL HYPERTENSION: ICD-10-CM

## 2025-08-26 PROCEDURE — 1101F PT FALLS ASSESS-DOCD LE1/YR: CPT | Mod: CPTII,HCNC,95, | Performed by: NURSE PRACTITIONER

## 2025-08-26 PROCEDURE — 4010F ACE/ARB THERAPY RXD/TAKEN: CPT | Mod: CPTII,HCNC,95, | Performed by: NURSE PRACTITIONER

## 2025-08-26 PROCEDURE — 99999 PR PBB SHADOW E&M-EST. PATIENT-LVL V: CPT | Mod: PBBFAC,HCNC,, | Performed by: STUDENT IN AN ORGANIZED HEALTH CARE EDUCATION/TRAINING PROGRAM

## 2025-08-26 PROCEDURE — 3288F FALL RISK ASSESSMENT DOCD: CPT | Mod: CPTII,HCNC,95, | Performed by: NURSE PRACTITIONER

## 2025-08-26 PROCEDURE — 1126F AMNT PAIN NOTED NONE PRSNT: CPT | Mod: CPTII,HCNC,95, | Performed by: NURSE PRACTITIONER

## 2025-08-26 PROCEDURE — 1159F MED LIST DOCD IN RCRD: CPT | Mod: CPTII,HCNC,95, | Performed by: NURSE PRACTITIONER

## 2025-08-26 PROCEDURE — 98007 SYNCH AUDIO-VIDEO EST HI 40: CPT | Mod: HCNC,95,, | Performed by: NURSE PRACTITIONER

## 2025-08-26 PROCEDURE — 3051F HG A1C>EQUAL 7.0%<8.0%: CPT | Mod: CPTII,HCNC,95, | Performed by: NURSE PRACTITIONER

## 2025-08-26 PROCEDURE — 1160F RVW MEDS BY RX/DR IN RCRD: CPT | Mod: CPTII,HCNC,95, | Performed by: NURSE PRACTITIONER

## 2025-08-26 RX ORDER — PANTOPRAZOLE SODIUM 40 MG/1
40 TABLET, DELAYED RELEASE ORAL 2 TIMES DAILY
Qty: 120 TABLET | Refills: 0 | Status: SHIPPED | OUTPATIENT
Start: 2025-09-12 | End: 2025-11-11

## 2025-08-27 ENCOUNTER — PATIENT MESSAGE (OUTPATIENT)
Dept: CARDIOLOGY | Facility: CLINIC | Age: 75
End: 2025-08-27
Payer: MEDICARE

## 2025-08-28 ENCOUNTER — TELEPHONE (OUTPATIENT)
Dept: CARDIOLOGY | Facility: CLINIC | Age: 75
End: 2025-08-28
Payer: MEDICARE

## 2025-08-28 ENCOUNTER — PATIENT MESSAGE (OUTPATIENT)
Dept: CARDIOLOGY | Facility: CLINIC | Age: 75
End: 2025-08-28
Payer: MEDICARE

## 2025-08-30 DIAGNOSIS — M19.90 INFLAMMATORY ARTHRITIS: ICD-10-CM

## 2025-08-30 DIAGNOSIS — M35.3 PMR (POLYMYALGIA RHEUMATICA): ICD-10-CM

## 2025-08-30 RX ORDER — PREDNISONE 5 MG/1
TABLET ORAL
Qty: 95 TABLET | Refills: 0 | Status: CANCELLED | OUTPATIENT
Start: 2025-08-30 | End: 2025-10-13

## 2025-09-03 RX ORDER — PREDNISONE 1 MG/1
3 TABLET ORAL DAILY
Qty: 90 TABLET | Refills: 0 | Status: SHIPPED | OUTPATIENT
Start: 2025-09-03 | End: 2025-10-05

## 2025-09-04 ENCOUNTER — PATIENT MESSAGE (OUTPATIENT)
Dept: RHEUMATOLOGY | Facility: CLINIC | Age: 75
End: 2025-09-04
Payer: MEDICARE

## 2025-09-04 DIAGNOSIS — M19.90 INFLAMMATORY ARTHRITIS: Primary | ICD-10-CM

## 2025-09-04 DIAGNOSIS — M35.3 PMR (POLYMYALGIA RHEUMATICA): ICD-10-CM

## 2025-09-04 RX ORDER — PREDNISONE 2.5 MG/1
2.5 TABLET ORAL 2 TIMES DAILY
Qty: 60 TABLET | Refills: 0 | Status: SHIPPED | OUTPATIENT
Start: 2025-09-04 | End: 2025-10-05

## (undated) DEVICE — COVER PROBE US 5.5X58L NON LTX

## (undated) DEVICE — GUIDEWIRE OMNI J TIP 185CM

## (undated) DEVICE — ADHESIVE DERMABOND ADVANCED

## (undated) DEVICE — VISIPAQUE 320 200ML +PK

## (undated) DEVICE — GAUZE SPONGE 4X4 12PLY

## (undated) DEVICE — KIT GLIDESHEATH SLEND 6FR 10CM

## (undated) DEVICE — BLADE PLASMA WIDE SPATULA TIP

## (undated) DEVICE — CATH OPTITORQUE TIGER 5F 100CM

## (undated) DEVICE — CONNECTOR ACCU-FLO 3WAY

## (undated) DEVICE — KIT WRENCH

## (undated) DEVICE — HEMOSTAT VASC BAND LONG 27CM

## (undated) DEVICE — TUBING HPCIL ROT M/F ADPT 48IN

## (undated) DEVICE — LEAD CAP DF4/IS-1/DF-1

## (undated) DEVICE — KIT LEFT HEART MANIFOLD CUSTOM

## (undated) DEVICE — SPIKE SHORT LG BORE 1-WAY 2IN

## (undated) DEVICE — PAD DEFIB CADENCE ADULT R2

## (undated) DEVICE — GUIDEWIRE EMERALD .035IN 260CM

## (undated) DEVICE — SAFESHEATH II 8FR 13CM

## (undated) DEVICE — SLING SWATHE UNIVERSAL FOAM

## (undated) DEVICE — Device

## (undated) DEVICE — GUIDEWIRE STD .035X260CM ANG

## (undated) DEVICE — PACK PACER PERMANENT

## (undated) DEVICE — DRAPE ANGIO BRACH 38X44IN

## (undated) DEVICE — CATH ANG PIGTAIL 4FR INFINITY

## (undated) DEVICE — DRESSING AQUACEL FOAM 4 X 8

## (undated) DEVICE — ELECTRODE REM PLYHSV RETURN 9